# Patient Record
Sex: FEMALE | Race: BLACK OR AFRICAN AMERICAN | NOT HISPANIC OR LATINO | Employment: UNEMPLOYED | URBAN - METROPOLITAN AREA
[De-identification: names, ages, dates, MRNs, and addresses within clinical notes are randomized per-mention and may not be internally consistent; named-entity substitution may affect disease eponyms.]

---

## 2020-08-11 ENCOUNTER — TELEPHONE (OUTPATIENT)
Dept: OPHTHALMOLOGY | Facility: CLINIC | Age: 80
End: 2020-08-11

## 2020-08-11 ENCOUNTER — TELEPHONE (OUTPATIENT)
Dept: OPTOMETRY | Facility: CLINIC | Age: 80
End: 2020-08-11

## 2020-08-11 ENCOUNTER — OFFICE VISIT (OUTPATIENT)
Dept: OPTOMETRY | Facility: CLINIC | Age: 80
End: 2020-08-11
Payer: COMMERCIAL

## 2020-08-11 DIAGNOSIS — E11.36 TYPE 2 DIABETES MELLITUS WITH CATARACT: ICD-10-CM

## 2020-08-11 DIAGNOSIS — H10.13 ALLERGIC CONJUNCTIVITIS OF BOTH EYES: ICD-10-CM

## 2020-08-11 DIAGNOSIS — H04.123 DRY EYE SYNDROME OF BOTH EYES: ICD-10-CM

## 2020-08-11 DIAGNOSIS — H52.4 BILATERAL PRESBYOPIA: Primary | ICD-10-CM

## 2020-08-11 DIAGNOSIS — E11.9 TYPE 2 DIABETES MELLITUS WITHOUT RETINOPATHY: ICD-10-CM

## 2020-08-11 DIAGNOSIS — H40.023 OAG (OPEN ANGLE GLAUCOMA) SUSPECT, HIGH RISK, BILATERAL: ICD-10-CM

## 2020-08-11 DIAGNOSIS — H25.13 SENILE NUCLEAR SCLEROSIS, BILATERAL: ICD-10-CM

## 2020-08-11 PROCEDURE — 92004 COMPRE OPH EXAM NEW PT 1/>: CPT | Mod: S$GLB,,, | Performed by: OPTOMETRIST

## 2020-08-11 PROCEDURE — 99999 PR PBB SHADOW E&M-NEW PATIENT-LVL III: ICD-10-PCS | Mod: PBBFAC,,, | Performed by: OPTOMETRIST

## 2020-08-11 PROCEDURE — 92015 PR REFRACTION: ICD-10-PCS | Mod: S$GLB,,, | Performed by: OPTOMETRIST

## 2020-08-11 PROCEDURE — 92004 PR EYE EXAM, NEW PATIENT,COMPREHESV: ICD-10-PCS | Mod: S$GLB,,, | Performed by: OPTOMETRIST

## 2020-08-11 PROCEDURE — 99999 PR PBB SHADOW E&M-NEW PATIENT-LVL III: CPT | Mod: PBBFAC,,, | Performed by: OPTOMETRIST

## 2020-08-11 PROCEDURE — 92015 DETERMINE REFRACTIVE STATE: CPT | Mod: S$GLB,,, | Performed by: OPTOMETRIST

## 2020-08-11 RX ORDER — ASPIRIN 81 MG/1
81 TABLET ORAL DAILY
Status: ON HOLD | COMMUNITY
End: 2022-11-11 | Stop reason: HOSPADM

## 2020-08-11 RX ORDER — METFORMIN HYDROCHLORIDE 500 MG/1
TABLET ORAL
COMMUNITY
Start: 2020-01-14 | End: 2022-11-14 | Stop reason: ALTCHOICE

## 2020-08-11 RX ORDER — AMLODIPINE BESYLATE 10 MG/1
10 TABLET ORAL DAILY
Status: ON HOLD | COMMUNITY
Start: 2020-06-09 | End: 2022-10-14 | Stop reason: HOSPADM

## 2020-08-11 RX ORDER — GLYBURIDE 2.5 MG/1
2.5 TABLET ORAL DAILY
Status: ON HOLD | COMMUNITY
Start: 2020-02-19 | End: 2022-10-25 | Stop reason: HOSPADM

## 2020-08-11 RX ORDER — ATORVASTATIN CALCIUM 10 MG/1
TABLET, FILM COATED ORAL
Status: ON HOLD | COMMUNITY
Start: 2020-06-29 | End: 2022-10-14 | Stop reason: HOSPADM

## 2020-08-11 RX ORDER — LISINOPRIL AND HYDROCHLOROTHIAZIDE 12.5; 2 MG/1; MG/1
TABLET ORAL
Status: ON HOLD | COMMUNITY
Start: 2020-06-29 | End: 2022-10-14 | Stop reason: HOSPADM

## 2020-08-11 NOTE — TELEPHONE ENCOUNTER
Tried to call pt to schedule a cataract eval with Dr Garsia per Dr Elliott.  No answer, LM for pt to call to schedule.

## 2020-08-11 NOTE — TELEPHONE ENCOUNTER
----- Message from Shila Clarke sent at 8/11/2020  3:38 PM CDT -----  Good afternoon. Pt is being referred for cataract eval. Thank you .

## 2020-08-11 NOTE — PROGRESS NOTES
CHARLES     ELO: 3 yrs ago  Chief complaint (CC): pt stated blurred vision distance but more blurred   at near  Glasses? None- used to wear bifocals but states she doesn't use bc they   dont work- did not bring with her  Contacts? Never   H/o eye surgery, injections or laser: none  H/o eye injury: none  Known eye conditions? none  Family h/o eye conditions? Mother- glaucoma, cataracts  Eye gtts? otc at bid -3 weeks ago but stopped bc they did not work      (--) Flashes (--)  Floaters (--) Mucous   (++)  Tearing (++) Itching (--) Burning   (++) Headaches (--) Eye Pain/discomfort (++) Irritation   (--)  Redness (--) Double vision (++) Blurry vision    Diabetic? NIDDM -metformin & gliperide   A1c? No results found for: HGBA1C.  Not sure tried calling [facility] to get   last a1c. Last seen in January  Did not check today sugar      Last edited by Katarina Guzmán on 8/11/2020  2:31 PM. (History)            Assessment /Plan     For exam results, see Encounter Report.    Allergic conjunctivitis of both eyes    Dry eye syndrome of both eyes    Type 2 diabetes mellitus without retinopathy    Senile nuclear sclerosis, bilateral  -     Ambulatory referral/consult to Ophthalmology; Future; Expected date: 08/18/2020    Type 2 diabetes mellitus with cataract    OAG (open angle glaucoma) suspect, high risk, bilateral  -     Posterior Segment OCT Optic Nerve- Both eyes; Future  -     Guerra Visual Field - OU - Extended - Both Eyes; Future    Bilateral presbyopia      1. Recommend Zaditor or Alaway bid OU and cool compresses to help soothe itching. Patient advised to RTC if condition gets worse.   2. Recommend Systane Ultra or Refresh Optive BID-TID OU to aid with symptoms of dry eyes.  3. BS control. No signs of diabetic retinopathy. Monitor with annual exam.  4-5. Nuclear sclerotic cataract - visually significant OS. Possible that glaucoma is playing a role. Refer to Dr Garsia  7.  (+) FHx- mother. IOP 23 OD, 22 OS> c/d 0.85 OD, 0.75  OS. Educated pt on findings w/understanding. RTC 2-4 weeks IOP/OCT/HVF/pachy.   8. Hold SRx. Minimal improvement with glasses Rx.

## 2020-08-12 ENCOUNTER — CLINICAL SUPPORT (OUTPATIENT)
Dept: OPHTHALMOLOGY | Facility: CLINIC | Age: 80
End: 2020-08-12
Payer: MEDICARE

## 2020-08-12 ENCOUNTER — TELEPHONE (OUTPATIENT)
Dept: OPHTHALMOLOGY | Facility: CLINIC | Age: 80
End: 2020-08-12

## 2020-08-12 ENCOUNTER — OFFICE VISIT (OUTPATIENT)
Dept: OPTOMETRY | Facility: CLINIC | Age: 80
End: 2020-08-12
Payer: MEDICARE

## 2020-08-12 DIAGNOSIS — H40.1131 PRIMARY OPEN ANGLE GLAUCOMA (POAG) OF BOTH EYES, MILD STAGE: ICD-10-CM

## 2020-08-12 DIAGNOSIS — H40.1113 PRIMARY OPEN ANGLE GLAUCOMA (POAG) OF RIGHT EYE, SEVERE STAGE: Primary | ICD-10-CM

## 2020-08-12 DIAGNOSIS — H40.023 OAG (OPEN ANGLE GLAUCOMA) SUSPECT, HIGH RISK, BILATERAL: ICD-10-CM

## 2020-08-12 PROCEDURE — 99999 PR PBB SHADOW E&M-EST. PATIENT-LVL II: ICD-10-PCS | Mod: PBBFAC,,, | Performed by: OPTOMETRIST

## 2020-08-12 PROCEDURE — 92012 PR EYE EXAM, EST PATIENT,INTERMED: ICD-10-PCS | Mod: S$PBB,,, | Performed by: OPTOMETRIST

## 2020-08-12 PROCEDURE — 92133 POSTERIOR SEGMENT OCT OPTIC NERVE(OCULAR COHERENCE TOMOGRAPHY) - OU - BOTH EYES: ICD-10-PCS | Mod: 26,S$PBB,, | Performed by: OPTOMETRIST

## 2020-08-12 PROCEDURE — 99999 PR PBB SHADOW E&M-EST. PATIENT-LVL II: CPT | Mod: PBBFAC,,, | Performed by: OPTOMETRIST

## 2020-08-12 PROCEDURE — 92083 EXTENDED VISUAL FIELD XM: CPT | Mod: PBBFAC

## 2020-08-12 PROCEDURE — 92133 CPTRZD OPH DX IMG PST SGM ON: CPT | Mod: PBBFAC

## 2020-08-12 PROCEDURE — 92012 INTRM OPH EXAM EST PATIENT: CPT | Mod: S$PBB,,, | Performed by: OPTOMETRIST

## 2020-08-12 PROCEDURE — 92083 HUMPHREY VISUAL FIELD - OU - BOTH EYES: ICD-10-PCS | Mod: 26,S$PBB,, | Performed by: OPTOMETRIST

## 2020-08-12 PROCEDURE — 92133 CPTRZD OPH DX IMG PST SGM ON: CPT | Mod: 26,S$PBB,, | Performed by: OPTOMETRIST

## 2020-08-12 PROCEDURE — 92083 EXTENDED VISUAL FIELD XM: CPT | Mod: 26,S$PBB,, | Performed by: OPTOMETRIST

## 2020-08-12 PROCEDURE — 99212 OFFICE O/P EST SF 10 MIN: CPT | Mod: PBBFAC,25 | Performed by: OPTOMETRIST

## 2020-08-12 RX ORDER — TIMOLOL MALEATE 5 MG/ML
1 SOLUTION/ DROPS OPHTHALMIC 2 TIMES DAILY
Qty: 10 ML | Refills: 11 | Status: SHIPPED | OUTPATIENT
Start: 2020-08-12 | End: 2020-09-21

## 2020-08-12 RX ORDER — LATANOPROST 50 UG/ML
1 SOLUTION/ DROPS OPHTHALMIC DAILY
Qty: 2.5 ML | Refills: 11 | Status: SHIPPED | OUTPATIENT
Start: 2020-08-12 | End: 2021-01-22

## 2020-08-12 NOTE — TELEPHONE ENCOUNTER
Tried to call pt to set up an appt with Dr Garsia.  No answer, LM with appt date and time (8/28/20 at 9:00am)and advised pt to call and let us know if that date and time works for her.

## 2020-08-12 NOTE — PROGRESS NOTES
OCT DONE OU     24-2  SS DONE OU     REL & FIX = FAIR OU         COOP =   FAIR     PATIENT DENIES ANY ALLERGIES TO LATEX/ADHESIVES AT THIS TIME      JTHOMAS                     Sphere Cyl[employer] [place] [facility]   Right Colt   20/400 blur   Left +0.50 +0.75 090 20/60+

## 2020-08-14 NOTE — PROGRESS NOTES
HPI     ELO: 3 yrs ago   Chief complaint (CC): here to review oct, hvf and iop   Glasses? None  Contacts? Never   H/o eye surgery, injections or laser: none   H/o eye injury: none   Known eye conditions? none   Family h/o eye conditions? Mother- glaucoma, cataracts   Eye gtts? otc at bid -3 weeks ago but stopped bc they did not work           Last edited by Nicole Cross on 8/12/2020  4:16 PM. (History)            Assessment /Plan     For exam results, see Encounter Report.    Primary open angle glaucoma (POAG) of right eye, severe stage  -     latanoprost (XALATAN) 0.005 % ophthalmic solution; Place 1 drop into both eyes once daily.  Dispense: 2.5 mL; Refill: 11  -     timolol maleate 0.5% (TIMOPTIC) 0.5 % Drop; Place 1 drop into the right eye 2 (two) times daily.  Dispense: 10 mL; Refill: 11    Primary open angle glaucoma (POAG) of both eyes, mild stage  -     latanoprost (XALATAN) 0.005 % ophthalmic solution; Place 1 drop into both eyes once daily.  Dispense: 2.5 mL; Refill: 11  -     timolol maleate 0.5% (TIMOPTIC) 0.5 % Drop; Place 1 drop into the right eye 2 (two) times daily.  Dispense: 10 mL; Refill: 11       (+) FHx- mother. IOP 28 OD, 23 OS. Last IOP 23 OD, 22 OS> c/d 0.85 OD, 0.75 OS. Pt reports possibly being on drops for this previously.   Pachy 579 OD, 608 OS  8/12/2020 OCt OD borderline N, thin T, TI, TS, G, NI, OS thin G, Ti, NI, borderline N and T  8/12/2020 HVF OD severely depressed VF w/some inferior sparing, OS WNL  Educated pt on findings w/understanding.  Consequences of noncompliance and lack of f/u reviewed.  Rx Timolol BiD OD and Latanoprost QhS OU.   RTC 6 weeks IOP.      Pt's daughter will be going to work out of town ([place]?)

## 2020-09-21 ENCOUNTER — OFFICE VISIT (OUTPATIENT)
Dept: OPTOMETRY | Facility: CLINIC | Age: 80
End: 2020-09-21
Payer: MEDICARE

## 2020-09-21 DIAGNOSIS — H40.1121 PRIMARY OPEN ANGLE GLAUCOMA (POAG) OF LEFT EYE, MILD STAGE: ICD-10-CM

## 2020-09-21 DIAGNOSIS — H40.1113 PRIMARY OPEN ANGLE GLAUCOMA (POAG) OF RIGHT EYE, SEVERE STAGE: Primary | ICD-10-CM

## 2020-09-21 DIAGNOSIS — H04.123 DRY EYE SYNDROME OF BOTH EYES: ICD-10-CM

## 2020-09-21 PROCEDURE — 99999 PR PBB SHADOW E&M-EST. PATIENT-LVL III: ICD-10-PCS | Mod: PBBFAC,,, | Performed by: OPTOMETRIST

## 2020-09-21 PROCEDURE — 99213 OFFICE O/P EST LOW 20 MIN: CPT | Mod: PBBFAC | Performed by: OPTOMETRIST

## 2020-09-21 PROCEDURE — 92012 PR EYE EXAM, EST PATIENT,INTERMED: ICD-10-PCS | Mod: S$PBB,,, | Performed by: OPTOMETRIST

## 2020-09-21 PROCEDURE — 99999 PR PBB SHADOW E&M-EST. PATIENT-LVL III: CPT | Mod: PBBFAC,,, | Performed by: OPTOMETRIST

## 2020-09-21 PROCEDURE — 92012 INTRM OPH EXAM EST PATIENT: CPT | Mod: S$PBB,,, | Performed by: OPTOMETRIST

## 2020-09-21 RX ORDER — DORZOLAMIDE HYDROCHLORIDE AND TIMOLOL MALEATE 20; 5 MG/ML; MG/ML
1 SOLUTION/ DROPS OPHTHALMIC 2 TIMES DAILY
Qty: 10 ML | Refills: 11 | Status: SHIPPED | OUTPATIENT
Start: 2020-09-21 | End: 2021-10-18

## 2020-09-21 NOTE — PROGRESS NOTES
HPI     6 wk IOP   Chief complaint (CC): here for iop check no problems or concerns   Doing well with gtts     Latanoprost QHS OU  Timolol BIDOD    Last edited by Katarina Abreu on 9/21/2020  3:12 PM. (History)            Assessment /Plan     For exam results, see Encounter Report.    Primary open angle glaucoma (POAG) of right eye, severe stage  -     dorzolamide-timolol 2-0.5% (COSOPT) 22.3-6.8 mg/mL ophthalmic solution; Place 1 drop into the right eye 2 (two) times daily.  Dispense: 10 mL; Refill: 11    Primary open angle glaucoma (POAG) of left eye, mild stage  -     dorzolamide-timolol 2-0.5% (COSOPT) 22.3-6.8 mg/mL ophthalmic solution; Place 1 drop into the right eye 2 (two) times daily.  Dispense: 10 mL; Refill: 11    Dry eye syndrome of both eyes      1-2. (+) FHx- mother. IOP 17 OD, OS. last IOP 28 OD, 23 OS. Tmax 28 OD, 23 OS. c/d 0.85 OD, 0.75 OS. Pt reports possibly being on drops for this previously.   Pachy 579 OD, 608 OS  8/12/2020 OCt OD borderline N, thin T, TI, TS, G, NI, OS thin G, Ti, NI, borderline N and T  8/12/2020 HVF OD severely depressed VF w/some inferior sparing, OS WNL  Educated pt on findings w/understanding.  Consequences of noncompliance and lack of f/u reviewed.  Cont Latanoprost QhS OU (8/12/2020)   D/c Timolol BiD OD (8/12/2020).  Rx Cosopt Bid OD (start 9/21/2020)   RTC 6 weeks IOP.   3. Recommend Systane Ultra or Refresh Optive BID-TID OU to aid with symptoms of dry eyes.

## 2020-10-23 ENCOUNTER — TELEPHONE (OUTPATIENT)
Dept: OPTOMETRY | Facility: CLINIC | Age: 80
End: 2020-10-23

## 2020-11-04 ENCOUNTER — TELEPHONE (OUTPATIENT)
Dept: OPTOMETRY | Facility: CLINIC | Age: 80
End: 2020-11-04

## 2021-01-10 ENCOUNTER — IMMUNIZATION (OUTPATIENT)
Dept: INTERNAL MEDICINE | Facility: CLINIC | Age: 81
End: 2021-01-10
Payer: MEDICARE

## 2021-01-10 DIAGNOSIS — Z23 NEED FOR VACCINATION: ICD-10-CM

## 2021-01-10 PROCEDURE — 91300 COVID-19, MRNA, LNP-S, PF, 30 MCG/0.3 ML DOSE VACCINE: CPT | Mod: PBBFAC

## 2021-01-31 ENCOUNTER — IMMUNIZATION (OUTPATIENT)
Dept: INTERNAL MEDICINE | Facility: CLINIC | Age: 81
End: 2021-01-31
Payer: MEDICARE

## 2021-01-31 DIAGNOSIS — Z23 NEED FOR VACCINATION: Primary | ICD-10-CM

## 2021-01-31 PROCEDURE — 0002A PR IMMUNIZ ADMIN, SARS-COV-2 COVID-19 VACC, 30MCG/0.3ML, 2ND DOSE: CPT | Mod: PBBFAC

## 2021-01-31 PROCEDURE — 91300 PR SARS-COV- 2 COVID-19 VACCINE, NO PRSV, 30MCG/0.3ML, IM: CPT | Mod: PBBFAC

## 2021-01-31 RX ADMIN — RNA INGREDIENT BNT-162B2 0.3 ML: 0.23 INJECTION, SUSPENSION INTRAMUSCULAR at 09:01

## 2021-03-19 ENCOUNTER — TELEPHONE (OUTPATIENT)
Dept: OPHTHALMOLOGY | Facility: CLINIC | Age: 81
End: 2021-03-19

## 2021-03-22 ENCOUNTER — OFFICE VISIT (OUTPATIENT)
Dept: OPTOMETRY | Facility: CLINIC | Age: 81
End: 2021-03-22
Payer: MEDICARE

## 2021-03-22 DIAGNOSIS — H40.1113 PRIMARY OPEN ANGLE GLAUCOMA (POAG) OF RIGHT EYE, SEVERE STAGE: Primary | ICD-10-CM

## 2021-03-22 DIAGNOSIS — H40.1121 PRIMARY OPEN ANGLE GLAUCOMA (POAG) OF LEFT EYE, MILD STAGE: ICD-10-CM

## 2021-03-22 PROCEDURE — 92012 INTRM OPH EXAM EST PATIENT: CPT | Mod: S$PBB,,, | Performed by: OPTOMETRIST

## 2021-03-22 PROCEDURE — 99999 PR PBB SHADOW E&M-EST. PATIENT-LVL II: CPT | Mod: PBBFAC,,, | Performed by: OPTOMETRIST

## 2021-03-22 PROCEDURE — 92012 PR EYE EXAM, EST PATIENT,INTERMED: ICD-10-PCS | Mod: S$PBB,,, | Performed by: OPTOMETRIST

## 2021-03-22 PROCEDURE — 99999 PR PBB SHADOW E&M-EST. PATIENT-LVL II: ICD-10-PCS | Mod: PBBFAC,,, | Performed by: OPTOMETRIST

## 2021-03-22 PROCEDURE — 99212 OFFICE O/P EST SF 10 MIN: CPT | Mod: PBBFAC | Performed by: OPTOMETRIST

## 2021-03-22 RX ORDER — METFORMIN HYDROCHLORIDE 500 MG/1
TABLET ORAL
COMMUNITY
Start: 2020-11-03 | End: 2021-03-22

## 2021-03-22 RX ORDER — BRIMONIDINE TARTRATE 2 MG/ML
1 SOLUTION/ DROPS OPHTHALMIC 2 TIMES DAILY
Qty: 10 ML | Refills: 11 | Status: SHIPPED | OUTPATIENT
Start: 2021-03-22 | End: 2022-04-25

## 2021-03-22 RX ORDER — LISINOPRIL AND HYDROCHLOROTHIAZIDE 12.5; 2 MG/1; MG/1
TABLET ORAL
COMMUNITY
Start: 2021-01-22 | End: 2021-03-22

## 2021-03-22 RX ORDER — ATORVASTATIN CALCIUM 10 MG/1
TABLET, FILM COATED ORAL
COMMUNITY
Start: 2020-11-16 | End: 2021-03-22

## 2021-03-22 RX ORDER — AMLODIPINE BESYLATE 10 MG/1
TABLET ORAL
COMMUNITY
Start: 2020-11-03 | End: 2021-03-22

## 2021-03-22 RX ORDER — DORZOLAMIDE HYDROCHLORIDE AND TIMOLOL MALEATE PRESERVATIVE FREE 20; 5 MG/ML; MG/ML
SOLUTION/ DROPS OPHTHALMIC
COMMUNITY
End: 2021-03-22

## 2021-03-22 RX ORDER — GLYBURIDE 2.5 MG/1
2.5 TABLET ORAL
COMMUNITY
Start: 2021-02-10 | End: 2021-03-22

## 2021-03-24 ENCOUNTER — TELEPHONE (OUTPATIENT)
Dept: OPTOMETRY | Facility: CLINIC | Age: 81
End: 2021-03-24

## 2021-04-15 ENCOUNTER — TELEPHONE (OUTPATIENT)
Dept: OPHTHALMOLOGY | Facility: CLINIC | Age: 81
End: 2021-04-15

## 2021-05-03 ENCOUNTER — OFFICE VISIT (OUTPATIENT)
Dept: OPTOMETRY | Facility: CLINIC | Age: 81
End: 2021-05-03
Payer: MEDICARE

## 2021-05-03 DIAGNOSIS — H40.1113 PRIMARY OPEN ANGLE GLAUCOMA (POAG) OF RIGHT EYE, SEVERE STAGE: ICD-10-CM

## 2021-05-03 DIAGNOSIS — H40.1121 PRIMARY OPEN ANGLE GLAUCOMA (POAG) OF LEFT EYE, MILD STAGE: Primary | ICD-10-CM

## 2021-05-03 PROCEDURE — 99999 PR PBB SHADOW E&M-EST. PATIENT-LVL II: ICD-10-PCS | Mod: PBBFAC,,, | Performed by: OPTOMETRIST

## 2021-05-03 PROCEDURE — 92012 PR EYE EXAM, EST PATIENT,INTERMED: ICD-10-PCS | Mod: S$PBB,,, | Performed by: OPTOMETRIST

## 2021-05-03 PROCEDURE — 92012 INTRM OPH EXAM EST PATIENT: CPT | Mod: S$PBB,,, | Performed by: OPTOMETRIST

## 2021-05-03 PROCEDURE — 99212 OFFICE O/P EST SF 10 MIN: CPT | Mod: PBBFAC | Performed by: OPTOMETRIST

## 2021-05-03 PROCEDURE — 99999 PR PBB SHADOW E&M-EST. PATIENT-LVL II: CPT | Mod: PBBFAC,,, | Performed by: OPTOMETRIST

## 2021-05-03 RX ORDER — ATORVASTATIN CALCIUM 10 MG/1
TABLET, FILM COATED ORAL
COMMUNITY
Start: 2021-04-05 | End: 2021-05-03

## 2021-05-11 ENCOUNTER — TELEPHONE (OUTPATIENT)
Dept: OPTOMETRY | Facility: CLINIC | Age: 81
End: 2021-05-11

## 2021-06-28 ENCOUNTER — OFFICE VISIT (OUTPATIENT)
Dept: URGENT CARE | Facility: CLINIC | Age: 81
End: 2021-06-28
Payer: MEDICARE

## 2021-06-28 VITALS
WEIGHT: 132 LBS | TEMPERATURE: 98 F | OXYGEN SATURATION: 98 % | SYSTOLIC BLOOD PRESSURE: 156 MMHG | HEART RATE: 76 BPM | RESPIRATION RATE: 16 BRPM | BODY MASS INDEX: 21.99 KG/M2 | DIASTOLIC BLOOD PRESSURE: 83 MMHG | HEIGHT: 65 IN

## 2021-06-28 DIAGNOSIS — I10 HYPERTENSION, UNSPECIFIED TYPE: ICD-10-CM

## 2021-06-28 DIAGNOSIS — L02.511 ABSCESS OF RIGHT THUMB: Primary | ICD-10-CM

## 2021-06-28 PROCEDURE — 99203 PR OFFICE/OUTPT VISIT, NEW, LEVL III, 30-44 MIN: ICD-10-PCS | Mod: 25,S$GLB,, | Performed by: NURSE PRACTITIONER

## 2021-06-28 PROCEDURE — 10060 I&D ABSCESS SIMPLE/SINGLE: CPT | Mod: S$GLB,,, | Performed by: NURSE PRACTITIONER

## 2021-06-28 PROCEDURE — 90715 TDAP VACCINE GREATER THAN OR EQUAL TO 7YO IM: ICD-10-PCS | Mod: S$GLB,,, | Performed by: NURSE PRACTITIONER

## 2021-06-28 PROCEDURE — 90471 TDAP VACCINE GREATER THAN OR EQUAL TO 7YO IM: ICD-10-PCS | Mod: S$GLB,,, | Performed by: NURSE PRACTITIONER

## 2021-06-28 PROCEDURE — 10060 INCISION & DRAINAGE: ICD-10-PCS | Mod: S$GLB,,, | Performed by: NURSE PRACTITIONER

## 2021-06-28 PROCEDURE — 90715 TDAP VACCINE 7 YRS/> IM: CPT | Mod: S$GLB,,, | Performed by: NURSE PRACTITIONER

## 2021-06-28 PROCEDURE — 90471 IMMUNIZATION ADMIN: CPT | Mod: S$GLB,,, | Performed by: NURSE PRACTITIONER

## 2021-06-28 PROCEDURE — 99203 OFFICE O/P NEW LOW 30 MIN: CPT | Mod: 25,S$GLB,, | Performed by: NURSE PRACTITIONER

## 2021-06-28 RX ORDER — SULFAMETHOXAZOLE AND TRIMETHOPRIM 800; 160 MG/1; MG/1
1 TABLET ORAL 2 TIMES DAILY
Qty: 14 TABLET | Refills: 0 | Status: SHIPPED | OUTPATIENT
Start: 2021-06-28 | End: 2021-07-05

## 2021-08-16 ENCOUNTER — OFFICE VISIT (OUTPATIENT)
Dept: OPHTHALMOLOGY | Facility: CLINIC | Age: 81
End: 2021-08-16
Payer: MEDICARE

## 2021-08-16 DIAGNOSIS — H40.1122 PRIMARY OPEN ANGLE GLAUCOMA (POAG) OF LEFT EYE, MODERATE STAGE: ICD-10-CM

## 2021-08-16 DIAGNOSIS — H40.1113 PRIMARY OPEN ANGLE GLAUCOMA (POAG) OF RIGHT EYE, SEVERE STAGE: ICD-10-CM

## 2021-08-16 DIAGNOSIS — H25.13 NUCLEAR SCLEROSIS OF BOTH EYES: Primary | ICD-10-CM

## 2021-08-16 DIAGNOSIS — E11.9 DM TYPE 2 WITHOUT RETINOPATHY: ICD-10-CM

## 2021-08-16 DIAGNOSIS — H43.812 VITREOUS DETACHMENT OF LEFT EYE: ICD-10-CM

## 2021-08-16 DIAGNOSIS — H52.7 REFRACTIVE ERROR: ICD-10-CM

## 2021-08-16 PROCEDURE — 92004 COMPRE OPH EXAM NEW PT 1/>: CPT | Mod: S$PBB,,, | Performed by: OPHTHALMOLOGY

## 2021-08-16 PROCEDURE — 99213 OFFICE O/P EST LOW 20 MIN: CPT | Mod: PBBFAC,PO | Performed by: OPHTHALMOLOGY

## 2021-08-16 PROCEDURE — 92004 PR EYE EXAM, NEW PATIENT,COMPREHESV: ICD-10-PCS | Mod: S$PBB,,, | Performed by: OPHTHALMOLOGY

## 2021-08-16 PROCEDURE — 92136 BIOMETRY: ICD-10-PCS | Mod: 26,S$PBB,RT, | Performed by: OPHTHALMOLOGY

## 2021-08-16 PROCEDURE — 99999 PR PBB SHADOW E&M-EST. PATIENT-LVL III: CPT | Mod: PBBFAC,,, | Performed by: OPHTHALMOLOGY

## 2021-08-16 PROCEDURE — 92136 OPHTHALMIC BIOMETRY: CPT | Mod: PBBFAC,PO | Performed by: OPHTHALMOLOGY

## 2021-08-16 PROCEDURE — 99999 PR PBB SHADOW E&M-EST. PATIENT-LVL III: ICD-10-PCS | Mod: PBBFAC,,, | Performed by: OPHTHALMOLOGY

## 2021-09-29 ENCOUNTER — OFFICE VISIT (OUTPATIENT)
Dept: OPTOMETRY | Facility: CLINIC | Age: 81
End: 2021-09-29
Payer: MEDICARE

## 2021-09-29 DIAGNOSIS — E11.36 TYPE 2 DIABETES MELLITUS WITH CATARACT: ICD-10-CM

## 2021-09-29 DIAGNOSIS — H25.13 SENILE NUCLEAR SCLEROSIS, BILATERAL: ICD-10-CM

## 2021-09-29 DIAGNOSIS — E11.9 TYPE 2 DIABETES MELLITUS WITHOUT RETINOPATHY: Primary | ICD-10-CM

## 2021-09-29 DIAGNOSIS — H40.1113 PRIMARY OPEN ANGLE GLAUCOMA (POAG) OF RIGHT EYE, SEVERE STAGE: ICD-10-CM

## 2021-09-29 DIAGNOSIS — H40.1121 PRIMARY OPEN ANGLE GLAUCOMA (POAG) OF LEFT EYE, MILD STAGE: ICD-10-CM

## 2021-09-29 PROCEDURE — 92083 EXTENDED VISUAL FIELD XM: CPT | Mod: PBBFAC | Performed by: OPTOMETRIST

## 2021-09-29 PROCEDURE — 92133 CPTRZD OPH DX IMG PST SGM ON: CPT | Mod: PBBFAC | Performed by: OPTOMETRIST

## 2021-09-29 PROCEDURE — 99213 OFFICE O/P EST LOW 20 MIN: CPT | Mod: PBBFAC | Performed by: OPTOMETRIST

## 2021-09-29 PROCEDURE — 92014 PR EYE EXAM, EST PATIENT,COMPREHESV: ICD-10-PCS | Mod: S$PBB,,, | Performed by: OPTOMETRIST

## 2021-09-29 PROCEDURE — 99999 PR PBB SHADOW E&M-EST. PATIENT-LVL III: CPT | Mod: PBBFAC,,, | Performed by: OPTOMETRIST

## 2021-09-29 PROCEDURE — 99999 PR PBB SHADOW E&M-EST. PATIENT-LVL III: ICD-10-PCS | Mod: PBBFAC,,, | Performed by: OPTOMETRIST

## 2021-09-29 PROCEDURE — 92014 COMPRE OPH EXAM EST PT 1/>: CPT | Mod: S$PBB,,, | Performed by: OPTOMETRIST

## 2021-09-29 PROCEDURE — 92083 HUMPHREY VISUAL FIELD - OU - BOTH EYES: ICD-10-PCS | Mod: 26,S$PBB,, | Performed by: OPTOMETRIST

## 2021-09-29 PROCEDURE — 92133 POSTERIOR SEGMENT OCT OPTIC NERVE(OCULAR COHERENCE TOMOGRAPHY) - OU - BOTH EYES: ICD-10-PCS | Mod: 26,S$PBB,, | Performed by: OPTOMETRIST

## 2021-10-16 ENCOUNTER — IMMUNIZATION (OUTPATIENT)
Dept: INTERNAL MEDICINE | Facility: CLINIC | Age: 81
End: 2021-10-16
Payer: MEDICARE

## 2021-10-16 DIAGNOSIS — Z23 NEED FOR VACCINATION: Primary | ICD-10-CM

## 2021-10-16 PROCEDURE — 0003A COVID-19, MRNA, LNP-S, PF, 30 MCG/0.3 ML DOSE VACCINE: CPT | Mod: CV19,PBBFAC | Performed by: INTERNAL MEDICINE

## 2021-10-16 PROCEDURE — 91300 COVID-19, MRNA, LNP-S, PF, 30 MCG/0.3 ML DOSE VACCINE: CPT | Mod: PBBFAC

## 2021-11-30 ENCOUNTER — TELEPHONE (OUTPATIENT)
Dept: OPHTHALMOLOGY | Facility: CLINIC | Age: 81
End: 2021-11-30
Payer: MEDICARE

## 2021-12-02 ENCOUNTER — TELEPHONE (OUTPATIENT)
Dept: OPHTHALMOLOGY | Facility: CLINIC | Age: 81
End: 2021-12-02
Payer: MEDICARE

## 2021-12-06 ENCOUNTER — PATIENT MESSAGE (OUTPATIENT)
Dept: OPHTHALMOLOGY | Facility: CLINIC | Age: 81
End: 2021-12-06
Payer: MEDICARE

## 2021-12-06 ENCOUNTER — OFFICE VISIT (OUTPATIENT)
Dept: OPTOMETRY | Facility: CLINIC | Age: 81
End: 2021-12-06
Payer: MEDICARE

## 2021-12-06 ENCOUNTER — TELEPHONE (OUTPATIENT)
Dept: OPHTHALMOLOGY | Facility: CLINIC | Age: 81
End: 2021-12-06
Payer: MEDICARE

## 2021-12-06 DIAGNOSIS — H25.13 NUCLEAR SCLEROTIC CATARACT OF BOTH EYES: Primary | ICD-10-CM

## 2021-12-06 DIAGNOSIS — H40.1121 PRIMARY OPEN ANGLE GLAUCOMA (POAG) OF LEFT EYE, MILD STAGE: Primary | ICD-10-CM

## 2021-12-06 DIAGNOSIS — H40.1113 PRIMARY OPEN ANGLE GLAUCOMA (POAG) OF RIGHT EYE, SEVERE STAGE: ICD-10-CM

## 2021-12-06 PROCEDURE — 99999 PR PBB SHADOW E&M-EST. PATIENT-LVL III: CPT | Mod: PBBFAC,,, | Performed by: OPTOMETRIST

## 2021-12-06 PROCEDURE — 92012 INTRM OPH EXAM EST PATIENT: CPT | Mod: S$PBB,,, | Performed by: OPTOMETRIST

## 2021-12-06 PROCEDURE — 99213 OFFICE O/P EST LOW 20 MIN: CPT | Mod: PBBFAC | Performed by: OPTOMETRIST

## 2021-12-06 PROCEDURE — 92012 PR EYE EXAM, EST PATIENT,INTERMED: ICD-10-PCS | Mod: S$PBB,,, | Performed by: OPTOMETRIST

## 2021-12-06 PROCEDURE — 99999 PR PBB SHADOW E&M-EST. PATIENT-LVL III: ICD-10-PCS | Mod: PBBFAC,,, | Performed by: OPTOMETRIST

## 2021-12-06 RX ORDER — DIFLUPREDNATE OPHTHALMIC 0.5 MG/ML
1 EMULSION OPHTHALMIC 4 TIMES DAILY
Qty: 5 ML | Refills: 1 | Status: SHIPPED | OUTPATIENT
Start: 2021-12-21 | End: 2022-01-20

## 2021-12-06 RX ORDER — NEPAFENAC 3 MG/ML
1 SUSPENSION/ DROPS OPHTHALMIC DAILY
Qty: 3 ML | Refills: 1 | Status: SHIPPED | OUTPATIENT
Start: 2021-12-18 | End: 2022-01-17

## 2021-12-06 RX ORDER — OFLOXACIN 3 MG/ML
1 SOLUTION/ DROPS OPHTHALMIC 4 TIMES DAILY
Qty: 5 ML | Refills: 1 | Status: SHIPPED | OUTPATIENT
Start: 2021-12-18 | End: 2021-12-28

## 2021-12-08 ENCOUNTER — TELEPHONE (OUTPATIENT)
Dept: OPHTHALMOLOGY | Facility: CLINIC | Age: 81
End: 2021-12-08
Payer: MEDICARE

## 2021-12-08 DIAGNOSIS — H25.12 NS (NUCLEAR SCLEROSIS), LEFT: Primary | ICD-10-CM

## 2021-12-17 ENCOUNTER — TELEPHONE (OUTPATIENT)
Dept: OPHTHALMOLOGY | Facility: CLINIC | Age: 81
End: 2021-12-17
Payer: MEDICARE

## 2021-12-21 ENCOUNTER — HOSPITAL ENCOUNTER (OUTPATIENT)
Facility: OTHER | Age: 81
Discharge: HOME | End: 2021-12-21
Attending: OPHTHALMOLOGY | Admitting: OPHTHALMOLOGY
Payer: MEDICARE

## 2021-12-21 ENCOUNTER — ANESTHESIA EVENT (OUTPATIENT)
Dept: SURGERY | Facility: OTHER | Age: 81
End: 2021-12-21
Payer: MEDICARE

## 2021-12-21 ENCOUNTER — ANESTHESIA (OUTPATIENT)
Dept: SURGERY | Facility: OTHER | Age: 81
End: 2021-12-21
Payer: MEDICARE

## 2021-12-21 VITALS
HEIGHT: 65 IN | RESPIRATION RATE: 18 BRPM | OXYGEN SATURATION: 99 % | DIASTOLIC BLOOD PRESSURE: 85 MMHG | HEART RATE: 67 BPM | BODY MASS INDEX: 21.66 KG/M2 | SYSTOLIC BLOOD PRESSURE: 170 MMHG | TEMPERATURE: 98 F | WEIGHT: 130 LBS

## 2021-12-21 DIAGNOSIS — H25.12 NUCLEAR SCLEROTIC CATARACT OF LEFT EYE: Primary | ICD-10-CM

## 2021-12-21 DIAGNOSIS — H25.12 NS (NUCLEAR SCLEROSIS), LEFT: ICD-10-CM

## 2021-12-21 PROCEDURE — 25000003 PHARM REV CODE 250: Performed by: OPHTHALMOLOGY

## 2021-12-21 PROCEDURE — 63600175 PHARM REV CODE 636 W HCPCS: Performed by: OPHTHALMOLOGY

## 2021-12-21 PROCEDURE — 63600175 PHARM REV CODE 636 W HCPCS: Performed by: NURSE ANESTHETIST, CERTIFIED REGISTERED

## 2021-12-21 PROCEDURE — 37000009 HC ANESTHESIA EA ADD 15 MINS: Performed by: OPHTHALMOLOGY

## 2021-12-21 PROCEDURE — 66984 PR REMOVAL, CATARACT, W/INSRT INTRAOC LENS, W/O ENDO CYCLO: ICD-10-PCS | Mod: LT,,, | Performed by: OPHTHALMOLOGY

## 2021-12-21 PROCEDURE — 66984 XCAPSL CTRC RMVL W/O ECP: CPT | Mod: LT,,, | Performed by: OPHTHALMOLOGY

## 2021-12-21 PROCEDURE — 36000707: Performed by: OPHTHALMOLOGY

## 2021-12-21 PROCEDURE — 37000008 HC ANESTHESIA 1ST 15 MINUTES: Performed by: OPHTHALMOLOGY

## 2021-12-21 PROCEDURE — 71000015 HC POSTOP RECOV 1ST HR: Performed by: OPHTHALMOLOGY

## 2021-12-21 PROCEDURE — 36000706: Performed by: OPHTHALMOLOGY

## 2021-12-21 PROCEDURE — V2632 POST CHMBR INTRAOCULAR LENS: HCPCS | Performed by: OPHTHALMOLOGY

## 2021-12-21 DEVICE — LENS CLAREON AUTONOME 22.0D: Type: IMPLANTABLE DEVICE | Site: EYE | Status: FUNCTIONAL

## 2021-12-21 RX ORDER — HYDROCODONE BITARTRATE AND ACETAMINOPHEN 5; 325 MG/1; MG/1
1 TABLET ORAL EVERY 4 HOURS PRN
Status: CANCELLED | OUTPATIENT
Start: 2021-12-21

## 2021-12-21 RX ORDER — PHENYLEPHRINE HYDROCHLORIDE 25 MG/ML
1 SOLUTION/ DROPS OPHTHALMIC
Status: COMPLETED | OUTPATIENT
Start: 2021-12-21 | End: 2021-12-21

## 2021-12-21 RX ORDER — TETRACAINE HYDROCHLORIDE 5 MG/ML
1 SOLUTION OPHTHALMIC
Status: COMPLETED | OUTPATIENT
Start: 2021-12-21 | End: 2021-12-21

## 2021-12-21 RX ORDER — TETRACAINE HYDROCHLORIDE 5 MG/ML
SOLUTION OPHTHALMIC
Status: DISCONTINUED | OUTPATIENT
Start: 2021-12-21 | End: 2021-12-21 | Stop reason: HOSPADM

## 2021-12-21 RX ORDER — LIDOCAINE HYDROCHLORIDE 40 MG/ML
INJECTION, SOLUTION RETROBULBAR
Status: DISCONTINUED | OUTPATIENT
Start: 2021-12-21 | End: 2021-12-21 | Stop reason: HOSPADM

## 2021-12-21 RX ORDER — ACETAMINOPHEN 325 MG/1
650 TABLET ORAL EVERY 4 HOURS PRN
Status: CANCELLED | OUTPATIENT
Start: 2021-12-21

## 2021-12-21 RX ORDER — SODIUM CHLORIDE 0.9 % (FLUSH) 0.9 %
10 SYRINGE (ML) INJECTION
Status: DISCONTINUED | OUTPATIENT
Start: 2021-12-21 | End: 2021-12-21 | Stop reason: HOSPADM

## 2021-12-21 RX ORDER — CYCLOPENTOLATE HYDROCHLORIDE 10 MG/ML
1 SOLUTION/ DROPS OPHTHALMIC
Status: DISCONTINUED | OUTPATIENT
Start: 2021-12-21 | End: 2021-12-21 | Stop reason: HOSPADM

## 2021-12-21 RX ORDER — MIDAZOLAM HYDROCHLORIDE 1 MG/ML
INJECTION INTRAMUSCULAR; INTRAVENOUS
Status: DISCONTINUED | OUTPATIENT
Start: 2021-12-21 | End: 2021-12-21

## 2021-12-21 RX ORDER — PREDNISOLONE ACETATE 10 MG/ML
SUSPENSION/ DROPS OPHTHALMIC
Status: DISCONTINUED | OUTPATIENT
Start: 2021-12-21 | End: 2021-12-21 | Stop reason: HOSPADM

## 2021-12-21 RX ORDER — EPINEPHRINE 1 MG/ML
INJECTION, SOLUTION INTRACARDIAC; INTRAMUSCULAR; INTRAVENOUS; SUBCUTANEOUS
Status: DISCONTINUED | OUTPATIENT
Start: 2021-12-21 | End: 2021-12-21 | Stop reason: HOSPADM

## 2021-12-21 RX ORDER — TROPICAMIDE 10 MG/ML
1 SOLUTION/ DROPS OPHTHALMIC
Status: COMPLETED | OUTPATIENT
Start: 2021-12-21 | End: 2021-12-21

## 2021-12-21 RX ORDER — OFLOXACIN 3 MG/ML
1 SOLUTION/ DROPS OPHTHALMIC
Status: DISCONTINUED | OUTPATIENT
Start: 2021-12-21 | End: 2021-12-21 | Stop reason: HOSPADM

## 2021-12-21 RX ADMIN — MIDAZOLAM HYDROCHLORIDE 1 MG: 1 INJECTION, SOLUTION INTRAMUSCULAR; INTRAVENOUS at 02:12

## 2021-12-21 RX ADMIN — TETRACAINE HYDROCHLORIDE 1 DROP: 5 SOLUTION OPHTHALMIC at 11:12

## 2021-12-21 RX ADMIN — PHENYLEPHRINE HYDROCHLORIDE 1 DROP: 25 SOLUTION/ DROPS OPHTHALMIC at 11:12

## 2021-12-21 RX ADMIN — TROPICAMIDE 1 DROP: 10 SOLUTION/ DROPS OPHTHALMIC at 11:12

## 2021-12-21 RX ADMIN — OFLOXACIN 1 DROP: 3 SOLUTION OPHTHALMIC at 11:12

## 2021-12-21 RX ADMIN — CYCLOPENTOLATE HYDROCHLORIDE 1 DROP: 10 SOLUTION/ DROPS OPHTHALMIC at 11:12

## 2021-12-22 ENCOUNTER — OFFICE VISIT (OUTPATIENT)
Dept: OPHTHALMOLOGY | Facility: CLINIC | Age: 81
End: 2021-12-22
Payer: MEDICARE

## 2021-12-22 DIAGNOSIS — Z98.890 POST-OPERATIVE STATE: Primary | ICD-10-CM

## 2021-12-22 DIAGNOSIS — H40.1122 PRIMARY OPEN ANGLE GLAUCOMA (POAG) OF LEFT EYE, MODERATE STAGE: ICD-10-CM

## 2021-12-22 LAB — POCT GLUCOSE: 152 MG/DL (ref 70–110)

## 2021-12-22 PROCEDURE — 99024 PR POST-OP FOLLOW-UP VISIT: ICD-10-PCS | Mod: POP,,, | Performed by: OPHTHALMOLOGY

## 2021-12-22 PROCEDURE — 99024 POSTOP FOLLOW-UP VISIT: CPT | Mod: POP,,, | Performed by: OPHTHALMOLOGY

## 2021-12-22 PROCEDURE — 99213 OFFICE O/P EST LOW 20 MIN: CPT | Mod: PBBFAC | Performed by: OPHTHALMOLOGY

## 2021-12-22 PROCEDURE — 99999 PR PBB SHADOW E&M-EST. PATIENT-LVL III: ICD-10-PCS | Mod: PBBFAC,,, | Performed by: OPHTHALMOLOGY

## 2021-12-22 PROCEDURE — 99999 PR PBB SHADOW E&M-EST. PATIENT-LVL III: CPT | Mod: PBBFAC,,, | Performed by: OPHTHALMOLOGY

## 2021-12-29 ENCOUNTER — OFFICE VISIT (OUTPATIENT)
Dept: OPTOMETRY | Facility: CLINIC | Age: 81
End: 2021-12-29
Payer: MEDICARE

## 2021-12-29 DIAGNOSIS — Z96.1 PRESENCE OF INTRAOCULAR LENS: Primary | ICD-10-CM

## 2021-12-29 PROCEDURE — 99999 PR PBB SHADOW E&M-EST. PATIENT-LVL I: CPT | Mod: PBBFAC,,, | Performed by: OPTOMETRIST

## 2021-12-29 PROCEDURE — 99999 PR PBB SHADOW E&M-EST. PATIENT-LVL I: ICD-10-PCS | Mod: PBBFAC,,, | Performed by: OPTOMETRIST

## 2021-12-29 PROCEDURE — 99211 OFF/OP EST MAY X REQ PHY/QHP: CPT | Mod: PBBFAC | Performed by: OPTOMETRIST

## 2021-12-29 PROCEDURE — 99024 POSTOP FOLLOW-UP VISIT: CPT | Mod: POP,,, | Performed by: OPTOMETRIST

## 2021-12-29 PROCEDURE — 99024 PR POST-OP FOLLOW-UP VISIT: ICD-10-PCS | Mod: POP,,, | Performed by: OPTOMETRIST

## 2022-01-19 ENCOUNTER — TELEPHONE (OUTPATIENT)
Dept: OPHTHALMOLOGY | Facility: CLINIC | Age: 82
End: 2022-01-19
Payer: MEDICARE

## 2022-01-19 NOTE — TELEPHONE ENCOUNTER
Call left message regarding appt time  ----- Message from Cece Nam sent at 1/19/2022  2:26 PM CST -----  Contact: PT @ [phone]  Pts daughter is calling about her p/o appt on 1/26/22. She said it was supposed to be for 3pm since she has work, but sees it's for the morning. She is asking if it can be changed for 3pm since she has to work and then bring pt? Pls call her back to assist.

## 2022-01-26 ENCOUNTER — OFFICE VISIT (OUTPATIENT)
Dept: OPHTHALMOLOGY | Facility: CLINIC | Age: 82
End: 2022-01-26
Payer: MEDICARE

## 2022-01-26 DIAGNOSIS — Z98.890 POST-OPERATIVE STATE: Primary | ICD-10-CM

## 2022-01-26 DIAGNOSIS — H52.7 REFRACTIVE ERROR: ICD-10-CM

## 2022-01-26 DIAGNOSIS — H40.1113 PRIMARY OPEN ANGLE GLAUCOMA (POAG) OF RIGHT EYE, SEVERE STAGE: ICD-10-CM

## 2022-01-26 DIAGNOSIS — H25.11 NUCLEAR SCLEROSIS OF RIGHT EYE: ICD-10-CM

## 2022-01-26 DIAGNOSIS — H40.1122 PRIMARY OPEN ANGLE GLAUCOMA (POAG) OF LEFT EYE, MODERATE STAGE: ICD-10-CM

## 2022-01-26 PROCEDURE — 99999 PR PBB SHADOW E&M-EST. PATIENT-LVL III: ICD-10-PCS | Mod: PBBFAC,,, | Performed by: OPHTHALMOLOGY

## 2022-01-26 PROCEDURE — 99024 POSTOP FOLLOW-UP VISIT: CPT | Mod: POP,,, | Performed by: OPHTHALMOLOGY

## 2022-01-26 PROCEDURE — 99999 PR PBB SHADOW E&M-EST. PATIENT-LVL III: CPT | Mod: PBBFAC,,, | Performed by: OPHTHALMOLOGY

## 2022-01-26 PROCEDURE — 99213 OFFICE O/P EST LOW 20 MIN: CPT | Mod: PBBFAC | Performed by: OPHTHALMOLOGY

## 2022-01-26 PROCEDURE — 99024 PR POST-OP FOLLOW-UP VISIT: ICD-10-PCS | Mod: POP,,, | Performed by: OPHTHALMOLOGY

## 2022-01-27 NOTE — PROGRESS NOTES
Subjective:       Patient ID: Radha Feng is a 81 y.o. female.    Chief Complaint: Post-op Evaluation    HPI     S/p phaco w/IOL OS- 12/21/2021    80 y/o female is here for 1mth  post op of the LT eye. Denies pain and   discomfort.  .   Eyemeds  Ofloxacin QID OS  Ilevro QD OS  Durezol QID OS    Last edited by Jessica Feng on 1/26/2022 10:57 AM. (History)             Assessment:       1. Post-operative state    2. Primary open angle glaucoma (POAG) of left eye, moderate stage    3. Primary open angle glaucoma (POAG) of right eye, severe stage    4. Nuclear sclerosis of right eye    5. Refractive error        Plan:       S/p CE OS- Doing well.     POAG OU-IOP is excellent OS today.  Cataract OD- Not visually significant per Pt.  RE-Pt wants MRx.      Cont Latanoprost, Cosopt & Brimonidine OU.  Give MRx.  RTC Dr Elliott in April as scheduled.

## 2022-04-11 ENCOUNTER — OFFICE VISIT (OUTPATIENT)
Dept: OPTOMETRY | Facility: CLINIC | Age: 82
End: 2022-04-11
Payer: MEDICARE

## 2022-04-11 DIAGNOSIS — H40.1121 PRIMARY OPEN ANGLE GLAUCOMA (POAG) OF LEFT EYE, MILD STAGE: Primary | ICD-10-CM

## 2022-04-11 DIAGNOSIS — H40.1113 PRIMARY OPEN ANGLE GLAUCOMA (POAG) OF RIGHT EYE, SEVERE STAGE: ICD-10-CM

## 2022-04-11 PROCEDURE — 92133 POSTERIOR SEGMENT OCT OPTIC NERVE(OCULAR COHERENCE TOMOGRAPHY) - OU - BOTH EYES: ICD-10-PCS | Mod: 26,S$PBB,, | Performed by: OPTOMETRIST

## 2022-04-11 PROCEDURE — 92012 PR EYE EXAM, EST PATIENT,INTERMED: ICD-10-PCS | Mod: S$PBB,,, | Performed by: OPTOMETRIST

## 2022-04-11 PROCEDURE — 92012 INTRM OPH EXAM EST PATIENT: CPT | Mod: S$PBB,,, | Performed by: OPTOMETRIST

## 2022-04-11 PROCEDURE — 99213 OFFICE O/P EST LOW 20 MIN: CPT | Mod: PBBFAC | Performed by: OPTOMETRIST

## 2022-04-11 PROCEDURE — 99999 PR PBB SHADOW E&M-EST. PATIENT-LVL III: CPT | Mod: PBBFAC,,, | Performed by: OPTOMETRIST

## 2022-04-11 PROCEDURE — 99999 PR PBB SHADOW E&M-EST. PATIENT-LVL III: ICD-10-PCS | Mod: PBBFAC,,, | Performed by: OPTOMETRIST

## 2022-04-11 PROCEDURE — 92133 CPTRZD OPH DX IMG PST SGM ON: CPT | Mod: PBBFAC | Performed by: OPTOMETRIST

## 2022-04-11 NOTE — PROGRESS NOTES
HPI     ELO: 4 months  Chief complaint (CC): IOP Check  Glasses? +  Contacts? -  H/o eye surgery, injections or laser: +  H/o eye injury: -  Known eye conditions? POAG  Family h/o eye conditions? -  Eye gtts? +      (-) Flashes (-)  Floaters (-) Mucous   (-)  Tearing (-) Itching (-) Burning   (-) Headaches (-) Eye Pain/discomfort (-) Irritation   (-)  Redness (-) Double vision (+) Blurry vision    Diabetic? Yes  A1c? Hemoglobin A1c       Date                     Value               Ref Range             Status                01/13/2022               5.3                 <5.7 % of tota*       Final              Comment:    For the purpose of screening for the presence of  diabetes:    <5.7%         Consistent with the absence of diabetes  5.7-6.4%    Consistent with   increased risk for diabetes              (prediabetes)  > or =6.5%    Consistent with diabetes    This assay result is consistent with a   decreased risk  of diabetes.    Currently, no consensus exists regarding   use of  hemoglobin A1c for diagnosis of diabetes in children.    According   to American Diabetes Association (ADA)  guidelines, hemoglobin A1c <7.0%   represents optimal  control in non-pregnant diabetic patients.   Different  metrics may apply to specific patient populations.   Standards   of Medical Care in Diabetes(ADA).           11/30/2020               5.6                 <5.7 % of tota*       Final              Comment:    For the purpose of screening for the presence of  diabetes:    <5.7%         Consistent with the absence of diabetes  5.7-6.4%    Consistent with   increased risk for diabetes              (prediabetes)  > or =6.5%    Consistent with diabetes    This assay result is consistent with a   decreased risk  of diabetes.    Currently, no consensus exists regarding   use of  hemoglobin A1c for diagnosis of diabetes in children.    According   to American Diabetes Association (ADA)  guidelines, hemoglobin A1c <7.0%    represents optimal  control in non-pregnant diabetic patients.   Different  metrics may apply to specific patient populations.   Standards   of Medical Care in Diabetes(ADA).      ----------         Last edited by Armand Macias on 4/11/2022  3:51 PM. (History)            Assessment /Plan     For exam results, see Encounter Report.    Primary open angle glaucoma (POAG) of left eye, mild stage  -     Posterior Segment OCT Optic Nerve- Both eyes; Future  -     Cancel: Guerra Visual Field - OU - Extended - Both Eyes; Future    Primary open angle glaucoma (POAG) of right eye, severe stage  -     Posterior Segment OCT Optic Nerve- Both eyes; Future  -     Cancel: Guerra Visual Field - OU - Extended - Both Eyes; Future      1-2. (+) FHx- mother. IOP 18 OD, OS. Last 13 OD, 17 OS. Tmax 28 OD, 23 OS. c/d 0.85 OD, 0.75 OS. Pt reports possibly being on drops for this previously.   Pachy 579 OD, 608 OS  9/29/2021 OCt OD borderline NI , thin T, TI, TS, G, N (NS is normal today with value of 73 but was prev 85) OS thin G, Ti, NI 27 (prev 40), borderline N and TS  81 (prev 94)  4/11/2022 OCT OD borderline NI, Thin T, TI, TS, G, OS thin G, TI 65 (prev 48), T, borderline TS and NI 60 (prev 27)  9/29/2021 HVF OD severely depressed VF w/some inferior sparing, OS inferionasal step  Educated pt on findings w/understanding.  Consequences of noncompliance and lack of f/u reviewed.  D/c Timolol BiD OD (8/12/2020).  Cont Latanoprost QhS OU (8/12/2020) and cont Cosopt BiD OU (started 9/21/2020)  No noticeable change in IOP w/Cosopt added.   Cont Brimonidine BID OD (started 3/22/2021)  RTC 4 mo Routine-24-2 charmaine faster HvF

## 2022-05-10 ENCOUNTER — TELEPHONE (OUTPATIENT)
Dept: VASCULAR SURGERY | Facility: CLINIC | Age: 82
End: 2022-05-10
Payer: MEDICARE

## 2022-05-10 DIAGNOSIS — I73.9 PVD (PERIPHERAL VASCULAR DISEASE): Primary | ICD-10-CM

## 2022-05-10 NOTE — TELEPHONE ENCOUNTER
Received call from pt's daughter who states patient is complaining of right leg swelling and severe pain. States patient has diminished right pedal pulse and foot is cool to touch compared to left. Would like to be seen by Dr. Cruz. Appointment scheduled, daughter verified. Appointment letter placed in mail.

## 2022-05-23 ENCOUNTER — INITIAL CONSULT (OUTPATIENT)
Dept: VASCULAR SURGERY | Facility: CLINIC | Age: 82
End: 2022-05-23
Payer: MEDICARE

## 2022-05-23 ENCOUNTER — HOSPITAL ENCOUNTER (OUTPATIENT)
Dept: VASCULAR SURGERY | Facility: CLINIC | Age: 82
Discharge: HOME | End: 2022-05-23
Attending: SURGERY
Payer: MEDICARE

## 2022-05-23 VITALS
HEIGHT: 66 IN | TEMPERATURE: 98 F | BODY MASS INDEX: 18.79 KG/M2 | SYSTOLIC BLOOD PRESSURE: 124 MMHG | HEART RATE: 71 BPM | WEIGHT: 116.88 LBS | DIASTOLIC BLOOD PRESSURE: 71 MMHG

## 2022-05-23 DIAGNOSIS — G89.29 CHRONIC PAIN OF LEFT KNEE: Primary | ICD-10-CM

## 2022-05-23 DIAGNOSIS — I73.9 PVD (PERIPHERAL VASCULAR DISEASE): ICD-10-CM

## 2022-05-23 DIAGNOSIS — M25.562 CHRONIC PAIN OF LEFT KNEE: Primary | ICD-10-CM

## 2022-05-23 PROCEDURE — 99202 PR OFFICE/OUTPT VISIT, NEW, LEVL II, 15-29 MIN: ICD-10-PCS | Mod: S$PBB,,, | Performed by: SURGERY

## 2022-05-23 PROCEDURE — 99999 PR PBB SHADOW E&M-EST. PATIENT-LVL III: ICD-10-PCS | Mod: PBBFAC,,, | Performed by: SURGERY

## 2022-05-23 PROCEDURE — 99999 PR PBB SHADOW E&M-EST. PATIENT-LVL III: CPT | Mod: PBBFAC,,, | Performed by: SURGERY

## 2022-05-23 PROCEDURE — 93923 UPR/LXTR ART STDY 3+ LVLS: CPT | Mod: 26,S$PBB,, | Performed by: SURGERY

## 2022-05-23 PROCEDURE — 93923 UPR/LXTR ART STDY 3+ LVLS: CPT | Mod: PBBFAC | Performed by: SURGERY

## 2022-05-23 PROCEDURE — 99213 OFFICE O/P EST LOW 20 MIN: CPT | Mod: PBBFAC,25 | Performed by: SURGERY

## 2022-05-23 PROCEDURE — 93923 PR NON-INVASIVE PHYSIOLOGIC STUDY EXTREMITY 3 LEVELS: ICD-10-PCS | Mod: 26,S$PBB,, | Performed by: SURGERY

## 2022-05-23 PROCEDURE — 99202 OFFICE O/P NEW SF 15 MIN: CPT | Mod: S$PBB,,, | Performed by: SURGERY

## 2022-05-23 RX ORDER — MELOXICAM 15 MG/1
15 TABLET ORAL NIGHTLY
Qty: 11 TABLET | Refills: 0 | Status: SHIPPED | OUTPATIENT
Start: 2022-05-23 | End: 2022-06-03

## 2022-05-23 NOTE — PROGRESS NOTES
Radha Feng  05/23/2022    HPI:  Patient is a 81 y.o. female with a h/o DM (A1c 5.3), HTN, cataracts who is here today for evaluation of  RLE knee pain. Of Note, Dr. Cruz repaired her 's Thoracic and abdominal aneurysms in approximately 2011.    Patient has had knee pain for approximately 7 months. This has been progressive. The pain is behind the knee and lateral. Sometimes it radiates proximally to the thigh. It does not worsen with activity nor does it improve with rest. NSAIDs and tylenol have not helped.    No MI/stroke  Tobacco use: 1 ppd x40 years, quit 25 years ago.    Past Medical History:   Diagnosis Date    Cataract     Diabetes mellitus     Glaucoma     Hypertension      Past Surgical History:   Procedure Laterality Date    CATARACT EXTRACTION W/  INTRAOCULAR LENS IMPLANT Left 12/21/2021    Procedure: EXTRACTION, CATARACT, WITH IOL INSERTION;  Surgeon: Shay Chou MD;  Location: [facility];  Service: Ophthalmology;  Laterality: Left;     Family History   Problem Relation Age of Onset    Cataracts Mother     Diabetes Mother     Glaucoma Mother     Hypertension Mother     Cancer Sister     Blindness Cousin     Amblyopia Neg Hx     Macular degeneration Neg Hx     Retinal detachment Neg Hx      Social History     Socioeconomic History    Marital status: Single   Tobacco Use    Smoking status: Former Smoker    Smokeless tobacco: Never Used   Substance and Sexual Activity    Alcohol use: Yes     Alcohol/week: 3.0 standard drinks     Types: 3 Cans of beer per week    Drug use: Never       Current Outpatient Medications:     amLODIPine (NORVASC) 10 MG tablet, Take 10 mg by mouth once daily., Disp: , Rfl:     aspirin (ECOTRIN) 81 MG EC tablet, Take 81 mg by mouth once daily., Disp: , Rfl:     atorvastatin (LIPITOR) 10 MG tablet, TAKE 1 TABLET BY MOUTH EVERY DAY, Disp: , Rfl:     brimonidine 0.2% (ALPHAGAN) 0.2 % Drop, INSTILL 1 DROP INTO RIGHT EYE TWICE A DAY, Disp: 30  mL, Rfl: 3    dorzolamide-timolol 2-0.5% (COSOPT) 22.3-6.8 mg/mL ophthalmic solution, INSTILL 1 DROP INTO RIGHT EYE TWICE A DAY, Disp: 30 mL, Rfl: 3    glyBURIDE (DIABETA) 2.5 MG tablet, Take 2.5 mg by mouth 2 (two) times daily with meals., Disp: , Rfl:     latanoprost 0.005 % ophthalmic solution, PLACE 1 DROP INTO BOTH EYES ONCE DAILY., Disp: 7.5 mL, Rfl: 3    lisinopriL-hydrochlorothiazide (PRINZIDE,ZESTORETIC) 20-12.5 mg per tablet, TAKE 1 TABLET BY MOUTH TWICE A DAY, Disp: , Rfl:     metFORMIN (GLUCOPHAGE) 500 MG tablet, TAKE 1 TABLET BY MOUTH TWICE A DAY, Disp: , Rfl:     REVIEW OF SYSTEMS:  General: negative; ENT: negative; Allergy and Immunology: negative; Hematological and Lymphatic: Negative; Endocrine: negative; Respiratory: no cough, shortness of breath, or wheezing; Cardiovascular: no chest pain or dyspnea on exertion; Gastrointestinal: no abdominal pain/back, change in bowel habits, or bloody stools; Genito-Urinary: no dysuria, trouble voiding, or hematuria; Musculoskeletal: negative  Neurological: no TIA or stroke symptoms; Psychiatric: no nervousness, anxiety or depression.    PHYSICAL EXAM:   Right Arm BP - Sittin/71 (22 1500)  Left Arm BP - Sittin/68 (22 1500)  Pulse: 71  Temp: 98 °F (36.7 °C)      General appearance:  Alert, well-appearing, and in no distress.  Oriented to person, place, and time   Neurological: Normal speech, no focal findings noted; CN II - XII grossly intact           Musculoskeletal: Digits/nail without cyanosis/clubbing.  Normal muscle strength/tone.                 Neck: Supple, no significant adenopathy; thyroid is not enlarged                Chest:  Clear to auscultation, no wheezes, rales or rhonchi, symmetric air entry     No use of accessory muscles             Cardiac: Normal rate and regular rhythm, S1 and S2 normal; PMI non-displaced          Abdomen: Soft, nontender, nondistended, no masses or organomegaly     No rebound tenderness noted;  bowel sounds normal     Pulsatile aortic mass is not palpable.     No groin adenopathy      Extremities:   2+ femoral pulses bilaterally     2+ pedal pulses palpable.     No palpable popliteal aneurysm     No pre-tibial edema     No ulcerations     Tenderness to palpation right outer knee. No pain with brush of paper towel.    LAB RESULTS:  No results found for: K, CREATININE  No results found for: WBC, HCT, PLT  Lab Results   Component Value Date    HGBA1C 5.3 01/13/2022    HGBA1C 5.6 11/30/2020     IMAGING:  NURY 5/23/22: LLE 0.94, RLE 0.94      IMP/PLAN:     81 y.o. female with a well-controlled diabetes, hypertension, cataracts who presented for right knee pain.      - Referral to orthopedic surgery  - Will send 10 days meloxicam to [facility]  - This is likely a musculoskeletal problem without a vascular component.      Grayson Singh MD

## 2022-07-15 DIAGNOSIS — M25.561 RIGHT KNEE PAIN, UNSPECIFIED CHRONICITY: Primary | ICD-10-CM

## 2022-07-18 ENCOUNTER — HOSPITAL ENCOUNTER (OUTPATIENT)
Dept: RADIOLOGY | Facility: HOSPITAL | Age: 82
Discharge: HOME | End: 2022-07-18
Attending: ORTHOPAEDIC SURGERY
Payer: MEDICARE

## 2022-07-18 ENCOUNTER — OFFICE VISIT (OUTPATIENT)
Dept: ORTHOPEDICS | Facility: CLINIC | Age: 82
End: 2022-07-18
Payer: MEDICARE

## 2022-07-18 VITALS — WEIGHT: 114.63 LBS | HEIGHT: 66 IN | BODY MASS INDEX: 18.42 KG/M2

## 2022-07-18 DIAGNOSIS — M25.562 CHRONIC PAIN OF LEFT KNEE: ICD-10-CM

## 2022-07-18 DIAGNOSIS — M25.561 RIGHT KNEE PAIN, UNSPECIFIED CHRONICITY: ICD-10-CM

## 2022-07-18 DIAGNOSIS — G89.29 CHRONIC PAIN OF LEFT KNEE: ICD-10-CM

## 2022-07-18 PROCEDURE — 73562 X-RAY EXAM OF KNEE 3: CPT | Mod: 26,RT,, | Performed by: RADIOLOGY

## 2022-07-18 PROCEDURE — 99203 PR OFFICE/OUTPT VISIT, NEW, LEVL III, 30-44 MIN: ICD-10-PCS | Mod: 25,S$PBB,, | Performed by: ORTHOPAEDIC SURGERY

## 2022-07-18 PROCEDURE — 99999 PR PBB SHADOW E&M-EST. PATIENT-LVL III: CPT | Mod: PBBFAC,,, | Performed by: ORTHOPAEDIC SURGERY

## 2022-07-18 PROCEDURE — 20610 DRAIN/INJ JOINT/BURSA W/O US: CPT | Mod: S$PBB,LT,, | Performed by: ORTHOPAEDIC SURGERY

## 2022-07-18 PROCEDURE — 20610 PR DRAIN/INJECT LARGE JOINT/BURSA: ICD-10-PCS | Mod: S$PBB,LT,, | Performed by: ORTHOPAEDIC SURGERY

## 2022-07-18 PROCEDURE — 73560 X-RAY EXAM OF KNEE 1 OR 2: CPT | Mod: 26,LT,, | Performed by: RADIOLOGY

## 2022-07-18 PROCEDURE — 73560 XR KNEE ORTHO RIGHT: ICD-10-PCS | Mod: 26,LT,, | Performed by: RADIOLOGY

## 2022-07-18 PROCEDURE — 99203 OFFICE O/P NEW LOW 30 MIN: CPT | Mod: 25,S$PBB,, | Performed by: ORTHOPAEDIC SURGERY

## 2022-07-18 PROCEDURE — 20610 DRAIN/INJ JOINT/BURSA W/O US: CPT | Mod: PBBFAC | Performed by: ORTHOPAEDIC SURGERY

## 2022-07-18 PROCEDURE — 73560 X-RAY EXAM OF KNEE 1 OR 2: CPT | Mod: TC,LT

## 2022-07-18 PROCEDURE — 99213 OFFICE O/P EST LOW 20 MIN: CPT | Mod: PBBFAC,25 | Performed by: ORTHOPAEDIC SURGERY

## 2022-07-18 PROCEDURE — 73562 X-RAY EXAM OF KNEE 3: CPT | Mod: TC,RT

## 2022-07-18 PROCEDURE — 99999 PR PBB SHADOW E&M-EST. PATIENT-LVL III: ICD-10-PCS | Mod: PBBFAC,,, | Performed by: ORTHOPAEDIC SURGERY

## 2022-07-18 PROCEDURE — 73562 XR KNEE ORTHO RIGHT: ICD-10-PCS | Mod: 26,RT,, | Performed by: RADIOLOGY

## 2022-07-18 RX ORDER — TRIAMCINOLONE ACETONIDE 40 MG/ML
40 INJECTION, SUSPENSION INTRA-ARTICULAR; INTRAMUSCULAR
Status: COMPLETED | OUTPATIENT
Start: 2022-07-18 | End: 2022-07-18

## 2022-07-18 RX ADMIN — TRIAMCINOLONE ACETONIDE 40 MG: 40 INJECTION, SUSPENSION INTRA-ARTICULAR; INTRAMUSCULAR at 03:07

## 2022-07-18 NOTE — PROGRESS NOTES
Subjective:      Patient ID: Radha Feng is a 81 y.o. female.    Chief Complaint: Pain of the Right Knee    HPI  Radha Feng is a 81 year old female here with a 3 month history of right knee pain. The patient is a  retiree. There was not a history of trauma.  The pain is moderate tosevere The pain is located in the lateral, anterior aspect of the knee. There is is not radiation.  There is catching or locking.   The pain is described as achy. The patient has not had prior surgery. It is aggravated by walking.  It is alleviated by rest. There is not numbness or tingling of the lower extremity.  There is not back pain.  She  has not tried medications or injections.  She does have difficulty getting in or out of a car, getting dressed, or going up or down stairs.  The patient does use an assistive device. Cne      Past Medical History:   Diagnosis Date    Cataract     Diabetes mellitus     Glaucoma     Hypertension      Past Surgical History:   Procedure Laterality Date    CATARACT EXTRACTION W/  INTRAOCULAR LENS IMPLANT Left 12/21/2021    Procedure: EXTRACTION, CATARACT, WITH IOL INSERTION;  Surgeon: Shay Chou MD;  Location: [facility];  Service: Ophthalmology;  Laterality: Left;     Family History   Problem Relation Age of Onset    Cataracts Mother     Diabetes Mother     Glaucoma Mother     Hypertension Mother     Cancer Sister     Blindness Cousin     Amblyopia Neg Hx     Macular degeneration Neg Hx     Retinal detachment Neg Hx      Social History     Socioeconomic History    Marital status: Single   Tobacco Use    Smoking status: Former Smoker    Smokeless tobacco: Never Used   Substance and Sexual Activity    Alcohol use: Yes     Alcohol/week: 3.0 standard drinks     Types: 3 Cans of beer per week    Drug use: Never     Current Outpatient Medications on File Prior to Visit   Medication Sig Dispense Refill    amLODIPine (NORVASC) 10 MG tablet Take 10 mg by mouth once daily.    "   aspirin (ECOTRIN) 81 MG EC tablet Take 81 mg by mouth once daily.      atorvastatin (LIPITOR) 10 MG tablet TAKE 1 TABLET BY MOUTH EVERY DAY      brimonidine 0.2% (ALPHAGAN) 0.2 % Drop INSTILL 1 DROP INTO RIGHT EYE TWICE A DAY 30 mL 3    dorzolamide-timolol 2-0.5% (COSOPT) 22.3-6.8 mg/mL ophthalmic solution INSTILL 1 DROP INTO RIGHT EYE TWICE A DAY 30 mL 3    glyBURIDE (DIABETA) 2.5 MG tablet Take 2.5 mg by mouth 2 (two) times daily with meals.      latanoprost 0.005 % ophthalmic solution PLACE 1 DROP INTO BOTH EYES ONCE DAILY. 7.5 mL 3    lisinopriL-hydrochlorothiazide (PRINZIDE,ZESTORETIC) 20-12.5 mg per tablet TAKE 1 TABLET BY MOUTH TWICE A DAY      metFORMIN (GLUCOPHAGE) 500 MG tablet TAKE 1 TABLET BY MOUTH TWICE A DAY       No current facility-administered medications on file prior to visit.     Review of patient's allergies indicates:  No Known Allergies    Review of Systems   Constitutional: Negative for chills, fever and night sweats.   HENT: Negative for hearing loss.    Eyes: Negative for blurred vision and double vision.   Cardiovascular: Negative for chest pain, claudication and leg swelling.   Respiratory: Negative for shortness of breath.    Endocrine: Negative for polydipsia, polyphagia and polyuria.   Hematologic/Lymphatic: Negative for adenopathy and bleeding problem. Does not bruise/bleed easily.   Skin: Negative for poor wound healing.   Musculoskeletal: Positive for joint pain.   Gastrointestinal: Negative for diarrhea and heartburn.   Genitourinary: Negative for bladder incontinence.   Neurological: Negative for focal weakness, headaches, numbness, paresthesias and sensory change.   Psychiatric/Behavioral: The patient is not nervous/anxious.    Allergic/Immunologic: Negative for persistent infections.         Objective:      Body mass index is 18.5 kg/m².  Vitals:    07/18/22 1443   Weight: 52 kg (114 lb 10.2 oz)   Height: 5' 6" (1.676 m)         General    Constitutional: She is " oriented to person, place, and time. She appears well-developed and well-nourished.   HENT:   Head: Normocephalic and atraumatic.   Eyes: EOM are normal.   Cardiovascular: Normal rate.    Pulmonary/Chest: Effort normal.   Neurological: She is alert and oriented to person, place, and time.   Psychiatric: She has a normal mood and affect. Her behavior is normal.     General Musculoskeletal Exam   Gait: abnormal and antalgic       Right Knee Exam     Inspection   Erythema: absent  Scars: absent  Swelling: present  Effusion: absent  Deformity: absent  Bruising: absent    Tenderness   The patient is tender to palpation of the lateral joint line.    Range of Motion   Extension: 0   Flexion: 120     Tests   Ligament Examination Lachman: normal (-1 to 2mm)   MCL - Valgus: normal (0 to 2mm)  LCL - Varus: normal  Patella   Passive Patellar Tilt: neutral    Other   Sensation: normal    Left Knee Exam     Inspection   Erythema: absent  Scars: absent  Swelling: absent  Effusion: absent  Deformity: absent  Bruising: absent    Tenderness   The patient is experiencing no tenderness.     Range of Motion   Extension: 0   Flexion: 130     Tests   Stability Lachman: normal (-1 to 2mm)   MCL - Valgus: normal (0 to 2mm)  LCL - Varus: normal (0 to 2mm)  Patella   Passive Patellar Tilt: neutral    Other   Sensation: normal    Muscle Strength   Right Lower Extremity   Hip Abduction: 5/5   Quadriceps:  5/5   Hamstrin/5   Left Lower Extremity   Hip Abduction: 5/5   Quadriceps:  5/5   Hamstrin/5     Reflexes     Left Side  Quadriceps:  2+    Right Side   Quadriceps:  2+    Vascular Exam     Right Pulses  Dorsalis Pedis:      2+          Left Pulses  Dorsalis Pedis:      2+          Edema  Right Lower Leg: absent  Left Lower Leg: absent      Radiographs taken today and reviewed by me demonstrate mild-moderate arthritic change of the right KNEE(s).There  is not bone destruction.  There is not a fracture.         Assessment:        Encounter Diagnosis   Name Primary?    Chronic pain of left knee           Plan:       Radha was seen today for pain.    Diagnoses and all orders for this visit:    Chronic pain of left knee  -     Ambulatory referral/consult to Orthopedics    Other orders  -     triamcinolone acetonide injection 40 mg      Treatment options have been discussed.  We have decided to proceed with a  cortico- steroid injection.  The risk of elevated blood glucose and the extremely low risk of infection were discussed. Verbal consent was obtained. .    After time out was performed and patient ID, side, and site were verified, the right knee  was prepped in the standard sterile fashion.  A 22-gauge needle was introduced into the right knee joint from an alicia-lateral site without complication. The right knee(s) was then injected with 40mg of triamcinolone.  Sterile dressing was applied.  The patient was informed that they may resume activities as tolerated. She was instructed to call if there were any problems.     We will see Radha Feng  back in 2 weeks to see how she has responded. If no better get an MRI.

## 2022-07-29 ENCOUNTER — OFFICE VISIT (OUTPATIENT)
Dept: ORTHOPEDICS | Facility: CLINIC | Age: 82
End: 2022-07-29
Payer: MEDICARE

## 2022-07-29 VITALS — BODY MASS INDEX: 18.32 KG/M2 | HEIGHT: 66 IN | WEIGHT: 114 LBS

## 2022-07-29 DIAGNOSIS — M25.561 RIGHT KNEE PAIN, UNSPECIFIED CHRONICITY: Primary | ICD-10-CM

## 2022-07-29 DIAGNOSIS — G89.29 CHRONIC PAIN OF LEFT KNEE: ICD-10-CM

## 2022-07-29 DIAGNOSIS — M25.562 CHRONIC PAIN OF LEFT KNEE: ICD-10-CM

## 2022-07-29 PROCEDURE — 99213 PR OFFICE/OUTPT VISIT, EST, LEVL III, 20-29 MIN: ICD-10-PCS | Mod: S$PBB,,, | Performed by: PHYSICIAN ASSISTANT

## 2022-07-29 PROCEDURE — 99999 PR PBB SHADOW E&M-EST. PATIENT-LVL III: CPT | Mod: PBBFAC,,, | Performed by: PHYSICIAN ASSISTANT

## 2022-07-29 PROCEDURE — 99999 PR PBB SHADOW E&M-EST. PATIENT-LVL III: ICD-10-PCS | Mod: PBBFAC,,, | Performed by: PHYSICIAN ASSISTANT

## 2022-07-29 PROCEDURE — 99213 OFFICE O/P EST LOW 20 MIN: CPT | Mod: PBBFAC | Performed by: PHYSICIAN ASSISTANT

## 2022-07-29 PROCEDURE — 99213 OFFICE O/P EST LOW 20 MIN: CPT | Mod: S$PBB,,, | Performed by: PHYSICIAN ASSISTANT

## 2022-07-29 NOTE — PROGRESS NOTES
Subjective:      Patient ID: Radha Feng is a 81 y.o. female.    Chief Complaint: Pain of the Right Knee    Pain  Pertinent negatives include no chest pain, chills, fever, headaches or numbness.     Radha Feng is a 81 year old female here with a 10 month history of right knee pain. The patient is a  retiree. There was not a history of trauma.  The pain is moderate to severe The pain is located in the lateral, anterior aspect of the knee. There is is not radiation.  There is catching or locking and sensation of knee giving out.   The pain is described as achy. The patient has not had prior surgery. It is aggravated by walking.  It is alleviated by rest but there is also pain at rest. There is not numbness or tingling of the lower extremity.  There is not back pain.  She  has tried ibuprofen and tylenol.  She does have difficulty getting in or out of a car, getting dressed, or going up or down stairs.  The patient does use an assistive device since knee started hurting. She had CSI two weeks ago that only relieved pain for one hour. Symptoms are significantly affecting ADL including ability to live independently.      Past Medical History:   Diagnosis Date    Cataract     Diabetes mellitus     Glaucoma     Hypertension      Past Surgical History:   Procedure Laterality Date    CATARACT EXTRACTION W/  INTRAOCULAR LENS IMPLANT Left 12/21/2021    Procedure: EXTRACTION, CATARACT, WITH IOL INSERTION;  Surgeon: Shay Chou MD;  Location: [facility];  Service: Ophthalmology;  Laterality: Left;     Family History   Problem Relation Age of Onset    Cataracts Mother     Diabetes Mother     Glaucoma Mother     Hypertension Mother     Cancer Sister     Blindness Cousin     Amblyopia Neg Hx     Macular degeneration Neg Hx     Retinal detachment Neg Hx      Social History     Socioeconomic History    Marital status: Single   Tobacco Use    Smoking status: Former Smoker    Smokeless tobacco: Never  Used   Substance and Sexual Activity    Alcohol use: Yes     Alcohol/week: 3.0 standard drinks     Types: 3 Cans of beer per week    Drug use: Never     Current Outpatient Medications on File Prior to Visit   Medication Sig Dispense Refill    amLODIPine (NORVASC) 10 MG tablet Take 10 mg by mouth once daily.      aspirin (ECOTRIN) 81 MG EC tablet Take 81 mg by mouth once daily.      atorvastatin (LIPITOR) 10 MG tablet TAKE 1 TABLET BY MOUTH EVERY DAY      brimonidine 0.2% (ALPHAGAN) 0.2 % Drop INSTILL 1 DROP INTO RIGHT EYE TWICE A DAY 30 mL 3    dorzolamide-timolol 2-0.5% (COSOPT) 22.3-6.8 mg/mL ophthalmic solution INSTILL 1 DROP INTO RIGHT EYE TWICE A DAY 30 mL 3    glyBURIDE (DIABETA) 2.5 MG tablet Take 2.5 mg by mouth 2 (two) times daily with meals.      latanoprost 0.005 % ophthalmic solution PLACE 1 DROP INTO BOTH EYES ONCE DAILY. 7.5 mL 3    lisinopriL-hydrochlorothiazide (PRINZIDE,ZESTORETIC) 20-12.5 mg per tablet TAKE 1 TABLET BY MOUTH TWICE A DAY      metFORMIN (GLUCOPHAGE) 500 MG tablet TAKE 1 TABLET BY MOUTH TWICE A DAY       No current facility-administered medications on file prior to visit.     Review of patient's allergies indicates:  No Known Allergies    Review of Systems   Constitutional: Negative for chills, fever and night sweats.   HENT: Negative for hearing loss.    Eyes: Negative for blurred vision and double vision.   Cardiovascular: Negative for chest pain, claudication and leg swelling.   Respiratory: Negative for shortness of breath.    Endocrine: Negative for polydipsia, polyphagia and polyuria.   Hematologic/Lymphatic: Negative for adenopathy and bleeding problem. Does not bruise/bleed easily.   Skin: Negative for poor wound healing.   Musculoskeletal: Positive for joint pain.   Gastrointestinal: Negative for diarrhea and heartburn.   Genitourinary: Negative for bladder incontinence.   Neurological: Negative for focal weakness, headaches, numbness, paresthesias and sensory  "change.   Psychiatric/Behavioral: The patient is not nervous/anxious.    Allergic/Immunologic: Negative for persistent infections.         Objective:      Body mass index is 18.4 kg/m².  Vitals:    22 1336   Weight: 51.7 kg (114 lb)   Height: 5' 6" (1.676 m)         General    Constitutional: She is oriented to person, place, and time. She appears well-developed and well-nourished.   HENT:   Head: Normocephalic and atraumatic.   Eyes: EOM are normal.   Cardiovascular: Normal rate.    Pulmonary/Chest: Effort normal.   Neurological: She is alert and oriented to person, place, and time.   Psychiatric: She has a normal mood and affect. Her behavior is normal.     General Musculoskeletal Exam   Gait: abnormal and antalgic       Right Knee Exam     Inspection   Erythema: absent  Scars: absent  Swelling: present  Effusion: absent  Deformity: absent  Bruising: absent    Tenderness   The patient is tender to palpation of the lateral joint line.    Range of Motion   Extension: 0   Flexion: 120     Tests   Ligament Examination Lachman: normal (-1 to 2mm)   MCL - Valgus: normal (0 to 2mm)  LCL - Varus: normal  Patella   Passive Patellar Tilt: neutral    Other   Sensation: normal    Left Knee Exam     Inspection   Erythema: absent  Scars: absent  Swelling: absent  Effusion: absent  Deformity: absent  Bruising: absent    Tenderness   The patient is experiencing no tenderness.     Range of Motion   Extension: 0   Flexion: 130     Tests   Stability Lachman: normal (-1 to 2mm)   MCL - Valgus: normal (0 to 2mm)  LCL - Varus: normal (0 to 2mm)  Patella   Passive Patellar Tilt: neutral    Other   Sensation: normal    Muscle Strength   Right Lower Extremity   Hip Abduction: 5/5   Quadriceps:  5/5   Hamstrin/5   Left Lower Extremity   Hip Abduction: 5/5   Quadriceps:  5/5   Hamstrin/5     Reflexes     Left Side  Quadriceps:  2+    Right Side   Quadriceps:  2+    Vascular Exam     Right Pulses  Dorsalis Pedis:      " 2+          Left Pulses  Dorsalis Pedis:      2+          Edema  Right Lower Leg: absent  Left Lower Leg: absent      Radiographs reviewed by me demonstrate mild-moderate arthritic change of the right knee. There  is not bone destruction.  There is not a fracture.         Assessment:       Encounter Diagnoses   Name Primary?    Right knee pain, unspecified chronicity Yes    Chronic pain of left knee           Plan:     Right knee pain  - Patient has failed NSAIDs, activity modification, CSI. Symptoms have been present for 10 months and are affecting ADL. An MRI is indicated. MRI ordered. Will call her with results and plan.

## 2022-08-08 ENCOUNTER — OFFICE VISIT (OUTPATIENT)
Dept: OPTOMETRY | Facility: CLINIC | Age: 82
End: 2022-08-08
Payer: MEDICARE

## 2022-08-08 DIAGNOSIS — H40.1113 PRIMARY OPEN ANGLE GLAUCOMA (POAG) OF RIGHT EYE, SEVERE STAGE: ICD-10-CM

## 2022-08-08 DIAGNOSIS — H40.1121 PRIMARY OPEN ANGLE GLAUCOMA (POAG) OF LEFT EYE, MILD STAGE: Primary | ICD-10-CM

## 2022-08-08 PROCEDURE — 92014 COMPRE OPH EXAM EST PT 1/>: CPT | Mod: S$PBB,,, | Performed by: OPTOMETRIST

## 2022-08-08 PROCEDURE — 99999 PR PBB SHADOW E&M-EST. PATIENT-LVL II: ICD-10-PCS | Mod: PBBFAC,,, | Performed by: OPTOMETRIST

## 2022-08-08 PROCEDURE — 92133 POSTERIOR SEGMENT OCT OPTIC NERVE(OCULAR COHERENCE TOMOGRAPHY) - OU - BOTH EYES: ICD-10-PCS | Mod: 26,S$PBB,, | Performed by: OPTOMETRIST

## 2022-08-08 PROCEDURE — 92083 HUMPHREY VISUAL FIELD - OU - BOTH EYES: ICD-10-PCS | Mod: 26,S$PBB,, | Performed by: OPTOMETRIST

## 2022-08-08 PROCEDURE — 92014 PR EYE EXAM, EST PATIENT,COMPREHESV: ICD-10-PCS | Mod: S$PBB,,, | Performed by: OPTOMETRIST

## 2022-08-08 PROCEDURE — 99999 PR PBB SHADOW E&M-EST. PATIENT-LVL II: CPT | Mod: PBBFAC,,, | Performed by: OPTOMETRIST

## 2022-08-08 PROCEDURE — 92083 EXTENDED VISUAL FIELD XM: CPT | Mod: PBBFAC | Performed by: OPTOMETRIST

## 2022-08-08 PROCEDURE — 92133 CPTRZD OPH DX IMG PST SGM ON: CPT | Mod: PBBFAC | Performed by: OPTOMETRIST

## 2022-08-08 PROCEDURE — 99212 OFFICE O/P EST SF 10 MIN: CPT | Mod: PBBFAC | Performed by: OPTOMETRIST

## 2022-08-08 NOTE — PROGRESS NOTES
Assessment /Plan     For exam results, see Encounter Report.    Primary open angle glaucoma (POAG) of left eye, mild stage    Primary open angle glaucoma (POAG) of right eye, severe stage      (+) FHx- mother. IOP 15 OD, 14 OS. 18 OD, OS. Last 13 OD, 17 OS. Tmax 28 OD, 23 OS. c/d 0.85 OD, 0.75 OS. Pt reports possibly being on drops for this previously.   Pachy 579 OD, 608 OS  8/11/2022  OCT OD borderline NI, Thin T, TI, TS, G, OS thin G, TI, T, borderline TS and NI  9/29/2021 HVF OD severely depressed VF w/some inferior sparing, OS inferionasal step  8/11/2022 HVF OD dense sup arcuate and early inf arcuate, OS nasal defects  Educated pt on findings w/understanding.  Consequences of noncompliance and lack of f/u reviewed.  D/c Timolol BiD OD (8/12/2020).  Cont Latanoprost QHS OU (8/12/2020) and cont Cosopt BiD OU (started 9/21/2020)  No noticeable change in IOP w/Cosopt added.   Cont Brimonidine BID OD (started 3/22/2021)  RTC 4 mo Routine          NOTES        24-2 SF HVF   DONE-OU        OCT-P. POLE   ONH-RC ([facility]/RNFL)   OU- DONE   *OD DID NOT FOCUS, FOR ONH-RC       MRX   +1.00 +0.75 180  -2.00 +2.25 119    RELAXATION & FIXATION- GOOD-OU   COOPERATION- GOOD          PATIENT DENIES ANY ALLERGIES TO LATEX OR ADHESIVES AT THIS TIME.        TR 8/8/2022

## 2022-08-12 ENCOUNTER — HOSPITAL ENCOUNTER (OUTPATIENT)
Dept: RADIOLOGY | Facility: OTHER | Age: 82
Discharge: HOME | End: 2022-08-12
Attending: PHYSICIAN ASSISTANT
Payer: MEDICARE

## 2022-08-12 DIAGNOSIS — M25.561 RIGHT KNEE PAIN, UNSPECIFIED CHRONICITY: ICD-10-CM

## 2022-08-12 PROCEDURE — 73721 MRI KNEE WITHOUT CONTRAST RIGHT: ICD-10-PCS | Mod: 26,RT,, | Performed by: RADIOLOGY

## 2022-08-12 PROCEDURE — 73721 MRI JNT OF LWR EXTRE W/O DYE: CPT | Mod: TC,RT

## 2022-08-12 PROCEDURE — 73721 MRI JNT OF LWR EXTRE W/O DYE: CPT | Mod: 26,RT,, | Performed by: RADIOLOGY

## 2022-08-15 ENCOUNTER — TELEPHONE (OUTPATIENT)
Dept: ORTHOPEDICS | Facility: CLINIC | Age: 82
End: 2022-08-15
Payer: MEDICARE

## 2022-08-15 NOTE — TELEPHONE ENCOUNTER
----- Message from Re Atkinson PA-C sent at 8/15/2022  1:22 PM CDT -----  Please call and let her know that her MRI shows that she did not tear anything and does not need surgery. Dr. Peterson reviewed the MRI and would like her to try PT. Find out where she would like to go and I will put in order.

## 2022-08-15 NOTE — TELEPHONE ENCOUNTER
I called patient and left a message for her to return my call. I was calling her about her MRI results.

## 2022-08-16 ENCOUNTER — TELEPHONE (OUTPATIENT)
Dept: ORTHOPEDICS | Facility: CLINIC | Age: 82
End: 2022-08-16
Payer: MEDICARE

## 2022-09-12 ENCOUNTER — PATIENT MESSAGE (OUTPATIENT)
Dept: ORTHOPEDICS | Facility: CLINIC | Age: 82
End: 2022-09-12
Payer: MEDICARE

## 2022-09-16 DIAGNOSIS — G89.29 CHRONIC PAIN OF RIGHT KNEE: Primary | ICD-10-CM

## 2022-09-16 DIAGNOSIS — M25.561 CHRONIC PAIN OF RIGHT KNEE: Primary | ICD-10-CM

## 2022-09-20 ENCOUNTER — HOSPITAL ENCOUNTER (INPATIENT)
Facility: HOSPITAL | Age: 82
LOS: 22 days | Discharge: HOME | DRG: 441 | End: 2022-10-14
Attending: EMERGENCY MEDICINE | Admitting: INTERNAL MEDICINE
Payer: MEDICARE

## 2022-09-20 ENCOUNTER — OFFICE VISIT (OUTPATIENT)
Dept: CARDIOLOGY | Facility: CLINIC | Age: 82
DRG: 441 | End: 2022-09-20
Payer: MEDICARE

## 2022-09-20 VITALS
WEIGHT: 114.88 LBS | DIASTOLIC BLOOD PRESSURE: 58 MMHG | BODY MASS INDEX: 18.46 KG/M2 | SYSTOLIC BLOOD PRESSURE: 112 MMHG | OXYGEN SATURATION: 98 % | HEIGHT: 66 IN | HEART RATE: 82 BPM

## 2022-09-20 DIAGNOSIS — M79.89 LEG SWELLING: ICD-10-CM

## 2022-09-20 DIAGNOSIS — R60.0 EDEMA OF EXTREMITIES: ICD-10-CM

## 2022-09-20 DIAGNOSIS — D59.10 AUTOIMMUNE HEMOLYTIC ANEMIA: ICD-10-CM

## 2022-09-20 DIAGNOSIS — E86.0 DEHYDRATION: ICD-10-CM

## 2022-09-20 DIAGNOSIS — R53.83 LETHARGY: ICD-10-CM

## 2022-09-20 DIAGNOSIS — G89.29 CHRONIC PAIN OF RIGHT KNEE: ICD-10-CM

## 2022-09-20 DIAGNOSIS — R06.02 SHORTNESS OF BREATH: ICD-10-CM

## 2022-09-20 DIAGNOSIS — I49.9 ARRHYTHMIA: ICD-10-CM

## 2022-09-20 DIAGNOSIS — R07.9 CHEST PAIN: ICD-10-CM

## 2022-09-20 DIAGNOSIS — M25.561 CHRONIC PAIN OF RIGHT KNEE: ICD-10-CM

## 2022-09-20 DIAGNOSIS — I73.9 PVD (PERIPHERAL VASCULAR DISEASE): ICD-10-CM

## 2022-09-20 DIAGNOSIS — I10 HYPERTENSION, UNSPECIFIED TYPE: ICD-10-CM

## 2022-09-20 DIAGNOSIS — K75.4 AUTOIMMUNE HEPATITIS: Primary | ICD-10-CM

## 2022-09-20 DIAGNOSIS — I95.0 IDIOPATHIC HYPOTENSION: ICD-10-CM

## 2022-09-20 DIAGNOSIS — R60.0 LEG EDEMA: ICD-10-CM

## 2022-09-20 DIAGNOSIS — R06.02 SOB (SHORTNESS OF BREATH): Primary | ICD-10-CM

## 2022-09-20 LAB
ALBUMIN SERPL BCP-MCNC: 2.1 G/DL (ref 3.5–5.2)
ALP SERPL-CCNC: 224 U/L (ref 55–135)
ALT SERPL W/O P-5'-P-CCNC: 254 U/L (ref 10–44)
ANION GAP SERPL CALC-SCNC: 10 MMOL/L (ref 8–16)
AST SERPL-CCNC: 296 U/L (ref 10–40)
BACTERIA #/AREA URNS AUTO: NORMAL /HPF
BASOPHILS # BLD AUTO: 0.03 K/UL (ref 0–0.2)
BASOPHILS NFR BLD: 0.4 % (ref 0–1.9)
BILIRUB SERPL-MCNC: 2.9 MG/DL (ref 0.1–1)
BILIRUB UR QL STRIP: NEGATIVE
BNP SERPL-MCNC: 66 PG/ML (ref 0–99)
BUN SERPL-MCNC: 41 MG/DL (ref 6–30)
BUN SERPL-MCNC: 42 MG/DL (ref 8–23)
CALCIUM SERPL-MCNC: 8.6 MG/DL (ref 8.7–10.5)
CHLORIDE SERPL-SCNC: 111 MMOL/L (ref 95–110)
CHLORIDE SERPL-SCNC: 112 MMOL/L (ref 95–110)
CLARITY UR REFRACT.AUTO: ABNORMAL
CO2 SERPL-SCNC: 12 MMOL/L (ref 23–29)
COLOR UR AUTO: YELLOW
CREAT SERPL-MCNC: 1.4 MG/DL (ref 0.5–1.4)
CREAT SERPL-MCNC: 1.5 MG/DL (ref 0.5–1.4)
D DIMER PPP IA.FEU-MCNC: 11.97 MG/L FEU
DIFFERENTIAL METHOD: ABNORMAL
EOSINOPHIL # BLD AUTO: 0.1 K/UL (ref 0–0.5)
EOSINOPHIL NFR BLD: 1.3 % (ref 0–8)
ERYTHROCYTE [DISTWIDTH] IN BLOOD BY AUTOMATED COUNT: 21 % (ref 11.5–14.5)
EST. GFR  (NO RACE VARIABLE): 38 ML/MIN/1.73 M^2
GLUCOSE SERPL-MCNC: 308 MG/DL (ref 70–110)
GLUCOSE SERPL-MCNC: 314 MG/DL (ref 70–110)
GLUCOSE UR QL STRIP: NEGATIVE
HCT VFR BLD AUTO: 31.7 % (ref 37–48.5)
HCT VFR BLD CALC: 37 %PCV (ref 36–54)
HCV AB SERPL QL IA: NORMAL
HGB BLD-MCNC: 10.4 G/DL (ref 12–16)
HGB UR QL STRIP: NEGATIVE
HIV 1+2 AB+HIV1 P24 AG SERPL QL IA: NORMAL
IMM GRANULOCYTES # BLD AUTO: 0.03 K/UL (ref 0–0.04)
IMM GRANULOCYTES NFR BLD AUTO: 0.4 % (ref 0–0.5)
KETONES UR QL STRIP: NEGATIVE
LEUKOCYTE ESTERASE UR QL STRIP: ABNORMAL
LYMPHOCYTES # BLD AUTO: 1.3 K/UL (ref 1–4.8)
LYMPHOCYTES NFR BLD: 16.2 % (ref 18–48)
MCH RBC QN AUTO: 32.8 PG (ref 27–31)
MCHC RBC AUTO-ENTMCNC: 32.8 G/DL (ref 32–36)
MCV RBC AUTO: 100 FL (ref 82–98)
MICROSCOPIC COMMENT: NORMAL
MONOCYTES # BLD AUTO: 1.1 K/UL (ref 0.3–1)
MONOCYTES NFR BLD: 14.4 % (ref 4–15)
NEUTROPHILS # BLD AUTO: 5.3 K/UL (ref 1.8–7.7)
NEUTROPHILS NFR BLD: 67.3 % (ref 38–73)
NITRITE UR QL STRIP: NEGATIVE
NRBC BLD-RTO: 0 /100 WBC
PH UR STRIP: 6 [PH] (ref 5–8)
PLATELET # BLD AUTO: 126 K/UL (ref 150–450)
PMV BLD AUTO: 12.1 FL (ref 9.2–12.9)
POC IONIZED CALCIUM: 1.15 MMOL/L (ref 1.06–1.42)
POC TCO2 (MEASURED): 17 MMOL/L (ref 23–29)
POTASSIUM BLD-SCNC: 5 MMOL/L (ref 3.5–5.1)
POTASSIUM SERPL-SCNC: 6.7 MMOL/L (ref 3.5–5.1)
PROT SERPL-MCNC: 7.2 G/DL (ref 6–8.4)
PROT UR QL STRIP: ABNORMAL
RBC # BLD AUTO: 3.17 M/UL (ref 4–5.4)
SAMPLE: ABNORMAL
SODIUM BLD-SCNC: 141 MMOL/L (ref 136–145)
SODIUM SERPL-SCNC: 133 MMOL/L (ref 136–145)
SP GR UR STRIP: 1.01 (ref 1–1.03)
SQUAMOUS #/AREA URNS AUTO: 1 /HPF
TROPONIN I SERPL DL<=0.01 NG/ML-MCNC: <0.006 NG/ML (ref 0–0.03)
URN SPEC COLLECT METH UR: ABNORMAL
WBC # BLD AUTO: 7.79 K/UL (ref 3.9–12.7)
WBC #/AREA URNS AUTO: 2 /HPF (ref 0–5)

## 2022-09-20 PROCEDURE — 99284 EMERGENCY DEPT VISIT MOD MDM: CPT | Mod: FS,,, | Performed by: EMERGENCY MEDICINE

## 2022-09-20 PROCEDURE — 93005 ELECTROCARDIOGRAM TRACING: CPT

## 2022-09-20 PROCEDURE — 86803 HEPATITIS C AB TEST: CPT | Performed by: PHYSICIAN ASSISTANT

## 2022-09-20 PROCEDURE — 83880 ASSAY OF NATRIURETIC PEPTIDE: CPT | Performed by: EMERGENCY MEDICINE

## 2022-09-20 PROCEDURE — 93010 ELECTROCARDIOGRAM REPORT: CPT | Mod: ,,, | Performed by: INTERNAL MEDICINE

## 2022-09-20 PROCEDURE — 87389 HIV-1 AG W/HIV-1&-2 AB AG IA: CPT | Performed by: PHYSICIAN ASSISTANT

## 2022-09-20 PROCEDURE — 99999 PR PBB SHADOW E&M-EST. PATIENT-LVL IV: CPT | Mod: PBBFAC,,, | Performed by: INTERNAL MEDICINE

## 2022-09-20 PROCEDURE — 99999 PR PBB SHADOW E&M-EST. PATIENT-LVL IV: ICD-10-PCS | Mod: PBBFAC,,, | Performed by: INTERNAL MEDICINE

## 2022-09-20 PROCEDURE — 93005 ELECTROCARDIOGRAM TRACING: CPT | Mod: PBBFAC | Performed by: INTERNAL MEDICINE

## 2022-09-20 PROCEDURE — 93010 EKG 12-LEAD: ICD-10-PCS | Mod: ,,, | Performed by: INTERNAL MEDICINE

## 2022-09-20 PROCEDURE — 99285 EMERGENCY DEPT VISIT HI MDM: CPT | Mod: 25,27

## 2022-09-20 PROCEDURE — 93010 ELECTROCARDIOGRAM REPORT: CPT | Mod: S$PBB,,, | Performed by: INTERNAL MEDICINE

## 2022-09-20 PROCEDURE — 93010 PR ELECTROCARDIOGRAM REPORT: ICD-10-PCS | Mod: S$PBB,,, | Performed by: INTERNAL MEDICINE

## 2022-09-20 PROCEDURE — 25500020 PHARM REV CODE 255: Performed by: EMERGENCY MEDICINE

## 2022-09-20 PROCEDURE — 93010 EKG 12-LEAD: ICD-10-PCS | Mod: S$PBB,,, | Performed by: INTERNAL MEDICINE

## 2022-09-20 PROCEDURE — 99214 OFFICE O/P EST MOD 30 MIN: CPT | Mod: PBBFAC,25 | Performed by: INTERNAL MEDICINE

## 2022-09-20 PROCEDURE — 80053 COMPREHEN METABOLIC PANEL: CPT | Performed by: EMERGENCY MEDICINE

## 2022-09-20 PROCEDURE — 84484 ASSAY OF TROPONIN QUANT: CPT | Performed by: EMERGENCY MEDICINE

## 2022-09-20 PROCEDURE — 81001 URINALYSIS AUTO W/SCOPE: CPT | Performed by: PHYSICIAN ASSISTANT

## 2022-09-20 PROCEDURE — 85379 FIBRIN DEGRADATION QUANT: CPT | Performed by: PHYSICIAN ASSISTANT

## 2022-09-20 PROCEDURE — 99284 PR EMERGENCY DEPT VISIT,LEVEL IV: ICD-10-PCS | Mod: FS,,, | Performed by: EMERGENCY MEDICINE

## 2022-09-20 PROCEDURE — 99205 PR OFFICE/OUTPT VISIT, NEW, LEVL V, 60-74 MIN: ICD-10-PCS | Mod: S$PBB,,, | Performed by: INTERNAL MEDICINE

## 2022-09-20 PROCEDURE — 99205 OFFICE O/P NEW HI 60 MIN: CPT | Mod: S$PBB,,, | Performed by: INTERNAL MEDICINE

## 2022-09-20 PROCEDURE — 85025 COMPLETE CBC W/AUTO DIFF WBC: CPT | Performed by: EMERGENCY MEDICINE

## 2022-09-20 RX ADMIN — IOHEXOL 75 ML: 350 INJECTION, SOLUTION INTRAVENOUS at 08:09

## 2022-09-20 NOTE — FIRST PROVIDER EVALUATION
Medical screening examination initiated.  I have conducted a focused provider triage encounter, findings are as follows:    Brief history of present illness:  81-year-old female, history of hypertension, diabetes, referred to the ED by her cardiologist for evaluation of worsening generalized weakness, lethargy, shortness of breath, lower extremity edema x2 weeks.    Vitals:    09/20/22 1706   BP: (!) 113/56   BP Location: Left arm   Patient Position: Sitting   Pulse: 71   Resp: 18   Temp: 98.9 °F (37.2 °C)   TempSrc: Oral   SpO2: 100%   Weight: 114 lb 13.8 oz (52.1 kg)       Pertinent physical exam:  Appears generally weak    Brief workup plan:  CHF labs, chest x-ray, bilateral lower extremity Dopplers as requested by Dr. Hutton.  Will defer decision to order CTA to ED provider    Preliminary workup initiated; this workup will be continued and followed by the physician or advanced practice provider that is assigned to the patient when roomed.

## 2022-09-20 NOTE — ED NOTES
I-STAT Chem-8+ Results:   Value Reference Range   Sodium 141 136-145 mmol/L   Potassium  5.0 3.5-5.1 mmol/L   Chloride 112  mmol/L   Ionized Calcium 1.15 1.06-1.42 mmol/L   CO2 (measured) 17 23-29 mmol/L   Glucose 308  mg/dL   BUN 41 6-30 mg/dL   Creatinine 1.5 0.5-1.4 mg/dL   Hematocrit 37 36-54%

## 2022-09-20 NOTE — ED PROVIDER NOTES
"Encounter Date: 9/20/2022       History     Chief Complaint   Patient presents with    Shortness of Breath     Per Dr. Hutton's note, "Her daughter reports multiple symptoms including worsening leg swelling which started 2 weeks ago.  She reports lethargy, SOB, and fatigue, all of which started 2 weeks ago.  Daughter states the pt has declined quickly over the past 2 weeks, and "is not herself".  Pt states "feel sleepy"."     81-year-old female with past medical history of DM type 2, hypertension, glaucoma and cataracts presents the emergency department for weakness, leg swelling shortness of breath.  Patient was sent from her cardiology office for these complaints for admission.  Reports 2 weeks of lethargy, poor appetite and bilateral leg swelling.  Patient also reports dyspnea on exertion and states that she gets short of breath just from taking her jacket off.  Patient denies any chest pain.  Per daughter lower extremity swelling was post to be last week but has improved now.  Daughter notes that she was hoping her change last week and noted that she had a prolapsed uterus but has not seen her OBGYN yet.  Patient denies dysuria but states that she does have difficulty urinating and can only urinate a small amount.    Review of patient's allergies indicates:  No Known Allergies  Past Medical History:   Diagnosis Date    Cataract     Diabetes mellitus     Glaucoma     Hypertension      Past Surgical History:   Procedure Laterality Date    CATARACT EXTRACTION W/  INTRAOCULAR LENS IMPLANT Left 12/21/2021    Procedure: EXTRACTION, CATARACT, WITH IOL INSERTION;  Surgeon: Shay Chou MD;  Location: [facility];  Service: Ophthalmology;  Laterality: Left;     Family History   Problem Relation Age of Onset    Cataracts Mother     Diabetes Mother     Glaucoma Mother     Hypertension Mother     Heart attack Father     Cancer Sister     Blindness Cousin     Amblyopia Neg Hx     Macular degeneration Neg Hx     " Retinal detachment Neg Hx      Social History     Tobacco Use    Smoking status: Former    Smokeless tobacco: Never   Substance Use Topics    Alcohol use: Not Currently    Drug use: Never     Review of Systems   Constitutional:  Positive for appetite change and fatigue. Negative for chills and fever.   HENT:  Negative for sore throat.    Respiratory:  Positive for shortness of breath.    Cardiovascular:  Positive for leg swelling. Negative for chest pain.   Gastrointestinal:  Negative for abdominal pain.   Genitourinary:  Positive for difficulty urinating. Negative for dysuria.   Musculoskeletal: Negative.    Skin: Negative.    Neurological:  Positive for weakness.   Psychiatric/Behavioral:  Negative for confusion.      Physical Exam     Initial Vitals [09/20/22 1706]   BP Pulse Resp Temp SpO2   (!) 113/56 71 18 98.9 °F (37.2 °C) 100 %      MAP       --         Physical Exam    Nursing note and vitals reviewed.  Constitutional: She appears well-developed and well-nourished. She is not diaphoretic. No distress.   HENT:   Head: Normocephalic and atraumatic.   Eyes: Conjunctivae are normal. Pupils are equal, round, and reactive to light.   Neck: Neck supple.   Normal range of motion.  Cardiovascular:  Normal rate, regular rhythm, normal heart sounds and intact distal pulses.           Pulmonary/Chest: Breath sounds normal.   Abdominal: Abdomen is soft. Bowel sounds are normal. She exhibits no distension and no mass. There is no abdominal tenderness. There is no rebound and no guarding.   Genitourinary:    Genitourinary Comments: Chaperone presents.  No visible prolapse     Musculoskeletal:         General: Normal range of motion.      Cervical back: Normal range of motion and neck supple.     Neurological: She is alert and oriented to person, place, and time. GCS score is 15. GCS eye subscore is 4. GCS verbal subscore is 5. GCS motor subscore is 6.   Skin: Skin is warm and dry. Capillary refill takes less than 2  seconds.   Psychiatric: She has a normal mood and affect.       ED Course   Procedures  Labs Reviewed   CBC W/ AUTO DIFFERENTIAL - Abnormal; Notable for the following components:       Result Value    RBC 3.17 (*)     Hemoglobin 10.4 (*)     Hematocrit 31.7 (*)      (*)     MCH 32.8 (*)     RDW 21.0 (*)     Platelets 126 (*)     Mono # 1.1 (*)     Lymph % 16.2 (*)     All other components within normal limits    Narrative:     Release to patient->Immediate   COMPREHENSIVE METABOLIC PANEL - Abnormal; Notable for the following components:    Sodium 133 (*)     Potassium 6.7 (*)     Chloride 111 (*)     CO2 12 (*)     Glucose 314 (*)     BUN 42 (*)     Calcium 8.6 (*)     Albumin 2.1 (*)     Total Bilirubin 2.9 (*)     Alkaline Phosphatase 224 (*)      (*)      (*)     eGFR 38.0 (*)     All other components within normal limits    Narrative:     Release to patient->Immediate   URINALYSIS, REFLEX TO URINE CULTURE - Abnormal; Notable for the following components:    Appearance, UA Hazy (*)     Protein, UA Trace (*)     Leukocytes, UA 2+ (*)     All other components within normal limits    Narrative:     Specimen Source->Urine   D DIMER, QUANTITATIVE - Abnormal; Notable for the following components:    D-Dimer 11.97 (*)     All other components within normal limits   ISTAT PROCEDURE - Abnormal; Notable for the following components:    POC Glucose 308 (*)     POC BUN 41 (*)     POC Creatinine 1.5 (*)     POC Chloride 112 (*)     POC TCO2 (MEASURED) 17 (*)     All other components within normal limits   TROPONIN I    Narrative:     Release to patient->Immediate   B-TYPE NATRIURETIC PEPTIDE    Narrative:     Release to patient->Immediate   HIV 1 / 2 ANTIBODY    Narrative:     Release to patient->Immediate   HEPATITIS C ANTIBODY    Narrative:     Release to patient->Immediate   URINALYSIS MICROSCOPIC    Narrative:     Specimen Source->Urine   BASIC METABOLIC PANEL   ISTAT CHEM8          Imaging Results               US Abdomen Limited (Final result)  Result time 09/21/22 00:42:00      Final result by Rory Cheatham MD (09/21/22 00:42:00)                   Impression:      The gallbladder is distended however there is no sonographically detectable gallstone, and no additional sonographic evidence for acute cholecystitis.    There is free fluid of the abdomen noted.      Electronically signed by: Rory Cheatham  Date:    09/21/2022  Time:    00:42               Narrative:    EXAMINATION:  US ABDOMEN LIMITED    CLINICAL HISTORY:  right upper quadrant pain;    TECHNIQUE:  Limited ultrasound of the right upper quadrant of the abdomen (including pancreas, liver, gallbladder, common bile duct, and spleen) was performed.    COMPARISON:  CT examination of the chest September 20, 2022    FINDINGS:  The visualized aspects of the pancreas appear unremarkable, not seen in its entirety.  The visualized IVC appears unremarkable.    The gallbladder is identified, the gallbladder appears distended, however there is no sonographic evidence for cholelithiasis, there is no abnormal gallbladder wall thickening, the technologist indicates a negative sonographic Castaneda's sign.  There is no abnormal biliary dilatation, the common duct measures approximately 3.9 mm.    The submitted hepatic length measurement is 14 cm.  There is no sonographic evidence for focal hepatic mass lesion.  There is free fluid of the abdomen seen adjacent to the liver and adjacent to the spleen.  The spleen measures approximately 7.4 cm in length, there is no evidence for focal splenic mass lesion.  Limited imaging of the right kidney demonstrates no evidence for hydronephrosis.                                       X-Ray Chest AP Portable (Final result)  Result time 09/20/22 21:41:23      Final result by Charles Melendez MD (09/20/22 21:41:23)                   Impression:      No acute findings in the chest.      Electronically signed by: Charles Melendez  MD  Date:    09/20/2022  Time:    21:41               Narrative:    EXAMINATION:  XR CHEST AP PORTABLE    CLINICAL HISTORY:  CHF;    TECHNIQUE:  Single frontal view of the chest was performed.    COMPARISON:  None    FINDINGS:  Hyperinflated lungs with emphysematous architecture in the upper lobes.    No consolidation, pleural effusion or pneumothorax.    Cardiomediastinal silhouette is unremarkable.                                        CTA Chest Non-Coronary (PE Studies) (Final result)  Result time 09/20/22 22:55:08      Final result by Charles Melendez MD (09/20/22 22:55:08)                   Impression:      No evidence of pulmonary thromboembolism as clinically questioned.    Marked distention of the partially visualized gallbladder, clinical correlation recommended.    Mild perihepatic and perisplenic ascites.    Moderate upper lobe predominant centrilobular emphysema.    Few bilateral pulmonary micro nodules.  For multiple solid nodules all <6 mm, Fleischner Society 2017 guidelines recommend no routine follow up for a low risk patient, or follow up with non-contrast chest CT at 12 months after discovery in a high risk patient.    Additional findings above.    This report was flagged in Epic as abnormal.    Electronically signed by resident: Jami Zhang  Date:    09/20/2022  Time:    21:53    Electronically signed by: Charles Melendez MD  Date:    09/20/2022  Time:    22:55               Narrative:    EXAMINATION:  CTA CHEST NON CORONARY (PE STUDIES)    CLINICAL HISTORY:  Pulmonary embolism (PE) suspected, high prob;    TECHNIQUE:  Low dose axial images, sagittal and coronal reformations were obtained from the thoracic inlet to the lung bases following the IV administration of 75 mL of Omnipaque 350.  MIP images were performed.    COMPARISON:  Chest radiograph same day.    FINDINGS:  Base of Neck: No significant abnormality.    Thoracic soft tissues: No axillary lymphadenopathy.    Aorta/vasculature: 3  vessel left-sided aortic arch.  Minimal calcification including at the 3 vessel origin.  No aneurysm.    Heart: Prominent left atrial size.  Moderate bilateral calcification of the coronary arteries.  No pericardial effusion.    Pulmonary vasculature: This study is satisfactory for the evaluation of the pulmonary arteries. There is no filling defect of the pulmonary arteries to suggest pulmonary thromboembolism.  Pulmonary veins are unremarkable.    Liliana/Mediastinum: Prominent non pathologically enlarged hilar/lymph nodes.    Airways: Patent.    Lungs/Pleura: Mild apical fibro nodular scarring.  Moderate centrilobular emphysema in an upper lobe predominance.  Pleural lipoma along the left upper lobe.  Few bilateral pulmonary micro nodules, for example the nodule in the apical segment of the right upper lobe (series 3, image 81).  No consolidation.  No pleural effusion.    Esophagus: Normal.    Upper Abdomen: Subcentimeter hypodensity in the inferior right hepatic lobe, too small to adequately characterize.  Mild perihepatic and perisplenic ascites.  Marked distension of the partially visualized gallbladder.  No intrahepatic biliary ductal dilatation.    Bones: Osseous degenerative changes without evidence of acute fracture.  No osseous destructive lesions.  Remote fractures of the posterior 9th and 10th left ribs.                                       US Lower Extremity Veins Bilateral (Final result)  Result time 09/20/22 20:19:43      Final result by Charles Melendez MD (09/20/22 20:19:43)                   Impression:      No evidence of deep venous thrombosis in either lower extremity.      Electronically signed by: Charles Melendez MD  Date:    09/20/2022  Time:    20:19               Narrative:    EXAMINATION:  US LOWER EXTREMITY VEINS BILATERAL    CLINICAL HISTORY:  Localized edema    TECHNIQUE:  Duplex and color flow Doppler and dynamic compression was performed of the bilateral lower extremity veins was  performed.    COMPARISON:  None    FINDINGS:  Right thigh veins: The common femoral, femoral, popliteal, upper greater saphenous, and deep femoral veins are patent and free of thrombus. The veins are normally compressible and have normal phasic flow and augmentation response.    Right calf veins: The visualized calf veins are patent.    Left thigh veins: The common femoral, femoral, popliteal, upper greater saphenous, and deep femoral veins are patent and free of thrombus. The veins are normally compressible and have normal phasic flow and augmentation response.    Left calf veins: The visualized calf veins are patent.    Miscellaneous: None                                       Medications   lactated ringers bolus 500 mL (500 mLs Intravenous New Bag 9/21/22 0127)   iohexoL (OMNIPAQUE 350) injection 75 mL (75 mLs Intravenous Given 9/20/22 2012)     Medical Decision Making:   History:   Old Medical Records: I decided to obtain old medical records.  Independently Interpreted Test(s):   I have ordered and independently interpreted EKG Reading(s) - see summary below       <> Summary of EKG Reading(s): Sinus rhythm with rate of 69, low voltage, left axis deviation.  No STEMI  Clinical Tests:   Lab Tests: Ordered and Reviewed  Radiological Study: Ordered and Reviewed  Medical Tests: Ordered and Reviewed     APC / Resident Notes:   81 y.o. year old female presenting with referred to the ED by a cardiologist due to 2 weeks of lethargy, bilateral lower extremity swelling and dyspnea on exertion.  On exam patient is afebrile and nontoxic. Heart rate and rhythm are regular. Lungs with clear breath sounds throughout. Abdomen is soft, nontender.  Mild pretibial and ankle edema bilaterally.  No focal neurological deficits.      DDx includes but is not limited to CHF exacerbation, ACS, DVT, PE, cholecystitis, dehydration, failure to thrive    ED workup reveals EKG normal sinus rhythm with no ischemic changes.  Troponin negative.   BMP within normal limits doubt CHF.  Patient has no chest pain with negative trope and nonischemic EKG low suspicion for ACS at this time.  Ultrasound DVT study you were negative for DVT.  Patient did have a elevated D-dimer of 11 so CTA was obtained which was negative for PE.  There was incidental gallbladder distention noted on CTA so right upper quadrant ultrasound was obtained which shows no evidence of cholecystitis or CBD dilation.  Patient does have elevated alkaline phosphatase and transaminitis however her abdominal exam is benign.  Her BMP creatinine ratio appears to be elevated suggesting dehydration.  UA negative for infection and no urinary retention with a postvoid residual of 84.  Initial potassium is 6.7 with reported hemolysis from of.  I-STAT was obtained which shows a potassium 5.0.  Whole BP with a formal BMP for.  No EKG changes suspect this is from hemolysis.  Can patient's dehydration and continued lethargy and decline over the past 2 weeks will admit to Hospital Medicine for observation.    I discussed the care of this patient with my supervising physician.            ED Course as of 09/21/22 0145   Tue Sep 20, 2022   2343 D-Dimer(!): 11.97  CTA negative for PE [HJ]      ED Course User Index  [HJ] Mary Kennedy PA-C                   Clinical Impression:   Final diagnoses:  [R06.02] Shortness of breath  [R60.0] Leg edema  [E86.0] Dehydration (Primary)        ED Disposition Condition    Observation Stable                Mary Kennedy PA-C  09/21/22 0134       Mary Kennedy PA-C  09/21/22 0145

## 2022-09-20 NOTE — PROGRESS NOTES
"[facility]      Chief Complaint   Patient presents with    Leg Problem     Right leg pain    Shortness of Breath    Leg Swelling    Fatigue       Patient ID: Radha Feng is a 81 y.o. female with HTN, DM2, who presents for an initial appointment.  Pertinent history/events are as follows:     -Pt presents for evaluation of leg swelling/SOB/fatigue.    HPI:  Mrs. Feng is accompanied by her daughter.  Her daughter reports multiple symptoms including worsening leg swelling which started 2 weeks ago.  She reports lethargy, SOB, and fatigue, all of which started 2 weeks ago.  Daughter states the pt has declined quickly over the past 2 weeks, and "is not herself".  Pt states "feel sleepy".  He main complaint is right knee pain and SOB.  EKG today shows normal sinus rhythm; Left axis deviation; Low voltage QRS; inferior infarct; cannot rule out Anteroseptal infarct.  No previous EKG is available for comparison.      Past Medical History:   Diagnosis Date    Cataract     Diabetes mellitus     Glaucoma     Hypertension      Past Surgical History:   Procedure Laterality Date    CATARACT EXTRACTION W/  INTRAOCULAR LENS IMPLANT Left 12/21/2021    Procedure: EXTRACTION, CATARACT, WITH IOL INSERTION;  Surgeon: Shay Chou MD;  Location: [facility];  Service: Ophthalmology;  Laterality: Left;     Social History     Socioeconomic History    Marital status: Single   Tobacco Use    Smoking status: Former    Smokeless tobacco: Never   Substance and Sexual Activity    Alcohol use: Not Currently    Drug use: Never     Family History   Problem Relation Age of Onset    Cataracts Mother     Diabetes Mother     Glaucoma Mother     Hypertension Mother     Heart attack Father     Cancer Sister     Blindness Cousin     Amblyopia Neg Hx     Macular degeneration Neg Hx     Retinal detachment Neg Hx        Review of patient's allergies indicates:  No Known Allergies    Medication List with Changes/Refills   Current " "Medications    AMLODIPINE (NORVASC) 10 MG TABLET    Take 10 mg by mouth once daily.    ASPIRIN (ECOTRIN) 81 MG EC TABLET    Take 81 mg by mouth once daily.    ATORVASTATIN (LIPITOR) 10 MG TABLET    TAKE 1 TABLET BY MOUTH EVERY DAY    BRIMONIDINE 0.2% (ALPHAGAN) 0.2 % DROP    INSTILL 1 DROP INTO RIGHT EYE TWICE A DAY    DORZOLAMIDE-TIMOLOL 2-0.5% (COSOPT) 22.3-6.8 MG/ML OPHTHALMIC SOLUTION    INSTILL 1 DROP INTO RIGHT EYE TWICE A DAY    GLYBURIDE (DIABETA) 2.5 MG TABLET    Take 2.5 mg by mouth 2 (two) times daily with meals.    LATANOPROST 0.005 % OPHTHALMIC SOLUTION    PLACE 1 DROP INTO BOTH EYES ONCE DAILY.    LISINOPRIL-HYDROCHLOROTHIAZIDE (PRINZIDE,ZESTORETIC) 20-12.5 MG PER TABLET    TAKE 1 TABLET BY MOUTH TWICE A DAY    METFORMIN (GLUCOPHAGE) 500 MG TABLET    TAKE 1 TABLET BY MOUTH TWICE A DAY       Review of Systems  Constitution: Denies chills, fever, and sweats.  HENT: Denies headaches or blurry vision.  Cardiovascular: Denies chest pain or irregular heart beat.  Respiratory: Positive for shortness of breath.  Gastrointestinal: Denies abdominal pain, nausea, or vomiting.  Musculoskeletal: Positive leg swelling and right knee pain.  Neurological: Denies dizziness or focal weakness.  Psychiatric/Behavioral: Normal mental status.  Hematologic/Lymphatic: Denies bleeding problem or easy bruising/bleeding.  Skin: Denies rash or suspicious lesions    Physical Examination  BP (!) 112/58 (BP Location: Left arm, Patient Position: Sitting, BP Method: Medium (Automatic))   Pulse 82   Ht 5' 6" (1.676 m)   Wt 52.1 kg (114 lb 13.8 oz)   SpO2 98%   BMI 18.54 kg/m²     Constitutional: No acute distress, conversant  HEENT: Sclera anicteric, Pupils equal, round and reactive to light, extraocular motions intact, Oropharynx clear  Neck: No JVD, no carotid bruits  Cardiovascular: regular rate and rhythm, no murmur, rubs or gallops, normal S1/S2  Pulmonary: Clear to auscultation bilaterally  Abdominal: Abdomen soft, " nontender, nondistended, positive bowel sounds  Extremities: BLE's with trace to 1 pitting edema (R>L)   Pulses:  Carotid pulses are 2+ on the right side, and 2+ on the left side.  Radial pulses are 2+ on the right side, and 2+ on the left side.   Femoral pulses are 2+ on the right side, and 2+ on the left side.  Popliteal pulses are 2+ on the right side, and 2+ on the left side.   Dorsalis pedis pulses are 2+ on the right side, and 2+ on the left side.   Posterior tibial pulses are 2+ on the right side, and 2+ on the left side.    Skin: No ecchymosis, erythema, or ulcers  Psych: Alert and oriented x 3, appropriate affect  Neuro: CNII-XII intact, no focal deficits    Labs:  Most Recent Data  CBC: No results found for: WBC, HGB, HCT, PLT, MCV, RDW  BMP: No results found for: NA, K, CL, CO2, BUN, CREATININE, GLU, CALCIUM, MG, PHOS  LFTS; No results found for: PROT, ALBUMIN, BILITOT, AST, ALKPHOS, ALT, GGT  COAGS: No results found for: INR, PROTIME, PTT  FLP:   Lab Results   Component Value Date    CHOL 183 01/13/2022    HDL 75 01/13/2022    TRIG 131 01/13/2022     CARDIAC: No results found for: TROPONINI, CKMB, BNP    Imaging:    EKG 9/20/2022:  Normal sinus rhythm  Left axis deviation  Low voltage QRS  Inferior infarct (cited on or before 20-SEP-2022)  Cannot rule out Anteroseptal infarct (cited on or before 20-SEP-2022)    NURY Study 5/23/2022:  Right Leg: Segmental Pressures suggest minimal peripheral arterial occlusive disease. However, PVR waveforms suggest moderate   peripheral arterial occlusive disease.     Left Leg: Segmental Pressures suggest minimal peripheral arterial occlusive disease. However, PVR waveforms suggest mild   peripheral arterial occlusive disease.    Assessment/Plan:  Radha Feng is a 81 y.o. female with HTN, DM2, who presents for an initial appointment.    Right Knee Pain/leg swelling/SOB- Etiology unclear.  EKG today shows normal sinus rhythm; Left axis deviation; Low voltage QRS;  inferior infarct; cannot rule out Anteroseptal infarct.  No previous EKG is available for comparison.  Given pt's rapid decline over the past week, lethargy, advanced age, and hypotension, I recommend inpatient admission for workup to evaluate for possible recent vs remote infarct.  Check echo and cardiac enzymes.  Check CTA chest and BLE venous ultrasound to rule out PE and DVT.    2. Hypotension- Blood pressure 112/58 today.  Daughter reports 107/62 yesterday.  Plan as per above.     Pt to be transported to ED for admission  Follow up in 4 months    Total duration of face to face visit time 45 minutes.  Total time spent counseling greater than fifty percent of total visit time.  Counseling included discussion regarding imaging findings, diagnosis, possibilities, treatment options, risks and benefits.  The patient had many questions regarding the options and long-term effects.    Don Hutton MD, PhD  Interventional Cardiology

## 2022-09-20 NOTE — PATIENT INSTRUCTIONS
Assessment/Plan:  Radha Feng is a 81 y.o. female with HTN, DM2, who presents for an initial appointment.    Right Knee Pain/leg swelling/SOB- Etiology unclear.  EKG today shows normal sinus rhythm; Left axis deviation; Low voltage QRS; inferior infarct; cannot rule out Anteroseptal infarct.  No previous EKG is available for comparison.  Given pt's rapid decline over the past week, lethargy, advanced age, and hypotension, I recommend inpatient admission for workup to evaluate for possible recent vs remote infarct.  Check echo and cardiac enzymes.  Check CTA chest and BLE venous ultrasound to rule out PE and DVT.    2. Hypotension- Blood pressure 112/58 today.  Daughter reports 107/62 yesterday.  Plan as per above.     Pt to be transported to ED for admission  Follow up in 4 months

## 2022-09-20 NOTE — ED NOTES
LOC: The patient is awake, alert and aware of environment with an appropriate affect, the patient is oriented x 3 and speaking appropriately.  APPEARANCE: Patient resting comfortably and in no acute distress, patient is clean and well groomed, patient's clothing is properly fastened.  SKIN: The skin is warm and dry, color consistent with ethnicity, patient has normal skin turgor and moist mucus membranes, skin intact, no breakdown or bruising noted.  MUSCULOSKELETAL: Patient moving all extremities spontaneously, no obvious swelling or deformities noted.  RESPIRATORY: Airway is open and patent, respirations are spontaneous, patient has a normal effort and rate, no accessory muscle use noted.  ABDOMEN: Soft and non tender to palpation, no distention noted.

## 2022-09-21 ENCOUNTER — PATIENT MESSAGE (OUTPATIENT)
Dept: UROGYNECOLOGY | Facility: CLINIC | Age: 82
End: 2022-09-21
Payer: MEDICARE

## 2022-09-21 PROBLEM — R74.8 ELEVATED LIVER ENZYMES: Status: ACTIVE | Noted: 2022-09-21

## 2022-09-21 PROBLEM — N17.9 AKI (ACUTE KIDNEY INJURY): Status: ACTIVE | Noted: 2022-09-21

## 2022-09-21 PROBLEM — E86.0 DEHYDRATION: Status: ACTIVE | Noted: 2022-09-21

## 2022-09-21 PROBLEM — E11.9 TYPE 2 DIABETES MELLITUS, WITHOUT LONG-TERM CURRENT USE OF INSULIN: Status: ACTIVE | Noted: 2022-09-21

## 2022-09-21 PROBLEM — I10 HYPERTENSION: Status: ACTIVE | Noted: 2022-09-21

## 2022-09-21 LAB
ALBUMIN SERPL BCP-MCNC: 2 G/DL (ref 3.5–5.2)
ALBUMIN SERPL BCP-MCNC: 2.1 G/DL (ref 3.5–5.2)
ALP SERPL-CCNC: 210 U/L (ref 55–135)
ALP SERPL-CCNC: 223 U/L (ref 55–135)
ALT SERPL W/O P-5'-P-CCNC: 229 U/L (ref 10–44)
ALT SERPL W/O P-5'-P-CCNC: 246 U/L (ref 10–44)
ANION GAP SERPL CALC-SCNC: 6 MMOL/L (ref 8–16)
ANION GAP SERPL CALC-SCNC: 7 MMOL/L (ref 8–16)
AST SERPL-CCNC: 241 U/L (ref 10–40)
AST SERPL-CCNC: 264 U/L (ref 10–40)
BASOPHILS # BLD AUTO: 0.05 K/UL (ref 0–0.2)
BASOPHILS NFR BLD: 0.6 % (ref 0–1.9)
BILIRUB DIRECT SERPL-MCNC: 1.6 MG/DL (ref 0.1–0.3)
BILIRUB SERPL-MCNC: 2.7 MG/DL (ref 0.1–1)
BILIRUB SERPL-MCNC: 2.8 MG/DL (ref 0.1–1)
BUN SERPL-MCNC: 39 MG/DL (ref 8–23)
BUN SERPL-MCNC: 42 MG/DL (ref 8–23)
CALCIUM SERPL-MCNC: 8.7 MG/DL (ref 8.7–10.5)
CALCIUM SERPL-MCNC: 8.7 MG/DL (ref 8.7–10.5)
CHLORIDE SERPL-SCNC: 112 MMOL/L (ref 95–110)
CHLORIDE SERPL-SCNC: 114 MMOL/L (ref 95–110)
CO2 SERPL-SCNC: 16 MMOL/L (ref 23–29)
CO2 SERPL-SCNC: 19 MMOL/L (ref 23–29)
CREAT SERPL-MCNC: 1 MG/DL (ref 0.5–1.4)
CREAT SERPL-MCNC: 1.1 MG/DL (ref 0.5–1.4)
DIFFERENTIAL METHOD: ABNORMAL
EOSINOPHIL # BLD AUTO: 0.3 K/UL (ref 0–0.5)
EOSINOPHIL NFR BLD: 4.2 % (ref 0–8)
ERYTHROCYTE [DISTWIDTH] IN BLOOD BY AUTOMATED COUNT: 21.1 % (ref 11.5–14.5)
EST. GFR  (NO RACE VARIABLE): 50.8 ML/MIN/1.73 M^2
EST. GFR  (NO RACE VARIABLE): 57 ML/MIN/1.73 M^2
ESTIMATED AVG GLUCOSE: 105 MG/DL (ref 68–131)
GLUCOSE SERPL-MCNC: 163 MG/DL (ref 70–110)
GLUCOSE SERPL-MCNC: 192 MG/DL (ref 70–110)
HAV IGM SERPL QL IA: NORMAL
HBA1C MFR BLD: 5.3 % (ref 4–5.6)
HBV CORE IGM SERPL QL IA: NORMAL
HBV SURFACE AG SERPL QL IA: NORMAL
HCT VFR BLD AUTO: 31.7 % (ref 37–48.5)
HCV AB SERPL QL IA: NORMAL
HGB BLD-MCNC: 10.6 G/DL (ref 12–16)
IMM GRANULOCYTES # BLD AUTO: 0.04 K/UL (ref 0–0.04)
IMM GRANULOCYTES NFR BLD AUTO: 0.5 % (ref 0–0.5)
LIPASE SERPL-CCNC: 81 U/L (ref 4–60)
LYMPHOCYTES # BLD AUTO: 1.3 K/UL (ref 1–4.8)
LYMPHOCYTES NFR BLD: 16 % (ref 18–48)
MCH RBC QN AUTO: 33.3 PG (ref 27–31)
MCHC RBC AUTO-ENTMCNC: 33.4 G/DL (ref 32–36)
MCV RBC AUTO: 100 FL (ref 82–98)
MONOCYTES # BLD AUTO: 1.2 K/UL (ref 0.3–1)
MONOCYTES NFR BLD: 14.7 % (ref 4–15)
NEUTROPHILS # BLD AUTO: 5.1 K/UL (ref 1.8–7.7)
NEUTROPHILS NFR BLD: 64 % (ref 38–73)
NRBC BLD-RTO: 0 /100 WBC
PLATELET # BLD AUTO: 112 K/UL (ref 150–450)
PMV BLD AUTO: 12.1 FL (ref 9.2–12.9)
POCT GLUCOSE: 115 MG/DL (ref 70–110)
POCT GLUCOSE: 269 MG/DL (ref 70–110)
POCT GLUCOSE: 98 MG/DL (ref 70–110)
POTASSIUM SERPL-SCNC: 4.2 MMOL/L (ref 3.5–5.1)
POTASSIUM SERPL-SCNC: 4.5 MMOL/L (ref 3.5–5.1)
PROT SERPL-MCNC: 6.7 G/DL (ref 6–8.4)
PROT SERPL-MCNC: 7 G/DL (ref 6–8.4)
RBC # BLD AUTO: 3.18 M/UL (ref 4–5.4)
SODIUM SERPL-SCNC: 135 MMOL/L (ref 136–145)
SODIUM SERPL-SCNC: 139 MMOL/L (ref 136–145)
TSH SERPL DL<=0.005 MIU/L-ACNC: 0.74 UIU/ML (ref 0.4–4)
WBC # BLD AUTO: 7.89 K/UL (ref 3.9–12.7)

## 2022-09-21 PROCEDURE — 84443 ASSAY THYROID STIM HORMONE: CPT

## 2022-09-21 PROCEDURE — 25000003 PHARM REV CODE 250: Performed by: HOSPITALIST

## 2022-09-21 PROCEDURE — 63600175 PHARM REV CODE 636 W HCPCS: Performed by: INTERNAL MEDICINE

## 2022-09-21 PROCEDURE — 83690 ASSAY OF LIPASE: CPT

## 2022-09-21 PROCEDURE — 96372 THER/PROPH/DIAG INJ SC/IM: CPT

## 2022-09-21 PROCEDURE — 25000003 PHARM REV CODE 250

## 2022-09-21 PROCEDURE — G0378 HOSPITAL OBSERVATION PER HR: HCPCS

## 2022-09-21 PROCEDURE — 25000003 PHARM REV CODE 250: Performed by: INTERNAL MEDICINE

## 2022-09-21 PROCEDURE — 83036 HEMOGLOBIN GLYCOSYLATED A1C: CPT

## 2022-09-21 PROCEDURE — 80048 BASIC METABOLIC PNL TOTAL CA: CPT | Mod: XB | Performed by: PHYSICIAN ASSISTANT

## 2022-09-21 PROCEDURE — 63600175 PHARM REV CODE 636 W HCPCS: Performed by: PHYSICIAN ASSISTANT

## 2022-09-21 PROCEDURE — 99220 PR INITIAL OBSERVATION CARE,LEVL III: ICD-10-PCS | Mod: ,,,

## 2022-09-21 PROCEDURE — 99220 PR INITIAL OBSERVATION CARE,LEVL III: CPT | Mod: ,,,

## 2022-09-21 PROCEDURE — 80076 HEPATIC FUNCTION PANEL: CPT | Performed by: PHYSICIAN ASSISTANT

## 2022-09-21 PROCEDURE — 80053 COMPREHEN METABOLIC PANEL: CPT

## 2022-09-21 PROCEDURE — 85025 COMPLETE CBC W/AUTO DIFF WBC: CPT

## 2022-09-21 PROCEDURE — 80074 ACUTE HEPATITIS PANEL: CPT

## 2022-09-21 RX ORDER — ONDANSETRON 8 MG/1
8 TABLET, ORALLY DISINTEGRATING ORAL EVERY 8 HOURS PRN
Status: DISCONTINUED | OUTPATIENT
Start: 2022-09-21 | End: 2022-10-14 | Stop reason: HOSPADM

## 2022-09-21 RX ORDER — NALOXONE HCL 0.4 MG/ML
0.02 VIAL (ML) INJECTION
Status: DISCONTINUED | OUTPATIENT
Start: 2022-09-21 | End: 2022-10-14 | Stop reason: HOSPADM

## 2022-09-21 RX ORDER — TALC
6 POWDER (GRAM) TOPICAL NIGHTLY PRN
Status: DISCONTINUED | OUTPATIENT
Start: 2022-09-21 | End: 2022-10-14 | Stop reason: HOSPADM

## 2022-09-21 RX ORDER — BISACODYL 10 MG
10 SUPPOSITORY, RECTAL RECTAL DAILY PRN
Status: DISCONTINUED | OUTPATIENT
Start: 2022-09-21 | End: 2022-10-14 | Stop reason: HOSPADM

## 2022-09-21 RX ORDER — INSULIN ASPART 100 [IU]/ML
0-5 INJECTION, SOLUTION INTRAVENOUS; SUBCUTANEOUS
Status: DISCONTINUED | OUTPATIENT
Start: 2022-09-21 | End: 2022-10-14 | Stop reason: HOSPADM

## 2022-09-21 RX ORDER — LATANOPROST 50 UG/ML
1 SOLUTION/ DROPS OPHTHALMIC DAILY
Status: DISCONTINUED | OUTPATIENT
Start: 2022-09-21 | End: 2022-10-14 | Stop reason: HOSPADM

## 2022-09-21 RX ORDER — ACETAMINOPHEN 325 MG/1
650 TABLET ORAL EVERY 4 HOURS PRN
Status: DISCONTINUED | OUTPATIENT
Start: 2022-09-21 | End: 2022-10-14 | Stop reason: HOSPADM

## 2022-09-21 RX ORDER — PROCHLORPERAZINE EDISYLATE 5 MG/ML
5 INJECTION INTRAMUSCULAR; INTRAVENOUS EVERY 6 HOURS PRN
Status: DISCONTINUED | OUTPATIENT
Start: 2022-09-21 | End: 2022-10-14 | Stop reason: HOSPADM

## 2022-09-21 RX ORDER — ATORVASTATIN CALCIUM 10 MG/1
10 TABLET, FILM COATED ORAL DAILY
Status: DISCONTINUED | OUTPATIENT
Start: 2022-09-21 | End: 2022-09-23

## 2022-09-21 RX ORDER — IBUPROFEN 200 MG
24 TABLET ORAL
Status: DISCONTINUED | OUTPATIENT
Start: 2022-09-21 | End: 2022-10-14 | Stop reason: HOSPADM

## 2022-09-21 RX ORDER — IPRATROPIUM BROMIDE AND ALBUTEROL SULFATE 2.5; .5 MG/3ML; MG/3ML
3 SOLUTION RESPIRATORY (INHALATION) EVERY 4 HOURS PRN
Status: DISCONTINUED | OUTPATIENT
Start: 2022-09-21 | End: 2022-10-14 | Stop reason: HOSPADM

## 2022-09-21 RX ORDER — SIMETHICONE 80 MG
1 TABLET,CHEWABLE ORAL 4 TIMES DAILY PRN
Status: DISCONTINUED | OUTPATIENT
Start: 2022-09-21 | End: 2022-10-14 | Stop reason: HOSPADM

## 2022-09-21 RX ORDER — SODIUM CHLORIDE 0.9 % (FLUSH) 0.9 %
5 SYRINGE (ML) INJECTION
Status: DISCONTINUED | OUTPATIENT
Start: 2022-09-21 | End: 2022-10-14 | Stop reason: HOSPADM

## 2022-09-21 RX ORDER — ACETAMINOPHEN 500 MG
TABLET ORAL
Status: ON HOLD | COMMUNITY
End: 2022-10-14 | Stop reason: HOSPADM

## 2022-09-21 RX ORDER — ASPIRIN 81 MG/1
81 TABLET ORAL DAILY
Status: DISCONTINUED | OUTPATIENT
Start: 2022-09-21 | End: 2022-09-24

## 2022-09-21 RX ORDER — BRIMONIDINE TARTRATE 2 MG/ML
1 SOLUTION/ DROPS OPHTHALMIC 2 TIMES DAILY
Status: DISCONTINUED | OUTPATIENT
Start: 2022-09-21 | End: 2022-10-14 | Stop reason: HOSPADM

## 2022-09-21 RX ORDER — IBUPROFEN 200 MG
TABLET ORAL
Status: ON HOLD | COMMUNITY
End: 2022-10-25 | Stop reason: HOSPADM

## 2022-09-21 RX ORDER — IBUPROFEN 200 MG
16 TABLET ORAL
Status: DISCONTINUED | OUTPATIENT
Start: 2022-09-21 | End: 2022-10-14 | Stop reason: HOSPADM

## 2022-09-21 RX ORDER — MAG HYDROX/ALUMINUM HYD/SIMETH 200-200-20
30 SUSPENSION, ORAL (FINAL DOSE FORM) ORAL 4 TIMES DAILY PRN
Status: DISCONTINUED | OUTPATIENT
Start: 2022-09-21 | End: 2022-10-14 | Stop reason: HOSPADM

## 2022-09-21 RX ORDER — SODIUM CHLORIDE, SODIUM LACTATE, POTASSIUM CHLORIDE, CALCIUM CHLORIDE 600; 310; 30; 20 MG/100ML; MG/100ML; MG/100ML; MG/100ML
INJECTION, SOLUTION INTRAVENOUS CONTINUOUS
Status: ACTIVE | OUTPATIENT
Start: 2022-09-21 | End: 2022-09-22

## 2022-09-21 RX ORDER — POLYETHYLENE GLYCOL 3350 17 G/17G
17 POWDER, FOR SOLUTION ORAL DAILY
Status: DISCONTINUED | OUTPATIENT
Start: 2022-09-21 | End: 2022-09-21

## 2022-09-21 RX ORDER — BRIMONIDINE TARTRATE 2 MG/ML
1 SOLUTION/ DROPS OPHTHALMIC 2 TIMES DAILY
Status: DISCONTINUED | OUTPATIENT
Start: 2022-09-21 | End: 2022-09-21

## 2022-09-21 RX ORDER — GLUCAGON 1 MG
1 KIT INJECTION
Status: DISCONTINUED | OUTPATIENT
Start: 2022-09-21 | End: 2022-10-14 | Stop reason: HOSPADM

## 2022-09-21 RX ORDER — FAMOTIDINE 20 MG/1
20 TABLET, FILM COATED ORAL DAILY
Status: DISCONTINUED | OUTPATIENT
Start: 2022-09-21 | End: 2022-10-14 | Stop reason: HOSPADM

## 2022-09-21 RX ORDER — ACETAMINOPHEN 500 MG
1000 TABLET ORAL EVERY 8 HOURS PRN
Status: DISCONTINUED | OUTPATIENT
Start: 2022-09-21 | End: 2022-10-14 | Stop reason: HOSPADM

## 2022-09-21 RX ADMIN — INSULIN DETEMIR 12 UNITS: 100 INJECTION, SOLUTION SUBCUTANEOUS at 08:09

## 2022-09-21 RX ADMIN — FAMOTIDINE 20 MG: 20 TABLET ORAL at 01:09

## 2022-09-21 RX ADMIN — ACETAMINOPHEN 1000 MG: 500 TABLET ORAL at 03:09

## 2022-09-21 RX ADMIN — SODIUM CHLORIDE, SODIUM LACTATE, POTASSIUM CHLORIDE, AND CALCIUM CHLORIDE 500 ML: .6; .31; .03; .02 INJECTION, SOLUTION INTRAVENOUS at 01:09

## 2022-09-21 RX ADMIN — ATORVASTATIN CALCIUM 10 MG: 10 TABLET, FILM COATED ORAL at 08:09

## 2022-09-21 RX ADMIN — ACETAMINOPHEN 1000 MG: 500 TABLET ORAL at 09:09

## 2022-09-21 RX ADMIN — SODIUM CHLORIDE, SODIUM LACTATE, POTASSIUM CHLORIDE, AND CALCIUM CHLORIDE: .6; .31; .03; .02 INJECTION, SOLUTION INTRAVENOUS at 01:09

## 2022-09-21 RX ADMIN — ASPIRIN 81 MG: 81 TABLET, COATED ORAL at 08:09

## 2022-09-21 NOTE — ASSESSMENT & PLAN NOTE
- reported trauma about 30 years ago  - follows with orthopedics outpatient  - recent MRI in august with chronic findings  - continue tylenol prn for pain

## 2022-09-21 NOTE — ASSESSMENT & PLAN NOTE
- patient will mild RUQ pain on exam, otherwise benign abdominal exam  - elevated AST/ALT at 296/254, T. Bili 2.1, alk phos 224  - CTA performed in the ED with marked distension of the partially visualized gallbladder  - subsequent RUQ US with evidence of distended gallbladder but without gallstones or acute cholecystitis   - continue to trend  - further imaging pending clinical course

## 2022-09-21 NOTE — SUBJECTIVE & OBJECTIVE
Past Medical History:   Diagnosis Date    Cataract     Diabetes mellitus     Glaucoma     Hypertension        Past Surgical History:   Procedure Laterality Date    CATARACT EXTRACTION W/  INTRAOCULAR LENS IMPLANT Left 12/21/2021    Procedure: EXTRACTION, CATARACT, WITH IOL INSERTION;  Surgeon: Shay Chou MD;  Location: [facility];  Service: Ophthalmology;  Laterality: Left;       Review of patient's allergies indicates:  No Known Allergies    No current facility-administered medications on file prior to encounter.     Current Outpatient Medications on File Prior to Encounter   Medication Sig    amLODIPine (NORVASC) 10 MG tablet Take 10 mg by mouth once daily.    aspirin (ECOTRIN) 81 MG EC tablet Take 81 mg by mouth once daily.    atorvastatin (LIPITOR) 10 MG tablet TAKE 1 TABLET BY MOUTH EVERY DAY    brimonidine 0.2% (ALPHAGAN) 0.2 % Drop INSTILL 1 DROP INTO RIGHT EYE TWICE A DAY    dorzolamide-timolol 2-0.5% (COSOPT) 22.3-6.8 mg/mL ophthalmic solution INSTILL 1 DROP INTO RIGHT EYE TWICE A DAY    glyBURIDE (DIABETA) 2.5 MG tablet Take 2.5 mg by mouth 2 (two) times daily with meals.    latanoprost 0.005 % ophthalmic solution PLACE 1 DROP INTO BOTH EYES ONCE DAILY.    lisinopriL-hydrochlorothiazide (PRINZIDE,ZESTORETIC) 20-12.5 mg per tablet TAKE 1 TABLET BY MOUTH TWICE A DAY    metFORMIN (GLUCOPHAGE) 500 MG tablet TAKE 1 TABLET BY MOUTH TWICE A DAY     Family History       Problem Relation (Age of Onset)    Blindness Cousin    Cancer Sister    Cataracts Mother    Diabetes Mother    Glaucoma Mother    Heart attack Father    Hypertension Mother          Tobacco Use    Smoking status: Former    Smokeless tobacco: Never   Substance and Sexual Activity    Alcohol use: Not Currently    Drug use: Never    Sexual activity: Not on file     Review of Systems   Constitutional:  Positive for activity change, appetite change and fatigue. Negative for chills and fever.   HENT:  Negative for trouble swallowing.    Eyes:   Negative for photophobia and visual disturbance.   Respiratory:  Negative for cough, chest tightness and shortness of breath.    Cardiovascular:  Negative for chest pain, palpitations and leg swelling.   Gastrointestinal:  Negative for abdominal pain, constipation, diarrhea, nausea and vomiting.   Genitourinary:  Negative for dysuria, frequency and hematuria.   Musculoskeletal:  Positive for arthralgias (R knee). Negative for back pain, gait problem and neck pain.   Skin:  Negative for rash and wound.   Neurological:  Negative for dizziness, syncope, speech difficulty and light-headedness.   Psychiatric/Behavioral:  Negative for agitation and confusion. The patient is not nervous/anxious.    Objective:     Vital Signs (Most Recent):  Temp: 96.5 °F (35.8 °C) (09/21/22 0303)  Pulse: 65 (09/21/22 0303)  Resp: 18 (09/21/22 0303)  BP: (!) 145/66 (09/21/22 0303)  SpO2: 99 % (09/21/22 0303)   Vital Signs (24h Range):  Temp:  [96.5 °F (35.8 °C)-98.9 °F (37.2 °C)] 96.5 °F (35.8 °C)  Pulse:  [64-82] 65  Resp:  [16-19] 18  SpO2:  [98 %-100 %] 99 %  BP: (112-145)/(56-66) 145/66     Weight: 52.1 kg (114 lb 13.8 oz)  Body mass index is 18.54 kg/m².    Physical Exam  Vitals and nursing note reviewed.   Constitutional:       General: She is not in acute distress.     Appearance: She is well-developed.      Comments: Thin, frail   HENT:      Head: Normocephalic and atraumatic.      Mouth/Throat:      Pharynx: No oropharyngeal exudate.   Eyes:      Extraocular Movements: Extraocular movements intact.   Cardiovascular:      Rate and Rhythm: Normal rate and regular rhythm.      Heart sounds: Normal heart sounds.   Pulmonary:      Effort: Pulmonary effort is normal. No respiratory distress.      Breath sounds: Normal breath sounds. No wheezing.   Abdominal:      General: Abdomen is flat. Bowel sounds are normal. There is no distension.      Palpations: Abdomen is soft.      Tenderness: There is abdominal tenderness.      Comments: TTP  in RUQ   Genitourinary:     Comments: Deferred   Musculoskeletal:         General: No tenderness. Normal range of motion.      Cervical back: Normal range of motion and neck supple.      Comments: No deformity or swelling evident in R knee, pain with active and passive movement  1+ pitting edema in BLE   Lymphadenopathy:      Cervical: No cervical adenopathy.   Skin:     General: Skin is warm and dry.      Capillary Refill: Capillary refill takes less than 2 seconds.      Findings: No rash.   Neurological:      Mental Status: She is oriented to person, place, and time. She is lethargic.      Cranial Nerves: No cranial nerve deficit.      Sensory: No sensory deficit.      Coordination: Coordination normal.      Comments: AAOx4, no focal deficits   Psychiatric:         Behavior: Behavior normal. Behavior is cooperative.         Thought Content: Thought content normal.         Judgment: Judgment normal.           Significant Labs: All pertinent labs within the past 24 hours have been reviewed.  BMP:   Recent Labs   Lab 09/21/22  0127   *      K 4.5   *   CO2 19*   BUN 42*   CREATININE 1.1   CALCIUM 8.7     CBC:   Recent Labs   Lab 09/20/22 1717 09/20/22  1830   WBC 7.79  --    HGB 10.4*  --    HCT 31.7* 37   *  --      CMP:   Recent Labs   Lab 09/20/22 1717 09/21/22  0127   * 139   K 6.7* 4.5   * 114*   CO2 12* 19*   * 192*   BUN 42* 42*   CREATININE 1.4 1.1   CALCIUM 8.6* 8.7   PROT 7.2  --    ALBUMIN 2.1*  --    BILITOT 2.9*  --    ALKPHOS 224*  --    *  --    *  --    ANIONGAP 10 6*     Cardiac Markers:   Recent Labs   Lab 09/20/22 1717   BNP 66     Troponin:   Recent Labs   Lab 09/20/22 1717   TROPONINI <0.006     Urine Studies:   Recent Labs   Lab 09/20/22 2126   COLORU Yellow   APPEARANCEUA Hazy*   PHUR 6.0   SPECGRAV 1.015   PROTEINUA Trace*   GLUCUA Negative   KETONESU Negative   BILIRUBINUA Negative   OCCULTUA Negative   NITRITE Negative    LEUKOCYTESUR 2+*   WBCUA 2   BACTERIA Rare   SQUAMEPITHEL 1       Significant Imaging: I have reviewed all pertinent imaging results/findings within the past 24 hours.

## 2022-09-21 NOTE — H&P
"[facility] Medicine  History & Physical    Patient Name: Radha Feng  MRN: [id number]  Patient Class: OP- Observation  Admission Date: 9/20/2022  Attending Physician: No att. providers found   Primary Care Provider: Primary Doctor No         Patient information was obtained from patient, relative(s) and ER records.     Subjective:     Principal Problem:Lethargy    Chief Complaint:   Chief Complaint   Patient presents with    Shortness of Breath     Per Dr. Hutton's note, "Her daughter reports multiple symptoms including worsening leg swelling which started 2 weeks ago.  She reports lethargy, SOB, and fatigue, all of which started 2 weeks ago.  Daughter states the pt has declined quickly over the past 2 weeks, and "is not herself".  Pt states "feel sleepy"."        HPI: Radha Feng is a 81 y.o. female with a past medical history of type 2 diabetes, hyperlipidemia, hypertension, glaucoma and cataracts presents the emergency department due to progressive lethargy and weakness. Patient's daughter at the bedside who provides the majority of the history due to the acuity of patient's condition. Per daughter, patient was sent from her cardiology office earlier today due to increased bilateral leg swelling, shortness of breath, and progressive weakness over the past two weeks. She reports that over the past two weeks patient has had a poor appetite with significantly decreased po intake. In addition, she reports that patient's BP has been lower at home compared to baseline and states she has held BP medications (amlodipine and lisinopril-HCTZ) for the past two days. She states her BP is normally 130/80 but as been as low as 98/63. Per daughter, lower extremity swelling was most pronounced last week but has improved significantly with compression stockings.  Additionally, daughter notes that she was helping her change last week and noted that she had a prolapsed uterus/bladder but patient has " not seen her OBGYN yet. Patient is requiring more assistance with ADLs which is abnormal for her. Patient denies dysuria but states that she does have difficulty urinating and can only urinate a small amount. Denies chest pain, shortness of breath, abdominal pain, dizziness, nausea, vomiting, or syncope. Patient's only complaint at this time is right knee pain.    ED: vital signs stable, afebrile, no acute distress. Elevated liver enzymes with T.bili of 2.9, AST//254, and alk phos of 224. Na 133, K 5.0, Cr 1.4 (most recent Cr of 0.8 in 12/21). D-dimer 11.97, CTA negative for PE. CTA showed evidence of distended gallbladder and emphysema. Subsequent RUQ US negative for cholecystitis or gallstones. BNP 66, troponin negative. UA negative for infection. Given 500 mL LR and placed in observation.       Past Medical History:   Diagnosis Date    Cataract     Diabetes mellitus     Glaucoma     Hypertension        Past Surgical History:   Procedure Laterality Date    CATARACT EXTRACTION W/  INTRAOCULAR LENS IMPLANT Left 12/21/2021    Procedure: EXTRACTION, CATARACT, WITH IOL INSERTION;  Surgeon: Shay Chou MD;  Location: [facility];  Service: Ophthalmology;  Laterality: Left;       Review of patient's allergies indicates:  No Known Allergies    No current facility-administered medications on file prior to encounter.     Current Outpatient Medications on File Prior to Encounter   Medication Sig    amLODIPine (NORVASC) 10 MG tablet Take 10 mg by mouth once daily.    aspirin (ECOTRIN) 81 MG EC tablet Take 81 mg by mouth once daily.    atorvastatin (LIPITOR) 10 MG tablet TAKE 1 TABLET BY MOUTH EVERY DAY    brimonidine 0.2% (ALPHAGAN) 0.2 % Drop INSTILL 1 DROP INTO RIGHT EYE TWICE A DAY    dorzolamide-timolol 2-0.5% (COSOPT) 22.3-6.8 mg/mL ophthalmic solution INSTILL 1 DROP INTO RIGHT EYE TWICE A DAY    glyBURIDE (DIABETA) 2.5 MG tablet Take 2.5 mg by mouth 2 (two) times daily with meals.    latanoprost 0.005 %  ophthalmic solution PLACE 1 DROP INTO BOTH EYES ONCE DAILY.    lisinopriL-hydrochlorothiazide (PRINZIDE,ZESTORETIC) 20-12.5 mg per tablet TAKE 1 TABLET BY MOUTH TWICE A DAY    metFORMIN (GLUCOPHAGE) 500 MG tablet TAKE 1 TABLET BY MOUTH TWICE A DAY     Family History       Problem Relation (Age of Onset)    Blindness Cousin    Cancer Sister    Cataracts Mother    Diabetes Mother    Glaucoma Mother    Heart attack Father    Hypertension Mother          Tobacco Use    Smoking status: Former    Smokeless tobacco: Never   Substance and Sexual Activity    Alcohol use: Not Currently    Drug use: Never    Sexual activity: Not on file     Review of Systems   Constitutional:  Positive for activity change, appetite change and fatigue. Negative for chills and fever.   HENT:  Negative for trouble swallowing.    Eyes:  Negative for photophobia and visual disturbance.   Respiratory:  Negative for cough, chest tightness and shortness of breath.    Cardiovascular:  Negative for chest pain, palpitations and leg swelling.   Gastrointestinal:  Negative for abdominal pain, constipation, diarrhea, nausea and vomiting.   Genitourinary:  Negative for dysuria, frequency and hematuria.   Musculoskeletal:  Positive for arthralgias (R knee). Negative for back pain, gait problem and neck pain.   Skin:  Negative for rash and wound.   Neurological:  Negative for dizziness, syncope, speech difficulty and light-headedness.   Psychiatric/Behavioral:  Negative for agitation and confusion. The patient is not nervous/anxious.    Objective:     Vital Signs (Most Recent):  Temp: 96.5 °F (35.8 °C) (09/21/22 0303)  Pulse: 65 (09/21/22 0303)  Resp: 18 (09/21/22 0303)  BP: (!) 145/66 (09/21/22 0303)  SpO2: 99 % (09/21/22 0303)   Vital Signs (24h Range):  Temp:  [96.5 °F (35.8 °C)-98.9 °F (37.2 °C)] 96.5 °F (35.8 °C)  Pulse:  [64-82] 65  Resp:  [16-19] 18  SpO2:  [98 %-100 %] 99 %  BP: (112-145)/(56-66) 145/66     Weight: 52.1 kg (114 lb 13.8 oz)  Body mass  index is 18.54 kg/m².    Physical Exam  Vitals and nursing note reviewed.   Constitutional:       General: She is not in acute distress.     Appearance: She is well-developed.      Comments: Thin, frail   HENT:      Head: Normocephalic and atraumatic.      Mouth/Throat:      Pharynx: No oropharyngeal exudate.   Eyes:      Extraocular Movements: Extraocular movements intact.   Cardiovascular:      Rate and Rhythm: Normal rate and regular rhythm.      Heart sounds: Normal heart sounds.   Pulmonary:      Effort: Pulmonary effort is normal. No respiratory distress.      Breath sounds: Normal breath sounds. No wheezing.   Abdominal:      General: Abdomen is flat. Bowel sounds are normal. There is no distension.      Palpations: Abdomen is soft.      Tenderness: There is abdominal tenderness.      Comments: TTP in RUQ   Genitourinary:     Comments: Deferred   Musculoskeletal:         General: No tenderness. Normal range of motion.      Cervical back: Normal range of motion and neck supple.      Comments: No deformity or swelling evident in R knee, pain with active and passive movement  1+ pitting edema in BLE   Lymphadenopathy:      Cervical: No cervical adenopathy.   Skin:     General: Skin is warm and dry.      Capillary Refill: Capillary refill takes less than 2 seconds.      Findings: No rash.   Neurological:      Mental Status: She is oriented to person, place, and time. She is lethargic.      Cranial Nerves: No cranial nerve deficit.      Sensory: No sensory deficit.      Coordination: Coordination normal.      Comments: AAOx4, no focal deficits   Psychiatric:         Behavior: Behavior normal. Behavior is cooperative.         Thought Content: Thought content normal.         Judgment: Judgment normal.           Significant Labs: All pertinent labs within the past 24 hours have been reviewed.  BMP:   Recent Labs   Lab 09/21/22  0127   *      K 4.5   *   CO2 19*   BUN 42*   CREATININE 1.1   CALCIUM  8.7     CBC:   Recent Labs   Lab 22  1830   WBC 7.79  --    HGB 10.4*  --    HCT 31.7* 37   *  --      CMP:   Recent Labs   Lab 22  0127   * 139   K 6.7* 4.5   * 114*   CO2 12* 19*   * 192*   BUN 42* 42*   CREATININE 1.4 1.1   CALCIUM 8.6* 8.7   PROT 7.2  --    ALBUMIN 2.1*  --    BILITOT 2.9*  --    ALKPHOS 224*  --    *  --    *  --    ANIONGAP 10 6*     Cardiac Markers:   Recent Labs   Lab 22   BNP 66     Troponin:   Recent Labs   Lab 22   TROPONINI <0.006     Urine Studies:   Recent Labs   Lab 22   COLORU Yellow   APPEARANCEUA Hazy*   PHUR 6.0   SPECGRAV 1.015   PROTEINUA Trace*   GLUCUA Negative   KETONESU Negative   BILIRUBINUA Negative   OCCULTUA Negative   NITRITE Negative   LEUKOCYTESUR 2+*   WBCUA 2   BACTERIA Rare   SQUAMEPITHEL 1       Significant Imaging: I have reviewed all pertinent imaging results/findings within the past 24 hours.    Assessment/Plan:     * Lethargy  Dehydration  Patient's daughter reports progressive lethargy and weakness onset about two weeks ago. Reports [id number] po intake, max of 1/2 a water bottle per day. She lives at home with her daughter. She also expresses concern of underlying depression as patient's twin sister  earlier this year and their birthday is coming up in November.  - CMP consistent with dehydration  - UA without infection  - given 500 ml LR in the ED, will likely give more following echo results  - albumin 2.1  - boost glucose control ordered TIDWM  - nutrition consulted, appreciate assistance  - PT/OT consulted, appreciate assistance    Elevated liver enzymes  - patient will mild RUQ pain on exam, otherwise benign abdominal exam  - elevated AST/ALT at 296/254, T. Bili 2.1, alk phos 224  - CTA performed in the ED with marked distension of the partially visualized gallbladder  - subsequent RUQ US with evidence of distended gallbladder but  without gallstones or acute cholecystitis   - continue to trend  - further imaging pending clinical course    EFREN (acute kidney injury)  Patient with acute kidney injury likely due to IVVD/dehydration EFREN is currently stable. Labs reviewed- Renal function/electrolytes with Estimated Creatinine Clearance: 33.5 mL/min (based on SCr of 1.1 mg/dL). according to latest data. Monitor urine output and serial BMP and adjust therapy as needed. Avoid nephrotoxins and renally dose meds for GFR listed above.        - most recent Cr of 0.8 last December       - Cr 1.4 in the ED       - repeat Cr of 1.1 after 500 ml LR       - continue to trend with CMP    Type 2 diabetes mellitus, without long-term current use of insulin  Patient's FSGs are uncontrolled due to hyperglycemia on current medication regimen.  - home regimen: metformin and glyburide  Last A1c reviewed-   Lab Results   Component Value Date    HGBA1C 5.3 01/13/2022     Most recent fingerstick glucose reviewed- No results for input(s): POCTGLUCOSE in the last 24 hours.  Current correctional scale  Low  Maintain anti-hyperglycemic dose as follows-   Antihyperglycemics (From admission, onward)      Start     Stop Route Frequency Ordered    09/21/22 0900  insulin detemir U-100 pen 12 Units         -- SubQ Daily 09/21/22 0156    09/21/22 0256  insulin aspart U-100 pen 0-5 Units         -- SubQ Before meals & nightly PRN 09/21/22 0156          Hold Oral hypoglycemics while patient is in the hospital.    Hypertension  - BP currently well-controlled  - patient's daughter reports that patient's BP has been lower at home compared to baseline and states she has held BP medications for the past two days  - holding home regimen of amlodipine 10 mg daily and lisinopril/HCTZ 20-12.5 mg BID  - will continue to monitor and further titrate antihypertensives as clinically indicated     Leg swelling  - no previous hx of CHF  - echo ordered for the am    Right knee pain  - chronic, ongoing  for about one year  - reported trauma about 30 years ago  - follows with orthopedics outpatient  - recent MRI in august with chronic findings  - continue tylenol prn for pain    VTE Risk Mitigation (From admission, onward)           Ordered     IP VTE LOW RISK PATIENT  Once         09/21/22 0156                       KEILA NealC  Department of Hospital Medicine   [facility] - Emergency Dept

## 2022-09-21 NOTE — ASSESSMENT & PLAN NOTE
Dehydration    Patient's daughter reports progressive lethargy and weakness onset about two weeks ago. Reports [id number]-ps-tp po intake, max of 1/2 a water bottle per day. She lives at home with her daughter. She also expresses concern of underlying depression as patient's twin sister  earlier this year and their birthday is coming up in November.  - CMP consistent with dehydration  - UA without infection  - given 500 ml LR in the ED, will likely give more following echo results  - albumin 2.1  - boost glucose control ordered TIDWM  - nutrition consulted, appreciate assistance  - PT/OT consulted, appreciate assistance

## 2022-09-21 NOTE — ASSESSMENT & PLAN NOTE
- BP currently well-controlled  - patient's daughter reports that patient's BP has been lower at home compared to baseline and states she has held BP medications for the past two days  - holding home regimen of amlodipine 10 mg daily and lisinopril/HCTZ 20-12.5 mg BID  - will continue to monitor and further titrate antihypertensives as clinically indicated

## 2022-09-21 NOTE — ASSESSMENT & PLAN NOTE
Patient's FSGs are uncontrolled due to hyperglycemia on current medication regimen.  - home regimen: metformin and glyburide  Last A1c reviewed-   Lab Results   Component Value Date    HGBA1C 5.3 01/13/2022     Most recent fingerstick glucose reviewed- No results for input(s): POCTGLUCOSE in the last 24 hours.  Current correctional scale  Low  Maintain anti-hyperglycemic dose as follows-   Antihyperglycemics (From admission, onward)    Start     Stop Route Frequency Ordered    09/21/22 0900  insulin detemir U-100 pen 12 Units         -- SubQ Daily 09/21/22 0156    09/21/22 0256  insulin aspart U-100 pen 0-5 Units         -- SubQ Before meals & nightly PRN 09/21/22 0156        Hold Oral hypoglycemics while patient is in the hospital.

## 2022-09-21 NOTE — HPI
Radha Feng is a 81 y.o. female with a past medical history of type 2 diabetes, hyperlipidemia, hypertension, glaucoma and cataracts presents the emergency department due to progressive lethargy and weakness. Patient's daughter at the bedside who provides the majority of the history due to the acuity of patient's condition. Per daughter, patient was sent from her cardiology office earlier today due to increased bilateral leg swelling, shortness of breath, and progressive weakness over the past two weeks. She reports that over the past two weeks patient has had a poor appetite with significantly decreased po intake. In addition, she reports that patient's BP has been lower at home compared to baseline and states she has held BP medications (amlodipine and lisinopril-HCTZ) for the past two days. She states her BP is normally 130/80 but as been as low as 98/63. Per daughter, lower extremity swelling was most pronounced last week but has improved significantly with compression stockings.  Additionally, daughter notes that she was helping her change last week and noted that she had a prolapsed uterus/bladder but patient has not seen her OBGYN yet. Patient is requiring more assistance with ADLs which is abnormal for her. Patient denies dysuria but states that she does have difficulty urinating and can only urinate a small amount. Denies chest pain, shortness of breath, abdominal pain, dizziness, nausea, vomiting, or syncope. Patient's only complaint at this time is right knee pain.    ED: vital signs stable, afebrile, no acute distress. Elevated liver enzymes with T.bili of 2.9, AST//254, and alk phos of 224. Na 133, K 5.0, Cr 1.4 (most recent Cr of 0.8 in 12/21). D-dimer 11.97, CTA negative for PE. CTA showed evidence of distended gallbladder and emphysema. Subsequent RUQ US negative for cholecystitis or gallstones. BNP 66, troponin negative. UA negative for infection. Given 500 mL LR and placed in  observation.

## 2022-09-21 NOTE — ED NOTES
LOC: Patient is awake, alert, and aware of environment with an appropriate affect. Patient is oriented x 3 and speaking appropriately.  APPEARANCE: Patient resting comfortably and in no acute distress. Patient is clean and well groomed, patient's clothing is properly fastened.  HEENT:   SKIN: The skin is warm and dry. Patient has normal skin turgor and moist mucus membranes.   MUSKULOSKELETAL: Patient is moving all extremities well, no obvious deformities noted. Pulses intact.   RESPIRATORY: Airway is open and patent. Respirations are spontaneous and non-labored with normal effort and rate.  CARDIAC: Patient has a normal rate and rhythm. No peripheral edema noted.   ABDOMEN: No distention noted. Soft and non-tender upon palpation.  NEUROLOGICAL:  PERRL. Facial expression is symmetrical. Hand grasps are equal bilaterally. Normal sensation in all extremities when touched with finger.

## 2022-09-21 NOTE — PHARMACY MED REC
"Admission Medication History     The home medication history was taken by Christopher Choi.    You may go to "Admission" then "Reconcile Home Medications" tabs to review and/or act upon these items.     The home medication list has been updated by the Pharmacy department.   Please read ALL comments highlighted in yellow.   Please address this information as you see fit.    Feel free to contact us if you have any questions or require assistance.      Medications listed below were obtained from: Della (daughter) and Medications brought from home     Current Outpatient Medications on File Prior to Encounter   Medication Sig    acetaminophen (TYLENOL) 500 MG tablet Take 1000 mg by mouth daily to every other day as needed for pain.      amLODIPine (NORVASC) 10 MG tablet   Take 10 mg by mouth once daily.    aspirin (ECOTRIN) 81 MG EC tablet   Take 81 mg by mouth once daily.    atorvastatin (LIPITOR) 10 MG tablet   TAKE 1 TABLET BY MOUTH EVERY DAY      brimonidine 0.2% (ALPHAGAN) 0.2 % Drop INSTILL 1 DROP INTO RIGHT EYE TWICE A DAY      dorzolamide-timolol 2-0.5% (COSOPT) 22.3-6.8 mg/mL ophthalmic solution   INSTILL 1 DROP INTO RIGHT EYE TWICE A DAY    glyBURIDE (DIABETA) 2.5 MG tablet   Take 2.5 mg by mouth once daily.    ibuprofen (ADVIL,MOTRIN) 200 MG tablet Take 3 tablets (600 mg) by mouth daily to every other day as needed for pain.      latanoprost 0.005 % ophthalmic solution PLACE 1 DROP INTO BOTH EYES ONCE DAILY.      lisinopriL-hydrochlorothiazide (PRINZIDE,ZESTORETIC) 20-12.5 mg per tablet   TAKE 1 TABLET BY MOUTH TWICE A DAY    metFORMIN (GLUCOPHAGE) 500 MG tablet   TAKE 1 TABLET BY MOUTH TWICE A DAY    multivit-minerals/folic acid (ONE DAILY GUMMY VITES ORAL) Chew and swallow 1 gummy by mouth once daily.       Christopher Choi, [facility] [id number]  EXT [id number]                  .        "

## 2022-09-21 NOTE — PROGRESS NOTES
Brief update    81F with h/o DMII, HTN p/w lethargy, decreased oral intake found to abnormal imaging of gallblader and elevated LFTs in addition to an EFREN improved with IVF administration.    Patient only complains of right knee pain which has a benign exam.       NAD, AO3  Crackles at base which clears with deep inspiration   RRR  Trace b/l pedal edema  Tender epigastrium without rebound or guarding    Abnormal LFTs/gallbladder imaging  -MRCP  -NPO  -lipase ordered    2. EFREN 2/2 hypovolemia  -IVF    3. DMII  -insulin administered this AM but by med rec not on insulin at home  -follow glucose  -ISS ordered   -A1C- 5.3  -hold orals.    Pepcid ordered given location of pain  Echo pending   SCDs; chemical DVT ppx held in case procedure is required.     Billed by another provider.

## 2022-09-21 NOTE — ASSESSMENT & PLAN NOTE
Patient with acute kidney injury likely due to IVVD/dehydration EFREN is currently stable. Labs reviewed- Renal function/electrolytes with Estimated Creatinine Clearance: 33.5 mL/min (based on SCr of 1.1 mg/dL). according to latest data. Monitor urine output and serial BMP and adjust therapy as needed. Avoid nephrotoxins and renally dose meds for GFR listed above.   - most recent Cr of 0.8 last December  - Cr 1.4 in the ED  - repeat Cr 1.1 after 500 ml LR  - continue to trend with CMP

## 2022-09-21 NOTE — ED NOTES
Entered room to find pt not in room.  Called to pt's daughter who reports that they are in ultrasound.

## 2022-09-21 NOTE — ED NOTES
Pt requesting to eat. MD notified and states can change from NPO to low sodium diabetic diet. Order placed. Dietary called. Pt and daughter updated on new diet and admit room assignment update. Verbalized understanding.

## 2022-09-21 NOTE — PLAN OF CARE
Problem: Adult Inpatient Plan of Care  Goal: Plan of Care Review  Outcome: Ongoing, Progressing  Goal: Patient-Specific Goal (Individualized)  Outcome: Ongoing, Progressing  Goal: Absence of Hospital-Acquired Illness or Injury  Outcome: Ongoing, Progressing     Problem: Infection  Goal: Absence of Infection Signs and Symptoms  Outcome: Ongoing, Progressing     Problem: Diabetes Comorbidity  Goal: Blood Glucose Level Within Targeted Range  Outcome: Ongoing, Progressing

## 2022-09-22 ENCOUNTER — PATIENT MESSAGE (OUTPATIENT)
Dept: UROGYNECOLOGY | Facility: CLINIC | Age: 82
End: 2022-09-22
Payer: MEDICARE

## 2022-09-22 PROBLEM — D53.9 MACROCYTIC ANEMIA: Status: ACTIVE | Noted: 2022-09-22

## 2022-09-22 PROBLEM — E43 SEVERE PROTEIN-CALORIE MALNUTRITION: Status: ACTIVE | Noted: 2022-09-22

## 2022-09-22 LAB
ALBUMIN SERPL BCP-MCNC: 1.9 G/DL (ref 3.5–5.2)
ALP SERPL-CCNC: 215 U/L (ref 55–135)
ALT SERPL W/O P-5'-P-CCNC: 204 U/L (ref 10–44)
ANION GAP SERPL CALC-SCNC: 7 MMOL/L (ref 8–16)
ASCENDING AORTA: 2.9 CM
AST SERPL-CCNC: 210 U/L (ref 10–40)
AV INDEX (PROSTH): 0.91
AV MEAN GRADIENT: 5 MMHG
AV PEAK GRADIENT: 11 MMHG
AV VALVE AREA: 2.86 CM2
AV VELOCITY RATIO: 0.66
BASOPHILS # BLD AUTO: 0.05 K/UL (ref 0–0.2)
BASOPHILS NFR BLD: 0.6 % (ref 0–1.9)
BILIRUB SERPL-MCNC: 2.6 MG/DL (ref 0.1–1)
BSA FOR ECHO PROCEDURE: 1.54 M2
BUN SERPL-MCNC: 34 MG/DL (ref 8–23)
CALCIUM SERPL-MCNC: 8.6 MG/DL (ref 8.7–10.5)
CHLORIDE SERPL-SCNC: 114 MMOL/L (ref 95–110)
CO2 SERPL-SCNC: 20 MMOL/L (ref 23–29)
CREAT SERPL-MCNC: 1 MG/DL (ref 0.5–1.4)
CV ECHO LV RWT: 0.42 CM
DIFFERENTIAL METHOD: ABNORMAL
DOP CALC AO PEAK VEL: 1.63 M/S
DOP CALC AO VTI: 27.68 CM
DOP CALC LVOT AREA: 3.1 CM2
DOP CALC LVOT DIAMETER: 2 CM
DOP CALC LVOT PEAK VEL: 1.07 M/S
DOP CALC LVOT STROKE VOLUME: 79.07 CM3
DOP CALCLVOT PEAK VEL VTI: 25.18 CM
E WAVE DECELERATION TIME: 221.19 MSEC
E/A RATIO: 1.07
E/E' RATIO: 15.85 M/S
ECHO LV POSTERIOR WALL: 0.85 CM (ref 0.6–1.1)
EJECTION FRACTION: 65 %
EOSINOPHIL # BLD AUTO: 0.4 K/UL (ref 0–0.5)
EOSINOPHIL NFR BLD: 5.3 % (ref 0–8)
ERYTHROCYTE [DISTWIDTH] IN BLOOD BY AUTOMATED COUNT: 21.2 % (ref 11.5–14.5)
EST. GFR  (NO RACE VARIABLE): 57 ML/MIN/1.73 M^2
FRACTIONAL SHORTENING: 28 % (ref 28–44)
GLUCOSE SERPL-MCNC: 178 MG/DL (ref 70–110)
HCT VFR BLD AUTO: 31.1 % (ref 37–48.5)
HETEROPH AB SERPL QL IA: NEGATIVE
HGB BLD-MCNC: 10.4 G/DL (ref 12–16)
IMM GRANULOCYTES # BLD AUTO: 0.02 K/UL (ref 0–0.04)
IMM GRANULOCYTES NFR BLD AUTO: 0.3 % (ref 0–0.5)
INTERVENTRICULAR SEPTUM: 0.83 CM (ref 0.6–1.1)
LA MAJOR: 5.22 CM
LA MINOR: 5.46 CM
LA WIDTH: 4.01 CM
LEFT ATRIUM SIZE: 3.94 CM
LEFT ATRIUM VOLUME INDEX MOD: 30.3 ML/M2
LEFT ATRIUM VOLUME INDEX: 45.9 ML/M2
LEFT ATRIUM VOLUME MOD: 47.33 CM3
LEFT ATRIUM VOLUME: 71.68 CM3
LEFT INTERNAL DIMENSION IN SYSTOLE: 2.94 CM (ref 2.1–4)
LEFT VENTRICLE DIASTOLIC VOLUME INDEX: 47.04 ML/M2
LEFT VENTRICLE DIASTOLIC VOLUME: 73.38 ML
LEFT VENTRICLE MASS INDEX: 66 G/M2
LEFT VENTRICLE SYSTOLIC VOLUME INDEX: 21.3 ML/M2
LEFT VENTRICLE SYSTOLIC VOLUME: 33.24 ML
LEFT VENTRICULAR INTERNAL DIMENSION IN DIASTOLE: 4.08 CM (ref 3.5–6)
LEFT VENTRICULAR MASS: 103.09 G
LV LATERAL E/E' RATIO: 12.88 M/S
LV SEPTAL E/E' RATIO: 20.6 M/S
LYMPHOCYTES # BLD AUTO: 1.5 K/UL (ref 1–4.8)
LYMPHOCYTES NFR BLD: 19.1 % (ref 18–48)
MAGNESIUM SERPL-MCNC: 1.6 MG/DL (ref 1.6–2.6)
MCH RBC QN AUTO: 32.8 PG (ref 27–31)
MCHC RBC AUTO-ENTMCNC: 33.4 G/DL (ref 32–36)
MCV RBC AUTO: 98 FL (ref 82–98)
MONOCYTES # BLD AUTO: 1.2 K/UL (ref 0.3–1)
MONOCYTES NFR BLD: 15.5 % (ref 4–15)
MR PISA EROA: 0.28 CM2
MV A" WAVE DURATION": 14.46 MSEC
MV PEAK A VEL: 0.96 M/S
MV PEAK E VEL: 1.03 M/S
MV STENOSIS PRESSURE HALF TIME: 64.15 MS
MV VALVE AREA P 1/2 METHOD: 3.43 CM2
NEUTROPHILS # BLD AUTO: 4.6 K/UL (ref 1.8–7.7)
NEUTROPHILS NFR BLD: 59.2 % (ref 38–73)
NRBC BLD-RTO: 0 /100 WBC
PHOSPHATE SERPL-MCNC: 3 MG/DL (ref 2.7–4.5)
PISA MRMAX VEL: 6.3 M/S
PISA RADIUS: 0.9 CM
PISA TR MAX VEL: 2.87 M/S
PISA VN NYQUIST: 0.35 M/S
PLATELET # BLD AUTO: 111 K/UL (ref 150–450)
PMV BLD AUTO: 12.2 FL (ref 9.2–12.9)
POCT GLUCOSE: 169 MG/DL (ref 70–110)
POCT GLUCOSE: 215 MG/DL (ref 70–110)
POCT GLUCOSE: 267 MG/DL (ref 70–110)
POCT GLUCOSE: 279 MG/DL (ref 70–110)
POTASSIUM SERPL-SCNC: 4.3 MMOL/L (ref 3.5–5.1)
PROT SERPL-MCNC: 6.5 G/DL (ref 6–8.4)
PULM VEIN S/D RATIO: 0.84
PV PEAK D VEL: 0.64 M/S
PV PEAK S VEL: 0.54 M/S
RA MAJOR: 3.89 CM
RA PRESSURE: 3 MMHG
RA WIDTH: 2.14 CM
RBC # BLD AUTO: 3.17 M/UL (ref 4–5.4)
RIGHT VENTRICULAR END-DIASTOLIC DIMENSION: 2.92 CM
SINUS: 2.98 CM
SODIUM SERPL-SCNC: 141 MMOL/L (ref 136–145)
STJ: 2.69 CM
TDI LATERAL: 0.08 M/S
TDI SEPTAL: 0.05 M/S
TDI: 0.07 M/S
TR MAX PG: 33 MMHG
TRICUSPID ANNULAR PLANE SYSTOLIC EXCURSION: 1.55 CM
TV REST PULMONARY ARTERY PRESSURE: 36 MMHG
WBC # BLD AUTO: 7.7 K/UL (ref 3.9–12.7)

## 2022-09-22 PROCEDURE — 63600175 PHARM REV CODE 636 W HCPCS

## 2022-09-22 PROCEDURE — 99226 PR SUBSEQUENT OBSERVATION CARE,LEVEL III: CPT | Mod: ,,, | Performed by: HOSPITALIST

## 2022-09-22 PROCEDURE — 84100 ASSAY OF PHOSPHORUS: CPT | Performed by: INTERNAL MEDICINE

## 2022-09-22 PROCEDURE — 96372 THER/PROPH/DIAG INJ SC/IM: CPT

## 2022-09-22 PROCEDURE — 99226 PR SUBSEQUENT OBSERVATION CARE,LEVEL III: ICD-10-PCS | Mod: ,,, | Performed by: HOSPITALIST

## 2022-09-22 PROCEDURE — 25000003 PHARM REV CODE 250

## 2022-09-22 PROCEDURE — 63600175 PHARM REV CODE 636 W HCPCS: Performed by: INTERNAL MEDICINE

## 2022-09-22 PROCEDURE — 36415 COLL VENOUS BLD VENIPUNCTURE: CPT

## 2022-09-22 PROCEDURE — 80053 COMPREHEN METABOLIC PANEL: CPT

## 2022-09-22 PROCEDURE — 83735 ASSAY OF MAGNESIUM: CPT | Performed by: INTERNAL MEDICINE

## 2022-09-22 PROCEDURE — 36415 COLL VENOUS BLD VENIPUNCTURE: CPT | Performed by: HOSPITALIST

## 2022-09-22 PROCEDURE — 12000002 HC ACUTE/MED SURGE SEMI-PRIVATE ROOM

## 2022-09-22 PROCEDURE — 86665 EPSTEIN-BARR CAPSID VCA: CPT | Performed by: HOSPITALIST

## 2022-09-22 PROCEDURE — 86308 HETEROPHILE ANTIBODY SCREEN: CPT | Performed by: HOSPITALIST

## 2022-09-22 PROCEDURE — 85025 COMPLETE CBC W/AUTO DIFF WBC: CPT

## 2022-09-22 PROCEDURE — 25000003 PHARM REV CODE 250: Performed by: INTERNAL MEDICINE

## 2022-09-22 RX ORDER — METFORMIN HYDROCHLORIDE 500 MG/1
500 TABLET ORAL 2 TIMES DAILY WITH MEALS
Status: DISCONTINUED | OUTPATIENT
Start: 2022-09-22 | End: 2022-09-22

## 2022-09-22 RX ADMIN — INSULIN ASPART 3 UNITS: 100 INJECTION, SOLUTION INTRAVENOUS; SUBCUTANEOUS at 06:09

## 2022-09-22 RX ADMIN — ASPIRIN 81 MG: 81 TABLET, COATED ORAL at 10:09

## 2022-09-22 RX ADMIN — ACETAMINOPHEN 1000 MG: 500 TABLET ORAL at 09:09

## 2022-09-22 RX ADMIN — SODIUM CHLORIDE, SODIUM LACTATE, POTASSIUM CHLORIDE, AND CALCIUM CHLORIDE: .6; .31; .03; .02 INJECTION, SOLUTION INTRAVENOUS at 03:09

## 2022-09-22 RX ADMIN — INSULIN ASPART 2 UNITS: 100 INJECTION, SOLUTION INTRAVENOUS; SUBCUTANEOUS at 09:09

## 2022-09-22 RX ADMIN — ATORVASTATIN CALCIUM 10 MG: 10 TABLET, FILM COATED ORAL at 10:09

## 2022-09-22 RX ADMIN — FAMOTIDINE 20 MG: 20 TABLET ORAL at 10:09

## 2022-09-22 RX ADMIN — INSULIN ASPART 2 UNITS: 100 INJECTION, SOLUTION INTRAVENOUS; SUBCUTANEOUS at 12:09

## 2022-09-22 NOTE — SUBJECTIVE & OBJECTIVE
Interval History:   No acute events overnight.  The patient is tolerating some PO.  She states that she feels overall better.  Urinating well.  No fever. No muscle aches.     Review of Systems   Constitutional:  Positive for activity change and appetite change. Negative for chills and fever.   Respiratory:  Negative for cough and shortness of breath.    Cardiovascular:  Positive for leg swelling. Negative for chest pain.   Gastrointestinal:  Negative for abdominal pain, nausea and vomiting.   Genitourinary:  Negative for difficulty urinating and dysuria.   Musculoskeletal:  Negative for joint swelling and myalgias.   Skin:  Negative for rash and wound.   Allergic/Immunologic: Negative for immunocompromised state.   Neurological:  Positive for weakness. Negative for light-headedness and headaches.   Psychiatric/Behavioral:  Negative for sleep disturbance.    Objective:     Vital Signs (Most Recent):  Temp: 97.5 °F (36.4 °C) (09/22/22 1525)  Pulse: 68 (09/22/22 1525)  Resp: 18 (09/22/22 1525)  BP: 133/65 (09/22/22 1525)  SpO2: 95 % (09/22/22 1525) Vital Signs (24h Range):  Temp:  [97 °F (36.1 °C)-98.1 °F (36.7 °C)] 97.5 °F (36.4 °C)  Pulse:  [58-72] 68  Resp:  [16-20] 18  SpO2:  [89 %-97 %] 95 %  BP: (121-149)/(58-71) 133/65     Weight: 50.8 kg (111 lb 15.9 oz)  Body mass index is 18.08 kg/m².    Intake/Output Summary (Last 24 hours) at 9/22/2022 1540  Last data filed at 9/22/2022 0500  Gross per 24 hour   Intake 240 ml   Output --   Net 240 ml      Physical Exam  Vitals reviewed.   Constitutional:       General: She is awake.      Appearance: She is underweight.   HENT:      Head: Normocephalic and atraumatic.      Mouth/Throat:      Mouth: Mucous membranes are dry.   Eyes:      General: Lids are normal. No scleral icterus.     Conjunctiva/sclera: Conjunctivae normal.   Neck:      Thyroid: No thyroid mass or thyromegaly.   Cardiovascular:      Rate and Rhythm: Normal rate and regular rhythm.      Heart sounds: Murmur  heard.   Pulmonary:      Effort: Pulmonary effort is normal.      Breath sounds: Normal breath sounds.   Abdominal:      General: Bowel sounds are normal. There is no distension.      Palpations: Abdomen is soft.      Tenderness: There is no abdominal tenderness.   Musculoskeletal:         General: No swelling or deformity. Normal range of motion.      Right lower leg: No edema.      Left lower leg: No edema.   Lymphadenopathy:      Cervical: No cervical adenopathy.   Skin:     General: Skin is warm and dry.   Neurological:      General: No focal deficit present.      Mental Status: She is alert. Mental status is at baseline.   Psychiatric:         Attention and Perception: Attention normal.         Mood and Affect: Mood normal.         Behavior: Behavior is cooperative.       Significant Labs: All pertinent labs within the past 24 hours have been reviewed.  Blood Culture: No results for input(s): LABBLOO in the last 48 hours.  BMP:   Recent Labs   Lab 09/22/22  0520   *      K 4.3   *   CO2 20*   BUN 34*   CREATININE 1.0   CALCIUM 8.6*   MG 1.6     CBC:   Recent Labs   Lab 09/20/22  1717 09/20/22  1830 09/21/22  0348 09/22/22  0520   WBC 7.79  --  7.89 7.70   HGB 10.4*  --  10.6* 10.4*   HCT 31.7* 37 31.7* 31.1*   *  --  112* 111*     CMP:   Recent Labs   Lab 09/21/22  0127 09/21/22  0348 09/22/22  0520    135* 141   K 4.5 4.2 4.3   * 112* 114*   CO2 19* 16* 20*   * 163* 178*   BUN 42* 39* 34*   CREATININE 1.1 1.0 1.0   CALCIUM 8.7 8.7 8.6*   PROT 7.0 6.7 6.5   ALBUMIN 2.1* 2.0* 1.9*   BILITOT 2.8* 2.7* 2.6*   ALKPHOS 223* 210* 215*   * 241* 210*   * 229* 204*   ANIONGAP 6* 7* 7*     Cardiac Markers:   Recent Labs   Lab 09/20/22  1717   BNP 66       Significant Imaging: I have reviewed all pertinent imaging results/findings within the past 24 hours.

## 2022-09-22 NOTE — HOSPITAL COURSE
Patient presented with elevated LFTs, worsening confusion and lower extremity edema.  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 On imuran and prednisone.    - ascites survey w large volume ascitic fluid.   Para requested; no evidence of SBP  She was also treated for hepatic encephalopathy with lactulose. Seemingly resolved. Lactulose held.   Hospitalization continued due to delirium 2/2 UTI, worsening hyponatremia felt to be the result of hypervolemia.   Dx with UTI. Completed course of ceftriaxone. Mental status returned to baseline.  Uro- gYN consulted for uterine prolapse w LUTS, but due to mental status change, she could not get pessary placed. Requires outpatient follow up.

## 2022-09-22 NOTE — PLAN OF CARE
Problem: Adult Inpatient Plan of Care  Goal: Plan of Care Review  Outcome: Ongoing, Progressing  Goal: Patient-Specific Goal (Individualized)  Outcome: Ongoing, Progressing  Goal: Absence of Hospital-Acquired Illness or Injury  Outcome: Ongoing, Progressing  Goal: Optimal Comfort and Wellbeing  Outcome: Ongoing, Progressing     Problem: Infection  Goal: Absence of Infection Signs and Symptoms  Outcome: Ongoing, Progressing     Problem: Diabetes Comorbidity  Goal: Blood Glucose Level Within Targeted Range  Outcome: Ongoing, Progressing     Problem: Fluid and Electrolyte Imbalance (Acute Kidney Injury/Impairment)  Goal: Fluid and Electrolyte Balance  Outcome: Ongoing, Progressing     Problem: Oral Intake Inadequate (Acute Kidney Injury/Impairment)  Goal: Optimal Nutrition Intake  Outcome: Ongoing, Progressing     Problem: Renal Function Impairment (Acute Kidney Injury/Impairment)  Goal: Effective Renal Function  Outcome: Ongoing, Progressing      DERMATOLOGY PROGRESS NOTE      11/3/2020  Janeen Stanton  1984  MRN: [id number]    HPI:  Janeen Stanton is a 35 year old female established patient with pmhx of immunosuppression secondary to renal transplant who presents for FBSE with the following concerns:    #1  Hair loss to scalp  Onset:  2 weeks ago.  States COVID + in August.   Denies increased shedding, denies itching or bald patches to the scalp.  Denies previous treatment to the areas.     Visit will be completed today using Tam[employer] interpretor.         PAST DERMATOLOGIC HISTORY:   History of immunosuppression from kidney transplant    FAMILY HISTORY:   History of skin cancer--Negative    SOCIAL HISTORY:   Occupation: [employer]  Smoking: No  ETOH: No  Current tanning bed use: No  History of severe sunburns: No      REVIEW OF SYSTEMS:    Patient denies nausea, vomiting, fever, cough, bruising or chills.    ALLERGIES:   Allergen Reactions   • Penicillins RASH     Denies SOB, just breaks out in rash       Current Outpatient Medications   Medication Sig Dispense Refill   • furosemide (Lasix) 40 MG tablet Take 1 and one-HALF tablets by mouth 2 times daily for 2 days, THEN 1 tablet 2 times daily. 62 tablet 2   • potassium CHLORIDE (KLOR-CON M) 20 MEQ brando ER tablet Take 2 tablets by mouth 2 times daily for 2 days, THEN 2 tablets daily. 64 tablet 2   • mycophenolate (CELLCEPT) 250 MG capsule Take 1 capsule by mouth 2 times daily. 60 capsule 11   • rosuvastatin (CRESTOR) 20 MG tablet Take 1 tablet by mouth daily. 90 tablet 1   • cycloSPORINE modified 100 MG capsule Take 1 capsule by mouth 2 times daily. Take with one 25 mg capsule for total dose of 125 mg two times daily. 60 capsule 11   • cycloSPORINE modified 25 MG capsule Take 1 capsule by mouth 2 times daily. Take with one 100 mg capsule for total dose of 125 mg two times daily. 60 capsule 11   • losartan (COZAAR) 25 MG tablet Take 1 tablet by mouth daily. 90 tablet 11   • amLODIPine (NORVASC) 10 MG  tablet Take 1 tablet by mouth daily. 90 tablet 3   • predniSONE (DELTASONE) 5 MG tablet Take 1 tablet by mouth daily. 90 tablet 3   • Cholecalciferol (VITAMIN D3) 2000 units capsule Take 2,000 Units by mouth daily.     • Cyanocobalamin (B-12) 1000 MCG Cap Take 100 mcg by mouth daily. 90 tablet 3     No current facility-administered medications for this visit.        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  Examination of the scalp/body hair, face, neck, ears, eyelids/conjunctiva, lips/oral mucosa, chest, back, abdomen, right upper extremity, left upper extremity, right lower extremity, left lower extremity, digits/nails and genitals/buttocks was performed (Physical exam findings noted in A/P)    IMPRESSION / PLAN:      CLINICALLY BENIGN NEVI (Multiple small, evenly colored, brown, regular, well-circumscribed nevi on the back & chest )  -nature of the disorder discussed in detail  -clinically benign today on exam, reassurance was given  -continue to monitor for any changes  -call if any changes occur    Milia (smooth minute well-circumscribed whitish papules left aerola)  - Benign nature of condition as retained keratin cysts under the skin reviewed.  Retinoids can be helpful and rarely the condition can resolve spontaneously, but often requires surgical extraction which is not covered by insurance.  - treatment options discussed including extraction followed by topical retinoids QHS to prevent formation of new lesion  - patient elects removal, area lanced with needle and contents expressed.      Hairloss (scalp with high hair density, scalp with/without  redness/scalp, scalp with/without loss of follicular ostia/scarring)  - dw patient at length  - Telogen effluvium secondary to COVID infection   - check labs (Vit D, TSH, Ferritin & iron ), supplement if abnormal  - reserve biopsy  - improvement expected to take at least 4-6 months  - practice good self-care: reduce stress as able, 7-8 hours of sleep nightly as possible, healthy balanced diet including fruits, vegetables, and lean protein    History of Immunosuppression  - FBSE (full body skin examination) done 10/29/2019 next due annually  -  Sun-protective behavior discussed including the use of sunscreen SPF 30+ daily and reapplying at least every 2 hours.  Also discussed sun protective clothing including long sleeves and wide brimmed hats when anticipating sun exposure.  Avoid peak sun hours and seek shade from 10 a.m.-4 p.m. when possible  - Counseled regarding avoidance of tanning beds  -  Recommend monthly self skin exams to monitor for any concerning changes  -  RTC (return to clinic) sooner if any lesions have any new or concerning changes       Return in about 1 year (around 11/3/2021) for 1 year fbse.    Call sooner if any problems or concerns.          On 11/3/2020, INancy CCMA scribed the services personally performed by AARTI Lima, Leslye Conner NP, attest that I performed all of the work during this encounter and that the scribe only recorded my findings.

## 2022-09-22 NOTE — ASSESSMENT & PLAN NOTE
Patient's FSGs are uncontrolled due to hyperglycemia on current medication regimen.  - home regimen: metformin and glyburide  Last A1c reviewed-   Lab Results   Component Value Date    HGBA1C 5.3 09/21/2022     Most recent fingerstick glucose reviewed-   Recent Labs   Lab 09/21/22  1704 09/21/22  2343 09/22/22  0814 09/22/22  1140   POCTGLUCOSE 98 269* 169* 215*     Current correctional scale  Low  Maintain anti-hyperglycemic dose as follows-   Antihyperglycemics (From admission, onward)    Start     Stop Route Frequency Ordered    09/21/22 0256  insulin aspart U-100 pen 0-5 Units         -- SubQ Before meals & nightly PRN 09/21/22 0156        Hold Oral hypoglycemics while patient is in the hospital.

## 2022-09-22 NOTE — ASSESSMENT & PLAN NOTE
- check B12, FA  - proceed with anemia workup in general   - no need for transfusion at this time

## 2022-09-22 NOTE — PLAN OF CARE
Recommendations     1. Continue current diabetic/ low sodium diet- encourage adequate PO intake.      - Consider liberalizing diet to just diabetic or regular if PO intake remains 50% or less.     2. Discontinue Boost Glucose Control per pt request.      3. RD following.     Goals: Will meet % EEN/EPN by next RD f/u.  Nutrition Goal Status: new  Communication of RD Recs:  (POC)     Maribel Rolon PL-LDN

## 2022-09-22 NOTE — PLAN OF CARE
Problem: Adult Inpatient Plan of Care  Goal: Plan of Care Review  Outcome: Ongoing, Progressing  Goal: Patient-Specific Goal (Individualized)  Outcome: Ongoing, Progressing  Goal: Absence of Hospital-Acquired Illness or Injury  Outcome: Ongoing, Progressing  Goal: Optimal Comfort and Wellbeing  Outcome: Ongoing, Progressing  Goal: Readiness for Transition of Care  Outcome: Ongoing, Progressing     Problem: Infection  Goal: Absence of Infection Signs and Symptoms  Outcome: Ongoing, Progressing     Problem: Diabetes Comorbidity  Goal: Blood Glucose Level Within Targeted Range  Outcome: Ongoing, Progressing     Problem: Fluid and Electrolyte Imbalance (Acute Kidney Injury/Impairment)  Goal: Fluid and Electrolyte Balance  Outcome: Ongoing, Progressing     Problem: Oral Intake Inadequate (Acute Kidney Injury/Impairment)  Goal: Optimal Nutrition Intake  Outcome: Ongoing, Progressing     Problem: Renal Function Impairment (Acute Kidney Injury/Impairment)  Goal: Effective Renal Function  Outcome: Ongoing, Progressing

## 2022-09-22 NOTE — PLAN OF CARE
[facility]  Discharge Assessment    Primary Care Provider: Domonique Cooper MD    Discharge Assessment (most recent)       BRIEF DISCHARGE ASSESSMENT - 09/22/22 1120          Discharge Planning    Assessment Type Discharge Planning Brief Assessment     Resource/Environmental Concerns none     Support Systems Children;Family members;Friends/neighbors     Equipment Currently Used at Home cane, straight     Current Living Arrangements home/apartment/condo     Patient/Family Anticipates Transition to home with family     Patient/Family Anticipated Services at Transition none     DME Needed Upon Discharge  none     Discharge Plan A Home with family     Discharge Plan B Home Health                 RN CM met with the patient and her grandson Raymon at bedside. CM verified demographics. CM update facesheet with phone numbers.    PCP: Domonique Cooper MD at [facility]    Patient stated that she lives with her daughter Della and Della's son (patient's grandson) Raymon. Patient has supportive family members and neighbors.    Patient only has and uses a SPC, no other DME.    Patient is independent of all ADLs, is retired, and does not drive. Family provides all transportation.    Denies history of home health, home infusion, or rehab placement.    Patient is agreeable to d/c home with family when medically cleared.      Plan: D/C home with family    Ride: Family      Larisa Hidalgo RN Case Manager

## 2022-09-22 NOTE — ASSESSMENT & PLAN NOTE
Dehydration    Patient's daughter reports progressive lethargy and weakness onset about two weeks ago. Reports [id number] po intake, max of 1/2 a water bottle per day. She lives at home with her daughter. She also expresses concern of underlying depression as patient's twin sister  earlier this year and their birthday is coming up in November.  - CMP consistent with dehydration  - UA without infection  - given 500 ml LR in the ED  - boost glucose control ordered TIDWM  - nutrition consulted, appreciate assistance  - PT/OT consulted, appreciate assistance  - check FA, B12, ammonia level  - TSH WNL

## 2022-09-22 NOTE — ASSESSMENT & PLAN NOTE
- patient will mild RUQ pain on exam, otherwise benign abdominal exam  - elevated AST/ALT at 296/254, T. Bili 2.1, alk phos 224  - CTA performed in the ED with marked distension of the partially visualized gallbladder  - subsequent RUQ US with evidence of distended gallbladder but without gallstones or acute cholecystitis   - MRCP demonstrates normal CBD, pancreatic duct  - Acute hepatitis panel neg

## 2022-09-22 NOTE — PROGRESS NOTES
[facility] Medicine  Progress Note    Patient Name: Radha Feng  MRN: [id number]  Patient Class: OP- Observation   Admission Date: 9/20/2022  Length of Stay: 0 days  Attending Physician: Taylor Avalos MD  Primary Care Provider: Primary Doctor No        Subjective:     Principal Problem:Lethargy        HPI:  Radha Feng is a 81 y.o. female with a past medical history of type 2 diabetes, hyperlipidemia, hypertension, glaucoma and cataracts presents the emergency department due to progressive lethargy and weakness. Patient's daughter at the bedside who provides the majority of the history due to the acuity of patient's condition. Per daughter, patient was sent from her cardiology office earlier today due to increased bilateral leg swelling, shortness of breath, and progressive weakness over the past two weeks. She reports that over the past two weeks patient has had a poor appetite with significantly decreased po intake. In addition, she reports that patient's BP has been lower at home compared to baseline and states she has held BP medications (amlodipine and lisinopril-HCTZ) for the past two days. She states her BP is normally 130/80 but as been as low as 98/63. Per daughter, lower extremity swelling was most pronounced last week but has improved significantly with compression stockings.  Additionally, daughter notes that she was helping her change last week and noted that she had a prolapsed uterus/bladder but patient has not seen her OBGYN yet. Patient is requiring more assistance with ADLs which is abnormal for her. Patient denies dysuria but states that she does have difficulty urinating and can only urinate a small amount. Denies chest pain, shortness of breath, abdominal pain, dizziness, nausea, vomiting, or syncope. Patient's only complaint at this time is right knee pain.    ED: vital signs stable, afebrile, no acute distress. Elevated liver enzymes with T.bili of 2.9, AST/ALT  296/254, and alk phos of 224. Na 133, K 5.0, Cr 1.4 (most recent Cr of 0.8 in 12/21). D-dimer 11.97, CTA negative for PE. CTA showed evidence of distended gallbladder and emphysema. Subsequent RUQ US negative for cholecystitis or gallstones. BNP 66, troponin negative. UA negative for infection. Given 500 mL LR and placed in observation.       Overview/Hospital Course:  Patient admitted to hospitalist service.  She was noted to have elevated liver enzymes, acute renal insuff, hyperbilirubin.  D-dimer was significatnly elevated.   CTA demonstrated NO PTE but did show few bilateral pulmonary micro nodules which can be followed up as OP.   Right UQ US showed distended gallbladder but no sonographically detectable gallstone, and no additional sonographic evidence for acute cholecystitis.   Mild free fluid of the abdomen noted.  MRCP showed normal pancreatic duct, Gallbladder is distended without wall thickening or pericholecystic fluid.  Common bile duct is normal caliber and tapers distally to the ampulla.  No intrahepatic biliary dilatation.  No biliary filling defects identified.   Acute hepatitis panel negative.          Interval History:   No acute events overnight.  The patient is tolerating some PO.  She states that she feels overall better.  Urinating well.  No fever. No muscle aches.     Review of Systems   Constitutional:  Positive for activity change and appetite change. Negative for chills and fever.   Respiratory:  Negative for cough and shortness of breath.    Cardiovascular:  Positive for leg swelling. Negative for chest pain.   Gastrointestinal:  Negative for abdominal pain, nausea and vomiting.   Genitourinary:  Negative for difficulty urinating and dysuria.   Musculoskeletal:  Negative for joint swelling and myalgias.   Skin:  Negative for rash and wound.   Allergic/Immunologic: Negative for immunocompromised state.   Neurological:  Positive for weakness. Negative for light-headedness and headaches.    Psychiatric/Behavioral:  Negative for sleep disturbance.    Objective:     Vital Signs (Most Recent):  Temp: 97.5 °F (36.4 °C) (09/22/22 1525)  Pulse: 68 (09/22/22 1525)  Resp: 18 (09/22/22 1525)  BP: 133/65 (09/22/22 1525)  SpO2: 95 % (09/22/22 1525) Vital Signs (24h Range):  Temp:  [97 °F (36.1 °C)-98.1 °F (36.7 °C)] 97.5 °F (36.4 °C)  Pulse:  [58-72] 68  Resp:  [16-20] 18  SpO2:  [89 %-97 %] 95 %  BP: (121-149)/(58-71) 133/65     Weight: 50.8 kg (111 lb 15.9 oz)  Body mass index is 18.08 kg/m².    Intake/Output Summary (Last 24 hours) at 9/22/2022 1540  Last data filed at 9/22/2022 0500  Gross per 24 hour   Intake 240 ml   Output --   Net 240 ml      Physical Exam  Vitals reviewed.   Constitutional:       General: She is awake.      Appearance: She is underweight.   HENT:      Head: Normocephalic and atraumatic.      Mouth/Throat:      Mouth: Mucous membranes are dry.   Eyes:      General: Lids are normal. No scleral icterus.     Conjunctiva/sclera: Conjunctivae normal.   Neck:      Thyroid: No thyroid mass or thyromegaly.   Cardiovascular:      Rate and Rhythm: Normal rate and regular rhythm.      Heart sounds: Murmur heard.   Pulmonary:      Effort: Pulmonary effort is normal.      Breath sounds: Normal breath sounds.   Abdominal:      General: Bowel sounds are normal. There is no distension.      Palpations: Abdomen is soft.      Tenderness: There is no abdominal tenderness.   Musculoskeletal:         General: No swelling or deformity. Normal range of motion.      Right lower leg: No edema.      Left lower leg: No edema.   Lymphadenopathy:      Cervical: No cervical adenopathy.   Skin:     General: Skin is warm and dry.   Neurological:      General: No focal deficit present.      Mental Status: She is alert. Mental status is at baseline.   Psychiatric:         Attention and Perception: Attention normal.         Mood and Affect: Mood normal.         Behavior: Behavior is cooperative.       Significant Labs:  All pertinent labs within the past 24 hours have been reviewed.  Blood Culture: No results for input(s): LABBLOO in the last 48 hours.  BMP:   Recent Labs   Lab 22  0520   *      K 4.3   *   CO2 20*   BUN 34*   CREATININE 1.0   CALCIUM 8.6*   MG 1.6     CBC:   Recent Labs   Lab 22  1717 22  1830 22  0348 22  0520   WBC 7.79  --  7.89 7.70   HGB 10.4*  --  10.6* 10.4*   HCT 31.7* 37 31.7* 31.1*   *  --  112* 111*     CMP:   Recent Labs   Lab 22  0127 22  0348 22  0520    135* 141   K 4.5 4.2 4.3   * 112* 114*   CO2 19* 16* 20*   * 163* 178*   BUN 42* 39* 34*   CREATININE 1.1 1.0 1.0   CALCIUM 8.7 8.7 8.6*   PROT 7.0 6.7 6.5   ALBUMIN 2.1* 2.0* 1.9*   BILITOT 2.8* 2.7* 2.6*   ALKPHOS 223* 210* 215*   * 241* 210*   * 229* 204*   ANIONGAP 6* 7* 7*     Cardiac Markers:   Recent Labs   Lab 22  1717   BNP 66       Significant Imaging: I have reviewed all pertinent imaging results/findings within the past 24 hours.      Assessment/Plan:      * Lethargy  Dehydration    Patient's daughter reports progressive lethargy and weakness onset about two weeks ago. Reports [id number] po intake, max of 1/2 a water bottle per day. She lives at home with her daughter. She also expresses concern of underlying depression as patient's twin sister  earlier this year and their birthday is coming up in November.  - CMP consistent with dehydration  - UA without infection  - given 500 ml LR in the ED  - boost glucose control ordered TIDWM  - nutrition consulted, appreciate assistance  - PT/OT consulted, appreciate assistance  - check FA, B12, ammonia level  - TSH WNL    Elevated liver enzymes  - patient will mild RUQ pain on exam, otherwise benign abdominal exam  - elevated AST/ALT at 296/254, T. Bili 2.1, alk phos 224  - CTA performed in the ED with marked distension of the partially visualized gallbladder  - subsequent RUQ US  with evidence of distended gallbladder but without gallstones or acute cholecystitis   - MRCP demonstrates normal CBD, pancreatic duct  - Acute hepatitis panel neg        EFREN (acute kidney injury)  Patient with acute kidney injury likely due to IVVD/dehydration EFREN is currently stable. Labs reviewed- Renal function/electrolytes with Estimated Creatinine Clearance: 36 mL/min (based on SCr of 1 mg/dL). according to latest data. Monitor urine output and serial BMP and adjust therapy as needed. Avoid nephrotoxins and renally dose meds for GFR listed above.   - most recent Cr of 0.8 last December  - Cr 1.4 in the ED  - repeat Cr 1.1 after 500 ml LR  - continue to trend with CMP    Dehydration  - LR 75cc/hr      Macrocytic anemia  - check B12, FA  - proceed with anemia workup in general   - no need for transfusion at this time      Type 2 diabetes mellitus, without long-term current use of insulin  Patient's FSGs are uncontrolled due to hyperglycemia on current medication regimen.  - home regimen: metformin and glyburide  Last A1c reviewed-   Lab Results   Component Value Date    HGBA1C 5.3 09/21/2022     Most recent fingerstick glucose reviewed-   Recent Labs   Lab 09/21/22  1704 09/21/22  2343 09/22/22  0814 09/22/22  1140   POCTGLUCOSE 98 269* 169* 215*     Current correctional scale  Low  Maintain anti-hyperglycemic dose as follows-   Antihyperglycemics (From admission, onward)    Start     Stop Route Frequency Ordered    09/21/22 0256  insulin aspart U-100 pen 0-5 Units         -- SubQ Before meals & nightly PRN 09/21/22 0156        Hold Oral hypoglycemics while patient is in the hospital.    Hypertension  - BP currently well-controlled  - patient's daughter reports that patient's BP has been lower at home compared to baseline and states she has held BP medications for the past two days  - holding home regimen of amlodipine 10 mg daily and lisinopril/HCTZ 20-12.5 mg BID  - will continue to monitor and further titrate  antihypertensives as clinically indicated         Leg swelling  - no previous hx of CHF  - echo ordered for the am--> neg for DVT    Right knee pain  - reported trauma about 30 years ago  - follows with orthopedics outpatient  - recent MRI in august with chronic findings  - continue tylenol prn for pain      VTE Risk Mitigation (From admission, onward)         Ordered     Place sequential compression device  Until discontinued         09/21/22 0932     IP VTE LOW RISK PATIENT  Once         09/21/22 0156                Discharge Planning   MELVI: 9/24/2022     Code Status: Full Code   Is the patient medically ready for discharge?: No    Reason for patient still in hospital (select all that apply): Patient new problem, Patient trending condition, Laboratory test and Treatment  Discharge Plan A: Home with family                  Taylor Avalos MD  Department of Hospital Medicine   [facility]

## 2022-09-22 NOTE — CONSULTS
[facility]  Adult Nutrition  Consult Note    SUMMARY     Recommendations    1. Continue current diabetic/ low sodium diet- encourage adequate PO intake.     - Consider liberalizing diet to just diabetic or regular if PO intake remains 50% or less.    2. Discontinue Boost Glucose Control per pt request.     3. RD following.    Goals: Will meet % EEN/EPN by next RD f/u.  Nutrition Goal Status: new  Communication of RD Recs:  (POC)    Assessment and Plan    Severe Protein-Calorie Malnutrition    Contributing Nutrition Diagnosis  Severe malnutrition in the context of chronic illness    Related to (etiology):   Decreased PO intake/appetite    Signs and Symptoms (as evidenced by):   Energy Intake: less than or equal to 75% of estimated energy requirement for greater than or equal to 1 month  Wt Loss: 14% x 9 months  Body Fat Depletion: severe depletion of orbital, buccal, and upper are regions  Muscle Mass Depletion: severe depletion of temple, clavicle, acromion bone, and dorsal hand regions    Interventions/Recommendations (treatment strategy):  Collaboration of nutrition care with other providers   Encourage adequate PO intake    Nutrition Diagnosis Status:   New    Malnutrition Assessment  Malnutrition Type: chronic illness  Energy Intake: severe energy intake      Weight Loss (Malnutrition):  (14% x 9 months)  Energy Intake (Malnutrition): less than or equal to 75% for greater than or equal to 1 month  Subcutaneous Fat (Malnutrition): severe depletion  Muscle Mass (Malnutrition): severe depletion   Orbital Region (Subcutaneous Fat Loss): severe depletion  Upper Arm Region (Subcutaneous Fat Loss): severe depletion   Devers Region (Muscle Loss): severe depletion  Clavicle and Acromion Bone Region (Muscle Loss): severe depletion  Dorsal Hand (Muscle Loss): severe depletion       Subcutaneous Fat Loss (Final Summary): severe protein-calorie malnutrition  Muscle Loss Evaluation (Final Summary): severe  "protein-calorie malnutrition    Severe Weight Loss (Malnutrition):  (14% x 9 months)    Reason for Assessment    Reason For Assessment: consult  Diagnosis:  (Lethargy)  Relevant Medical History: Increased liver enzymes, HTN, T2DM< EFREN, PVD, leg swelling x 2 weeks  Interdisciplinary Rounds: did not attend  General Information Comments: RD consulted for malnutrition concern. RD spoke with pt at bedside. States intake PTA "not good" x 3 weeks and was eating 2 smaller meals/day at home. States she has never been a "big eater" at any point in her life. States intake now 50% with good appetite. Pt requesting to discontinue ONS and does not wish to recieve any. States issues with constipation but no issues with n/v/d/chewing/swallowing. Unsure UBW but endorses wt loss. Wt on 12/16/21 was 130# and #- indicates 14% wt loss x 9 months. NFPE completed 9/22 and found severe fat and muscle wasting. Pt meets criteria for severe malnutrition in the context of chronic illness- see PES statement for details. LBM 9/19.  Nutrition Discharge Planning: Adequate nutrition    Nutrition Risk Screen    Nutrition Risk Screen: no indicators present    Nutrition/Diet History    Food Preferences: Apple juice  Food Allergies: NKFA  Factors Affecting Nutritional Intake: decreased appetite    Anthropometrics    Temp: 97.2 °F (36.2 °C)  Height Method: Stated  Height: 5' 6" (167.6 cm)  Height (inches): 66 in  Weight Method: Standard Scale  Weight: 50.8 kg (111 lb 15.9 oz)  Weight (lb): 111.99 lb  Ideal Body Weight (IBW), Female: 130 lb  % Ideal Body Weight, Female (lb): 86.15 %  BMI (Calculated): 18.1  BMI Grade: 17 - 18.4 protein-energy malnutrition grade I     Lab/Procedures/Meds    Pertinent Labs Reviewed: reviewed  Pertinent Labs Comments: Glucose 178, Albumin 1.9, Ca 8.6, , , AL Phos 215, GFR 57.0, Chloride 114, T Bili 2.6, Hgb 10.4, Hct 31.1, BUN 34  Pertinent Medications Reviewed: reviewed  Pertinent Medications Comments: " aspirin, atorvastatin, famotidine    Estimated/Assessed Needs    Weight Used For Calorie Calculations: 59 kg (130 lb)  Energy Calorie Requirements (kcal): 7133-4286 kcal  Energy Need Method:  (30-35 kcal/kg IBW)  Protein Requirements: 77-89 g (1.2-1.5 g/kg IBW)  Weight Used For Protein Calculations: 59 kg (130 lb)  Fluid Requirements (mL): 1 ml or fluid per MD  Estimated Fluid Requirement Method: RDA Method  RDA Method (mL): 1769  CHO Requirement: 221 g    Nutrition Prescription Ordered    Current Diet Order: DM, Low Sodium  Oral Nutrition Supplement: Boost Glucose Control TID    Evaluation of Received Nutrient/Fluid Intake    I/O: +1.2 L since admit  Energy Calories Required: not meeting needs  Protein Required: not meeting needs  Fluid Required: not meeting needs  Tolerance: tolerating  % Intake of Estimated Energy Needs: 50%  % Meal Intake: 50%    Nutrition Risk    Level of Risk/Frequency of Follow-up:  (1 time/week)     Monitor and Evaluation    Food and Nutrient Intake: energy intake, food and beverage intake  Food and Nutrient Adminstration: diet order  Knowledge/Beliefs/Attitudes: food and nutrition knowledge/skill, beliefs and attitudes  Physical Activity and Function: nutrition-related ADLs and IADLs  Anthropometric Measurements: height/length, weight, weight change, body mass index  Biochemical Data, Medical Tests and Procedures: electrolyte and renal panel, gastrointestinal profile, glucose/endocrine profile, inflammatory profile, lipid profile  Nutrition-Focused Physical Findings: overall appearance     Nutrition Follow-Up    RD Follow-up?: Yes    Maribel Colon PL-LDN

## 2022-09-22 NOTE — ASSESSMENT & PLAN NOTE
Contributing Nutrition Diagnosis  Severe malnutrition in the context of chronic illness    Related to (etiology):   Decreased PO intake/appetite    Signs and Symptoms (as evidenced by):   Energy Intake: less than or equal to 75% of estimated energy requirement for greater than or equal to 1 month  Wt Loss: 14% x 9 months  Body Fat Depletion: severe depletion of orbital, buccal, and upper are regions  Muscle Mass Depletion: severe depletion of temple, clavicle, acromion bone, and dorsal hand regions    Interventions/Recommendations (treatment strategy):  Collaboration of nutrition care with other providers   Encourage adequate PO intake    Nutrition Diagnosis Status:   New

## 2022-09-22 NOTE — ASSESSMENT & PLAN NOTE
Patient with acute kidney injury likely due to IVVD/dehydration EFREN is currently stable. Labs reviewed- Renal function/electrolytes with Estimated Creatinine Clearance: 36 mL/min (based on SCr of 1 mg/dL). according to latest data. Monitor urine output and serial BMP and adjust therapy as needed. Avoid nephrotoxins and renally dose meds for GFR listed above.   - most recent Cr of 0.8 last December  - Cr 1.4 in the ED  - repeat Cr 1.1 after 500 ml LR  - continue to trend with CMP

## 2022-09-23 PROBLEM — D58.9 HEMOLYTIC ANEMIA: Status: ACTIVE | Noted: 2022-09-23

## 2022-09-23 PROBLEM — K72.00 ACUTE LIVER FAILURE WITHOUT HEPATIC COMA: Status: ACTIVE | Noted: 2022-09-21

## 2022-09-23 PROBLEM — K76.82 HEPATIC ENCEPHALOPATHY: Status: ACTIVE | Noted: 2022-09-23

## 2022-09-23 LAB
ALBUMIN SERPL BCP-MCNC: 1.6 G/DL (ref 3.5–5.2)
ALP SERPL-CCNC: 171 U/L (ref 55–135)
ALT SERPL W/O P-5'-P-CCNC: 169 U/L (ref 10–44)
AMMONIA PLAS-SCNC: 95 UMOL/L (ref 10–50)
ANION GAP SERPL CALC-SCNC: 5 MMOL/L (ref 8–16)
APTT BLDCRRT: 32 SEC (ref 21–32)
AST SERPL-CCNC: 173 U/L (ref 10–40)
BASOPHILS # BLD AUTO: 0.03 K/UL (ref 0–0.2)
BASOPHILS # BLD AUTO: 0.05 K/UL (ref 0–0.2)
BASOPHILS NFR BLD: 0.5 % (ref 0–1.9)
BASOPHILS NFR BLD: 0.7 % (ref 0–1.9)
BILIRUB SERPL-MCNC: 2.3 MG/DL (ref 0.1–1)
BUN SERPL-MCNC: 31 MG/DL (ref 8–23)
CALCIUM SERPL-MCNC: 7.9 MG/DL (ref 8.7–10.5)
CERULOPLASMIN SERPL-MCNC: 28 MG/DL (ref 15–45)
CHLORIDE SERPL-SCNC: 113 MMOL/L (ref 95–110)
CO2 SERPL-SCNC: 19 MMOL/L (ref 23–29)
CREAT SERPL-MCNC: 0.9 MG/DL (ref 0.5–1.4)
CRP SERPL-MCNC: 41.6 MG/L (ref 0–8.2)
D DIMER PPP IA.FEU-MCNC: 12.76 MG/L FEU
DAT IGG-SP REAG RBC-IMP: NORMAL
DIFFERENTIAL METHOD: ABNORMAL
DIFFERENTIAL METHOD: ABNORMAL
EBV VCA IGM SER QL IA: NEGATIVE
EOSINOPHIL # BLD AUTO: 0.3 K/UL (ref 0–0.5)
EOSINOPHIL # BLD AUTO: 0.3 K/UL (ref 0–0.5)
EOSINOPHIL NFR BLD: 4.8 % (ref 0–8)
EOSINOPHIL NFR BLD: 5.4 % (ref 0–8)
ERYTHROCYTE [DISTWIDTH] IN BLOOD BY AUTOMATED COUNT: 20.6 % (ref 11.5–14.5)
ERYTHROCYTE [DISTWIDTH] IN BLOOD BY AUTOMATED COUNT: 20.7 % (ref 11.5–14.5)
EST. GFR  (NO RACE VARIABLE): >60 ML/MIN/1.73 M^2
FERRITIN SERPL-MCNC: 1186 NG/ML (ref 20–300)
FIBRINOGEN PPP-MCNC: 118 MG/DL (ref 182–400)
FOLATE SERPL-MCNC: 12.4 NG/ML (ref 4–24)
GLUCOSE SERPL-MCNC: 169 MG/DL (ref 70–110)
HAPTOGLOB SERPL-MCNC: <10 MG/DL (ref 30–250)
HCT VFR BLD AUTO: 28.2 % (ref 37–48.5)
HCT VFR BLD AUTO: 30.9 % (ref 37–48.5)
HCYS SERPL-SCNC: 9.7 UMOL/L (ref 4–15.5)
HGB BLD-MCNC: 10.5 G/DL (ref 12–16)
HGB BLD-MCNC: 9.6 G/DL (ref 12–16)
IMM GRANULOCYTES # BLD AUTO: 0.02 K/UL (ref 0–0.04)
IMM GRANULOCYTES # BLD AUTO: 0.05 K/UL (ref 0–0.04)
IMM GRANULOCYTES NFR BLD AUTO: 0.3 % (ref 0–0.5)
IMM GRANULOCYTES NFR BLD AUTO: 0.7 % (ref 0–0.5)
INR PPP: 1.5 (ref 0.8–1.2)
IRON SERPL-MCNC: 101 UG/DL (ref 30–160)
LDH SERPL L TO P-CCNC: 430 U/L (ref 110–260)
LYMPHOCYTES # BLD AUTO: 1.1 K/UL (ref 1–4.8)
LYMPHOCYTES # BLD AUTO: 1.2 K/UL (ref 1–4.8)
LYMPHOCYTES NFR BLD: 16.1 % (ref 18–48)
LYMPHOCYTES NFR BLD: 19 % (ref 18–48)
MCH RBC QN AUTO: 33.1 PG (ref 27–31)
MCH RBC QN AUTO: 33.6 PG (ref 27–31)
MCHC RBC AUTO-ENTMCNC: 34 G/DL (ref 32–36)
MCHC RBC AUTO-ENTMCNC: 34 G/DL (ref 32–36)
MCV RBC AUTO: 98 FL (ref 82–98)
MCV RBC AUTO: 99 FL (ref 82–98)
MONOCYTES # BLD AUTO: 1 K/UL (ref 0.3–1)
MONOCYTES # BLD AUTO: 1.1 K/UL (ref 0.3–1)
MONOCYTES NFR BLD: 15.3 % (ref 4–15)
MONOCYTES NFR BLD: 16.6 % (ref 4–15)
NEUTROPHILS # BLD AUTO: 3.5 K/UL (ref 1.8–7.7)
NEUTROPHILS # BLD AUTO: 4.5 K/UL (ref 1.8–7.7)
NEUTROPHILS NFR BLD: 58.2 % (ref 38–73)
NEUTROPHILS NFR BLD: 62.4 % (ref 38–73)
NRBC BLD-RTO: 0 /100 WBC
NRBC BLD-RTO: 0 /100 WBC
PATH REV BLD -IMP: NORMAL
PLATELET # BLD AUTO: 92 K/UL (ref 150–450)
PLATELET # BLD AUTO: 93 K/UL (ref 150–450)
PMV BLD AUTO: 11.4 FL (ref 9.2–12.9)
PMV BLD AUTO: 12.2 FL (ref 9.2–12.9)
POCT GLUCOSE: 130 MG/DL (ref 70–110)
POCT GLUCOSE: 191 MG/DL (ref 70–110)
POCT GLUCOSE: 220 MG/DL (ref 70–110)
POCT GLUCOSE: 245 MG/DL (ref 70–110)
POCT GLUCOSE: 337 MG/DL (ref 70–110)
POTASSIUM SERPL-SCNC: 3.6 MMOL/L (ref 3.5–5.1)
PROT SERPL-MCNC: 5.5 G/DL (ref 6–8.4)
PROTHROMBIN TIME: 15.6 SEC (ref 9–12.5)
RBC # BLD AUTO: 2.86 M/UL (ref 4–5.4)
RBC # BLD AUTO: 3.17 M/UL (ref 4–5.4)
RETICS/RBC NFR AUTO: 3.9 % (ref 0.5–2.5)
SATURATED IRON: 73 % (ref 20–50)
SODIUM SERPL-SCNC: 137 MMOL/L (ref 136–145)
TOTAL IRON BINDING CAPACITY: 138 UG/DL (ref 250–450)
TRANSFERRIN SERPL-MCNC: 93 MG/DL (ref 200–375)
VIT B12 SERPL-MCNC: >2000 PG/ML (ref 210–950)
WBC # BLD AUTO: 5.96 K/UL (ref 3.9–12.7)
WBC # BLD AUTO: 7.14 K/UL (ref 3.9–12.7)

## 2022-09-23 PROCEDURE — 12000002 HC ACUTE/MED SURGE SEMI-PRIVATE ROOM

## 2022-09-23 PROCEDURE — 83921 ORGANIC ACID SINGLE QUANT: CPT | Performed by: HOSPITALIST

## 2022-09-23 PROCEDURE — 36415 COLL VENOUS BLD VENIPUNCTURE: CPT | Performed by: HOSPITALIST

## 2022-09-23 PROCEDURE — 84238 ASSAY NONENDOCRINE RECEPTOR: CPT | Performed by: HOSPITALIST

## 2022-09-23 PROCEDURE — 83010 ASSAY OF HAPTOGLOBIN QUANT: CPT

## 2022-09-23 PROCEDURE — 85730 THROMBOPLASTIN TIME PARTIAL: CPT | Performed by: HOSPITALIST

## 2022-09-23 PROCEDURE — 80053 COMPREHEN METABOLIC PANEL: CPT

## 2022-09-23 PROCEDURE — 85025 COMPLETE CBC W/AUTO DIFF WBC: CPT

## 2022-09-23 PROCEDURE — 93010 EKG 12-LEAD: ICD-10-PCS | Mod: ,,, | Performed by: INTERNAL MEDICINE

## 2022-09-23 PROCEDURE — 82746 ASSAY OF FOLIC ACID SERUM: CPT | Performed by: HOSPITALIST

## 2022-09-23 PROCEDURE — 81256 HFE GENE: CPT | Performed by: HOSPITALIST

## 2022-09-23 PROCEDURE — 85610 PROTHROMBIN TIME: CPT | Performed by: HOSPITALIST

## 2022-09-23 PROCEDURE — 82390 ASSAY OF CERULOPLASMIN: CPT | Performed by: HOSPITALIST

## 2022-09-23 PROCEDURE — 85379 FIBRIN DEGRADATION QUANT: CPT | Performed by: HOSPITALIST

## 2022-09-23 PROCEDURE — 85025 COMPLETE CBC W/AUTO DIFF WBC: CPT | Mod: 91 | Performed by: HOSPITALIST

## 2022-09-23 PROCEDURE — 99233 SBSQ HOSP IP/OBS HIGH 50: CPT | Mod: ,,, | Performed by: HOSPITALIST

## 2022-09-23 PROCEDURE — 63600175 PHARM REV CODE 636 W HCPCS: Performed by: HOSPITALIST

## 2022-09-23 PROCEDURE — 85384 FIBRINOGEN ACTIVITY: CPT | Performed by: HOSPITALIST

## 2022-09-23 PROCEDURE — 83615 LACTATE (LD) (LDH) ENZYME: CPT

## 2022-09-23 PROCEDURE — 93005 ELECTROCARDIOGRAM TRACING: CPT

## 2022-09-23 PROCEDURE — 82607 VITAMIN B-12: CPT | Performed by: HOSPITALIST

## 2022-09-23 PROCEDURE — 84466 ASSAY OF TRANSFERRIN: CPT | Performed by: HOSPITALIST

## 2022-09-23 PROCEDURE — 25000003 PHARM REV CODE 250

## 2022-09-23 PROCEDURE — 82140 ASSAY OF AMMONIA: CPT | Performed by: HOSPITALIST

## 2022-09-23 PROCEDURE — 25000003 PHARM REV CODE 250: Performed by: INTERNAL MEDICINE

## 2022-09-23 PROCEDURE — 85045 AUTOMATED RETICULOCYTE COUNT: CPT | Performed by: HOSPITALIST

## 2022-09-23 PROCEDURE — 83090 ASSAY OF HOMOCYSTEINE: CPT | Performed by: HOSPITALIST

## 2022-09-23 PROCEDURE — 86140 C-REACTIVE PROTEIN: CPT | Performed by: HOSPITALIST

## 2022-09-23 PROCEDURE — 25000003 PHARM REV CODE 250: Performed by: HOSPITALIST

## 2022-09-23 PROCEDURE — 85060 BLOOD SMEAR INTERPRETATION: CPT | Mod: ,,, | Performed by: PATHOLOGY

## 2022-09-23 PROCEDURE — 82728 ASSAY OF FERRITIN: CPT | Performed by: HOSPITALIST

## 2022-09-23 PROCEDURE — 86880 COOMBS TEST DIRECT: CPT | Performed by: HOSPITALIST

## 2022-09-23 PROCEDURE — 85060 PATHOLOGIST REVIEW: ICD-10-PCS | Mod: ,,, | Performed by: PATHOLOGY

## 2022-09-23 PROCEDURE — 82525 ASSAY OF COPPER: CPT | Performed by: HOSPITALIST

## 2022-09-23 PROCEDURE — 93010 ELECTROCARDIOGRAM REPORT: CPT | Mod: ,,, | Performed by: INTERNAL MEDICINE

## 2022-09-23 PROCEDURE — 99233 PR SUBSEQUENT HOSPITAL CARE,LEVL III: ICD-10-PCS | Mod: ,,, | Performed by: HOSPITALIST

## 2022-09-23 RX ORDER — SODIUM CHLORIDE, SODIUM LACTATE, POTASSIUM CHLORIDE, CALCIUM CHLORIDE 600; 310; 30; 20 MG/100ML; MG/100ML; MG/100ML; MG/100ML
INJECTION, SOLUTION INTRAVENOUS CONTINUOUS
Status: DISCONTINUED | OUTPATIENT
Start: 2022-09-23 | End: 2022-09-28

## 2022-09-23 RX ORDER — LACTULOSE 10 G/15ML
20 SOLUTION ORAL 2 TIMES DAILY
Status: DISCONTINUED | OUTPATIENT
Start: 2022-09-23 | End: 2022-09-24

## 2022-09-23 RX ADMIN — ASPIRIN 81 MG: 81 TABLET, COATED ORAL at 09:09

## 2022-09-23 RX ADMIN — LACTULOSE 20 G: 20 SOLUTION ORAL at 09:09

## 2022-09-23 RX ADMIN — SODIUM CHLORIDE, SODIUM LACTATE, POTASSIUM CHLORIDE, AND CALCIUM CHLORIDE: .6; .31; .03; .02 INJECTION, SOLUTION INTRAVENOUS at 03:09

## 2022-09-23 RX ADMIN — LATANOPROST 1 DROP: 50 SOLUTION OPHTHALMIC at 09:09

## 2022-09-23 RX ADMIN — ACETAMINOPHEN 1000 MG: 500 TABLET ORAL at 06:09

## 2022-09-23 RX ADMIN — BRIMONIDINE TARTRATE 1 DROP: 2 SOLUTION OPHTHALMIC at 09:09

## 2022-09-23 RX ADMIN — FAMOTIDINE 20 MG: 20 TABLET ORAL at 09:09

## 2022-09-23 RX ADMIN — INSULIN ASPART 2 UNITS: 100 INJECTION, SOLUTION INTRAVENOUS; SUBCUTANEOUS at 04:09

## 2022-09-23 RX ADMIN — INSULIN ASPART 1 UNITS: 100 INJECTION, SOLUTION INTRAVENOUS; SUBCUTANEOUS at 09:09

## 2022-09-23 RX ADMIN — ATORVASTATIN CALCIUM 10 MG: 10 TABLET, FILM COATED ORAL at 09:09

## 2022-09-23 NOTE — CARE UPDATE
[facility] unable to schedule PCP hospital follow up visit, no answer and no voicemail. Per Della, she wants to get her mom Radha established with an [facility] doctor. I explained to Della that I can schedule a hospital follow up but she refused and stated that she wants to research the doctors to determine which doctor she wants her mom to get established with.

## 2022-09-23 NOTE — ASSESSMENT & PLAN NOTE
- Chronic  - reported trauma about 30 years ago  - follows with orthopedics outpatient  - recent MRI in august with chronic findings  - continue tylenol prn for pain

## 2022-09-23 NOTE — PLAN OF CARE
[facility]  Discharge Reassessment    Primary Care Provider: Primary Doctor No    Expected Discharge Date: 9/27/2022    Reassessment (most recent)       Discharge Reassessment - 09/23/22 1520          Discharge Reassessment    Assessment Type Discharge Planning Reassessment     Discharge Plan A Home with family     Discharge Plan B Home Health     DME Needed Upon Discharge  other (see comments)   TBD    Discharge Barriers Identified None     Why the patient remains in the hospital Requires continued medical care        Post-Acute Status    Discharge Delays None known at this time                 Per chart review - the patient was admitted as observation status on 9/21/2022 and was changed to inpatient status on 9/22/22 for liver failure.    Per Dr. Avalos - the patient will not be able to d/c today or tomorrow.    Prior to admission - the patient was independent of all ADLs and lives with her daughter and adult grandson.    Denies history of home health, home infusion, or rehab placement.     Patient is agreeable to d/c home with family when medically cleared.    Patient's daughter wants to schedule/find a new PCP for the patient (see Isabella's note).        Plan: D/C home with family     Ride: Family        Larisa Hidalgo, RN Case Manager

## 2022-09-23 NOTE — ASSESSMENT & PLAN NOTE
- with associated iron overload  - etiology unknown  - follow up direct genny test  - hematology consult  - no definitive evidence of microangiopathic associated hemolytic anemia

## 2022-09-23 NOTE — SUBJECTIVE & OBJECTIVE
Interval History:   No acute events overnight.  Patient remains lethargic but awakens easily to verbal stim.  Started on lactulose for elevated ammonia and symptoms of hepatic enceph.   Continues to eat very little.  Afebrile.     Review of Systems   Constitutional:  Positive for activity change and appetite change. Negative for chills and fever.   Respiratory:  Negative for cough and shortness of breath.    Cardiovascular:  Negative for chest pain.   Gastrointestinal:  Negative for abdominal pain, nausea and vomiting.   Genitourinary:  Negative for difficulty urinating.   Musculoskeletal:  Negative for joint swelling and myalgias.   Skin:  Negative for rash and wound.   Neurological:  Positive for weakness. Negative for light-headedness and headaches.   Psychiatric/Behavioral:  Negative for sleep disturbance.    Objective:     Vital Signs (Most Recent):  Temp: 97.8 °F (36.6 °C) (09/23/22 1118)  Pulse: 67 (09/23/22 1118)  Resp: 18 (09/23/22 1118)  BP: 129/65 (09/23/22 1118)  SpO2: 98 % (09/23/22 1118) Vital Signs (24h Range):  Temp:  [97.2 °F (36.2 °C)-98.3 °F (36.8 °C)] 97.8 °F (36.6 °C)  Pulse:  [56-68] 67  Resp:  [16-20] 18  SpO2:  [94 %-98 %] 98 %  BP: (115-142)/(56-69) 129/65     Weight: 50.8 kg (111 lb 15.9 oz)  Body mass index is 18.08 kg/m².    Intake/Output Summary (Last 24 hours) at 9/23/2022 1453  Last data filed at 9/23/2022 0745  Gross per 24 hour   Intake 837 ml   Output --   Net 837 ml      Physical Exam  Vitals reviewed.   Constitutional:       General: She is awake.      Appearance: She is underweight.   HENT:      Head: Normocephalic and atraumatic.      Mouth/Throat:      Mouth: Mucous membranes are dry.   Eyes:      General: Lids are normal. No scleral icterus.     Conjunctiva/sclera: Conjunctivae normal.   Neck:      Thyroid: No thyroid mass or thyromegaly.   Cardiovascular:      Rate and Rhythm: Normal rate and regular rhythm.      Heart sounds: Murmur heard.   Pulmonary:      Effort: Pulmonary  effort is normal.      Breath sounds: Normal breath sounds.   Abdominal:      General: Bowel sounds are normal. There is no distension.      Palpations: Abdomen is soft.      Tenderness: There is no abdominal tenderness.   Musculoskeletal:         General: Normal range of motion.      Right lower leg: No edema.      Left lower leg: No edema.   Skin:     General: Skin is warm and dry.      Coloration: Skin is not jaundiced.   Neurological:      General: No focal deficit present.      Mental Status: She is easily aroused. She is lethargic.      Motor: Weakness present.   Psychiatric:         Attention and Perception: Attention normal.         Mood and Affect: Mood normal.         Behavior: Behavior is cooperative.       Significant Labs: All pertinent labs within the past 24 hours have been reviewed.  Bilirubin:   Recent Labs   Lab 09/20/22  1717 09/21/22  0127 09/21/22  0348 09/22/22  0520 09/23/22  0307   BILIDIR  --  1.6*  --   --   --    BILITOT 2.9* 2.8* 2.7* 2.6* 2.3*     Blood Culture: No results for input(s): LABBLOO in the last 48 hours.  BMP:   Recent Labs   Lab 09/22/22  0520 09/23/22  0307   * 169*    137   K 4.3 3.6   * 113*   CO2 20* 19*   BUN 34* 31*   CREATININE 1.0 0.9   CALCIUM 8.6* 7.9*   MG 1.6  --      CBC:   Recent Labs   Lab 09/22/22 0520 09/23/22  0307   WBC 7.70 5.96   HGB 10.4* 9.6*   HCT 31.1* 28.2*   * 92*     CMP:   Recent Labs   Lab 09/22/22  0520 09/23/22  0307    137   K 4.3 3.6   * 113*   CO2 20* 19*   * 169*   BUN 34* 31*   CREATININE 1.0 0.9   CALCIUM 8.6* 7.9*   PROT 6.5 5.5*   ALBUMIN 1.9* 1.6*   BILITOT 2.6* 2.3*   ALKPHOS 215* 171*   * 173*   * 169*   ANIONGAP 7* 5*     Coagulation:   Recent Labs   Lab 09/23/22 0307   INR 1.5*     Lactic Acid: No results for input(s): LACTATE in the last 48 hours.  Magnesium:   Recent Labs   Lab 09/22/22  0520   MG 1.6       Significant Imaging: I have reviewed all pertinent imaging  results/findings within the past 24 hours.

## 2022-09-23 NOTE — ASSESSMENT & PLAN NOTE
- elevated PT, low albumin, thrombocytopenia  - elevated AST/ALT at 296/254, T. Bili 2.1, alk phos 224  - CTA performed in the ED with marked distension of the partially visualized gallbladder  - subsequent RUQ US with evidence of distended gallbladder but without gallstones or acute cholecystitis   - MRCP demonstrates normal CBD, pancreatic duct  - Acute hepatitis panel neg, monospot neg, copper levels normal  - evidence of iron overload  - consult hepatology for further recommendations for workup

## 2022-09-23 NOTE — TELEPHONE ENCOUNTER
Spoke with patients daughter. She stated she doesn't believe her mother will be discharged on 09/24.     I stated Dr. Evans can see her mother on Monday in clinic and if her mother is still in the hospital I can speak with Dr. Evans in regards to doing inpatient visit.

## 2022-09-23 NOTE — ASSESSMENT & PLAN NOTE
Patient with acute kidney injury likely due to IVVD/dehydration EFREN is currently stable. Labs reviewed- Renal function/electrolytes with Estimated Creatinine Clearance: 40 mL/min (based on SCr of 0.9 mg/dL). according to latest data. Monitor urine output and serial BMP and adjust therapy as needed. Avoid nephrotoxins and renally dose meds for GFR listed above.   - most recent Cr of 0.8 last December  - Cr 1.4 in the ED  - repeat Cr 1.1 after 500 ml LR  - continue to trend with CMP  - Resolved

## 2022-09-23 NOTE — PLAN OF CARE
Pt appears more fatigued than yesterday, still with low PO intake. Pt's daughter concerned about pt's nutrition with new Dx/concerns for Vit B12 deficiency and anemia. Pt has not been receiving Boost on meal trays, will address. Secure chat Dr. Trae Dias regarding concerns who will pass onto day shift care team to address. Will continue to monitor.  Problem: Adult Inpatient Plan of Care  Goal: Plan of Care Review  Outcome: Ongoing, Progressing  Goal: Patient-Specific Goal (Individualized)  Outcome: Ongoing, Progressing  Goal: Absence of Hospital-Acquired Illness or Injury  Outcome: Ongoing, Progressing  Goal: Optimal Comfort and Wellbeing  Outcome: Ongoing, Progressing     Problem: Infection  Goal: Absence of Infection Signs and Symptoms  Outcome: Ongoing, Progressing     Problem: Diabetes Comorbidity  Goal: Blood Glucose Level Within Targeted Range  Outcome: Ongoing, Progressing     Problem: Fluid and Electrolyte Imbalance (Acute Kidney Injury/Impairment)  Goal: Fluid and Electrolyte Balance  Outcome: Ongoing, Progressing     Problem: Oral Intake Inadequate (Acute Kidney Injury/Impairment)  Goal: Optimal Nutrition Intake  Outcome: Ongoing, Progressing     Problem: Renal Function Impairment (Acute Kidney Injury/Impairment)  Goal: Effective Renal Function  Outcome: Ongoing, Progressing     Problem: Skin Injury Risk Increased  Goal: Skin Health and Integrity  Outcome: Ongoing, Progressing     Problem: Fall Injury Risk  Goal: Absence of Fall and Fall-Related Injury  Outcome: Ongoing, Progressing

## 2022-09-23 NOTE — PROGRESS NOTES
[facility]  Progress Note    Patient Name: Radha Feng  MRN: [id number]  Patient Class: IP- Inpatient   Admission Date: 9/20/2022  Length of Stay: 1 days  Attending Physician: Taylor Avalos MD  Primary Care Provider: Primary Doctor No        Subjective:     Principal Problem:Hemolytic anemia        HPI:  Radha Feng is a 81 y.o. female with a past medical history of type 2 diabetes, hyperlipidemia, hypertension, glaucoma and cataracts presents the emergency department due to progressive lethargy and weakness. Patient's daughter at the bedside who provides the majority of the history due to the acuity of patient's condition. Per daughter, patient was sent from her cardiology office earlier today due to increased bilateral leg swelling, shortness of breath, and progressive weakness over the past two weeks. She reports that over the past two weeks patient has had a poor appetite with significantly decreased po intake. In addition, she reports that patient's BP has been lower at home compared to baseline and states she has held BP medications (amlodipine and lisinopril-HCTZ) for the past two days. She states her BP is normally 130/80 but as been as low as 98/63. Per daughter, lower extremity swelling was most pronounced last week but has improved significantly with compression stockings.  Additionally, daughter notes that she was helping her change last week and noted that she had a prolapsed uterus/bladder but patient has not seen her OBGYN yet. Patient is requiring more assistance with ADLs which is abnormal for her. Patient denies dysuria but states that she does have difficulty urinating and can only urinate a small amount. Denies chest pain, shortness of breath, abdominal pain, dizziness, nausea, vomiting, or syncope. Patient's only complaint at this time is right knee pain.    ED: vital signs stable, afebrile, no acute distress. Elevated liver enzymes with T.bili of 2.9,  AST//254, and alk phos of 224. Na 133, K 5.0, Cr 1.4 (most recent Cr of 0.8 in 12/21). D-dimer 11.97, CTA negative for PE. CTA showed evidence of distended gallbladder and emphysema. Subsequent RUQ US negative for cholecystitis or gallstones. BNP 66, troponin negative. UA negative for infection. Given 500 mL LR and placed in observation.       Overview/Hospital Course:  Patient admitted to hospitalist service.  She was noted to have elevated liver enzymes, acute renal insuff, hyperbilirubin.  D-dimer was significatnly elevated.   CTA demonstrated NO PTE but did show few bilateral pulmonary micro nodules which can be followed up as OP.   Right UQ US showed distended gallbladder but no sonographically detectable gallstone, and no additional sonographic evidence for acute cholecystitis.   Mild free fluid of the abdomen noted.  MRCP showed normal pancreatic duct, Gallbladder is distended without wall thickening or pericholecystic fluid.  Common bile duct is normal caliber and tapers distally to the ampulla.  No intrahepatic biliary dilatation.  No biliary filling defects identified.   Acute hepatitis panel negative.  Monospot neg.    Upon further workup, patient noted to have evidence of hemolytic anemia and elevated ferritin and transferrin saturation consistent with iron overload.  She was also treated for mild hepatic encephalopathy with lactulose.    Both hepatology and hematology consulted for liver failure/iron overload and hemolytic anemia.          Interval History:   No acute events overnight.  Patient remains lethargic but awakens easily to verbal stim.  Started on lactulose for elevated ammonia and symptoms of hepatic enceph.   Continues to eat very little.  Afebrile.     Review of Systems   Constitutional:  Positive for activity change and appetite change. Negative for chills and fever.   Respiratory:  Negative for cough and shortness of breath.    Cardiovascular:  Negative for chest pain.    Gastrointestinal:  Negative for abdominal pain, nausea and vomiting.   Genitourinary:  Negative for difficulty urinating.   Musculoskeletal:  Negative for joint swelling and myalgias.   Skin:  Negative for rash and wound.   Neurological:  Positive for weakness. Negative for light-headedness and headaches.   Psychiatric/Behavioral:  Negative for sleep disturbance.    Objective:     Vital Signs (Most Recent):  Temp: 97.8 °F (36.6 °C) (09/23/22 1118)  Pulse: 67 (09/23/22 1118)  Resp: 18 (09/23/22 1118)  BP: 129/65 (09/23/22 1118)  SpO2: 98 % (09/23/22 1118) Vital Signs (24h Range):  Temp:  [97.2 °F (36.2 °C)-98.3 °F (36.8 °C)] 97.8 °F (36.6 °C)  Pulse:  [56-68] 67  Resp:  [16-20] 18  SpO2:  [94 %-98 %] 98 %  BP: (115-142)/(56-69) 129/65     Weight: 50.8 kg (111 lb 15.9 oz)  Body mass index is 18.08 kg/m².    Intake/Output Summary (Last 24 hours) at 9/23/2022 1453  Last data filed at 9/23/2022 0745  Gross per 24 hour   Intake 837 ml   Output --   Net 837 ml      Physical Exam  Vitals reviewed.   Constitutional:       General: She is awake.      Appearance: She is underweight.   HENT:      Head: Normocephalic and atraumatic.      Mouth/Throat:      Mouth: Mucous membranes are dry.   Eyes:      General: Lids are normal. No scleral icterus.     Conjunctiva/sclera: Conjunctivae normal.   Neck:      Thyroid: No thyroid mass or thyromegaly.   Cardiovascular:      Rate and Rhythm: Normal rate and regular rhythm.      Heart sounds: Murmur heard.   Pulmonary:      Effort: Pulmonary effort is normal.      Breath sounds: Normal breath sounds.   Abdominal:      General: Bowel sounds are normal. There is no distension.      Palpations: Abdomen is soft.      Tenderness: There is no abdominal tenderness.   Musculoskeletal:         General: Normal range of motion.      Right lower leg: No edema.      Left lower leg: No edema.   Skin:     General: Skin is warm and dry.      Coloration: Skin is not jaundiced.   Neurological:       General: No focal deficit present.      Mental Status: She is easily aroused. She is lethargic.      Motor: Weakness present.   Psychiatric:         Attention and Perception: Attention normal.         Mood and Affect: Mood normal.         Behavior: Behavior is cooperative.       Significant Labs: All pertinent labs within the past 24 hours have been reviewed.  Bilirubin:   Recent Labs   Lab 09/20/22  1717 09/21/22  0127 09/21/22  0348 09/22/22  0520 09/23/22  0307   BILIDIR  --  1.6*  --   --   --    BILITOT 2.9* 2.8* 2.7* 2.6* 2.3*     Blood Culture: No results for input(s): LABBLOO in the last 48 hours.  BMP:   Recent Labs   Lab 09/22/22  0520 09/23/22  0307   * 169*    137   K 4.3 3.6   * 113*   CO2 20* 19*   BUN 34* 31*   CREATININE 1.0 0.9   CALCIUM 8.6* 7.9*   MG 1.6  --      CBC:   Recent Labs   Lab 09/22/22  0520 09/23/22  0307   WBC 7.70 5.96   HGB 10.4* 9.6*   HCT 31.1* 28.2*   * 92*     CMP:   Recent Labs   Lab 09/22/22  0520 09/23/22  0307    137   K 4.3 3.6   * 113*   CO2 20* 19*   * 169*   BUN 34* 31*   CREATININE 1.0 0.9   CALCIUM 8.6* 7.9*   PROT 6.5 5.5*   ALBUMIN 1.9* 1.6*   BILITOT 2.6* 2.3*   ALKPHOS 215* 171*   * 173*   * 169*   ANIONGAP 7* 5*     Coagulation:   Recent Labs   Lab 09/23/22  0307   INR 1.5*     Lactic Acid: No results for input(s): LACTATE in the last 48 hours.  Magnesium:   Recent Labs   Lab 09/22/22  0520   MG 1.6       Significant Imaging: I have reviewed all pertinent imaging results/findings within the past 24 hours.      Assessment/Plan:      * Hemolytic anemia  - with associated iron overload  - etiology unknown  - follow up direct genny test  - hematology consult  - no definitive evidence of microangiopathic associated hemolytic anemia       Acute liver failure without hepatic coma  - elevated PT, low albumin, thrombocytopenia  - elevated AST/ALT at 296/254, T. Bili 2.1, alk phos 224  - CTA performed in the ED  with marked distension of the partially visualized gallbladder  - subsequent RUQ US with evidence of distended gallbladder but without gallstones or acute cholecystitis   - MRCP demonstrates normal CBD, pancreatic duct  - Acute hepatitis panel neg, monospot neg, copper levels normal  - evidence of iron overload  - consult hepatology for further recommendations for workup         EFREN (acute kidney injury)  Patient with acute kidney injury likely due to IVVD/dehydration EFREN is currently stable. Labs reviewed- Renal function/electrolytes with Estimated Creatinine Clearance: 40 mL/min (based on SCr of 0.9 mg/dL). according to latest data. Monitor urine output and serial BMP and adjust therapy as needed. Avoid nephrotoxins and renally dose meds for GFR listed above.   - most recent Cr of 0.8 last December  - Cr 1.4 in the ED  - repeat Cr 1.1 after 500 ml LR  - continue to trend with CMP  - Resolved     Hepatic encephalopathy  - mild  - lactulose bid  - adjust dose as needed      Dehydration  - LR 75cc/hr      Lethargy  Dehydration    Patient's daughter reports progressive lethargy and weakness onset about two weeks ago. Reports [id number] po intake, max of 1/2 a water bottle per day. She lives at home with her daughter. She also expresses concern of underlying depression as patient's twin sister  earlier this year and their birthday is coming up in November.  - CMP consistent with dehydration  - UA without infection  - given 500 ml LR in the ED  - boost glucose control ordered TIDWM  - nutrition consulted, appreciate assistance  - PT/OT consulted, appreciate assistance  - check FA, B12, ammonia level  - TSH WNL    Severe protein-calorie malnutrition  Nutrition consulted. Most recent weight and BMI monitored-     Malnutrition Type and Energy Intake  Malnutrition Type: chronic illness  Energy Intake: severe energy intake    Malnutrition (Moderate to Severe)  Weight Loss (Malnutrition):  (14% x 9 months)  Energy  Intake (Malnutrition): less than or equal to 75% for greater than or equal to 1 month  Subcutaneous Fat (Malnutrition): severe depletion  Muscle Mass (Malnutrition): severe depletion    Final Summary  Subcutaneous Fat Loss (Final Summary): severe protein-calorie malnutrition  Muscle Loss Evaluation (Final Summary): severe protein-calorie malnutrition    Malnutrition Final Summary  Severe Weight Loss (Malnutrition):  (14% x 9 months)    Measurements:  Wt Readings from Last 1 Encounters:   09/22/22 50.8 kg (111 lb 15.9 oz)   Body mass index is 18.08 kg/m².    Recommendations: Recommendation/Intervention: 1. Continue current diabetic/ low sodium diet- encourage adequate PO intake. - Consider liberalizing diet to just diabetic or regular if PO intake remains 50% or less. 2. Discontinue Boost Glucose Control per pt request. 3. RD following.  Goals: Will meet % EEN/EPN by next RD f/u.    Patient has been screened and assessed by RD. RD will follow patient.      Type 2 diabetes mellitus, without long-term current use of insulin  Patient's FSGs are uncontrolled due to hyperglycemia on current medication regimen.  - home regimen: metformin and glyburide  Last A1c reviewed-   Lab Results   Component Value Date    HGBA1C 5.3 09/21/2022     Most recent fingerstick glucose reviewed-   Recent Labs   Lab 09/21/22  1704 09/21/22  2343 09/22/22  0814 09/22/22  1140   POCTGLUCOSE 98 269* 169* 215*     Current correctional scale  Low  Maintain anti-hyperglycemic dose as follows-   Antihyperglycemics (From admission, onward)    Start     Stop Route Frequency Ordered    09/21/22 0256  insulin aspart U-100 pen 0-5 Units         -- SubQ Before meals & nightly PRN 09/21/22 0156        Hold Oral hypoglycemics while patient is in the hospital.    Hypertension  - BP currently well-controlled  - patient's daughter reports that patient's BP has been lower at home compared to baseline and states she has held BP medications for the past two  days  - holding home regimen of amlodipine 10 mg daily and lisinopril/HCTZ 20-12.5 mg BID  - will continue to monitor and further titrate antihypertensives as clinically indicated         Leg swelling  - no previous hx of CHF  - echo ordered for the am--> neg for DVT  - resolved    Right knee pain  - Chronic  - reported trauma about 30 years ago  - follows with orthopedics outpatient  - recent MRI in august with chronic findings  - continue tylenol prn for pain      VTE Risk Mitigation (From admission, onward)         Ordered     Place sequential compression device  Until discontinued         09/21/22 0932     IP VTE LOW RISK PATIENT  Once         09/21/22 0156                Discharge Planning   MELVI: 9/25/2022     Code Status: Full Code   Is the patient medically ready for discharge?: No    Reason for patient still in hospital (select all that apply): Patient new problem, Patient trending condition, Laboratory test, Treatment and Consult recommendations  Discharge Plan A: Home with family                  Taylor Avalos MD  Department of Hospital Medicine   [facility]

## 2022-09-23 NOTE — NURSING
Secure chat Dr. Dias regarding change in telemetry @ 3:45AM and 4:50AM, inverse QRS complex in lead II and aVF, relatively flat in aVR and V1, EKG with no changes. No orders noted.

## 2022-09-23 NOTE — ASSESSMENT & PLAN NOTE
Dehydration    Patient's daughter reports progressive lethargy and weakness onset about two weeks ago. Reports [id number] po intake, max of 1/2 a water bottle per day. She lives at home with her daughter. She also expresses concern of underlying depression as patient's twin sister  earlier this year and their birthday is coming up in November.  - CMP consistent with dehydration  - UA without infection  - given 500 ml LR in the ED  - boost glucose control ordered TIDWM  - nutrition consulted, appreciate assistance  - PT/OT consulted, appreciate assistance  - check FA, B12, ammonia level  - TSH WNL

## 2022-09-23 NOTE — ASSESSMENT & PLAN NOTE
Nutrition consulted. Most recent weight and BMI monitored-     Malnutrition Type and Energy Intake  Malnutrition Type: chronic illness  Energy Intake: severe energy intake    Malnutrition (Moderate to Severe)  Weight Loss (Malnutrition):  (14% x 9 months)  Energy Intake (Malnutrition): less than or equal to 75% for greater than or equal to 1 month  Subcutaneous Fat (Malnutrition): severe depletion  Muscle Mass (Malnutrition): severe depletion    Final Summary  Subcutaneous Fat Loss (Final Summary): severe protein-calorie malnutrition  Muscle Loss Evaluation (Final Summary): severe protein-calorie malnutrition    Malnutrition Final Summary  Severe Weight Loss (Malnutrition):  (14% x 9 months)    Measurements:  Wt Readings from Last 1 Encounters:   09/22/22 50.8 kg (111 lb 15.9 oz)   Body mass index is 18.08 kg/m².    Recommendations: Recommendation/Intervention: 1. Continue current diabetic/ low sodium diet- encourage adequate PO intake. - Consider liberalizing diet to just diabetic or regular if PO intake remains 50% or less. 2. Discontinue Boost Glucose Control per pt request. 3. RD following.  Goals: Will meet % EEN/EPN by next RD f/u.    Patient has been screened and assessed by RD. RD will follow patient.

## 2022-09-24 PROBLEM — R13.11 ORAL PHASE DYSPHAGIA: Status: ACTIVE | Noted: 2022-09-24

## 2022-09-24 LAB
ALBUMIN SERPL BCP-MCNC: 1.8 G/DL (ref 3.5–5.2)
ALP SERPL-CCNC: 198 U/L (ref 55–135)
ALT SERPL W/O P-5'-P-CCNC: 180 U/L (ref 10–44)
ANION GAP SERPL CALC-SCNC: 7 MMOL/L (ref 8–16)
AST SERPL-CCNC: 202 U/L (ref 10–40)
BASOPHILS # BLD AUTO: 0.02 K/UL (ref 0–0.2)
BASOPHILS NFR BLD: 0.3 % (ref 0–1.9)
BILIRUB DIRECT SERPL-MCNC: 1.6 MG/DL (ref 0.1–0.3)
BILIRUB SERPL-MCNC: 2.6 MG/DL (ref 0.1–1)
BUN SERPL-MCNC: 28 MG/DL (ref 8–23)
CALCIUM SERPL-MCNC: 8 MG/DL (ref 8.7–10.5)
CHLORIDE SERPL-SCNC: 112 MMOL/L (ref 95–110)
CO2 SERPL-SCNC: 16 MMOL/L (ref 23–29)
CREAT SERPL-MCNC: 0.8 MG/DL (ref 0.5–1.4)
DIFFERENTIAL METHOD: ABNORMAL
EOSINOPHIL # BLD AUTO: 0.4 K/UL (ref 0–0.5)
EOSINOPHIL NFR BLD: 5.7 % (ref 0–8)
ERYTHROCYTE [DISTWIDTH] IN BLOOD BY AUTOMATED COUNT: 20.9 % (ref 11.5–14.5)
EST. GFR  (NO RACE VARIABLE): >60 ML/MIN/1.73 M^2
GLUCOSE SERPL-MCNC: 243 MG/DL (ref 70–110)
HCT VFR BLD AUTO: 29.4 % (ref 37–48.5)
HGB BLD-MCNC: 9.9 G/DL (ref 12–16)
IMM GRANULOCYTES # BLD AUTO: 0.01 K/UL (ref 0–0.04)
IMM GRANULOCYTES NFR BLD AUTO: 0.2 % (ref 0–0.5)
LYMPHOCYTES # BLD AUTO: 1 K/UL (ref 1–4.8)
LYMPHOCYTES NFR BLD: 16.5 % (ref 18–48)
MAGNESIUM SERPL-MCNC: 1.6 MG/DL (ref 1.6–2.6)
MCH RBC QN AUTO: 34 PG (ref 27–31)
MCHC RBC AUTO-ENTMCNC: 33.7 G/DL (ref 32–36)
MCV RBC AUTO: 101 FL (ref 82–98)
MONOCYTES # BLD AUTO: 1 K/UL (ref 0.3–1)
MONOCYTES NFR BLD: 16.1 % (ref 4–15)
NEUTROPHILS # BLD AUTO: 3.9 K/UL (ref 1.8–7.7)
NEUTROPHILS NFR BLD: 61.2 % (ref 38–73)
NRBC BLD-RTO: 0 /100 WBC
PLATELET # BLD AUTO: 56 K/UL (ref 150–450)
PMV BLD AUTO: 12.5 FL (ref 9.2–12.9)
POCT GLUCOSE: 229 MG/DL (ref 70–110)
POCT GLUCOSE: 251 MG/DL (ref 70–110)
POCT GLUCOSE: 338 MG/DL (ref 70–110)
POCT GLUCOSE: 366 MG/DL (ref 70–110)
POTASSIUM SERPL-SCNC: 3.4 MMOL/L (ref 3.5–5.1)
PROCALCITONIN SERPL IA-MCNC: 0.61 NG/ML
PROT SERPL-MCNC: 6 G/DL (ref 6–8.4)
RBC # BLD AUTO: 2.91 M/UL (ref 4–5.4)
SODIUM SERPL-SCNC: 135 MMOL/L (ref 136–145)
STFR SERPL-MCNC: 2.8 MG/L (ref 1.8–4.6)
WBC # BLD AUTO: 6.29 K/UL (ref 3.9–12.7)

## 2022-09-24 PROCEDURE — 99223 1ST HOSP IP/OBS HIGH 75: CPT | Mod: ,,, | Performed by: INTERNAL MEDICINE

## 2022-09-24 PROCEDURE — 84145 PROCALCITONIN (PCT): CPT | Performed by: HOSPITALIST

## 2022-09-24 PROCEDURE — 25000003 PHARM REV CODE 250: Performed by: INTERNAL MEDICINE

## 2022-09-24 PROCEDURE — 63600175 PHARM REV CODE 636 W HCPCS: Performed by: HOSPITALIST

## 2022-09-24 PROCEDURE — A4216 STERILE WATER/SALINE, 10 ML: HCPCS

## 2022-09-24 PROCEDURE — 99233 PR SUBSEQUENT HOSPITAL CARE,LEVL III: ICD-10-PCS | Mod: ,,, | Performed by: HOSPITALIST

## 2022-09-24 PROCEDURE — 86645 CMV ANTIBODY IGM: CPT | Performed by: HOSPITALIST

## 2022-09-24 PROCEDURE — 25000003 PHARM REV CODE 250: Performed by: HOSPITALIST

## 2022-09-24 PROCEDURE — 80053 COMPREHEN METABOLIC PANEL: CPT | Performed by: HOSPITALIST

## 2022-09-24 PROCEDURE — 25000003 PHARM REV CODE 250

## 2022-09-24 PROCEDURE — 83735 ASSAY OF MAGNESIUM: CPT | Performed by: HOSPITALIST

## 2022-09-24 PROCEDURE — 12000002 HC ACUTE/MED SURGE SEMI-PRIVATE ROOM

## 2022-09-24 PROCEDURE — 99233 SBSQ HOSP IP/OBS HIGH 50: CPT | Mod: ,,, | Performed by: HOSPITALIST

## 2022-09-24 PROCEDURE — 85025 COMPLETE CBC W/AUTO DIFF WBC: CPT | Performed by: HOSPITALIST

## 2022-09-24 PROCEDURE — 82248 BILIRUBIN DIRECT: CPT | Performed by: HOSPITALIST

## 2022-09-24 PROCEDURE — 99223 PR INITIAL HOSPITAL CARE,LEVL III: ICD-10-PCS | Mod: ,,, | Performed by: INTERNAL MEDICINE

## 2022-09-24 RX ORDER — LACTULOSE 10 G/15ML
20 SOLUTION ORAL DAILY
Status: DISCONTINUED | OUTPATIENT
Start: 2022-09-25 | End: 2022-09-26

## 2022-09-24 RX ORDER — INSULIN ASPART 100 [IU]/ML
4 INJECTION, SOLUTION INTRAVENOUS; SUBCUTANEOUS
Status: DISCONTINUED | OUTPATIENT
Start: 2022-09-25 | End: 2022-10-01

## 2022-09-24 RX ORDER — CAPSAICIN 0.03 G/100G
CREAM TOPICAL 2 TIMES DAILY
Status: DISPENSED | OUTPATIENT
Start: 2022-09-24 | End: 2022-10-01

## 2022-09-24 RX ADMIN — LATANOPROST 1 DROP: 50 SOLUTION OPHTHALMIC at 09:09

## 2022-09-24 RX ADMIN — FAMOTIDINE 20 MG: 20 TABLET ORAL at 08:09

## 2022-09-24 RX ADMIN — INSULIN ASPART 5 UNITS: 100 INJECTION, SOLUTION INTRAVENOUS; SUBCUTANEOUS at 06:09

## 2022-09-24 RX ADMIN — Medication 5 ML: at 08:09

## 2022-09-24 RX ADMIN — ASPIRIN 81 MG: 81 TABLET, COATED ORAL at 08:09

## 2022-09-24 RX ADMIN — INSULIN ASPART 3 UNITS: 100 INJECTION, SOLUTION INTRAVENOUS; SUBCUTANEOUS at 01:09

## 2022-09-24 RX ADMIN — LACTULOSE 20 G: 20 SOLUTION ORAL at 08:09

## 2022-09-24 RX ADMIN — CAPSAICIN: 0.25 CREAM TOPICAL at 08:09

## 2022-09-24 RX ADMIN — SODIUM CHLORIDE, SODIUM LACTATE, POTASSIUM CHLORIDE, AND CALCIUM CHLORIDE: .6; .31; .03; .02 INJECTION, SOLUTION INTRAVENOUS at 10:09

## 2022-09-24 RX ADMIN — INSULIN ASPART 2 UNITS: 100 INJECTION, SOLUTION INTRAVENOUS; SUBCUTANEOUS at 08:09

## 2022-09-24 RX ADMIN — BRIMONIDINE TARTRATE 1 DROP: 2 SOLUTION OPHTHALMIC at 09:09

## 2022-09-24 RX ADMIN — INSULIN DETEMIR 10 UNITS: 100 INJECTION, SOLUTION SUBCUTANEOUS at 08:09

## 2022-09-24 RX ADMIN — ACETAMINOPHEN 1000 MG: 500 TABLET ORAL at 08:09

## 2022-09-24 NOTE — ASSESSMENT & PLAN NOTE
Patient with acute kidney injury likely due to IVVD/dehydration EFREN is currently stable. Labs reviewed- Renal function/electrolytes with Estimated Creatinine Clearance: 45 mL/min (based on SCr of 0.8 mg/dL). according to latest data. Monitor urine output and serial BMP and adjust therapy as needed. Avoid nephrotoxins and renally dose meds for GFR listed above.   - most recent Cr of 0.8 last December  - Cr 1.4 in the ED  - repeat Cr 1.1 after 500 ml LR  - continue to trend with CMP  - Resolved

## 2022-09-24 NOTE — PROGRESS NOTES
[facility] Medicine  Progress Note    Patient Name: Radha Feng  MRN: [id number]  Patient Class: IP- Inpatient   Admission Date: 9/20/2022  Length of Stay: 2 days  Attending Physician: Taylor Avalos MD  Primary Care Provider: Primary Doctor No        Subjective:     Principal Problem:Hemolytic anemia        HPI:  Radha Feng is a 81 y.o. female with a past medical history of type 2 diabetes, hyperlipidemia, hypertension, glaucoma and cataracts presents the emergency department due to progressive lethargy and weakness. Patient's daughter at the bedside who provides the majority of the history due to the acuity of patient's condition. Per daughter, patient was sent from her cardiology office earlier today due to increased bilateral leg swelling, shortness of breath, and progressive weakness over the past two weeks. She reports that over the past two weeks patient has had a poor appetite with significantly decreased po intake. In addition, she reports that patient's BP has been lower at home compared to baseline and states she has held BP medications (amlodipine and lisinopril-HCTZ) for the past two days. She states her BP is normally 130/80 but as been as low as 98/63. Per daughter, lower extremity swelling was most pronounced last week but has improved significantly with compression stockings.  Additionally, daughter notes that she was helping her change last week and noted that she had a prolapsed uterus/bladder but patient has not seen her OBGYN yet. Patient is requiring more assistance with ADLs which is abnormal for her. Patient denies dysuria but states that she does have difficulty urinating and can only urinate a small amount. Denies chest pain, shortness of breath, abdominal pain, dizziness, nausea, vomiting, or syncope. Patient's only complaint at this time is right knee pain.    ED: vital signs stable, afebrile, no acute distress. Elevated liver enzymes with T.bili of 2.9,  AST//254, and alk phos of 224. Na 133, K 5.0, Cr 1.4 (most recent Cr of 0.8 in 12/21). D-dimer 11.97, CTA negative for PE. CTA showed evidence of distended gallbladder and emphysema. Subsequent RUQ US negative for cholecystitis or gallstones. BNP 66, troponin negative. UA negative for infection. Given 500 mL LR and placed in observation.       Overview/Hospital Course:  Patient admitted to hospitalist service.  She was noted to have elevated liver enzymes, acute renal insuff, hyperbilirubin.  D-dimer was significatnly elevated.   CTA demonstrated NO PTE but did show few bilateral pulmonary micro nodules which can be followed up as OP.   Right UQ US showed distended gallbladder but no sonographically detectable gallstone, and no additional sonographic evidence for acute cholecystitis.   Mild free fluid of the abdomen noted.  MRCP showed normal pancreatic duct, Gallbladder is distended without wall thickening or pericholecystic fluid.  Common bile duct is normal caliber and tapers distally to the ampulla.  No intrahepatic biliary dilatation.  No biliary filling defects identified.   Acute hepatitis panel negative.  Monospot neg. Ceruloplasmin normal.    Upon further workup, patient noted to have evidence of hemolytic anemia and elevated ferritin and transferrin saturation consistent with iron overload.  She was also treated for mild hepatic encephalopathy with lactulose.    Both hepatology and hematology consulted for liver failure/iron overload and hemolytic anemia.          Interval History:   Patient more alert and awake today after receiving several doses of lactulose.  Appetite better today as well.  Denies pain.  No signs of bleeding overnight.  Denies SOB.   Per daughter; 20# wt loss from Jan to July and further wt loss since.     Review of Systems   Constitutional:  Positive for activity change and appetite change. Negative for chills and fever.   Respiratory:  Negative for cough and shortness of  breath.    Cardiovascular:  Negative for chest pain and leg swelling.   Gastrointestinal:  Negative for abdominal pain and blood in stool.   Genitourinary:  Negative for difficulty urinating.   Musculoskeletal:  Negative for arthralgias and joint swelling.   Skin:  Negative for rash and wound.   Neurological:  Positive for weakness. Negative for light-headedness and headaches.   Psychiatric/Behavioral:  Negative for sleep disturbance.    Objective:     Vital Signs (Most Recent):  Temp: 97.8 °F (36.6 °C) (09/24/22 1544)  Pulse: 66 (09/24/22 1544)  Resp: 18 (09/24/22 1544)  BP: (!) 145/86 (09/24/22 1544)  SpO2: 95 % (09/24/22 1544)   Vital Signs (24h Range):  Temp:  [97.5 °F (36.4 °C)-98.1 °F (36.7 °C)] 97.8 °F (36.6 °C)  Pulse:  [56-74] 66  Resp:  [16-18] 18  SpO2:  [93 %-97 %] 95 %  BP: (129-156)/(63-86) 145/86     Weight: 50.8 kg (111 lb 15.9 oz)  Body mass index is 18.08 kg/m².    Intake/Output Summary (Last 24 hours) at 9/24/2022 1637  Last data filed at 9/24/2022 1535  Gross per 24 hour   Intake 2318.51 ml   Output --   Net 2318.51 ml      Physical Exam  Vitals reviewed.   Constitutional:       General: She is awake.      Appearance: She is underweight.   HENT:      Head: Normocephalic and atraumatic.      Mouth/Throat:      Mouth: Mucous membranes are dry.      Pharynx: Oropharynx is clear. No oropharyngeal exudate or posterior oropharyngeal erythema.   Eyes:      General: Lids are normal. No scleral icterus.     Conjunctiva/sclera: Conjunctivae normal.   Neck:      Thyroid: No thyroid mass or thyromegaly.   Cardiovascular:      Rate and Rhythm: Normal rate and regular rhythm.      Heart sounds: Murmur heard.   Pulmonary:      Effort: Pulmonary effort is normal.      Breath sounds: Normal breath sounds.   Abdominal:      General: Bowel sounds are normal. There is no distension.      Palpations: Abdomen is soft. There is no mass.      Tenderness: There is no abdominal tenderness. There is no guarding.    Musculoskeletal:         General: Normal range of motion.      Right lower leg: No edema.      Left lower leg: No edema.   Skin:     General: Skin is warm and dry.      Coloration: Skin is not jaundiced.   Neurological:      General: No focal deficit present.      Mental Status: She is alert and easily aroused.      Motor: Weakness present.   Psychiatric:         Attention and Perception: Attention normal.         Mood and Affect: Mood normal.         Behavior: Behavior is cooperative.       Significant Labs: All pertinent labs within the past 24 hours have been reviewed.  Blood Culture: No results for input(s): LABBLOO in the last 48 hours.  BMP:   Recent Labs   Lab 09/24/22  0428   *   *   K 3.4*   *   CO2 16*   BUN 28*   CREATININE 0.8   CALCIUM 8.0*   MG 1.6     CBC:   Recent Labs   Lab 09/23/22  0307 09/23/22  1605 09/24/22  0428   WBC 5.96 7.14 6.29   HGB 9.6* 10.5* 9.9*   HCT 28.2* 30.9* 29.4*   PLT 92* 93* 56*     CMP:   Recent Labs   Lab 09/23/22  0307 09/24/22  0428    135*   K 3.6 3.4*   * 112*   CO2 19* 16*   * 243*   BUN 31* 28*   CREATININE 0.9 0.8   CALCIUM 7.9* 8.0*   PROT 5.5* 6.0   ALBUMIN 1.6* 1.8*   BILITOT 2.3* 2.6*   ALKPHOS 171* 198*   * 202*   * 180*   ANIONGAP 5* 7*     Cardiac Markers: No results for input(s): CKMB, MYOGLOBIN, BNP, TROPISTAT in the last 48 hours.  Coagulation:   Recent Labs   Lab 09/23/22  0307 09/23/22  1416   INR 1.5*  --    APTT  --  32.0     Lactic Acid: No results for input(s): LACTATE in the last 48 hours.  Lipase: No results for input(s): LIPASE in the last 48 hours.  Lipid Panel: No results for input(s): CHOL, HDL, LDLCALC, TRIG, CHOLHDL in the last 48 hours.    Significant Imaging: I have reviewed all pertinent imaging results/findings within the past 24 hours.      Assessment/Plan:      * Hemolytic anemia  - with associated iron overload  - etiology unknown  - direct genny test NEG  - hematology consult  -  elevated ddimer and low fibrinogen (monitor platelets, coags, evidence of bleeding)     Acute liver failure without hepatic coma  - elevated PT, low albumin, thrombocytopenia  - elevated AST/ALT at 296/254, T. Bili 2.1, alk phos 224  - CTA performed in the ED with marked distension of the partially visualized gallbladder  - subsequent RUQ US with evidence of distended gallbladder but without gallstones or acute cholecystitis   - MRCP demonstrates normal CBD, pancreatic duct  - Acute hepatitis panel neg, monospot neg, copper levels normal, EBV neg  - evidence of iron overload  - consult hepatology for further recommendations for workup  - possible liver bx Monday          EFREN (acute kidney injury)  Patient with acute kidney injury likely due to IVVD/dehydration EFREN is currently stable. Labs reviewed- Renal function/electrolytes with Estimated Creatinine Clearance: 45 mL/min (based on SCr of 0.8 mg/dL). according to latest data. Monitor urine output and serial BMP and adjust therapy as needed. Avoid nephrotoxins and renally dose meds for GFR listed above.   - most recent Cr of 0.8 last December  - Cr 1.4 in the ED  - repeat Cr 1.1 after 500 ml LR  - continue to trend with CMP  - Resolved     Hepatic encephalopathy  - mild  - lactulose bid  - adjust dose as needed      Dehydration  - LR 75cc/hr      Oral phase dysphagia  - has become more noticeable over past 2-4wks  - patient has to swallow multiple times to get food bolus down  - no issue with liquids  - no evidence of aspiration  - consult speech       Lethargy  Dehydration    Patient's daughter reports progressive lethargy and weakness onset about two weeks ago. Reports rbryif-ts-ti po intake, max of 1/2 a water bottle per day. She lives at home with her daughter. She also expresses concern of underlying depression as patient's twin sister  earlier this year and their birthday is coming up in November.  - CMP consistent with dehydration  - UA without  infection  - given 500 ml LR in the ED  - boost glucose control ordered TIDWM  - nutrition consulted, appreciate assistance  - PT/OT consulted, appreciate assistance  - check FA, B12, ammonia level  - TSH WNL    Severe protein-calorie malnutrition  Nutrition consulted. Most recent weight and BMI monitored-     Malnutrition Type and Energy Intake  Malnutrition Type: chronic illness  Energy Intake: severe energy intake    Malnutrition (Moderate to Severe)  Weight Loss (Malnutrition):  (14% x 9 months)  Energy Intake (Malnutrition): less than or equal to 75% for greater than or equal to 1 month  Subcutaneous Fat (Malnutrition): severe depletion  Muscle Mass (Malnutrition): severe depletion    Final Summary  Subcutaneous Fat Loss (Final Summary): severe protein-calorie malnutrition  Muscle Loss Evaluation (Final Summary): severe protein-calorie malnutrition    Malnutrition Final Summary  Severe Weight Loss (Malnutrition):  (14% x 9 months)    Measurements:  Wt Readings from Last 1 Encounters:   09/22/22 50.8 kg (111 lb 15.9 oz)   Body mass index is 18.08 kg/m².    Recommendations: Recommendation/Intervention: 1. Continue current diabetic/ low sodium diet- encourage adequate PO intake. - Consider liberalizing diet to just diabetic or regular if PO intake remains 50% or less. 2. Discontinue Boost Glucose Control per pt request. 3. RD following.  Goals: Will meet % EEN/EPN by next RD f/u.    Patient has been screened and assessed by RD. RD will follow patient.      Type 2 diabetes mellitus, without long-term current use of insulin  Patient's FSGs are uncontrolled due to hyperglycemia on current medication regimen.  - home regimen: metformin and glyburide  Last A1c reviewed-   Lab Results   Component Value Date    HGBA1C 5.3 09/21/2022     Most recent fingerstick glucose reviewed-   Recent Labs   Lab 09/21/22  1704 09/21/22  2343 09/22/22  0814 09/22/22  1140   POCTGLUCOSE 98 269* 169* 215*     Current correctional  scale  Low  Maintain anti-hyperglycemic dose as follows-   Antihyperglycemics (From admission, onward)    Start     Stop Route Frequency Ordered    09/21/22 0256  insulin aspart U-100 pen 0-5 Units         -- SubQ Before meals & nightly PRN 09/21/22 0156        Hold Oral hypoglycemics while patient is in the hospital.    Hypertension  - BP currently well-controlled  - patient's daughter reports that patient's BP has been lower at home compared to baseline and states she has held BP medications for the past two days  - holding home regimen of amlodipine 10 mg daily and lisinopril/HCTZ 20-12.5 mg BID  - will continue to monitor and further titrate antihypertensives as clinically indicated         Leg swelling  - no previous hx of CHF  - echo ordered for the am--> neg for DVT  - resolved    Right knee pain  - Chronic  - reported trauma about 30 years ago  - follows with orthopedics outpatient  - recent MRI in august with chronic findings  - continue tylenol prn for pain      VTE Risk Mitigation (From admission, onward)         Ordered     Place sequential compression device  Until discontinued         09/21/22 0932     IP VTE LOW RISK PATIENT  Once         09/21/22 0156                Discharge Planning   MELVI: 9/27/2022     Code Status: Full Code   Is the patient medically ready for discharge?: No    Reason for patient still in hospital (select all that apply): Patient new problem, Patient trending condition, Laboratory test, Treatment and Consult recommendations  Discharge Plan A: Home with family   Discharge Delays: None known at this time              Taylor Avalos MD  Department of Hospital Medicine   [facility]

## 2022-09-24 NOTE — SUBJECTIVE & OBJECTIVE
Interval History:   Patient more alert and awake today after receiving several doses of lactulose.  Appetite better today as well.  Denies pain.  No signs of bleeding overnight.  Denies SOB.   Per daughter; 20# wt loss from Jan to July and further wt loss since.     Review of Systems   Constitutional:  Positive for activity change and appetite change. Negative for chills and fever.   Respiratory:  Negative for cough and shortness of breath.    Cardiovascular:  Negative for chest pain and leg swelling.   Gastrointestinal:  Negative for abdominal pain and blood in stool.   Genitourinary:  Negative for difficulty urinating.   Musculoskeletal:  Negative for arthralgias and joint swelling.   Skin:  Negative for rash and wound.   Neurological:  Positive for weakness. Negative for light-headedness and headaches.   Psychiatric/Behavioral:  Negative for sleep disturbance.    Objective:     Vital Signs (Most Recent):  Temp: 97.8 °F (36.6 °C) (09/24/22 1544)  Pulse: 66 (09/24/22 1544)  Resp: 18 (09/24/22 1544)  BP: (!) 145/86 (09/24/22 1544)  SpO2: 95 % (09/24/22 1544)   Vital Signs (24h Range):  Temp:  [97.5 °F (36.4 °C)-98.1 °F (36.7 °C)] 97.8 °F (36.6 °C)  Pulse:  [56-74] 66  Resp:  [16-18] 18  SpO2:  [93 %-97 %] 95 %  BP: (129-156)/(63-86) 145/86     Weight: 50.8 kg (111 lb 15.9 oz)  Body mass index is 18.08 kg/m².    Intake/Output Summary (Last 24 hours) at 9/24/2022 1637  Last data filed at 9/24/2022 1535  Gross per 24 hour   Intake 2318.51 ml   Output --   Net 2318.51 ml      Physical Exam  Vitals reviewed.   Constitutional:       General: She is awake.      Appearance: She is underweight.   HENT:      Head: Normocephalic and atraumatic.      Mouth/Throat:      Mouth: Mucous membranes are dry.      Pharynx: Oropharynx is clear. No oropharyngeal exudate or posterior oropharyngeal erythema.   Eyes:      General: Lids are normal. No scleral icterus.     Conjunctiva/sclera: Conjunctivae normal.   Neck:      Thyroid: No  thyroid mass or thyromegaly.   Cardiovascular:      Rate and Rhythm: Normal rate and regular rhythm.      Heart sounds: Murmur heard.   Pulmonary:      Effort: Pulmonary effort is normal.      Breath sounds: Normal breath sounds.   Abdominal:      General: Bowel sounds are normal. There is no distension.      Palpations: Abdomen is soft. There is no mass.      Tenderness: There is no abdominal tenderness. There is no guarding.   Musculoskeletal:         General: Normal range of motion.      Right lower leg: No edema.      Left lower leg: No edema.   Skin:     General: Skin is warm and dry.      Coloration: Skin is not jaundiced.   Neurological:      General: No focal deficit present.      Mental Status: She is alert and easily aroused.      Motor: Weakness present.   Psychiatric:         Attention and Perception: Attention normal.         Mood and Affect: Mood normal.         Behavior: Behavior is cooperative.       Significant Labs: All pertinent labs within the past 24 hours have been reviewed.  Blood Culture: No results for input(s): LABBLOO in the last 48 hours.  BMP:   Recent Labs   Lab 09/24/22  0428   *   *   K 3.4*   *   CO2 16*   BUN 28*   CREATININE 0.8   CALCIUM 8.0*   MG 1.6     CBC:   Recent Labs   Lab 09/23/22  0307 09/23/22  1605 09/24/22  0428   WBC 5.96 7.14 6.29   HGB 9.6* 10.5* 9.9*   HCT 28.2* 30.9* 29.4*   PLT 92* 93* 56*     CMP:   Recent Labs   Lab 09/23/22  0307 09/24/22  0428    135*   K 3.6 3.4*   * 112*   CO2 19* 16*   * 243*   BUN 31* 28*   CREATININE 0.9 0.8   CALCIUM 7.9* 8.0*   PROT 5.5* 6.0   ALBUMIN 1.6* 1.8*   BILITOT 2.3* 2.6*   ALKPHOS 171* 198*   * 202*   * 180*   ANIONGAP 5* 7*     Cardiac Markers: No results for input(s): CKMB, MYOGLOBIN, BNP, TROPISTAT in the last 48 hours.  Coagulation:   Recent Labs   Lab 09/23/22  0307 09/23/22  1416   INR 1.5*  --    APTT  --  32.0     Lactic Acid: No results for input(s): LACTATE in the  last 48 hours.  Lipase: No results for input(s): LIPASE in the last 48 hours.  Lipid Panel: No results for input(s): CHOL, HDL, LDLCALC, TRIG, CHOLHDL in the last 48 hours.    Significant Imaging: I have reviewed all pertinent imaging results/findings within the past 24 hours.

## 2022-09-24 NOTE — ASSESSMENT & PLAN NOTE
- with associated iron overload  - etiology unknown  - direct genny test NEG  - hematology consult  - elevated ddimer and low fibrinogen (monitor platelets, coags, evidence of bleeding)

## 2022-09-24 NOTE — PLAN OF CARE
Problem: Adult Inpatient Plan of Care  Goal: Plan of Care Review  Outcome: Ongoing, Progressing  Goal: Patient-Specific Goal (Individualized)  Outcome: Ongoing, Progressing  Goal: Absence of Hospital-Acquired Illness or Injury  Outcome: Ongoing, Progressing  Goal: Optimal Comfort and Wellbeing  Outcome: Ongoing, Progressing  Goal: Readiness for Transition of Care  Outcome: Ongoing, Progressing     Problem: Infection  Goal: Absence of Infection Signs and Symptoms  Outcome: Ongoing, Progressing     Problem: Diabetes Comorbidity  Goal: Blood Glucose Level Within Targeted Range  Outcome: Ongoing, Progressing     Problem: Fluid and Electrolyte Imbalance (Acute Kidney Injury/Impairment)  Goal: Fluid and Electrolyte Balance  Outcome: Ongoing, Progressing     Problem: Oral Intake Inadequate (Acute Kidney Injury/Impairment)  Goal: Optimal Nutrition Intake  Outcome: Ongoing, Progressing     Problem: Renal Function Impairment (Acute Kidney Injury/Impairment)  Goal: Effective Renal Function  Outcome: Ongoing, Progressing     Problem: Skin Injury Risk Increased  Goal: Skin Health and Integrity  Outcome: Ongoing, Progressing     Problem: Fall Injury Risk  Goal: Absence of Fall and Fall-Related Injury  Outcome: Ongoing, Progressing     Pt lying in bed with eyes open. AAOx3 and able to voice needs to staff. Dtr at bedside and assists with pt ADLs. Pt appetite has slightly increased. Cont LR at 75 ml/hr. Tele monitor intact. Call light within reach. Will cont to monitor.

## 2022-09-24 NOTE — ASSESSMENT & PLAN NOTE
- has become more noticeable over past 2-4wks  - patient has to swallow multiple times to get food bolus down  - no issue with liquids  - no evidence of aspiration  - consult speech

## 2022-09-24 NOTE — ASSESSMENT & PLAN NOTE
- elevated PT, low albumin, thrombocytopenia  - elevated AST/ALT at 296/254, T. Bili 2.1, alk phos 224  - CTA performed in the ED with marked distension of the partially visualized gallbladder  - subsequent RUQ US with evidence of distended gallbladder but without gallstones or acute cholecystitis   - MRCP demonstrates normal CBD, pancreatic duct  - Acute hepatitis panel neg, monospot neg, copper levels normal, EBV neg  - evidence of iron overload  - consult hepatology for further recommendations for workup  - possible liver bx Monday

## 2022-09-24 NOTE — CONSULTS
[facility]  Hepatology  Consult Note    Patient Name: Radha Feng  MRN: [id number]  Admission Date: 9/20/2022  Hospital Length of Stay: 2 days  Code Status: Full Code   Attending Provider: Taylor Avalos MD   Consulting Provider: Swati Hadley MD  Primary Care Physician: Primary Doctor No  Principal Problem:Hemolytic anemia    Inpatient consult to Hepatology  Consult performed by: Swati Hadley MD  Consult ordered by: Taylor Avalos MD      Inpatient consult to Hepatology  Consult performed by: Swati Hadley MD  Consult ordered by: Taylor Avalos MD      Subjective:     HPI: Radha Feng is a 81 y.o. female with history of DM, HTN, cataracts who presents from cardiology clinic for fatigue, SOB and edema.    History was mostly obtained from the patient's daughter since the patient is slightly lethargic.  She reports that the patient is normally independent at baseline and pretty active up until about a month ago when she developed reduced p.o. intake generalized fatigue.  This progressed up until about 2 weeks ago when she developed leg swelling and shortness of breath.  She has not been eating much due to reduced appetite and has lost about 20 lb in the past 6 months she also had to stop giving her her blood pressure medications because her blood pressure was low.  She scheduled her for an appointment to see a cardiologist who referred her for admission.  She has a history of drinking about 2 light beers a day throughout her life, stopped in January.  No new medications over the past year.  Has been taking Tylenol 1 g every other day for joint pain, vitamin-D supplement, no other supplements.    On admission, she was noted to be afebrile and hemodynamically stable.  Labs are notable for sodium 133, potassium 6.7, EFREN with creatinine 1.4, glucose 314, to bili 2.9, alk-phos 224,  , platelets 126.  Acute hepatitis panel negative.  She had an elevated D-dimers  "so a CTA PE protocol was negative for PE.  Liver ultrasound negative for cirrhosis or biliary dilation.  MRCP confirmed that however there was small volume ascites.  An iron panel was done that showed a ferritin of 1186, transfer 93, iron 101, haptoglobin less than 10, retic count 3.9. Hepatology consulted for "Liver failure with evidence of iron overload of unknown etiology.  Need assistance on further workup and treatment."        Past Medical History:   Diagnosis Date    Cataract     Diabetes mellitus     Glaucoma     Hypertension        Past Surgical History:   Procedure Laterality Date    CATARACT EXTRACTION W/  INTRAOCULAR LENS IMPLANT Left 12/21/2021    Procedure: EXTRACTION, CATARACT, WITH IOL INSERTION;  Surgeon: Shay Chou MD;  Location: [facility];  Service: Ophthalmology;  Laterality: Left;       Family History   Problem Relation Age of Onset    Cataracts Mother     Diabetes Mother     Glaucoma Mother     Hypertension Mother     Heart attack Father     Cancer Sister     Blindness Cousin     Amblyopia Neg Hx     Macular degeneration Neg Hx     Retinal detachment Neg Hx        Social History     Socioeconomic History    Marital status: Single   Tobacco Use    Smoking status: Former    Smokeless tobacco: Never   Substance and Sexual Activity    Alcohol use: Not Currently    Drug use: Never       No current facility-administered medications on file prior to encounter.     Current Outpatient Medications on File Prior to Encounter   Medication Sig Dispense Refill    acetaminophen (TYLENOL) 500 MG tablet Take 1000 mg by mouth daily to every other day as needed for pain.      amLODIPine (NORVASC) 10 MG tablet Take 10 mg by mouth once daily.      aspirin (ECOTRIN) 81 MG EC tablet Take 81 mg by mouth once daily.      atorvastatin (LIPITOR) 10 MG tablet TAKE 1 TABLET BY MOUTH EVERY DAY      brimonidine 0.2% (ALPHAGAN) 0.2 % Drop INSTILL 1 DROP INTO RIGHT EYE TWICE A DAY 30 mL 3    dorzolamide-timolol " 2-0.5% (COSOPT) 22.3-6.8 mg/mL ophthalmic solution INSTILL 1 DROP INTO RIGHT EYE TWICE A DAY 30 mL 3    glyBURIDE (DIABETA) 2.5 MG tablet Take 2.5 mg by mouth once daily.      ibuprofen (ADVIL,MOTRIN) 200 MG tablet Take 3 tablets (600 mg) by mouth daily to every other day as needed for pain.      latanoprost 0.005 % ophthalmic solution PLACE 1 DROP INTO BOTH EYES ONCE DAILY. 7.5 mL 3    lisinopriL-hydrochlorothiazide (PRINZIDE,ZESTORETIC) 20-12.5 mg per tablet TAKE 1 TABLET BY MOUTH TWICE A DAY      metFORMIN (GLUCOPHAGE) 500 MG tablet TAKE 1 TABLET BY MOUTH TWICE A DAY      multivit-minerals/folic acid (ONE DAILY GUMMY VITES ORAL) Chew and swallow 1 gummy by mouth once daily.         Review of patient's allergies indicates:  No Known Allergies    Review of Systems   Constitutional:  Positive for malaise/fatigue and weight loss.   HENT: Negative.     Eyes: Negative.    Respiratory:  Positive for shortness of breath.    Cardiovascular:  Positive for leg swelling.   Gastrointestinal:  Negative for abdominal pain, nausea and vomiting.   Genitourinary: Negative.    Musculoskeletal:  Positive for joint pain.   Neurological:  Positive for weakness.   Psychiatric/Behavioral: Negative.        Objective:     Vitals:    09/24/22 0807   BP: (!) 156/74   Pulse: 66   Resp: 18   Temp: 98.1 °F (36.7 °C)         Constitutional:  not in acute distress and well developed  HENT: Head: Normal, normocephalic, atraumatic.  Eyes: conjunctiva clear and sclera nonicteric  Cardiovascular: regular rate and rhythm  Respiratory: normal chest expansion & respiratory effort   and no accessory muscle use  GI: soft, non-tender, without masses or organomegaly  Musculoskeletal: no muscular tenderness noted  Skin: normal color  Neurological: alert, oriented x3  Psychiatric: lethargic    Significant Labs:  Recent Labs   Lab 09/23/22  0307 09/23/22  1605 09/24/22  0428   HGB 9.6* 10.5* 9.9*       Lab Results   Component Value Date    WBC 6.29 09/24/2022     HGB 9.9 (L) 09/24/2022    HCT 29.4 (L) 09/24/2022     (H) 09/24/2022    PLT 56 (L) 09/24/2022       Lab Results   Component Value Date     (L) 09/24/2022    K 3.4 (L) 09/24/2022     (H) 09/24/2022    CO2 16 (L) 09/24/2022    BUN 28 (H) 09/24/2022    CREATININE 0.8 09/24/2022    CALCIUM 8.0 (L) 09/24/2022    ANIONGAP 7 (L) 09/24/2022       Lab Results   Component Value Date     (H) 09/24/2022     (H) 09/24/2022    ALKPHOS 198 (H) 09/24/2022    BILITOT 2.6 (H) 09/24/2022       Lab Results   Component Value Date    INR 1.5 (H) 09/23/2022       Significant Imaging:  Reviewed pertinent radiology findings.       Assessment/Plan:     Radha Feng is a 81 y.o. female with history of DM, HTN, cataracts who presents from cardiology clinic for fatigue, SOB and edema.    Problem List:  Acute liver injury  Hemolysis  Thrombocytopenia    Assessment:  The patient presents with fatigue and lower extremity edema, found to have acute liver injury of a mixed pattern on labs as well as thrombocytopenia.  Her last available labs from January showed normal liver enzymes.  No prior history of liver disease.  This picture is concerning for acute liver injury, the cause of which is currently on clear vs a more chronic underlying advanced fibrosis of the liver that has now decompensated, given the thrombocytopenia and ascites.  Differentials include autoimmune hepatitis or more systemic process that is causing acute liver injury such as a hematological malignancy.  Would recommend obtaining autoimmune serologies, as well as a liver biopsy next week.  As far as iron overload, low concern of hereditary hemochromatosis given the patient's age.  The iron labs unlikely more reflective of liver disease and are not the driving factor of her presentation.    Recommendations:  - please obtain SONNY, anti smooth muscle antibody, antimitochondrial antibody, total IgG, alpha-1 antitrypsin phenotype, ceruloplasmin,  PETH, CMV PCR if not done so already  - transjugular liver biopsy next week  - please obtain daily CMP, INR      Thank you for involving us in the care of Radha Feng. Please call with any additional questions, concerns or changes in the patient's clinical status. We will continue to follow.     Swati Hadley MD  Gastroenterology & Hepatology Fellow PGY VI   [facility]

## 2022-09-24 NOTE — ASSESSMENT & PLAN NOTE
Dehydration    Patient's daughter reports progressive lethargy and weakness onset about two weeks ago. Reports [id number] po intake, max of 1/2 a water bottle per day. She lives at home with her daughter. She also expresses concern of underlying depression as patient's twin sister  earlier this year and their birthday is coming up in November.  - CMP consistent with dehydration  - UA without infection  - given 500 ml LR in the ED  - boost glucose control ordered TIDWM  - nutrition consulted, appreciate assistance  - PT/OT consulted, appreciate assistance  - check FA, B12, ammonia level  - TSH WNL

## 2022-09-25 ENCOUNTER — PATIENT MESSAGE (OUTPATIENT)
Dept: UROGYNECOLOGY | Facility: CLINIC | Age: 82
End: 2022-09-25
Payer: MEDICARE

## 2022-09-25 PROBLEM — D64.89 OTHER SPECIFIED ANEMIAS: Status: ACTIVE | Noted: 2022-09-25

## 2022-09-25 LAB
ALBUMIN SERPL BCP-MCNC: 1.7 G/DL (ref 3.5–5.2)
ALP SERPL-CCNC: 193 U/L (ref 55–135)
ALT SERPL W/O P-5'-P-CCNC: 173 U/L (ref 10–44)
ANION GAP SERPL CALC-SCNC: 5 MMOL/L (ref 8–16)
APTT BLDCRRT: 34.5 SEC (ref 21–32)
AST SERPL-CCNC: 190 U/L (ref 10–40)
BASOPHILS # BLD AUTO: 0.05 K/UL (ref 0–0.2)
BASOPHILS NFR BLD: 0.7 % (ref 0–1.9)
BILIRUB SERPL-MCNC: 2.3 MG/DL (ref 0.1–1)
BUN SERPL-MCNC: 27 MG/DL (ref 8–23)
CALCIUM SERPL-MCNC: 8.2 MG/DL (ref 8.7–10.5)
CHLORIDE SERPL-SCNC: 109 MMOL/L (ref 95–110)
CO2 SERPL-SCNC: 17 MMOL/L (ref 23–29)
CREAT SERPL-MCNC: 0.8 MG/DL (ref 0.5–1.4)
D DIMER PPP IA.FEU-MCNC: 13.06 MG/L FEU
DIFFERENTIAL METHOD: ABNORMAL
EOSINOPHIL # BLD AUTO: 0.4 K/UL (ref 0–0.5)
EOSINOPHIL NFR BLD: 6.5 % (ref 0–8)
ERYTHROCYTE [DISTWIDTH] IN BLOOD BY AUTOMATED COUNT: 20.6 % (ref 11.5–14.5)
EST. GFR  (NO RACE VARIABLE): >60 ML/MIN/1.73 M^2
FIBRINOGEN PPP-MCNC: 114 MG/DL (ref 182–400)
GLUCOSE SERPL-MCNC: 213 MG/DL (ref 70–110)
HCT VFR BLD AUTO: 30.6 % (ref 37–48.5)
HGB BLD-MCNC: 10.4 G/DL (ref 12–16)
IGG SERPL-MCNC: 2805 MG/DL (ref 650–1600)
IMM GRANULOCYTES # BLD AUTO: 0.02 K/UL (ref 0–0.04)
IMM GRANULOCYTES NFR BLD AUTO: 0.3 % (ref 0–0.5)
INR PPP: 1.5 (ref 0.8–1.2)
LDH SERPL L TO P-CCNC: 461 U/L (ref 110–260)
LYMPHOCYTES # BLD AUTO: 1 K/UL (ref 1–4.8)
LYMPHOCYTES NFR BLD: 15.2 % (ref 18–48)
MAGNESIUM SERPL-MCNC: 1.8 MG/DL (ref 1.6–2.6)
MCH RBC QN AUTO: 34.3 PG (ref 27–31)
MCHC RBC AUTO-ENTMCNC: 34 G/DL (ref 32–36)
MCV RBC AUTO: 101 FL (ref 82–98)
MONOCYTES # BLD AUTO: 1.3 K/UL (ref 0.3–1)
MONOCYTES NFR BLD: 18.7 % (ref 4–15)
NEUTROPHILS # BLD AUTO: 3.9 K/UL (ref 1.8–7.7)
NEUTROPHILS NFR BLD: 58.6 % (ref 38–73)
NRBC BLD-RTO: 0 /100 WBC
PLATELET # BLD AUTO: 76 K/UL (ref 150–450)
PMV BLD AUTO: 12.4 FL (ref 9.2–12.9)
POCT GLUCOSE: 206 MG/DL (ref 70–110)
POCT GLUCOSE: 210 MG/DL (ref 70–110)
POCT GLUCOSE: 283 MG/DL (ref 70–110)
POCT GLUCOSE: 314 MG/DL (ref 70–110)
POTASSIUM SERPL-SCNC: 3.5 MMOL/L (ref 3.5–5.1)
PROT SERPL-MCNC: 5.8 G/DL (ref 6–8.4)
PROTHROMBIN TIME: 14.9 SEC (ref 9–12.5)
RBC # BLD AUTO: 3.03 M/UL (ref 4–5.4)
RETICS/RBC NFR AUTO: 4.3 % (ref 0.5–2.5)
SODIUM SERPL-SCNC: 131 MMOL/L (ref 136–145)
WBC # BLD AUTO: 6.73 K/UL (ref 3.9–12.7)

## 2022-09-25 PROCEDURE — 99233 SBSQ HOSP IP/OBS HIGH 50: CPT | Mod: ,,, | Performed by: HOSPITALIST

## 2022-09-25 PROCEDURE — 85045 AUTOMATED RETICULOCYTE COUNT: CPT | Performed by: STUDENT IN AN ORGANIZED HEALTH CARE EDUCATION/TRAINING PROGRAM

## 2022-09-25 PROCEDURE — 80053 COMPREHEN METABOLIC PANEL: CPT | Performed by: HOSPITALIST

## 2022-09-25 PROCEDURE — 25000003 PHARM REV CODE 250: Performed by: HOSPITALIST

## 2022-09-25 PROCEDURE — 85384 FIBRINOGEN ACTIVITY: CPT | Performed by: HOSPITALIST

## 2022-09-25 PROCEDURE — 86039 ANTINUCLEAR ANTIBODIES (ANA): CPT | Performed by: HOSPITALIST

## 2022-09-25 PROCEDURE — 63600175 PHARM REV CODE 636 W HCPCS: Performed by: HOSPITALIST

## 2022-09-25 PROCEDURE — 82784 ASSAY IGA/IGD/IGG/IGM EACH: CPT | Performed by: HOSPITALIST

## 2022-09-25 PROCEDURE — 99233 PR SUBSEQUENT HOSPITAL CARE,LEVL III: ICD-10-PCS | Mod: ,,, | Performed by: HOSPITALIST

## 2022-09-25 PROCEDURE — 86256 FLUORESCENT ANTIBODY TITER: CPT | Mod: 91 | Performed by: HOSPITALIST

## 2022-09-25 PROCEDURE — 36415 COLL VENOUS BLD VENIPUNCTURE: CPT | Performed by: HOSPITALIST

## 2022-09-25 PROCEDURE — 80321 ALCOHOLS BIOMARKERS 1OR 2: CPT | Performed by: HOSPITALIST

## 2022-09-25 PROCEDURE — 36415 COLL VENOUS BLD VENIPUNCTURE: CPT | Performed by: STUDENT IN AN ORGANIZED HEALTH CARE EDUCATION/TRAINING PROGRAM

## 2022-09-25 PROCEDURE — 94761 N-INVAS EAR/PLS OXIMETRY MLT: CPT

## 2022-09-25 PROCEDURE — 86235 NUCLEAR ANTIGEN ANTIBODY: CPT | Performed by: HOSPITALIST

## 2022-09-25 PROCEDURE — 12000002 HC ACUTE/MED SURGE SEMI-PRIVATE ROOM

## 2022-09-25 PROCEDURE — 85240 CLOT FACTOR VIII AHG 1 STAGE: CPT | Performed by: STUDENT IN AN ORGANIZED HEALTH CARE EDUCATION/TRAINING PROGRAM

## 2022-09-25 PROCEDURE — 83735 ASSAY OF MAGNESIUM: CPT | Performed by: HOSPITALIST

## 2022-09-25 PROCEDURE — 85730 THROMBOPLASTIN TIME PARTIAL: CPT | Performed by: HOSPITALIST

## 2022-09-25 PROCEDURE — 83615 LACTATE (LD) (LDH) ENZYME: CPT | Performed by: STUDENT IN AN ORGANIZED HEALTH CARE EDUCATION/TRAINING PROGRAM

## 2022-09-25 PROCEDURE — 25000003 PHARM REV CODE 250: Performed by: INTERNAL MEDICINE

## 2022-09-25 PROCEDURE — 85025 COMPLETE CBC W/AUTO DIFF WBC: CPT | Performed by: HOSPITALIST

## 2022-09-25 PROCEDURE — 85610 PROTHROMBIN TIME: CPT | Performed by: HOSPITALIST

## 2022-09-25 PROCEDURE — 82103 ALPHA-1-ANTITRYPSIN TOTAL: CPT | Performed by: HOSPITALIST

## 2022-09-25 PROCEDURE — 86747 PARVOVIRUS ANTIBODY: CPT | Performed by: HOSPITALIST

## 2022-09-25 PROCEDURE — 25000003 PHARM REV CODE 250

## 2022-09-25 PROCEDURE — 86038 ANTINUCLEAR ANTIBODIES: CPT | Performed by: HOSPITALIST

## 2022-09-25 PROCEDURE — 85379 FIBRIN DEGRADATION QUANT: CPT | Performed by: HOSPITALIST

## 2022-09-25 PROCEDURE — 86225 DNA ANTIBODY NATIVE: CPT | Mod: 59 | Performed by: HOSPITALIST

## 2022-09-25 RX ADMIN — CAPSAICIN: 0.25 CREAM TOPICAL at 08:09

## 2022-09-25 RX ADMIN — BRIMONIDINE TARTRATE 1 DROP: 2 SOLUTION OPHTHALMIC at 09:09

## 2022-09-25 RX ADMIN — INSULIN ASPART 4 UNITS: 100 INJECTION, SOLUTION INTRAVENOUS; SUBCUTANEOUS at 01:09

## 2022-09-25 RX ADMIN — ACETAMINOPHEN 1000 MG: 500 TABLET ORAL at 09:09

## 2022-09-25 RX ADMIN — INSULIN ASPART 4 UNITS: 100 INJECTION, SOLUTION INTRAVENOUS; SUBCUTANEOUS at 05:09

## 2022-09-25 RX ADMIN — SODIUM CHLORIDE, SODIUM LACTATE, POTASSIUM CHLORIDE, AND CALCIUM CHLORIDE: .6; .31; .03; .02 INJECTION, SOLUTION INTRAVENOUS at 02:09

## 2022-09-25 RX ADMIN — INSULIN DETEMIR 12 UNITS: 100 INJECTION, SOLUTION SUBCUTANEOUS at 08:09

## 2022-09-25 RX ADMIN — ACETAMINOPHEN 1000 MG: 500 TABLET ORAL at 08:09

## 2022-09-25 RX ADMIN — INSULIN ASPART 2 UNITS: 100 INJECTION, SOLUTION INTRAVENOUS; SUBCUTANEOUS at 08:09

## 2022-09-25 RX ADMIN — LACTULOSE 20 G: 20 SOLUTION ORAL at 08:09

## 2022-09-25 RX ADMIN — INSULIN ASPART 2 UNITS: 100 INJECTION, SOLUTION INTRAVENOUS; SUBCUTANEOUS at 01:09

## 2022-09-25 RX ADMIN — FAMOTIDINE 20 MG: 20 TABLET ORAL at 08:09

## 2022-09-25 RX ADMIN — INSULIN ASPART 4 UNITS: 100 INJECTION, SOLUTION INTRAVENOUS; SUBCUTANEOUS at 08:09

## 2022-09-25 RX ADMIN — INSULIN ASPART 3 UNITS: 100 INJECTION, SOLUTION INTRAVENOUS; SUBCUTANEOUS at 05:09

## 2022-09-25 NOTE — PROGRESS NOTES
[facility] Medicine  Progress Note    Patient Name: Radha Feng  MRN: [id number]  Patient Class: IP- Inpatient   Admission Date: 9/20/2022  Length of Stay: 3 days  Attending Physician: Taylor Avalos MD  Primary Care Provider: Primary Doctor No        Subjective:     Principal Problem:Hemolytic anemia        HPI:  Radha Feng is a 81 y.o. female with a past medical history of type 2 diabetes, hyperlipidemia, hypertension, glaucoma and cataracts presents the emergency department due to progressive lethargy and weakness. Patient's daughter at the bedside who provides the majority of the history due to the acuity of patient's condition. Per daughter, patient was sent from her cardiology office earlier today due to increased bilateral leg swelling, shortness of breath, and progressive weakness over the past two weeks. She reports that over the past two weeks patient has had a poor appetite with significantly decreased po intake. In addition, she reports that patient's BP has been lower at home compared to baseline and states she has held BP medications (amlodipine and lisinopril-HCTZ) for the past two days. She states her BP is normally 130/80 but as been as low as 98/63. Per daughter, lower extremity swelling was most pronounced last week but has improved significantly with compression stockings.  Additionally, daughter notes that she was helping her change last week and noted that she had a prolapsed uterus/bladder but patient has not seen her OBGYN yet. Patient is requiring more assistance with ADLs which is abnormal for her. Patient denies dysuria but states that she does have difficulty urinating and can only urinate a small amount. Denies chest pain, shortness of breath, abdominal pain, dizziness, nausea, vomiting, or syncope. Patient's only complaint at this time is right knee pain.    ED: vital signs stable, afebrile, no acute distress. Elevated liver enzymes with T.bili of 2.9,  AST//254, and alk phos of 224. Na 133, K 5.0, Cr 1.4 (most recent Cr of 0.8 in 12/21). D-dimer 11.97, CTA negative for PE. CTA showed evidence of distended gallbladder and emphysema. Subsequent RUQ US negative for cholecystitis or gallstones. BNP 66, troponin negative. UA negative for infection. Given 500 mL LR and placed in observation.       Overview/Hospital Course:  Patient admitted to hospitalist service.  She was noted to have elevated liver enzymes, acute renal insuff, hyperbilirubin.  D-dimer was significatnly elevated.   CTA demonstrated NO PTE but did show few bilateral pulmonary micro nodules which can be followed up as OP.   Right UQ US showed distended gallbladder but no sonographically detectable gallstone, and no additional sonographic evidence for acute cholecystitis.   Mild free fluid of the abdomen noted.  MRCP showed normal pancreatic duct, Gallbladder is distended without wall thickening or pericholecystic fluid.  Common bile duct is normal caliber and tapers distally to the ampulla.  No intrahepatic biliary dilatation.  No biliary filling defects identified.   Acute hepatitis panel negative.  Monospot neg. Ceruloplasmin normal.  She was also treated for mild hepatic encephalopathy with lactulose.      Upon further workup, patient noted to have evidence of hemolytic anemia and elevated ferritin and transferrin saturation consistent with iron overload.  With elevated d-dimer and low fibrinogen, concern for developing DIC.  Both hepatology and hematology consulted for liver failure/iron overload and hemolytic anemia.          Interval History:   Overall, the patient feels better.  She is more awake and alert since receiving lactulose.  Continues to have poor PO intake and difficulty swallowing.  She has early satiety.  Denies ab pain. Afebrile     Review of Systems   Constitutional:  Positive for activity change, appetite change and unexpected weight change.   HENT:  Positive for trouble  swallowing.    Respiratory:  Negative for cough and shortness of breath.    Cardiovascular:  Negative for chest pain and leg swelling.   Gastrointestinal:  Negative for abdominal pain and blood in stool.   Genitourinary:  Negative for difficulty urinating and hematuria.   Skin:  Negative for rash and wound.   Neurological:  Positive for weakness. Negative for light-headedness.   Psychiatric/Behavioral:  Negative for confusion.    Objective:     Vital Signs (Most Recent):  Temp: 98.4 °F (36.9 °C) (09/25/22 0800)  Pulse: 63 (09/25/22 1148)  Resp: 14 (09/25/22 1148)  BP: (!) 140/72 (09/25/22 1148)  SpO2: 95 % (09/25/22 1148)   Vital Signs (24h Range):  Temp:  [97.5 °F (36.4 °C)-98.4 °F (36.9 °C)] 98.4 °F (36.9 °C)  Pulse:  [61-96] 63  Resp:  [14-18] 14  SpO2:  [94 %-96 %] 95 %  BP: (127-160)/(63-86) 140/72     Weight: 50.8 kg (111 lb 15.9 oz)  Body mass index is 18.08 kg/m².    Intake/Output Summary (Last 24 hours) at 9/25/2022 1438  Last data filed at 9/25/2022 1000  Gross per 24 hour   Intake 1919.51 ml   Output --   Net 1919.51 ml      Physical Exam  Vitals reviewed.   Constitutional:       General: She is awake.      Appearance: She is underweight. She is ill-appearing.   HENT:      Head: Normocephalic and atraumatic.      Mouth/Throat:      Mouth: Mucous membranes are dry.      Pharynx: Oropharynx is clear. No oropharyngeal exudate or posterior oropharyngeal erythema.   Eyes:      General: Lids are normal.      Conjunctiva/sclera: Conjunctivae normal.   Neck:      Thyroid: No thyroid mass or thyromegaly.   Cardiovascular:      Rate and Rhythm: Normal rate and regular rhythm.      Heart sounds: Murmur heard.   Pulmonary:      Effort: Pulmonary effort is normal.      Breath sounds: Normal breath sounds.   Abdominal:      General: Bowel sounds are normal. There is no distension.      Palpations: Abdomen is soft. There is no mass.      Tenderness: There is no abdominal tenderness.   Musculoskeletal:         General:  Normal range of motion.      Right lower leg: No edema.      Left lower leg: No edema.   Skin:     General: Skin is warm and dry.      Coloration: Skin is not jaundiced.   Neurological:      General: No focal deficit present.      Mental Status: She is alert.      Motor: Weakness present.   Psychiatric:         Attention and Perception: Attention normal.         Mood and Affect: Mood normal.         Behavior: Behavior is cooperative.       Significant Labs: All pertinent labs within the past 24 hours have been reviewed.  Bilirubin:   Recent Labs   Lab 09/21/22  0127 09/21/22  0348 09/22/22  0520 09/23/22  0307 09/24/22  0428 09/25/22  0545   BILIDIR 1.6*  --   --   --  1.6*  --    BILITOT 2.8* 2.7* 2.6* 2.3* 2.6* 2.3*     BMP:   Recent Labs   Lab 09/25/22  0545   *   *   K 3.5      CO2 17*   BUN 27*   CREATININE 0.8   CALCIUM 8.2*   MG 1.8     CBC:   Recent Labs   Lab 09/23/22  1605 09/24/22  0428 09/25/22  0545   WBC 7.14 6.29 6.73   HGB 10.5* 9.9* 10.4*   HCT 30.9* 29.4* 30.6*   PLT 93* 56* 76*     CMP:   Recent Labs   Lab 09/24/22  0428 09/25/22  0545   * 131*   K 3.4* 3.5   * 109   CO2 16* 17*   * 213*   BUN 28* 27*   CREATININE 0.8 0.8   CALCIUM 8.0* 8.2*   PROT 6.0 5.8*   ALBUMIN 1.8* 1.7*   BILITOT 2.6* 2.3*   ALKPHOS 198* 193*   * 190*   * 173*   ANIONGAP 7* 5*     Magnesium:   Recent Labs   Lab 09/24/22  0428 09/25/22  0545   MG 1.6 1.8       Significant Imaging: I have reviewed all pertinent imaging results/findings within the past 24 hours.      Assessment/Plan:      * Hemolytic anemia  - with associated iron overload  - direct genny test NEG  - hematology consult  - elevated ddimer and low fibrinogen (monitor platelets, coags, evidence of bleeding or clotting)  - etiology unknown  - pathology review of PBS??    Acute liver failure without hepatic coma  - elevated PT, low albumin, thrombocytopenia  - elevated AST/ALT at 296/254, T. Bili 2.1, alk phos  224  - CTA of ab/pelvix,  subsequent RUQ US, and  MRCP demonstrate no cause of patient's liver failure  - Acute hepatitis panel neg, monospot neg, copper levels normal, EBV neg  - evidence of iron overload  - consult hepatology for further recommendations for workup  - possible liver bx Monday          EFREN (acute kidney injury)  Patient with acute kidney injury likely due to IVVD/dehydration EFREN is currently stable. Labs reviewed- Renal function/electrolytes with Estimated Creatinine Clearance: 45 mL/min (based on SCr of 0.8 mg/dL). according to latest data. Monitor urine output and serial BMP and adjust therapy as needed. Avoid nephrotoxins and renally dose meds for GFR listed above.   - most recent Cr of 0.8 last December  - Cr 1.4 in the ED  - repeat Cr 1.1 after 500 ml LR  - continue to trend with CMP  - Resolved     Hepatic encephalopathy  - mild  - lactulose bid--> adjusted to qday  - adjust dose as needed      Dehydration  - LR 75cc/hr      Oral phase dysphagia  - has become more noticeable over past 2-4wks  - patient has to swallow multiple times to get food bolus down  - no issue with liquids  - no evidence of aspiration  - consult speech       Lethargy  Dehydration    Patient's daughter reports progressive lethargy and weakness onset about two weeks ago. Reports ijzhxn-ba-hy po intake, max of 1/2 a water bottle per day. She lives at home with her daughter. She also expresses concern of underlying depression as patient's twin sister  earlier this year and their birthday is coming up in November.  - CMP consistent with dehydration  - UA without infection  - given 500 ml LR in the ED  - boost glucose control ordered TIDWM  - nutrition consulted, appreciate assistance  - PT/OT consulted, appreciate assistance  - check FA, B12, ammonia level  - TSH WNL    Severe protein-calorie malnutrition  Nutrition consulted. Most recent weight and BMI monitored-     Malnutrition Type and Energy Intake  Malnutrition  Type: chronic illness  Energy Intake: severe energy intake    Malnutrition (Moderate to Severe)  Weight Loss (Malnutrition):  (14% x 9 months)  Energy Intake (Malnutrition): less than or equal to 75% for greater than or equal to 1 month  Subcutaneous Fat (Malnutrition): severe depletion  Muscle Mass (Malnutrition): severe depletion    Final Summary  Subcutaneous Fat Loss (Final Summary): severe protein-calorie malnutrition  Muscle Loss Evaluation (Final Summary): severe protein-calorie malnutrition    Malnutrition Final Summary  Severe Weight Loss (Malnutrition):  (14% x 9 months)    Measurements:  Wt Readings from Last 1 Encounters:   09/22/22 50.8 kg (111 lb 15.9 oz)   Body mass index is 18.08 kg/m².    Recommendations: Recommendation/Intervention: 1. Continue current diabetic/ low sodium diet- encourage adequate PO intake. - Consider liberalizing diet to just diabetic or regular if PO intake remains 50% or less. 2. Discontinue Boost Glucose Control per pt request. 3. RD following.  Goals: Will meet % EEN/EPN by next RD f/u.    Patient has been screened and assessed by RD. RD will follow patient.      Type 2 diabetes mellitus, without long-term current use of insulin  Patient's FSGs are uncontrolled due to hyperglycemia on current medication regimen.  - home regimen: metformin and glyburide  Last A1c reviewed-   Lab Results   Component Value Date    HGBA1C 5.3 09/21/2022     Most recent fingerstick glucose reviewed-   Recent Labs   Lab 09/21/22  1704 09/21/22  2343 09/22/22  0814 09/22/22  1140   POCTGLUCOSE 98 269* 169* 215*     Current correctional scale  Low  Maintain anti-hyperglycemic dose as follows-   Antihyperglycemics (From admission, onward)    Start     Stop Route Frequency Ordered    09/21/22 0256  insulin aspart U-100 pen 0-5 Units         -- SubQ Before meals & nightly PRN 09/21/22 0156        Hold Oral hypoglycemics while patient is in the hospital.    Hypertension  - BP currently  well-controlled  - patient's daughter reports that patient's BP has been lower at home compared to baseline and states she has held BP medications for the past two days  - holding home regimen of amlodipine 10 mg daily and lisinopril/HCTZ 20-12.5 mg BID  - will continue to monitor and further titrate antihypertensives as clinically indicated         Leg swelling  - no previous hx of CHF  - echo ordered for the am--> neg for DVT  - resolved    Right knee pain  - Chronic  - reported trauma about 30 years ago  - follows with orthopedics outpatient  - recent MRI in august with chronic findings  - continue tylenol prn for pain      VTE Risk Mitigation (From admission, onward)         Ordered     Place sequential compression device  Until discontinued         09/21/22 0932     IP VTE LOW RISK PATIENT  Once         09/21/22 0156                Discharge Planning   MELVI: 9/28/2022     Code Status: Full Code   Is the patient medically ready for discharge?: No    Reason for patient still in hospital (select all that apply): Patient new problem, Patient trending condition, Laboratory test, Treatment, Imaging and Consult recommendations  Discharge Plan A: Home with family   Discharge Delays: None known at this time              Taylor Avalso MD  Department of Hospital Medicine   [facility]

## 2022-09-25 NOTE — ASSESSMENT & PLAN NOTE
- elevated PT, low albumin, thrombocytopenia  - elevated AST/ALT at 296/254, T. Bili 2.1, alk phos 224  - CTA of ab/pelvix,  subsequent RUQ US, and  MRCP demonstrate no cause of patient's liver failure  - Acute hepatitis panel neg, monospot neg, copper levels normal, EBV neg  - evidence of iron overload  - consult hepatology for further recommendations for workup  - possible liver bx Monday

## 2022-09-25 NOTE — CONSULTS
[facility]  Hematology/Oncology  Consult Note    Patient Name: Radha Feng  MRN: [id number]  Admission Date: 9/20/2022  Hospital Length of Stay: 3 days  Code Status: Full Code   Attending Provider: Taylor Avalos MD  Consulting Provider: Yolanda Hammer DO  Primary Care Physician: Primary Doctor No  Principal Problem:Hemolytic anemia    Inpatient consult to Hematology/Oncology  Consult performed by: Yolanda Hammer DO  Consult ordered by: Taylor Avalos MD        Subjective:     HPI:  Ms. Radha Feng is an 81 year old female with DM2, HTN, hyperlipidemia who was brought to the ED by her daughter for progressive weakness over the past several months. She has also had pruritis, somnolence, leg swelling, and shortness of breath. On admission, she was found to have new liver failure (with completely normal labs in January 2022) of unknown etiology. She also has new anemia with hgb 10.4 and thrombocytopenia which has been slowly worsening since admission. Hemolysis labs were sent as part of anemia workup and were notable for low fibrinogen, undetectable haptoglobin, high D dimer, slightly high LDH. Her hemoglobin has been stable since admission. Direct genny was negative. No recent transfusions. She denies any recent new drugs, alcohol use, over the counter medications/supplements, herbal medications, or heavy tylenol use. Hematology was consulted with concern for hemolytic anemia.            Oncology Treatment Plan:   [No matching plan found]    Medications:  Continuous Infusions:   lactated ringers 75 mL/hr at 09/25/22 1424     Scheduled Meds:   brimonidine 0.2%  1 drop Right Eye BID    capsaicin   Topical (Top) BID    famotidine  20 mg Oral Daily    insulin aspart U-100  4 Units Subcutaneous TIDWM    insulin detemir U-100  10 Units Subcutaneous QHS    lactulose  20 g Oral Daily    latanoprost  1 drop Both Eyes Daily     PRN Meds:acetaminophen, acetaminophen, albuterol-ipratropium,  aluminum-magnesium hydroxide-simethicone, bisacodyL, dextrose 10%, dextrose 10%, glucagon (human recombinant), glucose, glucose, insulin aspart U-100, melatonin, naloxone, ondansetron, prochlorperazine, simethicone, sodium chloride 0.9%     Review of patient's allergies indicates:  No Known Allergies     Past Medical History:   Diagnosis Date    Cataract     Diabetes mellitus     Glaucoma     Hypertension      Past Surgical History:   Procedure Laterality Date    CATARACT EXTRACTION W/  INTRAOCULAR LENS IMPLANT Left 12/21/2021    Procedure: EXTRACTION, CATARACT, WITH IOL INSERTION;  Surgeon: Shay Chou MD;  Location: [facility];  Service: Ophthalmology;  Laterality: Left;     Family History       Problem Relation (Age of Onset)    Blindness Cousin    Cancer Sister    Cataracts Mother    Diabetes Mother    Glaucoma Mother    Heart attack Father    Hypertension Mother          Tobacco Use    Smoking status: Former    Smokeless tobacco: Never   Substance and Sexual Activity    Alcohol use: Not Currently    Drug use: Never    Sexual activity: Not on file       Review of Systems   Constitutional:  Positive for fatigue and unexpected weight change. Negative for chills and fever.   HENT:  Negative for rhinorrhea and sore throat.    Eyes:  Negative for pain and redness.   Respiratory:  Positive for shortness of breath. Negative for cough.    Cardiovascular:  Negative for chest pain, palpitations and leg swelling.   Gastrointestinal:  Negative for abdominal pain, constipation, diarrhea, nausea and vomiting.   Endocrine: Negative for polydipsia and polyuria.   Genitourinary:  Negative for dysuria and hematuria.   Musculoskeletal:  Negative for back pain and neck pain.   Skin:  Negative for color change and rash.   Neurological:  Negative for syncope, light-headedness and headaches.   Hematological:  Negative for adenopathy. Does not bruise/bleed easily.   Psychiatric/Behavioral:  Negative for confusion. The patient  is not nervous/anxious.    Objective:     Vital Signs (Most Recent):  Temp: 98.4 °F (36.9 °C) (09/25/22 0800)  Pulse: 63 (09/25/22 1148)  Resp: 14 (09/25/22 1148)  BP: (!) 140/72 (09/25/22 1148)  SpO2: 95 % (09/25/22 1148)   Vital Signs (24h Range):  Temp:  [97.5 °F (36.4 °C)-98.4 °F (36.9 °C)] 98.4 °F (36.9 °C)  Pulse:  [61-96] 63  Resp:  [14-18] 14  SpO2:  [94 %-96 %] 95 %  BP: (127-160)/(63-72) 140/72     Weight: 50.8 kg (111 lb 15.9 oz)  Body mass index is 18.08 kg/m².  Body surface area is 1.54 meters squared.      Intake/Output Summary (Last 24 hours) at 9/25/2022 1605  Last data filed at 9/25/2022 1456  Gross per 24 hour   Intake 337 ml   Output --   Net 337 ml       Physical Exam  Constitutional:       General: She is not in acute distress.     Appearance: She is well-developed. She is not diaphoretic.   HENT:      Head: Normocephalic and atraumatic.   Eyes:      General: No scleral icterus.     Conjunctiva/sclera: Conjunctivae normal.   Cardiovascular:      Rate and Rhythm: Normal rate and regular rhythm.   Pulmonary:      Effort: Pulmonary effort is normal. No respiratory distress.   Abdominal:      General: Bowel sounds are normal. There is no distension.      Palpations: Abdomen is soft.      Tenderness: There is no abdominal tenderness. There is no guarding.   Musculoskeletal:         General: No tenderness. Normal range of motion.      Cervical back: Normal range of motion and neck supple.   Skin:     General: Skin is warm and dry.      Findings: No rash.   Neurological:      General: No focal deficit present.      Mental Status: She is alert and oriented to person, place, and time.   Psychiatric:         Behavior: Behavior normal.       Significant Labs:   CBC:   Recent Labs   Lab 09/24/22  0428 09/25/22  0545   WBC 6.29 6.73   HGB 9.9* 10.4*   HCT 29.4* 30.6*   PLT 56* 76*    and CMP:   Recent Labs   Lab 09/24/22  0428 09/25/22  0545   * 131*   K 3.4* 3.5   * 109   CO2 16* 17*   *  213*   BUN 28* 27*   CREATININE 0.8 0.8   CALCIUM 8.0* 8.2*   PROT 6.0 5.8*   ALBUMIN 1.8* 1.7*   BILITOT 2.6* 2.3*   ALKPHOS 198* 193*   * 190*   * 173*   ANIONGAP 7* 5*       Diagnostic Results:  I have reviewed all pertinent imaging results/findings within the past 24 hours.    Assessment/Plan:     Other specified anemias  Ms. Feng is an 81 year old female who is admitted with new liver failure of unknown etiology. Hematology was consulted with concern for hemolytic anemia. She does have new anemia with hemoglobin of 10.4 (stable since admission) and thrombocytopenia which has been slowly declining since admission (now 77). Nutritional anemia workup did not reveal nutritional deficiency. Direct genny was negative. No recent transfusions. No evidence of hematologic malignancy.    Abnormalities in her coagulation labs are most likely due to liver dysfunction - INR, PTT, haptoglobin, fibrinogen, LDH, and D-dimer can all be explained by liver dysfunction. Additionally, liver dysfunction can cause anemia and thrombocytopenia.     I personally reviewed her blood smear and saw target cells, some bethany cells, but very few schistocytes (0-1 per hpf). She does not have platelet clumping and there were no blasts seen.     It is possible that she has some component of hemolytic anemia, however her stable hemoglobin over the past several days makes this unlikely. Empiric prednisone could be considered if her hemoglobin begins to drop.     Will check: Factor VIII, type + screen, repeat LDH and reticulocytes. We will follow up on these labs and follow her counts closely.     Recommend transfusion for hemoglobin <7, platelets <10 (or <50 if bleeding develops)        Thank you for your consult. I will follow-up with patient. Please contact us if you have any additional questions.    Yolanda Hammer, DO  Hematology/Oncology  [facility]    STAFF NOTE:  I have personally reviewed the past notes, images,  labs and other provoider's notes and taken the history and examined this patient and agree with Dr. Hammer's Note as stated above.    Ina Boyer MD

## 2022-09-25 NOTE — HPI
Ms. Radha Feng is an 81 year old female with DM2, HTN, hyperlipidemia who was brought to the ED by her daughter for progressive weakness over the past several months. She has also had pruritis, somnolence, leg swelling, and shortness of breath. On admission, she was found to have new liver failure (with completely normal labs in January 2022) of unknown etiology. She also has new anemia with hgb 10.4 and thrombocytopenia which has been slowly worsening since admission. Hemolysis labs were sent as part of anemia workup and were notable for low fibrinogen, undetectable haptoglobin, high D dimer, slightly high LDH. Her hemoglobin has been stable since admission. Direct genny was negative. No recent transfusions. She denies any recent new drugs, alcohol use, over the counter medications/supplements, herbal medications, or heavy tylenol use. Hematology was consulted with concern for hemolytic anemia.

## 2022-09-25 NOTE — ASSESSMENT & PLAN NOTE
- with associated iron overload  - direct genny test NEG  - hematology consult  - elevated ddimer and low fibrinogen (monitor platelets, coags, evidence of bleeding or clotting)  - etiology unknown  - pathology review of PBS??

## 2022-09-25 NOTE — ASSESSMENT & PLAN NOTE
Dehydration    Patient's daughter reports progressive lethargy and weakness onset about two weeks ago. Reports [id number] po intake, max of 1/2 a water bottle per day. She lives at home with her daughter. She also expresses concern of underlying depression as patient's twin sister  earlier this year and their birthday is coming up in November.  - CMP consistent with dehydration  - UA without infection  - given 500 ml LR in the ED  - boost glucose control ordered TIDWM  - nutrition consulted, appreciate assistance  - PT/OT consulted, appreciate assistance  - check FA, B12, ammonia level  - TSH WNL

## 2022-09-25 NOTE — ASSESSMENT & PLAN NOTE
Ms. Feng is an 81 year old female who is admitted with new liver failure of unknown etiology. Hematology was consulted with concern for hemolytic anemia. She does have new anemia with hemoglobin of 10.4 (stable since admission) and thrombocytopenia which has been slowly declining since admission (now 77). Nutritional anemia workup did not reveal nutritional deficiency. Direct genny was negative. No recent transfusions. No evidence of hematologic malignancy.    Abnormalities in her coagulation labs are most likely due to liver dysfunction - INR, PTT, haptoglobin, fibrinogen, LDH, and D-dimer can all be explained by liver dysfunction. Additionally, liver dysfunction can cause anemia and thrombocytopenia.     I personally reviewed her blood smear and saw target cells, some bethany cells, but very few schistocytes (0-1 per hpf). She does not have platelet clumping and there were no blasts seen.     It is possible that she has some component of hemolytic anemia, however her stable hemoglobin over the past several days makes this unlikely. Empiric prednisone could be considered if her hemoglobin begins to drop.     Will check: Factor VIII, type + screen, repeat LDH and reticulocytes. We will follow up on these labs and follow her counts closely.     Recommend transfusion for hemoglobin <7, platelets <10 (or <50 if bleeding develops)

## 2022-09-25 NOTE — PLAN OF CARE
Problem: Adult Inpatient Plan of Care  Goal: Plan of Care Review  Outcome: Ongoing, Progressing  Goal: Patient-Specific Goal (Individualized)  Outcome: Ongoing, Progressing  Goal: Absence of Hospital-Acquired Illness or Injury  Outcome: Ongoing, Progressing  Goal: Optimal Comfort and Wellbeing  Outcome: Ongoing, Progressing  Goal: Readiness for Transition of Care  Outcome: Ongoing, Progressing     Problem: Infection  Goal: Absence of Infection Signs and Symptoms  Outcome: Ongoing, Progressing     Problem: Diabetes Comorbidity  Goal: Blood Glucose Level Within Targeted Range  Outcome: Ongoing, Progressing     Problem: Fluid and Electrolyte Imbalance (Acute Kidney Injury/Impairment)  Goal: Fluid and Electrolyte Balance  Outcome: Ongoing, Progressing     Problem: Oral Intake Inadequate (Acute Kidney Injury/Impairment)  Goal: Optimal Nutrition Intake  Outcome: Ongoing, Progressing     Problem: Renal Function Impairment (Acute Kidney Injury/Impairment)  Goal: Effective Renal Function  Outcome: Ongoing, Progressing     Problem: Skin Injury Risk Increased  Goal: Skin Health and Integrity  Outcome: Ongoing, Progressing     Problem: Fall Injury Risk  Goal: Absence of Fall and Fall-Related Injury  Outcome: Ongoing, Progressing    Pt lying supine in bed with eyes open. Pt dtr assist pt at beside with ADLs. S/S of hyperglycemia via accucheck noted today. Pt drowsy at times and requires reminders/redirection at times. Call light within reach. Will cont to monitor.

## 2022-09-25 NOTE — SUBJECTIVE & OBJECTIVE
Oncology Treatment Plan:   [No matching plan found]    Medications:  Continuous Infusions:   lactated ringers 75 mL/hr at 09/25/22 1424     Scheduled Meds:   brimonidine 0.2%  1 drop Right Eye BID    capsaicin   Topical (Top) BID    famotidine  20 mg Oral Daily    insulin aspart U-100  4 Units Subcutaneous TIDWM    insulin detemir U-100  10 Units Subcutaneous QHS    lactulose  20 g Oral Daily    latanoprost  1 drop Both Eyes Daily     PRN Meds:acetaminophen, acetaminophen, albuterol-ipratropium, aluminum-magnesium hydroxide-simethicone, bisacodyL, dextrose 10%, dextrose 10%, glucagon (human recombinant), glucose, glucose, insulin aspart U-100, melatonin, naloxone, ondansetron, prochlorperazine, simethicone, sodium chloride 0.9%     Review of patient's allergies indicates:  No Known Allergies     Past Medical History:   Diagnosis Date    Cataract     Diabetes mellitus     Glaucoma     Hypertension      Past Surgical History:   Procedure Laterality Date    CATARACT EXTRACTION W/  INTRAOCULAR LENS IMPLANT Left 12/21/2021    Procedure: EXTRACTION, CATARACT, WITH IOL INSERTION;  Surgeon: Shay Chou MD;  Location: [facility];  Service: Ophthalmology;  Laterality: Left;     Family History       Problem Relation (Age of Onset)    Blindness Cousin    Cancer Sister    Cataracts Mother    Diabetes Mother    Glaucoma Mother    Heart attack Father    Hypertension Mother          Tobacco Use    Smoking status: Former    Smokeless tobacco: Never   Substance and Sexual Activity    Alcohol use: Not Currently    Drug use: Never    Sexual activity: Not on file       Review of Systems   Constitutional:  Positive for fatigue and unexpected weight change. Negative for chills and fever.   HENT:  Negative for rhinorrhea and sore throat.    Eyes:  Negative for pain and redness.   Respiratory:  Positive for shortness of breath. Negative for cough.    Cardiovascular:  Negative for chest pain, palpitations and leg swelling.    Gastrointestinal:  Negative for abdominal pain, constipation, diarrhea, nausea and vomiting.   Endocrine: Negative for polydipsia and polyuria.   Genitourinary:  Negative for dysuria and hematuria.   Musculoskeletal:  Negative for back pain and neck pain.   Skin:  Negative for color change and rash.   Neurological:  Negative for syncope, light-headedness and headaches.   Hematological:  Negative for adenopathy. Does not bruise/bleed easily.   Psychiatric/Behavioral:  Negative for confusion. The patient is not nervous/anxious.    Objective:     Vital Signs (Most Recent):  Temp: 98.4 °F (36.9 °C) (09/25/22 0800)  Pulse: 63 (09/25/22 1148)  Resp: 14 (09/25/22 1148)  BP: (!) 140/72 (09/25/22 1148)  SpO2: 95 % (09/25/22 1148)   Vital Signs (24h Range):  Temp:  [97.5 °F (36.4 °C)-98.4 °F (36.9 °C)] 98.4 °F (36.9 °C)  Pulse:  [61-96] 63  Resp:  [14-18] 14  SpO2:  [94 %-96 %] 95 %  BP: (127-160)/(63-72) 140/72     Weight: 50.8 kg (111 lb 15.9 oz)  Body mass index is 18.08 kg/m².  Body surface area is 1.54 meters squared.      Intake/Output Summary (Last 24 hours) at 9/25/2022 1605  Last data filed at 9/25/2022 1456  Gross per 24 hour   Intake 337 ml   Output --   Net 337 ml       Physical Exam  Constitutional:       General: She is not in acute distress.     Appearance: She is well-developed. She is not diaphoretic.   HENT:      Head: Normocephalic and atraumatic.   Eyes:      General: No scleral icterus.     Conjunctiva/sclera: Conjunctivae normal.   Cardiovascular:      Rate and Rhythm: Normal rate and regular rhythm.   Pulmonary:      Effort: Pulmonary effort is normal. No respiratory distress.   Abdominal:      General: Bowel sounds are normal. There is no distension.      Palpations: Abdomen is soft.      Tenderness: There is no abdominal tenderness. There is no guarding.   Musculoskeletal:         General: No tenderness. Normal range of motion.      Cervical back: Normal range of motion and neck supple.   Skin:      General: Skin is warm and dry.      Findings: No rash.   Neurological:      General: No focal deficit present.      Mental Status: She is alert and oriented to person, place, and time.   Psychiatric:         Behavior: Behavior normal.       Significant Labs:   CBC:   Recent Labs   Lab 09/24/22 0428 09/25/22  0545   WBC 6.29 6.73   HGB 9.9* 10.4*   HCT 29.4* 30.6*   PLT 56* 76*    and CMP:   Recent Labs   Lab 09/24/22 0428 09/25/22  0545   * 131*   K 3.4* 3.5   * 109   CO2 16* 17*   * 213*   BUN 28* 27*   CREATININE 0.8 0.8   CALCIUM 8.0* 8.2*   PROT 6.0 5.8*   ALBUMIN 1.8* 1.7*   BILITOT 2.6* 2.3*   ALKPHOS 198* 193*   * 190*   * 173*   ANIONGAP 7* 5*       Diagnostic Results:  I have reviewed all pertinent imaging results/findings within the past 24 hours.

## 2022-09-26 LAB
ABO + RH BLD: NORMAL
ALBUMIN SERPL BCP-MCNC: 1.7 G/DL (ref 3.5–5.2)
ALP SERPL-CCNC: 176 U/L (ref 55–135)
ALT SERPL W/O P-5'-P-CCNC: 172 U/L (ref 10–44)
AMMONIA PLAS-SCNC: 39 UMOL/L (ref 10–50)
ANION GAP SERPL CALC-SCNC: 4 MMOL/L (ref 8–16)
APTT BLDCRRT: 31.4 SEC (ref 21–32)
AST SERPL-CCNC: 208 U/L (ref 10–40)
BASOPHILS # BLD AUTO: 0.04 K/UL (ref 0–0.2)
BASOPHILS NFR BLD: 0.6 % (ref 0–1.9)
BILIRUB SERPL-MCNC: 2.5 MG/DL (ref 0.1–1)
BLD GP AB SCN CELLS X3 SERPL QL: NORMAL
BUN SERPL-MCNC: 23 MG/DL (ref 8–23)
CALCIUM SERPL-MCNC: 8.3 MG/DL (ref 8.7–10.5)
CHLORIDE SERPL-SCNC: 115 MMOL/L (ref 95–110)
CMV DNA SPEC QL NAA+PROBE: NOT DETECTED
CMV IGM SERPL IA-ACNC: <8 AU/ML
CO2 SERPL-SCNC: 17 MMOL/L (ref 23–29)
COPPER SERPL-MCNC: 808 UG/L (ref 810–1990)
CREAT SERPL-MCNC: 0.7 MG/DL (ref 0.5–1.4)
CYTOMEGALOVIRUS LOG (IU/ML): NOT DETECTED LOGIU/ML
CYTOMEGALOVIRUS PCR, QUANT: NOT DETECTED IU/ML
D DIMER PPP IA.FEU-MCNC: 12.81 MG/L FEU
DIFFERENTIAL METHOD: ABNORMAL
EOSINOPHIL # BLD AUTO: 0.5 K/UL (ref 0–0.5)
EOSINOPHIL NFR BLD: 6.5 % (ref 0–8)
ERYTHROCYTE [DISTWIDTH] IN BLOOD BY AUTOMATED COUNT: 20.3 % (ref 11.5–14.5)
EST. GFR  (NO RACE VARIABLE): >60 ML/MIN/1.73 M^2
FACT VIII ACT/NOR PPP: 201 % (ref 60–170)
FIBRINOGEN PPP-MCNC: 115 MG/DL (ref 182–400)
GLUCOSE SERPL-MCNC: 97 MG/DL (ref 70–110)
HAPTOGLOB SERPL-MCNC: <10 MG/DL (ref 30–250)
HCT VFR BLD AUTO: 29.1 % (ref 37–48.5)
HGB BLD-MCNC: 9.7 G/DL (ref 12–16)
IMM GRANULOCYTES # BLD AUTO: 0.03 K/UL (ref 0–0.04)
IMM GRANULOCYTES NFR BLD AUTO: 0.4 % (ref 0–0.5)
INR PPP: 1.4 (ref 0.8–1.2)
LDH SERPL L TO P-CCNC: 543 U/L (ref 110–260)
LYMPHOCYTES # BLD AUTO: 1 K/UL (ref 1–4.8)
LYMPHOCYTES NFR BLD: 14.2 % (ref 18–48)
MAGNESIUM SERPL-MCNC: 1.8 MG/DL (ref 1.6–2.6)
MCH RBC QN AUTO: 33.4 PG (ref 27–31)
MCHC RBC AUTO-ENTMCNC: 33.3 G/DL (ref 32–36)
MCV RBC AUTO: 100 FL (ref 82–98)
MONOCYTES # BLD AUTO: 1.2 K/UL (ref 0.3–1)
MONOCYTES NFR BLD: 17.2 % (ref 4–15)
NEUTROPHILS # BLD AUTO: 4.4 K/UL (ref 1.8–7.7)
NEUTROPHILS NFR BLD: 61.1 % (ref 38–73)
NRBC BLD-RTO: 0 /100 WBC
PATH REV BLD -IMP: NORMAL
PLATELET # BLD AUTO: 80 K/UL (ref 150–450)
PMV BLD AUTO: 11.7 FL (ref 9.2–12.9)
POTASSIUM SERPL-SCNC: 3.5 MMOL/L (ref 3.5–5.1)
PROT SERPL-MCNC: 6 G/DL (ref 6–8.4)
PROTHROMBIN TIME: 14.7 SEC (ref 9–12.5)
RBC # BLD AUTO: 2.9 M/UL (ref 4–5.4)
SODIUM SERPL-SCNC: 136 MMOL/L (ref 136–145)
WBC # BLD AUTO: 7.2 K/UL (ref 3.9–12.7)

## 2022-09-26 PROCEDURE — 99233 PR SUBSEQUENT HOSPITAL CARE,LEVL III: ICD-10-PCS | Mod: ,,, | Performed by: INTERNAL MEDICINE

## 2022-09-26 PROCEDURE — 63600175 PHARM REV CODE 636 W HCPCS: Performed by: HOSPITALIST

## 2022-09-26 PROCEDURE — 82140 ASSAY OF AMMONIA: CPT | Performed by: HOSPITALIST

## 2022-09-26 PROCEDURE — 36415 COLL VENOUS BLD VENIPUNCTURE: CPT | Performed by: HOSPITALIST

## 2022-09-26 PROCEDURE — 25000003 PHARM REV CODE 250: Performed by: HOSPITALIST

## 2022-09-26 PROCEDURE — 85025 COMPLETE CBC W/AUTO DIFF WBC: CPT | Performed by: HOSPITALIST

## 2022-09-26 PROCEDURE — 85610 PROTHROMBIN TIME: CPT | Performed by: HOSPITALIST

## 2022-09-26 PROCEDURE — 25000003 PHARM REV CODE 250: Performed by: INTERNAL MEDICINE

## 2022-09-26 PROCEDURE — 85379 FIBRIN DEGRADATION QUANT: CPT | Performed by: HOSPITALIST

## 2022-09-26 PROCEDURE — 80053 COMPREHEN METABOLIC PANEL: CPT | Performed by: HOSPITALIST

## 2022-09-26 PROCEDURE — 99233 SBSQ HOSP IP/OBS HIGH 50: CPT | Mod: ,,, | Performed by: INTERNAL MEDICINE

## 2022-09-26 PROCEDURE — 83010 ASSAY OF HAPTOGLOBIN QUANT: CPT | Performed by: HOSPITALIST

## 2022-09-26 PROCEDURE — 99233 SBSQ HOSP IP/OBS HIGH 50: CPT | Mod: ,,, | Performed by: HOSPITALIST

## 2022-09-26 PROCEDURE — 85384 FIBRINOGEN ACTIVITY: CPT | Performed by: HOSPITALIST

## 2022-09-26 PROCEDURE — 86901 BLOOD TYPING SEROLOGIC RH(D): CPT | Performed by: HOSPITALIST

## 2022-09-26 PROCEDURE — 85730 THROMBOPLASTIN TIME PARTIAL: CPT | Performed by: HOSPITALIST

## 2022-09-26 PROCEDURE — 83615 LACTATE (LD) (LDH) ENZYME: CPT | Performed by: HOSPITALIST

## 2022-09-26 PROCEDURE — 25000003 PHARM REV CODE 250

## 2022-09-26 PROCEDURE — 12000002 HC ACUTE/MED SURGE SEMI-PRIVATE ROOM

## 2022-09-26 PROCEDURE — 99233 PR SUBSEQUENT HOSPITAL CARE,LEVL III: ICD-10-PCS | Mod: ,,, | Performed by: HOSPITALIST

## 2022-09-26 PROCEDURE — 83735 ASSAY OF MAGNESIUM: CPT | Performed by: HOSPITALIST

## 2022-09-26 RX ADMIN — INSULIN ASPART 1 UNITS: 100 INJECTION, SOLUTION INTRAVENOUS; SUBCUTANEOUS at 08:09

## 2022-09-26 RX ADMIN — SODIUM CHLORIDE, SODIUM LACTATE, POTASSIUM CHLORIDE, AND CALCIUM CHLORIDE: .6; .31; .03; .02 INJECTION, SOLUTION INTRAVENOUS at 07:09

## 2022-09-26 RX ADMIN — CAPSAICIN: 0.25 CREAM TOPICAL at 08:09

## 2022-09-26 RX ADMIN — ACETAMINOPHEN 1000 MG: 500 TABLET ORAL at 06:09

## 2022-09-26 RX ADMIN — FAMOTIDINE 20 MG: 20 TABLET ORAL at 09:09

## 2022-09-26 RX ADMIN — LACTULOSE 20 G: 20 SOLUTION ORAL at 09:09

## 2022-09-26 RX ADMIN — INSULIN DETEMIR 12 UNITS: 100 INJECTION, SOLUTION SUBCUTANEOUS at 08:09

## 2022-09-26 NOTE — PROGRESS NOTES
[facility] Medicine  Progress Note    Patient Name: Radha Feng  MRN: [id number]  Patient Class: IP- Inpatient   Admission Date: 9/20/2022  Length of Stay: 4 days  Attending Physician: Taylor Avalos MD  Primary Care Provider: Primary Doctor No        Subjective:     Principal Problem:Hemolytic anemia        HPI:  Radha Feng is a 81 y.o. female with a past medical history of type 2 diabetes, hyperlipidemia, hypertension, glaucoma and cataracts presents the emergency department due to progressive lethargy and weakness. Patient's daughter at the bedside who provides the majority of the history due to the acuity of patient's condition. Per daughter, patient was sent from her cardiology office earlier today due to increased bilateral leg swelling, shortness of breath, and progressive weakness over the past two weeks. She reports that over the past two weeks patient has had a poor appetite with significantly decreased po intake. In addition, she reports that patient's BP has been lower at home compared to baseline and states she has held BP medications (amlodipine and lisinopril-HCTZ) for the past two days. She states her BP is normally 130/80 but as been as low as 98/63. Per daughter, lower extremity swelling was most pronounced last week but has improved significantly with compression stockings.  Additionally, daughter notes that she was helping her change last week and noted that she had a prolapsed uterus/bladder but patient has not seen her OBGYN yet. Patient is requiring more assistance with ADLs which is abnormal for her. Patient denies dysuria but states that she does have difficulty urinating and can only urinate a small amount. Denies chest pain, shortness of breath, abdominal pain, dizziness, nausea, vomiting, or syncope. Patient's only complaint at this time is right knee pain.    ED: vital signs stable, afebrile, no acute distress. Elevated liver enzymes with T.bili of 2.9,  AST//254, and alk phos of 224. Na 133, K 5.0, Cr 1.4 (most recent Cr of 0.8 in 12/21). D-dimer 11.97, CTA negative for PE. CTA showed evidence of distended gallbladder and emphysema. Subsequent RUQ US negative for cholecystitis or gallstones. BNP 66, troponin negative. UA negative for infection. Given 500 mL LR and placed in observation.       Overview/Hospital Course:  Patient admitted to hospitalist service.  She was noted to have elevated liver enzymes, acute renal insuff, hyperbilirubin.  D-dimer was significatnly elevated.   CTA demonstrated NO PTE but did show few bilateral pulmonary micro nodules which can be followed up as OP.   Right UQ US showed distended gallbladder but no sonographically detectable gallstone, and no additional sonographic evidence for acute cholecystitis.   Mild free fluid of the abdomen noted.  MRCP showed normal pancreatic duct, Gallbladder is distended without wall thickening or pericholecystic fluid.  Common bile duct is normal caliber and tapers distally to the ampulla.  No intrahepatic biliary dilatation.  No biliary filling defects identified.   Acute hepatitis panel negative.  Monospot neg. Ceruloplasmin normal.  She was also treated for mild hepatic encephalopathy with lactulose.      Upon further workup, patient noted to have evidence of hemolytic anemia and elevated ferritin and transferrin saturation consistent with iron overload.  With elevated d-dimer and low fibrinogen, concern for developing DIC.  Both hepatology and hematology consulted for liver failure/iron overload and hemolytic anemia.          Interval History:   No acute events overnight.  She has had 3BM today already.  Will hold lactulose.  Afebrile.  Tolerating 25-50% of meals.  Did not eat last night band was hypoglycemic this AM.  Will adjust insulin reg.     Review of Systems   Constitutional:  Positive for activity change, appetite change and unexpected weight change.   HENT:  Positive for trouble  swallowing.    Respiratory:  Negative for cough and shortness of breath.    Cardiovascular:  Negative for chest pain and leg swelling.   Gastrointestinal:  Negative for abdominal pain and blood in stool.   Genitourinary:  Negative for difficulty urinating and hematuria.   Musculoskeletal:  Negative for joint swelling.   Skin:  Negative for rash.   Neurological:  Positive for weakness. Negative for dizziness.   Psychiatric/Behavioral:  Negative for confusion.    Objective:     Vital Signs (Most Recent):  Temp: 97.9 °F (36.6 °C) (09/26/22 1537)  Pulse: 65 (09/26/22 1537)  Resp: 17 (09/26/22 1537)  BP: (!) 148/81 (09/26/22 1537)  SpO2: 95 % (09/26/22 1537)   Vital Signs (24h Range):  Temp:  [97.5 °F (36.4 °C)-97.9 °F (36.6 °C)] 97.9 °F (36.6 °C)  Pulse:  [63-78] 65  Resp:  [16-19] 17  SpO2:  [95 %-98 %] 95 %  BP: (135-165)/(66-82) 148/81     Weight: 50.8 kg (111 lb 15.9 oz)  Body mass index is 18.08 kg/m².    Intake/Output Summary (Last 24 hours) at 9/26/2022 1604  Last data filed at 9/26/2022 1328  Gross per 24 hour   Intake 574 ml   Output --   Net 574 ml      Physical Exam  Vitals reviewed.   Constitutional:       General: She is awake.      Appearance: She is underweight. She is ill-appearing.   HENT:      Head: Normocephalic and atraumatic.   Eyes:      General: Lids are normal.      Conjunctiva/sclera: Conjunctivae normal.   Neck:      Thyroid: No thyroid mass or thyromegaly.   Cardiovascular:      Rate and Rhythm: Normal rate and regular rhythm.      Heart sounds: Murmur heard.   Pulmonary:      Effort: Pulmonary effort is normal.      Breath sounds: Normal breath sounds.   Abdominal:      General: Bowel sounds are normal. There is no distension.      Palpations: Abdomen is soft. There is no mass.   Musculoskeletal:         General: Normal range of motion.      Right lower leg: No edema.      Left lower leg: No edema.   Skin:     General: Skin is warm and dry.      Coloration: Skin is not jaundiced.    Neurological:      General: No focal deficit present.      Mental Status: She is alert.      Motor: Weakness present.   Psychiatric:         Attention and Perception: Attention normal.         Mood and Affect: Mood normal.         Behavior: Behavior is cooperative.       Significant Labs: All pertinent labs within the past 24 hours have been reviewed.  BMP:   Recent Labs   Lab 09/26/22  0558   GLU 97      K 3.5   *   CO2 17*   BUN 23   CREATININE 0.7   CALCIUM 8.3*   MG 1.8     CBC:   Recent Labs   Lab 09/25/22  0545 09/26/22  0558   WBC 6.73 7.20   HGB 10.4* 9.7*   HCT 30.6* 29.1*   PLT 76* 80*     CMP:   Recent Labs   Lab 09/25/22  0545 09/26/22  0558   * 136   K 3.5 3.5    115*   CO2 17* 17*   * 97   BUN 27* 23   CREATININE 0.8 0.7   CALCIUM 8.2* 8.3*   PROT 5.8* 6.0   ALBUMIN 1.7* 1.7*   BILITOT 2.3* 2.5*   ALKPHOS 193* 176*   * 208*   * 172*   ANIONGAP 5* 4*     Cardiac Markers: No results for input(s): CKMB, MYOGLOBIN, BNP, TROPISTAT in the last 48 hours.    Significant Imaging: I have reviewed all pertinent imaging results/findings within the past 24 hours.      Assessment/Plan:      * Hemolytic anemia  - with associated iron overload  - direct genny test NEG  - hematology consult  - elevated ddimer and low fibrinogen (monitor platelets, coags, evidence of bleeding or clotting)  - etiology unknown  - hematology review of PBS--> target cells, some bethany cells,  very few schistocytes (0-1 per hp), no blast    Acute liver failure without hepatic coma  - elevated PT, low albumin, thrombocytopenia  - elevated AST/ALT at 296/254, T. Bili 2.1, alk phos 224  - CTA of ab/pelvix,  subsequent RUQ US, and  MRCP demonstrate no cause of patient's liver failure  - Acute hepatitis panel neg, monospot neg, copper levels normal, EBV neg  - evidence of iron overload  - consult hepatology for further recommendations for workup  - possible liver bx this week        EFREN (acute kidney  injury)  Patient with acute kidney injury likely due to IVVD/dehydration EFREN is currently stable. Labs reviewed- Renal function/electrolytes with Estimated Creatinine Clearance: 45 mL/min (based on SCr of 0.8 mg/dL). according to latest data. Monitor urine output and serial BMP and adjust therapy as needed. Avoid nephrotoxins and renally dose meds for GFR listed above.   - most recent Cr of 0.8 last December  - Cr 1.4 in the ED  - repeat Cr 1.1 after 500 ml LR  - continue to trend with CMP  - Resolved     Hepatic encephalopathy  - mild  - lactulose bid--> adjusted to qday  - adjust dose as needed      Dehydration  - LR 75cc/hr      Oral phase dysphagia  - has become more noticeable over past 2-4wks  - patient has to swallow multiple times to get food bolus down  - no issue with liquids  - no evidence of aspiration  - consult speech       Lethargy  Dehydration    Patient's daughter reports progressive lethargy and weakness onset about two weeks ago. Reports [id number] po intake, max of 1/2 a water bottle per day. She lives at home with her daughter. She also expresses concern of underlying depression as patient's twin sister  earlier this year and their birthday is coming up in November.  - CMP consistent with dehydration  - UA without infection  - given 500 ml LR in the ED  - boost glucose control ordered TIDWM  - nutrition consulted, appreciate assistance  - PT/OT consulted, appreciate assistance  - check FA, B12, ammonia level  - TSH WNL    Severe protein-calorie malnutrition  Nutrition consulted. Most recent weight and BMI monitored-     Malnutrition Type and Energy Intake  Malnutrition Type: chronic illness  Energy Intake: severe energy intake    Malnutrition (Moderate to Severe)  Weight Loss (Malnutrition):  (14% x 9 months)  Energy Intake (Malnutrition): less than or equal to 75% for greater than or equal to 1 month  Subcutaneous Fat (Malnutrition): severe depletion  Muscle Mass (Malnutrition): severe  depletion    Final Summary  Subcutaneous Fat Loss (Final Summary): severe protein-calorie malnutrition  Muscle Loss Evaluation (Final Summary): severe protein-calorie malnutrition    Malnutrition Final Summary  Severe Weight Loss (Malnutrition):  (14% x 9 months)    Measurements:  Wt Readings from Last 1 Encounters:   09/22/22 50.8 kg (111 lb 15.9 oz)   Body mass index is 18.08 kg/m².    Recommendations: Recommendation/Intervention: 1. Continue current diabetic/ low sodium diet- encourage adequate PO intake. - Consider liberalizing diet to just diabetic or regular if PO intake remains 50% or less. 2. Discontinue Boost Glucose Control per pt request. 3. RD following.  Goals: Will meet % EEN/EPN by next RD f/u.    Patient has been screened and assessed by RD. RD will follow patient.      Type 2 diabetes mellitus, without long-term current use of insulin  Patient's FSGs are uncontrolled due to hyperglycemia on current medication regimen.  - home regimen: metformin and glyburide  Last A1c reviewed-   Lab Results   Component Value Date    HGBA1C 5.3 09/21/2022     Most recent fingerstick glucose reviewed-   Recent Labs   Lab 09/21/22  1704 09/21/22  2343 09/22/22  0814 09/22/22  1140   POCTGLUCOSE 98 269* 169* 215*     Current correctional scale  Low  Maintain anti-hyperglycemic dose as follows-   Antihyperglycemics (From admission, onward)    Start     Stop Route Frequency Ordered    09/21/22 0256  insulin aspart U-100 pen 0-5 Units         -- SubQ Before meals & nightly PRN 09/21/22 0156        Hold Oral hypoglycemics while patient is in the hospital.    Hypertension  - BP currently well-controlled  - patient's daughter reports that patient's BP has been lower at home compared to baseline and states she has held BP medications for the past two days  - holding home regimen of amlodipine 10 mg daily and lisinopril/HCTZ 20-12.5 mg BID  - will continue to monitor and further titrate antihypertensives as clinically  indicated         Leg swelling  - no previous hx of CHF  - echo ordered for the am--> neg for DVT  - resolved    Right knee pain  - Chronic  - reported trauma about 30 years ago  - follows with orthopedics outpatient  - recent MRI in august with chronic findings  - continue tylenol prn for pain      VTE Risk Mitigation (From admission, onward)         Ordered     Place sequential compression device  Until discontinued         09/21/22 0932     IP VTE LOW RISK PATIENT  Once         09/21/22 0156                Discharge Planning   MELVI: 9/28/2022     Code Status: Full Code   Is the patient medically ready for discharge?: No    Reason for patient still in hospital (select all that apply): Patient trending condition, Laboratory test, Treatment and Consult recommendations  Discharge Plan A: Home with family   Discharge Delays: None known at this time              Taylor Avalos MD  Department of Hospital Medicine   [facility]

## 2022-09-26 NOTE — ASSESSMENT & PLAN NOTE
- elevated PT, low albumin, thrombocytopenia  - elevated AST/ALT at 296/254, T. Bili 2.1, alk phos 224  - CTA of ab/pelvix,  subsequent RUQ US, and  MRCP demonstrate no cause of patient's liver failure  - Acute hepatitis panel neg, monospot neg, copper levels normal, EBV neg  - evidence of iron overload  - consult hepatology for further recommendations for workup  - possible liver bx this week

## 2022-09-26 NOTE — SUBJECTIVE & OBJECTIVE
Interval History:   No acute events overnight.  She has had 3BM today already.  Will hold lactulose.  Afebrile.  Tolerating 25-50% of meals.  Did not eat last night band was hypoglycemic this AM.  Will adjust insulin reg.     Review of Systems   Constitutional:  Positive for activity change, appetite change and unexpected weight change.   HENT:  Positive for trouble swallowing.    Respiratory:  Negative for cough and shortness of breath.    Cardiovascular:  Negative for chest pain and leg swelling.   Gastrointestinal:  Negative for abdominal pain and blood in stool.   Genitourinary:  Negative for difficulty urinating and hematuria.   Musculoskeletal:  Negative for joint swelling.   Skin:  Negative for rash.   Neurological:  Positive for weakness. Negative for dizziness.   Psychiatric/Behavioral:  Negative for confusion.    Objective:     Vital Signs (Most Recent):  Temp: 97.9 °F (36.6 °C) (09/26/22 1537)  Pulse: 65 (09/26/22 1537)  Resp: 17 (09/26/22 1537)  BP: (!) 148/81 (09/26/22 1537)  SpO2: 95 % (09/26/22 1537)   Vital Signs (24h Range):  Temp:  [97.5 °F (36.4 °C)-97.9 °F (36.6 °C)] 97.9 °F (36.6 °C)  Pulse:  [63-78] 65  Resp:  [16-19] 17  SpO2:  [95 %-98 %] 95 %  BP: (135-165)/(66-82) 148/81     Weight: 50.8 kg (111 lb 15.9 oz)  Body mass index is 18.08 kg/m².    Intake/Output Summary (Last 24 hours) at 9/26/2022 1604  Last data filed at 9/26/2022 1328  Gross per 24 hour   Intake 574 ml   Output --   Net 574 ml      Physical Exam  Vitals reviewed.   Constitutional:       General: She is awake.      Appearance: She is underweight. She is ill-appearing.   HENT:      Head: Normocephalic and atraumatic.   Eyes:      General: Lids are normal.      Conjunctiva/sclera: Conjunctivae normal.   Neck:      Thyroid: No thyroid mass or thyromegaly.   Cardiovascular:      Rate and Rhythm: Normal rate and regular rhythm.      Heart sounds: Murmur heard.   Pulmonary:      Effort: Pulmonary effort is normal.      Breath sounds:  Normal breath sounds.   Abdominal:      General: Bowel sounds are normal. There is no distension.      Palpations: Abdomen is soft. There is no mass.   Musculoskeletal:         General: Normal range of motion.      Right lower leg: No edema.      Left lower leg: No edema.   Skin:     General: Skin is warm and dry.      Coloration: Skin is not jaundiced.   Neurological:      General: No focal deficit present.      Mental Status: She is alert.      Motor: Weakness present.   Psychiatric:         Attention and Perception: Attention normal.         Mood and Affect: Mood normal.         Behavior: Behavior is cooperative.       Significant Labs: All pertinent labs within the past 24 hours have been reviewed.  BMP:   Recent Labs   Lab 09/26/22  0558   GLU 97      K 3.5   *   CO2 17*   BUN 23   CREATININE 0.7   CALCIUM 8.3*   MG 1.8     CBC:   Recent Labs   Lab 09/25/22 0545 09/26/22  0558   WBC 6.73 7.20   HGB 10.4* 9.7*   HCT 30.6* 29.1*   PLT 76* 80*     CMP:   Recent Labs   Lab 09/25/22 0545 09/26/22  0558   * 136   K 3.5 3.5    115*   CO2 17* 17*   * 97   BUN 27* 23   CREATININE 0.8 0.7   CALCIUM 8.2* 8.3*   PROT 5.8* 6.0   ALBUMIN 1.7* 1.7*   BILITOT 2.3* 2.5*   ALKPHOS 193* 176*   * 208*   * 172*   ANIONGAP 5* 4*     Cardiac Markers: No results for input(s): CKMB, MYOGLOBIN, BNP, TROPISTAT in the last 48 hours.    Significant Imaging: I have reviewed all pertinent imaging results/findings within the past 24 hours.   Melolabial Interpolation Flap Text: A decision was made to reconstruct the defect utilizing an interpolation axial flap and a staged reconstruction.  A telfa template was made of the defect.  This telfa template was then used to outline the melolabial interpolation flap.  The donor area for the pedicle flap was then injected with anesthesia.  The flap was excised through the skin and subcutaneous tissue down to the layer of the underlying musculature.  The pedicle flap was carefully excised within this deep plane to maintain its blood supply.  The edges of the donor site were undermined.   The donor site was closed in a primary fashion.  The pedicle was then rotated into position and sutured.  Once the tube was sutured into place, adequate blood supply was confirmed with blanching and refill.  The pedicle was then wrapped with xeroform gauze and dressed appropriately with a telfa and gauze bandage to ensure continued blood supply and protect the attached pedicle.

## 2022-09-26 NOTE — ASSESSMENT & PLAN NOTE
Ms. Feng is an 81 year old female who is admitted with new liver failure of unknown etiology. Hematology was consulted with concern for hemolytic anemia. She does have new anemia with hemoglobin of 10.4 (stable since admission) and thrombocytopenia which has been slowly declining since admission (now 76). Nutritional anemia workup did not reveal nutritional deficiency. Direct genny was negative. No recent transfusions. No evidence of hematologic malignancy.    Abnormalities in her coagulation labs are most likely due to liver dysfunction - INR, PTT, haptoglobin, fibrinogen, LDH, D-dimer and Factor VIII can all be explained by liver dysfunction. Additionally, liver dysfunction can cause anemia and thrombocytopenia.     I personally reviewed her blood smear and saw target cells, some bethany cells, but very few schistocytes (0-1 per hpf). She does not have platelet clumping and there were no blasts seen.     It is possible that she has some component of hemolytic anemia, however her stable hemoglobin over the past several days makes this unlikely. Empiric prednisone could be considered if her hemoglobin begins to drop.     Recommend transfusion for hemoglobin <7, platelets <10 (or <50 if bleeding develops)

## 2022-09-26 NOTE — ASSESSMENT & PLAN NOTE
- with associated iron overload  - direct genny test NEG  - hematology consult  - elevated ddimer and low fibrinogen (monitor platelets, coags, evidence of bleeding or clotting)  - etiology unknown  - hematology review of PBS--> target cells, some bethany cells,  very few schistocytes (0-1 per hp), no blast

## 2022-09-26 NOTE — NURSING
Blood Sugars this shift as follows (glucometers not interfacing with Epic charting at this time):    Breakfast: 71  Lunch: 107  Dinner: 121    Destiny Cuello RN

## 2022-09-26 NOTE — SUBJECTIVE & OBJECTIVE
Interval History: Dyspnea is improving. Hgb and platelets remain stable.     Oncology Treatment Plan:   [No matching plan found]    Medications:  Continuous Infusions:   lactated ringers 75 mL/hr at 09/25/22 1424     Scheduled Meds:   brimonidine 0.2%  1 drop Right Eye BID    capsaicin   Topical (Top) BID    famotidine  20 mg Oral Daily    insulin aspart U-100  4 Units Subcutaneous TIDWM    insulin detemir U-100  12 Units Subcutaneous QHS    lactulose  20 g Oral Daily    latanoprost  1 drop Both Eyes Daily     PRN Meds:acetaminophen, acetaminophen, albuterol-ipratropium, aluminum-magnesium hydroxide-simethicone, bisacodyL, dextrose 10%, dextrose 10%, glucagon (human recombinant), glucose, glucose, insulin aspart U-100, melatonin, naloxone, ondansetron, prochlorperazine, simethicone, sodium chloride 0.9%     Review of Systems  Objective:     Vital Signs (Most Recent):  Temp: 97.5 °F (36.4 °C) (09/26/22 0440)  Pulse: 63 (09/26/22 0440)  Resp: 18 (09/26/22 0440)  BP: (!) 140/66 (09/26/22 0440)  SpO2: 96 % (09/26/22 0440)   Vital Signs (24h Range):  Temp:  [97.5 °F (36.4 °C)-97.8 °F (36.6 °C)] 97.5 °F (36.4 °C)  Pulse:  [63-71] 63  Resp:  [14-19] 18  SpO2:  [95 %-98 %] 96 %  BP: (135-157)/(66-82) 140/66     Weight: 50.8 kg (111 lb 15.9 oz)  Body mass index is 18.08 kg/m².  Body surface area is 1.54 meters squared.      Intake/Output Summary (Last 24 hours) at 9/26/2022 1134  Last data filed at 9/26/2022 0917  Gross per 24 hour   Intake 474 ml   Output --   Net 474 ml       Physical Exam  Constitutional:       General: She is not in acute distress.     Appearance: She is well-developed. She is not diaphoretic.   HENT:      Head: Normocephalic and atraumatic.   Eyes:      General: No scleral icterus.     Conjunctiva/sclera: Conjunctivae normal.   Cardiovascular:      Rate and Rhythm: Normal rate and regular rhythm.      Heart sounds: Murmur heard.   Pulmonary:      Effort: Pulmonary effort is normal. No respiratory  distress.      Breath sounds: Wheezing present.   Abdominal:      General: Bowel sounds are normal. There is no distension.      Palpations: Abdomen is soft.      Tenderness: There is no abdominal tenderness. There is no guarding.   Musculoskeletal:         General: No tenderness. Normal range of motion.      Cervical back: Normal range of motion and neck supple.   Skin:     General: Skin is warm and dry.      Findings: No rash.   Neurological:      General: No focal deficit present.      Mental Status: She is alert and oriented to person, place, and time.   Psychiatric:         Behavior: Behavior normal.       Significant Labs:   CBC:   Recent Labs   Lab 09/25/22  0545 09/26/22  0558   WBC 6.73 7.20   HGB 10.4* 9.7*   HCT 30.6* 29.1*   PLT 76* 80*   , Coagulation:   Recent Labs   Lab 09/25/22 0545 09/26/22  0558   INR 1.5* 1.4*   APTT 34.5* 31.4   , Haptoglobin:   Recent Labs   Lab 09/26/22  0558   HAPTOGLOBIN <10*   , LDH: No results for input(s): LDHCSF, BFSOURCE in the last 48 hours., and Reticulocytes:   Recent Labs   Lab 09/25/22  1617   RETIC 4.3*       Diagnostic Results:  I have reviewed all pertinent imaging results/findings within the past 24 hours.

## 2022-09-27 LAB
ALBUMIN SERPL BCP-MCNC: 1.6 G/DL (ref 3.5–5.2)
ALP SERPL-CCNC: 172 U/L (ref 55–135)
ALT SERPL W/O P-5'-P-CCNC: 177 U/L (ref 10–44)
ANA PATTERN 1: NORMAL
ANA SER QL IF: POSITIVE
ANA TITR SER IF: >2560 {TITER}
ANION GAP SERPL CALC-SCNC: 7 MMOL/L (ref 8–16)
APTT BLDCRRT: 32.8 SEC (ref 21–32)
AST SERPL-CCNC: 245 U/L (ref 10–40)
BASOPHILS # BLD AUTO: 0.05 K/UL (ref 0–0.2)
BASOPHILS NFR BLD: 0.8 % (ref 0–1.9)
BILIRUB SERPL-MCNC: 2.3 MG/DL (ref 0.1–1)
BUN SERPL-MCNC: 19 MG/DL (ref 8–23)
CALCIUM SERPL-MCNC: 8 MG/DL (ref 8.7–10.5)
CHLORIDE SERPL-SCNC: 113 MMOL/L (ref 95–110)
CO2 SERPL-SCNC: 15 MMOL/L (ref 23–29)
CREAT SERPL-MCNC: 0.8 MG/DL (ref 0.5–1.4)
DIFFERENTIAL METHOD: ABNORMAL
EOSINOPHIL # BLD AUTO: 0.5 K/UL (ref 0–0.5)
EOSINOPHIL NFR BLD: 7.5 % (ref 0–8)
ERYTHROCYTE [DISTWIDTH] IN BLOOD BY AUTOMATED COUNT: 20.6 % (ref 11.5–14.5)
EST. GFR  (NO RACE VARIABLE): >60 ML/MIN/1.73 M^2
GLUCOSE SERPL-MCNC: 155 MG/DL (ref 70–110)
HCT VFR BLD AUTO: 29.3 % (ref 37–48.5)
HGB BLD-MCNC: 10 G/DL (ref 12–16)
IMM GRANULOCYTES # BLD AUTO: 0.03 K/UL (ref 0–0.04)
IMM GRANULOCYTES NFR BLD AUTO: 0.5 % (ref 0–0.5)
INR PPP: 1.4 (ref 0.8–1.2)
LYMPHOCYTES # BLD AUTO: 1.2 K/UL (ref 1–4.8)
LYMPHOCYTES NFR BLD: 18.2 % (ref 18–48)
MAGNESIUM SERPL-MCNC: 1.8 MG/DL (ref 1.6–2.6)
MCH RBC QN AUTO: 34 PG (ref 27–31)
MCHC RBC AUTO-ENTMCNC: 34.1 G/DL (ref 32–36)
MCV RBC AUTO: 100 FL (ref 82–98)
METHYLMALONATE SERPL-SCNC: 0.19 UMOL/L
MONOCYTES # BLD AUTO: 1.1 K/UL (ref 0.3–1)
MONOCYTES NFR BLD: 16.5 % (ref 4–15)
NEUTROPHILS # BLD AUTO: 3.7 K/UL (ref 1.8–7.7)
NEUTROPHILS NFR BLD: 56.5 % (ref 38–73)
NRBC BLD-RTO: 0 /100 WBC
PLATELET # BLD AUTO: 83 K/UL (ref 150–450)
PMV BLD AUTO: 11.6 FL (ref 9.2–12.9)
POCT GLUCOSE: 106 MG/DL (ref 70–110)
POCT GLUCOSE: 109 MG/DL (ref 70–110)
POCT GLUCOSE: 121 MG/DL (ref 70–110)
POCT GLUCOSE: 122 MG/DL (ref 70–110)
POCT GLUCOSE: 188 MG/DL (ref 70–110)
POCT GLUCOSE: 282 MG/DL (ref 70–110)
POCT GLUCOSE: 71 MG/DL (ref 70–110)
POTASSIUM SERPL-SCNC: 4.7 MMOL/L (ref 3.5–5.1)
PROT SERPL-MCNC: 6.1 G/DL (ref 6–8.4)
PROTHROMBIN TIME: 14.8 SEC (ref 9–12.5)
RBC # BLD AUTO: 2.94 M/UL (ref 4–5.4)
SODIUM SERPL-SCNC: 135 MMOL/L (ref 136–145)
WBC # BLD AUTO: 6.55 K/UL (ref 3.9–12.7)

## 2022-09-27 PROCEDURE — 85025 COMPLETE CBC W/AUTO DIFF WBC: CPT | Performed by: HOSPITALIST

## 2022-09-27 PROCEDURE — 83735 ASSAY OF MAGNESIUM: CPT | Performed by: HOSPITALIST

## 2022-09-27 PROCEDURE — 88313 SPECIAL STAINS GROUP 2: CPT | Performed by: PATHOLOGY

## 2022-09-27 PROCEDURE — 80053 COMPREHEN METABOLIC PANEL: CPT | Performed by: HOSPITALIST

## 2022-09-27 PROCEDURE — 88307 TISSUE EXAM BY PATHOLOGIST: CPT | Mod: 26,,, | Performed by: PATHOLOGY

## 2022-09-27 PROCEDURE — 99232 SBSQ HOSP IP/OBS MODERATE 35: CPT | Mod: ,,, | Performed by: HOSPITALIST

## 2022-09-27 PROCEDURE — 25000003 PHARM REV CODE 250: Performed by: INTERNAL MEDICINE

## 2022-09-27 PROCEDURE — 25000003 PHARM REV CODE 250: Performed by: STUDENT IN AN ORGANIZED HEALTH CARE EDUCATION/TRAINING PROGRAM

## 2022-09-27 PROCEDURE — 63600175 PHARM REV CODE 636 W HCPCS: Performed by: HOSPITALIST

## 2022-09-27 PROCEDURE — 25000003 PHARM REV CODE 250

## 2022-09-27 PROCEDURE — 12000002 HC ACUTE/MED SURGE SEMI-PRIVATE ROOM

## 2022-09-27 PROCEDURE — 85610 PROTHROMBIN TIME: CPT | Performed by: HOSPITALIST

## 2022-09-27 PROCEDURE — 25500020 PHARM REV CODE 255: Performed by: HOSPITALIST

## 2022-09-27 PROCEDURE — 36415 COLL VENOUS BLD VENIPUNCTURE: CPT | Performed by: HOSPITALIST

## 2022-09-27 PROCEDURE — 88307 PR  SURG PATH,LEVEL V: ICD-10-PCS | Mod: 26,,, | Performed by: PATHOLOGY

## 2022-09-27 PROCEDURE — 88313 SPECIAL STAINS GROUP 2: CPT | Mod: 26,,, | Performed by: PATHOLOGY

## 2022-09-27 PROCEDURE — 88307 TISSUE EXAM BY PATHOLOGIST: CPT | Performed by: PATHOLOGY

## 2022-09-27 PROCEDURE — 85730 THROMBOPLASTIN TIME PARTIAL: CPT | Performed by: HOSPITALIST

## 2022-09-27 PROCEDURE — 88313 PR  SPECIAL STAINS,GROUP II: ICD-10-PCS | Mod: 26,,, | Performed by: PATHOLOGY

## 2022-09-27 PROCEDURE — 63600175 PHARM REV CODE 636 W HCPCS: Performed by: STUDENT IN AN ORGANIZED HEALTH CARE EDUCATION/TRAINING PROGRAM

## 2022-09-27 PROCEDURE — 99232 PR SUBSEQUENT HOSPITAL CARE,LEVL II: ICD-10-PCS | Mod: ,,, | Performed by: HOSPITALIST

## 2022-09-27 RX ORDER — MIDAZOLAM HYDROCHLORIDE 1 MG/ML
INJECTION INTRAMUSCULAR; INTRAVENOUS
Status: COMPLETED | OUTPATIENT
Start: 2022-09-27 | End: 2022-09-27

## 2022-09-27 RX ORDER — DICLOFENAC SODIUM 10 MG/G
2 GEL TOPICAL 2 TIMES DAILY PRN
Status: DISCONTINUED | OUTPATIENT
Start: 2022-09-27 | End: 2022-10-14 | Stop reason: HOSPADM

## 2022-09-27 RX ORDER — FENTANYL CITRATE 50 UG/ML
INJECTION, SOLUTION INTRAMUSCULAR; INTRAVENOUS
Status: COMPLETED | OUTPATIENT
Start: 2022-09-27 | End: 2022-09-27

## 2022-09-27 RX ORDER — LIDOCAINE HYDROCHLORIDE 10 MG/ML
INJECTION INFILTRATION; PERINEURAL
Status: COMPLETED | OUTPATIENT
Start: 2022-09-27 | End: 2022-10-05

## 2022-09-27 RX ADMIN — INSULIN DETEMIR 6 UNITS: 100 INJECTION, SOLUTION SUBCUTANEOUS at 10:09

## 2022-09-27 RX ADMIN — MIDAZOLAM HYDROCHLORIDE 1 MG: 1 INJECTION, SOLUTION INTRAMUSCULAR; INTRAVENOUS at 04:09

## 2022-09-27 RX ADMIN — IOHEXOL 15 ML: 300 INJECTION, SOLUTION INTRAVENOUS at 05:09

## 2022-09-27 RX ADMIN — FENTANYL CITRATE 50 MCG: 0.05 INJECTION, SOLUTION INTRAMUSCULAR; INTRAVENOUS at 04:09

## 2022-09-27 RX ADMIN — CAPSAICIN: 0.25 CREAM TOPICAL at 08:09

## 2022-09-27 RX ADMIN — LIDOCAINE HYDROCHLORIDE 3 ML: 10 INJECTION, SOLUTION INFILTRATION; PERINEURAL at 04:09

## 2022-09-27 RX ADMIN — SODIUM CHLORIDE, SODIUM LACTATE, POTASSIUM CHLORIDE, AND CALCIUM CHLORIDE: .6; .31; .03; .02 INJECTION, SOLUTION INTRAVENOUS at 08:09

## 2022-09-27 RX ADMIN — ACETAMINOPHEN 1000 MG: 500 TABLET ORAL at 08:09

## 2022-09-27 RX ADMIN — FAMOTIDINE 20 MG: 20 TABLET ORAL at 08:09

## 2022-09-27 NOTE — PLAN OF CARE
Pt arrived to IR Room 190 for transjugular liver biopsy. Pt oriented to unit and staff. Plan of care reviewed with patient, patient verbalizes understanding. Comfort measures utilized. Pt safely transferred from stretcher to procedural table. Fall risk reviewed with patient, fall risk interventions maintained. Safety strap applied, positioner pillows utilized to minimize pressure points. Blankets applied. Pt prepped and draped utilizing standard sterile technique. Patient placed on continuous monitoring, as required by sedation policy. Timeouts completed utilizing standard universal time-out, per department and facility policy. RN to remain at bedside, continuous monitoring maintained. Pt resting comfortably. Denies pain/discomfort. Will continue to monitor. See flow sheets for monitoring, medication administration, and updates.

## 2022-09-27 NOTE — ASSESSMENT & PLAN NOTE
Dehydration    Patient's daughter reports progressive lethargy and weakness onset about two weeks ago. Reports mhmlsk-ha-gz po intake, max of 1/2 a water bottle per day. She lives at home with her daughter. She also expresses concern of underlying depression as patient's twin sister  earlier this year and their birthday is coming up in November.  - CMP consistent with dehydration  - UA without infection  - given 500 ml LR in the ED  - boost glucose control ordered TIDWM  - nutrition consulted, appreciate assistance  - PT/OT consulted, appreciate assistance  - check FA(WNL), B12(WNL), ammonia level (elevated)  - TSH WNL  - improving

## 2022-09-27 NOTE — PROGRESS NOTES
[facility] Medicine  Progress Note    Patient Name: Radha Feng  MRN: [id number]  Patient Class: IP- Inpatient   Admission Date: 9/20/2022  Length of Stay: 5 days  Attending Physician: Taylor Avalos MD  Primary Care Provider: Primary Doctor No        Subjective:     Principal Problem:Hemolytic anemia        HPI:  Radha Feng is a 81 y.o. female with a past medical history of type 2 diabetes, hyperlipidemia, hypertension, glaucoma and cataracts presents the emergency department due to progressive lethargy and weakness. Patient's daughter at the bedside who provides the majority of the history due to the acuity of patient's condition. Per daughter, patient was sent from her cardiology office earlier today due to increased bilateral leg swelling, shortness of breath, and progressive weakness over the past two weeks. She reports that over the past two weeks patient has had a poor appetite with significantly decreased po intake. In addition, she reports that patient's BP has been lower at home compared to baseline and states she has held BP medications (amlodipine and lisinopril-HCTZ) for the past two days. She states her BP is normally 130/80 but as been as low as 98/63. Per daughter, lower extremity swelling was most pronounced last week but has improved significantly with compression stockings.  Additionally, daughter notes that she was helping her change last week and noted that she had a prolapsed uterus/bladder but patient has not seen her OBGYN yet. Patient is requiring more assistance with ADLs which is abnormal for her. Patient denies dysuria but states that she does have difficulty urinating and can only urinate a small amount. Denies chest pain, shortness of breath, abdominal pain, dizziness, nausea, vomiting, or syncope. Patient's only complaint at this time is right knee pain.    ED: vital signs stable, afebrile, no acute distress. Elevated liver enzymes with T.bili of 2.9,  AST//254, and alk phos of 224. Na 133, K 5.0, Cr 1.4 (most recent Cr of 0.8 in 12/21). D-dimer 11.97, CTA negative for PE. CTA showed evidence of distended gallbladder and emphysema. Subsequent RUQ US negative for cholecystitis or gallstones. BNP 66, troponin negative. UA negative for infection. Given 500 mL LR and placed in observation.       Overview/Hospital Course:  Patient admitted to hospitalist service.  She was noted to have elevated liver enzymes, acute renal insuff, hyperbilirubin.  D-dimer was significatnly elevated.   CTA demonstrated NO PTE but did show few bilateral pulmonary micro nodules which can be followed up as OP.   Right UQ US showed distended gallbladder but no sonographically detectable gallstone, and no additional sonographic evidence for acute cholecystitis.   Mild free fluid of the abdomen noted.  MRCP showed normal pancreatic duct, Gallbladder is distended without wall thickening or pericholecystic fluid.  Common bile duct is normal caliber and tapers distally to the ampulla.  No intrahepatic biliary dilatation.  No biliary filling defects identified.   Acute hepatitis panel negative.  Monospot neg. Ceruloplasmin normal.  She was also treated for mild hepatic encephalopathy with lactulose.      Upon further workup, patient noted to have evidence of hemolytic anemia and elevated ferritin and transferrin saturation consistent with iron overload.  With elevated d-dimer and low fibrinogen, concern for developing DIC.  Both hepatology and hematology consulted for liver failure/iron overload and hemolytic anemia.          Interval History:   No acute events overnight.  Patient continues to have poor PO intake.  No bleeding noted per patient or family members at the bedside.  Denies SOB.     Review of Systems   Constitutional:  Positive for activity change, appetite change and unexpected weight change.   Respiratory:  Negative for shortness of breath.    Cardiovascular:  Negative for  chest pain and leg swelling.   Gastrointestinal:  Negative for abdominal pain and blood in stool.   Genitourinary:  Negative for difficulty urinating and hematuria.   Musculoskeletal:  Negative for joint swelling and myalgias.   Neurological:  Positive for weakness. Negative for dizziness.   Psychiatric/Behavioral:  Negative for confusion.    Objective:     Vital Signs (Most Recent):  Temp: 98 °F (36.7 °C) (09/27/22 1200)  Pulse: 62 (09/27/22 1200)  Resp: 18 (09/27/22 1200)  BP: (!) 155/71 (09/27/22 1200)  SpO2: 97 % (09/27/22 1200)   Vital Signs (24h Range):  Temp:  [97.2 °F (36.2 °C)-98.4 °F (36.9 °C)] 98 °F (36.7 °C)  Pulse:  [60-73] 62  Resp:  [18-19] 18  SpO2:  [94 %-97 %] 97 %  BP: (147-155)/(64-75) 155/71     Weight: 50.8 kg (111 lb 15.9 oz)  Body mass index is 18.08 kg/m².    Intake/Output Summary (Last 24 hours) at 9/27/2022 1638  Last data filed at 9/27/2022 1320  Gross per 24 hour   Intake 1140 ml   Output --   Net 1140 ml      Physical Exam  Vitals reviewed.   Constitutional:       General: She is awake.      Appearance: She is underweight.   HENT:      Head: Normocephalic and atraumatic.   Eyes:      General: Lids are normal.      Conjunctiva/sclera: Conjunctivae normal.   Neck:      Thyroid: No thyroid mass or thyromegaly.   Cardiovascular:      Rate and Rhythm: Normal rate and regular rhythm.      Heart sounds: Murmur heard.   Pulmonary:      Effort: Pulmonary effort is normal.      Breath sounds: Normal breath sounds.   Abdominal:      General: Bowel sounds are normal. There is no distension.      Palpations: Abdomen is soft. There is no mass.   Musculoskeletal:         General: Normal range of motion.      Right lower leg: No edema.      Left lower leg: No edema.   Skin:     General: Skin is warm and dry.      Coloration: Skin is not jaundiced.   Neurological:      General: No focal deficit present.      Mental Status: She is alert.      Motor: Weakness present.   Psychiatric:         Attention and  Perception: Attention normal.         Mood and Affect: Mood normal.         Behavior: Behavior is cooperative.       Significant Labs: All pertinent labs within the past 24 hours have been reviewed.  BMP:   Recent Labs   Lab 09/27/22  0500   *   *   K 4.7   *   CO2 15*   BUN 19   CREATININE 0.8   CALCIUM 8.0*   MG 1.8     CBC:   Recent Labs   Lab 09/26/22  0558 09/27/22  0739   WBC 7.20 6.55   HGB 9.7* 10.0*   HCT 29.1* 29.3*   PLT 80* 83*     CMP:   Recent Labs   Lab 09/26/22  0558 09/27/22  0500    135*   K 3.5 4.7   * 113*   CO2 17* 15*   GLU 97 155*   BUN 23 19   CREATININE 0.7 0.8   CALCIUM 8.3* 8.0*   PROT 6.0 6.1   ALBUMIN 1.7* 1.6*   BILITOT 2.5* 2.3*   ALKPHOS 176* 172*   * 245*   * 177*   ANIONGAP 4* 7*       Significant Imaging: I have reviewed all pertinent imaging results/findings within the past 24 hours.      Assessment/Plan:      * Hemolytic anemia  - with associated iron overload  - direct genny test NEG  - hematology consult  - elevated ddimer and low fibrinogen (monitor platelets, coags, evidence of bleeding or clotting)  - etiology unknown  - hematology review of PBS--> target cells, some bethany cells,  very few schistocytes (0-1 per hp), no blast    Acute liver failure without hepatic coma  - elevated PT, low albumin, thrombocytopenia  - elevated AST/ALT at 296/254, T. Bili 2.1, alk phos 224  - CTA of ab/pelvix,  subsequent RUQ US, and  MRCP demonstrate no cause of patient's liver failure  - Acute hepatitis panel neg, monospot neg, copper levels normal, EBV neg  - evidence of iron overload  - consult hepatology for further recommendations for workup  - Liver bx on 9/27      EFREN (acute kidney injury)  Patient with acute kidney injury likely due to IVVD/dehydration EFREN is currently stable. Labs reviewed- Renal function/electrolytes with Estimated Creatinine Clearance: 45 mL/min (based on SCr of 0.8 mg/dL). according to latest data. Monitor urine output  and serial BMP and adjust therapy as needed. Avoid nephrotoxins and renally dose meds for GFR listed above.   - most recent Cr of 0.8 last December  - Cr 1.4 in the ED  - repeat Cr 1.1 after 500 ml LR  - continue to trend with CMP  - Resolved     Hepatic encephalopathy  - mild  - lactulose bid--> adjusted to qday  - adjust dose as needed      Dehydration  - LR 75cc/hr  - resolved       Oral phase dysphagia  - has become more noticeable over past 2-4wks  - patient has to swallow multiple times to get food bolus down  - no issue with liquids  - no evidence of aspiration  - consult speech       Lethargy  Dehydration    Patient's daughter reports progressive lethargy and weakness onset about two weeks ago. Reports gvvvux-uq-ph po intake, max of 1/2 a water bottle per day. She lives at home with her daughter. She also expresses concern of underlying depression as patient's twin sister  earlier this year and their birthday is coming up in November.  - CMP consistent with dehydration  - UA without infection  - given 500 ml LR in the ED  - boost glucose control ordered TIDWM  - nutrition consulted, appreciate assistance  - PT/OT consulted, appreciate assistance  - check FA(WNL), B12(WNL), ammonia level (elevated)  - TSH WNL  - improving     Severe protein-calorie malnutrition  Nutrition consulted. Most recent weight and BMI monitored-     Malnutrition Type and Energy Intake  Malnutrition Type: chronic illness  Energy Intake: severe energy intake    Malnutrition (Moderate to Severe)  Weight Loss (Malnutrition):  (14% x 9 months)  Energy Intake (Malnutrition): less than or equal to 75% for greater than or equal to 1 month  Subcutaneous Fat (Malnutrition): severe depletion  Muscle Mass (Malnutrition): severe depletion    Final Summary  Subcutaneous Fat Loss (Final Summary): severe protein-calorie malnutrition  Muscle Loss Evaluation (Final Summary): severe protein-calorie malnutrition    Malnutrition Final Summary  Severe  Weight Loss (Malnutrition):  (14% x 9 months)    Measurements:  Wt Readings from Last 1 Encounters:   09/22/22 50.8 kg (111 lb 15.9 oz)   Body mass index is 18.08 kg/m².    Recommendations: Recommendation/Intervention: 1. Continue current diabetic/ low sodium diet- encourage adequate PO intake. - Consider liberalizing diet to just diabetic or regular if PO intake remains 50% or less. 2. Discontinue Boost Glucose Control per pt request. 3. RD following.  Goals: Will meet % EEN/EPN by next RD f/u.    Patient has been screened and assessed by RD. RD will follow patient.      Type 2 diabetes mellitus, without long-term current use of insulin  Patient's FSGs are uncontrolled due to hyperglycemia on current medication regimen.  - home regimen: metformin and glyburide  Last A1c reviewed-   Lab Results   Component Value Date    HGBA1C 5.3 09/21/2022     Most recent fingerstick glucose reviewed-   Recent Labs   Lab 09/21/22  1704 09/21/22  2343 09/22/22  0814 09/22/22  1140   POCTGLUCOSE 98 269* 169* 215*     Current correctional scale  Low  Maintain anti-hyperglycemic dose as follows-   Antihyperglycemics (From admission, onward)    Start     Stop Route Frequency Ordered    09/21/22 0256  insulin aspart U-100 pen 0-5 Units         -- SubQ Before meals & nightly PRN 09/21/22 0156        Hold Oral hypoglycemics while patient is in the hospital.    Hypertension  - BP currently well-controlled  - patient's daughter reports that patient's BP has been lower at home compared to baseline and states she has held BP medications for the past two days  - holding home regimen of amlodipine 10 mg daily and lisinopril/HCTZ 20-12.5 mg BID  - will continue to monitor and further titrate antihypertensives as clinically indicated         Leg swelling  - no previous hx of CHF  - echo ordered for the am--> neg for DVT  - resolved    Right knee pain  - Chronic  - reported trauma about 30 years ago  - follows with orthopedics outpatient  -  recent MRI in august with chronic findings  - continue tylenol prn for pain      VTE Risk Mitigation (From admission, onward)         Ordered     Place sequential compression device  Until discontinued         09/21/22 0932     IP VTE LOW RISK PATIENT  Once         09/21/22 0156                Discharge Planning   MELVI: 9/30/2022     Code Status: Full Code   Is the patient medically ready for discharge?: No    Reason for patient still in hospital (select all that apply): Patient trending condition, Laboratory test, Treatment and Consult recommendations  Discharge Plan A: Home with family   Discharge Delays: None known at this time              Taylor Avalos MD  Department of Hospital Medicine   [facility]

## 2022-09-27 NOTE — PLAN OF CARE
Problem: Adult Inpatient Plan of Care  Goal: Plan of Care Review  Outcome: Ongoing, Progressing  Goal: Patient-Specific Goal (Individualized)  Outcome: Ongoing, Progressing  Goal: Absence of Hospital-Acquired Illness or Injury  Outcome: Ongoing, Progressing  Goal: Optimal Comfort and Wellbeing  Outcome: Ongoing, Progressing     Problem: Infection  Goal: Absence of Infection Signs and Symptoms  Outcome: Ongoing, Progressing     Problem: Diabetes Comorbidity  Goal: Blood Glucose Level Within Targeted Range  Outcome: Ongoing, Progressing     Problem: Fluid and Electrolyte Imbalance (Acute Kidney Injury/Impairment)  Goal: Fluid and Electrolyte Balance  Outcome: Ongoing, Progressing     Problem: Oral Intake Inadequate (Acute Kidney Injury/Impairment)  Goal: Optimal Nutrition Intake  Outcome: Ongoing, Progressing     Problem: Renal Function Impairment (Acute Kidney Injury/Impairment)  Goal: Effective Renal Function  Outcome: Ongoing, Progressing     Problem: Skin Injury Risk Increased  Goal: Skin Health and Integrity  Outcome: Ongoing, Progressing     Problem: Fall Injury Risk  Goal: Absence of Fall and Fall-Related Injury  Outcome: Ongoing, Progressing

## 2022-09-27 NOTE — PLAN OF CARE
Pt arrived to [facility]U bed 7 for 1 hour post transjugular liver biopsy recovery. Pt denies pain/discomfort. Dressing CDI. VSS. No acute events. See flow sheets for post procedure monitoring.

## 2022-09-27 NOTE — PROCEDURES
IR POST PROCEDURE NOTE    Date of Procedure: 9/27/2022    Procedure: Transjugular liver biopsy with pressure measurements    Pre-Procedure Diagnosis: Transaminitis    Post-Procedure Diagnosis: Same    Procedure Personnel: Lorenzo Palencia MD and Chelsie Norman MD    Written Informed Consent Obtained: Yes    Specimen Removed: 3 19G core specimens submitted to pathology    Estimated Blood Loss: Minimal    Findings: IVC venography and transjugular liver biopsy performed. Hemostasis achieved via manual compression.    Complications: None immediate.      Chelsie Norman MD  Fellow, Dept. Of Interventional Radiology  [facility]

## 2022-09-27 NOTE — SUBJECTIVE & OBJECTIVE
Interval History:   No acute events overnight.  Patient continues to have poor PO intake.  No bleeding noted per patient or family members at the bedside.  Denies SOB.     Review of Systems   Constitutional:  Positive for activity change, appetite change and unexpected weight change.   Respiratory:  Negative for shortness of breath.    Cardiovascular:  Negative for chest pain and leg swelling.   Gastrointestinal:  Negative for abdominal pain and blood in stool.   Genitourinary:  Negative for difficulty urinating and hematuria.   Musculoskeletal:  Negative for joint swelling and myalgias.   Neurological:  Positive for weakness. Negative for dizziness.   Psychiatric/Behavioral:  Negative for confusion.    Objective:     Vital Signs (Most Recent):  Temp: 98 °F (36.7 °C) (09/27/22 1200)  Pulse: 62 (09/27/22 1200)  Resp: 18 (09/27/22 1200)  BP: (!) 155/71 (09/27/22 1200)  SpO2: 97 % (09/27/22 1200)   Vital Signs (24h Range):  Temp:  [97.2 °F (36.2 °C)-98.4 °F (36.9 °C)] 98 °F (36.7 °C)  Pulse:  [60-73] 62  Resp:  [18-19] 18  SpO2:  [94 %-97 %] 97 %  BP: (147-155)/(64-75) 155/71     Weight: 50.8 kg (111 lb 15.9 oz)  Body mass index is 18.08 kg/m².    Intake/Output Summary (Last 24 hours) at 9/27/2022 1638  Last data filed at 9/27/2022 1320  Gross per 24 hour   Intake 1140 ml   Output --   Net 1140 ml      Physical Exam  Vitals reviewed.   Constitutional:       General: She is awake.      Appearance: She is underweight.   HENT:      Head: Normocephalic and atraumatic.   Eyes:      General: Lids are normal.      Conjunctiva/sclera: Conjunctivae normal.   Neck:      Thyroid: No thyroid mass or thyromegaly.   Cardiovascular:      Rate and Rhythm: Normal rate and regular rhythm.      Heart sounds: Murmur heard.   Pulmonary:      Effort: Pulmonary effort is normal.      Breath sounds: Normal breath sounds.   Abdominal:      General: Bowel sounds are normal. There is no distension.      Palpations: Abdomen is soft. There is no  mass.   Musculoskeletal:         General: Normal range of motion.      Right lower leg: No edema.      Left lower leg: No edema.   Skin:     General: Skin is warm and dry.      Coloration: Skin is not jaundiced.   Neurological:      General: No focal deficit present.      Mental Status: She is alert.      Motor: Weakness present.   Psychiatric:         Attention and Perception: Attention normal.         Mood and Affect: Mood normal.         Behavior: Behavior is cooperative.       Significant Labs: All pertinent labs within the past 24 hours have been reviewed.  BMP:   Recent Labs   Lab 09/27/22  0500   *   *   K 4.7   *   CO2 15*   BUN 19   CREATININE 0.8   CALCIUM 8.0*   MG 1.8     CBC:   Recent Labs   Lab 09/26/22  0558 09/27/22  0739   WBC 7.20 6.55   HGB 9.7* 10.0*   HCT 29.1* 29.3*   PLT 80* 83*     CMP:   Recent Labs   Lab 09/26/22  0558 09/27/22  0500    135*   K 3.5 4.7   * 113*   CO2 17* 15*   GLU 97 155*   BUN 23 19   CREATININE 0.7 0.8   CALCIUM 8.3* 8.0*   PROT 6.0 6.1   ALBUMIN 1.7* 1.6*   BILITOT 2.5* 2.3*   ALKPHOS 176* 172*   * 245*   * 177*   ANIONGAP 4* 7*       Significant Imaging: I have reviewed all pertinent imaging results/findings within the past 24 hours.

## 2022-09-27 NOTE — ASSESSMENT & PLAN NOTE
- elevated PT, low albumin, thrombocytopenia  - elevated AST/ALT at 296/254, T. Bili 2.1, alk phos 224  - CTA of ab/pelvix,  subsequent RUQ US, and  MRCP demonstrate no cause of patient's liver failure  - Acute hepatitis panel neg, monospot neg, copper levels normal, EBV neg  - evidence of iron overload  - consult hepatology for further recommendations for workup  - Liver bx on 9/27

## 2022-09-27 NOTE — PLAN OF CARE
Procedure completed. Patient tolerated well; VSS. Site CDI. Specimen transported to pathology. Patient to be transported to [facility] for 1 hour recovery; report to be given at the bedside. Report to also be called to inpatient RN.

## 2022-09-27 NOTE — PLAN OF CARE
Patient's hemoglobin and platelets remain stable. Would continue to hold off starting steroid therapy, unless counts start dropping.   The team will sign off. If there are any changes to her counts or any additional questions, please reach out to team.     Qi Solis, DO  Internal Medicine, PGY-3  Hematology Consults  [facility]

## 2022-09-27 NOTE — H&P
Inpatient Radiology Pre-procedure Note    History of Present Illness:  Radha Feng is a 81 y.o. female with history of HTN, T2DM, HLD who initially presented due to weakness and lower extremity edema and found to have acute liver injury of unknown etiology. IR was consulted for transjugular liver biopsy.     Admission H&P reviewed.  Past Medical History:   Diagnosis Date    Cataract     Diabetes mellitus     Glaucoma     Hypertension      Past Surgical History:   Procedure Laterality Date    CATARACT EXTRACTION W/  INTRAOCULAR LENS IMPLANT Left 12/21/2021    Procedure: EXTRACTION, CATARACT, WITH IOL INSERTION;  Surgeon: Shay Chou MD;  Location: [facility];  Service: Ophthalmology;  Laterality: Left;       Review of Systems:   As documented in primary team H&P    Home Meds:   Prior to Admission medications    Medication Sig Start Date End Date Taking? Authorizing Provider   acetaminophen (TYLENOL) 500 MG tablet Take 1000 mg by mouth daily to every other day as needed for pain.   Yes Historical Provider   amLODIPine (NORVASC) 10 MG tablet Take 10 mg by mouth once daily. 6/9/20  Yes Historical Provider   aspirin (ECOTRIN) 81 MG EC tablet Take 81 mg by mouth once daily.   Yes Historical Provider   atorvastatin (LIPITOR) 10 MG tablet TAKE 1 TABLET BY MOUTH EVERY DAY 6/29/20  Yes Historical Provider   brimonidine 0.2% (ALPHAGAN) 0.2 % Drop INSTILL 1 DROP INTO RIGHT EYE TWICE A DAY 4/25/22  Yes Nj Elliott, OD   dorzolamide-timolol 2-0.5% (COSOPT) 22.3-6.8 mg/mL ophthalmic solution INSTILL 1 DROP INTO RIGHT EYE TWICE A DAY 10/18/21  Yes Celina Choi, OD   glyBURIDE (DIABETA) 2.5 MG tablet Take 2.5 mg by mouth once daily. 2/19/20  Yes Historical Provider   ibuprofen (ADVIL,MOTRIN) 200 MG tablet Take 3 tablets (600 mg) by mouth daily to every other day as needed for pain.   Yes Historical Provider   latanoprost 0.005 % ophthalmic solution PLACE 1 DROP INTO BOTH EYES ONCE DAILY. 1/19/22 1/19/23 Yes  Nj Elliott, OD   lisinopriL-hydrochlorothiazide (PRINZIDE,ZESTORETIC) 20-12.5 mg per tablet TAKE 1 TABLET BY MOUTH TWICE A DAY 6/29/20  Yes Historical Provider   metFORMIN (GLUCOPHAGE) 500 MG tablet TAKE 1 TABLET BY MOUTH TWICE A DAY 1/14/20  Yes Historical Provider   multivit-minerals/folic acid (ONE DAILY GUMMY VITES ORAL) Chew and swallow 1 gummy by mouth once daily.   Yes Historical Provider     Scheduled Meds:    brimonidine 0.2%  1 drop Right Eye BID    capsaicin   Topical (Top) BID    famotidine  20 mg Oral Daily    insulin aspart U-100  4 Units Subcutaneous TIDWM    insulin detemir U-100  12 Units Subcutaneous QHS    latanoprost  1 drop Both Eyes Daily     Continuous Infusions:    lactated ringers 75 mL/hr at 09/26/22 1905     PRN Meds:acetaminophen, acetaminophen, albuterol-ipratropium, aluminum-magnesium hydroxide-simethicone, bisacodyL, dextrose 10%, dextrose 10%, glucagon (human recombinant), glucose, glucose, insulin aspart U-100, melatonin, naloxone, ondansetron, prochlorperazine, simethicone, sodium chloride 0.9%  Anticoagulants/Antiplatelets: no anticoagulation    Allergies: Review of patient's allergies indicates:  No Known Allergies  Sedation Hx: have not been any systemic reactions    Labs:  Recent Labs   Lab 09/27/22  0739   INR 1.4*       Recent Labs   Lab 09/27/22  0739   WBC 6.55   HGB 10.0*   HCT 29.3*   *   PLT 83*      Recent Labs   Lab 09/24/22  0428 09/25/22  0545 09/27/22  0500   *   < > 155*   *   < > 135*   K 3.4*   < > 4.7   *   < > 113*   CO2 16*   < > 15*   BUN 28*   < > 19   CREATININE 0.8   < > 0.8   CALCIUM 8.0*   < > 8.0*   MG 1.6   < > 1.8   *   < > 177*   *   < > 245*   ALBUMIN 1.8*   < > 1.6*   BILITOT 2.6*   < > 2.3*   BILIDIR 1.6*  --   --     < > = values in this interval not displayed.         Vitals:  Temp: 97.2 °F (36.2 °C) (09/27/22 0848)  Pulse: 73 (09/27/22 0848)  Resp: 18 (09/27/22 0848)  BP: (!) 155/75 (09/27/22  0848)  SpO2: (!) 94 % (09/27/22 0848)     Physical Exam:  ASA: III  Mallampati: II    General: frail/elderly  Mental Status: alert and oriented to person, place and time  HEENT: normocephalic, atraumatic  Chest: unlabored breathing  Heart: regular heart rate  Abdomen: nondistended  Extremity: moves all extremities    Plan:   Transjugular liver biopsy tentatively planned for 9/27.     Sedation Plan: Up to Moderate.     Elayne Love MD  Radiology, PGY II  [facility]

## 2022-09-28 PROBLEM — K75.4 AUTOIMMUNE HEPATITIS: Status: ACTIVE | Noted: 2022-09-28

## 2022-09-28 LAB
ALBUMIN SERPL BCP-MCNC: 1.5 G/DL (ref 3.5–5.2)
ALP SERPL-CCNC: 157 U/L (ref 55–135)
ALT SERPL W/O P-5'-P-CCNC: 166 U/L (ref 10–44)
ANION GAP SERPL CALC-SCNC: 5 MMOL/L (ref 8–16)
AST SERPL-CCNC: 242 U/L (ref 10–40)
BASOPHILS # BLD AUTO: 0.03 K/UL (ref 0–0.2)
BASOPHILS NFR BLD: 0.5 % (ref 0–1.9)
BILIRUB SERPL-MCNC: 2.6 MG/DL (ref 0.1–1)
BUN SERPL-MCNC: 19 MG/DL (ref 8–23)
CALCIUM SERPL-MCNC: 8.1 MG/DL (ref 8.7–10.5)
CHLORIDE SERPL-SCNC: 113 MMOL/L (ref 95–110)
CO2 SERPL-SCNC: 16 MMOL/L (ref 23–29)
COMMENT: NORMAL
CREAT SERPL-MCNC: 0.7 MG/DL (ref 0.5–1.4)
DIFFERENTIAL METHOD: ABNORMAL
EOSINOPHIL # BLD AUTO: 0.3 K/UL (ref 0–0.5)
EOSINOPHIL NFR BLD: 5.4 % (ref 0–8)
ERYTHROCYTE [DISTWIDTH] IN BLOOD BY AUTOMATED COUNT: 20.9 % (ref 11.5–14.5)
EST. GFR  (NO RACE VARIABLE): >60 ML/MIN/1.73 M^2
FINAL PATHOLOGIC DIAGNOSIS: NORMAL
GENETICIST REVIEW: NORMAL
GLUCOSE SERPL-MCNC: 181 MG/DL (ref 70–110)
GROSS: NORMAL
HCT VFR BLD AUTO: 25.7 % (ref 37–48.5)
HFE GENE MUT ANL BLD/T: NORMAL
HFE RELEASED BY: NORMAL
HFE RESULT SUMMARY: NEGATIVE
HGB BLD-MCNC: 8.8 G/DL (ref 12–16)
IMM GRANULOCYTES # BLD AUTO: 0.02 K/UL (ref 0–0.04)
IMM GRANULOCYTES NFR BLD AUTO: 0.3 % (ref 0–0.5)
INR PPP: 1.6 (ref 0.8–1.2)
LYMPHOCYTES # BLD AUTO: 1.2 K/UL (ref 1–4.8)
LYMPHOCYTES NFR BLD: 19.6 % (ref 18–48)
Lab: NORMAL
MAGNESIUM SERPL-MCNC: 1.6 MG/DL (ref 1.6–2.6)
MCH RBC QN AUTO: 34.1 PG (ref 27–31)
MCHC RBC AUTO-ENTMCNC: 34.2 G/DL (ref 32–36)
MCV RBC AUTO: 100 FL (ref 82–98)
MICROSCOPIC EXAM: NORMAL
MONOCYTES # BLD AUTO: 1.1 K/UL (ref 0.3–1)
MONOCYTES NFR BLD: 17.9 % (ref 4–15)
NEUTROPHILS # BLD AUTO: 3.3 K/UL (ref 1.8–7.7)
NEUTROPHILS NFR BLD: 56.3 % (ref 38–73)
NRBC BLD-RTO: 0 /100 WBC
PARVOVIRUS B19 ABS IGG & IGM: ABNORMAL
PARVOVIRUS B19 IGG ANTIBODY: POSITIVE
PARVOVIRUS B19 IGM ANTIBODY: NEGATIVE
PETH 16:0/18.1 (POPETH): <10 NG/ML
PETH 16:0/18.2 (PLPETH): <10 NG/ML
PLATELET # BLD AUTO: 73 K/UL (ref 150–450)
PMV BLD AUTO: 11.9 FL (ref 9.2–12.9)
POCT GLUCOSE: 138 MG/DL (ref 70–110)
POCT GLUCOSE: 221 MG/DL (ref 70–110)
POCT GLUCOSE: 227 MG/DL (ref 70–110)
POCT GLUCOSE: 250 MG/DL (ref 70–110)
POTASSIUM SERPL-SCNC: 3.9 MMOL/L (ref 3.5–5.1)
PROT SERPL-MCNC: 5.3 G/DL (ref 6–8.4)
PROTHROMBIN TIME: 15.8 SEC (ref 9–12.5)
RBC # BLD AUTO: 2.58 M/UL (ref 4–5.4)
REF LAB TEST METHOD: NORMAL
SODIUM SERPL-SCNC: 134 MMOL/L (ref 136–145)
SPECIMEN SOURCE: NORMAL
SPECIMEN,  HEMOCHROMATOSIS: NORMAL
WBC # BLD AUTO: 5.88 K/UL (ref 3.9–12.7)

## 2022-09-28 PROCEDURE — 99232 PR SUBSEQUENT HOSPITAL CARE,LEVL II: ICD-10-PCS | Mod: ,,, | Performed by: HOSPITALIST

## 2022-09-28 PROCEDURE — 25000003 PHARM REV CODE 250

## 2022-09-28 PROCEDURE — 36415 COLL VENOUS BLD VENIPUNCTURE: CPT | Performed by: HOSPITALIST

## 2022-09-28 PROCEDURE — 25000003 PHARM REV CODE 250: Performed by: INTERNAL MEDICINE

## 2022-09-28 PROCEDURE — 85610 PROTHROMBIN TIME: CPT | Performed by: HOSPITALIST

## 2022-09-28 PROCEDURE — 80053 COMPREHEN METABOLIC PANEL: CPT | Performed by: HOSPITALIST

## 2022-09-28 PROCEDURE — 63600175 PHARM REV CODE 636 W HCPCS: Performed by: HOSPITALIST

## 2022-09-28 PROCEDURE — 99232 SBSQ HOSP IP/OBS MODERATE 35: CPT | Mod: ,,, | Performed by: HOSPITALIST

## 2022-09-28 PROCEDURE — 83735 ASSAY OF MAGNESIUM: CPT | Performed by: HOSPITALIST

## 2022-09-28 PROCEDURE — 85025 COMPLETE CBC W/AUTO DIFF WBC: CPT | Performed by: HOSPITALIST

## 2022-09-28 PROCEDURE — 12000002 HC ACUTE/MED SURGE SEMI-PRIVATE ROOM

## 2022-09-28 RX ADMIN — CAPSAICIN: 0.25 CREAM TOPICAL at 10:09

## 2022-09-28 RX ADMIN — INSULIN ASPART 2 UNITS: 100 INJECTION, SOLUTION INTRAVENOUS; SUBCUTANEOUS at 06:09

## 2022-09-28 RX ADMIN — INSULIN ASPART 1 UNITS: 100 INJECTION, SOLUTION INTRAVENOUS; SUBCUTANEOUS at 10:09

## 2022-09-28 RX ADMIN — INSULIN ASPART 2 UNITS: 100 INJECTION, SOLUTION INTRAVENOUS; SUBCUTANEOUS at 01:09

## 2022-09-28 RX ADMIN — ACETAMINOPHEN 1000 MG: 500 TABLET ORAL at 06:09

## 2022-09-28 RX ADMIN — INSULIN DETEMIR 12 UNITS: 100 INJECTION, SOLUTION SUBCUTANEOUS at 10:09

## 2022-09-28 RX ADMIN — INSULIN ASPART 4 UNITS: 100 INJECTION, SOLUTION INTRAVENOUS; SUBCUTANEOUS at 06:09

## 2022-09-28 RX ADMIN — FAMOTIDINE 20 MG: 20 TABLET ORAL at 08:09

## 2022-09-28 RX ADMIN — SODIUM CHLORIDE, SODIUM LACTATE, POTASSIUM CHLORIDE, AND CALCIUM CHLORIDE: .6; .31; .03; .02 INJECTION, SOLUTION INTRAVENOUS at 10:09

## 2022-09-28 RX ADMIN — INSULIN ASPART 4 UNITS: 100 INJECTION, SOLUTION INTRAVENOUS; SUBCUTANEOUS at 01:09

## 2022-09-28 RX ADMIN — SODIUM CHLORIDE, SODIUM LACTATE, POTASSIUM CHLORIDE, AND CALCIUM CHLORIDE: .6; .31; .03; .02 INJECTION, SOLUTION INTRAVENOUS at 05:09

## 2022-09-29 LAB
ALBUMIN SERPL BCP-MCNC: 1.6 G/DL (ref 3.5–5.2)
ALP SERPL-CCNC: 171 U/L (ref 55–135)
ALT SERPL W/O P-5'-P-CCNC: 166 U/L (ref 10–44)
ANION GAP SERPL CALC-SCNC: 3 MMOL/L (ref 8–16)
ANTI SM ANTIBODY: 0.19 RATIO (ref 0–0.99)
ANTI SM/RNP ANTIBODY: 0.87 RATIO (ref 0–0.99)
ANTI-SM INTERPRETATION: NEGATIVE
ANTI-SM/RNP INTERPRETATION: NEGATIVE
ANTI-SSA ANTIBODY: 0.07 RATIO (ref 0–0.99)
ANTI-SSA INTERPRETATION: NEGATIVE
ANTI-SSB ANTIBODY: 0.1 RATIO (ref 0–0.99)
ANTI-SSB INTERPRETATION: NEGATIVE
APTT BLDCRRT: 35.6 SEC (ref 21–32)
AST SERPL-CCNC: 225 U/L (ref 10–40)
BASOPHILS # BLD AUTO: 0.03 K/UL (ref 0–0.2)
BASOPHILS NFR BLD: 0.5 % (ref 0–1.9)
BILIRUB SERPL-MCNC: 2.4 MG/DL (ref 0.1–1)
BUN SERPL-MCNC: 18 MG/DL (ref 8–23)
CALCIUM SERPL-MCNC: 7.9 MG/DL (ref 8.7–10.5)
CHLORIDE SERPL-SCNC: 113 MMOL/L (ref 95–110)
CO2 SERPL-SCNC: 19 MMOL/L (ref 23–29)
CREAT SERPL-MCNC: 0.7 MG/DL (ref 0.5–1.4)
DIFFERENTIAL METHOD: ABNORMAL
DNA TITER: NORMAL
DSDNA AB SER-ACNC: POSITIVE [IU]/ML
EOSINOPHIL # BLD AUTO: 0.4 K/UL (ref 0–0.5)
EOSINOPHIL NFR BLD: 6.1 % (ref 0–8)
ERYTHROCYTE [DISTWIDTH] IN BLOOD BY AUTOMATED COUNT: 21 % (ref 11.5–14.5)
EST. GFR  (NO RACE VARIABLE): >60 ML/MIN/1.73 M^2
GLUCOSE SERPL-MCNC: 198 MG/DL (ref 70–110)
HCT VFR BLD AUTO: 26.9 % (ref 37–48.5)
HGB BLD-MCNC: 9 G/DL (ref 12–16)
IMM GRANULOCYTES # BLD AUTO: 0.02 K/UL (ref 0–0.04)
IMM GRANULOCYTES NFR BLD AUTO: 0.3 % (ref 0–0.5)
INR PPP: 1.5 (ref 0.8–1.2)
LYMPHOCYTES # BLD AUTO: 1.1 K/UL (ref 1–4.8)
LYMPHOCYTES NFR BLD: 17.3 % (ref 18–48)
MAGNESIUM SERPL-MCNC: 1.6 MG/DL (ref 1.6–2.6)
MCH RBC QN AUTO: 33.7 PG (ref 27–31)
MCHC RBC AUTO-ENTMCNC: 33.5 G/DL (ref 32–36)
MCV RBC AUTO: 101 FL (ref 82–98)
MITOCHONDRIA AB TITR SER IF: NORMAL {TITER}
MONOCYTES # BLD AUTO: 1.3 K/UL (ref 0.3–1)
MONOCYTES NFR BLD: 19.3 % (ref 4–15)
NEUTROPHILS # BLD AUTO: 3.7 K/UL (ref 1.8–7.7)
NEUTROPHILS NFR BLD: 56.5 % (ref 38–73)
NRBC BLD-RTO: 0 /100 WBC
PLATELET # BLD AUTO: 65 K/UL (ref 150–450)
PMV BLD AUTO: 12.3 FL (ref 9.2–12.9)
POCT GLUCOSE: 168 MG/DL (ref 70–110)
POCT GLUCOSE: 179 MG/DL (ref 70–110)
POCT GLUCOSE: 210 MG/DL (ref 70–110)
POCT GLUCOSE: 271 MG/DL (ref 70–110)
POTASSIUM SERPL-SCNC: 4.2 MMOL/L (ref 3.5–5.1)
PROT SERPL-MCNC: 5.6 G/DL (ref 6–8.4)
PROTHROMBIN TIME: 15.4 SEC (ref 9–12.5)
RBC # BLD AUTO: 2.67 M/UL (ref 4–5.4)
SMOOTH MUSCLE AB TITR SER IF: NORMAL {TITER}
SODIUM SERPL-SCNC: 135 MMOL/L (ref 136–145)
WBC # BLD AUTO: 6.59 K/UL (ref 3.9–12.7)

## 2022-09-29 PROCEDURE — 80053 COMPREHEN METABOLIC PANEL: CPT | Performed by: HOSPITALIST

## 2022-09-29 PROCEDURE — 99232 PR SUBSEQUENT HOSPITAL CARE,LEVL II: ICD-10-PCS | Mod: GC,,, | Performed by: INTERNAL MEDICINE

## 2022-09-29 PROCEDURE — 25000003 PHARM REV CODE 250: Performed by: FAMILY MEDICINE

## 2022-09-29 PROCEDURE — 25000003 PHARM REV CODE 250

## 2022-09-29 PROCEDURE — 86706 HEP B SURFACE ANTIBODY: CPT | Mod: 91 | Performed by: INTERNAL MEDICINE

## 2022-09-29 PROCEDURE — 12000002 HC ACUTE/MED SURGE SEMI-PRIVATE ROOM

## 2022-09-29 PROCEDURE — 99233 SBSQ HOSP IP/OBS HIGH 50: CPT | Mod: ,,, | Performed by: FAMILY MEDICINE

## 2022-09-29 PROCEDURE — 86704 HEP B CORE ANTIBODY TOTAL: CPT | Performed by: INTERNAL MEDICINE

## 2022-09-29 PROCEDURE — 85025 COMPLETE CBC W/AUTO DIFF WBC: CPT | Performed by: HOSPITALIST

## 2022-09-29 PROCEDURE — 85730 THROMBOPLASTIN TIME PARTIAL: CPT | Performed by: HOSPITALIST

## 2022-09-29 PROCEDURE — 99233 PR SUBSEQUENT HOSPITAL CARE,LEVL III: ICD-10-PCS | Mod: ,,, | Performed by: FAMILY MEDICINE

## 2022-09-29 PROCEDURE — 25000003 PHARM REV CODE 250: Performed by: INTERNAL MEDICINE

## 2022-09-29 PROCEDURE — 87340 HEPATITIS B SURFACE AG IA: CPT | Performed by: INTERNAL MEDICINE

## 2022-09-29 PROCEDURE — 85610 PROTHROMBIN TIME: CPT | Performed by: HOSPITALIST

## 2022-09-29 PROCEDURE — 83735 ASSAY OF MAGNESIUM: CPT | Performed by: HOSPITALIST

## 2022-09-29 PROCEDURE — 99232 SBSQ HOSP IP/OBS MODERATE 35: CPT | Mod: GC,,, | Performed by: INTERNAL MEDICINE

## 2022-09-29 PROCEDURE — 86480 TB TEST CELL IMMUN MEASURE: CPT | Performed by: INTERNAL MEDICINE

## 2022-09-29 PROCEDURE — 36415 COLL VENOUS BLD VENIPUNCTURE: CPT | Performed by: INTERNAL MEDICINE

## 2022-09-29 PROCEDURE — 36415 COLL VENOUS BLD VENIPUNCTURE: CPT | Performed by: HOSPITALIST

## 2022-09-29 RX ORDER — PREDNISONE 20 MG/1
40 TABLET ORAL DAILY
Status: DISCONTINUED | OUTPATIENT
Start: 2022-09-30 | End: 2022-10-01

## 2022-09-29 RX ORDER — BENZONATATE 100 MG/1
100 CAPSULE ORAL 3 TIMES DAILY PRN
Status: DISCONTINUED | OUTPATIENT
Start: 2022-09-29 | End: 2022-10-14 | Stop reason: HOSPADM

## 2022-09-29 RX ADMIN — INSULIN ASPART 3 UNITS: 100 INJECTION, SOLUTION INTRAVENOUS; SUBCUTANEOUS at 05:09

## 2022-09-29 RX ADMIN — FAMOTIDINE 20 MG: 20 TABLET ORAL at 08:09

## 2022-09-29 RX ADMIN — ACETAMINOPHEN 1000 MG: 500 TABLET ORAL at 07:09

## 2022-09-29 RX ADMIN — INSULIN ASPART 4 UNITS: 100 INJECTION, SOLUTION INTRAVENOUS; SUBCUTANEOUS at 12:09

## 2022-09-29 RX ADMIN — INSULIN ASPART 4 UNITS: 100 INJECTION, SOLUTION INTRAVENOUS; SUBCUTANEOUS at 08:09

## 2022-09-29 RX ADMIN — BENZONATATE 100 MG: 100 CAPSULE ORAL at 09:09

## 2022-09-29 RX ADMIN — INSULIN ASPART 4 UNITS: 100 INJECTION, SOLUTION INTRAVENOUS; SUBCUTANEOUS at 05:09

## 2022-09-29 RX ADMIN — INSULIN ASPART 2 UNITS: 100 INJECTION, SOLUTION INTRAVENOUS; SUBCUTANEOUS at 12:09

## 2022-09-29 RX ADMIN — CAPSAICIN: 0.25 CREAM TOPICAL at 09:09

## 2022-09-29 RX ADMIN — BRIMONIDINE TARTRATE 1 DROP: 2 SOLUTION OPHTHALMIC at 09:09

## 2022-09-29 NOTE — PROGRESS NOTES
[facility] Medicine  Progress Note    Patient Name: Radha Feng  MRN: [id number]  Patient Class: IP- Inpatient   Admission Date: 9/20/2022  Length of Stay: 6 days  Attending Physician: Taylor Avalos MD  Primary Care Provider: Primary Doctor No        Subjective:     Principal Problem:Autoimmune hepatitis        HPI:  Radha Feng is a 81 y.o. female with a past medical history of type 2 diabetes, hyperlipidemia, hypertension, glaucoma and cataracts presents the emergency department due to progressive lethargy and weakness. Patient's daughter at the bedside who provides the majority of the history due to the acuity of patient's condition. Per daughter, patient was sent from her cardiology office earlier today due to increased bilateral leg swelling, shortness of breath, and progressive weakness over the past two weeks. She reports that over the past two weeks patient has had a poor appetite with significantly decreased po intake. In addition, she reports that patient's BP has been lower at home compared to baseline and states she has held BP medications (amlodipine and lisinopril-HCTZ) for the past two days. She states her BP is normally 130/80 but as been as low as 98/63. Per daughter, lower extremity swelling was most pronounced last week but has improved significantly with compression stockings.  Additionally, daughter notes that she was helping her change last week and noted that she had a prolapsed uterus/bladder but patient has not seen her OBGYN yet. Patient is requiring more assistance with ADLs which is abnormal for her. Patient denies dysuria but states that she does have difficulty urinating and can only urinate a small amount. Denies chest pain, shortness of breath, abdominal pain, dizziness, nausea, vomiting, or syncope. Patient's only complaint at this time is right knee pain.    ED: vital signs stable, afebrile, no acute distress. Elevated liver enzymes with T.bili of 2.9,  AST//254, and alk phos of 224. Na 133, K 5.0, Cr 1.4 (most recent Cr of 0.8 in 12/21). D-dimer 11.97, CTA negative for PE. CTA showed evidence of distended gallbladder and emphysema. Subsequent RUQ US negative for cholecystitis or gallstones. BNP 66, troponin negative. UA negative for infection. Given 500 mL LR and placed in observation.       Overview/Hospital Course:  Patient admitted to hospitalist service.  She was noted to have elevated liver enzymes, acute renal insuff, hyperbilirubin.  D-dimer was significatnly elevated.   CTA demonstrated NO PTE but did show few bilateral pulmonary micro nodules which can be followed up as OP.   Right UQ US showed distended gallbladder but no sonographically detectable gallstone, and no additional sonographic evidence for acute cholecystitis.   Mild free fluid of the abdomen noted.  MRCP showed normal pancreatic duct, Gallbladder is distended without wall thickening or pericholecystic fluid.  Common bile duct is normal caliber and tapers distally to the ampulla.  No intrahepatic biliary dilatation.  No biliary filling defects identified.   Acute hepatitis panel negative.  Monospot/EBV neg. CMV neg. Ceruloplasmin normal. Elevated IgG and SONNY.   Tentative diagnosis of Autoimmune Hepatitis. Defer to hepatology for treatment.   She was also treated for mild hepatic encephalopathy with lactulose.          Interval History:   No acute events overnight.  Tolerating about 50% PO.   Afebrile.  Denies CP or SOB. NO abdominal pain.    Review of Systems   Constitutional:  Positive for activity change, appetite change and unexpected weight change.   Respiratory:  Negative for shortness of breath.    Cardiovascular:  Negative for chest pain and leg swelling.   Gastrointestinal:  Negative for abdominal pain and blood in stool.   Genitourinary:  Negative for difficulty urinating and hematuria.   Musculoskeletal:  Negative for joint swelling and myalgias.   Neurological:  Positive for  weakness. Negative for dizziness.   Psychiatric/Behavioral:  Negative for confusion.    Objective:     Vital Signs (Most Recent):  Temp: 97.7 °F (36.5 °C) (09/28/22 1545)  Pulse: 77 (09/28/22 1545)  Resp: 18 (09/28/22 1545)  BP: (!) 168/79 (09/28/22 1545)  SpO2: 98 % (09/28/22 1545)   Vital Signs (24h Range):  Temp:  [97 °F (36.1 °C)-98.3 °F (36.8 °C)] 97.7 °F (36.5 °C)  Pulse:  [62-77] 77  Resp:  [17-18] 18  SpO2:  [94 %-98 %] 98 %  BP: (130-168)/(69-79) 168/79     Weight: 50.8 kg (111 lb 15.9 oz)  Body mass index is 18.08 kg/m².    Intake/Output Summary (Last 24 hours) at 9/28/2022 1948  Last data filed at 9/28/2022 1850  Gross per 24 hour   Intake 2205 ml   Output --   Net 2205 ml      Physical Exam  Vitals reviewed.   Constitutional:       General: She is awake.      Appearance: She is underweight.   HENT:      Head: Normocephalic and atraumatic.   Eyes:      General: Lids are normal.      Conjunctiva/sclera: Conjunctivae normal.   Cardiovascular:      Rate and Rhythm: Normal rate and regular rhythm.      Heart sounds: Murmur heard.   Pulmonary:      Effort: Pulmonary effort is normal.      Breath sounds: Normal breath sounds.   Abdominal:      General: Bowel sounds are normal. There is no distension.      Palpations: Abdomen is soft. There is no mass.   Musculoskeletal:         General: Normal range of motion.      Right lower leg: No edema.      Left lower leg: No edema.   Skin:     General: Skin is warm and dry.      Coloration: Skin is not jaundiced.   Neurological:      General: No focal deficit present.      Mental Status: She is alert.      Motor: Weakness present.   Psychiatric:         Attention and Perception: Attention normal.         Mood and Affect: Mood normal.         Behavior: Behavior is cooperative.       Significant Labs: All pertinent labs within the past 24 hours have been reviewed.  BMP:   Recent Labs   Lab 09/28/22  0311   *   *   K 3.9   *   CO2 16*   BUN 19    CREATININE 0.7   CALCIUM 8.1*   MG 1.6     CBC:   Recent Labs   Lab 09/27/22  0739 09/28/22  0311   WBC 6.55 5.88   HGB 10.0* 8.8*   HCT 29.3* 25.7*   PLT 83* 73*     CMP:   Recent Labs   Lab 09/27/22  0500 09/28/22  0311   * 134*   K 4.7 3.9   * 113*   CO2 15* 16*   * 181*   BUN 19 19   CREATININE 0.8 0.7   CALCIUM 8.0* 8.1*   PROT 6.1 5.3*   ALBUMIN 1.6* 1.5*   BILITOT 2.3* 2.6*   ALKPHOS 172* 157*   * 242*   * 166*   ANIONGAP 7* 5*       Significant Imaging: I have reviewed all pertinent imaging results/findings within the past 24 hours.      Assessment/Plan:      * Autoimmune hepatitis  - tentative dx  - waiting for hepatology input  - treatment per hepatology       Acute liver failure without hepatic coma  - elevated PT, low albumin, thrombocytopenia  - elevated AST/ALT at 296/254, T. Bili 2.1, alk phos 224  - CTA of ab/pelvix,  subsequent RUQ US, and  MRCP demonstrate no cause of patient's liver failure  - Acute hepatitis panel neg, monospot neg, copper levels normal, EBV neg  - consult hepatology for further recommendations for workup  - Liver bx on 9/27  - suspect autoimmune hepatitis.       Hemolytic anemia  - with associated iron overload  - direct genny test NEG  - hematology consult  - elevated ddimer and low fibrinogen (monitor platelets, coags, evidence of bleeding or clotting)  - etiology unknown  - hematology review of PBS--> target cells, some bethany cells,  very few schistocytes (0-1 per hp), no blast  - 9/28--> secondary to liver failure     Hepatic encephalopathy  - mild  - lactulose bid--> adjusted to qday  - adjust dose as needed      EFREN (acute kidney injury)  Patient with acute kidney injury likely due to IVVD/dehydration EFREN is currently stable. Labs reviewed- Renal function/electrolytes with Estimated Creatinine Clearance: 51.4 mL/min (based on SCr of 0.7 mg/dL). according to latest data. Monitor urine output and serial BMP and adjust therapy as needed.  Avoid nephrotoxins and renally dose meds for GFR listed above.   - Cr 1.4 in the ED  - repeat Cr 1.1 after 500 ml LR  - continue to trend with CMP  - Resolved     Oral phase dysphagia  - has become more noticeable over past 2-4wks  - patient has to swallow multiple times to get food bolus down  - no issue with liquids  - no evidence of aspiration  - consult speech       Dehydration  - LR 75cc/hr  - resolved       Lethargy  Dehydration    Patient's daughter reports progressive lethargy and weakness onset about two weeks ago. Reports [id number] po intake, max of 1/2 a water bottle per day. She lives at home with her daughter. She also expresses concern of underlying depression as patient's twin sister  earlier this year and their birthday is coming up in November.  - CMP consistent with dehydration  - UA without infection  - given 500 ml LR in the ED  - boost glucose control ordered TIDWM  - nutrition consulted, appreciate assistance  - PT/OT consulted, appreciate assistance  - check FA(WNL), B12(WNL), ammonia level (elevated)  - TSH WNL  - improving     Severe protein-calorie malnutrition  Nutrition consulted. Most recent weight and BMI monitored-     Malnutrition Type and Energy Intake  Malnutrition Type: chronic illness  Energy Intake: severe energy intake    Malnutrition (Moderate to Severe)  Weight Loss (Malnutrition):  (14% x 9 months)  Energy Intake (Malnutrition): less than or equal to 75% for greater than or equal to 1 month  Subcutaneous Fat (Malnutrition): severe depletion  Muscle Mass (Malnutrition): severe depletion    Final Summary  Subcutaneous Fat Loss (Final Summary): severe protein-calorie malnutrition  Muscle Loss Evaluation (Final Summary): severe protein-calorie malnutrition    Malnutrition Final Summary  Severe Weight Loss (Malnutrition):  (14% x 9 months)    Measurements:  Wt Readings from Last 1 Encounters:   22 50.8 kg (111 lb 15.9 oz)   Body mass index is 18.08  kg/m².    Recommendations: Recommendation/Intervention: 1. Continue current diabetic/ low sodium diet- encourage adequate PO intake. - Consider liberalizing diet to just diabetic or regular if PO intake remains 50% or less. 2. Discontinue Boost Glucose Control per pt request. 3. RD following.  Goals: Will meet % EEN/EPN by next RD f/u.    Patient has been screened and assessed by RD. RD will follow patient.      Type 2 diabetes mellitus, without long-term current use of insulin  Patient's FSGs are uncontrolled due to hyperglycemia on current medication regimen.  - home regimen: metformin and glyburide  Last A1c reviewed-   Lab Results   Component Value Date    HGBA1C 5.3 09/21/2022     Most recent fingerstick glucose reviewed-   Recent Labs   Lab 09/21/22  1704 09/21/22  2343 09/22/22  0814 09/22/22  1140   POCTGLUCOSE 98 269* 169* 215*     Current correctional scale  Low  Maintain anti-hyperglycemic dose as follows-   Antihyperglycemics (From admission, onward)    Start     Stop Route Frequency Ordered    09/21/22 0256  insulin aspart U-100 pen 0-5 Units         -- SubQ Before meals & nightly PRN 09/21/22 0156        Hold Oral hypoglycemics while patient is in the hospital.    Hypertension  - BP currently well-controlled  - patient's daughter reports that patient's BP has been lower at home compared to baseline and states she has held BP medications for the past two days  - holding home regimen of amlodipine 10 mg daily and lisinopril/HCTZ 20-12.5 mg BID  - will continue to monitor and further titrate antihypertensives as clinically indicated         Leg swelling  - no previous hx of CHF  - echo ordered for the am--> neg for DVT  - resolved    Right knee pain  - Chronic  - reported trauma about 30 years ago  - follows with orthopedics outpatient  - recent MRI in august with chronic findings  - continue tylenol prn for pain      VTE Risk Mitigation (From admission, onward)         Ordered     Place sequential  compression device  Until discontinued         09/21/22 0932     IP VTE LOW RISK PATIENT  Once         09/21/22 0156                Discharge Planning   MELVI: 9/30/2022     Code Status: Full Code   Is the patient medically ready for discharge?: No    Reason for patient still in hospital (select all that apply): Treatment and Consult recommendations  Discharge Plan A: Home with family   Discharge Delays: None known at this time              Taylor Avalos MD  Department of Hospital Medicine   [facility]

## 2022-09-29 NOTE — PROGRESS NOTES
Hepatology Treatment Plan    Radha Feng is a 81 y.o. female admitted to hospital 9/20/2022 (Hospital Day: 10) due to Autoimmune hepatitis.     Interval History  Biopsy results showing findings consistent with autoimmune hepatitis.    Objective  Temp:  [97.7 °F (36.5 °C)-98.7 °F (37.1 °C)] 97.8 °F (36.6 °C) (09/29 1123)  Pulse:  [71-80] 78 (09/29 1123)  BP: (119-168)/(60-88) 130/71 (09/29 1123)  Resp:  [18-20] 18 (09/29 1123)  SpO2:  [94 %-98 %] 96 % (09/29 1123)    General: Alert, Oriented x3, no distress  Neurologic: Asterixis absent  Abdomen: Normoactive bowel sounds. Non-distended. Normal tympany. Soft. Non-tender. No peritoneal signs.    Laboratory    Lab Results   Component Value Date    WBC 6.59 09/29/2022    HGB 9.0 (L) 09/29/2022    HCT 26.9 (L) 09/29/2022     (H) 09/29/2022    PLT 65 (L) 09/29/2022       Lab Results   Component Value Date     (L) 09/29/2022    K 4.2 09/29/2022     (H) 09/29/2022    CO2 19 (L) 09/29/2022    BUN 18 09/29/2022    CREATININE 0.7 09/29/2022    CALCIUM 7.9 (L) 09/29/2022       Lab Results   Component Value Date    ALBUMIN 1.6 (L) 09/29/2022     (H) 09/29/2022     (H) 09/29/2022    ALKPHOS 171 (H) 09/29/2022    BILITOT 2.4 (H) 09/29/2022       Lab Results   Component Value Date    INR 1.5 (H) 09/29/2022    INR 1.6 (H) 09/28/2022    INR 1.4 (H) 09/27/2022       MELD-Na score: 16 at 9/29/2022  3:29 AM  MELD score: 14 at 9/29/2022  3:29 AM  Calculated from:  Serum Creatinine: 0.7 mg/dL (Using min of 1 mg/dL) at 9/29/2022  3:29 AM  Serum Sodium: 135 mmol/L at 9/29/2022  3:29 AM  Total Bilirubin: 2.4 mg/dL at 9/29/2022  3:29 AM  INR(ratio): 1.5 at 9/29/2022  3:29 AM  Age: 81 years    Plan  - START prednisone 40mg daily  - STOP atorvastatin on discharge  - will monitor for response to steroids  - please obtain daily CBC, BMP, LFT, INR  - Plan of care was discussed with primary team    Thank you for involving us in the care of Radha Feng. Please  call with any additional concerns or questions.    Bud Nam MD  Gastroenterology Fellow

## 2022-09-29 NOTE — SUBJECTIVE & OBJECTIVE
Interval History: NAEON. No acute concerns. Daughter at bedside.       Review of Systems   Constitutional:  Negative for chills and fever.   Respiratory:  Negative for shortness of breath.    Cardiovascular:  Negative for chest pain.   Gastrointestinal:  Negative for abdominal pain.   Genitourinary:  Negative for dysuria.   Neurological:  Negative for headaches.   Psychiatric/Behavioral:  Negative for confusion.      Objective:     Vital Signs (Most Recent):  Temp: 98.1 °F (36.7 °C) (09/29/22 0559)  Pulse: 75 (09/29/22 0559)  Resp: 20 (09/29/22 0001)  BP: (!) 150/69 (09/29/22 0559)  SpO2: 98 % (09/29/22 0559)   Vital Signs (24h Range):  Temp:  [97.6 °F (36.4 °C)-98.7 °F (37.1 °C)] 98.1 °F (36.7 °C)  Pulse:  [64-80] 75  Resp:  [17-20] 20  SpO2:  [94 %-98 %] 98 %  BP: (119-168)/(60-88) 150/69     Weight: 50.8 kg (111 lb 15.9 oz)  Body mass index is 18.08 kg/m².    Intake/Output Summary (Last 24 hours) at 9/29/2022 0730  Last data filed at 9/29/2022 0600  Gross per 24 hour   Intake 1725 ml   Output --   Net 1725 ml      Physical Exam  Vitals and nursing note reviewed.   Constitutional:       General: She is not in acute distress.     Appearance: She is well-developed.   HENT:      Head: Normocephalic and atraumatic.   Eyes:      Conjunctiva/sclera: Conjunctivae normal.   Neck:      Vascular: No JVD.   Cardiovascular:      Rate and Rhythm: Normal rate and regular rhythm.      Heart sounds: Normal heart sounds.   Pulmonary:      Effort: Pulmonary effort is normal.      Breath sounds: Normal breath sounds.   Abdominal:      General: Bowel sounds are normal. There is no distension.      Palpations: Abdomen is soft.      Tenderness: There is no abdominal tenderness.   Musculoskeletal:      Cervical back: Neck supple.      Right lower leg: Edema present.      Left lower leg: Edema present.   Neurological:      Mental Status: She is alert.   Psychiatric:         Behavior: Behavior normal.       Significant Labs: All pertinent  labs within the past 24 hours have been reviewed.  CBC:   Recent Labs   Lab 09/27/22  0739 09/28/22  0311 09/29/22  0329   WBC 6.55 5.88 6.59   HGB 10.0* 8.8* 9.0*   HCT 29.3* 25.7* 26.9*   PLT 83* 73* 65*     CMP:   Recent Labs   Lab 09/28/22  0311 09/29/22  0329   * 135*   K 3.9 4.2   * 113*   CO2 16* 19*   * 198*   BUN 19 18   CREATININE 0.7 0.7   CALCIUM 8.1* 7.9*   PROT 5.3* 5.6*   ALBUMIN 1.5* 1.6*   BILITOT 2.6* 2.4*   ALKPHOS 157* 171*   * 225*   * 166*   ANIONGAP 5* 3*     Magnesium:   Recent Labs   Lab 09/28/22  0311 09/29/22  0329   MG 1.6 1.6     POCT Glucose:   Recent Labs   Lab 09/28/22  1145 09/28/22  1614 09/28/22  2203   POCTGLUCOSE 250* 221* 227*       Significant Imaging: I have reviewed all pertinent imaging results/findings within the past 24 hours.

## 2022-09-29 NOTE — PROGRESS NOTES
[facility]  Adult Nutrition  Progress Note    SUMMARY     Recommendations    1. Continue current diabetic/ low sodium diet- encourage adequate PO intake.   - Consider liberalizing diet to just diabetic or regular if PO intake remains 50% or less.     2. Discontinue Boost Glucose Control per pt request.     3. RD following.  Goals: Will meet % EEN/EPN by next RD f/u.  Nutrition Goal Status: new  Communication of RD Recs:  (POC)    Assessment and Plan    Severe protein-calorie malnutrition  Contributing Nutrition Diagnosis  Severe malnutrition in the context of chronic illness    Related to (etiology):   Decreased PO intake/appetite    Signs and Symptoms (as evidenced by):   Energy Intake: less than or equal to 75% of estimated energy requirement for greater than or equal to 1 month  Wt Loss: 14% x 9 months  Body Fat Depletion: severe depletion of orbital, buccal, and upper are regions  Muscle Mass Depletion: severe depletion of temple, clavicle, acromion bone, and dorsal hand regions    Interventions/Recommendations (treatment strategy):  Collaboration of nutrition care with other providers   Encourage adequate PO intake    Nutrition Diagnosis Status:   New           Malnutrition Assessment  Malnutrition Type: chronic illness  Energy Intake: severe energy intake          Weight Loss (Malnutrition):  (14% x 9 months)  Energy Intake (Malnutrition): less than or equal to 75% for greater than or equal to 1 month  Subcutaneous Fat (Malnutrition): severe depletion  Muscle Mass (Malnutrition): severe depletion   Orbital Region (Subcutaneous Fat Loss): severe depletion  Upper Arm Region (Subcutaneous Fat Loss): severe depletion   [place] Region (Muscle Loss): severe depletion  Clavicle and Acromion Bone Region (Muscle Loss): severe depletion  Dorsal Hand (Muscle Loss): severe depletion       Subcutaneous Fat Loss (Final Summary): severe protein-calorie malnutrition  Muscle Loss Evaluation (Final Summary):  "severe protein-calorie malnutrition    Severe Weight Loss (Malnutrition):  (14% x 9 months)    Reason for Assessment    Reason For Assessment: RD follow-up  Diagnosis:  (Lethargy)  Relevant Medical History: Increased liver enzymes, HTN, T2DM< EFREN, PVD, leg swelling x 2 weeks  Interdisciplinary Rounds: did not attend  General Information Comments: Pt seen for f/u, noted with 50% intake of meal, no N/V/D/C. Wts stable Rd following  Nutrition Discharge Planning: Adequate nutrition    Nutrition Risk Screen    Nutrition Risk Screen: no indicators present    Nutrition/Diet History    Food Preferences: Apple juice  Food Allergies: NKFA  Factors Affecting Nutritional Intake: decreased appetite    Anthropometrics    Temp: 97.2 °F (36.2 °C)  Height Method: Stated  Height: 5' 6" (167.6 cm)  Height (inches): 66 in  Weight Method: Standard Scale  Weight: 50.8 kg (111 lb 15.9 oz)  Weight (lb): 111.99 lb  Ideal Body Weight (IBW), Female: 130 lb  % Ideal Body Weight, Female (lb): 86.15 %  BMI (Calculated): 18.1  BMI Grade: 17 - 18.4 protein-energy malnutrition grade I       Lab/Procedures/Meds    Pertinent Labs Reviewed: reviewed  Pertinent Labs Comments: h/h:9.0/26.9, mcv:101, mch:33.7, na:135, chloride:113, bun:3, glucose:198, calcium:7.9, alkaline phophatase:171, ast 225, alt 166  Pertinent Medications Reviewed: reviewed  Pertinent Medications Comments: insulin, famotidine      Estimated/Assessed Needs    Weight Used For Calorie Calculations: 59 kg (130 lb)  Energy Calorie Requirements (kcal): 6466-5292 kcal  Energy Need Method:  (30-35 kcal/kg IBW)  Protein Requirements: 77-89 g (1.2-1.5 g/kg IBW)  Weight Used For Protein Calculations: 59 kg (130 lb)  Fluid Requirements (mL): 1 ml or fluid per MD  Estimated Fluid Requirement Method: RDA Method  RDA Method (mL): 1769  CHO Requirement: 221 g      Nutrition Prescription Ordered    Current Diet Order: DM, Low Sodium  Oral Nutrition Supplement: Boost Glucose Control " TID    Evaluation of Received Nutrient/Fluid Intake    I/O: +387  Energy Calories Required: not meeting needs  Protein Required: not meeting needs  Fluid Required: not meeting needs  Comments: LBM 9/29  Tolerance: tolerating  % Intake of Estimated Energy Needs: 50 - 75 %  % Meal Intake: 50 - 75 %    Nutrition Risk    Level of Risk/Frequency of Follow-up:  (1 time/week)     Monitor and Evaluation    Food and Nutrient Intake: energy intake, food and beverage intake  Food and Nutrient Adminstration: diet order  Knowledge/Beliefs/Attitudes: food and nutrition knowledge/skill, beliefs and attitudes  Physical Activity and Function: nutrition-related ADLs and IADLs  Anthropometric Measurements: height/length, weight, weight change, body mass index  Biochemical Data, Medical Tests and Procedures: electrolyte and renal panel, gastrointestinal profile, glucose/endocrine profile, inflammatory profile, lipid profile  Nutrition-Focused Physical Findings: overall appearance     Nutrition Follow-Up    RD Follow-up?: Yes

## 2022-09-29 NOTE — PLAN OF CARE
Recommendations    1. Continue current diabetic/ low sodium diet- encourage adequate PO intake.   - Consider liberalizing diet to just diabetic or regular if PO intake remains 50% or less.     2. Discontinue Boost Glucose Control per pt request.     3. RD following.  Goals: Will meet % EEN/EPN by next RD f/u.  Nutrition Goal Status: new  Communication of RD Recs:  (POC)

## 2022-09-29 NOTE — ASSESSMENT & PLAN NOTE
Patient with acute kidney injury likely due to IVVD/dehydration EFREN is currently stable. Labs reviewed- Renal function/electrolytes with Estimated Creatinine Clearance: 51.4 mL/min (based on SCr of 0.7 mg/dL). according to latest data. Monitor urine output and serial BMP and adjust therapy as needed. Avoid nephrotoxins and renally dose meds for GFR listed above.   - Cr 1.4 in the ED  - repeat Cr 1.1 after 500 ml LR  - continue to trend with CMP  - Resolved

## 2022-09-29 NOTE — ASSESSMENT & PLAN NOTE
- elevated PT, low albumin, thrombocytopenia  - elevated AST/ALT at 296/254, T. Bili 2.1, alk phos 224  - CTA of ab/pelvix,  subsequent RUQ US, and  MRCP demonstrate no cause of patient's liver failure  - Acute hepatitis panel neg, monospot neg, copper levels normal, EBV neg  - consult hepatology for further recommendations for workup  - Liver bx on 9/27  - suspect autoimmune hepatitis.

## 2022-09-29 NOTE — ASSESSMENT & PLAN NOTE
- with associated iron overload  - direct genny test NEG  - hematology consult  - elevated ddimer and low fibrinogen (monitor platelets, coags, evidence of bleeding or clotting)  - etiology unknown  - hematology review of PBS--> target cells, some bethany cells,  very few schistocytes (0-1 per hp), no blast  - 9/28--> secondary to liver failure

## 2022-09-29 NOTE — SUBJECTIVE & OBJECTIVE
Interval History:   No acute events overnight.  Tolerating about 50% PO.   Afebrile.  Denies CP or SOB. NO abdominal pain.    Review of Systems   Constitutional:  Positive for activity change, appetite change and unexpected weight change.   Respiratory:  Negative for shortness of breath.    Cardiovascular:  Negative for chest pain and leg swelling.   Gastrointestinal:  Negative for abdominal pain and blood in stool.   Genitourinary:  Negative for difficulty urinating and hematuria.   Musculoskeletal:  Negative for joint swelling and myalgias.   Neurological:  Positive for weakness. Negative for dizziness.   Psychiatric/Behavioral:  Negative for confusion.    Objective:     Vital Signs (Most Recent):  Temp: 97.7 °F (36.5 °C) (09/28/22 1545)  Pulse: 77 (09/28/22 1545)  Resp: 18 (09/28/22 1545)  BP: (!) 168/79 (09/28/22 1545)  SpO2: 98 % (09/28/22 1545)   Vital Signs (24h Range):  Temp:  [97 °F (36.1 °C)-98.3 °F (36.8 °C)] 97.7 °F (36.5 °C)  Pulse:  [62-77] 77  Resp:  [17-18] 18  SpO2:  [94 %-98 %] 98 %  BP: (130-168)/(69-79) 168/79     Weight: 50.8 kg (111 lb 15.9 oz)  Body mass index is 18.08 kg/m².    Intake/Output Summary (Last 24 hours) at 9/28/2022 1948  Last data filed at 9/28/2022 1850  Gross per 24 hour   Intake 2205 ml   Output --   Net 2205 ml      Physical Exam  Vitals reviewed.   Constitutional:       General: She is awake.      Appearance: She is underweight.   HENT:      Head: Normocephalic and atraumatic.   Eyes:      General: Lids are normal.      Conjunctiva/sclera: Conjunctivae normal.   Cardiovascular:      Rate and Rhythm: Normal rate and regular rhythm.      Heart sounds: Murmur heard.   Pulmonary:      Effort: Pulmonary effort is normal.      Breath sounds: Normal breath sounds.   Abdominal:      General: Bowel sounds are normal. There is no distension.      Palpations: Abdomen is soft. There is no mass.   Musculoskeletal:         General: Normal range of motion.      Right lower leg: No edema.       Left lower leg: No edema.   Skin:     General: Skin is warm and dry.      Coloration: Skin is not jaundiced.   Neurological:      General: No focal deficit present.      Mental Status: She is alert.      Motor: Weakness present.   Psychiatric:         Attention and Perception: Attention normal.         Mood and Affect: Mood normal.         Behavior: Behavior is cooperative.       Significant Labs: All pertinent labs within the past 24 hours have been reviewed.  BMP:   Recent Labs   Lab 09/28/22 0311   *   *   K 3.9   *   CO2 16*   BUN 19   CREATININE 0.7   CALCIUM 8.1*   MG 1.6     CBC:   Recent Labs   Lab 09/27/22  0739 09/28/22 0311   WBC 6.55 5.88   HGB 10.0* 8.8*   HCT 29.3* 25.7*   PLT 83* 73*     CMP:   Recent Labs   Lab 09/27/22  0500 09/28/22 0311   * 134*   K 4.7 3.9   * 113*   CO2 15* 16*   * 181*   BUN 19 19   CREATININE 0.8 0.7   CALCIUM 8.0* 8.1*   PROT 6.1 5.3*   ALBUMIN 1.6* 1.5*   BILITOT 2.3* 2.6*   ALKPHOS 172* 157*   * 242*   * 166*   ANIONGAP 7* 5*       Significant Imaging: I have reviewed all pertinent imaging results/findings within the past 24 hours.

## 2022-09-30 LAB
ALBUMIN SERPL BCP-MCNC: 1.6 G/DL (ref 3.5–5.2)
ALP SERPL-CCNC: 160 U/L (ref 55–135)
ALT SERPL W/O P-5'-P-CCNC: 176 U/L (ref 10–44)
ANION GAP SERPL CALC-SCNC: 3 MMOL/L (ref 8–16)
AST SERPL-CCNC: 265 U/L (ref 10–40)
BASOPHILS # BLD AUTO: 0.03 K/UL (ref 0–0.2)
BASOPHILS NFR BLD: 0.5 % (ref 0–1.9)
BILIRUB SERPL-MCNC: 2.7 MG/DL (ref 0.1–1)
BUN SERPL-MCNC: 18 MG/DL (ref 8–23)
CALCIUM SERPL-MCNC: 8.1 MG/DL (ref 8.7–10.5)
CHLORIDE SERPL-SCNC: 114 MMOL/L (ref 95–110)
CO2 SERPL-SCNC: 17 MMOL/L (ref 23–29)
CREAT SERPL-MCNC: 0.7 MG/DL (ref 0.5–1.4)
DIFFERENTIAL METHOD: ABNORMAL
EOSINOPHIL # BLD AUTO: 0.1 K/UL (ref 0–0.5)
EOSINOPHIL NFR BLD: 2.4 % (ref 0–8)
ERYTHROCYTE [DISTWIDTH] IN BLOOD BY AUTOMATED COUNT: 21.2 % (ref 11.5–14.5)
EST. GFR  (NO RACE VARIABLE): >60 ML/MIN/1.73 M^2
GLUCOSE SERPL-MCNC: 118 MG/DL (ref 70–110)
HBV CORE AB SERPL QL IA: NORMAL
HBV SURFACE AB SER-ACNC: <3 MIU/ML
HBV SURFACE AB SER-ACNC: NORMAL M[IU]/ML
HBV SURFACE AG SERPL QL IA: NORMAL
HCT VFR BLD AUTO: 27.8 % (ref 37–48.5)
HGB BLD-MCNC: 9.5 G/DL (ref 12–16)
IMM GRANULOCYTES # BLD AUTO: 0.02 K/UL (ref 0–0.04)
IMM GRANULOCYTES NFR BLD AUTO: 0.3 % (ref 0–0.5)
INR PPP: 1.5 (ref 0.8–1.2)
LYMPHOCYTES # BLD AUTO: 0.7 K/UL (ref 1–4.8)
LYMPHOCYTES NFR BLD: 11.5 % (ref 18–48)
MCH RBC QN AUTO: 35.1 PG (ref 27–31)
MCHC RBC AUTO-ENTMCNC: 34.2 G/DL (ref 32–36)
MCV RBC AUTO: 103 FL (ref 82–98)
MONOCYTES # BLD AUTO: 0.5 K/UL (ref 0.3–1)
MONOCYTES NFR BLD: 7.9 % (ref 4–15)
NEUTROPHILS # BLD AUTO: 4.4 K/UL (ref 1.8–7.7)
NEUTROPHILS NFR BLD: 77.4 % (ref 38–73)
NRBC BLD-RTO: 1 /100 WBC
PLATELET # BLD AUTO: 80 K/UL (ref 150–450)
PMV BLD AUTO: 12.3 FL (ref 9.2–12.9)
POCT GLUCOSE: 129 MG/DL (ref 70–110)
POCT GLUCOSE: 200 MG/DL (ref 70–110)
POCT GLUCOSE: 290 MG/DL (ref 70–110)
POCT GLUCOSE: 98 MG/DL (ref 70–110)
POTASSIUM SERPL-SCNC: 4 MMOL/L (ref 3.5–5.1)
PROT SERPL-MCNC: 6 G/DL (ref 6–8.4)
PROTHROMBIN TIME: 15.6 SEC (ref 9–12.5)
RBC # BLD AUTO: 2.71 M/UL (ref 4–5.4)
SODIUM SERPL-SCNC: 134 MMOL/L (ref 136–145)
WBC # BLD AUTO: 5.73 K/UL (ref 3.9–12.7)

## 2022-09-30 PROCEDURE — 25000003 PHARM REV CODE 250

## 2022-09-30 PROCEDURE — 85610 PROTHROMBIN TIME: CPT | Performed by: FAMILY MEDICINE

## 2022-09-30 PROCEDURE — 99232 SBSQ HOSP IP/OBS MODERATE 35: CPT | Mod: ,,, | Performed by: FAMILY MEDICINE

## 2022-09-30 PROCEDURE — 25000003 PHARM REV CODE 250: Performed by: INTERNAL MEDICINE

## 2022-09-30 PROCEDURE — 85025 COMPLETE CBC W/AUTO DIFF WBC: CPT | Performed by: FAMILY MEDICINE

## 2022-09-30 PROCEDURE — 12000002 HC ACUTE/MED SURGE SEMI-PRIVATE ROOM

## 2022-09-30 PROCEDURE — 25000003 PHARM REV CODE 250: Performed by: FAMILY MEDICINE

## 2022-09-30 PROCEDURE — 63600175 PHARM REV CODE 636 W HCPCS: Performed by: FAMILY MEDICINE

## 2022-09-30 PROCEDURE — 80053 COMPREHEN METABOLIC PANEL: CPT | Performed by: INTERNAL MEDICINE

## 2022-09-30 PROCEDURE — 99232 PR SUBSEQUENT HOSPITAL CARE,LEVL II: ICD-10-PCS | Mod: ,,, | Performed by: FAMILY MEDICINE

## 2022-09-30 PROCEDURE — 92610 EVALUATE SWALLOWING FUNCTION: CPT

## 2022-09-30 RX ORDER — GUAIFENESIN 600 MG/1
600 TABLET, EXTENDED RELEASE ORAL 2 TIMES DAILY
Status: DISCONTINUED | OUTPATIENT
Start: 2022-09-30 | End: 2022-10-11

## 2022-09-30 RX ADMIN — PREDNISONE 40 MG: 20 TABLET ORAL at 08:09

## 2022-09-30 RX ADMIN — INSULIN ASPART 4 UNITS: 100 INJECTION, SOLUTION INTRAVENOUS; SUBCUTANEOUS at 12:09

## 2022-09-30 RX ADMIN — LATANOPROST 1 DROP: 50 SOLUTION OPHTHALMIC at 09:09

## 2022-09-30 RX ADMIN — CAPSAICIN: 0.25 CREAM TOPICAL at 08:09

## 2022-09-30 RX ADMIN — BRIMONIDINE TARTRATE 1 DROP: 2 SOLUTION OPHTHALMIC at 09:09

## 2022-09-30 RX ADMIN — INSULIN ASPART 1 UNITS: 100 INJECTION, SOLUTION INTRAVENOUS; SUBCUTANEOUS at 08:09

## 2022-09-30 RX ADMIN — INSULIN ASPART 4 UNITS: 100 INJECTION, SOLUTION INTRAVENOUS; SUBCUTANEOUS at 08:09

## 2022-09-30 RX ADMIN — ACETAMINOPHEN 1000 MG: 500 TABLET ORAL at 08:09

## 2022-09-30 RX ADMIN — ACETAMINOPHEN 650 MG: 325 TABLET ORAL at 08:09

## 2022-09-30 RX ADMIN — FAMOTIDINE 20 MG: 20 TABLET ORAL at 08:09

## 2022-09-30 RX ADMIN — GUAIFENESIN 600 MG: 600 TABLET, EXTENDED RELEASE ORAL at 08:09

## 2022-09-30 RX ADMIN — BRIMONIDINE TARTRATE 1 DROP: 2 SOLUTION OPHTHALMIC at 08:09

## 2022-09-30 RX ADMIN — INSULIN ASPART 4 UNITS: 100 INJECTION, SOLUTION INTRAVENOUS; SUBCUTANEOUS at 04:09

## 2022-09-30 NOTE — PROGRESS NOTES
[facility]  Progress Note    Patient Name: Radha Feng  MRN: [id number]  Patient Class: IP- Inpatient   Admission Date: 9/20/2022  Length of Stay: 8 days  Attending Physician: Malaika Madrigal MD  Primary Care Provider: Primary Doctor No      Subjective:     Principal Problem:Autoimmune hepatitis      HPI:  Radha Feng is a 81 y.o. female with a past medical history of type 2 diabetes, hyperlipidemia, hypertension, glaucoma and cataracts presents the emergency department due to progressive lethargy and weakness. Patient's daughter at the bedside who provides the majority of the history due to the acuity of patient's condition. Per daughter, patient was sent from her cardiology office earlier today due to increased bilateral leg swelling, shortness of breath, and progressive weakness over the past two weeks. She reports that over the past two weeks patient has had a poor appetite with significantly decreased po intake. In addition, she reports that patient's BP has been lower at home compared to baseline and states she has held BP medications (amlodipine and lisinopril-HCTZ) for the past two days. She states her BP is normally 130/80 but as been as low as 98/63. Per daughter, lower extremity swelling was most pronounced last week but has improved significantly with compression stockings.  Additionally, daughter notes that she was helping her change last week and noted that she had a prolapsed uterus/bladder but patient has not seen her OBGYN yet. Patient is requiring more assistance with ADLs which is abnormal for her. Patient denies dysuria but states that she does have difficulty urinating and can only urinate a small amount. Denies chest pain, shortness of breath, abdominal pain, dizziness, nausea, vomiting, or syncope. Patient's only complaint at this time is right knee pain.    ED: vital signs stable, afebrile, no acute distress. Elevated liver enzymes with T.bili of 2.9,  AST//254, and alk phos of 224. Na 133, K 5.0, Cr 1.4 (most recent Cr of 0.8 in 12/21). D-dimer 11.97, CTA negative for PE. CTA showed evidence of distended gallbladder and emphysema. Subsequent RUQ US negative for cholecystitis or gallstones. BNP 66, troponin negative. UA negative for infection. Given 500 mL LR and placed in observation.       Overview/Hospital Course:  Patient admitted to hospitalist service.  She was noted to have elevated liver enzymes, acute renal insuff, hyperbilirubin.  D-dimer was significatnly elevated.   CTA demonstrated NO PTE but did show few bilateral pulmonary micro nodules which can be followed up as OP.   Right UQ US showed distended gallbladder but no sonographically detectable gallstone, and no additional sonographic evidence for acute cholecystitis.   Mild free fluid of the abdomen noted.  MRCP showed normal pancreatic duct, Gallbladder is distended without wall thickening or pericholecystic fluid.  Common bile duct is normal caliber and tapers distally to the ampulla.  No intrahepatic biliary dilatation.  No biliary filling defects identified.   Acute hepatitis panel negative.  Monospot/EBV neg. CMV neg. Ceruloplasmin normal. Elevated IgG and SONNY.   Tentative diagnosis of Autoimmune Hepatitis. Defer to hepatology for treatment.   She was also treated for mild hepatic encephalopathy with lactulose.          Interval History: NAEON. No acute concerns. Daughter at bedside.       Review of Systems   Constitutional:  Negative for chills and fever.   Respiratory:  Negative for shortness of breath.    Cardiovascular:  Negative for chest pain.   Gastrointestinal:  Negative for abdominal pain.   Genitourinary:  Negative for dysuria.   Neurological:  Negative for headaches.   Psychiatric/Behavioral:  Negative for confusion.      Objective:     Vital Signs (Most Recent):  Temp: 97.8 °F (36.6 °C) (09/30/22 0804)  Pulse: 82 (09/30/22 0804)  Resp: 18 (09/30/22 0804)  BP: 139/85 (09/30/22  0804)  SpO2: 98 % (09/30/22 0804)   Vital Signs (24h Range):  Temp:  [97.2 °F (36.2 °C)-98.6 °F (37 °C)] 97.8 °F (36.6 °C)  Pulse:  [69-82] 82  Resp:  [18-19] 18  SpO2:  [93 %-100 %] 98 %  BP: (123-165)/(60-85) 139/85     Weight: 50.8 kg (111 lb 15.9 oz)  Body mass index is 18.08 kg/m².    Intake/Output Summary (Last 24 hours) at 9/30/2022 1042  Last data filed at 9/30/2022 0500  Gross per 24 hour   Intake 400 ml   Output --   Net 400 ml        Physical Exam  Vitals and nursing note reviewed.   Constitutional:       General: She is not in acute distress.     Appearance: She is well-developed.   HENT:      Head: Normocephalic and atraumatic.   Eyes:      Conjunctiva/sclera: Conjunctivae normal.   Neck:      Vascular: No JVD.   Cardiovascular:      Rate and Rhythm: Normal rate and regular rhythm.      Heart sounds: Normal heart sounds.   Pulmonary:      Effort: Pulmonary effort is normal.      Breath sounds: Normal breath sounds.   Abdominal:      General: Bowel sounds are normal. There is no distension.      Palpations: Abdomen is soft.      Tenderness: There is no abdominal tenderness.   Musculoskeletal:      Cervical back: Neck supple.      Right lower leg: Edema present.      Left lower leg: Edema present.   Neurological:      Mental Status: She is alert.   Psychiatric:         Behavior: Behavior normal.       Significant Labs: All pertinent labs within the past 24 hours have been reviewed.  CBC:   Recent Labs   Lab 09/29/22  0329   WBC 6.59   HGB 9.0*   HCT 26.9*   PLT 65*       CMP:   Recent Labs   Lab 09/29/22  0329   *   K 4.2   *   CO2 19*   *   BUN 18   CREATININE 0.7   CALCIUM 7.9*   PROT 5.6*   ALBUMIN 1.6*   BILITOT 2.4*   ALKPHOS 171*   *   *   ANIONGAP 3*       Magnesium:   Recent Labs   Lab 09/29/22  0329   MG 1.6       POCT Glucose:   Recent Labs   Lab 09/29/22  1538 09/29/22  2019 09/30/22  0805   POCTGLUCOSE 271* 168* 129*         Significant Imaging: I have reviewed  all pertinent imaging results/findings within the past 24 hours.      Assessment/Plan:      * Autoimmune hepatitis  - continue prednisone. Further POC per hepatology.         Acute liver failure without hepatic coma  - CTA of ab/pelvix,  subsequent RUQ US, and  MRCP demonstrate no cause of patient's liver failure  - Acute hepatitis panel neg, monospot neg, copper levels normal, EBV neg  - Liver bx on 9/27-- Transjugular liver biopsy with pressure measurements         Hemolytic anemia  - with associated iron overload  - direct genny test NEG  - elevated ddimer and low fibrinogen (monitor platelets, coags, evidence of bleeding or clotting)  - etiology unknown  - hematology review of PBS--> target cells, some bethany cells,  very few schistocytes (0-1 per hp), no blast  - 9/28--> secondary to liver failure    - hematology signed off-- no further recs unless counts become unstable.         Oral phase dysphagia   - has become more noticeable over past 2-4wks  - patient has to swallow multiple times to get food bolus down  - no issue with liquids  - no evidence of aspiration  - consult speech      Severe protein-calorie malnutrition  Nutrition following. Maximize oral intake.        Type 2 diabetes mellitus, without long-term current use of insulin  Patient's FSGs are uncontrolled due to hyperglycemia on current medication regimen.  - home regimen: metformin and glyburide  Last A1c reviewed-             Lab Results   Component Value Date     HGBA1C 5.3 09/21/2022      - increased detemir. Will need to adjust with steroid therapy being initiated. Continue SSI. Monitor.         Hypertension  - BP stable. Monitor with initiation of steroid therapy.      Leg swelling  - no previous hx of CHF  - echo w grade II DD-- no acute exacerbation  - dopplers neg for DVT     Right knee pain  - Chronic  - reported trauma about 30 years ago  - follows with orthopedics outpatient  - recent MRI in august with chronic findings  - continue  tylenol prn for pain      Hepatic encephalopathy  - resolved   - lactulose bid--> adjusted to qday  - adjust dose as needed      Dehydration  - resolved      EFREN (acute kidney injury)  - Resolved     VTE Risk Mitigation (From admission, onward)           Ordered     Place sequential compression device  Until discontinued         09/21/22 0932     IP VTE LOW RISK PATIENT  Once         09/21/22 0156                    Discharge Planning   MELVI: 10/1/2022     Code Status: Full Code   Is the patient medically ready for discharge?: No    Reason for patient still in hospital (select all that apply): Patient trending condition, Treatment and Consult recommendations  Discharge Plan A: Home with family   Discharge Delays: None known at this time      Malaika Madrigal MD  Department of Hospital Medicine   [facility]

## 2022-09-30 NOTE — TREATMENT PLAN
Hepatology Treatment Plan    Radha Feng is a 81 y.o. female admitted to hospital 9/20/2022 (Hospital Day: 11) due to Autoimmune hepatitis.     Interval History  NAEON.  Started on PO prednisone    Objective  Temp:  [97.2 °F (36.2 °C)-98.6 °F (37 °C)] 97.6 °F (36.4 °C) (09/30 1125)  Pulse:  [67-82] 67 (09/30 1125)  BP: (123-165)/(60-85) 148/72 (09/30 1125)  Resp:  [18-19] 18 (09/30 1125)  SpO2:  [93 %-100 %] 94 % (09/30 1125)      Laboratory    Lab Results   Component Value Date    WBC 5.73 09/30/2022    HGB 9.5 (L) 09/30/2022    HCT 27.8 (L) 09/30/2022     (H) 09/30/2022    PLT 80 (L) 09/30/2022       Lab Results   Component Value Date     (L) 09/29/2022    K 4.2 09/29/2022     (H) 09/29/2022    CO2 19 (L) 09/29/2022    BUN 18 09/29/2022    CREATININE 0.7 09/29/2022    CALCIUM 7.9 (L) 09/29/2022       Lab Results   Component Value Date    ALBUMIN 1.6 (L) 09/29/2022     (H) 09/29/2022     (H) 09/29/2022    ALKPHOS 171 (H) 09/29/2022    BILITOT 2.4 (H) 09/29/2022       Lab Results   Component Value Date    INR 1.5 (H) 09/30/2022    INR 1.5 (H) 09/29/2022    INR 1.6 (H) 09/28/2022       MELD-Na score: 16 at 9/30/2022 11:00 AM  MELD score: 14 at 9/30/2022 11:00 AM  Calculated from:  Serum Creatinine: 0.7 mg/dL (Using min of 1 mg/dL) at 9/29/2022  3:29 AM  Serum Sodium: 135 mmol/L at 9/29/2022  3:29 AM  Total Bilirubin: 2.4 mg/dL at 9/29/2022  3:29 AM  INR(ratio): 1.5 at 9/30/2022 11:00 AM  Age: 81 years    Plan  - Continues on PO prednisone  - trend liver chemistries daily  - please obtain daily CBC, BMP, LFT, INR  - Plan of care was discussed with primary team    Thank you for involving us in the care of Radha Feng. Please call with any additional concerns or questions.    Bud Nam MD  Gastroenterology Fellow

## 2022-09-30 NOTE — PT/OT/SLP EVAL
Speech Language Pathology Evaluation & Discharge Summary  Bedside Swallow    Patient Name:  Radha Feng   MRN:  [id number]  Admitting Diagnosis: Autoimmune hepatitis    Recommendations:                 General Recommendations:  Follow-up not indicated  Diet recommendations:  Regular, Thin   Aspiration Precautions:  Cueing for timely mastication, 1 bite/sip at a time, Assistance with meals, Small bites/sips, and Standard aspiration precautions   General Precautions: Standard,    Communication strategies:  none    History:     Past Medical History:   Diagnosis Date    Cataract     Diabetes mellitus     Glaucoma     Hypertension        Past Surgical History:   Procedure Laterality Date    CATARACT EXTRACTION W/  INTRAOCULAR LENS IMPLANT Left 12/21/2021    Procedure: EXTRACTION, CATARACT, WITH IOL INSERTION;  Surgeon: Shya Chou MD;  Location: [facility];  Service: Ophthalmology;  Laterality: Left;     Prior Intubation HX:  None this admission    Modified Barium Swallow: None on file    Chest X-Rays 9/20/22: No acute findings in the chest.    Prior diet: reg/thin.    Subjective     Patient awake and alert  Communicated with nurse prior to session    Pain/Comfort:  Pain Rating 1: 0/10  Pain Rating Post-Intervention 1: 0/10    Respiratory Status: Room air    Objective:     Oral Musculature Evaluation  Oral Musculature: WFL  Dentition: rarely or never uses dentures to eat, edentulous  Secretion Management: adequate  Mucosal Quality: good  Mandibular Strength and Mobility: WFL  Oral Labial Strength and Mobility: WFL  Lingual Strength and Mobility: WFL  Volitional Cough: adequate  Volitional Swallow: timely; good laryngeal elevation  Voice Prior to PO Intake: WFL    Bedside Swallow Eval:   Consistencies Assessed:  Thin liquids x5 (via cup-edge)  Puree x3   Soft solids x2      Oral Phase:   Prolonged mastication of solids  Mastication of purees despite cueing    Pharyngeal Phase:   no overt clinical signs/symptoms  of aspiration  no overt clinical signs/symptoms of pharyngeal dysphagia    Compensatory Strategies  None    Treatment: Patient sitting up in bed with daughter present. Patient's daughter reported prolonged mastication during the last few weeks. Daughter was very familiar with altered diets and safe swallowing precautions. Regular diet appropriate at this time though patient would benefit with cueing for timely mastication. SLP educated patient and daughter regarding recs. Patient left in bed with call light within reach.     Assessment:     Radha Feng is a 81 y.o. female who presents with mild oral dysphagia, but with a safe pharyngeal swallow. No further acute ST warranted at this time.     Goals:   Multidisciplinary Problems       SLP Goals       Not on file              Multidisciplinary Problems (Resolved)          Problem: SLP    Goal Priority Disciplines Outcome   SLP Goal   (Resolved)     SLP Met                       Plan:     Plan of Care reviewed with:  patient, daughter   SLP Follow-Up:  No       Discharge recommendations:   (no further ST recommended)   Barriers to Discharge:  None    Time Tracking:     SLP Treatment Date:   09/30/22  Speech Start Time:  1011  Speech Stop Time:  1026     Speech Total Time (min):  15 min    Billable Minutes: Eval Swallow and Oral Function 15    Emeka Toussaint CCC-SLP  Speech-Language Pathology  Pager: [phone]   09/30/2022

## 2022-09-30 NOTE — PROGRESS NOTES
[facility] Medicine  Progress Note    Patient Name: Radha Feng  MRN: [id number]  Patient Class: IP- Inpatient   Admission Date: 9/20/2022  Length of Stay: 7 days  Attending Physician: Malaika Madrigal MD  Primary Care Provider: Primary Doctor No        Subjective:     Principal Problem:Autoimmune hepatitis        HPI:  Radha Feng is a 81 y.o. female with a past medical history of type 2 diabetes, hyperlipidemia, hypertension, glaucoma and cataracts presents the emergency department due to progressive lethargy and weakness. Patient's daughter at the bedside who provides the majority of the history due to the acuity of patient's condition. Per daughter, patient was sent from her cardiology office earlier today due to increased bilateral leg swelling, shortness of breath, and progressive weakness over the past two weeks. She reports that over the past two weeks patient has had a poor appetite with significantly decreased po intake. In addition, she reports that patient's BP has been lower at home compared to baseline and states she has held BP medications (amlodipine and lisinopril-HCTZ) for the past two days. She states her BP is normally 130/80 but as been as low as 98/63. Per daughter, lower extremity swelling was most pronounced last week but has improved significantly with compression stockings.  Additionally, daughter notes that she was helping her change last week and noted that she had a prolapsed uterus/bladder but patient has not seen her OBGYN yet. Patient is requiring more assistance with ADLs which is abnormal for her. Patient denies dysuria but states that she does have difficulty urinating and can only urinate a small amount. Denies chest pain, shortness of breath, abdominal pain, dizziness, nausea, vomiting, or syncope. Patient's only complaint at this time is right knee pain.    ED: vital signs stable, afebrile, no acute distress. Elevated liver enzymes with T.bili of  2.9, AST//254, and alk phos of 224. Na 133, K 5.0, Cr 1.4 (most recent Cr of 0.8 in 12/21). D-dimer 11.97, CTA negative for PE. CTA showed evidence of distended gallbladder and emphysema. Subsequent RUQ US negative for cholecystitis or gallstones. BNP 66, troponin negative. UA negative for infection. Given 500 mL LR and placed in observation.       Overview/Hospital Course:  Patient admitted to hospitalist service.  She was noted to have elevated liver enzymes, acute renal insuff, hyperbilirubin.  D-dimer was significatnly elevated.   CTA demonstrated NO PTE but did show few bilateral pulmonary micro nodules which can be followed up as OP.   Right UQ US showed distended gallbladder but no sonographically detectable gallstone, and no additional sonographic evidence for acute cholecystitis.   Mild free fluid of the abdomen noted.  MRCP showed normal pancreatic duct, Gallbladder is distended without wall thickening or pericholecystic fluid.  Common bile duct is normal caliber and tapers distally to the ampulla.  No intrahepatic biliary dilatation.  No biliary filling defects identified.   Acute hepatitis panel negative.  Monospot/EBV neg. CMV neg. Ceruloplasmin normal. Elevated IgG and SONNY.   Tentative diagnosis of Autoimmune Hepatitis. Defer to hepatology for treatment.   She was also treated for mild hepatic encephalopathy with lactulose.          Interval History: NAEON. No acute concerns. Daughter at bedside.       Review of Systems   Constitutional:  Negative for chills and fever.   Respiratory:  Negative for shortness of breath.    Cardiovascular:  Negative for chest pain.   Gastrointestinal:  Negative for abdominal pain.   Genitourinary:  Negative for dysuria.   Neurological:  Negative for headaches.   Psychiatric/Behavioral:  Negative for confusion.      Objective:     Vital Signs (Most Recent):  Temp: 98.1 °F (36.7 °C) (09/29/22 0559)  Pulse: 75 (09/29/22 0559)  Resp: 20 (09/29/22 0001)  BP: (!) 150/69  (09/29/22 0559)  SpO2: 98 % (09/29/22 0559)   Vital Signs (24h Range):  Temp:  [97.6 °F (36.4 °C)-98.7 °F (37.1 °C)] 98.1 °F (36.7 °C)  Pulse:  [64-80] 75  Resp:  [17-20] 20  SpO2:  [94 %-98 %] 98 %  BP: (119-168)/(60-88) 150/69     Weight: 50.8 kg (111 lb 15.9 oz)  Body mass index is 18.08 kg/m².    Intake/Output Summary (Last 24 hours) at 9/29/2022 0730  Last data filed at 9/29/2022 0600  Gross per 24 hour   Intake 1725 ml   Output --   Net 1725 ml      Physical Exam  Vitals and nursing note reviewed.   Constitutional:       General: She is not in acute distress.     Appearance: She is well-developed.   HENT:      Head: Normocephalic and atraumatic.   Eyes:      Conjunctiva/sclera: Conjunctivae normal.   Neck:      Vascular: No JVD.   Cardiovascular:      Rate and Rhythm: Normal rate and regular rhythm.      Heart sounds: Normal heart sounds.   Pulmonary:      Effort: Pulmonary effort is normal.      Breath sounds: Normal breath sounds.   Abdominal:      General: Bowel sounds are normal. There is no distension.      Palpations: Abdomen is soft.      Tenderness: There is no abdominal tenderness.   Musculoskeletal:      Cervical back: Neck supple.      Right lower leg: Edema present.      Left lower leg: Edema present.   Neurological:      Mental Status: She is alert.   Psychiatric:         Behavior: Behavior normal.       Significant Labs: All pertinent labs within the past 24 hours have been reviewed.  CBC:   Recent Labs   Lab 09/27/22  0739 09/28/22 0311 09/29/22 0329   WBC 6.55 5.88 6.59   HGB 10.0* 8.8* 9.0*   HCT 29.3* 25.7* 26.9*   PLT 83* 73* 65*     CMP:   Recent Labs   Lab 09/28/22 0311 09/29/22 0329   * 135*   K 3.9 4.2   * 113*   CO2 16* 19*   * 198*   BUN 19 18   CREATININE 0.7 0.7   CALCIUM 8.1* 7.9*   PROT 5.3* 5.6*   ALBUMIN 1.5* 1.6*   BILITOT 2.6* 2.4*   ALKPHOS 157* 171*   * 225*   * 166*   ANIONGAP 5* 3*     Magnesium:   Recent Labs   Lab 09/28/22  0312  09/29/22  0329   MG 1.6 1.6     POCT Glucose:   Recent Labs   Lab 09/28/22  1145 09/28/22  1614 09/28/22  2203   POCTGLUCOSE 250* 221* 227*       Significant Imaging: I have reviewed all pertinent imaging results/findings within the past 24 hours.      Assessment/Plan:      * Autoimmune hepatitis  - start prednisone per hepatology. Appreciate recs.         Acute liver failure without hepatic coma  - CTA of ab/pelvix,  subsequent RUQ US, and  MRCP demonstrate no cause of patient's liver failure  - Acute hepatitis panel neg, monospot neg, copper levels normal, EBV neg  - Liver bx on 9/27-- Transjugular liver biopsy with pressure measurements        Hemolytic anemia  - with associated iron overload  - direct genny test NEG  - elevated ddimer and low fibrinogen (monitor platelets, coags, evidence of bleeding or clotting)  - etiology unknown  - hematology review of PBS--> target cells, some bethany cells,  very few schistocytes (0-1 per hp), no blast  - 9/28--> secondary to liver failure    - hematology signed off-- no further recs unless counts become unstable.       Hepatic encephalopathy  - resolved   - lactulose bid--> adjusted to qday  - adjust dose as needed        EFREN (acute kidney injury)  - Resolved      Oral phase dysphagia   - has become more noticeable over past 2-4wks  - patient has to swallow multiple times to get food bolus down  - no issue with liquids  - no evidence of aspiration  - consult speech         Dehydration  - resolved      Severe protein-calorie malnutrition  Nutrition following. Maximize oral intake.        Type 2 diabetes mellitus, without long-term current use of insulin  Patient's FSGs are uncontrolled due to hyperglycemia on current medication regimen.  - home regimen: metformin and glyburide  Last A1c reviewed-         Lab Results   Component Value Date     HGBA1C 5.3 09/21/2022      - increased detemir. Will need to adjust with steroid therapy being initiated. Continue SSI. Monitor.         Hypertension  - BP stable. Monitor with initiation of steroid therapy.      Leg swelling  - no previous hx of CHF  - echo w grade II DD-- no acute exacerbation  - dopplers neg for DVT     Right knee pain  - Chronic  - reported trauma about 30 years ago  - follows with orthopedics outpatient  - recent MRI in august with chronic findings  - continue tylenol prn for pain      VTE Risk Mitigation (From admission, onward)         Ordered     Place sequential compression device  Until discontinued         09/21/22 0932     IP VTE LOW RISK PATIENT  Once         09/21/22 0156                Discharge Planning   MELVI: 10/1/2022     Code Status: Full Code   Is the patient medically ready for discharge?: No    Reason for patient still in hospital (select all that apply): Patient trending condition, Treatment and Consult recommendations  Discharge Plan A: Home with family   Discharge Delays: None known at this time      Malaika Madrigal MD  Department of Hospital Medicine   [facility]

## 2022-09-30 NOTE — SUBJECTIVE & OBJECTIVE
Interval History: NAEON. No acute concerns. Daughter at bedside.       Review of Systems   Constitutional:  Negative for chills and fever.   Respiratory:  Positive for cough (non productive). Negative for shortness of breath.    Cardiovascular:  Negative for chest pain.   Gastrointestinal:  Negative for abdominal pain.   Genitourinary:  Negative for dysuria.   Neurological:  Negative for headaches.   Psychiatric/Behavioral:  Negative for confusion.      Objective:     Vital Signs (Most Recent):  Temp: 97.8 °F (36.6 °C) (09/30/22 0804)  Pulse: 82 (09/30/22 0804)  Resp: 18 (09/30/22 0804)  BP: 139/85 (09/30/22 0804)  SpO2: 98 % (09/30/22 0804)   Vital Signs (24h Range):  Temp:  [97.2 °F (36.2 °C)-98.6 °F (37 °C)] 97.8 °F (36.6 °C)  Pulse:  [69-82] 82  Resp:  [18-19] 18  SpO2:  [93 %-100 %] 98 %  BP: (123-165)/(60-85) 139/85     Weight: 50.8 kg (111 lb 15.9 oz)  Body mass index is 18.08 kg/m².    Intake/Output Summary (Last 24 hours) at 9/30/2022 1042  Last data filed at 9/30/2022 0500  Gross per 24 hour   Intake 400 ml   Output --   Net 400 ml        Physical Exam  Vitals and nursing note reviewed.   Constitutional:       General: She is not in acute distress.     Appearance: She is well-developed.   HENT:      Head: Normocephalic and atraumatic.   Eyes:      Conjunctiva/sclera: Conjunctivae normal.   Neck:      Vascular: No JVD.   Cardiovascular:      Rate and Rhythm: Normal rate and regular rhythm.      Heart sounds: Normal heart sounds.   Pulmonary:      Effort: Pulmonary effort is normal.      Breath sounds: Normal breath sounds.   Abdominal:      General: Bowel sounds are normal. There is no distension.      Palpations: Abdomen is soft.      Tenderness: There is no abdominal tenderness.   Musculoskeletal:      Cervical back: Neck supple.      Right lower leg: Edema present.      Left lower leg: Edema present.   Neurological:      Mental Status: She is alert.   Psychiatric:         Behavior: Behavior normal.        Significant Labs: All pertinent labs within the past 24 hours have been reviewed.  CBC:   Recent Labs   Lab 09/29/22 0329   WBC 6.59   HGB 9.0*   HCT 26.9*   PLT 65*       CMP:   Recent Labs   Lab 09/29/22 0329   *   K 4.2   *   CO2 19*   *   BUN 18   CREATININE 0.7   CALCIUM 7.9*   PROT 5.6*   ALBUMIN 1.6*   BILITOT 2.4*   ALKPHOS 171*   *   *   ANIONGAP 3*       Magnesium:   Recent Labs   Lab 09/29/22 0329   MG 1.6       POCT Glucose:   Recent Labs   Lab 09/29/22  1538 09/29/22 2019 09/30/22  0805   POCTGLUCOSE 271* 168* 129*         Significant Imaging: I have reviewed all pertinent imaging results/findings within the past 24 hours.

## 2022-10-01 ENCOUNTER — TELEPHONE (OUTPATIENT)
Dept: HEPATOLOGY | Facility: CLINIC | Age: 82
End: 2022-10-01
Payer: MEDICARE

## 2022-10-01 LAB
A1AT PHENOTYP SERPL-IMP: NORMAL
A1AT SERPL NEPH-MCNC: 173 MG/DL (ref 100–190)
ALBUMIN SERPL BCP-MCNC: 1.8 G/DL (ref 3.5–5.2)
ALP SERPL-CCNC: 175 U/L (ref 55–135)
ALT SERPL W/O P-5'-P-CCNC: 164 U/L (ref 10–44)
ANION GAP SERPL CALC-SCNC: 4 MMOL/L (ref 8–16)
AST SERPL-CCNC: 196 U/L (ref 10–40)
BASOPHILS # BLD AUTO: 0.01 K/UL (ref 0–0.2)
BASOPHILS NFR BLD: 0.1 % (ref 0–1.9)
BILIRUB SERPL-MCNC: 2.4 MG/DL (ref 0.1–1)
BUN SERPL-MCNC: 23 MG/DL (ref 8–23)
CALCIUM SERPL-MCNC: 8.5 MG/DL (ref 8.7–10.5)
CHLORIDE SERPL-SCNC: 111 MMOL/L (ref 95–110)
CO2 SERPL-SCNC: 17 MMOL/L (ref 23–29)
CREAT SERPL-MCNC: 0.8 MG/DL (ref 0.5–1.4)
DIFFERENTIAL METHOD: ABNORMAL
EOSINOPHIL # BLD AUTO: 0 K/UL (ref 0–0.5)
EOSINOPHIL NFR BLD: 0 % (ref 0–8)
ERYTHROCYTE [DISTWIDTH] IN BLOOD BY AUTOMATED COUNT: 21.1 % (ref 11.5–14.5)
EST. GFR  (NO RACE VARIABLE): >60 ML/MIN/1.73 M^2
GAMMA INTERFERON BACKGROUND BLD IA-ACNC: 0.07 IU/ML
GLUCOSE SERPL-MCNC: 300 MG/DL (ref 70–110)
HCT VFR BLD AUTO: 29.1 % (ref 37–48.5)
HGB BLD-MCNC: 9.8 G/DL (ref 12–16)
IMM GRANULOCYTES # BLD AUTO: 0.03 K/UL (ref 0–0.04)
IMM GRANULOCYTES NFR BLD AUTO: 0.4 % (ref 0–0.5)
INR PPP: 1.4 (ref 0.8–1.2)
LYMPHOCYTES # BLD AUTO: 1.3 K/UL (ref 1–4.8)
LYMPHOCYTES NFR BLD: 15.2 % (ref 18–48)
M TB IFN-G CD4+ BCKGRND COR BLD-ACNC: -0 IU/ML
MCH RBC QN AUTO: 34.5 PG (ref 27–31)
MCHC RBC AUTO-ENTMCNC: 33.7 G/DL (ref 32–36)
MCV RBC AUTO: 103 FL (ref 82–98)
MITOGEN IGNF BCKGRD COR BLD-ACNC: 9.93 IU/ML
MONOCYTES # BLD AUTO: 1.3 K/UL (ref 0.3–1)
MONOCYTES NFR BLD: 15.7 % (ref 4–15)
NEUTROPHILS # BLD AUTO: 5.9 K/UL (ref 1.8–7.7)
NEUTROPHILS NFR BLD: 68.6 % (ref 38–73)
NRBC BLD-RTO: 0 /100 WBC
PLATELET # BLD AUTO: 84 K/UL (ref 150–450)
PMV BLD AUTO: 12.1 FL (ref 9.2–12.9)
POCT GLUCOSE: 286 MG/DL (ref 70–110)
POCT GLUCOSE: 291 MG/DL (ref 70–110)
POCT GLUCOSE: 299 MG/DL (ref 70–110)
POCT GLUCOSE: 349 MG/DL (ref 70–110)
POTASSIUM SERPL-SCNC: 4.7 MMOL/L (ref 3.5–5.1)
PROT SERPL-MCNC: 6.6 G/DL (ref 6–8.4)
PROTHROMBIN TIME: 14.3 SEC (ref 9–12.5)
RBC # BLD AUTO: 2.84 M/UL (ref 4–5.4)
SODIUM SERPL-SCNC: 132 MMOL/L (ref 136–145)
TB GOLD PLUS: NEGATIVE
TB2 - NIL: 0 IU/ML
WBC # BLD AUTO: 8.53 K/UL (ref 3.9–12.7)

## 2022-10-01 PROCEDURE — 99233 PR SUBSEQUENT HOSPITAL CARE,LEVL III: ICD-10-PCS | Mod: ,,, | Performed by: FAMILY MEDICINE

## 2022-10-01 PROCEDURE — 12000002 HC ACUTE/MED SURGE SEMI-PRIVATE ROOM

## 2022-10-01 PROCEDURE — 25000003 PHARM REV CODE 250: Performed by: INTERNAL MEDICINE

## 2022-10-01 PROCEDURE — 85610 PROTHROMBIN TIME: CPT | Performed by: FAMILY MEDICINE

## 2022-10-01 PROCEDURE — 80053 COMPREHEN METABOLIC PANEL: CPT | Performed by: FAMILY MEDICINE

## 2022-10-01 PROCEDURE — 85025 COMPLETE CBC W/AUTO DIFF WBC: CPT | Performed by: FAMILY MEDICINE

## 2022-10-01 PROCEDURE — 63600175 PHARM REV CODE 636 W HCPCS: Performed by: FAMILY MEDICINE

## 2022-10-01 PROCEDURE — 36415 COLL VENOUS BLD VENIPUNCTURE: CPT | Performed by: FAMILY MEDICINE

## 2022-10-01 PROCEDURE — 99233 SBSQ HOSP IP/OBS HIGH 50: CPT | Mod: ,,, | Performed by: FAMILY MEDICINE

## 2022-10-01 PROCEDURE — 25000003 PHARM REV CODE 250: Performed by: FAMILY MEDICINE

## 2022-10-01 PROCEDURE — 94761 N-INVAS EAR/PLS OXIMETRY MLT: CPT

## 2022-10-01 RX ORDER — METHYLPREDNISOLONE SOD SUCC 125 MG
60 VIAL (EA) INJECTION DAILY
Status: DISCONTINUED | OUTPATIENT
Start: 2022-10-01 | End: 2022-10-03

## 2022-10-01 RX ORDER — INSULIN ASPART 100 [IU]/ML
7 INJECTION, SOLUTION INTRAVENOUS; SUBCUTANEOUS
Status: DISCONTINUED | OUTPATIENT
Start: 2022-10-01 | End: 2022-10-03

## 2022-10-01 RX ADMIN — BRIMONIDINE TARTRATE 1 DROP: 2 SOLUTION OPHTHALMIC at 09:10

## 2022-10-01 RX ADMIN — FAMOTIDINE 20 MG: 20 TABLET ORAL at 09:10

## 2022-10-01 RX ADMIN — INSULIN ASPART 4 UNITS: 100 INJECTION, SOLUTION INTRAVENOUS; SUBCUTANEOUS at 12:10

## 2022-10-01 RX ADMIN — INSULIN ASPART 4 UNITS: 100 INJECTION, SOLUTION INTRAVENOUS; SUBCUTANEOUS at 07:10

## 2022-10-01 RX ADMIN — GUAIFENESIN 600 MG: 600 TABLET, EXTENDED RELEASE ORAL at 08:10

## 2022-10-01 RX ADMIN — INSULIN ASPART 3 UNITS: 100 INJECTION, SOLUTION INTRAVENOUS; SUBCUTANEOUS at 04:10

## 2022-10-01 RX ADMIN — LATANOPROST 1 DROP: 50 SOLUTION OPHTHALMIC at 09:10

## 2022-10-01 RX ADMIN — INSULIN ASPART 2 UNITS: 100 INJECTION, SOLUTION INTRAVENOUS; SUBCUTANEOUS at 08:10

## 2022-10-01 RX ADMIN — METHYLPREDNISOLONE SODIUM SUCCINATE 60 MG: 125 INJECTION, POWDER, FOR SOLUTION INTRAMUSCULAR; INTRAVENOUS at 09:10

## 2022-10-01 RX ADMIN — GUAIFENESIN 600 MG: 600 TABLET, EXTENDED RELEASE ORAL at 09:10

## 2022-10-01 RX ADMIN — INSULIN ASPART 7 UNITS: 100 INJECTION, SOLUTION INTRAVENOUS; SUBCUTANEOUS at 04:10

## 2022-10-01 RX ADMIN — CAPSAICIN: 0.25 CREAM TOPICAL at 09:10

## 2022-10-01 NOTE — PLAN OF CARE
Patient has been up in the chair for much of the afternoon.  She is currently sitting up in bed, ate 50% of supper.  Denies any abd pain, noted with some abd swelling, blood sugars have been elevated; on now on IV solumedrol, noted increased insulin. BLE swelling noted at 2+. BMx2 today.  Will continue monitoring.    Problem: Adult Inpatient Plan of Care  Goal: Plan of Care Review  Outcome: Ongoing, Progressing  Goal: Patient-Specific Goal (Individualized)  Outcome: Ongoing, Progressing  Goal: Absence of Hospital-Acquired Illness or Injury  Outcome: Ongoing, Progressing  Goal: Optimal Comfort and Wellbeing  Outcome: Ongoing, Progressing  Goal: Readiness for Transition of Care  Outcome: Ongoing, Progressing     Problem: Infection  Goal: Absence of Infection Signs and Symptoms  Outcome: Ongoing, Progressing

## 2022-10-01 NOTE — SUBJECTIVE & OBJECTIVE
Interval History: NAEON. No acute concerns. Daughter at bedside. Feels patient has more fluid in abd since procedure.        Review of Systems   Constitutional:  Negative for chills and fever.   Respiratory:  Negative for shortness of breath.    Cardiovascular:  Negative for chest pain.   Gastrointestinal:  Negative for abdominal pain.   Genitourinary:  Negative for dysuria.   Neurological:  Negative for headaches.   Psychiatric/Behavioral:  Negative for confusion.      Objective:     Vital Signs (Most Recent):  Temp: 97.6 °F (36.4 °C) (10/01/22 0741)  Pulse: 64 (10/01/22 0741)  Resp: 18 (10/01/22 0741)  BP: (!) 149/68 (10/01/22 0741)  SpO2: 95 % (10/01/22 0741)   Vital Signs (24h Range):  Temp:  [97.6 °F (36.4 °C)-98.5 °F (36.9 °C)] 97.6 °F (36.4 °C)  Pulse:  [62-76] 64  Resp:  [18-19] 18  SpO2:  [92 %-96 %] 95 %  BP: (132-149)/(62-81) 149/68     Weight: 50.8 kg (111 lb 15.9 oz)  Body mass index is 18.08 kg/m².  No intake or output data in the 24 hours ending 10/01/22 0818     Physical Exam  Vitals and nursing note reviewed.   Constitutional:       General: She is not in acute distress.     Appearance: She is well-developed.   HENT:      Head: Normocephalic and atraumatic.   Eyes:      Conjunctiva/sclera: Conjunctivae normal.   Neck:      Vascular: No JVD.   Cardiovascular:      Rate and Rhythm: Normal rate and regular rhythm.      Heart sounds: Normal heart sounds.   Pulmonary:      Effort: Pulmonary effort is normal.      Breath sounds: Normal breath sounds.   Abdominal:      General: Bowel sounds are normal. There is no distension.      Palpations: Abdomen is soft.      Tenderness: There is no abdominal tenderness.   Musculoskeletal:      Cervical back: Neck supple.      Right lower leg: Edema present.      Left lower leg: Edema present.   Neurological:      Mental Status: She is alert.   Psychiatric:         Behavior: Behavior normal.       Significant Labs: All pertinent labs within the past 24 hours have been  reviewed.  CBC:   Recent Labs   Lab 09/30/22  1100 10/01/22  0806   WBC 5.73 8.53   HGB 9.5* 9.8*   HCT 27.8* 29.1*   PLT 80* 84*     CMP:   Recent Labs   Lab 09/30/22  1100 10/01/22  0806   * 132*   K 4.0 4.7   * 111*   CO2 17* 17*   * 300*   BUN 18 23   CREATININE 0.7 0.8   CALCIUM 8.1* 8.5*   PROT 6.0 6.6   ALBUMIN 1.6* 1.8*   BILITOT 2.7* 2.4*   ALKPHOS 160* 175*   * 196*   * 164*   ANIONGAP 3* 4*     Magnesium:   No results for input(s): MG in the last 48 hours.    POCT Glucose:   Recent Labs   Lab 09/30/22  1509 09/30/22  1936 10/01/22  0742   POCTGLUCOSE 200* 290* 286*         Significant Imaging: I have reviewed all pertinent imaging results/findings within the past 24 hours.

## 2022-10-01 NOTE — TELEPHONE ENCOUNTER
[facility] Pathology Conference Note    Patient:  Radha Feng  MRN:   [id number]  YOB: 1940  Date of Transplant:  N/A  Native Diagnosis:     Discussion/Plan:    Presenter: Hepatologist - Meli Guerrero MD    Reason for presenting: elevated liver function  On atorvastatin for many years.   SONNY titer >2560.     Concerns for Pathologists:     Lab Results  WBC (K/uL)   Date Value   10/01/2022 8.53   09/30/2022 5.73   09/29/2022 6.59     PLT (K/uL)   Date Value   10/01/2022 84 (L)   09/30/2022 80 (L)   09/29/2022 65 (L)     INR (no units)   Date Value   10/01/2022 1.4 (H)   09/30/2022 1.5 (H)   09/29/2022 1.5 (H)     AST (U/L)   Date Value   10/01/2022 196 (H)     ALT (U/L)   Date Value   10/01/2022 164 (H)   09/30/2022 176 (H)   09/29/2022 166 (H)     BILITOT (mg/dL)   Date Value   10/01/2022 2.4 (H)   09/30/2022 2.7 (H)   09/29/2022 2.4 (H)     ALKPHOS (U/L)   Date Value   10/01/2022 175 (H)   09/30/2022 160 (H)   09/29/2022 171 (H)     CREATININE (mg/dL)   Date Value   10/01/2022 0.8   09/30/2022 0.7   09/29/2022 0.7

## 2022-10-01 NOTE — TREATMENT PLAN
Hepatology Treatment Plan    Radha Feng is a 81 y.o. female admitted to hospital 9/20/2022 (Hospital Day: 12) due to Autoimmune hepatitis.     Interval History  Liver chemistries worsened today.    Objective  Temp:  [97.6 °F (36.4 °C)-98.5 °F (36.9 °C)] 97.6 °F (36.4 °C) (10/01 0741)  Pulse:  [62-76] 64 (10/01 0741)  BP: (132-149)/(62-81) 149/68 (10/01 0741)  Resp:  [18-19] 18 (10/01 0741)  SpO2:  [92 %-96 %] 95 % (10/01 0741)      Laboratory    Lab Results   Component Value Date    WBC 8.53 10/01/2022    HGB 9.8 (L) 10/01/2022    HCT 29.1 (L) 10/01/2022     (H) 10/01/2022    PLT 84 (L) 10/01/2022       Lab Results   Component Value Date     (L) 10/01/2022    K 4.7 10/01/2022     (H) 10/01/2022    CO2 17 (L) 10/01/2022    BUN 23 10/01/2022    CREATININE 0.8 10/01/2022    CALCIUM 8.5 (L) 10/01/2022       Lab Results   Component Value Date    ALBUMIN 1.8 (L) 10/01/2022     (H) 10/01/2022     (H) 10/01/2022    ALKPHOS 175 (H) 10/01/2022    BILITOT 2.4 (H) 10/01/2022       Lab Results   Component Value Date    INR 1.4 (H) 10/01/2022    INR 1.5 (H) 09/30/2022    INR 1.5 (H) 09/29/2022       MELD-Na score: 18 at 10/1/2022  8:06 AM  MELD score: 14 at 10/1/2022  8:06 AM  Calculated from:  Serum Creatinine: 0.8 mg/dL (Using min of 1 mg/dL) at 10/1/2022  8:06 AM  Serum Sodium: 132 mmol/L at 10/1/2022  8:06 AM  Total Bilirubin: 2.4 mg/dL at 10/1/2022  8:06 AM  INR(ratio): 1.4 at 10/1/2022  8:06 AM  Age: 81 years    Plan  - INCREASE steroids to IV 60mg daily  - please obtain daily CBC, BMP, LFT, INR  - Plan of care was discussed with primary team    Thank you for involving us in the care of Radha Feng. Please call with any additional concerns or questions.    Bud Nam MD  Gastroenterology Fellow

## 2022-10-01 NOTE — PROGRESS NOTES
[facility] Medicine  Progress Note    Patient Name: Radha Feng  MRN: [id number]  Patient Class: IP- Inpatient   Admission Date: 9/20/2022  Length of Stay: 9 days  Attending Physician: Malaika Madrigal MD  Primary Care Provider: Primary Doctor No      Subjective:     Principal Problem:Autoimmune hepatitis      HPI:  Radha Feng is a 81 y.o. female with a past medical history of type 2 diabetes, hyperlipidemia, hypertension, glaucoma and cataracts presents the emergency department due to progressive lethargy and weakness. Patient's daughter at the bedside who provides the majority of the history due to the acuity of patient's condition. Per daughter, patient was sent from her cardiology office earlier today due to increased bilateral leg swelling, shortness of breath, and progressive weakness over the past two weeks. She reports that over the past two weeks patient has had a poor appetite with significantly decreased po intake. In addition, she reports that patient's BP has been lower at home compared to baseline and states she has held BP medications (amlodipine and lisinopril-HCTZ) for the past two days. She states her BP is normally 130/80 but as been as low as 98/63. Per daughter, lower extremity swelling was most pronounced last week but has improved significantly with compression stockings.  Additionally, daughter notes that she was helping her change last week and noted that she had a prolapsed uterus/bladder but patient has not seen her OBGYN yet. Patient is requiring more assistance with ADLs which is abnormal for her. Patient denies dysuria but states that she does have difficulty urinating and can only urinate a small amount. Denies chest pain, shortness of breath, abdominal pain, dizziness, nausea, vomiting, or syncope. Patient's only complaint at this time is right knee pain.    ED: vital signs stable, afebrile, no acute distress. Elevated liver enzymes with T.bili of 2.9,  AST//254, and alk phos of 224. Na 133, K 5.0, Cr 1.4 (most recent Cr of 0.8 in 12/21). D-dimer 11.97, CTA negative for PE. CTA showed evidence of distended gallbladder and emphysema. Subsequent RUQ US negative for cholecystitis or gallstones. BNP 66, troponin negative. UA negative for infection. Given 500 mL LR and placed in observation.       Overview/Hospital Course:  Patient admitted to hospitalist service.  She was noted to have elevated liver enzymes, acute renal insuff, hyperbilirubin.  D-dimer was significatnly elevated.   CTA demonstrated NO PTE but did show few bilateral pulmonary micro nodules which can be followed up as OP.   Right UQ US showed distended gallbladder but no sonographically detectable gallstone, and no additional sonographic evidence for acute cholecystitis.   Mild free fluid of the abdomen noted.  MRCP showed normal pancreatic duct, Gallbladder is distended without wall thickening or pericholecystic fluid.  Common bile duct is normal caliber and tapers distally to the ampulla.  No intrahepatic biliary dilatation.  No biliary filling defects identified.   Acute hepatitis panel negative.  Monospot/EBV neg. CMV neg. Ceruloplasmin normal. Elevated IgG and SONNY.   Tentative diagnosis of Autoimmune Hepatitis. Defer to hepatology for treatment.   She was also treated for mild hepatic encephalopathy with lactulose.        Interval History: NAEON. No acute concerns. Daughter at bedside. Feels patient has more fluid in abd since procedure. Also patient now having retention/ frequency/ urgency of urine due to uterine prolapse.       Review of Systems   Constitutional:  Negative for chills and fever.   Respiratory:  Negative for shortness of breath.    Cardiovascular:  Negative for chest pain.   Gastrointestinal:  Negative for abdominal pain.   Genitourinary:  Negative for dysuria.   Neurological:  Negative for headaches.   Psychiatric/Behavioral:  Negative for confusion.      Objective:      Vital Signs (Most Recent):  Temp: 97.6 °F (36.4 °C) (10/01/22 0741)  Pulse: 64 (10/01/22 0741)  Resp: 18 (10/01/22 0741)  BP: (!) 149/68 (10/01/22 0741)  SpO2: 95 % (10/01/22 0741)   Vital Signs (24h Range):  Temp:  [97.6 °F (36.4 °C)-98.5 °F (36.9 °C)] 97.6 °F (36.4 °C)  Pulse:  [62-76] 64  Resp:  [18-19] 18  SpO2:  [92 %-96 %] 95 %  BP: (132-149)/(62-81) 149/68     Weight: 50.8 kg (111 lb 15.9 oz)  Body mass index is 18.08 kg/m².  No intake or output data in the 24 hours ending 10/01/22 0818     Physical Exam  Vitals and nursing note reviewed.   Constitutional:       General: She is not in acute distress.     Appearance: She is well-developed.   HENT:      Head: Normocephalic and atraumatic.   Eyes:      Conjunctiva/sclera: Conjunctivae normal.   Neck:      Vascular: No JVD.   Cardiovascular:      Rate and Rhythm: Normal rate and regular rhythm.      Heart sounds: Normal heart sounds.   Pulmonary:      Effort: Pulmonary effort is normal.      Breath sounds: Normal breath sounds.   Abdominal:      General: Bowel sounds are normal. There is no distension.      Palpations: Abdomen is soft.      Tenderness: There is no abdominal tenderness.   Musculoskeletal:      Cervical back: Neck supple.      Right lower leg: Edema present.      Left lower leg: Edema present.   Neurological:      Mental Status: She is alert.   Psychiatric:         Behavior: Behavior normal.       Significant Labs: All pertinent labs within the past 24 hours have been reviewed.  CBC:   Recent Labs   Lab 09/30/22  1100 10/01/22  0806   WBC 5.73 8.53   HGB 9.5* 9.8*   HCT 27.8* 29.1*   PLT 80* 84*     CMP:   Recent Labs   Lab 09/30/22  1100 10/01/22  0806   * 132*   K 4.0 4.7   * 111*   CO2 17* 17*   * 300*   BUN 18 23   CREATININE 0.7 0.8   CALCIUM 8.1* 8.5*   PROT 6.0 6.6   ALBUMIN 1.6* 1.8*   BILITOT 2.7* 2.4*   ALKPHOS 160* 175*   * 196*   * 164*   ANIONGAP 3* 4*     Magnesium:   No results for input(s): MG in  the last 48 hours.    POCT Glucose:   Recent Labs   Lab 09/30/22  1509 09/30/22  1936 10/01/22  0742   POCTGLUCOSE 200* 290* 286*         Significant Imaging: I have reviewed all pertinent imaging results/findings within the past 24 hours.      Assessment/Plan:      * Autoimmune hepatitis  - discussed with hepatology. Increase steroids to 60 mg IV.       Uterine prolapse  - GYN consulted    Acute liver failure without hepatic coma  - CTA of ab/pelvix,  subsequent RUQ US, and  MRCP demonstrate no cause of patient's liver failure  - Acute hepatitis panel neg, monospot neg, copper levels normal, EBV neg  - Liver bx on 9/27-- Transjugular liver biopsy with pressure measurements    - ascites survey u/s ordered.      Hemolytic anemia  - with associated iron overload  - direct genny test NEG  - elevated ddimer and low fibrinogen (monitor platelets, coags, evidence of bleeding or clotting)  - etiology unknown  - hematology review of PBS--> target cells, some bethany cells,  very few schistocytes (0-1 per hp), no blast  - 9/28--> secondary to liver failure    - hematology signed off-- no further recs unless counts become unstable.         Oral phase dysphagia   mild oral dysphagia, but with a safe pharyngeal swallow. No further acute ST warranted at this time.      Severe protein-calorie malnutrition  Nutrition following. Maximize oral intake.        Type 2 diabetes mellitus, without long-term current use of insulin  Patient's FSGs are uncontrolled due to hyperglycemia on current medication regimen.  - home regimen: metformin and glyburide  Last A1c reviewed-             Lab Results   Component Value Date     HGBA1C 5.3 09/21/2022      - increased detemir. Will need to continue to adjust with steroid therapy. Increased pre meal novolog. SSI. Monitor.         Hypertension  - BP stable. Monitor with initiation of steroid therapy.      Leg swelling  - no previous hx of CHF  - echo w grade II DD-- no acute exacerbation  - dopplers  neg for DVT     Right knee pain  - Chronic  - reported trauma about 30 years ago  - follows with orthopedics outpatient  - recent MRI in august with chronic findings  - continue tylenol prn for pain      Hepatic encephalopathy  - resolved   - lactulose bid--> adjusted to qday  - adjust dose as needed      Dehydration  - resolved      EFREN (acute kidney injury)  - Resolved     VTE Risk Mitigation (From admission, onward)           Ordered     Place sequential compression device  Until discontinued         09/21/22 0932     IP VTE LOW RISK PATIENT  Once         09/21/22 0156                    Discharge Planning   MELVI: 10/1/2022     Code Status: Full Code   Is the patient medically ready for discharge?: No    Reason for patient still in hospital (select all that apply): Patient trending condition, Treatment and Consult recommendations  Discharge Plan A: Home with family   Discharge Delays: None known at this time      Malaika Madrigal MD  Department of Hospital Medicine   [facility]

## 2022-10-02 LAB
ALBUMIN SERPL BCP-MCNC: 1.6 G/DL (ref 3.5–5.2)
ALP SERPL-CCNC: 155 U/L (ref 55–135)
ALT SERPL W/O P-5'-P-CCNC: 129 U/L (ref 10–44)
ANION GAP SERPL CALC-SCNC: 4 MMOL/L (ref 8–16)
AST SERPL-CCNC: 130 U/L (ref 10–40)
BASOPHILS # BLD AUTO: 0.01 K/UL (ref 0–0.2)
BASOPHILS NFR BLD: 0.1 % (ref 0–1.9)
BILIRUB SERPL-MCNC: 2.1 MG/DL (ref 0.1–1)
BUN SERPL-MCNC: 26 MG/DL (ref 8–23)
CALCIUM SERPL-MCNC: 8.2 MG/DL (ref 8.7–10.5)
CHLORIDE SERPL-SCNC: 109 MMOL/L (ref 95–110)
CO2 SERPL-SCNC: 17 MMOL/L (ref 23–29)
CREAT SERPL-MCNC: 0.7 MG/DL (ref 0.5–1.4)
DIFFERENTIAL METHOD: ABNORMAL
EOSINOPHIL # BLD AUTO: 0 K/UL (ref 0–0.5)
EOSINOPHIL NFR BLD: 0 % (ref 0–8)
ERYTHROCYTE [DISTWIDTH] IN BLOOD BY AUTOMATED COUNT: 21 % (ref 11.5–14.5)
EST. GFR  (NO RACE VARIABLE): >60 ML/MIN/1.73 M^2
GLUCOSE SERPL-MCNC: 316 MG/DL (ref 70–110)
HCT VFR BLD AUTO: 28.6 % (ref 37–48.5)
HGB BLD-MCNC: 10.1 G/DL (ref 12–16)
IMM GRANULOCYTES # BLD AUTO: 0.02 K/UL (ref 0–0.04)
IMM GRANULOCYTES NFR BLD AUTO: 0.2 % (ref 0–0.5)
INR PPP: 1.5 (ref 0.8–1.2)
LYMPHOCYTES # BLD AUTO: 0.9 K/UL (ref 1–4.8)
LYMPHOCYTES NFR BLD: 9.2 % (ref 18–48)
MCH RBC QN AUTO: 34.9 PG (ref 27–31)
MCHC RBC AUTO-ENTMCNC: 35.3 G/DL (ref 32–36)
MCV RBC AUTO: 99 FL (ref 82–98)
MONOCYTES # BLD AUTO: 0.7 K/UL (ref 0.3–1)
MONOCYTES NFR BLD: 7.2 % (ref 4–15)
NEUTROPHILS # BLD AUTO: 8.3 K/UL (ref 1.8–7.7)
NEUTROPHILS NFR BLD: 83.3 % (ref 38–73)
NRBC BLD-RTO: 0 /100 WBC
PLATELET # BLD AUTO: 81 K/UL (ref 150–450)
PMV BLD AUTO: 12.3 FL (ref 9.2–12.9)
POCT GLUCOSE: 285 MG/DL (ref 70–110)
POCT GLUCOSE: 299 MG/DL (ref 70–110)
POCT GLUCOSE: 306 MG/DL (ref 70–110)
POCT GLUCOSE: 315 MG/DL (ref 70–110)
POTASSIUM SERPL-SCNC: 4.4 MMOL/L (ref 3.5–5.1)
PROT SERPL-MCNC: 6 G/DL (ref 6–8.4)
PROTHROMBIN TIME: 15.4 SEC (ref 9–12.5)
RBC # BLD AUTO: 2.89 M/UL (ref 4–5.4)
SODIUM SERPL-SCNC: 130 MMOL/L (ref 136–145)
WBC # BLD AUTO: 9.93 K/UL (ref 3.9–12.7)

## 2022-10-02 PROCEDURE — 12000002 HC ACUTE/MED SURGE SEMI-PRIVATE ROOM

## 2022-10-02 PROCEDURE — 99233 PR SUBSEQUENT HOSPITAL CARE,LEVL III: ICD-10-PCS | Mod: ,,, | Performed by: FAMILY MEDICINE

## 2022-10-02 PROCEDURE — 80053 COMPREHEN METABOLIC PANEL: CPT | Performed by: FAMILY MEDICINE

## 2022-10-02 PROCEDURE — 25000003 PHARM REV CODE 250: Performed by: FAMILY MEDICINE

## 2022-10-02 PROCEDURE — 63600175 PHARM REV CODE 636 W HCPCS: Performed by: FAMILY MEDICINE

## 2022-10-02 PROCEDURE — 85025 COMPLETE CBC W/AUTO DIFF WBC: CPT | Performed by: FAMILY MEDICINE

## 2022-10-02 PROCEDURE — 0034U TPMT NUDT15 GENES: CPT | Performed by: INTERNAL MEDICINE

## 2022-10-02 PROCEDURE — 25000003 PHARM REV CODE 250

## 2022-10-02 PROCEDURE — 25000003 PHARM REV CODE 250: Performed by: INTERNAL MEDICINE

## 2022-10-02 PROCEDURE — 36415 COLL VENOUS BLD VENIPUNCTURE: CPT | Performed by: FAMILY MEDICINE

## 2022-10-02 PROCEDURE — 36415 COLL VENOUS BLD VENIPUNCTURE: CPT | Performed by: INTERNAL MEDICINE

## 2022-10-02 PROCEDURE — 85610 PROTHROMBIN TIME: CPT | Performed by: FAMILY MEDICINE

## 2022-10-02 PROCEDURE — 94761 N-INVAS EAR/PLS OXIMETRY MLT: CPT

## 2022-10-02 PROCEDURE — 99233 SBSQ HOSP IP/OBS HIGH 50: CPT | Mod: ,,, | Performed by: FAMILY MEDICINE

## 2022-10-02 RX ORDER — AMLODIPINE BESYLATE 5 MG/1
5 TABLET ORAL DAILY
Status: DISCONTINUED | OUTPATIENT
Start: 2022-10-02 | End: 2022-10-03

## 2022-10-02 RX ADMIN — INSULIN ASPART 7 UNITS: 100 INJECTION, SOLUTION INTRAVENOUS; SUBCUTANEOUS at 04:10

## 2022-10-02 RX ADMIN — BRIMONIDINE TARTRATE 1 DROP: 2 SOLUTION OPHTHALMIC at 09:10

## 2022-10-02 RX ADMIN — INSULIN ASPART 7 UNITS: 100 INJECTION, SOLUTION INTRAVENOUS; SUBCUTANEOUS at 08:10

## 2022-10-02 RX ADMIN — INSULIN ASPART 3 UNITS: 100 INJECTION, SOLUTION INTRAVENOUS; SUBCUTANEOUS at 04:10

## 2022-10-02 RX ADMIN — GUAIFENESIN 600 MG: 600 TABLET, EXTENDED RELEASE ORAL at 08:10

## 2022-10-02 RX ADMIN — GUAIFENESIN 600 MG: 600 TABLET, EXTENDED RELEASE ORAL at 09:10

## 2022-10-02 RX ADMIN — METHYLPREDNISOLONE SODIUM SUCCINATE 60 MG: 125 INJECTION, POWDER, FOR SOLUTION INTRAMUSCULAR; INTRAVENOUS at 08:10

## 2022-10-02 RX ADMIN — INSULIN ASPART 7 UNITS: 100 INJECTION, SOLUTION INTRAVENOUS; SUBCUTANEOUS at 11:10

## 2022-10-02 RX ADMIN — LATANOPROST 1 DROP: 50 SOLUTION OPHTHALMIC at 09:10

## 2022-10-02 RX ADMIN — ACETAMINOPHEN 650 MG: 325 TABLET ORAL at 08:10

## 2022-10-02 RX ADMIN — INSULIN ASPART 1 UNITS: 100 INJECTION, SOLUTION INTRAVENOUS; SUBCUTANEOUS at 08:10

## 2022-10-02 RX ADMIN — INSULIN ASPART 4 UNITS: 100 INJECTION, SOLUTION INTRAVENOUS; SUBCUTANEOUS at 11:10

## 2022-10-02 RX ADMIN — AMLODIPINE BESYLATE 5 MG: 5 TABLET ORAL at 10:10

## 2022-10-02 RX ADMIN — FAMOTIDINE 20 MG: 20 TABLET ORAL at 08:10

## 2022-10-02 NOTE — TREATMENT PLAN
Hepatology Treatment Plan    Radha Feng is a 81 y.o. female admitted to hospital 9/20/2022 (Hospital Day: 13) due to Autoimmune hepatitis.     Interval History  Liver chemistries improving in IV steroids.    Objective  Temp:  [97.6 °F (36.4 °C)-98 °F (36.7 °C)] 97.8 °F (36.6 °C) (10/02 0840)  Pulse:  [59-64] 62 (10/02 0840)  BP: (134-164)/(67-87) 147/70 (10/02 0840)  Resp:  [18] 18 (10/02 0435)  SpO2:  [93 %-99 %] 94 % (10/02 0840)      Laboratory    Lab Results   Component Value Date    WBC 9.93 10/02/2022    HGB 10.1 (L) 10/02/2022    HCT 28.6 (L) 10/02/2022    MCV 99 (H) 10/02/2022    PLT 81 (L) 10/02/2022       Lab Results   Component Value Date     (L) 10/02/2022    K 4.4 10/02/2022     10/02/2022    CO2 17 (L) 10/02/2022    BUN 26 (H) 10/02/2022    CREATININE 0.7 10/02/2022    CALCIUM 8.2 (L) 10/02/2022       Lab Results   Component Value Date    ALBUMIN 1.6 (L) 10/02/2022     (H) 10/02/2022     (H) 10/02/2022    ALKPHOS 155 (H) 10/02/2022    BILITOT 2.1 (H) 10/02/2022       Lab Results   Component Value Date    INR 1.5 (H) 10/02/2022    INR 1.4 (H) 10/01/2022    INR 1.5 (H) 09/30/2022       MELD-Na score: 20 at 10/2/2022  5:19 AM  MELD score: 14 at 10/2/2022  5:19 AM  Calculated from:  Serum Creatinine: 0.7 mg/dL (Using min of 1 mg/dL) at 10/2/2022  5:19 AM  Serum Sodium: 130 mmol/L at 10/2/2022  5:19 AM  Total Bilirubin: 2.1 mg/dL at 10/2/2022  5:19 AM  INR(ratio): 1.5 at 10/2/2022  5:19 AM  Age: 81 years    Plan  - continue IV steroids today  - continue to trend liver chemistries daily  - please obtain daily CBC, BMP, LFT, INR  -  Hepatology will continue to follow    Thank you for involving us in the care of Radha Feng. Please call with any additional concerns or questions.    Bud Nam MD  Gastroenterology Fellow

## 2022-10-02 NOTE — PLAN OF CARE
Patient has had an uneventful day.  Blood sugars have been relatively high as she continues with Solumedrol IV as ordered.  Abd US done showing large volume ascites.  VSS.  Given PRN Tylenol x1 for c/o rt knee pain this am.  Currently sitting up in bed watching TV.  Daughter at bedside.  NAD noted.  Will continue monitoring.  Problem: Adult Inpatient Plan of Care  Goal: Plan of Care Review  Outcome: Ongoing, Progressing  Goal: Patient-Specific Goal (Individualized)  Outcome: Ongoing, Progressing  Goal: Absence of Hospital-Acquired Illness or Injury  Outcome: Ongoing, Progressing  Goal: Optimal Comfort and Wellbeing  Outcome: Ongoing, Progressing  Goal: Readiness for Transition of Care  Outcome: Ongoing, Progressing     Problem: Infection  Goal: Absence of Infection Signs and Symptoms  Outcome: Ongoing, Progressing     Problem: Diabetes Comorbidity  Goal: Blood Glucose Level Within Targeted Range  Outcome: Ongoing, Progressing

## 2022-10-03 LAB
ALBUMIN SERPL BCP-MCNC: 1.7 G/DL (ref 3.5–5.2)
ALP SERPL-CCNC: 163 U/L (ref 55–135)
ALT SERPL W/O P-5'-P-CCNC: 128 U/L (ref 10–44)
ANION GAP SERPL CALC-SCNC: 7 MMOL/L (ref 8–16)
AST SERPL-CCNC: 114 U/L (ref 10–40)
BASOPHILS # BLD AUTO: 0.01 K/UL (ref 0–0.2)
BASOPHILS NFR BLD: 0.1 % (ref 0–1.9)
BILIRUB SERPL-MCNC: 2.4 MG/DL (ref 0.1–1)
BUN SERPL-MCNC: 26 MG/DL (ref 8–23)
CALCIUM SERPL-MCNC: 8.4 MG/DL (ref 8.7–10.5)
CHLORIDE SERPL-SCNC: 108 MMOL/L (ref 95–110)
CO2 SERPL-SCNC: 14 MMOL/L (ref 23–29)
CREAT SERPL-MCNC: 0.8 MG/DL (ref 0.5–1.4)
DIFFERENTIAL METHOD: ABNORMAL
EOSINOPHIL # BLD AUTO: 0 K/UL (ref 0–0.5)
EOSINOPHIL NFR BLD: 0 % (ref 0–8)
ERYTHROCYTE [DISTWIDTH] IN BLOOD BY AUTOMATED COUNT: 21.3 % (ref 11.5–14.5)
EST. GFR  (NO RACE VARIABLE): >60 ML/MIN/1.73 M^2
GLUCOSE SERPL-MCNC: 261 MG/DL (ref 70–110)
HCT VFR BLD AUTO: 31.5 % (ref 37–48.5)
HGB BLD-MCNC: 11 G/DL (ref 12–16)
IMM GRANULOCYTES # BLD AUTO: 0.04 K/UL (ref 0–0.04)
IMM GRANULOCYTES NFR BLD AUTO: 0.4 % (ref 0–0.5)
INR PPP: 1.5 (ref 0.8–1.2)
LYMPHOCYTES # BLD AUTO: 0.8 K/UL (ref 1–4.8)
LYMPHOCYTES NFR BLD: 8.5 % (ref 18–48)
MCH RBC QN AUTO: 35 PG (ref 27–31)
MCHC RBC AUTO-ENTMCNC: 34.9 G/DL (ref 32–36)
MCV RBC AUTO: 100 FL (ref 82–98)
MONOCYTES # BLD AUTO: 0.9 K/UL (ref 0.3–1)
MONOCYTES NFR BLD: 9.3 % (ref 4–15)
NEUTROPHILS # BLD AUTO: 8.1 K/UL (ref 1.8–7.7)
NEUTROPHILS NFR BLD: 81.7 % (ref 38–73)
NRBC BLD-RTO: 0 /100 WBC
PLATELET # BLD AUTO: 100 K/UL (ref 150–450)
PMV BLD AUTO: 11.5 FL (ref 9.2–12.9)
POCT GLUCOSE: 237 MG/DL (ref 70–110)
POCT GLUCOSE: 245 MG/DL (ref 70–110)
POCT GLUCOSE: 250 MG/DL (ref 70–110)
POCT GLUCOSE: 262 MG/DL (ref 70–110)
POTASSIUM SERPL-SCNC: 4.6 MMOL/L (ref 3.5–5.1)
PROT SERPL-MCNC: 6.4 G/DL (ref 6–8.4)
PROTHROMBIN TIME: 15.5 SEC (ref 9–12.5)
RBC # BLD AUTO: 3.14 M/UL (ref 4–5.4)
SODIUM SERPL-SCNC: 129 MMOL/L (ref 136–145)
WBC # BLD AUTO: 9.93 K/UL (ref 3.9–12.7)

## 2022-10-03 PROCEDURE — 85025 COMPLETE CBC W/AUTO DIFF WBC: CPT | Performed by: FAMILY MEDICINE

## 2022-10-03 PROCEDURE — 99232 PR SUBSEQUENT HOSPITAL CARE,LEVL II: ICD-10-PCS | Mod: ,,, | Performed by: FAMILY MEDICINE

## 2022-10-03 PROCEDURE — 63600175 PHARM REV CODE 636 W HCPCS: Performed by: FAMILY MEDICINE

## 2022-10-03 PROCEDURE — 99232 SBSQ HOSP IP/OBS MODERATE 35: CPT | Mod: ,,, | Performed by: FAMILY MEDICINE

## 2022-10-03 PROCEDURE — 25000003 PHARM REV CODE 250: Performed by: INTERNAL MEDICINE

## 2022-10-03 PROCEDURE — 36415 COLL VENOUS BLD VENIPUNCTURE: CPT | Performed by: FAMILY MEDICINE

## 2022-10-03 PROCEDURE — 25000003 PHARM REV CODE 250

## 2022-10-03 PROCEDURE — 85610 PROTHROMBIN TIME: CPT | Performed by: FAMILY MEDICINE

## 2022-10-03 PROCEDURE — 80053 COMPREHEN METABOLIC PANEL: CPT | Performed by: FAMILY MEDICINE

## 2022-10-03 PROCEDURE — 25000003 PHARM REV CODE 250: Performed by: FAMILY MEDICINE

## 2022-10-03 PROCEDURE — 12000002 HC ACUTE/MED SURGE SEMI-PRIVATE ROOM

## 2022-10-03 RX ORDER — INSULIN ASPART 100 [IU]/ML
10 INJECTION, SOLUTION INTRAVENOUS; SUBCUTANEOUS
Status: DISCONTINUED | OUTPATIENT
Start: 2022-10-03 | End: 2022-10-14 | Stop reason: HOSPADM

## 2022-10-03 RX ORDER — AMLODIPINE BESYLATE 10 MG/1
10 TABLET ORAL DAILY
Status: DISCONTINUED | OUTPATIENT
Start: 2022-10-04 | End: 2022-10-11

## 2022-10-03 RX ORDER — PREDNISONE 20 MG/1
60 TABLET ORAL DAILY
Status: DISCONTINUED | OUTPATIENT
Start: 2022-10-04 | End: 2022-10-11

## 2022-10-03 RX ADMIN — INSULIN ASPART 7 UNITS: 100 INJECTION, SOLUTION INTRAVENOUS; SUBCUTANEOUS at 12:10

## 2022-10-03 RX ADMIN — FAMOTIDINE 20 MG: 20 TABLET ORAL at 08:10

## 2022-10-03 RX ADMIN — LATANOPROST 1 DROP: 50 SOLUTION OPHTHALMIC at 09:10

## 2022-10-03 RX ADMIN — BRIMONIDINE TARTRATE 1 DROP: 2 SOLUTION OPHTHALMIC at 09:10

## 2022-10-03 RX ADMIN — INSULIN ASPART 10 UNITS: 100 INJECTION, SOLUTION INTRAVENOUS; SUBCUTANEOUS at 05:10

## 2022-10-03 RX ADMIN — AMLODIPINE BESYLATE 5 MG: 5 TABLET ORAL at 08:10

## 2022-10-03 RX ADMIN — INSULIN ASPART 3 UNITS: 100 INJECTION, SOLUTION INTRAVENOUS; SUBCUTANEOUS at 12:10

## 2022-10-03 RX ADMIN — ACETAMINOPHEN 1000 MG: 500 TABLET ORAL at 08:10

## 2022-10-03 RX ADMIN — INSULIN ASPART 7 UNITS: 100 INJECTION, SOLUTION INTRAVENOUS; SUBCUTANEOUS at 08:10

## 2022-10-03 RX ADMIN — GUAIFENESIN 600 MG: 600 TABLET, EXTENDED RELEASE ORAL at 08:10

## 2022-10-03 RX ADMIN — INSULIN DETEMIR 30 UNITS: 100 INJECTION, SOLUTION SUBCUTANEOUS at 08:10

## 2022-10-03 RX ADMIN — INSULIN ASPART 2 UNITS: 100 INJECTION, SOLUTION INTRAVENOUS; SUBCUTANEOUS at 05:10

## 2022-10-03 RX ADMIN — INSULIN ASPART 2 UNITS: 100 INJECTION, SOLUTION INTRAVENOUS; SUBCUTANEOUS at 08:10

## 2022-10-03 RX ADMIN — METHYLPREDNISOLONE SODIUM SUCCINATE 60 MG: 125 INJECTION, POWDER, FOR SOLUTION INTRAMUSCULAR; INTRAVENOUS at 08:10

## 2022-10-03 NOTE — SUBJECTIVE & OBJECTIVE
Interval History: NAEON. No acute concerns. Daughter at bedside.       Review of Systems   Constitutional:  Negative for chills and fever.   Respiratory:  Negative for shortness of breath.    Cardiovascular:  Negative for chest pain.   Gastrointestinal:  Negative for abdominal pain.   Genitourinary:  Negative for dysuria.   Neurological:  Negative for headaches.   Psychiatric/Behavioral:  Negative for confusion.      Objective:     Vital Signs (Most Recent):  Temp: 97.3 °F (36.3 °C) (10/03/22 1107)  Pulse: 66 (10/03/22 1107)  Resp: 18 (10/03/22 1107)  BP: (!) 160/72 (10/03/22 1107)  SpO2: (!) 93 % (10/03/22 1107)   Vital Signs (24h Range):  Temp:  [96.4 °F (35.8 °C)-98.2 °F (36.8 °C)] 97.3 °F (36.3 °C)  Pulse:  [62-77] 66  Resp:  [18-20] 18  SpO2:  [92 %-96 %] 93 %  BP: (145-162)/(72-92) 160/72     Weight: 50.8 kg (111 lb 15.9 oz)  Body mass index is 18.08 kg/m².  No intake or output data in the 24 hours ending 10/03/22 1426     Physical Exam  Vitals and nursing note reviewed.   Constitutional:       General: She is not in acute distress.     Appearance: She is well-developed.   HENT:      Head: Normocephalic and atraumatic.   Eyes:      Conjunctiva/sclera: Conjunctivae normal.   Neck:      Vascular: No JVD.   Cardiovascular:      Rate and Rhythm: Normal rate and regular rhythm.      Heart sounds: Normal heart sounds.   Pulmonary:      Effort: Pulmonary effort is normal.      Breath sounds: Normal breath sounds.   Abdominal:      General: Bowel sounds are normal. There is no distension.      Palpations: Abdomen is soft.      Tenderness: There is no abdominal tenderness.   Musculoskeletal:      Cervical back: Neck supple.      Right lower leg: Edema present.      Left lower leg: Edema present.   Neurological:      Mental Status: She is alert.   Psychiatric:         Behavior: Behavior normal.       Significant Labs: All pertinent labs within the past 24 hours have been reviewed.  CBC:   Recent Labs   Lab  10/02/22  0519 10/03/22  0330   WBC 9.93 9.93   HGB 10.1* 11.0*   HCT 28.6* 31.5*   PLT 81* 100*       CMP:   Recent Labs   Lab 10/02/22  0519 10/03/22  0330   * 129*   K 4.4 4.6    108   CO2 17* 14*   * 261*   BUN 26* 26*   CREATININE 0.7 0.8   CALCIUM 8.2* 8.4*   PROT 6.0 6.4   ALBUMIN 1.6* 1.7*   BILITOT 2.1* 2.4*   ALKPHOS 155* 163*   * 114*   * 128*   ANIONGAP 4* 7*       Magnesium:   No results for input(s): MG in the last 48 hours.    POCT Glucose:   Recent Labs   Lab 10/02/22  1912 10/03/22  0725 10/03/22  1056   POCTGLUCOSE 285* 250* 262*         Significant Imaging: I have reviewed all pertinent imaging results/findings within the past 24 hours.

## 2022-10-03 NOTE — CONSULTS
Brief Consult Note    The request for consultation received and patient has been seen and examined. All of the relevant medical information (including but not limited to laboratory data and diagnostic imaging) was reviewed.  After full history and physical exam, this patient's care was discussed with the attending of record. The full note is pending.    In/out cath showed approx 100cc post void residual volume. Low concern for significant volume retention.       Assessment or Diagnosis:  uterine prolapse without significant urinary retention.     Plans & Recommendations:  will eval for pessary tomorrow. Discussed with patient and daughter.       The plans and recommendations have been communicated to the consulting service.     ROASRIO Smith MD  OBGYN PGY-4

## 2022-10-03 NOTE — TREATMENT PLAN
Hepatology Treatment Plan    Radha Feng is a 81 y.o. female admitted to hospital 9/20/2022 (Hospital Day: 14) due to Autoimmune hepatitis.     Interval History  Liver chemistries continue to improve with IV steroids.      Objective  Temp:  [96.4 °F (35.8 °C)-98.2 °F (36.8 °C)] 96.4 °F (35.8 °C) (10/03 0722)  Pulse:  [62-77] 77 (10/03 0722)  BP: (145-179)/(72-92) 162/72 (10/03 0722)  Resp:  [18-20] 18 (10/03 0722)  SpO2:  [92 %-96 %] 92 % (10/03 0722)      Laboratory    Lab Results   Component Value Date    WBC 9.93 10/03/2022    HGB 11.0 (L) 10/03/2022    HCT 31.5 (L) 10/03/2022     (H) 10/03/2022     (L) 10/03/2022       Lab Results   Component Value Date     (L) 10/03/2022    K 4.6 10/03/2022     10/03/2022    CO2 14 (L) 10/03/2022    BUN 26 (H) 10/03/2022    CREATININE 0.8 10/03/2022    CALCIUM 8.4 (L) 10/03/2022       Lab Results   Component Value Date    ALBUMIN 1.7 (L) 10/03/2022     (H) 10/03/2022     (H) 10/03/2022    ALKPHOS 163 (H) 10/03/2022    BILITOT 2.4 (H) 10/03/2022       Lab Results   Component Value Date    INR 1.5 (H) 10/03/2022    INR 1.5 (H) 10/02/2022    INR 1.4 (H) 10/01/2022       MELD-Na score: 21 at 10/3/2022  3:30 AM  MELD score: 14 at 10/3/2022  3:30 AM  Calculated from:  Serum Creatinine: 0.8 mg/dL (Using min of 1 mg/dL) at 10/3/2022  3:30 AM  Serum Sodium: 129 mmol/L at 10/3/2022  3:30 AM  Total Bilirubin: 2.4 mg/dL at 10/3/2022  3:30 AM  INR(ratio): 1.5 at 10/3/2022  3:30 AM  Age: 81 years    Plan  - transition to PO prednisone 60mg today  - continue to trend liver chemistries daily  - please obtain daily CBC, BMP, LFT, INR  -  Hepatology will continue to follow    Thank you for involving us in the care of Radha Feng. Please call with any additional concerns or questions.    Bud Nam MD  Gastroenterology Fellow

## 2022-10-03 NOTE — PROGRESS NOTES
[facility]  Progress Note    Patient Name: Radha Feng  MRN: [id number]  Patient Class: IP- Inpatient   Admission Date: 9/20/2022  Length of Stay: 11 days  Attending Physician: Malaika Madrigal MD  Primary Care Provider: Primary Doctor No      Subjective:     Principal Problem:Autoimmune hepatitis      HPI:  Radha Feng is a 81 y.o. female with a past medical history of type 2 diabetes, hyperlipidemia, hypertension, glaucoma and cataracts presents the emergency department due to progressive lethargy and weakness. Patient's daughter at the bedside who provides the majority of the history due to the acuity of patient's condition. Per daughter, patient was sent from her cardiology office earlier today due to increased bilateral leg swelling, shortness of breath, and progressive weakness over the past two weeks. She reports that over the past two weeks patient has had a poor appetite with significantly decreased po intake. In addition, she reports that patient's BP has been lower at home compared to baseline and states she has held BP medications (amlodipine and lisinopril-HCTZ) for the past two days. She states her BP is normally 130/80 but as been as low as 98/63. Per daughter, lower extremity swelling was most pronounced last week but has improved significantly with compression stockings.  Additionally, daughter notes that she was helping her change last week and noted that she had a prolapsed uterus/bladder but patient has not seen her OBGYN yet. Patient is requiring more assistance with ADLs which is abnormal for her. Patient denies dysuria but states that she does have difficulty urinating and can only urinate a small amount. Denies chest pain, shortness of breath, abdominal pain, dizziness, nausea, vomiting, or syncope. Patient's only complaint at this time is right knee pain.    ED: vital signs stable, afebrile, no acute distress. Elevated liver enzymes with T.bili of 2.9,  AST//254, and alk phos of 224. Na 133, K 5.0, Cr 1.4 (most recent Cr of 0.8 in 12/21). D-dimer 11.97, CTA negative for PE. CTA showed evidence of distended gallbladder and emphysema. Subsequent RUQ US negative for cholecystitis or gallstones. BNP 66, troponin negative. UA negative for infection. Given 500 mL LR and placed in observation.       Overview/Hospital Course:  Patient admitted to hospitalist service.  She was noted to have elevated liver enzymes, acute renal insuff, hyperbilirubin.  D-dimer was significatnly elevated.   CTA demonstrated NO PTE but did show few bilateral pulmonary micro nodules which can be followed up as OP.   Right UQ US showed distended gallbladder but no sonographically detectable gallstone, and no additional sonographic evidence for acute cholecystitis.   Mild free fluid of the abdomen noted.  MRCP showed normal pancreatic duct, Gallbladder is distended without wall thickening or pericholecystic fluid.  Common bile duct is normal caliber and tapers distally to the ampulla.  No intrahepatic biliary dilatation.  No biliary filling defects identified.   Acute hepatitis panel negative.  Monospot/EBV neg. CMV neg. Ceruloplasmin normal. Elevated IgG and SONNY.   Tentative diagnosis of Autoimmune Hepatitis.   She was also treated for mild hepatic encephalopathy with lactulose.    Hepatology started her on steroid therapy. Her LFTs improved daily. GYN consulted for uterine prolapse with LUTS.       Interval History: NAEON. No acute concerns. Daughter at bedside.       Review of Systems   Constitutional:  Negative for chills and fever.   Respiratory:  Negative for shortness of breath.    Cardiovascular:  Negative for chest pain.   Gastrointestinal:  Negative for abdominal pain.   Genitourinary:  Negative for dysuria.   Neurological:  Negative for headaches.   Psychiatric/Behavioral:  Negative for confusion.      Objective:     Vital Signs (Most Recent):  Temp: 97.3 °F (36.3 °C) (10/03/22  1107)  Pulse: 66 (10/03/22 1107)  Resp: 18 (10/03/22 1107)  BP: (!) 160/72 (10/03/22 1107)  SpO2: (!) 93 % (10/03/22 1107)   Vital Signs (24h Range):  Temp:  [96.4 °F (35.8 °C)-98.2 °F (36.8 °C)] 97.3 °F (36.3 °C)  Pulse:  [62-77] 66  Resp:  [18-20] 18  SpO2:  [92 %-96 %] 93 %  BP: (145-162)/(72-92) 160/72     Weight: 50.8 kg (111 lb 15.9 oz)  Body mass index is 18.08 kg/m².  No intake or output data in the 24 hours ending 10/03/22 1426     Physical Exam  Vitals and nursing note reviewed.   Constitutional:       General: She is not in acute distress.     Appearance: She is well-developed.   HENT:      Head: Normocephalic and atraumatic.   Eyes:      Conjunctiva/sclera: Conjunctivae normal.   Neck:      Vascular: No JVD.   Cardiovascular:      Rate and Rhythm: Normal rate and regular rhythm.      Heart sounds: Normal heart sounds.   Pulmonary:      Effort: Pulmonary effort is normal.      Breath sounds: Normal breath sounds.   Abdominal:      General: Bowel sounds are normal. There is no distension.      Palpations: Abdomen is soft.      Tenderness: There is no abdominal tenderness.   Musculoskeletal:      Cervical back: Neck supple.      Right lower leg: Edema present.      Left lower leg: Edema present.   Neurological:      Mental Status: She is alert.   Psychiatric:         Behavior: Behavior normal.       Significant Labs: All pertinent labs within the past 24 hours have been reviewed.  CBC:   Recent Labs   Lab 10/02/22  0519 10/03/22  0330   WBC 9.93 9.93   HGB 10.1* 11.0*   HCT 28.6* 31.5*   PLT 81* 100*       CMP:   Recent Labs   Lab 10/02/22  0519 10/03/22  0330   * 129*   K 4.4 4.6    108   CO2 17* 14*   * 261*   BUN 26* 26*   CREATININE 0.7 0.8   CALCIUM 8.2* 8.4*   PROT 6.0 6.4   ALBUMIN 1.6* 1.7*   BILITOT 2.1* 2.4*   ALKPHOS 155* 163*   * 114*   * 128*   ANIONGAP 4* 7*       Magnesium:   No results for input(s): MG in the last 48 hours.    POCT Glucose:   Recent Labs    Lab 10/02/22  1912 10/03/22  0725 10/03/22  1056   POCTGLUCOSE 285* 250* 262*         Significant Imaging: I have reviewed all pertinent imaging results/findings within the past 24 hours.      Assessment/Plan:      * Autoimmune hepatitis  - discussed with hepatology. Continue steroids at 60 mg PO. Continue to trend LFTs.       Uterine prolapse  - GYN plans to place pessary tomorrow. No significant urinary retention with straight cath. Continue plans for outpt fu with uro- gyn.      Acute liver failure without hepatic coma  - CTA of ab/pelvix,  subsequent RUQ US, and  MRCP demonstrate no cause of patient's liver failure  - Acute hepatitis panel neg, monospot neg, copper levels normal, EBV neg  - Liver bx on 9/27-- Transjugular liver biopsy with pressure measurements    - ascites survey w large volume ascitic fluid. Decision for paracentesis pending discussions with hepatology.      Hemolytic anemia  - with associated iron overload  - direct genny test NEG  - elevated ddimer and low fibrinogen (monitor platelets, coags, evidence of bleeding or clotting)  - etiology unknown  - hematology review of PBS--> target cells, some bethany cells,  very few schistocytes (0-1 per hp), no blast  - 9/28--> secondary to liver failure    - hematology signed off-- no further recs unless counts become unstable.         Oral phase dysphagia   mild oral dysphagia, but with a safe pharyngeal swallow. No further acute ST warranted at this time.      Severe protein-calorie malnutrition  Nutrition following. Maximize oral intake.        Type 2 diabetes mellitus, without long-term current use of insulin  Patient's FSGs are uncontrolled due to hyperglycemia on current medication regimen.  - home regimen: metformin and glyburide  Last A1c reviewed-             Lab Results   Component Value Date     HGBA1C 5.3 09/21/2022      - increased detemir to 25 u bid. Will need to continue to adjust with steroid therapy. Increased pre meal novolog. SSI.  Monitor.         Hypertension  - BP elevated. Increased norvasc to home dose, 10mg. Monitor with steroid therapy.      Leg swelling  - no previous hx of CHF  - echo w grade II DD-- no acute exacerbation  - dopplers neg for DVT    Daughter says patient has been on Amlodipine for years with no recent dosage changes. So this is unlikely the culprit.        Right knee pain  - Chronic  - reported trauma about 30 years ago  - follows with orthopedics outpatient  - recent MRI in august with chronic findings  - continue tylenol prn for pain        Hepatic encephalopathy  - resolved   - lactulose bid--> adjusted to qday  - adjust dose as needed      Dehydration  - resolved      EFREN (acute kidney injury)  - Resolved          VTE Risk Mitigation (From admission, onward)           Ordered     Place sequential compression device  Until discontinued         09/21/22 0932     IP VTE LOW RISK PATIENT  Once         09/21/22 0156                    Discharge Planning   MELVI: 10/3/2022     Code Status: Full Code   Is the patient medically ready for discharge?: No    Reason for patient still in hospital (select all that apply): Patient trending condition, Treatment and Consult recommendations  Discharge Plan A: Home with family   Discharge Delays: None known at this time      Malaika Madrigal MD  Department of Hospital Medicine   [facility]

## 2022-10-04 LAB
ALBUMIN SERPL BCP-MCNC: 1.6 G/DL (ref 3.5–5.2)
ALP SERPL-CCNC: 144 U/L (ref 55–135)
ALT SERPL W/O P-5'-P-CCNC: 114 U/L (ref 10–44)
ANION GAP SERPL CALC-SCNC: 5 MMOL/L (ref 8–16)
ANISOCYTOSIS BLD QL SMEAR: SLIGHT
AST SERPL-CCNC: 94 U/L (ref 10–40)
BASOPHILS # BLD AUTO: 0 K/UL (ref 0–0.2)
BASOPHILS NFR BLD: 0 % (ref 0–1.9)
BILIRUB SERPL-MCNC: 2.1 MG/DL (ref 0.1–1)
BUN SERPL-MCNC: 26 MG/DL (ref 8–23)
CALCIUM SERPL-MCNC: 8.3 MG/DL (ref 8.7–10.5)
CHLORIDE SERPL-SCNC: 110 MMOL/L (ref 95–110)
CO2 SERPL-SCNC: 15 MMOL/L (ref 23–29)
CREAT SERPL-MCNC: 0.7 MG/DL (ref 0.5–1.4)
DIFFERENTIAL METHOD: ABNORMAL
EOSINOPHIL # BLD AUTO: 0 K/UL (ref 0–0.5)
EOSINOPHIL NFR BLD: 0 % (ref 0–8)
ERYTHROCYTE [DISTWIDTH] IN BLOOD BY AUTOMATED COUNT: 21.2 % (ref 11.5–14.5)
EST. GFR  (NO RACE VARIABLE): >60 ML/MIN/1.73 M^2
GLUCOSE SERPL-MCNC: 152 MG/DL (ref 70–110)
HCT VFR BLD AUTO: 29.4 % (ref 37–48.5)
HGB BLD-MCNC: 10.1 G/DL (ref 12–16)
HYPOCHROMIA BLD QL SMEAR: ABNORMAL
IMM GRANULOCYTES # BLD AUTO: 0.01 K/UL (ref 0–0.04)
IMM GRANULOCYTES NFR BLD AUTO: 0.1 % (ref 0–0.5)
INR PPP: 1.5 (ref 0.8–1.2)
LYMPHOCYTES # BLD AUTO: 0.9 K/UL (ref 1–4.8)
LYMPHOCYTES NFR BLD: 10.4 % (ref 18–48)
MCH RBC QN AUTO: 35.2 PG (ref 27–31)
MCHC RBC AUTO-ENTMCNC: 34.4 G/DL (ref 32–36)
MCV RBC AUTO: 102 FL (ref 82–98)
MONOCYTES # BLD AUTO: 1 K/UL (ref 0.3–1)
MONOCYTES NFR BLD: 11.3 % (ref 4–15)
NEUTROPHILS # BLD AUTO: 6.6 K/UL (ref 1.8–7.7)
NEUTROPHILS NFR BLD: 78.2 % (ref 38–73)
NRBC BLD-RTO: 0 /100 WBC
OVALOCYTES BLD QL SMEAR: ABNORMAL
PLATELET # BLD AUTO: 87 K/UL (ref 150–450)
PMV BLD AUTO: 12.4 FL (ref 9.2–12.9)
POCT GLUCOSE: 109 MG/DL (ref 70–110)
POCT GLUCOSE: 194 MG/DL (ref 70–110)
POCT GLUCOSE: 216 MG/DL (ref 70–110)
POCT GLUCOSE: 218 MG/DL (ref 70–110)
POIKILOCYTOSIS BLD QL SMEAR: SLIGHT
POLYCHROMASIA BLD QL SMEAR: ABNORMAL
POTASSIUM SERPL-SCNC: 4.5 MMOL/L (ref 3.5–5.1)
PROT SERPL-MCNC: 6 G/DL (ref 6–8.4)
PROTHROMBIN TIME: 15.2 SEC (ref 9–12.5)
RBC # BLD AUTO: 2.87 M/UL (ref 4–5.4)
SODIUM SERPL-SCNC: 130 MMOL/L (ref 136–145)
TARGETS BLD QL SMEAR: ABNORMAL
WBC # BLD AUTO: 8.39 K/UL (ref 3.9–12.7)

## 2022-10-04 PROCEDURE — 63600175 PHARM REV CODE 636 W HCPCS: Performed by: FAMILY MEDICINE

## 2022-10-04 PROCEDURE — 12000002 HC ACUTE/MED SURGE SEMI-PRIVATE ROOM

## 2022-10-04 PROCEDURE — 80053 COMPREHEN METABOLIC PANEL: CPT | Performed by: FAMILY MEDICINE

## 2022-10-04 PROCEDURE — 85610 PROTHROMBIN TIME: CPT | Performed by: FAMILY MEDICINE

## 2022-10-04 PROCEDURE — 99233 SBSQ HOSP IP/OBS HIGH 50: CPT | Mod: ,,, | Performed by: INTERNAL MEDICINE

## 2022-10-04 PROCEDURE — 99233 PR SUBSEQUENT HOSPITAL CARE,LEVL III: ICD-10-PCS | Mod: ,,, | Performed by: INTERNAL MEDICINE

## 2022-10-04 PROCEDURE — 36415 COLL VENOUS BLD VENIPUNCTURE: CPT | Performed by: FAMILY MEDICINE

## 2022-10-04 PROCEDURE — 85025 COMPLETE CBC W/AUTO DIFF WBC: CPT | Performed by: FAMILY MEDICINE

## 2022-10-04 PROCEDURE — 25000003 PHARM REV CODE 250: Performed by: INTERNAL MEDICINE

## 2022-10-04 PROCEDURE — 99233 PR SUBSEQUENT HOSPITAL CARE,LEVL III: ICD-10-PCS | Mod: GC,,, | Performed by: STUDENT IN AN ORGANIZED HEALTH CARE EDUCATION/TRAINING PROGRAM

## 2022-10-04 PROCEDURE — 25000003 PHARM REV CODE 250: Performed by: FAMILY MEDICINE

## 2022-10-04 PROCEDURE — 99233 SBSQ HOSP IP/OBS HIGH 50: CPT | Mod: GC,,, | Performed by: STUDENT IN AN ORGANIZED HEALTH CARE EDUCATION/TRAINING PROGRAM

## 2022-10-04 RX ADMIN — GUAIFENESIN 600 MG: 600 TABLET, EXTENDED RELEASE ORAL at 08:10

## 2022-10-04 RX ADMIN — AMLODIPINE BESYLATE 10 MG: 10 TABLET ORAL at 09:10

## 2022-10-04 RX ADMIN — TRAMADOL HYDROCHLORIDE 25 MG: 50 TABLET, FILM COATED ORAL at 05:10

## 2022-10-04 RX ADMIN — PREDNISONE 60 MG: 20 TABLET ORAL at 09:10

## 2022-10-04 RX ADMIN — GUAIFENESIN 600 MG: 600 TABLET, EXTENDED RELEASE ORAL at 09:10

## 2022-10-04 RX ADMIN — FAMOTIDINE 20 MG: 20 TABLET ORAL at 09:10

## 2022-10-04 RX ADMIN — LATANOPROST 1 DROP: 50 SOLUTION OPHTHALMIC at 09:10

## 2022-10-04 RX ADMIN — BRIMONIDINE TARTRATE 1 DROP: 2 SOLUTION OPHTHALMIC at 09:10

## 2022-10-04 NOTE — CONSULTS
Initial Consult                                     UroGynecology          Subjective:       Radha Feng is a 81 y.o.  female admitted for suspected autoimmune hepatitis who is noted by caregiver (daughter) to have bulge coming from vagina and subjectively decreased urine output. This history and physical is combined from yesterday and today. Patient does complain of some bulge/pressure from vagina at times. Neither pt nor daughter have noticed any vaginal bulge prior to hospitalization. Prior to hospitalization, pt was able to perform ADLs without any assistance. She denies any PMB. No hx fecal/urinary incontinence, but now has had urinary incontinence during hospitalization.     Review of Systems   Constitutional:  Positive for activity change and fatigue. Negative for chills.   Gastrointestinal:  Negative for abdominal pain, bloating and nausea.   Endocrine: Negative for hot flashes.   Genitourinary:  Positive for urinary incontinence. Negative for pelvic pain and vaginal bleeding.   Musculoskeletal:  Positive for arthralgias.   Neurological:  Negative for syncope.   All other systems reviewed and are negative.    Past Medical/Surgical Hx: reviewed in chart  Social/Family Hx: reviewed in chart  Allergies: reviewed in chart  Meds:   Medications Prior to Admission   Medication Sig Dispense Refill Last Dose    acetaminophen (TYLENOL) 500 MG tablet Take 1000 mg by mouth daily to every other day as needed for pain.       amLODIPine (NORVASC) 10 MG tablet Take 10 mg by mouth once daily.       aspirin (ECOTRIN) 81 MG EC tablet Take 81 mg by mouth once daily.       atorvastatin (LIPITOR) 10 MG tablet TAKE 1 TABLET BY MOUTH EVERY DAY       brimonidine 0.2% (ALPHAGAN) 0.2 % Drop INSTILL 1 DROP INTO RIGHT EYE TWICE A DAY 30 mL 3     dorzolamide-timolol 2-0.5% (COSOPT) 22.3-6.8 mg/mL ophthalmic solution INSTILL 1 DROP INTO RIGHT EYE TWICE A DAY 30 mL 3     glyBURIDE (DIABETA) 2.5 MG tablet Take 2.5 mg by mouth  "once daily.       ibuprofen (ADVIL,MOTRIN) 200 MG tablet Take 3 tablets (600 mg) by mouth daily to every other day as needed for pain.       latanoprost 0.005 % ophthalmic solution PLACE 1 DROP INTO BOTH EYES ONCE DAILY. 7.5 mL 3     lisinopriL-hydrochlorothiazide (PRINZIDE,ZESTORETIC) 20-12.5 mg per tablet TAKE 1 TABLET BY MOUTH TWICE A DAY       metFORMIN (GLUCOPHAGE) 500 MG tablet TAKE 1 TABLET BY MOUTH TWICE A DAY       multivit-minerals/folic acid (ONE DAILY GUMMY VITES ORAL) Chew and swallow 1 gummy by mouth once daily.        OBHx:   As above    Objective:       BP (!) 154/77 (BP Location: Right arm, Patient Position: Lying)   Pulse 63   Temp 97.7 °F (36.5 °C) (Tympanic)   Resp 17   Ht 5' 6" (1.676 m)   Wt 50.8 kg (111 lb 15.9 oz)   SpO2 95%   BMI 18.08 kg/m²   Physical Exam  Constitutional:       General: She is not in acute distress.  Genitourinary:      Genitourinary Comments: Stage 4. Cervix visible past introitus      Uterus is prolapsed.   POP-Q measurements were:      Aa: 3, Ba: 3, C: 7     gH: 6, pB: 1, TVL: 9     Ap: 3, Bp: 5, D: 3     Pelvic exam was performed with patient in the lithotomy position.   HENT:      Head: Normocephalic.   Eyes:      Extraocular Movements: Extraocular movements intact.   Cardiovascular:      Rate and Rhythm: Normal rate.   Pulmonary:      Effort: Pulmonary effort is normal.   Abdominal:      General: There is distension.      Tenderness: There is no abdominal tenderness. There is no guarding or rebound.   Musculoskeletal:      Cervical back: Normal range of motion.   Neurological:      Mental Status: She is disoriented.   Skin:     General: Skin is warm.   Psychiatric:         Behavior: Behavior is uncooperative and withdrawn.   Vitals and nursing note reviewed. Exam conducted with a chaperone present.        Lab Review  BMP  Lab Results   Component Value Date     (L) 10/04/2022    K 4.5 10/04/2022     10/04/2022    CO2 15 (L) 10/04/2022    BUN 26 (H) " 10/04/2022    CREATININE 0.7 10/04/2022    CALCIUM 8.3 (L) 10/04/2022    ANIONGAP 5 (L) 10/04/2022      Lab Results   Component Value Date     (H) 10/04/2022    AST 94 (H) 10/04/2022    ALKPHOS 144 (H) 10/04/2022    BILITOT 2.1 (H) 10/04/2022      Lab Results   Component Value Date    WBC 8.39 10/04/2022    HGB 10.1 (L) 10/04/2022    HCT 29.4 (L) 10/04/2022     (H) 10/04/2022    PLT 87 (L) 10/04/2022           Assessment:       80 y/o para 5 w/ stage 4 uterine prolapse    Active Hospital Problems    Diagnosis  POA    *Autoimmune hepatitis [K75.4]  Yes    Other specified anemias [D64.89]  Yes    Oral phase dysphagia [R13.11]  Yes    Hemolytic anemia [D58.9]  Yes    Hepatic encephalopathy [K76.82]  Yes    Severe protein-calorie malnutrition [E43]  Yes    Acute liver failure without hepatic coma [K72.00]  Yes    Hypertension [I10]  Yes    Type 2 diabetes mellitus, without long-term current use of insulin [E11.9]  Yes    Dehydration [E86.0]  Yes    EFREN (acute kidney injury) [N17.9]  Yes    Right knee pain [M25.561]  Yes    Lethargy [R53.83]  Yes    Leg swelling [M79.89]  Yes      Resolved Hospital Problems   No resolved problems to display.          Plan:   Stage 4 Pelvic Organ Prolapse (POP)  - Exam consistent with this diagnosis  - In/out cath performed after voiding with approx 70cc concentrated urine. Low concern for urinary retention 2/2 POP  - NO need for hyman catheter at this time, however if patient's mentation continues to worsen, consider purewick  - Not a surgical candidate at this time; pt is a candidate for pessary  - Pessary fitting deferred given patient's worsening encephalopathy this am  - Will follow along via chart review and follow up with patient once metal status improves or follow up as outpatient.   - Pt has follow up scheduled for 10/20 as outpatient  - Findings/plan discussed with patient/daughter who are in agreement    ROSARIO Smith MD  OBGYN PGY-4

## 2022-10-04 NOTE — PROGRESS NOTES
Hepatology Treatment Plan    Radha Feng is a 81 y.o. female admitted to hospital 9/20/2022 (Hospital Day: 15) due to Autoimmune hepatitis.     Interval History  Patient's liver chemistries continue to improve.  Family reports delirum this AM.    Objective  Temp:  [97.2 °F (36.2 °C)-98.1 °F (36.7 °C)] 97.7 °F (36.5 °C) (10/04 0735)  Pulse:  [62-67] 63 (10/04 0735)  BP: (144-170)/(70-82) 154/77 (10/04 0735)  Resp:  [17-19] 17 (10/04 0735)  SpO2:  [91 %-95 %] 95 % (10/04 0735)    General: Alert, Oriented x1, no distress  Neurologic: Asterixis absent  Abdomen: Normoactive bowel sounds. Non-distended. Normal tympany. Soft. Non-tender. No peritoneal signs.    Laboratory    Lab Results   Component Value Date    WBC 8.39 10/04/2022    HGB 10.1 (L) 10/04/2022    HCT 29.4 (L) 10/04/2022     (H) 10/04/2022    PLT 87 (L) 10/04/2022       Lab Results   Component Value Date     (L) 10/04/2022    K 4.5 10/04/2022     10/04/2022    CO2 15 (L) 10/04/2022    BUN 26 (H) 10/04/2022    CREATININE 0.7 10/04/2022    CALCIUM 8.3 (L) 10/04/2022       Lab Results   Component Value Date    ALBUMIN 1.6 (L) 10/04/2022     (H) 10/04/2022    AST 94 (H) 10/04/2022    ALKPHOS 144 (H) 10/04/2022    BILITOT 2.1 (H) 10/04/2022       Lab Results   Component Value Date    INR 1.5 (H) 10/04/2022    INR 1.5 (H) 10/03/2022    INR 1.5 (H) 10/02/2022       MELD-Na score: 20 at 10/4/2022  3:54 AM  MELD score: 14 at 10/4/2022  3:54 AM  Calculated from:  Serum Creatinine: 0.7 mg/dL (Using min of 1 mg/dL) at 10/4/2022  3:54 AM  Serum Sodium: 130 mmol/L at 10/4/2022  3:54 AM  Total Bilirubin: 2.1 mg/dL at 10/4/2022  3:54 AM  INR(ratio): 1.5 at 10/4/2022  3:54 AM  Age: 81 years    Plan  - continue PO prednisone 60mg  - altered mentation could be augmented by steroids but likely delirium  - consider ruling out infectious process causing altered mentation  - please obtain daily CBC, BMP, LFT, INR  - Plan of care was discussed with  primary team    Thank you for involving us in the care of Rdaha Feng. Please call with any additional concerns or questions.    Bud Nam MD  Gastroenterology Fellow

## 2022-10-04 NOTE — NURSING
Patient slightly off LOC baseline then beginning of shift-still able to make needs known, but having inappropriate responses to situation and time. No current signs or symptoms of distress. Absent fever, chills or sweats. Sherry notified-no new orders rec'vd at this time. Daughter would like to speak with medical team over concerns she has with change in status-clutter removed. Bed in lowest position, call light within reach

## 2022-10-04 NOTE — PROGRESS NOTES
[facility] Medicine  Progress Note    Patient Name: Radha Feng  MRN: [id number]  Patient Class: IP- Inpatient   Admission Date: 9/20/2022  Length of Stay: 12 days  Attending Physician: Marysol Bullock MD  Primary Care Provider: Primary Doctor No      Subjective:     Principal Problem:Autoimmune hepatitis      HPI:  Radha Feng is a 81 y.o. female with a past medical history of type 2 diabetes, hyperlipidemia, hypertension, glaucoma and cataracts presents the emergency department due to progressive lethargy and weakness. Patient's daughter at the bedside who provides the majority of the history due to the acuity of patient's condition. Per daughter, patient was sent from her cardiology office earlier today due to increased bilateral leg swelling, shortness of breath, and progressive weakness over the past two weeks. She reports that over the past two weeks patient has had a poor appetite with significantly decreased po intake. In addition, she reports that patient's BP has been lower at home compared to baseline and states she has held BP medications (amlodipine and lisinopril-HCTZ) for the past two days. She states her BP is normally 130/80 but as been as low as 98/63. Per daughter, lower extremity swelling was most pronounced last week but has improved significantly with compression stockings.  Additionally, daughter notes that she was helping her change last week and noted that she had a prolapsed uterus/bladder but patient has not seen her OBGYN yet. Patient is requiring more assistance with ADLs which is abnormal for her. Patient denies dysuria but states that she does have difficulty urinating and can only urinate a small amount. Denies chest pain, shortness of breath, abdominal pain, dizziness, nausea, vomiting, or syncope. Patient's only complaint at this time is right knee pain.    ED: vital signs stable, afebrile, no acute distress. Elevated liver enzymes with T.bili of 2.9,  AST//254, and alk phos of 224. Na 133, K 5.0, Cr 1.4 (most recent Cr of 0.8 in 12/21). D-dimer 11.97, CTA negative for PE. CTA showed evidence of distended gallbladder and emphysema. Subsequent RUQ US negative for cholecystitis or gallstones. BNP 66, troponin negative. UA negative for infection. Given 500 mL LR and placed in observation.       Overview/Hospital Course:  Patient admitted to hospitalist service.  She was noted to have elevated liver enzymes, acute renal insuff, hyperbilirubin.  D-dimer was significatnly elevated.   CTA demonstrated NO PTE but did show few bilateral pulmonary micro nodules which can be followed up as OP.   Right UQ US showed distended gallbladder but no sonographically detectable gallstone, and no additional sonographic evidence for acute cholecystitis.   Mild free fluid of the abdomen noted.  MRCP showed normal pancreatic duct, Gallbladder is distended without wall thickening or pericholecystic fluid.  Common bile duct is normal caliber and tapers distally to the ampulla.  No intrahepatic biliary dilatation.  No biliary filling defects identified.   Acute hepatitis panel negative.  Monospot/EBV neg. CMV neg. Ceruloplasmin normal. Elevated IgG and SONNY.   Tentative diagnosis of Autoimmune Hepatitis.   She was also treated for mild hepatic encephalopathy with lactulose.    Hepatology started her on steroid therapy. Her LFTs improved daily. GYN consulted for uterine prolapse with LUTS.     Unable to participate in history taking process given current cognitive state. >4 BM/day    NAD, somnolent  NC, AT  RRR  CTAB  SNT, distended without rebound or guarding, +BS  1+ edema   PERRL    Vitals, labs and radiographs from past 24h reviewed and personally interpreted.       Assessment/Plan:      * Autoimmune hepatitis  - discussed with hepatology. Continue steroids at 60 mg PO. Continue to trend LFTs.       Uterine prolapse  - GYN plans to place pessary once cognitive status improves.. No  significant urinary retention with straight cath. Continue plans for outpt fu with uro- gyn.      Acute liver failure without hepatic coma  - CTA of ab/pelvix,  subsequent RUQ US, and  MRCP demonstrate no cause of patient's liver failure  - Acute hepatitis panel neg, monospot neg, copper levels normal, EBV neg  - Liver bx on 9/27-- Transjugular liver biopsy with pressure measurements    - ascites survey w large volume ascitic fluid.   Para requested      Hemolytic anemia  - with associated iron overload  - direct genny test NEG  - elevated ddimer and low fibrinogen (monitor platelets, coags, evidence of bleeding or clotting)  - etiology unknown  - hematology review of PBS--> target cells, some bethany cells,  very few schistocytes (0-1 per hp), no blast  - 9/28--> secondary to liver failure    - hematology signed off-- no further recs unless counts become unstable.         Oral phase dysphagia   mild oral dysphagia, but with a safe pharyngeal swallow. No further acute ST warranted at this time.      Severe protein-calorie malnutrition  Nutrition following. Maximize oral intake.        Type 2 diabetes mellitus, without long-term current use of insulin  Patient's FSGs are uncontrolled due to hyperglycemia on current medication regimen.  - home regimen: metformin and glyburide  Last A1c reviewed-             Lab Results   Component Value Date     HGBA1C 5.3 09/21/2022      - increased detemir to 25 u bid. Will need to continue to adjust with steroid therapy. Increased pre meal novolog. SSI. Monitor.         Hypertension  - BP elevated. Increased norvasc to home dose, 10mg. Monitor with steroid therapy.      Leg swelling  - no previous hx of CHF  - echo w grade II DD-- no acute exacerbation  - dopplers neg for DVT    Daughter says patient has been on Amlodipine for years with no recent dosage changes. So this is unlikely the culprit.        Right knee pain  - Chronic  - reported trauma about 30 years ago  - follows with  orthopedics outpatient  - recent MRI in august with chronic findings  - continue tylenol prn for pain        Hepatic encephalopathy  - resolved   - lactulose held  - adjust dose as needed  -Still >4 BM/day      Dehydration  - resolved      EFREN (acute kidney injury)  - Resolved          VTE Risk Mitigation (From admission, onward)           Ordered     Place sequential compression device  Until discontinued         09/21/22 0932     IP VTE LOW RISK PATIENT  Once         09/21/22 0156                    Discharge Planning   MELVI: 10/4/2022     Code Status: Full Code   Is the patient medically ready for discharge?: No    Reason for patient still in hospital (select all that apply): Patient trending condition, Treatment and Consult recommendations  Discharge Plan A: Home with family   Discharge Delays: None known at this time      Marysol Bullock MD  Department of Hospital Medicine   [facility]

## 2022-10-05 LAB
ALBUMIN SERPL BCP-MCNC: 1.7 G/DL (ref 3.5–5.2)
ALP SERPL-CCNC: 150 U/L (ref 55–135)
ALT SERPL W/O P-5'-P-CCNC: 106 U/L (ref 10–44)
ANION GAP SERPL CALC-SCNC: 7 MMOL/L (ref 8–16)
ANISOCYTOSIS BLD QL SMEAR: SLIGHT
APPEARANCE FLD: NORMAL
AST SERPL-CCNC: 82 U/L (ref 10–40)
BASOPHILS # BLD AUTO: 0.02 K/UL (ref 0–0.2)
BASOPHILS NFR BLD: 0.2 % (ref 0–1.9)
BILIRUB SERPL-MCNC: 2.7 MG/DL (ref 0.1–1)
BODY FLD TYPE: NORMAL
BUN SERPL-MCNC: 27 MG/DL (ref 8–23)
BURR CELLS BLD QL SMEAR: ABNORMAL
CALCIUM SERPL-MCNC: 8.5 MG/DL (ref 8.7–10.5)
CHLORIDE SERPL-SCNC: 106 MMOL/L (ref 95–110)
CO2 SERPL-SCNC: 16 MMOL/L (ref 23–29)
COLOR FLD: YELLOW
CREAT SERPL-MCNC: 0.7 MG/DL (ref 0.5–1.4)
DIFFERENTIAL METHOD: ABNORMAL
EOSINOPHIL # BLD AUTO: 0 K/UL (ref 0–0.5)
EOSINOPHIL NFR BLD: 0 % (ref 0–8)
ERYTHROCYTE [DISTWIDTH] IN BLOOD BY AUTOMATED COUNT: 21.4 % (ref 11.5–14.5)
EST. GFR  (NO RACE VARIABLE): >60 ML/MIN/1.73 M^2
GLUCOSE SERPL-MCNC: 178 MG/DL (ref 70–110)
GRAM STN SPEC: NORMAL
GRAM STN SPEC: NORMAL
HCT VFR BLD AUTO: 31.6 % (ref 37–48.5)
HGB BLD-MCNC: 10.5 G/DL (ref 12–16)
HYPOCHROMIA BLD QL SMEAR: ABNORMAL
IMM GRANULOCYTES # BLD AUTO: 0.02 K/UL (ref 0–0.04)
IMM GRANULOCYTES NFR BLD AUTO: 0.2 % (ref 0–0.5)
LYMPHOCYTES # BLD AUTO: 0.9 K/UL (ref 1–4.8)
LYMPHOCYTES NFR BLD: 11 % (ref 18–48)
LYMPHOCYTES NFR FLD MANUAL: 32 %
MAGNESIUM SERPL-MCNC: 1.9 MG/DL (ref 1.6–2.6)
MCH RBC QN AUTO: 34.5 PG (ref 27–31)
MCHC RBC AUTO-ENTMCNC: 33.2 G/DL (ref 32–36)
MCV RBC AUTO: 104 FL (ref 82–98)
MESOTHL CELL NFR FLD MANUAL: 8 %
MONOCYTES # BLD AUTO: 0.9 K/UL (ref 0.3–1)
MONOCYTES NFR BLD: 10.2 % (ref 4–15)
MONOS+MACROS NFR FLD MANUAL: 10 %
NEUTROPHILS # BLD AUTO: 6.7 K/UL (ref 1.8–7.7)
NEUTROPHILS NFR BLD: 78.4 % (ref 38–73)
NEUTROPHILS NFR FLD MANUAL: 50 %
NRBC BLD-RTO: 0 /100 WBC
NUDT15 GENOTYPE: NORMAL
NUDT15 PHENOTYPE: NORMAL
OVALOCYTES BLD QL SMEAR: ABNORMAL
PHOSPHATE SERPL-MCNC: 3.2 MG/DL (ref 2.7–4.5)
PLATELET # BLD AUTO: 91 K/UL (ref 150–450)
PMV BLD AUTO: 12.3 FL (ref 9.2–12.9)
POCT GLUCOSE: 145 MG/DL (ref 70–110)
POCT GLUCOSE: 153 MG/DL (ref 70–110)
POCT GLUCOSE: 179 MG/DL (ref 70–110)
POCT GLUCOSE: 211 MG/DL (ref 70–110)
POIKILOCYTOSIS BLD QL SMEAR: SLIGHT
POLYCHROMASIA BLD QL SMEAR: ABNORMAL
POTASSIUM SERPL-SCNC: 4.5 MMOL/L (ref 3.5–5.1)
PROT SERPL-MCNC: 6.2 G/DL (ref 6–8.4)
RBC # BLD AUTO: 3.04 M/UL (ref 4–5.4)
SODIUM SERPL-SCNC: 129 MMOL/L (ref 136–145)
SPHEROCYTES BLD QL SMEAR: ABNORMAL
TARGETS BLD QL SMEAR: ABNORMAL
TPMT ADDITIONAL INFORMATION: NORMAL
TPMT DISCLAIMER: NORMAL
TPMT GENOTYPE RESULT: NORMAL
TPMT INTERPRETATION: NORMAL
TPMT METHOD: NORMAL
TPMT PHENOTYPE: NORMAL
TPMT REVIEWED BY: NORMAL
WBC # BLD AUTO: 8.56 K/UL (ref 3.9–12.7)
WBC # FLD: 69 /CU MM

## 2022-10-05 PROCEDURE — 80053 COMPREHEN METABOLIC PANEL: CPT | Performed by: FAMILY MEDICINE

## 2022-10-05 PROCEDURE — 87205 SMEAR GRAM STAIN: CPT | Performed by: INTERNAL MEDICINE

## 2022-10-05 PROCEDURE — 85025 COMPLETE CBC W/AUTO DIFF WBC: CPT | Performed by: FAMILY MEDICINE

## 2022-10-05 PROCEDURE — 36415 COLL VENOUS BLD VENIPUNCTURE: CPT | Performed by: FAMILY MEDICINE

## 2022-10-05 PROCEDURE — 83735 ASSAY OF MAGNESIUM: CPT | Performed by: INTERNAL MEDICINE

## 2022-10-05 PROCEDURE — 99233 PR SUBSEQUENT HOSPITAL CARE,LEVL III: ICD-10-PCS | Mod: ,,, | Performed by: INTERNAL MEDICINE

## 2022-10-05 PROCEDURE — 63600175 PHARM REV CODE 636 W HCPCS

## 2022-10-05 PROCEDURE — 63600175 PHARM REV CODE 636 W HCPCS: Performed by: FAMILY MEDICINE

## 2022-10-05 PROCEDURE — 25000003 PHARM REV CODE 250: Performed by: FAMILY MEDICINE

## 2022-10-05 PROCEDURE — 87075 CULTR BACTERIA EXCEPT BLOOD: CPT | Performed by: INTERNAL MEDICINE

## 2022-10-05 PROCEDURE — 25000003 PHARM REV CODE 250: Performed by: INTERNAL MEDICINE

## 2022-10-05 PROCEDURE — 87070 CULTURE OTHR SPECIMN AEROBIC: CPT | Performed by: INTERNAL MEDICINE

## 2022-10-05 PROCEDURE — 25000003 PHARM REV CODE 250: Performed by: STUDENT IN AN ORGANIZED HEALTH CARE EDUCATION/TRAINING PROGRAM

## 2022-10-05 PROCEDURE — 63600175 PHARM REV CODE 636 W HCPCS: Performed by: INTERNAL MEDICINE

## 2022-10-05 PROCEDURE — 12000002 HC ACUTE/MED SURGE SEMI-PRIVATE ROOM

## 2022-10-05 PROCEDURE — 99233 SBSQ HOSP IP/OBS HIGH 50: CPT | Mod: ,,, | Performed by: INTERNAL MEDICINE

## 2022-10-05 PROCEDURE — 89051 BODY FLUID CELL COUNT: CPT | Performed by: INTERNAL MEDICINE

## 2022-10-05 PROCEDURE — 84100 ASSAY OF PHOSPHORUS: CPT | Performed by: INTERNAL MEDICINE

## 2022-10-05 RX ORDER — ENOXAPARIN SODIUM 100 MG/ML
40 INJECTION SUBCUTANEOUS EVERY 24 HOURS
Status: DISCONTINUED | OUTPATIENT
Start: 2022-10-05 | End: 2022-10-14 | Stop reason: HOSPADM

## 2022-10-05 RX ORDER — LACTULOSE 10 G/15ML
20 SOLUTION ORAL 2 TIMES DAILY
Status: DISCONTINUED | OUTPATIENT
Start: 2022-10-05 | End: 2022-10-06

## 2022-10-05 RX ADMIN — GUAIFENESIN 600 MG: 600 TABLET, EXTENDED RELEASE ORAL at 08:10

## 2022-10-05 RX ADMIN — INSULIN ASPART 10 UNITS: 100 INJECTION, SOLUTION INTRAVENOUS; SUBCUTANEOUS at 11:10

## 2022-10-05 RX ADMIN — LACTULOSE 20 G: 20 SOLUTION ORAL at 08:10

## 2022-10-05 RX ADMIN — ENOXAPARIN SODIUM 40 MG: 100 INJECTION SUBCUTANEOUS at 08:10

## 2022-10-05 RX ADMIN — AMLODIPINE BESYLATE 10 MG: 10 TABLET ORAL at 01:10

## 2022-10-05 RX ADMIN — TRAMADOL HYDROCHLORIDE 25 MG: 50 TABLET, FILM COATED ORAL at 08:10

## 2022-10-05 RX ADMIN — INSULIN ASPART 2 UNITS: 100 INJECTION, SOLUTION INTRAVENOUS; SUBCUTANEOUS at 01:10

## 2022-10-05 RX ADMIN — LATANOPROST 1 DROP: 50 SOLUTION OPHTHALMIC at 09:10

## 2022-10-05 RX ADMIN — LIDOCAINE HYDROCHLORIDE 5 ML: 10 INJECTION, SOLUTION INFILTRATION; PERINEURAL at 10:10

## 2022-10-05 RX ADMIN — BRIMONIDINE TARTRATE 1 DROP: 2 SOLUTION OPHTHALMIC at 09:10

## 2022-10-05 RX ADMIN — GUAIFENESIN 600 MG: 600 TABLET, EXTENDED RELEASE ORAL at 01:10

## 2022-10-05 RX ADMIN — PREDNISONE 60 MG: 20 TABLET ORAL at 01:10

## 2022-10-05 RX ADMIN — FAMOTIDINE 20 MG: 20 TABLET ORAL at 01:10

## 2022-10-05 NOTE — PROCEDURES
Radiology Post-Procedure Note    Pre Op Diagnosis: Ascites  Post Op Diagnosis: Same    Procedure: Ultrasound Guided Paracentesis    Procedure performed by: Ruma Wells PA-C    Written Informed Consent Obtained: Yes (By Daughter)  Specimen Removed: YES Clear, Yellow  Estimated Blood Loss: Minimal    Findings:   Successful paracentesis.  Albumin administered PRN per protocol.    Patient tolerated procedure well.    Ruma Wells PA-C  Interventional Radiology  [phone]

## 2022-10-05 NOTE — PLAN OF CARE
Pt arrived to MPU room 3 for Paracentesis, no acute distress noted. Orders and labs reviewed on chart. Awaiting consent.

## 2022-10-05 NOTE — PROGRESS NOTES
Hepatology Treatment Plan    Radha Feng is a 81 y.o. female admitted to hospital 9/20/2022 (Hospital Day: 16) due to Autoimmune hepatitis.     Interval History  Liver chemistries continue to improve.  Paracentesis performed today.    Objective  Temp:  [97.5 °F (36.4 °C)-98.5 °F (36.9 °C)] 98.1 °F (36.7 °C) (10/05 0727)  Pulse:  [63-88] 87 (10/05 1136)  BP: (134-160)/(65-94) 150/89 (10/05 1136)  Resp:  [16-20] 17 (10/05 1136)  SpO2:  [89 %-100 %] 95 % (10/05 1136)    Laboratory    Lab Results   Component Value Date    WBC 8.56 10/05/2022    HGB 10.5 (L) 10/05/2022    HCT 31.6 (L) 10/05/2022     (H) 10/05/2022    PLT 91 (L) 10/05/2022       Lab Results   Component Value Date     (L) 10/05/2022    K 4.5 10/05/2022     10/05/2022    CO2 16 (L) 10/05/2022    BUN 27 (H) 10/05/2022    CREATININE 0.7 10/05/2022    CALCIUM 8.5 (L) 10/05/2022       Lab Results   Component Value Date    ALBUMIN 1.7 (L) 10/05/2022     (H) 10/05/2022    AST 82 (H) 10/05/2022    ALKPHOS 150 (H) 10/05/2022    BILITOT 2.7 (H) 10/05/2022       Lab Results   Component Value Date    INR 1.5 (H) 10/04/2022    INR 1.5 (H) 10/03/2022    INR 1.5 (H) 10/02/2022       MELD-Na score: 22 at 10/5/2022  2:56 AM  MELD score: 15 at 10/5/2022  2:56 AM  Calculated from:  Serum Creatinine: 0.7 mg/dL (Using min of 1 mg/dL) at 10/5/2022  2:56 AM  Serum Sodium: 129 mmol/L at 10/5/2022  2:56 AM  Total Bilirubin: 2.7 mg/dL at 10/5/2022  2:56 AM  INR(ratio): 1.5 at 10/4/2022  3:54 AM  Age: 81 years    Plan  - continue PO prednisone 60mg  - altered mentation could be augmented by steroids but likely delirium  - consider ruling out infectious process causing altered mentation  - please obtain daily CBC, BMP, LFT, INR  - Plan of care was discussed with primary team    Thank you for involving us in the care of Radha Feng. Please call with any additional concerns or questions.    Bud Nam MD  Gastroenterology Fellow

## 2022-10-05 NOTE — PLAN OF CARE
Paracentesis completed, pt tolerated well. No apparent distress noted. 3.8 Liters removed from right lower abdomen, mepore applied CDI. Labs collected and sent. Pt. Ready for transport back to floor. Report called to FARZAD Jensen

## 2022-10-05 NOTE — H&P
Inpatient Radiology Pre-procedure Note    History of Present Illness:  Radha Feng is a 81 y.o. female who presents for US guided paracentesis.    Admission H&P reviewed.  Past Medical History:   Diagnosis Date    Cataract     Diabetes mellitus     Glaucoma     Hypertension      Past Surgical History:   Procedure Laterality Date    CATARACT EXTRACTION W/  INTRAOCULAR LENS IMPLANT Left 12/21/2021    Procedure: EXTRACTION, CATARACT, WITH IOL INSERTION;  Surgeon: Shay Chou MD;  Location: [facility];  Service: Ophthalmology;  Laterality: Left;       Review of Systems:   As documented in primary team H&P    Home Meds:   Prior to Admission medications    Medication Sig Start Date End Date Taking? Authorizing Provider   acetaminophen (TYLENOL) 500 MG tablet Take 1000 mg by mouth daily to every other day as needed for pain.   Yes Historical Provider   amLODIPine (NORVASC) 10 MG tablet Take 10 mg by mouth once daily. 6/9/20  Yes Historical Provider   aspirin (ECOTRIN) 81 MG EC tablet Take 81 mg by mouth once daily.   Yes Historical Provider   atorvastatin (LIPITOR) 10 MG tablet TAKE 1 TABLET BY MOUTH EVERY DAY 6/29/20  Yes Historical Provider   brimonidine 0.2% (ALPHAGAN) 0.2 % Drop INSTILL 1 DROP INTO RIGHT EYE TWICE A DAY 4/25/22  Yes Nj Elliott, OD   dorzolamide-timolol 2-0.5% (COSOPT) 22.3-6.8 mg/mL ophthalmic solution INSTILL 1 DROP INTO RIGHT EYE TWICE A DAY 10/18/21  Yes Celina Choi, OD   glyBURIDE (DIABETA) 2.5 MG tablet Take 2.5 mg by mouth once daily. 2/19/20  Yes Historical Provider   ibuprofen (ADVIL,MOTRIN) 200 MG tablet Take 3 tablets (600 mg) by mouth daily to every other day as needed for pain.   Yes Historical Provider   latanoprost 0.005 % ophthalmic solution PLACE 1 DROP INTO BOTH EYES ONCE DAILY. 1/19/22 1/19/23 Yes Nj Elliott, OD   lisinopriL-hydrochlorothiazide (PRINZIDE,ZESTORETIC) 20-12.5 mg per tablet TAKE 1 TABLET BY MOUTH TWICE A DAY 6/29/20  Yes Historical Provider    metFORMIN (GLUCOPHAGE) 500 MG tablet TAKE 1 TABLET BY MOUTH TWICE A DAY 1/14/20  Yes Historical Provider   multivit-minerals/folic acid (ONE DAILY GUMMY VITES ORAL) Chew and swallow 1 gummy by mouth once daily.   Yes Historical Provider     Scheduled Meds:    amLODIPine  10 mg Oral Daily    brimonidine 0.2%  1 drop Right Eye BID    famotidine  20 mg Oral Daily    guaiFENesin  600 mg Oral BID    insulin aspart U-100  10 Units Subcutaneous TIDWM    insulin detemir U-100  30 Units Subcutaneous QHS    latanoprost  1 drop Both Eyes Daily    predniSONE  60 mg Oral Daily     Continuous Infusions:   PRN Meds:acetaminophen, acetaminophen, albuterol-ipratropium, aluminum-magnesium hydroxide-simethicone, benzonatate, bisacodyL, dextrose 10%, dextrose 10%, diclofenac sodium, fentaNYL, glucagon (human recombinant), glucose, glucose, insulin aspart U-100, LIDOcaine HCL 10 mg/ml (1%), melatonin, midazolam, naloxone, ondansetron, prochlorperazine, simethicone, sodium chloride 0.9%, traMADoL  Anticoagulants/Antiplatelets: no anticoagulation    Allergies: Review of patient's allergies indicates:  No Known Allergies  Sedation Hx: have not been any systemic reactions    Vitals:  Temp: 98.1 °F (36.7 °C) (10/05/22 0727)  Pulse: 70 (10/05/22 0727)  Resp: 17 (10/05/22 0727)  BP: (!) 160/71 (10/05/22 0727)  SpO2: (!) 94 % (10/05/22 0727)     Physical Exam:  ASA: 3  Mallampati: n/a    General: no acute distress  Mental Status: alert and oriented to person, place and time  HEENT: normocephalic, atraumatic  Chest: unlabored breathing  Heart: regular heart rate  Abdomen: nondistended  Extremity: moves all extremities    Plan: US guided paracentesis  Sedation Plan: Local    Ruma Wells PA-C  Interventional Radiology  [phone]

## 2022-10-06 ENCOUNTER — CONFERENCE (OUTPATIENT)
Dept: TRANSPLANT | Facility: CLINIC | Age: 82
End: 2022-10-06
Payer: MEDICARE

## 2022-10-06 LAB
ALBUMIN SERPL BCP-MCNC: 1.7 G/DL (ref 3.5–5.2)
ALP SERPL-CCNC: 145 U/L (ref 55–135)
ALT SERPL W/O P-5'-P-CCNC: 112 U/L (ref 10–44)
ANION GAP SERPL CALC-SCNC: 7 MMOL/L (ref 8–16)
ANION GAP SERPL CALC-SCNC: 8 MMOL/L (ref 8–16)
AST SERPL-CCNC: 103 U/L (ref 10–40)
BACTERIA #/AREA URNS AUTO: ABNORMAL /HPF
BASOPHILS # BLD AUTO: 0 K/UL (ref 0–0.2)
BASOPHILS NFR BLD: 0 % (ref 0–1.9)
BILIRUB SERPL-MCNC: 3.2 MG/DL (ref 0.1–1)
BILIRUB UR QL STRIP: NEGATIVE
BUN SERPL-MCNC: 27 MG/DL (ref 8–23)
BUN SERPL-MCNC: 28 MG/DL (ref 8–23)
CALCIUM SERPL-MCNC: 8.7 MG/DL (ref 8.7–10.5)
CALCIUM SERPL-MCNC: 9 MG/DL (ref 8.7–10.5)
CHLORIDE SERPL-SCNC: 110 MMOL/L (ref 95–110)
CHLORIDE SERPL-SCNC: 113 MMOL/L (ref 95–110)
CLARITY UR REFRACT.AUTO: ABNORMAL
CO2 SERPL-SCNC: 15 MMOL/L (ref 23–29)
CO2 SERPL-SCNC: 16 MMOL/L (ref 23–29)
COLOR UR AUTO: YELLOW
CREAT SERPL-MCNC: 0.7 MG/DL (ref 0.5–1.4)
CREAT SERPL-MCNC: 0.8 MG/DL (ref 0.5–1.4)
DIFFERENTIAL METHOD: ABNORMAL
EOSINOPHIL # BLD AUTO: 0 K/UL (ref 0–0.5)
EOSINOPHIL NFR BLD: 0 % (ref 0–8)
ERYTHROCYTE [DISTWIDTH] IN BLOOD BY AUTOMATED COUNT: 21.2 % (ref 11.5–14.5)
EST. GFR  (NO RACE VARIABLE): >60 ML/MIN/1.73 M^2
EST. GFR  (NO RACE VARIABLE): >60 ML/MIN/1.73 M^2
GLUCOSE SERPL-MCNC: 180 MG/DL (ref 70–110)
GLUCOSE SERPL-MCNC: 185 MG/DL (ref 70–110)
GLUCOSE UR QL STRIP: NEGATIVE
HCT VFR BLD AUTO: 31.8 % (ref 37–48.5)
HGB BLD-MCNC: 11 G/DL (ref 12–16)
HGB UR QL STRIP: ABNORMAL
IMM GRANULOCYTES # BLD AUTO: 0.03 K/UL (ref 0–0.04)
IMM GRANULOCYTES NFR BLD AUTO: 0.3 % (ref 0–0.5)
KETONES UR QL STRIP: ABNORMAL
LEUKOCYTE ESTERASE UR QL STRIP: ABNORMAL
LYMPHOCYTES # BLD AUTO: 0.8 K/UL (ref 1–4.8)
LYMPHOCYTES NFR BLD: 8.4 % (ref 18–48)
MAGNESIUM SERPL-MCNC: 2 MG/DL (ref 1.6–2.6)
MCH RBC QN AUTO: 35 PG (ref 27–31)
MCHC RBC AUTO-ENTMCNC: 34.6 G/DL (ref 32–36)
MCV RBC AUTO: 101 FL (ref 82–98)
MICROSCOPIC COMMENT: ABNORMAL
MONOCYTES # BLD AUTO: 0.8 K/UL (ref 0.3–1)
MONOCYTES NFR BLD: 8.1 % (ref 4–15)
NEUTROPHILS # BLD AUTO: 8 K/UL (ref 1.8–7.7)
NEUTROPHILS NFR BLD: 83.2 % (ref 38–73)
NITRITE UR QL STRIP: NEGATIVE
NRBC BLD-RTO: 0 /100 WBC
PH UR STRIP: 6 [PH] (ref 5–8)
PHOSPHATE SERPL-MCNC: 3.5 MG/DL (ref 2.7–4.5)
PLATELET # BLD AUTO: 87 K/UL (ref 150–450)
PMV BLD AUTO: 11.9 FL (ref 9.2–12.9)
POCT GLUCOSE: 151 MG/DL (ref 70–110)
POCT GLUCOSE: 167 MG/DL (ref 70–110)
POCT GLUCOSE: 187 MG/DL (ref 70–110)
POCT GLUCOSE: 249 MG/DL (ref 70–110)
POTASSIUM SERPL-SCNC: 4.4 MMOL/L (ref 3.5–5.1)
POTASSIUM SERPL-SCNC: 5.3 MMOL/L (ref 3.5–5.1)
PROT SERPL-MCNC: 6.3 G/DL (ref 6–8.4)
PROT UR QL STRIP: ABNORMAL
RBC # BLD AUTO: 3.14 M/UL (ref 4–5.4)
RBC #/AREA URNS AUTO: 6 /HPF (ref 0–4)
SODIUM SERPL-SCNC: 132 MMOL/L (ref 136–145)
SODIUM SERPL-SCNC: 137 MMOL/L (ref 136–145)
SP GR UR STRIP: 1.02 (ref 1–1.03)
SQUAMOUS #/AREA URNS AUTO: 38 /HPF
URN SPEC COLLECT METH UR: ABNORMAL
WBC # BLD AUTO: 9.55 K/UL (ref 3.9–12.7)
WBC #/AREA URNS AUTO: 35 /HPF (ref 0–5)

## 2022-10-06 PROCEDURE — 97165 OT EVAL LOW COMPLEX 30 MIN: CPT

## 2022-10-06 PROCEDURE — 81001 URINALYSIS AUTO W/SCOPE: CPT | Performed by: INTERNAL MEDICINE

## 2022-10-06 PROCEDURE — 97116 GAIT TRAINING THERAPY: CPT

## 2022-10-06 PROCEDURE — 84100 ASSAY OF PHOSPHORUS: CPT | Performed by: INTERNAL MEDICINE

## 2022-10-06 PROCEDURE — 36415 COLL VENOUS BLD VENIPUNCTURE: CPT | Performed by: INTERNAL MEDICINE

## 2022-10-06 PROCEDURE — 83735 ASSAY OF MAGNESIUM: CPT | Performed by: INTERNAL MEDICINE

## 2022-10-06 PROCEDURE — 25000003 PHARM REV CODE 250: Performed by: INTERNAL MEDICINE

## 2022-10-06 PROCEDURE — 85025 COMPLETE CBC W/AUTO DIFF WBC: CPT | Performed by: INTERNAL MEDICINE

## 2022-10-06 PROCEDURE — 87086 URINE CULTURE/COLONY COUNT: CPT | Performed by: INTERNAL MEDICINE

## 2022-10-06 PROCEDURE — 87186 SC STD MICRODIL/AGAR DIL: CPT | Performed by: INTERNAL MEDICINE

## 2022-10-06 PROCEDURE — 63600175 PHARM REV CODE 636 W HCPCS: Performed by: FAMILY MEDICINE

## 2022-10-06 PROCEDURE — 99233 PR SUBSEQUENT HOSPITAL CARE,LEVL III: ICD-10-PCS | Mod: ,,, | Performed by: INTERNAL MEDICINE

## 2022-10-06 PROCEDURE — 80053 COMPREHEN METABOLIC PANEL: CPT | Performed by: INTERNAL MEDICINE

## 2022-10-06 PROCEDURE — 97162 PT EVAL MOD COMPLEX 30 MIN: CPT

## 2022-10-06 PROCEDURE — 80048 BASIC METABOLIC PNL TOTAL CA: CPT | Mod: XB | Performed by: INTERNAL MEDICINE

## 2022-10-06 PROCEDURE — 12000002 HC ACUTE/MED SURGE SEMI-PRIVATE ROOM

## 2022-10-06 PROCEDURE — 25000003 PHARM REV CODE 250: Performed by: FAMILY MEDICINE

## 2022-10-06 PROCEDURE — 97535 SELF CARE MNGMENT TRAINING: CPT

## 2022-10-06 PROCEDURE — 87077 CULTURE AEROBIC IDENTIFY: CPT | Performed by: INTERNAL MEDICINE

## 2022-10-06 PROCEDURE — 87088 URINE BACTERIA CULTURE: CPT | Performed by: INTERNAL MEDICINE

## 2022-10-06 PROCEDURE — 99233 SBSQ HOSP IP/OBS HIGH 50: CPT | Mod: ,,, | Performed by: INTERNAL MEDICINE

## 2022-10-06 PROCEDURE — 63600175 PHARM REV CODE 636 W HCPCS: Performed by: INTERNAL MEDICINE

## 2022-10-06 RX ORDER — LACTULOSE 10 G/15ML
20 SOLUTION ORAL DAILY
Status: DISCONTINUED | OUTPATIENT
Start: 2022-10-07 | End: 2022-10-07

## 2022-10-06 RX ORDER — AZATHIOPRINE 50 MG/1
50 TABLET ORAL DAILY
Status: DISCONTINUED | OUTPATIENT
Start: 2022-10-06 | End: 2022-10-14 | Stop reason: HOSPADM

## 2022-10-06 RX ADMIN — FAMOTIDINE 20 MG: 20 TABLET ORAL at 08:10

## 2022-10-06 RX ADMIN — AMLODIPINE BESYLATE 10 MG: 10 TABLET ORAL at 08:10

## 2022-10-06 RX ADMIN — INSULIN ASPART 10 UNITS: 100 INJECTION, SOLUTION INTRAVENOUS; SUBCUTANEOUS at 01:10

## 2022-10-06 RX ADMIN — CEFTRIAXONE 1 G: 1 INJECTION, SOLUTION INTRAVENOUS at 11:10

## 2022-10-06 RX ADMIN — TRAMADOL HYDROCHLORIDE 25 MG: 50 TABLET, FILM COATED ORAL at 09:10

## 2022-10-06 RX ADMIN — INSULIN ASPART 10 UNITS: 100 INJECTION, SOLUTION INTRAVENOUS; SUBCUTANEOUS at 08:10

## 2022-10-06 RX ADMIN — GUAIFENESIN 600 MG: 600 TABLET, EXTENDED RELEASE ORAL at 09:10

## 2022-10-06 RX ADMIN — INSULIN ASPART 1 UNITS: 100 INJECTION, SOLUTION INTRAVENOUS; SUBCUTANEOUS at 09:10

## 2022-10-06 RX ADMIN — ENOXAPARIN SODIUM 40 MG: 100 INJECTION SUBCUTANEOUS at 05:10

## 2022-10-06 RX ADMIN — GUAIFENESIN 600 MG: 600 TABLET, EXTENDED RELEASE ORAL at 08:10

## 2022-10-06 RX ADMIN — PREDNISONE 60 MG: 20 TABLET ORAL at 08:10

## 2022-10-06 RX ADMIN — INSULIN DETEMIR 30 UNITS: 100 INJECTION, SOLUTION SUBCUTANEOUS at 09:10

## 2022-10-06 RX ADMIN — LACTULOSE 20 G: 20 SOLUTION ORAL at 08:10

## 2022-10-06 RX ADMIN — AZATHIOPRINE 50 MG: 50 TABLET ORAL at 11:10

## 2022-10-06 NOTE — PT/OT/SLP EVAL
"Physical Therapy Co-Evaluation  And Co-Treatment    Patient Name:  Radha Feng   MRN:  [id number]    Recommendations:     Discharge Recommendations:  outpatient PT   Discharge Equipment Recommendations: walker, rolling, wheelchair   Barriers to discharge: None    Assessment:     Radha Feng is a 81 y.o. female admitted with a medical diagnosis of Autoimmune hepatitis.  She presents with the following impairments/functional limitations:  weakness, impaired endurance, impaired functional mobility, decreased lower extremity function, impaired balance, gait instability Patient was initially drowsy, but more arousable in upright position with encouragement from daughter. Session consisted of bed mobility, multiple transfers, and ambulation into the hallway. Patient is exhibiting deficits with generalized weakness and endurance, and will benefit from skilled PT during this admit to address these deficits and maximize independence with functional mobility. After discharge she would benefit from OPPT in order to further progress functional strength, static/dynamic balance, gait quality, and overall endurance to return patient to independent PLOF.    Rehab Prognosis: Good; patient would benefit from acute skilled PT services to address these deficits and reach maximum level of function.    Recent Surgery: * No surgery found *      Plan:     During this hospitalization, patient to be seen 3 x/week to address the identified rehab impairments via gait training, therapeutic activities, therapeutic exercises, neuromuscular re-education and progress toward the following goals:    Plan of Care Expires:  11/06/22    Subjective     Chief Complaint: "I need to use the bathroom"  Patient/Family Comments/goals: return home to PLOF  Pain/Comfort:  Pain Rating 1: 0/10  Pain Rating Post-Intervention 1: 0/10    Patients cultural, spiritual, Roman Catholic conflicts given the current situation: no    Living Environment:  Most information " obtained from daughter, who was present during evaluation. Patient lives with her daughter and adult grandson in a H with 6 ARIEL and BHR (too far apart to reach simultaneously). The bathroom contains a tub-shower combo with a shower chair.  Prior to admission, patients level of function was modified independence with use of SPC. The days leading up to hospital admission, patient had decreased ability to get in/out of bathtub, and needed A from family for ambulation and ADLs. Equipment used at home: cane, straight, bedside commode, shower chair.  DME owned (not currently used): none.  Upon discharge, patient will have assistance from daughter, grandson.    Objective:   Patient required co-evaluation with OT for highest quality care d/t decreased activity tolerance and increased level of assistance necessary.  Communicated with nurse prior to session.  Patient found HOB elevated with PureWick  upon PT entry to room.    General Precautions: Standard, fall, diabetic   Orthopedic Precautions:N/A   Braces: N/A  Respiratory Status: Room air    Exams:  Cognitive Exam:  Patient is oriented to Person and Place  Gross Motor Coordination:  WFL  Postural Exam:  Patient presented with the following abnormalities:    RLE ROM: WFL  RLE Strength: WFL  LLE ROM: WFL  LLE Strength: WFL    Functional Mobility:  Bed Mobility:     Supine to Sit: minimum assistance and HOB elevated with use of bed rails  Sit to Supine: minimum assistance  Transfers:     Sit to Stand:  minimum assistance with no AD, hand-held assist, and x3 trials (EOB, BSC)  Gait: patient ambulated ~10 ft, 10 ft, 40 ft with min A and no AD seated rest breaks following each trial. Patient exhibits decreased milton and step length.   Balance: Sitting: SBA at EOB with BUE support, Standing: min A    Therapeutic Activities and Exercises:   Patient educated on importance of OOB activity to promote overall endurance.  Patient educated on calling for assistance for any needs  to improve overall safety awareness.   Patient educated on role of PT in acute care, PT POC, and PT goals.    AM-PAC 6 CLICK MOBILITY  Total Score:17     Patient left HOB elevated with all lines intact, call button in reach, bed alarm on, RN notified, and daughter present.    GOALS:   Multidisciplinary Problems       Physical Therapy Goals          Problem: Physical Therapy    Goal Priority Disciplines Outcome Goal Variances Interventions   Physical Therapy Goal     PT, PT/OT Ongoing, Progressing     Description: Goals to be met by: 10/20/2022     Patient will increase functional independence with mobility by performin. Supine to sit with Stand-by Assistance  2. Sit to supine with Stand-by Assistance  3. Sit to stand transfer with Stand-by Assistance  4. Bed to chair transfer with Stand-by Assistance using Rolling Walker or LRAD  5. Gait  x 100 feet with Stand-by Assistance using Rolling Walker or LRAD  6. Ascend/descend 6 stair with left Handrails Contact Guard Assistance using No Assistive Device.   7. Lower extremity exercise program x15 reps per handout, with assistance as needed                         History:     Past Medical History:   Diagnosis Date    Cataract     Diabetes mellitus     Glaucoma     Hypertension        Past Surgical History:   Procedure Laterality Date    CATARACT EXTRACTION W/  INTRAOCULAR LENS IMPLANT Left 2021    Procedure: EXTRACTION, CATARACT, WITH IOL INSERTION;  Surgeon: Shay Chou MD;  Location: [facility];  Service: Ophthalmology;  Laterality: Left;       Time Tracking:     PT Received On: 10/06/22  PT Start Time: 1051     PT Stop Time: 1128  PT Total Time (min): 37 min     Billable Minutes: Evaluation 15 and Gait Training 22      10/06/2022

## 2022-10-06 NOTE — PROGRESS NOTES
Hepatology Treatment Plan    Radha Feng is a 81 y.o. female admitted to hospital 9/20/2022 (Hospital Day: 17) due to Autoimmune hepatitis.     Interval History  Liver chemistries uptrending today on oral prednisone.    Objective  Temp:  [97.5 °F (36.4 °C)-98.5 °F (36.9 °C)] 97.5 °F (36.4 °C) (10/06 0743)  Pulse:  [63-87] 63 (10/06 0743)  BP: (129-162)/(59-89) 138/73 (10/06 0743)  Resp:  [17-18] 17 (10/06 0743)  SpO2:  [91 %-95 %] 95 % (10/06 0743)    Laboratory    Lab Results   Component Value Date    WBC 9.55 10/06/2022    HGB 11.0 (L) 10/06/2022    HCT 31.8 (L) 10/06/2022     (H) 10/06/2022    PLT 87 (L) 10/06/2022       Lab Results   Component Value Date     (L) 10/06/2022    K 5.3 (H) 10/06/2022     10/06/2022    CO2 15 (L) 10/06/2022    BUN 27 (H) 10/06/2022    CREATININE 0.7 10/06/2022    CALCIUM 8.7 10/06/2022       Lab Results   Component Value Date    ALBUMIN 1.7 (L) 10/06/2022     (H) 10/06/2022     (H) 10/06/2022    ALKPHOS 145 (H) 10/06/2022    BILITOT 3.2 (H) 10/06/2022       Lab Results   Component Value Date    INR 1.5 (H) 10/04/2022    INR 1.5 (H) 10/03/2022    INR 1.5 (H) 10/02/2022       MELD-Na score: 19 at 10/6/2022  5:53 AM  MELD score: 15 at 10/6/2022  5:53 AM  Calculated from:  Serum Creatinine: 0.7 mg/dL (Using min of 1 mg/dL) at 10/6/2022  5:53 AM  Serum Sodium: 132 mmol/L at 10/6/2022  5:53 AM  Total Bilirubin: 3.2 mg/dL at 10/6/2022  5:53 AM  INR(ratio): 1.5 at 10/4/2022  3:54 AM  Age: 81 years    Plan  - continue PO prednisone 60mg  - START imuran 50mg daily  TPMT normal metabolizer  - altered mentation could be augmented by steroids but likely delirium  - consider ruling out infectious process causing altered mentation  - please obtain daily CBC, BMP, LFT, INR  - Plan of care was discussed with primary team    Thank you for involving us in the care of Radha NELLY Feng. Please call with any additional concerns or questions.    Bud Nam,  MD  Gastroenterology Fellow

## 2022-10-06 NOTE — PT/OT/SLP EVAL
Occupational Therapy  Co- Evaluation/tx    Name: Radha Feng  MRN: [id number]  Admitting Diagnosis:  Autoimmune hepatitis  Recent Surgery: * No surgery found *    Co-treatment performed due to patient's multiple deficits requiring two skilled therapists to appropriately and safely assess patient's strength and endurance while facilitating functional tasks in addition to accommodating for patient's activity tolerance.     Recommendations:     Discharge Recommendations: outpatient PT  Discharge Equipment Recommendations:  walker, rolling, wheelchair  Barriers to discharge:  None    Assessment:     Radha Feng is a 81 y.o. female with a medical diagnosis of Autoimmune hepatitis.  She presents with deficits in self-care tasks and mobility as compared to PLOF.  Pt. More lethargic than usual and somewhat confused but is improving per family.  Pt. Tolerated session well on this date and is below baseline level and would benefit from continued OT services to maximize safety and I with ADL tasks.  Performance deficits affecting function: weakness, impaired endurance, impaired self care skills, impaired functional mobility, decreased safety awareness, gait instability.      Rehab Prognosis: Good; patient would benefit from acute skilled OT services to address these deficits and reach maximum level of function.       Plan:     Patient to be seen 3 x/week to address the above listed problems via self-care/home management, therapeutic activities, therapeutic exercises  Plan of Care Expires: 11/05/22  Plan of Care Reviewed with: patient, daughter    Subjective     Chief Complaint: Pt. Trying to lay back down in bed   Patient/Family Comments/goals: Family would like pt. To improve and have OP services on d/c    Occupational Profile:  Living Environment: Pt. Lives in a sss house with 6 steps to enter and BHR but too far apart to reach both sides at the same time. Pt. 's daughter and grandson reside with her. (Grandson 27 years  old) Pt. Has a tub with a seat and has had difficulty getting out of tub past few days and has required assist.   Previous level of function: Pt. Was completely independent 2 weeks ago.  Pt. Has had a decline in past 2 weeks and has required assist for mobility and self-care.   Roles and Routines: normally caretaker of self, mother, grandmother  Equipment Used at Home:  cane, straight, bedside commode, shower chair  Assistance upon Discharge: daughter (who is a nurse ) or grandson (who is a )    Pain/Comfort:  Pain Rating 1: 0/10  Pain Rating Post-Intervention 1: 0/10    Patients cultural, spiritual, Religion conflicts given the current situation: no    Objective:     Communicated with: nurse prior to session.  Patient found supine with   upon OT entry to room.daughter present. Pt. Initially difficult to arouse    General Precautions: Standard, diabetic, fall   Orthopedic Precautions:N/A   Braces: N/A  Respiratory Status: Room air    Occupational Performance:    Bed Mobility:    Patient completed Supine to Sit with minimum assistance    Functional Mobility/Transfers:  Patient completed Sit <> Stand Transfer with minimum assistance  with  no assistive device   Patient completed Bed <> Chair Transfer using Stand Pivot technique with minimum assistance with no assistive device  Patient completed Toilet Transfer Stand Pivot technique with minimum assistance with  no AD  Functional Mobility: Pt. Ambulated ~ 40 feet with Min A : pt. Also ambulated in room with Min A    Activities of Daily Living:  Lower Body Dressing: total assistance to don socks  Toileting: maximal assistance with assist to lower brief and clean    Cognitive/Visual Perceptual:  Cognitive/Psychosocial Skills:     -       Oriented to: Person and Place   -       Follows Commands/attention:Follows one-step commands  -       Communication: minimal verbalizations noted  -       Memory: impaired  -       Safety awareness/insight to disability:  impaired   -       Mood/Affect/Coping skills/emotional control: pt. Initally very sleepy  Visual/Perceptual:      -wears readers    Physical Exam:  Balance: -       SBA : sit; Min A stand  Postural examination/scapula alignment:    -       Rounded shoulders  -       Posterior pelvic tilt  Upper Extremity Range of Motion:     -       Right Upper Extremity: WFL  -       Left Upper Extremity: WFL   Strength:    -       Right Upper Extremity: WFL  -       Left Upper Extremity: WFL    AMPAC 6 Click ADL:  AMPAC Total Score: 16    Treatment & Education:  Pt. And family educated on role of OT and POC  Pt. And family educated on importance of OOB/therapy    Patient left supine with call button in reach, bed alarm on, and daughter present    GOALS:   Multidisciplinary Problems       Occupational Therapy Goals          Problem: Occupational Therapy    Goal Priority Disciplines Outcome Interventions   Occupational Therapy Goal     OT, PT/OT Ongoing, Progressing    Description: Goals to be met by: 10-16-22     Patient will increase functional independence with ADLs by performing:    UE Dressing with Supervision.  LE Dressing with Supervision.  Grooming while standing at sink with Supervision.  Toileting from bedside commode with Supervision for hygiene and clothing management.   Supine to sit with [place].  Stand pivot transfers with Supervision with RW  Toilet transfer to bedside commode with Supervision.  Pt. To be I with HEP to BUE to improve level of endurance                         History:     Past Medical History:   Diagnosis Date    Cataract     Diabetes mellitus     Glaucoma     Hypertension          Past Surgical History:   Procedure Laterality Date    CATARACT EXTRACTION W/  INTRAOCULAR LENS IMPLANT Left 12/21/2021    Procedure: EXTRACTION, CATARACT, WITH IOL INSERTION;  Surgeon: Shay Chou MD;  Location: [facility];  Service: Ophthalmology;  Laterality: Left;       Time Tracking:     OT Date of  Treatment: 10/06/22  OT Start Time: 1051  OT Stop Time: 1128  OT Total Time (min): 37 min    Billable Minutes:Evaluation 15  Self Care/Home Management 22    10/6/2022

## 2022-10-06 NOTE — PLAN OF CARE
Recommendations     1. If PO intake remains <50% consider liberalizing to Regular Diet for adequate EEN/EPN.   2. Continue Boost Glucose Control TID.  3. RD following.     Goals: Meet % EEN/EPN by RD f/u date  Nutrition Goal Status: new  Communication of RD Recs: other (comment) (POC)    Estella Payan - Dietetic Intern

## 2022-10-06 NOTE — PLAN OF CARE
Problem: Occupational Therapy  Goal: Occupational Therapy Goal  Description: Goals to be met by: 10-16-22     Patient will increase functional independence with ADLs by performing:    UE Dressing with Supervision.  LE Dressing with Supervision.  Grooming while standing at sink with Supervision.  Toileting from bedside commode with Supervision for hygiene and clothing management.   Supine to sit with [place].  Stand pivot transfers with Supervision with RW  Toilet transfer to bedside commode with Supervision.  Pt. To be I with HEP to BUE to improve level of endurance    Outcome: Ongoing, Progressing

## 2022-10-06 NOTE — TELEPHONE ENCOUNTER
10/6/22 Liver Pathology Conference:    Liver Biopsy: active severe hepatitis/ plasma cells/ interface;  plasma cell rich hepatitis ; consistent with Autoimmune     Plan : steroids given.

## 2022-10-06 NOTE — PROGRESS NOTES
[facility]  Adult Nutrition  Progress Note    SUMMARY       Recommendations    1. If PO intake remains <50% consider liberalizing to Regular Diet for adequate EEN/EPN.   2. Continue Boost Glucose Control TID.  3. RD following.    Goals: Meet % EEN/EPN by RD f/u date  Nutrition Goal Status: new  Communication of RD Recs: other (comment) (POC)    Assessment and Plan    Severe protein-calorie malnutrition  Contributing Nutrition Diagnosis  Severe malnutrition in the context of chronic illness    Related to (etiology):   Decreased PO intake/appetite    Signs and Symptoms (as evidenced by):   Energy Intake: less than or equal to 75% of estimated energy requirement for greater than or equal to 1 month  Wt Loss: 14% x 9 months  Body Fat Depletion: severe depletion of orbital, buccal, and upper are regions  Muscle Mass Depletion: severe depletion of temple, clavicle, acromion bone, and dorsal hand regions    Interventions/Recommendations (treatment strategy):  Collaboration of nutrition care with other providers   Encourage adequate PO intake    Nutrition Diagnosis Status:        Continues     Malnutrition Assessment  Malnutrition Type: chronic illness  Energy Intake: severe energy intake  Weight Loss (Malnutrition):  (14% x 9 months)  Energy Intake (Malnutrition) less than or equal to 75% for greater than or equal to 1 month  Subcutaneous Fat (Malnutrition): severe depletion  Muscle Mass (Malnutrition): severe depletion   Orbital Region (Subcutaneous Fat Loss): severe depletion  Upper Arm Region (Subcutaneous Fat Loss): severe depletion   [place] Region (Muscle Loss): severe depletion  Clavicle and Acromion Bone Region (Muscle Loss): severe depletion  Dorsal Hand (Muscle Loss): severe depletion   Subcutaneous Fat Loss (Final Summary): severe protein-calorie malnutrition  Muscle Loss Evaluation (Final Summary): severe protein-calorie malnutrition    Severe Weight Loss (Malnutrition):  (14% x 9  "months)    Reason for Assessment    Reason For Assessment: RD follow-up  Diagnosis: other (see comments) (Autoimmune hepatitis)  Relevant Medical History: HTN, T2DM, EFREN, Severe Protein-Calorie Malnutrition (9/22)  Interdisciplinary Rounds: did not attend    General Information Comments:   10/6 - Pt was sleeping at time of visit. Spoke w/ Pts daughter at bedside. Pts daughter reported Pt to be consuming 50% of meals + Boost until 10/5. Pts intake decreased to 0% 10/5 and 20% at breakfast 10/6. Pts daughter reported no signs of nausea or vomitting or issues with chewing or swallowing. Per chart review, Pt has weight loss of 5# x 4 months. CBW - 111#. Pt still meeting criteria for severe protein-calorie malnutrition at this time. Diagnosis made 9/22. LBM reported- 10/6    Nutrition Discharge Planning: pending clinical course    Nutrition Risk Screen    Nutrition Risk Screen: no indicators present    Nutrition/Diet History    Food Preferences: Apple juice  Spiritual, Cultural Beliefs, Sabianism Practices, Values that Affect Care: no  Food Allergies: NKFA  Factors Affecting Nutritional Intake: decreased appetite    Anthropometrics    Temp: 97.5 °F (36.4 °C)  Height Method: Stated  Height: 5' 5.98" (167.6 cm)  Height (inches): 65.98 in  Weight Method: Standard Scale  Weight: 50.3 kg (111 lb)  Weight (lb): 111 lb  Ideal Body Weight (IBW), Female: 129.9 lb  % Ideal Body Weight, Female (lb): 85.45 %  BMI (Calculated): 17.9  BMI Grade: 17 - 18.4 protein-energy malnutrition grade I     Lab/Procedures/Meds    Pertinent Labs Reviewed: reviewed  Pertinent Labs Comments: Glucose 185, Na 132, K 5.3, BUN 27, , Ketones in Urine  Pertinent Medications Reviewed: reviewed  Pertinent Medications Comments: Ondansetron, Insulin, Famotidine, Enoxaparin    Estimated/Assessed Needs    Weight Used For Calorie Calculations: 50.3 kg (111 lb)  Energy Calorie Requirements (kcal): 1762  Energy Need Method: Kcal/kg (35 kcal/kg)  Protein " Requirements: 60 - 75 g (1.2 - 1.5 g/kg)  Weight Used For Protein Calculations: 50.3 kg (111 lb)  Fluid Requirements (mL): Per MD or 1 ml/ kcal  Estimated Fluid Requirement Method: RDA Method  RDA Method (mL): 1762  CHO Requirement: 221 g    Nutrition Prescription Ordered    Current Diet Order: Diabetic Diet  Oral Nutrition Supplement: Boost Plus TID    Evaluation of Received Nutrient/Fluid Intake    I/O: +9.1L  Energy Calories Required: not meeting needs  Protein Required: not meeting needs  Fluid Required: not meeting needs  Comments: LBM 9/29  Tolerance: tolerating  % Intake of Estimated Energy Needs: 0 - 25 %  % Meal Intake: 0 - 25 %    Nutrition Risk    Level of Risk/Frequency of Follow-up:  (1xweek)     Monitor and Evaluation    Food and Nutrient Intake: energy intake, food and beverage intake  Food and Nutrient Adminstration: diet order  Knowledge/Beliefs/Attitudes: beliefs and attitudes, food and nutrition knowledge/skill  Physical Activity and Function: nutrition-related ADLs and IADLs  Anthropometric Measurements: height/length, weight, weight change, body mass index  Biochemical Data, Medical Tests and Procedures: gastrointestinal profile, electrolyte and renal panel, glucose/endocrine profile, inflammatory profile, lipid profile  Nutrition-Focused Physical Findings: overall appearance     Nutrition Follow-Up    RD Follow-up?: Yes    Estella Payan - Dietetic Intern

## 2022-10-06 NOTE — PROGRESS NOTES
[facility] Medicine  Progress Note    Patient Name: Radha Feng  MRN: [id number]  Patient Class: IP- Inpatient   Admission Date: 9/20/2022  Length of Stay: 13 days  Attending Physician: Marysol Bullock MD  Primary Care Provider: Primary Doctor No      Subjective:     Principal Problem:Autoimmune hepatitis      HPI:  Radha Feng is a 81 y.o. female with a past medical history of type 2 diabetes, hyperlipidemia, hypertension, glaucoma and cataracts presents the emergency department due to progressive lethargy and weakness. Patient's daughter at the bedside who provides the majority of the history due to the acuity of patient's condition. Per daughter, patient was sent from her cardiology office earlier today due to increased bilateral leg swelling, shortness of breath, and progressive weakness over the past two weeks. She reports that over the past two weeks patient has had a poor appetite with significantly decreased po intake. In addition, she reports that patient's BP has been lower at home compared to baseline and states she has held BP medications (amlodipine and lisinopril-HCTZ) for the past two days. She states her BP is normally 130/80 but as been as low as 98/63. Per daughter, lower extremity swelling was most pronounced last week but has improved significantly with compression stockings.  Additionally, daughter notes that she was helping her change last week and noted that she had a prolapsed uterus/bladder but patient has not seen her OBGYN yet. Patient is requiring more assistance with ADLs which is abnormal for her. Patient denies dysuria but states that she does have difficulty urinating and can only urinate a small amount. Denies chest pain, shortness of breath, abdominal pain, dizziness, nausea, vomiting, or syncope. Patient's only complaint at this time is right knee pain.    ED: vital signs stable, afebrile, no acute distress. Elevated liver enzymes with T.bili of 2.9,  AST//254, and alk phos of 224. Na 133, K 5.0, Cr 1.4 (most recent Cr of 0.8 in 12/21). D-dimer 11.97, CTA negative for PE. CTA showed evidence of distended gallbladder and emphysema. Subsequent RUQ US negative for cholecystitis or gallstones. BNP 66, troponin negative. UA negative for infection. Given 500 mL LR and placed in observation.       Overview/Hospital Course:  Patient admitted to hospitalist service.  She was noted to have elevated liver enzymes, acute renal insuff, hyperbilirubin.  D-dimer was significatnly elevated.   CTA demonstrated NO PTE but did show few bilateral pulmonary micro nodules which can be followed up as OP.   Right UQ US showed distended gallbladder but no sonographically detectable gallstone, and no additional sonographic evidence for acute cholecystitis.   Mild free fluid of the abdomen noted.  MRCP showed normal pancreatic duct, Gallbladder is distended without wall thickening or pericholecystic fluid.  Common bile duct is normal caliber and tapers distally to the ampulla.  No intrahepatic biliary dilatation.  No biliary filling defects identified.   Acute hepatitis panel negative.  Monospot/EBV neg. CMV neg. Ceruloplasmin normal. Elevated IgG and SONNY.   Tentative diagnosis of Autoimmune Hepatitis.   She was also treated for mild hepatic encephalopathy with lactulose.    Hepatology started her on steroid therapy. Her LFTs improved daily. GYN consulted for uterine prolapse with LUTS.     Unable to participate in history taking process given current cognitive state. 0-1 BM in 24h    NAD, somnolent  NC, AT  RRR  CTAB  SNT, (less) distended without rebound or guarding, +BS  1+ edema   PERRL    Vitals, labs and radiographs from past 24h reviewed and personally interpreted.       Assessment/Plan:      * Autoimmune hepatitis   Continue steroids at 60 mg PO. Continue to trend LFTs.       Uterine prolapse  - GYN plans to place pessary once cognitive status improves.. No significant  urinary retention with straight cath. Continue plans for outpt fu with uro- gyn.      Acute liver failure without hepatic coma  - CTA of ab/pelvix,  subsequent RUQ US, and  MRCP demonstrate no cause of patient's liver failure  - Acute hepatitis panel neg, monospot neg, copper levels normal, EBV neg  - Liver bx on 9/27-- Transjugular liver biopsy with pressure measurements    - ascites survey w large volume ascitic fluid.   Para requested; no evidence of SBP     Hemolytic anemia  - with associated iron overload  - direct genny test NEG  - elevated ddimer and low fibrinogen (monitor platelets, coags, evidence of bleeding or clotting)  - etiology unknown  - hematology review of PBS--> target cells, some bethany cells,  very few schistocytes (0-1 per hp), no blast  - 9/28--> secondary to liver failure    - hematology signed off-- no further recs unless counts become unstable.         Oral phase dysphagia   mild oral dysphagia, but with a safe pharyngeal swallow. No further acute ST warranted at this time.      Severe protein-calorie malnutrition  Nutrition following. Maximize oral intake.        Type 2 diabetes mellitus, without long-term current use of insulin  Patient's FSGs are uncontrolled due to hyperglycemia on current medication regimen.  - home regimen: metformin and glyburide  Last A1c reviewed-             Lab Results   Component Value Date     HGBA1C 5.3 09/21/2022      - increased detemir to 25 u bid. Will need to continue to adjust with steroid therapy. Increased pre meal novolog. SSI. Monitor.         Hypertension  - BP elevated. Increased norvasc to home dose, 10mg. Monitor with steroid therapy.      Leg swelling  - no previous hx of CHF  - echo w grade II DD-- no acute exacerbation  - dopplers neg for DVT    Daughter says patient has been on Amlodipine for years with no recent dosage changes. So this is unlikely the culprit.        Right knee pain  - Chronic  - reported trauma about 30 years ago  - follows  with orthopedics outpatient  - recent MRI in august with chronic findings  - continue tylenol prn for pain        Metabolic encephalopathy/Hepatic encephalopathy      - resuming lactulose; adjust dose as needed  -No evidence of SBP  -UA requested      Dehydration  - resolved      EFREN (acute kidney injury)  - Resolved          VTE Risk Mitigation (From admission, onward)           Ordered     enoxaparin injection 40 mg  Daily         10/05/22 1920     Place sequential compression device  Until discontinued         09/21/22 0932     IP VTE LOW RISK PATIENT  Once         09/21/22 0156                    Discharge Planning   MELVI: 10/6/2022     Code Status: Full Code   Is the patient medically ready for discharge?: No    Reason for patient still in hospital (select all that apply): Patient trending condition, Treatment and Consult recommendations  Discharge Plan A: Home with family   Discharge Delays: None known at this time      Marysol Bullock MD  Department of Hospital Medicine   [facility]

## 2022-10-06 NOTE — PLAN OF CARE
Problem: Physical Therapy  Goal: Physical Therapy Goal  Description: Goals to be met by: 10/20/2022     Patient will increase functional independence with mobility by performin. Supine to sit with Stand-by Assistance  2. Sit to supine with Stand-by Assistance  3. Sit to stand transfer with Stand-by Assistance  4. Bed to chair transfer with Stand-by Assistance using Rolling Walker or LRAD  5. Gait  x 100 feet with Stand-by Assistance using Rolling Walker or LRAD  6. Ascend/descend 6 stair with left Handrails Contact Guard Assistance using No Assistive Device.   7. Lower extremity exercise program x15 reps per handout, with assistance as needed    PT Evaluation completed 10/6/2022   PT goals and POC established.    Outcome: Ongoing, Progressing

## 2022-10-07 LAB
ALBUMIN SERPL BCP-MCNC: 1.7 G/DL (ref 3.5–5.2)
ALP SERPL-CCNC: 143 U/L (ref 55–135)
ALT SERPL W/O P-5'-P-CCNC: 100 U/L (ref 10–44)
AMMONIA PLAS-SCNC: 34 UMOL/L (ref 10–50)
ANION GAP SERPL CALC-SCNC: 5 MMOL/L (ref 8–16)
AST SERPL-CCNC: 74 U/L (ref 10–40)
BASOPHILS # BLD AUTO: 0.01 K/UL (ref 0–0.2)
BASOPHILS NFR BLD: 0.1 % (ref 0–1.9)
BILIRUB SERPL-MCNC: 2.7 MG/DL (ref 0.1–1)
BUN SERPL-MCNC: 30 MG/DL (ref 8–23)
CALCIUM SERPL-MCNC: 8.5 MG/DL (ref 8.7–10.5)
CHLORIDE SERPL-SCNC: 114 MMOL/L (ref 95–110)
CO2 SERPL-SCNC: 16 MMOL/L (ref 23–29)
CREAT SERPL-MCNC: 0.7 MG/DL (ref 0.5–1.4)
DIFFERENTIAL METHOD: ABNORMAL
EOSINOPHIL # BLD AUTO: 0 K/UL (ref 0–0.5)
EOSINOPHIL NFR BLD: 0 % (ref 0–8)
ERYTHROCYTE [DISTWIDTH] IN BLOOD BY AUTOMATED COUNT: 20.9 % (ref 11.5–14.5)
EST. GFR  (NO RACE VARIABLE): >60 ML/MIN/1.73 M^2
GLUCOSE SERPL-MCNC: 149 MG/DL (ref 70–110)
HCT VFR BLD AUTO: 33.6 % (ref 37–48.5)
HGB BLD-MCNC: 11.2 G/DL (ref 12–16)
IMM GRANULOCYTES # BLD AUTO: 0.04 K/UL (ref 0–0.04)
IMM GRANULOCYTES NFR BLD AUTO: 0.4 % (ref 0–0.5)
LYMPHOCYTES # BLD AUTO: 0.7 K/UL (ref 1–4.8)
LYMPHOCYTES NFR BLD: 7.5 % (ref 18–48)
MAGNESIUM SERPL-MCNC: 1.9 MG/DL (ref 1.6–2.6)
MCH RBC QN AUTO: 34.7 PG (ref 27–31)
MCHC RBC AUTO-ENTMCNC: 33.3 G/DL (ref 32–36)
MCV RBC AUTO: 104 FL (ref 82–98)
MONOCYTES # BLD AUTO: 1.1 K/UL (ref 0.3–1)
MONOCYTES NFR BLD: 10.8 % (ref 4–15)
NEUTROPHILS # BLD AUTO: 8 K/UL (ref 1.8–7.7)
NEUTROPHILS NFR BLD: 81.2 % (ref 38–73)
NRBC BLD-RTO: 0 /100 WBC
PHOSPHATE SERPL-MCNC: 2.9 MG/DL (ref 2.7–4.5)
PLATELET # BLD AUTO: 96 K/UL (ref 150–450)
PMV BLD AUTO: 11.6 FL (ref 9.2–12.9)
POCT GLUCOSE: 140 MG/DL (ref 70–110)
POCT GLUCOSE: 160 MG/DL (ref 70–110)
POCT GLUCOSE: 171 MG/DL (ref 70–110)
POCT GLUCOSE: 93 MG/DL (ref 70–110)
POTASSIUM SERPL-SCNC: 4.2 MMOL/L (ref 3.5–5.1)
PROT SERPL-MCNC: 6.2 G/DL (ref 6–8.4)
RBC # BLD AUTO: 3.23 M/UL (ref 4–5.4)
SODIUM SERPL-SCNC: 135 MMOL/L (ref 136–145)
WBC # BLD AUTO: 9.87 K/UL (ref 3.9–12.7)

## 2022-10-07 PROCEDURE — 25000003 PHARM REV CODE 250: Performed by: FAMILY MEDICINE

## 2022-10-07 PROCEDURE — 25000003 PHARM REV CODE 250: Performed by: INTERNAL MEDICINE

## 2022-10-07 PROCEDURE — 85025 COMPLETE CBC W/AUTO DIFF WBC: CPT | Performed by: INTERNAL MEDICINE

## 2022-10-07 PROCEDURE — 83735 ASSAY OF MAGNESIUM: CPT | Performed by: INTERNAL MEDICINE

## 2022-10-07 PROCEDURE — 12000002 HC ACUTE/MED SURGE SEMI-PRIVATE ROOM

## 2022-10-07 PROCEDURE — 63600175 PHARM REV CODE 636 W HCPCS: Performed by: FAMILY MEDICINE

## 2022-10-07 PROCEDURE — 36415 COLL VENOUS BLD VENIPUNCTURE: CPT | Performed by: INTERNAL MEDICINE

## 2022-10-07 PROCEDURE — 97116 GAIT TRAINING THERAPY: CPT | Mod: CQ

## 2022-10-07 PROCEDURE — 97535 SELF CARE MNGMENT TRAINING: CPT

## 2022-10-07 PROCEDURE — 99233 PR SUBSEQUENT HOSPITAL CARE,LEVL III: ICD-10-PCS | Mod: ,,, | Performed by: INTERNAL MEDICINE

## 2022-10-07 PROCEDURE — 99233 SBSQ HOSP IP/OBS HIGH 50: CPT | Mod: ,,, | Performed by: INTERNAL MEDICINE

## 2022-10-07 PROCEDURE — 82140 ASSAY OF AMMONIA: CPT | Performed by: INTERNAL MEDICINE

## 2022-10-07 PROCEDURE — 80053 COMPREHEN METABOLIC PANEL: CPT | Performed by: INTERNAL MEDICINE

## 2022-10-07 PROCEDURE — 63600175 PHARM REV CODE 636 W HCPCS: Performed by: INTERNAL MEDICINE

## 2022-10-07 PROCEDURE — 84100 ASSAY OF PHOSPHORUS: CPT | Performed by: INTERNAL MEDICINE

## 2022-10-07 RX ORDER — LACTULOSE 10 G/15ML
20 SOLUTION ORAL DAILY
Status: DISCONTINUED | OUTPATIENT
Start: 2022-10-07 | End: 2022-10-09

## 2022-10-07 RX ADMIN — INSULIN ASPART 10 UNITS: 100 INJECTION, SOLUTION INTRAVENOUS; SUBCUTANEOUS at 04:10

## 2022-10-07 RX ADMIN — PREDNISONE 60 MG: 20 TABLET ORAL at 08:10

## 2022-10-07 RX ADMIN — FAMOTIDINE 20 MG: 20 TABLET ORAL at 08:10

## 2022-10-07 RX ADMIN — AZATHIOPRINE 50 MG: 50 TABLET ORAL at 08:10

## 2022-10-07 RX ADMIN — CEFTRIAXONE 1 G: 1 INJECTION, SOLUTION INTRAVENOUS at 11:10

## 2022-10-07 RX ADMIN — INSULIN ASPART 10 UNITS: 100 INJECTION, SOLUTION INTRAVENOUS; SUBCUTANEOUS at 01:10

## 2022-10-07 RX ADMIN — AMLODIPINE BESYLATE 10 MG: 10 TABLET ORAL at 08:10

## 2022-10-07 RX ADMIN — GUAIFENESIN 600 MG: 600 TABLET, EXTENDED RELEASE ORAL at 09:10

## 2022-10-07 RX ADMIN — GUAIFENESIN 600 MG: 600 TABLET, EXTENDED RELEASE ORAL at 08:10

## 2022-10-07 RX ADMIN — ENOXAPARIN SODIUM 40 MG: 100 INJECTION SUBCUTANEOUS at 04:10

## 2022-10-07 RX ADMIN — LACTULOSE 20 G: 20 SOLUTION ORAL at 01:10

## 2022-10-07 NOTE — PROGRESS NOTES
[facility] Medicine  Progress Note    Patient Name: Radha Feng  MRN: [id number]  Patient Class: IP- Inpatient   Admission Date: 9/20/2022  Length of Stay: 14 days  Attending Physician: Marysol Bullock MD  Primary Care Provider: Primary Doctor No      Subjective:     Principal Problem:Autoimmune hepatitis      HPI:  Radha Feng is a 81 y.o. female with a past medical history of type 2 diabetes, hyperlipidemia, hypertension, glaucoma and cataracts presents the emergency department due to progressive lethargy and weakness. Patient's daughter at the bedside who provides the majority of the history due to the acuity of patient's condition. Per daughter, patient was sent from her cardiology office earlier today due to increased bilateral leg swelling, shortness of breath, and progressive weakness over the past two weeks. She reports that over the past two weeks patient has had a poor appetite with significantly decreased po intake. In addition, she reports that patient's BP has been lower at home compared to baseline and states she has held BP medications (amlodipine and lisinopril-HCTZ) for the past two days. She states her BP is normally 130/80 but as been as low as 98/63. Per daughter, lower extremity swelling was most pronounced last week but has improved significantly with compression stockings.  Additionally, daughter notes that she was helping her change last week and noted that she had a prolapsed uterus/bladder but patient has not seen her OBGYN yet. Patient is requiring more assistance with ADLs which is abnormal for her. Patient denies dysuria but states that she does have difficulty urinating and can only urinate a small amount. Denies chest pain, shortness of breath, abdominal pain, dizziness, nausea, vomiting, or syncope. Patient's only complaint at this time is right knee pain.    ED: vital signs stable, afebrile, no acute distress. Elevated liver enzymes with T.bili of 2.9,  AST//254, and alk phos of 224. Na 133, K 5.0, Cr 1.4 (most recent Cr of 0.8 in 12/21). D-dimer 11.97, CTA negative for PE. CTA showed evidence of distended gallbladder and emphysema. Subsequent RUQ US negative for cholecystitis or gallstones. BNP 66, troponin negative. UA negative for infection. Given 500 mL LR and placed in observation.       Overview/Hospital Course:  Patient admitted to hospitalist service.  She was noted to have elevated liver enzymes, acute renal insuff, hyperbilirubin.  D-dimer was significatnly elevated.   CTA demonstrated NO PTE but did show few bilateral pulmonary micro nodules which can be followed up as OP.   Right UQ US showed distended gallbladder but no sonographically detectable gallstone, and no additional sonographic evidence for acute cholecystitis.   Mild free fluid of the abdomen noted.  MRCP showed normal pancreatic duct, Gallbladder is distended without wall thickening or pericholecystic fluid.  Common bile duct is normal caliber and tapers distally to the ampulla.  No intrahepatic biliary dilatation.  No biliary filling defects identified.   Acute hepatitis panel negative.  Monospot/EBV neg. CMV neg. Ceruloplasmin normal. Elevated IgG and SONNY.   Tentative diagnosis of Autoimmune Hepatitis.   She was also treated for mild hepatic encephalopathy with lactulose.    Hepatology started her on steroid therapy. Her LFTs improved daily. GYN consulted for uterine prolapse with LUTS.     Unable to participate in history taking process given current cognitive state. 5 BM in 12h    NAD, somnolent  NC, AT  RRR  CTAB  SNT, (less) distended without rebound or guarding, +BS  1+ edema   PERRL    Vitals, labs and radiographs from past 24h reviewed and personally interpreted.       Assessment/Plan:      * Autoimmune hepatitis   Continue steroids at 60 mg PO. Continue to trend LFTs.    -10/5: imuran 50 added       Uterine prolapse  - GYN plans to place pessary once cognitive status  improves.. No significant urinary retention with straight cath. Continue plans for outpt fu with uro- gyn.      Acute liver failure without hepatic coma  - CTA of ab/pelvix,  subsequent RUQ US, and  MRCP demonstrate no cause of patient's liver failure  - Acute hepatitis panel neg, monospot neg, copper levels normal, EBV neg  - Liver bx on 9/27-- Transjugular liver biopsy with pressure measurements    - ascites survey w large volume ascitic fluid.   Para requested; no evidence of SBP  -titrate lactulose to 3-4 BM/day      Hemolytic anemia  - with associated iron overload  - direct genny test NEG  - elevated ddimer and low fibrinogen (monitor platelets, coags, evidence of bleeding or clotting)  - etiology unknown  - hematology review of PBS--> target cells, some bethany cells,  very few schistocytes (0-1 per hp), no blast  - 9/28--> secondary to liver failure    - hematology signed off-- no further recs unless counts become unstable.         Oral phase dysphagia   mild oral dysphagia, but with a safe pharyngeal swallow. No further acute ST warranted at this time.      Severe protein-calorie malnutrition  Nutrition following. Maximize oral intake.        Type 2 diabetes mellitus, without long-term current use of insulin  Patient's FSGs are uncontrolled due to hyperglycemia on current medication regimen.  - home regimen: metformin and glyburide  Last A1c reviewed-             Lab Results   Component Value Date     HGBA1C 5.3 09/21/2022      - increased detemir to 25 u bid. Will need to continue to adjust with steroid therapy. Increased pre meal novolog. SSI. Monitor.         Hypertension  - BP elevated. Increased norvasc to home dose, 10mg. Monitor with steroid therapy.      Leg swelling  - no previous hx of CHF  - echo w grade II DD-- no acute exacerbation  - dopplers neg for DVT    Daughter says patient has been on Amlodipine for years with no recent dosage changes. So this is unlikely the culprit.        Right knee  pain  - Chronic  - reported trauma about 30 years ago  - follows with orthopedics outpatient  - recent MRI in august with chronic findings  - continue tylenol prn for pain        Metabolic encephalopathy/Hepatic encephalopathy      - resuming lactulose; adjust dose as needed  -No evidence of SBP  -UA requested      Dehydration  - resolved      EFREN (acute kidney injury)  - Resolved     UTI  -ceftriaxone          VTE Risk Mitigation (From admission, onward)           Ordered     enoxaparin injection 40 mg  Daily         10/05/22 1920     Place sequential compression device  Until discontinued         09/21/22 0932     IP VTE LOW RISK PATIENT  Once         09/21/22 0156                    Discharge Planning   MELVI: 10/7/2022     Code Status: Full Code   Is the patient medically ready for discharge?: No    Reason for patient still in hospital (select all that apply): Patient trending condition, Treatment and Consult recommendations  Discharge Plan A: Home with family   Discharge Delays: None known at this time      Marysol Bullock MD  Department of Hospital Medicine   [facility]

## 2022-10-07 NOTE — PLAN OF CARE
Patient is not medically ready to discharge.    Plan: Outpatient therapy.    CM will follow-up and assist team with discharge needs.      Tarsha Luevano RN  Covering  - [facility]  [id number]

## 2022-10-07 NOTE — PT/OT/SLP PROGRESS
Physical Therapy Treatment    Patient Name:  Radha Feng   MRN:  [id number]    Recommendations:     Discharge Recommendations:  outpatient PT   Discharge Equipment Recommendations: walker, rolling, wheelchair   Barriers to discharge: None    Assessment:     Radha Feng is a 81 y.o. female admitted with a medical diagnosis of Autoimmune hepatitis.  She presents with the following impairments/functional limitations:   (weakness; impaired endurance; impaired functional mobility; decreased lower extremity function; impaired balance; gait instability) .Pt  tolerated treatment fairly well and is progressing slowly with mobility. pt limited due to fatigue. Patient remains appropriate for continued skilled services within the acute environment and goals remain appropriate.      Rehab Prognosis: Good; patient would benefit from acute skilled PT services to address these deficits and reach maximum level of function.    Recent Surgery: * No surgery found *      Plan:     During this hospitalization, patient to be seen 3 x/week to address the identified rehab impairments via gait training, therapeutic activities, therapeutic exercises, neuromuscular re-education and progress toward the following goals:    Plan of Care Expires:  11/06/22    Subjective     Chief Complaint: I am tired, I want to rest    Pain/Comfort:  Pain Rating 1: 0/10  Pain Rating Post-Intervention 1: 0/10      Objective:     Communicated with RN prior to session.  Patient found right sidelying with   upon PT entry to room.     General Precautions: Standard, fall   Orthopedic Precautions:N/A   Braces: N/A  Respiratory Status: Room air     Functional Mobility:  Bed Mobility:     Supine to Sit: minimum assistance and HOB elevated with use of bed rails  Sit to Supine: minimum assistance  Transfers:     Sit to Stand:  minimum assistance with RW  Gait: patient ambulated 120 ft with CGA/min A and RW with  standing rest breaks  during gait. Patient exhibits  decreased milton and step length.   Stairs:  Pt ascended/descended 5 stair(s) with No Assistive Device with left handrail with Minimal Assistance.       AM-PAC 6 CLICK MOBILITY  Turning over in bed (including adjusting bedclothes, sheets and blankets)?: 3  Sitting down on and standing up from a chair with arms (e.g., wheelchair, bedside commode, etc.): 3  Moving from lying on back to sitting on the side of the bed?: 3  Moving to and from a bed to a chair (including a wheelchair)?: 3  Need to walk in hospital room?: 3  Climbing 3-5 steps with a railing?: 2  Basic Mobility Total Score: 17       Therapeutic Activities and Exercises:   Therapist provided instruction and educated of  patient on progress, safety,d/c,PT POC,   proper body mechanics, energy conservation, and fall prevention strategies during tasks listed above, on the effects of prolonged immobility and the importance of performing OOB activity and exercises to promote healing and reduce recovery time    Updated white board with appropriate PT mobility information for medical team notification     Donned an extra gown     Call nursing/pct to transfer to chair/use bathroom. Pt stated understanding    Bedside table in front of patient and area set up for function, convenience, and safety. RN aware of patient's mobility needs and status. Questions/concerns addressed within PTA scope of practice; patient  with no further questions. Time was provided for active listening, discussion of health disposition, and discussion of safe discharge. Pt?verbalized?agreement .    Patient left right sidelying with all lines intact, call button in reach, and grandson present..    GOALS:   Multidisciplinary Problems       Physical Therapy Goals          Problem: Physical Therapy    Goal Priority Disciplines Outcome Goal Variances Interventions   Physical Therapy Goal     PT, PT/OT Ongoing, Progressing     Description: Goals to be met by: 10/20/2022     Patient will increase  functional independence with mobility by performin. Supine to sit with Stand-by Assistance  2. Sit to supine with Stand-by Assistance  3. Sit to stand transfer with Stand-by Assistance  4. Bed to chair transfer with Stand-by Assistance using Rolling Walker or LRAD  5. Gait  x 100 feet with Stand-by Assistance using Rolling Walker or LRAD  6. Ascend/descend 6 stair with left Handrails Contact Guard Assistance using No Assistive Device.   7. Lower extremity exercise program x15 reps per handout, with assistance as needed                         Time Tracking:     PT Received On: 10/07/22  PT Start Time: 1041     PT Stop Time: 1054  PT Total Time (min): 13 min     Billable Minutes: Gait Training 13    Treatment Type: Treatment  PT/PTA: PTA     PTA Visit Number: 1     10/07/2022

## 2022-10-07 NOTE — PT/OT/SLP PROGRESS
Occupational Therapy   Treatment    Name: Radha Feng  MRN: [id number]  Admitting Diagnosis:  Autoimmune hepatitis       Recommendations:     Discharge Recommendations: outpatient OT, outpatient PT  Discharge Equipment Recommendations:  walker, rolling, wheelchair  Barriers to discharge:       Assessment:     Radha Feng is a 81 y.o. female with a medical diagnosis of Autoimmune hepatitis.  She presents with autoimmune hepatitis. Performance deficits affecting function are weakness, impaired endurance, impaired self care skills, impaired functional mobility, gait instability, impaired balance, decreased safety awareness (weakness; impaired endurance; impaired functional mobility; decreased lower extremity function; impaired balance; gait instability).     Rehab Prognosis:  Good; patient would benefit from acute skilled OT services to address these deficits and reach maximum level of function.       Plan:     Patient to be seen 3 x/week to address the above listed problems via self-care/home management, therapeutic activities, therapeutic exercises  Plan of Care Expires: 11/05/22  Plan of Care Reviewed with: patient, daughter    Subjective     Pain/Comfort:       Objective:     Communicated with: RN prior to session.  Patient found left sidelying with   upon OT entry to room.    General Precautions: Standard, diabetic, fall   Orthopedic Precautions:N/A   Braces:    Respiratory Status: Room air     Occupational Performance:     Bed Mobility:    Patient completed Supine to Sit with minimum assistance  Patient completed Sit to Supine with minimum assistance     Functional Mobility/Transfers:  Patient completed Sit <> Stand Transfer with contact guard assistance  with  rolling walker   Functional Mobility: completed in preparation for fxnl mobility with CGA for balance and safety    Activities of Daily Living:  Feeding:  supervision sitting EOB      [facility] 6 Click ADL: 16    Treatment & Education:  Family and pt  educated on OT POC, no questions at this time.    Patient left HOB elevated with all lines intact, call button in reach, RN notified, and family present    GOALS:   Multidisciplinary Problems       Occupational Therapy Goals          Problem: Occupational Therapy    Goal Priority Disciplines Outcome Interventions   Occupational Therapy Goal     OT, PT/OT Ongoing, Progressing    Description: Goals to be met by: 10-16-22     Patient will increase functional independence with ADLs by performing:    UE Dressing with Supervision.  LE Dressing with Supervision.  Grooming while standing at sink with Supervision.  Toileting from bedside commode with Supervision for hygiene and clothing management.   Supine to sit with Taney.  Stand pivot transfers with Supervision with RW  Toilet transfer to bedside commode with Supervision.  Pt. To be I with HEP to BUE to improve level of endurance                         Time Tracking:     OT Date of Treatment: 10/07/22  OT Start Time: 1354  OT Stop Time: 1411  OT Total Time (min): 17 min    Billable Minutes:Self Care/Home Management 17    OT/MANFRED: OT          10/7/2022

## 2022-10-07 NOTE — PROGRESS NOTES
Hepatology Treatment Plan    Radha Feng is a 81 y.o. female admitted to hospital 9/20/2022 (Hospital Day: 18) due to Autoimmune hepatitis.     Interval History  Liver chemistries improved slightly today.    Objective  Temp:  [97.5 °F (36.4 °C)-97.9 °F (36.6 °C)] 97.9 °F (36.6 °C) (10/07 0804)  Pulse:  [59-65] 63 (10/07 0804)  BP: (136-157)/(64-72) 149/70 (10/07 0804)  Resp:  [17-20] 18 (10/07 0804)  SpO2:  [95 %-97 %] 97 % (10/07 0804)    Laboratory    Lab Results   Component Value Date    WBC 9.87 10/07/2022    HGB 11.2 (L) 10/07/2022    HCT 33.6 (L) 10/07/2022     (H) 10/07/2022    PLT 96 (L) 10/07/2022       Lab Results   Component Value Date     (L) 10/07/2022    K 4.2 10/07/2022     (H) 10/07/2022    CO2 16 (L) 10/07/2022    BUN 30 (H) 10/07/2022    CREATININE 0.7 10/07/2022    CALCIUM 8.5 (L) 10/07/2022       Lab Results   Component Value Date    ALBUMIN 1.7 (L) 10/07/2022     (H) 10/07/2022    AST 74 (H) 10/07/2022    ALKPHOS 143 (H) 10/07/2022    BILITOT 2.7 (H) 10/07/2022       Lab Results   Component Value Date    INR 1.5 (H) 10/04/2022    INR 1.5 (H) 10/03/2022    INR 1.5 (H) 10/02/2022       MELD-Na score: 15 at 10/6/2022  3:19 PM  MELD score: 15 at 10/6/2022  3:19 PM  Calculated from:  Serum Creatinine: 0.8 mg/dL (Using min of 1 mg/dL) at 10/6/2022  3:19 PM  Serum Sodium: 137 mmol/L at 10/6/2022  3:19 PM  Total Bilirubin: 3.2 mg/dL at 10/6/2022  5:53 AM  INR(ratio): 1.5 at 10/4/2022  3:54 AM  Age: 81 years    Plan  - continue PO prednisone 60mg  - continue imuran 50mg daily  TPMT normal metabolizer  - OK for discharge from hepatology standpoint when stable per primary.  Will arrange for steroid taper via hepatology clinic and arrange for f/u labs and clinic visit.  - altered mentation could be augmented by steroids but likely delirium  - consider ruling out infectious process causing altered mentation  - please obtain daily CBC, BMP, LFT, INR  - Plan of care was discussed  with primary team    Thank you for involving us in the care of Radha Feng. Please call with any additional concerns or questions.    Bud Nam MD  Gastroenterology Fellow

## 2022-10-08 LAB
ALBUMIN SERPL BCP-MCNC: 1.9 G/DL (ref 3.5–5.2)
ALP SERPL-CCNC: 152 U/L (ref 55–135)
ALT SERPL W/O P-5'-P-CCNC: 95 U/L (ref 10–44)
ANION GAP SERPL CALC-SCNC: 6 MMOL/L (ref 8–16)
AST SERPL-CCNC: 72 U/L (ref 10–40)
BACTERIA SPEC AEROBE CULT: NO GROWTH
BACTERIA UR CULT: ABNORMAL
BASOPHILS # BLD AUTO: 0.01 K/UL (ref 0–0.2)
BASOPHILS NFR BLD: 0.1 % (ref 0–1.9)
BILIRUB SERPL-MCNC: 2.7 MG/DL (ref 0.1–1)
BUN SERPL-MCNC: 27 MG/DL (ref 8–23)
CALCIUM SERPL-MCNC: 8.7 MG/DL (ref 8.7–10.5)
CHLORIDE SERPL-SCNC: 110 MMOL/L (ref 95–110)
CO2 SERPL-SCNC: 17 MMOL/L (ref 23–29)
CREAT SERPL-MCNC: 0.7 MG/DL (ref 0.5–1.4)
DIFFERENTIAL METHOD: ABNORMAL
EOSINOPHIL # BLD AUTO: 0 K/UL (ref 0–0.5)
EOSINOPHIL NFR BLD: 0 % (ref 0–8)
ERYTHROCYTE [DISTWIDTH] IN BLOOD BY AUTOMATED COUNT: 19.9 % (ref 11.5–14.5)
EST. GFR  (NO RACE VARIABLE): >60 ML/MIN/1.73 M^2
GLUCOSE SERPL-MCNC: 96 MG/DL (ref 70–110)
HCT VFR BLD AUTO: 34.2 % (ref 37–48.5)
HGB BLD-MCNC: 11.6 G/DL (ref 12–16)
IMM GRANULOCYTES # BLD AUTO: 0.05 K/UL (ref 0–0.04)
IMM GRANULOCYTES NFR BLD AUTO: 0.5 % (ref 0–0.5)
LYMPHOCYTES # BLD AUTO: 0.7 K/UL (ref 1–4.8)
LYMPHOCYTES NFR BLD: 6.4 % (ref 18–48)
MAGNESIUM SERPL-MCNC: 2.1 MG/DL (ref 1.6–2.6)
MCH RBC QN AUTO: 35 PG (ref 27–31)
MCHC RBC AUTO-ENTMCNC: 33.9 G/DL (ref 32–36)
MCV RBC AUTO: 103 FL (ref 82–98)
MONOCYTES # BLD AUTO: 1.1 K/UL (ref 0.3–1)
MONOCYTES NFR BLD: 10.4 % (ref 4–15)
NEUTROPHILS # BLD AUTO: 8.4 K/UL (ref 1.8–7.7)
NEUTROPHILS NFR BLD: 82.6 % (ref 38–73)
NRBC BLD-RTO: 0 /100 WBC
PHOSPHATE SERPL-MCNC: 2.5 MG/DL (ref 2.7–4.5)
PLATELET # BLD AUTO: 94 K/UL (ref 150–450)
PMV BLD AUTO: 12.1 FL (ref 9.2–12.9)
POCT GLUCOSE: 141 MG/DL (ref 70–110)
POCT GLUCOSE: 184 MG/DL (ref 70–110)
POCT GLUCOSE: 197 MG/DL (ref 70–110)
POCT GLUCOSE: 58 MG/DL (ref 70–110)
POTASSIUM SERPL-SCNC: 4.2 MMOL/L (ref 3.5–5.1)
PROT SERPL-MCNC: 6.5 G/DL (ref 6–8.4)
RBC # BLD AUTO: 3.31 M/UL (ref 4–5.4)
SODIUM SERPL-SCNC: 133 MMOL/L (ref 136–145)
WBC # BLD AUTO: 10.21 K/UL (ref 3.9–12.7)

## 2022-10-08 PROCEDURE — 63600175 PHARM REV CODE 636 W HCPCS: Performed by: FAMILY MEDICINE

## 2022-10-08 PROCEDURE — 83735 ASSAY OF MAGNESIUM: CPT | Performed by: INTERNAL MEDICINE

## 2022-10-08 PROCEDURE — 85025 COMPLETE CBC W/AUTO DIFF WBC: CPT | Performed by: INTERNAL MEDICINE

## 2022-10-08 PROCEDURE — 99232 PR SUBSEQUENT HOSPITAL CARE,LEVL II: ICD-10-PCS | Mod: ,,, | Performed by: INTERNAL MEDICINE

## 2022-10-08 PROCEDURE — 36415 COLL VENOUS BLD VENIPUNCTURE: CPT | Performed by: INTERNAL MEDICINE

## 2022-10-08 PROCEDURE — 84100 ASSAY OF PHOSPHORUS: CPT | Performed by: INTERNAL MEDICINE

## 2022-10-08 PROCEDURE — 25000003 PHARM REV CODE 250: Performed by: FAMILY MEDICINE

## 2022-10-08 PROCEDURE — 25000003 PHARM REV CODE 250: Performed by: INTERNAL MEDICINE

## 2022-10-08 PROCEDURE — 80053 COMPREHEN METABOLIC PANEL: CPT | Performed by: INTERNAL MEDICINE

## 2022-10-08 PROCEDURE — 63600175 PHARM REV CODE 636 W HCPCS: Performed by: INTERNAL MEDICINE

## 2022-10-08 PROCEDURE — 99232 SBSQ HOSP IP/OBS MODERATE 35: CPT | Mod: ,,, | Performed by: INTERNAL MEDICINE

## 2022-10-08 PROCEDURE — 12000002 HC ACUTE/MED SURGE SEMI-PRIVATE ROOM

## 2022-10-08 RX ADMIN — LACTULOSE 20 G: 20 SOLUTION ORAL at 08:10

## 2022-10-08 RX ADMIN — AZATHIOPRINE 50 MG: 50 TABLET ORAL at 08:10

## 2022-10-08 RX ADMIN — CEFTRIAXONE 1 G: 1 INJECTION, SOLUTION INTRAVENOUS at 12:10

## 2022-10-08 RX ADMIN — FAMOTIDINE 20 MG: 20 TABLET ORAL at 08:10

## 2022-10-08 RX ADMIN — GUAIFENESIN 600 MG: 600 TABLET, EXTENDED RELEASE ORAL at 08:10

## 2022-10-08 RX ADMIN — PREDNISONE 60 MG: 20 TABLET ORAL at 08:10

## 2022-10-08 RX ADMIN — INSULIN ASPART 10 UNITS: 100 INJECTION, SOLUTION INTRAVENOUS; SUBCUTANEOUS at 12:10

## 2022-10-08 RX ADMIN — AMLODIPINE BESYLATE 10 MG: 10 TABLET ORAL at 08:10

## 2022-10-08 RX ADMIN — ENOXAPARIN SODIUM 40 MG: 100 INJECTION SUBCUTANEOUS at 05:10

## 2022-10-08 RX ADMIN — INSULIN ASPART 10 UNITS: 100 INJECTION, SOLUTION INTRAVENOUS; SUBCUTANEOUS at 05:10

## 2022-10-08 NOTE — PROGRESS NOTES
[facility] Medicine  Progress Note    Patient Name: Radha Feng  MRN: [id number]  Patient Class: IP- Inpatient   Admission Date: 9/20/2022  Length of Stay: 16 days  Attending Physician: Marysol Bullock MD  Primary Care Provider: Primary Doctor No      Subjective:     Principal Problem:Autoimmune hepatitis      HPI:  Radha Feng is a 81 y.o. female with a past medical history of type 2 diabetes, hyperlipidemia, hypertension, glaucoma and cataracts presents the emergency department due to progressive lethargy and weakness. Patient's daughter at the bedside who provides the majority of the history due to the acuity of patient's condition. Per daughter, patient was sent from her cardiology office earlier today due to increased bilateral leg swelling, shortness of breath, and progressive weakness over the past two weeks. She reports that over the past two weeks patient has had a poor appetite with significantly decreased po intake. In addition, she reports that patient's BP has been lower at home compared to baseline and states she has held BP medications (amlodipine and lisinopril-HCTZ) for the past two days. She states her BP is normally 130/80 but as been as low as 98/63. Per daughter, lower extremity swelling was most pronounced last week but has improved significantly with compression stockings.  Additionally, daughter notes that she was helping her change last week and noted that she had a prolapsed uterus/bladder but patient has not seen her OBGYN yet. Patient is requiring more assistance with ADLs which is abnormal for her. Patient denies dysuria but states that she does have difficulty urinating and can only urinate a small amount. Denies chest pain, shortness of breath, abdominal pain, dizziness, nausea, vomiting, or syncope. Patient's only complaint at this time is right knee pain.    ED: vital signs stable, afebrile, no acute distress. Elevated liver enzymes with T.bili of 2.9,  AST//254, and alk phos of 224. Na 133, K 5.0, Cr 1.4 (most recent Cr of 0.8 in 12/21). D-dimer 11.97, CTA negative for PE. CTA showed evidence of distended gallbladder and emphysema. Subsequent RUQ US negative for cholecystitis or gallstones. BNP 66, troponin negative. UA negative for infection. Given 500 mL LR and placed in observation.       Overview/Hospital Course:  Patient admitted to hospitalist service.  She was noted to have elevated liver enzymes, acute renal insuff, hyperbilirubin.  D-dimer was significatnly elevated.   CTA demonstrated NO PTE but did show few bilateral pulmonary micro nodules which can be followed up as OP.   Right UQ US showed distended gallbladder but no sonographically detectable gallstone, and no additional sonographic evidence for acute cholecystitis.   Mild free fluid of the abdomen noted.  MRCP showed normal pancreatic duct, Gallbladder is distended without wall thickening or pericholecystic fluid.  Common bile duct is normal caliber and tapers distally to the ampulla.  No intrahepatic biliary dilatation.  No biliary filling defects identified.   Acute hepatitis panel negative.  Monospot/EBV neg. CMV neg. Ceruloplasmin normal. Elevated IgG and SONNY.   Tentative diagnosis of Autoimmune Hepatitis.   She was also treated for mild hepatic encephalopathy with lactulose.    Hepatology started her on steroid therapy. Her LFTs improved daily. GYN consulted for uterine prolapse with LUTS.     .  Patient more alert and interactive.  Denies chest pain, shortness a breath or lightheadedness.  2 BM in 12h. Seen sitting on the toilet initially and then on follow up interview sitting and fully interactive. Pain controlled     NAD, AO3  NC, AT  RRR  CTAB  SNT, minimally distended +BS  1+ edema   PERRL    Vitals, labs and radiographs from past 24h reviewed and personally interpreted.       Assessment/Plan:      * Autoimmune hepatitis   Continue steroids at 60 mg PO. Continue to trend LFTs.     -10/5: imuran 50 added  -per hepatology, no intention to wean steroids until follow up appointment        Uterine prolapse  - GYN plans to place pessary once cognitive status improves.. No significant urinary retention with straight cath. Continue plans for outpt fu with uro- gyn.      Acute liver failure without hepatic coma  - CTA of ab/pelvix,  subsequent RUQ US, and  MRCP demonstrate no cause of patient's liver failure  - Acute hepatitis panel neg, monospot neg, copper levels normal, EBV neg  - Liver bx on 9/27-- Transjugular liver biopsy with pressure measurements    - ascites survey w large volume ascitic fluid.   Para requested; no evidence of SBP  -titrate lactulose to 3-4 BM/day      Hemolytic anemia  - with associated iron overload  - direct genny test NEG  - elevated ddimer and low fibrinogen (monitor platelets, coags, evidence of bleeding or clotting)  - etiology unknown  - hematology review of PBS--> target cells, some bethany cells,  very few schistocytes (0-1 per hp), no blast  - 9/28--> secondary to liver failure    - hematology signed off-- no further recs unless counts become unstable.         Oral phase dysphagia   mild oral dysphagia, but with a safe pharyngeal swallow. No further acute ST warranted at this time.      Severe protein-calorie malnutrition  Nutrition following. Maximize oral intake.        Type 2 diabetes mellitus, without long-term current use of insulin  Patient's FSGs are uncontrolled due to hyperglycemia on current medication regimen.  - home regimen: metformin and glyburide  Last A1c reviewed-             Lab Results   Component Value Date     HGBA1C 5.3 09/21/2022      - i Levemir decreased to 25 nightly from 30.  Continue 10 of prandial insulin     Hypertension  - BP elevated. Increased norvasc to home dose, 10mg. Monitor with steroid therapy.      Leg swelling  - no previous hx of CHF  - echo w grade II DD-- no acute exacerbation  - dopplers neg for DVT    Daughter says  patient has been on Amlodipine for years with no recent dosage changes. So this is unlikely the culprit.        Right knee pain  - Chronic  - reported trauma about 30 years ago  - follows with orthopedics outpatient  - recent MRI in august with chronic findings  - continue tylenol prn for pain        Metabolic encephalopathy/Hepatic encephalopathy      - resuming lactulose; adjust dose as needed  -No evidence of SBP  -UA requested      Dehydration  - resolved      EFREN (acute kidney injury)  - Resolved     UTI  -ceftriaxone        There was concern for urinary retention on 10/07.  Bladder scan believed to be with spurious result given possibility of the presence of ascites.  Following urination post void straight cath yielded less than 50 cc.      VTE Risk Mitigation (From admission, onward)           Ordered     enoxaparin injection 40 mg  Daily         10/05/22 1920     Place sequential compression device  Until discontinued         09/21/22 0932     IP VTE LOW RISK PATIENT  Once         09/21/22 0156                    Discharge Planning   MELVI: 10/8/2022     Code Status: Full Code   Is the patient medically ready for discharge?: No    Reason for patient still in hospital (select all that apply): Patient trending condition, Treatment and Consult recommendations  Discharge Plan A: Home with family   Discharge Delays: None known at this time      Marysol Bullock MD  Department of Hospital Medicine   [facility]

## 2022-10-08 NOTE — PROGRESS NOTES
[facility] Medicine  Progress Note    Patient Name: Radha Feng  MRN: [id number]  Patient Class: IP- Inpatient   Admission Date: 9/20/2022  Length of Stay: 15 days  Attending Physician: Marysol Bullock MD  Primary Care Provider: Primary Doctor No      Subjective:     Principal Problem:Autoimmune hepatitis      HPI:  Radha Feng is a 81 y.o. female with a past medical history of type 2 diabetes, hyperlipidemia, hypertension, glaucoma and cataracts presents the emergency department due to progressive lethargy and weakness. Patient's daughter at the bedside who provides the majority of the history due to the acuity of patient's condition. Per daughter, patient was sent from her cardiology office earlier today due to increased bilateral leg swelling, shortness of breath, and progressive weakness over the past two weeks. She reports that over the past two weeks patient has had a poor appetite with significantly decreased po intake. In addition, she reports that patient's BP has been lower at home compared to baseline and states she has held BP medications (amlodipine and lisinopril-HCTZ) for the past two days. She states her BP is normally 130/80 but as been as low as 98/63. Per daughter, lower extremity swelling was most pronounced last week but has improved significantly with compression stockings.  Additionally, daughter notes that she was helping her change last week and noted that she had a prolapsed uterus/bladder but patient has not seen her OBGYN yet. Patient is requiring more assistance with ADLs which is abnormal for her. Patient denies dysuria but states that she does have difficulty urinating and can only urinate a small amount. Denies chest pain, shortness of breath, abdominal pain, dizziness, nausea, vomiting, or syncope. Patient's only complaint at this time is right knee pain.    ED: vital signs stable, afebrile, no acute distress. Elevated liver enzymes with T.bili of 2.9,  AST//254, and alk phos of 224. Na 133, K 5.0, Cr 1.4 (most recent Cr of 0.8 in 12/21). D-dimer 11.97, CTA negative for PE. CTA showed evidence of distended gallbladder and emphysema. Subsequent RUQ US negative for cholecystitis or gallstones. BNP 66, troponin negative. UA negative for infection. Given 500 mL LR and placed in observation.       Overview/Hospital Course:  Patient admitted to hospitalist service.  She was noted to have elevated liver enzymes, acute renal insuff, hyperbilirubin.  D-dimer was significatnly elevated.   CTA demonstrated NO PTE but did show few bilateral pulmonary micro nodules which can be followed up as OP.   Right UQ US showed distended gallbladder but no sonographically detectable gallstone, and no additional sonographic evidence for acute cholecystitis.   Mild free fluid of the abdomen noted.  MRCP showed normal pancreatic duct, Gallbladder is distended without wall thickening or pericholecystic fluid.  Common bile duct is normal caliber and tapers distally to the ampulla.  No intrahepatic biliary dilatation.  No biliary filling defects identified.   Acute hepatitis panel negative.  Monospot/EBV neg. CMV neg. Ceruloplasmin normal. Elevated IgG and SONNY.   Tentative diagnosis of Autoimmune Hepatitis.   She was also treated for mild hepatic encephalopathy with lactulose.    Hepatology started her on steroid therapy. Her LFTs improved daily. GYN consulted for uterine prolapse with LUTS.     .  Patient more alert and interactive.  Denies chest pain, shortness a breath or lightheadedness.  2 BM in 12h    NAD, somnolent  NC, AT  RRR  CTAB  SNT, (less) distended without rebound or guarding, +BS  1+ edema   PERRL    Vitals, labs and radiographs from past 24h reviewed and personally interpreted.       Assessment/Plan:      * Autoimmune hepatitis   Continue steroids at 60 mg PO. Continue to trend LFTs.    -10/5: imuran 50 added       Uterine prolapse  - GYN plans to place pessary once  cognitive status improves.. No significant urinary retention with straight cath. Continue plans for outpt fu with uro- gyn.      Acute liver failure without hepatic coma  - CTA of ab/pelvix,  subsequent RUQ US, and  MRCP demonstrate no cause of patient's liver failure  - Acute hepatitis panel neg, monospot neg, copper levels normal, EBV neg  - Liver bx on 9/27-- Transjugular liver biopsy with pressure measurements    - ascites survey w large volume ascitic fluid.   Para requested; no evidence of SBP  -titrate lactulose to 3-4 BM/day      Hemolytic anemia  - with associated iron overload  - direct genny test NEG  - elevated ddimer and low fibrinogen (monitor platelets, coags, evidence of bleeding or clotting)  - etiology unknown  - hematology review of PBS--> target cells, some bethany cells,  very few schistocytes (0-1 per hp), no blast  - 9/28--> secondary to liver failure    - hematology signed off-- no further recs unless counts become unstable.         Oral phase dysphagia   mild oral dysphagia, but with a safe pharyngeal swallow. No further acute ST warranted at this time.      Severe protein-calorie malnutrition  Nutrition following. Maximize oral intake.        Type 2 diabetes mellitus, without long-term current use of insulin  Patient's FSGs are uncontrolled due to hyperglycemia on current medication regimen.  - home regimen: metformin and glyburide  Last A1c reviewed-             Lab Results   Component Value Date     HGBA1C 5.3 09/21/2022      - i Levemir decreased to 25 nightly from 30.  Continue 10 of prandial insulin     Hypertension  - BP elevated. Increased norvasc to home dose, 10mg. Monitor with steroid therapy.      Leg swelling  - no previous hx of CHF  - echo w grade II DD-- no acute exacerbation  - dopplers neg for DVT    Daughter says patient has been on Amlodipine for years with no recent dosage changes. So this is unlikely the culprit.        Right knee pain  - Chronic  - reported trauma about  30 years ago  - follows with orthopedics outpatient  - recent MRI in august with chronic findings  - continue tylenol prn for pain        Metabolic encephalopathy/Hepatic encephalopathy      - resuming lactulose; adjust dose as needed  -No evidence of SBP  -UA requested      Dehydration  - resolved      EFREN (acute kidney injury)  - Resolved     UTI  -ceftriaxone        There was concern for urinary retention on 10/07.  Bladder scan believed to be with spurious result given possibility of the presence of ascites.  Following urination post void straight cath yielded less than 50 cc.      VTE Risk Mitigation (From admission, onward)           Ordered     enoxaparin injection 40 mg  Daily         10/05/22 1920     Place sequential compression device  Until discontinued         09/21/22 0932     IP VTE LOW RISK PATIENT  Once         09/21/22 0156                    Discharge Planning   MELVI: 10/8/2022     Code Status: Full Code   Is the patient medically ready for discharge?: No    Reason for patient still in hospital (select all that apply): Patient trending condition, Treatment and Consult recommendations  Discharge Plan A: Home with family   Discharge Delays: None known at this time      Marysol Bullock MD  Department of Hospital Medicine   [facility]

## 2022-10-09 LAB
ALBUMIN SERPL BCP-MCNC: 1.8 G/DL (ref 3.5–5.2)
ALP SERPL-CCNC: 142 U/L (ref 55–135)
ALT SERPL W/O P-5'-P-CCNC: 88 U/L (ref 10–44)
ANION GAP SERPL CALC-SCNC: 5 MMOL/L (ref 8–16)
AST SERPL-CCNC: 62 U/L (ref 10–40)
BILIRUB SERPL-MCNC: 2.7 MG/DL (ref 0.1–1)
BUN SERPL-MCNC: 28 MG/DL (ref 8–23)
CALCIUM SERPL-MCNC: 8.4 MG/DL (ref 8.7–10.5)
CHLORIDE SERPL-SCNC: 112 MMOL/L (ref 95–110)
CO2 SERPL-SCNC: 17 MMOL/L (ref 23–29)
CREAT SERPL-MCNC: 0.8 MG/DL (ref 0.5–1.4)
EST. GFR  (NO RACE VARIABLE): >60 ML/MIN/1.73 M^2
GLUCOSE SERPL-MCNC: 165 MG/DL (ref 70–110)
MAGNESIUM SERPL-MCNC: 2.1 MG/DL (ref 1.6–2.6)
PHOSPHATE SERPL-MCNC: 2.7 MG/DL (ref 2.7–4.5)
POCT GLUCOSE: 139 MG/DL (ref 70–110)
POCT GLUCOSE: 186 MG/DL (ref 70–110)
POCT GLUCOSE: 269 MG/DL (ref 70–110)
POCT GLUCOSE: 299 MG/DL (ref 70–110)
POTASSIUM SERPL-SCNC: 4.5 MMOL/L (ref 3.5–5.1)
PROT SERPL-MCNC: 6.2 G/DL (ref 6–8.4)
SODIUM SERPL-SCNC: 134 MMOL/L (ref 136–145)

## 2022-10-09 PROCEDURE — 12000002 HC ACUTE/MED SURGE SEMI-PRIVATE ROOM

## 2022-10-09 PROCEDURE — 99232 PR SUBSEQUENT HOSPITAL CARE,LEVL II: ICD-10-PCS | Mod: ,,, | Performed by: INTERNAL MEDICINE

## 2022-10-09 PROCEDURE — 25000003 PHARM REV CODE 250: Performed by: FAMILY MEDICINE

## 2022-10-09 PROCEDURE — 84100 ASSAY OF PHOSPHORUS: CPT | Performed by: INTERNAL MEDICINE

## 2022-10-09 PROCEDURE — 80053 COMPREHEN METABOLIC PANEL: CPT | Performed by: INTERNAL MEDICINE

## 2022-10-09 PROCEDURE — 99232 SBSQ HOSP IP/OBS MODERATE 35: CPT | Mod: ,,, | Performed by: INTERNAL MEDICINE

## 2022-10-09 PROCEDURE — 25000003 PHARM REV CODE 250: Performed by: INTERNAL MEDICINE

## 2022-10-09 PROCEDURE — 63600175 PHARM REV CODE 636 W HCPCS: Performed by: INTERNAL MEDICINE

## 2022-10-09 PROCEDURE — 63600175 PHARM REV CODE 636 W HCPCS: Performed by: FAMILY MEDICINE

## 2022-10-09 PROCEDURE — 83735 ASSAY OF MAGNESIUM: CPT | Performed by: INTERNAL MEDICINE

## 2022-10-09 PROCEDURE — 36415 COLL VENOUS BLD VENIPUNCTURE: CPT | Performed by: INTERNAL MEDICINE

## 2022-10-09 PROCEDURE — 97530 THERAPEUTIC ACTIVITIES: CPT

## 2022-10-09 RX ORDER — LACTULOSE 10 G/15ML
20 SOLUTION ORAL DAILY PRN
Status: DISCONTINUED | OUTPATIENT
Start: 2022-10-09 | End: 2022-10-14 | Stop reason: HOSPADM

## 2022-10-09 RX ADMIN — INSULIN ASPART 1 UNITS: 100 INJECTION, SOLUTION INTRAVENOUS; SUBCUTANEOUS at 08:10

## 2022-10-09 RX ADMIN — INSULIN ASPART 10 UNITS: 100 INJECTION, SOLUTION INTRAVENOUS; SUBCUTANEOUS at 12:10

## 2022-10-09 RX ADMIN — AMLODIPINE BESYLATE 10 MG: 10 TABLET ORAL at 09:10

## 2022-10-09 RX ADMIN — PREDNISONE 60 MG: 20 TABLET ORAL at 09:10

## 2022-10-09 RX ADMIN — ENOXAPARIN SODIUM 40 MG: 100 INJECTION SUBCUTANEOUS at 05:10

## 2022-10-09 RX ADMIN — AZATHIOPRINE 50 MG: 50 TABLET ORAL at 09:10

## 2022-10-09 RX ADMIN — GUAIFENESIN 600 MG: 600 TABLET, EXTENDED RELEASE ORAL at 08:10

## 2022-10-09 RX ADMIN — CEFTRIAXONE 1 G: 1 INJECTION, SOLUTION INTRAVENOUS at 08:10

## 2022-10-09 RX ADMIN — INSULIN ASPART 10 UNITS: 100 INJECTION, SOLUTION INTRAVENOUS; SUBCUTANEOUS at 04:10

## 2022-10-09 RX ADMIN — GUAIFENESIN 600 MG: 600 TABLET, EXTENDED RELEASE ORAL at 09:10

## 2022-10-09 RX ADMIN — FAMOTIDINE 20 MG: 20 TABLET ORAL at 09:10

## 2022-10-09 RX ADMIN — INSULIN ASPART 3 UNITS: 100 INJECTION, SOLUTION INTRAVENOUS; SUBCUTANEOUS at 05:10

## 2022-10-09 NOTE — PT/OT/SLP PROGRESS
Occupational Therapy   Treatment    Name: Radha Feng  MRN: [id number]  Admitting Diagnosis:  Autoimmune hepatitis       Recommendations:     Discharge Recommendations: outpatient OT  Discharge Equipment Recommendations:  walker, rolling, wheelchair  Barriers to discharge:  None    Assessment:     Radha Feng is a 81 y.o. female with a medical diagnosis of Autoimmune hepatitis. Performance deficits affecting function are weakness, impaired self care skills, impaired functional mobility, gait instability, impaired balance, decreased safety awareness.     Rehab Prognosis:  Good; patient would benefit from acute skilled OT services to address these deficits and reach maximum level of function.       Plan:     Patient to be seen 3 x/week to address the above listed problems via self-care/home management, therapeutic activities, therapeutic exercises  Plan of Care Expires: 11/05/22  Plan of Care Reviewed with: patient, daughter    Subjective     Pain/Comfort:  Pain Rating 1: 0/10    Objective:     Communicated with: nurse prior to session.  Patient found up in chair with   upon OT entry to room.    General Precautions: Standard, fall   Orthopedic Precautions:N/A   Braces: N/A  Respiratory Status: Room air      Bed Mobility:    Patient completed Supine to Sit with stand by assistance     Functional Mobility/Transfers:  Patient completed Sit <> Stand Transfer with contact guard assistance  with  hand-held assist   Patient completed Bed <> Chair Transfer using Step Transfer technique with contact guard assistance with hand-held assist  Patient completed Toilet Transfer Step Transfer technique with contact guard assistance with  hand-held assist  Functional Mobility: pt ambulated ~ 300 ft w/ single point cane w/ CGA for safety, req'ing 2 standing rest breaks 2t DEC endurance.     Activities of Daily Living:  Toileting: minimum assistance seated on bedside commode  LB dressing: Max A x 1 seated EOB      [facility] 6 Click  ADL: 19    Treatment & Education:  -pt and daughter educated on HEP for UB strengthening. Pt provided w/ 3 level gradable thera bands and exercises to complete 3x/week. Pt verbalized understanding and demo'd each exercise for 5 reps each (shoulder flexion, bicep curl, tricep extension, lateral shoulder raise).     Patient left up in chair with call button in reach and chair alarm on    GOALS:   Multidisciplinary Problems       Occupational Therapy Goals          Problem: Occupational Therapy    Goal Priority Disciplines Outcome Interventions   Occupational Therapy Goal     OT, PT/OT Ongoing, Progressing    Description: Goals to be met by: 10-16-22     Patient will increase functional independence with ADLs by performing:    UE Dressing with Supervision.  LE Dressing with Supervision.  Grooming while standing at sink with Supervision.  Toileting from bedside commode with Supervision for hygiene and clothing management.   Supine to sit with [place].  Stand pivot transfers with Supervision with RW  Toilet transfer to bedside commode with Supervision.  Pt. To be I with HEP to BUE to improve level of endurance                         Time Tracking:     OT Date of Treatment: 10/09/22  OT Start Time: 1513  OT Stop Time: 1531  OT Total Time (min): 18 min    Billable Minutes:Therapeutic Activity 18    OT/MANFRED: OT     MANFRED Visit Number: 0    10/9/2022

## 2022-10-10 LAB
ALBUMIN SERPL BCP-MCNC: 1.8 G/DL (ref 3.5–5.2)
ALP SERPL-CCNC: 142 U/L (ref 55–135)
ALT SERPL W/O P-5'-P-CCNC: 83 U/L (ref 10–44)
ANION GAP SERPL CALC-SCNC: 4 MMOL/L (ref 8–16)
AST SERPL-CCNC: 65 U/L (ref 10–40)
BASOPHILS # BLD AUTO: 0.02 K/UL (ref 0–0.2)
BASOPHILS NFR BLD: 0.1 % (ref 0–1.9)
BILIRUB SERPL-MCNC: 2.8 MG/DL (ref 0.1–1)
BUN SERPL-MCNC: 30 MG/DL (ref 8–23)
BURR CELLS BLD QL SMEAR: ABNORMAL
CALCIUM SERPL-MCNC: 8.6 MG/DL (ref 8.7–10.5)
CHLORIDE SERPL-SCNC: 108 MMOL/L (ref 95–110)
CO2 SERPL-SCNC: 16 MMOL/L (ref 23–29)
CREAT SERPL-MCNC: 0.7 MG/DL (ref 0.5–1.4)
DIFFERENTIAL METHOD: ABNORMAL
EOSINOPHIL # BLD AUTO: 0 K/UL (ref 0–0.5)
EOSINOPHIL NFR BLD: 0 % (ref 0–8)
ERYTHROCYTE [DISTWIDTH] IN BLOOD BY AUTOMATED COUNT: 18.6 % (ref 11.5–14.5)
EST. GFR  (NO RACE VARIABLE): >60 ML/MIN/1.73 M^2
GLUCOSE SERPL-MCNC: 183 MG/DL (ref 70–110)
HCT VFR BLD AUTO: 32.5 % (ref 37–48.5)
HGB BLD-MCNC: 10.9 G/DL (ref 12–16)
IMM GRANULOCYTES # BLD AUTO: 0.08 K/UL (ref 0–0.04)
IMM GRANULOCYTES NFR BLD AUTO: 0.6 % (ref 0–0.5)
LYMPHOCYTES # BLD AUTO: 0.5 K/UL (ref 1–4.8)
LYMPHOCYTES NFR BLD: 3.6 % (ref 18–48)
MAGNESIUM SERPL-MCNC: 2 MG/DL (ref 1.6–2.6)
MCH RBC QN AUTO: 36.1 PG (ref 27–31)
MCHC RBC AUTO-ENTMCNC: 33.5 G/DL (ref 32–36)
MCV RBC AUTO: 108 FL (ref 82–98)
MONOCYTES # BLD AUTO: 1.1 K/UL (ref 0.3–1)
MONOCYTES NFR BLD: 8.4 % (ref 4–15)
NEUTROPHILS # BLD AUTO: 11.8 K/UL (ref 1.8–7.7)
NEUTROPHILS NFR BLD: 87.3 % (ref 38–73)
NRBC BLD-RTO: 0 /100 WBC
PHOSPHATE SERPL-MCNC: 2.7 MG/DL (ref 2.7–4.5)
PLATELET # BLD AUTO: 86 K/UL (ref 150–450)
PLATELET BLD QL SMEAR: ABNORMAL
PMV BLD AUTO: 13.6 FL (ref 9.2–12.9)
POCT GLUCOSE: 151 MG/DL (ref 70–110)
POCT GLUCOSE: 191 MG/DL (ref 70–110)
POCT GLUCOSE: 200 MG/DL (ref 70–110)
POCT GLUCOSE: 249 MG/DL (ref 70–110)
POIKILOCYTOSIS BLD QL SMEAR: SLIGHT
POTASSIUM SERPL-SCNC: 4.8 MMOL/L (ref 3.5–5.1)
PROT SERPL-MCNC: 6 G/DL (ref 6–8.4)
RBC # BLD AUTO: 3.02 M/UL (ref 4–5.4)
SODIUM SERPL-SCNC: 128 MMOL/L (ref 136–145)
TARGETS BLD QL SMEAR: ABNORMAL
WBC # BLD AUTO: 13.54 K/UL (ref 3.9–12.7)

## 2022-10-10 PROCEDURE — 99233 SBSQ HOSP IP/OBS HIGH 50: CPT | Mod: ,,, | Performed by: INTERNAL MEDICINE

## 2022-10-10 PROCEDURE — 83735 ASSAY OF MAGNESIUM: CPT | Performed by: INTERNAL MEDICINE

## 2022-10-10 PROCEDURE — 99233 PR SUBSEQUENT HOSPITAL CARE,LEVL III: ICD-10-PCS | Mod: ,,, | Performed by: INTERNAL MEDICINE

## 2022-10-10 PROCEDURE — 63600175 PHARM REV CODE 636 W HCPCS: Performed by: FAMILY MEDICINE

## 2022-10-10 PROCEDURE — 85025 COMPLETE CBC W/AUTO DIFF WBC: CPT | Performed by: INTERNAL MEDICINE

## 2022-10-10 PROCEDURE — 25000003 PHARM REV CODE 250: Performed by: INTERNAL MEDICINE

## 2022-10-10 PROCEDURE — 99233 PR SUBSEQUENT HOSPITAL CARE,LEVL III: ICD-10-PCS | Mod: GC,,, | Performed by: INTERNAL MEDICINE

## 2022-10-10 PROCEDURE — 99233 SBSQ HOSP IP/OBS HIGH 50: CPT | Mod: GC,,, | Performed by: INTERNAL MEDICINE

## 2022-10-10 PROCEDURE — 12000002 HC ACUTE/MED SURGE SEMI-PRIVATE ROOM

## 2022-10-10 PROCEDURE — 84100 ASSAY OF PHOSPHORUS: CPT | Performed by: INTERNAL MEDICINE

## 2022-10-10 PROCEDURE — 80053 COMPREHEN METABOLIC PANEL: CPT | Performed by: INTERNAL MEDICINE

## 2022-10-10 PROCEDURE — 25000003 PHARM REV CODE 250: Performed by: FAMILY MEDICINE

## 2022-10-10 PROCEDURE — 36415 COLL VENOUS BLD VENIPUNCTURE: CPT | Performed by: INTERNAL MEDICINE

## 2022-10-10 PROCEDURE — 63600175 PHARM REV CODE 636 W HCPCS: Performed by: INTERNAL MEDICINE

## 2022-10-10 RX ORDER — FUROSEMIDE 20 MG/1
20 TABLET ORAL DAILY
Status: DISCONTINUED | OUTPATIENT
Start: 2022-10-10 | End: 2022-10-11

## 2022-10-10 RX ADMIN — INSULIN ASPART 10 UNITS: 100 INJECTION, SOLUTION INTRAVENOUS; SUBCUTANEOUS at 05:10

## 2022-10-10 RX ADMIN — FUROSEMIDE 20 MG: 20 TABLET ORAL at 09:10

## 2022-10-10 RX ADMIN — ENOXAPARIN SODIUM 40 MG: 100 INJECTION SUBCUTANEOUS at 05:10

## 2022-10-10 RX ADMIN — INSULIN ASPART 10 UNITS: 100 INJECTION, SOLUTION INTRAVENOUS; SUBCUTANEOUS at 12:10

## 2022-10-10 RX ADMIN — PREDNISONE 60 MG: 20 TABLET ORAL at 09:10

## 2022-10-10 RX ADMIN — AZATHIOPRINE 50 MG: 50 TABLET ORAL at 09:10

## 2022-10-10 RX ADMIN — FAMOTIDINE 20 MG: 20 TABLET ORAL at 09:10

## 2022-10-10 RX ADMIN — GUAIFENESIN 600 MG: 600 TABLET, EXTENDED RELEASE ORAL at 09:10

## 2022-10-10 RX ADMIN — AMLODIPINE BESYLATE 10 MG: 10 TABLET ORAL at 09:10

## 2022-10-10 RX ADMIN — CEFTRIAXONE 1 G: 1 INJECTION, SOLUTION INTRAVENOUS at 09:10

## 2022-10-10 RX ADMIN — INSULIN ASPART 10 UNITS: 100 INJECTION, SOLUTION INTRAVENOUS; SUBCUTANEOUS at 08:10

## 2022-10-10 RX ADMIN — INSULIN ASPART 2 UNITS: 100 INJECTION, SOLUTION INTRAVENOUS; SUBCUTANEOUS at 05:10

## 2022-10-10 NOTE — SUBJECTIVE & OBJECTIVE
Interval History: No acute events documented overnight. History obtained with assistance of daughter at bedside. They report patient experiencing on-going slight abdominal distension, however patient denies abdominal pain. She is tolerating PO without difficulty and is ambulating with assistance of cane. Vital signs normal on room air. Labs notable for new leukocytosis (13), stable macrocytic anemia, worsening hyponatremia (128), and stable transaminitis (AST 65, ALT 83).     Current Facility-Administered Medications   Medication    acetaminophen tablet 1,000 mg    acetaminophen tablet 650 mg    albuterol-ipratropium 2.5 mg-0.5 mg/3 mL nebulizer solution 3 mL    aluminum-magnesium hydroxide-simethicone 200-200-20 mg/5 mL suspension 30 mL    amLODIPine tablet 10 mg    azaTHIOprine tablet 50 mg    benzonatate capsule 100 mg    bisacodyL suppository 10 mg    brimonidine 0.2% ophthalmic solution 1 drop    cefTRIAXone (ROCEPHIN) 1 g/50 mL D5W IVPB    dextrose 10% bolus 125 mL    dextrose 10% bolus 250 mL    diclofenac sodium 1 % gel 2 g    enoxaparin injection 40 mg    famotidine tablet 20 mg    glucagon (human recombinant) injection 1 mg    glucose chewable tablet 16 g    glucose chewable tablet 24 g    guaiFENesin 12 hr tablet 600 mg    insulin aspart U-100 pen 0-5 Units    insulin aspart U-100 pen 10 Units    lactulose 20 gram/30 mL solution Soln 20 g    latanoprost 0.005 % ophthalmic solution 1 drop    melatonin tablet 6 mg    naloxone 0.4 mg/mL injection 0.02 mg    ondansetron disintegrating tablet 8 mg    predniSONE tablet 60 mg    prochlorperazine injection Soln 5 mg    simethicone chewable tablet 80 mg    sodium chloride 0.9% flush 5 mL    tramadol split tablet 25 mg       Objective:     Vital Signs (Most Recent):  Temp: 97.7 °F (36.5 °C) (10/10/22 1129)  Pulse: 63 (10/10/22 1129)  Resp: 17 (10/10/22 1129)  BP: 138/65 (10/10/22 1129)  SpO2: 95 % (10/10/22 1129) Vital Signs (24h Range):  Temp:  [97.2 °F (36.2  °C)-98 °F (36.7 °C)] 97.7 °F (36.5 °C)  Pulse:  [62-67] 63  Resp:  [16-18] 17  SpO2:  [95 %-98 %] 95 %  BP: (128-142)/(60-69) 138/65     Weight: 50.3 kg (111 lb) (10/06/22 1100)  Body mass index is 17.92 kg/m².    Physical Exam  Constitutional:       Appearance: Normal appearance. She is not ill-appearing.   Eyes:      General: No scleral icterus.     Conjunctiva/sclera: Conjunctivae normal.   Cardiovascular:      Rate and Rhythm: Normal rate and regular rhythm.      Pulses: Normal pulses.      Heart sounds: Normal heart sounds.   Pulmonary:      Effort: Pulmonary effort is normal. No respiratory distress.      Breath sounds: Normal breath sounds.   Abdominal:      General: Bowel sounds are normal. There is distension.      Palpations: Abdomen is soft.      Tenderness: There is no abdominal tenderness.   Musculoskeletal:      Right lower leg: Edema present.      Left lower leg: Edema present.   Skin:     General: Skin is warm and dry.      Findings: No bruising or rash.   Neurological:      Mental Status: She is alert and oriented to person, place, and time.       MELD-Na score: 15 at 10/6/2022  3:19 PM  MELD score: 15 at 10/6/2022  3:19 PM  Calculated from:  Serum Creatinine: 0.8 mg/dL (Using min of 1 mg/dL) at 10/6/2022  3:19 PM  Serum Sodium: 137 mmol/L at 10/6/2022  3:19 PM  Total Bilirubin: 3.2 mg/dL at 10/6/2022  5:53 AM  INR(ratio): 1.5 at 10/4/2022  3:54 AM  Age: 81 years    Significant Labs:  Labs within the past month have been reviewed.    Significant Imaging:  US: Reviewed

## 2022-10-10 NOTE — PROGRESS NOTES
[facility] Medicine  Progress Note    Patient Name: Radha Feng  MRN: [id number]  Patient Class: IP- Inpatient   Admission Date: 9/20/2022  Length of Stay: 17 days  Attending Physician: Marysol Bullock MD  Primary Care Provider: Primary Doctor No      Subjective:     Principal Problem:Autoimmune hepatitis      HPI:  Radha Feng is a 81 y.o. female with a past medical history of type 2 diabetes, hyperlipidemia, hypertension, glaucoma and cataracts presents the emergency department due to progressive lethargy and weakness. Patient's daughter at the bedside who provides the majority of the history due to the acuity of patient's condition. Per daughter, patient was sent from her cardiology office earlier today due to increased bilateral leg swelling, shortness of breath, and progressive weakness over the past two weeks. She reports that over the past two weeks patient has had a poor appetite with significantly decreased po intake. In addition, she reports that patient's BP has been lower at home compared to baseline and states she has held BP medications (amlodipine and lisinopril-HCTZ) for the past two days. She states her BP is normally 130/80 but as been as low as 98/63. Per daughter, lower extremity swelling was most pronounced last week but has improved significantly with compression stockings.  Additionally, daughter notes that she was helping her change last week and noted that she had a prolapsed uterus/bladder but patient has not seen her OBGYN yet. Patient is requiring more assistance with ADLs which is abnormal for her. Patient denies dysuria but states that she does have difficulty urinating and can only urinate a small amount. Denies chest pain, shortness of breath, abdominal pain, dizziness, nausea, vomiting, or syncope. Patient's only complaint at this time is right knee pain.    ED: vital signs stable, afebrile, no acute distress. Elevated liver enzymes with T.bili of 2.9,  AST//254, and alk phos of 224. Na 133, K 5.0, Cr 1.4 (most recent Cr of 0.8 in 12/21). D-dimer 11.97, CTA negative for PE. CTA showed evidence of distended gallbladder and emphysema. Subsequent RUQ US negative for cholecystitis or gallstones. BNP 66, troponin negative. UA negative for infection. Given 500 mL LR and placed in observation.       Overview/Hospital Course:  Patient admitted to hospitalist service.  She was noted to have elevated liver enzymes, acute renal insuff, hyperbilirubin.  D-dimer was significatnly elevated.   CTA demonstrated NO PTE but did show few bilateral pulmonary micro nodules which can be followed up as OP.   Right UQ US showed distended gallbladder but no sonographically detectable gallstone, and no additional sonographic evidence for acute cholecystitis.   Mild free fluid of the abdomen noted.  MRCP showed normal pancreatic duct, Gallbladder is distended without wall thickening or pericholecystic fluid.  Common bile duct is normal caliber and tapers distally to the ampulla.  No intrahepatic biliary dilatation.  No biliary filling defects identified.   Acute hepatitis panel negative.  Monospot/EBV neg. CMV neg. Ceruloplasmin normal. Elevated IgG and SONNY.   Tentative diagnosis of Autoimmune Hepatitis.   She was also treated for mild hepatic encephalopathy with lactulose.    Hepatology started her on steroid therapy. Her LFTs improved daily. GYN consulted for uterine prolapse with LUTS.     .  Patient more alert and interactive.  Denies chest pain, shortness a breath or lightheadedness.  5 BM in 12h.  Pain controlled     NAD, AO3  NC, AT  RRR  CTAB  SNT, minimally distended +BS  1+ edema   PERRL    Vitals, labs and radiographs from past 24h reviewed and personally interpreted.       Assessment/Plan:      * Autoimmune hepatitis   Continue steroids at 60 mg PO. Continue to trend LFTs.    -10/5: imuran 50 added  -per hepatology, no intention to wean steroids until follow up appointment         Uterine prolapse  - GYN plans to place pessary once cognitive status improves.. No significant urinary retention with straight cath. Continue plans for outpt fu with uro- gyn.      Acute liver failure without hepatic coma  - CTA of ab/pelvix,  subsequent RUQ US, and  MRCP demonstrate no cause of patient's liver failure  - Acute hepatitis panel neg, monospot neg, copper levels normal, EBV neg  - Liver bx on 9/27-- Transjugular liver biopsy with pressure measurements    - ascites survey w large volume ascitic fluid.   Para requested; no evidence of SBP  -metabolic encephalopathy now likely to UTI with hepatoencephalopathy resolved. Transitioning to PRN lactulose     Hemolytic anemia  - with associated iron overload  - direct genny test NEG  - elevated ddimer and low fibrinogen (monitor platelets, coags, evidence of bleeding or clotting)  - etiology unknown  - hematology review of PBS--> target cells, some bethany cells,  very few schistocytes (0-1 per hp), no blast  - 9/28--> secondary to liver failure    - hematology signed off-- no further recs unless counts become unstable.         Oral phase dysphagia   mild oral dysphagia, but with a safe pharyngeal swallow. No further acute ST warranted at this time.      Severe protein-calorie malnutrition  Nutrition following. Maximize oral intake.        Type 2 diabetes mellitus, without long-term current use of insulin  Patient's FSGs are uncontrolled due to hyperglycemia on current medication regimen.  - home regimen: metformin and glyburide  Last A1c reviewed-             Lab Results   Component Value Date     HGBA1C 5.3 09/21/2022      - i Levemir decreased to 25 nightly from 30.  Continue 10 of prandial insulin     Hypertension  - BP elevated. Increased norvasc to home dose, 10mg. Monitor with steroid therapy.      Leg swelling  - no previous hx of CHF  - echo w grade II DD-- no acute exacerbation  - dopplers neg for DVT    Daughter says patient has been on  Amlodipine for years with no recent dosage changes. So this is unlikely the culprit.        Right knee pain  - Chronic  - reported trauma about 30 years ago  - follows with orthopedics outpatient  - recent MRI in august with chronic findings  - continue tylenol prn for pain        Metabolic encephalopathy/Hepatic encephalopathy      - resuming lactulose; adjust dose as needed  -No evidence of SBP  -UA requested      Dehydration  - resolved      EFREN (acute kidney injury)  - Resolved     UTI  -ceftriaxone        There was concern for urinary retention on 10/07.  Bladder scan believed to be with spurious result given possibility of the presence of ascites.  Following urination post void straight cath yielded less than 50 cc.      VTE Risk Mitigation (From admission, onward)           Ordered     enoxaparin injection 40 mg  Daily         10/05/22 1920     Place sequential compression device  Until discontinued         09/21/22 0932     IP VTE LOW RISK PATIENT  Once         09/21/22 0156                    Discharge Planning   MELVI: 10/8/2022     Code Status: Full Code   Is the patient medically ready for discharge?: No    Reason for patient still in hospital (select all that apply): Patient trending condition, Treatment and Consult recommendations  Discharge Plan A: Home with family   Discharge Delays: None known at this time      Marysol Bullock MD  Department of Hospital Medicine   [facility]

## 2022-10-10 NOTE — TELEPHONE ENCOUNTER
Mario Arthur,   Called them to follow up left a message, letting you know in case they call.  Thanks,   LD

## 2022-10-10 NOTE — PLAN OF CARE
Problem: Adult Inpatient Plan of Care  Goal: Plan of Care Review  Outcome: Ongoing, Progressing     Problem: Adult Inpatient Plan of Care  Goal: Absence of Hospital-Acquired Illness or Injury  Outcome: Ongoing, Progressing     Problem: Adult Inpatient Plan of Care  Goal: Optimal Comfort and Wellbeing  Outcome: Ongoing, Progressing     Problem: Diabetes Comorbidity  Goal: Blood Glucose Level Within Targeted Range  Outcome: Ongoing, Progressing

## 2022-10-10 NOTE — PROGRESS NOTES
[facility]  Hepatology  Progress Note    Patient Name: Radha Feng  MRN: [id number]  Admission Date: 9/20/2022  Hospital Length of Stay: 18 days  Attending Provider: Marysol Bullock MD   Primary Care Physician: Primary Doctor No  Principal Problem:Autoimmune hepatitis    Subjective:     Transplant status: No    Interval History: No acute events documented overnight. History obtained with assistance of daughter at bedside. They report patient experiencing on-going slight abdominal distension, however patient denies abdominal pain. She is tolerating PO without difficulty and is ambulating with assistance of cane. Vital signs normal on room air. Labs notable for new leukocytosis (13), stable macrocytic anemia, worsening hyponatremia (128), and stable transaminitis (AST 65, ALT 83).     Current Facility-Administered Medications   Medication    acetaminophen tablet 1,000 mg    acetaminophen tablet 650 mg    albuterol-ipratropium 2.5 mg-0.5 mg/3 mL nebulizer solution 3 mL    aluminum-magnesium hydroxide-simethicone 200-200-20 mg/5 mL suspension 30 mL    amLODIPine tablet 10 mg    azaTHIOprine tablet 50 mg    benzonatate capsule 100 mg    bisacodyL suppository 10 mg    brimonidine 0.2% ophthalmic solution 1 drop    cefTRIAXone (ROCEPHIN) 1 g/50 mL D5W IVPB    dextrose 10% bolus 125 mL    dextrose 10% bolus 250 mL    diclofenac sodium 1 % gel 2 g    enoxaparin injection 40 mg    famotidine tablet 20 mg    glucagon (human recombinant) injection 1 mg    glucose chewable tablet 16 g    glucose chewable tablet 24 g    guaiFENesin 12 hr tablet 600 mg    insulin aspart U-100 pen 0-5 Units    insulin aspart U-100 pen 10 Units    lactulose 20 gram/30 mL solution Soln 20 g    latanoprost 0.005 % ophthalmic solution 1 drop    melatonin tablet 6 mg    naloxone 0.4 mg/mL injection 0.02 mg    ondansetron disintegrating tablet 8 mg    predniSONE tablet 60 mg    prochlorperazine injection Soln 5  mg    simethicone chewable tablet 80 mg    sodium chloride 0.9% flush 5 mL    tramadol split tablet 25 mg       Objective:     Vital Signs (Most Recent):  Temp: 97.7 °F (36.5 °C) (10/10/22 1129)  Pulse: 63 (10/10/22 1129)  Resp: 17 (10/10/22 1129)  BP: 138/65 (10/10/22 1129)  SpO2: 95 % (10/10/22 1129) Vital Signs (24h Range):  Temp:  [97.2 °F (36.2 °C)-98 °F (36.7 °C)] 97.7 °F (36.5 °C)  Pulse:  [62-67] 63  Resp:  [16-18] 17  SpO2:  [95 %-98 %] 95 %  BP: (128-142)/(60-69) 138/65     Weight: 50.3 kg (111 lb) (10/06/22 1100)  Body mass index is 17.92 kg/m².    Physical Exam  Constitutional:       Appearance: Normal appearance. She is not ill-appearing.   Eyes:      General: No scleral icterus.     Conjunctiva/sclera: Conjunctivae normal.   Cardiovascular:      Rate and Rhythm: Normal rate and regular rhythm.      Pulses: Normal pulses.      Heart sounds: Normal heart sounds.   Pulmonary:      Effort: Pulmonary effort is normal. No respiratory distress.      Breath sounds: Normal breath sounds.   Abdominal:      General: Bowel sounds are normal. There is distension.      Palpations: Abdomen is soft.      Tenderness: There is no abdominal tenderness.   Musculoskeletal:      Right lower leg: Edema present.      Left lower leg: Edema present.   Skin:     General: Skin is warm and dry.      Findings: No bruising or rash.   Neurological:      Mental Status: She is alert and oriented to person, place, and time.       MELD-Na score: 15 at 10/6/2022  3:19 PM  MELD score: 15 at 10/6/2022  3:19 PM  Calculated from:  Serum Creatinine: 0.8 mg/dL (Using min of 1 mg/dL) at 10/6/2022  3:19 PM  Serum Sodium: 137 mmol/L at 10/6/2022  3:19 PM  Total Bilirubin: 3.2 mg/dL at 10/6/2022  5:53 AM  INR(ratio): 1.5 at 10/4/2022  3:54 AM  Age: 81 years    Significant Labs:  Labs within the past month have been reviewed.    Significant Imaging:  US: Reviewed      Assessment/Plan:     * Autoimmune hepatitis  This is an 81 year old male with  PMH significant for HTN and T2DM (A1c 5.3) who was admitted to [facility] on 09/20/2022 for evaluation of two week duration of progressive dyspnea on exertion, fatigue, and lower extremity swelling. Labs on admission significant for  evidence of new transaminitis in a mixed cholestatic and hepatocellular pattern and EFREN. Work-up for underlying etiology of transaminitis suggestive of autoimmune hepatitis (positive high titer SONNY and dsDNA and liver biopsy showing chronic severe active hepatitis with plasma cell rich infiltrate) with likely underlying cirrhosis (based on presence of ascites, thrombocytopenia, and liver biopsy also showing underlying fibrosis). It is unclear if underlying liver disease is from RUBIO (with risk factors) or auto-immune hepatitis. She is status post initiation of steroids and IMURAN with improvement in transaminitis, but with progressive hyponatremia and signs of volume overload on exam.     Recommendations:     -Initiation of Lasix 40 and Spironolactone 100 mg daily for volume management.   -Continue IMURAN 50 mg daily with PREDISONE 40 MG daily. Upon discharge, will plan for steroid taper consisting of Prednisone 40 mg for 1 week, 30 mg for 1 week, 20 mg for 1 week, 15 mg for 2 weeks, and 10 mg for 2 weeks.   -Given age and anticipated need for prolonged steroid taper, recommend initiation of PJP prophylaxis with TMP-SMX on Monday, Wednesday, Friday and initiation of Candida prophylaxis with Nystatin swish/swallow.   -CMP and INR daily.   -Will arrange follow-up with hepatology.           Thank you for your consult. I will follow-up with patient. Please contact us if you have any additional questions.    Victor Manuel Espinoza MD  Hepatology  [facility]

## 2022-10-10 NOTE — ASSESSMENT & PLAN NOTE
This is an 81 year old male with PMH significant for HTN and T2DM (A1c 5.3) who was admitted to [facility] on 09/20/2022 for evaluation of two week duration of progressive dyspnea on exertion, fatigue, and lower extremity swelling. Labs on admission significant for  evidence of new transaminitis in a mixed cholestatic and hepatocellular pattern and EFREN. Work-up for underlying etiology of transaminitis suggestive of autoimmune hepatitis (positive high titer SONNY and dsDNA and liver biopsy showing chronic severe active hepatitis with plasma cell rich infiltrate) with likely underlying cirrhosis (based on presence of ascites, thrombocytopenia, and liver biopsy also showing underlying fibrosis). It is unclear if underlying liver disease is from RUBIO (with risk factors) or auto-immune hepatitis. She is status post initiation of steroids and IMURAN with improvement in transaminitis, but with progressive hyponatremia and signs of volume overload on exam.     Recommendations:     -Initiation of Lasix 40 and Spironolactone 100 mg daily for volume management.   -Continue IMURAN 50 mg daily with PREDISONE 40 MG daily. Upon discharge, will plan for steroid taper consisting of Prednisone 40 mg for 1 week, 30 mg for 1 week, 20 mg for 1 week, 15 mg for 2 weeks, and 10 mg for 2 weeks.   -Given age and anticipated need for prolonged steroid taper, recommend initiation of PJP prophylaxis with TMP-SMX on Monday, Wednesday, Friday and initiation of Candida prophylaxis with Nystatin swish/swallow.   -CMP and INR daily.   -Will arrange follow-up with hepatology.

## 2022-10-11 LAB
ALBUMIN SERPL BCP-MCNC: 1.7 G/DL (ref 3.5–5.2)
ALP SERPL-CCNC: 136 U/L (ref 55–135)
ALT SERPL W/O P-5'-P-CCNC: 73 U/L (ref 10–44)
ANION GAP SERPL CALC-SCNC: 2 MMOL/L (ref 8–16)
AST SERPL-CCNC: 55 U/L (ref 10–40)
BASOPHILS # BLD AUTO: 0.02 K/UL (ref 0–0.2)
BASOPHILS NFR BLD: 0.1 % (ref 0–1.9)
BILIRUB SERPL-MCNC: 2.6 MG/DL (ref 0.1–1)
BUN SERPL-MCNC: 29 MG/DL (ref 8–23)
CALCIUM SERPL-MCNC: 8 MG/DL (ref 8.7–10.5)
CHLORIDE SERPL-SCNC: 104 MMOL/L (ref 95–110)
CO2 SERPL-SCNC: 21 MMOL/L (ref 23–29)
CREAT SERPL-MCNC: 0.6 MG/DL (ref 0.5–1.4)
DIFFERENTIAL METHOD: ABNORMAL
EOSINOPHIL # BLD AUTO: 0 K/UL (ref 0–0.5)
EOSINOPHIL NFR BLD: 0 % (ref 0–8)
ERYTHROCYTE [DISTWIDTH] IN BLOOD BY AUTOMATED COUNT: 17.9 % (ref 11.5–14.5)
EST. GFR  (NO RACE VARIABLE): >60 ML/MIN/1.73 M^2
GLUCOSE SERPL-MCNC: 169 MG/DL (ref 70–110)
HCT VFR BLD AUTO: 30.8 % (ref 37–48.5)
HGB BLD-MCNC: 10.6 G/DL (ref 12–16)
IMM GRANULOCYTES # BLD AUTO: 0.09 K/UL (ref 0–0.04)
IMM GRANULOCYTES NFR BLD AUTO: 0.5 % (ref 0–0.5)
LYMPHOCYTES # BLD AUTO: 0.6 K/UL (ref 1–4.8)
LYMPHOCYTES NFR BLD: 3.6 % (ref 18–48)
MAGNESIUM SERPL-MCNC: 1.9 MG/DL (ref 1.6–2.6)
MCH RBC QN AUTO: 35.8 PG (ref 27–31)
MCHC RBC AUTO-ENTMCNC: 34.4 G/DL (ref 32–36)
MCV RBC AUTO: 104 FL (ref 82–98)
MONOCYTES # BLD AUTO: 1.4 K/UL (ref 0.3–1)
MONOCYTES NFR BLD: 8.5 % (ref 4–15)
NEUTROPHILS # BLD AUTO: 14.5 K/UL (ref 1.8–7.7)
NEUTROPHILS NFR BLD: 87.3 % (ref 38–73)
NRBC BLD-RTO: 0 /100 WBC
PHOSPHATE SERPL-MCNC: 2.5 MG/DL (ref 2.7–4.5)
PLATELET # BLD AUTO: 90 K/UL (ref 150–450)
PMV BLD AUTO: 12.3 FL (ref 9.2–12.9)
POCT GLUCOSE: 172 MG/DL (ref 70–110)
POCT GLUCOSE: 248 MG/DL (ref 70–110)
POCT GLUCOSE: 251 MG/DL (ref 70–110)
POCT GLUCOSE: 296 MG/DL (ref 70–110)
POCT GLUCOSE: 336 MG/DL (ref 70–110)
POTASSIUM SERPL-SCNC: 4.4 MMOL/L (ref 3.5–5.1)
PROT SERPL-MCNC: 5.6 G/DL (ref 6–8.4)
RBC # BLD AUTO: 2.96 M/UL (ref 4–5.4)
SODIUM SERPL-SCNC: 127 MMOL/L (ref 136–145)
WBC # BLD AUTO: 16.56 K/UL (ref 3.9–12.7)

## 2022-10-11 PROCEDURE — 25000003 PHARM REV CODE 250: Performed by: HOSPITALIST

## 2022-10-11 PROCEDURE — 63600175 PHARM REV CODE 636 W HCPCS: Performed by: FAMILY MEDICINE

## 2022-10-11 PROCEDURE — 25000003 PHARM REV CODE 250: Performed by: STUDENT IN AN ORGANIZED HEALTH CARE EDUCATION/TRAINING PROGRAM

## 2022-10-11 PROCEDURE — 85025 COMPLETE CBC W/AUTO DIFF WBC: CPT | Performed by: INTERNAL MEDICINE

## 2022-10-11 PROCEDURE — 63600175 PHARM REV CODE 636 W HCPCS: Performed by: INTERNAL MEDICINE

## 2022-10-11 PROCEDURE — 97116 GAIT TRAINING THERAPY: CPT

## 2022-10-11 PROCEDURE — 25000003 PHARM REV CODE 250: Performed by: FAMILY MEDICINE

## 2022-10-11 PROCEDURE — 36415 COLL VENOUS BLD VENIPUNCTURE: CPT | Performed by: INTERNAL MEDICINE

## 2022-10-11 PROCEDURE — 25000003 PHARM REV CODE 250: Performed by: INTERNAL MEDICINE

## 2022-10-11 PROCEDURE — 12000002 HC ACUTE/MED SURGE SEMI-PRIVATE ROOM

## 2022-10-11 PROCEDURE — 80053 COMPREHEN METABOLIC PANEL: CPT | Performed by: INTERNAL MEDICINE

## 2022-10-11 PROCEDURE — 99233 SBSQ HOSP IP/OBS HIGH 50: CPT | Mod: ,,, | Performed by: FAMILY MEDICINE

## 2022-10-11 PROCEDURE — 83735 ASSAY OF MAGNESIUM: CPT | Performed by: INTERNAL MEDICINE

## 2022-10-11 PROCEDURE — 99233 PR SUBSEQUENT HOSPITAL CARE,LEVL III: ICD-10-PCS | Mod: ,,, | Performed by: FAMILY MEDICINE

## 2022-10-11 PROCEDURE — 84100 ASSAY OF PHOSPHORUS: CPT | Performed by: INTERNAL MEDICINE

## 2022-10-11 RX ORDER — FUROSEMIDE 20 MG/1
20 TABLET ORAL DAILY
Status: DISCONTINUED | OUTPATIENT
Start: 2022-10-12 | End: 2022-10-14 | Stop reason: HOSPADM

## 2022-10-11 RX ORDER — SPIRONOLACTONE 100 MG/1
100 TABLET, FILM COATED ORAL DAILY
Status: DISCONTINUED | OUTPATIENT
Start: 2022-10-11 | End: 2022-10-12

## 2022-10-11 RX ORDER — SULFAMETHOXAZOLE AND TRIMETHOPRIM 800; 160 MG/1; MG/1
1 TABLET ORAL
Status: DISCONTINUED | OUTPATIENT
Start: 2022-10-12 | End: 2022-10-14 | Stop reason: HOSPADM

## 2022-10-11 RX ORDER — FUROSEMIDE 40 MG/1
40 TABLET ORAL DAILY
Status: DISCONTINUED | OUTPATIENT
Start: 2022-10-12 | End: 2022-10-11

## 2022-10-11 RX ORDER — NYSTATIN 100000 [USP'U]/ML
500000 SUSPENSION ORAL 4 TIMES DAILY
Status: DISCONTINUED | OUTPATIENT
Start: 2022-10-11 | End: 2022-10-14 | Stop reason: HOSPADM

## 2022-10-11 RX ORDER — PREDNISONE 20 MG/1
40 TABLET ORAL DAILY
Status: DISCONTINUED | OUTPATIENT
Start: 2022-10-12 | End: 2022-10-14 | Stop reason: HOSPADM

## 2022-10-11 RX ADMIN — INSULIN ASPART 10 UNITS: 100 INJECTION, SOLUTION INTRAVENOUS; SUBCUTANEOUS at 05:10

## 2022-10-11 RX ADMIN — INSULIN ASPART 2 UNITS: 100 INJECTION, SOLUTION INTRAVENOUS; SUBCUTANEOUS at 12:10

## 2022-10-11 RX ADMIN — NYSTATIN 500000 UNITS: 500000 SUSPENSION ORAL at 09:10

## 2022-10-11 RX ADMIN — INSULIN ASPART 1 UNITS: 100 INJECTION, SOLUTION INTRAVENOUS; SUBCUTANEOUS at 09:10

## 2022-10-11 RX ADMIN — GUAIFENESIN 600 MG: 600 TABLET, EXTENDED RELEASE ORAL at 08:10

## 2022-10-11 RX ADMIN — INSULIN ASPART 2 UNITS: 100 INJECTION, SOLUTION INTRAVENOUS; SUBCUTANEOUS at 05:10

## 2022-10-11 RX ADMIN — PREDNISONE 60 MG: 20 TABLET ORAL at 08:10

## 2022-10-11 RX ADMIN — FUROSEMIDE 20 MG: 20 TABLET ORAL at 08:10

## 2022-10-11 RX ADMIN — NYSTATIN 500000 UNITS: 500000 SUSPENSION ORAL at 05:10

## 2022-10-11 RX ADMIN — SPIRONOLACTONE 100 MG: 100 TABLET ORAL at 12:10

## 2022-10-11 RX ADMIN — ENOXAPARIN SODIUM 40 MG: 100 INJECTION SUBCUTANEOUS at 05:10

## 2022-10-11 RX ADMIN — INSULIN ASPART 10 UNITS: 100 INJECTION, SOLUTION INTRAVENOUS; SUBCUTANEOUS at 08:10

## 2022-10-11 RX ADMIN — INSULIN ASPART 10 UNITS: 100 INJECTION, SOLUTION INTRAVENOUS; SUBCUTANEOUS at 12:10

## 2022-10-11 RX ADMIN — AZATHIOPRINE 50 MG: 50 TABLET ORAL at 08:10

## 2022-10-11 RX ADMIN — FAMOTIDINE 20 MG: 20 TABLET ORAL at 08:10

## 2022-10-11 RX ADMIN — AMLODIPINE BESYLATE 10 MG: 10 TABLET ORAL at 08:10

## 2022-10-11 RX ADMIN — CEFTRIAXONE 1 G: 1 INJECTION, SOLUTION INTRAVENOUS at 09:10

## 2022-10-11 NOTE — PROGRESS NOTES
[facility] Medicine  Progress Note    Patient Name: Radha Feng  MRN: [id number]  Patient Class: IP- Inpatient   Admission Date: 9/20/2022  Length of Stay: 19 days  Attending Physician: Marysol Bullock MD  Primary Care Provider: Primary Doctor No      Subjective:     Principal Problem:Autoimmune hepatitis      HPI:  Radha Feng is a 81 y.o. female with a past medical history of type 2 diabetes, hyperlipidemia, hypertension, glaucoma and cataracts presents the emergency department due to progressive lethargy and weakness. Patient's daughter at the bedside who provides the majority of the history due to the acuity of patient's condition. Per daughter, patient was sent from her cardiology office earlier today due to increased bilateral leg swelling, shortness of breath, and progressive weakness over the past two weeks. She reports that over the past two weeks patient has had a poor appetite with significantly decreased po intake. In addition, she reports that patient's BP has been lower at home compared to baseline and states she has held BP medications (amlodipine and lisinopril-HCTZ) for the past two days. She states her BP is normally 130/80 but as been as low as 98/63. Per daughter, lower extremity swelling was most pronounced last week but has improved significantly with compression stockings.  Additionally, daughter notes that she was helping her change last week and noted that she had a prolapsed uterus/bladder but patient has not seen her OBGYN yet. Patient is requiring more assistance with ADLs which is abnormal for her. Patient denies dysuria but states that she does have difficulty urinating and can only urinate a small amount. Denies chest pain, shortness of breath, abdominal pain, dizziness, nausea, vomiting, or syncope. Patient's only complaint at this time is right knee pain.    ED: vital signs stable, afebrile, no acute distress. Elevated liver enzymes with T.bili of 2.9,  AST//254, and alk phos of 224. Na 133, K 5.0, Cr 1.4 (most recent Cr of 0.8 in 12/21). D-dimer 11.97, CTA negative for PE. CTA showed evidence of distended gallbladder and emphysema. Subsequent RUQ US negative for cholecystitis or gallstones. BNP 66, troponin negative. UA negative for infection. Given 500 mL LR and placed in observation.       Overview/Hospital Course:  Patient presented with elevated LFTs, worsening confusion and lower extremity edema.  Ultimate diagnosis is likely autoimmune Hepatitis. On imuran and prednisone.  Intention is for regimen to stay as is until hepatology follow up;   She was also treated for hepatic encephalopathy with lactulose. Seemingly resolved. Lactulose held. Follow cognitive status as now off lactulose. (Liver function improved. Encephalopathy possibly 2/2 UTI primarily).    Hospitalization continues due to worsening hyponatremia felt to be the result of hypervolemia.  Lasix initiated on 10/10.  Follow response. Consider renal consult    Dx with UTI. On ceftriaxone.   GYN consulted for uterine prolapse. Requires outpatient follow up.     .  Patient more alert and interactive.  Denies chest pain, shortness a breath or lightheadedness. Pain controlled. Worsening lower extremity edema    NAD, AO3  NC, AT  RRR  CTAB  SNT, minimally distended +BS  1+ edema, worsening   PERRL    Vitals, labs and radiographs from past 24h reviewed and personally interpreted.       Assessment/Plan:        Hyponatremia  -lasix initiated as above on 10/10       * Autoimmune hepatitis   Continue steroids at 60 mg PO. Continue to trend LFTs.    -10/5: imuran 50 added  -per hepatology, no intention to wean steroids until follow up appointment        Uterine prolapse  - GYN plans to place pessary once cognitive status improves.. No significant urinary retention with straight cath. Continue plans for outpt fu with uro- gyn.      Acute liver failure without hepatic coma  - CTA of ab/pelvix,  subsequent  RUQ US, and  MRCP demonstrate no cause of patient's liver failure  - Acute hepatitis panel neg, monospot neg, copper levels normal, EBV neg  - Liver bx on 9/27-- Transjugular liver biopsy with pressure measurements    - ascites survey w large volume ascitic fluid.   Para requested; no evidence of SBP  -metabolic encephalopathy now likely to UTI with hepatoencephalopathy resolved. Transitioning to PRN lactulose     Hemolytic anemia  - with associated iron overload  - direct genny test NEG  - elevated ddimer and low fibrinogen (monitor platelets, coags, evidence of bleeding or clotting)  - etiology unknown  - hematology review of PBS--> target cells, some bethany cells,  very few schistocytes (0-1 per hp), no blast  - 9/28--> secondary to liver failure    - hematology signed off-- no further recs unless counts become unstable.         Oral phase dysphagia   mild oral dysphagia, but with a safe pharyngeal swallow. No further acute ST warranted at this time.      Severe protein-calorie malnutrition  Nutrition following. Maximize oral intake.        Type 2 diabetes mellitus, without long-term current use of insulin  Patient's FSGs are uncontrolled due to hyperglycemia on current medication regimen.  - home regimen: metformin and glyburide  Last A1c reviewed-             Lab Results   Component Value Date     HGBA1C 5.3 09/21/2022      - i Levemir held multiple nights; now discontinued. Continue 10 of prandial insulin     Hypertension  - BP elevated. Increased norvasc to home dose, 10mg. Monitor with steroid therapy.      Leg swelling  - no previous hx of CHF  - echo w grade II DD-- no acute exacerbation  - dopplers neg for DVT    Daughter says patient has been on Amlodipine for years with no recent dosage changes. So this is unlikely the culprit.          Right knee pain  - Chronic  - reported trauma about 30 years ago  - follows with orthopedics outpatient  - recent MRI in august with chronic findings  - continue tylenol  prn for pain        Metabolic encephalopathy/Hepatic encephalopathy      - resuming lactulose; adjust dose as needed  -No evidence of SBP  -UA requested      Dehydration  - resolved      EFREN (acute kidney injury)  - Resolved     UTI  -ceftriaxone        There was concern for urinary retention on 10/07.  Bladder scan believed to be with spurious result given possibility of the presence of ascites.  Following urination post void straight cath yielded less than 50 cc.      VTE Risk Mitigation (From admission, onward)           Ordered     enoxaparin injection 40 mg  Daily         10/05/22 1920     Place sequential compression device  Until discontinued         09/21/22 0932     IP VTE LOW RISK PATIENT  Once         09/21/22 0156                    Discharge Planning   MELVI: 10/12/2022     Code Status: Full Code   Is the patient medically ready for discharge?: No    Reason for patient still in hospital (select all that apply): Patient trending condition, Treatment and Consult recommendations  Discharge Plan A: Home with family   Discharge Delays: None known at this time      Marysol Bullock MD  Department of Hospital Medicine   [facility]

## 2022-10-11 NOTE — PT/OT/SLP PROGRESS
"Physical Therapy Treatment    Patient Name:  Radha Feng   MRN:  [id number]    Recommendations:     Discharge Recommendations:  outpatient PT   Discharge Equipment Recommendations: walker, rolling, wheelchair   Barriers to discharge: None    Assessment:     Radha Feng is a 81 y.o. female admitted with a medical diagnosis of Autoimmune hepatitis.  She presents with the following impairments/functional limitations:  weakness, impaired endurance, impaired functional mobility, gait instability, impaired balance Patient was pleasantly agreeable to PT this date and tolerated her session well this date. She was able to complete bed mobility, transfers, and ambulation into the hallway for stair training. Patient and daughter educated on appropriate energy conservation techniques to utilize as well as toileting completed during session. She will continue to benefit from skilled PT during this admit to address her decreased BLE strength and endurance, and maximize independence with functional mobility.    Rehab Prognosis: Good; patient would benefit from acute skilled PT services to address these deficits and reach maximum level of function.    Recent Surgery: * No surgery found *      Plan:     During this hospitalization, patient to be seen 3 x/week to address the identified rehab impairments via gait training, therapeutic activities, therapeutic exercises, neuromuscular re-education and progress toward the following goals:    Plan of Care Expires:  11/06/22    Subjective     Chief Complaint: "I feel much better today"  Patient/Family Comments/goals: return home to [facility]  Pain/Comfort:  Pain Rating 1: 0/10  Pain Rating Post-Intervention 1: 0/10      Objective:     Communicated with RN prior to session.  Patient found HOB elevated with  (no active lines) upon PT entry to room.     General Precautions: Standard, fall   Orthopedic Precautions:N/A   Braces: N/A  Respiratory Status: Room air     Functional Mobility:  Bed " Mobility:     Scooting: contact guard assistance and in seated position anteriorly toward EOB  Supine to Sit: contact guard assistance and with HOB elevated, use of bed rails, increased time required  Transfers:     Sit to Stand:  contact guard assistance with hand-held assist and from EOB x1 trial, from chair x1 trial, from BSC x1 trial  Bed to BSC: contact guard assistance with  hand-held assist  using  Step Transfer  Gait: ~22 ft in hallway with SPC initially and CGA/HHA, transferred to RW with improved stability ~150 ft and CGA. 3 standing rest breaks implemented, decreased milton and step length, appropriate use of RW used.  Balance:   Sitting: supervision for supported and unsupported sitting  Standing: CGA with BUE support on RW  Stairs:  Pt descended  5 stair(s) with No Assistive Device with left handrail with Minimal Assistance and HHA  Step to stair pattern noted with increased use of LLE on HR, RUE with PT support.       AM-PAC 6 CLICK MOBILITY  Turning over in bed (including adjusting bedclothes, sheets and blankets)?: 3  Sitting down on and standing up from a chair with arms (e.g., wheelchair, bedside commode, etc.): 3  Moving from lying on back to sitting on the side of the bed?: 3  Moving to and from a bed to a chair (including a wheelchair)?: 3  Need to walk in hospital room?: 3  Climbing 3-5 steps with a railing?: 2  Basic Mobility Total Score: 17       Therapeutic Activities and Exercises:   Patient educated on importance of OOB activity to promote overall endurance.  Patient educated on calling for assistance for any needs to improve overall safety awareness.   Toileting completed on BSC, total A from daughter for katie-care  Educated on energy conservation techniques, appropriate RW usage    Patient left up in chair with all lines intact, call button in reach, and RN notified, daughter present.    GOALS:   Multidisciplinary Problems       Physical Therapy Goals          Problem: Physical Therapy     Goal Priority Disciplines Outcome Goal Variances Interventions   Physical Therapy Goal     PT, PT/OT Ongoing, Progressing     Description: Goals to be met by: 10/20/2022     Patient will increase functional independence with mobility by performin. Supine to sit with Stand-by Assistance  2. Sit to supine with Stand-by Assistance  3. Sit to stand transfer with Stand-by Assistance  4. Bed to chair transfer with Stand-by Assistance using Rolling Walker or LRAD  5. Gait  x 100 feet with Stand-by Assistance using Rolling Walker or LRAD  6. Ascend/descend 6 stair with left Handrails Contact Guard Assistance using No Assistive Device.   7. Lower extremity exercise program x15 reps per handout, with assistance as needed                         Time Tracking:     PT Received On: 10/11/22  PT Start Time: 1524     PT Stop Time: 1553  PT Total Time (min): 29 min     Billable Minutes: Gait Training 29    Treatment Type: Treatment  PT/PTA: PT     PTA Visit Number: 0     10/11/2022

## 2022-10-11 NOTE — TREATMENT PLAN
Hepatology Treatment Plan    Radha Feng is a 81 y.o. female admitted to hospital 9/20/2022 (Hospital Day: 22) due to Autoimmune hepatitis.     Interval History  No events documented overnight. Patient and daughter report decreasing lower extremity edema and abdominal distension following receiving LASIX 20 mg yesterday. Daughter denies noticing confusion. Vital signs stable on room air. Labs notable for sodium downtrending to 127 from 128, however LFT's and bilirubin continue to downtrend.     Objective  Temp:  [97.4 °F (36.3 °C)-98.4 °F (36.9 °C)] 97.4 °F (36.3 °C) (10/11 1148)  Pulse:  [60-71] 64 (10/11 1148)  BP: (128-150)/(60-74) 128/60 (10/11 1148)  Resp:  [16-18] 18 (10/11 1148)  SpO2:  [94 %-98 %] 96 % (10/11 1148)    General: Alert, Oriented x3, no distress  Neurologic: Asterixis absent  Abdomen: Normoactive bowel sounds. Non-distended. Normal tympany. Soft. Non-tender. No peritoneal signs.  MSK: Trace bilateral lower extremity edema.     Laboratory    Lab Results   Component Value Date    WBC 16.56 (H) 10/11/2022    HGB 10.6 (L) 10/11/2022    HCT 30.8 (L) 10/11/2022     (H) 10/11/2022    PLT 90 (L) 10/11/2022       Lab Results   Component Value Date     (L) 10/11/2022    K 4.4 10/11/2022     10/11/2022    CO2 21 (L) 10/11/2022    BUN 29 (H) 10/11/2022    CREATININE 0.6 10/11/2022    CALCIUM 8.0 (L) 10/11/2022       Lab Results   Component Value Date    ALBUMIN 1.7 (L) 10/11/2022    ALT 73 (H) 10/11/2022    AST 55 (H) 10/11/2022    ALKPHOS 136 (H) 10/11/2022    BILITOT 2.6 (H) 10/11/2022       Lab Results   Component Value Date    INR 1.5 (H) 10/04/2022    INR 1.5 (H) 10/03/2022    INR 1.5 (H) 10/02/2022       MELD-Na score: 15 at 10/6/2022  3:19 PM  MELD score: 15 at 10/6/2022  3:19 PM  Calculated from:  Serum Creatinine: 0.8 mg/dL (Using min of 1 mg/dL) at 10/6/2022  3:19 PM  Serum Sodium: 137 mmol/L at 10/6/2022  3:19 PM  Total Bilirubin: 3.2 mg/dL at 10/6/2022  5:53  AM  INR(ratio): 1.5 at 10/4/2022  3:54 AM  Age: 81 years    Assessment  This is an 81 year old male with PMH significant for HTN and T2DM (A1c 5.3) who was admitted to [facility] on 09/20/2022 for evaluation of two week duration of progressive dyspnea on exertion, fatigue, and lower extremity swelling. Labs on admission significant for  evidence of new transaminitis in a mixed cholestatic and hepatocellular pattern and EFREN. Work-up for underlying etiology of transaminitis suggestive of autoimmune hepatitis (positive high titer SONNY and dsDNA and liver biopsy showing chronic severe active hepatitis with plasma cell rich infiltrate) with likely underlying cirrhosis (based on presence of ascites, thrombocytopenia, and liver biopsy also showing underlying fibrosis). It is unclear if underlying liver disease is from RUBIO (with risk factors) or auto-immune hepatitis. She is status post initiation of steroids and IMURAN with improvement in transaminitis, but with progressive hyponatremia and signs of volume overload on exam prompting initiation of diuresis on 10/10. Hypervolemia has improved, however hyponatremia slightly worse.      Recommendations:      -Continue Lasix 20 mg and Spironolactone 100 mg daily for volume management.   -Continue IMURAN 50 mg daily with PREDISONE 40 MG daily. Upon discharge, will plan for steroid taper consisting of Prednisone 40 mg for 1 week, 30 mg for 1 week, 20 mg for 1 week, 15 mg for 2 weeks, and 10 mg for 2 weeks.   -Given age and anticipated need for prolonged steroid taper, recommend initiation of PJP prophylaxis with TMP-SMX on Monday, Wednesday, Friday and initiation of Candida prophylaxis with Nystatin swish/swallow.   -1L fluid restricted diet.   -CMP and INR daily.   -Follow-up with hepatology scheduled on 11/04.     Thank you for involving us in the care of Radha Feng. Please call with any additional concerns or questions.    Victor Manuel Espinoza MD, PGY-V  Gastroenterology  Fellow  [facility]

## 2022-10-11 NOTE — SUBJECTIVE & OBJECTIVE
Interval History: NAEON. Daughter at bedside. Patient no longer confused. Wants to go home.      Review of Systems   Constitutional:  Negative for chills and fever.   Respiratory:  Negative for shortness of breath.    Cardiovascular:  Negative for chest pain.   Gastrointestinal:  Negative for abdominal pain.   Genitourinary:  Negative for dysuria.   Neurological:  Negative for headaches.   Psychiatric/Behavioral:  Negative for confusion.      Objective:     Vital Signs (Most Recent):  Temp: 97.7 °F (36.5 °C) (10/11/22 0807)  Pulse: 62 (10/11/22 0807)  Resp: 16 (10/11/22 0807)  BP: (!) 146/65 (10/11/22 0807)  SpO2: (!) 94 % (10/11/22 0807)   Vital Signs (24h Range):  Temp:  [97.4 °F (36.3 °C)-98.4 °F (36.9 °C)] 97.7 °F (36.5 °C)  Pulse:  [60-71] 62  Resp:  [16-18] 16  SpO2:  [94 %-98 %] 94 %  BP: (129-150)/(60-74) 146/65     Weight: 50.3 kg (111 lb)  Body mass index is 17.92 kg/m².  No intake or output data in the 24 hours ending 10/11/22 1049     Physical Exam  Vitals and nursing note reviewed.   Constitutional:       General: She is not in acute distress.     Appearance: She is well-developed.   HENT:      Head: Normocephalic and atraumatic.   Eyes:      Conjunctiva/sclera: Conjunctivae normal.   Neck:      Vascular: No JVD.   Cardiovascular:      Rate and Rhythm: Normal rate and regular rhythm.      Heart sounds: Normal heart sounds.   Pulmonary:      Effort: Pulmonary effort is normal.      Breath sounds: Normal breath sounds.   Abdominal:      General: Bowel sounds are normal. There is no distension.      Palpations: Abdomen is soft.      Tenderness: There is no abdominal tenderness.   Musculoskeletal:      Cervical back: Neck supple.      Right lower leg: Edema present.      Left lower leg: Edema present.      Comments: 3+   Neurological:      Mental Status: She is alert.   Psychiatric:         Behavior: Behavior normal.       Significant Labs: All pertinent labs within the past 24 hours have been  reviewed.  CBC:   Recent Labs   Lab 10/10/22  0455 10/11/22  0505   WBC 13.54* 16.56*   HGB 10.9* 10.6*   HCT 32.5* 30.8*   PLT 86* 90*       CMP:   Recent Labs   Lab 10/10/22  0455 10/11/22  0505   * 127*   K 4.8 4.4    104   CO2 16* 21*   * 169*   BUN 30* 29*   CREATININE 0.7 0.6   CALCIUM 8.6* 8.0*   PROT 6.0 5.6*   ALBUMIN 1.8* 1.7*   BILITOT 2.8* 2.6*   ALKPHOS 142* 136*   AST 65* 55*   ALT 83* 73*   ANIONGAP 4* 2*       Magnesium:   Recent Labs   Lab 10/10/22  0455 10/11/22  0505   MG 2.0 1.9       POCT Glucose:   Recent Labs   Lab 10/10/22  1515 10/10/22  2116 10/11/22  0823   POCTGLUCOSE 249* 151* 172*         Significant Imaging: I have reviewed all pertinent imaging results/findings within the past 24 hours.

## 2022-10-11 NOTE — PROGRESS NOTES
[facility] Medicine  Progress Note    Patient Name: Radha Feng  MRN: [id number]  Patient Class: IP- Inpatient   Admission Date: 9/20/2022  Length of Stay: 19 days  Attending Physician: Malaika Madrigal MD  Primary Care Provider: Primary Doctor No      Subjective:     Principal Problem:Autoimmune hepatitis      HPI:  Radha Feng is a 81 y.o. female with a past medical history of type 2 diabetes, hyperlipidemia, hypertension, glaucoma and cataracts presents the emergency department due to progressive lethargy and weakness. Patient's daughter at the bedside who provides the majority of the history due to the acuity of patient's condition. Per daughter, patient was sent from her cardiology office earlier today due to increased bilateral leg swelling, shortness of breath, and progressive weakness over the past two weeks. She reports that over the past two weeks patient has had a poor appetite with significantly decreased po intake. In addition, she reports that patient's BP has been lower at home compared to baseline and states she has held BP medications (amlodipine and lisinopril-HCTZ) for the past two days. She states her BP is normally 130/80 but as been as low as 98/63. Per daughter, lower extremity swelling was most pronounced last week but has improved significantly with compression stockings.  Additionally, daughter notes that she was helping her change last week and noted that she had a prolapsed uterus/bladder but patient has not seen her OBGYN yet. Patient is requiring more assistance with ADLs which is abnormal for her. Patient denies dysuria but states that she does have difficulty urinating and can only urinate a small amount. Denies chest pain, shortness of breath, abdominal pain, dizziness, nausea, vomiting, or syncope. Patient's only complaint at this time is right knee pain.    ED: vital signs stable, afebrile, no acute distress. Elevated liver enzymes with T.bili of 2.9,  AST//254, and alk phos of 224. Na 133, K 5.0, Cr 1.4 (most recent Cr of 0.8 in 12/21). D-dimer 11.97, CTA negative for PE. CTA showed evidence of distended gallbladder and emphysema. Subsequent RUQ US negative for cholecystitis or gallstones. BNP 66, troponin negative. UA negative for infection. Given 500 mL LR and placed in observation.       Overview/Hospital Course:  Patient presented with elevated LFTs, worsening confusion and lower extremity edema.  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 On imuran and prednisone.    - ascites survey w large volume ascitic fluid.   Para requested; no evidence of SBP  She was also treated for hepatic encephalopathy with lactulose. Seemingly resolved. Lactulose held. Follow cognitive status as now off lactulose. (Liver function improved. Encephalopathy possibly 2/2 UTI primarily).    Hospitalization continued due to worsening hyponatremia felt to be the result of hypervolemia.  Lasix initiated on 10/10.     Dx with UTI. On ceftriaxone.   GYN consulted for uterine prolapse, but due to mental status change, could not get pessary placed. Requires outpatient follow up.       Interval History: NAEON. Daughter at bedside. Patient no longer confused. Wants to go home.      Review of Systems   Constitutional:  Negative for chills and fever.   Respiratory:  Negative for shortness of breath.    Cardiovascular:  Negative for chest pain.   Gastrointestinal:  Negative for abdominal pain.   Genitourinary:  Negative for dysuria.   Neurological:  Negative for headaches.   Psychiatric/Behavioral:  Negative for confusion.      Objective:     Vital Signs (Most Recent):  Temp: 97.7 °F (36.5 °C) (10/11/22 0807)  Pulse: 62 (10/11/22  0807)  Resp: 16 (10/11/22 0807)  BP: (!) 146/65 (10/11/22 0807)  SpO2: (!) 94 % (10/11/22 0807)   Vital Signs (24h Range):  Temp:  [97.4 °F (36.3 °C)-98.4 °F (36.9 °C)] 97.7 °F (36.5 °C)  Pulse:  [60-71] 62  Resp:  [16-18] 16  SpO2:  [94 %-98 %] 94 %  BP: (129-150)/(60-74) 146/65     Weight: 50.3 kg (111 lb)  Body mass index is 17.92 kg/m².  No intake or output data in the 24 hours ending 10/11/22 1049     Physical Exam  Vitals and nursing note reviewed.   Constitutional:       General: She is not in acute distress.     Appearance: She is well-developed.   HENT:      Head: Normocephalic and atraumatic.   Eyes:      Conjunctiva/sclera: Conjunctivae normal.   Neck:      Vascular: No JVD.   Cardiovascular:      Rate and Rhythm: Normal rate and regular rhythm.      Heart sounds: Normal heart sounds.   Pulmonary:      Effort: Pulmonary effort is normal.      Breath sounds: Normal breath sounds.   Abdominal:      General: Bowel sounds are normal. There is no distension.      Palpations: Abdomen is soft.      Tenderness: There is no abdominal tenderness.   Musculoskeletal:      Cervical back: Neck supple.      Right lower leg: Edema present.      Left lower leg: Edema present.      Comments: 3+   Neurological:      Mental Status: She is alert.   Psychiatric:         Behavior: Behavior normal.       Significant Labs: All pertinent labs within the past 24 hours have been reviewed.  CBC:   Recent Labs   Lab 10/10/22  0455 10/11/22  0505   WBC 13.54* 16.56*   HGB 10.9* 10.6*   HCT 32.5* 30.8*   PLT 86* 90*       CMP:   Recent Labs   Lab 10/10/22  0455 10/11/22  0505   * 127*   K 4.8 4.4    104   CO2 16* 21*   * 169*   BUN 30* 29*   CREATININE 0.7 0.6   CALCIUM 8.6* 8.0*   PROT 6.0 5.6*   ALBUMIN 1.8* 1.7*   BILITOT 2.8* 2.6*   ALKPHOS 142* 136*   AST 65* 55*   ALT 83* 73*   ANIONGAP 4* 2*       Magnesium:   Recent Labs   Lab 10/10/22  0455 10/11/22  0505   MG 2.0 1.9       POCT Glucose:   Recent Labs   Lab  10/10/22  1515 10/10/22  2116 10/11/22  0823   POCTGLUCOSE 249* 151* 172*         Significant Imaging: I have reviewed all pertinent imaging results/findings within the past 24 hours.    Assessment/Plan:      Hyponatremia  Likely 2/2 to diuretic use. Trend.      Leukocytosis  - likely secondary to steroid use. Trend. Denies diarrhea.     UTI   -Ucx w E. Coli. Pan sensitive. Last day of Ceftriaxone.      * Autoimmune hepatitis  Continue IMURAN 50 mg daily with PREDISONE 40 MG daily   Hep recs upon dc: steroid taper consisting of Prednisone 40 mg for 1 week, 30 mg for 1 week, 20 mg for 1 week, 15 mg for 2 weeks, and 10 mg for 2 weeks.   -Given age and anticipated need for prolonged steroid taper, recommend initiation of PJP prophylaxis with TMP-SMX on Monday, Wednesday, Friday and initiation of Candida prophylaxis with Nystatin swish/swallow.         Uterine prolapse   plans for outpt fu with uro- gyn.      Acute liver failure without hepatic coma w ascites   LFTs significantly improved and almost back to normal levels after initiation of steroids.   lasix 40 mg and aldactone 100 mg daily        Hemolytic anemia  - with associated iron overload  - direct genny test NEG  - elevated ddimer and low fibrinogen (monitor platelets, coags, evidence of bleeding or clotting)  - etiology unknown  - hematology review of PBS--> target cells, some bethany cells,  very few schistocytes (0-1 per hp), no blast  - 9/28--> secondary to liver failure    - hematology signed off-- no further recs unless counts become unstable.         Oral phase dysphagia   mild oral dysphagia, but with a safe pharyngeal swallow. No further acute ST warranted at this time.      Severe protein-calorie malnutrition  Maximize oral intake.        Type 2 diabetes mellitus, without long-term current use of insulin  Patient's FSGs are stable as above. On bolus/ SSI.     Last A1c reviewed-             Lab Results   Component Value Date     HGBA1C 5.3 09/21/2022          Hypertension  - Stable. Hold home Norvasc due to the need of diuretics for ascites. Resume in addition to diuretics if warranted.      Leg swelling  - no previous hx of CHF  - echo w grade II DD-- no acute exacerbation  - dopplers neg for DVT     Daughter says patient has been on Amlodipine for years with no recent dosage changes. So this is unlikely the culprit.         Right knee pain  - Chronic  - reported trauma about 30 years ago  - follows with orthopedics outpatient  - recent MRI in august with chronic findings  - continue tylenol prn for pain        Metabolic encephalopathy/ Hepatic encephalopathy   - treat UTI  - lactulose prn        Dehydration  - resolved      EFREN (acute kidney injury)  - Resolved        VTE Risk Mitigation (From admission, onward)           Ordered     enoxaparin injection 40 mg  Daily         10/05/22 1920     Place sequential compression device  Until discontinued         09/21/22 0932     IP VTE LOW RISK PATIENT  Once         09/21/22 0156                    Discharge Planning   MELVI: 10/13/2022     Code Status: Full Code   Is the patient medically ready for discharge?: No    Reason for patient still in hospital (select all that apply): Patient trending condition and Treatment  Discharge Plan A: Home with family   Discharge Delays: None known at this time      Malaika Madrigal MD  Department of Hospital Medicine   [facility]

## 2022-10-11 NOTE — PLAN OF CARE
0000 pt and family refused 0400 vitals          END OF SHIFT NOTE  Pt rested well throughout shift, no distress at this time, no complaints, VSS, no insulin coverage needed, daughter remain at bedside, possible d/c today, bed in lowest position, side rails up x2. Bed alarm refused, personal items within reach. [facility]      KALYAN Avendaño RN       Problem: Adult Inpatient Plan of Care  Goal: Plan of Care Review  Outcome: Ongoing, Progressing  Goal: Patient-Specific Goal (Individualized)  Outcome: Ongoing, Progressing  Goal: Absence of Hospital-Acquired Illness or Injury  Outcome: Ongoing, Progressing  Goal: Optimal Comfort and Wellbeing  Outcome: Ongoing, Progressing  Goal: Readiness for Transition of Care  Outcome: Ongoing, Progressing     Problem: Infection  Goal: Absence of Infection Signs and Symptoms  Outcome: Ongoing, Progressing     Problem: Diabetes Comorbidity  Goal: Blood Glucose Level Within Targeted Range  Outcome: Ongoing, Progressing     Problem: Fluid and Electrolyte Imbalance (Acute Kidney Injury/Impairment)  Goal: Fluid and Electrolyte Balance  Outcome: Ongoing, Progressing     Problem: Oral Intake Inadequate (Acute Kidney Injury/Impairment)  Goal: Optimal Nutrition Intake  Outcome: Ongoing, Progressing     Problem: Renal Function Impairment (Acute Kidney Injury/Impairment)  Goal: Effective Renal Function  Outcome: Ongoing, Progressing     Problem: Skin Injury Risk Increased  Goal: Skin Health and Integrity  Outcome: Ongoing, Progressing     Problem: Fall Injury Risk  Goal: Absence of Fall and Fall-Related Injury  Outcome: Ongoing, Progressing

## 2022-10-12 ENCOUNTER — PATIENT MESSAGE (OUTPATIENT)
Dept: UROGYNECOLOGY | Facility: CLINIC | Age: 82
End: 2022-10-12
Payer: MEDICARE

## 2022-10-12 LAB
ALBUMIN SERPL BCP-MCNC: 1.8 G/DL (ref 3.5–5.2)
ALP SERPL-CCNC: 141 U/L (ref 55–135)
ALT SERPL W/O P-5'-P-CCNC: 74 U/L (ref 10–44)
ANION GAP SERPL CALC-SCNC: 4 MMOL/L (ref 8–16)
AST SERPL-CCNC: 65 U/L (ref 10–40)
BACTERIA SPEC ANAEROBE CULT: NORMAL
BASOPHILS # BLD AUTO: 0.01 K/UL (ref 0–0.2)
BASOPHILS NFR BLD: 0.1 % (ref 0–1.9)
BILIRUB SERPL-MCNC: 2.8 MG/DL (ref 0.1–1)
BUN SERPL-MCNC: 34 MG/DL (ref 8–23)
CALCIUM SERPL-MCNC: 8.3 MG/DL (ref 8.7–10.5)
CHLORIDE SERPL-SCNC: 104 MMOL/L (ref 95–110)
CO2 SERPL-SCNC: 19 MMOL/L (ref 23–29)
CREAT SERPL-MCNC: 0.8 MG/DL (ref 0.5–1.4)
DIFFERENTIAL METHOD: ABNORMAL
EOSINOPHIL # BLD AUTO: 0 K/UL (ref 0–0.5)
EOSINOPHIL NFR BLD: 0 % (ref 0–8)
ERYTHROCYTE [DISTWIDTH] IN BLOOD BY AUTOMATED COUNT: 17.2 % (ref 11.5–14.5)
EST. GFR  (NO RACE VARIABLE): >60 ML/MIN/1.73 M^2
GLUCOSE SERPL-MCNC: 275 MG/DL (ref 70–110)
HCT VFR BLD AUTO: 30.2 % (ref 37–48.5)
HGB BLD-MCNC: 10.7 G/DL (ref 12–16)
IMM GRANULOCYTES # BLD AUTO: 0.08 K/UL (ref 0–0.04)
IMM GRANULOCYTES NFR BLD AUTO: 0.5 % (ref 0–0.5)
LYMPHOCYTES # BLD AUTO: 0.7 K/UL (ref 1–4.8)
LYMPHOCYTES NFR BLD: 4.5 % (ref 18–48)
MCH RBC QN AUTO: 36.6 PG (ref 27–31)
MCHC RBC AUTO-ENTMCNC: 35.4 G/DL (ref 32–36)
MCV RBC AUTO: 103 FL (ref 82–98)
MONOCYTES # BLD AUTO: 1 K/UL (ref 0.3–1)
MONOCYTES NFR BLD: 6.5 % (ref 4–15)
NEUTROPHILS # BLD AUTO: 13 K/UL (ref 1.8–7.7)
NEUTROPHILS NFR BLD: 88.4 % (ref 38–73)
NRBC BLD-RTO: 0 /100 WBC
PLATELET # BLD AUTO: 110 K/UL (ref 150–450)
PMV BLD AUTO: 11.8 FL (ref 9.2–12.9)
POCT GLUCOSE: 226 MG/DL (ref 70–110)
POCT GLUCOSE: 237 MG/DL (ref 70–110)
POCT GLUCOSE: 260 MG/DL (ref 70–110)
POCT GLUCOSE: 269 MG/DL (ref 70–110)
POTASSIUM SERPL-SCNC: 5 MMOL/L (ref 3.5–5.1)
PROT SERPL-MCNC: 6.1 G/DL (ref 6–8.4)
RBC # BLD AUTO: 2.92 M/UL (ref 4–5.4)
SODIUM SERPL-SCNC: 127 MMOL/L (ref 136–145)
WBC # BLD AUTO: 14.67 K/UL (ref 3.9–12.7)

## 2022-10-12 PROCEDURE — 12000002 HC ACUTE/MED SURGE SEMI-PRIVATE ROOM

## 2022-10-12 PROCEDURE — 63600175 PHARM REV CODE 636 W HCPCS: Performed by: FAMILY MEDICINE

## 2022-10-12 PROCEDURE — 85025 COMPLETE CBC W/AUTO DIFF WBC: CPT | Performed by: FAMILY MEDICINE

## 2022-10-12 PROCEDURE — 25000003 PHARM REV CODE 250: Performed by: STUDENT IN AN ORGANIZED HEALTH CARE EDUCATION/TRAINING PROGRAM

## 2022-10-12 PROCEDURE — 99233 PR SUBSEQUENT HOSPITAL CARE,LEVL III: ICD-10-PCS | Mod: ,,, | Performed by: FAMILY MEDICINE

## 2022-10-12 PROCEDURE — 36415 COLL VENOUS BLD VENIPUNCTURE: CPT | Performed by: FAMILY MEDICINE

## 2022-10-12 PROCEDURE — 25000003 PHARM REV CODE 250: Performed by: FAMILY MEDICINE

## 2022-10-12 PROCEDURE — 80053 COMPREHEN METABOLIC PANEL: CPT | Performed by: FAMILY MEDICINE

## 2022-10-12 PROCEDURE — 25000003 PHARM REV CODE 250: Performed by: INTERNAL MEDICINE

## 2022-10-12 PROCEDURE — 99233 SBSQ HOSP IP/OBS HIGH 50: CPT | Mod: ,,, | Performed by: FAMILY MEDICINE

## 2022-10-12 PROCEDURE — 63600175 PHARM REV CODE 636 W HCPCS: Performed by: INTERNAL MEDICINE

## 2022-10-12 RX ORDER — AMLODIPINE BESYLATE 2.5 MG/1
2.5 TABLET ORAL DAILY
Status: DISCONTINUED | OUTPATIENT
Start: 2022-10-12 | End: 2022-10-14 | Stop reason: HOSPADM

## 2022-10-12 RX ORDER — SPIRONOLACTONE 25 MG/1
50 TABLET ORAL DAILY
Status: DISCONTINUED | OUTPATIENT
Start: 2022-10-13 | End: 2022-10-14 | Stop reason: HOSPADM

## 2022-10-12 RX ADMIN — INSULIN ASPART 1 UNITS: 100 INJECTION, SOLUTION INTRAVENOUS; SUBCUTANEOUS at 08:10

## 2022-10-12 RX ADMIN — INSULIN ASPART 10 UNITS: 100 INJECTION, SOLUTION INTRAVENOUS; SUBCUTANEOUS at 05:10

## 2022-10-12 RX ADMIN — SULFAMETHOXAZOLE AND TRIMETHOPRIM 1 TABLET: 800; 160 TABLET ORAL at 05:10

## 2022-10-12 RX ADMIN — SPIRONOLACTONE 100 MG: 100 TABLET ORAL at 09:10

## 2022-10-12 RX ADMIN — INSULIN ASPART 10 UNITS: 100 INJECTION, SOLUTION INTRAVENOUS; SUBCUTANEOUS at 12:10

## 2022-10-12 RX ADMIN — INSULIN ASPART 2 UNITS: 100 INJECTION, SOLUTION INTRAVENOUS; SUBCUTANEOUS at 05:10

## 2022-10-12 RX ADMIN — NYSTATIN 500000 UNITS: 500000 SUSPENSION ORAL at 09:10

## 2022-10-12 RX ADMIN — NYSTATIN 500000 UNITS: 500000 SUSPENSION ORAL at 08:10

## 2022-10-12 RX ADMIN — AMLODIPINE BESYLATE 2.5 MG: 2.5 TABLET ORAL at 05:10

## 2022-10-12 RX ADMIN — FUROSEMIDE 20 MG: 20 TABLET ORAL at 09:10

## 2022-10-12 RX ADMIN — NYSTATIN 500000 UNITS: 500000 SUSPENSION ORAL at 05:10

## 2022-10-12 RX ADMIN — ENOXAPARIN SODIUM 40 MG: 100 INJECTION SUBCUTANEOUS at 05:10

## 2022-10-12 RX ADMIN — PREDNISONE 40 MG: 20 TABLET ORAL at 09:10

## 2022-10-12 RX ADMIN — INSULIN ASPART 2 UNITS: 100 INJECTION, SOLUTION INTRAVENOUS; SUBCUTANEOUS at 12:10

## 2022-10-12 RX ADMIN — FAMOTIDINE 20 MG: 20 TABLET ORAL at 09:10

## 2022-10-12 RX ADMIN — AZATHIOPRINE 50 MG: 50 TABLET ORAL at 10:10

## 2022-10-12 RX ADMIN — INSULIN ASPART 10 UNITS: 100 INJECTION, SOLUTION INTRAVENOUS; SUBCUTANEOUS at 09:10

## 2022-10-12 RX ADMIN — INSULIN ASPART 3 UNITS: 100 INJECTION, SOLUTION INTRAVENOUS; SUBCUTANEOUS at 09:10

## 2022-10-12 NOTE — TREATMENT PLAN
Hepatology Treatment Plan    Radha Feng is a 81 y.o. female admitted to hospital 9/20/2022 (Hospital Day: 23) due to Autoimmune hepatitis.     Interval History  No events documented overnight. Vital signs remain normal on room air. Labs notable for stable leukocytosis (14), stable macrocytic anemia (Hgb 10.7), stable hyponatremia (127), and stable transaminitis (AST 65, ALT 74). K 5 today.       Objective  Temp:  [97.8 °F (36.6 °C)-98.2 °F (36.8 °C)] 97.9 °F (36.6 °C) (10/12 1611)  Pulse:  [57-67] 67 (10/12 1611)  BP: (128-150)/(60-74) 146/74 (10/12 1611)  Resp:  [16-18] 18 (10/12 1611)  SpO2:  [94 %-98 %] 96 % (10/12 1611)    Laboratory    Lab Results   Component Value Date    WBC 14.67 (H) 10/12/2022    HGB 10.7 (L) 10/12/2022    HCT 30.2 (L) 10/12/2022     (H) 10/12/2022     (L) 10/12/2022       Lab Results   Component Value Date     (L) 10/12/2022    K 5.0 10/12/2022     10/12/2022    CO2 19 (L) 10/12/2022    BUN 34 (H) 10/12/2022    CREATININE 0.8 10/12/2022    CALCIUM 8.3 (L) 10/12/2022       Lab Results   Component Value Date    ALBUMIN 1.8 (L) 10/12/2022    ALT 74 (H) 10/12/2022    AST 65 (H) 10/12/2022    ALKPHOS 141 (H) 10/12/2022    BILITOT 2.8 (H) 10/12/2022       Lab Results   Component Value Date    INR 1.5 (H) 10/04/2022    INR 1.5 (H) 10/03/2022    INR 1.5 (H) 10/02/2022       MELD-Na score: 15 at 10/6/2022  3:19 PM  MELD score: 15 at 10/6/2022  3:19 PM  Calculated from:  Serum Creatinine: 0.8 mg/dL (Using min of 1 mg/dL) at 10/6/2022  3:19 PM  Serum Sodium: 137 mmol/L at 10/6/2022  3:19 PM  Total Bilirubin: 3.2 mg/dL at 10/6/2022  5:53 AM  INR(ratio): 1.5 at 10/4/2022  3:54 AM  Age: 81 years    Assessment  This is an 81 year old male with PMH significant for HTN and T2DM (A1c 5.3) who was admitted to [facility] on 09/20/2022 for evaluation of two week duration of progressive dyspnea on exertion, fatigue, and lower extremity swelling. Labs on admission significant for   evidence of new transaminitis in a mixed cholestatic and hepatocellular pattern and EFREN. Work-up for underlying etiology of transaminitis suggestive of autoimmune hepatitis (positive high titer SONNY and dsDNA and liver biopsy showing chronic severe active hepatitis with plasma cell rich infiltrate) with likely underlying cirrhosis (based on presence of ascites, thrombocytopenia, and liver biopsy also showing underlying fibrosis). It is unclear if underlying liver disease is from RUBIO (with risk factors) or auto-immune hepatitis. She is status post initiation of steroids and IMURAN with improvement in transaminitis, but with progressive hyponatremia and signs of volume overload on exam prompting initiation of diuresis on 10/10. Hypervolemia has improved and hypernatremia stable.       Recommendations:      -Continue Lasix 20 mg and reduce Spironolactone to 50 mg daily for volume management with borderline hyperkalemia.    -Continue IMURAN 50 mg daily with PREDISONE 40 MG daily. Upon discharge, will plan for steroid taper consisting of Prednisone 40 mg for 1 week, 30 mg for 1 week, 20 mg for 1 week, 15 mg for 2 weeks, and 10 mg for 2 weeks.   -Given age and anticipated need for prolonged steroid taper, recommend initiation of PJP prophylaxis with TMP-SMX on Monday, Wednesday, Friday and initiation of Candida prophylaxis with Nystatin swish/swallow.   -1L fluid restricted diet.   -CMP and INR daily.   -Follow-up with hepatology scheduled on 11/04.     Thank you for involving us in the care of Radha Feng. Please call with any additional concerns or questions.    Victor Manuel Espinoza MD, PGY-V  Gastroenterology Fellow  [facility]

## 2022-10-12 NOTE — SUBJECTIVE & OBJECTIVE
Interval History: NAEON. No acute concerns. Feeling well.       Review of Systems   Constitutional:  Negative for chills and fever.   Respiratory:  Negative for shortness of breath.    Cardiovascular:  Negative for chest pain.   Gastrointestinal:  Negative for abdominal pain.   Genitourinary:  Negative for dysuria.   Neurological:  Negative for headaches.   Psychiatric/Behavioral:  Negative for confusion.      Objective:     Vital Signs (Most Recent):  Temp: 98.2 °F (36.8 °C) (10/12/22 1124)  Pulse: 63 (10/12/22 1124)  Resp: 18 (10/12/22 1124)  BP: (!) 150/72 (10/12/22 1124)  SpO2: 97 % (10/12/22 1124)   Vital Signs (24h Range):  Temp:  [97.6 °F (36.4 °C)-98.2 °F (36.8 °C)] 98.2 °F (36.8 °C)  Pulse:  [57-65] 63  Resp:  [16-18] 18  SpO2:  [94 %-98 %] 97 %  BP: (120-150)/(60-72) 150/72     Weight: 50.3 kg (111 lb)  Body mass index is 17.92 kg/m².  No intake or output data in the 24 hours ending 10/12/22 1450     Physical Exam  Vitals and nursing note reviewed.   Constitutional:       General: She is not in acute distress.     Appearance: She is well-developed.   HENT:      Head: Normocephalic and atraumatic.   Eyes:      Conjunctiva/sclera: Conjunctivae normal.   Neck:      Vascular: No JVD.   Cardiovascular:      Rate and Rhythm: Normal rate and regular rhythm.      Heart sounds: Normal heart sounds.   Pulmonary:      Effort: Pulmonary effort is normal.      Breath sounds: Normal breath sounds.   Abdominal:      General: Bowel sounds are normal. There is no distension.      Palpations: Abdomen is soft.      Tenderness: There is no abdominal tenderness.   Musculoskeletal:      Cervical back: Neck supple.      Right lower leg: Edema present.      Left lower leg: Edema present.      Comments: 3+   Neurological:      Mental Status: She is alert.   Psychiatric:         Behavior: Behavior normal.       Significant Labs: All pertinent labs within the past 24 hours have been reviewed.  CBC:   Recent Labs   Lab 10/11/22  0505  10/12/22  1022   WBC 16.56* 14.67*   HGB 10.6* 10.7*   HCT 30.8* 30.2*   PLT 90* 110*       CMP:   Recent Labs   Lab 10/11/22  0505 10/12/22  1022   * 127*   K 4.4 5.0    104   CO2 21* 19*   * 275*   BUN 29* 34*   CREATININE 0.6 0.8   CALCIUM 8.0* 8.3*   PROT 5.6* 6.1   ALBUMIN 1.7* 1.8*   BILITOT 2.6* 2.8*   ALKPHOS 136* 141*   AST 55* 65*   ALT 73* 74*   ANIONGAP 2* 4*       Magnesium:   Recent Labs   Lab 10/11/22  0505   MG 1.9       POCT Glucose:   Recent Labs   Lab 10/11/22  2121 10/12/22  0719 10/12/22  1123   POCTGLUCOSE 296* 260* 226*         Significant Imaging: I have reviewed all pertinent imaging results/findings within the past 24 hours.

## 2022-10-12 NOTE — PROGRESS NOTES
[facility]  Progress Note    Patient Name: Radha Feng  MRN: [id number]  Patient Class: IP- Inpatient   Admission Date: 9/20/2022  Length of Stay: 20 days  Attending Physician: Malaika Madrigal MD  Primary Care Provider: Primary Doctor No      Subjective:     Principal Problem:Autoimmune hepatitis      HPI:  Radha Feng is a 81 y.o. female with a past medical history of type 2 diabetes, hyperlipidemia, hypertension, glaucoma and cataracts presents the emergency department due to progressive lethargy and weakness. Patient's daughter at the bedside who provides the majority of the history due to the acuity of patient's condition. Per daughter, patient was sent from her cardiology office earlier today due to increased bilateral leg swelling, shortness of breath, and progressive weakness over the past two weeks. She reports that over the past two weeks patient has had a poor appetite with significantly decreased po intake. In addition, she reports that patient's BP has been lower at home compared to baseline and states she has held BP medications (amlodipine and lisinopril-HCTZ) for the past two days. She states her BP is normally 130/80 but as been as low as 98/63. Per daughter, lower extremity swelling was most pronounced last week but has improved significantly with compression stockings.  Additionally, daughter notes that she was helping her change last week and noted that she had a prolapsed uterus/bladder but patient has not seen her OBGYN yet. Patient is requiring more assistance with ADLs which is abnormal for her. Patient denies dysuria but states that she does have difficulty urinating and can only urinate a small amount. Denies chest pain, shortness of breath, abdominal pain, dizziness, nausea, vomiting, or syncope. Patient's only complaint at this time is right knee pain.    ED: vital signs stable, afebrile, no acute distress. Elevated liver enzymes with T.bili of 2.9,  AST//254, and alk phos of 224. Na 133, K 5.0, Cr 1.4 (most recent Cr of 0.8 in 12/21). D-dimer 11.97, CTA negative for PE. CTA showed evidence of distended gallbladder and emphysema. Subsequent RUQ US negative for cholecystitis or gallstones. BNP 66, troponin negative. UA negative for infection. Given 500 mL LR and placed in observation.       Overview/Hospital Course:  Patient presented with elevated LFTs, worsening confusion and lower extremity edema.  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 On imuran and prednisone.    - ascites survey w large volume ascitic fluid.   Para requested; no evidence of SBP  She was also treated for hepatic encephalopathy with lactulose. Seemingly resolved. Lactulose held. Follow cognitive status as now off lactulose. (Liver function improved. Encephalopathy possibly 2/2 UTI primarily).    Hospitalization continued due to worsening hyponatremia felt to be the result of hypervolemia.  Lasix initiated on 10/10.     Dx with UTI. On ceftriaxone.   GYN consulted for uterine prolapse, but due to mental status change, could not get pessary placed. Requires outpatient follow up.       Interval History: NAEON. No acute concerns. Feeling well.       Review of Systems   Constitutional:  Negative for chills and fever.   Respiratory:  Negative for shortness of breath.    Cardiovascular:  Negative for chest pain.   Gastrointestinal:  Negative for abdominal pain.   Genitourinary:  Negative for dysuria.   Neurological:  Negative for headaches.   Psychiatric/Behavioral:  Negative for confusion.      Objective:     Vital Signs (Most Recent):  Temp: 98.2 °F (36.8 °C) (10/12/22 1124)  Pulse: 63 (10/12/22 1124)  Resp: 18 (10/12/22 1124)  BP:  (!) 150/72 (10/12/22 1124)  SpO2: 97 % (10/12/22 1124)   Vital Signs (24h Range):  Temp:  [97.6 °F (36.4 °C)-98.2 °F (36.8 °C)] 98.2 °F (36.8 °C)  Pulse:  [57-65] 63  Resp:  [16-18] 18  SpO2:  [94 %-98 %] 97 %  BP: (120-150)/(60-72) 150/72     Weight: 50.3 kg (111 lb)  Body mass index is 17.92 kg/m².  No intake or output data in the 24 hours ending 10/12/22 1450     Physical Exam  Vitals and nursing note reviewed.   Constitutional:       General: She is not in acute distress.     Appearance: She is well-developed.   HENT:      Head: Normocephalic and atraumatic.   Eyes:      Conjunctiva/sclera: Conjunctivae normal.   Neck:      Vascular: No JVD.   Cardiovascular:      Rate and Rhythm: Normal rate and regular rhythm.      Heart sounds: Normal heart sounds.   Pulmonary:      Effort: Pulmonary effort is normal.      Breath sounds: Normal breath sounds.   Abdominal:      General: Bowel sounds are normal. There is no distension.      Palpations: Abdomen is soft.      Tenderness: There is no abdominal tenderness.   Musculoskeletal:      Cervical back: Neck supple.      Right lower leg: Edema present.      Left lower leg: Edema present.      Comments: 3+   Neurological:      Mental Status: She is alert.   Psychiatric:         Behavior: Behavior normal.       Significant Labs: All pertinent labs within the past 24 hours have been reviewed.  CBC:   Recent Labs   Lab 10/11/22  0505 10/12/22  1022   WBC 16.56* 14.67*   HGB 10.6* 10.7*   HCT 30.8* 30.2*   PLT 90* 110*       CMP:   Recent Labs   Lab 10/11/22  0505 10/12/22  1022   * 127*   K 4.4 5.0    104   CO2 21* 19*   * 275*   BUN 29* 34*   CREATININE 0.6 0.8   CALCIUM 8.0* 8.3*   PROT 5.6* 6.1   ALBUMIN 1.7* 1.8*   BILITOT 2.6* 2.8*   ALKPHOS 136* 141*   AST 55* 65*   ALT 73* 74*   ANIONGAP 2* 4*       Magnesium:   Recent Labs   Lab 10/11/22  0505   MG 1.9       POCT Glucose:   Recent Labs   Lab 10/11/22  2121 10/12/22  0719 10/12/22  1123   POCTGLUCOSE  296* 260* 226*         Significant Imaging: I have reviewed all pertinent imaging results/findings within the past 24 hours.    Assessment/Plan:      Hyponatremia  Likely 2/2 to diuretic use. Trend. Daughter says patient only had 600 cc intake yesterday and does not drink much so no need to fluid restrict.      Leukocytosis  - likely secondary to steroid use. Trend- down trending.      * Autoimmune hepatitis  Continue IMURAN 50 mg daily with PREDISONE 40 MG daily   Hep recs upon dc: steroid taper consisting of Prednisone 40 mg for 1 week, 30 mg for 1 week, 20 mg for 1 week, 15 mg for 2 weeks, and 10 mg for 2 weeks.   -Given age and anticipated need for prolonged steroid taper, recommend initiation of PJP prophylaxis with TMP-SMX on Monday, Wednesday, Friday and initiation of Candida prophylaxis with Nystatin swish/swallow.         Uterine prolapse   plans for outpt fu with uro- gyn.      Acute liver failure without hepatic coma w ascites   LFTs significantly improved and almost back to normal levels after initiation of steroids.   lasix 40 mg and aldactone 100 mg daily. Appears Lasix decreased to 20 mg-- will discuss w hep.         Hemolytic anemia  - with associated iron overload  - direct genny test NEG  - elevated ddimer and low fibrinogen (monitor platelets, coags, evidence of bleeding or clotting)  - etiology unknown  - hematology review of PBS--> target cells, some bethany cells,  very few schistocytes (0-1 per hp), no blast  - 9/28--> secondary to liver failure    - hematology signed off-- no further recs unless counts become unstable.         Oral phase dysphagia   mild oral dysphagia, but with a safe pharyngeal swallow. No further acute ST warranted at this time.      Severe protein-calorie malnutrition  Maximize oral intake.        Type 2 diabetes mellitus, without long-term current use of insulin  Patient's FSGs are stable as above. On bolus/ SSI.     Last A1c reviewed-             Lab Results   Component  Value Date     HGBA1C 5.3 09/21/2022         Hypertension  - Stable. Hold home Norvasc due to the need of diuretics for ascites. Resume in addition to diuretics if warranted.      Leg swelling  - no previous hx of CHF  - echo w grade II DD-- no acute exacerbation  - dopplers neg for DVT     Daughter says patient has been on Amlodipine for years with no recent dosage changes. So this is unlikely the culprit.         Right knee pain  - Chronic  - reported trauma about 30 years ago  - follows with orthopedics outpatient  - recent MRI in august with chronic findings  - continue tylenol prn for pain        Metabolic encephalopathy/ Hepatic encephalopathy   - resolved       Dehydration  - resolved      EFREN (acute kidney injury)  - Resolved      UTI   -Ucx w E. Coli. Pan sensitive. Treated with Ceftriaxone.       VTE Risk Mitigation (From admission, onward)           Ordered     enoxaparin injection 40 mg  Daily         10/05/22 1920     Place sequential compression device  Until discontinued         09/21/22 0932     IP VTE LOW RISK PATIENT  Once         09/21/22 0156                    Discharge Planning   MELVI: 10/13/2022     Code Status: Full Code   Is the patient medically ready for discharge?: No    Reason for patient still in hospital (select all that apply): Patient trending condition and Treatment  Discharge Plan A: Home with family   Discharge Delays: None known at this time      Malaika Madrigal MD  Department of Hospital Medicine   [facility]

## 2022-10-13 LAB
ALBUMIN SERPL BCP-MCNC: 1.7 G/DL (ref 3.5–5.2)
ALP SERPL-CCNC: 142 U/L (ref 55–135)
ALT SERPL W/O P-5'-P-CCNC: 73 U/L (ref 10–44)
ANION GAP SERPL CALC-SCNC: 3 MMOL/L (ref 8–16)
AST SERPL-CCNC: 55 U/L (ref 10–40)
BASOPHILS # BLD AUTO: 0.02 K/UL (ref 0–0.2)
BASOPHILS NFR BLD: 0.1 % (ref 0–1.9)
BILIRUB SERPL-MCNC: 2.7 MG/DL (ref 0.1–1)
BUN SERPL-MCNC: 32 MG/DL (ref 8–23)
CALCIUM SERPL-MCNC: 8.2 MG/DL (ref 8.7–10.5)
CHLORIDE SERPL-SCNC: 105 MMOL/L (ref 95–110)
CO2 SERPL-SCNC: 18 MMOL/L (ref 23–29)
CREAT SERPL-MCNC: 0.7 MG/DL (ref 0.5–1.4)
DIFFERENTIAL METHOD: ABNORMAL
EOSINOPHIL # BLD AUTO: 0 K/UL (ref 0–0.5)
EOSINOPHIL NFR BLD: 0 % (ref 0–8)
ERYTHROCYTE [DISTWIDTH] IN BLOOD BY AUTOMATED COUNT: 16.6 % (ref 11.5–14.5)
EST. GFR  (NO RACE VARIABLE): >60 ML/MIN/1.73 M^2
GLUCOSE SERPL-MCNC: 192 MG/DL (ref 70–110)
HCT VFR BLD AUTO: 30.1 % (ref 37–48.5)
HGB BLD-MCNC: 10.6 G/DL (ref 12–16)
IMM GRANULOCYTES # BLD AUTO: 0.11 K/UL (ref 0–0.04)
IMM GRANULOCYTES NFR BLD AUTO: 0.6 % (ref 0–0.5)
LYMPHOCYTES # BLD AUTO: 0.6 K/UL (ref 1–4.8)
LYMPHOCYTES NFR BLD: 3.3 % (ref 18–48)
MCH RBC QN AUTO: 35.9 PG (ref 27–31)
MCHC RBC AUTO-ENTMCNC: 35.2 G/DL (ref 32–36)
MCV RBC AUTO: 102 FL (ref 82–98)
MONOCYTES # BLD AUTO: 1.2 K/UL (ref 0.3–1)
MONOCYTES NFR BLD: 6.3 % (ref 4–15)
NEUTROPHILS # BLD AUTO: 16.3 K/UL (ref 1.8–7.7)
NEUTROPHILS NFR BLD: 89.7 % (ref 38–73)
NRBC BLD-RTO: 0 /100 WBC
PLATELET # BLD AUTO: 99 K/UL (ref 150–450)
PMV BLD AUTO: 11.3 FL (ref 9.2–12.9)
POCT GLUCOSE: 193 MG/DL (ref 70–110)
POCT GLUCOSE: 254 MG/DL (ref 70–110)
POCT GLUCOSE: 288 MG/DL (ref 70–110)
POCT GLUCOSE: 291 MG/DL (ref 70–110)
POTASSIUM SERPL-SCNC: 4.7 MMOL/L (ref 3.5–5.1)
PROT SERPL-MCNC: 5.8 G/DL (ref 6–8.4)
RBC # BLD AUTO: 2.95 M/UL (ref 4–5.4)
SODIUM SERPL-SCNC: 126 MMOL/L (ref 136–145)
WBC # BLD AUTO: 18.13 K/UL (ref 3.9–12.7)

## 2022-10-13 PROCEDURE — 25000003 PHARM REV CODE 250: Performed by: STUDENT IN AN ORGANIZED HEALTH CARE EDUCATION/TRAINING PROGRAM

## 2022-10-13 PROCEDURE — 97535 SELF CARE MNGMENT TRAINING: CPT

## 2022-10-13 PROCEDURE — 97530 THERAPEUTIC ACTIVITIES: CPT

## 2022-10-13 PROCEDURE — P9047 ALBUMIN (HUMAN), 25%, 50ML: HCPCS | Mod: JG | Performed by: STUDENT IN AN ORGANIZED HEALTH CARE EDUCATION/TRAINING PROGRAM

## 2022-10-13 PROCEDURE — 12000002 HC ACUTE/MED SURGE SEMI-PRIVATE ROOM

## 2022-10-13 PROCEDURE — 63600175 PHARM REV CODE 636 W HCPCS: Performed by: INTERNAL MEDICINE

## 2022-10-13 PROCEDURE — 63600175 PHARM REV CODE 636 W HCPCS: Mod: JG | Performed by: STUDENT IN AN ORGANIZED HEALTH CARE EDUCATION/TRAINING PROGRAM

## 2022-10-13 PROCEDURE — 99233 PR SUBSEQUENT HOSPITAL CARE,LEVL III: ICD-10-PCS | Mod: ,,, | Performed by: FAMILY MEDICINE

## 2022-10-13 PROCEDURE — 80053 COMPREHEN METABOLIC PANEL: CPT | Performed by: FAMILY MEDICINE

## 2022-10-13 PROCEDURE — 85025 COMPLETE CBC W/AUTO DIFF WBC: CPT | Performed by: FAMILY MEDICINE

## 2022-10-13 PROCEDURE — 25000003 PHARM REV CODE 250: Performed by: FAMILY MEDICINE

## 2022-10-13 PROCEDURE — 99233 SBSQ HOSP IP/OBS HIGH 50: CPT | Mod: GC,,, | Performed by: INTERNAL MEDICINE

## 2022-10-13 PROCEDURE — 99233 PR SUBSEQUENT HOSPITAL CARE,LEVL III: ICD-10-PCS | Mod: GC,,, | Performed by: INTERNAL MEDICINE

## 2022-10-13 PROCEDURE — 36415 COLL VENOUS BLD VENIPUNCTURE: CPT | Performed by: FAMILY MEDICINE

## 2022-10-13 PROCEDURE — 25000003 PHARM REV CODE 250: Performed by: INTERNAL MEDICINE

## 2022-10-13 PROCEDURE — 99233 SBSQ HOSP IP/OBS HIGH 50: CPT | Mod: ,,, | Performed by: FAMILY MEDICINE

## 2022-10-13 PROCEDURE — 63600175 PHARM REV CODE 636 W HCPCS: Performed by: FAMILY MEDICINE

## 2022-10-13 RX ORDER — FUROSEMIDE 10 MG/ML
20 INJECTION INTRAMUSCULAR; INTRAVENOUS ONCE
Status: COMPLETED | OUTPATIENT
Start: 2022-10-13 | End: 2022-10-13

## 2022-10-13 RX ORDER — ALBUMIN HUMAN 250 G/1000ML
25 SOLUTION INTRAVENOUS ONCE
Status: COMPLETED | OUTPATIENT
Start: 2022-10-13 | End: 2022-10-13

## 2022-10-13 RX ADMIN — PREDNISONE 40 MG: 20 TABLET ORAL at 08:10

## 2022-10-13 RX ADMIN — FAMOTIDINE 20 MG: 20 TABLET ORAL at 08:10

## 2022-10-13 RX ADMIN — AZATHIOPRINE 50 MG: 50 TABLET ORAL at 08:10

## 2022-10-13 RX ADMIN — NYSTATIN 500000 UNITS: 500000 SUSPENSION ORAL at 09:10

## 2022-10-13 RX ADMIN — INSULIN ASPART 10 UNITS: 100 INJECTION, SOLUTION INTRAVENOUS; SUBCUTANEOUS at 12:10

## 2022-10-13 RX ADMIN — ALBUMIN (HUMAN) 25 G: 12.5 SOLUTION INTRAVENOUS at 03:10

## 2022-10-13 RX ADMIN — INSULIN ASPART 10 UNITS: 100 INJECTION, SOLUTION INTRAVENOUS; SUBCUTANEOUS at 05:10

## 2022-10-13 RX ADMIN — INSULIN ASPART 1 UNITS: 100 INJECTION, SOLUTION INTRAVENOUS; SUBCUTANEOUS at 09:10

## 2022-10-13 RX ADMIN — ENOXAPARIN SODIUM 40 MG: 100 INJECTION SUBCUTANEOUS at 05:10

## 2022-10-13 RX ADMIN — FUROSEMIDE 20 MG: 10 INJECTION, SOLUTION INTRAMUSCULAR; INTRAVENOUS at 03:10

## 2022-10-13 RX ADMIN — FUROSEMIDE 20 MG: 20 TABLET ORAL at 08:10

## 2022-10-13 RX ADMIN — NYSTATIN 500000 UNITS: 500000 SUSPENSION ORAL at 05:10

## 2022-10-13 RX ADMIN — INSULIN ASPART 3 UNITS: 100 INJECTION, SOLUTION INTRAVENOUS; SUBCUTANEOUS at 12:10

## 2022-10-13 RX ADMIN — SPIRONOLACTONE 50 MG: 25 TABLET, FILM COATED ORAL at 08:10

## 2022-10-13 RX ADMIN — INSULIN ASPART 10 UNITS: 100 INJECTION, SOLUTION INTRAVENOUS; SUBCUTANEOUS at 08:10

## 2022-10-13 RX ADMIN — INSULIN ASPART 3 UNITS: 100 INJECTION, SOLUTION INTRAVENOUS; SUBCUTANEOUS at 05:10

## 2022-10-13 RX ADMIN — NYSTATIN 500000 UNITS: 500000 SUSPENSION ORAL at 12:10

## 2022-10-13 RX ADMIN — AMLODIPINE BESYLATE 2.5 MG: 2.5 TABLET ORAL at 08:10

## 2022-10-13 RX ADMIN — NYSTATIN 500000 UNITS: 500000 SUSPENSION ORAL at 08:10

## 2022-10-13 NOTE — SUBJECTIVE & OBJECTIVE
Interval History: No acute events documented overnight. History obtained with assistance of daughter at bedside. They report stable lower extremity swelling and abdominal distension with use of PO diuretic regimen. Patient is tolerating PO and ambulating with assistance. However, patient noted to be consuming ice chips without restriction. Vital signs normal on room air. Labs notable for stable leukocytosis (18), stable macrocytic anemia, worsening hyponatremia (126), and stable transaminitis (AST 55, ALT 73).     Current Facility-Administered Medications   Medication    acetaminophen tablet 1,000 mg    acetaminophen tablet 650 mg    albuterol-ipratropium 2.5 mg-0.5 mg/3 mL nebulizer solution 3 mL    aluminum-magnesium hydroxide-simethicone 200-200-20 mg/5 mL suspension 30 mL    amLODIPine tablet 2.5 mg    azaTHIOprine tablet 50 mg    benzonatate capsule 100 mg    bisacodyL suppository 10 mg    brimonidine 0.2% ophthalmic solution 1 drop    dextrose 10% bolus 125 mL    dextrose 10% bolus 250 mL    diclofenac sodium 1 % gel 2 g    enoxaparin injection 40 mg    famotidine tablet 20 mg    furosemide tablet 20 mg    glucagon (human recombinant) injection 1 mg    glucose chewable tablet 16 g    glucose chewable tablet 24 g    insulin aspart U-100 pen 0-5 Units    insulin aspart U-100 pen 10 Units    lactulose 20 gram/30 mL solution Soln 20 g    latanoprost 0.005 % ophthalmic solution 1 drop    melatonin tablet 6 mg    naloxone 0.4 mg/mL injection 0.02 mg    nystatin 100,000 unit/mL suspension 500,000 Units    ondansetron disintegrating tablet 8 mg    predniSONE tablet 40 mg    prochlorperazine injection Soln 5 mg    simethicone chewable tablet 80 mg    sodium chloride 0.9% flush 5 mL    spironolactone tablet 50 mg    sulfamethoxazole-trimethoprim 800-160mg per tablet 1 tablet    tramadol split tablet 25 mg       Objective:     Vital Signs (Most Recent):  Temp: 97.8 °F (36.6 °C) (10/13/22 1133)  Pulse: 62 (10/13/22  1133)  Resp: 18 (10/13/22 1133)  BP: (!) 140/67 (10/13/22 1133)  SpO2: (!) 94 % (10/13/22 1133) Vital Signs (24h Range):  Temp:  [97.6 °F (36.4 °C)-98.1 °F (36.7 °C)] 97.8 °F (36.6 °C)  Pulse:  [61-67] 62  Resp:  [18-19] 18  SpO2:  [92 %-96 %] 94 %  BP: (117-146)/(57-74) 140/67     Weight: 50.3 kg (110 lb 14.3 oz) (10/13/22 1133)  Body mass index is 17.91 kg/m².    Physical Exam  Constitutional:       Appearance: Normal appearance. She is not ill-appearing.   Eyes:      General: No scleral icterus.     Conjunctiva/sclera: Conjunctivae normal.   Cardiovascular:      Rate and Rhythm: Normal rate and regular rhythm.      Pulses: Normal pulses.      Heart sounds: Normal heart sounds.   Pulmonary:      Effort: Pulmonary effort is normal. No respiratory distress.      Breath sounds: Normal breath sounds.   Abdominal:      General: Bowel sounds are normal. There is distension.      Palpations: Abdomen is soft.      Tenderness: There is no abdominal tenderness.   Musculoskeletal:      Right lower leg: Edema present.      Left lower leg: Edema present.   Skin:     General: Skin is warm and dry.      Findings: No bruising or rash.   Neurological:      Mental Status: She is alert and oriented to person, place, and time.       MELD-Na score: 15 at 10/6/2022  3:19 PM  MELD score: 15 at 10/6/2022  3:19 PM  Calculated from:  Serum Creatinine: 0.8 mg/dL (Using min of 1 mg/dL) at 10/6/2022  3:19 PM  Serum Sodium: 137 mmol/L at 10/6/2022  3:19 PM  Total Bilirubin: 3.2 mg/dL at 10/6/2022  5:53 AM  INR(ratio): 1.5 at 10/4/2022  3:54 AM  Age: 81 years    Significant Labs:  Labs within the past month have been reviewed.    Significant Imaging:  US: Reviewed

## 2022-10-13 NOTE — PLAN OF CARE
Recommendations     1. Continue diabetic diet with fluid restriction per MD- encourage adequate PO intake.  2. Continue Boost Glucose Control TID.  3. RD following.     Goals: Meet % EEN/EPN by RD f/u date  Nutrition Goal Status: progressing towards goal  Communication of RD Recs: other (comment) (POC)    Estella Payan - Dietetic Intern

## 2022-10-13 NOTE — PT/OT/SLP PROGRESS
Occupational Therapy   Treatment    Name: Radha Feng  MRN: [id number]  Admitting Diagnosis:  Autoimmune hepatitis       Recommendations:     Discharge Recommendations: outpatient PT  Discharge Equipment Recommendations:  wheelchair, walker, rolling  Barriers to discharge:  None    Assessment:     Radha Feng is a 81 y.o. female with a medical diagnosis of Autoimmune hepatitis.  She presents with deconditioning, VERA. Performance deficits affecting function are weakness, impaired endurance, impaired self care skills, decreased lower extremity function, decreased upper extremity function, impaired balance, gait instability, impaired functional mobility, edema, impaired cardiopulmonary response to activity, decreased ROM.     Rehab Prognosis:  Good; patient would benefit from acute skilled OT services to address these deficits and reach maximum level of function.       Plan:     Patient to be seen 3 x/week to address the above listed problems via self-care/home management, therapeutic activities, therapeutic exercises  Plan of Care Expires: 11/05/22  Plan of Care Reviewed with: patient, grandchild(keith)    Subjective     Pain/Comfort:  Pain Rating 1: 0/10    Objective:     Communicated with: nsg prior to session.  Patient found  initially supine in bed but after OT returned with supplied, grandson had assisted pt to seated EOB  with peripheral IV upon OT entry to room.    General Precautions: Standard, fall   Orthopedic Precautions:N/A   Braces: N/A  Respiratory Status: Room air     Occupational Performance:     Functional Mobility/Transfers:  Patient completed Sit <> Stand Transfer with contact guard assistance and minimum assistance  with  rolling walker and straight cane  imprived stability with RW, family educated on use of RW until pt with improved balance  Functional Mobility: Pt with fair- to poor+ dynamic seated and standing balance.     Activities of Daily Living:  Upper Body Dressing: stand by assistance  to don gown as robe seated NAMAN  Lower Body Dressing: moderate assistance and maximal assistance to don/doff B socks seated EOB      AMPAC 6 Click ADL: 18    Treatment & Education:  Pt educated on role of OT and POC.   Pt performing skills as listed above.   Pt ambulated in hallway and required min/mod v/c for pacing and standing rest breaks. Pt encouraged to pace self and to pair breathing with activities to reduce holding breath while exercising.   Pt completed 3x5 reps BUE therex with yellow theraband, mod v/c for technique and positioning, increased rest breaks required initially but improved endurance with pairing breath to movement likely 2/2 pt holding breath during repetitions.     Patient left up in chair with all lines intact, call button in reach, nsg notified, and grandson present    GOALS:   Multidisciplinary Problems       Occupational Therapy Goals          Problem: Occupational Therapy    Goal Priority Disciplines Outcome Interventions   Occupational Therapy Goal     OT, PT/OT Ongoing, Progressing    Description: Goals to be met by: 10-16-22     Patient will increase functional independence with ADLs by performing:    UE Dressing with Supervision.  LE Dressing with Supervision.  Grooming while standing at sink with Supervision.  Toileting from bedside commode with Supervision for hygiene and clothing management.   Supine to sit with [place].  Stand pivot transfers with Supervision with RW  Toilet transfer to bedside commode with Supervision.  Pt. To be I with HEP to BUE to improve level of endurance                         Time Tracking:     OT Date of Treatment: 10/13/22  OT Start Time: 1441  OT Stop Time: 1504  OT Total Time (min): 23 min    Billable Minutes:Self Care/Home Management 10  Therapeutic Activity 13    OT/MANFRED: OT     MANFRED Visit Number: 0    10/13/2022

## 2022-10-13 NOTE — PLAN OF CARE
Problem: Occupational Therapy  Goal: Occupational Therapy Goal  Description: Goals to be met by: 10-16-22     Patient will increase functional independence with ADLs by performing:    UE Dressing with Supervision.  LE Dressing with Supervision.  Grooming while standing at sink with Supervision.  Toileting from bedside commode with Supervision for hygiene and clothing management.   Supine to sit with [place].  Stand pivot transfers with Supervision with RW  Toilet transfer to bedside commode with Supervision.  Pt. To be I with HEP to BUE to improve level of endurance    Outcome: Ongoing, Progressing   Pt progressing towards OT goals. Cont OT POC

## 2022-10-13 NOTE — PROGRESS NOTES
[facility]  Adult Nutrition  Progress Note    SUMMARY       Recommendations    1. Continue diabetic diet with fluid restriction per MD- encourage adequate PO intake.  2. Continue Boost Glucose Control TID.  3. RD following.    Goals: Meet % EEN/EPN by RD f/u date  Nutrition Goal Status: progressing towards goal  Communication of RD Recs: other (comment) (POC)    Assessment and Plan    Severe protein-calorie malnutrition  Contributing Nutrition Diagnosis  Severe malnutrition in the context of chronic illness    Related to (etiology):   Decreased PO intake/appetite    Signs and Symptoms (as evidenced by):   Energy Intake: less than or equal to 75% of estimated energy requirement for greater than or equal to 1 month  Wt Loss: 14% x 9 months  Body Fat Depletion: severe depletion of orbital, buccal, and upper are regions  Muscle Mass Depletion: severe depletion of temple, clavicle, acromion bone, and dorsal hand regions    Interventions/Recommendations (treatment strategy):  Collaboration of nutrition care with other providers   Encourage adequate PO intake    Nutrition Diagnosis Status:   Continues    Malnutrition Assessment  Malnutrition Type: chronic illness  Energy Intake: severe energy intake    Weight Loss (Malnutrition):  (14% x 9 months)  Energy Intake (Malnutrition): less than or equal to 75% for greater than or equal to 1 month  Subcutaneous Fat (Malnutrition): severe depletion  Muscle Mass (Malnutrition): severe depletion   Orbital Region (Subcutaneous Fat Loss): severe depletion  Upper Arm Region (Subcutaneous Fat Loss): severe depletion   Christian Region (Muscle Loss): severe depletion  Clavicle and Acromion Bone Region (Muscle Loss): severe depletion  Dorsal Hand (Muscle Loss): severe depletion   Subcutaneous Fat Loss (Final Summary): severe protein-calorie malnutrition  Muscle Loss Evaluation (Final Summary): severe protein-calorie malnutrition    Severe Weight Loss (Malnutrition):  (14% x  "9 months)    Reason for Assessment    Reason For Assessment: RD follow-up  Diagnosis: other (see comments) (autoimmune hepatits)  Relevant Medical History: HTN, T2DM, EFREN, Severe Protein-Calorie Malnutrition  Interdisciplinary Rounds: did not attend    General Information Comments:   10/13- Visited for RD f/u. Spoke with Pt and family member at bedside. Reported a growing appetite, consuming 25-50% of each meal and no issues with n/v or chewing and swallowing. Per chart review, Pt has weight loss of 5# x 4 months. CBW - 110#. Per NFPE 10/13, pt still meeting criteria for severe protein-calorie malnutrition at this time d/t wt loss, PO intake, and muscle/fat loss (see PES statement.) LBM - 10/12.  Nutrition Discharge Planning: pending clinical course    Nutrition Risk Screen    Nutrition Risk Screen: no indicators present    Nutrition/Diet History    Food Preferences: Apple juice  Spiritual, Cultural Beliefs, Jainism Practices, Values that Affect Care: no  Food Allergies: NKFA  Factors Affecting Nutritional Intake: decreased appetite    Anthropometrics    Temp: 97.8 °F (36.6 °C)  Height Method: Stated  Height: 5' 5.98" (167.6 cm)  Height (inches): 65.98 in  Weight Method: Standard Scale  Weight: 50.3 kg (110 lb 14.3 oz)  Weight (lb): 110.89 lb  Ideal Body Weight (IBW), Female: 129.9 lb  % Ideal Body Weight, Female (lb): 85.37 %  BMI (Calculated): 17.9  BMI Grade: 17 - 18.4 protein-energy malnutrition grade I     Lab/Procedures/Meds    Pertinent Labs Reviewed: reviewed  Pertinent Labs Comments: glucose 192, Na 126, BUN 32  Pertinent Medications Reviewed: reviewed  Pertinent Medications Comments: enoxaparin, famotidine, insulin, prednisone, ondansetron,    Estimated/Assessed Needs    Weight Used For Calorie Calculations: 50.3 kg (111 lb)  Energy Calorie Requirements (kcal): 1762  Energy Need Method: Kcal/kg (35 kcal/kg)  Protein Requirements: 60-75 g (1.2-1.5 g/kg)  Weight Used For Protein Calculations: 50.3 kg (111 " lb)  Fluid Requirements (mL): per MD or 1ml/kcal  Estimated Fluid Requirement Method: RDA Method  RDA Method (mL): 1762  CHO Requirement: 221 g    Nutrition Prescription Ordered    Current Diet Order: Diabetic Diet/Fluid 1000mL  Oral Nutrition Supplement: Boost Plus TID    Evaluation of Received Nutrient/Fluid Intake    I/O: +1.2 L  Energy Calories Required: not meeting needs  Protein Required: not meeting needs  Fluid Required: not meeting needs  Comments: LBM 9/29  Tolerance: tolerating  % Intake of Estimated Energy Needs: 50 - 75 %  % Meal Intake: 50 - 75 %    Nutrition Risk    Level of Risk/Frequency of Follow-up:  (1xweek)     Monitor and Evaluation    Food and Nutrient Intake: energy intake, food and beverage intake  Food and Nutrient Adminstration: diet order  Knowledge/Beliefs/Attitudes: beliefs and attitudes, food and nutrition knowledge/skill  Physical Activity and Function: nutrition-related ADLs and IADLs  Anthropometric Measurements: height/length, weight, weight change, body mass index  Biochemical Data, Medical Tests and Procedures: electrolyte and renal panel, glucose/endocrine profile, inflammatory profile, gastrointestinal profile, lipid profile  Nutrition-Focused Physical Findings: overall appearance     Nutrition Follow-Up    RD Follow-up?: Yes    Estella Payan - Dietetic Intern

## 2022-10-13 NOTE — PROGRESS NOTES
[facility] - [facility] Medicine  Progress Note    Patient Name: Radha Feng  MRN: [id number]  Patient Class: IP- Inpatient   Admission Date: 9/20/2022  Length of Stay: 21 days  Attending Physician: Malaika Madrigal MD  Primary Care Provider: Primary Doctor No      Subjective:     Principal Problem:Autoimmune hepatitis      HPI:  Radha Feng is a 81 y.o. female with a past medical history of type 2 diabetes, hyperlipidemia, hypertension, glaucoma and cataracts presents the emergency department due to progressive lethargy and weakness. Patient's daughter at the bedside who provides the majority of the history due to the acuity of patient's condition. Per daughter, patient was sent from her cardiology office earlier today due to increased bilateral leg swelling, shortness of breath, and progressive weakness over the past two weeks. She reports that over the past two weeks patient has had a poor appetite with significantly decreased po intake. In addition, she reports that patient's BP has been lower at home compared to baseline and states she has held BP medications (amlodipine and lisinopril-HCTZ) for the past two days. She states her BP is normally 130/80 but as been as low as 98/63. Per daughter, lower extremity swelling was most pronounced last week but has improved significantly with compression stockings.  Additionally, daughter notes that she was helping her change last week and noted that she had a prolapsed uterus/bladder but patient has not seen her OBGYN yet. Patient is requiring more assistance with ADLs which is abnormal for her. Patient denies dysuria but states that she does have difficulty urinating and can only urinate a small amount. Denies chest pain, shortness of breath, abdominal pain, dizziness, nausea, vomiting, or syncope. Patient's only complaint at this time is right knee pain.    ED: vital signs stable, afebrile, no acute distress. Elevated liver enzymes with T.bili of 2.9,  AST//254, and alk phos of 224. Na 133, K 5.0, Cr 1.4 (most recent Cr of 0.8 in 12/21). D-dimer 11.97, CTA negative for PE. CTA showed evidence of distended gallbladder and emphysema. Subsequent RUQ US negative for cholecystitis or gallstones. BNP 66, troponin negative. UA negative for infection. Given 500 mL LR and placed in observation.       Overview/Hospital Course:  Patient presented with elevated LFTs, worsening confusion and lower extremity edema.  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 On imuran and prednisone.    - ascites survey w large volume ascitic fluid.   Para requested; no evidence of SBP  She was also treated for hepatic encephalopathy with lactulose. Seemingly resolved. Lactulose held. Follow cognitive status as now off lactulose. (Liver function improved. Encephalopathy possibly 2/2 UTI primarily).    Hospitalization continued due to worsening hyponatremia felt to be the result of hypervolemia.  Lasix initiated on 10/10.     Dx with UTI. On ceftriaxone.   GYN consulted for uterine prolapse, but due to mental status change, could not get pessary placed. Requires outpatient follow up.       Interval History: NAEON. No acute concerns. Feeling well.       Review of Systems   Constitutional:  Negative for chills and fever.   Respiratory:  Negative for shortness of breath.    Cardiovascular:  Negative for chest pain.   Gastrointestinal:  Negative for abdominal pain.   Genitourinary:  Negative for dysuria.   Neurological:  Negative for headaches.   Psychiatric/Behavioral:  Negative for confusion.      Objective:     Vital Signs (Most Recent):  Temp: 97.8 °F (36.6 °C) (10/13/22 1133)  Pulse: 62 (10/13/22 1133)  Resp: 18 (10/13/22 1133)  BP:  (!) 140/67 (10/13/22 1133)  SpO2: (!) 94 % (10/13/22 1133)   Vital Signs (24h Range):  Temp:  [97.6 °F (36.4 °C)-98.1 °F (36.7 °C)] 97.8 °F (36.6 °C)  Pulse:  [61-67] 62  Resp:  [18-19] 18  SpO2:  [92 %-96 %] 94 %  BP: (117-146)/(57-74) 140/67     Weight: 50.3 kg (110 lb 14.3 oz)  Body mass index is 17.91 kg/m².    Intake/Output Summary (Last 24 hours) at 10/13/2022 1432  Last data filed at 10/12/2022 1846  Gross per 24 hour   Intake --   Output 300 ml   Net -300 ml        Physical Exam  Vitals and nursing note reviewed.   Constitutional:       General: She is not in acute distress.     Appearance: She is well-developed.   HENT:      Head: Normocephalic and atraumatic.   Eyes:      Conjunctiva/sclera: Conjunctivae normal.   Neck:      Vascular: No JVD.   Cardiovascular:      Rate and Rhythm: Normal rate and regular rhythm.      Heart sounds: Normal heart sounds.   Pulmonary:      Effort: Pulmonary effort is normal.      Breath sounds: Normal breath sounds.   Abdominal:      General: Bowel sounds are normal. There is no distension.      Palpations: Abdomen is soft.      Tenderness: There is no abdominal tenderness.   Musculoskeletal:      Cervical back: Neck supple.      Right lower leg: Edema present.      Left lower leg: Edema present.      Comments: 3+   Neurological:      Mental Status: She is alert.   Psychiatric:         Behavior: Behavior normal.       Significant Labs: All pertinent labs within the past 24 hours have been reviewed.  CBC:   Recent Labs   Lab 10/12/22  1022 10/13/22  0357   WBC 14.67* 18.13*   HGB 10.7* 10.6*   HCT 30.2* 30.1*   * 99*       CMP:   Recent Labs   Lab 10/12/22  1022 10/13/22  0357   * 126*   K 5.0 4.7    105   CO2 19* 18*   * 192*   BUN 34* 32*   CREATININE 0.8 0.7   CALCIUM 8.3* 8.2*   PROT 6.1 5.8*   ALBUMIN 1.8* 1.7*   BILITOT 2.8* 2.7*   ALKPHOS 141* 142*   AST 65* 55*   ALT 74* 73*   ANIONGAP 4* 3*       Magnesium:   No results for input(s): MG in  the last 48 hours.    POCT Glucose:   Recent Labs   Lab 10/12/22  2032 10/13/22  0754 10/13/22  1132   POCTGLUCOSE 269* 193* 254*         Significant Imaging: I have reviewed all pertinent imaging results/findings within the past 24 hours.    Assessment/Plan:      Hyponatremia  Likely 2/2 to diuretic use. Trend. 1L fluid restriction placed as patient may be consuming too much ice. IV lasix and albumin given by hep.      Leukocytosis  - likely secondary to steroid use. Trend. Not worried about C. Diff as she is not having diarrhea.      * Autoimmune hepatitis  Continue IMURAN 50 mg daily with PREDISONE 40 MG daily   Hep recs upon dc: steroid taper consisting of Prednisone 40 mg for 1 week, 30 mg for 1 week, 20 mg for 1 week, 15 mg for 2 weeks, and 10 mg for 2 weeks.   -Given age and anticipated need for prolonged steroid taper, recommend initiation of PJP prophylaxis with TMP-SMX on Monday, Wednesday, Friday and initiation of Candida prophylaxis with Nystatin swish/swallow.   -Follow-up with hepatology scheduled on 11/04         Uterine prolapse   plans for outpt fu with uro- gyn.      Acute liver failure without hepatic coma w ascites   LFTs significantly improved and almost back to normal levels after initiation of steroids.   lasix 20 mg and aldactone 50 mg daily.        Hemolytic anemia  - with associated iron overload  - direct genny test NEG  - elevated ddimer and low fibrinogen (monitor platelets, coags, evidence of bleeding or clotting)  - etiology unknown  - hematology review of PBS--> target cells, some bethany cells,  very few schistocytes (0-1 per hp), no blast  - 9/28--> secondary to liver failure    - hematology signed off-- no further recs unless counts become unstable.         Oral phase dysphagia   mild oral dysphagia, but with a safe pharyngeal swallow. No further acute ST warranted at this time.      Severe protein-calorie malnutrition  Maximize oral intake.        Type 2 diabetes mellitus, without  long-term current use of insulin  Patient's FSGs are stable as above. On bolus/ SSI.     Last A1c reviewed-             Lab Results   Component Value Date     HGBA1C 5.3 09/21/2022         Hypertension  - Stable. Resume home Norvasc at 2.5 mg due to elevated BP.      Leg swelling  - no previous hx of CHF  - echo w grade II DD-- no acute exacerbation  - dopplers neg for DVT     Daughter says patient has been on Amlodipine for years with no recent dosage changes. So this is unlikely the culprit.         Right knee pain  - Chronic  - reported trauma about 30 years ago  - follows with orthopedics outpatient  - recent MRI in august with chronic findings  - continue tylenol prn for pain        Metabolic encephalopathy/ Hepatic encephalopathy   - resolved       Dehydration  - resolved      EFREN (acute kidney injury)  - Resolved      UTI   -Ucx w E. Coli. Pan sensitive. Treated with Ceftriaxone.       VTE Risk Mitigation (From admission, onward)           Ordered     enoxaparin injection 40 mg  Daily         10/05/22 1920     Place sequential compression device  Until discontinued         09/21/22 0932     IP VTE LOW RISK PATIENT  Once         09/21/22 0156                    Discharge Planning   MELVI: 10/13/2022     Code Status: Full Code   Is the patient medically ready for discharge?: No    Reason for patient still in hospital (select all that apply): Patient trending condition and Treatment  Discharge Plan A: Home with family   Discharge Delays: None known at this time      Malaika Madrigal MD  Department of Hospital Medicine   [facility]

## 2022-10-13 NOTE — PROGRESS NOTES
[facility]  Hepatology  Progress Note    Patient Name: Radha Feng  MRN: [id number]  Admission Date: 9/20/2022  Hospital Length of Stay: 21 days  Attending Provider: Malaika Madrigal MD   Primary Care Physician: Primary Doctor No  Principal Problem:Autoimmune hepatitis    Subjective:     Transplant status: No    Interval History: No acute events documented overnight. History obtained with assistance of daughter at bedside. They report stable lower extremity swelling and abdominal distension with use of PO diuretic regimen. Patient is tolerating PO and ambulating with assistance. However, patient noted to be consuming ice chips without restriction. Vital signs normal on room air. Labs notable for stable leukocytosis (18), stable macrocytic anemia, worsening hyponatremia (126), and stable transaminitis (AST 55, ALT 73).     Current Facility-Administered Medications   Medication    acetaminophen tablet 1,000 mg    acetaminophen tablet 650 mg    albuterol-ipratropium 2.5 mg-0.5 mg/3 mL nebulizer solution 3 mL    aluminum-magnesium hydroxide-simethicone 200-200-20 mg/5 mL suspension 30 mL    amLODIPine tablet 2.5 mg    azaTHIOprine tablet 50 mg    benzonatate capsule 100 mg    bisacodyL suppository 10 mg    brimonidine 0.2% ophthalmic solution 1 drop    dextrose 10% bolus 125 mL    dextrose 10% bolus 250 mL    diclofenac sodium 1 % gel 2 g    enoxaparin injection 40 mg    famotidine tablet 20 mg    furosemide tablet 20 mg    glucagon (human recombinant) injection 1 mg    glucose chewable tablet 16 g    glucose chewable tablet 24 g    insulin aspart U-100 pen 0-5 Units    insulin aspart U-100 pen 10 Units    lactulose 20 gram/30 mL solution Soln 20 g    latanoprost 0.005 % ophthalmic solution 1 drop    melatonin tablet 6 mg    naloxone 0.4 mg/mL injection 0.02 mg    nystatin 100,000 unit/mL suspension 500,000 Units    ondansetron disintegrating tablet 8 mg    predniSONE tablet 40  mg    prochlorperazine injection Soln 5 mg    simethicone chewable tablet 80 mg    sodium chloride 0.9% flush 5 mL    spironolactone tablet 50 mg    sulfamethoxazole-trimethoprim 800-160mg per tablet 1 tablet    tramadol split tablet 25 mg       Objective:     Vital Signs (Most Recent):  Temp: 97.8 °F (36.6 °C) (10/13/22 1133)  Pulse: 62 (10/13/22 1133)  Resp: 18 (10/13/22 1133)  BP: (!) 140/67 (10/13/22 1133)  SpO2: (!) 94 % (10/13/22 1133) Vital Signs (24h Range):  Temp:  [97.6 °F (36.4 °C)-98.1 °F (36.7 °C)] 97.8 °F (36.6 °C)  Pulse:  [61-67] 62  Resp:  [18-19] 18  SpO2:  [92 %-96 %] 94 %  BP: (117-146)/(57-74) 140/67     Weight: 50.3 kg (110 lb 14.3 oz) (10/13/22 1133)  Body mass index is 17.91 kg/m².    Physical Exam  Constitutional:       Appearance: Normal appearance. She is not ill-appearing.   Eyes:      General: No scleral icterus.     Conjunctiva/sclera: Conjunctivae normal.   Cardiovascular:      Rate and Rhythm: Normal rate and regular rhythm.      Pulses: Normal pulses.      Heart sounds: Normal heart sounds.   Pulmonary:      Effort: Pulmonary effort is normal. No respiratory distress.      Breath sounds: Normal breath sounds.   Abdominal:      General: Bowel sounds are normal. There is distension.      Palpations: Abdomen is soft.      Tenderness: There is no abdominal tenderness.   Musculoskeletal:      Right lower leg: Edema present.      Left lower leg: Edema present.   Skin:     General: Skin is warm and dry.      Findings: No bruising or rash.   Neurological:      Mental Status: She is alert and oriented to person, place, and time.       MELD-Na score: 15 at 10/6/2022  3:19 PM  MELD score: 15 at 10/6/2022  3:19 PM  Calculated from:  Serum Creatinine: 0.8 mg/dL (Using min of 1 mg/dL) at 10/6/2022  3:19 PM  Serum Sodium: 137 mmol/L at 10/6/2022  3:19 PM  Total Bilirubin: 3.2 mg/dL at 10/6/2022  5:53 AM  INR(ratio): 1.5 at 10/4/2022  3:54 AM  Age: 81 years    Significant Labs:  Labs within  the past month have been reviewed.    Significant Imaging:  US: Reviewed      Assessment/Plan:     * Autoimmune hepatitis  This is an 81 year old male with PMH significant for HTN and T2DM (A1c 5.3) who was admitted to [facility] on 09/20/2022 for evaluation of two week duration of progressive dyspnea on exertion, fatigue, and lower extremity swelling. Labs on admission significant for  evidence of new transaminitis in a mixed cholestatic and hepatocellular pattern and EFREN. Work-up for underlying etiology of transaminitis suggestive of autoimmune hepatitis (positive high titer SONNY and dsDNA and liver biopsy showing chronic severe active hepatitis with plasma cell rich infiltrate) with likely underlying cirrhosis (based on presence of ascites, thrombocytopenia, and liver biopsy also showing underlying fibrosis). It is unclear if underlying liver disease is from RUBIO (with risk factors) or auto-immune hepatitis. She is status post initiation of steroids and IMURAN with improvement in transaminitis, but with progressive hyponatremia and signs of volume overload on exam.     Recommendations:     -Continue Lasix 20 mg and Spironolactone 50 mg daily. Give Albumin 25 g and additional Lasix 20 mg IV today.   -Continue IMURAN 50 mg daily with PREDISONE 40 MG daily. Upon discharge, will plan for steroid taper consisting of Prednisone 40 mg for 1 week, 30 mg for 1 week, 20 mg for 1 week, 15 mg for 2 weeks, and 10 mg for 2 weeks.   -Given age and anticipated need for prolonged steroid taper, recommend initiation of PJP prophylaxis with TMP-SMX on Monday, Wednesday, Friday and initiation of Candida prophylaxis with Nystatin swish/swallow.   -CMP and INR daily.   -1L fluid restricted diet; please prevent unrestricted consumption of ice chips as to not cause worsening hyponatremia.   -Follow-up with hepatology scheduled on 11/04.           Thank you for your consult. I will follow-up with patient. Please contact us if you have any  additional questions.    Victor Manuel Espinoza MD  Hepatology  [facility]

## 2022-10-13 NOTE — SUBJECTIVE & OBJECTIVE
Interval History: NAEON. No acute concerns. Feeling well.       Review of Systems   Constitutional:  Negative for chills and fever.   Respiratory:  Negative for shortness of breath.    Cardiovascular:  Negative for chest pain.   Gastrointestinal:  Negative for abdominal pain.   Genitourinary:  Negative for dysuria.   Neurological:  Negative for headaches.   Psychiatric/Behavioral:  Negative for confusion.      Objective:     Vital Signs (Most Recent):  Temp: 97.8 °F (36.6 °C) (10/13/22 1133)  Pulse: 62 (10/13/22 1133)  Resp: 18 (10/13/22 1133)  BP: (!) 140/67 (10/13/22 1133)  SpO2: (!) 94 % (10/13/22 1133)   Vital Signs (24h Range):  Temp:  [97.6 °F (36.4 °C)-98.1 °F (36.7 °C)] 97.8 °F (36.6 °C)  Pulse:  [61-67] 62  Resp:  [18-19] 18  SpO2:  [92 %-96 %] 94 %  BP: (117-146)/(57-74) 140/67     Weight: 50.3 kg (110 lb 14.3 oz)  Body mass index is 17.91 kg/m².    Intake/Output Summary (Last 24 hours) at 10/13/2022 1432  Last data filed at 10/12/2022 1846  Gross per 24 hour   Intake --   Output 300 ml   Net -300 ml        Physical Exam  Vitals and nursing note reviewed.   Constitutional:       General: She is not in acute distress.     Appearance: She is well-developed.   HENT:      Head: Normocephalic and atraumatic.   Eyes:      Conjunctiva/sclera: Conjunctivae normal.   Neck:      Vascular: No JVD.   Cardiovascular:      Rate and Rhythm: Normal rate and regular rhythm.      Heart sounds: Normal heart sounds.   Pulmonary:      Effort: Pulmonary effort is normal.      Breath sounds: Normal breath sounds.   Abdominal:      General: Bowel sounds are normal. There is no distension.      Palpations: Abdomen is soft.      Tenderness: There is no abdominal tenderness.   Musculoskeletal:      Cervical back: Neck supple.      Right lower leg: Edema present.      Left lower leg: Edema present.      Comments: 3+   Neurological:      Mental Status: She is alert.   Psychiatric:         Behavior: Behavior normal.       Significant  Labs: All pertinent labs within the past 24 hours have been reviewed.  CBC:   Recent Labs   Lab 10/12/22  1022 10/13/22  0357   WBC 14.67* 18.13*   HGB 10.7* 10.6*   HCT 30.2* 30.1*   * 99*       CMP:   Recent Labs   Lab 10/12/22  1022 10/13/22  0357   * 126*   K 5.0 4.7    105   CO2 19* 18*   * 192*   BUN 34* 32*   CREATININE 0.8 0.7   CALCIUM 8.3* 8.2*   PROT 6.1 5.8*   ALBUMIN 1.8* 1.7*   BILITOT 2.8* 2.7*   ALKPHOS 141* 142*   AST 65* 55*   ALT 74* 73*   ANIONGAP 4* 3*       Magnesium:   No results for input(s): MG in the last 48 hours.    POCT Glucose:   Recent Labs   Lab 10/12/22  2032 10/13/22  0754 10/13/22  1132   POCTGLUCOSE 269* 193* 254*         Significant Imaging: I have reviewed all pertinent imaging results/findings within the past 24 hours.

## 2022-10-13 NOTE — ASSESSMENT & PLAN NOTE
This is an 81 year old male with PMH significant for HTN and T2DM (A1c 5.3) who was admitted to [facility] on 09/20/2022 for evaluation of two week duration of progressive dyspnea on exertion, fatigue, and lower extremity swelling. Labs on admission significant for  evidence of new transaminitis in a mixed cholestatic and hepatocellular pattern and EFREN. Work-up for underlying etiology of transaminitis suggestive of autoimmune hepatitis (positive high titer SONNY and dsDNA and liver biopsy showing chronic severe active hepatitis with plasma cell rich infiltrate) with likely underlying cirrhosis (based on presence of ascites, thrombocytopenia, and liver biopsy also showing underlying fibrosis). It is unclear if underlying liver disease is from RUBIO (with risk factors) or auto-immune hepatitis. She is status post initiation of steroids and IMURAN with improvement in transaminitis, but with progressive hyponatremia and signs of volume overload on exam.     Recommendations:     -Continue Lasix 20 mg and Spironolactone 50 mg daily. Give Albumin 25 g and additional Lasix 20 mg IV today.   -Continue IMURAN 50 mg daily with PREDISONE 40 MG daily. Upon discharge, will plan for steroid taper consisting of Prednisone 40 mg for 1 week, 30 mg for 1 week, 20 mg for 1 week, 15 mg for 2 weeks, and 10 mg for 2 weeks.   -Given age and anticipated need for prolonged steroid taper, recommend initiation of PJP prophylaxis with TMP-SMX on Monday, Wednesday, Friday and initiation of Candida prophylaxis with Nystatin swish/swallow.   -CMP and INR daily.   -1L fluid restricted diet; please prevent unrestricted consumption of ice chips as to not cause worsening hyponatremia.   -Follow-up with hepatology scheduled on 11/04.

## 2022-10-14 VITALS
WEIGHT: 110.88 LBS | SYSTOLIC BLOOD PRESSURE: 155 MMHG | RESPIRATION RATE: 18 BRPM | TEMPERATURE: 98 F | HEART RATE: 58 BPM | BODY MASS INDEX: 17.82 KG/M2 | HEIGHT: 66 IN | DIASTOLIC BLOOD PRESSURE: 86 MMHG | OXYGEN SATURATION: 93 %

## 2022-10-14 LAB
ALBUMIN SERPL BCP-MCNC: 2.2 G/DL (ref 3.5–5.2)
ALP SERPL-CCNC: 112 U/L (ref 55–135)
ALT SERPL W/O P-5'-P-CCNC: 57 U/L (ref 10–44)
ANION GAP SERPL CALC-SCNC: 3 MMOL/L (ref 8–16)
AST SERPL-CCNC: 41 U/L (ref 10–40)
BASOPHILS # BLD AUTO: 0.01 K/UL (ref 0–0.2)
BASOPHILS NFR BLD: 0.1 % (ref 0–1.9)
BILIRUB SERPL-MCNC: 2.6 MG/DL (ref 0.1–1)
BUN SERPL-MCNC: 29 MG/DL (ref 8–23)
CALCIUM SERPL-MCNC: 8 MG/DL (ref 8.7–10.5)
CHLORIDE SERPL-SCNC: 107 MMOL/L (ref 95–110)
CO2 SERPL-SCNC: 20 MMOL/L (ref 23–29)
CREAT SERPL-MCNC: 0.8 MG/DL (ref 0.5–1.4)
DIFFERENTIAL METHOD: ABNORMAL
EOSINOPHIL # BLD AUTO: 0 K/UL (ref 0–0.5)
EOSINOPHIL NFR BLD: 0 % (ref 0–8)
ERYTHROCYTE [DISTWIDTH] IN BLOOD BY AUTOMATED COUNT: 16.6 % (ref 11.5–14.5)
EST. GFR  (NO RACE VARIABLE): >60 ML/MIN/1.73 M^2
GLUCOSE SERPL-MCNC: 239 MG/DL (ref 70–110)
HCT VFR BLD AUTO: 25.9 % (ref 37–48.5)
HGB BLD-MCNC: 9.2 G/DL (ref 12–16)
IMM GRANULOCYTES # BLD AUTO: 0.09 K/UL (ref 0–0.04)
IMM GRANULOCYTES NFR BLD AUTO: 0.6 % (ref 0–0.5)
LYMPHOCYTES # BLD AUTO: 0.6 K/UL (ref 1–4.8)
LYMPHOCYTES NFR BLD: 3.8 % (ref 18–48)
MCH RBC QN AUTO: 36.2 PG (ref 27–31)
MCHC RBC AUTO-ENTMCNC: 35.5 G/DL (ref 32–36)
MCV RBC AUTO: 102 FL (ref 82–98)
MONOCYTES # BLD AUTO: 1.1 K/UL (ref 0.3–1)
MONOCYTES NFR BLD: 7.8 % (ref 4–15)
NEUTROPHILS # BLD AUTO: 12.7 K/UL (ref 1.8–7.7)
NEUTROPHILS NFR BLD: 87.7 % (ref 38–73)
NRBC BLD-RTO: 0 /100 WBC
PLATELET # BLD AUTO: 87 K/UL (ref 150–450)
PMV BLD AUTO: 11.9 FL (ref 9.2–12.9)
POCT GLUCOSE: 227 MG/DL (ref 70–110)
POCT GLUCOSE: 235 MG/DL (ref 70–110)
POTASSIUM SERPL-SCNC: 4.7 MMOL/L (ref 3.5–5.1)
PROT SERPL-MCNC: 5.2 G/DL (ref 6–8.4)
RBC # BLD AUTO: 2.54 M/UL (ref 4–5.4)
SODIUM SERPL-SCNC: 130 MMOL/L (ref 136–145)
WBC # BLD AUTO: 14.49 K/UL (ref 3.9–12.7)

## 2022-10-14 PROCEDURE — 25000003 PHARM REV CODE 250: Performed by: INTERNAL MEDICINE

## 2022-10-14 PROCEDURE — 25000003 PHARM REV CODE 250: Performed by: STUDENT IN AN ORGANIZED HEALTH CARE EDUCATION/TRAINING PROGRAM

## 2022-10-14 PROCEDURE — 85025 COMPLETE CBC W/AUTO DIFF WBC: CPT | Performed by: FAMILY MEDICINE

## 2022-10-14 PROCEDURE — 36415 COLL VENOUS BLD VENIPUNCTURE: CPT | Performed by: FAMILY MEDICINE

## 2022-10-14 PROCEDURE — 63600175 PHARM REV CODE 636 W HCPCS: Performed by: INTERNAL MEDICINE

## 2022-10-14 PROCEDURE — 63600175 PHARM REV CODE 636 W HCPCS: Performed by: FAMILY MEDICINE

## 2022-10-14 PROCEDURE — 80053 COMPREHEN METABOLIC PANEL: CPT | Performed by: FAMILY MEDICINE

## 2022-10-14 PROCEDURE — 25000003 PHARM REV CODE 250: Performed by: FAMILY MEDICINE

## 2022-10-14 RX ORDER — SULFAMETHOXAZOLE AND TRIMETHOPRIM 800; 160 MG/1; MG/1
1 TABLET ORAL
Qty: 12 TABLET | Refills: 0 | Status: SHIPPED | OUTPATIENT
Start: 2022-10-14 | End: 2022-11-14

## 2022-10-14 RX ORDER — PREDNISONE 10 MG/1
TABLET ORAL
Qty: 90 TABLET | Refills: 0 | Status: ON HOLD | OUTPATIENT
Start: 2022-10-15 | End: 2022-11-11 | Stop reason: HOSPADM

## 2022-10-14 RX ORDER — FUROSEMIDE 20 MG/1
20 TABLET ORAL DAILY
Qty: 30 TABLET | Refills: 0 | Status: ON HOLD | OUTPATIENT
Start: 2022-10-15 | End: 2022-10-25 | Stop reason: HOSPADM

## 2022-10-14 RX ORDER — FAMOTIDINE 20 MG/1
20 TABLET, FILM COATED ORAL DAILY
Qty: 30 TABLET | Refills: 0 | Status: ON HOLD | OUTPATIENT
Start: 2022-10-15 | End: 2022-11-11 | Stop reason: HOSPADM

## 2022-10-14 RX ORDER — AZATHIOPRINE 50 MG/1
50 TABLET ORAL DAILY
Qty: 30 TABLET | Refills: 0 | Status: ON HOLD | OUTPATIENT
Start: 2022-10-15 | End: 2022-11-11 | Stop reason: HOSPADM

## 2022-10-14 RX ORDER — SPIRONOLACTONE 50 MG/1
50 TABLET, FILM COATED ORAL DAILY
Qty: 30 TABLET | Refills: 0 | Status: ON HOLD | OUTPATIENT
Start: 2022-10-15 | End: 2022-11-11 | Stop reason: HOSPADM

## 2022-10-14 RX ORDER — AMLODIPINE BESYLATE 2.5 MG/1
2.5 TABLET ORAL DAILY
Qty: 30 TABLET | Refills: 0 | Status: ON HOLD | OUTPATIENT
Start: 2022-10-15 | End: 2022-11-11 | Stop reason: HOSPADM

## 2022-10-14 RX ORDER — ACETAMINOPHEN 500 MG
1000 TABLET ORAL EVERY 8 HOURS PRN
Refills: 0 | COMMUNITY
Start: 2022-10-14

## 2022-10-14 RX ADMIN — PREDNISONE 40 MG: 20 TABLET ORAL at 08:10

## 2022-10-14 RX ADMIN — INSULIN ASPART 2 UNITS: 100 INJECTION, SOLUTION INTRAVENOUS; SUBCUTANEOUS at 12:10

## 2022-10-14 RX ADMIN — FAMOTIDINE 20 MG: 20 TABLET ORAL at 08:10

## 2022-10-14 RX ADMIN — AZATHIOPRINE 50 MG: 50 TABLET ORAL at 08:10

## 2022-10-14 RX ADMIN — SPIRONOLACTONE 50 MG: 25 TABLET, FILM COATED ORAL at 08:10

## 2022-10-14 RX ADMIN — INSULIN ASPART 10 UNITS: 100 INJECTION, SOLUTION INTRAVENOUS; SUBCUTANEOUS at 08:10

## 2022-10-14 RX ADMIN — AMLODIPINE BESYLATE 2.5 MG: 2.5 TABLET ORAL at 08:10

## 2022-10-14 RX ADMIN — INSULIN ASPART 10 UNITS: 100 INJECTION, SOLUTION INTRAVENOUS; SUBCUTANEOUS at 12:10

## 2022-10-14 RX ADMIN — NYSTATIN 500000 UNITS: 500000 SUSPENSION ORAL at 08:10

## 2022-10-14 RX ADMIN — FUROSEMIDE 20 MG: 20 TABLET ORAL at 08:10

## 2022-10-14 RX ADMIN — INSULIN ASPART 2 UNITS: 100 INJECTION, SOLUTION INTRAVENOUS; SUBCUTANEOUS at 08:10

## 2022-10-14 NOTE — PLAN OF CARE
Problem: Adult Inpatient Plan of Care  Goal: Optimal Comfort and Wellbeing  Outcome: Ongoing, Progressing     Problem: Diabetes Comorbidity  Goal: Blood Glucose Level Within Targeted Range  Outcome: Ongoing, Progressing     Problem: Adult Inpatient Plan of Care  Goal: Optimal Comfort and Wellbeing  Outcome: Ongoing, Progressing

## 2022-10-14 NOTE — NURSING
Pt AAOX4. No distress noted. Bed in lowest position. Side rails up x2. Call bell and personal belongs within reach. Purewick maintained. Safety precautions maintained. Pt free of falls or injuries. Daughter at bedside. Will continue to monitor.

## 2022-10-14 NOTE — PLAN OF CARE
[facility]  Discharge Final Note    Primary Care Provider: Primary Doctor No    Expected Discharge Date: 10/14/2022    Final Discharge Note (most recent)       Final Note - 10/14/22 1356          Final Note    Assessment Type Final Discharge Note     Anticipated Discharge Disposition Home or Self Care     What phone number can be called within the next 1-3 days to see how you are doing after discharge? [phone]     Hospital Resources/Appts/Education Provided Provided patient/caregiver with written discharge plan information;Appointments scheduled by Navigator/Coordinator        Post-Acute Status    Discharge Delays None known at this time                     Important Message from Medicare  Important Message from Medicare regarding Discharge Appeal Rights: Given to patient/caregiver, Explained to patient/caregiver, Signed/date by patient/caregiver, Other (comments) (IMM signed by daughter.)     Date IMM was signed: 10/13/22  Time IMM was signed: 0903    Contact Info       No, Primary Doctor   Relationship: PCP - General        Next Steps: Follow up in 1 week(s)          Pt discharging home with family. Pt's appointments are scheduled and in her AVS. Pt has no other post acute needs.    Pt is cleared for discharge from a case management standpoint.    HOOD Summers, AMISH  [facility]  [id number]

## 2022-10-14 NOTE — NURSING
No s/s of distress.VS stable  . Patient  resp even and unlabored. Safety precautions maintained. Bed in low position call light within reach patient instructed to call if needed

## 2022-10-14 NOTE — NURSING
Pt was inquiring about the Flu vaccination. Pt is on immunosuppressions. Contacted MD Madrigal due to contraindication. Awaiting reply/new order.

## 2022-10-14 NOTE — NURSING
Discharge instructions and education given to pt and family. Verbalized understanding. Removal of IV. No redness, swelling or drainage noted. Pt escort requested/family at bedside.

## 2022-10-14 NOTE — TREATMENT PLAN
Hepatology Treatment Plan    Radha Feng is a 81 y.o. female admitted to hospital 9/20/2022 (Hospital Day: 25) due to Autoimmune hepatitis.     Interval History  No events documented overnight. Patient seen with daughter at bedside. They report improving lower extremity edema this morning with continued mild abdominal distension. Labs notable for improving hyponatremia (130 from 126) following trial of albumin yesterday; LFT's remain stable (AST 41, ALT 57, total bilirubin 2.6).     Objective  Temp:  [97.6 °F (36.4 °C)-98.5 °F (36.9 °C)] 98.3 °F (36.8 °C) (10/14 1114)  Pulse:  [53-66] 58 (10/14 1114)  BP: (138-155)/(64-86) 155/86 (10/14 1114)  Resp:  [18-20] 18 (10/14 1114)  SpO2:  [93 %-95 %] 93 % (10/14 1114)    General: Alert, Oriented x3, no distress  Neurologic: Asterixis absent  Abdomen: Normoactive bowel sounds. Non-distended. Normal tympany. Soft. Non-tender. No peritoneal signs.    Laboratory    Lab Results   Component Value Date    WBC 14.49 (H) 10/14/2022    HGB 9.2 (L) 10/14/2022    HCT 25.9 (L) 10/14/2022     (H) 10/14/2022    PLT 87 (L) 10/14/2022       Lab Results   Component Value Date     (L) 10/14/2022    K 4.7 10/14/2022     10/14/2022    CO2 20 (L) 10/14/2022    BUN 29 (H) 10/14/2022    CREATININE 0.8 10/14/2022    CALCIUM 8.0 (L) 10/14/2022       Lab Results   Component Value Date    ALBUMIN 2.2 (L) 10/14/2022    ALT 57 (H) 10/14/2022    AST 41 (H) 10/14/2022    ALKPHOS 112 10/14/2022    BILITOT 2.6 (H) 10/14/2022       Lab Results   Component Value Date    INR 1.5 (H) 10/04/2022    INR 1.5 (H) 10/03/2022    INR 1.5 (H) 10/02/2022       MELD-Na score: 15 at 10/6/2022  3:19 PM  MELD score: 15 at 10/6/2022  3:19 PM  Calculated from:  Serum Creatinine: 0.8 mg/dL (Using min of 1 mg/dL) at 10/6/2022  3:19 PM  Serum Sodium: 137 mmol/L at 10/6/2022  3:19 PM  Total Bilirubin: 3.2 mg/dL at 10/6/2022  5:53 AM  INR(ratio): 1.5 at 10/4/2022  3:54 AM  Age: 81 years    Assessment  This  is an 81 year old male with PMH significant for HTN and T2DM (A1c 5.3) who was admitted to [facility] on 09/20/2022 for evaluation of two week duration of progressive dyspnea on exertion, fatigue, and lower extremity swelling. Labs on admission significant for  evidence of new transaminitis in a mixed cholestatic and hepatocellular pattern and EFREN. Work-up for underlying etiology of transaminitis suggestive of autoimmune hepatitis (positive high titer SONNY and dsDNA and liver biopsy showing chronic severe active hepatitis with plasma cell rich infiltrate) with likely underlying cirrhosis (based on presence of ascites, thrombocytopenia, and liver biopsy also showing underlying fibrosis). It is unclear if underlying liver disease is from RUBIO (with risk factors) or auto-immune hepatitis. She is status post initiation of steroids and IMURAN with improvement in transaminitis. Hospital course associated with progressive hyponatremia that improved with trial of Albumin. She continued minimal signs of volume overload on exam that will be on-going in setting of suspected cirrhosis.     Recommendations:      -Continue Lasix 20 mg and Spironolactone 50 mg daily.   -Continue IMURAN 50 mg daily with PREDISONE 40 MG daily. Upon discharge, will plan for steroid taper consisting of Prednisone 40 mg for 1 week, 30 mg for 1 week, 20 mg for 1 week, 15 mg for 2 weeks, and 10 mg for 2 weeks.   -Given age and anticipated need for prolonged steroid taper, recommend initiation of PJP prophylaxis with TMP-SMX on Monday, Wednesday, Friday and initiation of Candida prophylaxis with Nystatin swish/swallow.   -CMP and INR daily.   -1L fluid restricted diet.  -Follow-up with hepatology scheduled on 11/04.   -Stable for discharge from hepatology standpoint.   - Plan of care was discussed with primary team    Thank you for involving us in the care of Radha Feng. Please call with any additional concerns or questions.        Victor Manuel Espinoza MD,  PGY-V  Gastroenterology Fellow  [facility]

## 2022-10-14 NOTE — PLAN OF CARE
Problem: Adult Inpatient Plan of Care  Goal: Plan of Care Review  Outcome: Met  Goal: Patient-Specific Goal (Individualized)  Outcome: Met  Goal: Absence of Hospital-Acquired Illness or Injury  Outcome: Met  Goal: Optimal Comfort and Wellbeing  Outcome: Met  Goal: Readiness for Transition of Care  Outcome: Met     Problem: Infection  Goal: Absence of Infection Signs and Symptoms  Outcome: Met     Problem: Diabetes Comorbidity  Goal: Blood Glucose Level Within Targeted Range  Outcome: Met     Problem: Fluid and Electrolyte Imbalance (Acute Kidney Injury/Impairment)  Goal: Fluid and Electrolyte Balance  Outcome: Met     Problem: Oral Intake Inadequate (Acute Kidney Injury/Impairment)  Goal: Optimal Nutrition Intake  Outcome: Met     Problem: Renal Function Impairment (Acute Kidney Injury/Impairment)  Goal: Effective Renal Function  Outcome: Met     Problem: Skin Injury Risk Increased  Goal: Skin Health and Integrity  Outcome: Met     Problem: Fall Injury Risk  Goal: Absence of Fall and Fall-Related Injury  Outcome: Met

## 2022-10-15 DIAGNOSIS — K76.89 AUTOIMMUNE LIVER DISEASE: Primary | ICD-10-CM

## 2022-10-15 NOTE — DISCHARGE SUMMARY
[facility]  Discharge Summary      Patient Name: Radha Feng  MRN: [id number]  Patient Class: IP- Inpatient  Admission Date: 9/20/2022  Hospital Length of Stay: 22 days  Discharge Date and Time: 10/14/2022  2:51 PM  Discharging Provider: Malaika Madrigal MD  Primary Care Provider: Primary Doctor [facility] Medicine Team: [facility] B Malaika Madrigal MD      HPI:   Radha Feng is a 81 y.o. female with a past medical history of type 2 diabetes, hyperlipidemia, hypertension, glaucoma and cataracts presents the emergency department due to progressive lethargy and weakness. Patient's daughter at the bedside who provides the majority of the history due to the acuity of patient's condition. Per daughter, patient was sent from her cardiology office earlier today due to increased bilateral leg swelling, shortness of breath, and progressive weakness over the past two weeks. She reports that over the past two weeks patient has had a poor appetite with significantly decreased po intake. In addition, she reports that patient's BP has been lower at home compared to baseline and states she has held BP medications (amlodipine and lisinopril-HCTZ) for the past two days. She states her BP is normally 130/80 but as been as low as 98/63. Per daughter, lower extremity swelling was most pronounced last week but has improved significantly with compression stockings.  Additionally, daughter notes that she was helping her change last week and noted that she had a prolapsed uterus/bladder but patient has not seen her OBGYN yet. Patient is requiring more assistance with ADLs which is abnormal for her. Patient denies dysuria but states that she does have difficulty urinating and can only urinate a small amount. Denies chest pain, shortness of breath, abdominal pain, dizziness, nausea, vomiting, or syncope. Patient's only complaint at this time is right knee pain.    ED: vital signs stable, afebrile,  no acute distress. Elevated liver enzymes with T.bili of 2.9, AST//254, and alk phos of 224. Na 133, K 5.0, Cr 1.4 (most recent Cr of 0.8 in 12/21). D-dimer 11.97, CTA negative for PE. CTA showed evidence of distended gallbladder and emphysema. Subsequent RUQ US negative for cholecystitis or gallstones. BNP 66, troponin negative. UA negative for infection. Given 500 mL LR and placed in observation.       Hospital Course:   Patient presented with elevated LFTs, worsening confusion and lower extremity edema.  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 On imuran and prednisone.    - ascites survey w large volume ascitic fluid.   Para requested; no evidence of SBP  She was also treated for hepatic encephalopathy with lactulose. Seemingly resolved. Lactulose held.   Hospitalization continued due to delirium 2/2 UTI, worsening hyponatremia felt to be the result of hypervolemia.   Dx with UTI. Completed course of ceftriaxone. Mental status returned to baseline.  Uro- gYN consulted for uterine prolapse w LUTS, but due to mental status change, she could not get pessary placed. Requires outpatient follow up.       Consults:   Consults (From admission, onward)          Status Ordering Provider     Inpatient consult to Gynecology  Once        Provider:  (Not yet assigned)    SINAN Cosby     Inpatient consult to Interventional Radiology  Once        Provider:  (Not yet assigned)    Completed JOE KRUGER     IP consult to case management  Once        Provider:  (Not yet assigned)    Completed JOE KRUGER     Inpatient consult to Hematology/Oncology  Once        Provider:  (Not yet assigned)    Completed JOE KRUGER     Inpatient consult  to Hepatology  Once        Provider:  (Not yet assigned)    Completed JOE KRUGER.     Inpatient consult to Hepatology  Once        Provider:  (Not yet assigned)    Completed JOE KRUGER.     Inpatient consult to Registered Dietitian/Nutritionist  Once        Provider:  (Not yet assigned)    Completed LEVI HOLMAN          Hyponatremia  Improved. Fu w PCP.     Leukocytosis  - likely secondary to steroid use.      * Autoimmune hepatitis  Continue IMURAN 50 mg daily with PREDISONE taper  Hep recs upon dc: steroid taper consisting of Prednisone 40 mg for 1 week, 30 mg for 1 week, 20 mg for 1 week, 15 mg for 2 weeks, and 10 mg for 2 weeks.   -Given age and anticipated need for prolonged steroid taper, recommend initiation of PJP prophylaxis with TMP-SMX on Monday, Wednesday, Friday  -Follow-up with hepatology scheduled on 11/04          Uterine prolapse   plans for outpt fu with uro- gyn.      Acute liver failure without hepatic coma w ascites   LFTs significantly improved and almost back to normal levels after initiation of steroids.   lasix 20 mg and aldactone 50 mg daily.         Hemolytic anemia  - resolved         Oral phase dysphagia   mild oral dysphagia, but with a safe pharyngeal swallow. No further acute ST warranted at this time.      Severe protein-calorie malnutrition  Maximize oral intake.        Type 2 diabetes mellitus, without long-term current use of insulin  Stable. Monitor while on steroids.        Hypertension  - Stable.     Leg swelling  Chronic diastolic CHF  - echo w grade II DD-- no acute exacerbation  - dopplers neg for DVT        Right knee pain  - Chronic  - reported trauma about 30 years ago  - follows with orthopedics outpatient  - recent MRI in august with chronic findings  - continue tylenol prn for pain        Metabolic encephalopathy/ Hepatic encephalopathy   - resolved       Dehydration  - resolved      EFREN (acute kidney injury)  - Resolved      UTI   -Ucx w E. Coli. Pan  sensitive. Treated with Ceftriaxone.       Final Active Diagnoses:    Diagnosis Date Noted POA    PRINCIPAL PROBLEM:  Autoimmune hepatitis [K75.4] 09/28/2022 Yes    Other specified anemias [D64.89] 09/25/2022 Yes    Oral phase dysphagia [R13.11] 09/24/2022 Yes    Hemolytic anemia [D58.9] 09/23/2022 Yes    Hepatic encephalopathy [K76.82] 09/23/2022 Yes    Severe protein-calorie malnutrition [E43] 09/22/2022 Yes    Acute liver failure without hepatic coma [K72.00] 09/21/2022 Yes    Hypertension [I10] 09/21/2022 Yes    Type 2 diabetes mellitus, without long-term current use of insulin [E11.9] 09/21/2022 Yes    Dehydration [E86.0] 09/21/2022 Yes    EFREN (acute kidney injury) [N17.9] 09/21/2022 Yes    Right knee pain [M25.561] 09/20/2022 Yes    Lethargy [R53.83] 09/20/2022 Yes    Leg swelling [M79.89] 09/20/2022 Yes      Problems Resolved During this Admission:       Discharged Condition: stable    Disposition: Home or Self Care    Follow Up:   Follow-up Information       Primary Doctor No Follow up in 1 week(s).                           Patient Instructions:      Diet Cardiac     Activity as tolerated       Significant Diagnostic Studies: Labs:   CMP   Recent Labs   Lab 10/14/22  0453   *   K 4.7      CO2 20*   *   BUN 29*   CREATININE 0.8   CALCIUM 8.0*   PROT 5.2*   ALBUMIN 2.2*   BILITOT 2.6*   ALKPHOS 112   AST 41*   ALT 57*   ANIONGAP 3*    and CBC   Recent Labs   Lab 10/14/22  0453   WBC 14.49*   HGB 9.2*   HCT 25.9*   PLT 87*       Pending Diagnostic Studies:       None           Medications:  Reconciled Home Medications:      Medication List        START taking these medications      azaTHIOprine 50 mg Tab  Commonly known as: IMURAN  Take 1 tablet (50 mg total) by mouth once daily.     famotidine 20 MG tablet  Commonly known as: PEPCID  Take 1 tablet (20 mg total) by mouth once daily.     furosemide 20 MG tablet  Commonly known as: LASIX  Take 1 tablet (20 mg total) by mouth once daily.      predniSONE 10 MG tablet  Commonly known as: DELTASONE  Take 4 tablets by mouth once daily for 5 days, THEN 3 tablets once daily for 7 days, THEN 2 tablets once daily for 7 days, THEN 1.5 tablets once daily for 14 days, THEN 1 tablet once daily for 14 days.  Start taking on: October 15, 2022     spironolactone 50 MG tablet  Commonly known as: ALDACTONE  Take 1 tablet (50 mg total) by mouth once daily.     sulfamethoxazole-trimethoprim 800-160mg 800-160 mg Tab  Commonly known as: BACTRIM DS  Take 1 tablet by mouth every Mon, Wed, Fri.            CHANGE how you take these medications      acetaminophen 500 MG tablet  Commonly known as: TYLENOL  Take 2 tablets (1,000 mg total) by mouth every 8 (eight) hours as needed for Pain.  What changed:   how much to take  how to take this  when to take this  reasons to take this  additional instructions     amLODIPine 2.5 MG tablet  Commonly known as: NORVASC  Take 1 tablet (2.5 mg total) by mouth once daily.  What changed:   medication strength  how much to take            CONTINUE taking these medications      aspirin 81 MG EC tablet  Commonly known as: ECOTRIN  Take 81 mg by mouth once daily.     brimonidine 0.2% 0.2 % Drop  Commonly known as: ALPHAGAN  INSTILL 1 DROP INTO RIGHT EYE TWICE A DAY     dorzolamide-timolol 2-0.5% 22.3-6.8 mg/mL ophthalmic solution  Commonly known as: COSOPT  INSTILL 1 DROP INTO RIGHT EYE TWICE A DAY     glyBURIDE 2.5 MG tablet  Commonly known as: DIABETA  Take 2.5 mg by mouth once daily.     ibuprofen 200 MG tablet  Commonly known as: ADVIL,MOTRIN  Take 3 tablets (600 mg) by mouth daily to every other day as needed for pain.     latanoprost 0.005 % ophthalmic solution  PLACE 1 DROP INTO BOTH EYES ONCE DAILY.     metFORMIN 500 MG tablet  Commonly known as: GLUCOPHAGE  TAKE 1 TABLET BY MOUTH TWICE A DAY     ONE DAILY GUMMY VITES ORAL  Chew and swallow 1 gummy by mouth once daily.            STOP taking these medications      atorvastatin 10 MG  tablet  Commonly known as: LIPITOR     lisinopriL-hydrochlorothiazide 20-12.5 mg per tablet  Commonly known as: PRINZIDE,ZESTORETIC              Indwelling Lines/Drains at time of discharge:   Lines/Drains/Airways       None                   Time spent on the discharge of patient: 36 minutes   Patient examined at bedside on day of discharge. Exam findings stable. She was deemed safe for discharge.       Malaika Madrigal MD  Department of Hospital Medicine  [facility]

## 2022-10-17 ENCOUNTER — PATIENT OUTREACH (OUTPATIENT)
Dept: ADMINISTRATIVE | Facility: CLINIC | Age: 82
End: 2022-10-17
Payer: MEDICARE

## 2022-10-17 ENCOUNTER — TELEPHONE (OUTPATIENT)
Dept: HEPATOLOGY | Facility: CLINIC | Age: 82
End: 2022-10-17
Payer: MEDICARE

## 2022-10-17 NOTE — PROGRESS NOTES
C3 nurse spoke with Radha Feng's daughter, Della, for a TCC post hospital discharge follow up call. The patient reports does not have a scheduled HOSFU appointment. C3 nurse was unable to schedule HOSFU appointment for [facility] PCP. Patient advised to contact their PCP to schedule a HOSPFU within 5-7 days.     Daughter, Della, declined NP @ Home visit.    Spoke with patient's daughter, Della. States that patient has been experiencing blood sugars in 300's due to steroid, would like to know if she can increase Metformin and Glyburide to 3xs daily.Also states patient was supposed to receive prescription for Nystatin swish and swallow, but was not discharged home with it. Della also states that she was told by PT/OT patient was to receive a walker and wheelchair at discharge. Message sent to [facility] dr[facility] and Stu. Spoke with KATE Summers, advises patient to call PCP for orders; Patient daughter advised, states that she is switching to [facility] from current PCP. # provided to help with scheduling.

## 2022-10-17 NOTE — PATIENT INSTRUCTIONS
[facility] teaching reviewed with Radhaleticia Feng's daughter Della . She verbalized understanding.    Education was provided based on the patient's discharge diagnosis using the attached [facility] patient education as a reference.

## 2022-10-17 NOTE — TELEPHONE ENCOUNTER
----- Message from Marilee Merrill RN sent at 10/15/2022  4:21 PM CDT -----  Regarding: hospital  Ms. Feng was discharged and already has a follow up but needs weekly labs. I placed orders.  Can you please schedule and let the patient know.  Thanks Josefina

## 2022-10-17 NOTE — TELEPHONE ENCOUNTER
----- Message from Bela Pacheco RN sent at 10/17/2022  1:48 PM CDT -----  Spoke with patient's daughter, Della. States that patient has been experiencing blood sugars in 300's due to steroid, would like to know if she can increase Metformin and Glyburide to 3xs daily.Also states patient was supposed to receive prescription for Nystatin swish and swallow, but was not discharged home with it. Della also states that she was told by PT/OT patient was to receive a walker and wheelchair at discharge. Please contact patient's daughter, Della, to discuss as soon as possible.      Respectfully,  Bela Pacheco RN  [facility]    [email or web address]       Please do not reply to this message, as this inbox is not routinely monitored.

## 2022-10-23 ENCOUNTER — HOSPITAL ENCOUNTER (INPATIENT)
Facility: HOSPITAL | Age: 82
LOS: 5 days | Discharge: HOME | DRG: 638 | End: 2022-10-28
Attending: EMERGENCY MEDICINE | Admitting: HOSPITALIST
Payer: MEDICARE

## 2022-10-23 DIAGNOSIS — R73.9 HYPERGLYCEMIA: ICD-10-CM

## 2022-10-23 DIAGNOSIS — M17.11 OSTEOARTHRITIS OF RIGHT KNEE, UNSPECIFIED OSTEOARTHRITIS TYPE: ICD-10-CM

## 2022-10-23 DIAGNOSIS — R10.9 ABDOMINAL PAIN: ICD-10-CM

## 2022-10-23 DIAGNOSIS — R53.81 PHYSICAL DEBILITY: ICD-10-CM

## 2022-10-23 DIAGNOSIS — D69.6 THROMBOCYTOPENIA: ICD-10-CM

## 2022-10-23 DIAGNOSIS — E11.00 HYPEROSMOLAR HYPERGLYCEMIC STATE (HHS): ICD-10-CM

## 2022-10-23 DIAGNOSIS — M94.261 CHONDROMALACIA, KNEE, RIGHT: ICD-10-CM

## 2022-10-23 DIAGNOSIS — E86.0 DEHYDRATION: Primary | ICD-10-CM

## 2022-10-23 DIAGNOSIS — G89.29 CHRONIC PAIN OF RIGHT KNEE: ICD-10-CM

## 2022-10-23 DIAGNOSIS — M25.561 CHRONIC PAIN OF RIGHT KNEE: ICD-10-CM

## 2022-10-23 DIAGNOSIS — E11.01 HHNC (HYPERGLYCEMIC HYPEROSMOLAR NONKETOTIC COMA): ICD-10-CM

## 2022-10-23 PROBLEM — H40.89 OTHER SPECIFIED GLAUCOMA: Status: ACTIVE | Noted: 2022-10-23

## 2022-10-23 LAB
ALBUMIN SERPL BCP-MCNC: 2.4 G/DL (ref 3.5–5.2)
ALLENS TEST: ABNORMAL
ALP SERPL-CCNC: 119 U/L (ref 55–135)
ALT SERPL W/O P-5'-P-CCNC: 56 U/L (ref 10–44)
ANION GAP SERPL CALC-SCNC: 11 MMOL/L (ref 8–16)
ANION GAP SERPL CALC-SCNC: 14 MMOL/L (ref 8–16)
AST SERPL-CCNC: 29 U/L (ref 10–40)
B-OH-BUTYR BLD STRIP-SCNC: 0.5 MMOL/L (ref 0–0.5)
BACTERIA #/AREA URNS AUTO: ABNORMAL /HPF
BASOPHILS # BLD AUTO: 0.01 K/UL (ref 0–0.2)
BASOPHILS NFR BLD: 0.1 % (ref 0–1.9)
BILIRUB SERPL-MCNC: 3.8 MG/DL (ref 0.1–1)
BILIRUB UR QL STRIP: NEGATIVE
BUN SERPL-MCNC: 50 MG/DL (ref 6–30)
BUN SERPL-MCNC: 55 MG/DL (ref 8–23)
BUN SERPL-MCNC: 59 MG/DL (ref 8–23)
CALCIUM SERPL-MCNC: 8.2 MG/DL (ref 8.7–10.5)
CALCIUM SERPL-MCNC: 9.1 MG/DL (ref 8.7–10.5)
CHLORIDE SERPL-SCNC: 109 MMOL/L (ref 95–110)
CHLORIDE SERPL-SCNC: 112 MMOL/L (ref 95–110)
CHLORIDE SERPL-SCNC: 114 MMOL/L (ref 95–110)
CLARITY UR REFRACT.AUTO: ABNORMAL
CO2 SERPL-SCNC: 10 MMOL/L (ref 23–29)
CO2 SERPL-SCNC: 12 MMOL/L (ref 23–29)
COLOR UR AUTO: YELLOW
CREAT SERPL-MCNC: 1.5 MG/DL (ref 0.5–1.4)
CREAT SERPL-MCNC: 1.5 MG/DL (ref 0.5–1.4)
CREAT SERPL-MCNC: 1.7 MG/DL (ref 0.5–1.4)
CTP QC/QA: YES
DIFFERENTIAL METHOD: ABNORMAL
EOSINOPHIL # BLD AUTO: 0 K/UL (ref 0–0.5)
EOSINOPHIL NFR BLD: 0 % (ref 0–8)
ERYTHROCYTE [DISTWIDTH] IN BLOOD BY AUTOMATED COUNT: 15.7 % (ref 11.5–14.5)
EST. GFR  (NO RACE VARIABLE): 30.1 ML/MIN/1.73 M^2
EST. GFR  (NO RACE VARIABLE): 35 ML/MIN/1.73 M^2
GLUCOSE SERPL-MCNC: 465 MG/DL (ref 70–110)
GLUCOSE SERPL-MCNC: 492 MG/DL (ref 70–110)
GLUCOSE SERPL-MCNC: 603 MG/DL (ref 70–110)
GLUCOSE UR QL STRIP: ABNORMAL
HCO3 UR-SCNC: 13.7 MMOL/L (ref 24–28)
HCT VFR BLD AUTO: 32.8 % (ref 37–48.5)
HCT VFR BLD CALC: 30 %PCV (ref 36–54)
HGB BLD-MCNC: 11 G/DL (ref 12–16)
HGB UR QL STRIP: ABNORMAL
HYALINE CASTS UR QL AUTO: 0 /LPF
IMM GRANULOCYTES # BLD AUTO: 0.07 K/UL (ref 0–0.04)
IMM GRANULOCYTES NFR BLD AUTO: 0.7 % (ref 0–0.5)
KETONES UR QL STRIP: NEGATIVE
LACTATE SERPL-SCNC: 6.1 MMOL/L (ref 0.5–2.2)
LEUKOCYTE ESTERASE UR QL STRIP: ABNORMAL
LIPASE SERPL-CCNC: 36 U/L (ref 4–60)
LYMPHOCYTES # BLD AUTO: 0.3 K/UL (ref 1–4.8)
LYMPHOCYTES NFR BLD: 2.9 % (ref 18–48)
MCH RBC QN AUTO: 37.4 PG (ref 27–31)
MCHC RBC AUTO-ENTMCNC: 33.5 G/DL (ref 32–36)
MCV RBC AUTO: 112 FL (ref 82–98)
MICROSCOPIC COMMENT: ABNORMAL
MONOCYTES # BLD AUTO: 0.3 K/UL (ref 0.3–1)
MONOCYTES NFR BLD: 3.4 % (ref 4–15)
NEUTROPHILS # BLD AUTO: 9 K/UL (ref 1.8–7.7)
NEUTROPHILS NFR BLD: 92.9 % (ref 38–73)
NITRITE UR QL STRIP: NEGATIVE
NRBC BLD-RTO: 0 /100 WBC
OSMOLALITY SERPL: 339 MOSM/KG (ref 275–295)
PCO2 BLDA: 23.9 MMHG (ref 35–45)
PH SMN: 7.37 [PH] (ref 7.35–7.45)
PH UR STRIP: 6 [PH] (ref 5–8)
PHOSPHATE SERPL-MCNC: 2.6 MG/DL (ref 2.7–4.5)
PLATELET # BLD AUTO: 43 K/UL (ref 150–450)
PMV BLD AUTO: 13.1 FL (ref 9.2–12.9)
PO2 BLDA: 79 MMHG (ref 80–100)
POC BE: -12 MMOL/L
POC IONIZED CALCIUM: 1.18 MMOL/L (ref 1.06–1.42)
POC SATURATED O2: 96 % (ref 95–100)
POC TCO2 (MEASURED): 15 MMOL/L (ref 23–29)
POC TCO2: 14 MMOL/L (ref 23–27)
POCT GLUCOSE: 402 MG/DL (ref 70–110)
POCT GLUCOSE: 432 MG/DL (ref 70–110)
POCT GLUCOSE: 476 MG/DL (ref 70–110)
POCT GLUCOSE: >500 MG/DL (ref 70–110)
POTASSIUM BLD-SCNC: 4.8 MMOL/L (ref 3.5–5.1)
POTASSIUM SERPL-SCNC: 4.8 MMOL/L (ref 3.5–5.1)
POTASSIUM SERPL-SCNC: 5.6 MMOL/L (ref 3.5–5.1)
PROT SERPL-MCNC: 6.1 G/DL (ref 6–8.4)
PROT UR QL STRIP: ABNORMAL
RBC # BLD AUTO: 2.94 M/UL (ref 4–5.4)
RBC #/AREA URNS AUTO: >100 /HPF (ref 0–4)
SAMPLE: ABNORMAL
SAMPLE: ABNORMAL
SARS-COV-2 RDRP RESP QL NAA+PROBE: NEGATIVE
SITE: ABNORMAL
SODIUM BLD-SCNC: 139 MMOL/L (ref 136–145)
SODIUM SERPL-SCNC: 133 MMOL/L (ref 136–145)
SODIUM SERPL-SCNC: 137 MMOL/L (ref 136–145)
SP GR UR STRIP: 1.02 (ref 1–1.03)
SQUAMOUS #/AREA URNS AUTO: 1 /HPF
URN SPEC COLLECT METH UR: ABNORMAL
WBC # BLD AUTO: 9.68 K/UL (ref 3.9–12.7)
WBC #/AREA URNS AUTO: >100 /HPF (ref 0–5)
WBC CLUMPS UR QL AUTO: ABNORMAL
YEAST UR QL AUTO: ABNORMAL

## 2022-10-23 PROCEDURE — 84100 ASSAY OF PHOSPHORUS: CPT | Performed by: EMERGENCY MEDICINE

## 2022-10-23 PROCEDURE — 80048 BASIC METABOLIC PNL TOTAL CA: CPT | Mod: XB | Performed by: EMERGENCY MEDICINE

## 2022-10-23 PROCEDURE — 36600 WITHDRAWAL OF ARTERIAL BLOOD: CPT

## 2022-10-23 PROCEDURE — 87086 URINE CULTURE/COLONY COUNT: CPT | Performed by: EMERGENCY MEDICINE

## 2022-10-23 PROCEDURE — 99900035 HC TECH TIME PER 15 MIN (STAT)

## 2022-10-23 PROCEDURE — 20600001 HC STEP DOWN PRIVATE ROOM

## 2022-10-23 PROCEDURE — 83605 ASSAY OF LACTIC ACID: CPT | Performed by: EMERGENCY MEDICINE

## 2022-10-23 PROCEDURE — 99285 EMERGENCY DEPT VISIT HI MDM: CPT | Mod: 25

## 2022-10-23 PROCEDURE — 63600175 PHARM REV CODE 636 W HCPCS: Performed by: INTERNAL MEDICINE

## 2022-10-23 PROCEDURE — 99223 PR INITIAL HOSPITAL CARE,LEVL III: ICD-10-PCS | Mod: ,,, | Performed by: INTERNAL MEDICINE

## 2022-10-23 PROCEDURE — 82010 KETONE BODYS QUAN: CPT | Performed by: EMERGENCY MEDICINE

## 2022-10-23 PROCEDURE — 99223 1ST HOSP IP/OBS HIGH 75: CPT | Mod: ,,, | Performed by: INTERNAL MEDICINE

## 2022-10-23 PROCEDURE — 83930 ASSAY OF BLOOD OSMOLALITY: CPT | Performed by: EMERGENCY MEDICINE

## 2022-10-23 PROCEDURE — 81001 URINALYSIS AUTO W/SCOPE: CPT | Performed by: EMERGENCY MEDICINE

## 2022-10-23 PROCEDURE — 96374 THER/PROPH/DIAG INJ IV PUSH: CPT

## 2022-10-23 PROCEDURE — 99291 PR CRITICAL CARE, E/M 30-74 MINUTES: ICD-10-PCS | Mod: CS,,, | Performed by: EMERGENCY MEDICINE

## 2022-10-23 PROCEDURE — 80053 COMPREHEN METABOLIC PANEL: CPT | Performed by: EMERGENCY MEDICINE

## 2022-10-23 PROCEDURE — 82962 GLUCOSE BLOOD TEST: CPT

## 2022-10-23 PROCEDURE — 25000003 PHARM REV CODE 250: Performed by: EMERGENCY MEDICINE

## 2022-10-23 PROCEDURE — 83690 ASSAY OF LIPASE: CPT | Performed by: EMERGENCY MEDICINE

## 2022-10-23 PROCEDURE — 85025 COMPLETE CBC W/AUTO DIFF WBC: CPT | Performed by: EMERGENCY MEDICINE

## 2022-10-23 PROCEDURE — 63600175 PHARM REV CODE 636 W HCPCS: Performed by: EMERGENCY MEDICINE

## 2022-10-23 PROCEDURE — 96361 HYDRATE IV INFUSION ADD-ON: CPT

## 2022-10-23 PROCEDURE — 93010 ELECTROCARDIOGRAM REPORT: CPT | Mod: ,,, | Performed by: INTERNAL MEDICINE

## 2022-10-23 PROCEDURE — 99291 CRITICAL CARE FIRST HOUR: CPT | Mod: CS,,, | Performed by: EMERGENCY MEDICINE

## 2022-10-23 PROCEDURE — 87635 SARS-COV-2 COVID-19 AMP PRB: CPT | Performed by: INTERNAL MEDICINE

## 2022-10-23 PROCEDURE — 80047 BASIC METABLC PNL IONIZED CA: CPT

## 2022-10-23 PROCEDURE — 93010 EKG 12-LEAD: ICD-10-PCS | Mod: ,,, | Performed by: INTERNAL MEDICINE

## 2022-10-23 PROCEDURE — 93005 ELECTROCARDIOGRAM TRACING: CPT

## 2022-10-23 PROCEDURE — 25000003 PHARM REV CODE 250: Performed by: INTERNAL MEDICINE

## 2022-10-23 PROCEDURE — 82803 BLOOD GASES ANY COMBINATION: CPT

## 2022-10-23 RX ORDER — SODIUM CHLORIDE 9 MG/ML
INJECTION, SOLUTION INTRAVENOUS CONTINUOUS
Status: DISCONTINUED | OUTPATIENT
Start: 2022-10-23 | End: 2022-10-23

## 2022-10-23 RX ORDER — LATANOPROST 50 UG/ML
1 SOLUTION/ DROPS OPHTHALMIC DAILY
Status: DISCONTINUED | OUTPATIENT
Start: 2022-10-23 | End: 2022-10-28 | Stop reason: HOSPADM

## 2022-10-23 RX ORDER — AMLODIPINE BESYLATE 2.5 MG/1
2.5 TABLET ORAL DAILY
Status: DISCONTINUED | OUTPATIENT
Start: 2022-10-24 | End: 2022-10-28 | Stop reason: HOSPADM

## 2022-10-23 RX ORDER — AZATHIOPRINE 50 MG/1
50 TABLET ORAL DAILY
Status: DISCONTINUED | OUTPATIENT
Start: 2022-10-24 | End: 2022-10-25

## 2022-10-23 RX ORDER — DORZOLAMIDE HYDROCHLORIDE AND TIMOLOL MALEATE 20; 5 MG/ML; MG/ML
1 SOLUTION/ DROPS OPHTHALMIC 2 TIMES DAILY
Status: DISCONTINUED | OUTPATIENT
Start: 2022-10-23 | End: 2022-10-23

## 2022-10-23 RX ORDER — ENOXAPARIN SODIUM 100 MG/ML
40 INJECTION SUBCUTANEOUS EVERY 24 HOURS
Status: DISCONTINUED | OUTPATIENT
Start: 2022-10-23 | End: 2022-10-23

## 2022-10-23 RX ORDER — SODIUM CHLORIDE 9 MG/ML
INJECTION, SOLUTION INTRAVENOUS CONTINUOUS
Status: DISCONTINUED | OUTPATIENT
Start: 2022-10-23 | End: 2022-10-24

## 2022-10-23 RX ORDER — TALC
6 POWDER (GRAM) TOPICAL NIGHTLY PRN
Status: DISCONTINUED | OUTPATIENT
Start: 2022-10-23 | End: 2022-10-28 | Stop reason: HOSPADM

## 2022-10-23 RX ORDER — ONDANSETRON 2 MG/ML
4 INJECTION INTRAMUSCULAR; INTRAVENOUS
Status: COMPLETED | OUTPATIENT
Start: 2022-10-23 | End: 2022-10-23

## 2022-10-23 RX ORDER — BRIMONIDINE TARTRATE 2 MG/ML
1 SOLUTION/ DROPS OPHTHALMIC 2 TIMES DAILY
Status: DISCONTINUED | OUTPATIENT
Start: 2022-10-23 | End: 2022-10-28 | Stop reason: HOSPADM

## 2022-10-23 RX ORDER — FAMOTIDINE 20 MG/1
20 TABLET, FILM COATED ORAL DAILY
Status: DISCONTINUED | OUTPATIENT
Start: 2022-10-23 | End: 2022-10-28 | Stop reason: HOSPADM

## 2022-10-23 RX ORDER — SODIUM CHLORIDE 0.9 % (FLUSH) 0.9 %
10 SYRINGE (ML) INJECTION
Status: DISCONTINUED | OUTPATIENT
Start: 2022-10-23 | End: 2022-10-28 | Stop reason: HOSPADM

## 2022-10-23 RX ORDER — ENOXAPARIN SODIUM 100 MG/ML
30 INJECTION SUBCUTANEOUS EVERY 24 HOURS
Status: DISCONTINUED | OUTPATIENT
Start: 2022-10-23 | End: 2022-10-26

## 2022-10-23 RX ORDER — TIMOLOL MALEATE 5 MG/ML
1 SOLUTION/ DROPS OPHTHALMIC 2 TIMES DAILY
Status: DISCONTINUED | OUTPATIENT
Start: 2022-10-23 | End: 2022-10-28 | Stop reason: HOSPADM

## 2022-10-23 RX ORDER — DORZOLAMIDE HCL 20 MG/ML
1 SOLUTION/ DROPS OPHTHALMIC 2 TIMES DAILY
Status: DISCONTINUED | OUTPATIENT
Start: 2022-10-23 | End: 2022-10-28 | Stop reason: HOSPADM

## 2022-10-23 RX ADMIN — INSULIN HUMAN 2 UNITS/HR: 1 INJECTION, SOLUTION INTRAVENOUS at 02:10

## 2022-10-23 RX ADMIN — PREDNISONE 30 MG: 20 TABLET ORAL at 05:10

## 2022-10-23 RX ADMIN — FAMOTIDINE 20 MG: 20 TABLET ORAL at 05:10

## 2022-10-23 RX ADMIN — SODIUM CHLORIDE 1000 ML: 0.9 INJECTION, SOLUTION INTRAVENOUS at 12:10

## 2022-10-23 RX ADMIN — ENOXAPARIN SODIUM 30 MG: 30 INJECTION SUBCUTANEOUS at 08:10

## 2022-10-23 RX ADMIN — SODIUM CHLORIDE: 0.9 INJECTION, SOLUTION INTRAVENOUS at 05:10

## 2022-10-23 RX ADMIN — ONDANSETRON 4 MG: 2 INJECTION INTRAMUSCULAR; INTRAVENOUS at 12:10

## 2022-10-23 NOTE — HPI
81-year-old female with a history of autoimmune hepatitis, hypertension, glaucoma, type 2 diabetes and cataract presenting with abdominal pain, diarrhea and hyperglycemia.  Patient's daughter brought her in for lethargy, weakness, fatigue, and confusion. She was recently admitted and discharged 1 week ago with autoimmune hepatitis. She was discharged on prednisone taper. While at home she has had elevated glucoses that persisted into 500s despite increasing glyburide and metformin.  Patient has been having several bouts of nonbloody diarrhea associated with abdominal cramping and pain.  She was instructed to present to the ED today for evaluation of hyperglycemia, abdominal pain and worsening lethargy and fatigue. No focal neurologic deficits or unilateral weakness.

## 2022-10-23 NOTE — H&P
Hospital Medicine  History and Physical Exam    Team: [facility] MED  Adela Kaur MD  Admit Date: 10/23/2022  MELVI   Principal Problem:  HHNC (hyperglycemic hyperosmolar nonketotic coma)   Patient information was obtained from patient and ER records.   Primary care Physician: Primary Doctor No  Code status: Full Code    HPI: 81-year-old female with a history of autoimmune hepatitis, hypertension, glaucoma, type 2 diabetes and cataract presenting with abdominal pain, diarrhea and hyperglycemia.  Patient's daughter brought her in for lethargy, weakness, fatigue, and confusion. She was recently admitted and discharged 1 week ago with autoimmune hepatitis. She was discharged on prednisone taper. While at home she has had elevated glucoses that persisted into 500s despite increasing glyburide and metformin.  Patient has been having several bouts of nonbloody diarrhea associated with abdominal cramping and pain.  She was instructed to present to the ED today for evaluation of hyperglycemia, abdominal pain and worsening lethargy and fatigue. No focal neurologic deficits or unilateral weakness.    Past Medical History: Patient has a past medical history of Cataract, Diabetes mellitus, Glaucoma, and Hypertension.    Past Surgical History: Patient has a past surgical history that includes Cataract extraction w/  intraocular lens implant (Left, 12/21/2021).    Social History: Patient reports that she has quit smoking. She has never used smokeless tobacco. She reports that she does not currently use alcohol. She reports that she does not use drugs.    Family History: family history includes Blindness in her cousin; Cancer in her sister; Cataracts in her mother; Diabetes in her mother; Glaucoma in her mother; Heart attack in her father; Hypertension in her mother.    Medications:   Prior to Admission medications    Medication Sig   acetaminophen (TYLENOL) 500 MG tablet Take 2 tablets (1,000 mg total) by mouth every 8 (eight)  hours as needed for Pain.   amLODIPine (NORVASC) 2.5 MG tablet Take 1 tablet (2.5 mg total) by mouth once daily.   aspirin (ECOTRIN) 81 MG EC tablet Take 81 mg by mouth once daily.   azaTHIOprine (IMURAN) 50 mg Tab Take 1 tablet (50 mg total) by mouth once daily.   brimonidine 0.2% (ALPHAGAN) 0.2 % Drop INSTILL 1 DROP INTO RIGHT EYE TWICE A DAY   dorzolamide-timolol 2-0.5% (COSOPT) 22.3-6.8 mg/mL ophthalmic solution INSTILL 1 DROP INTO RIGHT EYE TWICE A DAY   famotidine (PEPCID) 20 MG tablet Take 1 tablet (20 mg total) by mouth once daily.   furosemide (LASIX) 20 MG tablet Take 1 tablet (20 mg total) by mouth once daily.   glyBURIDE (DIABETA) 2.5 MG tablet Take 2.5 mg by mouth once daily.   ibuprofen (ADVIL,MOTRIN) 200 MG tablet Take 3 tablets (600 mg) by mouth daily to every other day as needed for pain.   latanoprost 0.005 % ophthalmic solution PLACE 1 DROP INTO BOTH EYES ONCE DAILY.   metFORMIN (GLUCOPHAGE) 500 MG tablet TAKE 1 TABLET BY MOUTH TWICE A DAY   multivit-minerals/folic acid (ONE DAILY GUMMY VITES ORAL) Chew and swallow 1 gummy by mouth once daily.   predniSONE (DELTASONE) 10 MG tablet Take 4 tablets by mouth once daily for 5 days, THEN 3 tablets once daily for 7 days, THEN 2 tablets once daily for 7 days, THEN 1.5 tablets once daily for 14 days, THEN 1 tablet once daily for 14 days.   spironolactone (ALDACTONE) 50 MG tablet Take 1 tablet (50 mg total) by mouth once daily.   sulfamethoxazole-trimethoprim 800-160mg (BACTRIM DS) 800-160 mg Tab Take 1 tablet by mouth every Mon, Wed, Fri.       Allergies: Patient has No Known Allergies.    ROS    Constitutional: neg for fever or chills  Eyes: neg for visual changes  ENT: neg for nasal congestion or sore throat  Respiratory: neg for cough or shortness of breath  Cardiovascular: neg for chest pain or palpitations  Gastrointestinal: neg for nausea or vomiting or abdominal pain. Neg for change in bowel habits  Genitourinary: neg for hematuria or  dysuria  Integument/Breast: neg for rash or pruritis  Hematologic/Lymphatic: neg for easy bruising neg for lymphadenopathy   Musculoskeletal: neg for arthralgias or myalgias  Neurological: neg for seizures or tremors  Endocrine: neg for heat or cold intolerance    PEx  Temp:  [97.5 °F (36.4 °C)]   Pulse:  [64-73]   Resp:  [16]   BP: (144-166)/(68-77)   SpO2:  [96 %-99 %]   Body mass index is 17.92 kg/m².     General: well developed, well nourished, neg for acute distress  Eyes: conjunctivae/corneas clear.   Head: normocephalic, atraumatic.   Neck: supple, symmetrical, trachea midline, neg for JVD, neg for carotid bruits  Lungs: clear to auscultation bilaterally, normal respiratory effort  Heart: regular rate and rhythm, neg for murmur  Extremities: neg for edema, neg for joint swelling, pulses: 2+ at ankles   Abdomen: Soft, non-tender; liver and spleen not palpable, bowel sounds active, neg for aortic bruits  Skin: Skin color, texture, turgor normal. Neg for rashes or lesions.   Neurologic: CN II-XII grossly intact, DTR: 2/4 bilaterally. Normal muscle strength and tone. Neg for focal numbness or weakness  Mental status: Alert, oriented x 4, affect appropriate, memory intact    Recent Labs   Lab 10/23/22  1251 10/23/22  1715   WBC 9.68  --    HGB 11.0*  --    HCT 32.8* 30*   PLT 43*  --      Recent Labs   Lab 10/23/22  1155 10/23/22  1711   * 137   K 5.6* 4.8    114*   CO2 10* 12*   BUN 59* 55*   CREATININE 1.7* 1.5*   * 492*   CALCIUM 9.1 8.2*   PHOS  --  2.6*   LIPASE 36  --      Recent Labs   Lab 10/23/22  1155   ALKPHOS 119   ALT 56*   AST 29   ALBUMIN 2.4*   PROT 6.1   BILITOT 3.8*      Recent Labs   Lab 10/23/22  1047 10/23/22  1438 10/23/22  1555 10/23/22  1704   POCTGLUCOSE >500* >500* >500* >500*     Recent Labs     10/23/22  1251   LACTATE 6.1*      Assessment and Plan:    * HHNC (hyperglycemic hyperosmolar nonketotic coma)  Dehydration  Acute kidney injury  Steroid induced  hyperglycemia  Insulin drip  IVFs    Other specified glaucoma  Continue eye drops    Autoimmune hepatitis  Prednisone taper  azathioprine 50 mg daily    Primary hypertension  Amlodipine 2.5 mg daily      Diet:  regular diet  GI PPx: not needed  DVT PPx:  enoxaparin  Airways: room air  Wounds: none    Goals of Care: Return to prior functional status  Discharge plan: none    Time (minutes) spent in care of the patient (Greater than 1/2 spent in direct face-to-face contact) 35 min    Adela Kaur MD

## 2022-10-23 NOTE — ED PROVIDER NOTES
Encounter Date: 10/23/2022       History     Chief Complaint   Patient presents with    Hyperglycemia     D/c 10/14, dx with autoimmune hepatitis. Pt placed on steroids, high sugars of 500 at home. Pt complaining of abdominal pain and diarrhea.     CHARLES Feng is an 81-year-old female with a history of autoimmune hepatitis, hypertension, glaucoma, type 2 diabetes and cataract presenting with abdominal pain, diarrhea and hyperglycemia.  Patient's daughter is present at bedside to relay some history as patient has recent lethargy, weakness and fatigue.  She was recently admitted and discharged 1 week ago with autoimmune hepatitis, lethargy generalized fatigue and weakness.  At that time she was placed on a steroid.  Since then she has been having rising blood sugars at home initially at 300 and now greater than 550.  Patient is currently not on insulin.  Per her daughter, she has been administering glyburide and metformin 3 times a day in order to maintain blood sugar of 330s however it has been increasing to greater than 500.  Patient's daughter endorses that the patient has been lethargic, weak, fatigued and periods of confusion.  There has been no reported falls, trauma, chest pain, head injury, evidence of cough or shortness of breath.  Patient has been having several bouts of nonbloody diarrhea associated with abdominal cramping and pain.  She was instructed to present to the ED today for evaluation of hyperglycemia, abdominal pain and worsening lethargy and fatigue.  No focal neurologic deficits or unilateral weakness.    Review of patient's allergies indicates:  No Known Allergies  Past Medical History:   Diagnosis Date    Cataract     Diabetes mellitus     Glaucoma     Hypertension      Past Surgical History:   Procedure Laterality Date    CATARACT EXTRACTION W/  INTRAOCULAR LENS IMPLANT Left 12/21/2021    Procedure: EXTRACTION, CATARACT, WITH IOL INSERTION;  Surgeon: Shay Chou MD;   Location: [facility] OR;  Service: Ophthalmology;  Laterality: Left;     Family History   Problem Relation Age of Onset    Cataracts Mother     Diabetes Mother     Glaucoma Mother     Hypertension Mother     Heart attack Father     Cancer Sister     Blindness Cousin     Amblyopia Neg Hx     Macular degeneration Neg Hx     Retinal detachment Neg Hx      Social History     Tobacco Use    Smoking status: Former    Smokeless tobacco: Never   Substance Use Topics    Alcohol use: Not Currently    Drug use: Never     Review of Systems   Unable to perform ROS: Mental status change   Constitutional:  Positive for fatigue. Negative for chills and fever.   HENT:  Negative for congestion and sore throat.    Respiratory:  Negative for chest tightness and shortness of breath.    Cardiovascular:  Negative for chest pain, palpitations and leg swelling.   Gastrointestinal:  Negative for abdominal pain, nausea and vomiting.   Endocrine: Positive for polydipsia.   Genitourinary:  Negative for decreased urine volume, dysuria, flank pain and hematuria.   Musculoskeletal:  Negative for back pain and neck stiffness.   Skin:  Negative for color change and wound.   Psychiatric/Behavioral:  Positive for confusion.      Physical Exam     Initial Vitals [10/23/22 1046]   BP Pulse Resp Temp SpO2   (!) 144/69 73 16 97.5 °F (36.4 °C) 99 %      MAP       --         Physical Exam    Nursing note and vitals reviewed.    Gen/Constitutional: Interactive.  Chronically ill-appearing  Head: Normocephalic, Atraumatic  Neck: supple, no masses or LAD, no JVD  Eyes: PERRLA, conjunctiva clear  Ears, Nose and Throat: No rhinorrhea or stridor.  Cardiac:  Regular rate, Reg Rhythm, No murmur  Pulmonary: CTA Bilat, no wheezes, rhonchi, rales.  No increased work of breathing.  GI: Abdomen soft, non-tender, non-distended; no rebound or guarding  : No CVA tenderness.  Musculoskeletal: Extremities warm, well perfused, no erythema, no edema  Skin: No rashes, cyanosis or  jaundice. Dry skin  Neuro: Alert and Oriented x 3; No focal motor or sensory deficits.    Psych: Normal affect      ED Course   Critical Care    Date/Time: 10/23/2022 1:15 PM  Performed by: Hilario Glasgow DO  Authorized by: Hilario Glasgow DO   Direct patient critical care time: 15 minutes  Additional history critical care time: 15 minutes  Ordering / reviewing critical care time: 25 minutes  Documentation critical care time: 8 minutes  Total critical care time (exclusive of procedural time) : 63 minutes  Critical care was necessary to treat or prevent imminent or life-threatening deterioration of the following conditions: metabolic crisis and endocrine crisis.  Critical care was time spent personally by me on the following activities: blood draw for specimens, development of treatment plan with patient or surrogate, evaluation of patient's response to treatment, examination of patient, obtaining history from patient or surrogate, ordering and performing treatments and interventions, ordering and review of laboratory studies, ordering and review of radiographic studies, pulse oximetry, re-evaluation of patient's condition and review of old charts.      Labs Reviewed   CBC W/ AUTO DIFFERENTIAL - Abnormal; Notable for the following components:       Result Value    RBC 2.94 (*)     Hemoglobin 11.0 (*)     Hematocrit 32.8 (*)      (*)     MCH 37.4 (*)     RDW 15.7 (*)     Platelets 43 (*)     MPV 13.1 (*)     Immature Granulocytes 0.7 (*)     Gran # (ANC) 9.0 (*)     Immature Grans (Abs) 0.07 (*)     Lymph # 0.3 (*)     Gran % 92.9 (*)     Lymph % 2.9 (*)     Mono % 3.4 (*)     All other components within normal limits   COMPREHENSIVE METABOLIC PANEL - Abnormal; Notable for the following components:    Sodium 133 (*)     Potassium 5.6 (*)     CO2 10 (*)     Glucose 603 (*)     BUN 59 (*)     Creatinine 1.7 (*)     Albumin 2.4 (*)     Total Bilirubin 3.8 (*)     ALT 56 (*)     eGFR 30.1 (*)     All other  components within normal limits   URINALYSIS, REFLEX TO URINE CULTURE - Abnormal; Notable for the following components:    Protein, UA 1+ (*)     Glucose, UA 4+ (*)     Occult Blood UA 3+ (*)     Leukocytes, UA 3+ (*)     All other components within normal limits    Narrative:     Specimen Source->Urine   LACTIC ACID, PLASMA - Abnormal; Notable for the following components:    Lactate (Lactic Acid) 6.1 (*)     All other components within normal limits    Narrative:     add on mg & phos per Tarsha Abreu RN/ Hilraio Glasgow DO order#   [id number]  10/23/2022 @ 13:37    OSMOLALITY, SERUM - Abnormal; Notable for the following components:    Osmolality 339 (*)     All other components within normal limits    Narrative:     add on mg & phos per Tarsha Abreu RN/ Hilario Glasgow DO order#   [id number]  10/23/2022 @ 13:37     serum osmo critical result(s) called and verbal readback obtained   from tarsha encarnacion rn by BEBA 10/23/2022 13:42   BASIC METABOLIC PANEL - Abnormal; Notable for the following components:    Chloride 114 (*)     CO2 12 (*)     Glucose 492 (*)     BUN 55 (*)     Creatinine 1.5 (*)     Calcium 8.2 (*)     eGFR 35.0 (*)     All other components within normal limits   PHOSPHORUS - Abnormal; Notable for the following components:    Phosphorus 2.6 (*)     All other components within normal limits   URINALYSIS MICROSCOPIC - Abnormal; Notable for the following components:    RBC, UA >100 (*)     WBC, UA >100 (*)     WBC Clumps, UA Occasional (*)     Bacteria Many (*)     All other components within normal limits    Narrative:     Specimen Source->Urine   POCT GLUCOSE - Abnormal; Notable for the following components:    POCT Glucose >500 (*)     All other components within normal limits   ISTAT PROCEDURE - Abnormal; Notable for the following components:    POC PCO2 23.9 (*)     POC PO2 79 (*)     POC HCO3 13.7 (*)     POC TCO2 14 (*)     All other components within normal limits   POCT  GLUCOSE - Abnormal; Notable for the following components:    POCT Glucose >500 (*)     All other components within normal limits   POCT GLUCOSE - Abnormal; Notable for the following components:    POCT Glucose >500 (*)     All other components within normal limits   POCT GLUCOSE - Abnormal; Notable for the following components:    POCT Glucose >500 (*)     All other components within normal limits   ISTAT PROCEDURE - Abnormal; Notable for the following components:    POC Glucose 465 (*)     POC BUN 50 (*)     POC Creatinine 1.5 (*)     POC Chloride 112 (*)     POC TCO2 (MEASURED) 15 (*)     POC Hematocrit 30 (*)     All other components within normal limits   POCT GLUCOSE - Abnormal; Notable for the following components:    POCT Glucose >500 (*)     All other components within normal limits   POCT GLUCOSE - Abnormal; Notable for the following components:    POCT Glucose 476 (*)     All other components within normal limits   POCT GLUCOSE - Abnormal; Notable for the following components:    POCT Glucose 432 (*)     All other components within normal limits   POCT GLUCOSE - Abnormal; Notable for the following components:    POCT Glucose 402 (*)     All other components within normal limits   POCT GLUCOSE - Abnormal; Notable for the following components:    POCT Glucose 450 (*)     All other components within normal limits   POCT GLUCOSE - Abnormal; Notable for the following components:    POCT Glucose 323 (*)     All other components within normal limits   CULTURE, URINE   LIPASE   BETA - HYDROXYBUTYRATE, SERUM   SARS-COV-2 RDRP GENE   ISTAT CHEM8   POCT GLUCOSE MONITORING CONTINUOUS     EKG Readings: (Independently Interpreted)   Initial Reading: No STEMI. Previous EKG: Compared with most recent EKG Rhythm: Sinus Tachycardia. Heart Rate: 108. ST Segments: Non-Specific ST Segment Depression.     Imaging Results    None          Medications   dextrose 10% bolus 250 mL (has no administration in time range)   dextrose  10% bolus 125 mL (has no administration in time range)   dextrose 10% bolus 125 mL (has no administration in time range)   dextrose 10% bolus 250 mL (has no administration in time range)   insulin regular in 0.9 % NaCl 100 unit/100 mL (1 unit/mL) infusion (1.1 Units/hr Intravenous No Dose/Rate Change 10/24/22 0629)   sodium chloride 0.9% flush 10 mL (has no administration in time range)   melatonin tablet 6 mg (has no administration in time range)   brimonidine 0.2% ophthalmic solution 1 drop (1 drop Right Eye Not Given 10/23/22 2100)   famotidine tablet 20 mg (20 mg Oral Given 10/23/22 1736)   latanoprost 0.005 % ophthalmic solution 1 drop (1 drop Both Eyes Not Given 10/23/22 1600)   predniSONE tablet 30 mg (30 mg Oral Given 10/23/22 1736)   dorzolamide 2 % ophthalmic solution 1 drop (1 drop Right Eye Not Given 10/23/22 2100)   timolol maleate 0.5% ophthalmic solution 1 drop (1 drop Right Eye Not Given 10/23/22 2100)   k phos di & mono-sod phos mono 250 mg tablet 2 tablet (has no administration in time range)   azaTHIOprine tablet 50 mg (has no administration in time range)   amLODIPine tablet 2.5 mg (has no administration in time range)   enoxaparin injection 30 mg (30 mg Subcutaneous Given 10/23/22 2048)   dextrose 5 % and 0.45 % NaCl with KCl 20 mEq infusion ( Intravenous New Bag 10/24/22 0600)   sodium chloride 0.9% bolus 1,000 mL (0 mLs Intravenous Stopped 10/23/22 1726)   ondansetron injection 4 mg (4 mg Intravenous Given 10/23/22 1251)     Medical Decision Making:   History:   I obtained history from: someone other than patient.       <> Summary of History: Patient's daughter and medical records provides much of the history as patient is lethargic, weak and fatigue and a poor historian  Old Medical Records: I decided to obtain old medical records.  Initial Assessment:     Radha Feng is an 81-year-old female with a history of autoimmune hepatitis, hypertension, glaucoma, type 2 diabetes and cataract  presenting with abdominal pain, diarrhea and hyperglycemia.    Differential Diagnosis:   Infectious diarrhea, hyperglycemia, HHS, DKA, dehydration, UTI, pneumonia, electrolyte abnormality, infectious etiology  Independently Interpreted Test(s):   I have ordered and independently interpreted EKG Reading(s) - see prior notes  Clinical Tests:   Lab Tests: Ordered and Reviewed  Medical Tests: Reviewed and Ordered  Other:   I have discussed this case with another health care provider.       <> Summary of the Discussion: Hospital medicine                 Emergent evaluation patient presenting with hyperglycemia, evidence of dehydration and occasional confusion.  She was recently placed on steroids secondary to autoimmune hepatitis.  I suspect she is steroid induced hyperglycemia.  She has required increasing dose of oral anti hyperglycemics at home per her daughter's report.  Per her daughter's report, patient has had occasional confusion and disorientation that is improved today.  Her last blood sugars greater than 500.  She is currently afebrile without tachycardia, hypoxemia or hypotension.  Physical exam findings with no focal neurologic deficits, lung sounds clear, cardiac exam unremarkable.  She appears to have dry mucous membranes and dry skin.  Laboratory evaluation confirms hyperglycemia with lactic acidosis of 6.1.  She has an elevated serum osmolality greater than 330 with suspicion for hyper osmolar nonketotic hyperglycemia.  Beta hydroxybutyrate 0.5.  ABG without significant acidosis.  Given HHS picture, will place on insulin drip, and continue to manage aggressive hyperglycemia management.  ECG without ischemia or STEMI on my read.  Discussed case with hospital medicine, will admit for ongoing management cares.  Please see critical care note for critical care time given significant metabolic crisis.    Complexity:  Critical care       Clinical Impression:   Final diagnoses:  [R10.9] Abdominal pain  [E86.0]  Dehydration (Primary)  [R73.9] Hyperglycemia  [E11.00] Hyperosmolar hyperglycemic state (HHS)      ED Disposition Condition    Admit Stable               Hilario Glasgow DO, FAAEM  Emergency Staff Physician   Dept of Emergency Medicine   [facility]  Spectralink: [id number]        Disclaimer: This note has been generated using voice-recognition software. There may be typographical errors that have been missed during proof-reading.       Hilario Glasgow DO  10/24/22 0718

## 2022-10-24 ENCOUNTER — PATIENT MESSAGE (OUTPATIENT)
Dept: PRIMARY CARE CLINIC | Facility: CLINIC | Age: 82
End: 2022-10-24
Payer: MEDICARE

## 2022-10-24 LAB
ALBUMIN SERPL BCP-MCNC: 1.7 G/DL (ref 3.5–5.2)
ALBUMIN SERPL BCP-MCNC: 2.1 G/DL (ref 3.5–5.2)
ALP SERPL-CCNC: 109 U/L (ref 55–135)
ALT SERPL W/O P-5'-P-CCNC: 52 U/L (ref 10–44)
ANION GAP SERPL CALC-SCNC: 5 MMOL/L (ref 8–16)
ANION GAP SERPL CALC-SCNC: 7 MMOL/L (ref 8–16)
ANISOCYTOSIS BLD QL SMEAR: SLIGHT
AST SERPL-CCNC: 36 U/L (ref 10–40)
BACTERIA UR CULT: NORMAL
BACTERIA UR CULT: NORMAL
BASOPHILS # BLD AUTO: 0.01 K/UL (ref 0–0.2)
BASOPHILS NFR BLD: 0.1 % (ref 0–1.9)
BILIRUB SERPL-MCNC: 2.9 MG/DL (ref 0.1–1)
BUN SERPL-MCNC: 44 MG/DL (ref 8–23)
BUN SERPL-MCNC: 47 MG/DL (ref 8–23)
BURR CELLS BLD QL SMEAR: ABNORMAL
CALCIUM SERPL-MCNC: 8.4 MG/DL (ref 8.7–10.5)
CALCIUM SERPL-MCNC: 8.9 MG/DL (ref 8.7–10.5)
CHLORIDE SERPL-SCNC: 112 MMOL/L (ref 95–110)
CHLORIDE SERPL-SCNC: 119 MMOL/L (ref 95–110)
CO2 SERPL-SCNC: 15 MMOL/L (ref 23–29)
CO2 SERPL-SCNC: 15 MMOL/L (ref 23–29)
CREAT SERPL-MCNC: 1.1 MG/DL (ref 0.5–1.4)
CREAT SERPL-MCNC: 1.1 MG/DL (ref 0.5–1.4)
DIFFERENTIAL METHOD: ABNORMAL
EOSINOPHIL # BLD AUTO: 0 K/UL (ref 0–0.5)
EOSINOPHIL NFR BLD: 0 % (ref 0–8)
ERYTHROCYTE [DISTWIDTH] IN BLOOD BY AUTOMATED COUNT: 15.9 % (ref 11.5–14.5)
EST. GFR  (NO RACE VARIABLE): 50.8 ML/MIN/1.73 M^2
EST. GFR  (NO RACE VARIABLE): 50.8 ML/MIN/1.73 M^2
GLUCOSE SERPL-MCNC: 186 MG/DL (ref 70–110)
GLUCOSE SERPL-MCNC: 199 MG/DL (ref 70–110)
HCT VFR BLD AUTO: 32.7 % (ref 37–48.5)
HGB BLD-MCNC: 11.2 G/DL (ref 12–16)
IMM GRANULOCYTES # BLD AUTO: 0.04 K/UL (ref 0–0.04)
IMM GRANULOCYTES NFR BLD AUTO: 0.5 % (ref 0–0.5)
LYMPHOCYTES # BLD AUTO: 0.2 K/UL (ref 1–4.8)
LYMPHOCYTES NFR BLD: 3 % (ref 18–48)
MAGNESIUM SERPL-MCNC: 1.8 MG/DL (ref 1.6–2.6)
MCH RBC QN AUTO: 37.1 PG (ref 27–31)
MCHC RBC AUTO-ENTMCNC: 34.3 G/DL (ref 32–36)
MCV RBC AUTO: 108 FL (ref 82–98)
MONOCYTES # BLD AUTO: 0.3 K/UL (ref 0.3–1)
MONOCYTES NFR BLD: 3.6 % (ref 4–15)
NEUTROPHILS # BLD AUTO: 7.5 K/UL (ref 1.8–7.7)
NEUTROPHILS NFR BLD: 92.8 % (ref 38–73)
NRBC BLD-RTO: 0 /100 WBC
OVALOCYTES BLD QL SMEAR: ABNORMAL
PHOSPHATE SERPL-MCNC: 2.5 MG/DL (ref 2.7–4.5)
PLATELET # BLD AUTO: 32 K/UL (ref 150–450)
PLATELET BLD QL SMEAR: ABNORMAL
PMV BLD AUTO: 12.3 FL (ref 9.2–12.9)
POCT GLUCOSE: 188 MG/DL (ref 70–110)
POCT GLUCOSE: 197 MG/DL (ref 70–110)
POCT GLUCOSE: 202 MG/DL (ref 70–110)
POCT GLUCOSE: 205 MG/DL (ref 70–110)
POCT GLUCOSE: 213 MG/DL (ref 70–110)
POCT GLUCOSE: 229 MG/DL (ref 70–110)
POCT GLUCOSE: 247 MG/DL (ref 70–110)
POCT GLUCOSE: 253 MG/DL (ref 70–110)
POCT GLUCOSE: 257 MG/DL (ref 70–110)
POCT GLUCOSE: 292 MG/DL (ref 70–110)
POCT GLUCOSE: 313 MG/DL (ref 70–110)
POCT GLUCOSE: 321 MG/DL (ref 70–110)
POCT GLUCOSE: 323 MG/DL (ref 70–110)
POCT GLUCOSE: 450 MG/DL (ref 70–110)
POIKILOCYTOSIS BLD QL SMEAR: SLIGHT
POLYCHROMASIA BLD QL SMEAR: ABNORMAL
POTASSIUM SERPL-SCNC: 4.6 MMOL/L (ref 3.5–5.1)
POTASSIUM SERPL-SCNC: 5.8 MMOL/L (ref 3.5–5.1)
PROT SERPL-MCNC: 5.6 G/DL (ref 6–8.4)
RBC # BLD AUTO: 3.02 M/UL (ref 4–5.4)
SODIUM SERPL-SCNC: 134 MMOL/L (ref 136–145)
SODIUM SERPL-SCNC: 139 MMOL/L (ref 136–145)
WBC # BLD AUTO: 8.06 K/UL (ref 3.9–12.7)

## 2022-10-24 PROCEDURE — 80053 COMPREHEN METABOLIC PANEL: CPT | Performed by: INTERNAL MEDICINE

## 2022-10-24 PROCEDURE — 20600001 HC STEP DOWN PRIVATE ROOM

## 2022-10-24 PROCEDURE — 99233 PR SUBSEQUENT HOSPITAL CARE,LEVL III: ICD-10-PCS | Mod: ,,, | Performed by: INTERNAL MEDICINE

## 2022-10-24 PROCEDURE — 85025 COMPLETE CBC W/AUTO DIFF WBC: CPT | Performed by: INTERNAL MEDICINE

## 2022-10-24 PROCEDURE — 25000003 PHARM REV CODE 250: Performed by: EMERGENCY MEDICINE

## 2022-10-24 PROCEDURE — 63600175 PHARM REV CODE 636 W HCPCS: Performed by: INTERNAL MEDICINE

## 2022-10-24 PROCEDURE — 36415 COLL VENOUS BLD VENIPUNCTURE: CPT | Performed by: INTERNAL MEDICINE

## 2022-10-24 PROCEDURE — 25000003 PHARM REV CODE 250: Performed by: INTERNAL MEDICINE

## 2022-10-24 PROCEDURE — 83735 ASSAY OF MAGNESIUM: CPT | Performed by: INTERNAL MEDICINE

## 2022-10-24 PROCEDURE — 99233 SBSQ HOSP IP/OBS HIGH 50: CPT | Mod: ,,, | Performed by: INTERNAL MEDICINE

## 2022-10-24 PROCEDURE — 80069 RENAL FUNCTION PANEL: CPT | Performed by: INTERNAL MEDICINE

## 2022-10-24 RX ORDER — IBUPROFEN 200 MG
16 TABLET ORAL
Status: DISCONTINUED | OUTPATIENT
Start: 2022-10-24 | End: 2022-10-28 | Stop reason: HOSPADM

## 2022-10-24 RX ORDER — INSULIN ASPART 100 [IU]/ML
1-10 INJECTION, SOLUTION INTRAVENOUS; SUBCUTANEOUS
Status: DISCONTINUED | OUTPATIENT
Start: 2022-10-24 | End: 2022-10-28 | Stop reason: HOSPADM

## 2022-10-24 RX ORDER — IBUPROFEN 200 MG
24 TABLET ORAL
Status: DISCONTINUED | OUTPATIENT
Start: 2022-10-24 | End: 2022-10-28 | Stop reason: HOSPADM

## 2022-10-24 RX ORDER — INSULIN ASPART 100 [IU]/ML
5 INJECTION, SOLUTION INTRAVENOUS; SUBCUTANEOUS
Status: DISCONTINUED | OUTPATIENT
Start: 2022-10-24 | End: 2022-10-26

## 2022-10-24 RX ORDER — DEXTROSE MONOHYDRATE, SODIUM CHLORIDE, AND POTASSIUM CHLORIDE 50; 1.49; 4.5 G/1000ML; G/1000ML; G/1000ML
INJECTION, SOLUTION INTRAVENOUS CONTINUOUS
Status: DISCONTINUED | OUTPATIENT
Start: 2022-10-24 | End: 2022-10-24

## 2022-10-24 RX ORDER — GLUCAGON 1 MG
1 KIT INJECTION
Status: DISCONTINUED | OUTPATIENT
Start: 2022-10-24 | End: 2022-10-28 | Stop reason: HOSPADM

## 2022-10-24 RX ORDER — SODIUM CHLORIDE 9 MG/ML
INJECTION, SOLUTION INTRAVENOUS CONTINUOUS
Status: ACTIVE | OUTPATIENT
Start: 2022-10-24 | End: 2022-10-25

## 2022-10-24 RX ADMIN — DIBASIC SODIUM PHOSPHATE, MONOBASIC POTASSIUM PHOSPHATE AND MONOBASIC SODIUM PHOSPHATE 2 TABLET: 852; 155; 130 TABLET ORAL at 08:10

## 2022-10-24 RX ADMIN — INSULIN DETEMIR 15 UNITS: 100 INJECTION, SOLUTION SUBCUTANEOUS at 10:10

## 2022-10-24 RX ADMIN — INSULIN ASPART 5 UNITS: 100 INJECTION, SOLUTION INTRAVENOUS; SUBCUTANEOUS at 01:10

## 2022-10-24 RX ADMIN — INSULIN ASPART 6 UNITS: 100 INJECTION, SOLUTION INTRAVENOUS; SUBCUTANEOUS at 05:10

## 2022-10-24 RX ADMIN — AZATHIOPRINE 50 MG: 50 TABLET ORAL at 08:10

## 2022-10-24 RX ADMIN — FAMOTIDINE 20 MG: 20 TABLET ORAL at 08:10

## 2022-10-24 RX ADMIN — PREDNISONE 30 MG: 20 TABLET ORAL at 08:10

## 2022-10-24 RX ADMIN — INSULIN HUMAN 3.3 UNITS/HR: 1 INJECTION, SOLUTION INTRAVENOUS at 12:10

## 2022-10-24 RX ADMIN — SODIUM CHLORIDE: 0.9 INJECTION, SOLUTION INTRAVENOUS at 12:10

## 2022-10-24 RX ADMIN — SODIUM CHLORIDE: 0.9 INJECTION, SOLUTION INTRAVENOUS at 09:10

## 2022-10-24 RX ADMIN — DEXTROSE, SODIUM CHLORIDE, AND POTASSIUM CHLORIDE: 5; .45; .15 INJECTION INTRAVENOUS at 06:10

## 2022-10-24 RX ADMIN — INSULIN ASPART 5 UNITS: 100 INJECTION, SOLUTION INTRAVENOUS; SUBCUTANEOUS at 05:10

## 2022-10-24 RX ADMIN — INSULIN ASPART 2 UNITS: 100 INJECTION, SOLUTION INTRAVENOUS; SUBCUTANEOUS at 09:10

## 2022-10-24 RX ADMIN — INSULIN DETEMIR 15 UNITS: 100 INJECTION, SOLUTION SUBCUTANEOUS at 09:10

## 2022-10-24 RX ADMIN — INSULIN ASPART 6 UNITS: 100 INJECTION, SOLUTION INTRAVENOUS; SUBCUTANEOUS at 01:10

## 2022-10-24 RX ADMIN — DIBASIC SODIUM PHOSPHATE, MONOBASIC POTASSIUM PHOSPHATE AND MONOBASIC SODIUM PHOSPHATE 2 TABLET: 852; 155; 130 TABLET ORAL at 01:10

## 2022-10-24 RX ADMIN — CEFTRIAXONE SODIUM 2 G: 2 INJECTION, SOLUTION INTRAVENOUS at 10:10

## 2022-10-24 RX ADMIN — ENOXAPARIN SODIUM 30 MG: 30 INJECTION SUBCUTANEOUS at 05:10

## 2022-10-24 RX ADMIN — AMLODIPINE BESYLATE 2.5 MG: 2.5 TABLET ORAL at 08:10

## 2022-10-24 RX ADMIN — SODIUM CHLORIDE: 0.9 INJECTION, SOLUTION INTRAVENOUS at 01:10

## 2022-10-24 RX ADMIN — DIBASIC SODIUM PHOSPHATE, MONOBASIC POTASSIUM PHOSPHATE AND MONOBASIC SODIUM PHOSPHATE 2 TABLET: 852; 155; 130 TABLET ORAL at 05:10

## 2022-10-24 NOTE — ED NOTES
Family member, daughter, at bedside reports administering eye drops per home medication supply. Declines to provide RX with home medication for verification at this time r/t concerns for medication being misplaced. Declines hospital administration of eye drops during length of stay.

## 2022-10-24 NOTE — PLAN OF CARE
Pt admitted this shift from ED with elevated BG, lethagy, Abdominal pain and diarrhea   Pt remains AAO x 4 with VSS, afebrile, sats upper 90s on RA throughout shift  BG greater than 500 on admit - q1 insulin gtt started in ED - see flowsheets for values   L wrist 20g - NS infusing at 100 cc/hr  Lactic 6.1 on Admit, UA dirty   Skin tears noted on arms, back and sacrum - Mepilex applied to sacrum   Pt up with assist x2, turns with assist in bed, Purewick in place, LBM 10/23  Daughter remains at bedside an attentive to patient needs  Clear liquid diet   Pt remains free from falls and injuries, call light in reach, bed in lowest position, nonskid socks on when OOB, side rails up x2  Will continue to monitor

## 2022-10-24 NOTE — PLAN OF CARE
[facility] - Transplant Stepdown  Initial Discharge Assessment       Primary Care Provider: Primary Doctor No    Admission Diagnosis: Dehydration [E86.0]  Hyperglycemia [R73.9]  Abdominal pain [R10.9]  Hyperosmolar hyperglycemic state (HHS) [E11.00]    Admission Date: 10/23/2022  Expected Discharge Date: 10/27/2022         Payor: MEDICARE / Plan: MEDICARE PART A & B / Product Type: Government /     Extended Emergency Contact Information  Primary Emergency Contact: Della Feng  Mobile Phone: [phone]  Relation: Daughter  Preferred language: English   needed? No  Secondary Emergency Contact: Raymon Morin  Mobile Phone: [phone]  Relation: Grandchild    Discharge Plan A: Home with family  Discharge Plan B: Home Health      [id number] [address]  [address]  [place] [address]  Phone: [phone] Fax: [phone]      Initial Assessment (most recent)       Adult Discharge Assessment - 10/24/22 1350          Discharge Assessment    Assessment Type Discharge Planning Assessment     Confirmed/corrected address, phone number and insurance Yes     Confirmed Demographics Correct on Facesheet     Source of Information patient     Communicated MELVI with patient/caregiver Date not available/Unable to determine     Reason For Admission HHNC (hyperglycemic hyperosmolar nonketotic coma)     Lives With child(keith), adult     Do you expect to return to your current living situation? Yes     Do you have help at home or someone to help you manage your care at home? Yes     Who are your caregiver(s) and their phone number(s)? Della Feng [phone]     Home Layout Able to live on 1st floor     Equipment Currently Used at Home cane, quad     Patient currently being followed by outpatient case management? No     Do you currently have service(s) that help you manage your care at home? No     Do you take prescription medications? Yes     Is the patient taking  medications as prescribed? yes     Who is going to help you get home at discharge? Della Feng     Are you on dialysis? No     Do you take coumadin? No     Discharge Plan A Home with family     Discharge Plan B Home Health     DME Needed Upon Discharge  other (see comments)   TBD    Discharge Plan discussed with: Patient        Relationship/Environment    Name(s) of Who Lives With Patient Della Feng                   This SW met with patient at bedside to complete DPA. Questions answered / contact numbers provided.  Use PREFERRED PHARMACY / BEDSIDE DELIVERY for any necessary medications at time of discharge. The patient is independent with all ADLs - does use cane, is not on HD, BTs or home oxygen.The patient's daughter will be assisting with help upon discharge. The patient's daughter  will be providing transportation home. Hospital follow up will be scheduled with PCP. Will continue to follow for course of hospitalization.     Alec Carrero LMSW  Case Management [facility]  Ext: [phone]

## 2022-10-24 NOTE — PROGRESS NOTES
Pharmacist Renal Dose Adjustment Note    Radha Feng is a 81 y.o. female being treated with the medication enoxaparin for DVT prophylaxis.    Patient Data:    Vital Signs (Most Recent):  Temp: 97.5 °F (36.4 °C) (10/23/22 1046)  Pulse: 64 (10/23/22 1900)  Resp: 16 (10/23/22 1046)  BP: (!) 148/68 (10/23/22 1649)  SpO2: 95 % (10/23/22 1900)   Vital Signs (72h Range):  Temp:  [97.5 °F (36.4 °C)]   Pulse:  [64-73]   Resp:  [16]   BP: (144-166)/(68-77)   SpO2:  [95 %-99 %]      Recent Labs   Lab 10/23/22  1155 10/23/22  1711   CREATININE 1.7* 1.5*     Serum creatinine: 1.5 mg/dL (H) 10/23/22 1711  Estimated creatinine clearance: 23.8 mL/min (A)    Enoxaparin 40 mg subcutaneous every 24 hours will be changed to enoxaparin 30 mg subcutaneous every 24 hours.     Pharmacist's Name:  Koby Luu  Pharmacist's Extension:  [phone]

## 2022-10-24 NOTE — PLAN OF CARE
Patient is AAOx3. Patient started on IV antibiotics. Accuchecks changed from Q1 hr to AC&HS. Insulin drip stopped, patient received levemir and started on meal insulin. Patient to receive NS@100cc/hr x 2L. Pure Wick in place. PT/OT consulted. Reminded the patient and her daughter to call for assistance. Call light and personal items are within reach.

## 2022-10-24 NOTE — ASSESSMENT & PLAN NOTE
Resolved, off insulin infusion, off IVF.  levemir 10 units subq qhs and novolog 5 units subq TID - continue current regimen, carb restricted diet.

## 2022-10-24 NOTE — PROGRESS NOTES
Hospital Medicine  Progress note    Team: [facility] Adela Kaur MD  Admit Date: 10/23/2022    Principal Problem:  HHNC (hyperglycemic hyperosmolar nonketotic coma)    Interval hx:  Feeling improved. Daughter reporting patient still with decreased UOP    ROS   Respiratory: neg for cough neg for shortness of breath  Cardiovascular: neg for chest pain neg for palpitations  Gastrointestinal: neg for nausea neg for vomiting, neg for abdominal pain neg for diarrhea neg for constipation   Behavioral/Psych: neg for depression neg for anxiety    PEx  Temp:  [97.1 °F (36.2 °C)-97.9 °F (36.6 °C)]   Pulse:  [57-70]   Resp:  [16-17]   BP: (133-157)/(68-90)   SpO2:  [94 %-100 %]     Intake/Output Summary (Last 24 hours) at 10/24/2022 1649  Last data filed at 10/24/2022 1400  Gross per 24 hour   Intake 3212.78 ml   Output 425 ml   Net 2787.78 ml     General Appearance: no acute distress, WD, elderly  Mouth: mucous membranes still dry  Heart: regular rate and rhythm, no heave  Respiratory: Normal respiratory effort, symmetric excursion, bilateral vesicular breath sounds   Abdomen: Soft, non-tender; bowel sounds active  Skin: intact, no rash, no ulcers, still tenting  Neurologic:  No focal numbness or weakness  Mental status: Alert, oriented x 4, affect appropriate     Recent Labs   Lab 10/23/22  1251 10/23/22  1715 10/24/22  1200   WBC 9.68  --  8.06   HGB 11.0*  --  11.2*   HCT 32.8* 30* 32.7*   PLT 43*  --  32*     Recent Labs   Lab 10/23/22  1155 10/23/22  1711 10/24/22  0748 10/24/22  1200   * 137 139 134*   K 5.6* 4.8 5.8* 4.6    114* 119* 112*   CO2 10* 12* 15* 15*   BUN 59* 55* 47* 44*   CREATININE 1.7* 1.5* 1.1 1.1   * 492* 186* 199*   CALCIUM 9.1 8.2* 8.4* 8.9   MG  --   --  1.8  --    PHOS  --  2.6* 2.5*  --    LIPASE 36  --   --   --      Recent Labs   Lab 10/23/22  1155 10/24/22  0748 10/24/22  1200   ALKPHOS 119  --  109   ALT 56*  --  52*   AST 29  --  36   ALBUMIN 2.4* 1.7* 2.1*   PROT 6.1   --  5.6*   BILITOT 3.8*  --  2.9*        Recent Labs   Lab 10/24/22  0426 10/24/22  0527 10/24/22  0628 10/24/22  0744 10/24/22  0831 10/24/22  1328   POCTGLUCOSE 292* 188* 202* 197* 205* 253*       Scheduled Meds:   amLODIPine  2.5 mg Oral Daily    azaTHIOprine  50 mg Oral Daily    brimonidine 0.2%  1 drop Right Eye BID    cefTRIAXone (ROCEPHIN) IVPB  2 g Intravenous Daily    dorzolamide  1 drop Right Eye BID    enoxaparin  30 mg Subcutaneous Daily    famotidine  20 mg Oral Daily    insulin aspart U-100  5 Units Subcutaneous TIDWM    insulin detemir U-100  15 Units Subcutaneous QHS    k phos di & mono-sod phos mono  2 tablet Oral TID WM    latanoprost  1 drop Both Eyes Daily    predniSONE  30 mg Oral Daily    timolol maleate 0.5%  1 drop Right Eye BID     Continuous Infusions:   sodium chloride 0.9% 100 mL/hr at 10/24/22 1329     As Needed:  dextrose 10%, dextrose 10%, glucagon (human recombinant), glucose, glucose, insulin aspart U-100, melatonin, sodium chloride 0.9%    Assessment and Plan  / Problems managed today    * HHNC (hyperglycemic hyperosmolar nonketotic coma)  Dehydration  Acute kidney injury  Steroid induced hyperglycemia  Insulin drip  IVFs  Still dry on exam - NS x 2 L    Other specified glaucoma  Continue eye drops    Autoimmune hepatitis  Prednisone taper  azathioprine 50 mg daily    Primary hypertension  Amlodipine 2.5 mg daily    Discharge Planning   MELVI: 10/27/2022     Code Status: Full Code   Is the patient medically ready for discharge?:     Reason for patient still in hospital (select all that apply): Patient trending condition and Treatment  Discharge Plan A: Home with family        Diet:  regular diet  GI PPx: protonix  DVT PPx:  enoxaparin  Airways: room air  Wounds: none    Goals of Care:  Return to prior functional status       Time (minutes) spent in care of the patient (Greater than 1/2 spent in direct face-to-face contact and care coordination on unit)  35 min    Adela Kaur MD

## 2022-10-24 NOTE — ASSESSMENT & PLAN NOTE
Prednisone taper - currently at prednisone 30 mg daily. Will keep at current dose given azathioprine needs to be held  azathioprine 50 mg daily - hold due to thrombocytopenia  Hepatology recs reviewed.

## 2022-10-24 NOTE — ED NOTES
Pt awake, lethargic, oriented X4. Moves all extremities, noted generalized weakness. Reports feelings of malaise, abd pain, N/V. Abd round, tender. Skin hot, dry, normal color for extremity. 20G L wrist, patent, dressing intact. Monitoring equipment on, family at bedside. Updated on POC.

## 2022-10-25 LAB
ALBUMIN SERPL BCP-MCNC: 1.9 G/DL (ref 3.5–5.2)
ALP SERPL-CCNC: 95 U/L (ref 55–135)
ALT SERPL W/O P-5'-P-CCNC: 48 U/L (ref 10–44)
ANION GAP SERPL CALC-SCNC: 4 MMOL/L (ref 8–16)
ANISOCYTOSIS BLD QL SMEAR: SLIGHT
AST SERPL-CCNC: 46 U/L (ref 10–40)
BASOPHILS # BLD AUTO: 0.05 K/UL (ref 0–0.2)
BASOPHILS NFR BLD: 0.7 % (ref 0–1.9)
BILIRUB SERPL-MCNC: 2.2 MG/DL (ref 0.1–1)
BUN SERPL-MCNC: 33 MG/DL (ref 8–23)
CALCIUM SERPL-MCNC: 8.2 MG/DL (ref 8.7–10.5)
CHLORIDE SERPL-SCNC: 114 MMOL/L (ref 95–110)
CO2 SERPL-SCNC: 16 MMOL/L (ref 23–29)
CREAT SERPL-MCNC: 0.7 MG/DL (ref 0.5–1.4)
DIFFERENTIAL METHOD: ABNORMAL
EOSINOPHIL # BLD AUTO: 0 K/UL (ref 0–0.5)
EOSINOPHIL NFR BLD: 0.3 % (ref 0–8)
ERYTHROCYTE [DISTWIDTH] IN BLOOD BY AUTOMATED COUNT: 16.6 % (ref 11.5–14.5)
EST. GFR  (NO RACE VARIABLE): >60 ML/MIN/1.73 M^2
GLUCOSE SERPL-MCNC: 125 MG/DL (ref 70–110)
HCT VFR BLD AUTO: 33.1 % (ref 37–48.5)
HGB BLD-MCNC: 10.9 G/DL (ref 12–16)
IMM GRANULOCYTES # BLD AUTO: 0.22 K/UL (ref 0–0.04)
IMM GRANULOCYTES NFR BLD AUTO: 3 % (ref 0–0.5)
INR PPP: 1.5 (ref 0.8–1.2)
LYMPHOCYTES # BLD AUTO: 0.6 K/UL (ref 1–4.8)
LYMPHOCYTES NFR BLD: 7.9 % (ref 18–48)
MAGNESIUM SERPL-MCNC: 1.7 MG/DL (ref 1.6–2.6)
MCH RBC QN AUTO: 38.1 PG (ref 27–31)
MCHC RBC AUTO-ENTMCNC: 32.9 G/DL (ref 32–36)
MCV RBC AUTO: 116 FL (ref 82–98)
MONOCYTES # BLD AUTO: 0.4 K/UL (ref 0.3–1)
MONOCYTES NFR BLD: 5.2 % (ref 4–15)
NEUTROPHILS # BLD AUTO: 6 K/UL (ref 1.8–7.7)
NEUTROPHILS NFR BLD: 82.9 % (ref 38–73)
NRBC BLD-RTO: 1 /100 WBC
PHOSPHATE SERPL-MCNC: 3.1 MG/DL (ref 2.7–4.5)
PLATELET # BLD AUTO: 37 K/UL (ref 150–450)
PLATELET BLD QL SMEAR: ABNORMAL
PMV BLD AUTO: 11.9 FL (ref 9.2–12.9)
POCT GLUCOSE: 109 MG/DL (ref 70–110)
POCT GLUCOSE: 131 MG/DL (ref 70–110)
POCT GLUCOSE: 150 MG/DL (ref 70–110)
POCT GLUCOSE: 232 MG/DL (ref 70–110)
POTASSIUM SERPL-SCNC: 4.3 MMOL/L (ref 3.5–5.1)
PROT SERPL-MCNC: 5.1 G/DL (ref 6–8.4)
PROTHROMBIN TIME: 15.4 SEC (ref 9–12.5)
RBC # BLD AUTO: 2.86 M/UL (ref 4–5.4)
SODIUM SERPL-SCNC: 134 MMOL/L (ref 136–145)
WBC # BLD AUTO: 7.25 K/UL (ref 3.9–12.7)

## 2022-10-25 PROCEDURE — 84100 ASSAY OF PHOSPHORUS: CPT | Performed by: INTERNAL MEDICINE

## 2022-10-25 PROCEDURE — 80053 COMPREHEN METABOLIC PANEL: CPT | Performed by: INTERNAL MEDICINE

## 2022-10-25 PROCEDURE — 36415 COLL VENOUS BLD VENIPUNCTURE: CPT | Performed by: INTERNAL MEDICINE

## 2022-10-25 PROCEDURE — 20600001 HC STEP DOWN PRIVATE ROOM

## 2022-10-25 PROCEDURE — 63600175 PHARM REV CODE 636 W HCPCS: Performed by: INTERNAL MEDICINE

## 2022-10-25 PROCEDURE — 83735 ASSAY OF MAGNESIUM: CPT | Performed by: INTERNAL MEDICINE

## 2022-10-25 PROCEDURE — 97161 PT EVAL LOW COMPLEX 20 MIN: CPT

## 2022-10-25 PROCEDURE — 99233 SBSQ HOSP IP/OBS HIGH 50: CPT | Mod: ,,, | Performed by: INTERNAL MEDICINE

## 2022-10-25 PROCEDURE — 85025 COMPLETE CBC W/AUTO DIFF WBC: CPT | Performed by: INTERNAL MEDICINE

## 2022-10-25 PROCEDURE — 99233 PR SUBSEQUENT HOSPITAL CARE,LEVL III: ICD-10-PCS | Mod: ,,, | Performed by: INTERNAL MEDICINE

## 2022-10-25 PROCEDURE — 87449 NOS EACH ORGANISM AG IA: CPT | Performed by: INTERNAL MEDICINE

## 2022-10-25 PROCEDURE — 97116 GAIT TRAINING THERAPY: CPT

## 2022-10-25 PROCEDURE — 25000003 PHARM REV CODE 250: Performed by: INTERNAL MEDICINE

## 2022-10-25 PROCEDURE — 85610 PROTHROMBIN TIME: CPT | Performed by: INTERNAL MEDICINE

## 2022-10-25 PROCEDURE — 27000207 HC ISOLATION

## 2022-10-25 RX ORDER — INSULIN ASPART 100 [IU]/ML
5 INJECTION, SOLUTION INTRAVENOUS; SUBCUTANEOUS
Qty: 15 ML | Refills: 1 | Status: SHIPPED | OUTPATIENT
Start: 2022-10-25 | End: 2022-11-14

## 2022-10-25 RX ORDER — SODIUM CHLORIDE 9 MG/ML
INJECTION, SOLUTION INTRAVENOUS CONTINUOUS
Status: ACTIVE | OUTPATIENT
Start: 2022-10-25 | End: 2022-10-26

## 2022-10-25 RX ADMIN — AZATHIOPRINE 50 MG: 50 TABLET ORAL at 09:10

## 2022-10-25 RX ADMIN — DIBASIC SODIUM PHOSPHATE, MONOBASIC POTASSIUM PHOSPHATE AND MONOBASIC SODIUM PHOSPHATE 2 TABLET: 852; 155; 130 TABLET ORAL at 09:10

## 2022-10-25 RX ADMIN — INSULIN ASPART 2 UNITS: 100 INJECTION, SOLUTION INTRAVENOUS; SUBCUTANEOUS at 10:10

## 2022-10-25 RX ADMIN — INSULIN ASPART 5 UNITS: 100 INJECTION, SOLUTION INTRAVENOUS; SUBCUTANEOUS at 09:10

## 2022-10-25 RX ADMIN — SODIUM CHLORIDE: 0.9 INJECTION, SOLUTION INTRAVENOUS at 05:10

## 2022-10-25 RX ADMIN — ENOXAPARIN SODIUM 30 MG: 30 INJECTION SUBCUTANEOUS at 05:10

## 2022-10-25 RX ADMIN — INSULIN ASPART 5 UNITS: 100 INJECTION, SOLUTION INTRAVENOUS; SUBCUTANEOUS at 01:10

## 2022-10-25 RX ADMIN — AMLODIPINE BESYLATE 2.5 MG: 2.5 TABLET ORAL at 09:10

## 2022-10-25 RX ADMIN — INSULIN DETEMIR 15 UNITS: 100 INJECTION, SOLUTION SUBCUTANEOUS at 10:10

## 2022-10-25 RX ADMIN — FAMOTIDINE 20 MG: 20 TABLET ORAL at 09:10

## 2022-10-25 RX ADMIN — DIBASIC SODIUM PHOSPHATE, MONOBASIC POTASSIUM PHOSPHATE AND MONOBASIC SODIUM PHOSPHATE 2 TABLET: 852; 155; 130 TABLET ORAL at 05:10

## 2022-10-25 RX ADMIN — CEFTRIAXONE SODIUM 2 G: 2 INJECTION, SOLUTION INTRAVENOUS at 09:10

## 2022-10-25 RX ADMIN — PREDNISONE 30 MG: 20 TABLET ORAL at 09:10

## 2022-10-25 RX ADMIN — SODIUM CHLORIDE: 0.9 INJECTION, SOLUTION INTRAVENOUS at 06:10

## 2022-10-25 RX ADMIN — DIBASIC SODIUM PHOSPHATE, MONOBASIC POTASSIUM PHOSPHATE AND MONOBASIC SODIUM PHOSPHATE 2 TABLET: 852; 155; 130 TABLET ORAL at 01:10

## 2022-10-25 NOTE — PLAN OF CARE
"- Patient admitted 10/23/22 with hyperosmolar hyperglycemia (), lactic acidosis, dehydration, and weakness.  - Patient has a recent diagnosis of autoimmune hepatitis and is currently on a steroid taper.  - No acute events overnight.  - BG monitored AC/HS. BG= 247 at bedtime. Novolog and Levemir administered.  - Patient is receiving IV fluids (NS at 100 mL/hr) until ~0930 this morning under current orders.  - Purewick in use - concentrated urine output noted (250 mL overnight).  - Patient has daughter at bedside - attentive to needs.  - Eye drops documented as "family refused" per request of daughter - as some of these are very expensive, she prefers to utilize existing home supply of the same eye drops.  - 2 small BMs in bedside commode during this RN's shift.  "

## 2022-10-25 NOTE — PLAN OF CARE
10/25/22 1013   Post-Acute Status   Post-Acute Authorization Placement;Other   Post-Acute Placement Status Pending medical clearance/testing   Other Status See Comments  (will need OP therapy upon dc)     Covering SW observed therapy recs for OP. Advised by MD that Pt and family have DME questions. Observed therapy is recommending a WC and RW. With Medicare, they will only cover one of those. Ordered both and advised Pt at bedside they would have to pay for the RW.     Candace Atkins, SERENE  Neurocritical Care   [facility]  [id number]

## 2022-10-25 NOTE — PLAN OF CARE
Patient is AAOx3. Patient's daughter reports that the patient has been having diarrhea. Dr Kaur notified. Stool sample sent for testing and IV antibiotics stopped. PT is working with the patient. Patient sat up in the chair for breakfast. Reminded the patient and her daughter to call for assistance. Call light and personal items are within reach

## 2022-10-25 NOTE — PLAN OF CARE
Problem: Physical Therapy  Goal: Physical Therapy Goal  Description: PT goals to be met by: 11/6/22    Patient will perform supine <> sitting with supervision.  Patient will perform sit <> stand transitions with supervision using LRAD.  Patient will perform transfers from bed <> chair or BSC with supervision using LRAD.  Patient will ambulate 60 feet with supervision using LRAD.  Patient will ascend/descend 6 steps using handrails with stand by assistance.  Outcome: Ongoing, Progressing

## 2022-10-25 NOTE — PT/OT/SLP EVAL
Physical Therapy Evaluation    Patient Name:  Radha Feng   MRN:  [id number]  Admit Date: 10/23/2022  Admitting Diagnosis:  HHNC (hyperglycemic hyperosmolar nonketotic coma)  Length of Stay: 2 days  Recent Surgery: * No surgery found *      Recommendations:     Discharge Recommendations:  outpatient PT   Discharge Equipment Recommendations: walker, rolling, wheelchair   Barriers to discharge: None    Highest Level of Mobility: walked ~20 feet  Assistance Needed: minimal assistance    Assessment:     Radha Feng is a 81 y.o. female admitted with a medical diagnosis of HHNC (hyperglycemic hyperosmolar nonketotic coma). Medical history includes hepatitis and diabetes. She is most limited by weakness. Today, she was able to perform bed mobility, standing, transfers, and ambuation. Based on clinical presentation and co-morbidities, pt would benefit from acute skilled physical therapy services to improve functional mobility and return to max capacity prior level of function. See detailed evaluation below:    Problem List: weakness, impaired endurance, impaired self care skills, impaired functional mobility, gait instability, impaired balance, impaired cardiopulmonary response to activity  Rehab Prognosis: Good; patient would benefit from acute skilled PT services to address these deficits and reach maximum level of function.      Plan:     During this hospitalization, patient to be seen 4 x/week to address the identified rehab impairments via gait training, therapeutic activities, therapeutic exercises, neuromuscular re-education and progress towards the established goals.    Plan of Care Expires:  11/24/22    Subjective   Communicated with RN prior to session.  Patient found HOB elevated upon PT entry to room, agreeable to evaluation.     Chief Complaint: Hyperglycemia (D/c 10/14, dx with autoimmune hepatitis. Pt placed on steroids, high sugars of 500 at home. Pt complaining of abdominal pain and  "diarrhea.)    Patient/Family Comments/goals: to get better  Pain/Comfort:  Pain Rating 1: 0/10  Pain Rating Post-Intervention 1: 0/10    Living Environment:  Patient lives with her daughter and family in a single story home with 6 steps to enter (bilateral handrails on the back steps). She denies any recent falls.     Prior Level of Function:   Patient reports being modified independent with mobility & with ADLs. Patient owns DME as follows: cane, straight, bedside commode, shower chair, raised toilet.     Patient reports they will have assistance from family upon discharge.    Objective:   Patient found with: peripheral IV     General Precautions: Standard, fall   Orthopedic Precautions:N/A   Braces: N/A   Oxygen Device: Room Air  Vitals: BP (!) 153/74 (BP Location: Right arm, Patient Position: Lying)   Pulse (!) 56   Temp 98 °F (36.7 °C) (Oral)   Resp 18   Ht 5' 6" (1.676 m)   Wt 61.5 kg (135 lb 9.3 oz)   SpO2 96%   Breastfeeding No   BMI 21.88 kg/m²     Exams:  Cognition:   Alert and Cooperative  AxOx4  Command following: Follows multistep  commands  Fluency: clear/fluent  Hearing: Intact  Vision:  Intact visual fields  Skin Integrity: intact  Sensation: intact  Coordination: intact  LE Strength:  L Lower Extremity: grossly 3/5  R Lower Extremity: grossly 3/5  LE ROM:  L Lower Extremity: WFL  R Lower Extremity: WFL      Outcome Measures:  AM-PAC 6 CLICK MOBILITY  Turning over in bed (including adjusting bedclothes, sheets and blankets)?: 3  Sitting down on and standing up from a chair with arms (e.g., wheelchair, bedside commode, etc.): 3  Moving from lying on back to sitting on the side of the bed?: 3  Moving to and from a bed to a chair (including a wheelchair)?: 3  Need to walk in hospital room?: 3  Climbing 3-5 steps with a railing?: 3  Basic Mobility Total Score: 18     Functional Mobility:    Bed Mobility:  Supine to Sit: minimum assistance  Scooting anteriorly to EOB to have both feet planted on " floor: minimum assistance    Sitting Balance at Edge of Bed:  Assistance Level Required: Supervision  Postural deviations noted: rounded shoulders, forward head    Transfers:   Sit <> Stand Transfer: minimum assistance with hand-held assist   Standing Tolerance: minimum assistance with hand-held assist   Deviations: flexed posture  Bed <> Chair Transfer: Step Transfer technique with minimum assistance with hand-held assist    Gait:   Patient ambulated: ~20 feet   Patient required: minimal assist  Patient used: hand-held assist  Gait Deviation(s): decreased speed, narrow base of support, decreased step length    Education:   Patient was educated on the following:  Role of PT, plan of care, and goals  In room safety and use of call button  Importance of continued upright mobility and exercise      Patient left up in chair with all lines intact, call button in reach, and RN notified.    GOALS:   Multidisciplinary Problems       Physical Therapy Goals          Problem: Physical Therapy    Goal Priority Disciplines Outcome Goal Variances Interventions   Physical Therapy Goal     PT, PT/OT Ongoing, Progressing     Description: PT goals to be met by: 11/6/22    Patient will perform supine <> sitting with supervision.  Patient will perform sit <> stand transitions with supervision using LRAD.  Patient will perform transfers from bed <> chair or BSC with supervision using LRAD.  Patient will ambulate 60 feet with supervision using LRAD.  Patient will ascend/descend 6 steps using handrails with stand by assistance.                       History:     Past Medical History:   Diagnosis Date    Cataract     Diabetes mellitus     Glaucoma     Hypertension        Past Surgical History:   Procedure Laterality Date    CATARACT EXTRACTION W/  INTRAOCULAR LENS IMPLANT Left 12/21/2021    Procedure: EXTRACTION, CATARACT, WITH IOL INSERTION;  Surgeon: Shay Chou MD;  Location: [facility];  Service: Ophthalmology;  Laterality:  Left;       Time Tracking:     PT Received On: 10/25/22  PT Start Time: 0849     PT Stop Time: 0906  PT Total Time (min): 17 min     Billable Minutes: Evaluation 8 and Gait Training 9    Lety Medrano, PT, DPT  10/25/2022

## 2022-10-26 PROBLEM — D69.6 THROMBOCYTOPENIA: Status: ACTIVE | Noted: 2022-10-26

## 2022-10-26 PROBLEM — R82.90 ABNORMAL URINALYSIS: Status: ACTIVE | Noted: 2022-10-26

## 2022-10-26 LAB
ALBUMIN SERPL BCP-MCNC: 1.9 G/DL (ref 3.5–5.2)
ALP SERPL-CCNC: 110 U/L (ref 55–135)
ALT SERPL W/O P-5'-P-CCNC: 51 U/L (ref 10–44)
ANION GAP SERPL CALC-SCNC: 11 MMOL/L (ref 8–16)
AST SERPL-CCNC: 48 U/L (ref 10–40)
BASOPHILS # BLD AUTO: 0.01 K/UL (ref 0–0.2)
BASOPHILS NFR BLD: 0.2 % (ref 0–1.9)
BILIRUB SERPL-MCNC: 2.1 MG/DL (ref 0.1–1)
BUN SERPL-MCNC: 29 MG/DL (ref 8–23)
C DIFF GDH STL QL: NEGATIVE
C DIFF TOX A+B STL QL IA: NEGATIVE
CALCIUM SERPL-MCNC: 8.1 MG/DL (ref 8.7–10.5)
CHLORIDE SERPL-SCNC: 118 MMOL/L (ref 95–110)
CO2 SERPL-SCNC: 11 MMOL/L (ref 23–29)
CREAT SERPL-MCNC: 0.7 MG/DL (ref 0.5–1.4)
DIFFERENTIAL METHOD: ABNORMAL
EOSINOPHIL # BLD AUTO: 0 K/UL (ref 0–0.5)
EOSINOPHIL NFR BLD: 0.2 % (ref 0–8)
ERYTHROCYTE [DISTWIDTH] IN BLOOD BY AUTOMATED COUNT: 16.4 % (ref 11.5–14.5)
EST. GFR  (NO RACE VARIABLE): >60 ML/MIN/1.73 M^2
GLUCOSE SERPL-MCNC: 90 MG/DL (ref 70–110)
HCT VFR BLD AUTO: 31.4 % (ref 37–48.5)
HGB BLD-MCNC: 10.8 G/DL (ref 12–16)
IMM GRANULOCYTES # BLD AUTO: 0.03 K/UL (ref 0–0.04)
IMM GRANULOCYTES NFR BLD AUTO: 0.6 % (ref 0–0.5)
LYMPHOCYTES # BLD AUTO: 0.3 K/UL (ref 1–4.8)
LYMPHOCYTES NFR BLD: 6.5 % (ref 18–48)
MAGNESIUM SERPL-MCNC: 1.6 MG/DL (ref 1.6–2.6)
MCH RBC QN AUTO: 37.8 PG (ref 27–31)
MCHC RBC AUTO-ENTMCNC: 34.4 G/DL (ref 32–36)
MCV RBC AUTO: 110 FL (ref 82–98)
MONOCYTES # BLD AUTO: 0.3 K/UL (ref 0.3–1)
MONOCYTES NFR BLD: 5.4 % (ref 4–15)
NEUTROPHILS # BLD AUTO: 4 K/UL (ref 1.8–7.7)
NEUTROPHILS NFR BLD: 87.1 % (ref 38–73)
NRBC BLD-RTO: 0 /100 WBC
OB PNL STL: POSITIVE
PHOSPHATE SERPL-MCNC: 3.5 MG/DL (ref 2.7–4.5)
PLATELET # BLD AUTO: 21 K/UL (ref 150–450)
PLATELET BLD QL SMEAR: ABNORMAL
PMV BLD AUTO: 12.6 FL (ref 9.2–12.9)
POCT GLUCOSE: 124 MG/DL (ref 70–110)
POCT GLUCOSE: 150 MG/DL (ref 70–110)
POCT GLUCOSE: 169 MG/DL (ref 70–110)
POCT GLUCOSE: 84 MG/DL (ref 70–110)
POTASSIUM SERPL-SCNC: 3.8 MMOL/L (ref 3.5–5.1)
PROT SERPL-MCNC: 5.1 G/DL (ref 6–8.4)
RBC # BLD AUTO: 2.86 M/UL (ref 4–5.4)
SODIUM SERPL-SCNC: 140 MMOL/L (ref 136–145)
WBC # BLD AUTO: 4.62 K/UL (ref 3.9–12.7)
WBC #/AREA STL HPF: NORMAL /[HPF]

## 2022-10-26 PROCEDURE — 25000003 PHARM REV CODE 250: Performed by: INTERNAL MEDICINE

## 2022-10-26 PROCEDURE — 87045 FECES CULTURE AEROBIC BACT: CPT | Performed by: INTERNAL MEDICINE

## 2022-10-26 PROCEDURE — 63600175 PHARM REV CODE 636 W HCPCS: Performed by: INTERNAL MEDICINE

## 2022-10-26 PROCEDURE — 99233 SBSQ HOSP IP/OBS HIGH 50: CPT | Mod: ,,, | Performed by: INTERNAL MEDICINE

## 2022-10-26 PROCEDURE — 80053 COMPREHEN METABOLIC PANEL: CPT | Performed by: INTERNAL MEDICINE

## 2022-10-26 PROCEDURE — 85025 COMPLETE CBC W/AUTO DIFF WBC: CPT | Performed by: INTERNAL MEDICINE

## 2022-10-26 PROCEDURE — 36415 COLL VENOUS BLD VENIPUNCTURE: CPT | Performed by: INTERNAL MEDICINE

## 2022-10-26 PROCEDURE — 94761 N-INVAS EAR/PLS OXIMETRY MLT: CPT

## 2022-10-26 PROCEDURE — 20600001 HC STEP DOWN PRIVATE ROOM

## 2022-10-26 PROCEDURE — 87329 GIARDIA AG IA: CPT | Performed by: INTERNAL MEDICINE

## 2022-10-26 PROCEDURE — 99233 PR SUBSEQUENT HOSPITAL CARE,LEVL III: ICD-10-PCS | Mod: ,,, | Performed by: INTERNAL MEDICINE

## 2022-10-26 PROCEDURE — 82272 OCCULT BLD FECES 1-3 TESTS: CPT | Performed by: INTERNAL MEDICINE

## 2022-10-26 PROCEDURE — 87427 SHIGA-LIKE TOXIN AG IA: CPT | Performed by: INTERNAL MEDICINE

## 2022-10-26 PROCEDURE — 87209 SMEAR COMPLEX STAIN: CPT | Performed by: INTERNAL MEDICINE

## 2022-10-26 PROCEDURE — 83735 ASSAY OF MAGNESIUM: CPT | Performed by: INTERNAL MEDICINE

## 2022-10-26 PROCEDURE — 97535 SELF CARE MNGMENT TRAINING: CPT

## 2022-10-26 PROCEDURE — 87425 ROTAVIRUS AG IA: CPT | Performed by: INTERNAL MEDICINE

## 2022-10-26 PROCEDURE — 84100 ASSAY OF PHOSPHORUS: CPT | Performed by: INTERNAL MEDICINE

## 2022-10-26 PROCEDURE — 97165 OT EVAL LOW COMPLEX 30 MIN: CPT

## 2022-10-26 PROCEDURE — 89055 LEUKOCYTE ASSESSMENT FECAL: CPT | Performed by: INTERNAL MEDICINE

## 2022-10-26 PROCEDURE — 87046 STOOL CULTR AEROBIC BACT EA: CPT | Mod: 59 | Performed by: INTERNAL MEDICINE

## 2022-10-26 RX ORDER — LOPERAMIDE HYDROCHLORIDE 2 MG/1
2 CAPSULE ORAL 4 TIMES DAILY PRN
Status: DISCONTINUED | OUTPATIENT
Start: 2022-10-26 | End: 2022-10-28 | Stop reason: HOSPADM

## 2022-10-26 RX ORDER — INSULIN ASPART 100 [IU]/ML
7 INJECTION, SOLUTION INTRAVENOUS; SUBCUTANEOUS
Status: DISCONTINUED | OUTPATIENT
Start: 2022-10-26 | End: 2022-10-28 | Stop reason: HOSPADM

## 2022-10-26 RX ORDER — FLASH GLUCOSE SENSOR
KIT MISCELLANEOUS
Qty: 1 KIT | Status: SHIPPED | OUTPATIENT
Start: 2022-10-26 | End: 2022-11-14

## 2022-10-26 RX ORDER — BLOOD-GLUCOSE,RECEIVER,CONT
EACH MISCELLANEOUS
Qty: 1 EACH | Status: SHIPPED | OUTPATIENT
Start: 2022-10-26

## 2022-10-26 RX ORDER — BLOOD-GLUCOSE TRANSMITTER
EACH MISCELLANEOUS
Qty: 1 EACH | Status: SHIPPED | OUTPATIENT
Start: 2022-10-26 | End: 2023-02-24 | Stop reason: SDUPTHER

## 2022-10-26 RX ORDER — BLOOD-GLUCOSE SENSOR
EACH MISCELLANEOUS
Qty: 1 EACH | Status: SHIPPED | OUTPATIENT
Start: 2022-10-26 | End: 2022-12-12 | Stop reason: SDUPTHER

## 2022-10-26 RX ADMIN — INSULIN ASPART 7 UNITS: 100 INJECTION, SOLUTION INTRAVENOUS; SUBCUTANEOUS at 05:10

## 2022-10-26 RX ADMIN — LOPERAMIDE HYDROCHLORIDE 2 MG: 2 CAPSULE ORAL at 09:10

## 2022-10-26 RX ADMIN — FAMOTIDINE 20 MG: 20 TABLET ORAL at 08:10

## 2022-10-26 RX ADMIN — PREDNISONE 30 MG: 20 TABLET ORAL at 08:10

## 2022-10-26 RX ADMIN — INSULIN ASPART 7 UNITS: 100 INJECTION, SOLUTION INTRAVENOUS; SUBCUTANEOUS at 03:10

## 2022-10-26 RX ADMIN — AMLODIPINE BESYLATE 2.5 MG: 2.5 TABLET ORAL at 08:10

## 2022-10-26 RX ADMIN — DIBASIC SODIUM PHOSPHATE, MONOBASIC POTASSIUM PHOSPHATE AND MONOBASIC SODIUM PHOSPHATE 2 TABLET: 852; 155; 130 TABLET ORAL at 08:10

## 2022-10-26 NOTE — PLAN OF CARE
SSC attempted scheduling follow up with Dr. Lim for patient,  stated she will reach out with a message for Raphael to schedule patient follow up appointment.

## 2022-10-26 NOTE — PROGRESS NOTES
Hospital Medicine  Progress note    [facility]: [facility] Adela Kaur MD  Admit Date: 10/23/2022    Principal Problem:  HHNC (hyperglycemic hyperosmolar nonketotic coma)    Interval hx:  PAtient with diarrhea. Described as loose 2-3 times a day. Patient drinking minimally    ROS   Respiratory: neg for cough neg for shortness of breath  Cardiovascular: neg for chest pain neg for palpitations  Gastrointestinal: neg for nausea neg for vomiting, neg for abdominal pain neg for diarrhea neg for constipation   Behavioral/Psych: neg for depression neg for anxiety    PEx  Temp:  [97.4 °F (36.3 °C)-97.7 °F (36.5 °C)]   Pulse:  [56-62]   Resp:  [15-18]   BP: (133-168)/(66-85)   SpO2:  [96 %-100 %]     Intake/Output Summary (Last 24 hours) at 10/26/2022 0811  Last data filed at 10/26/2022 0403  Gross per 24 hour   Intake 2499.05 ml   Output --   Net 2499.05 ml       General Appearance: no acute distress, WD, elderly  Mouth: mucous membranes still dry  Heart: regular rate and rhythm, no heave  Respiratory: Normal respiratory effort, symmetric excursion, bilateral vesicular breath sounds   Abdomen: Soft, non-tender; bowel sounds active  Skin: intact, no rash, no ulcers, still tenting  Neurologic:  No focal numbness or weakness  Mental status: Alert, oriented x 4, affect appropriate     Recent Labs   Lab 10/23/22  1251 10/23/22  1715 10/24/22  1200 10/25/22  0753   WBC 9.68  --  8.06 7.25   HGB 11.0*  --  11.2* 10.9*   HCT 32.8* 30* 32.7* 33.1*   PLT 43*  --  32* 37*       Recent Labs   Lab 10/23/22  1155 10/23/22  1711 10/24/22  0748 10/24/22  1200 10/25/22  0753   * 137 139 134* 134*   K 5.6* 4.8 5.8* 4.6 4.3    114* 119* 112* 114*   CO2 10* 12* 15* 15* 16*   BUN 59* 55* 47* 44* 33*   CREATININE 1.7* 1.5* 1.1 1.1 0.7   * 492* 186* 199* 125*   CALCIUM 9.1 8.2* 8.4* 8.9 8.2*   MG  --   --  1.8  --  1.7   PHOS  --  2.6* 2.5*  --  3.1   LIPASE 36  --   --   --   --        Recent Labs   Lab 10/23/22  7710  10/24/22  0748 10/24/22  1200 10/25/22  0753   ALKPHOS 119  --  109 95   ALT 56*  --  52* 48*   AST 29  --  36 46*   ALBUMIN 2.4* 1.7* 2.1* 1.9*   PROT 6.1  --  5.6* 5.1*   BILITOT 3.8*  --  2.9* 2.2*   INR  --   --   --  1.5*          Recent Labs   Lab 10/24/22  2130 10/25/22  0905 10/25/22  1319 10/25/22  1753 10/25/22  2206 10/26/22  0802   POCTGLUCOSE 247* 109 131* 150* 232* 84         Scheduled Meds:   amLODIPine  2.5 mg Oral Daily    brimonidine 0.2%  1 drop Right Eye BID    dorzolamide  1 drop Right Eye BID    enoxaparin  30 mg Subcutaneous Daily    famotidine  20 mg Oral Daily    insulin aspart U-100  5 Units Subcutaneous TIDWM    insulin detemir U-100  15 Units Subcutaneous QHS    k phos di & mono-sod phos mono  2 tablet Oral TID WM    latanoprost  1 drop Both Eyes Daily    predniSONE  30 mg Oral Daily    timolol maleate 0.5%  1 drop Right Eye BID     Continuous Infusions:      As Needed:  dextrose 10%, dextrose 10%, glucagon (human recombinant), glucose, glucose, insulin aspart U-100, melatonin, sodium chloride 0.9%    Assessment and Plan  / Problems managed today    * HHNC (hyperglycemic hyperosmolar nonketotic coma)  Dehydration  Acute kidney injury  Steroid induced hyperglycemia  Insulin drip  IVFs  Evolemic  Concern for diarrhea >> oral fluid intake - 1 L fluid    Abnormal urinalysis  Ceftriaxone emprically started  Culture resulted in multiple organisms  Stop ceftriaxone due to development of diarrhea and non-diagnostic result of urine culture.    Other specified glaucoma  Continue eye drops    Autoimmune hepatitis  Prednisone taper - currently at prednisone 30 mg daily  azathioprine 50 mg daily - hold due to thrombocytopenia  Discussed above with hepatology    Primary hypertension  Amlodipine 2.5 mg daily    Discharge Planning   MELVI: 10/26/2022     Code Status: Full Code   Is the patient medically ready for discharge?:     Reason for patient still in hospital (select all that apply): Patient trending  condition and Treatment  Discharge Plan A: Home with family        Diet:  regular diet  GI PPx: protonix  DVT PPx:  enoxaparin  Airways: room air  Wounds: none    Goals of Care:  Return to prior functional status       Time (minutes) spent in care of the patient (Greater than 1/2 spent in direct face-to-face contact and care coordination on unit)  35 min    Adela Kaur MD

## 2022-10-26 NOTE — PT/OT/SLP EVAL
"Occupational Therapy   Evaluation    Name: Radha Feng  MRN: [id number]  Admitting Diagnosis:  HHNC (hyperglycemic hyperosmolar nonketotic coma)  Recent Surgery: * No surgery found *      Recommendations:     Discharge Recommendations: outpatient OT  Discharge Equipment Recommendations:  walker, rolling, wheelchair  Barriers to discharge:  None    Assessment:     Radha Feng is a 81 y.o. female with a medical diagnosis of HHNC (hyperglycemic hyperosmolar nonketotic coma).  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 Performance deficits affecting function: weakness, impaired endurance, impaired self care skills, impaired functional mobility, gait instability, impaired balance, impaired cardiopulmonary response to activity.  Patient would benefit from continued skilled acute OT 4x/wk to improve functional mobility, increase independence with ADLs, and address established goals. Recommending HHOT  once medically appropriate for discharge to increase maximal independence, reduce burden of care, and ensure safety.       Rehab Prognosis: Good; patient would benefit from acute skilled OT services to address these deficits and reach maximum level of function.       Plan:     Patient to be seen 4 x/week to address the above listed problems via self-care/home management, therapeutic activities, therapeutic exercises  Plan of Care Expires: 11/23/22  Plan of Care Reviewed with: patient, daughter    Subjective   " I'll haver to get my neighbor to help me get her inside the house"  Chief Complaint: persistent loose stool  Patient/Family Comments/goals: to return home with increased ADL independence.    Occupational Profile:  Living Environment: Lives with RN daughter in  house with 6 ARIEL, B rails in back.  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    Pain/Comfort:  Pain Rating 1: 0/10  Pain Rating Post-Intervention 1: 0/10    Patients cultural, spiritual, Jain conflicts given the current situation: no    Objective:     Communicated with: nurse and daughter prior to session.  Patient found HOB elevated with peripheral IV upon OT entry to room.    General Precautions: Standard, fall   Orthopedic Precautions:N/A   Braces: N/A  Respiratory Status: Room air    Occupational Performance:    Bed Mobility:    Patient completed Rolling/Turning to Right with minimum assistance  Patient completed Scooting/Bridging with minimum assistance  Patient completed Supine to Sit with minimum assistance  Patient completed Sit to Supine with minimum assistance    Functional Mobility/Transfers:  Patient completed Sit <> Stand Transfer with minimum assistance  with  rolling walker   Patient completed Toilet Transfer Step Transfer technique with minimum assistance with  rolling walker  Functional Mobility: Pt able to functionally ambulate around room with RW and CGA for balance. Pt had shuffle gait but no LOB but fatigue after reaching door.    Activities of Daily Living:  Grooming: supervision for hair  Upper Body Dressing: minimum assistance for tie in back of gown.  Lower Body Dressing: maximal assistance to don socks secondary to legs "feeling heavy" and unable to cross over leg or bend over to don at EOB.  Toileting: maximal assistance for clothing mgmt and hygiene using BSC.    Cognitive/Visual Perceptual:  Cognitive/Psychosocial Skills:     -       Oriented to: Person, Place, Time, and Situation   -       Follows Commands/attention:Follows multistep  commands  -       Communication: clear/fluent  -       Memory: No Deficits noted  -       Safety awareness/insight to disability: intact   -       Mood/Affect/Coping skills/emotional control: Pleasant  Visual/Perceptual:    "   -Intact WNL    Physical Exam:  Balance: -       WFL sitting EOB and static/dynamic stand.  Postural examination/scapula alignment:    -       Rounded shoulders  -       Forward head  Skin integrity: Visible skin intact  Edema:  Moderate BLE's  Sensation:    -       Intact  Motor Planning: -       WFL  Dominant hand: -       Left  Upper Extremity Range of Motion:     -       Right Upper Extremity: WFL  -       Left Upper Extremity: WFL  Upper Extremity Strength:    -       Right Upper Extremity: WFL  -       Left Upper Extremity: WFL   Strength:    -       Right Upper Extremity: WFL  -       Left Upper Extremity: WFL  Fine Motor Coordination:    -       Intact  Neurological: -       WNL    AMPAC 6 Click ADL:  AMPA Total Score: 15    Treatment & Education:  Role of OT and POC  Safety  ADL retraining  Functional mobility training  Discharge planning  Importance of EOB/OOB activity    Patient left HOB elevated with call button in reach, nurse notified, and daughter present    GOALS:   Multidisciplinary Problems       Occupational Therapy Goals          Problem: Occupational Therapy    Goal Priority Disciplines Outcome Interventions   Occupational Therapy Goal     OT, PT/OT Ongoing, Progressing    Description: Goals to be met by: 11/9/22    Patient will increase functional independence with ADLs by performing:    UE Dressing with Supervision.  LE Dressing with Minimal Assistance.  Grooming while seated at sink with Supervision.  Toileting from bedside commode with Minimal Assistance for hygiene and clothing management.   Supine to sit with Supervision.                         History:     Past Medical History:   Diagnosis Date    Cataract     Diabetes mellitus     Glaucoma     Hypertension          Past Surgical History:   Procedure Laterality Date    CATARACT EXTRACTION W/  INTRAOCULAR LENS IMPLANT Left 12/21/2021    Procedure: EXTRACTION, CATARACT, WITH IOL INSERTION;  Surgeon: Shay Chou MD;   Location: [facility];  Service: Ophthalmology;  Laterality: Left;       Time Tracking:     OT Date of Treatment: 10/26/22  OT Start Time: 1112  OT Stop Time: 1206  OT Total Time (min): 76    Billable Minutes:Evaluation 8  Self Care/Home Management 68    10/26/2022

## 2022-10-26 NOTE — ASSESSMENT & PLAN NOTE
Ceftriaxone emprically started  Culture resulted in multiple organisms  Stop ceftriaxone due to development of diarrhea and non-diagnostic result of urine culture.

## 2022-10-26 NOTE — PLAN OF CARE
10/26/22 0958   Post-Acute Status   Post-Acute Authorization HME   HME Status Referrals Sent     The SW placed a secure chat to Deloris with [facility] regarding the patient's walker and wheelchair.     The SW placed a message to the patient's MD regarding the patient's referral for outpatient PT/OT.       11:13 AM  Deloris informed the SW that the patient's daughter declined the walker.     1:06 PM  The SW placed a secure chat to the patient's MD regarding the patient's referral for outpatient PT/OT.    The SW will continue to follow.     Alec Carrero LMSW  Case Management [facility]  Ext: [phone]

## 2022-10-26 NOTE — PLAN OF CARE
SSC met with patient/family at bedside. Patient experience rounding completed and reviewed the following.     Do you know your discharge plan? Yes      If yes, what is the plan? Home with family    If you are discharging home, do you have help at home? Yes, Daughter and grandson    Do you think you will need help at home at discharge? Yes, WheelChair    Have you discussed your needs and preferences with your SW/CM? Yes      Assigned SW/CM notified of any patient/family needs or concerns.

## 2022-10-26 NOTE — PLAN OF CARE
Problem: Occupational Therapy  Goal: Occupational Therapy Goal  Description: Goals to be met by: 11/9/22    Patient will increase functional independence with ADLs by performing:    UE Dressing with Supervision.  LE Dressing with Minimal Assistance.  Grooming while seated at sink with Supervision.  Toileting from bedside commode with Minimal Assistance for hygiene and clothing management.   Supine to sit with Supervision.    10/26/2022 1214 by Mitali Figueroa OT  Outcome: Ongoing, Progressing  10/26/2022 1208 by Mitali Figueroa, OT  Outcome: Ongoing, Progressing  Pts goals are set.

## 2022-10-26 NOTE — PROGRESS NOTES
Hospital Medicine  Progress note    Team: [facility] Adela Kaur MD  Admit Date: 10/23/2022    Principal Problem:  HHNC (hyperglycemic hyperosmolar nonketotic coma)    Interval hx:  Patient with diarrhea but has improved since onset over a week ago. C diff negative. Appetite poor but oral fluid intake has dramatically improved.     ROS   Respiratory: neg for cough neg for shortness of breath  Cardiovascular: neg for chest pain neg for palpitations  Gastrointestinal: neg for nausea neg for vomiting, neg for abdominal pain neg for diarrhea neg for constipation   Behavioral/Psych: neg for depression neg for anxiety    PEx  Temp:  [97.5 °F (36.4 °C)-97.7 °F (36.5 °C)]   Pulse:  [56-94]   Resp:  [15-18]   BP: (133-168)/()   SpO2:  [96 %-100 %]     Intake/Output Summary (Last 24 hours) at 10/26/2022 1742  Last data filed at 10/26/2022 0403  Gross per 24 hour   Intake 1542.87 ml   Output --   Net 1542.87 ml       General Appearance: no acute distress, WD, elderly  Mouth: mucous membranes moist  Heart: regular rate and rhythm, no heave  Respiratory: Normal respiratory effort, symmetric excursion, bilateral vesicular breath sounds   Abdomen: Soft, non-tender; bowel sounds active  Skin: intact, no rash, no ulcers, no longer tenting and somewhat   Neurologic:  No focal numbness or weakness  Mental status: Alert, oriented x 4, affect appropriate     Recent Labs   Lab 10/24/22  1200 10/25/22  0753 10/26/22  0739   WBC 8.06 7.25 4.62   HGB 11.2* 10.9* 10.8*   HCT 32.7* 33.1* 31.4*   PLT 32* 37* 21*       Recent Labs   Lab 10/23/22  1155 10/23/22  1711 10/24/22  0748 10/24/22  1200 10/25/22  0753 10/26/22  0739   *   < > 139 134* 134* 140   K 5.6*   < > 5.8* 4.6 4.3 3.8      < > 119* 112* 114* 118*   CO2 10*   < > 15* 15* 16* 11*   BUN 59*   < > 47* 44* 33* 29*   CREATININE 1.7*   < > 1.1 1.1 0.7 0.7   *   < > 186* 199* 125* 90   CALCIUM 9.1   < > 8.4* 8.9 8.2* 8.1*   MG  --   --  1.8  --  1.7 1.6    PHOS  --    < > 2.5*  --  3.1 3.5   LIPASE 36  --   --   --   --   --     < > = values in this interval not displayed.       Recent Labs   Lab 10/24/22  1200 10/25/22  0753 10/26/22  0739   ALKPHOS 109 95 110   ALT 52* 48* 51*   AST 36 46* 48*   ALBUMIN 2.1* 1.9* 1.9*   PROT 5.6* 5.1* 5.1*   BILITOT 2.9* 2.2* 2.1*   INR  --  1.5*  --           Recent Labs   Lab 10/24/22  2130 10/25/22  0905 10/25/22  1319 10/25/22  1753 10/25/22  2206 10/26/22  0802   POCTGLUCOSE 247* 109 131* 150* 232* 84         Scheduled Meds:   amLODIPine  2.5 mg Oral Daily    brimonidine 0.2%  1 drop Right Eye BID    dorzolamide  1 drop Right Eye BID    famotidine  20 mg Oral Daily    insulin aspart U-100  7 Units Subcutaneous TIDWM    insulin detemir U-100  12 Units Subcutaneous QHS    latanoprost  1 drop Both Eyes Daily    predniSONE  30 mg Oral Daily    timolol maleate 0.5%  1 drop Right Eye BID     Continuous Infusions:      As Needed:  dextrose 10%, dextrose 10%, glucagon (human recombinant), glucose, glucose, insulin aspart U-100, loperamide, melatonin, sodium chloride 0.9%    Assessment and Plan  / Problems managed today    * HHNC (hyperglycemic hyperosmolar nonketotic coma)  Dehydration  Acute kidney injury  Steroid induced hyperglycemia  Insulin drip  Resuscitated with IV fluids - euvolemic  Encourage oral fluid intake of at least 8 cups a day  levemir 10 units subq qhs and novolog 5 units subq TID    Thrombocytopenia  platelets peaked at 100s during stay last admission  She was started on imuran on 10/6/2022  Hold imuran per hepatology  Will get hematology to evaluate in AM if thrombocytopenia not improved    Abnormal urinalysis  Ceftriaxone emprically started  Culture resulted in multiple organisms  Stop ceftriaxone due to development of diarrhea and non-diagnostic result of urine culture.    Other specified glaucoma  Continue eye drops    Autoimmune hepatitis  Prednisone taper - currently at prednisone 30 mg daily. Will keep at  current dose given azathioprine needs to be held  azathioprine 50 mg daily - hold due to thrombocytopenia  Discussed above with hepatology    Primary hypertension  Amlodipine 2.5 mg daily    Discharge Planning   MELVI: 10/28/2022     Code Status: Full Code   Is the patient medically ready for discharge?:     Reason for patient still in hospital (select all that apply): Patient trending condition and Treatment  Discharge Plan A: Home with family        Diet:  regular diet  GI PPx: protonix  DVT PPx:  enoxaparin  Airways: room air  Wounds: none    Goals of Care:  Return to prior functional status       Time (minutes) spent in care of the patient (Greater than 1/2 spent in direct face-to-face contact and care coordination on unit)  35 min    Adela Kaur MD

## 2022-10-26 NOTE — ASSESSMENT & PLAN NOTE
Acute drop noted, no bleeding, sebastian 21K. 27K today.  She was started on imuran on 10/6/2022  Hold imuran per hepatology, trend.   Hematology recs reviewed.

## 2022-10-26 NOTE — PLAN OF CARE
Patient aao today, one person assist to the bedside commode. Patient daughter at the bedside, VERY helpful with patient care. BG controlled.  Home eye drops utilized due to cost per patients request. Diarrhea noted this am, CDIFF negative. Stool studies sent, one dose of imodium. No more BMs today. Plt 21 today, will monitor one more day, possible dc tomorrow.

## 2022-10-26 NOTE — PLAN OF CARE
"- Patient admitted 10/23/22 with hyperosmolar hyperglycemia (), lactic acidosis, dehydration, and weakness.  - Patient has a recent diagnosis of autoimmune hepatitis and is currently on a steroid taper.  - No acute events overnight.  - BG monitored AC/HS. BG= 232 at bedtime. Novolog and Levemir administered.  - Patient received IV fluids (NS at 100 mL/hr) overnight per order until ~0400 due to ongoing c/f dehydration.  - Patient is up with assistance to bedside commode and has daughter at bedside - attentive to needs. Daughter states patient had 3 BMs overnight. C-diff sample collected yesterday afternoon - resulted negative this morning.  - Eye drops documented as "family refused" per request of daughter - as some of these are very expensive, she prefers to utilize existing home supply of the same eye drops.  - Possible discharge today.  "

## 2022-10-26 NOTE — PROGRESS NOTES
C-diff testing has resulted negative. Isolation materials taken down per protocol. Patient continues to have diarrhea with BM occurrence every 2-3 hours overnight.     Latest Reference Range & Units 10/25/22 13:57   C. diff Antigen Negative  Negative   C difficile Toxins A+B, EIA Negative  Negative

## 2022-10-27 LAB
ALBUMIN SERPL BCP-MCNC: 1.7 G/DL (ref 3.5–5.2)
ALP SERPL-CCNC: 104 U/L (ref 55–135)
ALT SERPL W/O P-5'-P-CCNC: 47 U/L (ref 10–44)
ANION GAP SERPL CALC-SCNC: 9 MMOL/L (ref 8–16)
ANISOCYTOSIS BLD QL SMEAR: SLIGHT
AST SERPL-CCNC: 42 U/L (ref 10–40)
BASOPHILS # BLD AUTO: 0 K/UL (ref 0–0.2)
BASOPHILS NFR BLD: 0 % (ref 0–1.9)
BILIRUB SERPL-MCNC: 1.9 MG/DL (ref 0.1–1)
BUN SERPL-MCNC: 28 MG/DL (ref 8–23)
BURR CELLS BLD QL SMEAR: ABNORMAL
CALCIUM SERPL-MCNC: 7.7 MG/DL (ref 8.7–10.5)
CHLORIDE SERPL-SCNC: 116 MMOL/L (ref 95–110)
CO2 SERPL-SCNC: 13 MMOL/L (ref 23–29)
CREAT SERPL-MCNC: 0.7 MG/DL (ref 0.5–1.4)
CRYPTOSP AG STL QL IA: NEGATIVE
DIFFERENTIAL METHOD: ABNORMAL
EOSINOPHIL # BLD AUTO: 0 K/UL (ref 0–0.5)
EOSINOPHIL NFR BLD: 0.8 % (ref 0–8)
ERYTHROCYTE [DISTWIDTH] IN BLOOD BY AUTOMATED COUNT: 16.4 % (ref 11.5–14.5)
EST. GFR  (NO RACE VARIABLE): >60 ML/MIN/1.73 M^2
G LAMBLIA AG STL QL IA: NEGATIVE
GLUCOSE SERPL-MCNC: 113 MG/DL (ref 70–110)
HCT VFR BLD AUTO: 30.2 % (ref 37–48.5)
HGB BLD-MCNC: 10.4 G/DL (ref 12–16)
IMM GRANULOCYTES # BLD AUTO: 0.02 K/UL (ref 0–0.04)
IMM GRANULOCYTES NFR BLD AUTO: 0.4 % (ref 0–0.5)
LYMPHOCYTES # BLD AUTO: 0.3 K/UL (ref 1–4.8)
LYMPHOCYTES NFR BLD: 6.2 % (ref 18–48)
MAGNESIUM SERPL-MCNC: 1.5 MG/DL (ref 1.6–2.6)
MCH RBC QN AUTO: 37.3 PG (ref 27–31)
MCHC RBC AUTO-ENTMCNC: 34.4 G/DL (ref 32–36)
MCV RBC AUTO: 108 FL (ref 82–98)
MONOCYTES # BLD AUTO: 0.3 K/UL (ref 0.3–1)
MONOCYTES NFR BLD: 7.1 % (ref 4–15)
NEUTROPHILS # BLD AUTO: 4.1 K/UL (ref 1.8–7.7)
NEUTROPHILS NFR BLD: 85.5 % (ref 38–73)
NRBC BLD-RTO: 0 /100 WBC
PHOSPHATE SERPL-MCNC: 2.7 MG/DL (ref 2.7–4.5)
PLATELET # BLD AUTO: 27 K/UL (ref 150–450)
PLATELET BLD QL SMEAR: ABNORMAL
PMV BLD AUTO: 12.7 FL (ref 9.2–12.9)
POCT GLUCOSE: 173 MG/DL (ref 70–110)
POCT GLUCOSE: 194 MG/DL (ref 70–110)
POCT GLUCOSE: 196 MG/DL (ref 70–110)
POCT GLUCOSE: 94 MG/DL (ref 70–110)
POIKILOCYTOSIS BLD QL SMEAR: SLIGHT
POLYCHROMASIA BLD QL SMEAR: ABNORMAL
POTASSIUM SERPL-SCNC: 3.8 MMOL/L (ref 3.5–5.1)
PROT SERPL-MCNC: 4.6 G/DL (ref 6–8.4)
RBC # BLD AUTO: 2.79 M/UL (ref 4–5.4)
RV AG STL QL IA.RAPID: NEGATIVE
SODIUM SERPL-SCNC: 138 MMOL/L (ref 136–145)
TARGETS BLD QL SMEAR: ABNORMAL
WBC # BLD AUTO: 4.82 K/UL (ref 3.9–12.7)

## 2022-10-27 PROCEDURE — 99232 SBSQ HOSP IP/OBS MODERATE 35: CPT | Mod: ,,, | Performed by: INTERNAL MEDICINE

## 2022-10-27 PROCEDURE — 94761 N-INVAS EAR/PLS OXIMETRY MLT: CPT

## 2022-10-27 PROCEDURE — 25000003 PHARM REV CODE 250: Performed by: INTERNAL MEDICINE

## 2022-10-27 PROCEDURE — 97530 THERAPEUTIC ACTIVITIES: CPT

## 2022-10-27 PROCEDURE — 36415 COLL VENOUS BLD VENIPUNCTURE: CPT | Performed by: INTERNAL MEDICINE

## 2022-10-27 PROCEDURE — 85025 COMPLETE CBC W/AUTO DIFF WBC: CPT | Performed by: INTERNAL MEDICINE

## 2022-10-27 PROCEDURE — 80053 COMPREHEN METABOLIC PANEL: CPT | Performed by: INTERNAL MEDICINE

## 2022-10-27 PROCEDURE — 83735 ASSAY OF MAGNESIUM: CPT | Performed by: INTERNAL MEDICINE

## 2022-10-27 PROCEDURE — 99232 PR SUBSEQUENT HOSPITAL CARE,LEVL II: ICD-10-PCS | Mod: ,,, | Performed by: INTERNAL MEDICINE

## 2022-10-27 PROCEDURE — 84100 ASSAY OF PHOSPHORUS: CPT | Performed by: INTERNAL MEDICINE

## 2022-10-27 PROCEDURE — 97110 THERAPEUTIC EXERCISES: CPT

## 2022-10-27 PROCEDURE — 20600001 HC STEP DOWN PRIVATE ROOM

## 2022-10-27 PROCEDURE — 63600175 PHARM REV CODE 636 W HCPCS: Performed by: INTERNAL MEDICINE

## 2022-10-27 PROCEDURE — 63600175 PHARM REV CODE 636 W HCPCS: Performed by: HOSPITALIST

## 2022-10-27 RX ORDER — MAGNESIUM SULFATE HEPTAHYDRATE 40 MG/ML
2 INJECTION, SOLUTION INTRAVENOUS ONCE
Status: COMPLETED | OUTPATIENT
Start: 2022-10-27 | End: 2022-10-27

## 2022-10-27 RX ADMIN — INSULIN ASPART 7 UNITS: 100 INJECTION, SOLUTION INTRAVENOUS; SUBCUTANEOUS at 03:10

## 2022-10-27 RX ADMIN — AMLODIPINE BESYLATE 2.5 MG: 2.5 TABLET ORAL at 09:10

## 2022-10-27 RX ADMIN — INSULIN ASPART 1 UNITS: 100 INJECTION, SOLUTION INTRAVENOUS; SUBCUTANEOUS at 09:10

## 2022-10-27 RX ADMIN — FAMOTIDINE 20 MG: 20 TABLET ORAL at 09:10

## 2022-10-27 RX ADMIN — MAGNESIUM SULFATE 2 G: 2 INJECTION INTRAVENOUS at 03:10

## 2022-10-27 RX ADMIN — PREDNISONE 30 MG: 20 TABLET ORAL at 09:10

## 2022-10-27 RX ADMIN — INSULIN ASPART 2 UNITS: 100 INJECTION, SOLUTION INTRAVENOUS; SUBCUTANEOUS at 03:10

## 2022-10-27 NOTE — PLAN OF CARE
Plan of care reviewed with the patient at the beginning of the shift. No diarrhea overnight. Fall precautions maintained. She is up with stand by assist. Pt remained free from falls and injury this shift. Bed locked in lowest position, side rails up x2, call light within reach. Instructed pt to call for assistance as needed. Pt verbalized understanding. Vitals stable. Pt afebrile overnight. Neutropenic precautions maintained. No acute issues overnight. Daughter remains at the bedside. Will continue to monitor. Tentative discharge today.

## 2022-10-27 NOTE — HPI
Mrs. Feng is an 80 yo woman with autoimmune hepatitis (on azathioprine and prednisone), T2DM, HTN, HLD presenting with hyperglycemia, abdominal pain and diarrhea. She has been on steroids and azathioprine for about a month for newly diagnosed autoimmune hepatitis. Since admission her hemoglobin has remained stable but her platelets continue to drop. She denies any bleeding or easy bruising outside of sites where labs are drawn. She notes her diarrhea and appetite are both improving. Denies any fever, chills, shortness of breath, cough, dizziness or rashes. She denies any recent vaccinations.  Hematology was consulted for evaluation of thrombocytopenia.    Of note, anemia workup during recent hospitalization may have shown some hemolysis, but not treatment was started as counts remained stable.

## 2022-10-27 NOTE — PT/OT/SLP PROGRESS
Occupational Therapy   Treatment    Name: Radha Feng  MRN: [id number]  Admitting Diagnosis:  HHNC (hyperglycemic hyperosmolar nonketotic coma)       Recommendations:     Discharge Recommendations: home health PT, home health OT  Discharge Equipment Recommendations:  wheelchair, walker, rolling  Barriers to discharge:  None    Assessment:     Radha Feng is a 81 y.o. female with a medical diagnosis of HHNC (hyperglycemic hyperosmolar nonketotic coma).  She presents with deconditioning, fatigue but good self pacing in session. Performance deficits affecting function are weakness, impaired endurance, impaired self care skills, visual deficits, impaired balance, impaired functional mobility, gait instability, decreased upper extremity function, decreased lower extremity function, decreased ROM, edema, decreased safety awareness.     Rehab Prognosis:  Good; patient would benefit from acute skilled OT services to address these deficits and reach maximum level of function.       Plan:     Patient to be seen 4 x/week to address the above listed problems via self-care/home management, therapeutic activities, therapeutic exercises  Plan of Care Expires: 11/23/22  Plan of Care Reviewed with: patient, daughter    Subjective     Pain/Comfort:  Pain Rating 1: 0/10    Objective:     Communicated with: tigre prior to session.  Patient found ambulatory in room/bernard  with daughter with peripheral IV upon OT entry to room.    General Precautions: Standard, fall   Orthopedic Precautions:N/A   Braces: N/A  Respiratory Status: Room air     Occupational Performance:     Bed Mobility:    Patient completed Scooting/Bridging with stand by assistance and contact guard assistance  Patient completed Sit to Supine with minimum assistance and moderate assistance fro BLE management and v/c for hand placement    Functional Mobility/Transfers:  Functional Mobility: Pt ambulated in room and hallway ~45 ft x2 with short standing rest break. Pt CGA  with close SBA for short distances    Activities of Daily Living:  Upper Body Dressing: minimum assistance and moderate assistance to don gown as robe in stance      [facility] 6 Click ADL: 15    Treatment & Education:  Pt educated on role of OT and POC.   Pt performing skills as listed above.     Patient left HOB elevated with all lines intact, call button in reach, nsg notified, and daughter present    GOALS:   Multidisciplinary Problems       Occupational Therapy Goals          Problem: Occupational Therapy    Goal Priority Disciplines Outcome Interventions   Occupational Therapy Goal     OT, PT/OT Ongoing, Progressing    Description: Goals to be met by: 11/9/22    Patient will increase functional independence with ADLs by performing:    UE Dressing with Supervision.  LE Dressing with Minimal Assistance.  Grooming while seated at sink with Supervision.  Toileting from bedside commode with Minimal Assistance for hygiene and clothing management.   Supine to sit with Supervision.                         Time Tracking:     OT Date of Treatment: 10/27/22  OT Start Time: 1808  OT Stop Time: 1823  OT Total Time (min): 15 min    Billable Minutes:Therapeutic Activity 15    OT/MANFRED: OT     MANFRED Visit Number: 0    10/27/2022

## 2022-10-27 NOTE — ASSESSMENT & PLAN NOTE
82 yo woman with newly diagnosed autoimmune hepatitis (on azathioprine and prednisone), T2DM, HTN, HLD presenting with steroid induced hyperglycemia found to have worsening thrombocytopenia. Anemia remains stable.  Previous work up done last month, showed elevated B12, normal folate, low fibrinogen, low haptoglobin, high D-dimer, high LDH, negative ROMA and high factor 8.   Thrombocytopenia likely secondary to autoimmune hepatitis and medication side effect from azathioprine. Although previous labs showed some component of hemolytic anemia, less likely contributing now due to stable hemoglobin.    -- Recommend continuing to hold azathioprine  -- Obtain LDH, haptoglobin, fibrinogen and coagulation labs to evaluate for other causes.  -- Will have patient follow up in clinic  -- Recommend holding off on vaccinations until platelet count improves

## 2022-10-27 NOTE — PLAN OF CARE
Problem: Occupational Therapy  Goal: Occupational Therapy Goal  Description: Goals to be met by: 11/9/22    Patient will increase functional independence with ADLs by performing:    UE Dressing with Supervision.  LE Dressing with Minimal Assistance.  Grooming while seated at sink with Supervision.  Toileting from bedside commode with Minimal Assistance for hygiene and clothing management.   Supine to sit with Supervision.    Outcome: Ongoing, Progressing   Pt progressing well towards OT goals, cont OT POC

## 2022-10-27 NOTE — SUBJECTIVE & OBJECTIVE
Oncology Treatment Plan:   [No matching plan found]    Medications:  Continuous Infusions:  Scheduled Meds:   amLODIPine  2.5 mg Oral Daily    brimonidine 0.2%  1 drop Right Eye BID    dorzolamide  1 drop Right Eye BID    famotidine  20 mg Oral Daily    insulin aspart U-100  7 Units Subcutaneous TIDWM    insulin detemir U-100  12 Units Subcutaneous QHS    latanoprost  1 drop Both Eyes Daily    predniSONE  30 mg Oral Daily    timolol maleate 0.5%  1 drop Right Eye BID     PRN Meds:dextrose 10%, dextrose 10%, glucagon (human recombinant), glucose, glucose, insulin aspart U-100, loperamide, melatonin, sodium chloride 0.9%     Review of patient's allergies indicates:  No Known Allergies     Past Medical History:   Diagnosis Date    Cataract     Diabetes mellitus     Glaucoma     Hypertension      Past Surgical History:   Procedure Laterality Date    CATARACT EXTRACTION W/  INTRAOCULAR LENS IMPLANT Left 12/21/2021    Procedure: EXTRACTION, CATARACT, WITH IOL INSERTION;  Surgeon: Shay Chou MD;  Location: [facility];  Service: Ophthalmology;  Laterality: Left;     Family History       Problem Relation (Age of Onset)    Blindness Cousin    Cancer Sister    Cataracts Mother    Diabetes Mother    Glaucoma Mother    Heart attack Father    Hypertension Mother          Tobacco Use    Smoking status: Former    Smokeless tobacco: Never   Substance and Sexual Activity    Alcohol use: Not Currently    Drug use: Never    Sexual activity: Not on file       Review of Systems   Constitutional:  Negative for activity change, chills and fever.   HENT:  Negative for sore throat.    Respiratory:  Negative for cough and shortness of breath.    Cardiovascular:  Positive for leg swelling. Negative for chest pain.   Gastrointestinal:  Positive for diarrhea. Negative for abdominal pain, blood in stool, constipation and vomiting.   Genitourinary:  Negative for dysuria and hematuria.   Musculoskeletal:  Negative for  arthralgias and myalgias.   Skin:  Negative for wound.   Neurological:  Negative for dizziness and headaches.   Psychiatric/Behavioral:  Negative for confusion.    Objective:     Vital Signs (Most Recent):  Temp: 97 °F (36.1 °C) (10/27/22 0908)  Pulse: 71 (10/27/22 0908)  Resp: 16 (10/27/22 0908)  BP: (!) 146/91 (10/27/22 0908)  SpO2: 95 % (10/27/22 0908)   Vital Signs (24h Range):  Temp:  [97 °F (36.1 °C)-98.1 °F (36.7 °C)] 97 °F (36.1 °C)  Pulse:  [58-94] 71  Resp:  [15-18] 16  SpO2:  [95 %-100 %] 95 %  BP: (122-162)/(69-91) 146/91     Weight: 60.8 kg (134 lb 1.6 oz)  Body mass index is 21.64 kg/m².  Body surface area is 1.68 meters squared.    No intake or output data in the 24 hours ending 10/27/22 1035    Physical Exam  Constitutional:       Appearance: Normal appearance.   HENT:      Head: Normocephalic and atraumatic.      Mouth/Throat:      Mouth: Mucous membranes are moist.   Eyes:      General: No scleral icterus.  Neck:      Thyroid: No thyromegaly.      Trachea: No tracheal deviation.   Cardiovascular:      Rate and Rhythm: Normal rate and regular rhythm.      Heart sounds: Murmur heard.   Pulmonary:      Effort: Pulmonary effort is normal.      Breath sounds: Normal breath sounds. No wheezing or rales.   Abdominal:      General: Bowel sounds are normal.      Palpations: Abdomen is soft. There is no mass.      Tenderness: There is no abdominal tenderness.   Musculoskeletal:      Right lower leg: Edema present.      Left lower leg: Edema present.   Skin:     General: Skin is warm and dry.      Coloration: Skin is not pale.      Findings: No bruising.   Neurological:      Mental Status: She is alert. Mental status is at baseline.       Significant Labs:   CBC:   Recent Labs   Lab 10/26/22  0739 10/27/22  0702   WBC 4.62 4.82   HGB 10.8* 10.4*   HCT 31.4* 30.2*   PLT 21*  --     and CMP:   Recent Labs   Lab 10/26/22  0739 10/27/22  0702    138   K 3.8 3.8   * 116*   CO2 11* 13*   GLU 90 113*    BUN 29* 28*   CREATININE 0.7 0.7   CALCIUM 8.1* 7.7*   PROT 5.1* 4.6*   ALBUMIN 1.9* 1.7*   BILITOT 2.1* 1.9*   ALKPHOS 110 104   AST 48* 42*   ALT 51* 47*   ANIONGAP 11 9       Diagnostic Results:  I have reviewed all pertinent imaging results/findings within the past 24 hours.

## 2022-10-27 NOTE — CONSULTS
[facility] - Transplant Stepdown  Hematology/Oncology  Consult Note    Patient Name: Radha Feng  MRN: [id number]  Admission Date: 10/23/2022  Hospital Length of Stay: 4 days  Code Status: Full Code   Attending Provider: Allyn Shelby MD  Consulting Provider: Qi Solis DO  Primary Care Physician: Primary Doctor No  Principal Problem:HHNC (hyperglycemic hyperosmolar nonketotic coma)    Inpatient consult to Hematology/Oncology  Consult performed by: Qi Solis DO  Consult ordered by: Adela Kaur MD        Subjective:     HPI:  Mrs. Feng is an 82 yo woman with autoimmune hepatitis (on azathioprine and prednisone), T2DM, HTN, HLD presenting with hyperglycemia, abdominal pain and diarrhea. She has been on steroids and azathioprine for about a month for newly diagnosed autoimmune hepatitis. Since admission her hemoglobin has remained stable but her platelets continue to drop. She denies any bleeding or easy bruising outside of sites where labs are drawn. She notes her diarrhea and appetite are both improving. Denies any fever, chills, shortness of breath, cough, dizziness or rashes. She denies any recent vaccinations.  Hematology was consulted for evaluation of thrombocytopenia.    Of note, anemia workup during recent hospitalization may have shown some hemolysis, but not treatment was started as counts remained stable.       Oncology Treatment Plan:   [No matching plan found]    Medications:  Continuous Infusions:  Scheduled Meds:   amLODIPine  2.5 mg Oral Daily    brimonidine 0.2%  1 drop Right Eye BID    dorzolamide  1 drop Right Eye BID    famotidine  20 mg Oral Daily    insulin aspart U-100  7 Units Subcutaneous TIDWM    insulin detemir U-100  12 Units Subcutaneous QHS    latanoprost  1 drop Both Eyes Daily    predniSONE  30 mg Oral Daily    timolol maleate 0.5%  1 drop Right Eye BID     PRN Meds:dextrose 10%, dextrose 10%, glucagon (human recombinant), glucose, glucose, insulin aspart  U-100, loperamide, melatonin, sodium chloride 0.9%     Review of patient's allergies indicates:  No Known Allergies     Past Medical History:   Diagnosis Date    Cataract     Diabetes mellitus     Glaucoma     Hypertension      Past Surgical History:   Procedure Laterality Date    CATARACT EXTRACTION W/  INTRAOCULAR LENS IMPLANT Left 12/21/2021    Procedure: EXTRACTION, CATARACT, WITH IOL INSERTION;  Surgeon: Shay Chou MD;  Location: [facility];  Service: Ophthalmology;  Laterality: Left;     Family History       Problem Relation (Age of Onset)    Blindness Cousin    Cancer Sister    Cataracts Mother    Diabetes Mother    Glaucoma Mother    Heart attack Father    Hypertension Mother          Tobacco Use    Smoking status: Former    Smokeless tobacco: Never   Substance and Sexual Activity    Alcohol use: Not Currently    Drug use: Never    Sexual activity: Not on file       Review of Systems   Constitutional:  Negative for activity change, chills and fever.   HENT:  Negative for sore throat.    Respiratory:  Negative for cough and shortness of breath.    Cardiovascular:  Positive for leg swelling. Negative for chest pain.   Gastrointestinal:  Positive for diarrhea. Negative for abdominal pain, blood in stool, constipation and vomiting.   Genitourinary:  Negative for dysuria and hematuria.   Musculoskeletal:  Negative for arthralgias and myalgias.   Skin:  Negative for wound.   Neurological:  Negative for dizziness and headaches.   Psychiatric/Behavioral:  Negative for confusion.    Objective:     Vital Signs (Most Recent):  Temp: 97 °F (36.1 °C) (10/27/22 0908)  Pulse: 71 (10/27/22 0908)  Resp: 16 (10/27/22 0908)  BP: (!) 146/91 (10/27/22 0908)  SpO2: 95 % (10/27/22 0908)   Vital Signs (24h Range):  Temp:  [97 °F (36.1 °C)-98.1 °F (36.7 °C)] 97 °F (36.1 °C)  Pulse:  [58-94] 71  Resp:  [15-18] 16  SpO2:  [95 %-100 %] 95 %  BP: (122-162)/(69-91) 146/91     Weight: 60.8 kg (134 lb 1.6 oz)  Body mass  index is 21.64 kg/m².  Body surface area is 1.68 meters squared.    No intake or output data in the 24 hours ending 10/27/22 1035    Physical Exam  Constitutional:       Appearance: Normal appearance.   HENT:      Head: Normocephalic and atraumatic.      Mouth/Throat:      Mouth: Mucous membranes are moist.   Eyes:      General: No scleral icterus.  Neck:      Thyroid: No thyromegaly.      Trachea: No tracheal deviation.   Cardiovascular:      Rate and Rhythm: Normal rate and regular rhythm.      Heart sounds: Murmur heard.   Pulmonary:      Effort: Pulmonary effort is normal.      Breath sounds: Normal breath sounds. No wheezing or rales.   Abdominal:      General: Bowel sounds are normal.      Palpations: Abdomen is soft. There is no mass.      Tenderness: There is no abdominal tenderness.   Musculoskeletal:      Right lower leg: Edema present.      Left lower leg: Edema present.   Skin:     General: Skin is warm and dry.      Coloration: Skin is not pale.      Findings: No bruising.   Neurological:      Mental Status: She is alert. Mental status is at baseline.       Significant Labs:   CBC:   Recent Labs   Lab 10/26/22  0739 10/27/22  0702   WBC 4.62 4.82   HGB 10.8* 10.4*   HCT 31.4* 30.2*   PLT 21*  --     and CMP:   Recent Labs   Lab 10/26/22  0739 10/27/22  0702    138   K 3.8 3.8   * 116*   CO2 11* 13*   GLU 90 113*   BUN 29* 28*   CREATININE 0.7 0.7   CALCIUM 8.1* 7.7*   PROT 5.1* 4.6*   ALBUMIN 1.9* 1.7*   BILITOT 2.1* 1.9*   ALKPHOS 110 104   AST 48* 42*   ALT 51* 47*   ANIONGAP 11 9       Diagnostic Results:  I have reviewed all pertinent imaging results/findings within the past 24 hours.    Assessment/Plan:     Thrombocytopenia  82 yo woman with newly diagnosed autoimmune hepatitis (on azathioprine and prednisone), T2DM, HTN, HLD presenting with steroid induced hyperglycemia found to have worsening thrombocytopenia. Anemia remains stable.  Previous work up done last month, showed elevated  B12, normal folate, low fibrinogen, low haptoglobin, high D-dimer, high LDH, negative ROMA and high factor 8.   Thrombocytopenia likely secondary to autoimmune hepatitis and medication side effect from azathioprine. Although previous labs showed some component of hemolytic anemia, less likely contributing now due to stable hemoglobin.    -- Recommend continuing to hold azathioprine  -- Obtain LDH, haptoglobin, fibrinogen and coagulation labs to evaluate for other causes.  -- Will have patient follow up in clinic  -- Recommend holding off on vaccinations until platelet count improves            Qi Solis, DO  Hematology/Oncology  [facility] - [facility]

## 2022-10-27 NOTE — HOSPITAL COURSE
82F with T2DM and newly diagnosed autoimmune hepatitis (on OP tx with azathioprine and high dose prednisone) was admitted with HHS, reversed with IVF and insulin. Hospital course pertinent for thrombocytopenia, prompting Hematology consult.

## 2022-10-27 NOTE — PT/OT/SLP PROGRESS
Physical Therapy Treatment    Patient Name:  Radha Feng   MRN:  [id number]    Recommendations:     Discharge Recommendations:  home health PT ((pt's family wants OPPT))   Discharge Equipment Recommendations: walker, rolling, wheelchair   Barriers to discharge: Inaccessible home    Assessment:     Radha Feng is a 81 y.o. female admitted with a medical diagnosis of HHNC (hyperglycemic hyperosmolar nonketotic coma).  She presents with the following impairments/functional limitations:  weakness, impaired endurance, impaired self care skills, impaired functional mobility, gait instability, impaired balance, decreased coordination, decreased upper extremity function, decreased lower extremity function, decreased safety awareness, decreased ROM, edema. Despite maximal and then maxAx2 assistance, pt was unable to complete more than 1 step in stair well. Pt's daughter reports her son is a  and can help them at home. Pt's daughter continues to want OPPT.     Rehab Prognosis: Good; patient would benefit from acute skilled PT services to address these deficits and reach maximum level of function.    Recent Surgery: * No surgery found *      Plan:     During this hospitalization, patient to be seen 4 x/week to address the identified rehab impairments via gait training, therapeutic activities, therapeutic exercises, neuromuscular re-education and progress toward the following goals:    Plan of Care Expires:  11/24/22    Subjective     Chief Complaint: BLE heaviness and weakness  Patient/Family Comments/goals: pt's daughter reporting they are going home today or tomorrow  Pain/Comfort:  Pain Rating 1: 0/10  Pain Rating Post-Intervention 1: 0/10      Objective:     Communicated with RN prior to session.  Patient found up in chair with peripheral IV upon PT entry to room.     General Precautions: Standard, fall   Orthopedic Precautions:N/A   Braces: N/A  Respiratory Status: Room air     Functional Mobility:  Transfers:      Sit to Stand:  moderate assistance with no AD and hand-held assist  Sit to Stand:  moderate assistance with no AD and hand-held assist  Sit to Stand:  minimum assistance with no AD and hand-held assist  Gait: 8 ft, 4 ft, 4 ft, no AD, HHA, Asmita; decreased gait speed, decreased step length, unsteadiness present  Balance:   Static Sitting: SBA  Dynamic Sitting: SBA-CGA  Static Standing: Asmita  Dynamic Standing: Asmita for ambulation  Stairs:  Pt ascended/descended  1 stair(s) with No Assistive Device with left handrail with Maximum Assistance x2. X3 trials with multiple attempts each trial. Pt with significant difficulty in clearing LEs on steps.      AM-PAC 6 CLICK MOBILITY  Turning over in bed (including adjusting bedclothes, sheets and blankets)?: 3  Sitting down on and standing up from a chair with arms (e.g., wheelchair, bedside commode, etc.): 3  Moving from lying on back to sitting on the side of the bed?: 3  Moving to and from a bed to a chair (including a wheelchair)?: 3  Need to walk in hospital room?: 3  Climbing 3-5 steps with a railing?: 1  Basic Mobility Total Score: 16       Treatment & Education:  Discussed exercises and ambulation to be performed at home. TE: hip flexion, knee extension, APs. Ambulation: to be performed with assistance from family and with RW.  Patient and daughter educated on role of therapy, goals of session, and benefits of mobilizing.   Discussed PT plan of care during hospitalization.   Patient and daughter educated on calling for assistance.   Patient and daughter educated on how BLE edema impacts their mobility within PT scope of practice.   Communication board up to date.  All questions answered within PT scope of practice.    Patient left up in chair with all lines intact, call button in reach, and pt's daughter present.    GOALS:   Multidisciplinary Problems       Physical Therapy Goals          Problem: Physical Therapy    Goal Priority Disciplines Outcome Goal Variances  Interventions   Physical Therapy Goal     PT, PT/OT Ongoing, Progressing     Description: PT goals to be met by: 11/6/22    Patient will perform supine <> sitting with supervision.  Patient will perform sit <> stand transitions with supervision using LRAD.  Patient will perform transfers from bed <> chair or BSC with supervision using LRAD.  Patient will ambulate 60 feet with supervision using LRAD.  Patient will ascend/descend 6 steps using handrails with stand by assistance.                       Time Tracking:     PT Received On: 10/27/22  PT Start Time: 1154     PT Stop Time: 1239  PT Total Time (min): 45 min     Billable Minutes: Therapeutic Activity 15 and Therapeutic Exercise 30    Treatment Type: Treatment  PT/PTA: PT     PTA Visit Number: 0     10/27/2022

## 2022-10-27 NOTE — PLAN OF CARE
Pt AAOx4. VSS, afebrile, on room air. Pt w/o complaints of pain. Pt on diabetic diet. 2 BM/shift. Blood glucose monitoring ACHS. Mg on AM labs 1.5, IV replacements administered. PT worked w/ PT. Pt OOBTC throughout shift, ambulates w/ 1 person assistance. Non-skid socks on pt when OOB. Chair wheels locked, call light within pt reach. Daughter at bedside, very attentive to pt & participating in care. Pt & daughter updated on plan of care.

## 2022-10-28 VITALS
HEIGHT: 66 IN | SYSTOLIC BLOOD PRESSURE: 135 MMHG | RESPIRATION RATE: 18 BRPM | WEIGHT: 134.13 LBS | TEMPERATURE: 98 F | DIASTOLIC BLOOD PRESSURE: 69 MMHG | HEART RATE: 62 BPM | OXYGEN SATURATION: 99 % | BODY MASS INDEX: 21.56 KG/M2

## 2022-10-28 PROBLEM — M25.561 RIGHT KNEE PAIN: Status: RESOLVED | Noted: 2022-09-20 | Resolved: 2022-10-28

## 2022-10-28 PROBLEM — E86.0 DEHYDRATION: Status: RESOLVED | Noted: 2022-09-21 | Resolved: 2022-10-28

## 2022-10-28 PROBLEM — N17.9 AKI (ACUTE KIDNEY INJURY): Status: RESOLVED | Noted: 2022-09-21 | Resolved: 2022-10-28

## 2022-10-28 PROBLEM — K75.4 AUTOIMMUNE HEPATITIS: Chronic | Status: ACTIVE | Noted: 2022-09-28

## 2022-10-28 PROBLEM — R82.90 ABNORMAL URINALYSIS: Status: RESOLVED | Noted: 2022-10-26 | Resolved: 2022-10-28

## 2022-10-28 PROBLEM — E11.01 HHNC (HYPERGLYCEMIC HYPEROSMOLAR NONKETOTIC COMA): Status: RESOLVED | Noted: 2022-10-23 | Resolved: 2022-10-28

## 2022-10-28 PROBLEM — M94.261 CHONDROMALACIA, KNEE, RIGHT: Status: ACTIVE | Noted: 2022-10-28

## 2022-10-28 LAB
ALBUMIN SERPL BCP-MCNC: 1.8 G/DL (ref 3.5–5.2)
ALP SERPL-CCNC: 104 U/L (ref 55–135)
ALT SERPL W/O P-5'-P-CCNC: 47 U/L (ref 10–44)
ANION GAP SERPL CALC-SCNC: 4 MMOL/L (ref 8–16)
AST SERPL-CCNC: 41 U/L (ref 10–40)
BASOPHILS # BLD AUTO: 0 K/UL (ref 0–0.2)
BASOPHILS NFR BLD: 0 % (ref 0–1.9)
BILIRUB SERPL-MCNC: 2.1 MG/DL (ref 0.1–1)
BUN SERPL-MCNC: 29 MG/DL (ref 8–23)
CALCIUM SERPL-MCNC: 7.8 MG/DL (ref 8.7–10.5)
CHLORIDE SERPL-SCNC: 115 MMOL/L (ref 95–110)
CO2 SERPL-SCNC: 17 MMOL/L (ref 23–29)
CREAT SERPL-MCNC: 0.7 MG/DL (ref 0.5–1.4)
DIFFERENTIAL METHOD: ABNORMAL
EOSINOPHIL # BLD AUTO: 0 K/UL (ref 0–0.5)
EOSINOPHIL NFR BLD: 0.2 % (ref 0–8)
ERYTHROCYTE [DISTWIDTH] IN BLOOD BY AUTOMATED COUNT: 16.5 % (ref 11.5–14.5)
EST. GFR  (NO RACE VARIABLE): >60 ML/MIN/1.73 M^2
GLUCOSE SERPL-MCNC: 114 MG/DL (ref 70–110)
HCT VFR BLD AUTO: 28.3 % (ref 37–48.5)
HGB BLD-MCNC: 10 G/DL (ref 12–16)
IMM GRANULOCYTES # BLD AUTO: 0.03 K/UL (ref 0–0.04)
IMM GRANULOCYTES NFR BLD AUTO: 0.7 % (ref 0–0.5)
LYMPHOCYTES # BLD AUTO: 0.4 K/UL (ref 1–4.8)
LYMPHOCYTES NFR BLD: 8.1 % (ref 18–48)
MAGNESIUM SERPL-MCNC: 1.9 MG/DL (ref 1.6–2.6)
MCH RBC QN AUTO: 38 PG (ref 27–31)
MCHC RBC AUTO-ENTMCNC: 35.3 G/DL (ref 32–36)
MCV RBC AUTO: 108 FL (ref 82–98)
MONOCYTES # BLD AUTO: 0.3 K/UL (ref 0.3–1)
MONOCYTES NFR BLD: 7.2 % (ref 4–15)
NEUTROPHILS # BLD AUTO: 3.7 K/UL (ref 1.8–7.7)
NEUTROPHILS NFR BLD: 83.8 % (ref 38–73)
NRBC BLD-RTO: 0 /100 WBC
PHOSPHATE SERPL-MCNC: 2.5 MG/DL (ref 2.7–4.5)
PLATELET # BLD AUTO: 28 K/UL (ref 150–450)
PLATELET BLD QL SMEAR: ABNORMAL
PMV BLD AUTO: 13.3 FL (ref 9.2–12.9)
POCT GLUCOSE: 200 MG/DL (ref 70–110)
POCT GLUCOSE: 207 MG/DL (ref 70–110)
POCT GLUCOSE: 65 MG/DL (ref 70–110)
POCT GLUCOSE: 87 MG/DL (ref 70–110)
POTASSIUM SERPL-SCNC: 3.8 MMOL/L (ref 3.5–5.1)
PROT SERPL-MCNC: 4.7 G/DL (ref 6–8.4)
RBC # BLD AUTO: 2.63 M/UL (ref 4–5.4)
SODIUM SERPL-SCNC: 136 MMOL/L (ref 136–145)
WBC # BLD AUTO: 4.44 K/UL (ref 3.9–12.7)

## 2022-10-28 PROCEDURE — 85025 COMPLETE CBC W/AUTO DIFF WBC: CPT | Performed by: INTERNAL MEDICINE

## 2022-10-28 PROCEDURE — 25000003 PHARM REV CODE 250: Performed by: HOSPITALIST

## 2022-10-28 PROCEDURE — 25000003 PHARM REV CODE 250: Performed by: INTERNAL MEDICINE

## 2022-10-28 PROCEDURE — 36415 COLL VENOUS BLD VENIPUNCTURE: CPT | Performed by: INTERNAL MEDICINE

## 2022-10-28 PROCEDURE — 63600175 PHARM REV CODE 636 W HCPCS: Performed by: INTERNAL MEDICINE

## 2022-10-28 PROCEDURE — 83735 ASSAY OF MAGNESIUM: CPT | Performed by: INTERNAL MEDICINE

## 2022-10-28 PROCEDURE — 84100 ASSAY OF PHOSPHORUS: CPT | Performed by: INTERNAL MEDICINE

## 2022-10-28 PROCEDURE — 80053 COMPREHEN METABOLIC PANEL: CPT | Performed by: INTERNAL MEDICINE

## 2022-10-28 RX ORDER — SODIUM,POTASSIUM PHOSPHATES 280-250MG
2 POWDER IN PACKET (EA) ORAL ONCE
Status: COMPLETED | OUTPATIENT
Start: 2022-10-28 | End: 2022-10-28

## 2022-10-28 RX ADMIN — AMLODIPINE BESYLATE 2.5 MG: 2.5 TABLET ORAL at 09:10

## 2022-10-28 RX ADMIN — POTASSIUM & SODIUM PHOSPHATES POWDER PACK 280-160-250 MG 2 PACKET: 280-160-250 PACK at 09:10

## 2022-10-28 RX ADMIN — INSULIN ASPART 7 UNITS: 100 INJECTION, SOLUTION INTRAVENOUS; SUBCUTANEOUS at 02:10

## 2022-10-28 RX ADMIN — INSULIN ASPART 2 UNITS: 100 INJECTION, SOLUTION INTRAVENOUS; SUBCUTANEOUS at 06:10

## 2022-10-28 RX ADMIN — PREDNISONE 30 MG: 20 TABLET ORAL at 09:10

## 2022-10-28 RX ADMIN — INSULIN ASPART 7 UNITS: 100 INJECTION, SOLUTION INTRAVENOUS; SUBCUTANEOUS at 06:10

## 2022-10-28 RX ADMIN — FAMOTIDINE 20 MG: 20 TABLET ORAL at 09:10

## 2022-10-28 NOTE — ASSESSMENT & PLAN NOTE
Patient with acute kidney injury likely due to IVVD/dehydration and pre-renal azotemia EFREN is currently improving. Labs reviewed- Renal function/electrolytes with Estimated Creatinine Clearance: 60 mL/min (based on SCr of 0.7 mg/dL). according to latest data. Monitor urine output and serial BMP and adjust therapy as needed. Avoid nephrotoxins and renally dose meds for GFR listed above.

## 2022-10-28 NOTE — PLAN OF CARE
2:21 PM    The SW met with the patient and her grandson and provided them with the patient's outpatient PT/OT orders. The patient contacted her daughter by phone. The SW spoke with the patient's daughter by phone regarding the outpatient PT/OT orders.  The patient's daughter reported that she had spoke with a navigator from [facility] regarding PT/OT outpatient. The patient's daughter informed the SW that the patient's orders need clarification.  The patient's daughter reported that the patient's diagnosis is chronic pain of the right knee which was the same in the orders from 9-23-22. The SW placed a secure chat to the patient's MD asking for clarification regarding the patient' outpatient PT/OT.       2:44 PM  The patient's MD responded Severe Osteoarthritis. The SW requested updated outpatient PT/OT from the patient's MD.     4:36 PM  The SW is still awaiting the patient's ambulatory referral for outpatient PT/OT. The patient's MD placed the order for out patient PT/OP but cancelled the orders. The SW placed the on call SW into the secure chat.     The SW will continue to follow.       Alec Carrero LMSW  Case Management [facility]  Ext: [phone]

## 2022-10-28 NOTE — PLAN OF CARE
10/28/22 1635   Post-Acute Status   Post-Acute Authorization Home Health   HME Status Patient declined/refused       The patient declined home health services .      Alec Carrero LMSW  Case Management [facility]  Ext: [phone]

## 2022-10-28 NOTE — PROGRESS NOTES
[facility] Hospital Medicine  Progress Note    Patient Name: Radha Feng  MRN: [id number]  Patient Class: IP- Inpatient   Admission Date: 10/23/2022  Length of Stay: 5 days  Attending Physician: Allyn Shelby MD  Primary Care Provider: Primary Doctor No        Subjective:     Principal Problem:HHNC (hyperglycemic hyperosmolar nonketotic coma)        HPI:  81-year-old female with a history of autoimmune hepatitis, hypertension, glaucoma, type 2 diabetes and cataract presenting with abdominal pain, diarrhea and hyperglycemia.  Patient's daughter brought her in for lethargy, weakness, fatigue, and confusion. She was recently admitted and discharged 1 week ago with autoimmune hepatitis. She was discharged on prednisone taper. While at home she has had elevated glucoses that persisted into 500s despite increasing glyburide and metformin.  Patient has been having several bouts of nonbloody diarrhea associated with abdominal cramping and pain.  She was instructed to present to the ED today for evaluation of hyperglycemia, abdominal pain and worsening lethargy and fatigue. No focal neurologic deficits or unilateral weakness.      Overview/Hospital Course:  PAtient with recently diagnosed AIH came in with hyperosmolar hyperglycemic syndrome. Resolved with lots of fluids. PAtient with on-going diarrhea for >1 week that presented prior to discharge of previous admission, but overall improving. C. Diff negative. Other stool studies pending. Loperamide prn ordered. Thrombocytopenia likely from Imuran. Discussed care with Dr. Guerrero. Hold imuran but keep prednisone at 30 mg until follow up with Dr. Lacy on 11/4. Trending platelets. Plt 40s-->20s      Interval History: assuming care today, examined in room with daughter and RN. Improving overall, still weak. Worked with therapy today. Glycemic profile - stable on current insulin regimen.    Review of Systems   Constitutional:  Negative for fever.   Eyes:   Negative for visual disturbance.   Respiratory:  Negative for shortness of breath.    Cardiovascular:  Negative for chest pain.   All other systems reviewed and are negative.  Objective:       Physical Exam  Vitals reviewed.   Constitutional:       General: She is not in acute distress.     Appearance: She is not toxic-appearing.   HENT:      Head: Normocephalic and atraumatic.   Eyes:      General: No scleral icterus.     Extraocular Movements: Extraocular movements intact.   Pulmonary:      Effort: Pulmonary effort is normal. No respiratory distress.   Skin:     General: Skin is warm.   Neurological:      Mental Status: She is alert. Mental status is at baseline.   Psychiatric:         Behavior: Behavior normal.         Thought Content: Thought content normal.       Significant Labs: All pertinent labs within the past 24 hours have been reviewed.    Significant Imaging: I have reviewed all pertinent imaging results/findings within the past 24 hours.      Assessment/Plan:      * HHNC (hyperglycemic hyperosmolar nonketotic coma)  Resolved, off insulin infusion, off IVF.  levemir 10 units subq qhs and novolog 5 units subq TID - continue current regimen, carb restricted diet.    Thrombocytopenia  Acute drop noted, no bleeding, sebastian 21K. 27K today.  She was started on imuran on 10/6/2022  Hold imuran per hepatology, trend.   Hematology recs reviewed.     Abnormal urinalysis  Ceftriaxone emprically started  Culture resulted in multiple organisms  Stop ceftriaxone due to development of diarrhea and non-diagnostic result of urine culture.    Other specified glaucoma  Continue eye drops    Autoimmune hepatitis  Prednisone taper - currently at prednisone 30 mg daily. Will keep at current dose given azathioprine needs to be held  azathioprine 50 mg daily - hold due to thrombocytopenia  Hepatology recs reviewed.    EFREN (acute kidney injury)  Patient with acute kidney injury likely due to IVVD/dehydration and pre-renal  azotemia EFREN is currently improving. Labs reviewed- Renal function/electrolytes with Estimated Creatinine Clearance: 60 mL/min (based on SCr of 0.7 mg/dL). according to latest data. Monitor urine output and serial BMP and adjust therapy as needed. Avoid nephrotoxins and renally dose meds for GFR listed above.       Dehydration  Due to severe hyperglycemia, s/p IVF resc.  Encouraged PO hydration.    Primary hypertension  Amlodipine 2.5 mg daily      VTE Risk Mitigation (From admission, onward)         Ordered     IP VTE HIGH RISK PATIENT  Once         10/23/22 1456     Place sequential compression device  Until discontinued         10/23/22 1456                Discharge Planning   MELVI: 10/28/2022     Code Status: Full Code   Is the patient medically ready for discharge?:     Reason for patient still in hospital (select all that apply): Patient trending condition, Laboratory test, Consult recommendations and PT / OT recommendations  Discharge Plan A: Home with family                  Allyn Shelby MD  Department of Hospital Medicine   [facility] - Trans[facility] Stepdown

## 2022-10-28 NOTE — PLAN OF CARE
Plan of care reviewed with the patient at the beginning of the shift. No diarrhea overnight. Fall precautions maintained. She is up with stand by assist. Pt remained free from falls and injury this shift. Bed locked in lowest position, side rails up x2, call light within reach. Instructed pt to call for assistance as needed. Pt verbalized understanding. Vitals stable. Pt afebrile overnight. No acute issues overnight. Daughter remains at the bedside. Will continue to monitor. Tentative discharge today.

## 2022-10-28 NOTE — SUBJECTIVE & OBJECTIVE
Interval History: assuming care today, examined in room with daughter and RN. Improving overall, still weak. Worked with therapy today. Glycemic profile - stable on current insulin regimen.    Review of Systems   Constitutional:  Negative for fever.   Eyes:  Negative for visual disturbance.   Respiratory:  Negative for shortness of breath.    Cardiovascular:  Negative for chest pain.   All other systems reviewed and are negative.  Objective:       Physical Exam  Vitals reviewed.   Constitutional:       General: She is not in acute distress.     Appearance: She is not toxic-appearing.   HENT:      Head: Normocephalic and atraumatic.   Eyes:      General: No scleral icterus.     Extraocular Movements: Extraocular movements intact.   Pulmonary:      Effort: Pulmonary effort is normal. No respiratory distress.   Skin:     General: Skin is warm.   Neurological:      Mental Status: She is alert. Mental status is at baseline.   Psychiatric:         Behavior: Behavior normal.         Thought Content: Thought content normal.       Significant Labs: All pertinent labs within the past 24 hours have been reviewed.    Significant Imaging: I have reviewed all pertinent imaging results/findings within the past 24 hours.

## 2022-10-29 LAB
BACTERIA STL CULT: NORMAL
BACTERIA STL CULT: NORMAL
O+P STL MICRO: NORMAL

## 2022-10-29 NOTE — PLAN OF CARE
Pt d/c from [facility]. VSS. PIV d/c. AVS reviewed w/ pt & daughter, upcoming labs/appointments reviewed, pt verbalizes understanding. Prescriptions picked up from pharmacy. Pt transported off unit via wheelchair w/ pt belongings.

## 2022-10-31 ENCOUNTER — LAB VISIT (OUTPATIENT)
Dept: LAB | Facility: HOSPITAL | Age: 82
End: 2022-10-31
Attending: STUDENT IN AN ORGANIZED HEALTH CARE EDUCATION/TRAINING PROGRAM
Payer: MEDICARE

## 2022-10-31 ENCOUNTER — TELEPHONE (OUTPATIENT)
Dept: PHARMACY | Facility: CLINIC | Age: 82
End: 2022-10-31
Payer: MEDICARE

## 2022-10-31 DIAGNOSIS — K76.89 AUTOIMMUNE LIVER DISEASE: ICD-10-CM

## 2022-10-31 LAB
ALBUMIN SERPL BCP-MCNC: 2 G/DL (ref 3.5–5.2)
ALP SERPL-CCNC: 117 U/L (ref 55–135)
ALT SERPL W/O P-5'-P-CCNC: 63 U/L (ref 10–44)
ANION GAP SERPL CALC-SCNC: 4 MMOL/L (ref 8–16)
AST SERPL-CCNC: 46 U/L (ref 10–40)
BASOPHILS # BLD AUTO: 0 K/UL (ref 0–0.2)
BASOPHILS NFR BLD: 0 % (ref 0–1.9)
BILIRUB SERPL-MCNC: 2.9 MG/DL (ref 0.1–1)
BUN SERPL-MCNC: 31 MG/DL (ref 8–23)
CALCIUM SERPL-MCNC: 8.2 MG/DL (ref 8.7–10.5)
CHLORIDE SERPL-SCNC: 110 MMOL/L (ref 95–110)
CO2 SERPL-SCNC: 19 MMOL/L (ref 23–29)
CREAT SERPL-MCNC: 0.8 MG/DL (ref 0.5–1.4)
DIFFERENTIAL METHOD: ABNORMAL
EOSINOPHIL # BLD AUTO: 0 K/UL (ref 0–0.5)
EOSINOPHIL NFR BLD: 0.5 % (ref 0–8)
ERYTHROCYTE [DISTWIDTH] IN BLOOD BY AUTOMATED COUNT: 17.1 % (ref 11.5–14.5)
EST. GFR  (NO RACE VARIABLE): >60 ML/MIN/1.73 M^2
GLUCOSE SERPL-MCNC: 217 MG/DL (ref 70–110)
HCT VFR BLD AUTO: 31.7 % (ref 37–48.5)
HGB BLD-MCNC: 11 G/DL (ref 12–16)
IMM GRANULOCYTES # BLD AUTO: 0.02 K/UL (ref 0–0.04)
IMM GRANULOCYTES NFR BLD AUTO: 0.5 % (ref 0–0.5)
INR PPP: 1.4 (ref 0.8–1.2)
LYMPHOCYTES # BLD AUTO: 0.5 K/UL (ref 1–4.8)
LYMPHOCYTES NFR BLD: 12.5 % (ref 18–48)
MCH RBC QN AUTO: 37 PG (ref 27–31)
MCHC RBC AUTO-ENTMCNC: 34.7 G/DL (ref 32–36)
MCV RBC AUTO: 107 FL (ref 82–98)
MONOCYTES # BLD AUTO: 0.3 K/UL (ref 0.3–1)
MONOCYTES NFR BLD: 7.9 % (ref 4–15)
NEUTROPHILS # BLD AUTO: 3.1 K/UL (ref 1.8–7.7)
NEUTROPHILS NFR BLD: 78.6 % (ref 38–73)
NRBC BLD-RTO: 1 /100 WBC
PLATELET # BLD AUTO: 44 K/UL (ref 150–450)
PMV BLD AUTO: 13 FL (ref 9.2–12.9)
POTASSIUM SERPL-SCNC: 4.4 MMOL/L (ref 3.5–5.1)
PROT SERPL-MCNC: 5.1 G/DL (ref 6–8.4)
PROTHROMBIN TIME: 14 SEC (ref 9–12.5)
RBC # BLD AUTO: 2.97 M/UL (ref 4–5.4)
SODIUM SERPL-SCNC: 133 MMOL/L (ref 136–145)
WBC # BLD AUTO: 3.92 K/UL (ref 3.9–12.7)

## 2022-10-31 PROCEDURE — 80053 COMPREHEN METABOLIC PANEL: CPT | Performed by: STUDENT IN AN ORGANIZED HEALTH CARE EDUCATION/TRAINING PROGRAM

## 2022-10-31 PROCEDURE — 36415 COLL VENOUS BLD VENIPUNCTURE: CPT | Performed by: STUDENT IN AN ORGANIZED HEALTH CARE EDUCATION/TRAINING PROGRAM

## 2022-10-31 PROCEDURE — 85610 PROTHROMBIN TIME: CPT | Performed by: STUDENT IN AN ORGANIZED HEALTH CARE EDUCATION/TRAINING PROGRAM

## 2022-10-31 PROCEDURE — 85025 COMPLETE CBC W/AUTO DIFF WBC: CPT | Performed by: STUDENT IN AN ORGANIZED HEALTH CARE EDUCATION/TRAINING PROGRAM

## 2022-11-03 ENCOUNTER — HOSPITAL ENCOUNTER (INPATIENT)
Facility: HOSPITAL | Age: 82
LOS: 7 days | Discharge: HOME | DRG: 442 | End: 2022-11-11
Attending: EMERGENCY MEDICINE | Admitting: EMERGENCY MEDICINE
Payer: MEDICARE

## 2022-11-03 ENCOUNTER — PATIENT MESSAGE (OUTPATIENT)
Dept: HEPATOLOGY | Facility: CLINIC | Age: 82
End: 2022-11-03
Payer: MEDICARE

## 2022-11-03 DIAGNOSIS — L13.9 BULLOUS ERUPTION: ICD-10-CM

## 2022-11-03 DIAGNOSIS — K72.90 LIVER FAILURE WITHOUT HEPATIC COMA, UNSPECIFIED CHRONICITY: ICD-10-CM

## 2022-11-03 DIAGNOSIS — K75.4 AUTOIMMUNE HEPATITIS: ICD-10-CM

## 2022-11-03 DIAGNOSIS — R06.02 SOB (SHORTNESS OF BREATH): Primary | ICD-10-CM

## 2022-11-03 PROBLEM — E87.1 HYPONATREMIA: Status: ACTIVE | Noted: 2022-11-03

## 2022-11-03 LAB
ALBUMIN SERPL BCP-MCNC: 2.3 G/DL (ref 3.5–5.2)
ALLENS TEST: ABNORMAL
ALP SERPL-CCNC: 163 U/L (ref 55–135)
ALT SERPL W/O P-5'-P-CCNC: 86 U/L (ref 10–44)
AMMONIA PLAS-SCNC: 49 UMOL/L (ref 10–50)
ANION GAP SERPL CALC-SCNC: 9 MMOL/L (ref 8–16)
AST SERPL-CCNC: 68 U/L (ref 10–40)
BACTERIA #/AREA URNS AUTO: ABNORMAL /HPF
BASOPHILS # BLD AUTO: 0.01 K/UL (ref 0–0.2)
BASOPHILS NFR BLD: 0.2 % (ref 0–1.9)
BILIRUB SERPL-MCNC: 4.4 MG/DL (ref 0.1–1)
BILIRUB UR QL STRIP: NEGATIVE
BUN SERPL-MCNC: 27 MG/DL (ref 8–23)
CALCIUM SERPL-MCNC: 8.8 MG/DL (ref 8.7–10.5)
CHLORIDE SERPL-SCNC: 111 MMOL/L (ref 95–110)
CLARITY UR REFRACT.AUTO: CLEAR
CO2 SERPL-SCNC: 10 MMOL/L (ref 23–29)
COLOR UR AUTO: ABNORMAL
CREAT SERPL-MCNC: 0.7 MG/DL (ref 0.5–1.4)
DELSYS: ABNORMAL
DIFFERENTIAL METHOD: ABNORMAL
EOSINOPHIL # BLD AUTO: 0 K/UL (ref 0–0.5)
EOSINOPHIL NFR BLD: 0.6 % (ref 0–8)
ERYTHROCYTE [DISTWIDTH] IN BLOOD BY AUTOMATED COUNT: 17.1 % (ref 11.5–14.5)
EST. GFR  (NO RACE VARIABLE): >60 ML/MIN/1.73 M^2
GLUCOSE SERPL-MCNC: 134 MG/DL (ref 70–110)
GLUCOSE UR QL STRIP: NEGATIVE
HCO3 UR-SCNC: 8.8 MMOL/L (ref 24–28)
HCT VFR BLD AUTO: 38.8 % (ref 37–48.5)
HGB BLD-MCNC: 13.7 G/DL (ref 12–16)
HGB UR QL STRIP: ABNORMAL
HYALINE CASTS UR QL AUTO: 12 /LPF
IMM GRANULOCYTES # BLD AUTO: 0.01 K/UL (ref 0–0.04)
IMM GRANULOCYTES NFR BLD AUTO: 0.2 % (ref 0–0.5)
INR PPP: 2.4 (ref 0.8–1.2)
KETONES UR QL STRIP: NEGATIVE
LEUKOCYTE ESTERASE UR QL STRIP: ABNORMAL
LYMPHOCYTES # BLD AUTO: 0.6 K/UL (ref 1–4.8)
LYMPHOCYTES NFR BLD: 11.3 % (ref 18–48)
MAGNESIUM SERPL-MCNC: 2 MG/DL (ref 1.6–2.6)
MCH RBC QN AUTO: 37.3 PG (ref 27–31)
MCHC RBC AUTO-ENTMCNC: 35.3 G/DL (ref 32–36)
MCV RBC AUTO: 106 FL (ref 82–98)
MICROSCOPIC COMMENT: ABNORMAL
MONOCYTES # BLD AUTO: 0.6 K/UL (ref 0.3–1)
MONOCYTES NFR BLD: 10.7 % (ref 4–15)
NEUTROPHILS # BLD AUTO: 4 K/UL (ref 1.8–7.7)
NEUTROPHILS NFR BLD: 77 % (ref 38–73)
NITRITE UR QL STRIP: NEGATIVE
NRBC BLD-RTO: 0 /100 WBC
OSMOLALITY UR: 308 MOSM/KG (ref 50–1200)
PCO2 BLDA: 21.2 MMHG (ref 35–45)
PH SMN: 7.23 [PH] (ref 7.35–7.45)
PH UR STRIP: 5 [PH] (ref 5–8)
PLATELET # BLD AUTO: 57 K/UL (ref 150–450)
PMV BLD AUTO: 12.6 FL (ref 9.2–12.9)
PO2 BLDA: 51 MMHG (ref 40–60)
POC BE: -19 MMOL/L
POC SATURATED O2: 80 % (ref 95–100)
POC TCO2: 9 MMOL/L (ref 24–29)
POCT GLUCOSE: 171 MG/DL (ref 70–110)
POTASSIUM SERPL-SCNC: 4.4 MMOL/L (ref 3.5–5.1)
PROT SERPL-MCNC: 6.4 G/DL (ref 6–8.4)
PROT UR QL STRIP: NEGATIVE
PROTHROMBIN TIME: 24 SEC (ref 9–12.5)
RBC # BLD AUTO: 3.67 M/UL (ref 4–5.4)
RBC #/AREA URNS AUTO: 4 /HPF (ref 0–4)
SAMPLE: ABNORMAL
SITE: ABNORMAL
SODIUM SERPL-SCNC: 130 MMOL/L (ref 136–145)
SODIUM UR-SCNC: 117 MMOL/L (ref 20–250)
SP GR UR STRIP: 1 (ref 1–1.03)
SQUAMOUS #/AREA URNS AUTO: 0 /HPF
URN SPEC COLLECT METH UR: ABNORMAL
UUN UR-MCNC: 119 MG/DL (ref 140–1050)
WBC # BLD AUTO: 5.13 K/UL (ref 3.9–12.7)
WBC #/AREA URNS AUTO: 14 /HPF (ref 0–5)

## 2022-11-03 PROCEDURE — 99291 CRITICAL CARE FIRST HOUR: CPT | Mod: 25

## 2022-11-03 PROCEDURE — 81001 URINALYSIS AUTO W/SCOPE: CPT | Performed by: STUDENT IN AN ORGANIZED HEALTH CARE EDUCATION/TRAINING PROGRAM

## 2022-11-03 PROCEDURE — 94761 N-INVAS EAR/PLS OXIMETRY MLT: CPT

## 2022-11-03 PROCEDURE — 82140 ASSAY OF AMMONIA: CPT | Performed by: EMERGENCY MEDICINE

## 2022-11-03 PROCEDURE — 25000003 PHARM REV CODE 250: Performed by: STUDENT IN AN ORGANIZED HEALTH CARE EDUCATION/TRAINING PROGRAM

## 2022-11-03 PROCEDURE — 63600175 PHARM REV CODE 636 W HCPCS: Performed by: STUDENT IN AN ORGANIZED HEALTH CARE EDUCATION/TRAINING PROGRAM

## 2022-11-03 PROCEDURE — 85025 COMPLETE CBC W/AUTO DIFF WBC: CPT | Performed by: EMERGENCY MEDICINE

## 2022-11-03 PROCEDURE — 96372 THER/PROPH/DIAG INJ SC/IM: CPT | Performed by: STUDENT IN AN ORGANIZED HEALTH CARE EDUCATION/TRAINING PROGRAM

## 2022-11-03 PROCEDURE — 99220 PR INITIAL OBSERVATION CARE,LEVL III: ICD-10-PCS | Mod: ,,, | Performed by: STUDENT IN AN ORGANIZED HEALTH CARE EDUCATION/TRAINING PROGRAM

## 2022-11-03 PROCEDURE — 93010 EKG 12-LEAD: ICD-10-PCS | Mod: ,,, | Performed by: INTERNAL MEDICINE

## 2022-11-03 PROCEDURE — 80053 COMPREHEN METABOLIC PANEL: CPT | Performed by: EMERGENCY MEDICINE

## 2022-11-03 PROCEDURE — G0378 HOSPITAL OBSERVATION PER HR: HCPCS

## 2022-11-03 PROCEDURE — 99291 CRITICAL CARE FIRST HOUR: CPT | Mod: ,,, | Performed by: EMERGENCY MEDICINE

## 2022-11-03 PROCEDURE — 93005 ELECTROCARDIOGRAM TRACING: CPT

## 2022-11-03 PROCEDURE — 99900035 HC TECH TIME PER 15 MIN (STAT)

## 2022-11-03 PROCEDURE — 99220 PR INITIAL OBSERVATION CARE,LEVL III: CPT | Mod: ,,, | Performed by: STUDENT IN AN ORGANIZED HEALTH CARE EDUCATION/TRAINING PROGRAM

## 2022-11-03 PROCEDURE — 96374 THER/PROPH/DIAG INJ IV PUSH: CPT

## 2022-11-03 PROCEDURE — 84540 ASSAY OF URINE/UREA-N: CPT | Performed by: STUDENT IN AN ORGANIZED HEALTH CARE EDUCATION/TRAINING PROGRAM

## 2022-11-03 PROCEDURE — 85610 PROTHROMBIN TIME: CPT | Performed by: EMERGENCY MEDICINE

## 2022-11-03 PROCEDURE — 63600175 PHARM REV CODE 636 W HCPCS: Performed by: EMERGENCY MEDICINE

## 2022-11-03 PROCEDURE — 83935 ASSAY OF URINE OSMOLALITY: CPT | Performed by: STUDENT IN AN ORGANIZED HEALTH CARE EDUCATION/TRAINING PROGRAM

## 2022-11-03 PROCEDURE — 99291 PR CRITICAL CARE, E/M 30-74 MINUTES: ICD-10-PCS | Mod: ,,, | Performed by: EMERGENCY MEDICINE

## 2022-11-03 PROCEDURE — 83735 ASSAY OF MAGNESIUM: CPT | Performed by: EMERGENCY MEDICINE

## 2022-11-03 PROCEDURE — 87086 URINE CULTURE/COLONY COUNT: CPT | Performed by: STUDENT IN AN ORGANIZED HEALTH CARE EDUCATION/TRAINING PROGRAM

## 2022-11-03 PROCEDURE — 93010 ELECTROCARDIOGRAM REPORT: CPT | Mod: ,,, | Performed by: INTERNAL MEDICINE

## 2022-11-03 PROCEDURE — 82803 BLOOD GASES ANY COMBINATION: CPT

## 2022-11-03 PROCEDURE — 84300 ASSAY OF URINE SODIUM: CPT | Performed by: STUDENT IN AN ORGANIZED HEALTH CARE EDUCATION/TRAINING PROGRAM

## 2022-11-03 RX ORDER — SODIUM CHLORIDE 0.9 % (FLUSH) 0.9 %
10 SYRINGE (ML) INJECTION
Status: DISCONTINUED | OUTPATIENT
Start: 2022-11-03 | End: 2022-11-11 | Stop reason: HOSPADM

## 2022-11-03 RX ORDER — LOPERAMIDE HYDROCHLORIDE 2 MG/1
2 CAPSULE ORAL 4 TIMES DAILY PRN
Status: DISCONTINUED | OUTPATIENT
Start: 2022-11-03 | End: 2022-11-11 | Stop reason: HOSPADM

## 2022-11-03 RX ORDER — TALC
6 POWDER (GRAM) TOPICAL NIGHTLY PRN
Status: DISCONTINUED | OUTPATIENT
Start: 2022-11-03 | End: 2022-11-03

## 2022-11-03 RX ORDER — IBUPROFEN 200 MG
24 TABLET ORAL
Status: DISCONTINUED | OUTPATIENT
Start: 2022-11-03 | End: 2022-11-09

## 2022-11-03 RX ORDER — TALC
6 POWDER (GRAM) TOPICAL NIGHTLY PRN
Status: DISCONTINUED | OUTPATIENT
Start: 2022-11-03 | End: 2022-11-11 | Stop reason: HOSPADM

## 2022-11-03 RX ORDER — INSULIN ASPART 100 [IU]/ML
1-10 INJECTION, SOLUTION INTRAVENOUS; SUBCUTANEOUS
Status: DISCONTINUED | OUTPATIENT
Start: 2022-11-03 | End: 2022-11-09

## 2022-11-03 RX ORDER — SODIUM CHLORIDE 0.9 % (FLUSH) 0.9 %
10 SYRINGE (ML) INJECTION
Status: DISCONTINUED | OUTPATIENT
Start: 2022-11-03 | End: 2022-11-03

## 2022-11-03 RX ORDER — FUROSEMIDE 10 MG/ML
40 INJECTION INTRAMUSCULAR; INTRAVENOUS
Status: COMPLETED | OUTPATIENT
Start: 2022-11-03 | End: 2022-11-03

## 2022-11-03 RX ORDER — IBUPROFEN 200 MG
16 TABLET ORAL
Status: DISCONTINUED | OUTPATIENT
Start: 2022-11-03 | End: 2022-11-09

## 2022-11-03 RX ORDER — TIMOLOL MALEATE 5 MG/ML
1 SOLUTION/ DROPS OPHTHALMIC 2 TIMES DAILY
Status: DISCONTINUED | OUTPATIENT
Start: 2022-11-03 | End: 2022-11-11 | Stop reason: HOSPADM

## 2022-11-03 RX ORDER — GLUCAGON 1 MG
1 KIT INJECTION
Status: DISCONTINUED | OUTPATIENT
Start: 2022-11-03 | End: 2022-11-09

## 2022-11-03 RX ORDER — DORZOLAMIDE HCL 20 MG/ML
1 SOLUTION/ DROPS OPHTHALMIC 2 TIMES DAILY
Status: DISCONTINUED | OUTPATIENT
Start: 2022-11-03 | End: 2022-11-11 | Stop reason: HOSPADM

## 2022-11-03 RX ORDER — BRIMONIDINE TARTRATE 2 MG/ML
1 SOLUTION/ DROPS OPHTHALMIC 2 TIMES DAILY
Status: DISCONTINUED | OUTPATIENT
Start: 2022-11-03 | End: 2022-11-11 | Stop reason: HOSPADM

## 2022-11-03 RX ORDER — AMLODIPINE BESYLATE 2.5 MG/1
2.5 TABLET ORAL DAILY
Status: DISCONTINUED | OUTPATIENT
Start: 2022-11-04 | End: 2022-11-04

## 2022-11-03 RX ADMIN — INSULIN DETEMIR 10 UNITS: 100 INJECTION, SOLUTION SUBCUTANEOUS at 10:11

## 2022-11-03 RX ADMIN — CEFTRIAXONE 1 G: 1 INJECTION, SOLUTION INTRAVENOUS at 11:11

## 2022-11-03 RX ADMIN — FUROSEMIDE 40 MG: 10 INJECTION, SOLUTION INTRAMUSCULAR; INTRAVENOUS at 07:11

## 2022-11-03 NOTE — ED NOTES
Patient identifiers for Radha Feng 81 y.o. female checked and correct.  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 Patient is oriented x 2 and speaking appropriately.  APPEARANCE: Patient resting comfortably and in no acute distress. Patient is clean and well groomed, patient's clothing is properly fastened.  SKIN: The skin is warm and dry. Patient has normal skin turgor and moist mucus membranes.   MUSKULOSKELETAL: Patient is moving all extremities well, no obvious deformities noted. Pulses intact. Generalized weakness  RESPIRATORY: Airway is open and patent. Respirations are spontaneous and non-labored with normal effort and rate. C/o SOB, O2 sat  room air  CARDIAC: Patient has a normal rate and rhythm.  trace edema to BUEs, 3+ to BLES noted.   ABDOMEN: Acites noted. Rigid and non-tender upon palpation.  NEUROLOGICAL: pupils 3mm, PERRL. Facial expression is symmetrical. Hand grasps are equal bilaterally. Normal sensation in all extremities when touched with finger.

## 2022-11-03 NOTE — Clinical Note
Diagnosis: SOB (shortness of breath) [274690]   Future Attending Provider: VINITA FAITH [[id number]]   Admitting Provider:: GAETANO SELLERS [[id number]]

## 2022-11-03 NOTE — ED PROVIDER NOTES
Encounter Date: 11/3/2022       History     Chief Complaint   Patient presents with    Shortness of Breath     Cough, hx ascites      81-year-old female with a history of hypertension, diabetes who was previously doing very well from a health standpoint until the end of September when she was hospitalized for what was ultimately diagnosed as autoimmune hepatitis - based on liver biopsy.  CT/ultrasound not c/w cirrhosis.  She was started on azathioprine and prednisone and sent home but then was readmitted on October 23rd and sent home again last week.  Readmission was for severe hyperglycemia, EFREN, thrombocytopenia in the setting of prednisone and azathioprine use.  She was volume resuscitated and her lab work improved so she was sent home.  Medications were held given adverse reaction.  Daughter reports that over the past week the patient's ascites has worsened, her lower extremity edema has worsened, and she has become more short of breath and her breathing has appeared more labored in the past 2 days.  In addition, prior to this illness in September she was active and independent in all of her ADLs but her functional status has declined dramatically over the last month.    The history is provided by the patient and a relative.   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    Diabetes mellitus     Glaucoma     Hypertension      Past Surgical History:   Procedure Laterality Date    CATARACT EXTRACTION W/  INTRAOCULAR LENS IMPLANT Left 12/21/2021    Procedure: EXTRACTION, CATARACT, WITH IOL INSERTION;  Surgeon: Shay Chou MD;  Location: [facility];  Service: Ophthalmology;  Laterality: Left;     Family History   Problem Relation Age of Onset    Cataracts Mother     Diabetes Mother     Glaucoma Mother     Hypertension Mother     Heart attack Father     Cancer Sister     Blindness Cousin      Amblyopia Neg Hx     Macular degeneration Neg Hx     Retinal detachment Neg Hx      Social History     Tobacco Use    Smoking status: Former    Smokeless tobacco: Never   Substance Use Topics    Alcohol use: Not Currently    Drug use: Never     Review of Systems   Constitutional:  Positive for activity change, appetite change and fatigue. Negative for chills and fever.   Respiratory:  Positive for shortness of breath. Negative for cough.    Cardiovascular:  Positive for leg swelling. Negative for chest pain and palpitations.   Gastrointestinal:  Positive for abdominal distention. Negative for abdominal pain, diarrhea and vomiting.   Neurological:  Negative for headaches.   All other systems reviewed and are negative.    Physical Exam     Initial Vitals [11/03/22 1516]   BP Pulse Resp Temp SpO2   (!) 169/83 70 18 97.9 °F (36.6 °C) 99 %      MAP       --         Physical Exam    Nursing note and vitals reviewed.  Constitutional: Vital signs are normal. She appears well-developed and well-nourished. She appears ill.   HENT:   Head: Normocephalic and atraumatic.   Mouth/Throat: Oropharynx is clear and moist and mucous membranes are normal. Mucous membranes are not dry.   Eyes: Conjunctivae and lids are normal. No scleral icterus.   Neck: Neck supple.   Normal range of motion.  Cardiovascular:  Normal rate.           Pulmonary/Chest: Tachypnea noted. She has decreased breath sounds in the right lower field and the left lower field.   Abdominal: Abdomen is soft. She exhibits distension. There is no abdominal tenderness. There is no rebound and no guarding.   Musculoskeletal:         General: Edema present.      Cervical back: Normal range of motion and neck supple.      Comments: 2+ edema to her lower extremities bilaterally     Neurological: She is oriented to person, place, and time.   Slightly drowsy but able to answer simple questions appropriately and follow commands, no focal weakness   Skin: Skin is warm, dry and  intact. No pallor.   Psychiatric: Her speech is normal.       ED Course   Procedures  Labs Reviewed   CBC W/ AUTO DIFFERENTIAL - Abnormal; Notable for the following components:       Result Value    RBC 3.67 (*)      (*)     MCH 37.3 (*)     RDW 17.1 (*)     Platelets 57 (*)     Lymph # 0.6 (*)     Gran % 77.0 (*)     Lymph % 11.3 (*)     All other components within normal limits   COMPREHENSIVE METABOLIC PANEL - Abnormal; Notable for the following components:    Sodium 130 (*)     Chloride 111 (*)     CO2 10 (*)     Glucose 134 (*)     BUN 27 (*)     Albumin 2.3 (*)     Total Bilirubin 4.4 (*)     Alkaline Phosphatase 163 (*)     AST 68 (*)     ALT 86 (*)     All other components within normal limits   PROTIME-INR - Abnormal; Notable for the following components:    Prothrombin Time 24.0 (*)     INR 2.4 (*)     All other components within normal limits   ISTAT PROCEDURE - Abnormal; Notable for the following components:    POC PH 7.227 (*)     POC PCO2 21.2 (*)     POC HCO3 8.8 (*)     POC SATURATED O2 80 (*)     POC TCO2 9 (*)     All other components within normal limits   CULTURE, AEROBIC  (SPECIFY SOURCE)   CULTURE, ANAEROBIC   GRAM STAIN   MAGNESIUM   AMMONIA   ALBUMIN, PERITONEAL, PLEURAL FLUID OR ILENE DRAINAGE, IN-HOUSE   PROTEIN, PERITONEAL, PLEURAL FLUID OR ILENE DRAINAGE, IN-HOUSE   LDH, PERITONEAL, PLEURAL FLUID OR ILENE DRAINAGE, IN-HOUSE   WBC & DIFF, BODY FLUID   URINALYSIS, REFLEX TO URINE CULTURE   OSMOLALITY, SERUM   OSMOLALITY, URINE RANDOM   SODIUM, URINE, RANDOM   LACTIC ACID, PLASMA   B-TYPE NATRIURETIC PEPTIDE   POCT GLUCOSE MONITORING CONTINUOUS        ECG Results              EKG 12-lead (Final result)  Result time 11/03/22 15:39:19      Final result by Interface, Lab In [facility] (11/03/22 15:39:19)                   Narrative:    Test Reason : R06.02,    Vent. Rate : 068 BPM     Atrial Rate : 000 BPM     P-R Int : 000 ms          QRS Dur : 064 ms      QT Int : 332 ms       P-R-T Axes : 000 -05  -29 degrees     QTc Int : 353 ms    Normal sinus rhythm  Low voltage QRS  Anteroseptal infarct (cited on or before 20-SEP-2022)  Abnormal ECG  When compared with ECG of 23-OCT-2022 13:28,  No significant change was found  Confirmed by Damian Morales MD ([id number]) on 11/3/2022 3:39:07 PM    Referred By: RACHEL   SELF           Confirmed By:Damian Morales MD                                  Imaging Results              US Liver with Doppler (xpd) (In process)                       X-Ray Chest AP Portable (Final result)  Result time 11/03/22 17:34:48      Final result by Franklin Moyer MD (11/03/22 17:34:48)                   Impression:      See above comments.  Interval worsening from the prior study.  Follow-up recommended.    This report was flagged in Epic as abnormal.      Electronically signed by: Franklin Moyer  Date:    11/03/2022  Time:    17:34               Narrative:    EXAMINATION:  XR CHEST AP PORTABLE    CLINICAL HISTORY:  sob;    TECHNIQUE:  Single frontal view of the chest was performed.    COMPARISON:  09/20/2022    FINDINGS:  Suboptimal inspiration limits characterization.    Cardiac silhouette is within normal limits.    Bilateral ground-glass changes may be associated with layering mild pleural effusions.  Mild edema or infiltrate are not excluded.    No evidence of pneumothorax.  No focal mass or lobar consolidation is detected.  No acute osseous abnormality.    Detrimental change from the prior study.                                       Medications   sodium chloride 0.9% flush 10 mL (has no administration in time range)   melatonin tablet 6 mg (has no administration in time range)   amLODIPine tablet 2.5 mg (has no administration in time range)   predniSONE tablet 30 mg (has no administration in time range)   timolol maleate 0.5% ophthalmic solution 1 drop (has no administration in time range)   brimonidine 0.2% ophthalmic solution 1 drop (has no administration in time range)   dorzolamide 2 %  ophthalmic solution 1 drop (has no administration in time range)   insulin detemir U-100 pen 10 Units (has no administration in time range)   dextrose 10% bolus 125 mL (has no administration in time range)   dextrose 10% bolus 250 mL (has no administration in time range)   glucagon (human recombinant) injection 1 mg (has no administration in time range)   glucose chewable tablet 16 g (has no administration in time range)   glucose chewable tablet 24 g (has no administration in time range)   insulin aspart U-100 pen 1-10 Units (has no administration in time range)   loperamide capsule 2 mg (has no administration in time range)   cefTRIAXone (ROCEPHIN) 1 g/50 mL D5W IVPB (has no administration in time range)   furosemide injection 40 mg (40 mg Intravenous Given 11/3/22 1911)     Medical Decision Making:   History:   Old Medical Records: I decided to obtain old medical records.  Initial Assessment:   Suspect recurrent decompensated liver disease    VSS though pt tachypneic and ill-appearing  Exam consistent with recurrent ascites and volume overload  Differential Diagnosis:   Liver failure  Heart failure  Pulmonary edema  Worsening ascites  Clinical Tests:   Lab Tests: Ordered and Reviewed  Radiological Study: Ordered and Reviewed  Medical Tests: Ordered and Reviewed  ED Management:  Labs  Chest x-ray  Anticipate need for readmission, recurrent paracentesis, further workup and management of this illness    Labs concerning for worsening liver function tests.  Chest x-ray also shows significant pulmonary edema.  Patient is also noted to have a low bicarb and anion gap acidosis.  I did a VBG that looks consistent with a mixed metabolic acidosis and respiratory alkalosis, compensatory.  I wonder if the patient's metformin is causing a lactic acidosis given that when I reviewed the patient's EMR it seems that this has been a recurrent issue for her.  She is clinically nontoxic appearing.  I ordered her Lasix for the  significant pulmonary edema and her daughter was initially resistant to this intervention but seems more receptive to this now.  I have admitted her to the hospital medicine team for further management.  She will need a paracentesis but I do not think this will be able to be performed tonight.  I am worried about her overall prognosis given her age, progressive symptoms, severe debility and I do also think that she would benefit from a palliative care consult to determine goals of care.          Attending Attestation:         Attending Critical Care:   Critical Care Times:   ==============================================================  Total Critical Care Time - exclusive of procedural time: 35 minutes.  ==============================================================  Critical care was necessary to treat or prevent imminent or life-threatening deterioration of the following conditions: hepatic failure, respiratory failure and metabolic crisis.   The following critical care procedures were done by me (see procedure notes): pulse oximetry.   Critical care was time spent personally by me on the following activities: obtaining history from patient or relative, examination of patient, review of old charts, review of x-rays / CT sent with the patient, ordering lab, x-rays, and/or EKG, development of treatment plan with patient or relative, ordering and performing treatments and interventions, evaluation of patient's response to treatment and re-evaluation of patient's conition.   Critical Care Condition: potentially life-threatening                      Clinical Impression:   Final diagnoses:  [R06.02] SOB (shortness of breath) (Primary)        ED Disposition Condition    Observation Stable                Libby Talbot MD  11/03/22 2042

## 2022-11-04 ENCOUNTER — TELEPHONE (OUTPATIENT)
Dept: INTERVENTIONAL RADIOLOGY/VASCULAR | Facility: HOSPITAL | Age: 82
End: 2022-11-04
Payer: MEDICARE

## 2022-11-04 PROBLEM — K72.00 LIVER FAILURE, ACUTE: Status: ACTIVE | Noted: 2022-11-04

## 2022-11-04 PROBLEM — K72.00 LIVER FAILURE, ACUTE: Status: RESOLVED | Noted: 2022-11-04 | Resolved: 2022-11-04

## 2022-11-04 PROBLEM — K72.90 LIVER FAILURE WITHOUT HEPATIC COMA: Status: ACTIVE | Noted: 2022-11-04

## 2022-11-04 LAB
ABO + RH BLD: NORMAL
ALBUMIN SERPL BCP-MCNC: 1.9 G/DL (ref 3.5–5.2)
ALP SERPL-CCNC: 127 U/L (ref 55–135)
ALT SERPL W/O P-5'-P-CCNC: 67 U/L (ref 10–44)
ANION GAP SERPL CALC-SCNC: 8 MMOL/L (ref 8–16)
AST SERPL-CCNC: 63 U/L (ref 10–40)
BASOPHILS # BLD AUTO: 0.01 K/UL (ref 0–0.2)
BASOPHILS NFR BLD: 0.2 % (ref 0–1.9)
BILIRUB SERPL-MCNC: 3.5 MG/DL (ref 0.1–1)
BLD GP AB SCN CELLS X3 SERPL QL: NORMAL
BUN SERPL-MCNC: 28 MG/DL (ref 8–23)
CALCIUM SERPL-MCNC: 8.3 MG/DL (ref 8.7–10.5)
CHLORIDE SERPL-SCNC: 111 MMOL/L (ref 95–110)
CO2 SERPL-SCNC: 17 MMOL/L (ref 23–29)
CREAT SERPL-MCNC: 0.7 MG/DL (ref 0.5–1.4)
DIFFERENTIAL METHOD: ABNORMAL
EOSINOPHIL # BLD AUTO: 0 K/UL (ref 0–0.5)
EOSINOPHIL NFR BLD: 0 % (ref 0–8)
ERYTHROCYTE [DISTWIDTH] IN BLOOD BY AUTOMATED COUNT: 17.3 % (ref 11.5–14.5)
EST. GFR  (NO RACE VARIABLE): >60 ML/MIN/1.73 M^2
FOLATE SERPL-MCNC: 10.8 NG/ML (ref 4–24)
GLUCOSE SERPL-MCNC: 183 MG/DL (ref 70–110)
HCT VFR BLD AUTO: 30.8 % (ref 37–48.5)
HCV AB SERPL QL IA: NORMAL
HGB BLD-MCNC: 10.4 G/DL (ref 12–16)
HIV 1+2 AB+HIV1 P24 AG SERPL QL IA: NORMAL
IMM GRANULOCYTES # BLD AUTO: 0.03 K/UL (ref 0–0.04)
IMM GRANULOCYTES NFR BLD AUTO: 0.6 % (ref 0–0.5)
INR PPP: 1.4 (ref 0.8–1.2)
LYMPHOCYTES # BLD AUTO: 0.6 K/UL (ref 1–4.8)
LYMPHOCYTES NFR BLD: 11.1 % (ref 18–48)
MAGNESIUM SERPL-MCNC: 2 MG/DL (ref 1.6–2.6)
MCH RBC QN AUTO: 37 PG (ref 27–31)
MCHC RBC AUTO-ENTMCNC: 33.8 G/DL (ref 32–36)
MCV RBC AUTO: 110 FL (ref 82–98)
MONOCYTES # BLD AUTO: 0.6 K/UL (ref 0.3–1)
MONOCYTES NFR BLD: 10.5 % (ref 4–15)
NEUTROPHILS # BLD AUTO: 4.1 K/UL (ref 1.8–7.7)
NEUTROPHILS NFR BLD: 77.6 % (ref 38–73)
NRBC BLD-RTO: 0 /100 WBC
PHOSPHATE SERPL-MCNC: 3.1 MG/DL (ref 2.7–4.5)
PLATELET # BLD AUTO: 53 K/UL (ref 150–450)
PMV BLD AUTO: 12.8 FL (ref 9.2–12.9)
POCT GLUCOSE: 112 MG/DL (ref 70–110)
POCT GLUCOSE: 160 MG/DL (ref 70–110)
POCT GLUCOSE: 170 MG/DL (ref 70–110)
POCT GLUCOSE: 211 MG/DL (ref 70–110)
POCT GLUCOSE: 285 MG/DL (ref 70–110)
POTASSIUM SERPL-SCNC: 4.6 MMOL/L (ref 3.5–5.1)
PROT SERPL-MCNC: 5.3 G/DL (ref 6–8.4)
PROTHROMBIN TIME: 14 SEC (ref 9–12.5)
RBC # BLD AUTO: 2.81 M/UL (ref 4–5.4)
SODIUM SERPL-SCNC: 136 MMOL/L (ref 136–145)
VIT B12 SERPL-MCNC: >2000 PG/ML (ref 210–950)
WBC # BLD AUTO: 5.24 K/UL (ref 3.9–12.7)

## 2022-11-04 PROCEDURE — 63600175 PHARM REV CODE 636 W HCPCS: Performed by: STUDENT IN AN ORGANIZED HEALTH CARE EDUCATION/TRAINING PROGRAM

## 2022-11-04 PROCEDURE — 36415 COLL VENOUS BLD VENIPUNCTURE: CPT | Performed by: HOSPITALIST

## 2022-11-04 PROCEDURE — 86850 RBC ANTIBODY SCREEN: CPT | Performed by: HOSPITALIST

## 2022-11-04 PROCEDURE — 82746 ASSAY OF FOLIC ACID SERUM: CPT | Performed by: STUDENT IN AN ORGANIZED HEALTH CARE EDUCATION/TRAINING PROGRAM

## 2022-11-04 PROCEDURE — 85610 PROTHROMBIN TIME: CPT | Performed by: HOSPITALIST

## 2022-11-04 PROCEDURE — 25000003 PHARM REV CODE 250: Performed by: STUDENT IN AN ORGANIZED HEALTH CARE EDUCATION/TRAINING PROGRAM

## 2022-11-04 PROCEDURE — 97165 OT EVAL LOW COMPLEX 30 MIN: CPT

## 2022-11-04 PROCEDURE — 11000001 HC ACUTE MED/SURG PRIVATE ROOM

## 2022-11-04 PROCEDURE — 99223 1ST HOSP IP/OBS HIGH 75: CPT | Mod: GC,,, | Performed by: INTERNAL MEDICINE

## 2022-11-04 PROCEDURE — 86803 HEPATITIS C AB TEST: CPT | Performed by: STUDENT IN AN ORGANIZED HEALTH CARE EDUCATION/TRAINING PROGRAM

## 2022-11-04 PROCEDURE — 82607 VITAMIN B-12: CPT | Performed by: STUDENT IN AN ORGANIZED HEALTH CARE EDUCATION/TRAINING PROGRAM

## 2022-11-04 PROCEDURE — 97535 SELF CARE MNGMENT TRAINING: CPT

## 2022-11-04 PROCEDURE — 84100 ASSAY OF PHOSPHORUS: CPT | Performed by: STUDENT IN AN ORGANIZED HEALTH CARE EDUCATION/TRAINING PROGRAM

## 2022-11-04 PROCEDURE — 83735 ASSAY OF MAGNESIUM: CPT | Performed by: STUDENT IN AN ORGANIZED HEALTH CARE EDUCATION/TRAINING PROGRAM

## 2022-11-04 PROCEDURE — 36415 COLL VENOUS BLD VENIPUNCTURE: CPT | Performed by: STUDENT IN AN ORGANIZED HEALTH CARE EDUCATION/TRAINING PROGRAM

## 2022-11-04 PROCEDURE — 99223 PR INITIAL HOSPITAL CARE,LEVL III: ICD-10-PCS | Mod: GC,,, | Performed by: INTERNAL MEDICINE

## 2022-11-04 PROCEDURE — 97161 PT EVAL LOW COMPLEX 20 MIN: CPT

## 2022-11-04 PROCEDURE — 97116 GAIT TRAINING THERAPY: CPT

## 2022-11-04 PROCEDURE — 63600175 PHARM REV CODE 636 W HCPCS: Performed by: HOSPITALIST

## 2022-11-04 PROCEDURE — 85025 COMPLETE CBC W/AUTO DIFF WBC: CPT | Performed by: HOSPITALIST

## 2022-11-04 PROCEDURE — 25000003 PHARM REV CODE 250: Performed by: HOSPITALIST

## 2022-11-04 PROCEDURE — 87389 HIV-1 AG W/HIV-1&-2 AB AG IA: CPT | Performed by: STUDENT IN AN ORGANIZED HEALTH CARE EDUCATION/TRAINING PROGRAM

## 2022-11-04 PROCEDURE — 80053 COMPREHEN METABOLIC PANEL: CPT | Performed by: STUDENT IN AN ORGANIZED HEALTH CARE EDUCATION/TRAINING PROGRAM

## 2022-11-04 RX ORDER — SPIRONOLACTONE 100 MG/1
100 TABLET, FILM COATED ORAL DAILY
Status: DISCONTINUED | OUTPATIENT
Start: 2022-11-05 | End: 2022-11-04

## 2022-11-04 RX ORDER — PREDNISONE 10 MG/1
10 TABLET ORAL DAILY
Status: DISCONTINUED | OUTPATIENT
Start: 2022-11-05 | End: 2022-11-11 | Stop reason: HOSPADM

## 2022-11-04 RX ORDER — SPIRONOLACTONE 25 MG/1
50 TABLET ORAL DAILY
Status: DISCONTINUED | OUTPATIENT
Start: 2022-11-05 | End: 2022-11-05

## 2022-11-04 RX ORDER — LACTULOSE 10 G/15ML
15 SOLUTION ORAL 2 TIMES DAILY
Status: DISCONTINUED | OUTPATIENT
Start: 2022-11-04 | End: 2022-11-08

## 2022-11-04 RX ORDER — FUROSEMIDE 20 MG/1
20 TABLET ORAL DAILY
Status: DISCONTINUED | OUTPATIENT
Start: 2022-11-05 | End: 2022-11-05

## 2022-11-04 RX ORDER — FUROSEMIDE 40 MG/1
40 TABLET ORAL DAILY
Status: DISCONTINUED | OUTPATIENT
Start: 2022-11-05 | End: 2022-11-04

## 2022-11-04 RX ADMIN — INSULIN ASPART 2 UNITS: 100 INJECTION, SOLUTION INTRAVENOUS; SUBCUTANEOUS at 05:11

## 2022-11-04 RX ADMIN — INSULIN ASPART 2 UNITS: 100 INJECTION, SOLUTION INTRAVENOUS; SUBCUTANEOUS at 09:11

## 2022-11-04 RX ADMIN — PREDNISONE 30 MG: 5 TABLET ORAL at 09:11

## 2022-11-04 RX ADMIN — INSULIN ASPART 3 UNITS: 100 INJECTION, SOLUTION INTRAVENOUS; SUBCUTANEOUS at 09:11

## 2022-11-04 RX ADMIN — LACTULOSE 15 G: 20 SOLUTION ORAL at 10:11

## 2022-11-04 RX ADMIN — CEFTRIAXONE 2 G: 2 INJECTION, SOLUTION INTRAVENOUS at 11:11

## 2022-11-04 RX ADMIN — AMLODIPINE BESYLATE 2.5 MG: 2.5 TABLET ORAL at 09:11

## 2022-11-04 NOTE — ASSESSMENT & PLAN NOTE
Decompensated liver failure with recurrent ascites.   Plan was for IR to proceed with paracentesis today, but unfortunately it was not done for unclear reason.   Daughter agreed to have medicine consult try bedside US guided paracentesis.   s/p IV furosemide 40 mg daily x1. Will start lasix 40 mg daily and aldactone 100 mg daily per hepatology recs.  Will increase ceftriaxone from 1 g to 2 grams to cover for SBP until it is ruled out.

## 2022-11-04 NOTE — NURSING
Ned in Hematology Lab called to report the purple for the cbc had clotted and will need recollection.cbc reordered,

## 2022-11-04 NOTE — PT/OT/SLP EVAL
"Occupational Therapy   Co-Evaluation  Co-treat with PT to accommodate pt activity tolerance and need for skilled hands for safe intervention.    Name: Radha Feng  MRN: [id number]  Admitting Diagnosis:  Autoimmune hepatitis  Recent Surgery: * No surgery found *      Recommendations:     Discharge Recommendations: outpatient OT  Discharge Equipment Recommendations:  other (see comments) (tbd pending progress)  Barriers to discharge:  None    Assessment:     Radha Feng is a 81 y.o. female with a medical diagnosis of Autoimmune hepatitis.  She presents with autoimmune hepatitis. Performance deficits affecting function: weakness, impaired endurance, impaired self care skills, impaired balance, gait instability, impaired functional mobility, decreased coordination, decreased upper extremity function, decreased lower extremity function, decreased safety awareness.      Rehab Prognosis: Good; patient would benefit from acute skilled OT services to address these deficits and reach maximum level of function.       Plan:     Patient to be seen 3 x/week to address the above listed problems via self-care/home management, therapeutic activities, therapeutic exercises, neuromuscular re-education  Plan of Care Expires: 12/04/22  Plan of Care Reviewed with: patient, daughter    Subjective     Chief Complaint: auto immune hepitits  Patient/Family Comments/goals: "She was doing everything by herself before"    Occupational Profile:  Living Environment: PT lives with her daughter and grandson, SSH, [address], with a tub only  Previous level of function: Pt was  completing ADLs independently prior to recent hospitilizations  Roles and Routines: Pt enjoys spending time with her family  Equipment Used at Home:  bedside commode, shower chair, walker, rolling, wheelchair, raised toilet  Assistance upon Discharge: outpatient OT recommended upon d/c    Pain/Comfort:  Pain Rating 1: 0/10    Patients cultural, spiritual, Worship conflicts " given the current situation: no    Objective:     Communicated with: RN prior to session.  Patient found HOB elevated with peripheral IV upon OT entry to room.    General Precautions: Standard, fall   Orthopedic Precautions:N/A   Braces: N/A  Respiratory Status: Room air    Occupational Performance:    Bed Mobility:    Patient completed Supine to Sit with moderate assistance  Patient completed Sit to Supine with moderate assistance    Functional Mobility/Transfers:  Patient completed Sit <> Stand Transfer with moderate assistance  with  hand-held assist   Functional Mobility: completed for household distances in preparation for ADLs with Min A x2    Activities of Daily Living:  Grooming: modified independence in bed  Lower Body Dressing: total assistance for donning socks EOB    Cognitive/Visual Perceptual:  Cognitive/Psychosocial Skills:     -       Oriented to: Person, Place, Situation, and time when given two choices     Physical Exam:  Upper Extremity Range of Motion:     -       Right Upper Extremity: WFL  -       Left Upper Extremity: WFL  Upper Extremity Strength:    -       Right Upper Extremity: 4/5 in all planes and ranges  -       Left Upper Extremity: 4/5 in all planes and ranges   Strength:    -       Right Upper Extremity: WFL  -       Left Upper Extremity: WFL  Fine Motor Coordination:    -       Intact    AMPAC 6 Click ADL:  AMPAC Total Score: 13    Treatment & Education:  Pt educated on OT POC  Pt educated on using call bell to request assistance for fxnl ambulation  All questions within OT scope of practice answered to satisfaction  Pt left with all lines intact and all needs met         Patient left HOB elevated with all lines intact, call button in reach, RN notified, and daughter present    GOALS:   Multidisciplinary Problems       Occupational Therapy Goals          Problem: Occupational Therapy    Goal Priority Disciplines Outcome Interventions   Occupational Therapy Goal     OT, PT/OT  Ongoing, Progressing    Description: Goals to be met by: 11/18/22     Patient will increase functional independence with ADLs by performing:    Feeding with Set-up Assistance.  UE Dressing with Modified [place].  LE Dressing with Stand-by Assistance.  Grooming while standing at sink with Modified [place].  Toileting from bedside commode with Modified [place] for hygiene and clothing management.   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

## 2022-11-04 NOTE — H&P
[facility] Medicine  History & Physical    Patient Name: Radha Feng  MRN: [id number]  Patient Class: OP- Observation  Admission Date: 11/3/2022  Attending Physician: Ruth Mendoza MD   Primary Care Provider: Primary Doctor No    Patient information was obtained from patient and ER records.     Subjective:     Chief Complaint:   Chief Complaint   Patient presents with    Shortness of Breath     Cough, hx ascites         HPI: Radha Feng is an 81-year-old woman with a past medical history of HTN, HLD, T2DM, and autoimmune hepatitis, who presents to the emergency department with increased abdominal distention and shortness of breath. Of note, the patient is accompanied by her daughter who assists in providing the history. Per discussion with patient, family, and chart review, the patient was in her normal state of health several weeks ago until she was admitted and hospitalized and found to have autoimmune hepatitis on liver biopsy. The patient was discharged home on a prednisone taper and azathioprine. However, she experienced malaise and significant hyperglycemia and was hospitalized again last week for hyperglycemia and thrombocytopenia. Her azathioprine was held and she was told to continue her prednisone 30 mg until she followed up with Hepatology in clinic. Her daughter said that she noted her mother's abdomen has become more distended since her most recent hospitalization. She started complaining of abdominal pain last evening, but denies any fevers, nausea/emesis or diarrhea. She has three, formed bowel movements daily. Her daughter reports she has been having a poor appetite. She also started to develop shortness of breath at rest. No chest pain, wheezing or cough. She denies any hematemesis, hematochezia, or rectal bleeding. She denies other overt bleeding. Her daughter does note a bullous eruption on the patient's buttocks and coccygeal area that started at her last hospitalization  and has continued to spread down her buttocks.     In the emergency department, the patient was given IV furosemide 40 mg once. She was admitted to a Hospital Medicine service for further management.           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           Current Outpatient Medications on File Prior to Encounter   Medication Sig    acetaminophen (TYLENOL) 500 MG tablet Take 2 tablets (1,000 mg total) by mouth every 8 (eight) hours as needed for Pain.    amLODIPine (NORVASC) 2.5 MG tablet Take 1 tablet (2.5 mg total) by mouth once daily.    aspirin (ECOTRIN) 81 MG EC tablet Take 81 mg by mouth once daily.    azaTHIOprine (IMURAN) 50 mg Tab Take 1 tablet (50 mg total) by mouth once daily.    blood-glucose meter,continuous (DEXCOM G6 ) Misc Check blood sugar before meals and at bedtime    blood-glucose sensor (DEXCOM G6 SENSOR) Amanda Check blood sugar before meals and at bedtime    blood-glucose transmitter (DEXCOM G6 TRANSMITTER) Amanda Check blood sugar before meals and at bedtime    brimonidine 0.2% (ALPHAGAN) 0.2 % Drop INSTILL 1 DROP INTO RIGHT EYE TWICE A DAY    dorzolamide-timolol 2-0.5% (COSOPT) 22.3-6.8 mg/mL ophthalmic solution INSTILL 1 DROP INTO RIGHT EYE TWICE A DAY    famotidine (PEPCID) 20 MG tablet Take 1 tablet (20 mg total) by mouth once daily.    flash glucose sensor (SeevibesYLE LISSA 14 DAY SENSOR) Kit Check blood sugar before meals and at bedtime     "insulin aspart U-100 (NOVOLOG) 100 unit/mL (3 mL) InPn pen Inject 5 Units into the skin 3 (three) times daily with meals. Hold if pre-meal blood sugar is less than 100 or eating less thant <25% of meal    insulin detemir U-100 (LEVEMIR FLEXTOUCH) 100 unit/mL (3 mL) SubQ InPn pen Inject 10 Units into the skin every evening.    latanoprost 0.005 % ophthalmic solution PLACE 1 DROP INTO BOTH EYES ONCE DAILY.    metFORMIN (GLUCOPHAGE) 500 MG tablet TAKE 1 TABLET BY MOUTH TWICE A DAY    pen needle, diabetic 31 gauge x 5/16" Ndle Use to inject insulin into the skin up to 4 times daily    predniSONE (DELTASONE) 10 MG tablet Take 4 tablets by mouth once daily for 5 days, THEN 3 tablets once daily for 7 days, THEN 2 tablets once daily for 7 days, THEN 1.5 tablets once daily for 14 days, THEN 1 tablet once daily for 14 days.    spironolactone (ALDACTONE) 50 MG tablet Take 1 tablet (50 mg total) by mouth once daily.    sulfamethoxazole-trimethoprim 800-160mg (BACTRIM DS) 800-160 mg Tab Take 1 tablet by mouth every Mon, Wed, Fri.      Family History         Problem Relation (Age of Onset)     Blindness Cousin     Cancer Sister     Cataracts Mother     Diabetes Mother     Glaucoma Mother     Heart attack Father     Hypertension Mother                  Tobacco Use    Smoking status: Former    Smokeless tobacco: Never   Substance and Sexual Activity    Alcohol use: Not Currently    Drug use: Never    Sexual activity: Not on file      Review of Systems   Constitutional:  Positive for fatigue. Negative for activity change, chills and fever.   HENT:  Negative for congestion, nosebleeds, rhinorrhea and sore throat.    Eyes:  Negative for photophobia and visual disturbance.   Respiratory:  Negative for chest tightness, shortness of breath and wheezing.    Cardiovascular:  Positive for leg swelling. Negative for chest pain.   Gastrointestinal:  Positive for abdominal distention and abdominal pain. Negative for anal bleeding, blood in " stool, constipation, diarrhea, nausea and vomiting.   Endocrine: Negative for polyphagia and polyuria.   Genitourinary:  Negative for difficulty urinating, dysuria, frequency and urgency.   Musculoskeletal:  Negative for gait problem.   Skin:  Positive for rash and wound.   Neurological:  Negative for tremors and seizures.   Hematological:  Does not bruise/bleed easily.   Psychiatric/Behavioral:  Negative for agitation, confusion and hallucinations.    Objective:      Vital Signs (Most Recent):  Temp: 98.1 °F (36.7 °C) (11/03/22 1911)  Pulse: 67 (11/03/22 1911)  Resp: 18 (11/03/22 1911)  BP: (!) 150/82 (11/03/22 1911)  SpO2: 100 % (11/03/22 1911)    Vital Signs (24h Range):  Temp:  [97.9 °F (36.6 °C)-98.1 °F (36.7 °C)] 98.1 °F (36.7 °C)  Pulse:  [67-70] 67  Resp:  [18] 18  SpO2:  [99 %-100 %] 100 %  BP: (150-169)/(82-83) 150/82      Weight: 50.8 kg (112 lb)  Body mass index is 18.08 kg/m².     Physical Exam  Constitutional:       General: She is not in acute distress.     Appearance: She is ill-appearing.      Comments: Patient is a pleasant woman but is ill-appearing but in no acute distress. Cooperative with physical examination and conversant with interview.   HENT:      Head: Normocephalic and atraumatic.      Nose: Nose normal. No congestion or rhinorrhea.      Mouth/Throat:      Mouth: Mucous membranes are moist.      Pharynx: Oropharynx is clear. No oropharyngeal exudate or posterior oropharyngeal erythema.   Eyes:      General: No scleral icterus.     Extraocular Movements: Extraocular movements intact.   Cardiovascular:      Rate and Rhythm: Normal rate and regular rhythm.      Pulses: Normal pulses.      Heart sounds: Normal heart sounds. No murmur heard.    No friction rub. No gallop.   Pulmonary:      Effort: Pulmonary effort is normal.      Breath sounds: Normal breath sounds. No wheezing, rhonchi or rales.   Chest:      Chest wall: No tenderness.   Abdominal:      General: Abdomen is flat. There is  distension.      Tenderness: There is abdominal tenderness. There is no guarding or rebound.   Genitourinary:     Comments: Unroofed blistering lesions noted in coccygeal and rectal area  Musculoskeletal:      Cervical back: Normal range of motion and neck supple.      Right lower leg: Edema present.      Left lower leg: Edema present.   Skin:     General: Skin is warm and dry.      Findings: Lesion and rash present.   Neurological:      General: No focal deficit present.      Mental Status: She is alert and oriented to person, place, and time. Mental status is at baseline.      Sensory: No sensory deficit.      Motor: No weakness.   Psychiatric:         Mood and Affect: Mood normal.         Behavior: Behavior normal.            Significant Labs: All pertinent labs within the past 24 hours have been reviewed.  CBC:       Recent Labs   Lab 11/03/22  1620   WBC 5.13   HGB 13.7   HCT 38.8   PLT 57*      CMP:       Recent Labs   Lab 11/03/22  1620   *   K 4.4   *   CO2 10*   *   BUN 27*   CREATININE 0.7   CALCIUM 8.8   PROT 6.4   ALBUMIN 2.3*   BILITOT 4.4*   ALKPHOS 163*   AST 68*   ALT 86*   ANIONGAP 9         Significant Imaging: I have reviewed all pertinent imaging results/findings within the past 24 hours.    Assessment/Plan:     Autoimmune hepatitis  Patient previously diagnosed with autoimmune hepatitis by liver biopsy at previous hospitalization last month. She was placed on azathioprine and a prednisone taper and discharged with Hepatology follow-up. Azathioprine discontinued by providers at last admission given thrombocytopenia. Patient presenting with notable ascites in abdomen with elevated INR, worsening total bilirubin (4.4), and progressive worsening of her liver enzymes. No confusion on examination, patient without asterixis.  - s/p IV furosemide 40 mg daily x1  - obtained RUQ ultrasound which revealed heterogeneous architecture of the liver concerning for progression to cirrhosis  "without evidence of portal vein thrombosis but with severe ascites  - Hepatology consult placed, appreciate recommendations  - continuing to hold azathioprine until Hepatology givens additional recommendations  - resume home prednisone 30 mg daily  - begin IV ceftriaxone 1 g daily for SBP prophylaxis  - Interventional Radiology consult placed for paracentesis, peritoneal labs ordered to rule-out SBP      Bullous eruption  Patient with notable bullous eruption of coccyx and gluteal cleft. Her daughter notes blisters that unroofed now with shallow ulcerations that began at her last hospitalization. Patient's daughter also noted "sore" in mouth but was not able to be appreciated on examination.  - will consult Would Care, patient would likely benefit from inpatient Dermatology consultation for punch biopsy      Thrombocytopenia  Patient with platelet level of 57 which is increased from 28 last week. Most concerning secondary to bone marrow suppression from the patient's azathioprine versus splenic sequestration from her liver disease. Will rule-out other causes.  - f/u HIV, Hepatitis C antibody, peripheral smear, folate and vitamin B12    Hyponatremia  Sodium level of 130 which is decreased from below baseline. Concern for mild volume overload given her recurrent ascites and concern for the development of cirrhosis.  - f/u serum osmolality, urine osmolality, urine sodium and urine urea nitrogen        Other specified glaucoma  Will continue all home prescribed eye drops.    Type 2 diabetes mellitus, without long-term current use of insulin  Patient's diabetes appears well controlled at home. She is on metformin as an outpatient. Recently hyperglycemic given her prednisone taper so placed on insulin detemir.  - resume home insulin detemir 10 units qHS  - low dose sliding scale insulin qACHS  - hypoglycemia protocol as needed    Primary hypertension  Patient on amlodipine 2.5 mg at home. She was previously on 10 mg but " this was decreased to assist with her lower extremity edema. Mildly hypertensive in the emergency, but patient with normal blood pressure when manual blood pressure utilized instead of automatic blood pressure cuff.  - continue amlodipine 2.5 mg daily      VTE Risk Mitigation (From admission, onward)           Ordered     IP VTE HIGH RISK PATIENT  Once         11/03/22 1925     Place sequential compression device  Until discontinued         11/03/22 1925     Place sequential compression device  Until discontinued         11/03/22 1925                   Code Status: FULL    Charles Kong MD  Department of Hospital Medicine   [facility]

## 2022-11-04 NOTE — PLAN OF CARE
[facility]  Initial Discharge Assessment       Primary Care Provider: Leticia Lim MD    Admission Diagnosis: SOB (shortness of breath) [R06.02]  Autoimmune hepatitis [K75.4]    Admission Date: 11/3/2022  Expected Discharge Date: 11/7/2022    Discharge Barriers Identified: None    Payor: MEDICARE / Plan: MEDICARE PART A & B / Product Type: Government /     Extended Emergency Contact Information  Primary Emergency Contact: Della Feng  Mobile Phone: [phone]  Relation: Daughter  Preferred language: English   needed? No  Secondary Emergency Contact: Raymon Morin  Mobile Phone: [phone]  Relation: Grandchild    Discharge Plan A: Home with family         [id number] - [place], [place] - [address]  [address]  [place] [address]  Phone: [phone] Fax: [phone]    SW met with pts dtg Della to complete dc assessment. Della lives in the home with pt, she provides assistance with mobility and ADLs. Up until Sept of this year, pt was independent and having no issues with mobility. She was recently diagnosed with autoimmune hepatitis and has been having fluid buildup in her abdomen and legs which make walking difficult.     Della provides transportation to MD appts and outpt therapy. Pt has a BSC and shower chair. They have declined HH during previous hospitalizations as pts dtg is a nurse and she prefers pt to receive therapy on an outpt basis.Pt is not on HD or coumadin.    SW will follow for dc needs.      Initial Assessment (most recent)       Adult Discharge Assessment - 11/04/22 1309          Discharge Assessment    Assessment Type Discharge Planning Assessment     Confirmed/corrected address, phone number and insurance Yes     Confirmed Demographics Correct on Facesheet     Source of Information family     If unable to respond/provide information was family/caregiver contacted? Yes     Contact Name/Number Dtg Della Feng  [phone]     Communicated MELVI with patient/caregiver Yes     Lives With child(keith), adult     Do you expect to return to your current living situation? Yes     Do you have help at home or someone to help you manage your care at home? Yes     Who are your caregiver(s) and their phone number(s)? dtg and other family     Prior to hospitilization cognitive status: Unable to Assess     Current cognitive status: Unable to Assess     Walking or Climbing Stairs Difficulty ambulation difficulty, requires equipment;ambulation difficulty, assistance 1 person     Mobility Management pt has had difficulty recently due to fluid buildup walking     Dressing/Bathing Difficulty bathing difficulty, assistance 1 person;dressing difficulty, assistance 1 person     Equipment Currently Used at Home bedside commode;shower chair     Readmission within 30 days? No     Patient currently being followed by outpatient case management? No     Do you currently have service(s) that help you manage your care at home? No     Do you take prescription medications? Yes     Do you have prescription coverage? Yes     Do you have any problems affording any of your prescribed medications? No     Is the patient taking medications as prescribed? yes     Who is going to help you get home at discharge? dtg     How do you get to doctors appointments? family or friend will provide     Are you on dialysis? No     Do you take coumadin? No     Discharge Plan A Home with family     DME Needed Upon Discharge  other (see comments)   tbd    Discharge Plan discussed with: Patient;Adult children     Discharge Barriers Identified None        Relationship/Environment    Name(s) of Who Lives With Patient dtg Della Eisenberg LCSW  PRPROSPER

## 2022-11-04 NOTE — PROGRESS NOTES
11/04/22 0700   WOCN Assessment   WOCN Total Time (mins) 15   Visit Date 11/04/22   Visit Time 0700   Consult Type New   WOCN Speciality Wound   Intervention assessed;changed;applied;chart review;coordination of care;orders        Altered Skin Integrity 10/24/22 0105 Left midline;lower Coccyx Skin Tear Partial thickness tissue loss. Shallow open ulcer with a red or pink wound bed, without slough. Intact or Open/Ruptured Serum-filled blister.   Date First Assessed/Time First Assessed: 10/24/22 0105   Altered Skin Integrity Present on Admission: yes  Side: Left  Orientation: midline;lower  Location: Coccyx  Is this injury device related?: No  Primary Wound Type: Skin Tear  Description of Alte...   Wound Image    Drainage Amount Scant   Red (%), Wound Tissue Color 100 %        Altered Skin Integrity 11/04/22 0700 Left lateral Lumbar spine   Date First Assessed/Time First Assessed: 11/04/22 0700   Side: Left  Orientation: lateral  Location: Lumbar spine   Wound Image    Drainage Amount Scant   Red (%), Wound Tissue Color 100 %   Wound Length (cm) 6 cm   Wound Width (cm) 5 cm   Wound Depth (cm) 0.1 cm   Wound Volume (cm^3) 3 cm^3   Wound Surface Area (cm^2) 30 cm^2   [facility]  Wound Care    Patient Name:  Radha Feng   MRN:  [id number]  Date: 11/4/2022  Diagnosis: Autoimmune hepatitis    History:     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    Diabetes mellitus     Glaucoma     Hypertension        Social History     Socioeconomic History    Marital status: Single   Tobacco Use    Smoking status: Former    Smokeless tobacco: Never   Substance and Sexual Activity    Alcohol use: Not Currently    Drug use: Never     Social Determinants of Health     Financial Resource Strain: Low Risk     Difficulty of Paying Living Expenses: Not hard at all   Food Insecurity: No Food Insecurity    Worried About Running Out of Food in the Last Year: Never true    Ran Out  of Food in the Last Year: Never true   Transportation Needs: No Transportation Needs    Lack of Transportation (Medical): No    Lack of Transportation (Non-Medical): No   Physical Activity: Inactive    Days of Exercise per Week: 0 days    Minutes of Exercise per Session: 0 min   Stress: No Stress Concern Present    Feeling of Stress : Only a little   Social Connections: Unknown    Frequency of Communication with Friends and Family: More than three times a week    Frequency of Social Gatherings with Friends and Family: Once a week    Active Member of Clubs or Organizations: No    Attends Club or Organization Meetings: Never    Marital Status:    Housing Stability: Low Risk     Unable to Pay for Housing in the Last Year: No    Number of Places Lived in the Last Year: 1    Unstable Housing in the Last Year: No       Precautions:     Allergies as of 11/03/2022    (No Known Allergies)       WOC Assessment Details/Treatment   Patient consult for wound care to bi lateral buttocks and left lower side. The patient's daughter states that her mom will get these raise red areas and then she would scratch them, breaking the skin open. The treatment is to cleanse the open sites with soap and water pat dry then apply triad paste leave open to air twice a day and prn.     (Insert flowsheet data here)    Recommendations made to primary team for the above Orders placed.     11/04/2022

## 2022-11-04 NOTE — SUBJECTIVE & OBJECTIVE
Interval History: was sleeping this morning. Answered the orientation questions while her eyes were closed. Denies abdominal pain.     Review of Systems   Constitutional:  Negative for chills and fever.   Respiratory:  Negative for cough and shortness of breath.    Cardiovascular:  Negative for chest pain.   Gastrointestinal:  Positive for abdominal distention. Negative for abdominal pain.   Genitourinary:  Negative for dysuria.   Skin:         Ulcers noted lumbar region and on her buttock cheek.   Neurological:  Negative for syncope.   Objective:     Vital Signs (Most Recent):  Temp: 97.7 °F (36.5 °C) (11/04/22 1615)  Pulse: (!) 58 (11/04/22 1615)  Resp: 17 (11/04/22 1615)  BP: 109/65 (11/04/22 1615)  SpO2: 98 % (11/04/22 1615)   Vital Signs (24h Range):  Temp:  [97.1 °F (36.2 °C)-98.4 °F (36.9 °C)] 97.7 °F (36.5 °C)  Pulse:  [58-67] 58  Resp:  [17-18] 17  SpO2:  [95 %-100 %] 98 %  BP: (109-176)/(65-86) 109/65     Weight: 64.1 kg (141 lb 5 oz)  Body mass index is 22.81 kg/m².    Intake/Output Summary (Last 24 hours) at 11/4/2022 1839  Last data filed at 11/4/2022 1740  Gross per 24 hour   Intake 778 ml   Output 900 ml   Net -122 ml      Physical Exam  Constitutional:       Appearance: Normal appearance.   HENT:      Head: Normocephalic and atraumatic.      Mouth/Throat:      Mouth: Mucous membranes are moist.   Eyes:      Extraocular Movements: Extraocular movements intact.      Conjunctiva/sclera: Conjunctivae normal.      Pupils: Pupils are equal, round, and reactive to light.   Cardiovascular:      Rate and Rhythm: Normal rate and regular rhythm.   Pulmonary:      Effort: Pulmonary effort is normal.      Breath sounds: Normal breath sounds.   Abdominal:      Tenderness: There is no abdominal tenderness.      Comments: Abdomen is distended but soft.    Musculoskeletal:      Cervical back: Neck supple.      Comments: +2 bilateral LE swelling.    Skin:     General: Skin is warm.   Neurological:      Mental Status:  She is alert and oriented to person, place, and time.       Significant Labs: All pertinent labs within the past 24 hours have been reviewed.  BMP:   Recent Labs   Lab 11/04/22  0341   *      K 4.6   *   CO2 17*   BUN 28*   CREATININE 0.7   CALCIUM 8.3*   MG 2.0     CBC:   Recent Labs   Lab 11/03/22  1620 11/04/22  0858   WBC 5.13 5.24   HGB 13.7 10.4*   HCT 38.8 30.8*   PLT 57* 53*     CMP:   Recent Labs   Lab 11/03/22  1620 11/04/22  0341   * 136   K 4.4 4.6   * 111*   CO2 10* 17*   * 183*   BUN 27* 28*   CREATININE 0.7 0.7   CALCIUM 8.8 8.3*   PROT 6.4 5.3*   ALBUMIN 2.3* 1.9*   BILITOT 4.4* 3.5*   ALKPHOS 163* 127   AST 68* 63*   ALT 86* 67*   ANIONGAP 9 8     Coagulation:   Recent Labs   Lab 11/04/22  0858   INR 1.4*       Significant Imaging: I have reviewed all pertinent imaging results/findings within the past 24 hours.

## 2022-11-04 NOTE — CONSULTS
Med Consults Staff  Consulted for para. Pt already in IR for para, will cancel consult. Please re-consult PRN.

## 2022-11-04 NOTE — NURSING
Daniel from Hematology Lab called stating the PT/INR tube hemolyzed.will need to be redrawn.reordered.

## 2022-11-04 NOTE — ASSESSMENT & PLAN NOTE
Patient on amlodipine 2.5 mg at home. She was previously on 10 mg but this was decreased to assist with her lower extremity edema. Mildly hypertensive in the emergency, but patient with normal blood pressure when manual blood pressure utilized instead of automatic blood pressure cuff.  - continue amlodipine 2.5 mg daily

## 2022-11-04 NOTE — ASSESSMENT & PLAN NOTE
Patient on amlodipine 2.5 mg at home. She was previously on 10 mg but this was decreased to assist with her lower extremity edema. Mildly hypertensive in the emergency, but patient with normal blood pressure when manual blood pressure utilized instead of automatic blood pressure cuff.  - I will discontinue norvasc 2.5 mg now that pt will be started on lasix and aldactone to hypotension.

## 2022-11-04 NOTE — CONSULTS
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    Patient recently hospitalized 09/20-10/15 for SOB found to have elevated liver enzymes and diagnosed with AIH vs RUBIO cirrhosis on biopsy decompensated by ascites. Started on steroids, imurann w/ improvement inn enzymes    VS since arrival notable for AF, HR wnl, normotensive.     CBC w/o leukocytosis, Hgb 13.7, plts 57.    BMP w/ Na 136, BUN/ Cr 28/ 0.7.  LFTs w/ T bili 4.4-->3.5. AST/ ALT 68/ 86, . INR 2.4.  US liver w/ doppler notable for large volume ascites, patent portal vein, morphologic cirrhosis.          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    Diabetes mellitus     Glaucoma     Hypertension        Past Surgical History:   Procedure Laterality Date    CATARACT EXTRACTION W/  INTRAOCULAR LENS IMPLANT Left 12/21/2021    Procedure: EXTRACTION, CATARACT, WITH IOL INSERTION;  Surgeon: Shay Chou MD;  Location: [facility];  Service: Ophthalmology;  Laterality: Left;       Family History   Problem Relation Age of Onset    Cataracts Mother     Diabetes Mother     Glaucoma Mother     Hypertension Mother     Heart attack Father     Cancer Sister     Blindness Cousin     Amblyopia Neg Hx     Macular degeneration Neg Hx     Retinal detachment Neg Hx        Social History     Socioeconomic History    Marital status: Single   Tobacco Use    Smoking status: Former    Smokeless tobacco: Never   Substance and Sexual Activity    Alcohol use: Not  Currently    Drug use: Never     Social Determinants of Health     Financial Resource Strain: Low Risk     Difficulty of Paying Living Expenses: Not hard at all   Food Insecurity: No Food Insecurity    Worried About Running Out of Food in the Last Year: Never true    Ran Out of Food in the Last Year: Never true   Transportation Needs: No Transportation Needs    Lack of Transportation (Medical): No    Lack of Transportation (Non-Medical): No   Physical Activity: Inactive    Days of Exercise per Week: 0 days    Minutes of Exercise per Session: 0 min   Stress: No Stress Concern Present    Feeling of Stress : Only a little   Social Connections: Unknown    Frequency of Communication with Friends and Family: More than three times a week    Frequency of Social Gatherings with Friends and Family: Once a week    Active Member of Clubs or Organizations: No    Attends Club or Organization Meetings: Never    Marital Status:    Housing Stability: Low Risk     Unable to Pay for Housing in the Last Year: No    Number of Places Lived in the Last Year: 1    Unstable Housing in the Last Year: No       No current facility-administered medications on file prior to encounter.     Current Outpatient Medications on File Prior to Encounter   Medication Sig Dispense Refill    amLODIPine (NORVASC) 2.5 MG tablet Take 1 tablet (2.5 mg total) by mouth once daily. 30 tablet 0    azaTHIOprine (IMURAN) 50 mg Tab Take 1 tablet (50 mg total) by mouth once daily. 30 tablet 0    brimonidine 0.2% (ALPHAGAN) 0.2 % Drop INSTILL 1 DROP INTO RIGHT EYE TWICE A DAY 30 mL 3    dorzolamide-timolol 2-0.5% (COSOPT) 22.3-6.8 mg/mL ophthalmic solution INSTILL 1 DROP INTO RIGHT EYE TWICE A DAY 30 mL 3    famotidine (PEPCID) 20 MG tablet Take 1 tablet (20 mg total) by mouth once daily. 30 tablet 0    insulin aspart U-100 (NOVOLOG) 100 unit/mL (3 mL) InPn pen Inject 5 Units into the skin 3 (three) times daily with meals. Hold if pre-meal blood sugar is less  "than 100 or eating less thant <25% of meal 15 mL 1    insulin detemir U-100 (LEVEMIR FLEXTOUCH) 100 unit/mL (3 mL) SubQ InPn pen Inject 10 Units into the skin every evening. 15 mL 1    latanoprost 0.005 % ophthalmic solution PLACE 1 DROP INTO BOTH EYES ONCE DAILY. 7.5 mL 3    metFORMIN (GLUCOPHAGE) 500 MG tablet TAKE 1 TABLET BY MOUTH TWICE A DAY      predniSONE (DELTASONE) 10 MG tablet Take 4 tablets by mouth once daily for 5 days, THEN 3 tablets once daily for 7 days, THEN 2 tablets once daily for 7 days, THEN 1.5 tablets once daily for 14 days, THEN 1 tablet once daily for 14 days. 90 tablet 0    spironolactone (ALDACTONE) 50 MG tablet Take 1 tablet (50 mg total) by mouth once daily. 30 tablet 0    sulfamethoxazole-trimethoprim 800-160mg (BACTRIM DS) 800-160 mg Tab Take 1 tablet by mouth every Mon, Wed, Fri. 12 tablet 0    acetaminophen (TYLENOL) 500 MG tablet Take 2 tablets (1,000 mg total) by mouth every 8 (eight) hours as needed for Pain.  0    aspirin (ECOTRIN) 81 MG EC tablet Take 81 mg by mouth once daily.      blood-glucose meter,continuous (DEXCOM G6 ) Misc Check blood sugar before meals and at bedtime 1 each PRN    blood-glucose sensor (DEXCOM G6 SENSOR) Amanda Check blood sugar before meals and at bedtime 1 each PRN    blood-glucose transmitter (DEXCOM G6 TRANSMITTER) Amanda Check blood sugar before meals and at bedtime 1 each PRN    flash glucose sensor (FREESTYLE LISSA 14 DAY SENSOR) Kit Check blood sugar before meals and at bedtime 1 kit PRN    pen needle, diabetic 31 gauge x 5/16" Ndle Use to inject insulin into the skin up to 4 times daily 100 each 0       Review of patient's allergies indicates:  No Known Allergies    Review of Systems   Constitutional:  Negative for chills, fever and weight loss.   HENT:  Negative for ear pain, hearing loss and tinnitus.    Eyes:  Negative for blurred vision, double vision and photophobia.   Respiratory:  Negative for cough, hemoptysis and sputum production.  "   Cardiovascular:  Negative for chest pain, palpitations and orthopnea.   Gastrointestinal:  Negative for abdominal pain, blood in stool, constipation, diarrhea, heartburn, melena, nausea and vomiting.   Genitourinary:  Negative for dysuria, frequency and urgency.   Musculoskeletal:  Negative for back pain, myalgias and neck pain.   Skin:  Negative for itching and rash.   Neurological:  Negative for dizziness, tingling and headaches.   Endo/Heme/Allergies:  Negative for environmental allergies and polydipsia. Does not bruise/bleed easily.      Objective:     Vitals:    11/04/22 0437   BP: 139/71   Pulse: (!) 58   Resp: 18   Temp: 97.3 °F (36.3 °C)         Constitutional:  not in acute distress and well developed  HENT: Head: Normal, normocephalic, atraumatic.  Eyes: conjunctiva clear and sclera nonicteric  Cardiovascular: regular rate and rhythm and no murmur  Respiratory: normal chest expansion & respiratory effort   and no accessory muscle use  GI: soft, non-tender, without masses or organomegaly  Musculoskeletal: no muscular tenderness noted  Skin: normal color  Neurological: alert, oriented x3  Psychiatric: mood and affect are within normal limits, pt is a good historian; no memory problems were noted    Significant Labs:  Recent Labs   Lab 10/31/22  1130 11/03/22  1620   HGB 11.0* 13.7       Lab Results   Component Value Date    WBC 5.13 11/03/2022    HGB 13.7 11/03/2022    HCT 38.8 11/03/2022     (H) 11/03/2022    PLT 57 (L) 11/03/2022       Lab Results   Component Value Date     11/04/2022    K 4.6 11/04/2022     (H) 11/04/2022    CO2 17 (L) 11/04/2022    BUN 28 (H) 11/04/2022    CREATININE 0.7 11/04/2022    CALCIUM 8.3 (L) 11/04/2022    ANIONGAP 8 11/04/2022       Lab Results   Component Value Date    ALT 67 (H) 11/04/2022    AST 63 (H) 11/04/2022    ALKPHOS 127 11/04/2022    BILITOT 3.5 (H) 11/04/2022       Lab Results   Component Value Date    INR 2.4 (H) 11/03/2022    INR 1.4 (H)  10/31/2022    INR 1.5 (H) 10/25/2022       Significant Imaging:  Reviewed pertinent radiology findings.       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    Improvement in LFTs since initiation of prednisone/ imuran last hospitalization.    Problem List:  Decompensated RUBIO vs AIH cirrhosis (ascites)    Recommendations:  Decrease prednisone to 10mg   Diagnostic paracentesis to rule out SBP  Hold imuran until SBP ruled out  Can start lasix 40, aldactone 100mg to assist with volume  Can start lactulose to titrate to 4BM/d    Thank you for involving us in the care of Radha Feng. Please call with any additional questions, concerns or changes in the patient's clinical status. We will continue to follow.     Hilario Chery MD  Gastroenterology Fellow PGY V  [facility]

## 2022-11-04 NOTE — SUBJECTIVE & OBJECTIVE
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    Diabetes mellitus     Glaucoma     Hypertension        Past Surgical History:   Procedure Laterality Date    CATARACT EXTRACTION W/  INTRAOCULAR LENS IMPLANT Left 12/21/2021    Procedure: EXTRACTION, CATARACT, WITH IOL INSERTION;  Surgeon: Shay Chou MD;  Location: [facility];  Service: Ophthalmology;  Laterality: Left;       Review of patient's allergies indicates:  No Known Allergies    No current facility-administered medications on file prior to encounter.     Current Outpatient Medications on File Prior to Encounter   Medication Sig    acetaminophen (TYLENOL) 500 MG tablet Take 2 tablets (1,000 mg total) by mouth every 8 (eight) hours as needed for Pain.    amLODIPine (NORVASC) 2.5 MG tablet Take 1 tablet (2.5 mg total) by mouth once daily.    aspirin (ECOTRIN) 81 MG EC tablet Take 81 mg by mouth once daily.    azaTHIOprine (IMURAN) 50 mg Tab Take 1 tablet (50 mg total) by mouth once daily.    blood-glucose meter,continuous (DEXCOM G6 ) Misc Check blood sugar before meals and at bedtime    blood-glucose sensor (DEXCOM G6 SENSOR) Amanda Check blood sugar before meals and at bedtime    blood-glucose transmitter (DEXCOM G6 TRANSMITTER) Amanda Check blood sugar before meals and at bedtime    brimonidine 0.2% (ALPHAGAN) 0.2 % Drop INSTILL 1 DROP INTO RIGHT EYE TWICE A DAY    dorzolamide-timolol 2-0.5% (COSOPT) 22.3-6.8 mg/mL ophthalmic solution INSTILL 1 DROP INTO RIGHT EYE TWICE A DAY    famotidine (PEPCID) 20 MG tablet Take 1 tablet (20 mg total) by mouth once daily.    flash glucose sensor (FREESTYLE LISSA 14 DAY SENSOR) Kit Check blood sugar before meals and at bedtime    insulin aspart U-100 (NOVOLOG) 100 unit/mL (3 mL) InPn pen Inject 5 Units into the skin 3 (three) times daily with meals. Hold if pre-meal blood sugar is less than 100 or eating less thant <25% of meal    insulin detemir U-100 (LEVEMIR  "FLEXTOUCH) 100 unit/mL (3 mL) SubQ InPn pen Inject 10 Units into the skin every evening.    latanoprost 0.005 % ophthalmic solution PLACE 1 DROP INTO BOTH EYES ONCE DAILY.    metFORMIN (GLUCOPHAGE) 500 MG tablet TAKE 1 TABLET BY MOUTH TWICE A DAY    pen needle, diabetic 31 gauge x 5/16" Ndle Use to inject insulin into the skin up to 4 times daily    predniSONE (DELTASONE) 10 MG tablet Take 4 tablets by mouth once daily for 5 days, THEN 3 tablets once daily for 7 days, THEN 2 tablets once daily for 7 days, THEN 1.5 tablets once daily for 14 days, THEN 1 tablet once daily for 14 days.    spironolactone (ALDACTONE) 50 MG tablet Take 1 tablet (50 mg total) by mouth once daily.    sulfamethoxazole-trimethoprim 800-160mg (BACTRIM DS) 800-160 mg Tab Take 1 tablet by mouth every Mon, Wed, Fri.     Family History       Problem Relation (Age of Onset)    Blindness Cousin    Cancer Sister    Cataracts Mother    Diabetes Mother    Glaucoma Mother    Heart attack Father    Hypertension Mother          Tobacco Use    Smoking status: Former    Smokeless tobacco: Never   Substance and Sexual Activity    Alcohol use: Not Currently    Drug use: Never    Sexual activity: Not on file     Review of Systems   Constitutional:  Positive for fatigue. Negative for activity change, chills and fever.   HENT:  Negative for congestion, nosebleeds, rhinorrhea and sore throat.    Eyes:  Negative for photophobia and visual disturbance.   Respiratory:  Negative for chest tightness, shortness of breath and wheezing.    Cardiovascular:  Positive for leg swelling. Negative for chest pain.   Gastrointestinal:  Positive for abdominal distention and abdominal pain. Negative for anal bleeding, blood in stool, constipation, diarrhea, nausea and vomiting.   Endocrine: Negative for polyphagia and polyuria.   Genitourinary:  Negative for difficulty urinating, dysuria, frequency and urgency.   Musculoskeletal:  Negative for gait problem.   Skin:  Positive for " rash and wound.   Neurological:  Negative for tremors and seizures.   Hematological:  Does not bruise/bleed easily.   Psychiatric/Behavioral:  Negative for agitation, confusion and hallucinations.    Objective:     Vital Signs (Most Recent):  Temp: 98.1 °F (36.7 °C) (11/03/22 1911)  Pulse: 67 (11/03/22 1911)  Resp: 18 (11/03/22 1911)  BP: (!) 150/82 (11/03/22 1911)  SpO2: 100 % (11/03/22 1911)   Vital Signs (24h Range):  Temp:  [97.9 °F (36.6 °C)-98.1 °F (36.7 °C)] 98.1 °F (36.7 °C)  Pulse:  [67-70] 67  Resp:  [18] 18  SpO2:  [99 %-100 %] 100 %  BP: (150-169)/(82-83) 150/82     Weight: 50.8 kg (112 lb)  Body mass index is 18.08 kg/m².    Physical Exam  Constitutional:       General: She is not in acute distress.     Appearance: She is ill-appearing.      Comments: Patient is a pleasant woman but is ill-appearing but in no acute distress. Cooperative with physical examination and conversant with interview.   HENT:      Head: Normocephalic and atraumatic.      Nose: Nose normal. No congestion or rhinorrhea.      Mouth/Throat:      Mouth: Mucous membranes are moist.      Pharynx: Oropharynx is clear. No oropharyngeal exudate or posterior oropharyngeal erythema.   Eyes:      General: No scleral icterus.     Extraocular Movements: Extraocular movements intact.   Cardiovascular:      Rate and Rhythm: Normal rate and regular rhythm.      Pulses: Normal pulses.      Heart sounds: Normal heart sounds. No murmur heard.    No friction rub. No gallop.   Pulmonary:      Effort: Pulmonary effort is normal.      Breath sounds: Normal breath sounds. No wheezing, rhonchi or rales.   Chest:      Chest wall: No tenderness.   Abdominal:      General: Abdomen is flat. There is distension.      Tenderness: There is abdominal tenderness. There is no guarding or rebound.   Genitourinary:     Comments: Unroofed blistering lesions noted in coccygeal and rectal area  Musculoskeletal:      Cervical back: Normal range of motion and neck supple.       Right lower leg: Edema present.      Left lower leg: Edema present.   Skin:     General: Skin is warm and dry.      Findings: Lesion and rash present.   Neurological:      General: No focal deficit present.      Mental Status: She is alert and oriented to person, place, and time. Mental status is at baseline.      Sensory: No sensory deficit.      Motor: No weakness.   Psychiatric:         Mood and Affect: Mood normal.         Behavior: Behavior normal.           Significant Labs: All pertinent labs within the past 24 hours have been reviewed.  CBC:   Recent Labs   Lab 11/03/22  1620   WBC 5.13   HGB 13.7   HCT 38.8   PLT 57*     CMP:   Recent Labs   Lab 11/03/22  1620   *   K 4.4   *   CO2 10*   *   BUN 27*   CREATININE 0.7   CALCIUM 8.8   PROT 6.4   ALBUMIN 2.3*   BILITOT 4.4*   ALKPHOS 163*   AST 68*   ALT 86*   ANIONGAP 9       Significant Imaging: I have reviewed all pertinent imaging results/findings within the past 24 hours.

## 2022-11-04 NOTE — ASSESSMENT & PLAN NOTE
"Patient with notable bullous eruption of coccyx and gluteal cleft. Her daughter notes blisters that unroofed now with shallow ulcerations that began at her last hospitalization. Patient's daughter also noted "sore" in mouth but was not able to be appreciated on examination.  I have also spoken with dermatology. Per dermatology, since her ulcers are not life threatening and they are not the reason for her presentation, then pt can follow up with derm outpt.   "

## 2022-11-04 NOTE — NURSING
Pts daughter,Della Feng, is a Nurse.she reports she will be administering pts eye drops from her own Home meds.also performing pts wound care to coccyx/perirectal region.

## 2022-11-04 NOTE — PLAN OF CARE
Problem: Occupational Therapy  Goal: Occupational Therapy Goal  Description: Goals to be met by: 11/18/22     Patient will increase functional independence with ADLs by performing:    Feeding with Set-up Assistance.  UE Dressing with Modified [place].  LE Dressing with Stand-by Assistance.  Grooming while standing at sink with Modified [place].  Toileting from bedside commode with Modified [place] for hygiene and clothing management.   Step transfer with Stand-by Assistance    Outcome: Ongoing, Progressing

## 2022-11-04 NOTE — ASSESSMENT & PLAN NOTE
Patient previously diagnosed with autoimmune hepatitis by liver biopsy at previous hospitalization last month. She was placed on azathioprine and a prednisone taper and discharged with Hepatology follow-up. Azathioprine discontinued by providers at last admission given thrombocytopenia. Patient presenting with notable ascites in abdomen with elevated INR, worsening total bilirubin (4.4), and progressive worsening of her liver enzymes. No confusion on examination, patient without asterixis.  - s/p IV furosemide 40 mg daily x1  - obtained RUQ ultrasound which revealed heterogeneous architecture of the liver concerning for progression to cirrhosis without evidence of portal vein thrombosis but with severe ascites  - Hepatology consult placed, appreciate recommendations  - continuing to hold azathioprine until Hepatology givens additional recommendations  - resume home prednisone 30 mg daily  - begin IV ceftriaxone 1 g daily for SBP prophylaxis  - Interventional Radiology consult placed for paracentesis, peritoneal labs ordered to rule-out SBP

## 2022-11-04 NOTE — ASSESSMENT & PLAN NOTE
Patient's diabetes appears well controlled at home. She is on metformin as an outpatient. Recently hyperglycemic given her prednisone taper so placed on insulin detemir.  - resume home insulin detemir 10 units qHS  - low dose sliding scale insulin qACHS  - hypoglycemia protocol as needed

## 2022-11-04 NOTE — PLAN OF CARE
Problem: Adult Inpatient Plan of Care  Goal: Plan of Care Review  Outcome: Ongoing, Progressing  Goal: Patient-Specific Goal (Individualized)  Outcome: Ongoing, Progressing  Goal: Absence of Hospital-Acquired Illness or Injury  Outcome: Ongoing, Progressing  Goal: Optimal Comfort and Wellbeing  Outcome: Ongoing, Progressing  Goal: Readiness for Transition of Care  Outcome: Ongoing, Progressing     Problem: Diabetes Comorbidity  Goal: Blood Glucose Level Within Targeted Range  Outcome: Ongoing, Progressing     Problem: Impaired Wound Healing  Goal: Optimal Wound Healing  Outcome: Ongoing, Progressing     Problem: Skin Injury Risk Increased  Goal: Skin Health and Integrity  Outcome: Ongoing, Progressing     Problem: Adjustment to Illness (Liver Failure)  Goal: Optimal Coping with Liver Failure  Outcome: Ongoing, Progressing     Problem: Fluid and Electrolyte Imbalance (Liver Failure)  Goal: Fluid and Electrolyte Balance  Outcome: Ongoing, Progressing     Problem: Gastrointestinal Complications (Liver Failure)  Goal: Optimal Gastrointestinal Function  Outcome: Ongoing, Progressing     Problem: Impaired Coagulation (Liver Failure)  Goal: Optimal Coagulation Function  Outcome: Ongoing, Progressing     Problem: Infection (Liver Failure)  Goal: Absence of Infection Signs and Symptoms  Outcome: Ongoing, Progressing     Problem: Neurologic Function Impaired (Liver Failure)  Goal: Optimal Neurologic Function  Outcome: Ongoing, Progressing     Problem: Oral Intake Inadequate (Liver Failure)  Goal: Improved Oral Intake  Outcome: Ongoing, Progressing     Problem: Pain (Liver Failure)  Goal: Optimal Pain Control  Outcome: Ongoing, Progressing     Problem: Renal Dysfunction (Liver Failure)  Goal: Optimize Renal Function  Outcome: Ongoing, Progressing     Problem: Respiratory Compromise (Liver Failure)  Goal: Effective Oxygenation and Ventilation  Outcome: Ongoing, Progressing     Problem: Fluid Volume Excess  Goal: Fluid  Balance  Outcome: Ongoing, Progressing   Pt resting in bed with daughter at bedside, Pt up to bedside commode x2 today, no complaints voiced, call light in reach

## 2022-11-04 NOTE — PT/OT/SLP EVAL
"Physical Therapy Co-Evaluation and Co-Treatment    Patient Name:  Radha Feng   MRN:  [id number]    Co-evaluation and co-treatment performed for this visit due to suspected patient need for two skilled therapists to ensure patient and staff safety and to accommodate for patient activity tolerance/pain management   Recommendations:     Discharge Recommendations:  outpatient PT   Discharge Equipment Recommendations: none   Barriers to discharge: Inaccessible home    Assessment:     Radha Feng is a 81 y.o. female admitted with a medical diagnosis of Autoimmune hepatitis. She presents with the following impairments/functional limitations: weakness, impaired endurance, gait instability, impaired self care skills, impaired functional mobility. Patient tolerated session well. Patient's daughter reports desire for pt to attend OPPT, clear for OPPT with family assist at home. Verbalized understanding for assist required on stairs.    Rehab Prognosis: Good; patient would benefit from acute skilled PT services 3 x/week to address these deficits and reach maximum level of function.  Recent Surgery: * No surgery found *      Plan:     During this hospitalization, patient to be seen 3 x/week to address the identified rehab impairments via gait training, therapeutic activities, therapeutic exercises, neuromuscular re-education and progress toward the following goals:    Plan of Care Expires:  12/04/22    Subjective     Chief Complaint: None verbalized   Patient/Family Comments/Goals: "She was afraid to go up the steps"  Pain/Comfort:  Pain Rating 1: 0/10    Patients cultural, spiritual, Buddhism conflicts given the current situation: no    Living Environment:  Patient lives with their daughter and granddaughter in a single story home, number of outside stairs: 6 big steps with no real handrail, tub only.  Prior to admission, patient was independent with ADLs, using straight cane for ambulation. Patient uses DME as follows: " cane, straight, bedside commode, wheelchair, raised toilet, shower chair. DME owned (not currently used): none. Upon discharge, patient will have assistance from family.    Objective:     Communicated with nursing prior to session.  Patient found HOB elevated with peripheral IV upon PT entry to room.    General Precautions: Standard, fall   Orthopedic Precautions:N/A   Braces: N/A    Exams:  Cognitive Exam:  Patient is oriented to Person, Place, Time (cuing for year), Situation, follows commands 100% of the time  RLE ROM: WFL  RLE Strength: WFL, grossly 4/5  LLE ROM: WFL  LLE Strength: WFL, grossly 4/5  Sensation: Intact light touch to BLEs    Functional Mobility:  Bed Mobility:     Supine to Sit: moderate assistance  Sit to Supine: moderate assistance  Transfers:     Sit to Stand: moderate assistance with hand-held assist  Gait: Patient ambulated 20 ft with hand-held assist and minimum assistance of 2 persons. Patient demonstrates occasional unsteady gait, decreased step length, narrow base of support, flexed posture, and decreased milton. Cuing for increased step size. All lines remained intact throughout ambulation trial.  Balance:   Static Sitting: Good, able to maintain for 6 minute(s) with supervision  Dynamic Sitting: Fair: Patient accepts minimal challenge, stand by assistance  Static Standing: Fair, able to maintain for 10 seconds with minimum assistance  Dynamic Standing: Fair: Patient accepts minimal challenge, minimum assistance of 2 persons    Therapeutic Activities and Exercises:  Patient educated on role of acute care PT and PT POC  Patient is clear to stand pivot transfer with RN/PCT, assist x1  Educated about HHPT vs OPPT, patient confident in caring for and transporting patient at home and prefers OPPT    AM-PAC 6 CLICK MOBILITY  Total Score:13     Patient left HOB elevated with all lines intact, call button in reach, and family present.    GOALS:   Multidisciplinary Problems       Physical  Therapy Goals          Problem: Physical Therapy    Goal Priority Disciplines Outcome Goal Variances Interventions   Physical Therapy Goal     PT, PT/OT Ongoing, Progressing     Description: Goals to be met by: 2022     Patient will increase functional independence with mobility by performin. Supine to sit with supervision  2. Sit to supine with supervision  3. Sit to stand transfer with supervision  4. Gait  x 40 feet with supervision using LRAD as needed  5. Ascend/descend 6 stair with no handrails minimum assistance using LRAD as needed  6. 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

## 2022-11-04 NOTE — PLAN OF CARE
Pt admitted and care plan initiated.scheduled for a Paracentesis in am in IR.3+ edema noted to ble.diuresing with iv lasix.monitor strict I&o.remains weak.will start PT/OT in am.vss.r/a sats 95%.ANGELA sob noted.continue iv antibiotics.afebrile.safety precautions implemented.bed in low position.rails up x3.call bell in reach.bed alarm in use for pt safety.daughter at bedside.continue plan of care.

## 2022-11-04 NOTE — NURSING TRANSFER
Nursing Transfer Note      Pt arrived from the er via stretcher accompanied by transporter and daughter.awake,alert,oriented to self,place and daughter.pt very weak.3+ edema noted to ble and feet.Ascites noted to abdomen.scheduled for a Paracentesis in am in IR. Glucose 171.pudding,broth,crackers served.assisted up to bsc to urinate.urine sample collected and sent to lab.resp even and nonlabored.o2 sat 95% on r/a.safety precautions implemented.bed in low position.rails up x3.call bell in reach.bed alarm activated for pt safety.daughter at bedside.will monitor.pt arrived with five open ulcers around rectal region.ulcerations are superficial,moist.wound care consulted as ordered.

## 2022-11-04 NOTE — HOSPITAL COURSE
"Below are the medical problems that were addressed during this hospitalization;    * Liver failure without hepatic coma  Decompensated liver failure with recurrent ascites.   s/p IR guided paracentesis on 11/7. SBP has been ruled out.  Pt was initially started on lasix 40 mg bid iv with albumin 25% bid.   After her volume stated improved, diuretic were switched to lasix 40 mg and aldactone 100 mg daily which she will continue on discharge.  She was given lactulose for goal of 2-3 BM a day.      Autoimmune hepatitis  - Patient previously diagnosed with autoimmune hepatitis by liver biopsy at previous hospitalization last month. She was placed on azathioprine and a prednisone taper and discharged with Hepatology follow-up. Azathioprine discontinued by providers at last admission given thrombocytopenia.      - RUQ ultrasound revealed heterogeneous architecture of the liver concerning for progression to cirrhosis without evidence of portal vein thrombosis but with severe ascites  - Continue lasix 40 mg and aldactone 100 mg daily per hepatology on discharge.   - Continue [id number] mg daily on dishcarge.   Will continue to hold Imuran on discharge per hepatology recs.         Type 2 diabetes mellitus, without long-term current use of insulin  Pt's blood sugar levels were up and down during this hospitalization unfortunately .  Per daughter who is RN, Pt will well managed at home with detemir 10 units qHS, and humalog 5 units with meals.       Thrombocytopenia  ddx includes bone marrow suppression from the patient's azathioprine versus splenic sequestration from her liver disease.  We ruled-out other causes; HIV negative, Hepatitis C antibody negative, folate and vitamin B12 are wnl.     Bullous eruption  Patient with notable bullous eruption of coccyx and gluteal cleft. Her daughter notes blisters that unroofed now with shallow ulcerations that began at her last hospitalization. Patient's daughter also noted "sore" in " mouth but was not able to be appreciated on examination.  I have spoken with dermatology on 11/4. Per dermatology, since her ulcers are not life threatening and they are not the reason for her presentation, then pt can follow up with derm outpt. referral to derm was given on discharge.        Subjective; pt feels much better. Denies abdominal pain, nausea, vomiting. Her abdominal distension resolved. Her leg swelling improved.     Objective;   Vitals; reviewed  General; no acute distress  HENT; NC/AT  CV; RRR  Resp; CTA   Abdomen; soft, non tender, ND, +Ve bowel sounds  Extremities; +2 bilateral LE edema  Neuro; AAO*3

## 2022-11-04 NOTE — ASSESSMENT & PLAN NOTE
Sodium level of 130 which is decreased from below baseline. Concern for mild volume overload given her recurrent ascites and concern for the development of cirrhosis.  - f/u serum osmolality, urine osmolality, urine sodium and urine urea nitrogen

## 2022-11-04 NOTE — ASSESSMENT & PLAN NOTE
Patient with platelet level of 57 which is increased from 28 last week. Most concerning secondary to bone marrow suppression from the patient's azathioprine versus splenic sequestration from her liver disease. Will rule-out other causes.  - f/u HIV, Hepatitis C antibody, peripheral smear, folate and vitamin B12

## 2022-11-04 NOTE — PLAN OF CARE
PT eval complete, plan of care established    2022    Problem: Physical Therapy  Goal: Physical Therapy Goal  Description: Goals to be met by: 2022     Patient will increase functional independence with mobility by performin. Supine to sit with supervision  2. Sit to supine with supervision  3. Sit to stand transfer with supervision  4. Gait  x 40 feet with supervision using LRAD as needed  5. Ascend/descend 6 stair with no handrails minimum assistance using LRAD as needed  6. Lower extremity exercise program x10 reps per handout, with independence   Outcome: Ongoing, Progressing

## 2022-11-04 NOTE — ASSESSMENT & PLAN NOTE
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 She was placed on azathioprine and a prednisone taper and discharged with Hepatology follow-up. Azathioprine discontinued by providers at last admission given thrombocytopenia.     - obtained RUQ ultrasound which revealed heterogeneous architecture of the liver concerning for progression to cirrhosis without evidence of portal vein thrombosis but with severe ascites  - Hepatology was consulted and case was discussed. Will decrease prednisone from 30 mg to 10 mg daily. Imuran to be held until SBP has been ruled out per hepatology.

## 2022-11-04 NOTE — TELEPHONE ENCOUNTER
Attempted to contact patient's daughter for paracentesis consent. No answer. Left VM to please call [phone]

## 2022-11-04 NOTE — HPI
Radha Feng is an 81-year-old woman with a past medical history of HTN, HLD, T2DM, and autoimmune hepatitis, who presents to the emergency department with increased abdominal distention and shortness of breath. Of note, the patient is accompanied by her daughter who assists in providing the history. Per discussion with patient, family, and chart review, the patient was in her normal state of health several weeks ago until she was admitted and hospitalized and found to have autoimmune hepatitis on liver biopsy. The patient was discharged home on a prednisone taper and azathioprine. However, she experienced malaise and significant hyperglycemia and was hospitalized again last week for hyperglycemia and thrombocytopenia. Her azathioprine was held and she was told to continue her prednisone 30 mg until she followed up with Hepatology in clinic. Her daughter said that she noted her mother's abdomen has become more distended since her most recent hospitalization. She started complaining of abdominal pain last evening, but denies any fevers, nausea/emesis or diarrhea. She has three, formed bowel movements daily. Her daughter reports she has been having a poor appetite. She also started to develop shortness of breath at rest. No chest pain, wheezing or cough. She denies any hematemesis, hematochezia, or rectal bleeding. She denies other overt bleeding. Her daughter does note a bullous eruption on the patient's buttocks and coccygeal area that started at her last hospitalization and has continued to spread down her buttocks.     In the emergency department, the patient was given IV furosemide 40 mg once. She was admitted to a Hospital Medicine service for further management.

## 2022-11-05 PROBLEM — E87.5 HYPERKALEMIA: Status: ACTIVE | Noted: 2022-11-05

## 2022-11-05 LAB
ALBUMIN SERPL BCP-MCNC: 1.8 G/DL (ref 3.5–5.2)
ALP SERPL-CCNC: 125 U/L (ref 55–135)
ALT SERPL W/O P-5'-P-CCNC: 61 U/L (ref 10–44)
ANION GAP SERPL CALC-SCNC: 4 MMOL/L (ref 8–16)
AST SERPL-CCNC: 39 U/L (ref 10–40)
BASOPHILS # BLD AUTO: 0 K/UL (ref 0–0.2)
BASOPHILS NFR BLD: 0 % (ref 0–1.9)
BILIRUB SERPL-MCNC: 2.4 MG/DL (ref 0.1–1)
BUN SERPL-MCNC: 26 MG/DL (ref 8–23)
CALCIUM SERPL-MCNC: 8.4 MG/DL (ref 8.7–10.5)
CHLORIDE SERPL-SCNC: 114 MMOL/L (ref 95–110)
CO2 SERPL-SCNC: 19 MMOL/L (ref 23–29)
CREAT SERPL-MCNC: 0.7 MG/DL (ref 0.5–1.4)
DIFFERENTIAL METHOD: ABNORMAL
EOSINOPHIL # BLD AUTO: 0 K/UL (ref 0–0.5)
EOSINOPHIL NFR BLD: 0 % (ref 0–8)
ERYTHROCYTE [DISTWIDTH] IN BLOOD BY AUTOMATED COUNT: 17 % (ref 11.5–14.5)
EST. GFR  (NO RACE VARIABLE): >60 ML/MIN/1.73 M^2
GLUCOSE SERPL-MCNC: 223 MG/DL (ref 70–110)
HCT VFR BLD AUTO: 30.3 % (ref 37–48.5)
HGB BLD-MCNC: 10.3 G/DL (ref 12–16)
IMM GRANULOCYTES # BLD AUTO: 0.02 K/UL (ref 0–0.04)
IMM GRANULOCYTES NFR BLD AUTO: 0.4 % (ref 0–0.5)
INR PPP: 1.4 (ref 0.8–1.2)
LYMPHOCYTES # BLD AUTO: 0.7 K/UL (ref 1–4.8)
LYMPHOCYTES NFR BLD: 12.6 % (ref 18–48)
MAGNESIUM SERPL-MCNC: 1.9 MG/DL (ref 1.6–2.6)
MCH RBC QN AUTO: 37.1 PG (ref 27–31)
MCHC RBC AUTO-ENTMCNC: 34 G/DL (ref 32–36)
MCV RBC AUTO: 109 FL (ref 82–98)
MONOCYTES # BLD AUTO: 0.7 K/UL (ref 0.3–1)
MONOCYTES NFR BLD: 11.7 % (ref 4–15)
NEUTROPHILS # BLD AUTO: 4.3 K/UL (ref 1.8–7.7)
NEUTROPHILS NFR BLD: 75.3 % (ref 38–73)
NRBC BLD-RTO: 0 /100 WBC
PHOSPHATE SERPL-MCNC: 2.7 MG/DL (ref 2.7–4.5)
PLATELET # BLD AUTO: 53 K/UL (ref 150–450)
PMV BLD AUTO: 12.9 FL (ref 9.2–12.9)
POCT GLUCOSE: 165 MG/DL (ref 70–110)
POCT GLUCOSE: 166 MG/DL (ref 70–110)
POCT GLUCOSE: 213 MG/DL (ref 70–110)
POCT GLUCOSE: 227 MG/DL (ref 70–110)
POTASSIUM SERPL-SCNC: 5.3 MMOL/L (ref 3.5–5.1)
PROT SERPL-MCNC: 4.8 G/DL (ref 6–8.4)
PROTHROMBIN TIME: 14 SEC (ref 9–12.5)
RBC # BLD AUTO: 2.78 M/UL (ref 4–5.4)
SODIUM SERPL-SCNC: 137 MMOL/L (ref 136–145)
WBC # BLD AUTO: 5.71 K/UL (ref 3.9–12.7)

## 2022-11-05 PROCEDURE — 63600175 PHARM REV CODE 636 W HCPCS: Performed by: HOSPITALIST

## 2022-11-05 PROCEDURE — P9047 ALBUMIN (HUMAN), 25%, 50ML: HCPCS | Mod: JG | Performed by: HOSPITALIST

## 2022-11-05 PROCEDURE — 83735 ASSAY OF MAGNESIUM: CPT | Performed by: STUDENT IN AN ORGANIZED HEALTH CARE EDUCATION/TRAINING PROGRAM

## 2022-11-05 PROCEDURE — 11000001 HC ACUTE MED/SURG PRIVATE ROOM

## 2022-11-05 PROCEDURE — 85025 COMPLETE CBC W/AUTO DIFF WBC: CPT | Performed by: STUDENT IN AN ORGANIZED HEALTH CARE EDUCATION/TRAINING PROGRAM

## 2022-11-05 PROCEDURE — 25000003 PHARM REV CODE 250: Performed by: STUDENT IN AN ORGANIZED HEALTH CARE EDUCATION/TRAINING PROGRAM

## 2022-11-05 PROCEDURE — 84100 ASSAY OF PHOSPHORUS: CPT | Performed by: STUDENT IN AN ORGANIZED HEALTH CARE EDUCATION/TRAINING PROGRAM

## 2022-11-05 PROCEDURE — 36415 COLL VENOUS BLD VENIPUNCTURE: CPT | Performed by: STUDENT IN AN ORGANIZED HEALTH CARE EDUCATION/TRAINING PROGRAM

## 2022-11-05 PROCEDURE — 80053 COMPREHEN METABOLIC PANEL: CPT | Performed by: STUDENT IN AN ORGANIZED HEALTH CARE EDUCATION/TRAINING PROGRAM

## 2022-11-05 PROCEDURE — 25000003 PHARM REV CODE 250: Performed by: HOSPITALIST

## 2022-11-05 PROCEDURE — 85610 PROTHROMBIN TIME: CPT | Performed by: STUDENT IN AN ORGANIZED HEALTH CARE EDUCATION/TRAINING PROGRAM

## 2022-11-05 RX ORDER — FUROSEMIDE 10 MG/ML
40 INJECTION INTRAMUSCULAR; INTRAVENOUS DAILY
Status: DISCONTINUED | OUTPATIENT
Start: 2022-11-05 | End: 2022-11-05

## 2022-11-05 RX ORDER — FUROSEMIDE 40 MG/1
40 TABLET ORAL DAILY
Status: DISCONTINUED | OUTPATIENT
Start: 2022-11-05 | End: 2022-11-05

## 2022-11-05 RX ORDER — FUROSEMIDE 10 MG/ML
40 INJECTION INTRAMUSCULAR; INTRAVENOUS 2 TIMES DAILY
Status: DISCONTINUED | OUTPATIENT
Start: 2022-11-05 | End: 2022-11-08

## 2022-11-05 RX ORDER — ALBUMIN HUMAN 250 G/1000ML
12.5 SOLUTION INTRAVENOUS 2 TIMES DAILY
Status: DISCONTINUED | OUTPATIENT
Start: 2022-11-05 | End: 2022-11-08

## 2022-11-05 RX ADMIN — LACTULOSE 15 G: 20 SOLUTION ORAL at 08:11

## 2022-11-05 RX ADMIN — ALBUMIN (HUMAN) 12.5 G: 12.5 SOLUTION INTRAVENOUS at 08:11

## 2022-11-05 RX ADMIN — INSULIN ASPART 2 UNITS: 100 INJECTION, SOLUTION INTRAVENOUS; SUBCUTANEOUS at 05:11

## 2022-11-05 RX ADMIN — INSULIN ASPART 4 UNITS: 100 INJECTION, SOLUTION INTRAVENOUS; SUBCUTANEOUS at 09:11

## 2022-11-05 RX ADMIN — INSULIN ASPART 2 UNITS: 100 INJECTION, SOLUTION INTRAVENOUS; SUBCUTANEOUS at 09:11

## 2022-11-05 RX ADMIN — SODIUM POLYSTYRENE SULFONATE 30 G: 15 SUSPENSION ORAL; RECTAL at 11:11

## 2022-11-05 RX ADMIN — PREDNISONE 10 MG: 5 TABLET ORAL at 08:11

## 2022-11-05 RX ADMIN — INSULIN DETEMIR 10 UNITS: 100 INJECTION, SOLUTION SUBCUTANEOUS at 09:11

## 2022-11-05 RX ADMIN — INSULIN ASPART 2 UNITS: 100 INJECTION, SOLUTION INTRAVENOUS; SUBCUTANEOUS at 11:11

## 2022-11-05 RX ADMIN — CEFTRIAXONE 2 G: 2 INJECTION, SOLUTION INTRAVENOUS at 09:11

## 2022-11-05 RX ADMIN — FUROSEMIDE 40 MG: 10 INJECTION, SOLUTION INTRAVENOUS at 08:11

## 2022-11-05 RX ADMIN — FUROSEMIDE 40 MG: 10 INJECTION, SOLUTION INTRAVENOUS at 09:11

## 2022-11-05 NOTE — PROGRESS NOTES
[facility] Medicine  Progress Note    Patient Name: Radha Feng  MRN: [id number]  Patient Class: IP- Inpatient   Admission Date: 11/3/2022  Length of Stay: 1 days  Attending Physician: Ruth Mendoza MD  Primary Care Provider: Leticia Lim MD        Subjective:     Principal Problem:Liver failure without hepatic coma        HPI:  Radha Feng is an 81-year-old woman with a past medical history of HTN, HLD, T2DM, and autoimmune hepatitis, who presents to the emergency department with increased abdominal distention and shortness of breath. Of note, the patient is accompanied by her daughter who assists in providing the history. Per discussion with patient, family, and chart review, the patient was in her normal state of health several weeks ago until she was admitted and hospitalized and found to have autoimmune hepatitis on liver biopsy. The patient was discharged home on a prednisone taper and azathioprine. However, she experienced malaise and significant hyperglycemia and was hospitalized again last week for hyperglycemia and thrombocytopenia. Her azathioprine was held and she was told to continue her prednisone 30 mg until she followed up with Hepatology in clinic. Her daughter said that she noted her mother's abdomen has become more distended since her most recent hospitalization. She started complaining of abdominal pain last evening, but denies any fevers, nausea/emesis or diarrhea. She has three, formed bowel movements daily. Her daughter reports she has been having a poor appetite. She also started to develop shortness of breath at rest. No chest pain, wheezing or cough. She denies any hematemesis, hematochezia, or rectal bleeding. She denies other overt bleeding. Her daughter does note a bullous eruption on the patient's buttocks and coccygeal area that started at her last hospitalization and has continued to spread down her buttocks.     In the emergency department, the  patient was given IV furosemide 40 mg once. She was admitted to a Hospital Medicine service for further management.      Overview/Hospital Course:  On 11/4; pt was seen and examined. Daughter by the bedside relating all the history.   Plan was for IR to proceed with paracentesis today, but unfortunately it was not done for unclear reason.   Daughter agreed to have medicine consult try bedside US guided paracentesis.   Discussed case with hepatology. Plan is to resume imuran 50 mg daily once SBP has been ruled out. Also to decrease prednisone from 30 mg to 10 mg.  I have also spoken with dermatology. Per dermatology, since her ulcers are not life threatening and they are not the reason for her presentation, then pt can follow up with derm outpt.     On 11/5; pt's potassium was elevated this am. Kayexalate ordered.   Case was discussed with hepatology who recommended lasix 40 mg bid iv and albumin 25% bid to be given prior to lasix.   No aldactone today given hyperkalemia.  Consult was placed to medicine for paracentesis. However unfortunately medicine consult is busy today and won't be able to do paracentesis until tomorrow. Case was discussed with the daughter who wanted to wait till Monday for the paracentesis to be done by IR since it won't be done today.           Interval History: sleepy this morning. When awaken, she is confused and only knows that she is in the hospital but otherwise doesn't know the [place] president name or the year.   Per daughter, pt is usually more alert and oriented in the afternoon.   Denies any abdominal pain, nausea/vomiting.       Review of Systems   Constitutional:  Negative for chills and fever.   Respiratory:  Negative for cough and shortness of breath.    Cardiovascular:  Negative for chest pain.   Gastrointestinal:  Positive for abdominal distention. Negative for abdominal pain.   Genitourinary:  Negative for dysuria.   Skin:         Ulcers noted lumbar region and on her buttock  cheek.   Neurological:  Negative for syncope.   Objective:     Vital Signs (Most Recent):  Temp: 97.3 °F (36.3 °C) (11/05/22 1142)  Pulse: (!) 59 (11/05/22 1142)  Resp: 18 (11/05/22 1142)  BP: (!) 160/88 (11/05/22 1142)  SpO2: 95 % (11/05/22 1142)   Vital Signs (24h Range):  Temp:  [97.3 °F (36.3 °C)-98.1 °F (36.7 °C)] 97.3 °F (36.3 °C)  Pulse:  [58-67] 59  Resp:  [17-18] 18  SpO2:  [95 %-98 %] 95 %  BP: (109-160)/(64-88) 160/88     Weight: 64.1 kg (141 lb 5 oz)  Body mass index is 22.81 kg/m².    Intake/Output Summary (Last 24 hours) at 11/5/2022 1353  Last data filed at 11/4/2022 1740  Gross per 24 hour   Intake 200 ml   Output 600 ml   Net -400 ml        Physical Exam  Constitutional:       Appearance: Normal appearance.   HENT:      Head: Normocephalic and atraumatic.      Mouth/Throat:      Mouth: Mucous membranes are moist.   Eyes:      Extraocular Movements: Extraocular movements intact.      Conjunctiva/sclera: Conjunctivae normal.      Pupils: Pupils are equal, round, and reactive to light.   Cardiovascular:      Rate and Rhythm: Normal rate and regular rhythm.   Pulmonary:      Effort: Pulmonary effort is normal.      Breath sounds: Normal breath sounds.   Abdominal:      Tenderness: There is no abdominal tenderness.      Comments: Abdomen is distended but soft.    Musculoskeletal:      Cervical back: Neck supple.      Comments: +2 bilateral LE swelling.    Skin:     General: Skin is warm.   Neurological:      Mental Status: She is alert.      Comments: Alert and oriented to self, and place being hospital.        Significant Labs: All pertinent labs within the past 24 hours have been reviewed.  BMP:   Recent Labs   Lab 11/05/22  0533   *      K 5.3*   *   CO2 19*   BUN 26*   CREATININE 0.7   CALCIUM 8.4*   MG 1.9       CBC:   Recent Labs   Lab 11/03/22  1620 11/04/22  0858 11/05/22  0533   WBC 5.13 5.24 5.71   HGB 13.7 10.4* 10.3*   HCT 38.8 30.8* 30.3*   PLT 57* 53* 53*       CMP:    Recent Labs   Lab 11/03/22  1620 11/04/22  0341 11/05/22  0533   * 136 137   K 4.4 4.6 5.3*   * 111* 114*   CO2 10* 17* 19*   * 183* 223*   BUN 27* 28* 26*   CREATININE 0.7 0.7 0.7   CALCIUM 8.8 8.3* 8.4*   PROT 6.4 5.3* 4.8*   ALBUMIN 2.3* 1.9* 1.8*   BILITOT 4.4* 3.5* 2.4*   ALKPHOS 163* 127 125   AST 68* 63* 39   ALT 86* 67* 61*   ANIONGAP 9 8 4*       Coagulation:   Recent Labs   Lab 11/05/22  0533   INR 1.4*         Significant Imaging: I have reviewed all pertinent imaging results/findings within the past 24 hours.      Assessment/Plan:      * Liver failure without hepatic coma  Decompensated liver failure with recurrent ascites.   Plan was for IR to proceed with diagnostic and therapeutic paracentesis on 11/4, but unfortunately it was not done for unclear reason.   Daughter agreed to have medicine consult try bedside US guided paracentesis. However medicine consult service is busy today and won't be able to do it. Per daughter request, will wait till IR be able to do it on Monday.   s/p IV furosemide 40 mg daily x1 in the ER. Will start lasix 40 mg iv bid per hepatology recs.  Continue ceftriaxone 2 grams to cover for SBP until it is ruled out.   Continue lactulose for goal of 2-3 BM a day. Per daughter, pt had 3 BM yesterday.      Autoimmune hepatitis  - Patient previously diagnosed with autoimmune hepatitis by liver biopsy at previous hospitalization last month. She was placed on azathioprine and a prednisone taper and discharged with Hepatology follow-up. Azathioprine discontinued by providers at last admission given thrombocytopenia.     - obtained RUQ ultrasound which revealed heterogeneous architecture of the liver concerning for progression to cirrhosis without evidence of portal vein thrombosis but with severe ascites  - Hepatology is following and case was discussed today:  Will start lasix 40 mg bid iv. Albumin 25% to be given prior to lasix.   Continue [id number] mg daily.  "  Imuran to be held until SBP has been ruled out per hepatology.         Hyperkalemia  Pt was given kayexylate and lasix.   Will repeat labs at 3 pm.       Thrombocytopenia  Patient with platelet level of 57 on admission which has increased from 28 last week.   ddx includes bone marrow suppression from the patient's azathioprine versus splenic sequestration from her liver disease.  We ruled-out other causes; HIV negative, Hepatitis C antibody negative, folate and vitamin B12 are wnl.  Monitor. Transfuse if plts<10.    Bullous eruption  Patient with notable bullous eruption of coccyx and gluteal cleft. Her daughter notes blisters that unroofed now with shallow ulcerations that began at her last hospitalization. Patient's daughter also noted "sore" in mouth but was not able to be appreciated on examination.  I have also spoken with dermatology on 11/4. Per dermatology, since her ulcers are not life threatening and they are not the reason for her presentation, then pt can follow up with derm outpt.     Hyponatremia  Sodium level of 130 which is decreased from below baseline. Concern for mild volume overload given her recurrent ascites and concern for the development of cirrhosis.  - improved with diuresis.       Other specified glaucoma  Will continue all home prescribed eye drops.    Type 2 diabetes mellitus, without long-term current use of insulin  Patient's diabetes appears well controlled at home. She is on metformin as an outpatient. Recently hyperglycemic given her prednisone taper so placed on insulin detemir.  - continue home insulin detemir 10 units qHS  - low dose sliding scale insulin qACHS  - hypoglycemia protocol as needed    Primary hypertension  Hold bp meds since pt will be on lasix iv. Avoid drop in bp.     VTE Risk Mitigation (From admission, onward)         Ordered     IP VTE HIGH RISK PATIENT  Once         11/03/22 1925     Place sequential compression device  Until discontinued         11/03/22 " 1925                Discharge Planning   MELVI: 11/7/2022     Code Status: Full Code   Is the patient medically ready for discharge?:     Reason for patient still in hospital (select all that apply): Patient trending condition  Discharge Plan A: Home with family        Case was discussed with hepatology, medicine consult service and RN.  I have spent more than 35 minutes taking care of Mrs. Feng today with more than 50% of that time was spent coordinating care and reviewing records.       Ruth Mendoza MD  Department of Hospital Medicine   [facility]

## 2022-11-05 NOTE — SUBJECTIVE & OBJECTIVE
Interval History: sleepy this morning. When awaken, she is confused and only knows that she is in the hospital but otherwise doesn't know the [place] president name or the year.   Per daughter, pt is usually more alert and oriented in the afternoon.   Denies any abdominal pain, nausea/vomiting.       Review of Systems   Constitutional:  Negative for chills and fever.   Respiratory:  Negative for cough and shortness of breath.    Cardiovascular:  Negative for chest pain.   Gastrointestinal:  Positive for abdominal distention. Negative for abdominal pain.   Genitourinary:  Negative for dysuria.   Skin:         Ulcers noted lumbar region and on her buttock cheek.   Neurological:  Negative for syncope.   Objective:     Vital Signs (Most Recent):  Temp: 97.3 °F (36.3 °C) (11/05/22 1142)  Pulse: (!) 59 (11/05/22 1142)  Resp: 18 (11/05/22 1142)  BP: (!) 160/88 (11/05/22 1142)  SpO2: 95 % (11/05/22 1142)   Vital Signs (24h Range):  Temp:  [97.3 °F (36.3 °C)-98.1 °F (36.7 °C)] 97.3 °F (36.3 °C)  Pulse:  [58-67] 59  Resp:  [17-18] 18  SpO2:  [95 %-98 %] 95 %  BP: (109-160)/(64-88) 160/88     Weight: 64.1 kg (141 lb 5 oz)  Body mass index is 22.81 kg/m².    Intake/Output Summary (Last 24 hours) at 11/5/2022 1353  Last data filed at 11/4/2022 1740  Gross per 24 hour   Intake 200 ml   Output 600 ml   Net -400 ml        Physical Exam  Constitutional:       Appearance: Normal appearance.   HENT:      Head: Normocephalic and atraumatic.      Mouth/Throat:      Mouth: Mucous membranes are moist.   Eyes:      Extraocular Movements: Extraocular movements intact.      Conjunctiva/sclera: Conjunctivae normal.      Pupils: Pupils are equal, round, and reactive to light.   Cardiovascular:      Rate and Rhythm: Normal rate and regular rhythm.   Pulmonary:      Effort: Pulmonary effort is normal.      Breath sounds: Normal breath sounds.   Abdominal:      Tenderness: There is no abdominal tenderness.      Comments: Abdomen is distended but  soft.    Musculoskeletal:      Cervical back: Neck supple.      Comments: +2 bilateral LE swelling.    Skin:     General: Skin is warm.   Neurological:      Mental Status: She is alert.      Comments: Alert and oriented to self, and place being hospital.        Significant Labs: All pertinent labs within the past 24 hours have been reviewed.  BMP:   Recent Labs   Lab 11/05/22  0533   *      K 5.3*   *   CO2 19*   BUN 26*   CREATININE 0.7   CALCIUM 8.4*   MG 1.9       CBC:   Recent Labs   Lab 11/03/22  1620 11/04/22  0858 11/05/22  0533   WBC 5.13 5.24 5.71   HGB 13.7 10.4* 10.3*   HCT 38.8 30.8* 30.3*   PLT 57* 53* 53*       CMP:   Recent Labs   Lab 11/03/22  1620 11/04/22  0341 11/05/22  0533   * 136 137   K 4.4 4.6 5.3*   * 111* 114*   CO2 10* 17* 19*   * 183* 223*   BUN 27* 28* 26*   CREATININE 0.7 0.7 0.7   CALCIUM 8.8 8.3* 8.4*   PROT 6.4 5.3* 4.8*   ALBUMIN 2.3* 1.9* 1.8*   BILITOT 4.4* 3.5* 2.4*   ALKPHOS 163* 127 125   AST 68* 63* 39   ALT 86* 67* 61*   ANIONGAP 9 8 4*       Coagulation:   Recent Labs   Lab 11/05/22  0533   INR 1.4*         Significant Imaging: I have reviewed all pertinent imaging results/findings within the past 24 hours.

## 2022-11-05 NOTE — ASSESSMENT & PLAN NOTE
Patient with platelet level of 57 on admission which has increased from 28 last week.   ddx includes bone marrow suppression from the patient's azathioprine versus splenic sequestration from her liver disease.  We ruled-out other causes; HIV negative, Hepatitis C antibody negative, folate and vitamin B12 are wnl.  Monitor. Transfuse if plts<10.

## 2022-11-05 NOTE — PROGRESS NOTES
[facility] Medicine  Progress Note    Patient Name: Radha Feng  MRN: [id number]  Patient Class: IP- Inpatient   Admission Date: 11/3/2022  Length of Stay: 0 days  Attending Physician: Ruth Mendoza MD  Primary Care Provider: Leticia Lim MD        Subjective:     Principal Problem:Liver failure without hepatic coma        HPI:  Radha Feng is an 81-year-old woman with a past medical history of HTN, HLD, T2DM, and autoimmune hepatitis, who presents to the emergency department with increased abdominal distention and shortness of breath. Of note, the patient is accompanied by her daughter who assists in providing the history. Per discussion with patient, family, and chart review, the patient was in her normal state of health several weeks ago until she was admitted and hospitalized and found to have autoimmune hepatitis on liver biopsy. The patient was discharged home on a prednisone taper and azathioprine. However, she experienced malaise and significant hyperglycemia and was hospitalized again last week for hyperglycemia and thrombocytopenia. Her azathioprine was held and she was told to continue her prednisone 30 mg until she followed up with Hepatology in clinic. Her daughter said that she noted her mother's abdomen has become more distended since her most recent hospitalization. She started complaining of abdominal pain last evening, but denies any fevers, nausea/emesis or diarrhea. She has three, formed bowel movements daily. Her daughter reports she has been having a poor appetite. She also started to develop shortness of breath at rest. No chest pain, wheezing or cough. She denies any hematemesis, hematochezia, or rectal bleeding. She denies other overt bleeding. Her daughter does note a bullous eruption on the patient's buttocks and coccygeal area that started at her last hospitalization and has continued to spread down her buttocks.     In the emergency department, the  patient was given IV furosemide 40 mg once. She was admitted to a Hospital Medicine service for further management.      Overview/Hospital Course:  On 11/4; pt was seen and examined. Daughter by the bedside relating all the history.   Plan was for IR to proceed with paracentesis today, but unfortunately it was not done for unclear reason.   Daughter agreed to have medicine consult try bedside US guided paracentesis.   Discussed case with hepatology. Plan is to resume imuran 50 mg daily once SBP has been ruled out. Also to decrease prednisone from 30 mg to 10 mg.  I have also spoken with dermatology. Per dermatology, since her ulcers are not life threatening and they are not the reason for her presentation, then pt can follow up with derm outpt.       Interval History: was sleeping this morning. Answered the orientation questions while her eyes were closed. Denies abdominal pain.     Review of Systems   Constitutional:  Negative for chills and fever.   Respiratory:  Negative for cough and shortness of breath.    Cardiovascular:  Negative for chest pain.   Gastrointestinal:  Positive for abdominal distention. Negative for abdominal pain.   Genitourinary:  Negative for dysuria.   Skin:         Ulcers noted lumbar region and on her buttock cheek.   Neurological:  Negative for syncope.   Objective:     Vital Signs (Most Recent):  Temp: 97.7 °F (36.5 °C) (11/04/22 1615)  Pulse: (!) 58 (11/04/22 1615)  Resp: 17 (11/04/22 1615)  BP: 109/65 (11/04/22 1615)  SpO2: 98 % (11/04/22 1615)   Vital Signs (24h Range):  Temp:  [97.1 °F (36.2 °C)-98.4 °F (36.9 °C)] 97.7 °F (36.5 °C)  Pulse:  [58-67] 58  Resp:  [17-18] 17  SpO2:  [95 %-100 %] 98 %  BP: (109-176)/(65-86) 109/65     Weight: 64.1 kg (141 lb 5 oz)  Body mass index is 22.81 kg/m².    Intake/Output Summary (Last 24 hours) at 11/4/2022 1839  Last data filed at 11/4/2022 1740  Gross per 24 hour   Intake 778 ml   Output 900 ml   Net -122 ml      Physical Exam  Constitutional:        Appearance: Normal appearance.   HENT:      Head: Normocephalic and atraumatic.      Mouth/Throat:      Mouth: Mucous membranes are moist.   Eyes:      Extraocular Movements: Extraocular movements intact.      Conjunctiva/sclera: Conjunctivae normal.      Pupils: Pupils are equal, round, and reactive to light.   Cardiovascular:      Rate and Rhythm: Normal rate and regular rhythm.   Pulmonary:      Effort: Pulmonary effort is normal.      Breath sounds: Normal breath sounds.   Abdominal:      Tenderness: There is no abdominal tenderness.      Comments: Abdomen is distended but soft.    Musculoskeletal:      Cervical back: Neck supple.      Comments: +2 bilateral LE swelling.    Skin:     General: Skin is warm.   Neurological:      Mental Status: She is alert and oriented to person, place, and time.       Significant Labs: All pertinent labs within the past 24 hours have been reviewed.  BMP:   Recent Labs   Lab 11/04/22  0341   *      K 4.6   *   CO2 17*   BUN 28*   CREATININE 0.7   CALCIUM 8.3*   MG 2.0     CBC:   Recent Labs   Lab 11/03/22  1620 11/04/22  0858   WBC 5.13 5.24   HGB 13.7 10.4*   HCT 38.8 30.8*   PLT 57* 53*     CMP:   Recent Labs   Lab 11/03/22  1620 11/04/22  0341   * 136   K 4.4 4.6   * 111*   CO2 10* 17*   * 183*   BUN 27* 28*   CREATININE 0.7 0.7   CALCIUM 8.8 8.3*   PROT 6.4 5.3*   ALBUMIN 2.3* 1.9*   BILITOT 4.4* 3.5*   ALKPHOS 163* 127   AST 68* 63*   ALT 86* 67*   ANIONGAP 9 8     Coagulation:   Recent Labs   Lab 11/04/22  0858   INR 1.4*       Significant Imaging: I have reviewed all pertinent imaging results/findings within the past 24 hours.      Assessment/Plan:      * Liver failure without hepatic coma  Decompensated liver failure with recurrent ascites.   Plan was for IR to proceed with paracentesis today, but unfortunately it was not done for unclear reason.   Daughter agreed to have medicine consult try bedside US guided paracentesis.   s/p  "IV furosemide 40 mg daily x1. Will start lasix 40 mg daily and aldactone 100 mg daily per hepatology recs.  Will increase ceftriaxone from 1 g to 2 grams to cover for SBP until it is ruled out.         Autoimmune hepatitis  - Patient previously diagnosed with autoimmune hepatitis by liver biopsy at previous hospitalization last month. She was placed on azathioprine and a prednisone taper and discharged with Hepatology follow-up. Azathioprine discontinued by providers at last admission given thrombocytopenia.     - obtained RUQ ultrasound which revealed heterogeneous architecture of the liver concerning for progression to cirrhosis without evidence of portal vein thrombosis but with severe ascites  - Hepatology was consulted and case was discussed. Will decrease prednisone from 30 mg to 10 mg daily. Imuran to be held until SBP has been ruled out per hepatology.         Bullous eruption  Patient with notable bullous eruption of coccyx and gluteal cleft. Her daughter notes blisters that unroofed now with shallow ulcerations that began at her last hospitalization. Patient's daughter also noted "sore" in mouth but was not able to be appreciated on examination.  I have also spoken with dermatology. Per dermatology, since her ulcers are not life threatening and they are not the reason for her presentation, then pt can follow up with derm outpt.     Thrombocytopenia  Patient with platelet level of 57 on admission which has increased from 28 last week.   ddx includes bone marrow suppression from the patient's azathioprine versus splenic sequestration from her liver disease.  We ruled-out other causes; HIV negative, Hepatitis C antibody negative, folate and vitamin B12 are wnl.    Hyponatremia  Sodium level of 130 which is decreased from below baseline. Concern for mild volume overload given her recurrent ascites and concern for the development of cirrhosis.  - improved with diuresis.       Other specified glaucoma  Will " continue all home prescribed eye drops.    Type 2 diabetes mellitus, without long-term current use of insulin  Patient's diabetes appears well controlled at home. She is on metformin as an outpatient. Recently hyperglycemic given her prednisone taper so placed on insulin detemir.  - continue home insulin detemir 10 units qHS  - low dose sliding scale insulin qACHS  - hypoglycemia protocol as needed    Primary hypertension  Patient on amlodipine 2.5 mg at home. She was previously on 10 mg but this was decreased to assist with her lower extremity edema. Mildly hypertensive in the emergency, but patient with normal blood pressure when manual blood pressure utilized instead of automatic blood pressure cuff.  - I will discontinue norvasc 2.5 mg now that pt will be started on lasix and aldactone to hypotension.     VTE Risk Mitigation (From admission, onward)         Ordered     IP VTE HIGH RISK PATIENT  Once         11/03/22 1925     Place sequential compression device  Until discontinued         11/03/22 1925                Discharge Planning   MELVI: 11/7/2022     Code Status: Full Code   Is the patient medically ready for discharge?:     Reason for patient still in hospital (select all that apply): Patient trending condition  Discharge Plan A: Home with family        I have spent more than 35 minutes taking care of Mrs. Feng today with more than 50% of that time was spent coordinating care and reviewing records. Case was discussed with hepatology, dermatology, RN and pt's daughter.       Ruth Mendoza MD  Department of Hospital Medicine   [facility]

## 2022-11-05 NOTE — ASSESSMENT & PLAN NOTE
Sodium level of 130 which is decreased from below baseline. Concern for mild volume overload given her recurrent ascites and concern for the development of cirrhosis.  - improved with diuresis.

## 2022-11-05 NOTE — PROGRESS NOTES
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     Patient recently hospitalized 09/20-10/15 for SOB found to have elevated liver enzymes and diagnosed with AIH vs RUBIO cirrhosis on biopsy decompensated by ascites. Started on steroids, imurann w/ improvement inn enzymes     VS since arrival notable for AF, HR wnl, normotensive.      CBC w/o leukocytosis, Hgb 13.7, plts 57.     BMP w/ Na 136, BUN/ Cr 28/ 0.7.  LFTs w/ T bili 4.4-->3.5. AST/ ALT 68/ 86, . INR 2.4.  US liver w/ doppler notable for large volume ascites, patent portal vein, morphologic cirrhosis."    Interval History:  NAEON. Paracentesis not completed.  LFTs downtrending    No current facility-administered medications on file prior to encounter.     Current Outpatient Medications on File Prior to Encounter   Medication Sig Dispense Refill    amLODIPine (NORVASC) 2.5 MG tablet Take 1 tablet (2.5 mg total) by mouth once daily. 30 tablet 0    azaTHIOprine (IMURAN) 50 mg Tab Take 1 tablet (50 mg total) by mouth once daily. 30 tablet 0    brimonidine 0.2% (ALPHAGAN) 0.2 % Drop INSTILL 1 DROP INTO RIGHT EYE TWICE A DAY 30 mL 3    dorzolamide-timolol 2-0.5% (COSOPT) 22.3-6.8 mg/mL ophthalmic solution INSTILL 1 DROP INTO RIGHT EYE TWICE A DAY 30 mL 3    famotidine (PEPCID) 20 MG tablet Take 1 tablet (20 mg total) by mouth once daily. 30 tablet 0    insulin aspart U-100 (NOVOLOG) 100 unit/mL (3 mL) InPn pen Inject 5 Units into the skin 3 (three) times daily with meals. Hold if pre-meal blood sugar is less than 100 or eating less thant <25% of meal 15 mL 1    insulin detemir U-100 (LEVEMIR FLEXTOUCH) 100 unit/mL (3 mL) SubQ InPn pen Inject 10 Units into the skin every evening. 15 mL 1    " "latanoprost 0.005 % ophthalmic solution PLACE 1 DROP INTO BOTH EYES ONCE DAILY. 7.5 mL 3    metFORMIN (GLUCOPHAGE) 500 MG tablet TAKE 1 TABLET BY MOUTH TWICE A DAY      predniSONE (DELTASONE) 10 MG tablet Take 4 tablets by mouth once daily for 5 days, THEN 3 tablets once daily for 7 days, THEN 2 tablets once daily for 7 days, THEN 1.5 tablets once daily for 14 days, THEN 1 tablet once daily for 14 days. 90 tablet 0    spironolactone (ALDACTONE) 50 MG tablet Take 1 tablet (50 mg total) by mouth once daily. 30 tablet 0    sulfamethoxazole-trimethoprim 800-160mg (BACTRIM DS) 800-160 mg Tab Take 1 tablet by mouth every Mon, Wed, Fri. 12 tablet 0    acetaminophen (TYLENOL) 500 MG tablet Take 2 tablets (1,000 mg total) by mouth every 8 (eight) hours as needed for Pain.  0    aspirin (ECOTRIN) 81 MG EC tablet Take 81 mg by mouth once daily.      blood-glucose meter,continuous (DEXCOM G6 ) Misc Check blood sugar before meals and at bedtime 1 each PRN    blood-glucose sensor (DEXCOM G6 SENSOR) Amanda Check blood sugar before meals and at bedtime 1 each PRN    blood-glucose transmitter (DEXCOM G6 TRANSMITTER) Amanda Check blood sugar before meals and at bedtime 1 each PRN    flash glucose sensor (FREESTYLE LISSA 14 DAY SENSOR) Kit Check blood sugar before meals and at bedtime 1 kit PRN    pen needle, diabetic 31 gauge x 5/16" Ndle Use to inject insulin into the skin up to 4 times daily 100 each 0       Review of patient's allergies indicates:  No Known Allergies      Objective:     Vitals:    11/05/22 0743   BP: (!) 153/72   Pulse: (!) 59   Resp: 18   Temp: 97.3 °F (36.3 °C)         Constitutional:  not in acute distress and well developed  HENT: Head: Normal, normocephalic, atraumatic.  Eyes: conjunctiva clear and sclera nonicteric  Cardiovascular: regular rate and rhythm  Respiratory: normal chest expansion & respiratory effort   and no accessory muscle use  GI: soft and distended  Musculoskeletal: no muscular " tenderness noted  Skin: normal color  Neurological: alert, oriented x3  Psychiatric: mood and affect are within normal limits, pt is a good historian; no memory problems were noted    Significant Labs:  Recent Labs   Lab 11/03/22  1620 11/04/22  0858 11/05/22  0533   HGB 13.7 10.4* 10.3*       Lab Results   Component Value Date    WBC 5.71 11/05/2022    HGB 10.3 (L) 11/05/2022    HCT 30.3 (L) 11/05/2022     (H) 11/05/2022    PLT 53 (L) 11/05/2022       Lab Results   Component Value Date     11/05/2022    K 5.3 (H) 11/05/2022     (H) 11/05/2022    CO2 19 (L) 11/05/2022    BUN 26 (H) 11/05/2022    CREATININE 0.7 11/05/2022    CALCIUM 8.4 (L) 11/05/2022    ANIONGAP 4 (L) 11/05/2022       Lab Results   Component Value Date    ALT 61 (H) 11/05/2022    AST 39 11/05/2022    ALKPHOS 125 11/05/2022    BILITOT 2.4 (H) 11/05/2022       Lab Results   Component Value Date    INR 1.4 (H) 11/05/2022    INR 1.4 (H) 11/04/2022    INR 2.4 (H) 11/03/2022       Significant Imaging:  Reviewed pertinent radiology findings.       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     Improvement in LFTs since initiation of prednisone/ imuran last hospitalization.     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 Please call with any additional questions, concerns or changes in the patient's clinical status. We will continue to follow.     Hilario Chery MD  Gastroenterology Fellow PGY IV   [facility]

## 2022-11-05 NOTE — ASSESSMENT & PLAN NOTE
Patient's diabetes appears well controlled at home. She is on metformin as an outpatient. Recently hyperglycemic given her prednisone taper so placed on insulin detemir.  - continue home insulin detemir 10 units qHS  - low dose sliding scale insulin qACHS  - hypoglycemia protocol as needed

## 2022-11-05 NOTE — ASSESSMENT & PLAN NOTE
"Patient with notable bullous eruption of coccyx and gluteal cleft. Her daughter notes blisters that unroofed now with shallow ulcerations that began at her last hospitalization. Patient's daughter also noted "sore" in mouth but was not able to be appreciated on examination.  I have also spoken with dermatology on 11/4. Per dermatology, since her ulcers are not life threatening and they are not the reason for her presentation, then pt can follow up with derm outpt.   "

## 2022-11-05 NOTE — ASSESSMENT & PLAN NOTE
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 She was placed on azathioprine and a prednisone taper and discharged with Hepatology follow-up. Azathioprine discontinued by providers at last admission given thrombocytopenia.     - obtained RUQ ultrasound which revealed heterogeneous architecture of the liver concerning for progression to cirrhosis without evidence of portal vein thrombosis but with severe ascites  - Hepatology is following and case was discussed today:  Will start lasix 40 mg bid iv. Albumin 25% to be given prior to lasix.   Continue [id number] mg daily.   Imuran to be held until SBP has been ruled out per hepatology.

## 2022-11-05 NOTE — ASSESSMENT & PLAN NOTE
Patient with platelet level of 57 on admission which has increased from 28 last week.   ddx includes bone marrow suppression from the patient's azathioprine versus splenic sequestration from her liver disease.  We ruled-out other causes; HIV negative, Hepatitis C antibody negative, folate and vitamin B12 are wnl.

## 2022-11-05 NOTE — ASSESSMENT & PLAN NOTE
Decompensated liver failure with recurrent ascites.   Plan was for IR to proceed with diagnostic and therapeutic paracentesis on 11/4, but unfortunately it was not done for unclear reason.   Daughter agreed to have medicine consult try bedside US guided paracentesis. However medicine consult service is busy today and won't be able to do it. Per daughter request, will wait till IR be able to do it on Monday.   s/p IV furosemide 40 mg daily x1 in the ER. Will start lasix 40 mg iv bid per hepatology recs.  Continue ceftriaxone 2 grams to cover for SBP until it is ruled out.   Continue lactulose for goal of 2-3 BM a day. Per daughter, pt had 3 BM yesterday.

## 2022-11-06 LAB
ALBUMIN SERPL BCP-MCNC: 2.3 G/DL (ref 3.5–5.2)
ALP SERPL-CCNC: 118 U/L (ref 55–135)
ALT SERPL W/O P-5'-P-CCNC: 58 U/L (ref 10–44)
ANION GAP SERPL CALC-SCNC: 7 MMOL/L (ref 8–16)
AST SERPL-CCNC: 41 U/L (ref 10–40)
BACTERIA UR CULT: NO GROWTH
BASOPHILS # BLD AUTO: 0 K/UL (ref 0–0.2)
BASOPHILS NFR BLD: 0 % (ref 0–1.9)
BILIRUB DIRECT SERPL-MCNC: 1.2 MG/DL (ref 0.1–0.3)
BILIRUB SERPL-MCNC: 2.7 MG/DL (ref 0.1–1)
BUN SERPL-MCNC: 22 MG/DL (ref 8–23)
CALCIUM SERPL-MCNC: 8.1 MG/DL (ref 8.7–10.5)
CHLORIDE SERPL-SCNC: 110 MMOL/L (ref 95–110)
CO2 SERPL-SCNC: 20 MMOL/L (ref 23–29)
CREAT SERPL-MCNC: 0.7 MG/DL (ref 0.5–1.4)
DIFFERENTIAL METHOD: ABNORMAL
EOSINOPHIL # BLD AUTO: 0 K/UL (ref 0–0.5)
EOSINOPHIL NFR BLD: 0.2 % (ref 0–8)
ERYTHROCYTE [DISTWIDTH] IN BLOOD BY AUTOMATED COUNT: 16.8 % (ref 11.5–14.5)
EST. GFR  (NO RACE VARIABLE): >60 ML/MIN/1.73 M^2
GLUCOSE SERPL-MCNC: 240 MG/DL (ref 70–110)
HCT VFR BLD AUTO: 27.5 % (ref 37–48.5)
HGB BLD-MCNC: 9.3 G/DL (ref 12–16)
IMM GRANULOCYTES # BLD AUTO: 0.01 K/UL (ref 0–0.04)
IMM GRANULOCYTES NFR BLD AUTO: 0.2 % (ref 0–0.5)
INR PPP: 1.5 (ref 0.8–1.2)
LYMPHOCYTES # BLD AUTO: 0.6 K/UL (ref 1–4.8)
LYMPHOCYTES NFR BLD: 13.6 % (ref 18–48)
MAGNESIUM SERPL-MCNC: 1.6 MG/DL (ref 1.6–2.6)
MCH RBC QN AUTO: 36.8 PG (ref 27–31)
MCHC RBC AUTO-ENTMCNC: 33.8 G/DL (ref 32–36)
MCV RBC AUTO: 109 FL (ref 82–98)
MONOCYTES # BLD AUTO: 0.5 K/UL (ref 0.3–1)
MONOCYTES NFR BLD: 11.7 % (ref 4–15)
NEUTROPHILS # BLD AUTO: 3.1 K/UL (ref 1.8–7.7)
NEUTROPHILS NFR BLD: 74.3 % (ref 38–73)
NRBC BLD-RTO: 0 /100 WBC
PHOSPHATE SERPL-MCNC: 2.4 MG/DL (ref 2.7–4.5)
PLATELET # BLD AUTO: 52 K/UL (ref 150–450)
PMV BLD AUTO: 12.7 FL (ref 9.2–12.9)
POCT GLUCOSE: 194 MG/DL (ref 70–110)
POCT GLUCOSE: 241 MG/DL (ref 70–110)
POCT GLUCOSE: 271 MG/DL (ref 70–110)
POCT GLUCOSE: 276 MG/DL (ref 70–110)
POTASSIUM SERPL-SCNC: 3.5 MMOL/L (ref 3.5–5.1)
PROT SERPL-MCNC: 4.9 G/DL (ref 6–8.4)
PROTHROMBIN TIME: 15.1 SEC (ref 9–12.5)
RBC # BLD AUTO: 2.53 M/UL (ref 4–5.4)
SODIUM SERPL-SCNC: 137 MMOL/L (ref 136–145)
WBC # BLD AUTO: 4.19 K/UL (ref 3.9–12.7)

## 2022-11-06 PROCEDURE — 11000001 HC ACUTE MED/SURG PRIVATE ROOM

## 2022-11-06 PROCEDURE — 99233 PR SUBSEQUENT HOSPITAL CARE,LEVL III: ICD-10-PCS | Mod: GC,,, | Performed by: INTERNAL MEDICINE

## 2022-11-06 PROCEDURE — 84100 ASSAY OF PHOSPHORUS: CPT | Performed by: STUDENT IN AN ORGANIZED HEALTH CARE EDUCATION/TRAINING PROGRAM

## 2022-11-06 PROCEDURE — 82248 BILIRUBIN DIRECT: CPT | Performed by: HOSPITALIST

## 2022-11-06 PROCEDURE — 25000003 PHARM REV CODE 250: Performed by: HOSPITALIST

## 2022-11-06 PROCEDURE — 99233 SBSQ HOSP IP/OBS HIGH 50: CPT | Mod: GC,,, | Performed by: INTERNAL MEDICINE

## 2022-11-06 PROCEDURE — 85610 PROTHROMBIN TIME: CPT | Performed by: STUDENT IN AN ORGANIZED HEALTH CARE EDUCATION/TRAINING PROGRAM

## 2022-11-06 PROCEDURE — 63600175 PHARM REV CODE 636 W HCPCS: Performed by: HOSPITALIST

## 2022-11-06 PROCEDURE — 36415 COLL VENOUS BLD VENIPUNCTURE: CPT | Performed by: STUDENT IN AN ORGANIZED HEALTH CARE EDUCATION/TRAINING PROGRAM

## 2022-11-06 PROCEDURE — 20600001 HC STEP DOWN PRIVATE ROOM

## 2022-11-06 PROCEDURE — 85025 COMPLETE CBC W/AUTO DIFF WBC: CPT | Performed by: STUDENT IN AN ORGANIZED HEALTH CARE EDUCATION/TRAINING PROGRAM

## 2022-11-06 PROCEDURE — 80053 COMPREHEN METABOLIC PANEL: CPT | Performed by: STUDENT IN AN ORGANIZED HEALTH CARE EDUCATION/TRAINING PROGRAM

## 2022-11-06 PROCEDURE — P9047 ALBUMIN (HUMAN), 25%, 50ML: HCPCS | Mod: JG | Performed by: HOSPITALIST

## 2022-11-06 PROCEDURE — 83735 ASSAY OF MAGNESIUM: CPT | Performed by: STUDENT IN AN ORGANIZED HEALTH CARE EDUCATION/TRAINING PROGRAM

## 2022-11-06 RX ADMIN — INSULIN ASPART 6 UNITS: 100 INJECTION, SOLUTION INTRAVENOUS; SUBCUTANEOUS at 01:11

## 2022-11-06 RX ADMIN — LACTULOSE 15 G: 20 SOLUTION ORAL at 10:11

## 2022-11-06 RX ADMIN — INSULIN ASPART 2 UNITS: 100 INJECTION, SOLUTION INTRAVENOUS; SUBCUTANEOUS at 09:11

## 2022-11-06 RX ADMIN — INSULIN ASPART 6 UNITS: 100 INJECTION, SOLUTION INTRAVENOUS; SUBCUTANEOUS at 05:11

## 2022-11-06 RX ADMIN — FUROSEMIDE 40 MG: 10 INJECTION, SOLUTION INTRAVENOUS at 10:11

## 2022-11-06 RX ADMIN — ALBUMIN (HUMAN) 12.5 G: 12.5 SOLUTION INTRAVENOUS at 01:11

## 2022-11-06 RX ADMIN — FUROSEMIDE 40 MG: 10 INJECTION, SOLUTION INTRAVENOUS at 02:11

## 2022-11-06 RX ADMIN — CEFTRIAXONE 2 G: 2 INJECTION, SOLUTION INTRAVENOUS at 09:11

## 2022-11-06 RX ADMIN — INSULIN DETEMIR 10 UNITS: 100 INJECTION, SOLUTION SUBCUTANEOUS at 09:11

## 2022-11-06 RX ADMIN — ALBUMIN (HUMAN) 12.5 G: 12.5 SOLUTION INTRAVENOUS at 07:11

## 2022-11-06 RX ADMIN — PREDNISONE 10 MG: 5 TABLET ORAL at 10:11

## 2022-11-06 RX ADMIN — INSULIN ASPART 2 UNITS: 100 INJECTION, SOLUTION INTRAVENOUS; SUBCUTANEOUS at 10:11

## 2022-11-06 NOTE — SUBJECTIVE & OBJECTIVE
Interval History: sleepy this morning. When awaken, she is confused and only knows that she is in the hospital but otherwise doesn't know the [place] president name or the year.   Per daughter, pt is usually more alert and oriented in the afternoon.   Denies any abdominal pain, nausea/vomiting.       Review of Systems   Constitutional:  Negative for chills and fever.   Respiratory:  Negative for cough and shortness of breath.    Cardiovascular:  Negative for chest pain.   Gastrointestinal:  Negative for abdominal pain.        Abdominal distension has improved compared to admission.    Genitourinary:  Negative for dysuria.   Skin:         Ulcers lumbar region and on her buttock cheek.   Neurological:  Negative for syncope.   Objective:     Vital Signs (Most Recent):  Temp: 97.6 °F (36.4 °C) (11/06/22 1224)  Pulse: (!) 55 (11/06/22 1224)  Resp: 16 (11/06/22 1224)  BP: (!) 157/72 (11/06/22 1224)  SpO2: (!) 94 % (11/06/22 1224)   Vital Signs (24h Range):  Temp:  [97.3 °F (36.3 °C)-97.7 °F (36.5 °C)] 97.6 °F (36.4 °C)  Pulse:  [55-88] 55  Resp:  [16-19] 16  SpO2:  [94 %-99 %] 94 %  BP: (122-157)/(63-86) 157/72     Weight: 64.1 kg (141 lb 5 oz)  Body mass index is 22.81 kg/m².  No intake or output data in the 24 hours ending 11/06/22 1228     Physical Exam  Constitutional:       Appearance: Normal appearance.   HENT:      Head: Normocephalic and atraumatic.      Mouth/Throat:      Mouth: Mucous membranes are moist.   Eyes:      Extraocular Movements: Extraocular movements intact.      Conjunctiva/sclera: Conjunctivae normal.      Pupils: Pupils are equal, round, and reactive to light.   Cardiovascular:      Rate and Rhythm: Normal rate and regular rhythm.   Pulmonary:      Effort: Pulmonary effort is normal.      Breath sounds: Normal breath sounds.   Abdominal:      Comments: Abdomen distension has improved since admission. No tenderness on deep palpation.    Musculoskeletal:      Cervical back: Neck supple.      Comments:  +2 bilateral LE swelling.    Skin:     General: Skin is warm.      Comments: Exam was done in the presence of her daughter (who stated that she is a RN) on 11/4. Pt has superficial skin ulcers with no erythema around the area.   Neurological:      Mental Status: She is alert.      Comments: Alert and oriented to self, and place being hospital.        Significant Labs: All pertinent labs within the past 24 hours have been reviewed.  BMP:   Recent Labs   Lab 11/06/22  0340   *      K 3.5      CO2 20*   BUN 22   CREATININE 0.7   CALCIUM 8.1*   MG 1.6       CBC:   Recent Labs   Lab 11/05/22 0533 11/06/22 0340   WBC 5.71 4.19   HGB 10.3* 9.3*   HCT 30.3* 27.5*   PLT 53* 52*       CMP:   Recent Labs   Lab 11/05/22 0533 11/06/22 0340    137   K 5.3* 3.5   * 110   CO2 19* 20*   * 240*   BUN 26* 22   CREATININE 0.7 0.7   CALCIUM 8.4* 8.1*   PROT 4.8* 4.9*   ALBUMIN 1.8* 2.3*   BILITOT 2.4* 2.7*   ALKPHOS 125 118   AST 39 41*   ALT 61* 58*   ANIONGAP 4* 7*       Coagulation:   Recent Labs   Lab 11/06/22 0340   INR 1.5*         Significant Imaging: I have reviewed all pertinent imaging results/findings within the past 24 hours.

## 2022-11-06 NOTE — PROGRESS NOTES
[facility]  Progress Note    Patient Name: Radha Feng  MRN: [id number]  Patient Class: IP- Inpatient   Admission Date: 11/3/2022  Length of Stay: 2 days  Attending Physician: Ruth Mendoza MD  Primary Care Provider: Leticia Lim MD        Subjective:     Principal Problem:Liver failure without hepatic coma        HPI:  Radha Feng is an 81-year-old woman with a past medical history of HTN, HLD, T2DM, and autoimmune hepatitis, who presents to the emergency department with increased abdominal distention and shortness of breath. Of note, the patient is accompanied by her daughter who assists in providing the history. Per discussion with patient, family, and chart review, the patient was in her normal state of health several weeks ago until she was admitted and hospitalized and found to have autoimmune hepatitis on liver biopsy. The patient was discharged home on a prednisone taper and azathioprine. However, she experienced malaise and significant hyperglycemia and was hospitalized again last week for hyperglycemia and thrombocytopenia. Her azathioprine was held and she was told to continue her prednisone 30 mg until she followed up with Hepatology in clinic. Her daughter said that she noted her mother's abdomen has become more distended since her most recent hospitalization. She started complaining of abdominal pain last evening, but denies any fevers, nausea/emesis or diarrhea. She has three, formed bowel movements daily. Her daughter reports she has been having a poor appetite. She also started to develop shortness of breath at rest. No chest pain, wheezing or cough. She denies any hematemesis, hematochezia, or rectal bleeding. She denies other overt bleeding. Her daughter does note a bullous eruption on the patient's buttocks and coccygeal area that started at her last hospitalization and has continued to spread down her buttocks.     In the emergency department, the  patient was given IV furosemide 40 mg once. She was admitted to a Hospital Medicine service for further management.      Overview/Hospital Course:  On 11/4; pt was seen and examined. Daughter by the bedside relating all the history.   Plan was for IR to proceed with paracentesis today, but unfortunately it was not done for unclear reason.   Daughter agreed to have medicine consult try bedside US guided paracentesis.   Discussed case with hepatology. Plan is to resume imuran 50 mg daily once SBP has been ruled out. Also to decrease prednisone from 30 mg to 10 mg.  I have also spoken with dermatology. Per dermatology, since her ulcers are not life threatening and they are not the reason for her presentation, then pt can follow up with derm outpt.     On 11/5; pt's potassium was elevated this am. Kayexalate ordered.   Case was discussed with hepatology who recommended lasix 40 mg bid iv and albumin 25% bid to be given prior to lasix.   No aldactone today given hyperkalemia.  Consult was placed to medicine for paracentesis. However unfortunately medicine consult is busy today and won't be able to do paracentesis until tomorrow. Case was discussed with the daughter who wanted to wait till Monday for the paracentesis to be done by IR since it won't be done today.     On 11/6; pt is doing better. Mentation has improved. She has not any abdominal pain since admission.   Diuresing well with the lasix iv bid and had 7 Bms according to the daughter yesterday.         Interval History: sleepy this morning. When awaken, she is confused and only knows that she is in the hospital but otherwise doesn't know the [place] president name or the year.   Per daughter, pt is usually more alert and oriented in the afternoon.   Denies any abdominal pain, nausea/vomiting.       Review of Systems   Constitutional:  Negative for chills and fever.   Respiratory:  Negative for cough and shortness of breath.    Cardiovascular:  Negative for chest  pain.   Gastrointestinal:  Negative for abdominal pain.        Abdominal distension has improved compared to admission.    Genitourinary:  Negative for dysuria.   Skin:         Ulcers lumbar region and on her buttock cheek.   Neurological:  Negative for syncope.   Objective:     Vital Signs (Most Recent):  Temp: 97.6 °F (36.4 °C) (11/06/22 1224)  Pulse: (!) 55 (11/06/22 1224)  Resp: 16 (11/06/22 1224)  BP: (!) 157/72 (11/06/22 1224)  SpO2: (!) 94 % (11/06/22 1224)   Vital Signs (24h Range):  Temp:  [97.3 °F (36.3 °C)-97.7 °F (36.5 °C)] 97.6 °F (36.4 °C)  Pulse:  [55-88] 55  Resp:  [16-19] 16  SpO2:  [94 %-99 %] 94 %  BP: (122-157)/(63-86) 157/72     Weight: 64.1 kg (141 lb 5 oz)  Body mass index is 22.81 kg/m².  No intake or output data in the 24 hours ending 11/06/22 1228     Physical Exam  Constitutional:       Appearance: Normal appearance.   HENT:      Head: Normocephalic and atraumatic.      Mouth/Throat:      Mouth: Mucous membranes are moist.   Eyes:      Extraocular Movements: Extraocular movements intact.      Conjunctiva/sclera: Conjunctivae normal.      Pupils: Pupils are equal, round, and reactive to light.   Cardiovascular:      Rate and Rhythm: Normal rate and regular rhythm.   Pulmonary:      Effort: Pulmonary effort is normal.      Breath sounds: Normal breath sounds.   Abdominal:      Comments: Abdomen distension has improved since admission. No tenderness on deep palpation.    Musculoskeletal:      Cervical back: Neck supple.      Comments: +2 bilateral LE swelling.    Skin:     General: Skin is warm.      Comments: Exam was done in the presence of her daughter (who stated that she is a RN) on 11/4. Pt has superficial skin ulcers with no erythema around the area.   Neurological:      Mental Status: She is alert.      Comments: Alert and oriented to self, and place being hospital.        Significant Labs: All pertinent labs within the past 24 hours have been reviewed.  BMP:   Recent Labs   Lab  11/06/22  0340   *      K 3.5      CO2 20*   BUN 22   CREATININE 0.7   CALCIUM 8.1*   MG 1.6       CBC:   Recent Labs   Lab 11/05/22  0533 11/06/22  0340   WBC 5.71 4.19   HGB 10.3* 9.3*   HCT 30.3* 27.5*   PLT 53* 52*       CMP:   Recent Labs   Lab 11/05/22  0533 11/06/22  0340    137   K 5.3* 3.5   * 110   CO2 19* 20*   * 240*   BUN 26* 22   CREATININE 0.7 0.7   CALCIUM 8.4* 8.1*   PROT 4.8* 4.9*   ALBUMIN 1.8* 2.3*   BILITOT 2.4* 2.7*   ALKPHOS 125 118   AST 39 41*   ALT 61* 58*   ANIONGAP 4* 7*       Coagulation:   Recent Labs   Lab 11/06/22  0340   INR 1.5*         Significant Imaging: I have reviewed all pertinent imaging results/findings within the past 24 hours.      Assessment/Plan:      * Liver failure without hepatic coma  Decompensated liver failure with recurrent ascites.   Plan was for IR to proceed with diagnostic and therapeutic paracentesis on 11/4, but unfortunately it was not done for unclear reason.   Daughter agreed to have medicine consult try bedside US guided paracentesis. However medicine consult service was bsuy on 11/5 and couldn't do it. Per daughter request, will wait till IR be able to do it on Monday.   s/p IV furosemide 40 mg daily x1 in the ER.  Continue lasix 40 mg iv bid per hepatology recs.  Continue ceftriaxone 2 grams to cover for SBP until it is ruled out.   Continue lactulose for goal of 2-3 BM a day. Per daughter, pt had 7 BM yesterday.      Autoimmune hepatitis  - Patient previously diagnosed with autoimmune hepatitis by liver biopsy at previous hospitalization last month. She was placed on azathioprine and a prednisone taper and discharged with Hepatology follow-up. Azathioprine discontinued by providers at last admission given thrombocytopenia.     - RUQ ultrasound revealed heterogeneous architecture of the liver concerning for progression to cirrhosis without evidence of portal vein thrombosis but with severe ascites  - Hepatology is  "following and case was discussed:  Continue lasix 40 mg bid iv. Albumin 25% to be given prior to lasix. Daily wt and strict I's & O's.  Continue [id number] mg daily.   Imuran to be held until SBP has been ruled out per hepatology.         Type 2 diabetes mellitus, without long-term current use of insulin  Patient's diabetes appears well controlled at home. She is on metformin as an outpatient. Recently hyperglycemic given her prednisone taper so placed on insulin detemir.  - continue home insulin detemir 10 units qHS  - low dose sliding scale insulin qACHS  - hypoglycemia protocol as needed    Thrombocytopenia  Patient with platelet level of 57 on admission which has increased from 28 last week.   ddx includes bone marrow suppression from the patient's azathioprine versus splenic sequestration from her liver disease.  We ruled-out other causes; HIV negative, Hepatitis C antibody negative, folate and vitamin B12 are wnl.  Monitor. Transfuse if plts<10.    Hyperkalemia  Resolved. Continue to monitor.       Bullous eruption  Patient with notable bullous eruption of coccyx and gluteal cleft. Her daughter notes blisters that unroofed now with shallow ulcerations that began at her last hospitalization. Patient's daughter also noted "sore" in mouth but was not able to be appreciated on examination.  I have spoken with dermatology on 11/4. Per dermatology, since her ulcers are not life threatening and they are not the reason for her presentation, then pt can follow up with derm outpt.     Hyponatremia  Sodium level of 130 which is decreased from below baseline. Concern for mild volume overload given her recurrent ascites and concern for the development of cirrhosis.  - improved with diuresis.       Other specified glaucoma  Will continue all home prescribed eye drops.    Primary hypertension  Hold bp meds since pt will be on lasix iv. Avoid drop in bp.       VTE Risk Mitigation (From admission, onward)         Ordered     " IP VTE HIGH RISK PATIENT  Once         11/03/22 1925     Place sequential compression device  Until discontinued         11/03/22 1925                Discharge Planning   MELVI: 11/7/2022     Code Status: Full Code   Is the patient medically ready for discharge?:     Reason for patient still in hospital (select all that apply): Patient trending condition  Discharge Plan A: Home with family              Ruth Menodza MD  Department of Hospital Medicine   [facility]

## 2022-11-06 NOTE — ASSESSMENT & PLAN NOTE
Decompensated liver failure with recurrent ascites.   Plan was for IR to proceed with diagnostic and therapeutic paracentesis on 11/4, but unfortunately it was not done for unclear reason.   Daughter agreed to have medicine consult try bedside US guided paracentesis. However medicine consult service was bsuy on 11/5 and couldn't do it. Per daughter request, will wait till IR be able to do it on Monday.   s/p IV furosemide 40 mg daily x1 in the ER.  Continue lasix 40 mg iv bid per hepatology recs.  Continue ceftriaxone 2 grams to cover for SBP until it is ruled out.   Continue lactulose for goal of 2-3 BM a day. Per daughter, pt had 7 BM yesterday.

## 2022-11-06 NOTE — PROGRESS NOTES
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     Patient recently hospitalized 09/20-10/15 for SOB found to have elevated liver enzymes and diagnosed with AIH vs RUBIO cirrhosis on biopsy decompensated by ascites. Started on steroids, imurann w/ improvement inn enzymes     VS since arrival notable for AF, HR wnl, normotensive.      CBC w/o leukocytosis, Hgb 13.7, plts 57.     BMP w/ Na 136, BUN/ Cr 28/ 0.7.  LFTs w/ T bili 4.4-->3.5. AST/ ALT 68/ 86, . INR 2.4.  US liver w/ doppler notable for large volume ascites, patent portal vein, morphologic cirrhosis."    Interval History:  NAEON.     No current facility-administered medications on file prior to encounter.     Current Outpatient Medications on File Prior to Encounter   Medication Sig Dispense Refill    amLODIPine (NORVASC) 2.5 MG tablet Take 1 tablet (2.5 mg total) by mouth once daily. 30 tablet 0    azaTHIOprine (IMURAN) 50 mg Tab Take 1 tablet (50 mg total) by mouth once daily. 30 tablet 0    brimonidine 0.2% (ALPHAGAN) 0.2 % Drop INSTILL 1 DROP INTO RIGHT EYE TWICE A DAY 30 mL 3    dorzolamide-timolol 2-0.5% (COSOPT) 22.3-6.8 mg/mL ophthalmic solution INSTILL 1 DROP INTO RIGHT EYE TWICE A DAY 30 mL 3    famotidine (PEPCID) 20 MG tablet Take 1 tablet (20 mg total) by mouth once daily. 30 tablet 0    insulin aspart U-100 (NOVOLOG) 100 unit/mL (3 mL) InPn pen Inject 5 Units into the skin 3 (three) times daily with meals. Hold if pre-meal blood sugar is less than 100 or eating less thant <25% of meal 15 mL 1    insulin detemir U-100 (LEVEMIR FLEXTOUCH) 100 unit/mL (3 mL) SubQ InPn pen Inject 10 Units into the skin every evening. 15 mL 1    latanoprost 0.005 % ophthalmic solution PLACE 1 " "DROP INTO BOTH EYES ONCE DAILY. 7.5 mL 3    metFORMIN (GLUCOPHAGE) 500 MG tablet TAKE 1 TABLET BY MOUTH TWICE A DAY      predniSONE (DELTASONE) 10 MG tablet Take 4 tablets by mouth once daily for 5 days, THEN 3 tablets once daily for 7 days, THEN 2 tablets once daily for 7 days, THEN 1.5 tablets once daily for 14 days, THEN 1 tablet once daily for 14 days. 90 tablet 0    spironolactone (ALDACTONE) 50 MG tablet Take 1 tablet (50 mg total) by mouth once daily. 30 tablet 0    sulfamethoxazole-trimethoprim 800-160mg (BACTRIM DS) 800-160 mg Tab Take 1 tablet by mouth every Mon, Wed, Fri. 12 tablet 0    acetaminophen (TYLENOL) 500 MG tablet Take 2 tablets (1,000 mg total) by mouth every 8 (eight) hours as needed for Pain.  0    aspirin (ECOTRIN) 81 MG EC tablet Take 81 mg by mouth once daily.      blood-glucose meter,continuous (DEXCOM G6 ) Misc Check blood sugar before meals and at bedtime 1 each PRN    blood-glucose sensor (DEXCOM G6 SENSOR) Amanda Check blood sugar before meals and at bedtime 1 each PRN    blood-glucose transmitter (DEXCOM G6 TRANSMITTER) Amanda Check blood sugar before meals and at bedtime 1 each PRN    flash glucose sensor (FREESTYLE LISSA 14 DAY SENSOR) Kit Check blood sugar before meals and at bedtime 1 kit PRN    pen needle, diabetic 31 gauge x 5/16" Ndle Use to inject insulin into the skin up to 4 times daily 100 each 0       Review of patient's allergies indicates:  No Known Allergies      Objective:     Vitals:    11/06/22 0906   BP: 122/78   Pulse: 69   Resp: 16   Temp: 97.4 °F (36.3 °C)         Constitutional:  not in acute distress and well developed  HENT: Head: Normal, normocephalic, atraumatic.  Eyes: conjunctiva clear and sclera nonicteric  Cardiovascular: regular rate and rhythm  Respiratory: normal chest expansion & respiratory effort   and no accessory muscle use  GI: soft and distended  Musculoskeletal: no muscular tenderness noted  Skin: normal color  Neurological: alert, " oriented x3  Psychiatric: mood and affect are within normal limits, pt is a good historian; no memory problems were noted    Significant Labs:  Recent Labs   Lab 11/04/22  0858 11/05/22  0533 11/06/22  0340   HGB 10.4* 10.3* 9.3*       Lab Results   Component Value Date    WBC 4.19 11/06/2022    HGB 9.3 (L) 11/06/2022    HCT 27.5 (L) 11/06/2022     (H) 11/06/2022    PLT 52 (L) 11/06/2022       Lab Results   Component Value Date     11/06/2022    K 3.5 11/06/2022     11/06/2022    CO2 20 (L) 11/06/2022    BUN 22 11/06/2022    CREATININE 0.7 11/06/2022    CALCIUM 8.1 (L) 11/06/2022    ANIONGAP 7 (L) 11/06/2022       Lab Results   Component Value Date    ALT 58 (H) 11/06/2022    AST 41 (H) 11/06/2022    ALKPHOS 118 11/06/2022    BILITOT 2.7 (H) 11/06/2022       Lab Results   Component Value Date    INR 1.5 (H) 11/06/2022    INR 1.4 (H) 11/05/2022    INR 1.4 (H) 11/04/2022       Significant Imaging:  Reviewed pertinent radiology findings.       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     Improvement in LFTs since initiation of prednisone/ imuran last hospitalization.     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 Please call with any additional questions, concerns or changes in the patient's clinical status. We will continue to follow.     Hilario Chery MD  Gastroenterology Fellow PGY IV   [facility]

## 2022-11-06 NOTE — ASSESSMENT & PLAN NOTE
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 She was placed on azathioprine and a prednisone taper and discharged with Hepatology follow-up. Azathioprine discontinued by providers at last admission given thrombocytopenia.     - RUQ ultrasound revealed heterogeneous architecture of the liver concerning for progression to cirrhosis without evidence of portal vein thrombosis but with severe ascites  - Hepatology is following and case was discussed:  Continue lasix 40 mg bid iv. Albumin 25% to be given prior to lasix. Daily wt and strict I's & O's.  Continue [id number] mg daily.   Imuran to be held until SBP has been ruled out per hepatology.

## 2022-11-06 NOTE — ASSESSMENT & PLAN NOTE
"Patient with notable bullous eruption of coccyx and gluteal cleft. Her daughter notes blisters that unroofed now with shallow ulcerations that began at her last hospitalization. Patient's daughter also noted "sore" in mouth but was not able to be appreciated on examination.  I have spoken with dermatology on 11/4. Per dermatology, since her ulcers are not life threatening and they are not the reason for her presentation, then pt can follow up with derm outpt.   "

## 2022-11-07 ENCOUNTER — TELEPHONE (OUTPATIENT)
Dept: HEMATOLOGY/ONCOLOGY | Facility: CLINIC | Age: 82
End: 2022-11-07
Payer: MEDICARE

## 2022-11-07 PROBLEM — E83.42 HYPOMAGNESEMIA: Status: ACTIVE | Noted: 2022-11-07

## 2022-11-07 PROBLEM — E87.5 HYPERKALEMIA: Status: RESOLVED | Noted: 2022-11-05 | Resolved: 2022-11-07

## 2022-11-07 PROBLEM — E87.6 HYPOKALEMIA: Status: ACTIVE | Noted: 2022-11-07

## 2022-11-07 LAB
ABO + RH BLD: NORMAL
ALBUMIN FLD-MCNC: <0.4 G/DL
ALBUMIN SERPL BCP-MCNC: 2.3 G/DL (ref 3.5–5.2)
ALP SERPL-CCNC: 84 U/L (ref 55–135)
ALT SERPL W/O P-5'-P-CCNC: 39 U/L (ref 10–44)
ANION GAP SERPL CALC-SCNC: 5 MMOL/L (ref 8–16)
APPEARANCE FLD: NORMAL
AST SERPL-CCNC: 28 U/L (ref 10–40)
BASOPHILS # BLD AUTO: 0 K/UL (ref 0–0.2)
BASOPHILS NFR BLD: 0 % (ref 0–1.9)
BILIRUB SERPL-MCNC: 2 MG/DL (ref 0.1–1)
BLD GP AB SCN CELLS X3 SERPL QL: NORMAL
BODY FLD TYPE: NORMAL
BODY FLUID SOURCE, LDH: NORMAL
BUN SERPL-MCNC: 19 MG/DL (ref 8–23)
CALCIUM SERPL-MCNC: 8.1 MG/DL (ref 8.7–10.5)
CHLORIDE SERPL-SCNC: 110 MMOL/L (ref 95–110)
CO2 SERPL-SCNC: 23 MMOL/L (ref 23–29)
COLOR FLD: YELLOW
CREAT SERPL-MCNC: 0.6 MG/DL (ref 0.5–1.4)
DIFFERENTIAL METHOD: ABNORMAL
EOSINOPHIL # BLD AUTO: 0 K/UL (ref 0–0.5)
EOSINOPHIL NFR BLD: 0.5 % (ref 0–8)
ERYTHROCYTE [DISTWIDTH] IN BLOOD BY AUTOMATED COUNT: 16.6 % (ref 11.5–14.5)
EST. GFR  (NO RACE VARIABLE): >60 ML/MIN/1.73 M^2
GLUCOSE SERPL-MCNC: 90 MG/DL (ref 70–110)
GRAM STN SPEC: NORMAL
GRAM STN SPEC: NORMAL
HCT VFR BLD AUTO: 21.1 % (ref 37–48.5)
HGB BLD-MCNC: 7.4 G/DL (ref 12–16)
IMM GRANULOCYTES # BLD AUTO: 0.02 K/UL (ref 0–0.04)
IMM GRANULOCYTES NFR BLD AUTO: 0.5 % (ref 0–0.5)
INR PPP: 1.8 (ref 0.8–1.2)
LDH FLD L TO P-CCNC: 49 U/L
LYMPHOCYTES # BLD AUTO: 0.7 K/UL (ref 1–4.8)
LYMPHOCYTES NFR BLD: 18.4 % (ref 18–48)
LYMPHOCYTES NFR FLD MANUAL: 18 %
MAGNESIUM SERPL-MCNC: 1.4 MG/DL (ref 1.6–2.6)
MCH RBC QN AUTO: 37.4 PG (ref 27–31)
MCHC RBC AUTO-ENTMCNC: 35.1 G/DL (ref 32–36)
MCV RBC AUTO: 107 FL (ref 82–98)
MESOTHL CELL NFR FLD MANUAL: 6 %
MONOCYTES # BLD AUTO: 0.6 K/UL (ref 0.3–1)
MONOCYTES NFR BLD: 14.6 % (ref 4–15)
MONOS+MACROS NFR FLD MANUAL: 49 %
NEUTROPHILS # BLD AUTO: 2.6 K/UL (ref 1.8–7.7)
NEUTROPHILS NFR BLD: 66 % (ref 38–73)
NEUTROPHILS NFR FLD MANUAL: 27 %
NRBC BLD-RTO: 0 /100 WBC
PHOSPHATE SERPL-MCNC: 1.9 MG/DL (ref 2.7–4.5)
PLATELET # BLD AUTO: 40 K/UL (ref 150–450)
PMV BLD AUTO: 12.9 FL (ref 9.2–12.9)
POCT GLUCOSE: 271 MG/DL (ref 70–110)
POCT GLUCOSE: 318 MG/DL (ref 70–110)
POCT GLUCOSE: 37 MG/DL (ref 70–110)
POCT GLUCOSE: 90 MG/DL (ref 70–110)
POTASSIUM SERPL-SCNC: 3.1 MMOL/L (ref 3.5–5.1)
PROT FLD-MCNC: <1 G/DL
PROT SERPL-MCNC: 4.2 G/DL (ref 6–8.4)
PROTHROMBIN TIME: 17.6 SEC (ref 9–12.5)
RBC # BLD AUTO: 1.98 M/UL (ref 4–5.4)
SODIUM SERPL-SCNC: 138 MMOL/L (ref 136–145)
SPECIMEN SOURCE: NORMAL
SPECIMEN SOURCE: NORMAL
WBC # BLD AUTO: 3.91 K/UL (ref 3.9–12.7)
WBC # FLD: 50 /CU MM

## 2022-11-07 PROCEDURE — 97110 THERAPEUTIC EXERCISES: CPT | Mod: CQ

## 2022-11-07 PROCEDURE — 25000003 PHARM REV CODE 250: Performed by: HOSPITALIST

## 2022-11-07 PROCEDURE — 83615 LACTATE (LD) (LDH) ENZYME: CPT | Performed by: STUDENT IN AN ORGANIZED HEALTH CARE EDUCATION/TRAINING PROGRAM

## 2022-11-07 PROCEDURE — 97535 SELF CARE MNGMENT TRAINING: CPT

## 2022-11-07 PROCEDURE — P9047 ALBUMIN (HUMAN), 25%, 50ML: HCPCS | Mod: JG | Performed by: HOSPITALIST

## 2022-11-07 PROCEDURE — 63600175 PHARM REV CODE 636 W HCPCS: Performed by: HOSPITALIST

## 2022-11-07 PROCEDURE — 97530 THERAPEUTIC ACTIVITIES: CPT

## 2022-11-07 PROCEDURE — 20600001 HC STEP DOWN PRIVATE ROOM

## 2022-11-07 PROCEDURE — 80053 COMPREHEN METABOLIC PANEL: CPT | Performed by: STUDENT IN AN ORGANIZED HEALTH CARE EDUCATION/TRAINING PROGRAM

## 2022-11-07 PROCEDURE — 97116 GAIT TRAINING THERAPY: CPT | Mod: CQ

## 2022-11-07 PROCEDURE — 84100 ASSAY OF PHOSPHORUS: CPT | Performed by: STUDENT IN AN ORGANIZED HEALTH CARE EDUCATION/TRAINING PROGRAM

## 2022-11-07 PROCEDURE — 87070 CULTURE OTHR SPECIMN AEROBIC: CPT | Performed by: HOSPITALIST

## 2022-11-07 PROCEDURE — 82042 OTHER SOURCE ALBUMIN QUAN EA: CPT | Performed by: STUDENT IN AN ORGANIZED HEALTH CARE EDUCATION/TRAINING PROGRAM

## 2022-11-07 PROCEDURE — 63600175 PHARM REV CODE 636 W HCPCS: Performed by: STUDENT IN AN ORGANIZED HEALTH CARE EDUCATION/TRAINING PROGRAM

## 2022-11-07 PROCEDURE — 87205 SMEAR GRAM STAIN: CPT | Performed by: HOSPITALIST

## 2022-11-07 PROCEDURE — 85025 COMPLETE CBC W/AUTO DIFF WBC: CPT | Performed by: STUDENT IN AN ORGANIZED HEALTH CARE EDUCATION/TRAINING PROGRAM

## 2022-11-07 PROCEDURE — 83735 ASSAY OF MAGNESIUM: CPT | Performed by: STUDENT IN AN ORGANIZED HEALTH CARE EDUCATION/TRAINING PROGRAM

## 2022-11-07 PROCEDURE — 85610 PROTHROMBIN TIME: CPT | Performed by: HOSPITALIST

## 2022-11-07 PROCEDURE — 87075 CULTR BACTERIA EXCEPT BLOOD: CPT | Performed by: HOSPITALIST

## 2022-11-07 PROCEDURE — 86850 RBC ANTIBODY SCREEN: CPT | Performed by: HOSPITALIST

## 2022-11-07 PROCEDURE — 25000003 PHARM REV CODE 250

## 2022-11-07 PROCEDURE — 89051 BODY FLUID CELL COUNT: CPT | Performed by: STUDENT IN AN ORGANIZED HEALTH CARE EDUCATION/TRAINING PROGRAM

## 2022-11-07 PROCEDURE — 84157 ASSAY OF PROTEIN OTHER: CPT | Performed by: STUDENT IN AN ORGANIZED HEALTH CARE EDUCATION/TRAINING PROGRAM

## 2022-11-07 RX ORDER — LIDOCAINE HYDROCHLORIDE 10 MG/ML
INJECTION, SOLUTION EPIDURAL; INFILTRATION; INTRACAUDAL; PERINEURAL
Status: COMPLETED
Start: 2022-11-07 | End: 2022-11-07

## 2022-11-07 RX ORDER — MAGNESIUM SULFATE HEPTAHYDRATE 40 MG/ML
2 INJECTION, SOLUTION INTRAVENOUS ONCE
Status: COMPLETED | OUTPATIENT
Start: 2022-11-07 | End: 2022-11-08

## 2022-11-07 RX ADMIN — PREDNISONE 10 MG: 5 TABLET ORAL at 08:11

## 2022-11-07 RX ADMIN — INSULIN ASPART 8 UNITS: 100 INJECTION, SOLUTION INTRAVENOUS; SUBCUTANEOUS at 05:11

## 2022-11-07 RX ADMIN — LIDOCAINE HYDROCHLORIDE 5 MG: 10 INJECTION, SOLUTION EPIDURAL; INFILTRATION; INTRACAUDAL at 10:11

## 2022-11-07 RX ADMIN — LACTULOSE 15 G: 20 SOLUTION ORAL at 08:11

## 2022-11-07 RX ADMIN — MAGNESIUM SULFATE 2 G: 2 INJECTION INTRAVENOUS at 11:11

## 2022-11-07 RX ADMIN — ALBUMIN (HUMAN) 12.5 G: 12.5 SOLUTION INTRAVENOUS at 09:11

## 2022-11-07 RX ADMIN — FUROSEMIDE 40 MG: 10 INJECTION, SOLUTION INTRAVENOUS at 09:11

## 2022-11-07 RX ADMIN — POTASSIUM BICARBONATE 50 MEQ: 978 TABLET, EFFERVESCENT ORAL at 11:11

## 2022-11-07 RX ADMIN — INSULIN DETEMIR 10 UNITS: 100 INJECTION, SOLUTION SUBCUTANEOUS at 09:11

## 2022-11-07 RX ADMIN — INSULIN ASPART 3 UNITS: 100 INJECTION, SOLUTION INTRAVENOUS; SUBCUTANEOUS at 09:11

## 2022-11-07 RX ADMIN — FUROSEMIDE 40 MG: 10 INJECTION, SOLUTION INTRAVENOUS at 08:11

## 2022-11-07 RX ADMIN — CEFTRIAXONE 2 G: 2 INJECTION, SOLUTION INTRAVENOUS at 10:11

## 2022-11-07 RX ADMIN — ALBUMIN (HUMAN) 12.5 G: 12.5 SOLUTION INTRAVENOUS at 08:11

## 2022-11-07 NOTE — H&P
Inpatient Radiology Pre-procedure Note    History of Present Illness:  Radha Feng is a 82 y.o. female who presents for ultrasound guided paracentesis.  Admission H&P reviewed.  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    Diabetes mellitus     Glaucoma     Hypertension      Past Surgical History:   Procedure Laterality Date    CATARACT EXTRACTION W/  INTRAOCULAR LENS IMPLANT Left 12/21/2021    Procedure: EXTRACTION, CATARACT, WITH IOL INSERTION;  Surgeon: Shay Chou MD;  Location: [facility];  Service: Ophthalmology;  Laterality: Left;       Review of Systems:   As documented in primary team H&P    Home Meds:   Prior to Admission medications    Medication Sig Start Date End Date Taking? Authorizing Provider   amLODIPine (NORVASC) 2.5 MG tablet Take 1 tablet (2.5 mg total) by mouth once daily. 10/15/22 10/15/23 Yes Malaika Madrigal MD   azaTHIOprine (IMURAN) 50 mg Tab Take 1 tablet (50 mg total) by mouth once daily. 10/15/22 10/15/23 Yes Malaika Madrigal MD   brimonidine 0.2% (ALPHAGAN) 0.2 % Drop INSTILL 1 DROP INTO RIGHT EYE TWICE A DAY 4/25/22  Yes Nj Elliott, OD   dorzolamide-timolol 2-0.5% (COSOPT) 22.3-6.8 mg/mL ophthalmic solution INSTILL 1 DROP INTO RIGHT EYE TWICE A DAY 10/18/21  Yes Celina Choi, OD   famotidine (PEPCID) 20 MG tablet Take 1 tablet (20 mg total) by mouth once daily. 10/15/22 10/15/23 Yes Malaika Madrigal MD   insulin aspart U-100 (NOVOLOG) 100 unit/mL (3 mL) InPn pen Inject 5 Units into the skin 3 (three) times daily with meals. Hold if pre-meal blood sugar is less than 100 or eating less thant <25% of meal 10/25/22 11/24/22 Yes Adela Kaur MD   insulin detemir U-100 (LEVEMIR FLEXTOUCH) 100 unit/mL (3 mL) SubQ InPn pen Inject 10 Units into the skin every evening. 10/26/22 10/26/23 Yes Adela Kaur MD   latanoprost 0.005 % ophthalmic solution PLACE 1 DROP INTO BOTH EYES ONCE DAILY. 1/19/22 1/19/23 Yes  "Nj Elliott, OD   metFORMIN (GLUCOPHAGE) 500 MG tablet TAKE 1 TABLET BY MOUTH TWICE A DAY 1/14/20  Yes Historical Provider   predniSONE (DELTASONE) 10 MG tablet Take 4 tablets by mouth once daily for 5 days, THEN 3 tablets once daily for 7 days, THEN 2 tablets once daily for 7 days, THEN 1.5 tablets once daily for 14 days, THEN 1 tablet once daily for 14 days. 10/15/22 12/1/22 Yes Malaika Madrigal MD   spironolactone (ALDACTONE) 50 MG tablet Take 1 tablet (50 mg total) by mouth once daily. 10/15/22 10/15/23 Yes Malaika Madrigal MD   sulfamethoxazole-trimethoprim 800-160mg (BACTRIM DS) 800-160 mg Tab Take 1 tablet by mouth every Mon, Wed, Fri. 10/14/22 11/13/22 Yes Malaika Madrigal MD   acetaminophen (TYLENOL) 500 MG tablet Take 2 tablets (1,000 mg total) by mouth every 8 (eight) hours as needed for Pain. 10/14/22   Malaika Madrigal MD   aspirin (ECOTRIN) 81 MG EC tablet Take 81 mg by mouth once daily.    Historical Provider   blood-glucose meter,continuous (DEXCOM G6 ) Misc Check blood sugar before meals and at bedtime 10/26/22   Adela Kaur MD   blood-glucose sensor (DEXCOM G6 SENSOR) Amanda Check blood sugar before meals and at bedtime 10/26/22   Adela Kaur MD   blood-glucose transmitter (DEXCOM G6 TRANSMITTER) Amanda Check blood sugar before meals and at bedtime 10/26/22   Adela Kaur MD   flash glucose sensor (FREESTYLE LISSA 14 DAY SENSOR) Kit Check blood sugar before meals and at bedtime 10/26/22   Adela Kaur MD   pen needle, diabetic 31 gauge x 5/16" Ndle Use to inject insulin into the skin up to 4 times daily 10/26/22   Adela Kaur MD     Scheduled Meds:    LIDOcaine (PF) 10 mg/ml (1%)        albumin human 25%  12.5 g Intravenous BID    brimonidine 0.2%  1 drop Right Eye BID    cefTRIAXone (ROCEPHIN) IVPB  2 g Intravenous Q24H    dorzolamide  1 drop Right Eye BID    furosemide (LASIX) injection  40 mg Intravenous BID    insulin detemir U-100  10 Units Subcutaneous QHS    " lactulose  15 g Oral BID    predniSONE  10 mg Oral Daily    timolol maleate 0.5%  1 drop Right Eye BID     Continuous Infusions:   PRN Meds:dextrose 10%, dextrose 10%, glucagon (human recombinant), glucose, glucose, insulin aspart U-100, loperamide, melatonin, sodium chloride 0.9%  Anticoagulants/Antiplatelets: no anticoagulation    Allergies: Review of patient's allergies indicates:  No Known Allergies  Sedation Hx: have not been any systemic reactions    Vitals:  Temp: 98.4 °F (36.9 °C) (11/07/22 0000)  Pulse: (!) 57 (11/07/22 1004)  Resp: 18 (11/07/22 1004)  BP: (!) 154/77 (11/07/22 1004)  SpO2: 100 % (11/07/22 1004)     Physical Exam:  ASA: 3  Mallampati: n/a    General: no acute distress  Mental Status: alert and oriented to person, place and time  HEENT: normocephalic, atraumatic  Chest: unlabored breathing  Heart: regular heart rate  Abdomen: distended  Extremity: moves all extremities    Plan: ultrasound guided paracentesis  Sedation Plan: local    LUZ Alarcon, DONGP  Interventional Radiology  [phone] clinic

## 2022-11-07 NOTE — PLAN OF CARE
Para completed, pt tolerated well. No apparent distress noted. 2.2 Liters removed from left abd, mepore applied CDI. Labs collected and sent. Albumin not given per protocol. Report given to FARZAD Hamilton. Pt awaiting transport, family at bedside.

## 2022-11-07 NOTE — PLAN OF CARE
Patient is AAOx3. Patient had a para this morning with 2L removed. Patient is receiving scheduled lasix and albumin. Monitoring the patient's blood sugar AC&HS. Patient's blood sugar at lunch was 37. Patient's family brought her sweets for her birthday, blood sugar came up to 90. Reminded the patient to call for assistance. Call light and personal items are within reach.

## 2022-11-07 NOTE — PROCEDURES
Radiology Post-Procedure Note    Pre Op Diagnosis: Ascites  Post Op Diagnosis: Same    Procedure: Ultrasound Guided Paracentesis    Procedure performed by: Lila ESCALANTE, Lola     Written Informed Consent Obtained: Yes  Specimen Removed: YES cloudy yellow  Estimated Blood Loss: Minimal    Findings:   Successful paracentesis.  Albumin administered PRN per protocol.    Patient tolerated procedure well.    Lola Sharp, APRN, FNP  Interventional Radiology  [phone] clinic

## 2022-11-07 NOTE — TREATMENT PLAN
Hepatology Treatment Plan    Radha Feng is a 82 y.o. female admitted to hospital 11/3/2022 (Hospital Day: 5) due to Liver failure without hepatic coma.     Interval History  No overnight events documented. Patient downstairs for paracentesis at time of attempted bedside assessment. Vital signs normal on room air. Labs notable for downtrending Hgb (7.4 from 9.3 on 11/06), normal renal function, and bilirubin improving to 2 with normal AST, ALT.     Objective  Temp:  [97.7 °F (36.5 °C)-98.5 °F (36.9 °C)] 98.2 °F (36.8 °C) (11/07 1200)  Pulse:  [51-68] 51 (11/07 1200)  BP: (120-168)/(52-84) 146/66 (11/07 1200)  Resp:  [16-19] 17 (11/07 1200)  SpO2:  [94 %-100 %] 100 % (11/07 1200)    Laboratory    Lab Results   Component Value Date    WBC 3.91 11/07/2022    HGB 7.4 (L) 11/07/2022    HCT 21.1 (L) 11/07/2022     (H) 11/07/2022    PLT 40 (L) 11/07/2022       Lab Results   Component Value Date     11/07/2022    K 3.1 (L) 11/07/2022     11/07/2022    CO2 23 11/07/2022    BUN 19 11/07/2022    CREATININE 0.6 11/07/2022    CALCIUM 8.1 (L) 11/07/2022       Lab Results   Component Value Date    ALBUMIN 2.3 (L) 11/07/2022    ALT 39 11/07/2022    AST 28 11/07/2022    ALKPHOS 84 11/07/2022    BILITOT 2.0 (H) 11/07/2022       Lab Results   Component Value Date    INR 1.8 (H) 11/07/2022    INR 1.5 (H) 11/06/2022    INR 1.4 (H) 11/05/2022       MELD-Na score: 16 at 11/7/2022  6:56 AM  MELD score: 16 at 11/7/2022  6:56 AM  Calculated from:  Serum Creatinine: 0.6 mg/dL (Using min of 1 mg/dL) at 11/7/2022  6:56 AM  Serum Sodium: 138 mmol/L (Using max of 137 mmol/L) at 11/7/2022  6:56 AM  Total Bilirubin: 2.0 mg/dL at 11/7/2022  6:56 AM  INR(ratio): 1.8 at 11/7/2022  6:56 AM  Age: 82 years    Assessment  This is an 81 year old female with PMH significant for 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 She is pending diagnostic paracentesis to rule out SBP contributing to decompensation. Hospital course associated with onset of encephalopathy on 11/06.     Recommendations  - Follow-up results of ascitic fluid studies to rule out SBP.   - Continue Lasix 40 mg IV BID with Albumin 12.5 g BID.   - Continue Prednisone 10 mg daily with TMP/SMX prophylaxis.   - Okay to hold home IMURAN.   - Lactulose TID; titrate to 3-4 bowel movements daily.   - Avoid use of medications that may precipitate encephalopathy (e.g. opioids and benzo's).   - Please obtain daily CBC, BMP, LFT, INR    Thank you for involving us in the care of Radha Feng. Please call with any additional concerns or questions.        Victor Manuel Espinoza MD, PGY-V  Gastroenterology Fellow  [facility]

## 2022-11-07 NOTE — PT/OT/SLP PROGRESS
Occupational Therapy   Treatment    Name: Radha Feng  MRN: [id number]  Admitting Diagnosis:  Liver failure without hepatic coma       Recommendations:     Discharge Recommendations: outpatient OT  Discharge Equipment Recommendations:  other (see comments) (tbd pending progress)  Barriers to discharge:       Assessment:     Radha Feng is a 82 y.o. female with a medical diagnosis of Liver failure without hepatic coma. Performance deficits affecting function are weakness, impaired endurance, impaired self care skills, impaired functional mobility, gait instability, impaired balance, decreased coordination.     Rehab Prognosis:  Good; patient would benefit from acute skilled OT services to address these deficits and reach maximum level of function.       Plan:     Patient to be seen 3 x/week to address the above listed problems via self-care/home management, therapeutic activities, therapeutic exercises  Plan of Care Expires: 12/04/22  Plan of Care Reviewed with: patient, daughter    Subjective     Pain/Comfort:  Pain Rating 1: 0/10    Objective:     Communicated with: rn prior to session.  Patient found supine with peripheral IV upon OT entry to room.    General Precautions: Standard, fall   Orthopedic Precautions:N/A   Braces:    Respiratory Status: Room air     Occupational Performance:     Bed Mobility:    Patient completed Rolling/Turning to Right with minimum assistance  Patient completed Scooting/Bridging with minimum assistance  Patient completed Supine to Sit with moderate assistance     Functional Mobility/Transfers:  Patient completed Sit <> Stand Transfer with contact guard assistance  with  no assistive device   Patient completed Bed <> Chair Transfer using Step Transfer technique with contact guard assistance with no assistive device  Patient completed Toilet Transfer Step Transfer technique with contact guard assistance with  bedside commode  Functional Mobility: Pt walked within room SBA pushing  her IV pole.    Activities of Daily Living:  Grooming: stand by assistance in standing at the sink.  Toileting: stand by assistance for pericare and moderate assistance to manage brief.    [facility] 6 Click ADL: 20    Treatment & Education:  Discussed Ot POC and progress.  Educated on proper t/f techniques.    Patient left up in chair with all lines intact and call button in reach    GOALS:   Multidisciplinary Problems       Occupational Therapy Goals          Problem: Occupational Therapy    Goal Priority Disciplines Outcome Interventions   Occupational Therapy Goal     OT, PT/OT Ongoing, Progressing    Description: Goals to be met by: 11/18/22     Patient will increase functional independence with ADLs by performing:    Feeding with Set-up Assistance.  UE Dressing with Modified [place].  LE Dressing with Stand-by Assistance.  Grooming while standing at sink with Modified [place].  Toileting from bedside commode with Modified [place] for hygiene and clothing management.   Step transfer with Stand-by Assistance                         Time Tracking:     OT Date of Treatment: 11/07/22  OT Start Time: 0937  OT Stop Time: 1002  OT Total Time (min): 25 min    Billable Minutes:Self Care/Home Management 10  Therapeutic Activity 15    OT/MANFRED: OT          11/7/2022

## 2022-11-07 NOTE — PLAN OF CARE
Pt arrived to MPU room 3 for PARA, no acute distress noted. Orders and labs reviewed on chart. Awaiting consent.

## 2022-11-07 NOTE — PLAN OF CARE
Patient arrived to TSU from Observation at 2330. Pt oriented to room, VSS, call bell in reach. Pt NPO for Paracentesis in AM. Daughter at bedside.

## 2022-11-07 NOTE — PT/OT/SLP PROGRESS
Physical Therapy Treatment    Patient Name:  Radha Feng   MRN:  [id number]    Recommendations:     Discharge Recommendations:  outpatient PT   Discharge Equipment Recommendations: none   Barriers to discharge: Inaccessible home    Assessment:     Radha Feng is a 82 y.o. female admitted with a medical diagnosis of Liver failure without hepatic coma.  She presents with the following impairments/functional limitations:  weakness, impaired endurance, impaired functional mobility, gait instability, decreased safety awareness, pain.  Pt was agreeable to therapy with encouragement from family. Pt ambulated around the room with CGA and RW, no rest break and no LOB noted. Pt completed seated therex and standing marching with no issues and occasional rest breaks. Pt is progressing well and continues to benefit from therapy to improve functional endurance, strength, and mobility.     Rehab Prognosis: Good; patient would benefit from acute skilled PT services to address these deficits and reach maximum level of function.    Recent Surgery: * No surgery found *      Plan:     During this hospitalization, patient to be seen 3 x/week to address the identified rehab impairments via gait training, therapeutic activities, therapeutic exercises, neuromuscular re-education and progress toward the following goals:    Plan of Care Expires:  12/04/22    Subjective     Chief Complaint: fatigued  Patient/Family Comments/goals: to go home  Pain/Comfort:  Pain Rating 1: 0/10  Pain Rating Post-Intervention 1: 0/10      Objective:     Communicated with RN prior to session.  Patient found up in chair with telemetry upon PT entry to room.     General Precautions: Standard, fall   Orthopedic Precautions:N/A   Braces: N/A  Respiratory Status: Room air     Functional Mobility:  Bed Mobility:   Not assessed, Pt found/returned to bedside chair    Transfers:   Sit <> Stand Transfer: minimum assistance with rolling walker   Verbal cues for hand  placement prior to standing up and sitting down and cues to slowly sit down    Gait:  Pt ambulated ~60 ft with contact guard assistance and rolling walker.  Gait Pattern observed: reciprocal gait  Gait Deviation(s): occasional unsteady gait, decreased step length, flexed posture, and decreased [place]  Verbal/tactile cues for RW management and upright posture and gaze direction.   No rest break and no LOB noted.     Stairs:  Deferred stairs, completed x15 standing march with RW and CGA    AM-PAC 6 CLICK MOBILITY  Turning over in bed (including adjusting bedclothes, sheets and blankets)?: 2  Sitting down on and standing up from a chair with arms (e.g., wheelchair, bedside commode, etc.): 3  Moving from lying on back to sitting on the side of the bed?: 2  Moving to and from a bed to a chair (including a wheelchair)?: 3  Need to walk in hospital room?: 3  Climbing 3-5 steps with a railing?: 2  Basic Mobility Total Score: 15       Treatment & Education:  Seated BLE therex 2x10 reps: heel/toe raises, LAQ, marching   Standing BLE therex x15 reps; marching   CGA with RW  Verbal cues for higher knee to simulate steps at home  Patient educated on role of therapy, goals of session, and benefits of out of bed mobility.   Instructed on use of call button and importance of calling nursing staff for assistance with mobility   Questions/concerns addressed within PTA scope of practice  Pt verbalized understanding.  Whiteboard Updated    Patient left up in chair with all lines intact, call button in reach, and family present..    GOALS:   Multidisciplinary Problems       Physical Therapy Goals          Problem: Physical Therapy    Goal Priority Disciplines Outcome Goal Variances Interventions   Physical Therapy Goal     PT, PT/OT Ongoing, Progressing     Description: Goals to be met by: 2022     Patient will increase functional independence with mobility by performin. Supine to sit with supervision  2. Sit to supine  with supervision  3. Sit to stand transfer with supervision  4. Gait  x 40 feet with supervision using LRAD as needed  5. Ascend/descend 6 stair with no handrails minimum assistance using LRAD as needed  6. Lower extremity exercise program x10 reps per handout, with independence                        Time Tracking:     PT Received On: 11/07/22  PT Start Time: 1450     PT Stop Time: 1515  PT Total Time (min): 25 min     Billable Minutes: Gait Training 13 and Therapeutic Exercise 12    Treatment Type: Treatment  PT/PTA: PTA     PTA Visit Number: 1     11/07/2022

## 2022-11-08 PROBLEM — E87.6 HYPOKALEMIA: Status: RESOLVED | Noted: 2022-11-07 | Resolved: 2022-11-08

## 2022-11-08 PROBLEM — E83.42 HYPOMAGNESEMIA: Status: RESOLVED | Noted: 2022-11-07 | Resolved: 2022-11-08

## 2022-11-08 PROBLEM — E83.39 HYPOPHOSPHATEMIA: Status: ACTIVE | Noted: 2022-11-08

## 2022-11-08 LAB
ALBUMIN SERPL BCP-MCNC: 2.7 G/DL (ref 3.5–5.2)
ALP SERPL-CCNC: 80 U/L (ref 55–135)
ALT SERPL W/O P-5'-P-CCNC: 33 U/L (ref 10–44)
ANION GAP SERPL CALC-SCNC: 6 MMOL/L (ref 8–16)
AST SERPL-CCNC: 34 U/L (ref 10–40)
BASOPHILS # BLD AUTO: 0 K/UL (ref 0–0.2)
BASOPHILS NFR BLD: 0 % (ref 0–1.9)
BILIRUB SERPL-MCNC: 1.8 MG/DL (ref 0.1–1)
BUN SERPL-MCNC: 18 MG/DL (ref 8–23)
CALCIUM SERPL-MCNC: 8 MG/DL (ref 8.7–10.5)
CHLORIDE SERPL-SCNC: 109 MMOL/L (ref 95–110)
CO2 SERPL-SCNC: 22 MMOL/L (ref 23–29)
CREAT SERPL-MCNC: 0.6 MG/DL (ref 0.5–1.4)
DIFFERENTIAL METHOD: ABNORMAL
EOSINOPHIL # BLD AUTO: 0 K/UL (ref 0–0.5)
EOSINOPHIL NFR BLD: 0.2 % (ref 0–8)
ERYTHROCYTE [DISTWIDTH] IN BLOOD BY AUTOMATED COUNT: 16.7 % (ref 11.5–14.5)
EST. GFR  (NO RACE VARIABLE): >60 ML/MIN/1.73 M^2
GLUCOSE SERPL-MCNC: 121 MG/DL (ref 70–110)
HCT VFR BLD AUTO: 21.6 % (ref 37–48.5)
HGB BLD-MCNC: 7.4 G/DL (ref 12–16)
IMM GRANULOCYTES # BLD AUTO: 0.02 K/UL (ref 0–0.04)
IMM GRANULOCYTES NFR BLD AUTO: 0.5 % (ref 0–0.5)
LYMPHOCYTES # BLD AUTO: 0.8 K/UL (ref 1–4.8)
LYMPHOCYTES NFR BLD: 19.5 % (ref 18–48)
MAGNESIUM SERPL-MCNC: 1.7 MG/DL (ref 1.6–2.6)
MCH RBC QN AUTO: 37 PG (ref 27–31)
MCHC RBC AUTO-ENTMCNC: 34.3 G/DL (ref 32–36)
MCV RBC AUTO: 108 FL (ref 82–98)
MONOCYTES # BLD AUTO: 0.7 K/UL (ref 0.3–1)
MONOCYTES NFR BLD: 15.3 % (ref 4–15)
NEUTROPHILS # BLD AUTO: 2.8 K/UL (ref 1.8–7.7)
NEUTROPHILS NFR BLD: 64.5 % (ref 38–73)
NRBC BLD-RTO: 0 /100 WBC
PHOSPHATE SERPL-MCNC: 1.5 MG/DL (ref 2.7–4.5)
PLATELET # BLD AUTO: 45 K/UL (ref 150–450)
PMV BLD AUTO: 12.6 FL (ref 9.2–12.9)
POCT GLUCOSE: 158 MG/DL (ref 70–110)
POCT GLUCOSE: 323 MG/DL (ref 70–110)
POCT GLUCOSE: 376 MG/DL (ref 70–110)
POCT GLUCOSE: 389 MG/DL (ref 70–110)
POTASSIUM SERPL-SCNC: 3.8 MMOL/L (ref 3.5–5.1)
PROT SERPL-MCNC: 4.2 G/DL (ref 6–8.4)
RBC # BLD AUTO: 2 M/UL (ref 4–5.4)
SODIUM SERPL-SCNC: 137 MMOL/L (ref 136–145)
WBC # BLD AUTO: 4.26 K/UL (ref 3.9–12.7)

## 2022-11-08 PROCEDURE — 99233 SBSQ HOSP IP/OBS HIGH 50: CPT | Mod: GC,,, | Performed by: INTERNAL MEDICINE

## 2022-11-08 PROCEDURE — 25000003 PHARM REV CODE 250: Performed by: STUDENT IN AN ORGANIZED HEALTH CARE EDUCATION/TRAINING PROGRAM

## 2022-11-08 PROCEDURE — 20600001 HC STEP DOWN PRIVATE ROOM

## 2022-11-08 PROCEDURE — 36415 COLL VENOUS BLD VENIPUNCTURE: CPT | Performed by: STUDENT IN AN ORGANIZED HEALTH CARE EDUCATION/TRAINING PROGRAM

## 2022-11-08 PROCEDURE — 25000003 PHARM REV CODE 250: Performed by: HOSPITALIST

## 2022-11-08 PROCEDURE — 85025 COMPLETE CBC W/AUTO DIFF WBC: CPT | Performed by: STUDENT IN AN ORGANIZED HEALTH CARE EDUCATION/TRAINING PROGRAM

## 2022-11-08 PROCEDURE — 63600175 PHARM REV CODE 636 W HCPCS: Performed by: HOSPITALIST

## 2022-11-08 PROCEDURE — 84100 ASSAY OF PHOSPHORUS: CPT | Performed by: STUDENT IN AN ORGANIZED HEALTH CARE EDUCATION/TRAINING PROGRAM

## 2022-11-08 PROCEDURE — 99233 PR SUBSEQUENT HOSPITAL CARE,LEVL III: ICD-10-PCS | Mod: GC,,, | Performed by: INTERNAL MEDICINE

## 2022-11-08 PROCEDURE — P9047 ALBUMIN (HUMAN), 25%, 50ML: HCPCS | Mod: JG | Performed by: HOSPITALIST

## 2022-11-08 PROCEDURE — 83735 ASSAY OF MAGNESIUM: CPT | Performed by: STUDENT IN AN ORGANIZED HEALTH CARE EDUCATION/TRAINING PROGRAM

## 2022-11-08 PROCEDURE — 80053 COMPREHEN METABOLIC PANEL: CPT | Performed by: STUDENT IN AN ORGANIZED HEALTH CARE EDUCATION/TRAINING PROGRAM

## 2022-11-08 RX ORDER — LACTULOSE 10 G/15ML
10 SOLUTION ORAL DAILY
Status: DISCONTINUED | OUTPATIENT
Start: 2022-11-09 | End: 2022-11-08

## 2022-11-08 RX ORDER — MAGNESIUM SULFATE 1 G/100ML
1 INJECTION INTRAVENOUS ONCE
Status: COMPLETED | OUTPATIENT
Start: 2022-11-08 | End: 2022-11-08

## 2022-11-08 RX ORDER — SPIRONOLACTONE 50 MG/1
100 TABLET, FILM COATED ORAL DAILY
Status: DISCONTINUED | OUTPATIENT
Start: 2022-11-09 | End: 2022-11-11 | Stop reason: HOSPADM

## 2022-11-08 RX ORDER — FUROSEMIDE 10 MG/ML
40 INJECTION INTRAMUSCULAR; INTRAVENOUS ONCE
Status: COMPLETED | OUTPATIENT
Start: 2022-11-08 | End: 2022-11-08

## 2022-11-08 RX ORDER — FUROSEMIDE 40 MG/1
40 TABLET ORAL DAILY
Status: DISCONTINUED | OUTPATIENT
Start: 2022-11-09 | End: 2022-11-11 | Stop reason: HOSPADM

## 2022-11-08 RX ORDER — LACTULOSE 10 G/15ML
10 SOLUTION ORAL EVERY OTHER DAY
Status: DISCONTINUED | OUTPATIENT
Start: 2022-11-10 | End: 2022-11-11 | Stop reason: HOSPADM

## 2022-11-08 RX ORDER — ALBUMIN HUMAN 250 G/1000ML
12.5 SOLUTION INTRAVENOUS ONCE
Status: COMPLETED | OUTPATIENT
Start: 2022-11-08 | End: 2022-11-08

## 2022-11-08 RX ADMIN — LACTULOSE 15 G: 20 SOLUTION ORAL at 08:11

## 2022-11-08 RX ADMIN — MAGNESIUM SULFATE HEPTAHYDRATE 1 G: 500 INJECTION, SOLUTION INTRAMUSCULAR; INTRAVENOUS at 03:11

## 2022-11-08 RX ADMIN — INSULIN ASPART 10 UNITS: 100 INJECTION, SOLUTION INTRAVENOUS; SUBCUTANEOUS at 04:11

## 2022-11-08 RX ADMIN — FUROSEMIDE 40 MG: 10 INJECTION, SOLUTION INTRAVENOUS at 08:11

## 2022-11-08 RX ADMIN — POTASSIUM PHOSPHATE, MONOBASIC AND POTASSIUM PHOSPHATE, DIBASIC 15 MMOL: 224; 236 INJECTION, SOLUTION, CONCENTRATE INTRAVENOUS at 04:11

## 2022-11-08 RX ADMIN — FUROSEMIDE 40 MG: 10 INJECTION, SOLUTION INTRAMUSCULAR; INTRAVENOUS at 09:11

## 2022-11-08 RX ADMIN — ALBUMIN (HUMAN) 12.5 G: 12.5 SOLUTION INTRAVENOUS at 09:11

## 2022-11-08 RX ADMIN — SODIUM PHOSPHATE, MONOBASIC, MONOHYDRATE AND SODIUM PHOSPHATE, DIBASIC, ANHYDROUS 30 MMOL: 142; 276 INJECTION, SOLUTION INTRAVENOUS at 10:11

## 2022-11-08 RX ADMIN — PREDNISONE 10 MG: 5 TABLET ORAL at 08:11

## 2022-11-08 RX ADMIN — ALBUMIN (HUMAN) 12.5 G: 12.5 SOLUTION INTRAVENOUS at 08:11

## 2022-11-08 RX ADMIN — INSULIN ASPART 5 UNITS: 100 INJECTION, SOLUTION INTRAVENOUS; SUBCUTANEOUS at 10:11

## 2022-11-08 RX ADMIN — INSULIN DETEMIR 10 UNITS: 100 INJECTION, SOLUTION SUBCUTANEOUS at 10:11

## 2022-11-08 NOTE — PROGRESS NOTES
[facility] - Transplant Stepdown  Hepatology  Progress Note    Patient Name: Radha Feng  MRN: [id number]  Admission Date: 11/3/2022  Hospital Length of Stay: 4 days  Attending Provider: Ruth Mendoza MD   Primary Care Physician: Leticia Lim MD  Principal Problem:Liver failure without hepatic coma    Subjective:     Transplant status: No    Interval History: Paracentesis completed yesterday with 2.2L of fluid removed; studies negative for SBP. Patient had no complaints on bedside encounter. Daughter reports patient having up to 9 bowel movements daily with current Lactulose dosing. Vital signs normal on room air. Labs notable for normal renal function and LFT's.     Current Facility-Administered Medications   Medication    albumin human 25% bottle 12.5 g    brimonidine 0.2% ophthalmic solution 1 drop    dextrose 10% bolus 125 mL    dextrose 10% bolus 250 mL    dorzolamide 2 % ophthalmic solution 1 drop    furosemide injection 40 mg    [START ON 11/9/2022] furosemide tablet 40 mg    glucagon (human recombinant) injection 1 mg    glucose chewable tablet 16 g    glucose chewable tablet 24 g    insulin aspart U-100 pen 1-10 Units    insulin detemir U-100 pen 10 Units    [START ON 11/10/2022] lactulose 20 gram/30 mL solution Soln 10 g    loperamide capsule 2 mg    magnesium sulfate in dextrose IVPB (premix) 1 g    melatonin tablet 6 mg    potassium phosphate 15 mmol in dextrose 5 % 250 mL infusion    predniSONE tablet 10 mg    sodium chloride 0.9% flush 10 mL    sodium phosphate 30 mmol in dextrose 5 % 250 mL IVPB    [START ON 11/9/2022] spironolactone tablet 100 mg    timolol maleate 0.5% ophthalmic solution 1 drop       Objective:     Vital Signs (Most Recent):  Temp: 97.7 °F (36.5 °C) (11/08/22 1135)  Pulse: (!) 55 (11/08/22 1135)  Resp: 19 (11/08/22 1135)  BP: (!) 149/67 (11/08/22 1135)  SpO2: 99 % (11/08/22 1135)   Vital Signs (24h Range):  Temp:  [97.1 °F (36.2 °C)-98.3 °F (36.8 °C)]  97.7 °F (36.5 °C)  Pulse:  [54-60] 55  Resp:  [17-19] 19  SpO2:  [97 %-100 %] 99 %  BP: (123-149)/(62-91) 149/67     Weight: 65.4 kg (144 lb 2.9 oz) (11/08/22 0518)  Body mass index is 23.27 kg/m².    Physical Exam  Constitutional:       Appearance: Normal appearance.   Eyes:      General: No scleral icterus.     Conjunctiva/sclera: Conjunctivae normal.   Cardiovascular:      Rate and Rhythm: Normal rate and regular rhythm.      Pulses: Normal pulses.      Heart sounds: Normal heart sounds.   Pulmonary:      Effort: Pulmonary effort is normal. No respiratory distress.      Breath sounds: Normal breath sounds.   Abdominal:      General: Bowel sounds are normal. There is distension.      Palpations: Abdomen is soft.      Tenderness: There is no abdominal tenderness.   Musculoskeletal:      Right lower leg: Edema present.      Left lower leg: Edema present.   Skin:     General: Skin is warm and dry.      Findings: No bruising or rash.   Neurological:      Mental Status: She is alert and oriented to person, place, and time.       MELD-Na score: 15 at 11/8/2022  6:10 AM  MELD score: 15 at 11/8/2022  6:10 AM  Calculated from:  Serum Creatinine: 0.6 mg/dL (Using min of 1 mg/dL) at 11/8/2022  6:10 AM  Serum Sodium: 137 mmol/L at 11/8/2022  6:10 AM  Total Bilirubin: 1.8 mg/dL at 11/8/2022  6:10 AM  INR(ratio): 1.8 at 11/7/2022  6:56 AM  Age: 82 years    Significant Labs:  Labs within the past month have been reviewed.      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  Hospital course associated with onset of encephalopathy on 11/06. Paracentesis completed on 11/07 with studies negative for SBP. She is status post  initiation of IV diuretics and albumin with improvement in volume status.     Recommendations:     -Continue Lasix 40 mg IV BID for today and then transition to Lasix 40 mg PO and Spironolactone 100 mg daily tomorrow.   -Discontinue Albumin IV.   -Decrease Lactulose to 10 g every other day; target 3-4 bowel movements daily.   -Continue Prednisone 10 mg daily.   -Continue to hold IMURAN; patient may not be able to tolerate long-term due to thrombocytopenia.   -CMP and INR daily.   -Avoid use of medications that may precipitate encephalopathy (e.g. opioids and benzo's).   -Low sodium (< 2 g daily) diet.         Thank you for your consult. I will follow-up with patient. Please contact us if you have any additional questions.    Victor Manuel Espinoza MD  Hepatology  [facility] - Transplant Stepdown

## 2022-11-08 NOTE — ASSESSMENT & PLAN NOTE
plts is 40 today.  ddx includes bone marrow suppression from the patient's azathioprine versus splenic sequestration from her liver disease.  We ruled-out other causes; HIV negative, Hepatitis C antibody negative, folate and vitamin B12 are wnl.  Monitor. Transfuse if plts<10.

## 2022-11-08 NOTE — SUBJECTIVE & OBJECTIVE
Interval History: continue to clinically improves. Denies abdominal pain, nausea or vomiting. Her abdominal distension almost resolved.       Review of Systems   Constitutional:  Negative for chills and fever.   Respiratory:  Negative for cough and shortness of breath.    Cardiovascular:  Negative for chest pain.   Gastrointestinal:  Negative for abdominal pain.        Abdominal distension has almost resolved.   Genitourinary:  Negative for dysuria.   Skin:         Ulcers lumbar region and on her buttock cheek.   Neurological:  Negative for syncope.   Objective:     Vital Signs (Most Recent):  Temp: 97.7 °F (36.5 °C) (11/08/22 1135)  Pulse: (!) 55 (11/08/22 1135)  Resp: 19 (11/08/22 1135)  BP: (!) 149/67 (11/08/22 1135)  SpO2: 99 % (11/08/22 1135)   Vital Signs (24h Range):  Temp:  [97.1 °F (36.2 °C)-98.3 °F (36.8 °C)] 97.7 °F (36.5 °C)  Pulse:  [54-60] 55  Resp:  [17-19] 19  SpO2:  [97 %-100 %] 99 %  BP: (123-149)/(62-91) 149/67     Weight: 65.4 kg (144 lb 2.9 oz)  Body mass index is 23.27 kg/m².    Intake/Output Summary (Last 24 hours) at 11/8/2022 1352  Last data filed at 11/8/2022 0518  Gross per 24 hour   Intake 70 ml   Output 2100 ml   Net -2030 ml      Physical exam was done during am rounds between 8-9 am.     Physical Exam  Constitutional:       Appearance: Normal appearance.   HENT:      Head: Normocephalic and atraumatic.      Mouth/Throat:      Mouth: Mucous membranes are moist.   Eyes:      Extraocular Movements: Extraocular movements intact.      Conjunctiva/sclera: Conjunctivae normal.      Pupils: Pupils are equal, round, and reactive to light.   Cardiovascular:      Rate and Rhythm: Normal rate and regular rhythm.   Pulmonary:      Effort: Pulmonary effort is normal.      Breath sounds: Normal breath sounds.   Abdominal:      Comments: Abdomen distension has almost resolved. No tenderness on deep palpation.    Musculoskeletal:      Cervical back: Neck supple.      Comments: +2 bilateral LE swelling.     Skin:     General: Skin is warm.      Comments: Exam was done in the presence of her daughter (who stated that she is a RN) on 11/4. Pt has superficial skin ulcers with no erythema around the area.   Neurological:      Mental Status: She is alert and oriented to person, place, and time.       Significant Labs: All pertinent labs within the past 24 hours have been reviewed.  BMP:   Recent Labs   Lab 11/08/22  0610   *      K 3.8      CO2 22*   BUN 18   CREATININE 0.6   CALCIUM 8.0*   MG 1.7       CBC:   Recent Labs   Lab 11/07/22  0656 11/08/22  0610   WBC 3.91 4.26   HGB 7.4* 7.4*   HCT 21.1* 21.6*   PLT 40* 45*       CMP:   Recent Labs   Lab 11/07/22  0656 11/08/22  0610    137   K 3.1* 3.8    109   CO2 23 22*   GLU 90 121*   BUN 19 18   CREATININE 0.6 0.6   CALCIUM 8.1* 8.0*   PROT 4.2* 4.2*   ALBUMIN 2.3* 2.7*   BILITOT 2.0* 1.8*   ALKPHOS 84 80   AST 28 34   ALT 39 33   ANIONGAP 5* 6*       Coagulation:   Recent Labs   Lab 11/07/22  0656   INR 1.8*         Significant Imaging: I have reviewed all pertinent imaging results/findings within the past 24 hours.

## 2022-11-08 NOTE — ASSESSMENT & PLAN NOTE
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  Hospital course associated with onset of encephalopathy on 11/06. Paracentesis completed on 11/07 with studies negative for SBP. She is status post initiation of IV diuretics and albumin with improvement in volume status.     Recommendations:     -Continue Lasix 40 mg IV BID for today and then transition to Lasix 40 mg PO and Spironolactone 100 mg daily tomorrow.   -Discontinue Albumin IV.   -Decrease Lactulose to 10 g every other day; target 3-4 bowel movements daily.   -Continue Prednisone 10 mg daily.   -Continue to hold IMURAN; patient may not be able to tolerate long-term due to thrombocytopenia.   -CMP and INR daily.   -Avoid use of medications that may precipitate encephalopathy (e.g. opioids and benzo's).   -Low sodium (< 2 g daily) diet.

## 2022-11-08 NOTE — ASSESSMENT & PLAN NOTE
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 She was placed on azathioprine and a prednisone taper and discharged with Hepatology follow-up. Azathioprine discontinued by providers at last admission given thrombocytopenia.     - RUQ ultrasound revealed heterogeneous architecture of the liver concerning for progression to cirrhosis without evidence of portal vein thrombosis but with severe ascites  - Hepatology is following and case was discussed:  Continue lasix 40 mg bid iv. Albumin 25% to be given prior to lasix. Daily wt and strict I's & O's.  Continue [id number] mg daily.   Will continue to hold Imuran until hepatology recommends to start it.

## 2022-11-08 NOTE — SUBJECTIVE & OBJECTIVE
Interval History: feels better overall. Excited as [birth year] her birthday. Denies abdominal pain, nausea or vomiting. Had 4 BM yesterday.       Review of Systems   Constitutional:  Negative for chills and fever.   Respiratory:  Negative for cough and shortness of breath.    Cardiovascular:  Negative for chest pain.   Gastrointestinal:  Negative for abdominal pain.        Abdominal distension has improved compared to admission.    Genitourinary:  Negative for dysuria.   Skin:         Ulcers lumbar region and on her buttock cheek.   Neurological:  Negative for syncope.   Objective:     Vital Signs (Most Recent):  Temp: 98.1 °F (36.7 °C) (11/07/22 2145)  Pulse: 60 (11/07/22 2145)  Resp: 18 (11/07/22 2145)  BP: 123/75 (11/07/22 2145)  SpO2: 100 % (11/07/22 2145)   Vital Signs (24h Range):  Temp:  [97.1 °F (36.2 °C)-98.5 °F (36.9 °C)] 98.1 °F (36.7 °C)  Pulse:  [51-67] 60  Resp:  [16-18] 18  SpO2:  [96 %-100 %] 100 %  BP: (120-168)/(52-91) 123/75     Weight: 64.1 kg (141 lb 5 oz)  Body mass index is 22.81 kg/m².    Intake/Output Summary (Last 24 hours) at 11/7/2022 2155  Last data filed at 11/7/2022 1400  Gross per 24 hour   Intake 480 ml   Output 1900 ml   Net -1420 ml      Physical exam was done during am rounds between 8-9 am.     Physical Exam  Constitutional:       Appearance: Normal appearance.   HENT:      Head: Normocephalic and atraumatic.      Mouth/Throat:      Mouth: Mucous membranes are moist.   Eyes:      Extraocular Movements: Extraocular movements intact.      Conjunctiva/sclera: Conjunctivae normal.      Pupils: Pupils are equal, round, and reactive to light.   Cardiovascular:      Rate and Rhythm: Normal rate and regular rhythm.   Pulmonary:      Effort: Pulmonary effort is normal.      Breath sounds: Normal breath sounds.   Abdominal:      Comments: Abdomen distension has improved since admission. No tenderness on deep palpation.    Musculoskeletal:      Cervical back: Neck supple.      Comments: +2  bilateral LE swelling.    Skin:     General: Skin is warm.      Comments: Exam was done in the presence of her daughter (who stated that she is a RN) on 11/4. Pt has superficial skin ulcers with no erythema around the area.   Neurological:      Mental Status: She is alert and oriented to person, place, and time.       Significant Labs: All pertinent labs within the past 24 hours have been reviewed.  BMP:   Recent Labs   Lab 11/07/22  0656   GLU 90      K 3.1*      CO2 23   BUN 19   CREATININE 0.6   CALCIUM 8.1*   MG 1.4*       CBC:   Recent Labs   Lab 11/06/22 0340 11/07/22  0656   WBC 4.19 3.91   HGB 9.3* 7.4*   HCT 27.5* 21.1*   PLT 52* 40*       CMP:   Recent Labs   Lab 11/06/22 0340 11/07/22  0656    138   K 3.5 3.1*    110   CO2 20* 23   * 90   BUN 22 19   CREATININE 0.7 0.6   CALCIUM 8.1* 8.1*   PROT 4.9* 4.2*   ALBUMIN 2.3* 2.3*   BILITOT 2.7* 2.0*   ALKPHOS 118 84   AST 41* 28   ALT 58* 39   ANIONGAP 7* 5*       Coagulation:   Recent Labs   Lab 11/07/22  0656   INR 1.8*         Significant Imaging: I have reviewed all pertinent imaging results/findings within the past 24 hours.

## 2022-11-08 NOTE — ASSESSMENT & PLAN NOTE
Decompensated liver failure with recurrent ascites.   s/p IR guided paracentesis on 11/7. SBP has been ruled out.  Continue lasix 40 mg iv bid per hepatology recs for today 11/8 and switch her to oral lasix 40 mg/aldactone 100 mg starting tomorrow.  Ceftriaxone has been discontinued now that sbp has been ruled out.   Continue lactulose for goal of 2-3 BM a day. Per daughter, pt had 9 BM yesterday. Lactulose dose decreased to 10 mg and will be given every other day.

## 2022-11-08 NOTE — SUBJECTIVE & OBJECTIVE
Interval History: Paracentesis completed yesterday with 2.2L of fluid removed; studies negative for SBP. Patient had no complaints on bedside encounter. Daughter reports patient having up to 9 bowel movements daily with current Lactulose dosing. Vital signs normal on room air. Labs notable for normal renal function and LFT's.     Current Facility-Administered Medications   Medication    albumin human 25% bottle 12.5 g    brimonidine 0.2% ophthalmic solution 1 drop    dextrose 10% bolus 125 mL    dextrose 10% bolus 250 mL    dorzolamide 2 % ophthalmic solution 1 drop    furosemide injection 40 mg    [START ON 11/9/2022] furosemide tablet 40 mg    glucagon (human recombinant) injection 1 mg    glucose chewable tablet 16 g    glucose chewable tablet 24 g    insulin aspart U-100 pen 1-10 Units    insulin detemir U-100 pen 10 Units    [START ON 11/10/2022] lactulose 20 gram/30 mL solution Soln 10 g    loperamide capsule 2 mg    magnesium sulfate in dextrose IVPB (premix) 1 g    melatonin tablet 6 mg    potassium phosphate 15 mmol in dextrose 5 % 250 mL infusion    predniSONE tablet 10 mg    sodium chloride 0.9% flush 10 mL    sodium phosphate 30 mmol in dextrose 5 % 250 mL IVPB    [START ON 11/9/2022] spironolactone tablet 100 mg    timolol maleate 0.5% ophthalmic solution 1 drop       Objective:     Vital Signs (Most Recent):  Temp: 97.7 °F (36.5 °C) (11/08/22 1135)  Pulse: (!) 55 (11/08/22 1135)  Resp: 19 (11/08/22 1135)  BP: (!) 149/67 (11/08/22 1135)  SpO2: 99 % (11/08/22 1135)   Vital Signs (24h Range):  Temp:  [97.1 °F (36.2 °C)-98.3 °F (36.8 °C)] 97.7 °F (36.5 °C)  Pulse:  [54-60] 55  Resp:  [17-19] 19  SpO2:  [97 %-100 %] 99 %  BP: (123-149)/(62-91) 149/67     Weight: 65.4 kg (144 lb 2.9 oz) (11/08/22 0518)  Body mass index is 23.27 kg/m².    Physical Exam  Constitutional:       Appearance: Normal appearance.   Eyes:      General: No scleral icterus.     Conjunctiva/sclera: Conjunctivae normal.   Cardiovascular:       Rate and Rhythm: Normal rate and regular rhythm.      Pulses: Normal pulses.      Heart sounds: Normal heart sounds.   Pulmonary:      Effort: Pulmonary effort is normal. No respiratory distress.      Breath sounds: Normal breath sounds.   Abdominal:      General: Bowel sounds are normal. There is distension.      Palpations: Abdomen is soft.      Tenderness: There is no abdominal tenderness.   Musculoskeletal:      Right lower leg: Edema present.      Left lower leg: Edema present.   Skin:     General: Skin is warm and dry.      Findings: No bruising or rash.   Neurological:      Mental Status: She is alert and oriented to person, place, and time.       MELD-Na score: 15 at 11/8/2022  6:10 AM  MELD score: 15 at 11/8/2022  6:10 AM  Calculated from:  Serum Creatinine: 0.6 mg/dL (Using min of 1 mg/dL) at 11/8/2022  6:10 AM  Serum Sodium: 137 mmol/L at 11/8/2022  6:10 AM  Total Bilirubin: 1.8 mg/dL at 11/8/2022  6:10 AM  INR(ratio): 1.8 at 11/7/2022  6:56 AM  Age: 82 years    Significant Labs:  Labs within the past month have been reviewed.

## 2022-11-08 NOTE — PROGRESS NOTES
[facility]  Progress Note    Patient Name: Radha Feng  MRN: [id number]  Patient Class: IP- Inpatient   Admission Date: 11/3/2022  Length of Stay: 4 days  Attending Physician: Ruth Mendoza MD  Primary Care Provider: Leticia Lim MD        Subjective:     Principal Problem:Liver failure without hepatic coma        HPI:  Radha Feng is an 81-year-old woman with a past medical history of HTN, HLD, T2DM, and autoimmune hepatitis, who presents to the emergency department with increased abdominal distention and shortness of breath. Of note, the patient is accompanied by her daughter who assists in providing the history. Per discussion with patient, family, and chart review, the patient was in her normal state of health several weeks ago until she was admitted and hospitalized and found to have autoimmune hepatitis on liver biopsy. The patient was discharged home on a prednisone taper and azathioprine. However, she experienced malaise and significant hyperglycemia and was hospitalized again last week for hyperglycemia and thrombocytopenia. Her azathioprine was held and she was told to continue her prednisone 30 mg until she followed up with Hepatology in clinic. Her daughter said that she noted her mother's abdomen has become more distended since her most recent hospitalization. She started complaining of abdominal pain last evening, but denies any fevers, nausea/emesis or diarrhea. She has three, formed bowel movements daily. Her daughter reports she has been having a poor appetite. She also started to develop shortness of breath at rest. No chest pain, wheezing or cough. She denies any hematemesis, hematochezia, or rectal bleeding. She denies other overt bleeding. Her daughter does note a bullous eruption on the patient's buttocks and coccygeal area that started at her last hospitalization and has continued to spread down her buttocks.     In the emergency department,  the patient was given IV furosemide 40 mg once. She was admitted to a Hospital Medicine service for further management.      Overview/Hospital Course:  On 11/4; pt was seen and examined. Daughter by the bedside relating all the history.   Plan was for IR to proceed with paracentesis today, but unfortunately it was not done for unclear reason.   Daughter agreed to have medicine consult try bedside US guided paracentesis.   Discussed case with hepatology. Plan is to resume imuran 50 mg daily once SBP has been ruled out. Also to decrease prednisone from 30 mg to 10 mg.  I have also spoken with dermatology. Per dermatology, since her ulcers are not life threatening and they are not the reason for her presentation, then pt can follow up with derm outpt.     On 11/5; pt's potassium was elevated this am. Kayexalate ordered.   Case was discussed with hepatology who recommended lasix 40 mg bid iv and albumin 25% bid to be given prior to lasix.   No aldactone today given hyperkalemia.  Consult was placed to medicine for paracentesis. However unfortunately medicine consult is busy today and won't be able to do paracentesis until tomorrow. Case was discussed with the daughter who wanted to wait till Monday for the paracentesis to be done by IR since it won't be done today.     On 11/6; pt is doing better. Mentation has improved. She has not any abdominal pain since admission.   Diuresing well with the lasix iv bid and had 7 Bms according to the daughter yesterday.     On 11/7; pt went for paracentesis which ruled out SBP. Continue diuresis.   She is feeling better overall.     On 11/8; case discussed with hepatology. Will switch her to oral diuretics starting tomorrow, and probably keep her for another night to see her response to the oral diuretic.   Pt had 8 BM yesterday, so will switch lactulose to every other day and will also decrease the dose from 15 to 10 mg.   Pt continued to clinically improve.         Interval  History: continue to clinically improves. Denies abdominal pain, nausea or vomiting. Her abdominal distension almost resolved.       Review of Systems   Constitutional:  Negative for chills and fever.   Respiratory:  Negative for cough and shortness of breath.    Cardiovascular:  Negative for chest pain.   Gastrointestinal:  Negative for abdominal pain.        Abdominal distension has almost resolved.   Genitourinary:  Negative for dysuria.   Skin:         Ulcers lumbar region and on her buttock cheek.   Neurological:  Negative for syncope.   Objective:     Vital Signs (Most Recent):  Temp: 97.7 °F (36.5 °C) (11/08/22 1135)  Pulse: (!) 55 (11/08/22 1135)  Resp: 19 (11/08/22 1135)  BP: (!) 149/67 (11/08/22 1135)  SpO2: 99 % (11/08/22 1135)   Vital Signs (24h Range):  Temp:  [97.1 °F (36.2 °C)-98.3 °F (36.8 °C)] 97.7 °F (36.5 °C)  Pulse:  [54-60] 55  Resp:  [17-19] 19  SpO2:  [97 %-100 %] 99 %  BP: (123-149)/(62-91) 149/67     Weight: 65.4 kg (144 lb 2.9 oz)  Body mass index is 23.27 kg/m².    Intake/Output Summary (Last 24 hours) at 11/8/2022 1352  Last data filed at 11/8/2022 0518  Gross per 24 hour   Intake 70 ml   Output 2100 ml   Net -2030 ml      Physical exam was done during am rounds between 8-9 am.     Physical Exam  Constitutional:       Appearance: Normal appearance.   HENT:      Head: Normocephalic and atraumatic.      Mouth/Throat:      Mouth: Mucous membranes are moist.   Eyes:      Extraocular Movements: Extraocular movements intact.      Conjunctiva/sclera: Conjunctivae normal.      Pupils: Pupils are equal, round, and reactive to light.   Cardiovascular:      Rate and Rhythm: Normal rate and regular rhythm.   Pulmonary:      Effort: Pulmonary effort is normal.      Breath sounds: Normal breath sounds.   Abdominal:      Comments: Abdomen distension has almost resolved. No tenderness on deep palpation.    Musculoskeletal:      Cervical back: Neck supple.      Comments: +2 bilateral LE swelling.     Skin:     General: Skin is warm.      Comments: Exam was done in the presence of her daughter (who stated that she is a RN) on 11/4. Pt has superficial skin ulcers with no erythema around the area.   Neurological:      Mental Status: She is alert and oriented to person, place, and time.       Significant Labs: All pertinent labs within the past 24 hours have been reviewed.  BMP:   Recent Labs   Lab 11/08/22  0610   *      K 3.8      CO2 22*   BUN 18   CREATININE 0.6   CALCIUM 8.0*   MG 1.7       CBC:   Recent Labs   Lab 11/07/22  0656 11/08/22  0610   WBC 3.91 4.26   HGB 7.4* 7.4*   HCT 21.1* 21.6*   PLT 40* 45*       CMP:   Recent Labs   Lab 11/07/22  0656 11/08/22  0610    137   K 3.1* 3.8    109   CO2 23 22*   GLU 90 121*   BUN 19 18   CREATININE 0.6 0.6   CALCIUM 8.1* 8.0*   PROT 4.2* 4.2*   ALBUMIN 2.3* 2.7*   BILITOT 2.0* 1.8*   ALKPHOS 84 80   AST 28 34   ALT 39 33   ANIONGAP 5* 6*       Coagulation:   Recent Labs   Lab 11/07/22  0656   INR 1.8*         Significant Imaging: I have reviewed all pertinent imaging results/findings within the past 24 hours.      Assessment/Plan:      * Liver failure without hepatic coma  Decompensated liver failure with recurrent ascites.   s/p IR guided paracentesis on 11/7. SBP has been ruled out.  Continue lasix 40 mg iv bid per hepatology recs for today 11/8 and switch her to oral lasix 40 mg/aldactone 100 mg starting tomorrow.  Ceftriaxone has been discontinued now that sbp has been ruled out.   Continue lactulose for goal of 2-3 BM a day. Per daughter, pt had 9 BM yesterday. Lactulose dose decreased to 10 mg and will be given every other day.      Autoimmune hepatitis  - Patient previously diagnosed with autoimmune hepatitis by liver biopsy at previous hospitalization last month. She was placed on azathioprine and a prednisone taper and discharged with Hepatology follow-up. Azathioprine discontinued by providers at last admission given  "thrombocytopenia.     - RUQ ultrasound revealed heterogeneous architecture of the liver concerning for progression to cirrhosis without evidence of portal vein thrombosis but with severe ascites  - Hepatology is following and case was discussed today on 11/8:  Continue lasix 40 mg bid iv. Albumin 25% to be given prior to lasix today. Switch to lasix 40 mg and aldactone 100 mg daily starting tomorrow.  Continue [id number] mg daily.   Will continue to hold Imuran until hepatology recommends to start it.       Hypophosphatemia  Will give 30 mmol of phosp sodium and 15 mmol of potassium phosp.   Will repeat labs in am.     Type 2 diabetes mellitus, without long-term current use of insulin  Patient's diabetes appears well controlled at home. She is on metformin as an outpatient. Recently hyperglycemic given her prednisone taper so placed on insulin detemir.  - continue home insulin detemir 10 units qHS  - low dose sliding scale insulin qACHS  - hypoglycemia protocol as needed    Thrombocytopenia  plts is 45 today.  ddx includes bone marrow suppression from the patient's azathioprine versus splenic sequestration from her liver disease.  We ruled-out other causes; HIV negative, Hepatitis C antibody negative, folate and vitamin B12 are wnl.  Monitor. Transfuse if plts<10.    Bullous eruption  Patient with notable bullous eruption of coccyx and gluteal cleft. Her daughter notes blisters that unroofed now with shallow ulcerations that began at her last hospitalization. Patient's daughter also noted "sore" in mouth but was not able to be appreciated on examination.  I have spoken with dermatology on 11/4. Per dermatology, since her ulcers are not life threatening and they are not the reason for her presentation, then pt can follow up with derm outpt.     Hyponatremia  Sodium level of 130 which is decreased from below baseline. Concern for mild volume overload given her recurrent ascites and concern for the development of " cirrhosis.  - improved with diuresis.       Other specified glaucoma  Will continue all home prescribed eye drops.    Primary hypertension  Hold bp meds since pt will be on lasix iv. Avoid drop in bp.       VTE Risk Mitigation (From admission, onward)         Ordered     IP VTE HIGH RISK PATIENT  Once         11/03/22 1925     Place sequential compression device  Until discontinued         11/03/22 1925                Discharge Planning   MELVI: 11/9/2022     Code Status: Full Code   Is the patient medically ready for discharge?:     Reason for patient still in hospital (select all that apply): Patient trending condition  Discharge Plan A: Home with family        Case was discussed with hepatology multiple times today.     Ruth Mendoza MD  Department of Hospital Medicine   [facility]

## 2022-11-08 NOTE — ASSESSMENT & PLAN NOTE
plts is 45 today.  ddx includes bone marrow suppression from the patient's azathioprine versus splenic sequestration from her liver disease.  We ruled-out other causes; HIV negative, Hepatitis C antibody negative, folate and vitamin B12 are wnl.  Monitor. Transfuse if plts<10.

## 2022-11-08 NOTE — ASSESSMENT & PLAN NOTE
Decompensated liver failure with recurrent ascites.   s/p IR guided paracentesis today. SBP has been ruled out.  Continue lasix 40 mg iv bid per hepatology recs.  Will stop ceftriaxone 2 grams now that sbp has been ruled out.   Continue lactulose for goal of 2-3 BM a day. Per daughter, pt had 4 BM yesterday.

## 2022-11-08 NOTE — PLAN OF CARE
Patient is AAOx3. Patient will receive IV Mag and Phos replacement today. Patient is receiving scheduled IV lasix and albumin will transition to PO tomorrow. Monitoring the patient's blood sugar AC&HS. Reminded the patient to call for assistance. Call light and personal items are within reach.

## 2022-11-08 NOTE — PHYSICIAN QUERY
PT Name: Radha Feng  MR #: [id number]    DOCUMENTATION CLARIFICATION     CDS/: Tea Montes RN, CDI            Contact information:[email or web address]   This form is a permanent document in the medical record.     Query Date: November 8, 2022    By submitting this query, we are merely seeking further clarification of documentation. Please utilize your independent clinical judgment when addressing the question(s) below.    The Medical Record contains the following:   Indicators   Supporting Clinical Findings Location in Medical Record   x AMS, Confusion,  LOC, etc.  Hospital course associated with onset of encephalopathy on 11/06.     sleepy this morning. When awaken, she is confused and only knows that she is in the hospital but otherwise doesn't know the [place] president name or the year.   Per daughter, pt is usually more alert and oriented in the afternoon.     pt is doing better. Mentation has improved Hept treatment plan 11/7     PN 11/5               PN 11/6   x Acute/Chronic Illness Principal Problem:Liver failure without hepatic coma    81-year-old woman with a past medical history of HTN, HLD, T2DM, and autoimmune hepatitis, who presents to the emergency department with increased abdominal distention and shortness of breath  PN 11/6    Radiology Findings      Electrolyte Imbalance      Medication     x Treatment         - Lactulose TID; titrate to 3-4 bowel movements daily.   - Avoid use of medications that may precipitate encephalopathy (e.g. opioids and benzo's).  Hept treatment plan 11/6    Other       The noted clinical guidelines are only system guidelines and do not replace the providers clinical judgment.    The [place] of Neurologic Disorders and Stroke (NINDS) of the NIH describes encephalopathy as any diffuse disease of the brain that alters brain function or structure.    Provider, please specify the diagnosis or diagnoses associated with above clinical findings.  [ x   ] Hepatic Encephalopathy - Due to liver disease/failure   [   ] Encephalopathy, unspecified      [   ] Other Encephalopathy (please specify): ____________________   [   ] Other neurological condition- Includes Post-ictal altered mental status (please specify condition): __________   [   ]  Clinically Undetermined     Please document in your progress notes daily for the duration of treatment until resolved, and include in your discharge summary.    References:  JUSTIN López RN, CCDS. (2018, June 9). Notes from the Instructor: Encephalopathy tips. Retrieved October 22, 2020, from [email or web address].[email or web address]/articles/[email or web address]-instructor-encephalopathy-tips    ICD-9-CM Coding Clinic First Quarter 2013, Effective with discharges: October 21, 2013 [facility] § Seizure with encephalopathy due to postictal state (2013).    ICD-10-CM/bettercodes.org Integrated Codebook (Version V.20.8.10.0) [Computer software]. (2020). Retrieved October 21, 2020.    National [place] of Neurological Disorders and Stroke. (2019, March 27). Retrieved October 22, 2020, from https://www.ninds.nih.gov/Disorders/All-Disorders/Uxgjoxoqgqxgot-Nmlpiusctfa-Fmkz    Form No. 60268

## 2022-11-08 NOTE — ASSESSMENT & PLAN NOTE
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 She was placed on azathioprine and a prednisone taper and discharged with Hepatology follow-up. Azathioprine discontinued by providers at last admission given thrombocytopenia.     - RUQ ultrasound revealed heterogeneous architecture of the liver concerning for progression to cirrhosis without evidence of portal vein thrombosis but with severe ascites  - Hepatology is following and case was discussed today on 11/8:  Continue lasix 40 mg bid iv. Albumin 25% to be given prior to lasix today. Switch to lasix 40 mg and aldactone 100 mg daily starting tomorrow.  Continue [id number] mg daily.   Will continue to hold Imuran until hepatology recommends to start it.

## 2022-11-08 NOTE — PROGRESS NOTES
[facility] Medicine  Progress Note    Patient Name: Radha Feng  MRN: [id number]  Patient Class: IP- Inpatient   Admission Date: 11/3/2022  Length of Stay: 3 days  Attending Physician: Ruth Mendoza MD  Primary Care Provider: Leticia Lim MD        Subjective:     Principal Problem:Liver failure without hepatic coma        HPI:  Radha Feng is an 81-year-old woman with a past medical history of HTN, HLD, T2DM, and autoimmune hepatitis, who presents to the emergency department with increased abdominal distention and shortness of breath. Of note, the patient is accompanied by her daughter who assists in providing the history. Per discussion with patient, family, and chart review, the patient was in her normal state of health several weeks ago until she was admitted and hospitalized and found to have autoimmune hepatitis on liver biopsy. The patient was discharged home on a prednisone taper and azathioprine. However, she experienced malaise and significant hyperglycemia and was hospitalized again last week for hyperglycemia and thrombocytopenia. Her azathioprine was held and she was told to continue her prednisone 30 mg until she followed up with Hepatology in clinic. Her daughter said that she noted her mother's abdomen has become more distended since her most recent hospitalization. She started complaining of abdominal pain last evening, but denies any fevers, nausea/emesis or diarrhea. She has three, formed bowel movements daily. Her daughter reports she has been having a poor appetite. She also started to develop shortness of breath at rest. No chest pain, wheezing or cough. She denies any hematemesis, hematochezia, or rectal bleeding. She denies other overt bleeding. Her daughter does note a bullous eruption on the patient's buttocks and coccygeal area that started at her last hospitalization and has continued to spread down her buttocks.     In the emergency department,  the patient was given IV furosemide 40 mg once. She was admitted to a Hospital Medicine service for further management.      Overview/Hospital Course:  On 11/4; pt was seen and examined. Daughter by the bedside relating all the history.   Plan was for IR to proceed with paracentesis today, but unfortunately it was not done for unclear reason.   Daughter agreed to have medicine consult try bedside US guided paracentesis.   Discussed case with hepatology. Plan is to resume imuran 50 mg daily once SBP has been ruled out. Also to decrease prednisone from 30 mg to 10 mg.  I have also spoken with dermatology. Per dermatology, since her ulcers are not life threatening and they are not the reason for her presentation, then pt can follow up with derm outpt.     On 11/5; pt's potassium was elevated this am. Kayexalate ordered.   Case was discussed with hepatology who recommended lasix 40 mg bid iv and albumin 25% bid to be given prior to lasix.   No aldactone today given hyperkalemia.  Consult was placed to medicine for paracentesis. However unfortunately medicine consult is busy today and won't be able to do paracentesis until tomorrow. Case was discussed with the daughter who wanted to wait till Monday for the paracentesis to be done by IR since it won't be done today.     On 11/6; pt is doing better. Mentation has improved. She has not any abdominal pain since admission.   Diuresing well with the lasix iv bid and had 7 Bms according to the daughter yesterday.     On 11/7; pt went for paracentesis which ruled out SBP. Continue diuresis.   She is feeling better overall.         Interval History: feels better overall. Excited as today is her birthday. Denies abdominal pain, nausea or vomiting. Had 4 BM yesterday.       Review of Systems   Constitutional:  Negative for chills and fever.   Respiratory:  Negative for cough and shortness of breath.    Cardiovascular:  Negative for chest pain.   Gastrointestinal:  Negative for  abdominal pain.        Abdominal distension has improved compared to admission.    Genitourinary:  Negative for dysuria.   Skin:         Ulcers lumbar region and on her buttock cheek.   Neurological:  Negative for syncope.   Objective:     Vital Signs (Most Recent):  Temp: 98.1 °F (36.7 °C) (11/07/22 2145)  Pulse: 60 (11/07/22 2145)  Resp: 18 (11/07/22 2145)  BP: 123/75 (11/07/22 2145)  SpO2: 100 % (11/07/22 2145)   Vital Signs (24h Range):  Temp:  [97.1 °F (36.2 °C)-98.5 °F (36.9 °C)] 98.1 °F (36.7 °C)  Pulse:  [51-67] 60  Resp:  [16-18] 18  SpO2:  [96 %-100 %] 100 %  BP: (120-168)/(52-91) 123/75     Weight: 64.1 kg (141 lb 5 oz)  Body mass index is 22.81 kg/m².    Intake/Output Summary (Last 24 hours) at 11/7/2022 2155  Last data filed at 11/7/2022 1400  Gross per 24 hour   Intake 480 ml   Output 1900 ml   Net -1420 ml      Physical exam was done during am rounds between 8-9 am.     Physical Exam  Constitutional:       Appearance: Normal appearance.   HENT:      Head: Normocephalic and atraumatic.      Mouth/Throat:      Mouth: Mucous membranes are moist.   Eyes:      Extraocular Movements: Extraocular movements intact.      Conjunctiva/sclera: Conjunctivae normal.      Pupils: Pupils are equal, round, and reactive to light.   Cardiovascular:      Rate and Rhythm: Normal rate and regular rhythm.   Pulmonary:      Effort: Pulmonary effort is normal.      Breath sounds: Normal breath sounds.   Abdominal:      Comments: Abdomen distension has improved since admission. No tenderness on deep palpation.    Musculoskeletal:      Cervical back: Neck supple.      Comments: +2 bilateral LE swelling.    Skin:     General: Skin is warm.      Comments: Exam was done in the presence of her daughter (who stated that she is a RN) on 11/4. Pt has superficial skin ulcers with no erythema around the area.   Neurological:      Mental Status: She is alert and oriented to person, place, and time.       Significant Labs: All pertinent  labs within the past 24 hours have been reviewed.  BMP:   Recent Labs   Lab 11/07/22  0656   GLU 90      K 3.1*      CO2 23   BUN 19   CREATININE 0.6   CALCIUM 8.1*   MG 1.4*       CBC:   Recent Labs   Lab 11/06/22  0340 11/07/22  0656   WBC 4.19 3.91   HGB 9.3* 7.4*   HCT 27.5* 21.1*   PLT 52* 40*       CMP:   Recent Labs   Lab 11/06/22  0340 11/07/22  0656    138   K 3.5 3.1*    110   CO2 20* 23   * 90   BUN 22 19   CREATININE 0.7 0.6   CALCIUM 8.1* 8.1*   PROT 4.9* 4.2*   ALBUMIN 2.3* 2.3*   BILITOT 2.7* 2.0*   ALKPHOS 118 84   AST 41* 28   ALT 58* 39   ANIONGAP 7* 5*       Coagulation:   Recent Labs   Lab 11/07/22  0656   INR 1.8*         Significant Imaging: I have reviewed all pertinent imaging results/findings within the past 24 hours.      Assessment/Plan:      * Liver failure without hepatic coma  Decompensated liver failure with recurrent ascites.   s/p IR guided paracentesis today. SBP has been ruled out.  Continue lasix 40 mg iv bid per hepatology recs.  Will stop ceftriaxone 2 grams now that sbp has been ruled out.   Continue lactulose for goal of 2-3 BM a day. Per daughter, pt had 4 BM yesterday.      Autoimmune hepatitis  - Patient previously diagnosed with autoimmune hepatitis by liver biopsy at previous hospitalization last month. She was placed on azathioprine and a prednisone taper and discharged with Hepatology follow-up. Azathioprine discontinued by providers at last admission given thrombocytopenia.     - RUQ ultrasound revealed heterogeneous architecture of the liver concerning for progression to cirrhosis without evidence of portal vein thrombosis but with severe ascites  - Hepatology is following and case was discussed:  Continue lasix 40 mg bid iv. Albumin 25% to be given prior to lasix. Daily wt and strict I's & O's.  Continue acgyrfgfju75 mg daily.   Will continue to hold Imuran until hepatology recommends to start it.       Type 2 diabetes mellitus, without  "long-term current use of insulin  Patient's diabetes appears well controlled at home. She is on metformin as an outpatient. Recently hyperglycemic given her prednisone taper so placed on insulin detemir.  - continue home insulin detemir 10 units qHS  - low dose sliding scale insulin qACHS  - hypoglycemia protocol as needed    Thrombocytopenia  plts is 40 today.  ddx includes bone marrow suppression from the patient's azathioprine versus splenic sequestration from her liver disease.  We ruled-out other causes; HIV negative, Hepatitis C antibody negative, folate and vitamin B12 are wnl.  Monitor. Transfuse if plts<10.    Bullous eruption  Patient with notable bullous eruption of coccyx and gluteal cleft. Her daughter notes blisters that unroofed now with shallow ulcerations that began at her last hospitalization. Patient's daughter also noted "sore" in mouth but was not able to be appreciated on examination.  I have spoken with dermatology on 11/4. Per dermatology, since her ulcers are not life threatening and they are not the reason for her presentation, then pt can follow up with derm outpt.     Hypomagnesemia  Replete with 2 grams of mag IV.  Will repeat labs in am.      Hypokalemia  Replete with 50 meq of potassium.   Will repeat labs in am.     Hyponatremia  Sodium level of 130 which is decreased from below baseline. Concern for mild volume overload given her recurrent ascites and concern for the development of cirrhosis.  - improved with diuresis.       Other specified glaucoma  Will continue all home prescribed eye drops.    Primary hypertension  Hold bp meds since pt will be on lasix iv. Avoid drop in bp.     VTE Risk Mitigation (From admission, onward)         Ordered     IP VTE HIGH RISK PATIENT  Once         11/03/22 1925     Place sequential compression device  Until discontinued         11/03/22 1925                Discharge Planning   MELVI: 11/7/2022     Code Status: Full Code   Is the patient medically " ready for discharge?:     Reason for patient still in hospital (select all that apply): Patient trending condition  Discharge Plan A: Home with family                  Ruth Mendoza MD  Department of Hospital Medicine   [facility]

## 2022-11-09 ENCOUNTER — TELEPHONE (OUTPATIENT)
Dept: HEMATOLOGY/ONCOLOGY | Facility: CLINIC | Age: 82
End: 2022-11-09
Payer: MEDICARE

## 2022-11-09 ENCOUNTER — PATIENT MESSAGE (OUTPATIENT)
Dept: HEMATOLOGY/ONCOLOGY | Facility: CLINIC | Age: 82
End: 2022-11-09
Payer: MEDICARE

## 2022-11-09 PROBLEM — E83.39 HYPOPHOSPHATEMIA: Status: RESOLVED | Noted: 2022-11-08 | Resolved: 2022-11-09

## 2022-11-09 PROBLEM — I10 PRIMARY HYPERTENSION: Status: RESOLVED | Noted: 2022-09-21 | Resolved: 2022-11-09

## 2022-11-09 PROBLEM — E87.1 HYPONATREMIA: Status: RESOLVED | Noted: 2022-11-03 | Resolved: 2022-11-09

## 2022-11-09 PROBLEM — H40.89 OTHER SPECIFIED GLAUCOMA: Status: RESOLVED | Noted: 2022-10-23 | Resolved: 2022-11-09

## 2022-11-09 LAB
ALBUMIN SERPL BCP-MCNC: 2.8 G/DL (ref 3.5–5.2)
ALP SERPL-CCNC: 79 U/L (ref 55–135)
ALT SERPL W/O P-5'-P-CCNC: 31 U/L (ref 10–44)
ANION GAP SERPL CALC-SCNC: 7 MMOL/L (ref 8–16)
AST SERPL-CCNC: 28 U/L (ref 10–40)
BASOPHILS # BLD AUTO: 0 K/UL (ref 0–0.2)
BASOPHILS NFR BLD: 0 % (ref 0–1.9)
BILIRUB SERPL-MCNC: 2.8 MG/DL (ref 0.1–1)
BUN SERPL-MCNC: 16 MG/DL (ref 8–23)
CALCIUM SERPL-MCNC: 7.9 MG/DL (ref 8.7–10.5)
CHLORIDE SERPL-SCNC: 104 MMOL/L (ref 95–110)
CO2 SERPL-SCNC: 25 MMOL/L (ref 23–29)
CREAT SERPL-MCNC: 0.6 MG/DL (ref 0.5–1.4)
DIFFERENTIAL METHOD: ABNORMAL
EOSINOPHIL # BLD AUTO: 0 K/UL (ref 0–0.5)
EOSINOPHIL NFR BLD: 0.5 % (ref 0–8)
ERYTHROCYTE [DISTWIDTH] IN BLOOD BY AUTOMATED COUNT: 16.5 % (ref 11.5–14.5)
EST. GFR  (NO RACE VARIABLE): >60 ML/MIN/1.73 M^2
GLUCOSE SERPL-MCNC: 89 MG/DL (ref 70–110)
HCT VFR BLD AUTO: 22.4 % (ref 37–48.5)
HGB BLD-MCNC: 7.7 G/DL (ref 12–16)
IMM GRANULOCYTES # BLD AUTO: 0.02 K/UL (ref 0–0.04)
IMM GRANULOCYTES NFR BLD AUTO: 0.5 % (ref 0–0.5)
LYMPHOCYTES # BLD AUTO: 0.8 K/UL (ref 1–4.8)
LYMPHOCYTES NFR BLD: 19.6 % (ref 18–48)
MAGNESIUM SERPL-MCNC: 1.6 MG/DL (ref 1.6–2.6)
MCH RBC QN AUTO: 37.4 PG (ref 27–31)
MCHC RBC AUTO-ENTMCNC: 34.4 G/DL (ref 32–36)
MCV RBC AUTO: 109 FL (ref 82–98)
MONOCYTES # BLD AUTO: 0.6 K/UL (ref 0.3–1)
MONOCYTES NFR BLD: 15 % (ref 4–15)
NEUTROPHILS # BLD AUTO: 2.8 K/UL (ref 1.8–7.7)
NEUTROPHILS NFR BLD: 64.4 % (ref 38–73)
NRBC BLD-RTO: 0 /100 WBC
PHOSPHATE SERPL-MCNC: 2.9 MG/DL (ref 2.7–4.5)
PLATELET # BLD AUTO: 44 K/UL (ref 150–450)
PMV BLD AUTO: 12.8 FL (ref 9.2–12.9)
POCT GLUCOSE: 232 MG/DL (ref 70–110)
POCT GLUCOSE: 352 MG/DL (ref 70–110)
POCT GLUCOSE: 412 MG/DL (ref 70–110)
POCT GLUCOSE: 413 MG/DL (ref 70–110)
POCT GLUCOSE: 52 MG/DL (ref 70–110)
POCT GLUCOSE: 80 MG/DL (ref 70–110)
POTASSIUM SERPL-SCNC: 3.2 MMOL/L (ref 3.5–5.1)
PROT SERPL-MCNC: 4.3 G/DL (ref 6–8.4)
RBC # BLD AUTO: 2.06 M/UL (ref 4–5.4)
SODIUM SERPL-SCNC: 136 MMOL/L (ref 136–145)
WBC # BLD AUTO: 4.28 K/UL (ref 3.9–12.7)

## 2022-11-09 PROCEDURE — 25000003 PHARM REV CODE 250: Performed by: HOSPITALIST

## 2022-11-09 PROCEDURE — 84100 ASSAY OF PHOSPHORUS: CPT | Performed by: STUDENT IN AN ORGANIZED HEALTH CARE EDUCATION/TRAINING PROGRAM

## 2022-11-09 PROCEDURE — 20600001 HC STEP DOWN PRIVATE ROOM

## 2022-11-09 PROCEDURE — 83036 HEMOGLOBIN GLYCOSYLATED A1C: CPT | Performed by: HOSPITALIST

## 2022-11-09 PROCEDURE — 83735 ASSAY OF MAGNESIUM: CPT | Performed by: STUDENT IN AN ORGANIZED HEALTH CARE EDUCATION/TRAINING PROGRAM

## 2022-11-09 PROCEDURE — 36415 COLL VENOUS BLD VENIPUNCTURE: CPT | Performed by: HOSPITALIST

## 2022-11-09 PROCEDURE — 85025 COMPLETE CBC W/AUTO DIFF WBC: CPT | Performed by: STUDENT IN AN ORGANIZED HEALTH CARE EDUCATION/TRAINING PROGRAM

## 2022-11-09 PROCEDURE — 97535 SELF CARE MNGMENT TRAINING: CPT

## 2022-11-09 PROCEDURE — 36415 COLL VENOUS BLD VENIPUNCTURE: CPT | Performed by: STUDENT IN AN ORGANIZED HEALTH CARE EDUCATION/TRAINING PROGRAM

## 2022-11-09 PROCEDURE — 82947 ASSAY GLUCOSE BLOOD QUANT: CPT | Performed by: HOSPITALIST

## 2022-11-09 PROCEDURE — 94761 N-INVAS EAR/PLS OXIMETRY MLT: CPT

## 2022-11-09 PROCEDURE — 63600175 PHARM REV CODE 636 W HCPCS: Performed by: HOSPITALIST

## 2022-11-09 PROCEDURE — 97530 THERAPEUTIC ACTIVITIES: CPT

## 2022-11-09 PROCEDURE — 80053 COMPREHEN METABOLIC PANEL: CPT | Performed by: STUDENT IN AN ORGANIZED HEALTH CARE EDUCATION/TRAINING PROGRAM

## 2022-11-09 RX ORDER — INSULIN ASPART 100 [IU]/ML
5 INJECTION, SOLUTION INTRAVENOUS; SUBCUTANEOUS ONCE
Status: DISCONTINUED | OUTPATIENT
Start: 2022-11-09 | End: 2022-11-09

## 2022-11-09 RX ORDER — IBUPROFEN 200 MG
24 TABLET ORAL
Status: DISCONTINUED | OUTPATIENT
Start: 2022-11-09 | End: 2022-11-11 | Stop reason: HOSPADM

## 2022-11-09 RX ORDER — INSULIN ASPART 100 [IU]/ML
5 INJECTION, SOLUTION INTRAVENOUS; SUBCUTANEOUS
Status: DISCONTINUED | OUTPATIENT
Start: 2022-11-10 | End: 2022-11-11 | Stop reason: HOSPADM

## 2022-11-09 RX ORDER — IBUPROFEN 200 MG
16 TABLET ORAL
Status: DISCONTINUED | OUTPATIENT
Start: 2022-11-09 | End: 2022-11-11 | Stop reason: HOSPADM

## 2022-11-09 RX ORDER — INSULIN ASPART 100 [IU]/ML
0-5 INJECTION, SOLUTION INTRAVENOUS; SUBCUTANEOUS
Status: DISCONTINUED | OUTPATIENT
Start: 2022-11-09 | End: 2022-11-11 | Stop reason: HOSPADM

## 2022-11-09 RX ORDER — GLUCAGON 1 MG
1 KIT INJECTION
Status: DISCONTINUED | OUTPATIENT
Start: 2022-11-09 | End: 2022-11-11 | Stop reason: HOSPADM

## 2022-11-09 RX ORDER — INSULIN ASPART 100 [IU]/ML
2 INJECTION, SOLUTION INTRAVENOUS; SUBCUTANEOUS ONCE
Status: COMPLETED | OUTPATIENT
Start: 2022-11-09 | End: 2022-11-09

## 2022-11-09 RX ADMIN — INSULIN ASPART 2 UNITS: 100 INJECTION, SOLUTION INTRAVENOUS; SUBCUTANEOUS at 10:11

## 2022-11-09 RX ADMIN — INSULIN ASPART 10 UNITS: 100 INJECTION, SOLUTION INTRAVENOUS; SUBCUTANEOUS at 06:11

## 2022-11-09 RX ADMIN — PREDNISONE 10 MG: 5 TABLET ORAL at 08:11

## 2022-11-09 RX ADMIN — INSULIN ASPART 4 UNITS: 100 INJECTION, SOLUTION INTRAVENOUS; SUBCUTANEOUS at 01:11

## 2022-11-09 RX ADMIN — SPIRONOLACTONE 100 MG: 50 TABLET, FILM COATED ORAL at 08:11

## 2022-11-09 RX ADMIN — INSULIN ASPART 3 UNITS: 100 INJECTION, SOLUTION INTRAVENOUS; SUBCUTANEOUS at 09:11

## 2022-11-09 RX ADMIN — FUROSEMIDE 40 MG: 40 TABLET ORAL at 08:11

## 2022-11-09 RX ADMIN — POTASSIUM BICARBONATE 20 MEQ: 391 TABLET, EFFERVESCENT ORAL at 01:11

## 2022-11-09 NOTE — PT/OT/SLP PROGRESS
Physical Therapy      Patient Name:  Radha Feng   MRN:  [id number]    Patient not seen today secondary to Patient unwilling to participate, Patient fatigue, Other (Comment) (Attempt 2x: 1203 reported increased fatigue just seen OT and 1437 reported daughter wasn't present, she does not want to work w/ PT today.). Will follow-up 11/110/22.

## 2022-11-09 NOTE — PLAN OF CARE
Patient is AAOx3. Patient started on PO lasix and aldactone today. Patient received PO K replacement. PT/OT are working with the patient. Monitoring the patient's blood sugar AC&HS. Reminded the patient to call for assistance. Call light and personal items are within reach.

## 2022-11-09 NOTE — PLAN OF CARE
[facility]  Discharge Reassessment    Primary Care Provider: Leticia Lim MD    Expected Discharge Date: 11/11/2022    Reassessment (most recent)       Discharge Reassessment - 11/09/22 1535          Discharge Reassessment    Assessment Type Discharge Planning Reassessment     Communicated MELVI with patient/caregiver Date not available/Unable to determine     Discharge Plan A Home     Discharge Plan B Other   Outpatient PT/OT    DME Needed Upon Discharge  other (see comments)   TBD    Discharge Barriers Identified None     Why the patient remains in the hospital Requires continued medical care                     Alec Carrero LMSW  Case Management [facility]  Ext: [phone]

## 2022-11-09 NOTE — PT/OT/SLP PROGRESS
Occupational Therapy Treatment    Patient Name:  Radha Feng   MRN:  [id number]  Admit Date: 11/3/2022  Admitting Diagnosis:  Liver failure without hepatic coma   Length of Stay: 5 days  Recent Surgery: * No surgery found *      Recommendations:     Discharge Recommendations: outpatient OT  Discharge Equipment Recommendations:  none  Barriers to discharge:  None    Plan:     Patient to be seen 3 x/week to address the above listed problems via self-care/home management, therapeutic activities, therapeutic exercises  Plan of Care Expires: 12/04/22  Plan of Care Reviewed with: patient, daughter    Assessment:   Radha Feng is a 82 y.o. female with a medical diagnosis of Liver failure without hepatic coma.  She presents with the following performance deficits affecting function: weakness, impaired endurance, impaired self care skills, impaired functional mobility, gait instability, decreased coordination, decreased upper extremity function, decreased lower extremity function, decreased safety awareness, impaired balance, impaired skin.      Pt tolerated session well this date and was willing to participate. Demonstrating continued impaired balance, increased weakness, decreased activity tolerance, impaired endurance, decreased safety awareness and continued BLE edema, requiring increased time and cueing to complete functional tasks. Patient required education and cueing on safety, attempting to stand off BSC unassisted despite daughter cueing. Patient completed sit>Stand transfers and functional mobility of community and household distances using RW. Patient expressed eagerness to maintain independence. Patient is progressing towards established goals, and continues to benefit from acute skilled OT services to increase functional performance and improve quality of life. OT to continue to recommend OPOT at discharge to improve pt functional independence and increase patient safety before returning home.      Rehab  "Prognosis: Good; patient would benefit from acute skilled OT services to address these deficits and reach maximum level of function.        Subjective   Communicated with: Nurse prior to session.  Patient found  seated on BSC  with peripheral IV upon OT entry to room. Pt agreeable to participate at this time.    Patient: " I can stand by myself! " -to daughter in attempt to stand unassisted from BSC  "Thank you."    Pain/Comfort:  Pain Rating 1: 0/10  Pain Rating Post-Intervention 1: 0/10    Objective:   Patient found with: peripheral IV   General Precautions: Standard, Cardiac fall   Orthopedic Precautions:N/A   Braces: N/A   Oxygen Device: Room Air  Vitals: /67 (BP Location: Left arm, Patient Position: Sitting)   Pulse (!) 57   Temp 98 °F (36.7 °C) (Oral)   Resp 17   Ht 5' 6" (1.676 m)   Wt 65.4 kg (144 lb 2.9 oz)   SpO2 100%   Breastfeeding No   BMI 23.27 kg/m²     Outcome Measures:  [facility] 6 Click ADL: 20    Cognition:   Alert and Cooperative  Command following: follows two-step commands  Communication: clear/fluent    Occupational Performance:  Bed Mobility:    Not assessed    Functional Mobility/Transfers:  Static Sitting EOB: Supervision  Patient completed Sit <> Stand Transfer from BSC with contact guard assistance  with  rolling walker ; cueing for technique  Patient completed stand from EOB with CGA using RW; cueing for technique  Static Standing Balance: SBA  Patient completed functional mobility of household distance ~40ft with CGA using RW.  Patient completed functional mobility of community distance ~100ft with CGA using RW      Activities of Daily Living:  Grooming: independence to wash B hands in stance at sink  Lower Body Dressing: maximal assistance to doff/don adult brief in supported stance  Toileting: independence to perform pericare seated at BSC level    AMPA 6 Click ADL:  [facility] Total Score: 20    Treatment & Education:  -Pt and daughter education on OT role and POC.  -Importance " of E/OOB activity with staff assistance, emphasis on daily participation  -Safety during functional transfer and mobility ensured  -Patient provided with education on importance of Bilateral UB/LB integration during functional tasks for improvement in functional performance.   -Education provided/reviewed, questions answered within OT scope of practice.   -Patient demonstrates understanding and learning this date.         Patient left up in chair with all lines intact, call button in reach, nurse notified, and daughter present    GOALS:   Multidisciplinary Problems       Occupational Therapy Goals          Problem: Occupational Therapy    Goal Priority Disciplines Outcome Interventions   Occupational Therapy Goal     OT, PT/OT Ongoing, Progressing    Description: Goals to be met by: 11/18/22     Patient will increase functional independence with ADLs by performing:    Feeding with Set-up Assistance.  UE Dressing with Modified [place].  LE Dressing with Stand-by Assistance.  Grooming while standing at sink with Modified [place].  Toileting from bedside commode with Modified [place] for hygiene and clothing management.   Step transfer with Stand-by Assistance                         Time Tracking:     OT Date of Treatment: 11/09/22  OT Start Time: 1047  OT Stop Time: 1115  OT Total Time (min): 28 min    Billable Minutes:Self Care/Home Management 14  Therapeutic Activity 14      11/9/2022

## 2022-11-10 ENCOUNTER — PATIENT MESSAGE (OUTPATIENT)
Dept: HEPATOLOGY | Facility: CLINIC | Age: 82
End: 2022-11-10
Payer: MEDICARE

## 2022-11-10 DIAGNOSIS — K76.89 AUTOIMMUNE LIVER DISEASE: Primary | ICD-10-CM

## 2022-11-10 LAB
ALBUMIN SERPL BCP-MCNC: 2.6 G/DL (ref 3.5–5.2)
ALP SERPL-CCNC: 90 U/L (ref 55–135)
ALT SERPL W/O P-5'-P-CCNC: 35 U/L (ref 10–44)
ANION GAP SERPL CALC-SCNC: 5 MMOL/L (ref 8–16)
AST SERPL-CCNC: 34 U/L (ref 10–40)
BACTERIA SPEC AEROBE CULT: NO GROWTH
BASOPHILS # BLD AUTO: 0.01 K/UL (ref 0–0.2)
BASOPHILS NFR BLD: 0.2 % (ref 0–1.9)
BILIRUB SERPL-MCNC: 2.7 MG/DL (ref 0.1–1)
BUN SERPL-MCNC: 18 MG/DL (ref 8–23)
CALCIUM SERPL-MCNC: 7.9 MG/DL (ref 8.7–10.5)
CHLORIDE SERPL-SCNC: 105 MMOL/L (ref 95–110)
CO2 SERPL-SCNC: 23 MMOL/L (ref 23–29)
CREAT SERPL-MCNC: 0.6 MG/DL (ref 0.5–1.4)
DIFFERENTIAL METHOD: ABNORMAL
EOSINOPHIL # BLD AUTO: 0 K/UL (ref 0–0.5)
EOSINOPHIL NFR BLD: 0.2 % (ref 0–8)
ERYTHROCYTE [DISTWIDTH] IN BLOOD BY AUTOMATED COUNT: 16.7 % (ref 11.5–14.5)
EST. GFR  (NO RACE VARIABLE): >60 ML/MIN/1.73 M^2
ESTIMATED AVG GLUCOSE: 137 MG/DL (ref 68–131)
GLUCOSE SERPL-MCNC: 215 MG/DL (ref 70–110)
GLUCOSE SERPL-MCNC: 383 MG/DL (ref 70–110)
HBA1C MFR BLD: 6.4 % (ref 4–5.6)
HCT VFR BLD AUTO: 24.5 % (ref 37–48.5)
HGB BLD-MCNC: 8.1 G/DL (ref 12–16)
IMM GRANULOCYTES # BLD AUTO: 0.03 K/UL (ref 0–0.04)
IMM GRANULOCYTES NFR BLD AUTO: 0.6 % (ref 0–0.5)
LYMPHOCYTES # BLD AUTO: 1.1 K/UL (ref 1–4.8)
LYMPHOCYTES NFR BLD: 22.5 % (ref 18–48)
MAGNESIUM SERPL-MCNC: 1.7 MG/DL (ref 1.6–2.6)
MCH RBC QN AUTO: 37.5 PG (ref 27–31)
MCHC RBC AUTO-ENTMCNC: 33.1 G/DL (ref 32–36)
MCV RBC AUTO: 113 FL (ref 82–98)
MONOCYTES # BLD AUTO: 0.9 K/UL (ref 0.3–1)
MONOCYTES NFR BLD: 17.7 % (ref 4–15)
NEUTROPHILS # BLD AUTO: 2.9 K/UL (ref 1.8–7.7)
NEUTROPHILS NFR BLD: 58.8 % (ref 38–73)
NRBC BLD-RTO: 0 /100 WBC
PHOSPHATE SERPL-MCNC: 1.9 MG/DL (ref 2.7–4.5)
PLATELET # BLD AUTO: 45 K/UL (ref 150–450)
PMV BLD AUTO: 12.4 FL (ref 9.2–12.9)
POCT GLUCOSE: 126 MG/DL (ref 70–110)
POCT GLUCOSE: 174 MG/DL (ref 70–110)
POCT GLUCOSE: 192 MG/DL (ref 70–110)
POTASSIUM SERPL-SCNC: 3.6 MMOL/L (ref 3.5–5.1)
PROT SERPL-MCNC: 4.3 G/DL (ref 6–8.4)
RBC # BLD AUTO: 2.16 M/UL (ref 4–5.4)
SODIUM SERPL-SCNC: 133 MMOL/L (ref 136–145)
WBC # BLD AUTO: 4.98 K/UL (ref 3.9–12.7)

## 2022-11-10 PROCEDURE — 99233 PR SUBSEQUENT HOSPITAL CARE,LEVL III: ICD-10-PCS | Mod: GC,,, | Performed by: INTERNAL MEDICINE

## 2022-11-10 PROCEDURE — 80053 COMPREHEN METABOLIC PANEL: CPT | Performed by: STUDENT IN AN ORGANIZED HEALTH CARE EDUCATION/TRAINING PROGRAM

## 2022-11-10 PROCEDURE — 25000003 PHARM REV CODE 250: Performed by: HOSPITALIST

## 2022-11-10 PROCEDURE — 85025 COMPLETE CBC W/AUTO DIFF WBC: CPT | Performed by: STUDENT IN AN ORGANIZED HEALTH CARE EDUCATION/TRAINING PROGRAM

## 2022-11-10 PROCEDURE — 84100 ASSAY OF PHOSPHORUS: CPT | Performed by: STUDENT IN AN ORGANIZED HEALTH CARE EDUCATION/TRAINING PROGRAM

## 2022-11-10 PROCEDURE — 63600175 PHARM REV CODE 636 W HCPCS: Performed by: HOSPITALIST

## 2022-11-10 PROCEDURE — 83735 ASSAY OF MAGNESIUM: CPT | Performed by: STUDENT IN AN ORGANIZED HEALTH CARE EDUCATION/TRAINING PROGRAM

## 2022-11-10 PROCEDURE — 36415 COLL VENOUS BLD VENIPUNCTURE: CPT | Performed by: STUDENT IN AN ORGANIZED HEALTH CARE EDUCATION/TRAINING PROGRAM

## 2022-11-10 PROCEDURE — 20600001 HC STEP DOWN PRIVATE ROOM

## 2022-11-10 PROCEDURE — 97116 GAIT TRAINING THERAPY: CPT | Mod: CQ

## 2022-11-10 PROCEDURE — 99233 SBSQ HOSP IP/OBS HIGH 50: CPT | Mod: GC,,, | Performed by: INTERNAL MEDICINE

## 2022-11-10 PROCEDURE — 97530 THERAPEUTIC ACTIVITIES: CPT | Mod: CQ

## 2022-11-10 RX ORDER — SODIUM,POTASSIUM PHOSPHATES 280-250MG
2 POWDER IN PACKET (EA) ORAL
Status: DISCONTINUED | OUTPATIENT
Start: 2022-11-10 | End: 2022-11-11 | Stop reason: HOSPADM

## 2022-11-10 RX ADMIN — INSULIN ASPART 5 UNITS: 100 INJECTION, SOLUTION INTRAVENOUS; SUBCUTANEOUS at 02:11

## 2022-11-10 RX ADMIN — INSULIN ASPART 5 UNITS: 100 INJECTION, SOLUTION INTRAVENOUS; SUBCUTANEOUS at 09:11

## 2022-11-10 RX ADMIN — PREDNISONE 10 MG: 5 TABLET ORAL at 08:11

## 2022-11-10 RX ADMIN — LACTULOSE 10 G: 20 SOLUTION ORAL at 08:11

## 2022-11-10 RX ADMIN — FUROSEMIDE 40 MG: 40 TABLET ORAL at 08:11

## 2022-11-10 RX ADMIN — POTASSIUM & SODIUM PHOSPHATES POWDER PACK 280-160-250 MG 2 PACKET: 280-160-250 PACK at 08:11

## 2022-11-10 RX ADMIN — INSULIN ASPART 5 UNITS: 100 INJECTION, SOLUTION INTRAVENOUS; SUBCUTANEOUS at 06:11

## 2022-11-10 RX ADMIN — SPIRONOLACTONE 100 MG: 50 TABLET, FILM COATED ORAL at 08:11

## 2022-11-10 NOTE — ASSESSMENT & PLAN NOTE
Decompensated liver failure with recurrent ascites.   s/p IR guided paracentesis on 11/7. SBP has been ruled out.  Diuretic switched to oral, lasix 40 mg and aldactone 100 mg daily.  Ceftriaxone has been discontinued now that sbp has been ruled out.   Continue lactulose for goal of 2-3 BM a day.

## 2022-11-10 NOTE — PROGRESS NOTES
[facility]  Gastroenterology  Progress Note    Patient Name: Radha Feng  MRN: [id number]  Admission Date: 11/3/2022  Hospital Length of Stay: 6 days    Subjective:     Interval History:  NAEON. Appears to be near euvolemic    No current facility-administered medications on file prior to encounter.     Current Outpatient Medications on File Prior to Encounter   Medication Sig Dispense Refill    amLODIPine (NORVASC) 2.5 MG tablet Take 1 tablet (2.5 mg total) by mouth once daily. 30 tablet 0    azaTHIOprine (IMURAN) 50 mg Tab Take 1 tablet (50 mg total) by mouth once daily. 30 tablet 0    brimonidine 0.2% (ALPHAGAN) 0.2 % Drop INSTILL 1 DROP INTO RIGHT EYE TWICE A DAY 30 mL 3    dorzolamide-timolol 2-0.5% (COSOPT) 22.3-6.8 mg/mL ophthalmic solution INSTILL 1 DROP INTO RIGHT EYE TWICE A DAY 30 mL 3    famotidine (PEPCID) 20 MG tablet Take 1 tablet (20 mg total) by mouth once daily. 30 tablet 0    insulin aspart U-100 (NOVOLOG) 100 unit/mL (3 mL) InPn pen Inject 5 Units into the skin 3 (three) times daily with meals. Hold if pre-meal blood sugar is less than 100 or eating less thant <25% of meal 15 mL 1    insulin detemir U-100 (LEVEMIR FLEXTOUCH) 100 unit/mL (3 mL) SubQ InPn pen Inject 10 Units into the skin every evening. 15 mL 1    latanoprost 0.005 % ophthalmic solution PLACE 1 DROP INTO BOTH EYES ONCE DAILY. 7.5 mL 3    metFORMIN (GLUCOPHAGE) 500 MG tablet TAKE 1 TABLET BY MOUTH TWICE A DAY      predniSONE (DELTASONE) 10 MG tablet Take 4 tablets by mouth once daily for 5 days, THEN 3 tablets once daily for 7 days, THEN 2 tablets once daily for 7 days, THEN 1.5 tablets once daily for 14 days, THEN 1 tablet once daily for 14 days. 90 tablet 0    spironolactone (ALDACTONE) 50 MG tablet Take 1 tablet (50 mg total) by mouth once daily. 30 tablet 0    sulfamethoxazole-trimethoprim 800-160mg (BACTRIM DS) 800-160 mg Tab Take 1 tablet by mouth every Mon, Wed, Fri. 12 tablet 0    acetaminophen (TYLENOL)  "500 MG tablet Take 2 tablets (1,000 mg total) by mouth every 8 (eight) hours as needed for Pain.  0    aspirin (ECOTRIN) 81 MG EC tablet Take 81 mg by mouth once daily.      blood-glucose meter,continuous (DEXCOM G6 ) Misc Check blood sugar before meals and at bedtime 1 each PRN    blood-glucose sensor (DEXCOM G6 SENSOR) Amanda Check blood sugar before meals and at bedtime 1 each PRN    blood-glucose transmitter (DEXCOM G6 TRANSMITTER) Amanda Check blood sugar before meals and at bedtime 1 each PRN    flash glucose sensor (FREESTYLE LISSA 14 DAY SENSOR) Kit Check blood sugar before meals and at bedtime 1 kit PRN    pen needle, diabetic 31 gauge x 5/16" Ndle Use to inject insulin into the skin up to 4 times daily 100 each 0       Review of patient's allergies indicates:  No Known Allergies      Objective:     Vitals:    11/10/22 1136   BP: (!) 150/73   Pulse: 64   Resp:    Temp: 97.9 °F (36.6 °C)         Constitutional:  not in acute distress and well developed  HENT: Head: Normal, normocephalic, atraumatic.  Eyes: conjunctiva clear and sclera nonicteric  Cardiovascular: regular rate and rhythm  Respiratory: normal chest expansion & respiratory effort   and no accessory muscle use  GI: soft, non-tender, without masses or organomegaly  Musculoskeletal: no muscular tenderness noted  Skin: normal color  Neurological: alert, oriented x3  Psychiatric: mood and affect are within normal limits, pt is a good historian; no memory problems were noted    Significant Labs:  Recent Labs   Lab 11/08/22  0610 11/09/22  0709 11/10/22  0658   HGB 7.4* 7.7* 8.1*       Lab Results   Component Value Date    WBC 4.98 11/10/2022    HGB 8.1 (L) 11/10/2022    HCT 24.5 (L) 11/10/2022     (H) 11/10/2022    PLT 45 (L) 11/10/2022       Lab Results   Component Value Date     (L) 11/10/2022    K 3.6 11/10/2022     11/10/2022    CO2 23 11/10/2022    BUN 18 11/10/2022    CREATININE 0.6 11/10/2022    CALCIUM 7.9 (L) 11/10/2022 "    ANIONGAP 5 (L) 11/10/2022       Lab Results   Component Value Date    ALT 35 11/10/2022    AST 34 11/10/2022    ALKPHOS 90 11/10/2022    BILITOT 2.7 (H) 11/10/2022       Lab Results   Component Value Date    INR 1.8 (H) 11/07/2022    INR 1.5 (H) 11/06/2022    INR 1.4 (H) 11/05/2022       Significant Imaging:  Reviewed pertinent radiology findings.       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  Hospital course associated with onset of encephalopathy on 11/06. Paracentesis completed on 11/07 with studies negative for SBP. She is status post initiation of IV diuretics and albumin with improvement in volume status.      Recommendations:      -Continue Lasix 40 mg IV BID for today and then transition to Lasix 40 mg PO and Spironolactone 100 mg daily  -Discontinue Albumin IV.   -Decrease Lactulose to 10 g every other day; target 3-4 bowel movements daily.   -Continue Prednisone 10 mg daily.   -Continue to hold IMURAN; patient may not be able to tolerate long-term due to thrombocytopenia.   -CMP and INR daily.   -Avoid use of medications that may precipitate encephalopathy (e.g. opioids and benzo's).   -Low sodium (< 2 g daily) diet.     Thank you for involving us in the care of Radha Feng. Please call with any additional questions, concerns or changes in the patient's clinical status. We will continue to follow.     Hilario Chery MD  Gastroenterology Fellow PGY IV   [facility]

## 2022-11-10 NOTE — PLAN OF CARE
Admit 11/3 with SOB and abdominal distention  -AAO4, VSS/afebrile, on RA, denies pain  -11/7 para done, ruled out SBP  -11/9 started on PO lasix and spironolactone  -voids independently in BSC  -getting PO lactulose every 2 days  -accuchecks achs, BG >400 at bedtime; MD Kelly aware.  A1C and serum glucose collected.  2 extra units aspart given per MD.  Told pt daughter to not feed the pt over night, per MD  -daughter at bedside to assist with pt care, fall precautions maintained call bell in reach

## 2022-11-10 NOTE — SUBJECTIVE & OBJECTIVE
Interval History: continue to clinically improves. Denies abdominal pain, nausea or vomiting. Her abdominal distension almost resolved.       Review of Systems   Constitutional:  Negative for chills and fever.   Respiratory:  Negative for cough and shortness of breath.    Cardiovascular:  Negative for chest pain.   Gastrointestinal:  Negative for abdominal pain.        Abdominal distension has almost resolved.   Genitourinary:  Negative for dysuria.   Skin:         Ulcers lumbar region and on her buttock cheek.   Neurological:  Negative for syncope.   Objective:     Vital Signs (Most Recent):  Temp: 97.9 °F (36.6 °C) (11/09/22 1704)  Pulse: 62 (11/1941)  Resp: 16 (11/1941)  BP: (!) 153/72 (11/1941)  SpO2: 95 % (11/1941)   Vital Signs (24h Range):  Temp:  [97.4 °F (36.3 °C)-98.5 °F (36.9 °C)] 97.9 °F (36.6 °C)  Pulse:  [51-62] 62  Resp:  [16-20] 16  SpO2:  [95 %-100 %] 95 %  BP: (128-153)/(62-72) 153/72     Weight: 65.4 kg (144 lb 2.9 oz)  Body mass index is 23.27 kg/m².    Intake/Output Summary (Last 24 hours) at 11/9/2022 1948  Last data filed at 11/9/2022 1400  Gross per 24 hour   Intake 1020 ml   Output 1900 ml   Net -880 ml      Physical exam was done during am rounds between 8-9 am.     Physical Exam  Constitutional:       Appearance: Normal appearance.   HENT:      Head: Normocephalic and atraumatic.      Mouth/Throat:      Mouth: Mucous membranes are moist.   Eyes:      Extraocular Movements: Extraocular movements intact.      Conjunctiva/sclera: Conjunctivae normal.      Pupils: Pupils are equal, round, and reactive to light.   Cardiovascular:      Rate and Rhythm: Normal rate and regular rhythm.   Pulmonary:      Effort: Pulmonary effort is normal.      Breath sounds: Normal breath sounds.   Abdominal:      Comments: Abdomen distension has almost resolved. No tenderness on deep palpation.    Musculoskeletal:      Cervical back: Neck supple.      Comments: +2 bilateral LE swelling.     Skin:     General: Skin is warm.      Comments: Exam was done in the presence of her daughter (who stated that she is a RN) on 11/4. Pt has superficial skin ulcers with no erythema around the area.   Neurological:      Mental Status: She is alert and oriented to person, place, and time.       Significant Labs: All pertinent labs within the past 24 hours have been reviewed.  BMP:   Recent Labs   Lab 11/09/22  0709   GLU 89      K 3.2*      CO2 25   BUN 16   CREATININE 0.6   CALCIUM 7.9*   MG 1.6       CBC:   Recent Labs   Lab 11/08/22  0610 11/09/22  0709   WBC 4.26 4.28   HGB 7.4* 7.7*   HCT 21.6* 22.4*   PLT 45* 44*       CMP:   Recent Labs   Lab 11/08/22  0610 11/09/22  0709    136   K 3.8 3.2*    104   CO2 22* 25   * 89   BUN 18 16   CREATININE 0.6 0.6   CALCIUM 8.0* 7.9*   PROT 4.2* 4.3*   ALBUMIN 2.7* 2.8*   BILITOT 1.8* 2.8*   ALKPHOS 80 79   AST 34 28   ALT 33 31   ANIONGAP 6* 7*       Coagulation:   No results for input(s): PT, INR, APTT in the last 48 hours.      Significant Imaging: I have reviewed all pertinent imaging results/findings within the past 24 hours.

## 2022-11-10 NOTE — PLAN OF CARE
SSC met with patient/family at bedside. Patient experience rounding completed and reviewed the following.     Do you know your discharge plan? Yes    If yes, what is the plan? Home    If you are discharging home, do you have help at home? Yes , Daughter    Do you think you will need help at home at discharge? No    Have you discussed your needs and preferences with your SW/CM? Yes     Assigned SW/CM notified of any patient/family needs or concerns.     Fernando Ramos  Community Health Worker

## 2022-11-10 NOTE — ASSESSMENT & PLAN NOTE
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 She was placed on azathioprine and a prednisone taper and discharged with Hepatology follow-up. Azathioprine discontinued by providers at last admission given thrombocytopenia.     - RUQ ultrasound revealed heterogeneous architecture of the liver concerning for progression to cirrhosis without evidence of portal vein thrombosis but with severe ascites  - diuretic switched to oral today, lasix 40 mg and aldactone 100 mg daily.   - Continue [id number] mg daily.   Will continue to hold Imuran until hepatology recommends to start it.

## 2022-11-10 NOTE — ASSESSMENT & PLAN NOTE
Pt's blood sugar has been elevated.   Will increase detemir from 10 to 15 units qHS  - will add humalog 5 units with meals.  - Will switch her sliding scale insulin from moderate to low.   - hypoglycemia protocol as needed

## 2022-11-10 NOTE — PT/OT/SLP PROGRESS
"Physical Therapy Treatment    Patient Name:  Radha Feng   MRN:  [id number]  Admitting Diagnosis: Liver failure without hepatic coma  Recent Surgery: * No surgery found *      Recommendations:     Discharge Recommendations:  outpatient PT   Discharge Equipment Recommendations: none   Barriers to discharge: Inaccessible home     Plan:     During this hospitalization, patient to be seen 3 x/week to address the above listed problems via gait training, therapeutic activities, therapeutic exercises, neuromuscular re-education  Plan of Care Expires:  12/04/22  Plan of Care Reviewed with: patient, daughter    This Plan of care has been discussed with the patient who was involved in its development and understands and is in agreement with the identified goals and treatment plan    Subjective     Communicated with nurse (Catina MART) prior to session.     Patient comments: "My R leg is weak".   Pain/Comfort:  Pain Rating 1: 0/10  Location - Side 1: Right  Location - Orientation 1: generalized  Location 1: leg (during stair training)  Pain Addressed 1: Distraction, Cessation of Activity  Pain Rating Post-Intervention 1:  (no rating provided)    Objective:     2 attempts for tx session 2* pt's daughter prefers to give pt a shower first and "she's just waking up" at 12:02 pm    Patient found with:  (no lines attached)    Patient found sup in bed with HOB elevated upon PT entry to room, agreeable to treatment.  Daughter present in the room.    Respiratory Status: Room air    General Precautions: Standard, fall   Orthopedic Precautions:N/A   Braces: N/A       BED MOBILITY (vc's for hand placement sequencing of task) HOB elevated at 30* angle:        Rolling to the R:  SBA.       Rolling to the L:  NT.        Sup > sit at the EOB:  SBA for safety, exiting on the R side.       Sit > sup:  Not performed 2* pt left seated up in the chair.       Scooting hips to EOB with SBA                SITTING AT THE EDGE OF THE BED   Assistance " "Level Required: close sup for safety with B UE support   Postural deviations noted: flexed trunk   Encouraged: upright posture, B feet flat on floor        TRANSFERS  (vc's for hand placement, sequencing of task and safety)   Patient completed Sit <> Stand Transfer from moderately raised EOB with min A for hip elevation and balance with rolling walker x1 trial(s); from BS chair with no AD/B HHA min/mod A   Patient completed Stand <> Sit Transfer to BS chair with min A for controlled descetn with  RW/B HHA     Patient completed Bed <> BS chair Transfer using Step Transfer technique to the R with min A with rolling walker     GAIT: not performed this session in order to focus on stair training       STAIRS (Pt's daughter statest "It'll be my son and I helping her, we normally do this at home"  Pt ascended/descended  5 stair(s) with  B HHA  with no rails descending/no rails ascending, except for last 2 steps with  mod A of 2 descending, leading with R LE; mod/max A of 2 ascending leading with L LE  with vc's for Sequencing of LE's, hand placement, speed of task and safety.     EDUCATION  Patient provided with daily orientation and goals of this PT session. They were educated to call for assistance and to transfer with hospital staff only.  Also, pt was educated on the effects of prolonged immobility and the importance of performing OOB activity and exercises to promote healing and reduce recovery time    Whiteboard updated with correct mobility information. RN/PCT notified.  Pt safe to transfer OOB/BTB and amb short distanceswith RN/PCT and daughter: Use RW with min A.    Patient left up in chair, with  call button in reach, nurse notified, and daughter present    AM-PAC 6 CLICK MOBILITY  Turning over in bed (including adjusting bedclothes, sheets and blankets)?: 3  Sitting down on and standing up from a chair with arms (e.g., wheelchair, bedside commode, etc.): 2  Moving from lying on back to sitting on the side of " the bed?: 3  Moving to and from a bed to a chair (including a wheelchair)?: 3  Need to walk in hospital room?: 3  Climbing 3-5 steps with a railing?: 2  Basic Mobility Total Score: 16     Assessment:     Radha Feng is a 82 y.o. female admitted with a medical diagnosis of Liver failure without hepatic coma.  She presents with the following impairments/functional limitations:  weakness, impaired endurance, impaired self care skills, impaired functional mobility, gait instability, impaired balance, decreased upper extremity function, decreased lower extremity function, decreased safety awareness, pain, impaired coordination, edema, impaired cardiopulmonary response to activity. requiring light assistance and verbal cues for bed mob, transfers, and gait on level surfaces and significant assistance for stairs to prevent falls due to weakness, fatigue.   Pt remains motivated to participate in PT session and will cont to benefit from skilled PT intervention.    Rehab Prognosis:  Good; patient would benefit from acute skilled PT services to address these deficits and reach maximum level of function.      GOALS:   Multidisciplinary Problems       Physical Therapy Goals          Problem: Physical Therapy    Goal Priority Disciplines Outcome Goal Variances Interventions   Physical Therapy Goal     PT, PT/OT Ongoing, Progressing     Description: Goals to be met by: 2022     Patient will increase functional independence with mobility by performin. Supine to sit with supervision  2. Sit to supine with supervision  3. Sit to stand transfer with supervision  4. Gait  x 40 feet with supervision using LRAD as needed  5. Ascend/descend 6 stair with no handrails minimum assistance using LRAD as needed  6. Lower extremity exercise program x10 reps per handout, with independence                        Time Tracking:     PT Received On: 11/10/22  PT Start Time: 1350     PT Stop Time: 1417  PT Total Time (min): 27 min      Billable Minutes: Gait Training 15 and Therapeutic Activity 12    Treatment Type: Treatment  PT/PTA: PTA     PTA Visit Number: 2       HEAVENLY Fernando.  Pager [phone]    11/10/2022    .

## 2022-11-10 NOTE — PROGRESS NOTES
[facility] - [facility] Medicine  Progress Note    Patient Name: Radha Feng  MRN: [id number]  Patient Class: IP- Inpatient   Admission Date: 11/3/2022  Length of Stay: 5 days  Attending Physician: Ruth Mendoza MD  Primary Care Provider: Leticia Lim MD        Subjective:     Principal Problem:Liver failure without hepatic coma        HPI:  Radha Feng is an 81-year-old woman with a past medical history of HTN, HLD, T2DM, and autoimmune hepatitis, who presents to the emergency department with increased abdominal distention and shortness of breath. Of note, the patient is accompanied by her daughter who assists in providing the history. Per discussion with patient, family, and chart review, the patient was in her normal state of health several weeks ago until she was admitted and hospitalized and found to have autoimmune hepatitis on liver biopsy. The patient was discharged home on a prednisone taper and azathioprine. However, she experienced malaise and significant hyperglycemia and was hospitalized again last week for hyperglycemia and thrombocytopenia. Her azathioprine was held and she was told to continue her prednisone 30 mg until she followed up with Hepatology in clinic. Her daughter said that she noted her mother's abdomen has become more distended since her most recent hospitalization. She started complaining of abdominal pain last evening, but denies any fevers, nausea/emesis or diarrhea. She has three, formed bowel movements daily. Her daughter reports she has been having a poor appetite. She also started to develop shortness of breath at rest. No chest pain, wheezing or cough. She denies any hematemesis, hematochezia, or rectal bleeding. She denies other overt bleeding. Her daughter does note a bullous eruption on the patient's buttocks and coccygeal area that started at her last hospitalization and has continued to spread down her buttocks.     In the emergency department,  the patient was given IV furosemide 40 mg once. She was admitted to a Hospital Medicine service for further management.      Overview/Hospital Course:  On 11/4; pt was seen and examined. Daughter by the bedside relating all the history.   Plan was for IR to proceed with paracentesis today, but unfortunately it was not done for unclear reason.   Daughter agreed to have medicine consult try bedside US guided paracentesis.   Discussed case with hepatology. Plan is to resume imuran 50 mg daily once SBP has been ruled out. Also to decrease prednisone from 30 mg to 10 mg.  I have also spoken with dermatology. Per dermatology, since her ulcers are not life threatening and they are not the reason for her presentation, then pt can follow up with derm outpt.     On 11/5; pt's potassium was elevated this am. Kayexalate ordered.   Case was discussed with hepatology who recommended lasix 40 mg bid iv and albumin 25% bid to be given prior to lasix.   No aldactone today given hyperkalemia.  Consult was placed to medicine for paracentesis. However unfortunately medicine consult is busy today and won't be able to do paracentesis until tomorrow. Case was discussed with the daughter who wanted to wait till Monday for the paracentesis to be done by IR since it won't be done today.     On 11/6; pt is doing better. Mentation has improved. She has not any abdominal pain since admission.   Diuresing well with the lasix iv bid and had 7 Bms according to the daughter yesterday.     On 11/7; pt went for paracentesis which ruled out SBP. Continue diuresis.   She is feeling better overall.     On 11/8; case discussed with hepatology. Will switch her to oral diuretics starting tomorrow, and probably keep her for another night to see her response to the oral diuretic.   Pt had 8 BM yesterday, so will switch lactulose to every other day and will also decrease the dose from 15 to 10 mg.   Pt continued to clinically improve.     On 11/9: oral  diuretic started today. Pt will stay another day to monitor respond.       Interval History: continue to clinically improves. Denies abdominal pain, nausea or vomiting. Her abdominal distension almost resolved.       Review of Systems   Constitutional:  Negative for chills and fever.   Respiratory:  Negative for cough and shortness of breath.    Cardiovascular:  Negative for chest pain.   Gastrointestinal:  Negative for abdominal pain.        Abdominal distension has almost resolved.   Genitourinary:  Negative for dysuria.   Skin:         Ulcers lumbar region and on her buttock cheek.   Neurological:  Negative for syncope.   Objective:     Vital Signs (Most Recent):  Temp: 97.9 °F (36.6 °C) (11/09/22 1704)  Pulse: 62 (11/1941)  Resp: 16 (11/1941)  BP: (!) 153/72 (11/1941)  SpO2: 95 % (11/1941)   Vital Signs (24h Range):  Temp:  [97.4 °F (36.3 °C)-98.5 °F (36.9 °C)] 97.9 °F (36.6 °C)  Pulse:  [51-62] 62  Resp:  [16-20] 16  SpO2:  [95 %-100 %] 95 %  BP: (128-153)/(62-72) 153/72     Weight: 65.4 kg (144 lb 2.9 oz)  Body mass index is 23.27 kg/m².    Intake/Output Summary (Last 24 hours) at 11/9/2022 1948  Last data filed at 11/9/2022 1400  Gross per 24 hour   Intake 1020 ml   Output 1900 ml   Net -880 ml      Physical exam was done during am rounds between 8-9 am.     Physical Exam  Constitutional:       Appearance: Normal appearance.   HENT:      Head: Normocephalic and atraumatic.      Mouth/Throat:      Mouth: Mucous membranes are moist.   Eyes:      Extraocular Movements: Extraocular movements intact.      Conjunctiva/sclera: Conjunctivae normal.      Pupils: Pupils are equal, round, and reactive to light.   Cardiovascular:      Rate and Rhythm: Normal rate and regular rhythm.   Pulmonary:      Effort: Pulmonary effort is normal.      Breath sounds: Normal breath sounds.   Abdominal:      Comments: Abdomen distension has almost resolved. No tenderness on deep palpation.    Musculoskeletal:       Cervical back: Neck supple.      Comments: +2 bilateral LE swelling.    Skin:     General: Skin is warm.      Comments: Exam was done in the presence of her daughter (who stated that she is a RN) on 11/4. Pt has superficial skin ulcers with no erythema around the area.   Neurological:      Mental Status: She is alert and oriented to person, place, and time.       Significant Labs: All pertinent labs within the past 24 hours have been reviewed.  BMP:   Recent Labs   Lab 11/09/22  0709   GLU 89      K 3.2*      CO2 25   BUN 16   CREATININE 0.6   CALCIUM 7.9*   MG 1.6       CBC:   Recent Labs   Lab 11/08/22  0610 11/09/22  0709   WBC 4.26 4.28   HGB 7.4* 7.7*   HCT 21.6* 22.4*   PLT 45* 44*       CMP:   Recent Labs   Lab 11/08/22  0610 11/09/22  0709    136   K 3.8 3.2*    104   CO2 22* 25   * 89   BUN 18 16   CREATININE 0.6 0.6   CALCIUM 8.0* 7.9*   PROT 4.2* 4.3*   ALBUMIN 2.7* 2.8*   BILITOT 1.8* 2.8*   ALKPHOS 80 79   AST 34 28   ALT 33 31   ANIONGAP 6* 7*       Coagulation:   No results for input(s): PT, INR, APTT in the last 48 hours.      Significant Imaging: I have reviewed all pertinent imaging results/findings within the past 24 hours.      Assessment/Plan:      * Liver failure without hepatic coma  Decompensated liver failure with recurrent ascites.   s/p IR guided paracentesis on 11/7. SBP has been ruled out.  Diuretic switched to oral, lasix 40 mg and aldactone 100 mg daily.  Ceftriaxone has been discontinued now that sbp has been ruled out.   Continue lactulose for goal of 2-3 BM a day.     Autoimmune hepatitis  - Patient previously diagnosed with autoimmune hepatitis by liver biopsy at previous hospitalization last month. She was placed on azathioprine and a prednisone taper and discharged with Hepatology follow-up. Azathioprine discontinued by providers at last admission given thrombocytopenia.     - RUQ ultrasound revealed heterogeneous architecture of the liver  "concerning for progression to cirrhosis without evidence of portal vein thrombosis but with severe ascites  - diuretic switched to oral today, lasix 40 mg and aldactone 100 mg daily.   - Continue dyyyxpbrag70 mg daily.   Will continue to hold Imuran until hepatology recommends to start it.       Type 2 diabetes mellitus, without long-term current use of insulin  Pt's blood sugar has been elevated.   Will increase detemir from 10 to 15 units qHS  - will add humalog 5 units with meals.  - Will switch her sliding scale insulin from moderate to low.   - hypoglycemia protocol as needed    Thrombocytopenia  plts is 45 today.  ddx includes bone marrow suppression from the patient's azathioprine versus splenic sequestration from her liver disease.  We ruled-out other causes; HIV negative, Hepatitis C antibody negative, folate and vitamin B12 are wnl.  Monitor. Transfuse if plts<10.    Bullous eruption  Patient with notable bullous eruption of coccyx and gluteal cleft. Her daughter notes blisters that unroofed now with shallow ulcerations that began at her last hospitalization. Patient's daughter also noted "sore" in mouth but was not able to be appreciated on examination.  I have spoken with dermatology on 11/4. Per dermatology, since her ulcers are not life threatening and they are not the reason for her presentation, then pt can follow up with derm outpt.     Primary hypertension  Hold bp meds since pt will be on lasix iv. Avoid drop in bp.       VTE Risk Mitigation (From admission, onward)         Ordered     IP VTE HIGH RISK PATIENT  Once         11/03/22 1925     Place sequential compression device  Until discontinued         11/03/22 1925                Discharge Planning   MELVI: 11/11/2022     Code Status: Full Code   Is the patient medically ready for discharge?:     Reason for patient still in hospital (select all that apply): Patient trending condition  Discharge Plan A: Home                  Ruth Mendoza, " MD  Department of Hospital Medicine   [facility] - [facility]

## 2022-11-11 ENCOUNTER — TELEPHONE (OUTPATIENT)
Dept: HEPATOLOGY | Facility: CLINIC | Age: 82
End: 2022-11-11
Payer: MEDICARE

## 2022-11-11 VITALS
HEIGHT: 66 IN | SYSTOLIC BLOOD PRESSURE: 137 MMHG | RESPIRATION RATE: 18 BRPM | BODY MASS INDEX: 22.99 KG/M2 | TEMPERATURE: 98 F | HEART RATE: 61 BPM | OXYGEN SATURATION: 95 % | DIASTOLIC BLOOD PRESSURE: 70 MMHG | WEIGHT: 143.06 LBS

## 2022-11-11 DIAGNOSIS — R18.8 OTHER ASCITES: Primary | ICD-10-CM

## 2022-11-11 LAB
ALBUMIN SERPL BCP-MCNC: 2.7 G/DL (ref 3.5–5.2)
ALP SERPL-CCNC: 100 U/L (ref 55–135)
ALT SERPL W/O P-5'-P-CCNC: 40 U/L (ref 10–44)
ANION GAP SERPL CALC-SCNC: 6 MMOL/L (ref 8–16)
AST SERPL-CCNC: 45 U/L (ref 10–40)
BASOPHILS # BLD AUTO: 0 K/UL (ref 0–0.2)
BASOPHILS NFR BLD: 0 % (ref 0–1.9)
BILIRUB SERPL-MCNC: 3.1 MG/DL (ref 0.1–1)
BUN SERPL-MCNC: 20 MG/DL (ref 8–23)
CALCIUM SERPL-MCNC: 8.1 MG/DL (ref 8.7–10.5)
CHLORIDE SERPL-SCNC: 104 MMOL/L (ref 95–110)
CO2 SERPL-SCNC: 24 MMOL/L (ref 23–29)
CREAT SERPL-MCNC: 0.6 MG/DL (ref 0.5–1.4)
DIFFERENTIAL METHOD: ABNORMAL
EOSINOPHIL # BLD AUTO: 0 K/UL (ref 0–0.5)
EOSINOPHIL NFR BLD: 0.2 % (ref 0–8)
ERYTHROCYTE [DISTWIDTH] IN BLOOD BY AUTOMATED COUNT: 16.8 % (ref 11.5–14.5)
EST. GFR  (NO RACE VARIABLE): >60 ML/MIN/1.73 M^2
GLUCOSE SERPL-MCNC: 66 MG/DL (ref 70–110)
HCT VFR BLD AUTO: 24.8 % (ref 37–48.5)
HGB BLD-MCNC: 8.6 G/DL (ref 12–16)
IMM GRANULOCYTES # BLD AUTO: 0.02 K/UL (ref 0–0.04)
IMM GRANULOCYTES NFR BLD AUTO: 0.4 % (ref 0–0.5)
INR PPP: 1.4 (ref 0.8–1.2)
LYMPHOCYTES # BLD AUTO: 1.3 K/UL (ref 1–4.8)
LYMPHOCYTES NFR BLD: 23.1 % (ref 18–48)
MAGNESIUM SERPL-MCNC: 1.7 MG/DL (ref 1.6–2.6)
MCH RBC QN AUTO: 37.9 PG (ref 27–31)
MCHC RBC AUTO-ENTMCNC: 34.7 G/DL (ref 32–36)
MCV RBC AUTO: 109 FL (ref 82–98)
MONOCYTES # BLD AUTO: 0.9 K/UL (ref 0.3–1)
MONOCYTES NFR BLD: 17 % (ref 4–15)
NEUTROPHILS # BLD AUTO: 3.2 K/UL (ref 1.8–7.7)
NEUTROPHILS NFR BLD: 59.3 % (ref 38–73)
NRBC BLD-RTO: 0 /100 WBC
PHOSPHATE SERPL-MCNC: 2.3 MG/DL (ref 2.7–4.5)
PLATELET # BLD AUTO: 46 K/UL (ref 150–450)
PMV BLD AUTO: 12.8 FL (ref 9.2–12.9)
POCT GLUCOSE: 173 MG/DL (ref 70–110)
POCT GLUCOSE: 181 MG/DL (ref 70–110)
POCT GLUCOSE: 62 MG/DL (ref 70–110)
POCT GLUCOSE: 89 MG/DL (ref 70–110)
POTASSIUM SERPL-SCNC: 4.1 MMOL/L (ref 3.5–5.1)
PROT SERPL-MCNC: 4.8 G/DL (ref 6–8.4)
PROTHROMBIN TIME: 13.9 SEC (ref 9–12.5)
RBC # BLD AUTO: 2.27 M/UL (ref 4–5.4)
SODIUM SERPL-SCNC: 134 MMOL/L (ref 136–145)
WBC # BLD AUTO: 5.46 K/UL (ref 3.9–12.7)

## 2022-11-11 PROCEDURE — 83735 ASSAY OF MAGNESIUM: CPT | Performed by: STUDENT IN AN ORGANIZED HEALTH CARE EDUCATION/TRAINING PROGRAM

## 2022-11-11 PROCEDURE — 84100 ASSAY OF PHOSPHORUS: CPT | Performed by: STUDENT IN AN ORGANIZED HEALTH CARE EDUCATION/TRAINING PROGRAM

## 2022-11-11 PROCEDURE — 80053 COMPREHEN METABOLIC PANEL: CPT | Performed by: STUDENT IN AN ORGANIZED HEALTH CARE EDUCATION/TRAINING PROGRAM

## 2022-11-11 PROCEDURE — 25000003 PHARM REV CODE 250: Performed by: HOSPITALIST

## 2022-11-11 PROCEDURE — 85610 PROTHROMBIN TIME: CPT | Performed by: STUDENT IN AN ORGANIZED HEALTH CARE EDUCATION/TRAINING PROGRAM

## 2022-11-11 PROCEDURE — 99233 PR SUBSEQUENT HOSPITAL CARE,LEVL III: ICD-10-PCS | Mod: GC,,, | Performed by: INTERNAL MEDICINE

## 2022-11-11 PROCEDURE — 99233 SBSQ HOSP IP/OBS HIGH 50: CPT | Mod: GC,,, | Performed by: INTERNAL MEDICINE

## 2022-11-11 PROCEDURE — 63600175 PHARM REV CODE 636 W HCPCS: Performed by: HOSPITALIST

## 2022-11-11 PROCEDURE — 85025 COMPLETE CBC W/AUTO DIFF WBC: CPT | Performed by: STUDENT IN AN ORGANIZED HEALTH CARE EDUCATION/TRAINING PROGRAM

## 2022-11-11 PROCEDURE — 36415 COLL VENOUS BLD VENIPUNCTURE: CPT | Performed by: STUDENT IN AN ORGANIZED HEALTH CARE EDUCATION/TRAINING PROGRAM

## 2022-11-11 RX ORDER — PANTOPRAZOLE SODIUM 20 MG/1
20 TABLET, DELAYED RELEASE ORAL DAILY
Qty: 30 TABLET | Refills: 0 | Status: SHIPPED | OUTPATIENT
Start: 2022-11-11 | End: 2023-07-24

## 2022-11-11 RX ORDER — PREDNISONE 10 MG/1
10 TABLET ORAL DAILY
Qty: 30 TABLET | Refills: 0 | Status: SHIPPED | OUTPATIENT
Start: 2022-11-11 | End: 2022-12-07 | Stop reason: SDUPTHER

## 2022-11-11 RX ORDER — FUROSEMIDE 40 MG/1
40 TABLET ORAL DAILY
Qty: 30 TABLET | Refills: 0 | Status: SHIPPED | OUTPATIENT
Start: 2022-11-11 | End: 2022-12-07 | Stop reason: SDUPTHER

## 2022-11-11 RX ORDER — LACTULOSE 10 G/15ML
10 SOLUTION ORAL EVERY OTHER DAY
Qty: 225 ML | Refills: 0 | Status: SHIPPED | OUTPATIENT
Start: 2022-11-12 | End: 2022-11-11 | Stop reason: SDUPTHER

## 2022-11-11 RX ORDER — SPIRONOLACTONE 100 MG/1
100 TABLET, FILM COATED ORAL DAILY
Qty: 30 TABLET | Refills: 0 | Status: SHIPPED | OUTPATIENT
Start: 2022-11-11 | End: 2023-06-26

## 2022-11-11 RX ORDER — LACTULOSE 10 G/15ML
10 SOLUTION ORAL; RECTAL DAILY
Qty: 450 ML | Refills: 0 | Status: SHIPPED | OUTPATIENT
Start: 2022-11-11 | End: 2022-12-11

## 2022-11-11 RX ADMIN — POTASSIUM & SODIUM PHOSPHATES POWDER PACK 280-160-250 MG 2 PACKET: 280-160-250 PACK at 12:11

## 2022-11-11 RX ADMIN — FUROSEMIDE 40 MG: 40 TABLET ORAL at 07:11

## 2022-11-11 RX ADMIN — PREDNISONE 10 MG: 5 TABLET ORAL at 07:11

## 2022-11-11 RX ADMIN — INSULIN ASPART 5 UNITS: 100 INJECTION, SOLUTION INTRAVENOUS; SUBCUTANEOUS at 12:11

## 2022-11-11 RX ADMIN — SPIRONOLACTONE 100 MG: 50 TABLET, FILM COATED ORAL at 07:11

## 2022-11-11 RX ADMIN — POTASSIUM & SODIUM PHOSPHATES POWDER PACK 280-160-250 MG 2 PACKET: 280-160-250 PACK at 07:11

## 2022-11-11 NOTE — NURSING
AAOx4, VSS, Spo2 > 92% on RA. Meds will be picked up at pharmacy per daughter. Discharge instructions reviewed. Verbalized understanding. All personal belongings left with pt.

## 2022-11-11 NOTE — TELEPHONE ENCOUNTER
----- Message from Desiree Estrada RN sent at 11/11/2022 12:37 PM CST -----  Regarding: FW: hospital discharge EP Dr Regino Savage has an opening 11/15 at 3:00 pm      ----- Message -----  From: Lenny Wallace MA  Sent: 11/10/2022   3:24 PM CST  To: Desiree Estrada RN, Dot Hopkins RN  Subject: RE: hospital discharge EP Dr Lacy               Can you guys please help me get pt in soon. Thx !  ----- Message -----  From: Marilee Merrill RN  Sent: 11/10/2022   2:41 PM CST  To: [facility]UAN Staff  Subject: hospital discharge EP Dr Lacy                   Ms. Feng was supposed to see Dr. Lacy on the 4th but was inpatient. Can we please set her up with follow up in clinic soon.  Thanks  Josefina  She is discharging on diuretics, prednisone and is 82. She needs close follow up

## 2022-11-11 NOTE — TELEPHONE ENCOUNTER
----- Message from Roni Chakraborty MA sent at 11/11/2022  2:44 PM CST -----  Regarding: FW: Speak with office  Contact: Della Feng (Daughter)    ----- Message -----  From: George James  Sent: 11/11/2022   2:25 PM CST  To: Stu Russell Staff  Subject: Speak with office                                Pt daughter is returning call back to [facility] for sooner appt.    [phone]

## 2022-11-11 NOTE — DISCHARGE INSTRUCTIONS
Please continue to take lasix 40 mg daily, aldactone 100 mg daily, and prednisone 10 mg daily.     Stop famotidine and take protonix instead while being on prednisone.     Titrate lactulose for a goal of 2-3 bowel movements a day.     Follow up with the hepatologist and schedule an appointment with the dermatologist.

## 2022-11-11 NOTE — SUBJECTIVE & OBJECTIVE
Interval History: Patient reports having 2 bowel movements yesterday and 1 bowel movement this morning. She reports lower extremity swelling and abdominal distension are stable compared to yesterday. Vital signs normal on room air. Labs notable for normal renal function and uptrending bilirubin (3.5) although still improved since admission (4.4). Tentative plan for discharge home today.     Current Facility-Administered Medications   Medication    brimonidine 0.2% ophthalmic solution 1 drop    dextrose 10% bolus 125 mL    dextrose 10% bolus 250 mL    dorzolamide 2 % ophthalmic solution 1 drop    furosemide tablet 40 mg    glucagon (human recombinant) injection 1 mg    glucose chewable tablet 16 g    glucose chewable tablet 24 g    insulin aspart U-100 pen 0-5 Units    insulin aspart U-100 pen 5 Units    insulin detemir U-100 pen 15 Units    lactulose 20 gram/30 mL solution Soln 10 g    loperamide capsule 2 mg    melatonin tablet 6 mg    potassium, sodium phosphates 280-160-250 mg packet 2 packet    predniSONE tablet 10 mg    sodium chloride 0.9% flush 10 mL    spironolactone tablet 100 mg    timolol maleate 0.5% ophthalmic solution 1 drop       Objective:     Vital Signs (Most Recent):  Temp: 98.4 °F (36.9 °C) (11/11/22 0745)  Pulse: (!) 57 (11/11/22 0745)  Resp: 18 (11/11/22 0745)  BP: (!) 145/67 (11/11/22 0745)  SpO2: 97 % (11/11/22 0745)   Vital Signs (24h Range):  Temp:  [97.8 °F (36.6 °C)-98.4 °F (36.9 °C)] 98.4 °F (36.9 °C)  Pulse:  [56-78] 57  Resp:  [18] 18  SpO2:  [97 %-99 %] 97 %  BP: (130-156)/(63-73) 145/67     Weight: 64.9 kg (143 lb 1.3 oz) (11/11/22 0306)  Body mass index is 23.09 kg/m².    Physical Exam  Constitutional:       Appearance: Normal appearance.   Eyes:      General: No scleral icterus.     Conjunctiva/sclera: Conjunctivae normal.   Cardiovascular:      Rate and Rhythm: Normal rate and regular rhythm.      Pulses: Normal pulses.      Heart sounds: Normal heart sounds.   Pulmonary:       Effort: Pulmonary effort is normal. No respiratory distress.      Breath sounds: Normal breath sounds.   Abdominal:      General: Bowel sounds are normal. There is distension.      Palpations: Abdomen is soft.      Tenderness: There is no abdominal tenderness.   Musculoskeletal:      Right lower leg: Edema present.      Left lower leg: Edema present.   Skin:     General: Skin is warm and dry.      Findings: No bruising or rash.   Neurological:      Mental Status: She is alert and oriented to person, place, and time.       MELD-Na score: 17 at 11/11/2022  7:00 AM  MELD score: 14 at 11/11/2022  7:00 AM  Calculated from:  Serum Creatinine: 0.6 mg/dL (Using min of 1 mg/dL) at 11/11/2022  7:00 AM  Serum Sodium: 134 mmol/L at 11/11/2022  7:00 AM  Total Bilirubin: 3.1 mg/dL at 11/11/2022  7:00 AM  INR(ratio): 1.4 at 11/11/2022  7:00 AM  Age: 82 years    Significant Labs:  Labs within the past month have been reviewed.

## 2022-11-11 NOTE — PT/OT/SLP PROGRESS
Physical Therapy  Pt Not Seen    Patient Name:  Radha Feng   MRN:  [id number]    4:00 pm  Patient not seen today secondary to  Other (Comment) (Pt being discharged home from hospital today). Will follow-up on next scheduled visit if pt not discharged from hospital.    Stacy Ryan, PTA  11/11/2022

## 2022-11-11 NOTE — TELEPHONE ENCOUNTER
Ma contacted pt she stated she rather keep the appt that's set for January w Dr. Peraza also wanted to see how to press forward with the paracentesis

## 2022-11-11 NOTE — PROGRESS NOTES
[facility] Medicine  Progress Note    Patient Name: Radha Feng  MRN: [id number]  Patient Class: IP- Inpatient   Admission Date: 11/3/2022  Length of Stay: 6 days  Attending Physician: Ruth Mendoza MD  Primary Care Provider: Leticia Lim MD        Subjective:     Principal Problem:Liver failure without hepatic coma        HPI:  Radha Feng is an 81-year-old woman with a past medical history of HTN, HLD, T2DM, and autoimmune hepatitis, who presents to the emergency department with increased abdominal distention and shortness of breath. Of note, the patient is accompanied by her daughter who assists in providing the history. Per discussion with patient, family, and chart review, the patient was in her normal state of health several weeks ago until she was admitted and hospitalized and found to have autoimmune hepatitis on liver biopsy. The patient was discharged home on a prednisone taper and azathioprine. However, she experienced malaise and significant hyperglycemia and was hospitalized again last week for hyperglycemia and thrombocytopenia. Her azathioprine was held and she was told to continue her prednisone 30 mg until she followed up with Hepatology in clinic. Her daughter said that she noted her mother's abdomen has become more distended since her most recent hospitalization. She started complaining of abdominal pain last evening, but denies any fevers, nausea/emesis or diarrhea. She has three, formed bowel movements daily. Her daughter reports she has been having a poor appetite. She also started to develop shortness of breath at rest. No chest pain, wheezing or cough. She denies any hematemesis, hematochezia, or rectal bleeding. She denies other overt bleeding. Her daughter does note a bullous eruption on the patient's buttocks and coccygeal area that started at her last hospitalization and has continued to spread down her buttocks.     In the emergency department,  the patient was given IV furosemide 40 mg once. She was admitted to a Hospital Medicine service for further management.      Overview/Hospital Course:  On 11/4; pt was seen and examined. Daughter by the bedside relating all the history.   Plan was for IR to proceed with paracentesis today, but unfortunately it was not done for unclear reason.   Daughter agreed to have medicine consult try bedside US guided paracentesis.   Discussed case with hepatology. Plan is to resume imuran 50 mg daily once SBP has been ruled out. Also to decrease prednisone from 30 mg to 10 mg.  I have also spoken with dermatology. Per dermatology, since her ulcers are not life threatening and they are not the reason for her presentation, then pt can follow up with derm outpt.     On 11/5; pt's potassium was elevated this am. Kayexalate ordered.   Case was discussed with hepatology who recommended lasix 40 mg bid iv and albumin 25% bid to be given prior to lasix.   No aldactone today given hyperkalemia.  Consult was placed to medicine for paracentesis. However unfortunately medicine consult is busy today and won't be able to do paracentesis until tomorrow. Case was discussed with the daughter who wanted to wait till Monday for the paracentesis to be done by IR since it won't be done today.     On 11/6; pt is doing better. Mentation has improved. She has not any abdominal pain since admission.   Diuresing well with the lasix iv bid and had 7 Bms according to the daughter yesterday.     On 11/7; pt went for paracentesis which ruled out SBP. Continue diuresis.   She is feeling better overall.     On 11/8; case discussed with hepatology. Will switch her to oral diuretics starting tomorrow, and probably keep her for another night to see her response to the oral diuretic.   Pt had 8 BM yesterday, so will switch lactulose to every other day and will also decrease the dose from 15 to 10 mg.   Pt continued to clinically improve.     On 11/9: oral  diuretic started today. Pt will stay another day to monitor respond.     On 11/10; Pt didn't diurese much over the last 24 hours after oral diuretic were initiated. Will monitor for another night per GI recs.       Interval History: back to baseline. Denies abdominal pain, nausea or vomiting. Her abdominal distension resolved.       Review of Systems   Constitutional:  Negative for chills and fever.   Respiratory:  Negative for cough and shortness of breath.    Cardiovascular:  Negative for chest pain.   Gastrointestinal:  Negative for abdominal pain.        Abdominal distension has almost resolved.   Genitourinary:  Negative for dysuria.   Skin:         Ulcers lumbar region and on her buttock cheek.   Neurological:  Negative for syncope.   Objective:     Vital Signs (Most Recent):  Temp: 98.2 °F (36.8 °C) (11/10/22 1639)  Pulse: 65 (11/10/22 1639)  Resp: 16 (11/10/22 0740)  BP: 130/63 (11/10/22 1639)  SpO2: 97 % (11/10/22 1639)   Vital Signs (24h Range):  Temp:  [97.9 °F (36.6 °C)-98.7 °F (37.1 °C)] 98.2 °F (36.8 °C)  Pulse:  [55-65] 65  Resp:  [16-18] 16  SpO2:  [95 %-100 %] 97 %  BP: (130-153)/(63-81) 130/63     Weight: 65.3 kg (143 lb 15.4 oz)  Body mass index is 23.24 kg/m².    Intake/Output Summary (Last 24 hours) at 11/10/2022 1922  Last data filed at 11/10/2022 0333  Gross per 24 hour   Intake --   Output 250 ml   Net -250 ml      Physical exam was done during am rounds between 8-9 am.     Physical Exam  Constitutional:       Appearance: Normal appearance.   HENT:      Head: Normocephalic and atraumatic.      Mouth/Throat:      Mouth: Mucous membranes are moist.   Eyes:      Extraocular Movements: Extraocular movements intact.      Conjunctiva/sclera: Conjunctivae normal.      Pupils: Pupils are equal, round, and reactive to light.   Cardiovascular:      Rate and Rhythm: Normal rate and regular rhythm.   Pulmonary:      Effort: Pulmonary effort is normal.      Breath sounds: Normal breath sounds.    Abdominal:      Comments: Abdomen distension has resolved. No tenderness on deep palpation.    Musculoskeletal:      Cervical back: Neck supple.      Comments: +2 bilateral LE swelling.    Skin:     General: Skin is warm.      Comments: Exam was done in the presence of her daughter (who stated that she is a RN) on 11/4. Pt has superficial skin ulcers with no erythema around the area.   Neurological:      Mental Status: She is alert and oriented to person, place, and time.       Significant Labs: All pertinent labs within the past 24 hours have been reviewed.  BMP:   Recent Labs   Lab 11/10/22  0658   *   *   K 3.6      CO2 23   BUN 18   CREATININE 0.6   CALCIUM 7.9*   MG 1.7       CBC:   Recent Labs   Lab 11/09/22  0709 11/10/22  0658   WBC 4.28 4.98   HGB 7.7* 8.1*   HCT 22.4* 24.5*   PLT 44* 45*       CMP:   Recent Labs   Lab 11/09/22  0709 11/09/22  2255 11/10/22  0658     --  133*   K 3.2*  --  3.6     --  105   CO2 25  --  23   GLU 89 383* 215*   BUN 16  --  18   CREATININE 0.6  --  0.6   CALCIUM 7.9*  --  7.9*   PROT 4.3*  --  4.3*   ALBUMIN 2.8*  --  2.6*   BILITOT 2.8*  --  2.7*   ALKPHOS 79  --  90   AST 28  --  34   ALT 31  --  35   ANIONGAP 7*  --  5*       Coagulation:   No results for input(s): PT, INR, APTT in the last 48 hours.      Significant Imaging: I have reviewed all pertinent imaging results/findings within the past 24 hours.      Assessment/Plan:      * Liver failure without hepatic coma  Decompensated liver failure with recurrent ascites.   s/p IR guided paracentesis on 11/7. SBP has been ruled out.  continue lasix 40 mg and aldactone 100 mg daily.  Continue lactulose for goal of 2-3 BM a day.     Autoimmune hepatitis  - Patient previously diagnosed with autoimmune hepatitis by liver biopsy at previous hospitalization last month. She was placed on azathioprine and a prednisone taper and discharged with Hepatology follow-up. Azathioprine discontinued by  "providers at last admission given thrombocytopenia.     - RUQ ultrasound revealed heterogeneous architecture of the liver concerning for progression to cirrhosis without evidence of portal vein thrombosis but with severe ascites  - continue lasix 40 mg and aldactone 100 mg daily per hepatology.   - Continue [id number] mg daily.   Will continue to hold Imuran until hepatology recommends to start it.       Type 2 diabetes mellitus, without long-term current use of insulin  Continue detemir 15 units qHS, and humalog 5 units with meals.  - also sliding scale insulin low dose.   - hypoglycemia protocol as needed    Thrombocytopenia  plts is 45 today.  ddx includes bone marrow suppression from the patient's azathioprine versus splenic sequestration from her liver disease.  We ruled-out other causes; HIV negative, Hepatitis C antibody negative, folate and vitamin B12 are wnl.  Monitor. Transfuse if plts<10.    Bullous eruption  Patient with notable bullous eruption of coccyx and gluteal cleft. Her daughter notes blisters that unroofed now with shallow ulcerations that began at her last hospitalization. Patient's daughter also noted "sore" in mouth but was not able to be appreciated on examination.  I have spoken with dermatology on 11/4. Per dermatology, since her ulcers are not life threatening and they are not the reason for her presentation, then pt can follow up with derm outpt.     Primary hypertension  Hold bp meds since pt will be on lasix iv. Avoid drop in bp.       VTE Risk Mitigation (From admission, onward)         Ordered     IP VTE HIGH RISK PATIENT  Once         11/03/22 1925     Place sequential compression device  Until discontinued         11/03/22 1925                Discharge Planning   MELVI: 11/11/2022     Code Status: Full Code   Is the patient medically ready for discharge?:     Reason for patient still in hospital (select all that apply): Patient trending condition  Discharge Plan A: Home    "               Ruth Mendoza MD  Department of Hospital Medicine   [facility]

## 2022-11-11 NOTE — PROGRESS NOTES
[facility]  Hepatology  Progress Note    Patient Name: Radha Feng  MRN: [id number]  Admission Date: 11/3/2022  Hospital Length of Stay: 7 days   Attending Provider: Ruth Mendoza MD   Primary Care Physician: Leticia Lim MD  Principal Problem:Liver failure without hepatic coma    Subjective:     Transplant status: No    Interval History: Patient reports having 2 bowel movements yesterday and 1 bowel movement this morning. She reports lower extremity swelling and abdominal distension are stable compared to yesterday. Vital signs normal on room air. Labs notable for normal renal function and uptrending bilirubin (3.5) although still improved since admission (4.4). Tentative plan for discharge home today.     Current Facility-Administered Medications   Medication    brimonidine 0.2% ophthalmic solution 1 drop    dextrose 10% bolus 125 mL    dextrose 10% bolus 250 mL    dorzolamide 2 % ophthalmic solution 1 drop    furosemide tablet 40 mg    glucagon (human recombinant) injection 1 mg    glucose chewable tablet 16 g    glucose chewable tablet 24 g    insulin aspart U-100 pen 0-5 Units    insulin aspart U-100 pen 5 Units    insulin detemir U-100 pen 15 Units    lactulose 20 gram/30 mL solution Soln 10 g    loperamide capsule 2 mg    melatonin tablet 6 mg    potassium, sodium phosphates 280-160-250 mg packet 2 packet    predniSONE tablet 10 mg    sodium chloride 0.9% flush 10 mL    spironolactone tablet 100 mg    timolol maleate 0.5% ophthalmic solution 1 drop       Objective:     Vital Signs (Most Recent):  Temp: 98.4 °F (36.9 °C) (11/11/22 0745)  Pulse: (!) 57 (11/11/22 0745)  Resp: 18 (11/11/22 0745)  BP: (!) 145/67 (11/11/22 0745)  SpO2: 97 % (11/11/22 0745)   Vital Signs (24h Range):  Temp:  [97.8 °F (36.6 °C)-98.4 °F (36.9 °C)] 98.4 °F (36.9 °C)  Pulse:  [56-78] 57  Resp:  [18] 18  SpO2:  [97 %-99 %] 97 %  BP: (130-156)/(63-73) 145/67     Weight: 64.9 kg (143 lb 1.3  oz) (11/11/22 0306)  Body mass index is 23.09 kg/m².    Physical Exam  Constitutional:       Appearance: Normal appearance.   Eyes:      General: No scleral icterus.     Conjunctiva/sclera: Conjunctivae normal.   Cardiovascular:      Rate and Rhythm: Normal rate and regular rhythm.      Pulses: Normal pulses.      Heart sounds: Normal heart sounds.   Pulmonary:      Effort: Pulmonary effort is normal. No respiratory distress.      Breath sounds: Normal breath sounds.   Abdominal:      General: Bowel sounds are normal. There is distension.      Palpations: Abdomen is soft.      Tenderness: There is no abdominal tenderness.   Musculoskeletal:      Right lower leg: Edema present.      Left lower leg: Edema present.   Skin:     General: Skin is warm and dry.      Findings: No bruising or rash.   Neurological:      Mental Status: She is alert and oriented to person, place, and time.       MELD-Na score: 17 at 11/11/2022  7:00 AM  MELD score: 14 at 11/11/2022  7:00 AM  Calculated from:  Serum Creatinine: 0.6 mg/dL (Using min of 1 mg/dL) at 11/11/2022  7:00 AM  Serum Sodium: 134 mmol/L at 11/11/2022  7:00 AM  Total Bilirubin: 3.1 mg/dL at 11/11/2022  7:00 AM  INR(ratio): 1.4 at 11/11/2022  7:00 AM  Age: 82 years    Significant Labs:  Labs within the past month have been reviewed.      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  Hospital course associated with onset of encephalopathy on 11/06. Paracentesis completed on 11/07 with studies negative for SBP. She is status post initiation of IV diuretics and albumin with improvement in volume status.     Recommendations:     -Continue Lasix 40 mg PO and Spironolactone  100 mg daily.   -Decrease Lactulose to 10 g every other day; target 3-4 bowel movements daily.   -Continue Prednisone 10 mg daily.   -Continue to hold IMURAN; patient may not be able to tolerate long-term due to thrombocytopenia.   -CMP and INR daily.   -Avoid use of medications that may precipitate encephalopathy (e.g. opioids and benzo's).   -Low sodium (< 2 g daily) diet.   -Stable for discharge from a hepatology standpoint. We will arrange weekly labs and outpatient follow-up.         Thank you for your consult. I will follow-up with patient. Please contact us if you have any additional questions.    Victor Manuel Espinoza MD  Hepatology  [facility]

## 2022-11-11 NOTE — PLAN OF CARE
Pt and family requesting an earlier appt with hepatology than Jan 2023.  In -basket message sent to Dr. Finn's staff with this request.    Artie Wilkinson RN CM  Case Management  [id number]

## 2022-11-11 NOTE — PLAN OF CARE
Admit 11/3 with SOB and abdominal distention  -AAO4, VSS/afebrile, on RA, denies pain  -11/7 para done, ruled out SBP  -11/9 started on PO lasix and spironolactone  -voids independently in BSC  -getting PO lactulose every 2 days  -accuchecks achs, no ss coverage needed this shift  -anticipating d/c 11/11  -daughter at bedside to assist with pt care, fall precautions maintained call bell in reach

## 2022-11-11 NOTE — ASSESSMENT & PLAN NOTE
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  Hospital course associated with onset of encephalopathy on 11/06. Paracentesis completed on 11/07 with studies negative for SBP. She is status post initiation of IV diuretics and albumin with improvement in volume status.     Recommendations:     -Continue Lasix 40 mg PO and Spironolactone 100 mg daily.   -Decrease Lactulose to 10 g every other day; target 3-4 bowel movements daily.   -Continue Prednisone 10 mg daily.   -Continue to hold IMURAN; patient may not be able to tolerate long-term due to thrombocytopenia.   -CMP and INR daily.   -Avoid use of medications that may precipitate encephalopathy (e.g. opioids and benzo's).   -Low sodium (< 2 g daily) diet.   -Stable for discharge from a hepatology standpoint. We will arrange weekly labs and outpatient follow-up.

## 2022-11-11 NOTE — ASSESSMENT & PLAN NOTE
Continue detemir 15 units qHS, and humalog 5 units with meals.  - also sliding scale insulin low dose.   - hypoglycemia protocol as needed

## 2022-11-11 NOTE — ASSESSMENT & PLAN NOTE
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 She was placed on azathioprine and a prednisone taper and discharged with Hepatology follow-up. Azathioprine discontinued by providers at last admission given thrombocytopenia.     - RUQ ultrasound revealed heterogeneous architecture of the liver concerning for progression to cirrhosis without evidence of portal vein thrombosis but with severe ascites  - continue lasix 40 mg and aldactone 100 mg daily per hepatology.   - Continue [id number] mg daily.   Will continue to hold Imuran until hepatology recommends to start it.

## 2022-11-11 NOTE — PLAN OF CARE
11:26 AM    The SW met with the patient and her daughter at bedside to provide the patient with her Outpatient PT/OT Orders.     The SW will continue to follow.     Alec Carrero LMSW  Case Management [facility]  Ext: [phone]

## 2022-11-11 NOTE — ASSESSMENT & PLAN NOTE
Decompensated liver failure with recurrent ascites.   s/p IR guided paracentesis on 11/7. SBP has been ruled out.  continue lasix 40 mg and aldactone 100 mg daily.  Continue lactulose for goal of 2-3 BM a day.

## 2022-11-11 NOTE — PLAN OF CARE
AAOx4, VSS, SpO2 > 92% on RA.   PO lasix and aldactone continued.   Po lactulose every other day.   BG checks achs. Meal time administered per MAR.   Poss dc today.   Up with stand by assist. Daughter at bedside attentive to pt needs. Free from falls/injuries.   Bed in lowest, locked position. Bed rails up x2. Call light and personal belongings within reach.   Pt educated on plan of care. Verbalized understanding.

## 2022-11-11 NOTE — SUBJECTIVE & OBJECTIVE
Interval History: back to baseline. Denies abdominal pain, nausea or vomiting. Her abdominal distension resolved.       Review of Systems   Constitutional:  Negative for chills and fever.   Respiratory:  Negative for cough and shortness of breath.    Cardiovascular:  Negative for chest pain.   Gastrointestinal:  Negative for abdominal pain.        Abdominal distension has almost resolved.   Genitourinary:  Negative for dysuria.   Skin:         Ulcers lumbar region and on her buttock cheek.   Neurological:  Negative for syncope.   Objective:     Vital Signs (Most Recent):  Temp: 98.2 °F (36.8 °C) (11/10/22 1639)  Pulse: 65 (11/10/22 1639)  Resp: 16 (11/10/22 0740)  BP: 130/63 (11/10/22 1639)  SpO2: 97 % (11/10/22 1639)   Vital Signs (24h Range):  Temp:  [97.9 °F (36.6 °C)-98.7 °F (37.1 °C)] 98.2 °F (36.8 °C)  Pulse:  [55-65] 65  Resp:  [16-18] 16  SpO2:  [95 %-100 %] 97 %  BP: (130-153)/(63-81) 130/63     Weight: 65.3 kg (143 lb 15.4 oz)  Body mass index is 23.24 kg/m².    Intake/Output Summary (Last 24 hours) at 11/10/2022 1922  Last data filed at 11/10/2022 0333  Gross per 24 hour   Intake --   Output 250 ml   Net -250 ml      Physical exam was done during am rounds between 8-9 am.     Physical Exam  Constitutional:       Appearance: Normal appearance.   HENT:      Head: Normocephalic and atraumatic.      Mouth/Throat:      Mouth: Mucous membranes are moist.   Eyes:      Extraocular Movements: Extraocular movements intact.      Conjunctiva/sclera: Conjunctivae normal.      Pupils: Pupils are equal, round, and reactive to light.   Cardiovascular:      Rate and Rhythm: Normal rate and regular rhythm.   Pulmonary:      Effort: Pulmonary effort is normal.      Breath sounds: Normal breath sounds.   Abdominal:      Comments: Abdomen distension has resolved. No tenderness on deep palpation.    Musculoskeletal:      Cervical back: Neck supple.      Comments: +2 bilateral LE swelling.    Skin:     General: Skin is warm.       Comments: Exam was done in the presence of her daughter (who stated that she is a RN) on 11/4. Pt has superficial skin ulcers with no erythema around the area.   Neurological:      Mental Status: She is alert and oriented to person, place, and time.       Significant Labs: All pertinent labs within the past 24 hours have been reviewed.  BMP:   Recent Labs   Lab 11/10/22  0658   *   *   K 3.6      CO2 23   BUN 18   CREATININE 0.6   CALCIUM 7.9*   MG 1.7       CBC:   Recent Labs   Lab 11/09/22  0709 11/10/22  0658   WBC 4.28 4.98   HGB 7.7* 8.1*   HCT 22.4* 24.5*   PLT 44* 45*       CMP:   Recent Labs   Lab 11/09/22  0709 11/09/22  2255 11/10/22  0658     --  133*   K 3.2*  --  3.6     --  105   CO2 25  --  23   GLU 89 383* 215*   BUN 16  --  18   CREATININE 0.6  --  0.6   CALCIUM 7.9*  --  7.9*   PROT 4.3*  --  4.3*   ALBUMIN 2.8*  --  2.6*   BILITOT 2.8*  --  2.7*   ALKPHOS 79  --  90   AST 28  --  34   ALT 31  --  35   ANIONGAP 7*  --  5*       Coagulation:   No results for input(s): PT, INR, APTT in the last 48 hours.      Significant Imaging: I have reviewed all pertinent imaging results/findings within the past 24 hours.

## 2022-11-12 NOTE — DISCHARGE SUMMARY
Mundo Diaz - Transplant [facility] Medicine  Discharge Summary      Patient Name: Radha Feng  MRN: [id number]  BISMARK: [id number]  Patient Class: IP- Inpatient  Admission Date: 11/3/2022  Hospital Length of Stay: 7 days  Discharge Date and Time:  11/11/2022 9:11 PM  Attending Physician: Jenny att. providers found   Discharging Provider: Ruth Mendoza MD  Primary Care Provider: Leticia Lim MD  [facility] Medicine Team: [facility] Ruth Mendoza MD  Primary Care Team: [facility] MED Q    HPI:   Radha Feng is an 81-year-old woman with a past medical history of HTN, HLD, T2DM, and autoimmune hepatitis, who presents to the emergency department with increased abdominal distention and shortness of breath. Of note, the patient is accompanied by her daughter who assists in providing the history. Per discussion with patient, family, and chart review, the patient was in her normal state of health several weeks ago until she was admitted and hospitalized and found to have autoimmune hepatitis on liver biopsy. The patient was discharged home on a prednisone taper and azathioprine. However, she experienced malaise and significant hyperglycemia and was hospitalized again last week for hyperglycemia and thrombocytopenia. Her azathioprine was held and she was told to continue her prednisone 30 mg until she followed up with Hepatology in clinic. Her daughter said that she noted her mother's abdomen has become more distended since her most recent hospitalization. She started complaining of abdominal pain last evening, but denies any fevers, nausea/emesis or diarrhea. She has three, formed bowel movements daily. Her daughter reports she has been having a poor appetite. She also started to develop shortness of breath at rest. No chest pain, wheezing or cough. She denies any hematemesis, hematochezia, or rectal bleeding. She denies other overt bleeding. Her daughter does note a bullous eruption on the patient's buttocks and  coccygeal area that started at her last hospitalization and has continued to spread down her buttocks.     In the emergency department, the patient was given IV furosemide 40 mg once. She was admitted to a Hospital Medicine service for further management.      * No surgery found *      Hospital Course:   Below are the medical problems that were addressed during this hospitalization;    * Liver failure without hepatic coma  Decompensated liver failure with recurrent ascites.   s/p IR guided paracentesis on 11/7. SBP has been ruled out.  Pt was initially started on lasix 40 mg bid iv with albumin 25% bid.   After her volume stated improved, diuretic were switched to lasix 40 mg and aldactone 100 mg daily which she will continue on discharge.  She was given lactulose for goal of 2-3 BM a day.      Autoimmune hepatitis  - Patient previously diagnosed with autoimmune hepatitis by liver biopsy at previous hospitalization last month. She was placed on azathioprine and a prednisone taper and discharged with Hepatology follow-up. Azathioprine discontinued by providers at last admission given thrombocytopenia.      - RUQ ultrasound revealed heterogeneous architecture of the liver concerning for progression to cirrhosis without evidence of portal vein thrombosis but with severe ascites  - Continue lasix 40 mg and aldactone 100 mg daily per hepatology on discharge.   - Continue [id number] mg daily on dishcarge.   Will continue to hold Imuran on discharge per hepatology recs.         Type 2 diabetes mellitus, without long-term current use of insulin  Pt's blood sugar levels were up and down during this hospitalization unfortunately .  Per daughter who is RN, Pt will well managed at home with detemir 10 units qHS, and humalog 5 units with meals.       Thrombocytopenia  ddx includes bone marrow suppression from the patient's azathioprine versus splenic sequestration from her liver disease.  We ruled-out other causes; HIV  "negative, Hepatitis C antibody negative, folate and vitamin B12 are wnl.     Bullous eruption  Patient with notable bullous eruption of coccyx and gluteal cleft. Her daughter notes blisters that unroofed now with shallow ulcerations that began at her last hospitalization. Patient's daughter also noted "sore" in mouth but was not able to be appreciated on examination.  I have spoken with dermatology on 11/4. Per dermatology, since her ulcers are not life threatening and they are not the reason for her presentation, then pt can follow up with derm outpt. referral to derm was given on discharge.        Subjective; pt feels much better. Denies abdominal pain, nausea, vomiting. Her abdominal distension resolved. Her leg swelling improved.     Objective;   Vitals; reviewed  General; no acute distress  HENT; NC/AT  CV; RRR  Resp; CTA   Abdomen; soft, non tender, ND, +Ve bowel sounds  Extremities; +2 bilateral LE edema  Neuro; AAO*3       Goals of Care Treatment Preferences:  Code Status: Full Code      Consults:   Consults (From admission, onward)        Status Ordering Provider     Inpatient consult to Hospital Medicine-General  Once        Provider:  (Not yet assigned)    VINITA Lu          No new Assessment & Plan notes have been filed under this hospital service since the last note was generated.  Service: Hospital Medicine    Final Active Diagnoses:    Diagnosis Date Noted POA    PRINCIPAL PROBLEM:  Liver failure without hepatic coma [K72.90] 11/04/2022 Yes    Autoimmune hepatitis [K75.4] 09/28/2022 Yes     Chronic    Type 2 diabetes mellitus, without long-term current use of insulin [E11.9] 09/21/2022 Yes    Thrombocytopenia [D69.6] 10/26/2022 Yes    Bullous eruption [L13.9] 11/03/2022 Yes    Primary hypertension [I10] 09/21/2022 Yes      Problems Resolved During this Admission:    Diagnosis Date Noted Date Resolved POA    Hypophosphatemia [E83.39] 11/08/2022 11/09/2022 No    Hyperkalemia " [E87.5] 11/05/2022 11/07/2022 Yes    Hypokalemia [E87.6] 11/07/2022 11/08/2022 No    Hypomagnesemia [E83.42] 11/07/2022 11/08/2022 No    Liver failure, acute [K72.00] 11/04/2022 11/04/2022 Yes    Hyponatremia [E87.1] 11/03/2022 11/09/2022 Yes    Other specified glaucoma [H40.89] 10/23/2022 11/09/2022 Yes       Discharged Condition: stable    Disposition: Home or Self Care    Follow Up:   Follow-up Information     Leticia Lim MD. Schedule an appointment as soon as possible for a visit in 1 week(s).    Specialty: Internal Medicine  Contact information:  [address]  [facility] [address]  [phone]                       Patient Instructions:      Ambulatory referral/consult to Physical/Occupational Therapy   Standing Status: Future   Referral Priority: Routine Referral Type: Physical Medicine   Referral Reason: Specialty Services Required   Number of Visits Requested: 1     Ambulatory referral/consult to Dermatology   Standing Status: Future   Referral Priority: Routine Referral Type: Consultation   Referral Reason: Specialty Services Required   Requested Specialty: Dermatology   Number of Visits Requested: 1       Significant Diagnostic Studies: Labs:   BMP:   Recent Labs   Lab 11/09/22  2255 11/10/22  0658 11/11/22  0700   * 215* 66*   NA  --  133* 134*   K  --  3.6 4.1   CL  --  105 104   CO2  --  23 24   BUN  --  18 20   CREATININE  --  0.6 0.6   CALCIUM  --  7.9* 8.1*   MG  --  1.7 1.7   , CMP   Recent Labs   Lab 11/09/22  2255 11/10/22  0658 11/11/22  0700   NA  --  133* 134*   K  --  3.6 4.1   CL  --  105 104   CO2  --  23 24   * 215* 66*   BUN  --  18 20   CREATININE  --  0.6 0.6   CALCIUM  --  7.9* 8.1*   PROT  --  4.3* 4.8*   ALBUMIN  --  2.6* 2.7*   BILITOT  --  2.7* 3.1*   ALKPHOS  --  90 100   AST  --  34 45*   ALT  --  35 40   ANIONGAP  --  5* 6*    and CBC   Recent Labs   Lab 11/10/22  0658 11/11/22  0700   WBC 4.98 5.46   HGB 8.1* 8.6*   HCT 24.5* 24.8*   PLT 45*  "46*         Pending Diagnostic Studies:     None         Medications:  Reconciled Home Medications:      Medication List      START taking these medications    CONSTULOSE 10 gram/15 mL solution  Generic drug: lactulose  Take 15 mLs (10 g total) by mouth once daily.     furosemide 40 MG tablet  Commonly known as: LASIX  Take 1 tablet (40 mg total) by mouth once daily.     pantoprazole 20 MG tablet  Commonly known as: PROTONIX  Take 1 tablet (20 mg total) by mouth once daily.     PHOSPHA 250 NEUTRAL 250 mg Tab  Generic drug: k phos di & mono-sod phos mono  Take 1 tablet by mouth once daily. for 5 days        CHANGE how you take these medications    predniSONE 10 MG tablet  Commonly known as: DELTASONE  Take 1 tablet (10 mg total) by mouth once daily.  What changed: See the new instructions.     spironolactone 100 MG tablet  Commonly known as: ALDACTONE  Take 1 tablet (100 mg total) by mouth once daily.  What changed:   · medication strength  · how much to take        CONTINUE taking these medications    acetaminophen 500 MG tablet  Commonly known as: TYLENOL  Take 2 tablets (1,000 mg total) by mouth every 8 (eight) hours as needed for Pain.     BD ULTRA-FINE SHORT PEN NEEDLE 31 gauge x 5/16" Ndle  Generic drug: pen needle, diabetic  Use to inject insulin into the skin up to 4 times daily     brimonidine 0.2% 0.2 % Drop  Commonly known as: ALPHAGAN  INSTILL 1 DROP INTO RIGHT EYE TWICE A DAY     DEXCOM G6  Misc  Generic drug: blood-glucose meter,continuous  Check blood sugar before meals and at bedtime     DEXCOM G6 SENSOR Amanda  Generic drug: blood-glucose sensor  Check blood sugar before meals and at bedtime     DEXCOM G6 TRANSMITTER Amanda  Generic drug: blood-glucose transmitter  Check blood sugar before meals and at bedtime     dorzolamide-timolol 2-0.5% 22.3-6.8 mg/mL ophthalmic solution  Commonly known as: COSOPT  INSTILL 1 DROP INTO RIGHT EYE TWICE A DAY     FREESTYLE LISSA 14 DAY SENSOR Kit  Generic drug: " flash glucose sensor  Check blood sugar before meals and at bedtime     latanoprost 0.005 % ophthalmic solution  PLACE 1 DROP INTO BOTH EYES ONCE DAILY.     LEVEMIR FLEXTOUCH U-100 INSULN 100 unit/mL (3 mL) Inpn pen  Generic drug: insulin detemir U-100  Inject 10 Units into the skin every evening.     metFORMIN 500 MG tablet  Commonly known as: GLUCOPHAGE  TAKE 1 TABLET BY MOUTH TWICE A DAY     NovoLOG Flexpen U-100 Insulin 100 unit/mL (3 mL) Inpn pen  Generic drug: insulin aspart U-100  Inject 5 Units into the skin 3 (three) times daily with meals. Hold if pre-meal blood sugar is less than 100 or eating less thant <25% of meal     sulfamethoxazole-trimethoprim 800-160mg 800-160 mg Tab  Commonly known as: BACTRIM DS  Take 1 tablet by mouth every Mon, Wed, Fri.        STOP taking these medications    amLODIPine 2.5 MG tablet  Commonly known as: NORVASC     aspirin 81 MG EC tablet  Commonly known as: ECOTRIN     azaTHIOprine 50 mg Tab  Commonly known as: IMURAN     famotidine 20 MG tablet  Commonly known as: PEPCID            Indwelling Lines/Drains at time of discharge:   Lines/Drains/Airways     None                 Time spent on the discharge of patient: 35 minutes         Ruth Mendoza MD  Department of Hospital Medicine  [facility] - Transplant Stepdown

## 2022-11-14 ENCOUNTER — TELEPHONE (OUTPATIENT)
Dept: HEPATOLOGY | Facility: CLINIC | Age: 82
End: 2022-11-14
Payer: MEDICARE

## 2022-11-14 ENCOUNTER — LAB VISIT (OUTPATIENT)
Dept: LAB | Facility: HOSPITAL | Age: 82
End: 2022-11-14
Attending: STUDENT IN AN ORGANIZED HEALTH CARE EDUCATION/TRAINING PROGRAM
Payer: MEDICARE

## 2022-11-14 ENCOUNTER — OFFICE VISIT (OUTPATIENT)
Dept: PRIMARY CARE CLINIC | Facility: CLINIC | Age: 82
End: 2022-11-14
Payer: MEDICARE

## 2022-11-14 ENCOUNTER — PATIENT MESSAGE (OUTPATIENT)
Dept: HEPATOLOGY | Facility: CLINIC | Age: 82
End: 2022-11-14
Payer: MEDICARE

## 2022-11-14 VITALS
WEIGHT: 132.31 LBS | OXYGEN SATURATION: 99 % | SYSTOLIC BLOOD PRESSURE: 112 MMHG | HEIGHT: 66 IN | TEMPERATURE: 99 F | DIASTOLIC BLOOD PRESSURE: 60 MMHG | BODY MASS INDEX: 21.26 KG/M2 | HEART RATE: 49 BPM

## 2022-11-14 DIAGNOSIS — E11.59 TYPE 2 DIABETES MELLITUS WITH OTHER CIRCULATORY COMPLICATION, WITH LONG-TERM CURRENT USE OF INSULIN: Primary | ICD-10-CM

## 2022-11-14 DIAGNOSIS — J43.2 CENTRILOBULAR EMPHYSEMA: ICD-10-CM

## 2022-11-14 DIAGNOSIS — R18.8 CIRRHOSIS OF LIVER WITH ASCITES, UNSPECIFIED HEPATIC CIRRHOSIS TYPE: ICD-10-CM

## 2022-11-14 DIAGNOSIS — K75.4 AUTOIMMUNE HEPATITIS: ICD-10-CM

## 2022-11-14 DIAGNOSIS — I25.84 CORONARY ATHEROSCLEROSIS DUE TO CALCIFIED CORONARY LESION: ICD-10-CM

## 2022-11-14 DIAGNOSIS — I11.0 HYPERTENSIVE HEART DISEASE WITH DIASTOLIC HEART FAILURE: ICD-10-CM

## 2022-11-14 DIAGNOSIS — D53.9 MACROCYTIC ANEMIA: ICD-10-CM

## 2022-11-14 DIAGNOSIS — K74.60 CIRRHOSIS OF LIVER WITH ASCITES, UNSPECIFIED HEPATIC CIRRHOSIS TYPE: ICD-10-CM

## 2022-11-14 DIAGNOSIS — I70.0 AORTIC ATHEROSCLEROSIS: ICD-10-CM

## 2022-11-14 DIAGNOSIS — I50.30 HYPERTENSIVE HEART DISEASE WITH DIASTOLIC HEART FAILURE: ICD-10-CM

## 2022-11-14 DIAGNOSIS — D69.6 THROMBOCYTOPENIA: ICD-10-CM

## 2022-11-14 DIAGNOSIS — K76.89 AUTOIMMUNE LIVER DISEASE: ICD-10-CM

## 2022-11-14 DIAGNOSIS — I25.10 CORONARY ATHEROSCLEROSIS DUE TO CALCIFIED CORONARY LESION: ICD-10-CM

## 2022-11-14 DIAGNOSIS — Z79.4 TYPE 2 DIABETES MELLITUS WITH OTHER CIRCULATORY COMPLICATION, WITH LONG-TERM CURRENT USE OF INSULIN: Primary | ICD-10-CM

## 2022-11-14 LAB — BACTERIA SPEC ANAEROBE CULT: NORMAL

## 2022-11-14 PROCEDURE — 99999 PR PBB SHADOW E&M-EST. PATIENT-LVL IV: CPT | Mod: PBBFAC,,, | Performed by: INTERNAL MEDICINE

## 2022-11-14 PROCEDURE — 99999 PR PBB SHADOW E&M-EST. PATIENT-LVL IV: ICD-10-PCS | Mod: PBBFAC,,, | Performed by: INTERNAL MEDICINE

## 2022-11-14 PROCEDURE — 99214 PR OFFICE/OUTPT VISIT, EST, LEVL IV, 30-39 MIN: ICD-10-PCS | Mod: S$PBB,,, | Performed by: INTERNAL MEDICINE

## 2022-11-14 PROCEDURE — 99214 OFFICE O/P EST MOD 30 MIN: CPT | Mod: S$PBB,,, | Performed by: INTERNAL MEDICINE

## 2022-11-14 PROCEDURE — 99214 OFFICE O/P EST MOD 30 MIN: CPT | Mod: PBBFAC,PN | Performed by: INTERNAL MEDICINE

## 2022-11-14 RX ORDER — INSULIN ASPART 100 [IU]/ML
8 INJECTION, SOLUTION INTRAVENOUS; SUBCUTANEOUS
Qty: 21 ML | Refills: 1 | Status: SHIPPED | OUTPATIENT
Start: 2022-11-14 | End: 2023-01-09

## 2022-11-14 RX ORDER — AMLODIPINE BESYLATE 10 MG/1
10 TABLET ORAL DAILY
COMMUNITY
Start: 2022-10-19 | End: 2022-11-14 | Stop reason: ALTCHOICE

## 2022-11-14 RX ORDER — ATORVASTATIN CALCIUM 10 MG/1
10 TABLET, FILM COATED ORAL DAILY
COMMUNITY
Start: 2022-10-19 | End: 2022-11-14 | Stop reason: ALTCHOICE

## 2022-11-14 RX ORDER — LISINOPRIL AND HYDROCHLOROTHIAZIDE 12.5; 2 MG/1; MG/1
1 TABLET ORAL 2 TIMES DAILY
COMMUNITY
Start: 2022-10-19 | End: 2022-11-14 | Stop reason: ALTCHOICE

## 2022-11-15 ENCOUNTER — PATIENT MESSAGE (OUTPATIENT)
Dept: HEPATOLOGY | Facility: CLINIC | Age: 82
End: 2022-11-15
Payer: MEDICARE

## 2022-11-16 NOTE — DISCHARGE SUMMARY
Mundo Diaz - Transplant [facility] Medicine  Discharge Summary      Patient Name: Radha Feng  MRN: [id number]  BISMARK: [id number]  Patient Class: IP- Inpatient  Admission Date: 10/23/2022  Hospital Length of Stay: 5 days  Discharge Date and Time: 10/28/2022  7:12 PM  Attending Physician: No att. providers found   Discharging Provider: Allyn Shelby MD  Primary Care Provider: Leticia Lim MD  [facility] Medicine Team: [facility] Allyn Shelby MD  Primary Care Team: [facility]     HPI:   81-year-old female with a history of autoimmune hepatitis, hypertension, glaucoma, type 2 diabetes and cataract presenting with abdominal pain, diarrhea and hyperglycemia.  Patient's daughter brought her in for lethargy, weakness, fatigue, and confusion. She was recently admitted and discharged 1 week ago with autoimmune hepatitis. She was discharged on prednisone taper. While at home she has had elevated glucoses that persisted into 500s despite increasing glyburide and metformin.  Patient has been having several bouts of nonbloody diarrhea associated with abdominal cramping and pain.  She was instructed to present to the ED today for evaluation of hyperglycemia, abdominal pain and worsening lethargy and fatigue. No focal neurologic deficits or unilateral weakness.      * No surgery found *      Hospital Course:   82F with T2DM and newly diagnosed autoimmune hepatitis (on OP tx with azathioprine and high dose prednisone) was admitted with HHS, reversed with IVF and insulin. Hospital course pertinent for thrombocytopenia, prompting Hematology consult.        Goals of Care Treatment Preferences:  Code Status: Full Code      Consults:   Consults (From admission, onward)        Status Ordering Provider     Inpatient consult to PICC team ([facility])  Once        Provider:  (Not yet assigned)    Completed ALLYN SHELBY     Inpatient consult to Hematology/Oncology  Once        Provider:  (Not yet assigned)     Completed LINA PENALOZA          Chondromalacia, knee, right  Referral to outpatient PT/OT. Declined home health services.      Thrombocytopenia  Acute drop noted, no bleeding, sebastian 21K. 27K today.   Hematology consulted, recs reviewed. Continue holding azathioprine.   No c/i to discharge today, close OP follow up with repeat labs.      Autoimmune hepatitis  Evaluated by Hepatology, recs reviewed.  Continue prednisone 30 mg daily.   Azathioprine held due to thrombocytopenia.   Keep OP Hepatology clinic appointment.        Final Active Diagnoses:    Diagnosis Date Noted POA    Chondromalacia, knee, right [M94.261] 10/28/2022 Yes    Thrombocytopenia [D69.6] 10/26/2022 Yes    Autoimmune hepatitis [K75.4] 09/28/2022 Yes     Chronic    Hypertensive heart disease with diastolic heart failure [I11.0, I50.30] 09/21/2022 Yes      Problems Resolved During this Admission:    Diagnosis Date Noted Date Resolved POA    PRINCIPAL PROBLEM:  HHNC (hyperglycemic hyperosmolar nonketotic coma) [E11.01] 10/23/2022 10/28/2022 Yes    Abnormal urinalysis [R82.90] 10/26/2022 10/28/2022 Yes    Other specified glaucoma [H40.89] 10/23/2022 11/09/2022 Yes    Dehydration [E86.0] 09/21/2022 10/28/2022 Yes    EFREN (acute kidney injury) [N17.9] 09/21/2022 10/28/2022 Yes       Discharged Condition: stable    Disposition: Home or Self Care    Follow Up:   Follow-up Information     Aure Fajardo MD. Go to.    Specialty: Internal Medicine  Why: Hospital follow up on 11/1 at 10:30am  Contact information:  [address] [facility] [address]  [phone]             Willie Lacy MD. Go to.    Specialties: Transplant, Hepatology, Gastroenterology  Why: 11/4 at 8am  Contact information:  [address]  [facility] [address]  [phone]             Leticia Lim MD .    Specialty: Internal Medicine  Contact information:  [address]  [facility] [address]  [phone]             Primary Doctor No Follow up.      "                Patient Instructions:      WALKER FOR HOME USE     Order Specific Question Answer Comments   Type of Walker: Adult (5'4"-6'6")    With wheels? Yes    Height: 5' 6" (1.676 m)    Weight: 61.5 kg (135 lb 9.3 oz)    Length of need (1-99 months): 99    Does patient have medical equipment at home? cane, straight    Does patient have medical equipment at home? bedside commode    Does patient have medical equipment at home? shower chair    Does patient have medical equipment at home? raised toilet    Please check all that apply: Patient's condition impairs ambulation.    Please check all that apply: Walker will be used for gait training.      WHEELCHAIR FOR HOME USE     Order Specific Question Answer Comments   Hours in W/C per day: 6    Type of Wheelchair: Standard    Size(Width): 18"(STD adult)    Leg Support: STD footrests    Lap Belt: Velcro    Accessories: None    Cushion: Basic    Height: 5' 6" (1.676 m)    Weight: 61.5 kg (135 lb 9.3 oz)    Does patient have medical equipment at home? cane, straight    Does patient have medical equipment at home? bedside commode    Does patient have medical equipment at home? shower chair    Does patient have medical equipment at home? raised toilet    Length of need (1-99 months): 99    Please check all that apply: Caregiver is capable and willing to operate wheelchair safely.    Please check all that apply: The patient has a cast, brace or muscloskeletal condition which prevents 90 degree flexion of the knee.      CBC Auto Differential   Standing Status: Future Standing Exp. Date: 12/26/23     Comprehensive Metabolic Panel   Standing Status: Future Standing Exp. Date: 12/26/23     Ambulatory referral/consult to Physical/Occupational Therapy   Standing Status: Future   Referral Priority: Routine Referral Type: Physical Medicine   Referral Reason: Specialty Services Required   Requested Specialty: Physical Therapy   Number of Visits Requested: 1       Significant " "Diagnostic Studies: summarized above    Pending Diagnostic Studies:     None         Medications:  Reconciled Home Medications:      Medication List      START taking these medications    BD ULTRA-FINE SHORT PEN NEEDLE 31 gauge x 5/16" Ndle  Generic drug: pen needle, diabetic  Use to inject insulin into the skin up to 4 times daily     DEXCOM G6  Misc  Generic drug: blood-glucose meter,continuous  Check blood sugar before meals and at bedtime     DEXCOM G6 SENSOR Amanda  Generic drug: blood-glucose sensor  Check blood sugar before meals and at bedtime     DEXCOM G6 TRANSMITTER Amanda  Generic drug: blood-glucose transmitter  Check blood sugar before meals and at bedtime        CONTINUE taking these medications    acetaminophen 500 MG tablet  Commonly known as: TYLENOL  Take 2 tablets (1,000 mg total) by mouth every 8 (eight) hours as needed for Pain.     brimonidine 0.2% 0.2 % Drop  Commonly known as: ALPHAGAN  INSTILL 1 DROP INTO RIGHT EYE TWICE A DAY     dorzolamide-timolol 2-0.5% 22.3-6.8 mg/mL ophthalmic solution  Commonly known as: COSOPT  INSTILL 1 DROP INTO RIGHT EYE TWICE A DAY     latanoprost 0.005 % ophthalmic solution  PLACE 1 DROP INTO BOTH EYES ONCE DAILY.        STOP taking these medications    furosemide 20 MG tablet  Commonly known as: LASIX     glyBURIDE 2.5 MG tablet  Commonly known as: DIABETA     ibuprofen 200 MG tablet  Commonly known as: ADVIL,MOTRIN            Indwelling Lines/Drains at time of discharge:   Lines/Drains/Airways     None                 Time spent on the discharge of patient: 45 minutes         Allyn Shelby MD  Department of Hospital Medicine  [facility] - Transplant Stepdown  "

## 2022-11-16 NOTE — ASSESSMENT & PLAN NOTE
Evaluated by Hepatology, recs reviewed.  Continue prednisone 30 mg daily.   Azathioprine held due to thrombocytopenia.   Keep OP Hepatology clinic appointment.

## 2022-11-16 NOTE — ASSESSMENT & PLAN NOTE
Acute drop noted, no bleeding, sebastian 21K. 27K today.   Hematology consulted, recs reviewed. Continue holding azathioprine.   No c/i to discharge today, close OP follow up with repeat labs.

## 2022-11-17 NOTE — PROGRESS NOTES
Subjective:       Patient ID: Radha Feng is a 82 y.o. female.    Chief Complaint: Establish Care    Last seen by previous PCP outside [facility] ten months ago. Presents for transfer of care. Recent hospital admit x 2, diagnosed two months ago with autoimmune hepatitis with cirrhosis, ascites and hepatic encephalopathy. Required paracentesis, infection ruled out. Macrocytic anemia and thrombocytopenia - scheduled for follow up with Hematology. Cardiology also following. Here with her daughter Joss today. Did not bring a log of home glucose readings, she does not have the Dexcom monitor yet. Glucose has fluctuated in a wide range from near 60 fasting to near 300 in the evening. Following a diabetic diet, but has been supplementing with Boost - planning to switch to a low glucose alternative.     PMH: .  Hypertension, EF 65% with Grade II Diastolic Dysfunction, Left Atrial Enlargement, MVP with moderate regurg. Sep. '22.  Diabetes Type 2, HbA1c 6.4% Sep. '22. Glyburide switched to Insulin 10/22.  Hyperlipidemia, LDL 77 , currently off statin therapy.  Atherosclerosis of thoracic and abdominal aorta, and coronaries seen on imaging.  Centrilobular Emphysema with Micronodules and Bilateral Ground Glass Opacities on CT .  Autoimmune Hepatitis (biopsy ) with Cirrhosis, Ascites, Encephalopathy.  Hemolytic Anemia, Thrombocytopenia.   Dysphagia, Protein Malnutrition.     PSH:  2021: CATARACT EXTRACTION W/  INTRAOCULAR LENS IMPLANT; Left    NKDA.    Medications: list reviewed and reconciled.           Review of Systems   Constitutional:  Negative for diaphoresis and fever.   HENT:  Negative for nasal congestion, rhinorrhea, sore throat and trouble swallowing.    Eyes:  Negative for visual disturbance.   Respiratory:  Negative for cough, chest tightness, shortness of breath and wheezing.    Cardiovascular:  Negative for chest pain, palpitations and leg swelling.   Gastrointestinal:  Negative  "for abdominal pain, blood in stool, constipation, diarrhea, nausea and vomiting.   Genitourinary:  Negative for dysuria and frequency.   Musculoskeletal:  Positive for arthralgias. Negative for gait problem, joint swelling and myalgias.        Arthritis knees, has been evaluated by Ortho.    Integumentary:  Negative for color change and rash.        Bullous wound in sacral area has healed.   Neurological:  Negative for dizziness, syncope, facial asymmetry, speech difficulty, weakness, numbness and headaches.   Psychiatric/Behavioral:  Negative for confusion, dysphoric mood and sleep disturbance. The patient is not nervous/anxious.        Objective:      Vitals:    11/14/22 1015   BP: 112/60   Pulse: (!) 49   Temp: 98.9 °F (37.2 °C)   SpO2: 99%   Weight: 60 kg (132 lb 4.8 oz)   Height: 5' 6" (1.676 m)     Physical Exam  Constitutional:       General: She is not in acute distress.  HENT:      Head: Normocephalic and atraumatic.      Mouth/Throat:      Mouth: Mucous membranes are moist.      Pharynx: Oropharynx is clear.   Eyes:      Extraocular Movements: Extraocular movements intact.      Conjunctiva/sclera: Conjunctivae normal.   Cardiovascular:      Rate and Rhythm: Normal rate and regular rhythm.   Pulmonary:      Effort: Pulmonary effort is normal. No respiratory distress.      Breath sounds: Normal breath sounds. No wheezing, rhonchi or rales.   Abdominal:      General: Bowel sounds are normal.      Palpations: Abdomen is soft.      Tenderness: There is no abdominal tenderness.   Musculoskeletal:         General: Normal range of motion.      Right lower leg: No edema.      Left lower leg: No edema.      Comments: Crepitus left knee with pain on range of motion.    Skin:     General: Skin is warm and dry.   Neurological:      General: No focal deficit present.      Mental Status: She is alert.      Cranial Nerves: No cranial nerve deficit.   Psychiatric:         Mood and Affect: Mood normal.         Behavior: " Behavior normal.       Assessment:       Problem List Items Addressed This Visit       Autoimmune hepatitis (Chronic)    Hypertensive heart disease with diastolic heart failure    Thrombocytopenia    Aortic atherosclerosis    Centrilobular emphysema    Coronary atherosclerosis due to calcified coronary lesion    Cirrhosis of liver with ascites     Other Visit Diagnoses       Type 2 diabetes mellitus with other circulatory complication, with long-term current use of insulin    -  Primary    Relevant Medications    insulin detemir U-100 (LEVEMIR FLEXTOUCH) 100 unit/mL (3 mL) SubQ InPn pen    insulin aspart U-100 (NOVOLOG) 100 unit/mL (3 mL) InPn pen    Other Relevant Orders    Microalbumin/Creatinine Ratio, Urine    Macrocytic anemia                  Plan:       Type 2 diabetes mellitus with other circulatory complication, with long-term current use of insulin - morning hypoglycemia and evening hyperglycemia.  -     Decrease Insulin detemir U-100 (LEVEMIR FLEXTOUCH) 100 unit/mL (3 mL) SubQ InPn pen from 10 to 5 Units into the skin every evening.  Dispense: 6 mL; Refill: 1  -     Increase Insulin aspart U-100 (NOVOLOG) 100 unit/mL (3 mL) InPn pen; from 5 to 8 Units into the skin 3 (three) times daily with meals. Hold if pre-meal blood sugar is less than 100 or eating less thant <25% of meal  Dispense: 21 mL; Refill: 1  -     Microalbumin/Creatinine Ratio, Urine    Hypertensive heart disease with diastolic heart failure        -     pressure stable off meds, Amlodipine on hold.     Aortic atherosclerosis  Coronary atherosclerosis due to calcified coronary lesion        -     statin therapy on hold due to liver disease.     Centrilobular emphysema        -     asymptomatic.     Autoimmune hepatitis  Cirrhosis of liver with ascites, unspecified hepatic cirrhosis type        -    follow up with Hepatology and Transplant, labs as scheduled today.     Macrocytic anemia  Thrombocytopenia        -   Heme/Onc consult as  scheduled.

## 2022-11-18 ENCOUNTER — TELEPHONE (OUTPATIENT)
Dept: DERMATOLOGY | Facility: CLINIC | Age: 82
End: 2022-11-18
Payer: MEDICARE

## 2022-11-18 ENCOUNTER — TELEPHONE (OUTPATIENT)
Dept: INTERVENTIONAL RADIOLOGY/VASCULAR | Facility: CLINIC | Age: 82
End: 2022-11-18
Payer: MEDICARE

## 2022-11-18 NOTE — TELEPHONE ENCOUNTER
----- Message from Jennifer Arreola MD sent at 11/4/2022  9:37 AM CDT -----  Regarding: Acute derm patient schedule  Hello,    Can we schedule Ms. Feng in acute derm clinic in the next couple weeks? She's currently admitted but just needs outpatient follow-up for skin lesions - I would think later next week/early the week after should be fine. Thanks!

## 2022-11-21 ENCOUNTER — LAB VISIT (OUTPATIENT)
Dept: LAB | Facility: HOSPITAL | Age: 82
End: 2022-11-21
Attending: STUDENT IN AN ORGANIZED HEALTH CARE EDUCATION/TRAINING PROGRAM
Payer: MEDICARE

## 2022-11-21 DIAGNOSIS — K76.89 AUTOIMMUNE LIVER DISEASE: ICD-10-CM

## 2022-11-21 LAB
ALBUMIN SERPL BCP-MCNC: 2.6 G/DL (ref 3.5–5.2)
ALP SERPL-CCNC: 116 U/L (ref 55–135)
ALT SERPL W/O P-5'-P-CCNC: 35 U/L (ref 10–44)
ANION GAP SERPL CALC-SCNC: 7 MMOL/L (ref 8–16)
AST SERPL-CCNC: 38 U/L (ref 10–40)
BASOPHILS # BLD AUTO: 0.02 K/UL (ref 0–0.2)
BASOPHILS NFR BLD: 0.3 % (ref 0–1.9)
BILIRUB SERPL-MCNC: 3.2 MG/DL (ref 0.1–1)
BUN SERPL-MCNC: 16 MG/DL (ref 8–23)
CALCIUM SERPL-MCNC: 8.3 MG/DL (ref 8.7–10.5)
CHLORIDE SERPL-SCNC: 112 MMOL/L (ref 95–110)
CO2 SERPL-SCNC: 21 MMOL/L (ref 23–29)
CREAT SERPL-MCNC: 0.6 MG/DL (ref 0.5–1.4)
DIFFERENTIAL METHOD: ABNORMAL
EOSINOPHIL # BLD AUTO: 0 K/UL (ref 0–0.5)
EOSINOPHIL NFR BLD: 0 % (ref 0–8)
ERYTHROCYTE [DISTWIDTH] IN BLOOD BY AUTOMATED COUNT: 15.6 % (ref 11.5–14.5)
EST. GFR  (NO RACE VARIABLE): >60 ML/MIN/1.73 M^2
GLUCOSE SERPL-MCNC: 81 MG/DL (ref 70–110)
HCT VFR BLD AUTO: 30 % (ref 37–48.5)
HGB BLD-MCNC: 9.5 G/DL (ref 12–16)
IMM GRANULOCYTES # BLD AUTO: 0.03 K/UL (ref 0–0.04)
IMM GRANULOCYTES NFR BLD AUTO: 0.5 % (ref 0–0.5)
LYMPHOCYTES # BLD AUTO: 1.1 K/UL (ref 1–4.8)
LYMPHOCYTES NFR BLD: 16.9 % (ref 18–48)
MCH RBC QN AUTO: 37.1 PG (ref 27–31)
MCHC RBC AUTO-ENTMCNC: 31.7 G/DL (ref 32–36)
MCV RBC AUTO: 117 FL (ref 82–98)
MONOCYTES # BLD AUTO: 0.8 K/UL (ref 0.3–1)
MONOCYTES NFR BLD: 12.1 % (ref 4–15)
NEUTROPHILS # BLD AUTO: 4.6 K/UL (ref 1.8–7.7)
NEUTROPHILS NFR BLD: 70.2 % (ref 38–73)
NRBC BLD-RTO: 0 /100 WBC
PLATELET # BLD AUTO: 99 K/UL (ref 150–450)
PMV BLD AUTO: 11.6 FL (ref 9.2–12.9)
POTASSIUM SERPL-SCNC: 3.6 MMOL/L (ref 3.5–5.1)
PROT SERPL-MCNC: 5.5 G/DL (ref 6–8.4)
RBC # BLD AUTO: 2.56 M/UL (ref 4–5.4)
SODIUM SERPL-SCNC: 140 MMOL/L (ref 136–145)
WBC # BLD AUTO: 6.55 K/UL (ref 3.9–12.7)

## 2022-11-21 PROCEDURE — 80053 COMPREHEN METABOLIC PANEL: CPT | Performed by: STUDENT IN AN ORGANIZED HEALTH CARE EDUCATION/TRAINING PROGRAM

## 2022-11-21 PROCEDURE — 36415 COLL VENOUS BLD VENIPUNCTURE: CPT | Performed by: STUDENT IN AN ORGANIZED HEALTH CARE EDUCATION/TRAINING PROGRAM

## 2022-11-21 PROCEDURE — 85025 COMPLETE CBC W/AUTO DIFF WBC: CPT | Performed by: STUDENT IN AN ORGANIZED HEALTH CARE EDUCATION/TRAINING PROGRAM

## 2022-11-28 ENCOUNTER — OFFICE VISIT (OUTPATIENT)
Dept: HEPATOLOGY | Facility: CLINIC | Age: 82
End: 2022-11-28
Payer: MEDICARE

## 2022-11-28 ENCOUNTER — PATIENT MESSAGE (OUTPATIENT)
Dept: HEPATOLOGY | Facility: CLINIC | Age: 82
End: 2022-11-28

## 2022-11-28 VITALS
OXYGEN SATURATION: 98 % | SYSTOLIC BLOOD PRESSURE: 152 MMHG | DIASTOLIC BLOOD PRESSURE: 70 MMHG | TEMPERATURE: 99 F | WEIGHT: 144.19 LBS | HEART RATE: 66 BPM | BODY MASS INDEX: 23.17 KG/M2 | HEIGHT: 66 IN | RESPIRATION RATE: 19 BRPM

## 2022-11-28 DIAGNOSIS — K76.82 HEPATIC ENCEPHALOPATHY: ICD-10-CM

## 2022-11-28 DIAGNOSIS — R18.8 OTHER ASCITES: ICD-10-CM

## 2022-11-28 DIAGNOSIS — K75.4 AUTOIMMUNE HEPATITIS: Chronic | ICD-10-CM

## 2022-11-28 DIAGNOSIS — R18.8 CIRRHOSIS OF LIVER WITH ASCITES, UNSPECIFIED HEPATIC CIRRHOSIS TYPE: ICD-10-CM

## 2022-11-28 DIAGNOSIS — K74.60 CIRRHOSIS OF LIVER WITH ASCITES, UNSPECIFIED HEPATIC CIRRHOSIS TYPE: ICD-10-CM

## 2022-11-28 DIAGNOSIS — M79.89 LEG SWELLING: ICD-10-CM

## 2022-11-28 DIAGNOSIS — R79.0 LOW SERUM PHOSPHORUS FOR AGE: Primary | ICD-10-CM

## 2022-11-28 DIAGNOSIS — K74.60 HEPATIC CIRRHOSIS, UNSPECIFIED HEPATIC CIRRHOSIS TYPE, UNSPECIFIED WHETHER ASCITES PRESENT: ICD-10-CM

## 2022-11-28 PROCEDURE — 99999 PR PBB SHADOW E&M-EST. PATIENT-LVL III: CPT | Mod: PBBFAC,,, | Performed by: INTERNAL MEDICINE

## 2022-11-28 PROCEDURE — 99214 PR OFFICE/OUTPT VISIT, EST, LEVL IV, 30-39 MIN: ICD-10-PCS | Mod: S$PBB,,, | Performed by: INTERNAL MEDICINE

## 2022-11-28 PROCEDURE — 99999 PR PBB SHADOW E&M-EST. PATIENT-LVL III: ICD-10-PCS | Mod: PBBFAC,,, | Performed by: INTERNAL MEDICINE

## 2022-11-28 PROCEDURE — 99214 OFFICE O/P EST MOD 30 MIN: CPT | Mod: S$PBB,,, | Performed by: INTERNAL MEDICINE

## 2022-11-28 PROCEDURE — 99213 OFFICE O/P EST LOW 20 MIN: CPT | Mod: PBBFAC,PN | Performed by: INTERNAL MEDICINE

## 2022-11-28 NOTE — PATIENT INSTRUCTIONS
Labs tomorrow and monthly  Add phosphorus to labs tomorrow  Lactulose - titrate for mental status changes  Continue current diuretics  Return dayami

## 2022-11-28 NOTE — PROGRESS NOTES
HEPATOLOGY FOLLOW UP    Referring Physician: Leticia Lim MD   Current Corresponding Physician: Leticia Lim MD     Radha Feng is here for follow up of autoimmune hepatitis-induced cirrhosis    HPI  Ms Feng is an 82yo PMHx HTN, ID-T2DM, decompensated AIH cirrhosis (biopsy proven) presented and was admitted 11/3/22-11/11/22 with abdo distention from ascites.     Patient recently hospitalized 09/20-10/15 for SOB found to have elevated liver enzymes and diagnosed with AIH vs RUBIO cirrhosis on biopsy decompensated by ascites. Started on steroids, imuran w/ improvement in enzymes. However due to malaise an leukopenia, imuran has been held. She was diuresed, underwent LVP and discharged on 10 mg prednisone daily with goal to continue but minimize prednisone as outpt.    Interval History  Since Radha's discharge she has continued diuretics, lactulose and prednisone 10 mg daily. She has not required a paracentesis.    Labs 11/21/22: Tbil 3.2 (previously fractionated and found to be mostly indirect), ALT 35, AST 38, ALKP 116    Ascites, ongoing: lasix 40 mg daily and aldactone 100 mg every 3 days  HE, ongoing: lactulose every few days    MELD-Na score: 17 at 11/14/2022  4:00 PM  MELD score: 14 at 11/14/2022  4:00 PM  Calculated from:  Serum Creatinine: 0.7 mg/dL (Using min of 1 mg/dL) at 11/14/2022  4:00 PM  Serum Sodium: 134 mmol/L at 11/14/2022  4:00 PM  Total Bilirubin: 3.4 mg/dL at 11/14/2022  4:00 PM  INR(ratio): 1.3 at 11/14/2022  4:00 PM  Age: 82 years     Outpatient Encounter Medications as of 11/28/2022   Medication Sig Dispense Refill    blood-glucose meter,continuous (DEXCOM G6 ) Misc Check blood sugar before meals and at bedtime 1 each PRN    brimonidine 0.2% (ALPHAGAN) 0.2 % Drop INSTILL 1 DROP INTO RIGHT EYE TWICE A DAY 30 mL 3    dorzolamide-timolol 2-0.5% (COSOPT) 22.3-6.8 mg/mL ophthalmic solution INSTILL 1 DROP INTO RIGHT EYE TWICE A DAY 30 mL 3    furosemide (LASIX) 40 MG  "tablet Take 1 tablet (40 mg total) by mouth once daily. 30 tablet 0    insulin aspart U-100 (NOVOLOG) 100 unit/mL (3 mL) InPn pen Inject 8 Units into the skin 3 (three) times daily with meals. Hold if pre-meal blood sugar is less than 100 or eating less thant <25% of meal 21 mL 1    insulin detemir U-100 (LEVEMIR FLEXTOUCH) 100 unit/mL (3 mL) SubQ InPn pen Inject 5 Units into the skin every evening. 6 mL 1    lactulose (CHRONULAC) 10 gram/15 mL solution Take 15 mLs (10 g total) by mouth once daily. 450 mL 0    latanoprost 0.005 % ophthalmic solution PLACE 1 DROP INTO BOTH EYES ONCE DAILY. 7.5 mL 3    pantoprazole (PROTONIX) 20 MG tablet Take 1 tablet (20 mg total) by mouth once daily. 30 tablet 0    pen needle, diabetic 31 gauge x 5/16" Ndle Use to inject insulin into the skin up to 4 times daily 100 each 0    predniSONE (DELTASONE) 10 MG tablet Take 1 tablet (10 mg total) by mouth once daily. 30 tablet 0    spironolactone (ALDACTONE) 100 MG tablet Take 1 tablet (100 mg total) by mouth once daily. (Patient taking differently: Take 100 mg by mouth every 72 hours.) 30 tablet 0    acetaminophen (TYLENOL) 500 MG tablet Take 2 tablets (1,000 mg total) by mouth every 8 (eight) hours as needed for Pain. (Patient not taking: Reported on 11/28/2022)  0    blood-glucose sensor (DEXCOM G6 SENSOR) Amanda Check blood sugar before meals and at bedtime (Patient not taking: Reported on 11/28/2022) 1 each PRN    blood-glucose transmitter (DEXCOM G6 TRANSMITTER) Amanda Check blood sugar before meals and at bedtime (Patient not taking: Reported on 11/28/2022) 1 each PRN    k phos di & mono-sod phos mono (PHOSPHA 250 NEUTRAL) 250 mg Tab Take 1 tablet by mouth once daily. for 5 days 5 tablet 0     No facility-administered encounter medications on file as of 11/28/2022.     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   Constitutional:       Appearance: She is well-developed.   HENT:      Head: Normocephalic and atraumatic.   Eyes:      General: No scleral icterus.     Conjunctiva/sclera: Conjunctivae normal.      Pupils: Pupils are equal, round, and reactive to light.   Neck:      Thyroid: No thyromegaly.   Cardiovascular:      Rate and Rhythm: Normal rate and regular rhythm.      Heart sounds: Normal heart sounds.   Pulmonary:      Effort: Pulmonary effort is normal.      Breath sounds: Normal breath sounds. No rales.   Abdominal:      General: Bowel sounds are normal. There is no distension.      Palpations: Abdomen is soft. There is no mass.      Tenderness: There is no abdominal tenderness.   Musculoskeletal:         General: Normal range of motion.      Cervical back: Normal range of motion and neck supple.   Skin:     General: Skin is warm and dry.      Findings: No rash.   Neurological:      Mental Status: She is alert and oriented to person, place, and time.       MELD-Na score: 17 at 11/14/2022  4:00 PM  MELD score: 14 at 11/14/2022  4:00 PM  Calculated from:  Serum Creatinine: 0.7 mg/dL (Using min of 1 mg/dL) at 11/14/2022  4:00 PM  Serum Sodium: 134 mmol/L at 11/14/2022  4:00 PM  Total Bilirubin: 3.4 mg/dL at 11/14/2022  4:00 PM  INR(ratio): 1.3 at 11/14/2022  4:00 PM  Age: 82 years    Lab Results   Component Value Date    GLU 81 11/21/2022    BUN 16 11/21/2022    CREATININE 0.6 11/21/2022    CALCIUM 8.3 (L) 11/21/2022     11/21/2022    K 3.6 11/21/2022     (H) 11/21/2022    PROT 5.5 (L) 11/21/2022    CO2 21 (L) 11/21/2022    ANIONGAP 7 (L) 11/21/2022    WBC 6.55 11/21/2022    RBC 2.56 (L) 11/21/2022    HGB 9.5 (L) 11/21/2022    HCT 30.0 (L) 11/21/2022    HCT 30 (L) 10/23/2022     (H) 11/21/2022    MCH 37.1  (H) 11/21/2022    MCHC 31.7 (L) 11/21/2022     Lab Results   Component Value Date    RDW 15.6 (H) 11/21/2022    PLT 99 (L) 11/21/2022    MPV 11.6 11/21/2022    GRAN 4.6 11/21/2022    GRAN 70.2 11/21/2022    LYMPH 1.1 11/21/2022    LYMPH 16.9 (L) 11/21/2022    MONO 0.8 11/21/2022    MONO 12.1 11/21/2022    EOSINOPHIL 0.0 11/21/2022    BASOPHIL 0.3 11/21/2022    EOS 0.0 11/21/2022    BASO 0.02 11/21/2022    APTT 35.6 (H) 09/29/2022    GROUPTRH O POS 11/07/2022    BNP 66 09/20/2022    CHOL 183 01/13/2022    TRIG 131 01/13/2022    HDL 75 01/13/2022    ALBUMIN 2.6 (L) 11/21/2022    BILIDIR 1.2 (H) 11/06/2022    AST 38 11/21/2022    ALT 35 11/21/2022    ALKPHOS 116 11/21/2022    MG 1.7 11/11/2022    LABPROT 13.0 (H) 11/14/2022    INR 1.3 (H) 11/14/2022       Assessment and Plan:  Radha Feng is a 82 y.o. female with AIH-induced cirrhosis. Current recommendations:  Cirrhosis: check labs now and monthly; HCC screening every 6 months  Autoimmune hepatitis: continue prednisone 10 mg daily; will try to lower to 5 mg in the future  Adcites/edema, ongoing: continue current diuretics  HE, ongoing: continue HE meds  Return 8 weeks

## 2022-11-29 ENCOUNTER — CLINICAL SUPPORT (OUTPATIENT)
Dept: REHABILITATION | Facility: HOSPITAL | Age: 82
End: 2022-11-29
Payer: MEDICARE

## 2022-11-29 ENCOUNTER — LAB VISIT (OUTPATIENT)
Dept: LAB | Facility: HOSPITAL | Age: 82
End: 2022-11-29
Attending: INTERNAL MEDICINE
Payer: MEDICARE

## 2022-11-29 DIAGNOSIS — Z74.09 DECREASED MOBILITY AND ENDURANCE: ICD-10-CM

## 2022-11-29 DIAGNOSIS — K72.90 LIVER FAILURE WITHOUT HEPATIC COMA, UNSPECIFIED CHRONICITY: ICD-10-CM

## 2022-11-29 DIAGNOSIS — K74.60 HEPATIC CIRRHOSIS, UNSPECIFIED HEPATIC CIRRHOSIS TYPE, UNSPECIFIED WHETHER ASCITES PRESENT: ICD-10-CM

## 2022-11-29 DIAGNOSIS — R29.898 LEG WEAKNESS, BILATERAL: ICD-10-CM

## 2022-11-29 DIAGNOSIS — R79.0 LOW SERUM PHOSPHORUS FOR AGE: ICD-10-CM

## 2022-11-29 PROBLEM — R18.8 OTHER ASCITES: Status: ACTIVE | Noted: 2022-11-29

## 2022-11-29 LAB
ALBUMIN SERPL BCP-MCNC: 2.4 G/DL (ref 3.5–5.2)
ALP SERPL-CCNC: 99 U/L (ref 55–135)
ALT SERPL W/O P-5'-P-CCNC: 28 U/L (ref 10–44)
ANION GAP SERPL CALC-SCNC: 6 MMOL/L (ref 8–16)
AST SERPL-CCNC: 38 U/L (ref 10–40)
BASOPHILS # BLD AUTO: 0.01 K/UL (ref 0–0.2)
BASOPHILS NFR BLD: 0.1 % (ref 0–1.9)
BILIRUB DIRECT SERPL-MCNC: 1.1 MG/DL (ref 0.1–0.3)
BILIRUB SERPL-MCNC: 2.4 MG/DL (ref 0.1–1)
BUN SERPL-MCNC: 17 MG/DL (ref 8–23)
CALCIUM SERPL-MCNC: 8.2 MG/DL (ref 8.7–10.5)
CHLORIDE SERPL-SCNC: 108 MMOL/L (ref 95–110)
CO2 SERPL-SCNC: 24 MMOL/L (ref 23–29)
CREAT SERPL-MCNC: 0.7 MG/DL (ref 0.5–1.4)
DIFFERENTIAL METHOD: ABNORMAL
EOSINOPHIL # BLD AUTO: 0 K/UL (ref 0–0.5)
EOSINOPHIL NFR BLD: 0.1 % (ref 0–8)
ERYTHROCYTE [DISTWIDTH] IN BLOOD BY AUTOMATED COUNT: 14 % (ref 11.5–14.5)
EST. GFR  (NO RACE VARIABLE): >60 ML/MIN/1.73 M^2
GLUCOSE SERPL-MCNC: 222 MG/DL (ref 70–110)
HCT VFR BLD AUTO: 30 % (ref 37–48.5)
HGB BLD-MCNC: 9.7 G/DL (ref 12–16)
IMM GRANULOCYTES # BLD AUTO: 0.05 K/UL (ref 0–0.04)
IMM GRANULOCYTES NFR BLD AUTO: 0.6 % (ref 0–0.5)
INR PPP: 1.2 (ref 0.8–1.2)
LYMPHOCYTES # BLD AUTO: 1 K/UL (ref 1–4.8)
LYMPHOCYTES NFR BLD: 12.4 % (ref 18–48)
MCH RBC QN AUTO: 37.2 PG (ref 27–31)
MCHC RBC AUTO-ENTMCNC: 32.3 G/DL (ref 32–36)
MCV RBC AUTO: 115 FL (ref 82–98)
MONOCYTES # BLD AUTO: 0.5 K/UL (ref 0.3–1)
MONOCYTES NFR BLD: 6.1 % (ref 4–15)
NEUTROPHILS # BLD AUTO: 6.6 K/UL (ref 1.8–7.7)
NEUTROPHILS NFR BLD: 80.7 % (ref 38–73)
NRBC BLD-RTO: 0 /100 WBC
PHOSPHATE SERPL-MCNC: 3.3 MG/DL (ref 2.7–4.5)
PLATELET # BLD AUTO: 91 K/UL (ref 150–450)
PMV BLD AUTO: 12 FL (ref 9.2–12.9)
POTASSIUM SERPL-SCNC: 4.6 MMOL/L (ref 3.5–5.1)
PROT SERPL-MCNC: 5.5 G/DL (ref 6–8.4)
PROTHROMBIN TIME: 12.6 SEC (ref 9–12.5)
RBC # BLD AUTO: 2.61 M/UL (ref 4–5.4)
SODIUM SERPL-SCNC: 138 MMOL/L (ref 136–145)
WBC # BLD AUTO: 8.15 K/UL (ref 3.9–12.7)

## 2022-11-29 PROCEDURE — 84100 ASSAY OF PHOSPHORUS: CPT | Performed by: INTERNAL MEDICINE

## 2022-11-29 PROCEDURE — 82248 BILIRUBIN DIRECT: CPT | Performed by: INTERNAL MEDICINE

## 2022-11-29 PROCEDURE — 85610 PROTHROMBIN TIME: CPT | Performed by: INTERNAL MEDICINE

## 2022-11-29 PROCEDURE — 85025 COMPLETE CBC W/AUTO DIFF WBC: CPT | Performed by: INTERNAL MEDICINE

## 2022-11-29 PROCEDURE — 36415 COLL VENOUS BLD VENIPUNCTURE: CPT | Performed by: INTERNAL MEDICINE

## 2022-11-29 PROCEDURE — 97163 PT EVAL HIGH COMPLEX 45 MIN: CPT

## 2022-11-29 PROCEDURE — 97110 THERAPEUTIC EXERCISES: CPT

## 2022-11-29 PROCEDURE — 80053 COMPREHEN METABOLIC PANEL: CPT | Performed by: INTERNAL MEDICINE

## 2022-11-29 NOTE — PROGRESS NOTES
See evaluation in POC for goals and assessment     Eval Date: 11/30/2022    Bina Hurtado, PT, DPT, CLT

## 2022-11-30 PROBLEM — Z74.09 DECREASED MOBILITY AND ENDURANCE: Status: ACTIVE | Noted: 2022-11-30

## 2022-11-30 PROBLEM — R29.898 LEG WEAKNESS, BILATERAL: Status: ACTIVE | Noted: 2022-11-30

## 2022-12-01 NOTE — PLAN OF CARE
[facility]   Physical Therapy Initial Evaluation     Date: 11/29/2022   Name: Radha Feng  Clinic Number: [id number]    Therapy Diagnosis:   Encounter Diagnoses   Name Primary?    Liver failure without hepatic coma, unspecified chronicity     Leg weakness, bilateral     Decreased mobility and endurance      Physician: Ruth Mendoza MD    Physician Orders: PT Eval and Treat   Medical Diagnosis from Referral: K72.90 (ICD-10-CM) - Liver failure without hepatic coma, unspecified chronicity   Evaluation Date: 11/29/2022  Authorization Period Expiration: 12/31/2022  Plan of Care Expiration: 1/11/2023  Progress Note Due: 12/21/2022  Visit # / Visits authorized: 1/ 1   FOTO: Not taken     Precautions: Standard, Diabetes, and Fall, Liver Failure     Time In: 12:00pm   Time Out: 1:00pm   Total Appointment Time (timed & untimed codes): 60 minutes      SUBJECTIVE     Date of onset: 9/20/2022: first admittance     History of current condition - [place] reports: She was originally hospitalized for 3 weeks after seeing her cardiologist for general weakness, fatigue, SOB, and not eating/ drinking. She was admitted and diagnosed with autoimmune hepatitis. She was home but needed to be readmitted because of her blood sugar and then once again for her ascites.     Falls: None     Imaging,       FINDINGS:  Suboptimal inspiration limits characterization.     Cardiac silhouette is within normal limits.     Bilateral ground-glass changes may be associated with layering mild pleural effusions.  Mild edema or infiltrate are not excluded.     No evidence of pneumothorax.  No focal mass or lobar consolidation is detected.  No acute osseous abnormality.     Detrimental change from the prior study.     Impression:     See above comments.  Interval worsening from the prior study.  Follow-up recommended.     This report was flagged in Epic as abnormal.          Prior Therapy: in the hospital   Social History:  Daughter stays with her right now  Occupation: caring for elderly people   Prior Level of Function: Using a cane due to R knee problems. Independennt with bathing, toileting, cooking   Current Level of Function: needs assistance with dressing, getting into bed, walking, using a cane for walking.     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    Medications:   Radha has a current medication list which includes the following prescription(s): acetaminophen, dexcom g6 , dexcom g6 sensor, dexcom g6 transmitter, brimonidine 0.2%, dorzolamide-timolol 2-0.5%, furosemide, insulin aspart u-100, insulin detemir u-100, k phos di & mono-sod phos mono, lactulose, latanoprost, pantoprazole, pen needle, diabetic, prednisone, and spironolactone.    Allergies:   Review of patient's allergies indicates:  No Known Allergies       OBJECTIVE     Postural examination/scapula alignment: FHP, rounded shoulders, forward lean in WC      Strength: manual muscle test grades below      Lower Extremity Strength  Right LE   Left LE     Hip Flexion: 2+/5  Hip Flexion: 3/5   Hip extesnion: 3/5 Hip extension 3/5   Hip Abduction: 3/5 Hip Abduction 3/5   Knee Extension: 3/5 Knee Extension: 3/5   Knee Flexion: 3/5 Knee Flexion: 3/5   Ankle Dorsiflexion: 3/5 Ankle Dorsiflexion: 3/5   Ankle Plantarflexion: 3/5 Ankle Plantarflexion: 3/5      Functional Assessment:   Sit to stand: Mod A, Fall risk         GAIT DEVIATIONS:  Shortened step length, lack of foot clearance, poor balance, poor trunk control. Mod A with gait for balance      Sensation: Intact  grossly BLEs     Edema: Moderate swelling B ankles- pt wearing compression socks         Limitation/Restriction for FOTO  Survey    Therapist reviewed FOTO scores for Radha Feng on 11/29/2022.   FOTO documents entered into [employer] - see Media section.    Limitation Score: not performed          TREATMENT     Total Treatment time (time-based codes) separate from Evaluation: 30 minutes      Radha received the treatments listed below:      therapeutic exercises to develop strength, endurance, ROM, flexibility, and posture for 30 minutes including:  LAQs seated 2x5   Seated marches 2x5  Seated Hip ADD 2x5       PATIENT EDUCATION AND HOME EXERCISES     Education provided:   - HEP, POC, stop HEP if painful     Written Home Exercises Provided: yes. Exercises were reviewed and Radha was able to demonstrate them prior to the end of the session.  Radha demonstrated good  understanding of the education provided. See EMR under Patient Instructions for exercises provided during therapy sessions.    ASSESSMENT     Radha is a 82 y.o. female referred to outpatient Physical Therapy with a medical diagnosis of K72.90 (ICD-10-CM) - Liver failure without hepatic coma, unspecified chronicity . Patient presents with debility, overall LE weakness, poor balance, and decreased functional mobility. Pt presents to clinic after several extended stays in the hospital. While pt's overall health status has stabilized, she has lost much of her independence and mobility. Pt is currently living with daughter who accompanied her to [facility] today. Pt arrived in  But has SPC. Pt's daughter also reports they are working to get a walker. Pt's RLE is weaker than her LLE especially with hip flexion. Pt requires Mod A for sit to stand transfers and gait training, even with SPC. Pt was educated it is not safe at this time to ambulate without assistance at home. Pt was given seated exercises and was able to perform in clinic with no issues. Pt would  benefit from therapy to improve strength and balance and regain function      Patient prognosis is Good.   Patient will benefit from skilled outpatient Physical Therapy to address the deficits stated above and in the chart below, provide patient /family education, and to maximize patientt's level of independence.     Plan of care discussed with patient: Yes  Patient's spiritual, cultural and educational needs considered and patient is agreeable to the plan of care and goals as stated below:     Anticipated Barriers for therapy: Fall risk     Medical Necessity is demonstrated by the following  History  Co-morbidities and personal factors that may impact the plan of care Co-morbidities:   advanced age, diabetes, HTN, and Liver failure    Personal Factors:   no deficits     high   Examination  Body Structures and Functions, activity limitations and participation restrictions that may impact the plan of care Body Regions:   back  lower extremities  trunk    Body Systems:    strength  gross coordinated movement  balance  gait  transfers  motor control  motor learning    Participation Restrictions:   None     Activity limitations:   Learning and applying knowledge  no deficits    General Tasks and Commands  no deficits    Communication  no deficits    Mobility  lifting and carrying objects  walking  moving around using equipment (WC)  driving (bike, car, motorcycle)    Self care  toileting  dressing  looking after one's health    Domestic Life  cooking  doing house work (cleaning house, washing dishes, laundry)    Interactions/Relationships  no deficits    Life Areas  no deficits    Community and Social Life  no deficits         high   Clinical Presentation unstable clinical presentation with unpredictable characteristics high   Decision Making/ Complexity Score: high     Goals:  Short Term Goals: 3 weeks   Pt will be independent with HEP   Pt will be able to perform one sit to stand independently   Pt will be able to  walk with a walker with min A for 10 feet     Long Term Goals: 6 weeks   Pt will be independent with updated HEP   Pt will be SBA with walker for 30 feet with no LOB   Pt will be 4/5 for LE MMTs     PLAN   Plan of care Certification: 11/29/2022 to 1/11/2023    Outpatient Physical Therapy 2 times weekly for 6 weeks to include the following interventions: Gait Training, Manual Therapy, Neuromuscular Re-ed, Patient Education, Self Care, Therapeutic Activities, and Therapeutic Exercise.     Bina Hurtado, PT      I CERTIFY THE NEED FOR THESE SERVICES FURNISHED UNDER THIS PLAN OF TREATMENT AND WHILE UNDER MY CARE   Physician's comments:     Physician's Signature: ___________________________________________________

## 2022-12-05 ENCOUNTER — LAB VISIT (OUTPATIENT)
Dept: LAB | Facility: HOSPITAL | Age: 82
End: 2022-12-05
Payer: MEDICARE

## 2022-12-05 ENCOUNTER — CLINICAL SUPPORT (OUTPATIENT)
Dept: REHABILITATION | Facility: HOSPITAL | Age: 82
End: 2022-12-05
Payer: MEDICARE

## 2022-12-05 DIAGNOSIS — Z74.09 DECREASED MOBILITY AND ENDURANCE: ICD-10-CM

## 2022-12-05 DIAGNOSIS — D69.6 THROMBOCYTOPENIA: ICD-10-CM

## 2022-12-05 DIAGNOSIS — R29.898 LEG WEAKNESS, BILATERAL: Primary | ICD-10-CM

## 2022-12-05 LAB
ALBUMIN SERPL BCP-MCNC: 2.4 G/DL (ref 3.5–5.2)
ALP SERPL-CCNC: 91 U/L (ref 55–135)
ALT SERPL W/O P-5'-P-CCNC: 24 U/L (ref 10–44)
ANION GAP SERPL CALC-SCNC: 5 MMOL/L (ref 8–16)
AST SERPL-CCNC: 42 U/L (ref 10–40)
BASOPHILS # BLD AUTO: 0.02 K/UL (ref 0–0.2)
BASOPHILS NFR BLD: 0.2 % (ref 0–1.9)
BILIRUB SERPL-MCNC: 2.1 MG/DL (ref 0.1–1)
BUN SERPL-MCNC: 14 MG/DL (ref 8–23)
CALCIUM SERPL-MCNC: 8.2 MG/DL (ref 8.7–10.5)
CHLORIDE SERPL-SCNC: 109 MMOL/L (ref 95–110)
CO2 SERPL-SCNC: 24 MMOL/L (ref 23–29)
CREAT SERPL-MCNC: 0.6 MG/DL (ref 0.5–1.4)
DIFFERENTIAL METHOD: ABNORMAL
EOSINOPHIL # BLD AUTO: 0 K/UL (ref 0–0.5)
EOSINOPHIL NFR BLD: 0.2 % (ref 0–8)
ERYTHROCYTE [DISTWIDTH] IN BLOOD BY AUTOMATED COUNT: 13.2 % (ref 11.5–14.5)
EST. GFR  (NO RACE VARIABLE): >60 ML/MIN/1.73 M^2
GLUCOSE SERPL-MCNC: 110 MG/DL (ref 70–110)
HCT VFR BLD AUTO: 32.4 % (ref 37–48.5)
HGB BLD-MCNC: 10.2 G/DL (ref 12–16)
IMM GRANULOCYTES # BLD AUTO: 0.04 K/UL (ref 0–0.04)
IMM GRANULOCYTES NFR BLD AUTO: 0.5 % (ref 0–0.5)
LYMPHOCYTES # BLD AUTO: 0.9 K/UL (ref 1–4.8)
LYMPHOCYTES NFR BLD: 11.2 % (ref 18–48)
MCH RBC QN AUTO: 35.8 PG (ref 27–31)
MCHC RBC AUTO-ENTMCNC: 31.5 G/DL (ref 32–36)
MCV RBC AUTO: 114 FL (ref 82–98)
MONOCYTES # BLD AUTO: 0.7 K/UL (ref 0.3–1)
MONOCYTES NFR BLD: 7.9 % (ref 4–15)
NEUTROPHILS # BLD AUTO: 6.7 K/UL (ref 1.8–7.7)
NEUTROPHILS NFR BLD: 80 % (ref 38–73)
NRBC BLD-RTO: 0 /100 WBC
PLATELET # BLD AUTO: 99 K/UL (ref 150–450)
PMV BLD AUTO: 11.2 FL (ref 9.2–12.9)
POTASSIUM SERPL-SCNC: 3.6 MMOL/L (ref 3.5–5.1)
PROT SERPL-MCNC: 5.6 G/DL (ref 6–8.4)
RBC # BLD AUTO: 2.85 M/UL (ref 4–5.4)
SODIUM SERPL-SCNC: 138 MMOL/L (ref 136–145)
WBC # BLD AUTO: 8.37 K/UL (ref 3.9–12.7)

## 2022-12-05 PROCEDURE — 36415 COLL VENOUS BLD VENIPUNCTURE: CPT | Performed by: STUDENT IN AN ORGANIZED HEALTH CARE EDUCATION/TRAINING PROGRAM

## 2022-12-05 PROCEDURE — 85025 COMPLETE CBC W/AUTO DIFF WBC: CPT | Performed by: STUDENT IN AN ORGANIZED HEALTH CARE EDUCATION/TRAINING PROGRAM

## 2022-12-05 PROCEDURE — 97110 THERAPEUTIC EXERCISES: CPT | Mod: CQ

## 2022-12-05 PROCEDURE — 80053 COMPREHEN METABOLIC PANEL: CPT | Performed by: STUDENT IN AN ORGANIZED HEALTH CARE EDUCATION/TRAINING PROGRAM

## 2022-12-05 NOTE — PROGRESS NOTES
[facility]   Physical Therapy Treatment Note     Name: Radha Feng  Clinic Number: [id number]    Therapy Diagnosis:   Encounter Diagnoses   Name Primary?    Leg weakness, bilateral Yes    Decreased mobility and endurance      Physician: Deandre Peterson MD    Visit Date: 12/5/2022    Physician Orders: PT Eval and Treat   Medical Diagnosis from Referral: K72.90 (ICD-10-CM) - Liver failure without hepatic coma, unspecified chronicity   Evaluation Date: 11/29/2022  Authorization Period Expiration: 12/31/2022  Plan of Care Expiration: 1/11/2023  Progress Note Due: 12/21/2022  Visit # / Visits authorized: 1/ 6  FOTO: Not taken     PTA Visit #: 1/5     Time In: 12:10 pm  Time Out: 1:00 pm  Total Billable Time: 50 minutes    Precautions: Standard, Diabetes, and Fall, Liver Failure     SUBJECTIVE     Pt reports: doing well today . They ordered a walker . Pt stated she has been doing her exercises daily .   She was compliant with home exercise program.  Response to previous treatment: felt good after   Functional change: limited mobility and strength , amb c/ SPC with daughter assist - will begin to ambulate with RW once received .     Pain: N/A    OBJECTIVE     Objective Measures updated at progress report unless specified.     Treatment     Radha received the treatments listed below:      therapeutic exercises to develop strength, endurance, and ROM for 50 minutes including:    *Rae stand pivot WC<>table    Supine marching HOB elevated : 2 x 10 reps ( min assist c/ strap on R)   SAQs : 2 x 10 reps B   LAQs 5'' hold on L : 2 x 10 reps B  Seated marching: 2 x 10 reps   Seated hip add ball : 2 x 10 reps , 5'' hold   Seated hip abduction YTB: 2 x 10 reps , 5'' hold   Sit to stands - next    Patient Education and Home Exercises     Home Exercises Provided and Patient Education Provided     Education provided:   - Updated HEP provided   - Encouraged use of RW vs cane - amb with assistance      Written Home Exercises Provided: yes. Exercises were reviewed and Radha was able to demonstrate them prior to the end of the session.  Radha demonstrated good  understanding of the education provided. See EMR under Patient Instructions for exercises provided during therapy sessions    ASSESSMENT     Pt exhibits significant BLE weakness with more involvement on R>L. Assistance required to perform marching due to hip flexor weakness. Increased quad weakness also noted with SAQ/LAQ although able to perform without assistance. Focused on general LE strengthening today to progress HEP which she was appropriately challenged with.  Will plan to progress with strengthening and with gait training per tolerance and safety .     Radha Is progressing well towards her goals.   Pt prognosis is Good.     Pt will continue to benefit from skilled outpatient physical therapy to address the deficits listed in the problem list box on initial evaluation, provide pt/family education and to maximize pt's level of independence in the home and community environment.   Pt's spiritual, cultural and educational needs considered and pt agreeable to plan of care and goals.     Anticipated barriers to physical therapy: fall risk    Goals:  Short Term Goals: 3 weeks   Pt will be independent with HEP   Pt will be able to perform one sit to stand independently   Pt will be able to walk with a walker with min A for 10 feet      Long Term Goals: 6 weeks   Pt will be independent with updated HEP   Pt will be SBA with walker for 30 feet with no LOB   Pt will be 4/5 for LE MMTs     PLAN     Continue to progress general strengthening and gait training.     Nicole Disla, PTA

## 2022-12-07 ENCOUNTER — PATIENT MESSAGE (OUTPATIENT)
Dept: HEPATOLOGY | Facility: CLINIC | Age: 82
End: 2022-12-07
Payer: MEDICARE

## 2022-12-07 RX ORDER — FUROSEMIDE 40 MG/1
40 TABLET ORAL DAILY
Qty: 30 TABLET | Refills: 0 | Status: SHIPPED | OUTPATIENT
Start: 2022-12-07 | End: 2023-01-03

## 2022-12-07 RX ORDER — PREDNISONE 10 MG/1
10 TABLET ORAL DAILY
Qty: 30 TABLET | Refills: 0 | Status: SHIPPED | OUTPATIENT
Start: 2022-12-07 | End: 2023-01-06

## 2022-12-08 ENCOUNTER — CLINICAL SUPPORT (OUTPATIENT)
Dept: REHABILITATION | Facility: HOSPITAL | Age: 82
End: 2022-12-08
Payer: MEDICARE

## 2022-12-08 DIAGNOSIS — R29.898 LEG WEAKNESS, BILATERAL: Primary | ICD-10-CM

## 2022-12-08 DIAGNOSIS — Z74.09 DECREASED MOBILITY AND ENDURANCE: ICD-10-CM

## 2022-12-08 PROCEDURE — 97110 THERAPEUTIC EXERCISES: CPT

## 2022-12-08 NOTE — PROGRESS NOTES
[facility]   Physical Therapy Treatment Note     Name: Radha Feng  Clinic Number: [id number]    Therapy Diagnosis:   Encounter Diagnoses   Name Primary?    Leg weakness, bilateral Yes    Decreased mobility and endurance        Physician: Deandre Peterson MD    Visit Date: 2022    Physician Orders: PT Eval and Treat   Medical Diagnosis from Referral: K72.90 (ICD-10-CM) - Liver failure without hepatic coma, unspecified chronicity   Evaluation Date: 2022  Authorization Period Expiration: 2022  Plan of Care Expiration: 2023  Progress Note Due: 2022  Visit # / Visits authorized:   FOTO: Not taken     PTA Visit #:      Time In: 3:05pm   Time Out: 4:00pm   Total Billable Time: 55 minutes    Precautions: Standard, Diabetes, and Fall, Liver Failure     SUBJECTIVE     Pt reports: doing well today, she has been doing her exercises at home. She is having an easier time moving her L leg   She was compliant with home exercise program.  Response to previous treatment: felt good after   Functional change: limited mobility and strength , amb c/ SPC with daughter assist - will begin to ambulate with RW once received .     Pain: N/A    OBJECTIVE     Objective Measures updated at progress report unless specified.     Treatment     Radha received the treatments listed below:      therapeutic exercises to develop strength, endurance, and ROM for 50 minutes including:    *Rae stand pivot WC<>table    SAQs : 2 x 10 reps B   LAQs 5'' hold BLEs 1min x2   Seated Heel raises 1minx2   Seated toe raises 1 min x2     Seated marchin sets of 1 min   Seated hip add ball : 2 sets of 1 min  , 5'' hold   Seated hip abduction YTB: 2 sets of 1 min  , 5'' hold   Sit to stands 2 sets of 3, Mod A for safety- requires cueing for proper sequence, Bed raised up          Patient Education and Home Exercises     Home Exercises Provided and Patient Education Provided     Education  provided:   - Updated HEP provided   - Encouraged use of RW vs cane - amb with assistance     Written Home Exercises Provided: yes. Exercises were reviewed and Radha was able to demonstrate them prior to the end of the session.  Radha demonstrated good  understanding of the education provided. See EMR under Patient Instructions for exercises provided during therapy sessions    ASSESSMENT     Pt exhibits significant BLE weakness with more involvement on R>L.  Pt able to perform seated march on L side. Pt requires Mod A for safety and proper cueing for sit to stand sequence with bed raised up. Will continue to progress     Radha Is progressing well towards her goals.   Pt prognosis is Good.     Pt will continue to benefit from skilled outpatient physical therapy to address the deficits listed in the problem list box on initial evaluation, provide pt/family education and to maximize pt's level of independence in the home and community environment.   Pt's spiritual, cultural and educational needs considered and pt agreeable to plan of care and goals.     Anticipated barriers to physical therapy: fall risk    Goals:  Short Term Goals: 3 weeks   Pt will be independent with HEP   Pt will be able to perform one sit to stand independently   Pt will be able to walk with a walker with min A for 10 feet      Long Term Goals: 6 weeks   Pt will be independent with updated HEP   Pt will be SBA with walker for 30 feet with no LOB   Pt will be 4/5 for LE MMTs     PLAN     Continue to progress general strengthening and gait training.     Bina Hurtado, PT

## 2022-12-10 ENCOUNTER — PATIENT MESSAGE (OUTPATIENT)
Dept: PRIMARY CARE CLINIC | Facility: CLINIC | Age: 82
End: 2022-12-10
Payer: MEDICARE

## 2022-12-10 DIAGNOSIS — E11.59 TYPE 2 DIABETES MELLITUS WITH OTHER CIRCULATORY COMPLICATION, WITH LONG-TERM CURRENT USE OF INSULIN: Primary | ICD-10-CM

## 2022-12-10 DIAGNOSIS — Z79.4 TYPE 2 DIABETES MELLITUS WITH OTHER CIRCULATORY COMPLICATION, WITH LONG-TERM CURRENT USE OF INSULIN: Primary | ICD-10-CM

## 2022-12-12 ENCOUNTER — CLINICAL SUPPORT (OUTPATIENT)
Dept: REHABILITATION | Facility: HOSPITAL | Age: 82
End: 2022-12-12
Payer: MEDICARE

## 2022-12-12 ENCOUNTER — TELEPHONE (OUTPATIENT)
Dept: HEMATOLOGY/ONCOLOGY | Facility: CLINIC | Age: 82
End: 2022-12-12
Payer: MEDICARE

## 2022-12-12 DIAGNOSIS — R29.898 LEG WEAKNESS, BILATERAL: Primary | ICD-10-CM

## 2022-12-12 DIAGNOSIS — Z74.09 DECREASED MOBILITY AND ENDURANCE: ICD-10-CM

## 2022-12-12 PROCEDURE — 97110 THERAPEUTIC EXERCISES: CPT | Mod: CQ

## 2022-12-12 RX ORDER — BLOOD-GLUCOSE SENSOR
EACH MISCELLANEOUS
Qty: 1 EACH | Status: SHIPPED | OUTPATIENT
Start: 2022-12-12 | End: 2023-11-08

## 2022-12-12 RX ORDER — PEN NEEDLE, DIABETIC 30 GX3/16"
NEEDLE, DISPOSABLE MISCELLANEOUS
Qty: 400 EACH | Refills: 3 | Status: SHIPPED | OUTPATIENT
Start: 2022-12-12

## 2022-12-12 NOTE — TELEPHONE ENCOUNTER
No new care gaps identified.  [facility] Embedded Care Gaps. Reference number: [id number]. 12/12/2022   9:38:49 AM CST

## 2022-12-12 NOTE — PROGRESS NOTES
[facility]   Physical Therapy Treatment Note     Name: Radha Feng  Clinic Number: [id number]    Therapy Diagnosis:   Encounter Diagnoses   Name Primary?    Leg weakness, bilateral Yes    Decreased mobility and endurance          Physician: Deandre Peterson MD    Visit Date: 12/12/2022    Physician Orders: PT Eval and Treat   Medical Diagnosis from Referral: K72.90 (ICD-10-CM) - Liver failure without hepatic coma, unspecified chronicity   Evaluation Date: 11/29/2022  Authorization Period Expiration: 12/31/2022  Plan of Care Expiration: 1/11/2023  Progress Note Due: 12/21/2022  Visit # / Visits authorized: 3/ 6  FOTO: Not taken     PTA Visit #: 1/5     Time In: 12:00 pm  Time Out: 1:00 pm  Total Billable Time: 60 minutes    Precautions: Standard, Diabetes, and Fall, Liver Failure     SUBJECTIVE     Pt reports: doing well today . She has some R knee pain occasionally .   She was compliant with home exercise program.  Response to previous treatment: felt good after   Functional change: limited mobility and strength , amb c/ SPC with daughter assist - will begin to ambulate with RW once received .     Pain: N/A    OBJECTIVE     Objective Measures updated at progress report unless specified.     Treatment     Radha received the treatments listed below:      therapeutic exercises to develop strength, endurance, and ROM for 60 minutes including:    *Rae stand pivot WC<>table    SAQs : 2 x 10 reps B   LAQs 5'' hold BLEs 1min x2   Seated Heel raises 1minx2   Seated toe raises 1 min x2     Seated marching: 3 sets of 1 min   Seated hip add ball : 2 sets of 1 min  , 5'' hold   Seated hip abduction YTB: 2 sets of 1 min  , 5'' hold   Sit to stands 2 sets of 5, Min A-CGA for safety- requires cueing for proper sequence, Bed raised up    Standing in walker toe taps on 2' step RLE: 2 x 10 reps       Patient Education and Home Exercises     Home Exercises Provided and Patient Education Provided      Education provided:   - Updated HEP provided   - Encouraged use of RW vs cane - amb with assistance     Written Home Exercises Provided: yes. Exercises were reviewed and Radha was able to demonstrate them prior to the end of the session.  Radha demonstrated good  understanding of the education provided. See EMR under Patient Instructions for exercises provided during therapy sessions    ASSESSMENT     Pt is progressing well with noted improved R hip flexor strength and ability to perform marching with no assistance . Addition of standing 2' step toe taps performed to work on hip flexor strengthening and control in standing which pt tolerated well . Cues initially for proper sequencing with sit to stands which pt responded well to and was able to perform independently with SBA and min-no cues .  Will continue to progress     Radha Is progressing well towards her goals.   Pt prognosis is Good.     Pt will continue to benefit from skilled outpatient physical therapy to address the deficits listed in the problem list box on initial evaluation, provide pt/family education and to maximize pt's level of independence in the home and community environment.   Pt's spiritual, cultural and educational needs considered and pt agreeable to plan of care and goals.     Anticipated barriers to physical therapy: fall risk    Goals:  Short Term Goals: 3 weeks   Pt will be independent with HEP   Pt will be able to perform one sit to stand independently   Pt will be able to walk with a walker with min A for 10 feet      Long Term Goals: 6 weeks   Pt will be independent with updated HEP   Pt will be SBA with walker for 30 feet with no LOB   Pt will be 4/5 for LE MMTs     PLAN     Continue to progress general strengthening and gait training.     Nicole Disla, PTA

## 2022-12-14 ENCOUNTER — TELEPHONE (OUTPATIENT)
Dept: HEMATOLOGY/ONCOLOGY | Facility: CLINIC | Age: 82
End: 2022-12-14
Payer: MEDICARE

## 2022-12-14 NOTE — PROGRESS NOTES
PATIENT: Radha Feng  MRN: [id number]  DATE: 12/15/2022      Diagnosis:   1. Anemia due to other cause, not classified    2. Thrombocytopenia    3. Autoimmune hepatitis        Chief Complaint: L/M hospital f/u lin jacket, wheel chair and Knee Pain (RT/)      Subjective:    HPI: Ms. Feng is a 82 y.o. female with past medical history of HTN, HL, DMII, and recent diagnosis of autoimmune hepatitis (9/27/22) who presents for hospital follow-up of anemia. Patient admitted to the hospital 9/20-10/14 with initial diagnosis, 10/23-10/28 for hyperglycemia and thrombocytopenia on prednisone + azathioprine, and 11/3-11/11 with symptoms of decompensated liver failure with ascites. Hematology was consulted on initial hospitalization 9/25 with concerns for hemolytic anemia with hgb 10.4 and thrombocytopenia to 76k. Nutritional studies and genny test were negative, she did not have recent transfusions. Coagulation parameter abnormalities contributed to liver dysfunction. No schiscocytes were seen on peripheral smear. 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     Since most recent hospital discharge swelling has improved. Continues on prednisone 10 mg daily with plans to decr to 7.5 mg daily, off of imuran. Appetite has been good with adequate po intake. Nrml weight is 112, up to 131 lbs with fluid retention.     Present today with daughter, Della who is a nurse.     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    Diabetes mellitus     Glaucoma     Hypertension     Other ascites 11/29/2022       Past Surgical HIstory:   Past Surgical History:   Procedure Laterality Date    CATARACT EXTRACTION W/  INTRAOCULAR LENS IMPLANT Left 12/21/2021    Procedure: EXTRACTION, CATARACT, WITH IOL INSERTION;  Surgeon: Shay Chou MD;  Location: [facility];  Service: Ophthalmology;  Laterality: Left;       Family History:   Family History   Problem  "Relation Age of Onset    Cataracts Mother     Diabetes Mother     Glaucoma Mother     Hypertension Mother     Heart attack Father     Colon cancer Sister     Blindness Cousin     Amblyopia Neg Hx     Macular degeneration Neg Hx     Retinal detachment Neg Hx        Social History:  reports that she has quit smoking. She has never used smokeless tobacco. She reports that she does not currently use alcohol. She reports that she does not use drugs.    Allergies:  Review of patient's allergies indicates:  No Known Allergies    Medications:  Current Outpatient Medications   Medication Sig Dispense Refill    blood-glucose meter,continuous (DEXCOM G6 ) Misc Check blood sugar before meals and at bedtime 1 each PRN    blood-glucose sensor (DEXCOM G6 SENSOR) Amanda Check blood sugar before meals and at bedtime 1 each PRN    blood-glucose transmitter (DEXCOM G6 TRANSMITTER) Amanda Check blood sugar before meals and at bedtime 1 each PRN    brimonidine 0.2% (ALPHAGAN) 0.2 % Drop INSTILL 1 DROP INTO RIGHT EYE TWICE A DAY 30 mL 3    dorzolamide-timolol 2-0.5% (COSOPT) 22.3-6.8 mg/mL ophthalmic solution INSTILL 1 DROP INTO RIGHT EYE TWICE A DAY 10 mL 11    furosemide (LASIX) 40 MG tablet Take 1 tablet (40 mg total) by mouth once daily. 30 tablet 0    insulin aspart U-100 (NOVOLOG) 100 unit/mL (3 mL) InPn pen Inject 8 Units into the skin 3 (three) times daily with meals. Hold if pre-meal blood sugar is less than 100 or eating less thant <25% of meal 21 mL 1    insulin detemir U-100 (LEVEMIR FLEXTOUCH) 100 unit/mL (3 mL) SubQ InPn pen Inject 5 Units into the skin every evening. 6 mL 1    latanoprost 0.005 % ophthalmic solution PLACE 1 DROP INTO BOTH EYES ONCE DAILY. 7.5 mL 3    pen needle, diabetic 31 gauge x 5/16" Ndle Use to inject insulin into the skin up to 4 times daily 400 each 3    predniSONE (DELTASONE) 10 MG tablet Take 1 tablet (10 mg total) by mouth once daily. 30 tablet 0    spironolactone (ALDACTONE) 100 MG tablet " "Take 1 tablet (100 mg total) by mouth once daily. (Patient taking differently: Take 100 mg by mouth every 72 hours.) 30 tablet 0    acetaminophen (TYLENOL) 500 MG tablet Take 2 tablets (1,000 mg total) by mouth every 8 (eight) hours as needed for Pain. (Patient not taking: Reported on 11/28/2022)  0    k phos di & mono-sod phos mono (PHOSPHA 250 NEUTRAL) 250 mg Tab Take 1 tablet by mouth once daily. for 5 days (Patient not taking: Reported on 12/15/2022) 5 tablet 0    pantoprazole (PROTONIX) 20 MG tablet Take 1 tablet (20 mg total) by mouth once daily. (Patient not taking: Reported on 12/15/2022) 30 tablet 0     No current facility-administered medications for this visit.       Review of Systems   Constitutional:  Negative for activity change, appetite change, chills, fatigue and fever.   HENT:  Negative for congestion, drooling and tinnitus.    Respiratory:  Negative for apnea, cough, chest tightness and shortness of breath.    Cardiovascular:  Positive for leg swelling. Negative for chest pain and palpitations.   Gastrointestinal:  Positive for abdominal distention. Negative for abdominal pain.   Endocrine: Negative for cold intolerance and heat intolerance.   Genitourinary:  Negative for difficulty urinating and hematuria.   Musculoskeletal:  Negative for arthralgias, back pain and gait problem.   Skin:  Negative for color change and rash.   Neurological:  Positive for weakness. Negative for syncope.   Hematological:  Negative for adenopathy. Does not bruise/bleed easily.   Psychiatric/Behavioral:  Negative for agitation and confusion.      ECOG Performance Status: 1   Objective:      Vitals:   Vitals:    12/15/22 1350   BP: (!) 162/73   BP Location: Left arm   Patient Position: Sitting   BP Method: Medium (Automatic)   Pulse: 70   Resp: 16   Temp: 98.4 °F (36.9 °C)   TempSrc: Oral   SpO2: 97%   Weight: 59.8 kg (131 lb 11.6 oz)   Height: 5' 6" (1.676 m)       Physical Exam  Constitutional:       Appearance: " Normal appearance.   HENT:      Head: Normocephalic and atraumatic.      Mouth/Throat:      Mouth: Mucous membranes are moist.   Eyes:      Extraocular Movements: Extraocular movements intact.      Pupils: Pupils are equal, round, and reactive to light.   Cardiovascular:      Rate and Rhythm: Normal rate and regular rhythm.      Pulses: Normal pulses.      Heart sounds: No murmur heard.  Pulmonary:      Effort: Pulmonary effort is normal. No respiratory distress.      Breath sounds: Normal breath sounds.   Abdominal:      General: Abdomen is flat. There is distension.      Palpations: Abdomen is soft.      Tenderness: There is no abdominal tenderness.   Musculoskeletal:         General: No swelling or tenderness. Normal range of motion.      Cervical back: Normal range of motion. No rigidity.   Skin:     General: Skin is warm and dry.      Coloration: Skin is not jaundiced.   Neurological:      General: No focal deficit present.      Mental Status: She is alert and oriented to person, place, and time.   Psychiatric:         Mood and Affect: Mood normal.         Behavior: Behavior normal.       Laboratory Data:  No visits with results within 1 Week(s) from this visit.   Latest known visit with results is:   Lab Visit on 12/05/2022   Component Date Value Ref Range Status    WBC 12/05/2022 8.37  3.90 - 12.70 K/uL Final    RBC 12/05/2022 2.85 (L)  4.00 - 5.40 M/uL Final    Hemoglobin 12/05/2022 10.2 (L)  12.0 - 16.0 g/dL Final    Hematocrit 12/05/2022 32.4 (L)  37.0 - 48.5 % Final    MCV 12/05/2022 114 (H)  82 - 98 fL Final    MCH 12/05/2022 35.8 (H)  27.0 - 31.0 pg Final    MCHC 12/05/2022 31.5 (L)  32.0 - 36.0 g/dL Final    RDW 12/05/2022 13.2  11.5 - 14.5 % Final    Platelets 12/05/2022 99 (L)  150 - 450 K/uL Final    MPV 12/05/2022 11.2  9.2 - 12.9 fL Final    Immature Granulocytes 12/05/2022 0.5  0.0 - 0.5 % Final    Gran # (ANC) 12/05/2022 6.7  1.8 - 7.7 K/uL Final    Immature Grans (Abs) 12/05/2022 0.04  0.00 -  0.04 K/uL Final    Comment: Mild elevation in immature granulocytes is non specific and   can be seen in a variety of conditions including stress response,   acute inflammation, trauma and pregnancy. Correlation with other   laboratory and clinical findings is essential.      Lymph # 12/05/2022 0.9 (L)  1.0 - 4.8 K/uL Final    Mono # 12/05/2022 0.7  0.3 - 1.0 K/uL Final    Eos # 12/05/2022 0.0  0.0 - 0.5 K/uL Final    Baso # 12/05/2022 0.02  0.00 - 0.20 K/uL Final    nRBC 12/05/2022 0  0 /100 WBC Final    Gran % 12/05/2022 80.0 (H)  38.0 - 73.0 % Final    Lymph % 12/05/2022 11.2 (L)  18.0 - 48.0 % Final    Mono % 12/05/2022 7.9  4.0 - 15.0 % Final    Eosinophil % 12/05/2022 0.2  0.0 - 8.0 % Final    Basophil % 12/05/2022 0.2  0.0 - 1.9 % Final    Differential Method 12/05/2022 Automated   Final    Sodium 12/05/2022 138  136 - 145 mmol/L Final    Potassium 12/05/2022 3.6  3.5 - 5.1 mmol/L Final    Chloride 12/05/2022 109  95 - 110 mmol/L Final    CO2 12/05/2022 24  23 - 29 mmol/L Final    Glucose 12/05/2022 110  70 - 110 mg/dL Final    BUN 12/05/2022 14  8 - 23 mg/dL Final    Creatinine 12/05/2022 0.6  0.5 - 1.4 mg/dL Final    Calcium 12/05/2022 8.2 (L)  8.7 - 10.5 mg/dL Final    Total Protein 12/05/2022 5.6 (L)  6.0 - 8.4 g/dL Final    Albumin 12/05/2022 2.4 (L)  3.5 - 5.2 g/dL Final    Total Bilirubin 12/05/2022 2.1 (H)  0.1 - 1.0 mg/dL Final    Comment: For infants and newborns, interpretation of results should be based  on gestational age, weight and in agreement with clinical  observations.    Premature Infant recommended reference ranges:  Up to 24 hours.............<8.0 mg/dL  Up to 48 hours............<12.0 mg/dL  3-5 days..................<15.0 mg/dL  6-29 days.................<15.0 mg/dL      Alkaline Phosphatase 12/05/2022 91  55 - 135 U/L Final    AST 12/05/2022 42 (H)  10 - 40 U/L Final    ALT 12/05/2022 24  10 - 44 U/L Final    Anion Gap 12/05/2022 5 (L)  8 - 16 mmol/L Final    eGFR 12/05/2022 >60.0   >60 mL/min/1.73 m^2 Final         Imaging: reviewed   Assessment:       1. Anemia due to other cause, not classified    2. Thrombocytopenia    3. Autoimmune hepatitis           Plan:     Macrocytic anemia with broad differential diagnosis including autoimmune, medication induced, hepatic dysfunction and acute illness. Hemolysis and nutritional etiologies were ruled out, with the exception of a copper study, on past hospitalization. Will repeat autoimmune and hemolytic studies at next lab check since these can fluctuate over disease course, she remains on daily steroids which would treat autoimmune etiology. No longer on azathioprine which led to exacerbations of cytopenias. Macrocytosis and thrombocytopenia does raise concern for MDS, will plan to reevaluate in clinic and if cytopenias have not improved with normalization of liver function will consider bone marrow biopsy.   - 9/2022 nutritional studies: b12 >2k, folate 10.8, iron 101, 73% sat, ferritin 1,186  - 9/2022 hemolytic studies: ROMA, , haptoglobin <10     2. Thrombocytopenia - likely due to hepatic dysfunction and medications, no splenomegaly on exam or imaging. Will consider bone marrow biopsy in 3 months time if counts have not normalized.     3. Autoimmmune Hepatitis- Potentially medication induced (HCTZ). follows with Dr. Peraza, HCC screening every 6 months for cirrhosis. Continues on prednisone 10 mg daily, diuretics and HE medications.     Dispo: RTC in 3 months to follow up on blood counts     Patient discussed with attending physician, Dr. Hilario Brody, PGY-VI  Hematology/Oncology Fellow      BMT Chart Routing      Follow up with physician 3 months. Ronn 3/2023   Follow up with CORNEL    Provider visit type    Infusion scheduling note    Injection scheduling note    Labs    Imaging    Pharmacy appointment    Other referrals

## 2022-12-14 NOTE — TELEPHONE ENCOUNTER
Left message regarding confirmation of appointments scheduled for 12/15/22 as well as the clinic callback number.

## 2022-12-15 ENCOUNTER — CLINICAL SUPPORT (OUTPATIENT)
Dept: REHABILITATION | Facility: HOSPITAL | Age: 82
End: 2022-12-15
Payer: MEDICARE

## 2022-12-15 ENCOUNTER — OFFICE VISIT (OUTPATIENT)
Dept: HEMATOLOGY/ONCOLOGY | Facility: CLINIC | Age: 82
End: 2022-12-15
Payer: MEDICARE

## 2022-12-15 VITALS
TEMPERATURE: 98 F | SYSTOLIC BLOOD PRESSURE: 162 MMHG | HEIGHT: 66 IN | WEIGHT: 131.75 LBS | RESPIRATION RATE: 16 BRPM | HEART RATE: 70 BPM | DIASTOLIC BLOOD PRESSURE: 73 MMHG | BODY MASS INDEX: 21.17 KG/M2 | OXYGEN SATURATION: 97 %

## 2022-12-15 DIAGNOSIS — D69.6 THROMBOCYTOPENIA: ICD-10-CM

## 2022-12-15 DIAGNOSIS — D64.89 ANEMIA DUE TO OTHER CAUSE, NOT CLASSIFIED: Primary | ICD-10-CM

## 2022-12-15 DIAGNOSIS — Z74.09 DECREASED MOBILITY AND ENDURANCE: ICD-10-CM

## 2022-12-15 DIAGNOSIS — K75.4 AUTOIMMUNE HEPATITIS: Chronic | ICD-10-CM

## 2022-12-15 DIAGNOSIS — R29.898 LEG WEAKNESS, BILATERAL: Primary | ICD-10-CM

## 2022-12-15 PROCEDURE — 99214 OFFICE O/P EST MOD 30 MIN: CPT | Mod: S$PBB,GC,, | Performed by: STUDENT IN AN ORGANIZED HEALTH CARE EDUCATION/TRAINING PROGRAM

## 2022-12-15 PROCEDURE — 99214 OFFICE O/P EST MOD 30 MIN: CPT | Mod: PBBFAC | Performed by: STUDENT IN AN ORGANIZED HEALTH CARE EDUCATION/TRAINING PROGRAM

## 2022-12-15 PROCEDURE — 97110 THERAPEUTIC EXERCISES: CPT

## 2022-12-15 PROCEDURE — 99214 PR OFFICE/OUTPT VISIT, EST, LEVL IV, 30-39 MIN: ICD-10-PCS | Mod: S$PBB,GC,, | Performed by: STUDENT IN AN ORGANIZED HEALTH CARE EDUCATION/TRAINING PROGRAM

## 2022-12-15 PROCEDURE — 99999 PR PBB SHADOW E&M-EST. PATIENT-LVL IV: ICD-10-PCS | Mod: PBBFAC,GC,, | Performed by: STUDENT IN AN ORGANIZED HEALTH CARE EDUCATION/TRAINING PROGRAM

## 2022-12-15 PROCEDURE — 99999 PR PBB SHADOW E&M-EST. PATIENT-LVL IV: CPT | Mod: PBBFAC,GC,, | Performed by: STUDENT IN AN ORGANIZED HEALTH CARE EDUCATION/TRAINING PROGRAM

## 2022-12-19 ENCOUNTER — PATIENT MESSAGE (OUTPATIENT)
Dept: HEPATOLOGY | Facility: CLINIC | Age: 82
End: 2022-12-19
Payer: MEDICARE

## 2022-12-19 ENCOUNTER — PATIENT MESSAGE (OUTPATIENT)
Dept: UROGYNECOLOGY | Facility: CLINIC | Age: 82
End: 2022-12-19
Payer: MEDICARE

## 2022-12-20 ENCOUNTER — PATIENT MESSAGE (OUTPATIENT)
Dept: HEPATOLOGY | Facility: CLINIC | Age: 82
End: 2022-12-20
Payer: MEDICARE

## 2022-12-20 ENCOUNTER — OFFICE VISIT (OUTPATIENT)
Dept: UROGYNECOLOGY | Facility: CLINIC | Age: 82
End: 2022-12-20
Payer: MEDICARE

## 2022-12-20 ENCOUNTER — TELEPHONE (OUTPATIENT)
Dept: HEPATOLOGY | Facility: CLINIC | Age: 82
End: 2022-12-20
Payer: MEDICARE

## 2022-12-20 ENCOUNTER — TELEPHONE (OUTPATIENT)
Dept: INTERVENTIONAL RADIOLOGY/VASCULAR | Facility: CLINIC | Age: 82
End: 2022-12-20
Payer: MEDICARE

## 2022-12-20 ENCOUNTER — CLINICAL SUPPORT (OUTPATIENT)
Dept: REHABILITATION | Facility: HOSPITAL | Age: 82
End: 2022-12-20
Payer: MEDICARE

## 2022-12-20 VITALS
HEIGHT: 66 IN | BODY MASS INDEX: 21.18 KG/M2 | DIASTOLIC BLOOD PRESSURE: 74 MMHG | SYSTOLIC BLOOD PRESSURE: 126 MMHG | WEIGHT: 131.81 LBS

## 2022-12-20 DIAGNOSIS — R18.8 OTHER ASCITES: Primary | ICD-10-CM

## 2022-12-20 DIAGNOSIS — N81.10 PROLAPSE OF ANTERIOR VAGINAL WALL: ICD-10-CM

## 2022-12-20 DIAGNOSIS — N81.3 UTEROVAGINAL PROLAPSE, COMPLETE: Primary | ICD-10-CM

## 2022-12-20 DIAGNOSIS — Z74.09 DECREASED MOBILITY AND ENDURANCE: ICD-10-CM

## 2022-12-20 DIAGNOSIS — R29.898 LEG WEAKNESS, BILATERAL: Primary | ICD-10-CM

## 2022-12-20 DIAGNOSIS — N81.6 POSTERIOR VAGINAL WALL PROLAPSE: ICD-10-CM

## 2022-12-20 DIAGNOSIS — N39.46 MIXED URGE AND STRESS INCONTINENCE: ICD-10-CM

## 2022-12-20 PROCEDURE — 99203 OFFICE O/P NEW LOW 30 MIN: CPT | Mod: S$PBB,,, | Performed by: OBSTETRICS & GYNECOLOGY

## 2022-12-20 PROCEDURE — 97112 NEUROMUSCULAR REEDUCATION: CPT | Mod: CQ

## 2022-12-20 PROCEDURE — 99214 OFFICE O/P EST MOD 30 MIN: CPT | Mod: PBBFAC | Performed by: OBSTETRICS & GYNECOLOGY

## 2022-12-20 PROCEDURE — 99999 PR PBB SHADOW E&M-EST. PATIENT-LVL IV: CPT | Mod: PBBFAC,,, | Performed by: OBSTETRICS & GYNECOLOGY

## 2022-12-20 PROCEDURE — 99203 PR OFFICE/OUTPT VISIT, NEW, LEVL III, 30-44 MIN: ICD-10-PCS | Mod: S$PBB,,, | Performed by: OBSTETRICS & GYNECOLOGY

## 2022-12-20 PROCEDURE — 87086 URINE CULTURE/COLONY COUNT: CPT | Performed by: OBSTETRICS & GYNECOLOGY

## 2022-12-20 PROCEDURE — 87186 SC STD MICRODIL/AGAR DIL: CPT | Performed by: OBSTETRICS & GYNECOLOGY

## 2022-12-20 PROCEDURE — 87077 CULTURE AEROBIC IDENTIFY: CPT | Performed by: OBSTETRICS & GYNECOLOGY

## 2022-12-20 PROCEDURE — 97110 THERAPEUTIC EXERCISES: CPT | Mod: CQ

## 2022-12-20 PROCEDURE — 87088 URINE BACTERIA CULTURE: CPT | Performed by: OBSTETRICS & GYNECOLOGY

## 2022-12-20 PROCEDURE — 99999 PR PBB SHADOW E&M-EST. PATIENT-LVL IV: ICD-10-PCS | Mod: PBBFAC,,, | Performed by: OBSTETRICS & GYNECOLOGY

## 2022-12-20 NOTE — PROGRESS NOTES
Subjective:       Patient ID: Radha Feng is a 82 y.o. female.    Chief Complaint:  Vaginal Prolapse    History of Present Illness  Female  Problem  The patient's pertinent negatives include no genital itching, genital lesions, genital odor, genital rash, missed menses, pelvic pain, vaginal bleeding or vaginal discharge. This is a chronic problem. The current episode started more than 1 month ago. The problem occurs daily. The problem has been unchanged. The patient is experiencing no pain. She is not pregnant. Pertinent negatives include no abdominal pain, anorexia, back pain, chills, constipation, diarrhea, discolored urine, dysuria, fever, flank pain, frequency, headaches, hematuria, joint pain, joint swelling, nausea, painful intercourse, rash, sore throat, urgency or vomiting. Nothing aggravates the symptoms. She is not sexually active. No, her partner does not have an STD. She is postmenopausal.  82 y.o.  female No obstetric history on file.   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      [facility]    Question 12/20/2022  1:44 PM CST - Filed by Patient   General Urogynecology: Are you experiencing the following?    Dysuria (painful urination) No   Nocturia:  waking up at night to empty your bladder  Yes   If you answered yes to the previous question, how many times does this happen per night? 1-2   Enuresis (urine loss during sleep) No   Dribbling urine after you urinate No   Hematuria (urine appears red) No   Type of stream Weak   Urinary frequency: How often a day are you going to the bathroom per day?  Less than 10   Urinary Tract Infections: How many Urinary Tract Infections have you had in the past year? One (1) in the past year   If you have had a UTI in the past year, what treatments have you had so far?  Prophylactic antibiotics   Urinary Incontinence (General): Are you  "experiencing the following?    Past consultation for incontinence: Have you ever seen someone for the evaluation of incontinence? No   If you answered yes to the previous question, please select all the therapies you have tried.  N/a- I answered no to the previous question   Please note the effectiveness of the therapies.    Need to wear protection to keep clothes dry  Yes   If you answered yes to the previous question, please scar the protection you use.  Poise   If you wear protection, how much wetness is typically on each pad? Slight   If you wear protection, how often do you have to change per day, if applicable?  3-4   Stress Symptoms: Are you experiencing the following?    Leakage of urine with cough, laugh and/or sneeze Yes   If you answered yes to the previous question, what is the frequency in days, weeks and/or months? Daily   Leakage of urine with sex No   Leakage of urine with bending/ lifting No   Leakage of urine with briskly walking or jogging No   If you lose urine for any other reason not previously mentioned, please note it below, if applicable.     Urge Symptoms: Are you experiencing the following?    Urgency ("got to go" feeling) No   Urge: How frequently do you feel an urge to urinate (feeling like you "gotta go" to the bathroom and can't wait) Never   Do you experience a leakage of urine when you have a feeling of urgency?  No   Leakage of urine when unaware No   Past use of anticholinergics (medications used to treat overactive bladder) No   If you answered yes to the previous question, please scar the anticholinergics you have used:     Have you ever used Mirbetriq (aka Mirabegron)?  No   Prolapse Symptoms: Are you experiencing any of the following?     Falling out/ Bulging/ Heaviness in the vagina Yes   Vaginal/ Abdominal Pain/ Pressure No   Need to strain/ Push to void No   Need to wait on the toilet before you void No   Unusual position to urinate (using your hands to push back the vaginal " bulge) No   Sensation of incomplete emptying No   Past use of pessary device No   If you answered yes to the previous question, please list the devices you have used below.     Bowel Symptoms: Are you experiencing any of the following?    Constipation No   Diarrhea  No   Hematochezia (bloody stool) No   Incomplete evacuation of stool No   Involuntary loss of formed stool No   Fecal smearing/urgency No   Involuntary loss of gas Yes   Vaginal Symptoms: Are you experiencing any of the following?     Abnormal vaginal bleeding  No   Vaginal dryness No   Sexually active  No   Dyspareunia (painful intercourse) No   Estrogen use  No     GYN & OB History  No LMP recorded. Patient has had a hysterectomy.   Date of Last Pap: No result found    OB History   No obstetric history on file.     OB     x 5.  C/s x 0.    Largest: 7 # 12 oz   Forceps: yes  Episiotomy:  yes  Degree: 3rd or 4th no    GYN  Menarche:  14  Menstrual cycle: n/a  Menopause:  50's  Hysterectomy: No  Ovaries: Present   Tubal ligation: No   Other abdominal surgeries:  No       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    Diabetes mellitus     Glaucoma     Hypertension     Other ascites 2022     Past Surgical History:   Procedure Laterality Date    CATARACT EXTRACTION W/  INTRAOCULAR LENS IMPLANT Left 2021    Procedure: EXTRACTION, CATARACT, WITH IOL INSERTION;  Surgeon: Shay Chou MD;  Location: [facility];  Service: Ophthalmology;  Laterality: Left;       Review of Systems  Review of Systems   Constitutional:  Negative for chills and fever.   HENT:  Negative for sore throat.    Gastrointestinal:  Negative for abdominal pain, anorexia, constipation, diarrhea, nausea and vomiting.   Genitourinary:  Negative for dysuria, flank pain, frequency, hematuria, missed menses, pelvic pain, urgency and vaginal discharge.   Musculoskeletal:  Negative for back pain and joint pain.   Skin:  Negative  for rash.   Neurological:  Negative for headaches.         Objective:     Physical Exam   Constitutional: She is oriented to person, place, and time. She appears well-developed.   HENT:   Head: Normocephalic and atraumatic.   Eyes: Conjunctivae and EOM are normal.   Cardiovascular: Normal rate, regular rhythm, S1 normal, S2 normal, normal heart sounds and intact distal pulses.   Pulmonary/Chest: Effort normal and breath sounds normal. She exhibits no tenderness.   Abdominal: Soft. Bowel sounds are normal. She exhibits no distension and no mass. There is no splenomegaly or hepatomegaly. There is no abdominal tenderness. There is no rigidity, no rebound and no guarding. No hernia.   Genitourinary: Pelvic exam was performed with patient supine. Rectum normal, vagina normal, uterus normal, cervix normal, skenes normal and bartholins normal. Right labia normal and left labia normal. Urethra exhibits hypermobility. Urethra exhibits no urethral caruncle, no urethral diverticulum and no urethral mass. Right bartholin is not enlarged and not tender. Left bartholin is not enlarged and not tender. Rectal exam shows resting tone normal and active tone normal. Rectal exam shows no external hemorrhoid, no fissure, no tenderness, anal tone normal and no dovetailing. Guaiac negative stool. There is atrophy in the vagina. No foreign body, tenderness, bleeding, unspecified prolapse of vaginal walls, fistula, mesh exposure or lavator tenderness in the vagina. Right adnexum displays no mass and no tenderness. Left adnexum displays no mass and no tenderness. Cervix exhibits no motion tenderness and no discharge. Uterus is not tender and not experiencing uterine prolapse.   PVR: 110 ML  Empty cough stress test: Negative.  Kegel: 2/5    POP-Q  Aa: 3 Ba: 3 C: 5   GH: 4 PB: 2 TVL: 10   Ap: 0 Bp: 0 D: -1                     Musculoskeletal:         General: Normal range of motion.      Cervical back: Normal range of motion and neck supple.    Neurological: She is alert and oriented to person, place, and time. She has normal strength and normal reflexes. Cranial nerves II through XII intact. No cranial nerve deficit.   Skin: Skin is warm and dry.   Psychiatric: She has a normal mood and affect. Her speech is normal and behavior is normal. Judgment normal.        The patient was fit with #2 1/2 pessary.  She was able to tolerate the device comfortabley with bending, squatting, valsalva.  She was able to demonstrate independent removal and placement.  She tolerated the procedure well.         HCM  Pap's: No history of Abnormal pap's   Mammo: BIRADS: 1  ; Last imaging  normal last done    Colonoscopy: N/a:    Dexa:  Normal      Assessment:        1. Uterovaginal prolapse, complete    2. Mixed urge and stress incontinence    3. Prolapse of anterior vaginal wall    4. Posterior vaginal wall prolapse                Plan:    1. Prolapse: Stage 2 apical prolapse, Stage 2 anterior and posterior vaginal wall prolapse   --Pessary benefit discussed with patient, fitted with pessary GH  2 1/2   --Daily pelvic floor exercises as assigned, Pelvic floor PT   --Non surgical options discussed with patient      2.  Mixed urinary incontinence, urge > stress:   --Bladder diary for 3-7 days, write the number of times you go to the rest room and associated symptoms of urgency or leakage.   --Empty bladder every 3 hours.  Empty well: wait a minute, lean forward on toilet.    --Avoid dietary irritants (see sheet).  Keep diary x 3-5 days to determine your irritants.  --KEGELS: do 10 in AM and 10 in PM, holding each x 10 seconds.  When you feel urge to go, STOP, KEGEL, and when urge has passed, then go to bathroom.  Start pelvic floor PT.     --URGE: .  SE profile reviewed.    Takes 2-4 weeks to see if will have effect.  For dry mouth: get sour, sugar free lozenge or gum.    --STRESS:  Non surgical options: Pessary vs. Impressa vs Rivive - which represent urethral and bladder  support devices; Surgical options including 1.  mid urethral sling; retropubic, transobturator vs single incision 2. Fascial slings 3. Hutchison procedure 4. Periurethral bulking       Approximately 45 minutes of total time spent in consult, 75 % in discussion. This includes face to face time and non-face to face time preparing to see the patient, obtaining and/or reviewing separately obtained history, documenting clinical information in the electronic or other health record, independently interpreting results (not separately reported) and communicating results to the patient/family/caregiver, or care coordination (not separately reported).      Thank you for requesting consultation of your patient.  I look forward to participating in her care.    Luisa Evans DO  Female Pelvic Medicine and Reconstructive Surgery  [facility] [place], [place]

## 2022-12-20 NOTE — PROGRESS NOTES
[facility]   Physical Therapy Treatment Note     Name: Radha Feng  Clinic Number: [id number]    Therapy Diagnosis:   Encounter Diagnoses   Name Primary?    Leg weakness, bilateral Yes    Decreased mobility and endurance          Physician: Deandre Peterson MD    Visit Date: 12/20/2022    Physician Orders: PT Eval and Treat   Medical Diagnosis from Referral: K72.90 (ICD-10-CM) - Liver failure without hepatic coma, unspecified chronicity   Evaluation Date: 11/29/2022  Authorization Period Expiration: 12/31/2022  Plan of Care Expiration: 1/11/2023  Progress Note Due: 12/21/2022  Visit # / Visits authorized: 5/ 20  FOTO: Not taken     PTA Visit #: 1/5     Time In: 4:15 pm   Time Out: 5:00pm   Total Billable Time: 45 minutes    Precautions: Standard, Diabetes, and Fall, Liver Failure     SUBJECTIVE     Pt reports: she has a lot of doctor appointments today which all went well. She is feeling good currently.   She was compliant with home exercise program.  Response to previous treatment: felt good after   Functional change: amb with RW , WC longer distances     Pain: N/A    OBJECTIVE     Objective Measures updated at progress report unless specified.     Treatment     Pt presents in WC with daughter assist.     Radha received the treatments listed below:      therapeutic exercises to develop strength, endurance, and ROM for 15 minutes including:    **CGA for all activities below     Step up on 4' step with BUEs and CGA -  2 x 5 reps BLE   Standing march in // bars : 2 x 10 reps   Standing heel raises : 2 x 10 reps       neuromuscular re-education activities to improve: Balance, Coordination, Sense, and Proprioception for 30 minutes. The following activities were included:      WBOS balance // bars : x 30''   NBOS balance // bars  : 2 x 30''  NBOS c/ head turns : 2 x 30''   Tandem stance : x 30 each LE   NBOS with ball toss/catch : 2 x 1'         Patient Education and Home Exercises      Home Exercises Provided and Patient Education Provided     Education provided:   - Updated HEP provided   - Encouraged use of RW vs cane - amb with assistance     Written Home Exercises Provided: yes. Exercises were reviewed and Radha was able to demonstrate them prior to the end of the session.  Radha demonstrated good  understanding of the education provided. See EMR under Patient Instructions for exercises provided during therapy sessions    ASSESSMENT     Progressed with all standing and balancing activities today which pt tolerated very well. Noted increased difficulty with step ups leading with RLE due to continued weakness in R leg although able to perform with min UE assist and only very light UE assist on LLE. Addition of balancing activities performed ( as above) with good tolerance and no UE use while performing . She was challenged with tandem balancing and required occasional UE assist with this activity. Frequent seated rest breaks taken to prevent over exertion. She continues to progress well with function and strengthening.     Radha Is progressing well towards her goals.   Pt prognosis is Good.     Pt will continue to benefit from skilled outpatient physical therapy to address the deficits listed in the problem list box on initial evaluation, provide pt/family education and to maximize pt's level of independence in the home and community environment.   Pt's spiritual, cultural and educational needs considered and pt agreeable to plan of care and goals.     Anticipated barriers to physical therapy: fall risk    Goals:  Short Term Goals: 3 weeks   Pt will be independent with HEP   Pt will be able to perform one sit to stand independently   Pt will be able to walk with a walker with min A for 10 feet      Long Term Goals: 6 weeks   Pt will be independent with updated HEP   Pt will be SBA with walker for 30 feet with no LOB   Pt will be 4/5 for LE MMTs     PLAN     Continue to progress general  strengthening and gait training.     Nicole Disla, PTA

## 2022-12-20 NOTE — TELEPHONE ENCOUNTER
Left message for pt to return my call. Need to schedule IR para.  Please forward call to [phone]. Thanks

## 2022-12-23 LAB — BACTERIA UR CULT: ABNORMAL

## 2022-12-27 ENCOUNTER — LAB VISIT (OUTPATIENT)
Dept: LAB | Facility: HOSPITAL | Age: 82
End: 2022-12-27
Attending: INTERNAL MEDICINE
Payer: MEDICARE

## 2022-12-27 ENCOUNTER — CLINICAL SUPPORT (OUTPATIENT)
Dept: REHABILITATION | Facility: HOSPITAL | Age: 82
End: 2022-12-27
Payer: MEDICARE

## 2022-12-27 DIAGNOSIS — R29.898 LEG WEAKNESS, BILATERAL: Primary | ICD-10-CM

## 2022-12-27 DIAGNOSIS — K74.60 HEPATIC CIRRHOSIS, UNSPECIFIED HEPATIC CIRRHOSIS TYPE, UNSPECIFIED WHETHER ASCITES PRESENT: ICD-10-CM

## 2022-12-27 DIAGNOSIS — K75.4 AUTOIMMUNE HEPATITIS: Chronic | ICD-10-CM

## 2022-12-27 DIAGNOSIS — D64.89 ANEMIA DUE TO OTHER CAUSE, NOT CLASSIFIED: ICD-10-CM

## 2022-12-27 DIAGNOSIS — Z74.09 DECREASED MOBILITY AND ENDURANCE: ICD-10-CM

## 2022-12-27 LAB
ALBUMIN SERPL BCP-MCNC: 2.3 G/DL (ref 3.5–5.2)
ALP SERPL-CCNC: 89 U/L (ref 55–135)
ALT SERPL W/O P-5'-P-CCNC: 17 U/L (ref 10–44)
ANION GAP SERPL CALC-SCNC: 7 MMOL/L (ref 8–16)
AST SERPL-CCNC: 31 U/L (ref 10–40)
BASOPHILS # BLD AUTO: 0.03 K/UL (ref 0–0.2)
BASOPHILS NFR BLD: 0.3 % (ref 0–1.9)
BILIRUB DIRECT SERPL-MCNC: 0.7 MG/DL (ref 0.1–0.3)
BILIRUB SERPL-MCNC: 1.7 MG/DL (ref 0.1–1)
BUN SERPL-MCNC: 12 MG/DL (ref 8–23)
CALCIUM SERPL-MCNC: 8.3 MG/DL (ref 8.7–10.5)
CHLORIDE SERPL-SCNC: 111 MMOL/L (ref 95–110)
CO2 SERPL-SCNC: 23 MMOL/L (ref 23–29)
CREAT SERPL-MCNC: 0.7 MG/DL (ref 0.5–1.4)
DAT IGG-SP REAG RBC-IMP: NORMAL
DIFFERENTIAL METHOD: ABNORMAL
EOSINOPHIL # BLD AUTO: 0.2 K/UL (ref 0–0.5)
EOSINOPHIL NFR BLD: 2 % (ref 0–8)
ERYTHROCYTE [DISTWIDTH] IN BLOOD BY AUTOMATED COUNT: 12.7 % (ref 11.5–14.5)
EST. GFR  (NO RACE VARIABLE): >60 ML/MIN/1.73 M^2
GLUCOSE SERPL-MCNC: 85 MG/DL (ref 70–110)
HAPTOGLOB SERPL-MCNC: 45 MG/DL (ref 30–250)
HCT VFR BLD AUTO: 33 % (ref 37–48.5)
HGB BLD-MCNC: 10.7 G/DL (ref 12–16)
IMM GRANULOCYTES # BLD AUTO: 0.05 K/UL (ref 0–0.04)
IMM GRANULOCYTES NFR BLD AUTO: 0.5 % (ref 0–0.5)
INR PPP: 1.3 (ref 0.8–1.2)
LDH SERPL L TO P-CCNC: 337 U/L (ref 110–260)
LYMPHOCYTES # BLD AUTO: 0.9 K/UL (ref 1–4.8)
LYMPHOCYTES NFR BLD: 8 % (ref 18–48)
MCH RBC QN AUTO: 34.4 PG (ref 27–31)
MCHC RBC AUTO-ENTMCNC: 32.4 G/DL (ref 32–36)
MCV RBC AUTO: 106 FL (ref 82–98)
MONOCYTES # BLD AUTO: 0.5 K/UL (ref 0.3–1)
MONOCYTES NFR BLD: 4.7 % (ref 4–15)
NEUTROPHILS # BLD AUTO: 9.1 K/UL (ref 1.8–7.7)
NEUTROPHILS NFR BLD: 84.5 % (ref 38–73)
NRBC BLD-RTO: 0 /100 WBC
PLATELET # BLD AUTO: 100 K/UL (ref 150–450)
PMV BLD AUTO: 12.3 FL (ref 9.2–12.9)
POTASSIUM SERPL-SCNC: 3.6 MMOL/L (ref 3.5–5.1)
PROT SERPL-MCNC: 5.8 G/DL (ref 6–8.4)
PROTHROMBIN TIME: 13.1 SEC (ref 9–12.5)
RBC # BLD AUTO: 3.11 M/UL (ref 4–5.4)
RETICS/RBC NFR AUTO: 3.5 % (ref 0.5–2.5)
SODIUM SERPL-SCNC: 141 MMOL/L (ref 136–145)
WBC # BLD AUTO: 10.74 K/UL (ref 3.9–12.7)

## 2022-12-27 PROCEDURE — 83010 ASSAY OF HAPTOGLOBIN QUANT: CPT | Performed by: STUDENT IN AN ORGANIZED HEALTH CARE EDUCATION/TRAINING PROGRAM

## 2022-12-27 PROCEDURE — 97110 THERAPEUTIC EXERCISES: CPT | Mod: CQ

## 2022-12-27 PROCEDURE — 36415 COLL VENOUS BLD VENIPUNCTURE: CPT | Performed by: INTERNAL MEDICINE

## 2022-12-27 PROCEDURE — 85025 COMPLETE CBC W/AUTO DIFF WBC: CPT | Performed by: INTERNAL MEDICINE

## 2022-12-27 PROCEDURE — 85610 PROTHROMBIN TIME: CPT | Performed by: INTERNAL MEDICINE

## 2022-12-27 PROCEDURE — 83615 LACTATE (LD) (LDH) ENZYME: CPT | Performed by: STUDENT IN AN ORGANIZED HEALTH CARE EDUCATION/TRAINING PROGRAM

## 2022-12-27 PROCEDURE — 82248 BILIRUBIN DIRECT: CPT | Performed by: INTERNAL MEDICINE

## 2022-12-27 PROCEDURE — 82525 ASSAY OF COPPER: CPT | Performed by: STUDENT IN AN ORGANIZED HEALTH CARE EDUCATION/TRAINING PROGRAM

## 2022-12-27 PROCEDURE — 85045 AUTOMATED RETICULOCYTE COUNT: CPT | Performed by: STUDENT IN AN ORGANIZED HEALTH CARE EDUCATION/TRAINING PROGRAM

## 2022-12-27 PROCEDURE — 80053 COMPREHEN METABOLIC PANEL: CPT | Performed by: INTERNAL MEDICINE

## 2022-12-27 PROCEDURE — 97112 NEUROMUSCULAR REEDUCATION: CPT | Mod: CQ

## 2022-12-27 PROCEDURE — 86880 COOMBS TEST DIRECT: CPT | Performed by: STUDENT IN AN ORGANIZED HEALTH CARE EDUCATION/TRAINING PROGRAM

## 2022-12-27 NOTE — PROGRESS NOTES
[facility]   Physical Therapy Treatment Note     Name: Radha Feng  Clinic Number: [id number]    Therapy Diagnosis:   Encounter Diagnoses   Name Primary?    Leg weakness, bilateral Yes    Decreased mobility and endurance        Physician: Deandre Peterson MD    Visit Date: 12/27/2022    Physician Orders: PT Eval and Treat   Medical Diagnosis from Referral: K72.90 (ICD-10-CM) - Liver failure without hepatic coma, unspecified chronicity   Evaluation Date: 11/29/2022  Authorization Period Expiration: 12/31/2022  Plan of Care Expiration: 1/11/2023  Progress Note Due: 12/21/2022  Visit # / Visits authorized: 6/ 20  FOTO: Not taken     PTA Visit #: 2/5     Time In: 4::00 pm   Time Out: 4:55 pm  Total Billable Time: 55 minutes    Precautions: Standard, Diabetes, and Fall, Liver Failure     SUBJECTIVE     Pt reports: she is doing well today and feels improvements overall.   She was compliant with home exercise program.  Response to previous treatment: felt good after , denies soreness or fatigue .   Functional change: amb with RW , WC longer distances     Pain: N/A    OBJECTIVE     Objective Measures updated at progress report unless specified.     Treatment     Pt presents in WC with daughter assist.     Radha received the treatments listed below:      therapeutic exercises to develop strength, endurance, and ROM for 25 minutes including:    Aerobic activity and endurance training for reciprocal motion of lower limbs on Nustep for 6 minutes at Level 1.0 at > or equal to 50 spm w/ rest to increase mobility, blood flow and improve tissue tolerance      **CGA for all activities below     Step up on 4' step with BUEs and CGA -  2 x 5 reps BLE   Standing march in // bars : 2 x 20 reps   Standing heel raises : 2 x 10 reps   Standing hip abduction : 2 x 10 reps B    neuromuscular re-education activities to improve: Balance, Coordination, Sense, and Proprioception for 30 minutes. The following  activities were included:      NBOS balance // bars  : 2 x 30''  NBOS c/ head turns : 2 x 30''   NBOS eyes closed : 2 x 30''   Tandem stance : x 30 each LE   NBOS with ball toss/catch : 2 x 1'         Patient Education and Home Exercises     Home Exercises Provided and Patient Education Provided     Education provided:   - Updated HEP provided   - Encouraged use of RW vs cane - amb with assistance     Written Home Exercises Provided: yes. Exercises were reviewed and Radha was able to demonstrate them prior to the end of the session.  Radha demonstrated good  understanding of the education provided. See EMR under Patient Instructions for exercises provided during therapy sessions    ASSESSMENT     Pt continues to exhibit improved functional strength and endurance . Continued to focus on standing and balance activities to help return to PLOF. Frequent seated rest breaks taken to prevent over exertion. Good response post session noted .     Radha Is progressing well towards her goals.   Pt prognosis is Good.     Pt will continue to benefit from skilled outpatient physical therapy to address the deficits listed in the problem list box on initial evaluation, provide pt/family education and to maximize pt's level of independence in the home and community environment.   Pt's spiritual, cultural and educational needs considered and pt agreeable to plan of care and goals.     Anticipated barriers to physical therapy: fall risk    Goals:  Short Term Goals: 3 weeks   Pt will be independent with HEP   Pt will be able to perform one sit to stand independently   Pt will be able to walk with a walker with min A for 10 feet      Long Term Goals: 6 weeks   Pt will be independent with updated HEP   Pt will be SBA with walker for 30 feet with no LOB   Pt will be 4/5 for LE MMTs     PLAN     Continue to progress general strengthening and gait training.     Nicole Disla, PTA

## 2022-12-28 ENCOUNTER — PATIENT MESSAGE (OUTPATIENT)
Dept: UROGYNECOLOGY | Facility: CLINIC | Age: 82
End: 2022-12-28
Payer: MEDICARE

## 2022-12-28 DIAGNOSIS — N30.00 ACUTE CYSTITIS WITHOUT HEMATURIA: Primary | ICD-10-CM

## 2022-12-28 DIAGNOSIS — N39.46 MIXED URGE AND STRESS INCONTINENCE: ICD-10-CM

## 2022-12-28 RX ORDER — CEPHALEXIN 500 MG/1
500 CAPSULE ORAL EVERY 12 HOURS
Qty: 10 CAPSULE | Refills: 0 | Status: SHIPPED | OUTPATIENT
Start: 2022-12-28 | End: 2023-01-09

## 2022-12-29 ENCOUNTER — PATIENT MESSAGE (OUTPATIENT)
Dept: HEPATOLOGY | Facility: CLINIC | Age: 82
End: 2022-12-29
Payer: MEDICARE

## 2022-12-29 ENCOUNTER — CLINICAL SUPPORT (OUTPATIENT)
Dept: REHABILITATION | Facility: HOSPITAL | Age: 82
End: 2022-12-29
Payer: MEDICARE

## 2022-12-29 DIAGNOSIS — R29.898 LEG WEAKNESS, BILATERAL: Primary | ICD-10-CM

## 2022-12-29 DIAGNOSIS — Z74.09 DECREASED MOBILITY AND ENDURANCE: ICD-10-CM

## 2022-12-29 PROCEDURE — 97110 THERAPEUTIC EXERCISES: CPT | Mod: CQ

## 2022-12-29 PROCEDURE — 97112 NEUROMUSCULAR REEDUCATION: CPT | Mod: CQ

## 2022-12-29 NOTE — PROGRESS NOTES
[facility]   Physical Therapy Treatment Note     Name: Radha Feng  Clinic Number: [id number]    Therapy Diagnosis:   Encounter Diagnoses   Name Primary?    Leg weakness, bilateral Yes    Decreased mobility and endurance          Physician: Deandre Peterson MD    Visit Date: 12/29/2022    Physician Orders: PT Eval and Treat   Medical Diagnosis from Referral: K72.90 (ICD-10-CM) - Liver failure without hepatic coma, unspecified chronicity   Evaluation Date: 11/29/2022  Authorization Period Expiration: 12/31/2022  Plan of Care Expiration: 1/11/2023  Progress Note Due: 12/21/2022  Visit # / Visits authorized: 7/ 20  FOTO: Not taken     PTA Visit #: 3/5     Time In: 1:08 pm   Time Out: 2:00 pm  Total Billable Time: 52 minutes    Precautions: Standard, Diabetes, and Fall, Liver Failure     SUBJECTIVE     Pt reports: she is doing well today and feels improvements overall.   She was compliant with home exercise program.  Response to previous treatment: felt good after , denies soreness or fatigue .   Functional change: amb with RW , WC longer distances     Pain: N/A    OBJECTIVE     Objective Measures updated at progress report unless specified.     Treatment     Pt presents in WC with daughter assist.     Radha received the treatments listed below:      therapeutic exercises to develop strength, endurance, and ROM for 35 minutes including:    Aerobic activity and endurance training for reciprocal motion of lower limbs on Nustep for 6 minutes at Level 1.0 at > or equal to 50 spm w/ rest to increase mobility, blood flow and improve tissue tolerance      **CGA for all activities below     Step up on 4' step with BUEs and CGA -  x10 reps BLE   Standing march in // bars : 2 x 20 reps   Standing HS curls : 2 x 10 reps B  Standing heel raises : 2 x 10 reps   Standing hip abduction : 2 x 10 reps B    neuromuscular re-education activities to improve: Balance, Coordination, Sense, and  Proprioception for 17 minutes. The following activities were included:      NBOS balance // bars on airex : 2 x 30''  NBOS c/ head turns : 2 x 30''   NBOS eyes closed : 2 x 30''   Tandem stance : x 30 each LE - NT  NBOS with ball toss/catch : 2 x 1' - NT        Patient Education and Home Exercises     Home Exercises Provided and Patient Education Provided     Education provided:   - Updated HEP provided   - Encouraged use of RW vs cane - amb with assistance     Written Home Exercises Provided: yes. Exercises were reviewed and Radha was able to demonstrate them prior to the end of the session.  Radha demonstrated good  understanding of the education provided. See EMR under Patient Instructions for exercises provided during therapy sessions    ASSESSMENT     Continuing to progress strengthening and functional activities and balance with good pt tolerance . Able to progress slightly with repetitions with appropriate pt response . Addition of balance on airex performed which she was fairly challenged with and required occasional UE use for recovery . Frequent seated rest breaks taken to prevent over exertion. Good response post session noted .     Radha Is progressing well towards her goals.   Pt prognosis is Good.     Pt will continue to benefit from skilled outpatient physical therapy to address the deficits listed in the problem list box on initial evaluation, provide pt/family education and to maximize pt's level of independence in the home and community environment.   Pt's spiritual, cultural and educational needs considered and pt agreeable to plan of care and goals.     Anticipated barriers to physical therapy: fall risk    Goals:  Short Term Goals: 3 weeks   Pt will be independent with HEP   Pt will be able to perform one sit to stand independently   Pt will be able to walk with a walker with min A for 10 feet      Long Term Goals: 6 weeks   Pt will be independent with updated HEP   Pt will be SBA with walker  for 30 feet with no LOB   Pt will be 4/5 for LE MMTs     PLAN     Continue to progress general strengthening and gait training.     Nicole Disla, PTA

## 2022-12-30 ENCOUNTER — TELEPHONE (OUTPATIENT)
Dept: HEMATOLOGY/ONCOLOGY | Facility: CLINIC | Age: 82
End: 2022-12-30
Payer: MEDICARE

## 2022-12-30 LAB — COPPER SERPL-MCNC: 960 UG/L (ref 810–1990)

## 2023-01-03 ENCOUNTER — TELEPHONE (OUTPATIENT)
Dept: HEMATOLOGY/ONCOLOGY | Facility: CLINIC | Age: 83
End: 2023-01-03
Payer: MEDICARE

## 2023-01-09 ENCOUNTER — TELEPHONE (OUTPATIENT)
Dept: HEPATOLOGY | Facility: CLINIC | Age: 83
End: 2023-01-09
Payer: MEDICARE

## 2023-01-09 ENCOUNTER — PATIENT MESSAGE (OUTPATIENT)
Dept: HEPATOLOGY | Facility: CLINIC | Age: 83
End: 2023-01-09
Payer: MEDICARE

## 2023-01-09 ENCOUNTER — OFFICE VISIT (OUTPATIENT)
Dept: PRIMARY CARE CLINIC | Facility: CLINIC | Age: 83
End: 2023-01-09
Payer: MEDICARE

## 2023-01-09 VITALS
WEIGHT: 121.5 LBS | BODY MASS INDEX: 19.53 KG/M2 | DIASTOLIC BLOOD PRESSURE: 70 MMHG | TEMPERATURE: 98 F | HEIGHT: 66 IN | SYSTOLIC BLOOD PRESSURE: 154 MMHG | OXYGEN SATURATION: 98 % | HEART RATE: 72 BPM

## 2023-01-09 DIAGNOSIS — J43.2 CENTRILOBULAR EMPHYSEMA: ICD-10-CM

## 2023-01-09 DIAGNOSIS — I25.84 CORONARY ATHEROSCLEROSIS DUE TO CALCIFIED CORONARY LESION: ICD-10-CM

## 2023-01-09 DIAGNOSIS — D69.6 THROMBOCYTOPENIA: ICD-10-CM

## 2023-01-09 DIAGNOSIS — K74.60 CIRRHOSIS OF LIVER WITH ASCITES, UNSPECIFIED HEPATIC CIRRHOSIS TYPE: ICD-10-CM

## 2023-01-09 DIAGNOSIS — I11.0 HYPERTENSIVE HEART DISEASE WITH DIASTOLIC HEART FAILURE: ICD-10-CM

## 2023-01-09 DIAGNOSIS — K75.4 AUTOIMMUNE HEPATITIS: Primary | ICD-10-CM

## 2023-01-09 DIAGNOSIS — I50.30 HYPERTENSIVE HEART DISEASE WITH DIASTOLIC HEART FAILURE: ICD-10-CM

## 2023-01-09 DIAGNOSIS — E11.59 TYPE 2 DIABETES MELLITUS WITH OTHER CIRCULATORY COMPLICATION, WITH LONG-TERM CURRENT USE OF INSULIN: Primary | ICD-10-CM

## 2023-01-09 DIAGNOSIS — Z79.52 LONG TERM CURRENT USE OF SYSTEMIC STEROIDS: ICD-10-CM

## 2023-01-09 DIAGNOSIS — I70.0 AORTIC ATHEROSCLEROSIS: ICD-10-CM

## 2023-01-09 DIAGNOSIS — R18.8 CIRRHOSIS OF LIVER WITH ASCITES, UNSPECIFIED HEPATIC CIRRHOSIS TYPE: ICD-10-CM

## 2023-01-09 DIAGNOSIS — D53.9 MACROCYTIC ANEMIA: ICD-10-CM

## 2023-01-09 DIAGNOSIS — K75.4 AUTOIMMUNE HEPATITIS: ICD-10-CM

## 2023-01-09 DIAGNOSIS — Z79.4 TYPE 2 DIABETES MELLITUS WITH OTHER CIRCULATORY COMPLICATION, WITH LONG-TERM CURRENT USE OF INSULIN: Primary | ICD-10-CM

## 2023-01-09 DIAGNOSIS — I25.10 CORONARY ATHEROSCLEROSIS DUE TO CALCIFIED CORONARY LESION: ICD-10-CM

## 2023-01-09 PROBLEM — N39.46 MIXED URGE AND STRESS INCONTINENCE: Status: ACTIVE | Noted: 2023-01-09

## 2023-01-09 PROCEDURE — 99214 OFFICE O/P EST MOD 30 MIN: CPT | Mod: S$PBB,,, | Performed by: INTERNAL MEDICINE

## 2023-01-09 PROCEDURE — 99214 OFFICE O/P EST MOD 30 MIN: CPT | Mod: PBBFAC,PN | Performed by: INTERNAL MEDICINE

## 2023-01-09 PROCEDURE — 99214 PR OFFICE/OUTPT VISIT, EST, LEVL IV, 30-39 MIN: ICD-10-PCS | Mod: S$PBB,,, | Performed by: INTERNAL MEDICINE

## 2023-01-09 PROCEDURE — 99999 PR PBB SHADOW E&M-EST. PATIENT-LVL IV: CPT | Mod: PBBFAC,,, | Performed by: INTERNAL MEDICINE

## 2023-01-09 PROCEDURE — 99999 PR PBB SHADOW E&M-EST. PATIENT-LVL IV: ICD-10-PCS | Mod: PBBFAC,,, | Performed by: INTERNAL MEDICINE

## 2023-01-09 RX ORDER — INSULIN ASPART 100 [IU]/ML
12 INJECTION, SOLUTION INTRAVENOUS; SUBCUTANEOUS
Qty: 30 ML | Refills: 1 | Status: SHIPPED | OUTPATIENT
Start: 2023-01-09 | End: 2024-01-22

## 2023-01-09 RX ORDER — PREDNISONE 5 MG/1
7.5 TABLET ORAL DAILY
Qty: 45 TABLET | Refills: 5 | Status: SHIPPED | OUTPATIENT
Start: 2023-01-09 | End: 2023-04-14

## 2023-01-10 ENCOUNTER — CLINICAL SUPPORT (OUTPATIENT)
Dept: REHABILITATION | Facility: HOSPITAL | Age: 83
End: 2023-01-10
Payer: MEDICARE

## 2023-01-10 DIAGNOSIS — Z74.09 DECREASED MOBILITY AND ENDURANCE: ICD-10-CM

## 2023-01-10 DIAGNOSIS — R29.898 LEG WEAKNESS, BILATERAL: Primary | ICD-10-CM

## 2023-01-10 PROCEDURE — 97112 NEUROMUSCULAR REEDUCATION: CPT

## 2023-01-10 PROCEDURE — 97110 THERAPEUTIC EXERCISES: CPT

## 2023-01-10 NOTE — PLAN OF CARE
[facility]   Physical Therapy Treatment Note/ POC Update     Name: Radha Feng  Clinic Number: [id number]    Therapy Diagnosis:   Encounter Diagnoses   Name Primary?    Leg weakness, bilateral Yes    Decreased mobility and endurance        Physician: Deandre Peterson MD    Visit Date: 1/10/2023    Physician Orders: PT Eval and Treat   Medical Diagnosis from Referral: K72.90 (ICD-10-CM) - Liver failure without hepatic coma, unspecified chronicity   Evaluation Date: 11/29/2022  Authorization Period Expiration: 12/31/2022  Plan of Care Expiration: 12/7/2023  Progress Note Due: 1/24/2023  Visit # / Visits authorized: 1/20- 9 total   FOTO: Not taken     PTA Visit #: 0/5     Time In: 3:17pm   Time Out: 4:00pm   Total Billable Time: 43  minutes    Precautions: Standard, Diabetes, and Fall, Liver Failure     SUBJECTIVE     Pt reports: She feels she has been doing very well so far, she would like to continue therapy   She was compliant with home exercise program.  Response to previous treatment: felt good after , denies soreness or fatigue .   Functional change: amb with RW , WC longer distances     Pain: N/A    OBJECTIVE     LE MMTs: 4/5   Functional Asssessment: Pt able to perform sit to stand with no UE use   - Pt able to ambulate with SBA 60 feet with walker     Treatment     Pt presents in WC with daughter assist.     Radha received the treatments listed below:      therapeutic exercises to develop strength, endurance, and ROM for  33 minutes including:    Aerobic activity and endurance training for reciprocal motion of lower limbs on Nustep for 4 minutes at Level 1.0 at > or equal to 50 spm w/ rest to increase mobility, blood flow and improve tissue tolerance      **CGA for all activities below     Step up on 4' step with BUEs and CGA -  x10 reps BLE   Standing march in // bars : 2 x 20 reps   Standing HS curls : 2 x 10 reps B  Standing heel raises : 2 x 10 reps- NT   Standing hip  abduction : 2 x 10 reps B- NT   Stair step ups 2x5  Step up and over small steps x1   Sit to stands from high lo 2x3     neuromuscular re-education activities to improve: Balance, Coordination, Sense, and Proprioception for 10 minutes. The following activities were included:      NBOS balance // bars on airex : 2 x 30''  NBOS c/ head turns : 2 x 30''   NBOS eyes closed : 2 x 30''   Tandem stance : x 30 each LE - NT  NBOS with ball toss/catch : 2 x 1' - NT        Patient Education and Home Exercises     Home Exercises Provided and Patient Education Provided     Education provided:   - Updated HEP provided   - Encouraged use of RW vs cane - amb with assistance     Written Home Exercises Provided: yes. Exercises were reviewed and Radha was able to demonstrate them prior to the end of the session.  Radha demonstrated good  understanding of the education provided. See EMR under Patient Instructions for exercises provided during therapy sessions    ASSESSMENT     Pt's measurements show an improvement in strength, function, and safety with ambulation. Pt is able to walk with SBA with the walker with excellent form and no LOB. Pt is tolerating higher level exercises and most of session is now focused on standing exercises. Pt would benefit from therapy to continue progressing balance and increasing independence.     Radha Is progressing well towards her goals.   Pt prognosis is Good.     Pt will continue to benefit from skilled outpatient physical therapy to address the deficits listed in the problem list box on initial evaluation, provide pt/family education and to maximize pt's level of independence in the home and community environment.   Pt's spiritual, cultural and educational needs considered and pt agreeable to plan of care and goals.     Anticipated barriers to physical therapy: fall risk    Goals:  Short Term Goals: 3 weeks   Pt will be independent with HEP MET   Pt will be able to perform one sit to stand  independently MET   Pt will be able to walk with a walker with min A for 10 feet MET      Long Term Goals: 6 weeks   Pt will be independent with updated HEP MET   Pt will be SBA with walker for 30 feet with no LOB MET   Pt will be 4/5 for LE MMTs progressing, not met     New ST weeks   Pt will be independent with HEP   Pt will score under 13 seconds on a TUG   Pt will be able to ambulate 100' with no AD and no LOB     PLAN     Continue to progress general strengthening and gait training- continue with stairs and balance     Bina Hurtado, PT

## 2023-01-10 NOTE — PROGRESS NOTES
See evaluation in POC for goals and assessment     Eval Date: 01/10/2023    Bina Hurtado, PT, DPT, CLT

## 2023-01-10 NOTE — PROGRESS NOTES
Subjective:       Patient ID: Radha Feng is a 82 y.o. female.    Chief Complaint: Diabetes    Seen for initial visit to [facility] care two months ago after hospital admit for autoimmune hepatitis with cirrhosis, ascites and hepatic encephalopathy. Macrocytic anemia and thrombocytopenia have been evaluated by Hematology. Cardiology also following. Here with her daughter today with a log of home glucose readings QID in the month of November, nothing in the past five weeks. Fasting glucose ranges 100-120 on Levemir 5 units nightly, with PM glucose before lunch, before dinner and at bedtime in the upper 100's to upper 200's range, occasionally 300 - taking Novolog 8 units TID. Ms Garcia has a good appetite and has been eating very well since I last saw her. And is still on Prednisone at 10mg daily. Has follow up with Hepatology soon, liver enzymes have been normal for the past 5-6 weeks, I anticipate steroids will be reduced.     PMH: .  Hypertension, EF 65% with Grade II Diastolic Dysfunction, Left Atrial Enlargement, MVP with moderate regurg. Sep. '22.  Diabetes Type 2, HbA1c 6.4% Sep. '22. Glyburide switched to Insulin 10/22.  Hyperlipidemia, LDL 77 , currently off statin therapy.  Atherosclerosis of thoracic and abdominal aorta, and coronaries seen on imaging.  Centrilobular Emphysema with Micronodules and Bilateral Ground Glass Opacities on CT .  Autoimmune Hepatitis (biopsy ) with Cirrhosis, Ascites, Encephalopathy.  Hemolytic Anemia, Thrombocytopenia.   Dysphagia, Protein Malnutrition.     PSH:  2021: CATARACT EXTRACTION W/  INTRAOCULAR LENS IMPLANT; Left    NKDA.    Medications: list reviewed and reconciled.           Review of Systems   Constitutional:  Negative for fever and unexpected weight change.   Respiratory:  Negative for cough and shortness of breath.    Cardiovascular:  Negative for chest pain, palpitations and leg swelling.   Gastrointestinal:  Negative for abdominal  "pain, constipation, nausea and vomiting.   Musculoskeletal:  Negative for arthralgias and myalgias.   Psychiatric/Behavioral:  Negative for agitation, confusion and hallucinations.        Objective:      Vitals:    01/09/23 1139   BP: (!) 154/70   Pulse: 72   Temp: 97.7 °F (36.5 °C)   SpO2: 98%   Weight: 55.1 kg (121 lb 8 oz)   Height: 5' 6" (1.676 m)     Physical Exam  Constitutional:       General: She is not in acute distress.     Appearance: She is not ill-appearing.   Cardiovascular:      Rate and Rhythm: Normal rate and regular rhythm.   Pulmonary:      Effort: Pulmonary effort is normal. No respiratory distress.      Breath sounds: Normal breath sounds. No wheezing or rales.   Musculoskeletal:         General: Normal range of motion.      Right lower leg: No edema.      Left lower leg: No edema.   Skin:     General: Skin is warm and dry.   Neurological:      Mental Status: She is alert. Mental status is at baseline.      Cranial Nerves: No cranial nerve deficit.   Psychiatric:         Mood and Affect: Mood normal.         Behavior: Behavior normal.         Thought Content: Thought content normal.       Lab Visit on 12/27/2022   Component Date Value    WBC 12/27/2022 10.74     RBC 12/27/2022 3.11 (L)     Hemoglobin 12/27/2022 10.7 (L)     Hematocrit 12/27/2022 33.0 (L)     MCV 12/27/2022 106 (H)     MCH 12/27/2022 34.4 (H)     MCHC 12/27/2022 32.4     RDW 12/27/2022 12.7     Platelets 12/27/2022 100 (L)     MPV 12/27/2022 12.3     Immature Granulocytes 12/27/2022 0.5     Gran # (ANC) 12/27/2022 9.1 (H)     Immature Grans (Abs) 12/27/2022 0.05 (H)     Lymph # 12/27/2022 0.9 (L)     Mono # 12/27/2022 0.5     Eos # 12/27/2022 0.2     Baso # 12/27/2022 0.03     nRBC 12/27/2022 0     Gran % 12/27/2022 84.5 (H)     Lymph % 12/27/2022 8.0 (L)     Mono % 12/27/2022 4.7     Eosinophil % 12/27/2022 2.0     Basophil % 12/27/2022 0.3     Differential Method 12/27/2022 Automated     Sodium 12/27/2022 141     Potassium " 12/27/2022 3.6     Chloride 12/27/2022 111 (H)     CO2 12/27/2022 23     Glucose 12/27/2022 85     BUN 12/27/2022 12     Creatinine 12/27/2022 0.7     Calcium 12/27/2022 8.3 (L)     Total Protein 12/27/2022 5.8 (L)     Albumin 12/27/2022 2.3 (L)     Total Bilirubin 12/27/2022 1.7 (H)     Alkaline Phosphatase 12/27/2022 89     AST 12/27/2022 31     ALT 12/27/2022 17     Anion Gap 12/27/2022 7 (L)     eGFR 12/27/2022 >60.0     Prothrombin Time 12/27/2022 13.1 (H)     INR 12/27/2022 1.3 (H)     Bilirubin, Direct 12/27/2022 0.7 (H)     Retic 12/27/2022 3.5 (H)     LD 12/27/2022 337 (H)     Haptoglobin 12/27/2022 45     Direct Richie (ROMA) 12/27/2022 NEG     Copper 12/27/2022 960    Office Visit on 12/20/2022   Component Date Value    Urine Culture, Routine 12/20/2022  (A)                     Value:ESCHERICHIA COLI  >100,000 cfu/ml       Assessment:       Problem List Items Addressed This Visit       Autoimmune hepatitis (Chronic)    Hypertensive heart disease with diastolic heart failure    Thrombocytopenia    Aortic atherosclerosis    Relevant Orders    Lipid Panel    Centrilobular emphysema    Coronary atherosclerosis due to calcified coronary lesion    Relevant Orders    Lipid Panel    Cirrhosis of liver with ascites     Other Visit Diagnoses       Type 2 diabetes mellitus with other circulatory complication, with long-term current use of insulin    -  Primary    Relevant Medications    insulin aspart U-100 (NOVOLOG) 100 unit/mL (3 mL) InPn pen    Other Relevant Orders    Hemoglobin A1C    Ambulatory referral/consult to Endocrinology    Macrocytic anemia        Long term current use of systemic steroids                  Plan:       Type 2 diabetes mellitus with other circulatory complication, with long-term current use of insulin  -     Hemoglobin A1C; Future; Expected date: 01/09/2023  -     Increase Insulin aspart U-100 (NOVOLOG) 100 unit/mL (3 mL) InPn pen; Inject 12 Units into the skin 3 (three) times daily with  meals.  Dispense: 30 mL; Refill: 1  -     Ambulatory referral/consult to Endocrinology; Future; Expected date: 01/16/2023    Hypertensive heart disease with diastolic heart failure        -     continue Lisinopril HCT 20/12.5.    Aortic atherosclerosis  -     Lipid Panel; Future; Expected date: 01/09/2023    Coronary atherosclerosis due to calcified coronary lesion  -     Lipid Panel; Future; Expected date: 01/09/2023    Centrilobular emphysema - asymptomatic.     Autoimmune hepatitis  Cirrhosis of liver with ascites, unspecified hepatic cirrhosis type        -     f/u with Hepatology.     Macrocytic anemia  Thrombocytopenia        -     f/u with Hematology.    Long term current use of systemic steroids

## 2023-01-17 ENCOUNTER — CLINICAL SUPPORT (OUTPATIENT)
Dept: REHABILITATION | Facility: HOSPITAL | Age: 83
End: 2023-01-17
Payer: MEDICARE

## 2023-01-17 DIAGNOSIS — R29.898 LEG WEAKNESS, BILATERAL: Primary | ICD-10-CM

## 2023-01-17 DIAGNOSIS — Z74.09 DECREASED MOBILITY AND ENDURANCE: ICD-10-CM

## 2023-01-17 PROCEDURE — 97110 THERAPEUTIC EXERCISES: CPT

## 2023-01-17 PROCEDURE — 97112 NEUROMUSCULAR REEDUCATION: CPT

## 2023-01-17 NOTE — PROGRESS NOTES
[facility]   Physical Therapy Treatment Note     Name: Radha Feng  Clinic Number: [id number]    Therapy Diagnosis:   Encounter Diagnoses   Name Primary?    Leg weakness, bilateral Yes    Decreased mobility and endurance          Physician: Deandre Peterson MD    Visit Date: 1/17/2023    Physician Orders: PT Eval and Treat   Medical Diagnosis from Referral: K72.90 (ICD-10-CM) - Liver failure without hepatic coma, unspecified chronicity   Evaluation Date: 11/29/2022  Authorization Period Expiration: 1/4/2024  Plan of Care Expiration: 12/7/2023  Progress Note Due: 1/24/2023  Visit # / Visits authorized: 2/ 20  FOTO: Not taken     PTA Visit #: 0/5     Time In: 4:00pm   Time Out: 5:00PM   Total Billable Time: 60 minutes    Precautions: Standard, Diabetes, and Fall, Liver Failure     SUBJECTIVE     Pt reports: she is doing well today and feels improvements overall.   She was compliant with home exercise program.  Response to previous treatment: felt good after , denies soreness or fatigue .   Functional change: amb with RW , WC longer distances     Pain: N/A    OBJECTIVE     Objective Measures updated at progress report unless specified.     Treatment     Pt presents in WC with daughter assist.     Radha received the treatments listed below:      therapeutic exercises to develop strength, endurance, and ROM for 45 minutes including:    Aerobic activity and endurance training for reciprocal motion of lower limbs on Nustep for 6 minutes at Level 1.0 at > or equal to 50 spm w/ rest to increase mobility, blood flow and improve tissue tolerance      **CGA for all activities below     Step up on 4' step with BUEs and CGA -  x10 reps BLE   Standing march in // bars : 2 x 20 reps   Standing HS curls : 2 x 10 reps B  Standing heel raises : 2 x 10 reps   Standing hip abduction : 2 x 10 reps B  Stepping up and over stairs x3     neuromuscular re-education activities to improve: Balance,  Coordination, Sense, and Proprioception for 15 minutes. The following activities were included:      NBOS balance // bars on airex : 2 x 30''  One foot step ups onto Bosu x5   NBOS c/ head turns : 2 x 30''   NBOS eyes closed : 2 x 30''   Tandem stance : x 30 each LE - NT  NBOS with ball toss/catch : 2 x 1' - NT        Patient Education and Home Exercises     Home Exercises Provided and Patient Education Provided     Education provided:   - Updated HEP provided   - Encouraged use of RW vs cane - amb with assistance     Written Home Exercises Provided: yes. Exercises were reviewed and Radha was able to demonstrate them prior to the end of the session.  Radha demonstrated good  understanding of the education provided. See EMR under Patient Instructions for exercises provided during therapy sessions    ASSESSMENT     Pt tolerated treatment well with no reports of pain. Clear improvements in balance noted. Will continue to progress     Radha Is progressing well towards her goals.   Pt prognosis is Good.     Pt will continue to benefit from skilled outpatient physical therapy to address the deficits listed in the problem list box on initial evaluation, provide pt/family education and to maximize pt's level of independence in the home and community environment.   Pt's spiritual, cultural and educational needs considered and pt agreeable to plan of care and goals.     Anticipated barriers to physical therapy: fall risk    Goals:  Short Term Goals: 3 weeks   Pt will be independent with HEP   Pt will be able to perform one sit to stand independently   Pt will be able to walk with a walker with min A for 10 feet      Long Term Goals: 6 weeks   Pt will be independent with updated HEP   Pt will be SBA with walker for 30 feet with no LOB   Pt will be 4/5 for LE MMTs     PLAN     Continue to progress general strengthening and gait training.     Bina Hurtado, PT

## 2023-01-23 ENCOUNTER — CLINICAL SUPPORT (OUTPATIENT)
Dept: REHABILITATION | Facility: HOSPITAL | Age: 83
End: 2023-01-23
Payer: MEDICARE

## 2023-01-23 DIAGNOSIS — R29.898 LEG WEAKNESS, BILATERAL: Primary | ICD-10-CM

## 2023-01-23 DIAGNOSIS — Z74.09 DECREASED MOBILITY AND ENDURANCE: ICD-10-CM

## 2023-01-23 PROCEDURE — 97110 THERAPEUTIC EXERCISES: CPT | Mod: CQ

## 2023-01-23 NOTE — PROGRESS NOTES
[facility]   Physical Therapy Treatment Note     Name: Radha Feng  Clinic Number: [id number]    Therapy Diagnosis:   Encounter Diagnoses   Name Primary?    Leg weakness, bilateral Yes    Decreased mobility and endurance        Physician: Deandre Peterson MD    Visit Date: 1/23/2023    Physician Orders: PT Eval and Treat   Medical Diagnosis from Referral: K72.90 (ICD-10-CM) - Liver failure without hepatic coma, unspecified chronicity   Evaluation Date: 11/29/2022  Authorization Period Expiration: 1/4/2024  Plan of Care Expiration: 12/7/2023  Progress Note Due: 1/24/2023  Visit # / Visits authorized: 3/ 20  FOTO: Not taken     PTA Visit #: 1/5     Time In:  10:20 am ( late arrival )  Time Out: 11:00 am   Total Billable Time: 40 minutes    Precautions: Standard, Diabetes, and Fall, Liver Failure     SUBJECTIVE     Pt reports: she continues to have a lot of pain in her right knee. Pts daughter stated her steroid dosage decreased and she noticed more inflammation/swelling in the knee.   She was compliant with home exercise program.  Response to previous treatment: felt good after , denies soreness or fatigue .   Functional change: amb with RW , WC longer distances     Pain: N/A    OBJECTIVE     Objective Measures updated at progress report unless specified.     Treatment     Pt presents in WC with daughter assist.     Radha received the treatments listed below:      therapeutic exercises to develop strength, endurance, and ROM for 40 minutes including:    Aerobic activity and endurance training for reciprocal motion of lower limbs on Nustep for 5 minutes at Level 1.0 at > or equal to 50 spm w/ rest to increase mobility, blood flow and improve tissue tolerance    **CGA for all activities below     Step up on 4' step with BUEs and CGA -  x10 reps BLE   Standing march in // bars : 2 x 20 reps   Standing HS curls : 2 x 10 reps B   Standing heel raises : 2 x 10 reps   Standing hip abduction  : 2 x 10 reps B  Stepping up and over stairs x3     neuromuscular re-education activities to improve: Balance, Coordination, Sense, and Proprioception for 00 minutes. The following activities were included:      NBOS balance // bars on airex : 2 x 30''  One foot step ups onto Bosu x5   NBOS c/ head turns : 2 x 30''   NBOS eyes closed : 2 x 30''   Tandem stance : x 30 each LE - NT  NBOS with ball toss/catch : 2 x 1' - NT        Patient Education and Home Exercises     Home Exercises Provided and Patient Education Provided     Education provided:   - Updated HEP provided   - Encouraged use of RW vs cane - amb with assistance     Written Home Exercises Provided: yes. Exercises were reviewed and Radha was able to demonstrate them prior to the end of the session.  Radha demonstrated good  understanding of the education provided. See EMR under Patient Instructions for exercises provided during therapy sessions    ASSESSMENT     Pt demonstrated a good tolerance to session today . Monitored reports of knee pain with no exacerbations throughout session reported. She continues to demonstrate increased weakness and instability in R vs LLE although significant improvements noted since initiating therapy.     Radha Is progressing well towards her goals.   Pt prognosis is Good.     Pt will continue to benefit from skilled outpatient physical therapy to address the deficits listed in the problem list box on initial evaluation, provide pt/family education and to maximize pt's level of independence in the home and community environment.   Pt's spiritual, cultural and educational needs considered and pt agreeable to plan of care and goals.     Anticipated barriers to physical therapy: fall risk    Goals:  Short Term Goals: 3 weeks   Pt will be independent with HEP   Pt will be able to perform one sit to stand independently   Pt will be able to walk with a walker with min A for 10 feet      Long Term Goals: 6 weeks   Pt will be  independent with updated HEP   Pt will be SBA with walker for 30 feet with no LOB   Pt will be 4/5 for LE MMTs     PLAN     Continue to progress general strengthening and gait training.     Nicole Disla, PTA

## 2023-01-30 ENCOUNTER — LAB VISIT (OUTPATIENT)
Dept: LAB | Facility: HOSPITAL | Age: 83
End: 2023-01-30
Attending: INTERNAL MEDICINE
Payer: MEDICARE

## 2023-01-30 ENCOUNTER — PATIENT MESSAGE (OUTPATIENT)
Dept: HEMATOLOGY/ONCOLOGY | Facility: CLINIC | Age: 83
End: 2023-01-30
Payer: MEDICARE

## 2023-01-30 ENCOUNTER — OFFICE VISIT (OUTPATIENT)
Dept: HEPATOLOGY | Facility: CLINIC | Age: 83
End: 2023-01-30
Payer: MEDICARE

## 2023-01-30 ENCOUNTER — PATIENT MESSAGE (OUTPATIENT)
Dept: HEPATOLOGY | Facility: CLINIC | Age: 83
End: 2023-01-30

## 2023-01-30 VITALS
BODY MASS INDEX: 19.39 KG/M2 | SYSTOLIC BLOOD PRESSURE: 199 MMHG | HEART RATE: 70 BPM | TEMPERATURE: 99 F | RESPIRATION RATE: 17 BRPM | HEIGHT: 66 IN | DIASTOLIC BLOOD PRESSURE: 95 MMHG | OXYGEN SATURATION: 98 % | WEIGHT: 120.69 LBS

## 2023-01-30 DIAGNOSIS — I25.10 CORONARY ATHEROSCLEROSIS DUE TO CALCIFIED CORONARY LESION: ICD-10-CM

## 2023-01-30 DIAGNOSIS — R18.8 OTHER ASCITES: ICD-10-CM

## 2023-01-30 DIAGNOSIS — E11.59 TYPE 2 DIABETES MELLITUS WITH OTHER CIRCULATORY COMPLICATION, WITH LONG-TERM CURRENT USE OF INSULIN: ICD-10-CM

## 2023-01-30 DIAGNOSIS — K75.4 AUTOIMMUNE HEPATITIS: Chronic | ICD-10-CM

## 2023-01-30 DIAGNOSIS — I70.0 AORTIC ATHEROSCLEROSIS: ICD-10-CM

## 2023-01-30 DIAGNOSIS — M79.89 LEG SWELLING: ICD-10-CM

## 2023-01-30 DIAGNOSIS — K74.60 HEPATIC CIRRHOSIS, UNSPECIFIED HEPATIC CIRRHOSIS TYPE, UNSPECIFIED WHETHER ASCITES PRESENT: ICD-10-CM

## 2023-01-30 DIAGNOSIS — Z79.4 TYPE 2 DIABETES MELLITUS WITH OTHER CIRCULATORY COMPLICATION, WITH LONG-TERM CURRENT USE OF INSULIN: ICD-10-CM

## 2023-01-30 DIAGNOSIS — I25.84 CORONARY ATHEROSCLEROSIS DUE TO CALCIFIED CORONARY LESION: ICD-10-CM

## 2023-01-30 DIAGNOSIS — R18.8 CIRRHOSIS OF LIVER WITH ASCITES, UNSPECIFIED HEPATIC CIRRHOSIS TYPE: ICD-10-CM

## 2023-01-30 DIAGNOSIS — Z78.0 POST-MENOPAUSAL: ICD-10-CM

## 2023-01-30 DIAGNOSIS — K74.60 CIRRHOSIS OF LIVER WITH ASCITES, UNSPECIFIED HEPATIC CIRRHOSIS TYPE: ICD-10-CM

## 2023-01-30 DIAGNOSIS — K76.82 HEPATIC ENCEPHALOPATHY: Primary | ICD-10-CM

## 2023-01-30 DIAGNOSIS — I10 HYPERTENSION, UNSPECIFIED TYPE: Primary | ICD-10-CM

## 2023-01-30 LAB
ALBUMIN SERPL BCP-MCNC: 2.6 G/DL (ref 3.5–5.2)
ALP SERPL-CCNC: 75 U/L (ref 55–135)
ALT SERPL W/O P-5'-P-CCNC: 17 U/L (ref 10–44)
ANION GAP SERPL CALC-SCNC: 7 MMOL/L (ref 8–16)
AST SERPL-CCNC: 35 U/L (ref 10–40)
BASOPHILS # BLD AUTO: 0.03 K/UL (ref 0–0.2)
BASOPHILS NFR BLD: 0.4 % (ref 0–1.9)
BILIRUB DIRECT SERPL-MCNC: 0.6 MG/DL (ref 0.1–0.3)
BILIRUB SERPL-MCNC: 1.5 MG/DL (ref 0.1–1)
BUN SERPL-MCNC: 18 MG/DL (ref 8–23)
CALCIUM SERPL-MCNC: 8.8 MG/DL (ref 8.7–10.5)
CHLORIDE SERPL-SCNC: 109 MMOL/L (ref 95–110)
CHOLEST SERPL-MCNC: 238 MG/DL (ref 120–199)
CHOLEST/HDLC SERPL: 4.8 {RATIO} (ref 2–5)
CO2 SERPL-SCNC: 25 MMOL/L (ref 23–29)
CREAT SERPL-MCNC: 0.7 MG/DL (ref 0.5–1.4)
DIFFERENTIAL METHOD: ABNORMAL
EOSINOPHIL # BLD AUTO: 0.1 K/UL (ref 0–0.5)
EOSINOPHIL NFR BLD: 1.1 % (ref 0–8)
ERYTHROCYTE [DISTWIDTH] IN BLOOD BY AUTOMATED COUNT: 13.6 % (ref 11.5–14.5)
EST. GFR  (NO RACE VARIABLE): >60 ML/MIN/1.73 M^2
ESTIMATED AVG GLUCOSE: 97 MG/DL (ref 68–131)
GLUCOSE SERPL-MCNC: 144 MG/DL (ref 70–110)
HBA1C MFR BLD: 5 % (ref 4–5.6)
HCT VFR BLD AUTO: 35.2 % (ref 37–48.5)
HDLC SERPL-MCNC: 50 MG/DL (ref 40–75)
HDLC SERPL: 21 % (ref 20–50)
HGB BLD-MCNC: 11.1 G/DL (ref 12–16)
IMM GRANULOCYTES # BLD AUTO: 0.03 K/UL (ref 0–0.04)
IMM GRANULOCYTES NFR BLD AUTO: 0.4 % (ref 0–0.5)
INR PPP: 1.1 (ref 0.8–1.2)
LDLC SERPL CALC-MCNC: 168.6 MG/DL (ref 63–159)
LYMPHOCYTES # BLD AUTO: 1.2 K/UL (ref 1–4.8)
LYMPHOCYTES NFR BLD: 13.7 % (ref 18–48)
MCH RBC QN AUTO: 32.2 PG (ref 27–31)
MCHC RBC AUTO-ENTMCNC: 31.5 G/DL (ref 32–36)
MCV RBC AUTO: 102 FL (ref 82–98)
MONOCYTES # BLD AUTO: 0.9 K/UL (ref 0.3–1)
MONOCYTES NFR BLD: 10.9 % (ref 4–15)
NEUTROPHILS # BLD AUTO: 6.3 K/UL (ref 1.8–7.7)
NEUTROPHILS NFR BLD: 73.5 % (ref 38–73)
NONHDLC SERPL-MCNC: 188 MG/DL
NRBC BLD-RTO: 0 /100 WBC
PLATELET # BLD AUTO: 101 K/UL (ref 150–450)
PMV BLD AUTO: 12.6 FL (ref 9.2–12.9)
POTASSIUM SERPL-SCNC: 4.5 MMOL/L (ref 3.5–5.1)
PROT SERPL-MCNC: 6.4 G/DL (ref 6–8.4)
PROTHROMBIN TIME: 11.9 SEC (ref 9–12.5)
RBC # BLD AUTO: 3.45 M/UL (ref 4–5.4)
SODIUM SERPL-SCNC: 141 MMOL/L (ref 136–145)
TRIGL SERPL-MCNC: 97 MG/DL (ref 30–150)
WBC # BLD AUTO: 8.53 K/UL (ref 3.9–12.7)

## 2023-01-30 PROCEDURE — 85610 PROTHROMBIN TIME: CPT | Performed by: INTERNAL MEDICINE

## 2023-01-30 PROCEDURE — 83036 HEMOGLOBIN GLYCOSYLATED A1C: CPT | Performed by: INTERNAL MEDICINE

## 2023-01-30 PROCEDURE — 85025 COMPLETE CBC W/AUTO DIFF WBC: CPT | Performed by: INTERNAL MEDICINE

## 2023-01-30 PROCEDURE — 99214 OFFICE O/P EST MOD 30 MIN: CPT | Mod: S$PBB,,, | Performed by: INTERNAL MEDICINE

## 2023-01-30 PROCEDURE — 99999 PR PBB SHADOW E&M-EST. PATIENT-LVL V: CPT | Mod: PBBFAC,,, | Performed by: INTERNAL MEDICINE

## 2023-01-30 PROCEDURE — 99999 PR PBB SHADOW E&M-EST. PATIENT-LVL V: ICD-10-PCS | Mod: PBBFAC,,, | Performed by: INTERNAL MEDICINE

## 2023-01-30 PROCEDURE — 36415 COLL VENOUS BLD VENIPUNCTURE: CPT | Mod: PN | Performed by: INTERNAL MEDICINE

## 2023-01-30 PROCEDURE — 99215 OFFICE O/P EST HI 40 MIN: CPT | Mod: PBBFAC,PN | Performed by: INTERNAL MEDICINE

## 2023-01-30 PROCEDURE — 80053 COMPREHEN METABOLIC PANEL: CPT | Performed by: INTERNAL MEDICINE

## 2023-01-30 PROCEDURE — 80061 LIPID PANEL: CPT | Performed by: INTERNAL MEDICINE

## 2023-01-30 PROCEDURE — 82248 BILIRUBIN DIRECT: CPT | Performed by: INTERNAL MEDICINE

## 2023-01-30 PROCEDURE — 99214 PR OFFICE/OUTPT VISIT, EST, LEVL IV, 30-39 MIN: ICD-10-PCS | Mod: S$PBB,,, | Performed by: INTERNAL MEDICINE

## 2023-01-30 RX ORDER — PEN NEEDLE, DIABETIC 29 G X1/2"
NEEDLE, DISPOSABLE MISCELLANEOUS
COMMUNITY
Start: 2022-12-28

## 2023-01-30 RX ORDER — LISINOPRIL AND HYDROCHLOROTHIAZIDE 12.5; 2 MG/1; MG/1
1 TABLET ORAL DAILY
Qty: 90 TABLET | Refills: 3 | Status: SHIPPED | OUTPATIENT
Start: 2023-01-30 | End: 2024-01-11

## 2023-01-30 NOTE — PROGRESS NOTES
HEPATOLOGY FOLLOW UP    Referring Physician: Leticia Lim MD   Current Corresponding Physician: Leticia Lim MD     Radha Feng is here for follow up of autoimmune hepatitis-induced cirrhosis    HPI  Ms Feng is an 83 yo PMHx HTN, ID-T2DM, decompensated AIH cirrhosis (biopsy proven) presented and was admitted 11/3/22-11/11/22 with abdo distention from ascites.     Patient hospitalized 09/20-10/15 for SOB found to have elevated liver enzymes and diagnosed with AIH vs RUBIO cirrhosis on biopsy decompensated by ascites. Started on steroids, imuran w/ improvement in enzymes. However due to malaise an leukopenia, imuran has been held. She was diuresed, underwent LVP and discharged on 10 mg prednisone daily with goal to continue but minimize prednisone as outpt.    Interval History  Since Radha's discharge and last visit she has continued diuretics, lactulose and prednisone 7.5 mg daily. She required a paracentesis 11/7/22:    Paracentesis 11/7/22: 2200 ml fluid; cell count 50     Labs 11/21/22: Tbil 3.2 (previously fractionated and found to be mostly indirect), ALT 35, AST 38, ALKP 116  Labs 1/30/23: ALT 17, AST 35, ALKP 75, Tbil 1.5, direct 0.6    Ascites, ongoing: lasix 40 mg daily but no aldactone (urinates more with monotherapy by report)  HE, ongoing: lactulose every few days    MELD-Na score: 9 at 1/30/2023 11:16 AM  MELD score: 9 at 1/30/2023 11:16 AM  Calculated from:  Serum Creatinine: 0.7 mg/dL (Using min of 1 mg/dL) at 1/30/2023 11:16 AM  Serum Sodium: 141 mmol/L (Using max of 137 mmol/L) at 1/30/2023 11:16 AM  Total Bilirubin: 1.5 mg/dL at 1/30/2023 11:16 AM  INR(ratio): 1.1 at 1/30/2023 11:16 AM  Age: 82 years      Outpatient Encounter Medications as of 1/30/2023   Medication Sig Dispense Refill    BD INSULIN SYRINGE ULTRA-FINE 1 mL 31 gauge x 5/16 Syrg       blood-glucose meter,continuous (DEXCOM G6 ) Misc Check blood sugar before meals and at bedtime 1 each PRN    blood-glucose  "sensor (DEXCOM G6 SENSOR) Amanda Check blood sugar before meals and at bedtime 1 each PRN    blood-glucose transmitter (DEXCOM G6 TRANSMITTER) Amanda Check blood sugar before meals and at bedtime 1 each PRN    brimonidine 0.2% (ALPHAGAN) 0.2 % Drop INSTILL 1 DROP INTO RIGHT EYE TWICE A DAY 30 mL 3    dorzolamide-timolol 2-0.5% (COSOPT) 22.3-6.8 mg/mL ophthalmic solution INSTILL 1 DROP INTO RIGHT EYE TWICE A DAY 10 mL 11    furosemide (LASIX) 40 MG tablet Take 1 tablet (40 mg total) by mouth once daily. 30 tablet 5    insulin aspart U-100 (NOVOLOG) 100 unit/mL (3 mL) InPn pen Inject 12 Units into the skin 3 (three) times daily with meals. 30 mL 1    insulin detemir U-100 (LEVEMIR FLEXTOUCH) 100 unit/mL (3 mL) SubQ InPn pen Inject 5 Units into the skin every evening. 6 mL 1    latanoprost 0.005 % ophthalmic solution PLACE 1 DROP INTO BOTH EYES ONCE DAILY. 7.5 mL 3    pen needle, diabetic 31 gauge x 5/16" Ndle Use to inject insulin into the skin up to 4 times daily 400 each 3    predniSONE (DELTASONE) 5 MG tablet Take 1.5 tablets (7.5 mg total) by mouth once daily. 45 tablet 5    acetaminophen (TYLENOL) 500 MG tablet Take 2 tablets (1,000 mg total) by mouth every 8 (eight) hours as needed for Pain. (Patient not taking: Reported on 2023)  0    k phos di & mono-sod phos mono (PHOSPHA 250 NEUTRAL) 250 mg Tab Take 1 tablet by mouth once daily. for 5 days (Patient not taking: Reported on 12/15/2022) 5 tablet 0    pantoprazole (PROTONIX) 20 MG tablet Take 1 tablet (20 mg total) by mouth once daily. (Patient not taking: Reported on 12/15/2022) 30 tablet 0    [] predniSONE (DELTASONE) 10 MG tablet Take 1 tablet (10 mg total) by mouth once daily. 30 tablet 0    spironolactone (ALDACTONE) 100 MG tablet Take 1 tablet (100 mg total) by mouth once daily. (Patient taking differently: Take 100 mg by mouth every 72 hours.) 30 tablet 0    [DISCONTINUED] cephALEXin (KEFLEX) 500 MG capsule Take 1 capsule (500 mg total) by mouth " every 12 (twelve) hours. for 5 days 10 capsule 0    [DISCONTINUED] furosemide (LASIX) 40 MG tablet Take 1 tablet (40 mg total) by mouth once daily. 30 tablet 0    [DISCONTINUED] furosemide (LASIX) 40 MG tablet TAKE 1 TABLET BY MOUTH EVERY DAY 30 tablet 0    [DISCONTINUED] insulin aspart U-100 (NOVOLOG) 100 unit/mL (3 mL) InPn pen Inject 8 Units into the skin 3 (three) times daily with meals. Hold if pre-meal blood sugar is less than 100 or eating less thant <25% of meal 21 mL 1     No facility-administered encounter medications on file as of 1/30/2023.     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    HENT: Negative.     Eyes: Negative.    Respiratory: Negative.     Cardiovascular: Negative.    Gastrointestinal: Negative.    Genitourinary: Negative.    Musculoskeletal: Negative.    Skin: Negative.    Neurological: Negative.    Psychiatric/Behavioral: Negative.     Vitals:    01/30/23 1116   BP: (!) 199/95   Pulse: 70   Resp: 17   Temp: 98.6 °F (37 °C)       Physical Exam  Vitals reviewed.   Constitutional:       Appearance: She is well-developed.   HENT:      Head: Normocephalic and atraumatic.   Eyes:      General: No scleral icterus.     Conjunctiva/sclera: Conjunctivae normal.      Pupils: Pupils are equal, round, and reactive to light.   Neck:      Thyroid: No thyromegaly.   Cardiovascular:      Rate and Rhythm: Normal rate and regular rhythm.      Heart sounds: Normal heart sounds.   Pulmonary:      Effort: Pulmonary effort is normal.      Breath sounds: Normal breath sounds. No rales.   Abdominal:      General: Bowel sounds are normal. There is no distension.      Palpations: Abdomen is soft. There is no mass.      Tenderness: There is no abdominal tenderness.    Musculoskeletal:         General: Normal range of motion.      Cervical back: Normal range of motion and neck supple.   Skin:     General: Skin is warm and dry.      Findings: No rash.   Neurological:      Mental Status: She is alert and oriented to person, place, and time.       MELD-Na score: 11 at 12/27/2022  3:31 PM  MELD score: 11 at 12/27/2022  3:31 PM  Calculated from:  Serum Creatinine: 0.7 mg/dL (Using min of 1 mg/dL) at 12/27/2022  3:31 PM  Serum Sodium: 141 mmol/L (Using max of 137 mmol/L) at 12/27/2022  3:31 PM  Total Bilirubin: 1.7 mg/dL at 12/27/2022  3:31 PM  INR(ratio): 1.3 at 12/27/2022  3:31 PM  Age: 82 years    Lab Results   Component Value Date    GLU 85 12/27/2022    BUN 12 12/27/2022    CREATININE 0.7 12/27/2022    CALCIUM 8.3 (L) 12/27/2022     12/27/2022    K 3.6 12/27/2022     (H) 12/27/2022    PROT 5.8 (L) 12/27/2022    CO2 23 12/27/2022    ANIONGAP 7 (L) 12/27/2022    WBC 10.74 12/27/2022    RBC 3.11 (L) 12/27/2022    HGB 10.7 (L) 12/27/2022    HCT 33.0 (L) 12/27/2022    HCT 30 (L) 10/23/2022     (H) 12/27/2022    MCH 34.4 (H) 12/27/2022    MCHC 32.4 12/27/2022     Lab Results   Component Value Date    RDW 12.7 12/27/2022     (L) 12/27/2022    MPV 12.3 12/27/2022    GRAN 9.1 (H) 12/27/2022    GRAN 84.5 (H) 12/27/2022    LYMPH 0.9 (L) 12/27/2022    LYMPH 8.0 (L) 12/27/2022    MONO 0.5 12/27/2022    MONO 4.7 12/27/2022    EOSINOPHIL 2.0 12/27/2022    BASOPHIL 0.3 12/27/2022    EOS 0.2 12/27/2022    BASO 0.03 12/27/2022    APTT 35.6 (H) 09/29/2022    GROUPTRH O POS 11/07/2022    BNP 66 09/20/2022    CHOL 183 01/13/2022    TRIG 131 01/13/2022    HDL 75 01/13/2022    ALBUMIN 2.3 (L) 12/27/2022    BILIDIR 0.7 (H) 12/27/2022    AST 31 12/27/2022    ALT 17 12/27/2022    ALKPHOS 89 12/27/2022    MG 1.7 11/11/2022    LABPROT 13.1 (H) 12/27/2022    INR 1.3 (H) 12/27/2022       Assessment and Plan:  Radha Feng is a 82 y.o. female with AIH-induced cirrhosis. Current  recommendations:  Cirrhosis: check labs now and monthly; HCC screening every 6 months (05/23); EGD to screen for varices  Autoimmune hepatitis: continue prednisone 7.5 mg daily; will try to lower to 5 mg in the future; neds bone density  Ascites/edema, ongoing: continue current diuretics  HE, ongoing: continue HE meds  HTN: can go back on lisinopril since no longer on spironolactone  DM: agree with endocrinology consult: avoid metformin since has cirrhosis and is at risk for lactic acidosis    Return 12 weeks

## 2023-01-30 NOTE — PATIENT INSTRUCTIONS
Labs today and monthly labs  Agree with seeing endocrinology; consider avoiding metformin  Bone density  Continue lasix 40 mg daily; if potassium low will give a potassium pill  Abdo US 05/23  BP-f/u with pcp; can go back on lisinopril since not on spironolactone  EGD to screen for varices  Return 3 months

## 2023-02-06 ENCOUNTER — CLINICAL SUPPORT (OUTPATIENT)
Dept: REHABILITATION | Facility: HOSPITAL | Age: 83
End: 2023-02-06
Payer: MEDICARE

## 2023-02-06 ENCOUNTER — OFFICE VISIT (OUTPATIENT)
Dept: OPTOMETRY | Facility: CLINIC | Age: 83
End: 2023-02-06
Payer: MEDICARE

## 2023-02-06 DIAGNOSIS — R29.898 LEG WEAKNESS, BILATERAL: Primary | ICD-10-CM

## 2023-02-06 DIAGNOSIS — H40.1113 PRIMARY OPEN ANGLE GLAUCOMA (POAG) OF RIGHT EYE, SEVERE STAGE: ICD-10-CM

## 2023-02-06 DIAGNOSIS — Z96.1 PSEUDOPHAKIA: ICD-10-CM

## 2023-02-06 DIAGNOSIS — H25.11 SENILE NUCLEAR CATARACT, RIGHT: ICD-10-CM

## 2023-02-06 DIAGNOSIS — H52.4 BILATERAL PRESBYOPIA: ICD-10-CM

## 2023-02-06 DIAGNOSIS — H40.1121 PRIMARY OPEN ANGLE GLAUCOMA (POAG) OF LEFT EYE, MILD STAGE: Primary | ICD-10-CM

## 2023-02-06 DIAGNOSIS — Z74.09 DECREASED MOBILITY AND ENDURANCE: ICD-10-CM

## 2023-02-06 DIAGNOSIS — E11.9 TYPE 2 DIABETES MELLITUS WITHOUT RETINOPATHY: ICD-10-CM

## 2023-02-06 DIAGNOSIS — E11.36 TYPE 2 DIABETES MELLITUS WITH CATARACT: ICD-10-CM

## 2023-02-06 PROCEDURE — 99213 OFFICE O/P EST LOW 20 MIN: CPT | Mod: PBBFAC,PO | Performed by: OPTOMETRIST

## 2023-02-06 PROCEDURE — 92014 PR EYE EXAM, EST PATIENT,COMPREHESV: ICD-10-PCS | Mod: S$PBB,,, | Performed by: OPTOMETRIST

## 2023-02-06 PROCEDURE — 92015 PR REFRACTION: ICD-10-PCS | Mod: ,,, | Performed by: OPTOMETRIST

## 2023-02-06 PROCEDURE — 99999 PR PBB SHADOW E&M-EST. PATIENT-LVL III: ICD-10-PCS | Mod: PBBFAC,,, | Performed by: OPTOMETRIST

## 2023-02-06 PROCEDURE — 92014 COMPRE OPH EXAM EST PT 1/>: CPT | Mod: S$PBB,,, | Performed by: OPTOMETRIST

## 2023-02-06 PROCEDURE — 97110 THERAPEUTIC EXERCISES: CPT

## 2023-02-06 PROCEDURE — 99999 PR PBB SHADOW E&M-EST. PATIENT-LVL III: CPT | Mod: PBBFAC,,, | Performed by: OPTOMETRIST

## 2023-02-06 PROCEDURE — 92015 DETERMINE REFRACTIVE STATE: CPT | Mod: ,,, | Performed by: OPTOMETRIST

## 2023-02-06 NOTE — PROGRESS NOTES
See evaluation in POC for goals and assessment     Eval Date: 02/10/2023    Bina Hurtado, PT, DPT, CLT

## 2023-02-06 NOTE — PROGRESS NOTES
HPI    ELO: 08/22  Chief complaint (CC): Patient is here for annual eye exam today.  Patient   hasn't noticed any vision changes since the last exam.  Wears OTC readers   for near and sometimes uses a magnifier.  Patient would like a   prescription for glasses today.  Glasses?  OTC readers, unsure of power  Contacts? -  H/o eye surgery, injections or laser: PC IOL OS  H/o eye injury: -  Known eye conditions? See above  Family h/o eye conditions? Mother with glaucoma  Eye gtts? Using Latanoprost OU Q HS, Cosopt BID OU and Brimonidine BID OD,   last used as directed      (-) Flashes (-)  Floaters (-) Mucous   (-)  Tearing (-) Itching (-) Burning   (-) Headaches (-) Eye Pain/discomfort (-) Irritation   (-)  Redness (-) Double vision (-) Blurry vision    Diabetic? -  A1c? -      Last edited by Larisa Steiner on 2/6/2023  1:35 PM.            Assessment /Plan     For exam results, see Encounter Report.      Primary open angle glaucoma (POAG) of left eye, mild stage  Primary open angle glaucoma (POAG) of right eye, severe stage  (+) FHx- mother. IOP 16 OD, 14 OS. Last 15 OD, 14 OS. Tmax 28 OD, 23 OS. c/d 0.85 OD, 0.75 OS. Pt reports possibly being on drops for this previously.   Pachy 579 OD, 608 OS  8/11/2022  OCT OD borderline NI, Thin T, TI, TS, G, OS thin G, TI, T, borderline TS and NI  9/29/2021 HVF OD severely depressed VF w/some inferior sparing, OS inferionasal step  8/11/2022 HVF OD dense sup arcuate and early inf arcuate, OS nasal defects  Educated pt on findings w/understanding.  Consequences of noncompliance and lack of f/u reviewed.  D/c Timolol BiD OD (8/12/2020).  Cont Latanoprost QHS OU (8/12/2020) and cont Cosopt BiD OU (started 9/21/2020)  No noticeable change in IOP w/Cosopt added.   Cont Brimonidine BID OD (started 3/22/2021)  RTC 4 mo IOP    Type 2 diabetes mellitus without retinopathy  BS control. No signs of diabetic retinopathy. Monitor with annual exam.    Senile nuclear cataract, right  Type 2  diabetes mellitus with cataract  Pseudophakia  Good result OS. Monitor.     Bilateral presbyopia  SRx released to patient. Patient educated on lens options. Normal ocular health. RTC 1 year for routine exam.

## 2023-02-10 ENCOUNTER — PATIENT MESSAGE (OUTPATIENT)
Dept: UROGYNECOLOGY | Facility: CLINIC | Age: 83
End: 2023-02-10
Payer: MEDICARE

## 2023-02-10 NOTE — PLAN OF CARE
[facility]   Physical Therapy Treatment Note / POC Update     Name: Radha Feng  Clinic Number: [id number]    Therapy Diagnosis:   Encounter Diagnoses   Name Primary?    Leg weakness, bilateral Yes    Decreased mobility and endurance          Physician: Deandre Peterson MD    Visit Date: 2/6/2023    Physician Orders: PT Eval and Treat   Medical Diagnosis from Referral: K72.90 (ICD-10-CM) - Liver failure without hepatic coma, unspecified chronicity   Evaluation Date: 11/29/2022  Authorization Period Expiration: 1/4/2024  Plan of Care Expiration: 3/12/2023  Progress Note Due: 2 visits   Visit # / Visits authorized: 4/ 20  FOTO: Not taken     PTA Visit #: 0/5     Time In:  3:00pm   Time Out: 4:00pm   Total Billable Time: 60 minutes    Precautions: Standard, Diabetes, and Fall, Liver Failure     SUBJECTIVE     Pt reports: She has been doing well, feels like she has made good progress so far. Her R knee is sore occasionally   She was compliant with home exercise program.  Response to previous treatment: felt good after , denies soreness or fatigue .   Functional change: amb with RW , WC longer distances     Pain: 0/10 to R knee     OBJECTIVE     Functional assessment: Pt able to perform one sit to stand with no UE use   Pt able to ambulate approx 50' with walker with SBA   Pt able to ambulate approx 30' with SPC and SBA with no LOB       Treatment     Pt presents in WC with daughter assist.     Radha received the treatments listed below:      therapeutic exercises to develop strength, endurance, and ROM for 60 minutes including:    Aerobic activity and endurance training for reciprocal motion of lower limbs on Nustep for 6 minutes at Level 1.0 at > or equal to 50 spm w/ rest to increase mobility, blood flow and improve tissue tolerance    **CGA for all activities below     Step up on 4' step with BUEs and CGA -  x10 reps BLE   Standing march in // bars : 2 x 20 reps   Standing HS curls  : 2 x 10 reps B   Standing heel raises : 2 x 10 reps   Standing hip abduction : 2 x 10 reps B  Stepping up and over stairs x3     neuromuscular re-education activities to improve: Balance, Coordination, Sense, and Proprioception for 00 minutes. The following activities were included:      NBOS balance // bars on airex : 2 x 30''  One foot step ups onto Bosu x5   NBOS c/ head turns : 2 x 30''   NBOS eyes closed : 2 x 30''   Tandem stance : x 30 each LE - NT  NBOS with ball toss/catch : 2 x 1' - NT        Patient Education and Home Exercises     Home Exercises Provided and Patient Education Provided     Education provided:   - Updated HEP provided   - Encouraged use of RW vs cane - amb with assistance     Written Home Exercises Provided: yes. Exercises were reviewed and Radha was able to demonstrate them prior to the end of the session.  Radha demonstrated good  understanding of the education provided. See EMR under Patient Instructions for exercises provided during therapy sessions    ASSESSMENT   Pt returns to clinic with improved balance, strength, and functinal acitivity tolerance. pt and pt's daughter in agreement to continue therapy 1x a week to further improve functional mobility (particularly sit to stands), balance, and safety with ambulation. Pt was given an updated HEP focusing on standing exercises, which she was able to perform with no issues.  Pt would benefit from therapy to decrease weakness, improve balance, and prepare pt for home management     Radha Is progressing well towards her goals.   Pt prognosis is Good.     Pt will continue to benefit from skilled outpatient physical therapy to address the deficits listed in the problem list box on initial evaluation, provide pt/family education and to maximize pt's level of independence in the home and community environment.   Pt's spiritual, cultural and educational needs considered and pt agreeable to plan of care and goals.     Anticipated barriers  to physical therapy: fall risk    Goals:  Short Term Goals: 3 weeks   Pt will be independent with HEP MET   Pt will be able to perform one sit to stand independently MET   Pt will be able to walk with a walker with min A for 10 feet MET      Long Term Goals: 6 weeks   Pt will be independent with updated HEP MET   Pt will be SBA with walker for 30 feet with no LOB MET   Pt will be 4/5 for LE MMTs       New STGs:   1. Pt will be able to perform 5 sit to stands with no UE support   2. Pt will be able to walk over 5 low hurdles with no LOB to indicate an improvement in balance and safety   3. Pt will be 4/5 for BLE MMTs     PLAN     1x a week for 4 weeks     Bina Hurtado, PT

## 2023-02-13 ENCOUNTER — CLINICAL SUPPORT (OUTPATIENT)
Dept: REHABILITATION | Facility: HOSPITAL | Age: 83
End: 2023-02-13
Payer: MEDICARE

## 2023-02-13 DIAGNOSIS — R29.898 LEG WEAKNESS, BILATERAL: Primary | ICD-10-CM

## 2023-02-13 DIAGNOSIS — Z74.09 DECREASED MOBILITY AND ENDURANCE: ICD-10-CM

## 2023-02-13 PROCEDURE — 97110 THERAPEUTIC EXERCISES: CPT

## 2023-02-13 NOTE — PROGRESS NOTES
[facility]   Physical Therapy Treatment Note     Name: Radha Feng  Clinic Number: [id number]    Therapy Diagnosis:   Encounter Diagnoses   Name Primary?    Leg weakness, bilateral Yes    Decreased mobility and endurance          Physician: Deandre Peterson MD    Visit Date: 2/13/2023    Physician Orders: PT Eval and Treat   Medical Diagnosis from Referral: K72.90 (ICD-10-CM) - Liver failure without hepatic coma, unspecified chronicity   Evaluation Date: 11/29/2022  Authorization Period Expiration: 1/4/2024  Plan of Care Expiration: 3/12/2023  Progress Note Due: 1/24/2023  Visit # / Visits authorized: 5/ 20  FOTO: Not taken     PTA Visit #: 0/5     Time In:  3:12pm  ( late arrival )  Time Out: 4:00pm   Total Billable Time: 48 minutes    Precautions: Standard, Diabetes, and Fall, Liver Failure     SUBJECTIVE     Pt reports: She has been doing well, she is moving easier. Her R knee isn't bothering her as much   She was compliant with home exercise program.  Response to previous treatment: felt good after , denies soreness or fatigue .   Functional change: amb with RW , WC longer distances     Pain: none currently     OBJECTIVE     Objective Measures updated at progress report unless specified.     Treatment     Pt presents in WC with daughter assist.     Radha received the treatments listed below:      therapeutic exercises to develop strength, endurance, and ROM for 48 minutes including:    Aerobic activity and endurance training for reciprocal motion of lower limbs on Nustep for 5 minutes at Level 1.0 at > or equal to 50 spm w/ rest to increase mobility, blood flow and improve tissue tolerance    **CGA for all activities below     Step up on 4' step with BUEs and CGA -  x10 reps BLE   Standing march in // bars : 2 x 20 reps   Standing HS curls : 2 x 10 reps B   Standing heel raises : 2 x 10 reps   Standing hip abduction : 2 x 10 reps B  Stepping up and over stairs x3   2 laps  around clinic with SPC  Sit to stands with no UE support 3x3   Low hurdles front step overs and side steps x5 each    neuromuscular re-education activities to improve: Balance, Coordination, Sense, and Proprioception for 00 minutes. The following activities were included:      NBOS balance // bars on airex : 2 x 30''  One foot step ups onto Bosu x5   NBOS c/ head turns : 2 x 30''   NBOS eyes closed : 2 x 30''   Tandem stance : x 30 each LE - NT  NBOS with ball toss/catch : 2 x 1' - NT        Patient Education and Home Exercises     Home Exercises Provided and Patient Education Provided     Education provided:   - Updated HEP provided   - Encouraged use of RW vs cane - amb with assistance     Written Home Exercises Provided: yes. Exercises were reviewed and Radha was able to demonstrate them prior to the end of the session.  Radha demonstrated good  understanding of the education provided. See EMR under Patient Instructions for exercises provided during therapy sessions    ASSESSMENT     Pt continues to show clear improvements in function and activity tolerance. Pt able to perform sit to stands with cueing. Pt shows excellent balance with low hurdles with no LOB. Sit to stand added to HEP. Will continue to progress     Radha Is progressing well towards her goals.   Pt prognosis is Good.     Pt will continue to benefit from skilled outpatient physical therapy to address the deficits listed in the problem list box on initial evaluation, provide pt/family education and to maximize pt's level of independence in the home and community environment.   Pt's spiritual, cultural and educational needs considered and pt agreeable to plan of care and goals.     Anticipated barriers to physical therapy: fall risk    Goals:  Short Term Goals: 3 weeks   Pt will be independent with HEP   Pt will be able to perform one sit to stand independently   Pt will be able to walk with a walker with min A for 10 feet      Long Term Goals: 6  weeks   Pt will be independent with updated HEP   Pt will be SBA with walker for 30 feet with no LOB   Pt will be 4/5 for LE MMTs     PLAN     Continue to progress general strengthening and gait training.     Bina Hurtado, PT

## 2023-02-14 ENCOUNTER — TELEPHONE (OUTPATIENT)
Dept: HEPATOLOGY | Facility: CLINIC | Age: 83
End: 2023-02-14
Payer: MEDICARE

## 2023-02-14 ENCOUNTER — PATIENT MESSAGE (OUTPATIENT)
Dept: HEPATOLOGY | Facility: CLINIC | Age: 83
End: 2023-02-14
Payer: MEDICARE

## 2023-02-14 ENCOUNTER — PATIENT MESSAGE (OUTPATIENT)
Dept: UROGYNECOLOGY | Facility: CLINIC | Age: 83
End: 2023-02-14
Payer: MEDICARE

## 2023-02-14 DIAGNOSIS — U07.1 COVID: Primary | ICD-10-CM

## 2023-02-14 NOTE — PROGRESS NOTES
Paxlovid ordered. Rx sent to specialty pharmacy to check drug to drug interactions.    Dot. Pt with decomp cirrhosis/autoimmune hepatitis. Only on a smlal dose of prednisone for AIH. Now with covid. [facility] to have [facility] check for drug-drug interactions. Please check with them .        Call placed to [facility] at [phone] spoke with Claudia.  MR number given to review chart and message from Dr Peraza.  Unable to see message.  Message resent.    Claudia stated she was able to see message.  [facility]  Informed Dr Peraza sent to [facility] and had questions about drug to drug interactions because of her Dx and being on small dose Prednisone.     We need a message in the chart for Dr Peraza to review.  Claudia stated she will forward the message to her Supervisor.

## 2023-02-15 ENCOUNTER — TELEPHONE (OUTPATIENT)
Dept: UROGYNECOLOGY | Facility: CLINIC | Age: 83
End: 2023-02-15
Payer: MEDICARE

## 2023-02-15 NOTE — TELEPHONE ENCOUNTER
"Call to pt's daughter. Message left on her phone and also sent a portal message:    "I reviewed with the pharmacist. Biggest concern is the degree of liver dysfunction- may not be safe with her degree of liver failure. Best not to take it. Recommend going to ER if she is unwell"  "

## 2023-02-22 ENCOUNTER — LAB VISIT (OUTPATIENT)
Dept: LAB | Facility: HOSPITAL | Age: 83
End: 2023-02-22
Attending: INTERNAL MEDICINE
Payer: MEDICARE

## 2023-02-22 ENCOUNTER — CLINICAL SUPPORT (OUTPATIENT)
Dept: REHABILITATION | Facility: HOSPITAL | Age: 83
End: 2023-02-22
Payer: MEDICARE

## 2023-02-22 DIAGNOSIS — K74.60 CIRRHOSIS OF LIVER WITH ASCITES, UNSPECIFIED HEPATIC CIRRHOSIS TYPE: ICD-10-CM

## 2023-02-22 DIAGNOSIS — R18.8 CIRRHOSIS OF LIVER WITH ASCITES, UNSPECIFIED HEPATIC CIRRHOSIS TYPE: ICD-10-CM

## 2023-02-22 DIAGNOSIS — R29.898 LEG WEAKNESS, BILATERAL: Primary | ICD-10-CM

## 2023-02-22 DIAGNOSIS — Z74.09 DECREASED MOBILITY AND ENDURANCE: ICD-10-CM

## 2023-02-22 LAB
ALBUMIN SERPL BCP-MCNC: 2.7 G/DL (ref 3.5–5.2)
ALP SERPL-CCNC: 75 U/L (ref 55–135)
ALT SERPL W/O P-5'-P-CCNC: 17 U/L (ref 10–44)
ANION GAP SERPL CALC-SCNC: 9 MMOL/L (ref 8–16)
AST SERPL-CCNC: 34 U/L (ref 10–40)
BASOPHILS # BLD AUTO: 0.02 K/UL (ref 0–0.2)
BASOPHILS NFR BLD: 0.3 % (ref 0–1.9)
BILIRUB DIRECT SERPL-MCNC: 0.4 MG/DL (ref 0.1–0.3)
BILIRUB SERPL-MCNC: 1.1 MG/DL (ref 0.1–1)
BUN SERPL-MCNC: 19 MG/DL (ref 8–23)
CALCIUM SERPL-MCNC: 9.2 MG/DL (ref 8.7–10.5)
CHLORIDE SERPL-SCNC: 106 MMOL/L (ref 95–110)
CO2 SERPL-SCNC: 25 MMOL/L (ref 23–29)
CREAT SERPL-MCNC: 0.8 MG/DL (ref 0.5–1.4)
DIFFERENTIAL METHOD: ABNORMAL
EOSINOPHIL # BLD AUTO: 0 K/UL (ref 0–0.5)
EOSINOPHIL NFR BLD: 0.1 % (ref 0–8)
ERYTHROCYTE [DISTWIDTH] IN BLOOD BY AUTOMATED COUNT: 13.3 % (ref 11.5–14.5)
EST. GFR  (NO RACE VARIABLE): >60 ML/MIN/1.73 M^2
GLUCOSE SERPL-MCNC: 196 MG/DL (ref 70–110)
HCT VFR BLD AUTO: 35.8 % (ref 37–48.5)
HGB BLD-MCNC: 11.4 G/DL (ref 12–16)
IMM GRANULOCYTES # BLD AUTO: 0.02 K/UL (ref 0–0.04)
IMM GRANULOCYTES NFR BLD AUTO: 0.3 % (ref 0–0.5)
INR PPP: 1 (ref 0.8–1.2)
LYMPHOCYTES # BLD AUTO: 1.5 K/UL (ref 1–4.8)
LYMPHOCYTES NFR BLD: 18.3 % (ref 18–48)
MCH RBC QN AUTO: 31.7 PG (ref 27–31)
MCHC RBC AUTO-ENTMCNC: 31.8 G/DL (ref 32–36)
MCV RBC AUTO: 99 FL (ref 82–98)
MONOCYTES # BLD AUTO: 0.7 K/UL (ref 0.3–1)
MONOCYTES NFR BLD: 8.1 % (ref 4–15)
NEUTROPHILS # BLD AUTO: 5.8 K/UL (ref 1.8–7.7)
NEUTROPHILS NFR BLD: 72.9 % (ref 38–73)
NRBC BLD-RTO: 0 /100 WBC
PLATELET # BLD AUTO: 115 K/UL (ref 150–450)
PMV BLD AUTO: 11.9 FL (ref 9.2–12.9)
POTASSIUM SERPL-SCNC: 4.6 MMOL/L (ref 3.5–5.1)
PROT SERPL-MCNC: 6.7 G/DL (ref 6–8.4)
PROTHROMBIN TIME: 10.9 SEC (ref 9–12.5)
RBC # BLD AUTO: 3.6 M/UL (ref 4–5.4)
SODIUM SERPL-SCNC: 140 MMOL/L (ref 136–145)
WBC # BLD AUTO: 7.99 K/UL (ref 3.9–12.7)

## 2023-02-22 PROCEDURE — 80053 COMPREHEN METABOLIC PANEL: CPT | Performed by: INTERNAL MEDICINE

## 2023-02-22 PROCEDURE — 85025 COMPLETE CBC W/AUTO DIFF WBC: CPT | Performed by: INTERNAL MEDICINE

## 2023-02-22 PROCEDURE — 85610 PROTHROMBIN TIME: CPT | Performed by: INTERNAL MEDICINE

## 2023-02-22 PROCEDURE — 97116 GAIT TRAINING THERAPY: CPT

## 2023-02-22 PROCEDURE — 97110 THERAPEUTIC EXERCISES: CPT

## 2023-02-22 PROCEDURE — 36415 COLL VENOUS BLD VENIPUNCTURE: CPT | Performed by: INTERNAL MEDICINE

## 2023-02-22 PROCEDURE — 82248 BILIRUBIN DIRECT: CPT | Performed by: INTERNAL MEDICINE

## 2023-02-22 NOTE — PROGRESS NOTES
[facility]   Physical Therapy Treatment Note     Name: Radha Feng  Clinic Number: [id number]    Therapy Diagnosis:   Encounter Diagnoses   Name Primary?    Leg weakness, bilateral Yes    Decreased mobility and endurance      Physician: Deandre Peterson MD    Visit Date: 2/22/2023    Physician Orders: PT Eval and Treat   Medical Diagnosis from Referral: K72.90 (ICD-10-CM) - Liver failure without hepatic coma, unspecified chronicity   Evaluation Date: 11/29/2022  Authorization Period Expiration: 1/4/2024  Plan of Care Expiration: 3/12/2023  Progress Note Due: 1/24/2023  Visit # / Visits authorized: 6/ 20  FOTO: Not taken     PTA Visit #: 0/5     Time In:  2:10pm ( late arrival )  Time Out: 3:00pm   Total Billable Time: 50 minutes    Precautions: Standard, Diabetes, and Fall, Liver Failure     SUBJECTIVE     Pt reports: She has been doing well, she has been doing her exercises and thinks walking is getting easier   She was compliant with home exercise program.  Response to previous treatment: felt good after , denies soreness or fatigue .   Functional change: amb with RW , WC longer distances     Pain: none currently     OBJECTIVE     Objective Measures updated at progress report unless specified.     Treatment     Pt presents in WC with daughter assist.     Radha received the treatments listed below:      therapeutic exercises to develop strength, endurance, and ROM for 25 minutes including:    Aerobic activity and endurance training for reciprocal motion of lower limbs on Nustep for 7 minutes at Level 1.0 at > or equal to 50 spm w/ rest to increase mobility, blood flow and improve tissue tolerance    **CGA for all activities below     Step up on 4' step with BUEs and CGA -  x10 reps BLE   Standing march in // bars : 2 x 20 reps   Standing HS curls : 2 x 10 reps B   Standing heel raises : 2 x 10 reps   Standing hip abduction : 2 x 10 reps B  Stepping up and over stairs x3   Sit to  stands 5x5  Low hurdles front step overs and side steps x5 each    Gait trainin minutes  3 laps around clinic/ hallway with SPC       Patient Education and Home Exercises     Home Exercises Provided and Patient Education Provided     Education provided:   - Updated HEP provided   - Encouraged use of RW vs cane - amb with assistance     Written Home Exercises Provided: Patient instructed to cont prior HEP. Exercises were reviewed and Radha was able to demonstrate them prior to the end of the session.  Radha demonstrated good  understanding of the education provided. See EMR under Patient Instructions for exercises provided during therapy sessions    ASSESSMENT     Pt tolerated treatment well with no reports of pain. Pt continues to struggle with performing sit to stands with no UE use. Pt requires cueing for proper set up for sit to stands. Pt shows consistent improvement in mobility and endurance as seen with tolerating 3 reps of a lap around the clinic with the SPC. Pt in agreement to continue current HEP    Radha Is progressing well towards her goals.   Pt prognosis is Good.     Pt will continue to benefit from skilled outpatient physical therapy to address the deficits listed in the problem list box on initial evaluation, provide pt/family education and to maximize pt's level of independence in the home and community environment.   Pt's spiritual, cultural and educational needs considered and pt agreeable to plan of care and goals.     Anticipated barriers to physical therapy: fall risk    Goals:  Short Term Goals: 3 weeks   Pt will be independent with HEP   Pt will be able to perform one sit to stand independently   Pt will be able to walk with a walker with min A for 10 feet      Long Term Goals: 6 weeks   Pt will be independent with updated HEP   Pt will be SBA with walker for 30 feet with no LOB   Pt will be 4/5 for LE MMTs     PLAN     Continue to progress general strengthening and gait training.      Bina Hurtado, PT

## 2023-02-24 ENCOUNTER — PATIENT MESSAGE (OUTPATIENT)
Dept: PRIMARY CARE CLINIC | Facility: CLINIC | Age: 83
End: 2023-02-24
Payer: MEDICARE

## 2023-02-24 ENCOUNTER — PATIENT MESSAGE (OUTPATIENT)
Dept: OPTOMETRY | Facility: CLINIC | Age: 83
End: 2023-02-24
Payer: MEDICARE

## 2023-02-24 DIAGNOSIS — Z79.4 TYPE 2 DIABETES MELLITUS WITH OTHER CIRCULATORY COMPLICATION, WITH LONG-TERM CURRENT USE OF INSULIN: Primary | ICD-10-CM

## 2023-02-24 DIAGNOSIS — H40.1113 PRIMARY OPEN ANGLE GLAUCOMA (POAG) OF RIGHT EYE, SEVERE STAGE: ICD-10-CM

## 2023-02-24 DIAGNOSIS — H40.1131 PRIMARY OPEN ANGLE GLAUCOMA (POAG) OF BOTH EYES, MILD STAGE: ICD-10-CM

## 2023-02-24 DIAGNOSIS — E11.59 TYPE 2 DIABETES MELLITUS WITH OTHER CIRCULATORY COMPLICATION, WITH LONG-TERM CURRENT USE OF INSULIN: Primary | ICD-10-CM

## 2023-02-24 RX ORDER — LATANOPROST 50 UG/ML
1 SOLUTION/ DROPS OPHTHALMIC DAILY
Qty: 7.5 ML | Refills: 3 | Status: SHIPPED | OUTPATIENT
Start: 2023-02-24 | End: 2023-03-06

## 2023-02-24 NOTE — TELEPHONE ENCOUNTER
No new care gaps identified.  [facility] Embedded Care Gaps. Reference number: [id number]. 2/24/2023   3:33:26 PM CST

## 2023-02-25 RX ORDER — BLOOD-GLUCOSE TRANSMITTER
EACH MISCELLANEOUS
Qty: 1 EACH | Status: SHIPPED | OUTPATIENT
Start: 2023-02-25 | End: 2023-11-08

## 2023-03-06 ENCOUNTER — HOSPITAL ENCOUNTER (OUTPATIENT)
Dept: RADIOLOGY | Facility: OTHER | Age: 83
Discharge: HOME | End: 2023-03-06
Attending: INTERNAL MEDICINE
Payer: MEDICARE

## 2023-03-06 ENCOUNTER — CLINICAL SUPPORT (OUTPATIENT)
Dept: REHABILITATION | Facility: HOSPITAL | Age: 83
End: 2023-03-06
Payer: MEDICARE

## 2023-03-06 DIAGNOSIS — R29.898 LEG WEAKNESS, BILATERAL: Primary | ICD-10-CM

## 2023-03-06 DIAGNOSIS — Z78.0 POST-MENOPAUSAL: ICD-10-CM

## 2023-03-06 DIAGNOSIS — M85.80 OSTEOPENIA, UNSPECIFIED LOCATION: Primary | ICD-10-CM

## 2023-03-06 DIAGNOSIS — K75.4 AUTOIMMUNE HEPATITIS: ICD-10-CM

## 2023-03-06 DIAGNOSIS — Z74.09 DECREASED MOBILITY AND ENDURANCE: ICD-10-CM

## 2023-03-06 PROCEDURE — 77080 DXA BONE DENSITY AXIAL: CPT | Mod: 26,,, | Performed by: RADIOLOGY

## 2023-03-06 PROCEDURE — 77080 DXA BONE DENSITY AXIAL: CPT | Mod: TC

## 2023-03-06 PROCEDURE — 77080 DEXA BONE DENSITY SPINE HIP: ICD-10-PCS | Mod: 26,,, | Performed by: RADIOLOGY

## 2023-03-06 PROCEDURE — 97110 THERAPEUTIC EXERCISES: CPT | Mod: CQ

## 2023-03-06 NOTE — PROGRESS NOTES
[facility]   Physical Therapy Treatment Note     Name: Radha Feng  Clinic Number: [id number]    Therapy Diagnosis:   Encounter Diagnoses   Name Primary?    Leg weakness, bilateral Yes    Decreased mobility and endurance        Physician: Deandre Peterson MD    Visit Date: 3/6/2023    Physician Orders: PT Eval and Treat   Medical Diagnosis from Referral: K72.90 (ICD-10-CM) - Liver failure without hepatic coma, unspecified chronicity   Evaluation Date: 11/29/2022  Authorization Period Expiration: 1/4/2024  Plan of Care Expiration: 3/12/2023  Progress Note Due: 1/24/2023  Visit # / Visits authorized: 7/ 20  FOTO: Not taken     PTA Visit #: 1/5     Time In:  2:10pm ( late arrival )  Time Out: 3:00pm   Total Billable Time: 50 minutes    Precautions: Standard, Diabetes, and Fall, Liver Failure     SUBJECTIVE     Pt reports: they just came from getting a bone density test . She is doing well today with no complaints and has been walking around her house with her cane .   She was compliant with home exercise program.  Response to previous treatment: felt good after , denies soreness or fatigue .   Functional change: amb with RW , WC longer distances     Pain: none currently     OBJECTIVE     Objective Measures updated at progress report unless specified.     Treatment     Pt presents in WC with daughter assist.     Radha received the treatments listed below:      therapeutic exercises to develop strength, endurance, and ROM for 40 minutes including:    Aerobic activity and endurance training for reciprocal motion of lower limbs on Nustep for 5 minutes at Level 2.0 at > or equal to 50 spm w/ rest to increase mobility, blood flow and improve tissue tolerance    **CGA for all activities below     Step up on 4' step with BUEs and CGA -  x10 reps BLE   Standing march in // bars : 2 x 20 reps   Standing HS curls : 2 x 10 reps B   Standing heel raises : 2 x 10 reps   Standing hip abduction : 2 x  10 reps B- NP  Stepping up and over stairs x3   Sit to stands 5x5  + Steamboats : x 10 reps B   Low hurdles front step overs and side steps x5 each- NP      Gait training: 10 minutes  From gym to waiting room with SPC and CGA      Patient Education and Home Exercises     Home Exercises Provided and Patient Education Provided     Education provided:   - Updated HEP provided   - Encouraged use of RW vs cane - amb with assistance     Written Home Exercises Provided: Patient instructed to cont prior HEP. Exercises were reviewed and Radha was able to demonstrate them prior to the end of the session.  Radha demonstrated good  understanding of the education provided. See EMR under Patient Instructions for exercises provided during therapy sessions    ASSESSMENT     Pt demonstrated a good tolerance to session today. No knee pain reported and improved tolerance to standing activities. Pt demonstrates significant improved endurance and was encouraged to try and ambulate from waiting room<>gym with walker and therapist or daughter supervision VS using the wheelchair which she agrees feels she can perform.     Radha Is progressing well towards her goals.   Pt prognosis is Good.     Pt will continue to benefit from skilled outpatient physical therapy to address the deficits listed in the problem list box on initial evaluation, provide pt/family education and to maximize pt's level of independence in the home and community environment.   Pt's spiritual, cultural and educational needs considered and pt agreeable to plan of care and goals.     Anticipated barriers to physical therapy: fall risk    Goals:  Short Term Goals: 3 weeks   Pt will be independent with HEP   Pt will be able to perform one sit to stand independently   Pt will be able to walk with a walker with min A for 10 feet      Long Term Goals: 6 weeks   Pt will be independent with updated HEP   Pt will be SBA with walker for 30 feet with no LOB   Pt will be 4/5 for  LE MMTs     PLAN     Continue to progress general strengthening and gait training.     Nicole Disla, PTA

## 2023-03-06 NOTE — PROGRESS NOTES
Endocrinology New Visit   03/07/2023      Subjective:      Chief Complaint:  Diabetes and Osteoporosis      History of Present Illness  Radha Feng is a 82 y.o. female with autoimmune hepatitis, cirrhosis and hx HE, HTN, HLD, HFpEF, anemia, thrombocytopenia, Vit D def, CAD, and T2DM referred by Dr. Leticia Lim for evaluation of T2DM and newly diagnosed osteoporosis.    Recent admission for new dx of autoimmune hepatitis. Required several readmissions. Doing well now - no longer has ascites, LE edema, or recurrence of HE. Prednisone 7.5 mg qd - started at 40mg in the hospital and slowly tapered. They have f/u soon and will discuss plan for further decrease. Plan to get on lowest dose necessary and then stay on it per the pt's daughter who is an RN.    T2DM  Diagnosed approx 25yrs ago - in 60s  Known complications: neuropathy    Current Diabetes Regimen:  Levemir 12u qd  Novolog 8u TIDAC    She is on prednisone for autoimmune hepatitis, current dose is 7.5 mg qd.     Timing of prandial insulin: Gives it mid-meal or after the meal  Omitted doses: denies    Prior mediations tried:  Glyburide - switched to insulin in 10/2022  Metformin - stopped in 10/2022 due to autoimmune hepatitis and lactic acidosis       Diet/Exercise:  Eats 3 meals per day, overall healthy diet  Walks with cane inside, uses wheelchair for long distances. Signficant improvement in mobility since hospitalization     Recent Hgb A1C:  Lab Results   Component Value Date    HGBA1C 5.0 01/30/2023       Glucose Monitoring:  Dexcom G6 - started with this when started insulin. Report scanned into media tab - reviewed  report with pt and daughter today  Some postprandial hyperglycemia, williams midday. Overnight drifting down       Hypoglycemic Episodes: A few times 70s, but with dexcom has not had anything lower     Screening / DM Complications:    Nephropathy:  ACEi/ARB: Not taking  No results found for: MICALBCREAT    Last Lipid Panel:  Statin: Not  "taking statin 2/2 autoimmune hepatitis   Lab Results   Component Value Date    LDLCALC 168.6 (H) 01/30/2023       Last foot exam Most Recent Foot Exam Date: Not Found - has some neuropathy, cataracts and glaucoma, some nerve damage in R eye not from DM but from glaucoma  Last eye exam : 02/06/2023;  no laser surgery or DR    B12:  Lab Results   Component Value Date    YGFCWRMD31 >2000 (H) 11/04/2022       Vitamin D Deficiency  Last vitamin level was 18 on 1/13/2022  Was on high dose weekly vit d in the past, not on any Vit D supplements in a while      Osteoporosis  Diagnosed: 3/2023    DXA 1/30/2023  FINDINGS:  The bone mineral density measured from L1 through L4 is 0.954g/cm2.  This corresponds to a T score of -1.9 and a Z score of -0.4.     The bone mineral density within the left femoral neck measures 0.654 g/cm2.  This corresponds to a T score of -2.8 and a Z score of -1.2.     The bone mineral density within the right femoral neck measures 0.550 g/cm2.  This corresponds to a T score of -3.5 and a Z score of -2.0.  Values are likely lower than actual due to positioning.     FRAX RESULTS:     10-year Probability of Fracture:     Major Osteoporotic Fracture 12.8%.     Hip Fracture 6%.     Impression:     Osteopenia lumbar spine.  Osteoporosis both hips.      Denies fractures  No recent falls - using cane, walker, or wheelchair and going to PT - making significant improvements in mobility with PT    Dentures were thrown away when she was hospitalized -- wears full dentures bottom and top - does not have any teeth       Not taking calcium or vitamin D    On Prednisone 7.5 mg qd for autoimmune hepatitis     ROS:   As above    Objective:     BP (!) 140/72   Pulse 70   Ht 5' 6" (1.676 m)   Wt 49.7 kg (109 lb 10.9 oz)   SpO2 98%   BMI 17.70 kg/m²   BP Readings from Last 3 Encounters:   03/07/23 (!) 140/72   01/30/23 (!) 199/95   01/09/23 (!) 154/70     Wt Readings from Last 1 Encounters:   03/07/23 1119 49.7 kg " (109 lb 10.9 oz)     Body mass index is 17.7 kg/m².      Physical Exam  Vitals reviewed.   Constitutional:       General: She is not in acute distress.     Appearance: She is not ill-appearing.      Comments: Thin elderly female, comfortable in wheelchair   Eyes:      Conjunctiva/sclera: Conjunctivae normal.   Cardiovascular:      Rate and Rhythm: Normal rate.   Pulmonary:      Effort: Pulmonary effort is normal. No respiratory distress.   Musculoskeletal:      Cervical back: Normal range of motion and neck supple.      Right lower leg: No edema.      Left lower leg: No edema.   Skin:     General: Skin is warm and dry.      Comments: No skin irritation from injections   Neurological:      Mental Status: She is alert and oriented to person, place, and time.   Psychiatric:         Mood and Affect: Mood normal.         Behavior: Behavior normal.     Lab Review:   Lab Results   Component Value Date    HGBA1C 5.0 01/30/2023     Lab Results   Component Value Date    CHOL 238 (H) 01/30/2023    HDL 50 01/30/2023    LDLCALC 168.6 (H) 01/30/2023    TRIG 97 01/30/2023    CHOLHDL 21.0 01/30/2023     Lab Results   Component Value Date     02/22/2023    K 4.6 02/22/2023     02/22/2023    CO2 25 02/22/2023     (H) 02/22/2023    BUN 19 02/22/2023    CREATININE 0.8 02/22/2023    CALCIUM 9.2 02/22/2023    PROT 6.7 02/22/2023    ALBUMIN 2.7 (L) 02/22/2023    BILITOT 1.1 (H) 02/22/2023    ALKPHOS 75 02/22/2023    AST 34 02/22/2023    ALT 17 02/22/2023    ANIONGAP 9 02/22/2023    TSH 0.740 09/21/2022     No results found for: [id number]  Vit D 18 in 1/2022 (Care Everywhere)    Assessment and Plan     Age-related osteoporosis without current pathological fracture  New dx of osteoporosis with DXA 3/6/2023 - low T-scores williams in hips, and high FRAX  No hx fractures or falls  No prior tx  Discussed options for tx with the pt and daughter and reviewed risks/benefits - If fracturing would favor anabolic, but since no  fractures - recommend starting Prolia q6mo  Reviewed risks/benefits as well as frequency of dosing and importance of getting doses on-time to prevent bone loss that occurs with late doses  Pt and daughter agreeable -- therapy plan orders signed  Also discussed goal Ca intake daily and supplementation if not getting 1200 mg daily  Begin OTC Vit D3 2000 IU qd - will check vit d level with labs before next appt  Reviewed fall prevention/fall precautions    Type 2 diabetes mellitus with circulatory disorder, with long-term current use of insulin  Long hx of T2DM on oral agents only until autoimmune hepatitis dx in late 2022 when she was switched to MDI  Currently on Levemir 12u qd and Novolog 8u AC -- some prandial hyperglycemia related likely to timing -- giving insulin mid-meal or after the meal. Discussed pre-meal 10-15min dosing with pt and daughter. If still high with proper timing can consider increasing Novolog (williams given steroids)  BG drifting down o/n -- will decrease levemir from 12u qd down to 10u daily  Pt is interested in insulin pump -- right now she cannot eat a meal unless daughter is there to administer insulin - they feel a pump would allow more flexibility for pt to bolus herself -- we discussed omnipod with them today but will send them to DM education to discuss pump options and be assessed for pump therapy in general  Continue Dexcom G6 - pt and daughter very happy with it  Due for foot exam - will do this next time  Up to date on eye exam    Will get a1c and urine MAC prior to next visit     Autoimmune hepatitis  New dx of autoimmune hepatitis in late 2022   On prednisone 7.5 mg - specific weaning plan unknown but pt and her daughter will let us know anytime steroid dose is changed as this will likely change insulin requirement    Vitamin D deficiency  Vit D 18 in 1/2022 (Care Everywhere)  Not taking vit d currently -- begin OTC Vit D3 2000 IU daily  Will repeat vit d level with other labs prior  to next visit with us      DM education for pump eval  RTC 4mo with labs and urine MAC before      Divina Stock MD  [facility], [address]  [address] [place], [address]    Office: [phone]  Fax: [phone]

## 2023-03-07 ENCOUNTER — OFFICE VISIT (OUTPATIENT)
Dept: ENDOCRINOLOGY | Facility: CLINIC | Age: 83
End: 2023-03-07
Payer: MEDICARE

## 2023-03-07 VITALS
WEIGHT: 109.69 LBS | SYSTOLIC BLOOD PRESSURE: 140 MMHG | OXYGEN SATURATION: 98 % | DIASTOLIC BLOOD PRESSURE: 72 MMHG | HEART RATE: 70 BPM | BODY MASS INDEX: 17.63 KG/M2 | HEIGHT: 66 IN

## 2023-03-07 DIAGNOSIS — K75.4 AUTOIMMUNE HEPATITIS: Chronic | ICD-10-CM

## 2023-03-07 DIAGNOSIS — Z79.4 TYPE 2 DIABETES MELLITUS WITH OTHER CIRCULATORY COMPLICATION, WITH LONG-TERM CURRENT USE OF INSULIN: Primary | ICD-10-CM

## 2023-03-07 DIAGNOSIS — M85.80 OSTEOPENIA, UNSPECIFIED LOCATION: ICD-10-CM

## 2023-03-07 DIAGNOSIS — E55.9 VITAMIN D DEFICIENCY: ICD-10-CM

## 2023-03-07 DIAGNOSIS — M81.0 AGE-RELATED OSTEOPOROSIS WITHOUT CURRENT PATHOLOGICAL FRACTURE: ICD-10-CM

## 2023-03-07 DIAGNOSIS — E11.59 TYPE 2 DIABETES MELLITUS WITH OTHER CIRCULATORY COMPLICATION, WITH LONG-TERM CURRENT USE OF INSULIN: Primary | ICD-10-CM

## 2023-03-07 PROCEDURE — 99999 PR PBB SHADOW E&M-EST. PATIENT-LVL V: ICD-10-PCS | Mod: PBBFAC,GC,, | Performed by: STUDENT IN AN ORGANIZED HEALTH CARE EDUCATION/TRAINING PROGRAM

## 2023-03-07 PROCEDURE — 95251 CONT GLUC MNTR ANALYSIS I&R: CPT | Mod: GC,,, | Performed by: STUDENT IN AN ORGANIZED HEALTH CARE EDUCATION/TRAINING PROGRAM

## 2023-03-07 PROCEDURE — 95251 PR GLUCOSE MONITOR, 72 HOUR, PHYS INTERP: ICD-10-PCS | Mod: GC,,, | Performed by: STUDENT IN AN ORGANIZED HEALTH CARE EDUCATION/TRAINING PROGRAM

## 2023-03-07 PROCEDURE — 99204 PR OFFICE/OUTPT VISIT, NEW, LEVL IV, 45-59 MIN: ICD-10-PCS | Mod: 25,S$PBB,GC, | Performed by: STUDENT IN AN ORGANIZED HEALTH CARE EDUCATION/TRAINING PROGRAM

## 2023-03-07 PROCEDURE — 99204 OFFICE O/P NEW MOD 45 MIN: CPT | Mod: 25,S$PBB,GC, | Performed by: STUDENT IN AN ORGANIZED HEALTH CARE EDUCATION/TRAINING PROGRAM

## 2023-03-07 PROCEDURE — 99999 PR PBB SHADOW E&M-EST. PATIENT-LVL V: CPT | Mod: PBBFAC,GC,, | Performed by: STUDENT IN AN ORGANIZED HEALTH CARE EDUCATION/TRAINING PROGRAM

## 2023-03-07 PROCEDURE — 99215 OFFICE O/P EST HI 40 MIN: CPT | Mod: PBBFAC | Performed by: STUDENT IN AN ORGANIZED HEALTH CARE EDUCATION/TRAINING PROGRAM

## 2023-03-07 NOTE — ASSESSMENT & PLAN NOTE
New dx of osteoporosis with DXA 3/6/2023 - low T-scores williams in hips, and high FRAX  No hx fractures or falls  No prior tx  Discussed options for tx with the pt and daughter and reviewed risks/benefits - If fracturing would favor anabolic, but since no fractures - recommend starting Prolia q6mo  Reviewed risks/benefits as well as frequency of dosing and importance of getting doses on-time to prevent bone loss that occurs with late doses  Pt and daughter agreeable -- therapy plan orders signed  Also discussed goal Ca intake daily and supplementation if not getting 1200 mg daily  Begin OTC Vit D3 2000 IU qd - will check vit d level with labs before next appt  Reviewed fall prevention/fall precautions

## 2023-03-07 NOTE — ASSESSMENT & PLAN NOTE
New dx of autoimmune hepatitis in late 2022   On prednisone 7.5 mg - specific weaning plan unknown but pt and her daughter will let us know anytime steroid dose is changed as this will likely change insulin requirement

## 2023-03-07 NOTE — ASSESSMENT & PLAN NOTE
Vit D 18 in 1/2022 (Care Everywhere)  Not taking vit d currently -- begin OTC Vit D3 2000 IU daily  Will repeat vit d level with other labs prior to next visit with us

## 2023-03-07 NOTE — ASSESSMENT & PLAN NOTE
Long hx of T2DM on oral agents only until autoimmune hepatitis dx in late 2022 when she was switched to MDI  Currently on Levemir 12u qd and Novolog 8u AC -- some prandial hyperglycemia related likely to timing -- giving insulin mid-meal or after the meal. Discussed pre-meal 10-15min dosing with pt and daughter. If still high with proper timing can consider increasing Novolog (williams given steroids)  BG drifting down o/n -- will decrease levemir from 12u qd down to 10u daily  Pt is interested in insulin pump -- right now she cannot eat a meal unless daughter is there to administer insulin - they feel a pump would allow more flexibility for pt to bolus herself -- we discussed omnipod with them today but will send them to DM education to discuss pump options and be assessed for pump therapy in general  Continue Dexcom G6 - pt and daughter very happy with it  Due for foot exam - will do this next time  Up to date on eye exam    Will get a1c and urine MAC prior to next visit

## 2023-03-07 NOTE — PATIENT INSTRUCTIONS
"For Osteoporosis we would like to start a medication called Prolia to reduce risk of fracture  Osteoporosis medications  These are the medications we discussed for osteoporosis treatment:    - Prolia (denosumab):          - slows down bone loss           - it is a subcutaneous injection (under the skin) every 6 months, given in the office    It is important to get these injections on time -- if you are late your bone density can decrease quickly.   You will go to the infusion center for this every 6 months    Insulin:  Lower your Levemir (Detemir - long acting insulin) down to 10 units once per day  Give Novolog (mealtime insulin) 10-15 minutes before the meal, continue the same dose of 8 units before meals -- if your sugar is still high with giving the insulin before the meals we can consider increasing the dose - but let me know if sugars are consistently >200 or low    Please let us know anytime your prednisone/steroid dose is changed - it might change the amount of insulin you need.    We will schedule an appointment for you to see one of our diabetes educators to discuss options for insulin pumps.       "15-15 Rule" for hypoglycemia treatment  - Many people tend to want to eat as much as they can for low blood glucose until they feel better. This can cause blood glucose levels to go very high, which is bad. Using the step-wise approach of the "15-15 Rule" can help you correct a low blood glucose while also preventing subsequent high blood glucose levels  - If you experience an episode of hypoglycemia (glucose less than 70), please follow the "15-15 rule": Take 15 grams of carbohydrate (see examples below) to raise your blood sugar and recheck it after 15 minutes. If still below 70 mg/dL, take another 15 gram serving of carbohydrate  -Repeat these steps until your blood sugar is at least 70 mg/dL. Once your blood sugar is back to normal, eat a small serving of protein to make sure it doesnt lower " again    -Examples of 15g of carbohydrate:  4 ounces (1/2 cup) of juice or regular soda (not diet)  1 tablespoon of sugar, honey, or corn syrup  Hard candies, jellybeans, or gumdrops--see food label for how many to consume  Make a note about any episodes of low blood sugar and talk with your health care team if they are recurrent     - Note: When treating a low blood glucose, the choice of carbohydrate source is important. Complex carbohydrates, or foods that contain fats along with carbs (like chocolate) can slow the absorption of glucose and should not be used to treat an emergency low    - For more information, please visit:  [email or web address].org/diabetes/[email or web address]-management/blood-glucose-testing-[email or web address]-control/hypoglycemia    Start over-the-counter Vitamin D 2000 IU every day    For calcium intake:  I advise you get 1200 mg per day of calcium, split up over the day into 2 to 4 divided doses.  This amount includes calcium from both dietary sources and/or calcium supplements, and it is best to get smaller amounts throughout the day rather than all of the calcium at one time; this allows for better absorption of the calcium.     To estimate calcium content from a nutrition label, the label lists the % calcium in that food.   For example, the label for an 8 oz glass of milk lists the calcium content as 30%.  This is equivalent to 300 mg of calcium (multiply the % by 10 to get the mg of calcium per serving).  It is best to try to get the majority of calcium intake from the diet.  I've included a table below of some calcium-rich foods.    If you are not getting enough calcium in the diet, then you can add low doses of calcium supplements.  For calcium supplements, your body can only absorb about 500-600 mg of calcium at one time.  Thus, it is best to split the tablets up over the course of a day.  It is also best to avoid taking calcium supplements at the same time as a calcium-rich food to maximize your calcium  absorption.  Calcium carbonate is best taken with or after a meal.  Calcium citrate can be taken on an empty stomach or with/after a meal.  Please look at any multivitamin you are taking as these often usually contain calcium as well.    Estimated Calcium Content of Foods:  Produce  Serving Size Estimated Calcium*    Lise greens, frozen 8 oz 360 mg   Broccoli brad 8 oz 200 mg   Kale, frozen 8 oz 180 mg   Soy Beans, green, boiled 8 oz 175 mg   Bok Vasile, cooked, boiled 8 oz 160 mg   Figs, dried 2 figs 65 mg   Broccoli, fresh, cooked 8 oz 60 mg   Oranges 1 whole 55 mg   Seafood Serving Size Estimated Calcium*    Sardines, canned with bones 3 oz 325 mg   Corinne, canned with bones 3 oz 180 mg   Shrimp, canned 3 oz 125 mg   Dairy Serving Size Estimated Calcium*    Ricotta, part-skim 4 oz 335 mg   Yogurt, plain, low-fat 6 oz 310 mg   Milk, skim, low-fat, whole 8 oz 300 mg   Yogurt with fruit, low-fat 6 oz 260 mg   Mozzarella, part-skim 1 oz 210 mg   Cheddar 1 oz 205 mg   Yogurt, Greek 6 oz 200 mg   American Cheese 1 oz 195 mg   Feta Cheese 4 oz 140 mg   Cottage Cheese, 2% 4 oz 105 mg   Frozen yogurt, vanilla 8 oz 105 mg   Ice Cream, vanilla 8 oz 85 mg   Parmesan 1 tbsp 55 mg   Fortified Food Serving Size Estimated Calcium*   Suffield milk, rice milk or soy milk, fortified 8 oz 300 mg   Orange juice and other fruit juices, fortified 8 oz 300 mg   Tofu, prepared with calcium 4 oz 205 mg   Waffle, frozen, fortified 2 pieces 200 mg   Oatmeal, fortified 1 packet 140 mg   English muffin, fortified 1 muffin 100 mg   Cereal, fortified 8 oz 100-1,000 mg   Other Serving Size Estimated Calcium*   Mac & cheese, frozen 1 package 325 mg   Pizza, cheese, frozen 1 serving 115 mg   Pudding, chocolate, prepared with 2% milk 4 oz 160 mg   Beans, baked, canned 4 oz 160 mg   *The calcium content listed for most foods is estimated and can vary due to multiple factors. Check the food label to determine how much calcium is in a particular  product.  If you read the nutrition label for a food source, it lists the % calcium in that food.  For an 8 oz glass of milk, for example, the label states calcium 30%.  This is equivalent to 300 mg of calcium (multiply the listed number by 10).   **Table from the National Osteoporosis Foundation      Follow up with us in 6 months with labs and urine test before that visit

## 2023-03-08 DIAGNOSIS — D53.9 NUTRITIONAL ANEMIA: ICD-10-CM

## 2023-03-08 DIAGNOSIS — D64.89 ANEMIA DUE TO OTHER CAUSE, NOT CLASSIFIED: Primary | ICD-10-CM

## 2023-03-08 NOTE — PROGRESS NOTES
PATIENT: Radha Feng  MRN: [id number]  DATE: 3/9/2023      Diagnosis:   1. Anemia due to other cause, not classified    2. Nutritional anemia    3. Thrombocytopenia    4. Autoimmune hepatitis          Chief Complaint: No chief complaint on file.    Macrocytic anemia (hgb range 10-11) - attributed to medication and chronic disease  Thrombocytopenia (plts sebastian 27k during acute illness, stable ~100k) - attributed to hepatic dysfunction and medications  Autoimmune Hepatitis - diagnosed 2022    Subjective:    HPI: Ms. Feng is a 82 y.o. female with past medical history of HTN, HL, DMII, and autoimmune hepatitis (9/2022) who presents for follow-up of anemia and thrombocytopenia. Liver enzymes stable on prednisone 7.5 mg daily, goal to lower to 5 mg daily in liver clinic. Ascites and lower extremity edema has resolved. Appetite and strength improved.     Present today with daughter, Della who is a nurse and has her own business.    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    Diabetes mellitus     Glaucoma     Hypertension     Other ascites 11/29/2022       Past Surgical HIstory:   Past Surgical History:   Procedure Laterality Date    CATARACT EXTRACTION W/  INTRAOCULAR LENS IMPLANT Left 12/21/2021    Procedure: EXTRACTION, CATARACT, WITH IOL INSERTION;  Surgeon: Shay Chou MD;  [facility];  Service: Ophthalmology;  Laterality: Left;       Family History:   Family History   Problem Relation Age of Onset    Cataracts Mother     Diabetes Mother     Glaucoma Mother     Hypertension Mother     Heart attack Father     Colon cancer Sister     Blindness Cousin     Amblyopia Neg Hx     Macular degeneration Neg Hx     Retinal detachment Neg Hx        Social History:  reports that she has quit smoking. She has never used smokeless tobacco. She reports that she does not currently use alcohol. She reports that she does  "not use drugs.    Allergies:  Review of patient's allergies indicates:  No Known Allergies    Medications:  Current Outpatient Medications   Medication Sig Dispense Refill    acetaminophen (TYLENOL) 500 MG tablet Take 2 tablets (1,000 mg total) by mouth every 8 (eight) hours as needed for Pain.  0    BD INSULIN SYRINGE ULTRA-FINE 1 mL 31 gauge x 5/16 Syrg       blood-glucose meter,continuous (DEXCOM G6 ) Misc Check blood sugar before meals and at bedtime 1 each PRN    blood-glucose sensor (DEXCOM G6 SENSOR) Amanda Check blood sugar before meals and at bedtime 1 each PRN    blood-glucose transmitter (DEXCOM G6 TRANSMITTER) Amanda Check blood sugar before meals and at bedtime 1 each PRN    brimonidine 0.2% (ALPHAGAN) 0.2 % Drop INSTILL 1 DROP INTO RIGHT EYE TWICE A DAY 30 mL 3    dorzolamide-timolol 2-0.5% (COSOPT) 22.3-6.8 mg/mL ophthalmic solution INSTILL 1 DROP INTO RIGHT EYE TWICE A DAY 10 mL 11    furosemide (LASIX) 40 MG tablet Take 1 tablet (40 mg total) by mouth once daily. 30 tablet 5    insulin aspart U-100 (NOVOLOG) 100 unit/mL (3 mL) InPn pen Inject 12 Units into the skin 3 (three) times daily with meals. 30 mL 1    insulin detemir U-100 (LEVEMIR FLEXTOUCH) 100 unit/mL (3 mL) SubQ InPn pen Inject 5 Units into the skin every evening. 6 mL 1    latanoprost 0.005 % ophthalmic solution PLACE 1 DROP INTO BOTH EYES ONCE DAILY 7.5 mL 3    lisinopriL-hydrochlorothiazide (PRINZIDE,ZESTORETIC) 20-12.5 mg per tablet Take 1 tablet by mouth once daily. 90 tablet 3    pen needle, diabetic 31 gauge x 5/16" Ndle Use to inject insulin into the skin up to 4 times daily 400 each 3    predniSONE (DELTASONE) 5 MG tablet Take 1.5 tablets (7.5 mg total) by mouth once daily. 45 tablet 5    k phos di & mono-sod phos mono (PHOSPHA 250 NEUTRAL) 250 mg Tab Take 1 tablet by mouth once daily. for 5 days (Patient not taking: Reported on 12/15/2022) 5 tablet 0    pantoprazole (PROTONIX) 20 MG tablet Take 1 tablet (20 " "mg total) by mouth once daily. (Patient not taking: Reported on 12/15/2022) 30 tablet 0    spironolactone (ALDACTONE) 100 MG tablet Take 1 tablet (100 mg total) by mouth once daily. (Patient taking differently: Take 100 mg by mouth every 72 hours.) 30 tablet 0     No current facility-administered medications for this visit.       Review of Systems   Constitutional:  Positive for appetite change. Negative for activity change, chills, fatigue and fever.   HENT:  Negative for congestion, drooling and tinnitus.    Respiratory:  Negative for apnea, cough, chest tightness and shortness of breath.    Cardiovascular:  Negative for chest pain, palpitations and leg swelling.   Gastrointestinal:  Negative for abdominal distention and abdominal pain.   Endocrine: Negative for cold intolerance and heat intolerance.   Genitourinary:  Negative for difficulty urinating and hematuria.   Musculoskeletal:  Negative for arthralgias, back pain and gait problem.   Skin:  Negative for color change and rash.   Neurological:  Negative for syncope and weakness.   Hematological:  Negative for adenopathy. Does not bruise/bleed easily.   Psychiatric/Behavioral:  Negative for agitation and confusion.      ECOG Performance Status: 1   Objective:      Vitals:   Vitals:    03/09/23 1346   BP: (!) 170/79   Pulse: 60   Resp: 16   SpO2: 100%   Weight: 50 kg (110 lb 1.9 oz)   Height: 5' 6" (1.676 m)         Physical Exam  Constitutional:       Appearance: Normal appearance.   HENT:      Head: Normocephalic and atraumatic.      Mouth/Throat:      Mouth: Mucous membranes are moist.   Eyes:      Extraocular Movements: Extraocular movements intact.      Pupils: Pupils are equal, round, and reactive to light.   Cardiovascular:      Rate and Rhythm: Normal rate and regular rhythm.      Pulses: Normal pulses.      Heart sounds: No murmur heard.  Pulmonary:      Effort: Pulmonary effort is normal. No respiratory distress.      Breath sounds: Normal breath " sounds.   Abdominal:      General: Abdomen is flat. There is no distension.      Palpations: Abdomen is soft.      Tenderness: There is no abdominal tenderness.   Musculoskeletal:         General: No swelling or tenderness. Normal range of motion.      Cervical back: Normal range of motion. No rigidity.   Skin:     General: Skin is warm and dry.      Coloration: Skin is not jaundiced.   Neurological:      General: No focal deficit present.      Mental Status: She is alert and oriented to person, place, and time.   Psychiatric:         Mood and Affect: Mood normal.         Behavior: Behavior normal.       Laboratory Data:  No visits with results within 1 Week(s) from this visit.   Latest known visit with results is:   Lab Visit on 02/22/2023   Component Date Value Ref Range Status    WBC 02/22/2023 7.99  3.90 - 12.70 K/uL Final    RBC 02/22/2023 3.60 (L)  4.00 - 5.40 M/uL Final    Hemoglobin 02/22/2023 11.4 (L)  12.0 - 16.0 g/dL Final    Hematocrit 02/22/2023 35.8 (L)  37.0 - 48.5 % Final    MCV 02/22/2023 99 (H)  82 - 98 fL Final    MCH 02/22/2023 31.7 (H)  27.0 - 31.0 pg Final    MCHC 02/22/2023 31.8 (L)  32.0 - 36.0 g/dL Final    RDW 02/22/2023 13.3  11.5 - 14.5 % Final    Platelets 02/22/2023 115 (L)  150 - 450 K/uL Final    MPV 02/22/2023 11.9  9.2 - 12.9 fL Final    Immature Granulocytes 02/22/2023 0.3  0.0 - 0.5 % Final    Gran # (ANC) 02/22/2023 5.8  1.8 - 7.7 K/uL Final    Immature Grans (Abs) 02/22/2023 0.02  0.00 - 0.04 K/uL Final    Comment: Mild elevation in immature granulocytes is non specific and   can be seen in a variety of conditions including stress response,   acute inflammation, trauma and pregnancy. Correlation with other   laboratory and clinical findings is essential.      Lymph # 02/22/2023 1.5  1.0 - 4.8 K/uL Final    Mono # 02/22/2023 0.7  0.3 - 1.0 K/uL Final    Eos # 02/22/2023 0.0  0.0 - 0.5 K/uL Final    Baso # 02/22/2023 0.02  0.00 - 0.20 K/uL Final    nRBC  02/22/2023 0  0 /100 WBC Final    Gran % 02/22/2023 72.9  38.0 - 73.0 % Final    Lymph % 02/22/2023 18.3  18.0 - 48.0 % Final    Mono % 02/22/2023 8.1  4.0 - 15.0 % Final    Eosinophil % 02/22/2023 0.1  0.0 - 8.0 % Final    Basophil % 02/22/2023 0.3  0.0 - 1.9 % Final    Differential Method 02/22/2023 Automated   Final    Sodium 02/22/2023 140  136 - 145 mmol/L Final    Potassium 02/22/2023 4.6  3.5 - 5.1 mmol/L Final    Chloride 02/22/2023 106  95 - 110 mmol/L Final    CO2 02/22/2023 25  23 - 29 mmol/L Final    Glucose 02/22/2023 196 (H)  70 - 110 mg/dL Final    BUN 02/22/2023 19  8 - 23 mg/dL Final    Creatinine 02/22/2023 0.8  0.5 - 1.4 mg/dL Final    Calcium 02/22/2023 9.2  8.7 - 10.5 mg/dL Final    Total Protein 02/22/2023 6.7  6.0 - 8.4 g/dL Final    Albumin 02/22/2023 2.7 (L)  3.5 - 5.2 g/dL Final    Total Bilirubin 02/22/2023 1.1 (H)  0.1 - 1.0 mg/dL Final    Comment: For infants and newborns, interpretation of results should be based  on gestational age, weight and in agreement with clinical  observations.    Premature Infant recommended reference ranges:  Up to 24 hours.............<8.0 mg/dL  Up to 48 hours............<12.0 mg/dL  3-5 days..................<15.0 mg/dL  6-29 days.................<15.0 mg/dL      Alkaline Phosphatase 02/22/2023 75  55 - 135 U/L Final    AST 02/22/2023 34  10 - 40 U/L Final    ALT 02/22/2023 17  10 - 44 U/L Final    Anion Gap 02/22/2023 9  8 - 16 mmol/L Final    eGFR 02/22/2023 >60.0  >60 mL/min/1.73 m^2 Final    Prothrombin Time 02/22/2023 10.9  9.0 - 12.5 sec Final    INR 02/22/2023 1.0  0.8 - 1.2 Final    Comment: Coumadin Therapy:  2.0 - 3.0 for INR for all indicators except mechanical heart valves  and antiphospholipid syndromes which should use 2.5 - 3.5.      Bilirubin, Direct 02/22/2023 0.4 (H)  0.1 - 0.3 mg/dL Final         Imaging: reviewed   Assessment:       1. Anemia due to other cause, not classified    2. Nutritional anemia    3.  Thrombocytopenia    4. Autoimmune hepatitis             Plan:     Macrocytic anemia with broad differential diagnosis including autoimmune, medication induced, hepatic dysfunction and acute illness. Hemolysis and nutritional etiologies were ruled out 9/2022. No longer on azathioprine which led to exacerbations of cytopenias. Continues on low dose prednisone 7.5 mg daily with plans to decrease to 5 mg daily in hepatology clinic. Macrocytosis and thrombocytopenia does raise concern for MDS, however improvement in last several months makes bone marrow dysplasia less likely.   - 9/2022 nutritional studies: b12 >2k, folate 10.8, iron 101, 73% sat, ferritin 1,186  - 9/2022 hemolytic studies: ROMA, , haptoglobin <10   - repeat nutritional and hemolytic studies in process today    2. Thrombocytopenia - likely due to hepatic dysfunction and medications, no splenomegaly on exam or imaging. Plts stable 117k.     3. Autoimmmune Hepatitis- Potentially medication induced (HCTZ). follows with Dr. Peraza, HCC screening every 6 months for cirrhosis. Continues on prednisone 7.5 mg daily & diuretics. Clinically significantly improved.     Dispo: RTC in 6 months to follow up on blood counts     Patient discussed with attending physician, Dr. Hilario Brody, PGY-VI  Hematology/Oncology Fellow      BMT Chart Routing      Follow up with physician 6 months. [facility] clinic or Hilario 9/2023   Follow up with CORNEL    Provider visit type    Infusion scheduling note    Injection scheduling note    Labs   Scheduling:  Preferred lab:  Lab interval:  No lab apt needed   Imaging    Pharmacy appointment    Other referrals

## 2023-03-09 ENCOUNTER — LAB VISIT (OUTPATIENT)
Dept: LAB | Facility: HOSPITAL | Age: 83
End: 2023-03-09
Payer: MEDICARE

## 2023-03-09 ENCOUNTER — OFFICE VISIT (OUTPATIENT)
Dept: HEMATOLOGY/ONCOLOGY | Facility: CLINIC | Age: 83
End: 2023-03-09
Payer: MEDICARE

## 2023-03-09 ENCOUNTER — PATIENT MESSAGE (OUTPATIENT)
Dept: HEPATOLOGY | Facility: CLINIC | Age: 83
End: 2023-03-09
Payer: MEDICARE

## 2023-03-09 VITALS
HEIGHT: 66 IN | DIASTOLIC BLOOD PRESSURE: 79 MMHG | SYSTOLIC BLOOD PRESSURE: 170 MMHG | WEIGHT: 110.13 LBS | HEART RATE: 60 BPM | RESPIRATION RATE: 16 BRPM | OXYGEN SATURATION: 100 % | BODY MASS INDEX: 17.7 KG/M2

## 2023-03-09 DIAGNOSIS — D53.9 NUTRITIONAL ANEMIA: ICD-10-CM

## 2023-03-09 DIAGNOSIS — D64.89 ANEMIA DUE TO OTHER CAUSE, NOT CLASSIFIED: ICD-10-CM

## 2023-03-09 DIAGNOSIS — D69.6 THROMBOCYTOPENIA: ICD-10-CM

## 2023-03-09 DIAGNOSIS — D64.89 ANEMIA DUE TO OTHER CAUSE, NOT CLASSIFIED: Primary | ICD-10-CM

## 2023-03-09 DIAGNOSIS — D53.9 MACROCYTIC ANEMIA: ICD-10-CM

## 2023-03-09 DIAGNOSIS — K75.4 AUTOIMMUNE HEPATITIS: ICD-10-CM

## 2023-03-09 LAB
ALBUMIN SERPL BCP-MCNC: 2.8 G/DL (ref 3.5–5.2)
ALP SERPL-CCNC: 75 U/L (ref 55–135)
ALT SERPL W/O P-5'-P-CCNC: 40 U/L (ref 10–44)
ANION GAP SERPL CALC-SCNC: 8 MMOL/L (ref 8–16)
AST SERPL-CCNC: 55 U/L (ref 10–40)
BASOPHILS # BLD AUTO: 0.01 K/UL (ref 0–0.2)
BASOPHILS NFR BLD: 0.1 % (ref 0–1.9)
BILIRUB SERPL-MCNC: 1.1 MG/DL (ref 0.1–1)
BUN SERPL-MCNC: 25 MG/DL (ref 8–23)
CALCIUM SERPL-MCNC: 9.2 MG/DL (ref 8.7–10.5)
CHLORIDE SERPL-SCNC: 104 MMOL/L (ref 95–110)
CO2 SERPL-SCNC: 25 MMOL/L (ref 23–29)
CREAT SERPL-MCNC: 0.8 MG/DL (ref 0.5–1.4)
DIFFERENTIAL METHOD: ABNORMAL
EOSINOPHIL # BLD AUTO: 0 K/UL (ref 0–0.5)
EOSINOPHIL NFR BLD: 0.3 % (ref 0–8)
ERYTHROCYTE [DISTWIDTH] IN BLOOD BY AUTOMATED COUNT: 13.3 % (ref 11.5–14.5)
EST. GFR  (NO RACE VARIABLE): >60 ML/MIN/1.73 M^2
FERRITIN SERPL-MCNC: 193 NG/ML (ref 20–300)
FOLATE SERPL-MCNC: 11.6 NG/ML (ref 4–24)
GLUCOSE SERPL-MCNC: 216 MG/DL (ref 70–110)
HCT VFR BLD AUTO: 36.3 % (ref 37–48.5)
HGB BLD-MCNC: 11.6 G/DL (ref 12–16)
IMM GRANULOCYTES # BLD AUTO: 0.01 K/UL (ref 0–0.04)
IMM GRANULOCYTES NFR BLD AUTO: 0.1 % (ref 0–0.5)
IRON SERPL-MCNC: 121 UG/DL (ref 30–160)
LDH SERPL L TO P-CCNC: 261 U/L (ref 110–260)
LYMPHOCYTES # BLD AUTO: 1.2 K/UL (ref 1–4.8)
LYMPHOCYTES NFR BLD: 16.1 % (ref 18–48)
MCH RBC QN AUTO: 31.9 PG (ref 27–31)
MCHC RBC AUTO-ENTMCNC: 32 G/DL (ref 32–36)
MCV RBC AUTO: 100 FL (ref 82–98)
MONOCYTES # BLD AUTO: 0.7 K/UL (ref 0.3–1)
MONOCYTES NFR BLD: 9.6 % (ref 4–15)
NEUTROPHILS # BLD AUTO: 5.6 K/UL (ref 1.8–7.7)
NEUTROPHILS NFR BLD: 73.8 % (ref 38–73)
NRBC BLD-RTO: 0 /100 WBC
PLATELET # BLD AUTO: 117 K/UL (ref 150–450)
PMV BLD AUTO: 11.8 FL (ref 9.2–12.9)
POTASSIUM SERPL-SCNC: 4.2 MMOL/L (ref 3.5–5.1)
PROT SERPL-MCNC: 6.8 G/DL (ref 6–8.4)
RBC # BLD AUTO: 3.64 M/UL (ref 4–5.4)
RETICS/RBC NFR AUTO: 2.7 % (ref 0.5–2.5)
SATURATED IRON: 38 % (ref 20–50)
SODIUM SERPL-SCNC: 137 MMOL/L (ref 136–145)
TOTAL IRON BINDING CAPACITY: 315 UG/DL (ref 250–450)
TRANSFERRIN SERPL-MCNC: 213 MG/DL (ref 200–375)
URATE SERPL-MCNC: 7.8 MG/DL (ref 2.4–5.7)
VIT B12 SERPL-MCNC: 794 PG/ML (ref 210–950)
WBC # BLD AUTO: 7.57 K/UL (ref 3.9–12.7)

## 2023-03-09 PROCEDURE — 82746 ASSAY OF FOLIC ACID SERUM: CPT | Performed by: STUDENT IN AN ORGANIZED HEALTH CARE EDUCATION/TRAINING PROGRAM

## 2023-03-09 PROCEDURE — 99214 OFFICE O/P EST MOD 30 MIN: CPT | Mod: PBBFAC | Performed by: STUDENT IN AN ORGANIZED HEALTH CARE EDUCATION/TRAINING PROGRAM

## 2023-03-09 PROCEDURE — 82607 VITAMIN B-12: CPT | Performed by: STUDENT IN AN ORGANIZED HEALTH CARE EDUCATION/TRAINING PROGRAM

## 2023-03-09 PROCEDURE — 83615 LACTATE (LD) (LDH) ENZYME: CPT | Performed by: STUDENT IN AN ORGANIZED HEALTH CARE EDUCATION/TRAINING PROGRAM

## 2023-03-09 PROCEDURE — 80053 COMPREHEN METABOLIC PANEL: CPT | Performed by: STUDENT IN AN ORGANIZED HEALTH CARE EDUCATION/TRAINING PROGRAM

## 2023-03-09 PROCEDURE — 85025 COMPLETE CBC W/AUTO DIFF WBC: CPT | Performed by: STUDENT IN AN ORGANIZED HEALTH CARE EDUCATION/TRAINING PROGRAM

## 2023-03-09 PROCEDURE — 84550 ASSAY OF BLOOD/URIC ACID: CPT | Performed by: STUDENT IN AN ORGANIZED HEALTH CARE EDUCATION/TRAINING PROGRAM

## 2023-03-09 PROCEDURE — 85045 AUTOMATED RETICULOCYTE COUNT: CPT | Performed by: STUDENT IN AN ORGANIZED HEALTH CARE EDUCATION/TRAINING PROGRAM

## 2023-03-09 PROCEDURE — 99214 OFFICE O/P EST MOD 30 MIN: CPT | Mod: S$PBB,GC,, | Performed by: STUDENT IN AN ORGANIZED HEALTH CARE EDUCATION/TRAINING PROGRAM

## 2023-03-09 PROCEDURE — 99214 PR OFFICE/OUTPT VISIT, EST, LEVL IV, 30-39 MIN: ICD-10-PCS | Mod: S$PBB,GC,, | Performed by: STUDENT IN AN ORGANIZED HEALTH CARE EDUCATION/TRAINING PROGRAM

## 2023-03-09 PROCEDURE — 36415 COLL VENOUS BLD VENIPUNCTURE: CPT | Performed by: STUDENT IN AN ORGANIZED HEALTH CARE EDUCATION/TRAINING PROGRAM

## 2023-03-09 PROCEDURE — 84466 ASSAY OF TRANSFERRIN: CPT | Performed by: STUDENT IN AN ORGANIZED HEALTH CARE EDUCATION/TRAINING PROGRAM

## 2023-03-09 PROCEDURE — 99999 PR PBB SHADOW E&M-EST. PATIENT-LVL IV: ICD-10-PCS | Mod: PBBFAC,GC,, | Performed by: STUDENT IN AN ORGANIZED HEALTH CARE EDUCATION/TRAINING PROGRAM

## 2023-03-09 PROCEDURE — 99999 PR PBB SHADOW E&M-EST. PATIENT-LVL IV: CPT | Mod: PBBFAC,GC,, | Performed by: STUDENT IN AN ORGANIZED HEALTH CARE EDUCATION/TRAINING PROGRAM

## 2023-03-09 PROCEDURE — 82728 ASSAY OF FERRITIN: CPT | Performed by: STUDENT IN AN ORGANIZED HEALTH CARE EDUCATION/TRAINING PROGRAM

## 2023-03-20 ENCOUNTER — CLINICAL SUPPORT (OUTPATIENT)
Dept: REHABILITATION | Facility: HOSPITAL | Age: 83
End: 2023-03-20
Payer: MEDICARE

## 2023-03-20 DIAGNOSIS — Z74.09 DECREASED MOBILITY AND ENDURANCE: ICD-10-CM

## 2023-03-20 DIAGNOSIS — R29.898 LEG WEAKNESS, BILATERAL: Primary | ICD-10-CM

## 2023-03-20 PROCEDURE — 97110 THERAPEUTIC EXERCISES: CPT

## 2023-03-20 NOTE — PROGRESS NOTES
[facility]   Physical Therapy Treatment Note / Discharge      Name: Radha Feng  Clinic Number: [id number]    Therapy Diagnosis:   Encounter Diagnoses   Name Primary?    Leg weakness, bilateral Yes    Decreased mobility and endurance        Physician: Deandre Peterson MD    Visit Date: 3/20/2023    Physician Orders: PT Eval and Treat   Medical Diagnosis from Referral: K72.90 (ICD-10-CM) - Liver failure without hepatic coma, unspecified chronicity   Evaluation Date: 11/29/2022  Authorization Period Expiration: 1/4/2024  Plan of Care Expiration: 3/12/2023  Progress Note Due: 1/24/2023  Visit # / Visits authorized: 8/ 20  FOTO: Not taken     PTA Visit #: 0/5     Time In:  3:00pm   Time Out: 3:30pm   Total Billable Time: 30 minutes    Precautions: Standard, Diabetes, and Fall, Liver Failure     SUBJECTIVE     Pt reports: She has been doing well and has been trying to start walking outside with her rollator.   She was compliant with home exercise program.  Response to previous treatment: felt good after , denies soreness or fatigue .   Functional change: amb with RW , WC longer distances     Pain: none currently     OBJECTIVE     Pt able to perform 3 sit to stands from standard chair with no UE use    Pt able to ambulate 100' around clinic with rollator independently with no LOB     LE MMTs: 4/5 - performed in sitting     Treatment     Pt presents with daughter, ambulating with rollator     Radha received the treatments listed below:      therapeutic exercises to develop strength, endurance, and ROM for 30 minutes including:    Aerobic activity and endurance training for reciprocal motion of lower limbs on Nustep for 5 minutes at Level 2.0 at > or equal to 50 spm w/ rest to increase mobility, blood flow and improve tissue tolerance    **CGA for all activities below     Step up on 4' step with BUEs and CGA -  x10 reps BLE   Standing march in // bars : 2 x 20 reps   Standing HS curls : 2  x 10 reps B   Standing heel raises : 2 x 10 reps   Standing hip abduction : 2 x 10 reps B- NP  Stepping up and over stairs x3   Sit to stands 5x5  + Steamboats : x 10 reps B       Patient Education and Home Exercises     Home Exercises Provided and Patient Education Provided     Education provided:   - Updated HEP provided   - Encouraged use of RW vs cane - amb with assistance     Written Home Exercises Provided: Patient instructed to cont prior HEP. Exercises were reviewed and Radha was able to demonstrate them prior to the end of the session.  Radha demonstrated good  understanding of the education provided. See EMR under Patient Instructions for exercises provided during therapy sessions    ASSESSMENT     Measurements taken today show an excellent improvement in function, strength, and mobility since starting therapy. Pt is now able to stand from a standard chair with no UE use multiple times with no issue and is now able to walk safely with the use of a rollator with no LOB. Pt was given an updated HEP and encouraged to reach out with any questions or concerns. Pt discharged at this time        Radha Is progressing well towards her goals.   Pt prognosis is Good.     Pt will continue to benefit from skilled outpatient physical therapy to address the deficits listed in the problem list box on initial evaluation, provide pt/family education and to maximize pt's level of independence in the home and community environment.   Pt's spiritual, cultural and educational needs considered and pt agreeable to plan of care and goals.     Anticipated barriers to physical therapy: fall risk    Goals:  Short Term Goals: 3 weeks   Pt will be independent with HEP MET   Pt will be able to perform one sit to stand independently MET   Pt will be able to walk with a walker with min A for 10 feet MET      Long Term Goals: 6 weeks   Pt will be independent with updated HEP MET   Pt will be SBA with walker for 30 feet with no LOB MET    Pt will be 4/5 for LE MMTs MET     PLAN     Pt has met all goals and is discharged     Bina KITA Hurtado

## 2023-03-21 ENCOUNTER — TELEPHONE (OUTPATIENT)
Dept: INFECTIOUS DISEASES | Facility: HOSPITAL | Age: 83
End: 2023-03-21
Payer: MEDICARE

## 2023-03-27 ENCOUNTER — LAB VISIT (OUTPATIENT)
Dept: LAB | Facility: HOSPITAL | Age: 83
End: 2023-03-27
Attending: INTERNAL MEDICINE
Payer: MEDICARE

## 2023-03-27 ENCOUNTER — CLINICAL SUPPORT (OUTPATIENT)
Dept: DIABETES | Facility: CLINIC | Age: 83
End: 2023-03-27
Payer: MEDICARE

## 2023-03-27 DIAGNOSIS — K74.60 CIRRHOSIS OF LIVER WITH ASCITES, UNSPECIFIED HEPATIC CIRRHOSIS TYPE: ICD-10-CM

## 2023-03-27 DIAGNOSIS — E11.59 TYPE 2 DIABETES MELLITUS WITH OTHER CIRCULATORY COMPLICATION, WITH LONG-TERM CURRENT USE OF INSULIN: Primary | ICD-10-CM

## 2023-03-27 DIAGNOSIS — R18.8 CIRRHOSIS OF LIVER WITH ASCITES, UNSPECIFIED HEPATIC CIRRHOSIS TYPE: ICD-10-CM

## 2023-03-27 DIAGNOSIS — Z79.4 TYPE 2 DIABETES MELLITUS WITH OTHER CIRCULATORY COMPLICATION, WITH LONG-TERM CURRENT USE OF INSULIN: ICD-10-CM

## 2023-03-27 DIAGNOSIS — Z79.4 TYPE 2 DIABETES MELLITUS WITH OTHER CIRCULATORY COMPLICATION, WITH LONG-TERM CURRENT USE OF INSULIN: Primary | ICD-10-CM

## 2023-03-27 DIAGNOSIS — E11.59 TYPE 2 DIABETES MELLITUS WITH OTHER CIRCULATORY COMPLICATION, WITH LONG-TERM CURRENT USE OF INSULIN: ICD-10-CM

## 2023-03-27 LAB
ALBUMIN SERPL BCP-MCNC: 3 G/DL (ref 3.5–5.2)
ALP SERPL-CCNC: 83 U/L (ref 55–135)
ALT SERPL W/O P-5'-P-CCNC: 92 U/L (ref 10–44)
ANION GAP SERPL CALC-SCNC: 9 MMOL/L (ref 8–16)
AST SERPL-CCNC: 106 U/L (ref 10–40)
BASOPHILS # BLD AUTO: 0.02 K/UL (ref 0–0.2)
BASOPHILS NFR BLD: 0.3 % (ref 0–1.9)
BILIRUB DIRECT SERPL-MCNC: 0.5 MG/DL (ref 0.1–0.3)
BILIRUB SERPL-MCNC: 1.3 MG/DL (ref 0.1–1)
BUN SERPL-MCNC: 33 MG/DL (ref 8–23)
CALCIUM SERPL-MCNC: 9.8 MG/DL (ref 8.7–10.5)
CHLORIDE SERPL-SCNC: 107 MMOL/L (ref 95–110)
CO2 SERPL-SCNC: 25 MMOL/L (ref 23–29)
CREAT SERPL-MCNC: 0.9 MG/DL (ref 0.5–1.4)
DIFFERENTIAL METHOD: ABNORMAL
EOSINOPHIL # BLD AUTO: 0.1 K/UL (ref 0–0.5)
EOSINOPHIL NFR BLD: 1.8 % (ref 0–8)
ERYTHROCYTE [DISTWIDTH] IN BLOOD BY AUTOMATED COUNT: 13.6 % (ref 11.5–14.5)
EST. GFR  (NO RACE VARIABLE): >60 ML/MIN/1.73 M^2
GLUCOSE SERPL-MCNC: 219 MG/DL (ref 70–110)
HCT VFR BLD AUTO: 38.9 % (ref 37–48.5)
HGB BLD-MCNC: 12.5 G/DL (ref 12–16)
IMM GRANULOCYTES # BLD AUTO: 0.03 K/UL (ref 0–0.04)
IMM GRANULOCYTES NFR BLD AUTO: 0.4 % (ref 0–0.5)
INR PPP: 1.1 (ref 0.8–1.2)
LYMPHOCYTES # BLD AUTO: 1.3 K/UL (ref 1–4.8)
LYMPHOCYTES NFR BLD: 16.7 % (ref 18–48)
MCH RBC QN AUTO: 31.9 PG (ref 27–31)
MCHC RBC AUTO-ENTMCNC: 32.1 G/DL (ref 32–36)
MCV RBC AUTO: 99 FL (ref 82–98)
MONOCYTES # BLD AUTO: 1 K/UL (ref 0.3–1)
MONOCYTES NFR BLD: 12.8 % (ref 4–15)
NEUTROPHILS # BLD AUTO: 5.2 K/UL (ref 1.8–7.7)
NEUTROPHILS NFR BLD: 68 % (ref 38–73)
NRBC BLD-RTO: 0 /100 WBC
PLATELET # BLD AUTO: 131 K/UL (ref 150–450)
PMV BLD AUTO: 11.7 FL (ref 9.2–12.9)
POTASSIUM SERPL-SCNC: 4.7 MMOL/L (ref 3.5–5.1)
PROT SERPL-MCNC: 6.9 G/DL (ref 6–8.4)
PROTHROMBIN TIME: 10.9 SEC (ref 9–12.5)
RBC # BLD AUTO: 3.92 M/UL (ref 4–5.4)
SODIUM SERPL-SCNC: 141 MMOL/L (ref 136–145)
WBC # BLD AUTO: 7.71 K/UL (ref 3.9–12.7)

## 2023-03-27 PROCEDURE — 85610 PROTHROMBIN TIME: CPT | Performed by: INTERNAL MEDICINE

## 2023-03-27 PROCEDURE — 99999 PR PBB SHADOW E&M-EST. PATIENT-LVL I: ICD-10-PCS | Mod: PBBFAC,,,

## 2023-03-27 PROCEDURE — 80053 COMPREHEN METABOLIC PANEL: CPT | Performed by: INTERNAL MEDICINE

## 2023-03-27 PROCEDURE — G0108 DIAB MANAGE TRN  PER INDIV: HCPCS | Mod: PBBFAC

## 2023-03-27 PROCEDURE — 99211 OFF/OP EST MAY X REQ PHY/QHP: CPT | Mod: PBBFAC

## 2023-03-27 PROCEDURE — 99999 PR PBB SHADOW E&M-EST. PATIENT-LVL I: CPT | Mod: PBBFAC,,,

## 2023-03-27 PROCEDURE — 36415 COLL VENOUS BLD VENIPUNCTURE: CPT | Performed by: INTERNAL MEDICINE

## 2023-03-27 PROCEDURE — 82248 BILIRUBIN DIRECT: CPT | Performed by: INTERNAL MEDICINE

## 2023-03-27 PROCEDURE — 85025 COMPLETE CBC W/AUTO DIFF WBC: CPT | Performed by: INTERNAL MEDICINE

## 2023-03-27 RX ORDER — INSULIN ASPART 100 [IU]/ML
INJECTION, SOLUTION INTRAVENOUS; SUBCUTANEOUS
Qty: 30 ML | Refills: 5 | Status: SHIPPED | OUTPATIENT
Start: 2023-03-27

## 2023-03-27 RX ORDER — INSULIN PMP CART,AUT,G6/7,CNTR
1 EACH SUBCUTANEOUS ONCE
Qty: 1 EACH | Refills: 0 | Status: SHIPPED | OUTPATIENT
Start: 2023-03-27 | End: 2023-03-27

## 2023-03-28 ENCOUNTER — TELEPHONE (OUTPATIENT)
Dept: INFECTIOUS DISEASES | Facility: HOSPITAL | Age: 83
End: 2023-03-28
Payer: MEDICARE

## 2023-03-30 NOTE — PROGRESS NOTES
Diabetes Care Specialist Progress Note  Author: Michelle Feng RN, CDE  Date: 3/27/2023    Program Intake  Reason for Diabetes Program Visit:: Initial Diabetes Assessment  Current diabetes risk level:: low  In the last 12 months, have you:: been admitted to a hospital  Was the ER or hospital admission related to diabetes?: No  Permission to speak with others about care:: yes (Too (daughter))    Lab Results   Component Value Date    HGBA1C 5.0 01/30/2023       Clinical  Problem Review  Reviewed Problem List with Patient: yes  Active comorbidities affecting diabetes self-care.: yes  Comorbidities: Cardiovascular Disease, Hypertension (Severe protein-calorie malnutrition)  Reviewed health maintenance: no (see comments)    Clinical Assessment  Current Diabetes Treatment: Insulin (Levemir 10 units daily; Novolog 8 unit ac meals)  Have you ever experienced hypoglycemia (low blood sugar)?: yes  In the last month, how often have you experienced low blood sugar?: more than once a week  Are you able to tell when your blood sugar is low?: Yes  What symptoms do you experience?: Shaky  Have you ever been hospitalized because your blood sugar was too low?: no  How do you treat hypoglycemia (low blood sugar)?: 1/2 can soda/fruit juice, 1 tablespoon sugar/honey  Have you ever experienced hyperglycemia (high blood sugar)?: yes  In the last month, how often have you experienced high blood sugar?: more than once a week    Medication Information  How do you obtain your medications?: Family picks up  How many days a week do you miss your medications?: Never  Medication adherence impacting ability to self-manage diabetes?: No    Nutritional Status  Diet: Regular  Meal Plan 24 Hour Recall: Breakfast  Meal Plan 24 Hour Recall - Breakfast: 1 cup cheerios (23 gr); 1/2 glass milk (8 gr); 1/2 banana (44 gram total)  Change in appetite?: No  Recent Changes in Weight: No Recent Weight Change  Current nutritional status an area of need that  is impacting patient's ability to self-manage diabetes?: No    Additional Social History    Support  Does anyone support you with your diabetes care?: yes  Who supports you?: son/daughter  Who takes you to your medical appointments?: son/daughter  Does the current support meet the patient's needs?: Yes  Is Support an area impacting ability to self-manage diabetes?: No    Access to Mass Media & Technology  Does the patient have access to any of the following devices or technologies?: Smart phone  Media or technology needs impacting ability to self-manage diabetes?: No    Cognitive/Behavioral Health  Alert and Oriented: Yes  Difficulty Thinking: No  Requires Prompting: No  Requires assistance for routine expression?: No  Cognitive or behavioral barriers impacting ability to self-manage diabetes?: No    Communication  Language preference: English  Hearing Problems: No  Vision Problems: Yes  Vision Assistance: Glasses  Communication needs impacting ability to self-manage diabetes?: No    Health Literacy  Preferred Learning Method: Face to Face  How often do you need to have someone help you read instructions, pamphlets, or written material from your doctor or pharmacy?: Always  Health literacy needs impacting ability to self-manage diabetes?: No      Diabetes Self-Management Skills Assessment    Diabetes Disease Process/Treatment Options  Patient/caregiver able to state what happens when someone has diabetes.: yes  Patient/caregiver knows what type of diabetes they have.: yes  Diabetes Type : Type II  Diabetes Disease Process/Treatment Options: Skills Assessment Completed: Yes  Assessment indicates:: Adequate understanding  Area of need?: No    Nutrition/Healthy Eating  Method of carbohydrate measurement:: carb counting/reading labels, measuring cups/spoons  Patient can identify foods that impact blood sugar.: yes  Patient-identified foods:: fruit/fruit juice, milk, soda, starchy vegetables (corn, peas, beans), sweets,  starches (bread, pasta, rice, cereal), yogurt  Nutrition/Healthy Eating Skills Assessment Completed:: Yes  Assessment indicates:: Adequate understanding  Area of need?: No    Physical Activity/Exercise  Patient's daily activity level:: sedentary  Patient formally exercises outside of work.: no  Reasons for not exercising:: physically unable to exercise currently (in wheelchair)  Physical Activity/Exercise Skills Assessment Completed: : Yes  Assessment indicates:: Adequate understanding  Area of need?: No    Medications  Patient is able to describe current diabetes management routine.: yes (Daughter administers all insulin doses.)  Diabetes management routine:: insulin  Patient is able to identify current diabetes medications, dosages, and appropriate timing of medications.: yes  Patient understands the purpose of the medications taken for diabetes.: yes  Patient reports problems or concerns with current medication regimen.: no  Medication Skills Assessment Completed:: Yes  Assessment indicates:: Instruction Needed  Area of need?: Yes    Home Blood Glucose Monitoring  Patient states that blood sugar is checked at home daily.: yes  Monitoring Method:: personal continuous glucose monitor  Personal CGM type:: dexcom G6  Patient is able to use personal CGM appropriately.: yes (Daughter assist with insertion and maintenance)  CGM Report reviewed?: yes  Home Blood Glucose Monitoring Skills Assessment Completed: : Yes  Assessment indicates:: Instruction Needed (instructed pt to try to use smart phone for Dexcom; currently using  and wili used on daughters phone)  Area of need?: Yes     Acute Complications  Patient is able to identify types of acute complications: Yes  Patient Identified:: Hypoglycemia  Patient is able to state the basic meaning of hypoglycemia?: Yes  Patient can identify general symptoms of hypoglycemia: yes  Patient identified:: shakiness  Able to state proper treatment of hypoglycemia?:  yes  Patient identified:: 5-6 pieces of hard candy, 1/2 can soda/fruit juice  Acute Complications Skills Assessment Completed: : Yes  Assessment indicates:: Adequate understanding  Area of need?: No    Chronic Complications  Chronic Complications Skills Assessment Completed: : No  Deferred due to:: Time    Psychosocial/Coping  Patient can identify ways of coping with chronic disease.: yes  Patient-stated ways of coping with chronic disease:: support from loved ones  Psychosocial/Coping Skills Assessment Completed: : Yes  Assessment indicates:: Adequate understanding  Area of need?: No      Assessment Summary and Plan    Based on today's diabetes care assessment, the following areas of need were identified:      Social 3/27/2023   Support No   Access to Mass Media/Tech No   Cognitive/Behavioral Health No   Communication No   Health Literacy No        Clinical 3/27/2023   Medication Adherence No   Nutritional Status No        Diabetes Self-Management Skills 3/27/2023   Diabetes Disease Process/Treatment Options No   Nutrition/Healthy Eating No, see care planning   Physical Activity/Exercise No   Medication Yes, see care planning   Home Blood Glucose Monitoring Yes, instructed pt to try to use smart phone for Dexcom; currently using  and wili used on daughters phone   Acute Complications No   Psychosocial/Coping No          Today's interventions were provided through individual discussion, instruction, and written materials were provided.      Patient verbalized understanding of instruction and written materials.  Pt was able to return back demonstration of instructions today. Patient understood key points, needs reinforcement and further instruction.     Diabetes Self-Management Care Plan:    Today's Diabetes Self-Management Care Plan was developed with Radha's input. Radha has agreed to work toward the following goal(s) to improve his/her overall diabetes control.      Care Plan: Diabetes Management    Updates made since 2/28/2023 12:00 AM        Problem: Medications         Goal: Patient Agrees to take Diabetes Medication(s) as prescribed.    Start Date: 3/27/2023   Priority: High   Note:    Daughter and patient in for review of her insulin.  Blood glucoses are labile and Endo felt that pt could be better managed with closed loop system.  Discussed the Omnipod 5 with daughter in detail.  Daughter administers all insulin along with another family member who is willing to help.  Explained how the system works and that the pods are changed every 3 days. Pt was told she would need to wear the pod as well as the CGM which she is amendable to.  We reviewed basic pumping terms. Explained how the closed loop system works with Dexcom in adjusting insulin as needed. Plan: instructed to continue with current insulin regime: Levemir 10 in am and Novolog 8 ac meals plus sliding scale.        Task: Reviewed with patient all current diabetes medications and provided basic review of the purpose, dosage, frequency, side effects, and storage of both oral and injectable diabetes medications. Completed 3/30/2023     Task: Discussed guidelines for preventing, detecting and treating hypoglycemia and hyperglycemia and reviewed the importance of meal and medication timing with diabetes mediations for prevention of hypoglycemia and maximum drug benefit. Completed 3/30/2023        Problem: Healthy Eating         Goal: Eat 2-3 meals daily with 30-45g/2-3 servings of Carbohydrate per meal. Limit snacking in between meal to 1 serving (15 grams).    Start Date: 3/27/2023   Priority: Medium   Note:    Reviewed meal planning and general nutritional counseling with regards to diabetes management. Typical food intake obtained from patient.  Her daughter is meal prepping and measuring all food.  We reviewed some of her meals and carb counting was adequate.  Explained this will be important especially with pump, since carb amounts need to be  entered into the pump.  Daughter is on board with this.        Task: Reviewed the sources and role of Carbohydrate, Protein, and Fat and how each nutrient impacts blood sugar. Completed 3/30/2023        Task: Provided visual examples using dry measuring cups, food models, and other familiar objects such as computer mouse, deck or cards, tennis ball etc. to help with visualization of portions. Completed 3/30/2023        Task: Explained how to count carbohydrates using the food label and the use of dry measuring cups for accurate carb counting. Completed 3/30/2023     Task: Review the importance of balancing carbohydrates with each meal using portion control techniques to count servings of carbohydrate and label reading to identify serving size and amount of total carbs per serving. Completed 3/30/2023        Task: Provided Sample plate method and reviewed the use of the plate to estimate amounts of carbohydrate per meal. Completed 3/30/2023          Follow Up Plan     Plan:  Message sent to Dr. Stock regarding ordering Omnipod 5.  This will be called in to pharmacy along with vial insulin.  Instructed daughter to reach out when they receive their supplies for training.    Today's care plan and follow up schedule was discussed with patient.  Radha verbalized understanding of the care plan, goals, and agrees to follow up plan.        The patient was encouraged to communicate with his/her health care provider/physician and care team regarding his/her condition(s) and treatment.  I provided the patient with my contact information today and encouraged to contact me via phone or [facility] Patient Portal as needed.     Length of Visit   Total Time: 60 Minutes

## 2023-03-31 ENCOUNTER — PATIENT MESSAGE (OUTPATIENT)
Dept: ENDOCRINOLOGY | Facility: CLINIC | Age: 83
End: 2023-03-31
Payer: MEDICARE

## 2023-04-11 ENCOUNTER — PATIENT MESSAGE (OUTPATIENT)
Dept: ENDOCRINOLOGY | Facility: CLINIC | Age: 83
End: 2023-04-11
Payer: MEDICARE

## 2023-04-11 ENCOUNTER — PATIENT MESSAGE (OUTPATIENT)
Dept: HEPATOLOGY | Facility: CLINIC | Age: 83
End: 2023-04-11
Payer: MEDICARE

## 2023-04-11 ENCOUNTER — OFFICE VISIT (OUTPATIENT)
Dept: UROGYNECOLOGY | Facility: CLINIC | Age: 83
End: 2023-04-11
Payer: MEDICARE

## 2023-04-11 VITALS
DIASTOLIC BLOOD PRESSURE: 68 MMHG | HEIGHT: 66 IN | BODY MASS INDEX: 17.72 KG/M2 | SYSTOLIC BLOOD PRESSURE: 144 MMHG | WEIGHT: 110.25 LBS

## 2023-04-11 DIAGNOSIS — N81.3 UTEROVAGINAL PROLAPSE, COMPLETE: Primary | ICD-10-CM

## 2023-04-11 DIAGNOSIS — R39.15 URINARY URGENCY: ICD-10-CM

## 2023-04-11 DIAGNOSIS — N76.0 VAGINAL INFLAMMATION FROM PESSARY, INITIAL ENCOUNTER: ICD-10-CM

## 2023-04-11 DIAGNOSIS — T83.69XA VAGINAL INFLAMMATION FROM PESSARY, INITIAL ENCOUNTER: ICD-10-CM

## 2023-04-11 PROCEDURE — 99214 OFFICE O/P EST MOD 30 MIN: CPT | Mod: PBBFAC | Performed by: OBSTETRICS & GYNECOLOGY

## 2023-04-11 PROCEDURE — 87186 SC STD MICRODIL/AGAR DIL: CPT | Performed by: OBSTETRICS & GYNECOLOGY

## 2023-04-11 PROCEDURE — 99999 PR PBB SHADOW E&M-EST. PATIENT-LVL IV: CPT | Mod: PBBFAC,,, | Performed by: OBSTETRICS & GYNECOLOGY

## 2023-04-11 PROCEDURE — 99213 OFFICE O/P EST LOW 20 MIN: CPT | Mod: S$PBB,,, | Performed by: OBSTETRICS & GYNECOLOGY

## 2023-04-11 PROCEDURE — 81514 NFCT DS BV&VAGINITIS DNA ALG: CPT | Performed by: OBSTETRICS & GYNECOLOGY

## 2023-04-11 PROCEDURE — 99999 PR PBB SHADOW E&M-EST. PATIENT-LVL IV: ICD-10-PCS | Mod: PBBFAC,,, | Performed by: OBSTETRICS & GYNECOLOGY

## 2023-04-11 PROCEDURE — 99213 PR OFFICE/OUTPT VISIT, EST, LEVL III, 20-29 MIN: ICD-10-PCS | Mod: S$PBB,,, | Performed by: OBSTETRICS & GYNECOLOGY

## 2023-04-11 PROCEDURE — 87077 CULTURE AEROBIC IDENTIFY: CPT | Performed by: OBSTETRICS & GYNECOLOGY

## 2023-04-11 PROCEDURE — 87088 URINE BACTERIA CULTURE: CPT | Performed by: OBSTETRICS & GYNECOLOGY

## 2023-04-11 PROCEDURE — 87086 URINE CULTURE/COLONY COUNT: CPT | Performed by: OBSTETRICS & GYNECOLOGY

## 2023-04-11 RX ORDER — METRONIDAZOLE 7.5 MG/G
1 GEL VAGINAL NIGHTLY
Qty: 70 G | Refills: 0 | Status: SHIPPED | OUTPATIENT
Start: 2023-04-11 | End: 2023-04-18

## 2023-04-11 NOTE — PATIENT INSTRUCTIONS
1. Prolapse: Stage 2 apical prolapse, Stage 2 anterior and posterior vaginal wall prolapse   --Pessary benefit discussed with patient, fitted with pessary GH  2 1/2   --Daily pelvic floor exercises as assigned, Pelvic floor PT   --Non surgical options discussed with patient      2.  Mixed urinary incontinence, urge > stress:   --Bladder diary for 3-7 days, write the number of times you go to the rest room and associated symptoms of urgency or leakage.   --Empty bladder every 3 hours.  Empty well: wait a minute, lean forward on toilet.    --Avoid dietary irritants (see sheet).  Keep diary x 3-5 days to determine your irritants.  --KEGELS: do 10 in AM and 10 in PM, holding each x 10 seconds.  When you feel urge to go, STOP, KEGEL, and when urge has passed, then go to bathroom.  start pelvic floor PT.     --URGE: .  SE profile reviewed.    Takes 2-4 weeks to see if will have effect.  For dry mouth: get sour, sugar free lozenge or gum.    --STRESS:  Non surgical options: Pessary vs. Impressa vs Rivive - which represent urethral and bladder support devices; Surgical options including 1.  mid urethral sling; retropubic, transobturator vs single incision 2. Fascial slings 3. Hutchison procedure 4. Periurethral bulking     3. Vaginal discharge  Affirm  Metrogel   Pessary holiday

## 2023-04-11 NOTE — PROGRESS NOTES
Subjective:       Patient ID: Radha Feng is a 82 y.o. female.    Chief Complaint:  Follow-up (pessary)    History of Present Illness  Female  Problem  The patient's pertinent negatives include no genital itching, genital lesions, genital odor, genital rash, missed menses, pelvic pain, vaginal bleeding or vaginal discharge. This is a chronic problem. The current episode started more than 1 month ago. The problem occurs daily. The problem has been unchanged. The patient is experiencing no pain. She is not pregnant. Pertinent negatives include no abdominal pain, anorexia, back pain, chills, constipation, diarrhea, discolored urine, dysuria, fever, flank pain, frequency, headaches, hematuria, joint pain, joint swelling, nausea, painful intercourse, rash, sore throat, urgency or vomiting. Nothing aggravates the symptoms. She is not sexually active. No, her partner does not have an STD. She is postmenopausal.   Follow-up  Pertinent negatives include no abdominal pain, anorexia, chills, fever, headaches, nausea, rash, sore throat or vomiting.  82 y.o.  female No obstetric history on file.   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      [facility]    Question 12/20/2022  1:44 PM CST - Filed by Patient   General Urogynecology: Are you experiencing the following?    Dysuria (painful urination) No   Nocturia:  waking up at night to empty your bladder  Yes   If you answered yes to the previous question, how many times does this happen per night? 1-2   Enuresis (urine loss during sleep) No   Dribbling urine after you urinate No   Hematuria (urine appears red) No   Type of stream Weak   Urinary frequency: How often a day are you going to the bathroom per day?  Less than 10   Urinary Tract Infections: How many Urinary Tract Infections have you had in the past year? One (1) in the past year   If you  "have had a UTI in the past year, what treatments have you had so far?  Prophylactic antibiotics   Urinary Incontinence (General): Are you experiencing the following?    Past consultation for incontinence: Have you ever seen someone for the evaluation of incontinence? No   If you answered yes to the previous question, please select all the therapies you have tried.  N/a- I answered no to the previous question   Please note the effectiveness of the therapies.    Need to wear protection to keep clothes dry  Yes   If you answered yes to the previous question, please scar the protection you use.  Poise   If you wear protection, how much wetness is typically on each pad? Slight   If you wear protection, how often do you have to change per day, if applicable?  3-4   Stress Symptoms: Are you experiencing the following?    Leakage of urine with cough, laugh and/or sneeze Yes   If you answered yes to the previous question, what is the frequency in days, weeks and/or months? Daily   Leakage of urine with sex No   Leakage of urine with bending/ lifting No   Leakage of urine with briskly walking or jogging No   If you lose urine for any other reason not previously mentioned, please note it below, if applicable.     Urge Symptoms: Are you experiencing the following?    Urgency ("got to go" feeling) No   Urge: How frequently do you feel an urge to urinate (feeling like you "gotta go" to the bathroom and can't wait) Never   Do you experience a leakage of urine when you have a feeling of urgency?  No   Leakage of urine when unaware No   Past use of anticholinergics (medications used to treat overactive bladder) No   If you answered yes to the previous question, please scar the anticholinergics you have used:     Have you ever used Mirbetriq (aka Mirabegron)?  No   Prolapse Symptoms: Are you experiencing any of the following?     Falling out/ Bulging/ Heaviness in the vagina Yes   Vaginal/ Abdominal Pain/ Pressure No   Need to " strain/ Push to void No   Need to wait on the toilet before you void No   Unusual position to urinate (using your hands to push back the vaginal bulge) No   Sensation of incomplete emptying No   Past use of pessary device No   If you answered yes to the previous question, please list the devices you have used below.     Bowel Symptoms: Are you experiencing any of the following?    Constipation No   Diarrhea  No   Hematochezia (bloody stool) No   Incomplete evacuation of stool No   Involuntary loss of formed stool No   Fecal smearing/urgency No   Involuntary loss of gas Yes   Vaginal Symptoms: Are you experiencing any of the following?     Abnormal vaginal bleeding  No   Vaginal dryness No   Sexually active  No   Dyspareunia (painful intercourse) No   Estrogen use  No     GYN & OB History  No LMP recorded. Patient has had a hysterectomy.   Date of Last Pap: No result found    OB History   No obstetric history on file.     OB     x 5.  C/s x 0.    Largest: 7 # 12 oz   Forceps: yes  Episiotomy:  yes  Degree: 3rd or 4th no    GYN  Menarche:  14  Menstrual cycle: n/a  Menopause:  50's  Hysterectomy: No  Ovaries: Present   Tubal ligation: No   Other abdominal surgeries:  No       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    Diabetes mellitus     Glaucoma     Hypertension     Other ascites 2022     Past Surgical History:   Procedure Laterality Date    CATARACT EXTRACTION W/  INTRAOCULAR LENS IMPLANT Left 2021    Procedure: EXTRACTION, CATARACT, WITH IOL INSERTION;  Surgeon: Shay Chou MD;  Location: [facility];  Service: Ophthalmology;  Laterality: Left;       Review of Systems  Review of Systems   Constitutional:  Negative for chills and fever.   HENT:  Negative for sore throat.    Gastrointestinal:  Negative for abdominal pain, anorexia, constipation, diarrhea, nausea and vomiting.   Genitourinary:  Negative for dysuria, flank pain, frequency,  hematuria, missed menses, pelvic pain, urgency and vaginal discharge.   Musculoskeletal:  Negative for back pain and joint pain.   Skin:  Negative for rash.   Neurological:  Negative for headaches.         Objective:     Physical Exam   Constitutional: She is oriented to person, place, and time. She appears well-developed.   HENT:   Head: Normocephalic and atraumatic.   Eyes: Conjunctivae and EOM are normal.   Cardiovascular: Normal rate, regular rhythm, S1 normal, S2 normal, normal heart sounds and intact distal pulses.   Pulmonary/Chest: Effort normal and breath sounds normal. She exhibits no tenderness.   Abdominal: Soft. Bowel sounds are normal. She exhibits no distension and no mass. There is no splenomegaly or hepatomegaly. There is no abdominal tenderness. There is no rigidity, no rebound and no guarding. No hernia.   Genitourinary: Pelvic exam was performed with patient supine. Rectum normal, vagina normal, uterus normal, cervix normal, skenes normal and bartholins normal. Right labia normal and left labia normal. Urethra exhibits hypermobility. Urethra exhibits no urethral caruncle, no urethral diverticulum and no urethral mass. Right bartholin is not enlarged and not tender. Left bartholin is not enlarged and not tender. Rectal exam shows resting tone normal and active tone normal. Rectal exam shows no external hemorrhoid, no fissure, no tenderness, anal tone normal and no dovetailing. Guaiac negative stool. There is atrophy in the vagina. No foreign body, tenderness, bleeding, unspecified prolapse of vaginal walls, fistula, mesh exposure or lavator tenderness in the vagina. Right adnexum displays no mass and no tenderness. Left adnexum displays no mass and no tenderness. Cervix exhibits no motion tenderness and no discharge. Uterus is not tender and not experiencing uterine prolapse.   PVR: 110 ML  Empty cough stress test: Negative.  Kegel: 2/5    POP-Q  Aa: 3 Ba: 3 C: 5   GH: 4 PB: 2 TVL: 10   Ap: 0 Bp:  0 D: -1                     Musculoskeletal:         General: Normal range of motion.      Cervical back: Normal range of motion and neck supple.   Neurological: She is alert and oriented to person, place, and time. She has normal strength and normal reflexes. Cranial nerves II through XII intact. No cranial nerve deficit.   Skin: Skin is warm and dry.   Psychiatric: She has a normal mood and affect. Her speech is normal and behavior is normal. Judgment normal.        The patient was fit with #2 1/2 pessary.  She was able to tolerate the device comfortabley with bending, squatting, valsalva.  She was able to demonstrate independent removal and placement.  She tolerated the procedure well.        Vagina full thickness erosion - +discharge      HCM  Pap's: No history of Abnormal pap's   Mammo: BIRADS: 1  ; Last imaging  normal last done    Colonoscopy: N/a:    Dexa:  Normal      Assessment:        1. Uterovaginal prolapse, complete    2. Urinary urgency    3. Vaginal inflammation from pessary, initial encounter                  Plan:    1. Prolapse: Stage 2 apical prolapse, Stage 2 anterior and posterior vaginal wall prolapse   --Pessary removed and left out  --Daily pelvic floor exercises as assigned, Pelvic floor PT   --Non surgical options discussed with patient      2.  Mixed urinary incontinence, urge > stress:   --Bladder diary for 3-7 days, write the number of times you go to the rest room and associated symptoms of urgency or leakage.   --Empty bladder every 3 hours.  Empty well: wait a minute, lean forward on toilet.    --Avoid dietary irritants (see sheet).  Keep diary x 3-5 days to determine your irritants.  --KEGELS: do 10 in AM and 10 in PM, holding each x 10 seconds.  When you feel urge to go, STOP, KEGEL, and when urge has passed, then go to bathroom.  start pelvic floor PT.     --URGE: .  SE profile reviewed.    Takes 2-4 weeks to see if will have effect.  For dry mouth: get sour, sugar free lozenge or  gum.    --STRESS:  Non surgical options: Pessary vs. Impressa vs Rivive - which represent urethral and bladder support devices; Surgical options including 1.  mid urethral sling; retropubic, transobturator vs single incision 2. Fascial slings 3. Hutchison procedure 4. Periurethral bulking     3. Vaginal discharge  Affirm  Metrogel   Pessary holiday       Luisa Evans DO  Female Pelvic Medicine and Reconstructive Surgery  [facility] [place], [place]

## 2023-04-12 ENCOUNTER — PATIENT MESSAGE (OUTPATIENT)
Dept: ENDOCRINOLOGY | Facility: CLINIC | Age: 83
End: 2023-04-12
Payer: MEDICARE

## 2023-04-12 PROBLEM — R39.15 URINARY URGENCY: Status: ACTIVE | Noted: 2023-04-12

## 2023-04-12 PROBLEM — N81.3 UTEROVAGINAL PROLAPSE, COMPLETE: Status: ACTIVE | Noted: 2023-04-12

## 2023-04-13 LAB
BACTERIAL VAGINOSIS DNA: NEGATIVE
CANDIDA GLABRATA DNA: NEGATIVE
CANDIDA KRUSEI DNA: NEGATIVE
CANDIDA RRNA VAG QL PROBE: NEGATIVE
T VAGINALIS RRNA GENITAL QL PROBE: POSITIVE

## 2023-04-14 LAB — BACTERIA UR CULT: ABNORMAL

## 2023-04-16 ENCOUNTER — PATIENT MESSAGE (OUTPATIENT)
Dept: UROGYNECOLOGY | Facility: CLINIC | Age: 83
End: 2023-04-16
Payer: MEDICARE

## 2023-04-16 DIAGNOSIS — N30.00 ACUTE CYSTITIS WITHOUT HEMATURIA: Primary | ICD-10-CM

## 2023-04-17 ENCOUNTER — TELEPHONE (OUTPATIENT)
Dept: UROGYNECOLOGY | Facility: CLINIC | Age: 83
End: 2023-04-17
Payer: MEDICARE

## 2023-04-17 ENCOUNTER — INFUSION (OUTPATIENT)
Dept: INFECTIOUS DISEASES | Facility: HOSPITAL | Age: 83
End: 2023-04-17
Payer: MEDICARE

## 2023-04-17 VITALS
WEIGHT: 112.13 LBS | TEMPERATURE: 99 F | HEART RATE: 55 BPM | HEIGHT: 66 IN | DIASTOLIC BLOOD PRESSURE: 72 MMHG | OXYGEN SATURATION: 97 % | RESPIRATION RATE: 20 BRPM | SYSTOLIC BLOOD PRESSURE: 155 MMHG | BODY MASS INDEX: 18.02 KG/M2

## 2023-04-17 DIAGNOSIS — M81.0 AGE-RELATED OSTEOPOROSIS WITHOUT CURRENT PATHOLOGICAL FRACTURE: Primary | ICD-10-CM

## 2023-04-17 PROCEDURE — 96372 THER/PROPH/DIAG INJ SC/IM: CPT

## 2023-04-17 PROCEDURE — 63600175 PHARM REV CODE 636 W HCPCS: Mod: JZ,JG | Performed by: STUDENT IN AN ORGANIZED HEALTH CARE EDUCATION/TRAINING PROGRAM

## 2023-04-17 RX ORDER — CIPROFLOXACIN 500 MG/1
500 TABLET ORAL 2 TIMES DAILY
Qty: 10 TABLET | Refills: 0 | Status: SHIPPED | OUTPATIENT
Start: 2023-04-17 | End: 2023-04-22

## 2023-04-17 RX ORDER — METRONIDAZOLE 500 MG/1
500 TABLET ORAL EVERY 12 HOURS
Qty: 14 TABLET | Refills: 0 | Status: SHIPPED | OUTPATIENT
Start: 2023-04-17 | End: 2023-04-24

## 2023-04-17 RX ADMIN — DENOSUMAB 60 MG: 60 INJECTION SUBCUTANEOUS at 03:04

## 2023-04-17 NOTE — PROGRESS NOTES
Received 1st Prolia 60mg Injection patient taking Vitamin D denies any dental procedures in past 3 months.  Injection given by Kathrin Mera RN to patient's Left upper back arm SQ tolerated well observed for 15 minutes no adverse reactions noted.  Patient left in wheelchair with daughter in NAD noted.  Next appointment scheduled.

## 2023-04-18 ENCOUNTER — PATIENT MESSAGE (OUTPATIENT)
Dept: UROGYNECOLOGY | Facility: CLINIC | Age: 83
End: 2023-04-18
Payer: MEDICARE

## 2023-04-19 DIAGNOSIS — Z79.4 TYPE 2 DIABETES MELLITUS WITH OTHER CIRCULATORY COMPLICATION, WITH LONG-TERM CURRENT USE OF INSULIN: Primary | ICD-10-CM

## 2023-04-19 DIAGNOSIS — E11.59 TYPE 2 DIABETES MELLITUS WITH OTHER CIRCULATORY COMPLICATION, WITH LONG-TERM CURRENT USE OF INSULIN: Primary | ICD-10-CM

## 2023-04-19 RX ORDER — INSULIN PMP CART,AUT,G6/7,CNTR
1 EACH SUBCUTANEOUS
Qty: 10 EACH | Refills: 11 | Status: SHIPPED | OUTPATIENT
Start: 2023-04-19 | End: 2023-06-27 | Stop reason: SDUPTHER

## 2023-04-22 NOTE — PROGRESS NOTES
[facility]   Physical Therapy Treatment Note     Name: Radha Feng  Clinic Number: [id number]    Therapy Diagnosis:   Encounter Diagnoses   Name Primary?    Leg weakness, bilateral Yes    Decreased mobility and endurance        Physician: Deandre Peterson MD    Visit Date: 12/15/2022    Physician Orders: PT Eval and Treat   Medical Diagnosis from Referral: K72.90 (ICD-10-CM) - Liver failure without hepatic coma, unspecified chronicity   Evaluation Date: 11/29/2022  Authorization Period Expiration: 12/31/2022  Plan of Care Expiration: 1/11/2023  Progress Note Due: 12/21/2022  Visit # / Visits authorized: 4/ 20  FOTO: Not taken     PTA Visit #: 1/5     Time In: 3:05pm   Time Out: 4:00pm   Total Billable Time: 55 minutes    Precautions: Standard, Diabetes, and Fall, Liver Failure     SUBJECTIVE     Pt reports: doing well today, has been feeling good with therapy   She was compliant with home exercise program.  Response to previous treatment: felt good after   Functional change: limited mobility and strength , amb c/ SPC with daughter assist - will begin to ambulate with RW once received .     Pain: N/A    OBJECTIVE     Objective Measures updated at progress report unless specified.     Treatment     Radha received the treatments listed below:      therapeutic exercises to develop strength, endurance, and ROM for 55 minutes including:    *Rae stand pivot WC<>table    SAQs : 2 x 10 reps B   LAQs 5'' hold BLEs 1min x3  Seated Heel raises 1minx3  Seated toe raises 1 min x3    Seated marching: 3 sets of 1 min   Seated hip add ball : 2 sets of 1 min  , 5'' hold   Seated hip abduction YTB: 2 sets of 1 min  , 5'' hold   Sit to stands 2 sets of 5, Min A-CGA for safety- requires cueing for proper sequence, Bed raised up    Standing in walker toe taps on 2' step RLE: 2 x 10 reps   Step up on 2' step with both Ues and CGA- x3      Patient Education and Home Exercises     Home Exercises Provided  and Patient Education Provided     Education provided:   - Updated HEP provided   - Encouraged use of RW vs cane - amb with assistance     Written Home Exercises Provided: yes. Exercises were reviewed and Radha was able to demonstrate them prior to the end of the session.  Radha demonstrated good  understanding of the education provided. See EMR under Patient Instructions for exercises provided during therapy sessions    ASSESSMENT     Excellent progression today with all exercises. Pt show improvement in form with sit to stands and pt was able to step up onto 2' step with onlt CGA and good clearance.  Will continue to progress     Radha Is progressing well towards her goals.   Pt prognosis is Good.     Pt will continue to benefit from skilled outpatient physical therapy to address the deficits listed in the problem list box on initial evaluation, provide pt/family education and to maximize pt's level of independence in the home and community environment.   Pt's spiritual, cultural and educational needs considered and pt agreeable to plan of care and goals.     Anticipated barriers to physical therapy: fall risk    Goals:  Short Term Goals: 3 weeks   Pt will be independent with HEP   Pt will be able to perform one sit to stand independently   Pt will be able to walk with a walker with min A for 10 feet      Long Term Goals: 6 weeks   Pt will be independent with updated HEP   Pt will be SBA with walker for 30 feet with no LOB   Pt will be 4/5 for LE MMTs     PLAN     Continue to progress general strengthening and gait training.     Bina Hurtado, PT              None

## 2023-04-24 ENCOUNTER — LAB VISIT (OUTPATIENT)
Dept: LAB | Facility: HOSPITAL | Age: 83
End: 2023-04-24
Attending: INTERNAL MEDICINE
Payer: MEDICARE

## 2023-04-24 DIAGNOSIS — K74.60 CIRRHOSIS OF LIVER WITH ASCITES, UNSPECIFIED HEPATIC CIRRHOSIS TYPE: ICD-10-CM

## 2023-04-24 DIAGNOSIS — R18.8 CIRRHOSIS OF LIVER WITH ASCITES, UNSPECIFIED HEPATIC CIRRHOSIS TYPE: ICD-10-CM

## 2023-04-24 LAB
ALBUMIN SERPL BCP-MCNC: 3.3 G/DL (ref 3.5–5.2)
ALP SERPL-CCNC: 76 U/L (ref 55–135)
ALT SERPL W/O P-5'-P-CCNC: 42 U/L (ref 10–44)
ANION GAP SERPL CALC-SCNC: 5 MMOL/L (ref 8–16)
AST SERPL-CCNC: 52 U/L (ref 10–40)
BASOPHILS # BLD AUTO: 0.03 K/UL (ref 0–0.2)
BASOPHILS NFR BLD: 0.3 % (ref 0–1.9)
BILIRUB DIRECT SERPL-MCNC: 0.3 MG/DL (ref 0.1–0.3)
BILIRUB SERPL-MCNC: 1 MG/DL (ref 0.1–1)
BUN SERPL-MCNC: 30 MG/DL (ref 8–23)
CALCIUM SERPL-MCNC: 9.7 MG/DL (ref 8.7–10.5)
CHLORIDE SERPL-SCNC: 109 MMOL/L (ref 95–110)
CO2 SERPL-SCNC: 25 MMOL/L (ref 23–29)
CREAT SERPL-MCNC: 0.9 MG/DL (ref 0.5–1.4)
DIFFERENTIAL METHOD: ABNORMAL
EOSINOPHIL # BLD AUTO: 0 K/UL (ref 0–0.5)
EOSINOPHIL NFR BLD: 0.4 % (ref 0–8)
ERYTHROCYTE [DISTWIDTH] IN BLOOD BY AUTOMATED COUNT: 13.2 % (ref 11.5–14.5)
EST. GFR  (NO RACE VARIABLE): >60 ML/MIN/1.73 M^2
GLUCOSE SERPL-MCNC: 133 MG/DL (ref 70–110)
HCT VFR BLD AUTO: 39.8 % (ref 37–48.5)
HGB BLD-MCNC: 12.8 G/DL (ref 12–16)
IMM GRANULOCYTES # BLD AUTO: 0.04 K/UL (ref 0–0.04)
IMM GRANULOCYTES NFR BLD AUTO: 0.4 % (ref 0–0.5)
INR PPP: 1 (ref 0.8–1.2)
LYMPHOCYTES # BLD AUTO: 1.7 K/UL (ref 1–4.8)
LYMPHOCYTES NFR BLD: 18.7 % (ref 18–48)
MCH RBC QN AUTO: 32.2 PG (ref 27–31)
MCHC RBC AUTO-ENTMCNC: 32.2 G/DL (ref 32–36)
MCV RBC AUTO: 100 FL (ref 82–98)
MONOCYTES # BLD AUTO: 0.8 K/UL (ref 0.3–1)
MONOCYTES NFR BLD: 8.5 % (ref 4–15)
NEUTROPHILS # BLD AUTO: 6.4 K/UL (ref 1.8–7.7)
NEUTROPHILS NFR BLD: 71.7 % (ref 38–73)
NRBC BLD-RTO: 0 /100 WBC
PLATELET # BLD AUTO: 171 K/UL (ref 150–450)
PMV BLD AUTO: 11.4 FL (ref 9.2–12.9)
POTASSIUM SERPL-SCNC: 5.3 MMOL/L (ref 3.5–5.1)
PROT SERPL-MCNC: 7.5 G/DL (ref 6–8.4)
PROTHROMBIN TIME: 10.6 SEC (ref 9–12.5)
RBC # BLD AUTO: 3.97 M/UL (ref 4–5.4)
SODIUM SERPL-SCNC: 139 MMOL/L (ref 136–145)
WBC # BLD AUTO: 8.93 K/UL (ref 3.9–12.7)

## 2023-04-24 PROCEDURE — 80053 COMPREHEN METABOLIC PANEL: CPT | Performed by: INTERNAL MEDICINE

## 2023-04-24 PROCEDURE — 85025 COMPLETE CBC W/AUTO DIFF WBC: CPT | Performed by: INTERNAL MEDICINE

## 2023-04-24 PROCEDURE — 82248 BILIRUBIN DIRECT: CPT | Performed by: INTERNAL MEDICINE

## 2023-04-24 PROCEDURE — 36415 COLL VENOUS BLD VENIPUNCTURE: CPT | Mod: PN | Performed by: INTERNAL MEDICINE

## 2023-04-24 PROCEDURE — 85610 PROTHROMBIN TIME: CPT | Performed by: INTERNAL MEDICINE

## 2023-05-05 ENCOUNTER — HOSPITAL ENCOUNTER (OUTPATIENT)
Dept: RADIOLOGY | Facility: OTHER | Age: 83
Discharge: HOME | End: 2023-05-05
Attending: INTERNAL MEDICINE
Payer: MEDICARE

## 2023-05-05 DIAGNOSIS — R18.8 CIRRHOSIS OF LIVER WITH ASCITES, UNSPECIFIED HEPATIC CIRRHOSIS TYPE: ICD-10-CM

## 2023-05-05 DIAGNOSIS — K74.60 CIRRHOSIS OF LIVER WITH ASCITES, UNSPECIFIED HEPATIC CIRRHOSIS TYPE: ICD-10-CM

## 2023-05-05 PROCEDURE — 76700 US ABDOMEN COMPLETE: ICD-10-PCS | Mod: 26,,, | Performed by: RADIOLOGY

## 2023-05-05 PROCEDURE — 76700 US EXAM ABDOM COMPLETE: CPT | Mod: 26,,, | Performed by: RADIOLOGY

## 2023-05-05 PROCEDURE — 76700 US EXAM ABDOM COMPLETE: CPT | Mod: TC

## 2023-05-08 ENCOUNTER — OFFICE VISIT (OUTPATIENT)
Dept: HEPATOLOGY | Facility: CLINIC | Age: 83
End: 2023-05-08
Payer: MEDICARE

## 2023-05-08 VITALS
WEIGHT: 112.63 LBS | BODY MASS INDEX: 18.1 KG/M2 | DIASTOLIC BLOOD PRESSURE: 67 MMHG | HEIGHT: 66 IN | RESPIRATION RATE: 17 BRPM | HEART RATE: 67 BPM | OXYGEN SATURATION: 99 % | SYSTOLIC BLOOD PRESSURE: 141 MMHG

## 2023-05-08 DIAGNOSIS — K74.60 CIRRHOSIS OF LIVER WITH ASCITES, UNSPECIFIED HEPATIC CIRRHOSIS TYPE: ICD-10-CM

## 2023-05-08 DIAGNOSIS — K76.82 HEPATIC ENCEPHALOPATHY: ICD-10-CM

## 2023-05-08 DIAGNOSIS — K75.4 AUTOIMMUNE HEPATITIS: Primary | Chronic | ICD-10-CM

## 2023-05-08 DIAGNOSIS — R18.8 OTHER ASCITES: ICD-10-CM

## 2023-05-08 DIAGNOSIS — R18.8 CIRRHOSIS OF LIVER WITH ASCITES, UNSPECIFIED HEPATIC CIRRHOSIS TYPE: ICD-10-CM

## 2023-05-08 DIAGNOSIS — M79.89 LEG SWELLING: ICD-10-CM

## 2023-05-08 PROCEDURE — 99214 PR OFFICE/OUTPT VISIT, EST, LEVL IV, 30-39 MIN: ICD-10-PCS | Mod: S$PBB,,, | Performed by: INTERNAL MEDICINE

## 2023-05-08 PROCEDURE — 99214 OFFICE O/P EST MOD 30 MIN: CPT | Mod: S$PBB,,, | Performed by: INTERNAL MEDICINE

## 2023-05-08 PROCEDURE — 99215 OFFICE O/P EST HI 40 MIN: CPT | Mod: PBBFAC,PN | Performed by: INTERNAL MEDICINE

## 2023-05-08 PROCEDURE — 99999 PR PBB SHADOW E&M-EST. PATIENT-LVL V: ICD-10-PCS | Mod: PBBFAC,,, | Performed by: INTERNAL MEDICINE

## 2023-05-08 PROCEDURE — 99999 PR PBB SHADOW E&M-EST. PATIENT-LVL V: CPT | Mod: PBBFAC,,, | Performed by: INTERNAL MEDICINE

## 2023-05-08 RX ORDER — LACTULOSE 10 G/10G
10 SOLUTION ORAL 3 TIMES DAILY
COMMUNITY

## 2023-05-08 NOTE — PATIENT INSTRUCTIONS
Continue prednisone 7.5 mg daily  See PCP re lisinopril and elevated K  Continue lasix 40 mg daily  Labs monthly  Abdo US 11/23 with AFP  Return 4 months

## 2023-05-08 NOTE — PROGRESS NOTES
HEPATOLOGY FOLLOW UP    Referring Physician: Leticia Lim MD   Current Corresponding Physician: Leticia Lim MD     Radha Feng is here for follow up of autoimmune hepatitis-induced cirrhosis    HPI  Ms Feng is an 83 yo PMHx HTN, ID-T2DM, decompensated AIH cirrhosis (biopsy proven) presented and was admitted 11/3/22-11/11/22 with abdo distention from ascites.     Patient hospitalized 09/20-10/15 for SOB found to have elevated liver enzymes and diagnosed with AIH vs RUBIO cirrhosis on biopsy decompensated by ascites. Started on steroids, imuran w/ improvement in enzymes. However due to malaise an leukopenia, imuran has been held. She was diuresed, underwent LVP and discharged on 10 mg prednisone daily with goal to continue but minimize prednisone as outpt.    Interval History  Since Radha's discharge and last 2 visits she has continued diuretics, has been able to stop lactulose and continues prednisone 7.5 mg daily. Her last paracentesis was 11/7/22. She is feeling well. She saw endocrinology and is due to start using a dexacom for BS monitoring.    Paracentesis 11/7/22: 2200 ml fluid; cell count 50     Labs 4/24/23: Tbil 1.0, ALT 42, AST 52, ALKP 76, creat 0.9, K 5.3  Abdo US 5/5/23: Liver: 12 cm, normal in size. Coarsened liver echotexture. Hypoechoic lesion in the subcapsular region of the inferior right hepatic lobe measuring 0.9 cm may represent a tiny cyst, though is too small to characterize by imaging.    Bone density: osteoporosis in hips; osteopenia lumbar spine- on tx per endocrinology    Ascites, ongoing: lasix 40 mg daily but no aldactone (urinates more with monotherapy by report)  HE: off lactulose    MELD-Na: 6 at 4/24/2023  2:20 PM  MELD: 6 at 4/24/2023  2:20 PM  Calculated from:  Serum Creatinine: 0.9 mg/dL (Using min of 1 mg/dL) at 4/24/2023  2:20 PM  Serum Sodium: 139 mmol/L (Using max of 137 mmol/L) at 4/24/2023  2:20 PM  Total Bilirubin: 1.0 mg/dL at 4/24/2023  2:20  "PM  INR(ratio): 1.0 at 2023  2:20 PM      Outpatient Encounter Medications as of 2023   Medication Sig Dispense Refill    BD INSULIN SYRINGE ULTRA-FINE 1 mL 31 gauge x 5/16 Syrg       blood-glucose meter,continuous (DEXCOM G6 ) Misc Check blood sugar before meals and at bedtime 1 each PRN    brimonidine 0.2% (ALPHAGAN) 0.2 % Drop INSTILL 1 DROP INTO RIGHT EYE TWICE A DAY 30 mL 3    dorzolamide-timolol 2-0.5% (COSOPT) 22.3-6.8 mg/mL ophthalmic solution INSTILL 1 DROP INTO RIGHT EYE TWICE A DAY 10 mL 11    furosemide (LASIX) 40 MG tablet Take 1 tablet (40 mg total) by mouth once daily. 30 tablet 5    insulin aspart U-100 (NOVOLOG) 100 unit/mL (3 mL) InPn pen Inject 12 Units into the skin 3 (three) times daily with meals. 30 mL 1    insulin detemir U-100 (LEVEMIR FLEXTOUCH) 100 unit/mL (3 mL) SubQ InPn pen Inject 5 Units into the skin every evening. 6 mL 1    lactulose (CEPHULAC) 10 gram packet Take 10 g by mouth 3 (three) times daily.      latanoprost 0.005 % ophthalmic solution PLACE 1 DROP INTO BOTH EYES ONCE DAILY 7.5 mL 3    lisinopriL-hydrochlorothiazide (PRINZIDE,ZESTORETIC) 20-12.5 mg per tablet Take 1 tablet by mouth once daily. 90 tablet 3    pen needle, diabetic 31 gauge x 5/16" Ndle Use to inject insulin into the skin up to 4 times daily 400 each 3    predniSONE (DELTASONE) 5 MG tablet TAKE 1 AND 1/2 TABLETS BY MOUTH ONCE DAILY. 135 tablet 1    acetaminophen (TYLENOL) 500 MG tablet Take 2 tablets (1,000 mg total) by mouth every 8 (eight) hours as needed for Pain. (Patient not taking: Reported on 2023)  0    blood-glucose sensor (DEXCOM G6 SENSOR) Amanda Check blood sugar before meals and at bedtime (Patient not taking: Reported on 2023) 1 each PRN    blood-glucose transmitter (DEXCOM G6 TRANSMITTER) Amanda Check blood sugar before meals and at bedtime (Patient not taking: Reported on 2023) 1 each PRN    [] ciprofloxacin HCl (CIPRO) 500 MG tablet Take 1 tablet (500 mg total) " by mouth 2 (two) times daily. for 5 days 10 tablet 0    insulin aspart U-100 (NOVOLOG) 100 unit/mL injection To use with Omnipod 5 30 mL 5    insulin pump cart,automated,BT (OMNIPOD 5 G6 PODS, GEN 5,) Crtg Inject 1 each into the skin Every 3 (three) days. (Patient not taking: Reported on 2023) 10 each 11    k phos di & mono-sod phos mono (PHOSPHA 250 NEUTRAL) 250 mg Tab Take 1 tablet by mouth once daily. for 5 days (Patient not taking: Reported on 12/15/2022) 5 tablet 0    [] metroNIDAZOLE (FLAGYL) 500 MG tablet Take 1 tablet (500 mg total) by mouth every 12 (twelve) hours. for 7 days 14 tablet 0    [] metroNIDAZOLE (METROGEL) 0.75 % (37.5mg/5 gram) vaginal gel Place 1 applicator vaginally nightly. for 7 days 70 g 0    pantoprazole (PROTONIX) 20 MG tablet Take 1 tablet (20 mg total) by mouth once daily. (Patient not taking: Reported on 12/15/2022) 30 tablet 0    spironolactone (ALDACTONE) 100 MG tablet Take 1 tablet (100 mg total) by mouth once daily. (Patient taking differently: Take 100 mg by mouth every 72 hours.) 30 tablet 0    [DISCONTINUED] predniSONE (DELTASONE) 5 MG tablet Take 1.5 tablets (7.5 mg total) by mouth once daily. 45 tablet 5     No facility-administered encounter medications on file as of 2023.     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    Diabetes mellitus     Glaucoma     Hypertension     Other ascites 2022       Review of Systems   Constitutional: Negative.    HENT: Negative.     Eyes: Negative.    Respiratory: Negative.     Cardiovascular: Negative.    Gastrointestinal: Negative.    Genitourinary: Negative.    Musculoskeletal: Negative.    Skin: Negative.    Neurological: Negative.    Psychiatric/Behavioral: Negative.     Vitals:    23 0935   BP: (!) 141/67   Pulse: 67   Resp: 17       Physical Exam  Vitals reviewed.   Constitutional:        Appearance: She is well-developed.   HENT:      Head: Normocephalic and atraumatic.   Eyes:      General: No scleral icterus.     Conjunctiva/sclera: Conjunctivae normal.      Pupils: Pupils are equal, round, and reactive to light.   Neck:      Thyroid: No thyromegaly.   Cardiovascular:      Rate and Rhythm: Normal rate and regular rhythm.      Heart sounds: Normal heart sounds.   Pulmonary:      Effort: Pulmonary effort is normal.      Breath sounds: Normal breath sounds. No rales.   Abdominal:      General: Bowel sounds are normal. There is no distension.      Palpations: Abdomen is soft. There is no mass.      Tenderness: There is no abdominal tenderness.   Musculoskeletal:         General: Normal range of motion.      Cervical back: Normal range of motion and neck supple.   Skin:     General: Skin is warm and dry.      Findings: No rash.   Neurological:      Mental Status: She is alert and oriented to person, place, and time.       MELD-Na: 6 at 4/24/2023  2:20 PM  MELD: 6 at 4/24/2023  2:20 PM  Calculated from:  Serum Creatinine: 0.9 mg/dL (Using min of 1 mg/dL) at 4/24/2023  2:20 PM  Serum Sodium: 139 mmol/L (Using max of 137 mmol/L) at 4/24/2023  2:20 PM  Total Bilirubin: 1.0 mg/dL at 4/24/2023  2:20 PM  INR(ratio): 1.0 at 4/24/2023  2:20 PM    Lab Results   Component Value Date     (H) 04/24/2023    BUN 30 (H) 04/24/2023    CREATININE 0.9 04/24/2023    CALCIUM 9.7 04/24/2023     04/24/2023    K 5.3 (H) 04/24/2023     04/24/2023    PROT 7.5 04/24/2023    CO2 25 04/24/2023    ANIONGAP 5 (L) 04/24/2023    WBC 8.93 04/24/2023    RBC 3.97 (L) 04/24/2023    HGB 12.8 04/24/2023    HCT 39.8 04/24/2023    HCT 30 (L) 10/23/2022     (H) 04/24/2023    MCH 32.2 (H) 04/24/2023    MCHC 32.2 04/24/2023     Lab Results   Component Value Date    RDW 13.2 04/24/2023     04/24/2023    MPV 11.4 04/24/2023    GRAN 6.4 04/24/2023    GRAN 71.7 04/24/2023    LYMPH 1.7 04/24/2023    LYMPH 18.7  04/24/2023    MONO 0.8 04/24/2023    MONO 8.5 04/24/2023    EOSINOPHIL 0.4 04/24/2023    BASOPHIL 0.3 04/24/2023    EOS 0.0 04/24/2023    BASO 0.03 04/24/2023    APTT 35.6 (H) 09/29/2022    GROUPTRH O POS 11/07/2022    BNP 66 09/20/2022    CHOL 238 (H) 01/30/2023    TRIG 97 01/30/2023    HDL 50 01/30/2023    CHOLHDL 21.0 01/30/2023    TOTALCHOLEST 4.8 01/30/2023    ALBUMIN 3.3 (L) 04/24/2023    BILIDIR 0.3 04/24/2023    AST 52 (H) 04/24/2023    ALT 42 04/24/2023    ALKPHOS 76 04/24/2023    MG 1.7 11/11/2022    LABPROT 10.6 04/24/2023    INR 1.0 04/24/2023       Assessment and Plan:  Radha Feng is a 82 y.o. female with AIH-induced cirrhosis. Current recommendations:  Cirrhosis: check labs monthly; HCC screening every 6 months (abdo 11/23 with AFP); EGD to screen for varices (awaiting scheduling)  Autoimmune hepatitis: continue prednisone 7.5 mg daily; will try to lower to 5 mg in the future;   Ascites/edema, ongoing: continue current diuretics (lasix 40 mg daily)  HE, off lactulose; monitor  HTN: consider changing lisinopril since K high  DM: seeing endocrinology; on insulin;  Osteoporosis: tx per endocriniology    Return 16 weeks

## 2023-05-16 ENCOUNTER — CLINICAL SUPPORT (OUTPATIENT)
Dept: ENDOSCOPY | Facility: HOSPITAL | Age: 83
End: 2023-05-16
Attending: INTERNAL MEDICINE
Payer: MEDICARE

## 2023-05-16 VITALS — HEIGHT: 66 IN | BODY MASS INDEX: 18 KG/M2 | WEIGHT: 112 LBS

## 2023-05-16 DIAGNOSIS — K74.60 CIRRHOSIS OF LIVER WITH ASCITES, UNSPECIFIED HEPATIC CIRRHOSIS TYPE: ICD-10-CM

## 2023-05-16 DIAGNOSIS — R18.8 CIRRHOSIS OF LIVER WITH ASCITES, UNSPECIFIED HEPATIC CIRRHOSIS TYPE: ICD-10-CM

## 2023-05-17 ENCOUNTER — PATIENT MESSAGE (OUTPATIENT)
Dept: HEPATOLOGY | Facility: CLINIC | Age: 83
End: 2023-05-17
Payer: MEDICARE

## 2023-05-17 ENCOUNTER — PATIENT MESSAGE (OUTPATIENT)
Dept: ENDOCRINOLOGY | Facility: CLINIC | Age: 83
End: 2023-05-17
Payer: MEDICARE

## 2023-05-17 DIAGNOSIS — R79.89 ELEVATED LIVER FUNCTION TESTS: Primary | ICD-10-CM

## 2023-05-18 ENCOUNTER — TELEPHONE (OUTPATIENT)
Dept: HEPATOLOGY | Facility: CLINIC | Age: 83
End: 2023-05-18
Payer: MEDICARE

## 2023-05-18 ENCOUNTER — LAB VISIT (OUTPATIENT)
Dept: LAB | Facility: HOSPITAL | Age: 83
End: 2023-05-18
Attending: INTERNAL MEDICINE
Payer: MEDICARE

## 2023-05-18 DIAGNOSIS — R79.89 ELEVATED LIVER FUNCTION TESTS: ICD-10-CM

## 2023-05-18 LAB
ALBUMIN SERPL BCP-MCNC: 3 G/DL (ref 3.5–5.2)
ALP SERPL-CCNC: 70 U/L (ref 55–135)
ALT SERPL W/O P-5'-P-CCNC: 37 U/L (ref 10–44)
ANION GAP SERPL CALC-SCNC: 10 MMOL/L (ref 8–16)
AST SERPL-CCNC: 47 U/L (ref 10–40)
BILIRUB SERPL-MCNC: 0.9 MG/DL (ref 0.1–1)
BUN SERPL-MCNC: 27 MG/DL (ref 8–23)
CALCIUM SERPL-MCNC: 9.2 MG/DL (ref 8.7–10.5)
CHLORIDE SERPL-SCNC: 108 MMOL/L (ref 95–110)
CO2 SERPL-SCNC: 24 MMOL/L (ref 23–29)
CREAT SERPL-MCNC: 1 MG/DL (ref 0.5–1.4)
EST. GFR  (NO RACE VARIABLE): 56.6 ML/MIN/1.73 M^2
GLUCOSE SERPL-MCNC: 130 MG/DL (ref 70–110)
MAGNESIUM SERPL-MCNC: 1.7 MG/DL (ref 1.6–2.6)
POTASSIUM SERPL-SCNC: 4 MMOL/L (ref 3.5–5.1)
PROT SERPL-MCNC: 6.5 G/DL (ref 6–8.4)
SODIUM SERPL-SCNC: 142 MMOL/L (ref 136–145)

## 2023-05-18 PROCEDURE — 80053 COMPREHEN METABOLIC PANEL: CPT | Performed by: INTERNAL MEDICINE

## 2023-05-18 PROCEDURE — 83735 ASSAY OF MAGNESIUM: CPT | Performed by: INTERNAL MEDICINE

## 2023-05-18 PROCEDURE — 36415 COLL VENOUS BLD VENIPUNCTURE: CPT | Mod: PN | Performed by: INTERNAL MEDICINE

## 2023-05-29 ENCOUNTER — LAB VISIT (OUTPATIENT)
Dept: LAB | Facility: HOSPITAL | Age: 83
End: 2023-05-29
Attending: INTERNAL MEDICINE
Payer: MEDICARE

## 2023-05-29 DIAGNOSIS — K74.60 CIRRHOSIS OF LIVER WITH ASCITES, UNSPECIFIED HEPATIC CIRRHOSIS TYPE: ICD-10-CM

## 2023-05-29 DIAGNOSIS — K75.4 AUTOIMMUNE HEPATITIS: Chronic | ICD-10-CM

## 2023-05-29 DIAGNOSIS — R18.8 CIRRHOSIS OF LIVER WITH ASCITES, UNSPECIFIED HEPATIC CIRRHOSIS TYPE: ICD-10-CM

## 2023-05-29 LAB
ALBUMIN SERPL BCP-MCNC: 3.3 G/DL (ref 3.5–5.2)
ALP SERPL-CCNC: 62 U/L (ref 55–135)
ALT SERPL W/O P-5'-P-CCNC: 42 U/L (ref 10–44)
ANION GAP SERPL CALC-SCNC: 9 MMOL/L (ref 8–16)
AST SERPL-CCNC: 48 U/L (ref 10–40)
BASOPHILS # BLD AUTO: 0.03 K/UL (ref 0–0.2)
BASOPHILS NFR BLD: 0.4 % (ref 0–1.9)
BILIRUB DIRECT SERPL-MCNC: 0.4 MG/DL (ref 0.1–0.3)
BILIRUB SERPL-MCNC: 0.9 MG/DL (ref 0.1–1)
BUN SERPL-MCNC: 25 MG/DL (ref 8–23)
CALCIUM SERPL-MCNC: 9.6 MG/DL (ref 8.7–10.5)
CHLORIDE SERPL-SCNC: 108 MMOL/L (ref 95–110)
CO2 SERPL-SCNC: 22 MMOL/L (ref 23–29)
CREAT SERPL-MCNC: 0.8 MG/DL (ref 0.5–1.4)
DIFFERENTIAL METHOD: ABNORMAL
EOSINOPHIL # BLD AUTO: 0.1 K/UL (ref 0–0.5)
EOSINOPHIL NFR BLD: 0.6 % (ref 0–8)
ERYTHROCYTE [DISTWIDTH] IN BLOOD BY AUTOMATED COUNT: 13.1 % (ref 11.5–14.5)
EST. GFR  (NO RACE VARIABLE): >60 ML/MIN/1.73 M^2
GLUCOSE SERPL-MCNC: 111 MG/DL (ref 70–110)
HCT VFR BLD AUTO: 37.2 % (ref 37–48.5)
HGB BLD-MCNC: 12 G/DL (ref 12–16)
IMM GRANULOCYTES # BLD AUTO: 0.02 K/UL (ref 0–0.04)
IMM GRANULOCYTES NFR BLD AUTO: 0.3 % (ref 0–0.5)
INR PPP: 1 (ref 0.8–1.2)
LYMPHOCYTES # BLD AUTO: 1.7 K/UL (ref 1–4.8)
LYMPHOCYTES NFR BLD: 20.8 % (ref 18–48)
MCH RBC QN AUTO: 31.3 PG (ref 27–31)
MCHC RBC AUTO-ENTMCNC: 32.3 G/DL (ref 32–36)
MCV RBC AUTO: 97 FL (ref 82–98)
MONOCYTES # BLD AUTO: 0.6 K/UL (ref 0.3–1)
MONOCYTES NFR BLD: 7.4 % (ref 4–15)
NEUTROPHILS # BLD AUTO: 5.6 K/UL (ref 1.8–7.7)
NEUTROPHILS NFR BLD: 70.5 % (ref 38–73)
NRBC BLD-RTO: 0 /100 WBC
PLATELET # BLD AUTO: 161 K/UL (ref 150–450)
PMV BLD AUTO: 11.5 FL (ref 9.2–12.9)
POTASSIUM SERPL-SCNC: 4.1 MMOL/L (ref 3.5–5.1)
PROT SERPL-MCNC: 7.2 G/DL (ref 6–8.4)
PROTHROMBIN TIME: 10.9 SEC (ref 9–12.5)
RBC # BLD AUTO: 3.83 M/UL (ref 4–5.4)
SODIUM SERPL-SCNC: 139 MMOL/L (ref 136–145)
WBC # BLD AUTO: 7.99 K/UL (ref 3.9–12.7)

## 2023-05-29 PROCEDURE — 85610 PROTHROMBIN TIME: CPT | Performed by: INTERNAL MEDICINE

## 2023-05-29 PROCEDURE — 36415 COLL VENOUS BLD VENIPUNCTURE: CPT | Mod: PN | Performed by: INTERNAL MEDICINE

## 2023-05-29 PROCEDURE — 85025 COMPLETE CBC W/AUTO DIFF WBC: CPT | Performed by: INTERNAL MEDICINE

## 2023-05-29 PROCEDURE — 82248 BILIRUBIN DIRECT: CPT | Performed by: INTERNAL MEDICINE

## 2023-05-29 PROCEDURE — 80053 COMPREHEN METABOLIC PANEL: CPT | Performed by: INTERNAL MEDICINE

## 2023-06-06 ENCOUNTER — PATIENT MESSAGE (OUTPATIENT)
Dept: UROGYNECOLOGY | Facility: CLINIC | Age: 83
End: 2023-06-06
Payer: MEDICARE

## 2023-06-07 ENCOUNTER — TELEPHONE (OUTPATIENT)
Dept: OPTOMETRY | Facility: CLINIC | Age: 83
End: 2023-06-07
Payer: MEDICARE

## 2023-06-07 DIAGNOSIS — H40.1113 PRIMARY OPEN ANGLE GLAUCOMA (POAG) OF RIGHT EYE, SEVERE STAGE: ICD-10-CM

## 2023-06-07 DIAGNOSIS — H40.1121 PRIMARY OPEN ANGLE GLAUCOMA (POAG) OF LEFT EYE, MILD STAGE: ICD-10-CM

## 2023-06-07 RX ORDER — BRIMONIDINE TARTRATE 2 MG/ML
SOLUTION/ DROPS OPHTHALMIC
Qty: 10 ML | Refills: 11 | Status: SHIPPED | OUTPATIENT
Start: 2023-06-07 | End: 2024-02-12 | Stop reason: SDUPTHER

## 2023-06-08 ENCOUNTER — PATIENT MESSAGE (OUTPATIENT)
Dept: DIABETES | Facility: CLINIC | Age: 83
End: 2023-06-08
Payer: MEDICARE

## 2023-06-09 ENCOUNTER — PATIENT OUTREACH (OUTPATIENT)
Dept: DIABETES | Facility: CLINIC | Age: 83
End: 2023-06-09
Payer: MEDICARE

## 2023-06-09 ENCOUNTER — PATIENT MESSAGE (OUTPATIENT)
Dept: DIABETES | Facility: CLINIC | Age: 83
End: 2023-06-09
Payer: MEDICARE

## 2023-06-09 NOTE — PROGRESS NOTES
Left message on daughter's phone requesting call back today to discuss pump training scheduled for Monday 6/12/2023.  Return phone number provided.

## 2023-06-12 ENCOUNTER — OFFICE VISIT (OUTPATIENT)
Dept: UROGYNECOLOGY | Facility: CLINIC | Age: 83
End: 2023-06-12
Payer: MEDICARE

## 2023-06-12 ENCOUNTER — CLINICAL SUPPORT (OUTPATIENT)
Dept: DIABETES | Facility: CLINIC | Age: 83
End: 2023-06-12
Payer: MEDICARE

## 2023-06-12 VITALS
BODY MASS INDEX: 18.65 KG/M2 | WEIGHT: 115.5 LBS | DIASTOLIC BLOOD PRESSURE: 79 MMHG | SYSTOLIC BLOOD PRESSURE: 190 MMHG | HEART RATE: 60 BPM

## 2023-06-12 DIAGNOSIS — N81.6 POSTERIOR VAGINAL WALL PROLAPSE: ICD-10-CM

## 2023-06-12 DIAGNOSIS — Z79.4 TYPE 2 DIABETES MELLITUS WITH OTHER CIRCULATORY COMPLICATION, WITH LONG-TERM CURRENT USE OF INSULIN: Primary | ICD-10-CM

## 2023-06-12 DIAGNOSIS — Z46.89 ENCOUNTER FOR FITTING AND ADJUSTMENT OF PESSARY: ICD-10-CM

## 2023-06-12 DIAGNOSIS — N81.10 PROLAPSE OF ANTERIOR VAGINAL WALL: ICD-10-CM

## 2023-06-12 DIAGNOSIS — N39.46 MIXED URGE AND STRESS INCONTINENCE: ICD-10-CM

## 2023-06-12 DIAGNOSIS — E11.59 TYPE 2 DIABETES MELLITUS WITH OTHER CIRCULATORY COMPLICATION, WITH LONG-TERM CURRENT USE OF INSULIN: Primary | ICD-10-CM

## 2023-06-12 DIAGNOSIS — N81.3 UTEROVAGINAL PROLAPSE, COMPLETE: Primary | ICD-10-CM

## 2023-06-12 PROCEDURE — G0108 DIAB MANAGE TRN  PER INDIV: HCPCS | Mod: PBBFAC | Performed by: DIETITIAN, REGISTERED

## 2023-06-12 PROCEDURE — 99999 PR PBB SHADOW E&M-EST. PATIENT-LVL IV: CPT | Mod: PBBFAC,,, | Performed by: NURSE PRACTITIONER

## 2023-06-12 PROCEDURE — 99213 PR OFFICE/OUTPT VISIT, EST, LEVL III, 20-29 MIN: ICD-10-PCS | Mod: S$PBB,,, | Performed by: NURSE PRACTITIONER

## 2023-06-12 PROCEDURE — 99214 OFFICE O/P EST MOD 30 MIN: CPT | Mod: PBBFAC | Performed by: NURSE PRACTITIONER

## 2023-06-12 PROCEDURE — 99213 OFFICE O/P EST LOW 20 MIN: CPT | Mod: S$PBB,,, | Performed by: NURSE PRACTITIONER

## 2023-06-12 PROCEDURE — 99999 PR PBB SHADOW E&M-EST. PATIENT-LVL IV: ICD-10-PCS | Mod: PBBFAC,,, | Performed by: NURSE PRACTITIONER

## 2023-06-12 NOTE — PROGRESS NOTES
Urogyn follow up  06/12/2023  .  [facility] - UROGYNECOLOGY  [address] [id number]    Radha Feng  [id number]  1940      Radha Feng is a 82 y.o. here for a urogyn follow up for vaginal abrasion and pessary placement.    Last HPI from 04/11/2022  History of Present Illness  Female  Problem  The patient's pertinent negatives include no genital itching, genital lesions, genital odor, genital rash, missed menses, pelvic pain, vaginal bleeding or vaginal discharge. This is a chronic problem. The current episode started more than 1 month ago. The problem occurs daily. The problem has been unchanged. The patient is experiencing no pain. She is not pregnant. Pertinent negatives include no abdominal pain, anorexia, back pain, chills, constipation, diarrhea, discolored urine, dysuria, fever, flank pain, frequency, headaches, hematuria, joint pain, joint swelling, nausea, painful intercourse, rash, sore throat, urgency or vomiting. Nothing aggravates the symptoms. She is not sexually active. No, her partner does not have an STD. She is postmenopausal.   Follow-up  Pertinent negatives include no abdominal pain, anorexia, chills, fever, headaches, nausea, rash, sore throat or vomiting.  82 y.o.  female No obstetric history on file.   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       [facility]     Question 12/20/2022  1:44 PM CST - Filed by Patient   General Urogynecology: Are you experiencing the following?     Dysuria (painful urination) No   Nocturia:  waking up at night to empty your bladder  Yes   If you answered yes to the previous question, how many times does this happen per night? 1-2   Enuresis (urine loss during sleep) No   Dribbling urine after you urinate No   Hematuria (urine appears red) No   Type of stream Weak   Urinary frequency: How often a day are  "you going to the bathroom per day?  Less than 10   Urinary Tract Infections: How many Urinary Tract Infections have you had in the past year? One (1) in the past year   If you have had a UTI in the past year, what treatments have you had so far?  Prophylactic antibiotics   Urinary Incontinence (General): Are you experiencing the following?     Past consultation for incontinence: Have you ever seen someone for the evaluation of incontinence? No   If you answered yes to the previous question, please select all the therapies you have tried.  N/a- I answered no to the previous question   Please note the effectiveness of the therapies.     Need to wear protection to keep clothes dry  Yes   If you answered yes to the previous question, please scar the protection you use.  Poise   If you wear protection, how much wetness is typically on each pad? Slight   If you wear protection, how often do you have to change per day, if applicable?  3-4   Stress Symptoms: Are you experiencing the following?     Leakage of urine with cough, laugh and/or sneeze Yes   If you answered yes to the previous question, what is the frequency in days, weeks and/or months? Daily   Leakage of urine with sex No   Leakage of urine with bending/ lifting No   Leakage of urine with briskly walking or jogging No   If you lose urine for any other reason not previously mentioned, please note it below, if applicable.      Urge Symptoms: Are you experiencing the following?     Urgency ("got to go" feeling) No   Urge: How frequently do you feel an urge to urinate (feeling like you "gotta go" to the bathroom and can't wait) Never   Do you experience a leakage of urine when you have a feeling of urgency?  No   Leakage of urine when unaware No   Past use of anticholinergics (medications used to treat overactive bladder) No   If you answered yes to the previous question, please scar the anticholinergics you have used:      Have you ever used Mirbetriq (aka " Mirabegron)?  No   Prolapse Symptoms: Are you experiencing any of the following?      Falling out/ Bulging/ Heaviness in the vagina Yes   Vaginal/ Abdominal Pain/ Pressure No   Need to strain/ Push to void No   Need to wait on the toilet before you void No   Unusual position to urinate (using your hands to push back the vaginal bulge) No   Sensation of incomplete emptying No   Past use of pessary device No   If you answered yes to the previous question, please list the devices you have used below.      Bowel Symptoms: Are you experiencing any of the following?     Constipation No   Diarrhea  No   Hematochezia (bloody stool) No   Incomplete evacuation of stool No   Involuntary loss of formed stool No   Fecal smearing/urgency No   Involuntary loss of gas Yes   Vaginal Symptoms: Are you experiencing any of the following?      Abnormal vaginal bleeding  No   Vaginal dryness No   Sexually active  No   Dyspareunia (painful intercourse) No   Estrogen use  No          Changes from last visit:  Here for pessary placement. Has had pessary holiday since last visit.    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    Diabetes mellitus     Glaucoma     Hypertension     Other ascites 11/29/2022       Past Surgical History:   Procedure Laterality Date    CATARACT EXTRACTION W/  INTRAOCULAR LENS IMPLANT Left 12/21/2021    Procedure: EXTRACTION, CATARACT, WITH IOL INSERTION;  Surgeon: Shay Chou MD;  Location: [facility];  Service: Ophthalmology;  Laterality: Left;       Family History   Problem Relation Age of Onset    Cataracts Mother     Diabetes Mother     Glaucoma Mother     Hypertension Mother     Heart attack Father     Colon cancer Sister     Blindness Cousin     Amblyopia Neg Hx     Macular degeneration Neg Hx     Retinal detachment Neg Hx        Social History     Socioeconomic History    Marital status: Single   Tobacco Use    Smoking status: Former    Smokeless  tobacco: Never   Substance and Sexual Activity    Alcohol use: Not Currently    Drug use: Never   Social History Narrative    , one daughter local, four sons out of state. Retired . Lives with daughter Joss.     Social Determinants of Health     Financial Resource Strain: Low Risk     Difficulty of Paying Living Expenses: Not hard at all   Food Insecurity: No Food Insecurity    Worried About Running Out of Food in the Last Year: Never true    Ran Out of Food in the Last Year: Never true   Transportation Needs: No Transportation Needs    Lack of Transportation (Medical): No    Lack of Transportation (Non-Medical): No   Physical Activity: Inactive    Days of Exercise per Week: 0 days    Minutes of Exercise per Session: 0 min   Stress: No Stress Concern Present    Feeling of Stress : Only a little   Social Connections: Unknown    Frequency of Communication with Friends and Family: More than three times a week    Frequency of Social Gatherings with Friends and Family: Once a week    Active Member of Clubs or Organizations: No    Attends Club or Organization Meetings: Never    Marital Status:    Housing Stability: Low Risk     Unable to Pay for Housing in the Last Year: No    Number of Places Lived in the Last Year: 1    Unstable Housing in the Last Year: No       Current Outpatient Medications   Medication Sig Dispense Refill    acetaminophen (TYLENOL) 500 MG tablet Take 2 tablets (1,000 mg total) by mouth every 8 (eight) hours as needed for Pain.  0    BD INSULIN SYRINGE ULTRA-FINE 1 mL 31 gauge x 5/16 Syrg       blood-glucose meter,continuous (DEXCOM G6 ) Misc Check blood sugar before meals and at bedtime 1 each PRN    blood-glucose sensor (DEXCOM G6 SENSOR) Amanda Check blood sugar before meals and at bedtime 1 each PRN    blood-glucose transmitter (DEXCOM G6 TRANSMITTER) Amanda Check blood sugar before meals and at bedtime 1 each PRN    brimonidine 0.2% (ALPHAGAN) 0.2 % Drop INSTILL  "1 DROP INTO RIGHT EYE TWICE A DAY 10 mL 11    dorzolamide-timolol 2-0.5% (COSOPT) 22.3-6.8 mg/mL ophthalmic solution INSTILL 1 DROP INTO RIGHT EYE TWICE A DAY 10 mL 11    furosemide (LASIX) 40 MG tablet Take 1 tablet (40 mg total) by mouth once daily. 30 tablet 5    insulin aspart U-100 (NOVOLOG) 100 unit/mL (3 mL) InPn pen Inject 12 Units into the skin 3 (three) times daily with meals. 30 mL 1    insulin aspart U-100 (NOVOLOG) 100 unit/mL injection To use with Omnipod 5 30 mL 5    insulin detemir U-100 (LEVEMIR FLEXTOUCH) 100 unit/mL (3 mL) SubQ InPn pen Inject 5 Units into the skin every evening. 6 mL 1    insulin pump cart,automated,BT (OMNIPOD 5 G6 PODS, GEN 5,) Crtg Inject 1 each into the skin Every 3 (three) days. 10 each 11    lactulose (CEPHULAC) 10 gram packet Take 10 g by mouth 3 (three) times daily.      latanoprost 0.005 % ophthalmic solution PLACE 1 DROP INTO BOTH EYES ONCE DAILY 7.5 mL 3    lisinopriL-hydrochlorothiazide (PRINZIDE,ZESTORETIC) 20-12.5 mg per tablet Take 1 tablet by mouth once daily. 90 tablet 3    pen needle, diabetic 31 gauge x 5/16" Ndle Use to inject insulin into the skin up to 4 times daily 400 each 3    predniSONE (DELTASONE) 5 MG tablet TAKE 1 AND 1/2 TABLETS BY MOUTH ONCE DAILY. 135 tablet 1    k phos di & mono-sod phos mono (PHOSPHA 250 NEUTRAL) 250 mg Tab Take 1 tablet by mouth once daily. for 5 days (Patient not taking: Reported on 12/15/2022) 5 tablet 0    pantoprazole (PROTONIX) 20 MG tablet Take 1 tablet (20 mg total) by mouth once daily. (Patient not taking: Reported on 12/15/2022) 30 tablet 0    spironolactone (ALDACTONE) 100 MG tablet Take 1 tablet (100 mg total) by mouth once daily. (Patient taking differently: Take 100 mg by mouth every 72 hours.) 30 tablet 0     No current facility-administered medications for this visit.       Review of patient's allergies indicates:  No Known Allergies    ROS:  As per HPI.      Exam  BP (!) 190/79   Pulse 60   Wt 52.4 kg (115 lb " "8.3 oz)   BMI 18.65 kg/m²   General: alert and oriented, no acute distress  Respiratory: normal respiratory effort  Abd: soft, non-tender, non-distended    Pelvic  Ext. Genitalia: normal external genitalia. Normal bartholin's and skeens glands  Vagina: + atrophy. Normal vaginal mucosa without lesions. No discharge noted.   Non-tender bladder base without palpable mass.  Cervix no lesions  Uterus:  nontender, normal size, shape, position, mobile  Urethra: no masses or tenderness  Urethral meatus: no lesions, caruncle or prolapse.    #2 1/2 " LS GH pessary inserted.  She was able to tolerate the device comfortabley with bending, squatting, valsalva.  She was not able to demonstrate independent removal and placement.  She tolerated the procedure well.       Impression  1. Uterovaginal prolapse, complete        2. Mixed urge and stress incontinence        3. Prolapse of anterior vaginal wall        4. Posterior vaginal wall prolapse        5. Encounter for fitting and adjustment of pessary          We reviewed the above issues and discussed options for short-term versus long-term management of her problems.   Plan:   1. Prolapse: Stage 2 apical prolapse, Stage 2 anterior and posterior vaginal wall prolapse   --trial #2 1/2 " LS gH pessary  --Daily pelvic floor exercises as assigned, Pelvic floor PT   --Non surgical options discussed with patient       2.  Mixed urinary incontinence, urge > stress:   --Bladder diary for 3-7 days, write the number of times you go to the rest room and associated symptoms of urgency or leakage.   --Empty bladder every 3 hours.  Empty well: wait a minute, lean forward on toilet.    --Avoid dietary irritants (see sheet).  Keep diary x 3-5 days to determine your irritants.  --KEGELS: do 10 in AM and 10 in PM, holding each x 10 seconds.  When you feel urge to go, STOP, KEGEL, and when urge has passed, then go to bathroom.  start pelvic floor PT.     --URGE: .  SE profile reviewed.    Takes 2-4 " weeks to see if will have effect.  For dry mouth: get sour, sugar free lozenge or gum.    --STRESS:  Non surgical options: Pessary vs. Impressa vs Rivive - which represent urethral and bladder support devices; Surgical options including 1.  mid urethral sling; retropubic, transobturator vs single incision 2. Fascial slings 3. Hutchison procedure 4. Periurethral bulking     3. RTC 4 weeks for pc    I spent a total of 20 minutes on the day of the visit.  This includes face to face time and non-face to face time preparing to see the patient (eg, review of tests), obtaining and/or reviewing separately obtained history, documenting clinical information in the electronic or other health record, independently interpreting results and communicating results to the patient/family/caregiver, or care coordinator.       Estephania Lindsay, EVELYN-BC [facility]  Division of Female Pelvic Medicine and Reconstructive Surgery  Department of Obstetrics & Gynecology

## 2023-06-12 NOTE — PATIENT INSTRUCTIONS
"1. Prolapse: Stage 2 apical prolapse, Stage 2 anterior and posterior vaginal wall prolapse   --trial #2 1/2 " LS gH pessary  --Daily pelvic floor exercises as assigned, Pelvic floor PT   --Non surgical options discussed with patient       2.  Mixed urinary incontinence, urge > stress:   --Bladder diary for 3-7 days, write the number of times you go to the rest room and associated symptoms of urgency or leakage.   --Empty bladder every 3 hours.  Empty well: wait a minute, lean forward on toilet.    --Avoid dietary irritants (see sheet).  Keep diary x 3-5 days to determine your irritants.  --KEGELS: do 10 in AM and 10 in PM, holding each x 10 seconds.  When you feel urge to go, STOP, KEGEL, and when urge has passed, then go to bathroom.  start pelvic floor PT.     --URGE: .  SE profile reviewed.    Takes 2-4 weeks to see if will have effect.  For dry mouth: get sour, sugar free lozenge or gum.    --STRESS:  Non surgical options: Pessary vs. Impressa vs Rivive - which represent urethral and bladder support devices; Surgical options including 1.  mid urethral sling; retropubic, transobturator vs single incision 2. Fascial slings 3. Hutchison procedure 4. Periurethral bulking     3. RTC 4 weeks for pc  "

## 2023-06-12 NOTE — PROGRESS NOTES
Diabetes Care Specialist Progress Note  Author: Divina Smith RD, CDE  Date: 2023    Program Intake  Reason for Diabetes Program Visit:: Intervention  Type of Intervention:: Individual  Individual: Device Training  Device Training: Insulin Pump Start (OP5 training)    Current diabetes risk level:: low    Permission to speak with others about care:: yes (daughter)      Lab Results   Component Value Date    HGBA1C 5.0 2023         Clinical  Problem Review  Reviewed Problem List with Patient: yes  Comorbidities:  (Liver disease)    Clinical Assessment  Current Diabetes Treatment: Insulin  Levemir 10 units in AM  Novolog 12 units at B and L, 8 units at D      Have you ever experienced hypoglycemia (low blood sugar)?: yes  In the last month, how often have you experienced low blood sugar?:  (none recently)  Are you able to tell when your blood sugar is low?: Yes  What symptoms do you experience?: Shaky  How do you treat hypoglycemia (low blood sugar)?: 1/2 can soda/fruit juice    Have you ever experienced hyperglycemia (high blood sugar)?: yes  In the last month, how often have you experienced high blood sugar?: once a week      SMBG via Dexcom G6  Average B mg/dL  Time in Range:  67%  (<1% low, 32% high)  GMI:  7.5%          Medication Information  How many days a week do you miss your medications?: Never  Medication adherence impacting ability to self-manage diabetes?: No    Labs  Lab Compliance Barriers: No          Nutritional Status  Current nutritional status an area of need that is impacting patient's ability to self-manage diabetes?: No            Additional Social History  Support  Does anyone support you with your diabetes care?: yes  Who supports you?: son/daughter  Who takes you to your medical appointments?: son/daughter  Does the current support meet the patient's needs?: Yes  Is Support an area impacting ability to self-manage diabetes?: No    Access to [employer]  Does the  "patient have access to any of the following devices or technologies?: Smart phone  Media or technology needs impacting ability to self-manage diabetes?: No    Cognitive/Behavioral Health  Alert and Oriented: Yes  Cognitive or behavioral barriers impacting ability to self-manage diabetes?: No    Communication  Communication needs impacting ability to self-manage diabetes?: No    Health Literacy  Preferred Learning Method: Face to Face  Health literacy needs impacting ability to self-manage diabetes?: No             Diabetes Self-Management Care Plan:    Today's Diabetes Self-Management Care Plan was developed with Radha's input. Radha has agreed to work toward the following goal(s) to improve his/her overall diabetes control.      Care Plan: Diabetes Management   Updates made since 5/13/2023 12:00 AM        Problem: Medications         Goal: Patient Agrees to take Diabetes Medication(s) as prescribed.    Start Date: 3/27/2023   This Visit's Progress: On track   Priority: High   Note:    Pt here to start Omnipod 5 insulin pump with automated insulin delivery.   Switching from MDI  Currently using Dexcom G6 CGM via mobile phone wili.       Reviewed that pt will STOPPING the following medications with start of pump: Levemir      Pt was instructed on the following:   Details of pump therapy, including basic features of PDM programming, filling of pod, automatic pod priming and insertion, setting basal, bolus and other features in the set up menu.  Basic pump features, including site selection of pods, rotation of sites and hard stop on PDM to change every 72 hrs + low insulin in resevoir.   Details of CGM connection and insulin automation, including inputting Dexcom SN into PDM, changing of Dexcom sensor every 10 days still via mobile phone wili, "line of sight" placement between sensor and pod, automation of insulin delivery via automated pump algorithm, and differences between automated, automated limited, and manual " modes.     Initial pump settings per Dr. Stock programmed into pump today.      Basal: 0.5 u/hr   ISF: 60   ICR: 16                 Target: 120-150 mg/dL              AIT:  4 hours      Pt has demonstrated the ability to program PDM and appropriately fill, place, and start pod with Novolog insulin today.     Automated mode started in clinic today.       Reviewed the following with pt:  Insulin vial is good out of refrigeration for 30 days.   Appropriate treatment of hypoglycemia, and hyperglycemia, including sick day care, DKA and troubleshooting of pump.      Omni Pod 24 hour support line provided.     Written materials provided.   Patient verbalized understanding of all instructions given.          [employer] account linked to clinic account for ongoing monitoring.              Task: Instructed patient on how to use OP5 insulin pump system Completed 6/12/2023        Problem: Healthy Eating         Goal: Eat 2-3 meals daily with 30-45g/2-3 servings of Carbohydrate per meal. Limit snacking in between meal to 1 serving (15 grams).    Start Date: 3/27/2023   This Visit's Progress: On track   Priority: Medium   Note:                       Follow Up Plan     F/u in 1 week          Today's care plan and follow up schedule was discussed with patient.  Radha verbalized understanding of the care plan, goals, and agrees to follow up plan.        The patient was encouraged to communicate with his/her health care provider/physician and care team regarding his/her condition(s) and treatment.  I provided the patient with my contact information today and encouraged to contact me via phone or [facility] Patient Portal as needed.           Length of Visit   Total Time: 90 Minutes

## 2023-06-15 ENCOUNTER — CLINICAL SUPPORT (OUTPATIENT)
Dept: REHABILITATION | Facility: OTHER | Age: 83
End: 2023-06-15
Attending: OBSTETRICS & GYNECOLOGY
Payer: MEDICARE

## 2023-06-15 DIAGNOSIS — N81.89 PELVIC FLOOR WEAKNESS IN FEMALE: ICD-10-CM

## 2023-06-15 DIAGNOSIS — N81.3 UTEROVAGINAL PROLAPSE, COMPLETE: ICD-10-CM

## 2023-06-15 PROCEDURE — 97530 THERAPEUTIC ACTIVITIES: CPT

## 2023-06-15 PROCEDURE — 97112 NEUROMUSCULAR REEDUCATION: CPT

## 2023-06-15 PROCEDURE — 97161 PT EVAL LOW COMPLEX 20 MIN: CPT

## 2023-06-16 PROBLEM — N81.89 PELVIC FLOOR WEAKNESS IN FEMALE: Status: ACTIVE | Noted: 2023-06-16

## 2023-06-16 NOTE — PLAN OF CARE
[facility]  Pelvic Health Physical Therapy Initial Evaluation    Date: 6/15/2023   Name: Radha Feng  Clinic Number: [id number]  Therapy Diagnosis: No diagnosis found.  Physician: Luisa Evans,*    Physician Orders: PT Eval and Treat  Medical Diagnosis from Referral: uterovaginal prolapse  Evaluation Date: 6/15/2023  Authorization Period Expiration: 1 visit  Plan of Care Expiration: 23  Visit # / Visits authorized:     Time In: 300  Time Out: 400  Total Appointment Time (timed & untimed codes): 60 minutes    Precautions: universal    Subjective     Date of onset:     History of current condition - Radha reports: Daughter noticed prolapse in September with helping mother dry off after tub. Everything snowballed in September, found out she has autoimmune hepatitis. Got a pessary which helps keep uterus up and she can empty bladder better. In April noticed abrasion so had a rest from pessary but got new one on Monday. Since Monday has been feeling good. Just got Replens and will start using that.     OB/GYN History:  and vaginal delivery  Surgical History: none  Birth Control: menopause  History of chronic yeast, BV or UTIs? Yes - UTIs - 2 in past 6 months  Sexually active? No    Bladder/Bowel History:   Frequency of urination:   Daytime: every 2-3 hours           Nighttime: 4x/night, urge wakes her up, but also has R knee pain that wakes her up  Difficulty initiating urine stream: No  Urine stream: strong  Complete emptying: Yes  Push to empty bladder: No  Bladder leakage: Yes  Frequency of incidents/Type of incontinence: not daily but frequently  Urinary Urgency: Yes  Frequency of bowel movements: once a day  Form of protection: pad  Number of pads required in 24 hours: 3  History of coccyx injury: No    Prolapse Screening:  Feeling of vaginal bulge: No - pessary manages well    Pain:  Location: R knee     Medical History: Radha  has a past medical history of  Cataract, Chondromalacia, knee, right (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     Surgical History: Radha Feng  has a past surgical history that includes Cataract extraction w/  intraocular lens implant (Left, 12/21/2021).    Medications: Radha has a current medication list which includes the following prescription(s): acetaminophen, bd insulin syringe ultra-fine, dexcom g6 , dexcom g6 sensor, dexcom g6 transmitter, brimonidine 0.2%, dorzolamide-timolol 2-0.5%, furosemide, insulin aspart u-100, insulin aspart u-100, insulin detemir u-100 (levemir), omnipod 5 g6 pods (gen 5), k phos di & mono-sod phos mono, lactulose, latanoprost, lisinopril-hydrochlorothiazide, pantoprazole, pen needle, diabetic, prednisone, and spironolactone.    Allergies: Review of patient's allergies indicates:  No Known Allergies     Prior Therapy/Previous treatment included: pessary  Social History:  lives with their daughter since she has been sick, daughter helps with transfers but she can bathe indep, dress indep  Current exercise: walking in house, was going to PT   Occupation: retired  Prior Level of Function: no pressure or urinary symptoms  Current Level of Function: well managed by pessary, but if does not wear pessary has pelvic pressure and incomplete bladder emptying    Types of fluid intake: water - 48 oz water, coffee 8 oz, and cranberry juice - 16 oz, diet coke - 10-20 oz/day  Diet: TBA     Abuse/Neglect: No     Pts goals: to learn how to strengthen    OBJECTIVE     See EMR under MEDIA for written consent provided 6/15/2023  Chaperone: Declined    ORTHO SCREEN  Posture in sitting: slouched   Posture in standing: forward and rounded shoulders , increased kyphosis, and decreased lordosis  Pelvic alignment: no sign of deviations noted in supine     ABDOMINAL WALL ASSESSMENT  Palpation: WNL  Abdominal strength: low tone, poor load transfer  Scarring: none  Pelvic Floor  Muscle and Transverse Abdominus Synergy: absent  Diastasis: absent      BREATHING MECHANICS ASSESSMENT   Thorax Assessment During Quiet Respiration: Decreased excursion of abdominal wall  and Decreased excursion bilaterally of lateral ribs   Thorax Assessment During Deep Respiration: Decreased excursion of abdominal wall  and Decreased excursion bilaterally of lateral ribs     VAGINAL PELVIC FLOOR EXAM    EXTERNAL ASSESSMENT    Comments: performed with TAUS - able to elevate bladder base with contraction and demonstrate 5-10 second holds over several reps. Complete relaxation noted. PFME initiated.       INTERNAL ASSESSMENT    Comments: not performed for pt comfort    Limitation/Restriction for FOTO n/a Survey    Not completed - eval only       TREATMENT     Treatment Time In: 335  Treatment Time Out: 400  Total Treatment time (time-based codes) separate from Evaluation: 25 minutes    Neuromuscular Re-education to develop Coordination and Control for 15 minutes including:   rehabilitative ultrasound imaging to help visualize pelvic floor muscle contraction and relaxation with kegels    Therapeutic Activity Patient participated in dynamic functional therapeutic activities to improve functional performance for 10 minutes. Including: Education as described below.     Patient Education provided:   general anatomy/physiology of urinary/ bowel  system and benefits of treatment were discussed with the pt. Additionally, anatomy/physiology of pelvic floor and kegels were reviewed.     Home Exercises provided:  Written Home Exercises provided: Yes  Exercises were reviewed and Radha was able to demonstrate them prior to the end of the session.    Radha demonstrated good  understanding of the education provided.     See EMR under Patient Instructions for exercises provided 6/15/2023.    Assessment     Radha is a 82 y.o. female referred to outpatient Physical Therapy with a medical diagnosis of uterovaginal prolapse. Pt  presents with altered posture, poor knowledge of body mechanics and posture, poor trunk stability, decreased pelvic muscle strength, and decreased endurance of the pelvic muscles. Pelvic exam completed with TAUS for pt comfort. Noted good elevation of bladder base with contractions of pelvic floor mm with good isolation and relaxation. She also demo good endurance and coordination. PFME initiated. Pt reports her symptoms of pelvic pressure and incomplete bladder emptying are very well managed with pessary. Additionally, her and daughter report that she is going to seek ortho PT for R knee pain which is functionally limiting. Believe ortho PT should be her priority now as pelvic symptoms well managed and difficult for her to come to appts because daughter needs to come with. Will call patient in 3 weeks to ensure everything going well and see if she would like f/u appt to review exercises. Will schedule additional appt or discharge at that time.     Pt prognosis is Excellent  Pt will benefit from skilled outpatient Physical Therapy to address the deficits stated above and in the chart below, provide pt/family education, and to maximize pt's level of independence.     Plan of care discussed with patient: Yes  Pt's spiritual, cultural and educational needs considered and patient is agreeable to the plan of care and goals as stated below:     Anticipated Barriers for therapy: None    Medical Necessity is demonstrated by the following:    History  Co-morbidities and personal factors that may impact the plan of care Co-morbidities   transportation assistance required    Personal Factors  no deficits     moderate   Examination  Body structures and functions, activity limitations and participation restrictions that may impact the plan of care Body Regions/Systems/Functions:  altered posture, poor knowledge of body mechanics and posture, poor trunk stability, decreased pelvic muscle strength, and decreased endurance of the  pelvic muscles     Activity Limitations:  Participating in social activities and ADLs due to pelvic pressure    Participation Restrictions:  Pelvic pressure with ADLs.     Activity limitations:   Learning and applying knowledge  No deficits    General Tasks and Commands  No deficits    Communication  No deficits    Mobility  No deficits    Self care  No deficits    Domestic Life  No deficits    Interactions/Relationships  No deficits    Life Areas  No deficits    Community and Social Life  No deficits       low   Clinical Presentation stable and uncomplicated low   Decision Making/ Complexity Score: low       Goals:  Short Term Goals: 4 weeks   Pt indep in HEP      Plan     Plan of care Certification: 6/15/2023 to 9/15/23.    Outpatient Physical Therapy as needed, no more than once a month for 12 weeks, to include the following interventions: therapeutic exercises, therapeutic activity, neuromuscular re-education, manual therapy, patient/family education and self care/home management    Kimberly Dietz, PT

## 2023-06-19 ENCOUNTER — OFFICE VISIT (OUTPATIENT)
Dept: OPTOMETRY | Facility: CLINIC | Age: 83
End: 2023-06-19
Payer: MEDICARE

## 2023-06-19 DIAGNOSIS — H40.1131 PRIMARY OPEN ANGLE GLAUCOMA (POAG) OF BOTH EYES, MILD STAGE: ICD-10-CM

## 2023-06-19 DIAGNOSIS — H40.1113 PRIMARY OPEN ANGLE GLAUCOMA (POAG) OF RIGHT EYE, SEVERE STAGE: Primary | ICD-10-CM

## 2023-06-19 PROCEDURE — 99214 OFFICE O/P EST MOD 30 MIN: CPT | Mod: S$PBB,,, | Performed by: OPTOMETRIST

## 2023-06-19 PROCEDURE — 99999 PR PBB SHADOW E&M-EST. PATIENT-LVL I: CPT | Mod: PBBFAC,,, | Performed by: OPTOMETRIST

## 2023-06-19 PROCEDURE — 99999 PR PBB SHADOW E&M-EST. PATIENT-LVL I: ICD-10-PCS | Mod: PBBFAC,,, | Performed by: OPTOMETRIST

## 2023-06-19 PROCEDURE — 99211 OFF/OP EST MAY X REQ PHY/QHP: CPT | Mod: PBBFAC,PO | Performed by: OPTOMETRIST

## 2023-06-19 PROCEDURE — 99214 PR OFFICE/OUTPT VISIT, EST, LEVL IV, 30-39 MIN: ICD-10-PCS | Mod: S$PBB,,, | Performed by: OPTOMETRIST

## 2023-06-19 NOTE — PROGRESS NOTES
HPI     Glaucoma    In both eyes.  Side effects of treatment include none.           Comments    Gtts: Latanoprost QHS OU, Cosopt BID OU, Brimonidine BID OD          Last edited by Nj Elliott, OD on 6/19/2023  2:32 PM.            Assessment /Plan     For exam results, see Encounter Report.    Primary open angle glaucoma (POAG) of right eye, severe stage  -     Posterior Segment OCT Optic Nerve- Both eyes; Future  -     Guerra Visual Field - OU - Extended - Both Eyes; Future    Primary open angle glaucoma (POAG) of both eyes, mild stage  -     Posterior Segment OCT Optic Nerve- Both eyes; Future  -     Guerra Visual Field - OU - Extended - Both Eyes; Future      (+) FHx- mother. IOP 20 OD, 19 Os. Last 16 OD, 14 OS. Increased IOP OU compared to prior but pt reports compliance. Tmax 28 OD, 23 OS. c/d 0.85 OD, 0.75 OS. Pt reports possibly being on drops for this prior to 2020..   Pachy 579 OD, 608 OS  8/11/2022  OCT OD borderline NI, Thin T, TI, TS, G, OS thin G, TI, T, borderline TS and NI  9/29/2021 HVF OD severely depressed VF w/some inferior sparing, OS inferionasal step  8/11/2022 HVF OD dense sup arcuate and early inf arcuate, OS nasal defects  Educated pt on findings w/understanding.  Consequences of noncompliance and lack of f/u reviewed.  D/c Timolol BiD OD (8/12/2020).  Cont Latanoprost QAM OU (8/12/2020) and cont Cosopt BiD OU (started 9/21/2020)  No noticeable change in IOP w/Cosopt added.   Cont Brimonidine BID OD (started 3/22/2021)  RTC 4 mo IOP/OCT/24-2 charmaine faster HVF

## 2023-06-21 ENCOUNTER — PATIENT MESSAGE (OUTPATIENT)
Dept: DIABETES | Facility: CLINIC | Age: 83
End: 2023-06-21

## 2023-06-21 ENCOUNTER — CLINICAL SUPPORT (OUTPATIENT)
Dept: DIABETES | Facility: CLINIC | Age: 83
End: 2023-06-21
Payer: MEDICARE

## 2023-06-21 DIAGNOSIS — Z79.4 TYPE 2 DIABETES MELLITUS WITH OTHER CIRCULATORY COMPLICATION, WITH LONG-TERM CURRENT USE OF INSULIN: Primary | ICD-10-CM

## 2023-06-21 DIAGNOSIS — E11.59 TYPE 2 DIABETES MELLITUS WITH OTHER CIRCULATORY COMPLICATION, WITH LONG-TERM CURRENT USE OF INSULIN: Primary | ICD-10-CM

## 2023-06-21 PROCEDURE — G0108 DIAB MANAGE TRN  PER INDIV: HCPCS | Mod: 95,GC,, | Performed by: STUDENT IN AN ORGANIZED HEALTH CARE EDUCATION/TRAINING PROGRAM

## 2023-06-21 PROCEDURE — G0108 PR DIAB MANAGE TRN  PER INDIV: ICD-10-PCS | Mod: 95,GC,, | Performed by: STUDENT IN AN ORGANIZED HEALTH CARE EDUCATION/TRAINING PROGRAM

## 2023-06-22 NOTE — PROGRESS NOTES
Diabetes Care Specialist Virtual Visit Note   The patient location is: Home in [place]  The chief complaint leading to consultation is: Diabetes  Visit type: audiovisual  Total time spent with patient: 45 min   Each patient to whom he or she provides medical services by telemedicine is:  (1) informed of the relationship between the physician and patient and the respective role of any other health care provider with respect to management of the patient; and (2) notified that he or she may decline to receive medical services by telemedicine and may withdraw from such care at any time.       Diabetes Care Specialist Progress Note  Author: Michelle Feng RN, CDE  Date: 6/22/2023    Program Intake  Reason for Diabetes Program Visit:: Intervention  Type of Intervention:: Individual  Individual: Education  Education: Self-Management Skill Review, Pattern Management, Other  Current diabetes risk level:: low  In the last 12 months, have you:: been admitted to a hospital  Was the ER or hospital admission related to diabetes?: No  Permission to speak with others about care::  (Regino daughter)    Lab Results   Component Value Date    HGBA1C 5.0 01/30/2023               Clinical    Problem Review  Reviewed Problem List with Patient: yes  Comorbidities:  (Liver disease)    Clinical Assessment  Have you ever experienced hypoglycemia (low blood sugar)?: yes  In the last month, how often have you experienced low blood sugar?:  (none recently)  What symptoms do you experience?: Shaky  Have you ever experienced hyperglycemia (high blood sugar)?: yes  In the last month, how often have you experienced high blood sugar?: once a week       Additional Social History    Support  Does anyone support you with your diabetes care?: yes    Access to Mass Media & Technology  Does the patient have access to any of the following devices or technologies?: Smart phone    Health Literacy  Preferred Learning Method: Face to Face      Diabetes  Self-Management Skills Assessment    Diabetes Disease Process/Treatment Options  Patient/caregiver able to state what happens when someone has diabetes.: yes  Patient/caregiver knows what type of diabetes they have.: yes  Diabetes Type : Type II  Assessment indicates:: Adequate understanding  Area of need?: No    Nutrition/Healthy Eating  Method of carbohydrate measurement:: carb counting/reading labels, measuring cups/spoons  Patient can identify foods that impact blood sugar.: yes  Patient-identified foods:: fruit/fruit juice, milk, soda, starchy vegetables (corn, peas, beans), sweets, starches (bread, pasta, rice, cereal), yogurt  Assessment indicates:: Adequate understanding  Area of need?: No    Physical Activity/Exercise  Patient's daily activity level:: sedentary  Patient formally exercises outside of work.: no  Reasons for not exercising:: physically unable to exercise currently (in wheelchair)  Assessment indicates:: Adequate understanding  Area of need?: No    Medications  Patient is able to describe current diabetes management routine.: yes (Daughter administers all insulin doses.)  Diabetes management routine:: insulin  Patient is able to identify current diabetes medications, dosages, and appropriate timing of medications.: yes  Patient understands the purpose of the medications taken for diabetes.: yes  Patient reports problems or concerns with current medication regimen.: no  Assessment indicates:: Instruction Needed  Area of need?: Yes    Home Blood Glucose Monitoring  Patient states that blood sugar is checked at home daily.: yes  Monitoring Method:: personal continuous glucose monitor  Personal CGM type:: dexcom G6  Patient is able to use personal CGM appropriately.: yes (Daughter assist with insertion and maintenance)  Assessment indicates:: Instruction Needed (instructed pt to try to use smart phone for Dexcom; currently using  and wili used on daughters phone)  Area of need?: Yes  (instructed pt to try to use smart phone for Dexcom; currently using  and wili used on daughters phone)    Acute Complications  Patient can identify general symptoms of hypoglycemia: yes  Patient identified:: shakiness  Able to state proper treatment of hypoglycemia?: yes  Patient identified:: 5-6 pieces of hard candy, 1/2 can soda/fruit juice  Assessment indicates:: Adequate understanding  Area of need?: No    Chronic Complications  Deferred due to:: Time    Psychosocial/Coping  Patient can identify ways of coping with chronic disease.: yes  Patient-stated ways of coping with chronic disease:: support from loved ones  Assessment indicates:: Adequate understanding  Area of need?: No      Assessment Summary and Plan    Based on today's diabetes care assessment, the following areas of need were identified:      Social 6/12/2023   Support No   Access to Mass Media/Tech No   Cognitive/Behavioral Health No   Communication No   Health Literacy No        Clinical 6/12/2023   Medication Adherence No   Lab Compliance No   Nutritional Status No        Diabetes Self-Management Skills 6/21/2023   Diabetes Disease Process/Treatment Options No   Nutrition/Healthy Eating No   Physical Activity/Exercise No   Medication Yes, see care planning   Home Blood Glucose Monitoring Yes, using Dexcom G6 no issues reported   Acute Complications No   Psychosocial/Coping No          Today's interventions were provided through individual discussion, instruction, and written materials were provided.      Patient verbalized understanding of instruction and written materials.  Pt was able to return back demonstration of instructions today. Patient understood key points, needs reinforcement and further instruction.     Diabetes Self-Management Care Plan:    Today's Diabetes Self-Management Care Plan was developed with Radha's input. Radha has agreed to work toward the following goal(s) to improve his/her overall diabetes control.      Care  Plan: Diabetes Management   Updates made since 5/23/2023 12:00 AM        Problem: Medications         Goal: Patient Agrees to take Diabetes Medication(s) using the Omnipod 5 system    Start Date: 3/27/2023   Expected End Date: 10/10/2023   Recent Progress: On track   Priority: High   Barriers: Knowledge deficit   Note:    OP5    Update to care planning 06/21/2023:  Reviewed Omnipod 5 download with Dexcom integration with patient and her daughter Vinod.  Overall doing well.  Bgs stable during the night and when she is not eating.  Elevations noted at all post meal glucose.  Carbs are controlled and measured by family members.  Will increase her I:C from 16 to 14 as she requires more insulin with meals.  Walked daughter thru changes on her PDM. No other concerns voiced.  We reviewed troubleshooting, what to look for including leaking at the site an unexplained rise in glucose readings.  Advised to monitor for these and to change the pod if this occurs.  Advised that she no longer has long acting insulin and changes must be address immediately to avoid potential issues.  Has back up insulin.  Demonstrated how to use the inhaled and glucagon emergency pens.  Will reach out to HCP to prescribed. Patient states she likes using system as opposed to injections. Advised to call Omnipod for replacement pods as she had to change 2 prematurely.  No other questions voiced.        Task: Instructed patient on how to use OP5 insulin pump system Completed 6/12/2023        Problem: Healthy Eating         Goal: Eat 2-3 meals daily with 30-45g/2-3 servings of Carbohydrate per meal. Limit snacking in between meal to 1 serving (15 grams).    Start Date: 3/27/2023   Expected End Date: 10/10/2023   This Visit's Progress: On track   Recent Progress: On track   Priority: Medium   Note:    Carb counting    Update to care planning 06/21/2023:  no issues reported.  Daughter asked about bolusing for snacks.  Advised to keep snacks at about 15  gram and that it is ok to bolus for these as pump tracks insulin and will make adjustments accordingly.          Follow Up Plan     Virtual f/u scheduled for 10/10/2023    Today's care plan and follow up schedule was discussed with patient.  Radha verbalized understanding of the care plan, goals, and agrees to follow up plan.        The patient was encouraged to communicate with his/her health care provider/physician and care team regarding his/her condition(s) and treatment.  I provided the patient with my contact information today and encouraged to contact me via phone or [facility] Patient Portal as needed.     Length of Visit   Total Time: 45 Minutes

## 2023-06-23 ENCOUNTER — PATIENT MESSAGE (OUTPATIENT)
Dept: HEPATOLOGY | Facility: CLINIC | Age: 83
End: 2023-06-23
Payer: MEDICARE

## 2023-06-25 ENCOUNTER — PATIENT MESSAGE (OUTPATIENT)
Dept: HEPATOLOGY | Facility: CLINIC | Age: 83
End: 2023-06-25
Payer: MEDICARE

## 2023-06-26 ENCOUNTER — ANESTHESIA (OUTPATIENT)
Dept: ENDOSCOPY | Facility: HOSPITAL | Age: 83
End: 2023-06-26
Payer: MEDICARE

## 2023-06-26 ENCOUNTER — HOSPITAL ENCOUNTER (OUTPATIENT)
Facility: HOSPITAL | Age: 83
Discharge: HOME | End: 2023-06-26
Attending: INTERNAL MEDICINE | Admitting: INTERNAL MEDICINE
Payer: MEDICARE

## 2023-06-26 ENCOUNTER — ANESTHESIA EVENT (OUTPATIENT)
Dept: ENDOSCOPY | Facility: HOSPITAL | Age: 83
End: 2023-06-26
Payer: MEDICARE

## 2023-06-26 ENCOUNTER — OFFICE VISIT (OUTPATIENT)
Dept: PRIMARY CARE CLINIC | Facility: CLINIC | Age: 83
End: 2023-06-26
Payer: MEDICARE

## 2023-06-26 VITALS
SYSTOLIC BLOOD PRESSURE: 192 MMHG | WEIGHT: 112 LBS | HEART RATE: 60 BPM | OXYGEN SATURATION: 100 % | DIASTOLIC BLOOD PRESSURE: 88 MMHG | HEIGHT: 65 IN | RESPIRATION RATE: 19 BRPM | TEMPERATURE: 98 F | BODY MASS INDEX: 18.66 KG/M2

## 2023-06-26 VITALS
HEIGHT: 65 IN | OXYGEN SATURATION: 96 % | HEART RATE: 77 BPM | SYSTOLIC BLOOD PRESSURE: 118 MMHG | DIASTOLIC BLOOD PRESSURE: 62 MMHG | WEIGHT: 115.94 LBS | BODY MASS INDEX: 19.32 KG/M2

## 2023-06-26 DIAGNOSIS — M25.561 CHRONIC PAIN OF RIGHT KNEE: ICD-10-CM

## 2023-06-26 DIAGNOSIS — M81.0 AGE-RELATED OSTEOPOROSIS WITHOUT CURRENT PATHOLOGICAL FRACTURE: ICD-10-CM

## 2023-06-26 DIAGNOSIS — Z79.4 TYPE 2 DIABETES MELLITUS WITH OTHER CIRCULATORY COMPLICATION, WITH LONG-TERM CURRENT USE OF INSULIN: ICD-10-CM

## 2023-06-26 DIAGNOSIS — R18.8 CIRRHOSIS OF LIVER WITH ASCITES, UNSPECIFIED HEPATIC CIRRHOSIS TYPE: ICD-10-CM

## 2023-06-26 DIAGNOSIS — I11.0 HYPERTENSIVE HEART DISEASE WITH DIASTOLIC HEART FAILURE: Primary | ICD-10-CM

## 2023-06-26 DIAGNOSIS — K75.4 AUTOIMMUNE HEPATITIS: ICD-10-CM

## 2023-06-26 DIAGNOSIS — I50.30 HYPERTENSIVE HEART DISEASE WITH DIASTOLIC HEART FAILURE: Primary | ICD-10-CM

## 2023-06-26 DIAGNOSIS — D69.6 THROMBOCYTOPENIA: ICD-10-CM

## 2023-06-26 DIAGNOSIS — G89.29 CHRONIC PAIN OF RIGHT KNEE: ICD-10-CM

## 2023-06-26 DIAGNOSIS — E11.59 TYPE 2 DIABETES MELLITUS WITH OTHER CIRCULATORY COMPLICATION, WITH LONG-TERM CURRENT USE OF INSULIN: ICD-10-CM

## 2023-06-26 DIAGNOSIS — K74.60 CIRRHOSIS OF LIVER: ICD-10-CM

## 2023-06-26 DIAGNOSIS — K74.60 CIRRHOSIS OF LIVER WITH ASCITES, UNSPECIFIED HEPATIC CIRRHOSIS TYPE: ICD-10-CM

## 2023-06-26 DIAGNOSIS — Z79.52 LONG TERM CURRENT USE OF SYSTEMIC STEROIDS: ICD-10-CM

## 2023-06-26 PROBLEM — E43 SEVERE PROTEIN-CALORIE MALNUTRITION: Status: RESOLVED | Noted: 2022-09-22 | Resolved: 2023-06-26

## 2023-06-26 PROBLEM — D58.9 HEMOLYTIC ANEMIA: Status: RESOLVED | Noted: 2022-09-23 | Resolved: 2023-06-26

## 2023-06-26 LAB — POCT GLUCOSE: 165 MG/DL (ref 70–110)

## 2023-06-26 PROCEDURE — E9220 PRA ENDO ANESTHESIA: HCPCS | Mod: ,,, | Performed by: NURSE ANESTHETIST, CERTIFIED REGISTERED

## 2023-06-26 PROCEDURE — 25000003 PHARM REV CODE 250: Performed by: NURSE ANESTHETIST, CERTIFIED REGISTERED

## 2023-06-26 PROCEDURE — 99999 PR PBB SHADOW E&M-EST. PATIENT-LVL IV: ICD-10-PCS | Mod: PBBFAC,,, | Performed by: INTERNAL MEDICINE

## 2023-06-26 PROCEDURE — 37000008 HC ANESTHESIA 1ST 15 MINUTES: Performed by: INTERNAL MEDICINE

## 2023-06-26 PROCEDURE — 43235 EGD DIAGNOSTIC BRUSH WASH: CPT | Mod: ,,, | Performed by: INTERNAL MEDICINE

## 2023-06-26 PROCEDURE — 99999 PR PBB SHADOW E&M-EST. PATIENT-LVL IV: CPT | Mod: PBBFAC,,, | Performed by: INTERNAL MEDICINE

## 2023-06-26 PROCEDURE — 99214 OFFICE O/P EST MOD 30 MIN: CPT | Mod: PBBFAC,PN | Performed by: INTERNAL MEDICINE

## 2023-06-26 PROCEDURE — 99214 OFFICE O/P EST MOD 30 MIN: CPT | Mod: S$PBB,,, | Performed by: INTERNAL MEDICINE

## 2023-06-26 PROCEDURE — 99214 PR OFFICE/OUTPT VISIT, EST, LEVL IV, 30-39 MIN: ICD-10-PCS | Mod: S$PBB,,, | Performed by: INTERNAL MEDICINE

## 2023-06-26 PROCEDURE — 63600175 PHARM REV CODE 636 W HCPCS: Performed by: NURSE ANESTHETIST, CERTIFIED REGISTERED

## 2023-06-26 PROCEDURE — 43235 PR EGD, FLEX, DIAGNOSTIC: ICD-10-PCS | Mod: ,,, | Performed by: INTERNAL MEDICINE

## 2023-06-26 PROCEDURE — 43235 EGD DIAGNOSTIC BRUSH WASH: CPT | Performed by: INTERNAL MEDICINE

## 2023-06-26 PROCEDURE — E9220 PRA ENDO ANESTHESIA: ICD-10-PCS | Mod: ,,, | Performed by: NURSE ANESTHETIST, CERTIFIED REGISTERED

## 2023-06-26 PROCEDURE — 37000009 HC ANESTHESIA EA ADD 15 MINS: Performed by: INTERNAL MEDICINE

## 2023-06-26 RX ORDER — LIDOCAINE HYDROCHLORIDE 20 MG/ML
INJECTION INTRAVENOUS
Status: DISCONTINUED | OUTPATIENT
Start: 2023-06-26 | End: 2023-06-26

## 2023-06-26 RX ORDER — AMLODIPINE BESYLATE 2.5 MG/1
2.5 TABLET ORAL DAILY
Qty: 90 TABLET | Refills: 0 | Status: SHIPPED | OUTPATIENT
Start: 2023-06-26 | End: 2023-11-05

## 2023-06-26 RX ORDER — PROPOFOL 10 MG/ML
VIAL (ML) INTRAVENOUS
Status: DISCONTINUED | OUTPATIENT
Start: 2023-06-26 | End: 2023-06-26

## 2023-06-26 RX ORDER — PROPOFOL 10 MG/ML
VIAL (ML) INTRAVENOUS CONTINUOUS PRN
Status: DISCONTINUED | OUTPATIENT
Start: 2023-06-26 | End: 2023-06-26

## 2023-06-26 RX ORDER — SODIUM CHLORIDE 9 MG/ML
INJECTION, SOLUTION INTRAVENOUS CONTINUOUS
Status: DISCONTINUED | OUTPATIENT
Start: 2023-06-26 | End: 2023-06-26 | Stop reason: HOSPADM

## 2023-06-26 RX ORDER — SODIUM CHLORIDE 0.9 % (FLUSH) 0.9 %
10 SYRINGE (ML) INJECTION
Status: DISCONTINUED | OUTPATIENT
Start: 2023-06-26 | End: 2023-06-26 | Stop reason: HOSPADM

## 2023-06-26 RX ADMIN — PROPOFOL 30 MG: 10 INJECTION, EMULSION INTRAVENOUS at 10:06

## 2023-06-26 RX ADMIN — SODIUM CHLORIDE: 9 INJECTION, SOLUTION INTRAVENOUS at 10:06

## 2023-06-26 RX ADMIN — LIDOCAINE HYDROCHLORIDE 50 MG: 20 INJECTION INTRAVENOUS at 10:06

## 2023-06-26 RX ADMIN — PROPOFOL 100 MCG/KG/MIN: 10 INJECTION, EMULSION INTRAVENOUS at 10:06

## 2023-06-26 NOTE — TRANSFER OF CARE
"Anesthesia Transfer of Care Note    Patient: Radha Feng    Procedure(s) Performed: Procedure(s) (LRB):  ESOPHAGOGASTRODUODENOSCOPY (EGD) (N/A)    Patient location: PACU    Anesthesia Type: general    Transport from OR: Transported from OR on room air with adequate spontaneous ventilation    Post pain: adequate analgesia    Post assessment: no apparent anesthetic complications and tolerated procedure well    Post vital signs: stable    Level of consciousness: responds to stimulation    Nausea/Vomiting: no vomiting    Complications: none    Transfer of care protocol was followed      Last vitals:   Visit Vitals  BP (!) 192/90   Pulse 60   Temp 36.3 °C (97.3 °F)   Resp 15   Ht 5' 5" (1.651 m)   Wt 50.8 kg (112 lb)   SpO2 99%   Breastfeeding No   BMI 18.64 kg/m²     "

## 2023-06-26 NOTE — PROGRESS NOTES
Chief Complaint   Chief Complaint  HTN Management    Subjective:      History of Present Illness:  Radha Feng is a 82 y.o. female with who presents for HTN management.     HTN - Patient was taking furosemide 50MG every day and lisinopriL-hydrochlorothiazide 20-12.5 mg per tablet every day. April 24, 2023 labs showed 5.3 for potassium. HTN medication regimen changed to furosemide 50MG every day and lisinopriL-hydrochlorothiazide 20-12.5 mg per tablet every other day. Potassium wnls since change. Blood pressure has been 150s/70-80s per patient. Patient has been on spironolactone and amlodipine in the past. Amlodipine stopped due to lower extremity swelling and edema.     In addition, patient presents with new complaint of right knee pain.     Right knee pain - Ongoing chronic knee pain radiating from right knee to right hip with is worse with walking. Pain is worse at night (8/10) and better in the morning (4/10). Mild weakness in right knee secondary to pain. Pain is interfering with ADLs such as making her bed and walking up stairs. No improvement with OTC pain medication and steroid injection. Prior right knee XR and MRI show no significant findings.     Patient's HM include COVID booster, but hematology does not recommend booster for patient per daughter.       Past Medical History:  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    Diabetes mellitus     Glaucoma     Hypertension     Other ascites 11/29/2022       Past Surgical History:   has a past surgical history that includes Cataract extraction w/  intraocular lens implant (Left, 12/21/2021).    Family History:  family history includes Blindness in her cousin; Cataracts in her mother; Colon cancer in her sister; Diabetes in her mother; Glaucoma in her mother; Heart attack in her father; Hypertension in her mother.     Social History:  Social History     Tobacco Use    Smoking status: Former    Smokeless  "tobacco: Never   Substance Use Topics    Alcohol use: Not Currently    Drug use: Never       Medications:  Outpatient Encounter Medications as of 6/26/2023   Medication Sig Dispense Refill    acetaminophen (TYLENOL) 500 MG tablet Take 2 tablets (1,000 mg total) by mouth every 8 (eight) hours as needed for Pain.  0    BD INSULIN SYRINGE ULTRA-FINE 1 mL 31 gauge x 5/16 Syrg       blood-glucose meter,continuous (DEXCOM G6 ) Misc Check blood sugar before meals and at bedtime 1 each PRN    blood-glucose sensor (DEXCOM G6 SENSOR) Amanda Check blood sugar before meals and at bedtime 1 each PRN    blood-glucose transmitter (DEXCOM G6 TRANSMITTER) Amanda Check blood sugar before meals and at bedtime 1 each PRN    brimonidine 0.2% (ALPHAGAN) 0.2 % Drop INSTILL 1 DROP INTO RIGHT EYE TWICE A DAY 10 mL 11    dorzolamide-timolol 2-0.5% (COSOPT) 22.3-6.8 mg/mL ophthalmic solution INSTILL 1 DROP INTO RIGHT EYE TWICE A DAY 10 mL 11    furosemide (LASIX) 40 MG tablet Take 1 tablet (40 mg total) by mouth once daily. 30 tablet 5    insulin aspart U-100 (NOVOLOG) 100 unit/mL (3 mL) InPn pen Inject 12 Units into the skin 3 (three) times daily with meals. 30 mL 1    insulin aspart U-100 (NOVOLOG) 100 unit/mL injection To use with Omnipod 5 30 mL 5    insulin detemir U-100 (LEVEMIR FLEXTOUCH) 100 unit/mL (3 mL) SubQ InPn pen Inject 5 Units into the skin every evening. 6 mL 1    insulin pump cart,automated,BT (OMNIPOD 5 G6 PODS, GEN 5,) Crtg Inject 1 each into the skin Every 3 (three) days. 10 each 11    lactulose (CEPHULAC) 10 gram packet Take 10 g by mouth 3 (three) times daily.      latanoprost 0.005 % ophthalmic solution PLACE 1 DROP INTO BOTH EYES ONCE DAILY 7.5 mL 3    lisinopriL-hydrochlorothiazide (PRINZIDE,ZESTORETIC) 20-12.5 mg per tablet Take 1 tablet by mouth once daily. 90 tablet 3    pen needle, diabetic 31 gauge x 5/16" Ndle Use to inject insulin into the skin up to 4 times daily 400 each 3    predniSONE (DELTASONE) 5 MG " tablet TAKE 1 AND 1/2 TABLETS BY MOUTH ONCE DAILY. 135 tablet 1    spironolactone (ALDACTONE) 100 MG tablet Take 1 tablet (100 mg total) by mouth once daily. (Patient taking differently: Take 100 mg by mouth every 72 hours.) 30 tablet 0    k phos di & mono-sod phos mono (PHOSPHA 250 NEUTRAL) 250 mg Tab Take 1 tablet by mouth once daily. for 5 days (Patient not taking: Reported on 12/15/2022) 5 tablet 0    [] milex pessary ring/folding #5 Place 1 each vaginally once. for 1 dose 1 each 0    pantoprazole (PROTONIX) 20 MG tablet Take 1 tablet (20 mg total) by mouth once daily. (Patient not taking: Reported on 12/15/2022) 30 tablet 0    [DISCONTINUED] brimonidine 0.2% (ALPHAGAN) 0.2 % Drop INSTILL 1 DROP INTO RIGHT EYE TWICE A DAY 30 mL 3     Facility-Administered Encounter Medications as of 2023   Medication Dose Route Frequency Provider Last Rate Last Admin    [DISCONTINUED] 0.9%  NaCl infusion   Intravenous Continuous Edgar Branch MD        [DISCONTINUED] LIDOcaine (cardiac) injection   Intravenous PRN Fredrick H. ELIESER Pro   50 mg at 23 1051    [DISCONTINUED] propofol (DIPRIVAN) 10 mg/mL infusion   Intravenous PRN Fredrick H. Inocencia CRNA   30 mg at 23 1051    [DISCONTINUED] propofol (DIPRIVAN) 10 mg/mL infusion   Intravenous Continuous PRN Fredrick H. ELIESER Pro   Stopped at 23 1056    [DISCONTINUED] sodium chloride 0.9% flush 10 mL  10 mL Intravenous PRN Edgar Branch MD        [DISCONTINUED] sodium chloride 0.9% infusion   Intravenous Continuous PRN Fredrick H. ELIESER Pro   Stopped at 23 1056       Current Outpatient Medications:     acetaminophen (TYLENOL) 500 MG tablet, Take 2 tablets (1,000 mg total) by mouth every 8 (eight) hours as needed for Pain., Disp: , Rfl: 0    BD INSULIN SYRINGE ULTRA-FINE 1 mL 31 gauge x 5/16 Syrg, , Disp: , Rfl:     blood-glucose meter,continuous (DEXCOM G6 ) Misc, Check blood sugar before meals and at bedtime, Disp: 1 each, Rfl: PRN    blood-glucose  "sensor (DEXCOM G6 SENSOR) Amanda, Check blood sugar before meals and at bedtime, Disp: 1 each, Rfl: PRN    blood-glucose transmitter (DEXCOM G6 TRANSMITTER) Amanda, Check blood sugar before meals and at bedtime, Disp: 1 each, Rfl: PRN    brimonidine 0.2% (ALPHAGAN) 0.2 % Drop, INSTILL 1 DROP INTO RIGHT EYE TWICE A DAY, Disp: 10 mL, Rfl: 11    dorzolamide-timolol 2-0.5% (COSOPT) 22.3-6.8 mg/mL ophthalmic solution, INSTILL 1 DROP INTO RIGHT EYE TWICE A DAY, Disp: 10 mL, Rfl: 11    furosemide (LASIX) 40 MG tablet, Take 1 tablet (40 mg total) by mouth once daily., Disp: 30 tablet, Rfl: 5    insulin aspart U-100 (NOVOLOG) 100 unit/mL (3 mL) InPn pen, Inject 12 Units into the skin 3 (three) times daily with meals., Disp: 30 mL, Rfl: 1    insulin aspart U-100 (NOVOLOG) 100 unit/mL injection, To use with Omnipod 5, Disp: 30 mL, Rfl: 5    insulin detemir U-100 (LEVEMIR FLEXTOUCH) 100 unit/mL (3 mL) SubQ InPn pen, Inject 5 Units into the skin every evening., Disp: 6 mL, Rfl: 1    insulin pump cart,automated,BT (OMNIPOD 5 G6 PODS, GEN 5,) Crtg, Inject 1 each into the skin Every 3 (three) days., Disp: 10 each, Rfl: 11    lactulose (CEPHULAC) 10 gram packet, Take 10 g by mouth 3 (three) times daily., Disp: , Rfl:     latanoprost 0.005 % ophthalmic solution, PLACE 1 DROP INTO BOTH EYES ONCE DAILY, Disp: 7.5 mL, Rfl: 3    lisinopriL-hydrochlorothiazide (PRINZIDE,ZESTORETIC) 20-12.5 mg per tablet, Take 1 tablet by mouth once daily., Disp: 90 tablet, Rfl: 3    pen needle, diabetic 31 gauge x 5/16" Ndle, Use to inject insulin into the skin up to 4 times daily, Disp: 400 each, Rfl: 3    predniSONE (DELTASONE) 5 MG tablet, TAKE 1 AND 1/2 TABLETS BY MOUTH ONCE DAILY., Disp: 135 tablet, Rfl: 1    spironolactone (ALDACTONE) 100 MG tablet, Take 1 tablet (100 mg total) by mouth once daily. (Patient taking differently: Take 100 mg by mouth every 72 hours.), Disp: 30 tablet, Rfl: 0    k phos di & mono-sod phos mono (PHOSPHA 250 NEUTRAL) 250 mg " Tab, Take 1 tablet by mouth once daily. for 5 days (Patient not taking: Reported on 12/15/2022), Disp: 5 tablet, Rfl: 0    pantoprazole (PROTONIX) 20 MG tablet, Take 1 tablet (20 mg total) by mouth once daily. (Patient not taking: Reported on 12/15/2022), Disp: 30 tablet, Rfl: 0  No current facility-administered medications for this visit.    Allergies:  Review of patient's allergies indicates:  No Known Allergies    Health Maintenance:  Immunization History   Administered Date(s) Administered    COVID-19, MRNA, LN-S, PF (Pfizer) (Purple Cap) 01/10/2021, 01/31/2021, 10/16/2021    Influenza (FLUAD) - Quadrivalent - Adjuvanted - PF *Preferred* (65+) 01/26/2022    Influenza - High Dose - PF (65 years and older) 11/02/2017, 11/14/2019    Influenza - Quadrivalent - High Dose - PF (65 years and older) 11/30/2020    Pneumococcal Conjugate - 13 Valent 11/02/2017    Pneumococcal Polysaccharide - 23 Valent 11/14/2019    Tdap 06/28/2021      Health Maintenance   Topic Date Due    Hemoglobin A1c  07/30/2023    Lipid Panel  01/30/2024    Eye Exam  02/06/2024    DEXA Scan  03/06/2026    TETANUS VACCINE  06/28/2031        Health Maintenance         Date Due Completion Date    Diabetes Urine Screening Never done ---    Shingles Vaccine (1 of 2) Never done ---    COVID-19 Vaccine (4 - Pfizer series) 12/11/2021 10/16/2021    Hemoglobin A1c 07/30/2023 1/30/2023    Influenza Vaccine (Season Ended) 09/01/2023 1/26/2022    Lipid Panel 01/30/2024 1/30/2023    Eye Exam 02/06/2024 2/6/2023    DEXA Scan 03/06/2026 3/6/2023    TETANUS VACCINE 06/28/2031 6/28/2021             Review of Systems:  Review of Systems   Constitutional:  Negative for chills and fever.   Cardiovascular:  Negative for chest pain and palpitations.   Gastrointestinal:  Negative for abdominal pain, constipation, diarrhea, nausea and vomiting.   Genitourinary:  Negative for dysuria and hematuria.        No increased urination   Musculoskeletal:  Positive for arthralgias.  "       Right knee pain   Neurological:  Negative for dizziness, syncope, weakness, light-headedness and headaches.   All other systems reviewed and are negative.      Objective:      Physical Exam:  Vitals:    06/26/23 1505   BP: 118/62   BP Location: Right arm   Patient Position: Sitting   Pulse: 77   SpO2: 96%   Weight: 52.6 kg (115 lb 15.4 oz)   Height: 5' 5" (1.651 m)    Body mass index is 19.3 kg/m².  Weight: 52.6 kg (115 lb 15.4 oz)   Height: 5' 5" (165.1 cm)   Physical Exam  Constitutional:       General: She is not in acute distress.     Appearance: Normal appearance. She is not ill-appearing, toxic-appearing or diaphoretic.   HENT:      Head: Normocephalic and atraumatic.   Cardiovascular:      Rate and Rhythm: Normal rate.   Pulmonary:      Effort: Pulmonary effort is normal. No respiratory distress.   Abdominal:      General: There is no distension.      Palpations: Abdomen is soft.   Musculoskeletal:         General: Tenderness (TTP right knee) present.      Cervical back: Normal range of motion.   Skin:     General: Skin is warm and dry.   Neurological:      General: No focal deficit present.      Mental Status: She is alert and oriented to person, place, and time. Mental status is at baseline.   Psychiatric:         Mood and Affect: Mood normal.         Behavior: Behavior normal.         Thought Content: Thought content normal.         Judgment: Judgment normal.       Laboratory:         CBC:  Recent Labs   Lab 04/24/23  1420 05/29/23  1540 06/26/23  1159   WBC 8.93 7.99 9.13   RBC 3.97 L 3.83 L 4.06   Hemoglobin 12.8 12.0 13.2   Hematocrit 39.8 37.2 39.9   Platelets 171 161 146 L    H 97 98   MCH 32.2 H 31.3 H 32.5 H   MCHC 32.2 32.3 33.1       CMP:  Recent Labs   Lab 05/18/23  1615 05/29/23  1540 06/26/23  1159   Glucose 130 H 111 H 203 H   Calcium 9.2 9.6 9.4   Albumin 3.0 L 3.3 L 3.4 L   Total Protein 6.5 7.2 7.6   Sodium 142 139 142   Potassium 4.0 4.1 4.0   CO2 24 22 L 26   Chloride 108 " 108 106   BUN 27 H 25 H 21   Creatinine 1.0 0.8 0.8   Alkaline Phosphatase 70 62 58   ALT 37 42 41   AST 47 H 48 H 45 H   Total Bilirubin 0.9 0.9 1.6 H       URINALYSIS:  Recent Labs   Lab 11/03/22  2240   Color, UA Straw   Specific [place], UA 1.005   pH, UA 5.0   Protein, UA Negative   Bilirubin (UA) Negative   Bacteria Occasional      Recent Labs   Lab 10/23/22  1715 11/03/22  2240   Nitrite, UA Negative Negative   Leukocytes, UA 3+ A 3+ A   Hyaline Casts, UA 0 12 A        LIPIDS:  Recent Labs   Lab 01/13/22  1116 09/21/22  0348 01/30/23  1116   TSH  --  0.740  --    HDL 75  --  50   Cholesterol 183  --  238 H   Triglycerides 131  --  97   LDL Cholesterol  --   --  168.6 H   HDL/Cholesterol Ratio  --   --  21.0   Non-HDL Cholesterol  --   --  188   Total Cholesterol/HDL Ratio  --   --  4.8       TSH:  Recent Labs   Lab 09/21/22  0348   TSH 0.740       A1C:  Recent Labs   Lab 11/30/20  1456 01/13/22  1116 09/21/22  0348 11/09/22  2255 01/30/23  1116   Hemoglobin A1C  --   --  5.3 6.4 H 5.0   Hemoglobin A1c 5.6 5.3  --   --   --        Other:   Lab Results   Component Value Date    TVNZFSWX86 794 03/09/2023    FERRITIN 193 03/09/2023    IRON 121 03/09/2023    TRANSFERRIN 213 03/09/2023    TIBC 315 03/09/2023    FESATURATED 38 03/09/2023     Lab Results   Component Value Date    HEPCAB Non-reactive 11/04/2022          Assessment/Plan:      Assessment and Plan:    Radha Feng is a 82 y.o. female with who presents for HTN management and is doing well.     HTN  Continue furosemide 50MG every day and lisinopriL-hydrochlorothiazide 20-12.5 mg per tablet every other day  Add amlodipine 2.5mg every day     Right knee pain (likely osteoarthritic etiology)   Add diclofenac topical 1% gel     Diabetes, Osteoporosis - Endocrinology managing.    Jose Jones, MS4     Hypertensive heart disease with diastolic heart failure  -     amLODIPine (NORVASC) 2.5 MG tablet; Take 1 tablet (2.5 mg total) by mouth once daily.   Dispense: 90 tablet; Refill: 0    Type 2 diabetes mellitus with other circulatory complication, with long-term current use of insulin        -     controlled, labs for Endo as scheduled next month.     Autoimmune hepatitis - stable on current regimen.     Cirrhosis of liver with ascites, unspecified hepatic cirrhosis type - no varices on EGD today.     Long term current use of systemic steroids    Thrombocytopenia - mild, monitoring.     Age-related osteoporosis without current pathological fracture - Prolia due in October.     Chronic pain of right knee - no effusion, increased warmth or tenderness; topical analgesic cream recommended.     I hereby acknowledge that I am relying upon documentation authored by a medical student working under my supervision and further I hereby attest that I have verified the student documentation or findings by personally re-performing the physical exam and medical decision making activities of the Evaluation and Management service to be billed.    Leticia Lim

## 2023-06-26 NOTE — ANESTHESIA POSTPROCEDURE EVALUATION
Anesthesia Post Evaluation    Patient: Radha Feng    Procedure(s) Performed: Procedure(s) (LRB):  ESOPHAGOGASTRODUODENOSCOPY (EGD) (N/A)    Final Anesthesia Type: general      Patient location during evaluation: PACU  Patient participation: Yes- Able to Participate  Level of consciousness: awake and alert  Post-procedure vital signs: reviewed and stable  Pain management: adequate  Airway patency: patent    PONV status at discharge: No PONV  Anesthetic complications: no      Cardiovascular status: blood pressure returned to baseline  Respiratory status: unassisted  Hydration status: euvolemic  Follow-up not needed.          Vitals Value Taken Time   /88 06/26/23 1130   Temp 36.6 °C (97.9 °F) 06/26/23 1100   Pulse 60 06/26/23 1130   Resp 19 06/26/23 1130   SpO2 100 % 06/26/23 1130         Event Time   Out of Recovery 11:45:00         Pain/Tana Score: Tana Score: 9 (6/26/2023 11:01 AM)

## 2023-06-26 NOTE — PROVATION PATIENT INSTRUCTIONS
Discharge Summary/Instructions after an Endoscopic Procedure  Patient Name: Radha Feng  Patient MRN: [id number]  Patient YOB: 1940 Monday, June 26, 2023  Edgar Branch MD  Dear patient,  As a result of recent federal legislation (The Federal Cures Act), you may   receive lab or pathology results from your procedure in your [facility]   account before your physician is able to contact you. Your physician or   their representative will relay the results to you with their   recommendations at their soonest availability.  Thank you,  RESTRICTIONS:  During your procedure today, you received medications for sedation.  These   medications may affect your judgment, balance and coordination.  Therefore,   for 24 hours, you have the following restrictions:   - DO NOT drive a car, operate machinery, make legal/financial decisions,   sign important papers or drink alcohol.    ACTIVITY:  Today: no heavy lifting, straining or running due to procedural   sedation/anesthesia.  The following day: return to full activity including work.  DIET:  Eat and drink normally unless instructed otherwise.     TREATMENT FOR COMMON SIDE EFFECTS:  - Mild abdominal pain, nausea, belching, bloating or excessive gas:  rest,   eat lightly and use a heating pad.  - Sore Throat: treat with throat lozenges and/or gargle with warm salt   water.  - Because air was used during the procedure, expelling large amounts of air   from your rectum or belching is normal.  - If a bowel prep was taken, you may not have a bowel movement for 1-3 days.    This is normal.  SYMPTOMS TO WATCH FOR AND REPORT TO YOUR PHYSICIAN:  1. Abdominal pain or bloating, other than gas cramps.  2. Chest pain.  3. Back pain.  4. Signs of infection such as: chills or fever occurring within 24 hours   after the procedure.  5. Rectal bleeding, which would show as bright red, maroon, or black stools.   (A tablespoon of blood from the rectum is not serious, especially if    hemorrhoids are present.)  6. Vomiting.  7. Weakness or dizziness.  GO DIRECTLY TO THE NEAREST EMERGENCY ROOM IF YOU HAVE ANY OF THE FOLLOWING:      Difficulty breathing              Chills and/or fever over 101 F   Persistent vomiting and/or vomiting blood   Severe abdominal pain   Severe chest pain   Black, tarry stools   Bleeding- more than one tablespoon   Any other symptom or condition that you feel may need urgent attention  Your doctor recommends these additional instructions:  If any biopsies were taken, your doctors clinic will contact you in 1 to 2   weeks with any results.  - Discharge patient to home (ambulatory).   - Resume previous diet; Discharge to home (ambulatory); Resume outpatient   medications  - Return to liver clinic as previously scheduled.  For questions, problems or results please call your physician - Edgar Branch MD at Work:  [phone].  [facility] [place] [facility], EMERGENCY ROOM PHONE NUMBER: [phone]  IF A COMPLICATION OR EMERGENCY SITUATION ARISES AND YOU ARE UNABLE TO REACH   YOUR PHYSICIAN - GO DIRECTLY TO THE EMERGENCY ROOM.  Edgar Branch MD  6/26/2023 10:57:56 AM  This report has been verified and signed electronically.  Dear patient,  As a result of recent federal legislation (The Federal Cures Act), you may   receive lab or pathology results from your procedure in your [facility]   account before your physician is able to contact you. Your physician or   their representative will relay the results to you with their   recommendations at their soonest availability.  Thank you,  [employer]ATION

## 2023-06-26 NOTE — ANESTHESIA PREPROCEDURE EVALUATION
06/26/2023  Radha Feng is a 82 y.o., female.      Pre-op Assessment    I have reviewed the Patient Summary Reports.     I have reviewed the Nursing Notes. I have reviewed the NPO Status.   I have reviewed the Medications.     Review of Systems  Anesthesia Hx:  No problems with previous Anesthesia  Denies Family Hx of Anesthesia complications.   Denies Personal Hx of Anesthesia complications.   Social:  Non-Smoker    Hematology/Oncology:  Hematology Normal   Oncology Normal     EENT/Dental:EENT/Dental Normal   Cardiovascular:   Exercise tolerance: good Hypertension CHF    Pulmonary:   COPD    Renal/:  Renal/ Normal     Hepatic/GI:   Liver Disease, Hepatitis    Musculoskeletal:  Musculoskeletal Normal    Neurological:  Neurology Normal    Endocrine:   Diabetes    Dermatological:  Skin Normal    Psych:  Psychiatric Normal           Physical Exam  General: Well nourished and Cooperative    Airway:  Mallampati: II   Mouth Opening: Normal  Neck ROM: Normal ROM    Dental:  Edentulous        Anesthesia Plan  Type of Anesthesia, risks & benefits discussed:    Anesthesia Type: Gen Natural Airway, MAC  Intra-op Monitoring Plan: Standard ASA Monitors  Post Op Pain Control Plan: multimodal analgesia and IV/PO Opioids PRN  Induction:  IV  Informed Consent: Informed consent signed with the Patient and all parties understand the risks and agree with anesthesia plan.  All questions answered.   ASA Score: 3    Ready For Surgery From Anesthesia Perspective.     .

## 2023-06-26 NOTE — H&P
Short Stay Endoscopy History and Physical    PCP - Leticia Lim MD  Referring Physician - Yvonne Peraza MD  [address] [place] [address]    Procedure - EGD  ASA - per anesthesia  Mallampati - per anesthesia  History of Anesthesia problems - no  Family history Anesthesia problems -  no   Plan of anesthesia - General    HPI:  This is a 82 y.o. female here for evaluation of: cirrhosis ; r/o varices    Reflux - no  Dysphagia - no  Abdominal pain - no  Diarrhea - no    ROS:  Constitutional: No fevers, chills, No weight loss  CV: No chest pain  Pulm: No cough, No shortness of breath  GI: see HPI    Medical History:  has a past medical history of Cataract, Chondromalacia, knee, right (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    Surgical History:  has a past surgical history that includes Cataract extraction w/  intraocular lens implant (Left, 12/21/2021).    Family History: family history includes Blindness in her cousin; Cataracts in her mother; Colon cancer in her sister; Diabetes in her mother; Glaucoma in her mother; Heart attack in her father; Hypertension in her mother..    Social History:  reports that she has quit smoking. She has never used smokeless tobacco. She reports that she does not currently use alcohol. She reports that she does not use drugs.    Review of patient's allergies indicates:  No Known Allergies    Medications:   Medications Prior to Admission   Medication Sig Dispense Refill Last Dose    acetaminophen (TYLENOL) 500 MG tablet Take 2 tablets (1,000 mg total) by mouth every 8 (eight) hours as needed for Pain.  0 Past Week    BD INSULIN SYRINGE ULTRA-FINE 1 mL 31 gauge x 5/16 Syrg    Past Week    blood-glucose meter,continuous (DEXCOM G6 ) Misc Check blood sugar before meals and at bedtime 1 each PRN Past Week    blood-glucose sensor (DEXCOM G6 SENSOR) Amanda Check blood sugar before meals and at bedtime 1 each PRN  "6/26/2023    blood-glucose transmitter (DEXCOM G6 TRANSMITTER) Amanda Check blood sugar before meals and at bedtime 1 each PRN 6/26/2023    brimonidine 0.2% (ALPHAGAN) 0.2 % Drop INSTILL 1 DROP INTO RIGHT EYE TWICE A DAY 10 mL 11 Past Week    dorzolamide-timolol 2-0.5% (COSOPT) 22.3-6.8 mg/mL ophthalmic solution INSTILL 1 DROP INTO RIGHT EYE TWICE A DAY 10 mL 11 Past Week    furosemide (LASIX) 40 MG tablet Take 1 tablet (40 mg total) by mouth once daily. 30 tablet 5 6/26/2023    insulin aspart U-100 (NOVOLOG) 100 unit/mL (3 mL) InPn pen Inject 12 Units into the skin 3 (three) times daily with meals. 30 mL 1 Past Week    insulin aspart U-100 (NOVOLOG) 100 unit/mL injection To use with Omnipod 5 30 mL 5 6/26/2023    insulin detemir U-100 (LEVEMIR FLEXTOUCH) 100 unit/mL (3 mL) SubQ InPn pen Inject 5 Units into the skin every evening. 6 mL 1 Past Week    insulin pump cart,automated,BT (OMNIPOD 5 G6 PODS, GEN 5,) Crtg Inject 1 each into the skin Every 3 (three) days. 10 each 11 6/26/2023    lactulose (CEPHULAC) 10 gram packet Take 10 g by mouth 3 (three) times daily.   Past Week    latanoprost 0.005 % ophthalmic solution PLACE 1 DROP INTO BOTH EYES ONCE DAILY 7.5 mL 3 Past Week    lisinopriL-hydrochlorothiazide (PRINZIDE,ZESTORETIC) 20-12.5 mg per tablet Take 1 tablet by mouth once daily. 90 tablet 3 Past Week    pen needle, diabetic 31 gauge x 5/16" Ndle Use to inject insulin into the skin up to 4 times daily 400 each 3 Past Week    predniSONE (DELTASONE) 5 MG tablet TAKE 1 AND 1/2 TABLETS BY MOUTH ONCE DAILY. 135 tablet 1 6/26/2023    k phos di & mono-sod phos mono (PHOSPHA 250 NEUTRAL) 250 mg Tab Take 1 tablet by mouth once daily. for 5 days (Patient not taking: Reported on 12/15/2022) 5 tablet 0     pantoprazole (PROTONIX) 20 MG tablet Take 1 tablet (20 mg total) by mouth once daily. (Patient not taking: Reported on 12/15/2022) 30 tablet 0     spironolactone (ALDACTONE) 100 MG tablet Take 1 tablet (100 mg total) by " mouth once daily. (Patient taking differently: Take 100 mg by mouth every 72 hours.) 30 tablet 0        Physical Exam:    Vital Signs:   Vitals:    06/26/23 0950   BP: (!) 192/90   Pulse: 60   Resp: 15   Temp: 97.3 °F (36.3 °C)       General Appearance: Well appearing in no acute distress  Lungs: no labored breathing  CVS:  regular rate  Abdomen: non tender    Labs:  Lab Results   Component Value Date    WBC 7.99 05/29/2023    HGB 12.0 05/29/2023    HCT 37.2 05/29/2023     05/29/2023    CHOL 238 (H) 01/30/2023    TRIG 97 01/30/2023    HDL 50 01/30/2023    ALT 42 05/29/2023    AST 48 (H) 05/29/2023     05/29/2023    K 4.1 05/29/2023     05/29/2023    CREATININE 0.8 05/29/2023    BUN 25 (H) 05/29/2023    CO2 22 (L) 05/29/2023    TSH 0.740 09/21/2022    INR 1.0 05/29/2023    HGBA1C 5.0 01/30/2023       I have explained the risks and benefits of this endoscopic procedure to the patient including but not limited to bleeding, inflammation, infection, perforation, and death.      Edgar Branch MD

## 2023-06-27 ENCOUNTER — PATIENT MESSAGE (OUTPATIENT)
Dept: ENDOCRINOLOGY | Facility: CLINIC | Age: 83
End: 2023-06-27
Payer: MEDICARE

## 2023-06-27 DIAGNOSIS — E11.59 TYPE 2 DIABETES MELLITUS WITH OTHER CIRCULATORY COMPLICATION, WITH LONG-TERM CURRENT USE OF INSULIN: ICD-10-CM

## 2023-06-27 DIAGNOSIS — Z79.4 TYPE 2 DIABETES MELLITUS WITH OTHER CIRCULATORY COMPLICATION, WITH LONG-TERM CURRENT USE OF INSULIN: ICD-10-CM

## 2023-06-27 RX ORDER — INSULIN PMP CART,AUT,G6/7,CNTR
1 EACH SUBCUTANEOUS
Qty: 10 EACH | Refills: 11 | Status: SHIPPED | OUTPATIENT
Start: 2023-06-27 | End: 2023-07-12 | Stop reason: SDUPTHER

## 2023-07-07 RX ORDER — FUROSEMIDE 40 MG/1
TABLET ORAL
Qty: 90 TABLET | Refills: 1 | Status: SHIPPED | OUTPATIENT
Start: 2023-07-07 | End: 2024-01-03

## 2023-07-10 ENCOUNTER — OFFICE VISIT (OUTPATIENT)
Dept: UROGYNECOLOGY | Facility: CLINIC | Age: 83
End: 2023-07-10
Payer: MEDICARE

## 2023-07-10 ENCOUNTER — PATIENT MESSAGE (OUTPATIENT)
Dept: ENDOCRINOLOGY | Facility: CLINIC | Age: 83
End: 2023-07-10
Payer: MEDICARE

## 2023-07-10 VITALS — HEIGHT: 65 IN | BODY MASS INDEX: 19.3 KG/M2 | SYSTOLIC BLOOD PRESSURE: 140 MMHG | DIASTOLIC BLOOD PRESSURE: 70 MMHG

## 2023-07-10 DIAGNOSIS — N81.10 PROLAPSE OF ANTERIOR VAGINAL WALL: ICD-10-CM

## 2023-07-10 DIAGNOSIS — Z46.89 PESSARY MAINTENANCE: ICD-10-CM

## 2023-07-10 DIAGNOSIS — N81.3 UTEROVAGINAL PROLAPSE, COMPLETE: Primary | ICD-10-CM

## 2023-07-10 DIAGNOSIS — N81.6 POSTERIOR VAGINAL WALL PROLAPSE: ICD-10-CM

## 2023-07-10 DIAGNOSIS — N39.46 MIXED URGE AND STRESS INCONTINENCE: ICD-10-CM

## 2023-07-10 PROCEDURE — 99213 OFFICE O/P EST LOW 20 MIN: CPT | Mod: S$PBB,,, | Performed by: NURSE PRACTITIONER

## 2023-07-10 PROCEDURE — 99213 PR OFFICE/OUTPT VISIT, EST, LEVL III, 20-29 MIN: ICD-10-PCS | Mod: S$PBB,,, | Performed by: NURSE PRACTITIONER

## 2023-07-10 PROCEDURE — 99999 PR PBB SHADOW E&M-EST. PATIENT-LVL IV: CPT | Mod: PBBFAC,,, | Performed by: NURSE PRACTITIONER

## 2023-07-10 PROCEDURE — 99999 PR PBB SHADOW E&M-EST. PATIENT-LVL IV: ICD-10-PCS | Mod: PBBFAC,,, | Performed by: NURSE PRACTITIONER

## 2023-07-10 PROCEDURE — 99214 OFFICE O/P EST MOD 30 MIN: CPT | Mod: PBBFAC | Performed by: NURSE PRACTITIONER

## 2023-07-10 NOTE — PATIENT INSTRUCTIONS
"1. Prolapse: Stage 2 apical prolapse, Stage 2 anterior and posterior vaginal wall prolapse   --continue  #2 1/2 " LS gH pessary  --Daily pelvic floor exercises as assigned, Pelvic floor PT   --Non surgical options discussed with patient       2.  Mixed urinary incontinence, urge > stress:   --Empty bladder every 3 hours.  Empty well: wait a minute, lean forward on toilet.    --Avoid dietary irritants (see sheet).  Keep diary x 3-5 days to determine your irritants.  --KEGELS: do 10 in AM and 10 in PM, holding each x 10 seconds.  When you feel urge to go, STOP, KEGEL, and when urge has passed, then go to bathroom.  --URGE: .  SE profile reviewed.    Takes 2-4 weeks to see if will have effect.  For dry mouth: get sour, sugar free lozenge or gum.    --STRESS:  Non surgical options: Pessary vs. Impressa vs Rivive - which represent urethral and bladder support devices; Surgical options including 1.  mid urethral sling; retropubic, transobturator vs single incision 2. Fascial slings 3. Hutchison procedure 4. Periurethral bulking     3. RTC 3 months for for pc  "
Abdomen soft, non-tender, non distended, no rebound, no rigidity, no guarding.

## 2023-07-10 NOTE — PROGRESS NOTES
Urogyn follow up  07/10/2023  .  [facility], [place], operated by [facility] - UROGYNECOLOGY  [address] [id number]    Radha Feng  [id number]  1940      Radha Feng is a 82 y.o. here for a urogyn follow up for vaginal abrasion and pessary placement.    Last HPI from 04/11/2022  History of Present Illness  Female  Problem  The patient's pertinent negatives include no genital itching, genital lesions, genital odor, genital rash, missed menses, pelvic pain, vaginal bleeding or vaginal discharge. This is a chronic problem. The current episode started more than 1 month ago. The problem occurs daily. The problem has been unchanged. The patient is experiencing no pain. She is not pregnant. Pertinent negatives include no abdominal pain, anorexia, back pain, chills, constipation, diarrhea, discolored urine, dysuria, fever, flank pain, frequency, headaches, hematuria, joint pain, joint swelling, nausea, painful intercourse, rash, sore throat, urgency or vomiting. Nothing aggravates the symptoms. She is not sexually active. No, her partner does not have an STD. She is postmenopausal.   Follow-up  Pertinent negatives include no abdominal pain, anorexia, chills, fever, headaches, nausea, rash, sore throat or vomiting.  82 y.o.  female No obstetric history on file.   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       [facility]     Question 12/20/2022  1:44 PM CST - Filed by Patient   General Urogynecology: Are you experiencing the following?     Dysuria (painful urination) No   Nocturia:  waking up at night to empty your bladder  Yes   If you answered yes to the previous question, how many times does this happen per night? 1-2   Enuresis (urine loss during sleep) No   Dribbling urine after you urinate No   Hematuria (urine appears red) No   Type of stream Weak   Urinary frequency: How often a day are  "you going to the bathroom per day?  Less than 10   Urinary Tract Infections: How many Urinary Tract Infections have you had in the past year? One (1) in the past year   If you have had a UTI in the past year, what treatments have you had so far?  Prophylactic antibiotics   Urinary Incontinence (General): Are you experiencing the following?     Past consultation for incontinence: Have you ever seen someone for the evaluation of incontinence? No   If you answered yes to the previous question, please select all the therapies you have tried.  N/a- I answered no to the previous question   Please note the effectiveness of the therapies.     Need to wear protection to keep clothes dry  Yes   If you answered yes to the previous question, please scar the protection you use.  Poise   If you wear protection, how much wetness is typically on each pad? Slight   If you wear protection, how often do you have to change per day, if applicable?  3-4   Stress Symptoms: Are you experiencing the following?     Leakage of urine with cough, laugh and/or sneeze Yes   If you answered yes to the previous question, what is the frequency in days, weeks and/or months? Daily   Leakage of urine with sex No   Leakage of urine with bending/ lifting No   Leakage of urine with briskly walking or jogging No   If you lose urine for any other reason not previously mentioned, please note it below, if applicable.      Urge Symptoms: Are you experiencing the following?     Urgency ("got to go" feeling) No   Urge: How frequently do you feel an urge to urinate (feeling like you "gotta go" to the bathroom and can't wait) Never   Do you experience a leakage of urine when you have a feeling of urgency?  No   Leakage of urine when unaware No   Past use of anticholinergics (medications used to treat overactive bladder) No   If you answered yes to the previous question, please scar the anticholinergics you have used:      Have you ever used Mirbetriq (aka " Mirabegron)?  No   Prolapse Symptoms: Are you experiencing any of the following?      Falling out/ Bulging/ Heaviness in the vagina Yes   Vaginal/ Abdominal Pain/ Pressure No   Need to strain/ Push to void No   Need to wait on the toilet before you void No   Unusual position to urinate (using your hands to push back the vaginal bulge) No   Sensation of incomplete emptying No   Past use of pessary device No   If you answered yes to the previous question, please list the devices you have used below.      Bowel Symptoms: Are you experiencing any of the following?     Constipation No   Diarrhea  No   Hematochezia (bloody stool) No   Incomplete evacuation of stool No   Involuntary loss of formed stool No   Fecal smearing/urgency No   Involuntary loss of gas Yes   Vaginal Symptoms: Are you experiencing any of the following?      Abnormal vaginal bleeding  No   Vaginal dryness No   Sexually active  No   Dyspareunia (painful intercourse) No   Estrogen use  No          06/12/2023  Here for pessary placement. Has had pessary holiday since last visit.    Changes since last visit:  Rare UUI when pulling pants down  Denies pain, bleeding, or discharge  Doing well with pessary--using rephresh weekly  Denies constipation or straining    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    Diabetes mellitus     Glaucoma     Hypertension     Other ascites 11/29/2022       Past Surgical History:   Procedure Laterality Date    CATARACT EXTRACTION W/  INTRAOCULAR LENS IMPLANT Left 12/21/2021    Procedure: EXTRACTION, CATARACT, WITH IOL INSERTION;  Surgeon: Shay Chou MD;  Location: [facility];  Service: Ophthalmology;  Laterality: Left;    ESOPHAGOGASTRODUODENOSCOPY N/A 6/26/2023    Procedure: ESOPHAGOGASTRODUODENOSCOPY (EGD);  Surgeon: Edgar Branch MD;  Location: [address]);  Service: Endoscopy;  Laterality: N/A;  referral Dr Peraza-cirrhosis/labs done on 4/24/23-instr  portal-GT       Family History   Problem Relation Age of Onset    Cataracts Mother     Diabetes Mother     Glaucoma Mother     Hypertension Mother     Heart attack Father     Colon cancer Sister     Blindness Cousin     Amblyopia Neg Hx     Macular degeneration Neg Hx     Retinal detachment Neg Hx        Social History     Socioeconomic History    Marital status: Single   Tobacco Use    Smoking status: Former    Smokeless tobacco: Never   Substance and Sexual Activity    Alcohol use: Not Currently    Drug use: Never   Social History Narrative    , one daughter local, four sons out of state. Retired . Lives with daughter Joss.     Social Determinants of Health     Financial Resource Strain: Low Risk     Difficulty of Paying Living Expenses: Not hard at all   Food Insecurity: No Food Insecurity    Worried About Running Out of Food in the Last Year: Never true    Ran Out of Food in the Last Year: Never true   Transportation Needs: No Transportation Needs    Lack of Transportation (Medical): No    Lack of Transportation (Non-Medical): No   Physical Activity: Inactive    Days of Exercise per Week: 0 days    Minutes of Exercise per Session: 0 min   Stress: No Stress Concern Present    Feeling of Stress : Only a little   Social Connections: Unknown    Frequency of Communication with Friends and Family: More than three times a week    Frequency of Social Gatherings with Friends and Family: Once a week    Active Member of Clubs or Organizations: No    Attends Club or Organization Meetings: Never    Marital Status:    Housing Stability: Low Risk     Unable to Pay for Housing in the Last Year: No    Number of Places Lived in the Last Year: 1    Unstable Housing in the Last Year: No       Current Outpatient Medications   Medication Sig Dispense Refill    acetaminophen (TYLENOL) 500 MG tablet Take 2 tablets (1,000 mg total) by mouth every 8 (eight) hours as needed for Pain.  0    amLODIPine (NORVASC)  "2.5 MG tablet Take 1 tablet (2.5 mg total) by mouth once daily. 90 tablet 0    BD INSULIN SYRINGE ULTRA-FINE 1 mL 31 gauge x 5/16 Syrg       blood-glucose meter,continuous (DEXCOM G6 ) Misc Check blood sugar before meals and at bedtime 1 each PRN    blood-glucose sensor (DEXCOM G6 SENSOR) Amanda Check blood sugar before meals and at bedtime 1 each PRN    blood-glucose transmitter (DEXCOM G6 TRANSMITTER) Amanda Check blood sugar before meals and at bedtime 1 each PRN    brimonidine 0.2% (ALPHAGAN) 0.2 % Drop INSTILL 1 DROP INTO RIGHT EYE TWICE A DAY 10 mL 11    dorzolamide-timolol 2-0.5% (COSOPT) 22.3-6.8 mg/mL ophthalmic solution INSTILL 1 DROP INTO RIGHT EYE TWICE A DAY 10 mL 11    furosemide (LASIX) 40 MG tablet TAKE 1 TABLET BY MOUTH EVERY DAY 90 tablet 1    insulin aspart U-100 (NOVOLOG) 100 unit/mL (3 mL) InPn pen Inject 12 Units into the skin 3 (three) times daily with meals. 30 mL 1    insulin aspart U-100 (NOVOLOG) 100 unit/mL injection To use with Omnipod 5 30 mL 5    insulin detemir U-100 (LEVEMIR FLEXTOUCH) 100 unit/mL (3 mL) SubQ InPn pen Inject 5 Units into the skin every evening. 6 mL 1    insulin pump cart,automated,BT (OMNIPOD 5 G6 PODS, GEN 5,) Crtg Inject 1 each into the skin Every 3 (three) days. 10 each 11    lactulose (CEPHULAC) 10 gram packet Take 10 g by mouth 3 (three) times daily.      latanoprost 0.005 % ophthalmic solution PLACE 1 DROP INTO BOTH EYES ONCE DAILY 7.5 mL 3    lisinopriL-hydrochlorothiazide (PRINZIDE,ZESTORETIC) 20-12.5 mg per tablet Take 1 tablet by mouth once daily. (Patient taking differently: Take 1 tablet by mouth every other day.) 90 tablet 3    pen needle, diabetic 31 gauge x 5/16" Ndle Use to inject insulin into the skin up to 4 times daily 400 each 3    predniSONE (DELTASONE) 5 MG tablet TAKE 1 AND 1/2 TABLETS BY MOUTH ONCE DAILY. 135 tablet 1    k phos di & mono-sod phos mono (PHOSPHA 250 NEUTRAL) 250 mg Tab Take 1 tablet by mouth once daily. for 5 days (Patient " "not taking: Reported on 12/15/2022) 5 tablet 0    pantoprazole (PROTONIX) 20 MG tablet Take 1 tablet (20 mg total) by mouth once daily. (Patient not taking: Reported on 12/15/2022) 30 tablet 0     No current facility-administered medications for this visit.       Review of patient's allergies indicates:  No Known Allergies    Well Woman:  Pap:denies history of abnormal pap  Mammo:no longer screening  Colonoscopy:refused  Dexa:03/2023    Hip Fracture 6%.   Impression:   Osteopenia lumbar spine.  Osteoporosis both hips.      ROS:  As per HPI.      Exam  BP (!) 140/70 (BP Location: Right arm, Patient Position: Sitting, BP Method: Medium (Manual))   Ht 5' 5" (1.651 m)   BMI 19.30 kg/m²   General: alert and oriented, no acute distress  Respiratory: normal respiratory effort  Abd: soft, non-tender, non-distended    Pelvic  Ext. Genitalia: normal external genitalia. Normal bartholin's and skeens glands  Vagina: + atrophy. Normal vaginal mucosa without lesions. No discharge noted.   Non-tender bladder base without palpable mass.  #2 1/2 LS GH pessary in place  Cervix no lesions  Uterus:  nontender, normal size, shape, position, mobile  Urethra: no masses or tenderness  Urethral meatus: no lesions, caruncle or prolapse.    Pessary removed without difficulty. Washed with soap and water. Reinserted without difficulty    Impression  1. Uterovaginal prolapse, complete        2. Mixed urge and stress incontinence        3. Prolapse of anterior vaginal wall        4. Posterior vaginal wall prolapse        5. Pessary maintenance            We reviewed the above issues and discussed options for short-term versus long-term management of her problems.   Plan:   1. Prolapse: Stage 2 apical prolapse, Stage 2 anterior and posterior vaginal wall prolapse   --continue  #2 1/2 " LS gH pessary  --Daily pelvic floor exercises as assigned, Pelvic floor PT   --Non surgical options discussed with patient       2.  Mixed urinary incontinence, " urge > stress:   --Empty bladder every 3 hours.  Empty well: wait a minute, lean forward on toilet.    --Avoid dietary irritants (see sheet).  Keep diary x 3-5 days to determine your irritants.  --KEGELS: do 10 in AM and 10 in PM, holding each x 10 seconds.  When you feel urge to go, STOP, KEGEL, and when urge has passed, then go to bathroom.  --URGE: .  SE profile reviewed.    Takes 2-4 weeks to see if will have effect.  For dry mouth: get sour, sugar free lozenge or gum.    --STRESS:  Non surgical options: Pessary vs. Impressa vs Rivive - which represent urethral and bladder support devices; Surgical options including 1.  mid urethral sling; retropubic, transobturator vs single incision 2. Fascial slings 3. Hutchison procedure 4. Periurethral bulking     3. RTC 3 months for for pc    I spent a total of 20 minutes on the day of the visit.  This includes face to face time and non-face to face time preparing to see the patient (eg, review of tests), obtaining and/or reviewing separately obtained history, documenting clinical information in the electronic or other health record, independently interpreting results and communicating results to the patient/family/caregiver, or care coordinator.       Estephania Lindsay, EVELYN-BC [facility]  Division of Female Pelvic Medicine and Reconstructive Surgery  Department of Obstetrics & Gynecology

## 2023-07-11 ENCOUNTER — PATIENT MESSAGE (OUTPATIENT)
Dept: DIABETES | Facility: CLINIC | Age: 83
End: 2023-07-11
Payer: MEDICARE

## 2023-07-11 DIAGNOSIS — Z79.4 TYPE 2 DIABETES MELLITUS WITH OTHER CIRCULATORY COMPLICATION, WITH LONG-TERM CURRENT USE OF INSULIN: ICD-10-CM

## 2023-07-11 DIAGNOSIS — E11.59 TYPE 2 DIABETES MELLITUS WITH OTHER CIRCULATORY COMPLICATION, WITH LONG-TERM CURRENT USE OF INSULIN: ICD-10-CM

## 2023-07-11 RX ORDER — INSULIN PMP CART,AUT,G6/7,CNTR
1 EACH SUBCUTANEOUS
Qty: 10 EACH | Refills: 11 | Status: CANCELLED | OUTPATIENT
Start: 2023-07-11

## 2023-07-12 ENCOUNTER — PATIENT MESSAGE (OUTPATIENT)
Dept: ENDOCRINOLOGY | Facility: CLINIC | Age: 83
End: 2023-07-12
Payer: MEDICARE

## 2023-07-12 ENCOUNTER — TELEPHONE (OUTPATIENT)
Dept: ENDOCRINOLOGY | Facility: CLINIC | Age: 83
End: 2023-07-12
Payer: MEDICARE

## 2023-07-12 DIAGNOSIS — Z79.4 TYPE 2 DIABETES MELLITUS WITH OTHER CIRCULATORY COMPLICATION, WITH LONG-TERM CURRENT USE OF INSULIN: ICD-10-CM

## 2023-07-12 DIAGNOSIS — E11.59 TYPE 2 DIABETES MELLITUS WITH OTHER CIRCULATORY COMPLICATION, WITH LONG-TERM CURRENT USE OF INSULIN: ICD-10-CM

## 2023-07-12 RX ORDER — INSULIN PMP CART,AUT,G6/7,CNTR
1 EACH SUBCUTANEOUS
Qty: 10 EACH | Refills: 11 | Status: SHIPPED | OUTPATIENT
Start: 2023-07-12

## 2023-07-12 NOTE — TELEPHONE ENCOUNTER
Berna spoke with patient daughter to inform her that her mom pa was still pending for her Omnipods. Her p.a was submitted on July 7 , but it was canceled so Berna did it again today and now it's Pending. Berna also called pharmacy just for verification .

## 2023-07-24 ENCOUNTER — LAB VISIT (OUTPATIENT)
Dept: LAB | Facility: HOSPITAL | Age: 83
End: 2023-07-24
Payer: MEDICARE

## 2023-07-24 ENCOUNTER — OFFICE VISIT (OUTPATIENT)
Dept: ENDOCRINOLOGY | Facility: CLINIC | Age: 83
End: 2023-07-24
Payer: MEDICARE

## 2023-07-24 VITALS
HEART RATE: 62 BPM | OXYGEN SATURATION: 98 % | SYSTOLIC BLOOD PRESSURE: 150 MMHG | BODY MASS INDEX: 19.32 KG/M2 | HEIGHT: 65 IN | DIASTOLIC BLOOD PRESSURE: 70 MMHG | WEIGHT: 115.94 LBS

## 2023-07-24 DIAGNOSIS — E55.9 VITAMIN D DEFICIENCY: ICD-10-CM

## 2023-07-24 DIAGNOSIS — K75.4 AUTOIMMUNE HEPATITIS: Chronic | ICD-10-CM

## 2023-07-24 DIAGNOSIS — Z79.4 TYPE 2 DIABETES MELLITUS WITH OTHER CIRCULATORY COMPLICATION, WITH LONG-TERM CURRENT USE OF INSULIN: Primary | ICD-10-CM

## 2023-07-24 DIAGNOSIS — T38.0X5D ADVERSE EFFECT OF CORTICOSTEROIDS, SUBSEQUENT ENCOUNTER: ICD-10-CM

## 2023-07-24 DIAGNOSIS — M81.0 AGE-RELATED OSTEOPOROSIS WITHOUT CURRENT PATHOLOGICAL FRACTURE: ICD-10-CM

## 2023-07-24 DIAGNOSIS — R18.8 CIRRHOSIS OF LIVER WITH ASCITES, UNSPECIFIED HEPATIC CIRRHOSIS TYPE: ICD-10-CM

## 2023-07-24 DIAGNOSIS — K74.60 CIRRHOSIS OF LIVER WITH ASCITES, UNSPECIFIED HEPATIC CIRRHOSIS TYPE: ICD-10-CM

## 2023-07-24 DIAGNOSIS — E11.59 TYPE 2 DIABETES MELLITUS WITH OTHER CIRCULATORY COMPLICATION, WITH LONG-TERM CURRENT USE OF INSULIN: ICD-10-CM

## 2023-07-24 DIAGNOSIS — E11.59 TYPE 2 DIABETES MELLITUS WITH OTHER CIRCULATORY COMPLICATION, WITH LONG-TERM CURRENT USE OF INSULIN: Primary | ICD-10-CM

## 2023-07-24 DIAGNOSIS — Z79.4 TYPE 2 DIABETES MELLITUS WITH OTHER CIRCULATORY COMPLICATION, WITH LONG-TERM CURRENT USE OF INSULIN: ICD-10-CM

## 2023-07-24 PROBLEM — T38.0X5A ADVERSE EFFECT OF ADRENAL CORTICAL STEROIDS: Status: ACTIVE | Noted: 2023-07-24

## 2023-07-24 LAB
25(OH)D3+25(OH)D2 SERPL-MCNC: 29 NG/ML (ref 30–96)
ALBUMIN SERPL BCP-MCNC: 3.2 G/DL (ref 3.5–5.2)
ALP SERPL-CCNC: 50 U/L (ref 55–135)
ALT SERPL W/O P-5'-P-CCNC: 49 U/L (ref 10–44)
ANION GAP SERPL CALC-SCNC: 11 MMOL/L (ref 8–16)
AST SERPL-CCNC: 50 U/L (ref 10–40)
BASOPHILS # BLD AUTO: 0.03 K/UL (ref 0–0.2)
BASOPHILS NFR BLD: 0.4 % (ref 0–1.9)
BILIRUB DIRECT SERPL-MCNC: 0.3 MG/DL (ref 0.1–0.3)
BILIRUB SERPL-MCNC: 0.8 MG/DL (ref 0.1–1)
BUN SERPL-MCNC: 33 MG/DL (ref 8–23)
CALCIUM SERPL-MCNC: 9.4 MG/DL (ref 8.7–10.5)
CHLORIDE SERPL-SCNC: 110 MMOL/L (ref 95–110)
CO2 SERPL-SCNC: 22 MMOL/L (ref 23–29)
CREAT SERPL-MCNC: 0.9 MG/DL (ref 0.5–1.4)
DIFFERENTIAL METHOD: ABNORMAL
EOSINOPHIL # BLD AUTO: 0.1 K/UL (ref 0–0.5)
EOSINOPHIL NFR BLD: 1.1 % (ref 0–8)
ERYTHROCYTE [DISTWIDTH] IN BLOOD BY AUTOMATED COUNT: 13 % (ref 11.5–14.5)
EST. GFR  (NO RACE VARIABLE): >60 ML/MIN/1.73 M^2
ESTIMATED AVG GLUCOSE: 131 MG/DL (ref 68–131)
GLUCOSE SERPL-MCNC: 169 MG/DL (ref 70–110)
HBA1C MFR BLD: 6.2 % (ref 4–5.6)
HCT VFR BLD AUTO: 36.5 % (ref 37–48.5)
HGB BLD-MCNC: 11.8 G/DL (ref 12–16)
IMM GRANULOCYTES # BLD AUTO: 0.01 K/UL (ref 0–0.04)
IMM GRANULOCYTES NFR BLD AUTO: 0.1 % (ref 0–0.5)
INR PPP: 1 (ref 0.8–1.2)
LYMPHOCYTES # BLD AUTO: 1.3 K/UL (ref 1–4.8)
LYMPHOCYTES NFR BLD: 17.8 % (ref 18–48)
MCH RBC QN AUTO: 31.6 PG (ref 27–31)
MCHC RBC AUTO-ENTMCNC: 32.3 G/DL (ref 32–36)
MCV RBC AUTO: 98 FL (ref 82–98)
MONOCYTES # BLD AUTO: 0.6 K/UL (ref 0.3–1)
MONOCYTES NFR BLD: 8.2 % (ref 4–15)
NEUTROPHILS # BLD AUTO: 5.4 K/UL (ref 1.8–7.7)
NEUTROPHILS NFR BLD: 72.4 % (ref 38–73)
NRBC BLD-RTO: 0 /100 WBC
PLATELET # BLD AUTO: 150 K/UL (ref 150–450)
PMV BLD AUTO: 10.7 FL (ref 9.2–12.9)
POTASSIUM SERPL-SCNC: 4.4 MMOL/L (ref 3.5–5.1)
PROT SERPL-MCNC: 6.9 G/DL (ref 6–8.4)
PROTHROMBIN TIME: 10.7 SEC (ref 9–12.5)
RBC # BLD AUTO: 3.73 M/UL (ref 4–5.4)
SODIUM SERPL-SCNC: 143 MMOL/L (ref 136–145)
WBC # BLD AUTO: 7.47 K/UL (ref 3.9–12.7)

## 2023-07-24 PROCEDURE — 82248 BILIRUBIN DIRECT: CPT | Performed by: INTERNAL MEDICINE

## 2023-07-24 PROCEDURE — 95251 PR GLUCOSE MONITOR, 72 HOUR, PHYS INTERP: ICD-10-PCS | Mod: ,,, | Performed by: INTERNAL MEDICINE

## 2023-07-24 PROCEDURE — 36415 COLL VENOUS BLD VENIPUNCTURE: CPT | Performed by: INTERNAL MEDICINE

## 2023-07-24 PROCEDURE — 99213 OFFICE O/P EST LOW 20 MIN: CPT | Mod: PBBFAC | Performed by: INTERNAL MEDICINE

## 2023-07-24 PROCEDURE — 85610 PROTHROMBIN TIME: CPT | Performed by: INTERNAL MEDICINE

## 2023-07-24 PROCEDURE — 99214 PR OFFICE/OUTPT VISIT, EST, LEVL IV, 30-39 MIN: ICD-10-PCS | Mod: 25,S$PBB,, | Performed by: INTERNAL MEDICINE

## 2023-07-24 PROCEDURE — 95251 CONT GLUC MNTR ANALYSIS I&R: CPT | Mod: ,,, | Performed by: INTERNAL MEDICINE

## 2023-07-24 PROCEDURE — 80053 COMPREHEN METABOLIC PANEL: CPT | Performed by: INTERNAL MEDICINE

## 2023-07-24 PROCEDURE — 99214 OFFICE O/P EST MOD 30 MIN: CPT | Mod: 25,S$PBB,, | Performed by: INTERNAL MEDICINE

## 2023-07-24 PROCEDURE — 99999 PR PBB SHADOW E&M-EST. PATIENT-LVL III: CPT | Mod: PBBFAC,,, | Performed by: INTERNAL MEDICINE

## 2023-07-24 PROCEDURE — 82306 VITAMIN D 25 HYDROXY: CPT | Performed by: STUDENT IN AN ORGANIZED HEALTH CARE EDUCATION/TRAINING PROGRAM

## 2023-07-24 PROCEDURE — 99999 PR PBB SHADOW E&M-EST. PATIENT-LVL III: ICD-10-PCS | Mod: PBBFAC,,, | Performed by: INTERNAL MEDICINE

## 2023-07-24 PROCEDURE — 85025 COMPLETE CBC W/AUTO DIFF WBC: CPT | Performed by: INTERNAL MEDICINE

## 2023-07-24 PROCEDURE — 83036 HEMOGLOBIN GLYCOSYLATED A1C: CPT | Performed by: STUDENT IN AN ORGANIZED HEALTH CARE EDUCATION/TRAINING PROGRAM

## 2023-07-24 RX ORDER — VIT C/E/ZN/COPPR/LUTEIN/ZEAXAN 250MG-90MG
2000 CAPSULE ORAL DAILY
Refills: 0
Start: 2023-07-24

## 2023-07-24 NOTE — ASSESSMENT & PLAN NOTE
Steroids will predominantly effect prandial requirements of insulin.  Advised to let me know if the steroid dose is weaned so that we could monitor the need to adjust insulin.

## 2023-07-24 NOTE — ASSESSMENT & PLAN NOTE
Reviewed goals of therapy are to get the best control we can without hypoglycemia.    Currently meeting glycemic target:  Yes    Doing well with Dexcom G6 plus Omnipod five.  Will add more aggressive carb ratio during the day.  Advised to bolus insulin 10-15 minutes prior to eating to prevent postprandial spikes.    Pump Settings  Basal Rate  12A:0.5    Carb Ratio  12A:14  6A: 12  6P: 14    ISF  12A:60 > 50    Target: 120  Correct above: 150 > 140    IAT: 4 hours     Reviewed patient's current insulin regimen. Clarified proper insulin dose and timing in relation to meals, etc. Insulin injection sites and proper rotation instructed.      Advised frequent self blood glucose monitoring. Patient encouraged to document glucose results and bring them to every clinic visit.    Hypoglycemia precautions discussed.     Discussed diet and exercise.    Diabetes health maintenance topics are addressed in the HPI    Lab Results   Component Value Date    HGBA1C 6.2 (H) 07/24/2023    HGBA1C 5.0 01/30/2023    HGBA1C 6.4 (H) 11/09/2022

## 2023-07-24 NOTE — PROGRESS NOTES
Follow-up visit      Subjective:      Chief Complaint:  Diabetes and osteoporosis    History of Present Illness  Radha Feng is a 82 y.o. female with autoimmune hepatitis, cirrhosis and hx HE, HTN, HLD, HFpEF, anemia, thrombocytopenia, Vit D def, CAD, and T2DM referred for evaluation of T2DM and osteoporosis.    Recent admission for new dx of autoimmune hepatitis. Required several readmissions. Doing well now - no longer has ascites, LE edema, or recurrence of HE. Prednisone 7.5 mg qd - started at 40mg in the hospital and slowly tapered. They have f/u soon and will discuss plan for further decrease. Plan to get on lowest dose necessary and then stay on it per the pt's daughter who is an RN.        With regards to diabetes:    Diagnosed approx 25yrs ago - in 60s  Known complications: neuropathy    Current Diabetes Regimen:  Omnipod 5 + Dexcom G6 with Novolog    Pump Settings  Basal Rate  12A:0.5    Carb Ratio  12A:14    ISF  12A:60    Target: 120  Correct above: 150    IAT: 4 hours      She is on prednisone for autoimmune hepatitis, current dose is 7.5 mg qd.     Timing of prandial insulin: Gives it mid-meal or after the meal  Omitted doses: denies    Prior mediations tried:  Glyburide - switched to insulin in 10/2022  Metformin - stopped in 10/2022 due to autoimmune hepatitis and lactic acidosis       Diet/Exercise:  Eats 3 meals per day, overall healthy diet  Walks with cane inside, uses wheelchair for long distances. Signficant improvement in mobility since hospitalization     Recent Hgb A1C:  Lab Results   Component Value Date    HGBA1C 6.2 (H) 07/24/2023       Glucose Monitoring:  Dexcom G6 + Omnipod pump data was downloaded and reviewed.  She is bolusing at the time of meals. Bolusing for most meals. Post-prandial excursions noted.            Hypoglycemic Episodes: A few times 70s, but with dexcom has not had anything lower     Screening / DM Complications:    Nephropathy:  ACEi/ARB: Not taking  No results  found for: MICALBCREAT    Last Lipid Panel:  Statin: Not taking statin 2/2 autoimmune hepatitis   Lab Results   Component Value Date    LDLCALC 168.6 (H) 01/30/2023       Last foot exam : 07/24/2023 - has some neuropathy, cataracts and glaucoma, some nerve damage in R eye not from DM but from glaucoma  Last eye exam : 02/06/2023;  no laser surgery or DR    B12:  Lab Results   Component Value Date    [id number] 03/09/2023     Diabetes Management Status    Statin: Not taking  ACE/ARB: Taking    Screening or Prevention Patient's value Goal Complete/Controlled?   HgA1C Testing and Control   Lab Results   Component Value Date    HGBA1C 6.2 (H) 07/24/2023      Annually/Less than 8% Yes   Lipid profile : 01/30/2023 Annually Yes   LDL control Lab Results   Component Value Date    LDLCALC 168.6 (H) 01/30/2023    Annually/Less than 100 mg/dl  No   Nephropathy screening No results found for: LABMICR  Lab Results   Component Value Date    PROTEINUA Negative 11/03/2022     No results found for: UTPCR   Annually Yes   Blood pressure BP Readings from Last 1 Encounters:   07/24/23 (!) 150/70    Less than 140/90 No   Dilated retinal exam : 02/06/2023 Annually Yes   Foot exam   : 07/24/2023 Annually No         With regards to osteoporosis and vitamin D deficiency:  Diagnosed: 3/2023    Started on Prolia in 4/2023 - tolerating well so far.    DXA 1/30/2023  FINDINGS:  The bone mineral density measured from L1 through L4 is 0.954g/cm2.  This corresponds to a T score of -1.9 and a Z score of -0.4.     The bone mineral density within the left femoral neck measures 0.654 g/cm2.  This corresponds to a T score of -2.8 and a Z score of -1.2.     The bone mineral density within the right femoral neck measures 0.550 g/cm2.  This corresponds to a T score of -3.5 and a Z score of -2.0.  Values are likely lower than actual due to positioning.     FRAX RESULTS:     10-year Probability of Fracture:     Major Osteoporotic Fracture 12.8%.     Hip  "Fracture 6%.     Impression:     Osteopenia lumbar spine.  Osteoporosis both hips.    Last vitamin level was 18 on 1/13/2022  Was on high dose weekly vit d in the past, not on any Vit D supplements in a while    Lab Results   Component Value Date    [id number] 29 (L) 07/24/2023         Denies fractures  No recent falls - using cane, walker, or wheelchair and going to PT - making significant improvements in mobility with PT    Dentures were thrown away when she was hospitalized -- wears full dentures bottom and top - does not have any teeth       Not taking calcium or vitamin D    On Prednisone 7.5 mg qd for autoimmune hepatitis     ROS:   As above    Objective:     BP (!) 150/70   Pulse 62   Ht 5' 5" (1.651 m)   Wt 52.6 kg (115 lb 15.4 oz)   SpO2 98%   BMI 19.30 kg/m²   BP Readings from Last 3 Encounters:   07/24/23 (!) 150/70   07/10/23 (!) 140/70   06/26/23 118/62     Wt Readings from Last 1 Encounters:   07/24/23 1135 52.6 kg (115 lb 15.4 oz)     Body mass index is 19.3 kg/m².      Physical Exam  Cardiovascular:      Pulses:           Dorsalis pedis pulses are 2+ on the right side and 2+ on the left side.        Posterior tibial pulses are 2+ on the right side and 2+ on the left side.   Musculoskeletal:      Right foot: No deformity.      Left foot: No deformity.   Feet:      Right foot:      Protective Sensation: 7 sites tested.  7 sites sensed.      Skin integrity: No ulcer, blister, skin breakdown, erythema, warmth, callus, dry skin or fissure.      Toenail Condition: Right toenails are normal.      Left foot:      Protective Sensation: 7 sites tested.  7 sites sensed.      Skin integrity: No ulcer, blister, skin breakdown, erythema, warmth, callus, dry skin or fissure.      Toenail Condition: Left toenails are normal.     Lab Review:   Lab Results   Component Value Date    HGBA1C 6.2 (H) 07/24/2023     Lab Results   Component Value Date    CHOL 238 (H) 01/30/2023    HDL 50 01/30/2023    LDLCALC 168.6 " (H) 01/30/2023    TRIG 97 01/30/2023    CHOLHDL 21.0 01/30/2023     Lab Results   Component Value Date     07/24/2023    K 4.4 07/24/2023     07/24/2023    CO2 22 (L) 07/24/2023     (H) 07/24/2023    BUN 33 (H) 07/24/2023    CREATININE 0.9 07/24/2023    CALCIUM 9.4 07/24/2023    PROT 6.9 07/24/2023    ALBUMIN 3.2 (L) 07/24/2023    BILITOT 0.8 07/24/2023    ALKPHOS 50 (L) 07/24/2023    AST 50 (H) 07/24/2023    ALT 49 (H) 07/24/2023    ANIONGAP 11 07/24/2023    TSH 0.740 09/21/2022     Vit D, 25-Hydroxy   Date Value Ref Range Status   07/24/2023 29 (L) 30 - 96 ng/mL Final     Comment:     Vitamin D deficiency.........<10 ng/mL                              Vitamin D insufficiency......10-29 ng/mL       Vitamin D sufficiency........> or equal to 30 ng/mL  Vitamin D toxicity............>100 ng/mL       Vit D 18 in 1/2022 (Care Everywhere)    Assessment and Plan     Adverse effect of adrenal cortical steroids  Steroids will predominantly effect prandial requirements of insulin.  Advised to let me know if the steroid dose is weaned so that we could monitor the need to adjust insulin.    Autoimmune hepatitis  Currently on prednisone 7.5 mg daily.    Vitamin D deficiency  On vitamin-D replacement - cholecalciferol 2000 IU daily  Vitamin-D level is much improved since last visit.  Continue current supplement.    Age-related osteoporosis without current pathological fracture  Tolerating Prolia well so far.  Continue every six months.  Next DXA in January, 2025.    Type 2 diabetes mellitus with circulatory disorder, with long-term current use of insulin  Reviewed goals of therapy are to get the best control we can without hypoglycemia.    Currently meeting glycemic target:  Yes    Doing well with Dexcom G6 plus Omnipod five.  Will add more aggressive carb ratio during the day.  Advised to bolus insulin 10-15 minutes prior to eating to prevent postprandial spikes.    Pump Settings  Basal Rate  12A:0.5    Carb  Ratio  12A:14  6A: 12  6P: 14    ISF  12A:60 > 50    Target: 120  Correct above: 150 > 140    IAT: 4 hours     Reviewed patient's current insulin regimen. Clarified proper insulin dose and timing in relation to meals, etc. Insulin injection sites and proper rotation instructed.      Advised frequent self blood glucose monitoring. Patient encouraged to document glucose results and bring them to every clinic visit.    Hypoglycemia precautions discussed.     Discussed diet and exercise.    Diabetes health maintenance topics are addressed in the HPI    Lab Results   Component Value Date    HGBA1C 6.2 (H) 07/24/2023    HGBA1C 5.0 01/30/2023    HGBA1C 6.4 (H) 11/09/2022       Follow up in about 6 months (around 1/24/2024).

## 2023-07-24 NOTE — ASSESSMENT & PLAN NOTE
On vitamin-D replacement - cholecalciferol 2000 IU daily  Vitamin-D level is much improved since last visit.  Continue current supplement.

## 2023-08-18 ENCOUNTER — PATIENT MESSAGE (OUTPATIENT)
Dept: HEPATOLOGY | Facility: CLINIC | Age: 83
End: 2023-08-18
Payer: MEDICARE

## 2023-08-28 ENCOUNTER — LAB VISIT (OUTPATIENT)
Dept: LAB | Facility: HOSPITAL | Age: 83
End: 2023-08-28
Attending: INTERNAL MEDICINE
Payer: MEDICARE

## 2023-08-28 DIAGNOSIS — K74.60 CIRRHOSIS OF LIVER WITH ASCITES, UNSPECIFIED HEPATIC CIRRHOSIS TYPE: ICD-10-CM

## 2023-08-28 DIAGNOSIS — K75.4 AUTOIMMUNE HEPATITIS: Chronic | ICD-10-CM

## 2023-08-28 DIAGNOSIS — R18.8 CIRRHOSIS OF LIVER WITH ASCITES, UNSPECIFIED HEPATIC CIRRHOSIS TYPE: ICD-10-CM

## 2023-08-28 LAB
ALBUMIN SERPL BCP-MCNC: 3.2 G/DL (ref 3.5–5.2)
ALP SERPL-CCNC: 53 U/L (ref 55–135)
ALT SERPL W/O P-5'-P-CCNC: 40 U/L (ref 10–44)
ANION GAP SERPL CALC-SCNC: 10 MMOL/L (ref 8–16)
AST SERPL-CCNC: 43 U/L (ref 10–40)
BASOPHILS # BLD AUTO: 0.05 K/UL (ref 0–0.2)
BASOPHILS NFR BLD: 0.6 % (ref 0–1.9)
BILIRUB DIRECT SERPL-MCNC: 0.3 MG/DL (ref 0.1–0.3)
BILIRUB SERPL-MCNC: 0.9 MG/DL (ref 0.1–1)
BUN SERPL-MCNC: 36 MG/DL (ref 8–23)
CALCIUM SERPL-MCNC: 9.7 MG/DL (ref 8.7–10.5)
CHLORIDE SERPL-SCNC: 108 MMOL/L (ref 95–110)
CO2 SERPL-SCNC: 21 MMOL/L (ref 23–29)
CREAT SERPL-MCNC: 0.8 MG/DL (ref 0.5–1.4)
DIFFERENTIAL METHOD: ABNORMAL
EOSINOPHIL # BLD AUTO: 0.2 K/UL (ref 0–0.5)
EOSINOPHIL NFR BLD: 1.8 % (ref 0–8)
ERYTHROCYTE [DISTWIDTH] IN BLOOD BY AUTOMATED COUNT: 12.9 % (ref 11.5–14.5)
EST. GFR  (NO RACE VARIABLE): >60 ML/MIN/1.73 M^2
GLUCOSE SERPL-MCNC: 135 MG/DL (ref 70–110)
HCT VFR BLD AUTO: 40.2 % (ref 37–48.5)
HGB BLD-MCNC: 12.9 G/DL (ref 12–16)
IMM GRANULOCYTES # BLD AUTO: 0.04 K/UL (ref 0–0.04)
IMM GRANULOCYTES NFR BLD AUTO: 0.5 % (ref 0–0.5)
INR PPP: 1 (ref 0.8–1.2)
LYMPHOCYTES # BLD AUTO: 1.6 K/UL (ref 1–4.8)
LYMPHOCYTES NFR BLD: 17.5 % (ref 18–48)
MCH RBC QN AUTO: 31.5 PG (ref 27–31)
MCHC RBC AUTO-ENTMCNC: 32.1 G/DL (ref 32–36)
MCV RBC AUTO: 98 FL (ref 82–98)
MONOCYTES # BLD AUTO: 1.1 K/UL (ref 0.3–1)
MONOCYTES NFR BLD: 12.9 % (ref 4–15)
NEUTROPHILS # BLD AUTO: 5.9 K/UL (ref 1.8–7.7)
NEUTROPHILS NFR BLD: 66.7 % (ref 38–73)
NRBC BLD-RTO: 0 /100 WBC
PLATELET # BLD AUTO: 178 K/UL (ref 150–450)
PMV BLD AUTO: 11.5 FL (ref 9.2–12.9)
POTASSIUM SERPL-SCNC: 4 MMOL/L (ref 3.5–5.1)
PROT SERPL-MCNC: 7.1 G/DL (ref 6–8.4)
PROTHROMBIN TIME: 10.3 SEC (ref 9–12.5)
RBC # BLD AUTO: 4.1 M/UL (ref 4–5.4)
SODIUM SERPL-SCNC: 139 MMOL/L (ref 136–145)
WBC # BLD AUTO: 8.86 K/UL (ref 3.9–12.7)

## 2023-08-28 PROCEDURE — 85025 COMPLETE CBC W/AUTO DIFF WBC: CPT | Performed by: INTERNAL MEDICINE

## 2023-08-28 PROCEDURE — 36415 COLL VENOUS BLD VENIPUNCTURE: CPT | Mod: PN | Performed by: INTERNAL MEDICINE

## 2023-08-28 PROCEDURE — 80053 COMPREHEN METABOLIC PANEL: CPT | Performed by: INTERNAL MEDICINE

## 2023-08-28 PROCEDURE — 82248 BILIRUBIN DIRECT: CPT | Performed by: INTERNAL MEDICINE

## 2023-08-28 PROCEDURE — 85610 PROTHROMBIN TIME: CPT | Performed by: INTERNAL MEDICINE

## 2023-08-29 ENCOUNTER — TELEPHONE (OUTPATIENT)
Dept: HEPATOLOGY | Facility: CLINIC | Age: 83
End: 2023-08-29
Payer: MEDICARE

## 2023-08-29 NOTE — TELEPHONE ENCOUNTER
----- Message from Adilene Kurtz RN sent at 8/29/2023  8:41 AM CDT -----    ----- Message -----  From: Yvonne Peraza MD  Sent: 8/28/2023   7:06 PM CDT  To: [facility]    The Labs are stable - please let patient know.

## 2023-09-05 NOTE — TELEPHONE ENCOUNTER
Received In basket message from the patient.  Patient has Monthly Labs scheduled.    The present schedule of Monthly Labs are OK except for 10/19/23 when Patient comes to [facility] for Prolia Inj.  Appt for the Labs were scheduled on same day.      Call placed to the patient at [phone]. No Answer. Left a VM message to return call to clinic to discuss.      Call returned from Della, daughter of the patient. 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    When I get off from work I just want to run in and run out. I don't want to do the Labs on the 10/19. Pt scheduled to see the Dr on Mon 10/23.    Della informed I could not schedule the Labs on [facility] on 10/23 on appt day with Dr Peraza. Labs are not done every day there.    Della stated we have gone to the Dr and had labs done later. She just wants to adjust the Prednisone.    We will do labs on Mon 10/23 at [facility] around 9 am.  Labs cancelled on 10/19 and rescheduled to Mon 10/23/23 at 9:15 am that was available.  Voiced understanding.     Reminder placed in the mail.

## 2023-09-14 NOTE — PROGRESS NOTES
[facility] Medicine  Progress Note    Patient Name: Radha Feng  MRN: [id number]  Patient Class: IP- Inpatient   Admission Date: 9/20/2022  Length of Stay: 10 days  Attending Physician: Malaika Madrigal MD  Primary Care Provider: Primary Doctor No      Subjective:     Principal Problem:Autoimmune hepatitis      HPI:  Radha Feng is a 81 y.o. female with a past medical history of type 2 diabetes, hyperlipidemia, hypertension, glaucoma and cataracts presents the emergency department due to progressive lethargy and weakness. Patient's daughter at the bedside who provides the majority of the history due to the acuity of patient's condition. Per daughter, patient was sent from her cardiology office earlier today due to increased bilateral leg swelling, shortness of breath, and progressive weakness over the past two weeks. She reports that over the past two weeks patient has had a poor appetite with significantly decreased po intake. In addition, she reports that patient's BP has been lower at home compared to baseline and states she has held BP medications (amlodipine and lisinopril-HCTZ) for the past two days. She states her BP is normally 130/80 but as been as low as 98/63. Per daughter, lower extremity swelling was most pronounced last week but has improved significantly with compression stockings.  Additionally, daughter notes that she was helping her change last week and noted that she had a prolapsed uterus/bladder but patient has not seen her OBGYN yet. Patient is requiring more assistance with ADLs which is abnormal for her. Patient denies dysuria but states that she does have difficulty urinating and can only urinate a small amount. Denies chest pain, shortness of breath, abdominal pain, dizziness, nausea, vomiting, or syncope. Patient's only complaint at this time is right knee pain.    ED: vital signs stable, afebrile, no acute distress. Elevated liver enzymes with T.bili of 2.9,  AST//254, and alk phos of 224. Na 133, K 5.0, Cr 1.4 (most recent Cr of 0.8 in 12/21). D-dimer 11.97, CTA negative for PE. CTA showed evidence of distended gallbladder and emphysema. Subsequent RUQ US negative for cholecystitis or gallstones. BNP 66, troponin negative. UA negative for infection. Given 500 mL LR and placed in observation.       Overview/Hospital Course:  Patient admitted to hospitalist service.  She was noted to have elevated liver enzymes, acute renal insuff, hyperbilirubin.  D-dimer was significatnly elevated.   CTA demonstrated NO PTE but did show few bilateral pulmonary micro nodules which can be followed up as OP.   Right UQ US showed distended gallbladder but no sonographically detectable gallstone, and no additional sonographic evidence for acute cholecystitis.   Mild free fluid of the abdomen noted.  MRCP showed normal pancreatic duct, Gallbladder is distended without wall thickening or pericholecystic fluid.  Common bile duct is normal caliber and tapers distally to the ampulla.  No intrahepatic biliary dilatation.  No biliary filling defects identified.   Acute hepatitis panel negative.  Monospot/EBV neg. CMV neg. Ceruloplasmin normal. Elevated IgG and SONNY.   Tentative diagnosis of Autoimmune Hepatitis. Defer to hepatology for treatment.   She was also treated for mild hepatic encephalopathy with lactulose.          Interval History: NAEON. No acute concerns. Daughter at bedside.       Review of Systems   Constitutional:  Negative for chills and fever.   Respiratory:  Negative for shortness of breath.    Cardiovascular:  Negative for chest pain.   Gastrointestinal:  Negative for abdominal pain.   Genitourinary:  Negative for dysuria.   Neurological:  Negative for headaches.   Psychiatric/Behavioral:  Negative for confusion.      Objective:     Vital Signs (Most Recent):  Temp: 97.8 °F (36.6 °C) (10/02/22 1145)  Pulse: 63 (10/02/22 1145)  Resp: 18 (10/02/22 1145)  BP: (!) 179/76  (10/02/22 1145)  SpO2: 95 % (10/02/22 1145)   Vital Signs (24h Range):  Temp:  [97.6 °F (36.4 °C)-98 °F (36.7 °C)] 97.8 °F (36.6 °C)  Pulse:  [60-64] 63  Resp:  [18-20] 18  SpO2:  [93 %-99 %] 95 %  BP: (146-179)/(67-87) 179/76     Weight: 50.8 kg (111 lb 15.9 oz)  Body mass index is 18.08 kg/m².    Intake/Output Summary (Last 24 hours) at 10/2/2022 1500  Last data filed at 10/1/2022 1833  Gross per 24 hour   Intake --   Output 2 ml   Net -2 ml        Physical Exam  Vitals and nursing note reviewed.   Constitutional:       General: She is not in acute distress.     Appearance: She is well-developed.   HENT:      Head: Normocephalic and atraumatic.   Eyes:      Conjunctiva/sclera: Conjunctivae normal.   Neck:      Vascular: No JVD.   Cardiovascular:      Rate and Rhythm: Normal rate and regular rhythm.      Heart sounds: Normal heart sounds.   Pulmonary:      Effort: Pulmonary effort is normal.      Breath sounds: Normal breath sounds.   Abdominal:      General: Bowel sounds are normal. There is no distension.      Palpations: Abdomen is soft.      Tenderness: There is no abdominal tenderness.   Musculoskeletal:      Cervical back: Neck supple.      Right lower leg: Edema present.      Left lower leg: Edema present.   Neurological:      Mental Status: She is alert.   Psychiatric:         Behavior: Behavior normal.       Significant Labs: All pertinent labs within the past 24 hours have been reviewed.  CBC:   Recent Labs   Lab 10/01/22  0806 10/02/22  0519   WBC 8.53 9.93   HGB 9.8* 10.1*   HCT 29.1* 28.6*   PLT 84* 81*       CMP:   Recent Labs   Lab 10/01/22  0806 10/02/22  0519   * 130*   K 4.7 4.4   * 109   CO2 17* 17*   * 316*   BUN 23 26*   CREATININE 0.8 0.7   CALCIUM 8.5* 8.2*   PROT 6.6 6.0   ALBUMIN 1.8* 1.6*   BILITOT 2.4* 2.1*   ALKPHOS 175* 155*   * 130*   * 129*   ANIONGAP 4* 4*       Magnesium:   No results for input(s): MG in the last 48 hours.    POCT Glucose:   Recent  Labs   Lab 10/01/22  2051 10/02/22  0705 10/02/22  1150   POCTGLUCOSE 349* 306* 315*         Significant Imaging: I have reviewed all pertinent imaging results/findings within the past 24 hours.      Assessment/Plan:      * Autoimmune hepatitis  - discussed with hepatology. Continue steroids at 60 mg IV. Continue to trend LFTs.       Uterine prolapse  - GYN plans to have uro- gyn see her tomorrow given her retention/ hesitancy symptoms.       Acute liver failure without hepatic coma  - CTA of ab/pelvix,  subsequent RUQ US, and  MRCP demonstrate no cause of patient's liver failure  - Acute hepatitis panel neg, monospot neg, copper levels normal, EBV neg  - Liver bx on 9/27-- Transjugular liver biopsy with pressure measurements    - ascites survey u/s ordered.   - fu ascites survey us-- daughter states abdomen has more fluid since procedure.      Hemolytic anemia  - with associated iron overload  - direct genny test NEG  - elevated ddimer and low fibrinogen (monitor platelets, coags, evidence of bleeding or clotting)  - etiology unknown  - hematology review of PBS--> target cells, some bethany cells,  very few schistocytes (0-1 per hp), no blast  - 9/28--> secondary to liver failure    - hematology signed off-- no further recs unless counts become unstable.         Oral phase dysphagia   mild oral dysphagia, but with a safe pharyngeal swallow. No further acute ST warranted at this time.      Severe protein-calorie malnutrition  Nutrition following. Maximize oral intake.        Type 2 diabetes mellitus, without long-term current use of insulin  Patient's FSGs are uncontrolled due to hyperglycemia on current medication regimen.  - home regimen: metformin and glyburide  Last A1c reviewed-             Lab Results   Component Value Date     HGBA1C 5.3 09/21/2022      - increased detemir to 25 u bid. Will need to continue to adjust with steroid therapy. Increased pre meal novolog. SSI. Monitor.         Hypertension  -  BP elevated. Resumed norvasc. Monitor with steroid therapy.      Leg swelling  - no previous hx of CHF  - echo w grade II DD-- no acute exacerbation  - dopplers neg for DVT    Daughter says patient has been on Amlodipine for years with no recent dosage changes.        Right knee pain  - Chronic  - reported trauma about 30 years ago  - follows with orthopedics outpatient  - recent MRI in august with chronic findings  - continue tylenol prn for pain        Hepatic encephalopathy  - resolved   - lactulose bid--> adjusted to qday  - adjust dose as needed      Dehydration  - resolved      EFREN (acute kidney injury)  - Resolved          VTE Risk Mitigation (From admission, onward)         Ordered     Place sequential compression device  Until discontinued         09/21/22 0932     IP VTE LOW RISK PATIENT  Once         09/21/22 0156                Discharge Planning   MELVI: 10/1/2022     Code Status: Full Code   Is the patient medically ready for discharge?: No    Reason for patient still in hospital (select all that apply): Patient trending condition, Treatment and Consult recommendations  Discharge Plan A: Home with family   Discharge Delays: None known at this time      Malaika Madrigal MD  Department of Hospital Medicine   [facility]

## 2023-09-25 ENCOUNTER — LAB VISIT (OUTPATIENT)
Dept: LAB | Facility: HOSPITAL | Age: 83
End: 2023-09-25
Attending: INTERNAL MEDICINE
Payer: MEDICARE

## 2023-09-25 DIAGNOSIS — K75.4 AUTOIMMUNE HEPATITIS: Primary | ICD-10-CM

## 2023-09-25 DIAGNOSIS — K75.4 AUTOIMMUNE HEPATITIS: Chronic | ICD-10-CM

## 2023-09-25 DIAGNOSIS — K74.60 CIRRHOSIS OF LIVER WITH ASCITES, UNSPECIFIED HEPATIC CIRRHOSIS TYPE: ICD-10-CM

## 2023-09-25 DIAGNOSIS — R18.8 CIRRHOSIS OF LIVER WITH ASCITES, UNSPECIFIED HEPATIC CIRRHOSIS TYPE: ICD-10-CM

## 2023-09-25 LAB
ALBUMIN SERPL BCP-MCNC: 3.2 G/DL (ref 3.5–5.2)
ALP SERPL-CCNC: 55 U/L (ref 55–135)
ALT SERPL W/O P-5'-P-CCNC: 92 U/L (ref 10–44)
ANION GAP SERPL CALC-SCNC: 5 MMOL/L (ref 8–16)
AST SERPL-CCNC: 86 U/L (ref 10–40)
BASOPHILS # BLD AUTO: 0.03 K/UL (ref 0–0.2)
BASOPHILS NFR BLD: 0.4 % (ref 0–1.9)
BILIRUB DIRECT SERPL-MCNC: 0.4 MG/DL (ref 0.1–0.3)
BILIRUB SERPL-MCNC: 1 MG/DL (ref 0.1–1)
BUN SERPL-MCNC: 29 MG/DL (ref 8–23)
CALCIUM SERPL-MCNC: 9.8 MG/DL (ref 8.7–10.5)
CHLORIDE SERPL-SCNC: 110 MMOL/L (ref 95–110)
CO2 SERPL-SCNC: 26 MMOL/L (ref 23–29)
CREAT SERPL-MCNC: 0.9 MG/DL (ref 0.5–1.4)
DIFFERENTIAL METHOD: ABNORMAL
EOSINOPHIL # BLD AUTO: 0.1 K/UL (ref 0–0.5)
EOSINOPHIL NFR BLD: 1.5 % (ref 0–8)
ERYTHROCYTE [DISTWIDTH] IN BLOOD BY AUTOMATED COUNT: 13.1 % (ref 11.5–14.5)
EST. GFR  (NO RACE VARIABLE): >60 ML/MIN/1.73 M^2
GLUCOSE SERPL-MCNC: 147 MG/DL (ref 70–110)
HCT VFR BLD AUTO: 38.4 % (ref 37–48.5)
HGB BLD-MCNC: 12.5 G/DL (ref 12–16)
IMM GRANULOCYTES # BLD AUTO: 0.04 K/UL (ref 0–0.04)
IMM GRANULOCYTES NFR BLD AUTO: 0.5 % (ref 0–0.5)
INR PPP: 1 (ref 0.8–1.2)
LYMPHOCYTES # BLD AUTO: 1.7 K/UL (ref 1–4.8)
LYMPHOCYTES NFR BLD: 20.3 % (ref 18–48)
MCH RBC QN AUTO: 32.5 PG (ref 27–31)
MCHC RBC AUTO-ENTMCNC: 32.6 G/DL (ref 32–36)
MCV RBC AUTO: 100 FL (ref 82–98)
MONOCYTES # BLD AUTO: 1.1 K/UL (ref 0.3–1)
MONOCYTES NFR BLD: 13.6 % (ref 4–15)
NEUTROPHILS # BLD AUTO: 5.2 K/UL (ref 1.8–7.7)
NEUTROPHILS NFR BLD: 63.7 % (ref 38–73)
NRBC BLD-RTO: 0 /100 WBC
PLATELET # BLD AUTO: 158 K/UL (ref 150–450)
PMV BLD AUTO: 11.8 FL (ref 9.2–12.9)
POTASSIUM SERPL-SCNC: 3.9 MMOL/L (ref 3.5–5.1)
PROT SERPL-MCNC: 7.3 G/DL (ref 6–8.4)
PROTHROMBIN TIME: 10.9 SEC (ref 9–12.5)
RBC # BLD AUTO: 3.85 M/UL (ref 4–5.4)
SODIUM SERPL-SCNC: 141 MMOL/L (ref 136–145)
WBC # BLD AUTO: 8.17 K/UL (ref 3.9–12.7)

## 2023-09-25 PROCEDURE — 80053 COMPREHEN METABOLIC PANEL: CPT | Performed by: INTERNAL MEDICINE

## 2023-09-25 PROCEDURE — 85025 COMPLETE CBC W/AUTO DIFF WBC: CPT | Performed by: INTERNAL MEDICINE

## 2023-09-25 PROCEDURE — 36415 COLL VENOUS BLD VENIPUNCTURE: CPT | Mod: PN | Performed by: INTERNAL MEDICINE

## 2023-09-25 PROCEDURE — 85610 PROTHROMBIN TIME: CPT | Performed by: INTERNAL MEDICINE

## 2023-09-25 PROCEDURE — 82248 BILIRUBIN DIRECT: CPT | Performed by: INTERNAL MEDICINE

## 2023-09-26 ENCOUNTER — PATIENT MESSAGE (OUTPATIENT)
Dept: HEPATOLOGY | Facility: CLINIC | Age: 83
End: 2023-09-26
Payer: MEDICARE

## 2023-09-26 DIAGNOSIS — K75.4 AUTOIMMUNE HEPATITIS: Primary | ICD-10-CM

## 2023-10-04 ENCOUNTER — PATIENT MESSAGE (OUTPATIENT)
Dept: UROGYNECOLOGY | Facility: CLINIC | Age: 83
End: 2023-10-04
Payer: MEDICARE

## 2023-10-09 ENCOUNTER — LAB VISIT (OUTPATIENT)
Dept: LAB | Facility: HOSPITAL | Age: 83
End: 2023-10-09
Attending: INTERNAL MEDICINE
Payer: MEDICARE

## 2023-10-09 DIAGNOSIS — K75.4 AUTOIMMUNE HEPATITIS: ICD-10-CM

## 2023-10-09 LAB
ALBUMIN SERPL BCP-MCNC: 3.3 G/DL (ref 3.5–5.2)
ALP SERPL-CCNC: 68 U/L (ref 55–135)
ALT SERPL W/O P-5'-P-CCNC: 85 U/L (ref 10–44)
ANION GAP SERPL CALC-SCNC: 7 MMOL/L (ref 8–16)
AST SERPL-CCNC: 81 U/L (ref 10–40)
BASOPHILS # BLD AUTO: 0.03 K/UL (ref 0–0.2)
BASOPHILS NFR BLD: 0.4 % (ref 0–1.9)
BILIRUB DIRECT SERPL-MCNC: 0.3 MG/DL (ref 0.1–0.3)
BILIRUB SERPL-MCNC: 0.8 MG/DL (ref 0.1–1)
BUN SERPL-MCNC: 22 MG/DL (ref 8–23)
CALCIUM SERPL-MCNC: 10.1 MG/DL (ref 8.7–10.5)
CHLORIDE SERPL-SCNC: 107 MMOL/L (ref 95–110)
CO2 SERPL-SCNC: 25 MMOL/L (ref 23–29)
CREAT SERPL-MCNC: 0.8 MG/DL (ref 0.5–1.4)
DIFFERENTIAL METHOD: ABNORMAL
EOSINOPHIL # BLD AUTO: 0.2 K/UL (ref 0–0.5)
EOSINOPHIL NFR BLD: 2.1 % (ref 0–8)
ERYTHROCYTE [DISTWIDTH] IN BLOOD BY AUTOMATED COUNT: 13.2 % (ref 11.5–14.5)
EST. GFR  (NO RACE VARIABLE): >60 ML/MIN/1.73 M^2
GLUCOSE SERPL-MCNC: 131 MG/DL (ref 70–110)
HCT VFR BLD AUTO: 40.8 % (ref 37–48.5)
HGB BLD-MCNC: 13.1 G/DL (ref 12–16)
IMM GRANULOCYTES # BLD AUTO: 0.06 K/UL (ref 0–0.04)
IMM GRANULOCYTES NFR BLD AUTO: 0.7 % (ref 0–0.5)
INR PPP: 1 (ref 0.8–1.2)
LYMPHOCYTES # BLD AUTO: 1.8 K/UL (ref 1–4.8)
LYMPHOCYTES NFR BLD: 20.7 % (ref 18–48)
MCH RBC QN AUTO: 32.3 PG (ref 27–31)
MCHC RBC AUTO-ENTMCNC: 32.1 G/DL (ref 32–36)
MCV RBC AUTO: 101 FL (ref 82–98)
MONOCYTES # BLD AUTO: 1 K/UL (ref 0.3–1)
MONOCYTES NFR BLD: 11.9 % (ref 4–15)
NEUTROPHILS # BLD AUTO: 5.5 K/UL (ref 1.8–7.7)
NEUTROPHILS NFR BLD: 64.2 % (ref 38–73)
NRBC BLD-RTO: 0 /100 WBC
PLATELET # BLD AUTO: 196 K/UL (ref 150–450)
PMV BLD AUTO: 11.5 FL (ref 9.2–12.9)
POTASSIUM SERPL-SCNC: 4.4 MMOL/L (ref 3.5–5.1)
PROT SERPL-MCNC: 7.9 G/DL (ref 6–8.4)
PROTHROMBIN TIME: 10.6 SEC (ref 9–12.5)
RBC # BLD AUTO: 4.06 M/UL (ref 4–5.4)
SODIUM SERPL-SCNC: 139 MMOL/L (ref 136–145)
WBC # BLD AUTO: 8.5 K/UL (ref 3.9–12.7)

## 2023-10-09 PROCEDURE — 85025 COMPLETE CBC W/AUTO DIFF WBC: CPT | Performed by: INTERNAL MEDICINE

## 2023-10-09 PROCEDURE — 80053 COMPREHEN METABOLIC PANEL: CPT | Performed by: INTERNAL MEDICINE

## 2023-10-09 PROCEDURE — 85610 PROTHROMBIN TIME: CPT | Performed by: INTERNAL MEDICINE

## 2023-10-09 PROCEDURE — 82248 BILIRUBIN DIRECT: CPT | Performed by: INTERNAL MEDICINE

## 2023-10-09 PROCEDURE — 36415 COLL VENOUS BLD VENIPUNCTURE: CPT | Mod: PN | Performed by: INTERNAL MEDICINE

## 2023-10-10 ENCOUNTER — CLINICAL SUPPORT (OUTPATIENT)
Dept: DIABETES | Facility: CLINIC | Age: 83
End: 2023-10-10
Payer: MEDICARE

## 2023-10-10 DIAGNOSIS — E11.59 TYPE 2 DIABETES MELLITUS WITH OTHER CIRCULATORY COMPLICATION, WITH LONG-TERM CURRENT USE OF INSULIN: Primary | ICD-10-CM

## 2023-10-10 DIAGNOSIS — Z79.4 TYPE 2 DIABETES MELLITUS WITH OTHER CIRCULATORY COMPLICATION, WITH LONG-TERM CURRENT USE OF INSULIN: Primary | ICD-10-CM

## 2023-10-10 PROCEDURE — G0108 DIAB MANAGE TRN  PER INDIV: HCPCS | Mod: 95,GC,, | Performed by: STUDENT IN AN ORGANIZED HEALTH CARE EDUCATION/TRAINING PROGRAM

## 2023-10-10 PROCEDURE — G0108 PR DIAB MANAGE TRN  PER INDIV: ICD-10-PCS | Mod: 95,GC,, | Performed by: STUDENT IN AN ORGANIZED HEALTH CARE EDUCATION/TRAINING PROGRAM

## 2023-10-11 NOTE — PROGRESS NOTES
Diabetes Care Specialist Virtual Visit Note   The patient location is: Home in [place]  The chief complaint leading to consultation is: Diabetes  Visit type: audiovisual  Total time spent with patient: 30 min   Each patient to whom he or she provides medical services by telemedicine is:  (1) informed of the relationship between the physician and patient and the respective role of any other health care provider with respect to management of the patient; and (2) notified that he or she may decline to receive medical services by telemedicine and may withdraw from such care at any time.       Diabetes Care Specialist Follow-up Note  Author: Michelle Feng RN, CDE  Date: 10/11/2023    Program Intake  Reason for Diabetes Program Visit:: Intervention  Type of Intervention:: Individual  Individual: Education  Education: Pattern Management  Current diabetes risk level:: low  In the last 12 months, have you:: none    Lab Results   Component Value Date    HGBA1C 6.2 (H) 07/24/2023       Clinical      There is no height or weight on file to calculate BMI.    Problem Review  Reviewed Problem List with Patient: yes  Comorbidities:  (Liver disease)    Clinical Assessment  Have you ever experienced hypoglycemia (low blood sugar)?: yes  In the last month, how often have you experienced low blood sugar?:  (none recently)  What symptoms do you experience?: Shaky  Have you ever experienced hyperglycemia (high blood sugar)?: yes  In the last month, how often have you experienced high blood sugar?: once a week    Additional Social History    Support  Does anyone support you with your diabetes care?: yes    Access to Mass Media & Technology  Does the patient have access to any of the following devices or technologies?: Smart phone    Health Literacy  Preferred Learning Method: Face to Face      Diabetes Self-Management Skills Assessment     Diabetes Disease Process/Treatment Options  Patient/caregiver able to state what happens when  someone has diabetes.: yes  Patient/caregiver knows what type of diabetes they have.: yes  Diabetes Type : Type II  Assessment indicates:: Adequate understanding  Area of need?: No    Nutrition/Healthy Eating  Method of carbohydrate measurement:: carb counting/reading labels, measuring cups/spoons  Patient can identify foods that impact blood sugar.: yes  Patient-identified foods:: fruit/fruit juice, milk, soda, starchy vegetables (corn, peas, beans), sweets, starches (bread, pasta, rice, cereal), yogurt  Assessment indicates:: Adequate understanding  Area of need?: No    Physical Activity/Exercise  Patient's daily activity level:: sedentary  Patient formally exercises outside of work.: no  Reasons for not exercising:: physically unable to exercise currently (in wheelchair)  Assessment indicates:: Adequate understanding  Area of need?: No    Medications  Patient is able to describe current diabetes management routine.: yes (Daughter administers all insulin doses.)  Diabetes management routine:: insulin  Patient is able to identify current diabetes medications, dosages, and appropriate timing of medications.: yes  Patient understands the purpose of the medications taken for diabetes.: yes  Patient reports problems or concerns with current medication regimen.: no  Assessment indicates:: Instruction Needed  Area of need?: Yes    Home Blood Glucose Monitoring  Patient states that blood sugar is checked at home daily.: yes  Monitoring Method:: personal continuous glucose monitor  Personal CGM type:: dexcom G6  Patient is able to use personal CGM appropriately.: yes (Daughter assist with insertion and maintenance)  Assessment indicates:: Instruction Needed (instructed pt to try to use smart phone for Dexcom; currently using  and wili used on daughters phone)  Area of need?: Yes (instructed pt to try to use smart phone for Dexcom; currently using  and wili used on daughters phone)    Acute  Complications  Patient can identify general symptoms of hypoglycemia: yes  Patient identified:: shakiness  Able to state proper treatment of hypoglycemia?: yes  Patient identified:: 5-6 pieces of hard candy, 1/2 can soda/fruit juice  Assessment indicates:: Adequate understanding  Area of need?: No    Chronic Complications  Deferred due to:: Time    Psychosocial/Coping  Patient can identify ways of coping with chronic disease.: yes  Patient-stated ways of coping with chronic disease:: support from loved ones  Assessment indicates:: Adequate understanding  Area of need?: No      During today's follow-up visit,  the following areas required further assessment and content was provided/reviewed.    Based on today's diabetes care assessment, the following areas of need were identified:            10/10/2023    12:01 AM   Diabetes Self-Management Skills   Diabetes Disease Process/Treatment Options No   Nutrition/Healthy Eating No   Physical Activity/Exercise No   Medication Yes, see care planning   Home Blood Glucose Monitoring Yes, continues to use Dexcom without issue   Acute Complications No   Psychosocial/Coping No        Today's interventions were provided through individual discussion, instruction, and written materials were provided.    Patient verbalized understanding of instruction and written materials.  Pt was able to return back demonstration of instructions today. Patient understood key points, needs reinforcement and further instruction.     Diabetes Self-Management Care Plan Review and Evaluation of Progress:    During today's follow-up Radha's Diabetes Self-Management Care Plan progress was reviewed and progress was evaluated including his/her input. Radha has agreed to continue his/her journey to improve/maintain overall diabetes control by continuing to set health goals. See care plan progress below.      Care Plan: Diabetes Management   Updates made since 9/11/2023 12:00 AM        Problem: Medications          Goal: Patient Agrees to take Diabetes Medication(s) using the Omnipod 5 system    Start Date: 3/27/2023   Expected End Date: 4/2/2024   This Visit's Progress: On track   Recent Progress: On track   Priority: High   Barriers: Knowledge deficit   Note:    OP5    Update to care planning 06/21/2023:  Reviewed Omnipod 5 download with Dexcom integration with patient and her daughter Vinod.  Overall doing well.  Bgs stable during the night and when she is not eating.  Elevations noted at all post meal glucose.  Carbs are controlled and measured by family members.  Will increase her I:C from 16 to 14 as she requires more insulin with meals.  Walked daughter thru changes on her PDM. No other concerns voiced.  We reviewed troubleshooting, what to look for including leaking at the site an unexplained rise in glucose readings.  Advised to monitor for these and to change the pod if this occurs.  Advised that she no longer has long acting insulin and changes must be address immediately to avoid potential issues.  Has back up insulin.  Demonstrated how to use the inhaled and glucagon emergency pens.  Will reach out to HCP to prescribed. Patient states she likes using system as opposed to injections. Advised to call Omnipod for replacement pods as she had to change 2 prematurely.  No other questions voiced.     Update to care planning 10/10/23:  Patient and her daughter seen for pump f/u.  Doing well on Omnipod 5.  We reviewed when changing the pod to make sure she is in [employer].  Had changed pod last and forgot to switch.  Discussed troubleshooting with pump and how to identify issues.  Advised to always change pod if numbers do not respond to correction bolus.  Instructed Too how to do a correction bolus with pump.          Follow Up Plan     Follow up in 6 mths April 2024    Today's care plan and follow up schedule was discussed with patient.  Radha verbalized understanding of the care plan, goals, and agrees to  follow up plan.        The patient was encouraged to communicate with his/her health care provider/physician and care team regarding his/her condition(s) and treatment.  I provided the patient with my contact information today and encouraged to contact me via phone or [facility] Patient Portal as needed.     Length of Visit   Total Time: 30 Minutes

## 2023-10-12 ENCOUNTER — OFFICE VISIT (OUTPATIENT)
Dept: UROGYNECOLOGY | Facility: CLINIC | Age: 83
End: 2023-10-12
Payer: MEDICARE

## 2023-10-12 VITALS
HEIGHT: 65 IN | WEIGHT: 121.69 LBS | BODY MASS INDEX: 20.28 KG/M2 | HEART RATE: 76 BPM | DIASTOLIC BLOOD PRESSURE: 75 MMHG | SYSTOLIC BLOOD PRESSURE: 140 MMHG

## 2023-10-12 DIAGNOSIS — Z46.89 PESSARY MAINTENANCE: ICD-10-CM

## 2023-10-12 DIAGNOSIS — N81.3 UTEROVAGINAL PROLAPSE, COMPLETE: Primary | ICD-10-CM

## 2023-10-12 DIAGNOSIS — N39.46 MIXED URGE AND STRESS INCONTINENCE: ICD-10-CM

## 2023-10-12 PROCEDURE — 99999 PR PBB SHADOW E&M-EST. PATIENT-LVL IV: CPT | Mod: PBBFAC,,, | Performed by: OBSTETRICS & GYNECOLOGY

## 2023-10-12 PROCEDURE — 99213 OFFICE O/P EST LOW 20 MIN: CPT | Mod: S$PBB,,, | Performed by: OBSTETRICS & GYNECOLOGY

## 2023-10-12 PROCEDURE — 99213 PR OFFICE/OUTPT VISIT, EST, LEVL III, 20-29 MIN: ICD-10-PCS | Mod: S$PBB,,, | Performed by: OBSTETRICS & GYNECOLOGY

## 2023-10-12 PROCEDURE — 99214 OFFICE O/P EST MOD 30 MIN: CPT | Mod: PBBFAC | Performed by: OBSTETRICS & GYNECOLOGY

## 2023-10-12 PROCEDURE — 99999 PR PBB SHADOW E&M-EST. PATIENT-LVL IV: ICD-10-PCS | Mod: PBBFAC,,, | Performed by: OBSTETRICS & GYNECOLOGY

## 2023-10-12 RX ORDER — ESTRADIOL 0.1 MG/G
0.5 CREAM VAGINAL
Qty: 45 G | Refills: 4 | Status: SHIPPED | OUTPATIENT
Start: 2023-10-12

## 2023-10-12 NOTE — PROGRESS NOTES
Urogyn follow up      [facility]  [address] [address]    Radha Feng  [id number]  1940      Radha Feng is a 82 y.o. here for a urogyn follow up for pelvic organ prolapse.     History of Present Illness   82 y.o.  female has a past medical history of Cataract, Chondromalacia, knee, right (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       06/12/2023  Here for pessary placement. Has had pessary holiday since last visit.    Changes since last visit:  Rare UUI when pulling pants down  Denies pain, bleeding, or discharge  Doing well with pessary--using rephresh weekly  Denies constipation or straining    10/12/2023:  Patient presents for follow up   She is currently using the pessary has had issues with abrasions in the past  No vaginal discharge or bleeding   +JUAN MIGUEL and +UU stable with the pessary     POP + doing well with the pessary       [facility]     Question 12/20/2022  1:44 PM CST - Filed by Patient   General Urogynecology: Are you experiencing the following?     Dysuria (painful urination) No   Nocturia:  waking up at night to empty your bladder  Yes   If you answered yes to the previous question, how many times does this happen per night? 1-2   Enuresis (urine loss during sleep) No   Dribbling urine after you urinate No   Hematuria (urine appears red) No   Type of stream Weak   Urinary frequency: How often a day are you going to the bathroom per day?  Less than 10   Urinary Tract Infections: How many Urinary Tract Infections have you had in the past year? One (1) in the past year   If you have had a UTI in the past year, what treatments have you had so far?  Prophylactic antibiotics   Urinary Incontinence (General): Are you experiencing the following?     Past consultation for incontinence: Have you ever seen someone for the evaluation of incontinence? No   If you answered yes to the previous question,  "please select all the therapies you have tried.  N/a- I answered no to the previous question   Please note the effectiveness of the therapies.     Need to wear protection to keep clothes dry  Yes   If you answered yes to the previous question, please scar the protection you use.  Poise   If you wear protection, how much wetness is typically on each pad? Slight   If you wear protection, how often do you have to change per day, if applicable?  3-4   Stress Symptoms: Are you experiencing the following?     Leakage of urine with cough, laugh and/or sneeze Yes   If you answered yes to the previous question, what is the frequency in days, weeks and/or months? Daily   Leakage of urine with sex No   Leakage of urine with bending/ lifting No   Leakage of urine with briskly walking or jogging No   If you lose urine for any other reason not previously mentioned, please note it below, if applicable.      Urge Symptoms: Are you experiencing the following?     Urgency ("got to go" feeling) No   Urge: How frequently do you feel an urge to urinate (feeling like you "gotta go" to the bathroom and can't wait) Never   Do you experience a leakage of urine when you have a feeling of urgency?  No   Leakage of urine when unaware No   Past use of anticholinergics (medications used to treat overactive bladder) No   If you answered yes to the previous question, please scar the anticholinergics you have used:      Have you ever used Mirbetriq (aka Mirabegron)?  No   Prolapse Symptoms: Are you experiencing any of the following?      Falling out/ Bulging/ Heaviness in the vagina Yes   Vaginal/ Abdominal Pain/ Pressure No   Need to strain/ Push to void No   Need to wait on the toilet before you void No   Unusual position to urinate (using your hands to push back the vaginal bulge) No   Sensation of incomplete emptying No   Past use of pessary device No   If you answered yes to the previous question, please list the devices you have used below.   "    Bowel Symptoms: Are you experiencing any of the following?     Constipation No   Diarrhea  No   Hematochezia (bloody stool) No   Incomplete evacuation of stool No   Involuntary loss of formed stool No   Fecal smearing/urgency No   Involuntary loss of gas Yes   Vaginal Symptoms: Are you experiencing any of the following?      Abnormal vaginal bleeding  No   Vaginal dryness No   Sexually active  No   Dyspareunia (painful intercourse) No   Estrogen use  No        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    Diabetes mellitus     Glaucoma     Hypertension     Other ascites 11/29/2022       Past Surgical History:   Procedure Laterality Date    CATARACT EXTRACTION W/  INTRAOCULAR LENS IMPLANT Left 12/21/2021    Procedure: EXTRACTION, CATARACT, WITH IOL INSERTION;  Surgeon: Shay Chou MD;  Location: [facility];  Service: Ophthalmology;  Laterality: Left;    ESOPHAGOGASTRODUODENOSCOPY N/A 6/26/2023    Procedure: ESOPHAGOGASTRODUODENOSCOPY (EGD);  Surgeon: Edgar Branch MD;  Location: [address]);  Service: Endoscopy;  Laterality: N/A;  referral Dr Peraza-cirrhosis/labs done on 4/24/23-instr portal-GT       Family History   Problem Relation Age of Onset    Cataracts Mother     Diabetes Mother     Glaucoma Mother     Hypertension Mother     Heart attack Father     Colon cancer Sister     Blindness Cousin     Amblyopia Neg Hx     Macular degeneration Neg Hx     Retinal detachment Neg Hx        Social History     Socioeconomic History    Marital status: Single   Tobacco Use    Smoking status: Former    Smokeless tobacco: Never   Substance and Sexual Activity    Alcohol use: Not Currently    Drug use: Never   Social History Narrative    , one daughter local, four sons out of state. Retired . Lives with daughter Joss.     Social Determinants of Health     Financial Resource Strain: Low Risk  (10/23/2022)    Overall Financial Resource  Strain (CARDIA)     Difficulty of Paying Living Expenses: Not hard at all   Recent Concern: Financial Resource Strain - Medium Risk (9/29/2022)    Overall Financial Resource Strain (CARDIA)     Difficulty of Paying Living Expenses: Somewhat hard   Food Insecurity: No Food Insecurity (10/23/2022)    Hunger Vital Sign     Worried About Running Out of Food in the Last Year: Never true     Ran Out of Food in the Last Year: Never true   Recent Concern: Food Insecurity - Food Insecurity Present (9/29/2022)    Hunger Vital Sign     Worried About Running Out of Food in the Last Year: Sometimes true     Ran Out of Food in the Last Year: Sometimes true   Transportation Needs: No Transportation Needs (10/23/2022)    PRAPARE - Transportation     Lack of Transportation (Medical): No     Lack of Transportation (Non-Medical): No   Physical Activity: Inactive (10/23/2022)    Exercise Vital Sign     Days of Exercise per Week: 0 days     Minutes of Exercise per Session: 0 min   Stress: No Stress Concern Present (10/23/2022)    [employer] - Occupational Stress Questionnaire     Feeling of Stress : Only a little   Recent Concern: Stress - Stress Concern Present (9/29/2022)    [employer] - Occupational Stress Questionnaire     Feeling of Stress : Rather much   Social Connections: Unknown (10/23/2022)    Social Connection and Isolation Panel [NHANES]     Frequency of Communication with Friends and Family: More than three times a week     Frequency of Social Gatherings with Friends and Family: Once a week     Active Member of Clubs or Organizations: No     Attends Club or Organization Meetings: Never     Marital Status:    Housing Stability: Low Risk  (10/23/2022)    Housing Stability Vital Sign     Unable to Pay for Housing in the Last Year: No     Number of Places Lived in the Last Year: 1     Unstable Housing in the Last Year: No       Current Outpatient Medications  "  Medication Sig Dispense Refill    acetaminophen (TYLENOL) 500 MG tablet Take 2 tablets (1,000 mg total) by mouth every 8 (eight) hours as needed for Pain.  0    amLODIPine (NORVASC) 2.5 MG tablet Take 1 tablet (2.5 mg total) by mouth once daily. 90 tablet 0    BD INSULIN SYRINGE ULTRA-FINE 1 mL 31 gauge x 5/16 Syrg       blood-glucose meter,continuous (DEXCOM G6 ) Misc Check blood sugar before meals and at bedtime 1 each PRN    blood-glucose sensor (DEXCOM G6 SENSOR) Amanda Check blood sugar before meals and at bedtime 1 each PRN    blood-glucose transmitter (DEXCOM G6 TRANSMITTER) Amanda Check blood sugar before meals and at bedtime 1 each PRN    brimonidine 0.2% (ALPHAGAN) 0.2 % Drop INSTILL 1 DROP INTO RIGHT EYE TWICE A DAY 10 mL 11    cholecalciferol, vitamin D3, (VITAMIN D3) 25 mcg (1,000 unit) capsule Take 2 capsules (2,000 Units total) by mouth once daily.  0    dorzolamide-timolol 2-0.5% (COSOPT) 22.3-6.8 mg/mL ophthalmic solution INSTILL 1 DROP INTO RIGHT EYE TWICE A DAY 10 mL 11    furosemide (LASIX) 40 MG tablet TAKE 1 TABLET BY MOUTH EVERY DAY 90 tablet 1    insulin aspart U-100 (NOVOLOG) 100 unit/mL injection To use with Omnipod 5 30 mL 5    insulin pump cart,automated,BT (OMNIPOD 5 G6 PODS, GEN 5,) Crtg Inject 1 each into the skin Every 3 (three) days. 10 each 11    lactulose (CEPHULAC) 10 gram packet Take 10 g by mouth 3 (three) times daily.      latanoprost 0.005 % ophthalmic solution PLACE 1 DROP INTO BOTH EYES ONCE DAILY 7.5 mL 3    lisinopriL-hydrochlorothiazide (PRINZIDE,ZESTORETIC) 20-12.5 mg per tablet Take 1 tablet by mouth once daily. (Patient taking differently: Take 1 tablet by mouth every other day.) 90 tablet 3    pen needle, diabetic 31 gauge x 5/16" Ndle Use to inject insulin into the skin up to 4 times daily 400 each 3    predniSONE (DELTASONE) 5 MG tablet TAKE 1 AND 1/2 TABLETS BY MOUTH ONCE DAILY. 135 tablet 1    estradioL (ESTRACE) 0.01 % (0.1 mg/gram) vaginal cream Place 0.5 " "g vaginally twice a week. Apply to the vagina daily for two weeks then twice a week. 45 g 4    insulin aspart U-100 (NOVOLOG) 100 unit/mL (3 mL) InPn pen Inject 12 Units into the skin 3 (three) times daily with meals. 30 mL 1    insulin detemir U-100 (LEVEMIR FLEXTOUCH) 100 unit/mL (3 mL) SubQ InPn pen Inject 5 Units into the skin every evening. (Patient not taking: Reported on 7/24/2023) 6 mL 1    k phos di & mono-sod phos mono (PHOSPHA 250 NEUTRAL) 250 mg Tab Take 1 tablet by mouth once daily. for 5 days (Patient not taking: Reported on 12/15/2022) 5 tablet 0     No current facility-administered medications for this visit.       Review of patient's allergies indicates:  No Known Allergies    Well Woman:  Pap:denies history of abnormal pap  Mammo:no longer screening  Colonoscopy:refused  Dexa:03/2023    Hip Fracture 6%.   Impression:   Osteopenia lumbar spine.  Osteoporosis both hips.      ROS:  As per HPI.      Exam  BP (!) 140/75   Pulse 76   Ht 5' 5" (1.651 m)   Wt 55.2 kg (121 lb 11.1 oz)   BMI 20.25 kg/m²   General: alert and oriented, no acute distress  Respiratory: normal respiratory effort  Abd: soft, non-tender, non-distended    Pelvic  Ext. Genitalia: normal external genitalia. Normal bartholin's and skeens glands  Vagina: + atrophy. Normal vaginal mucosa without lesions. Small discharge noted.  Superficial abrasions along the apex and posterior vagina.  Non-tender bladder base without palpable mass.  #2 1/2 LS GH pessary in place  Cervix no lesions  Uterus:  nontender, normal size, shape, position, mobile  Urethra: no masses or tenderness  Urethral meatus: no lesions, caruncle or prolapse.    Pessary removed without difficulty. Washed with soap and water. Reinserted without difficulty    Impression  1. Uterovaginal prolapse, complete        2. Mixed urge and stress incontinence        3. Pessary maintenance              We reviewed the above issues and discussed options for short-term versus " "long-term management of her problems.     Plan:   1. Prolapse: Stage 2 apical prolapse, Stage 2 anterior and posterior vaginal wall prolapse -  --continue  #2 1/2 " LS gH pessary  --Daily pelvic floor exercises as assigned, Pelvic floor PT   --Non surgical options discussed with patient    --superficial discharge noted recommend vaginal estrogen and add replens  on opposite days.      2.  Mixed urinary incontinence, urge > stress:   --Empty bladder every 3 hours.  Empty well: wait a minute, lean forward on toilet.    --Avoid dietary irritants (see sheet).  Keep diary x 3-5 days to determine your irritants.  --KEGELS: do 10 in AM and 10 in PM, holding each x 10 seconds.  When you feel urge to go, STOP, KEGEL, and when urge has passed, then go to bathroom.  --URGE: .  SE profile reviewed.    Takes 2-4 weeks to see if will have effect.  For dry mouth: get sour, sugar free lozenge or gum.    --STRESS:  Non surgical options: Pessary vs. Impressa vs Rivive - which represent urethral and bladder support devices; Surgical options including 1.  mid urethral sling; retropubic, transobturator vs single incision 2. Fascial slings 3. Hutchison procedure 4. Periurethral bulking     3. Vaginal atrophy (dryness):  Use .5 gram of estrogen cream in vagina reese for two weeks then twice a week .  Please start Use REPLENS or REFRESH OTC: 1/2 applicator full in vagina twice a week.        4. RTC 3 months for for pc       Luisa Evans DO  Female Pelvic Medicine and Reconstructive Surgery  [facility] [place], [place]      "

## 2023-10-12 NOTE — PATIENT INSTRUCTIONS
"1. Prolapse: Stage 2 apical prolapse, Stage 2 anterior and posterior vaginal wall prolapse   --continue  #2 1/2 " LS gH pessary  --Daily pelvic floor exercises as assigned, Pelvic floor PT   --Non surgical options discussed with patient       2.  Mixed urinary incontinence, urge > stress:   --Empty bladder every 3 hours.  Empty well: wait a minute, lean forward on toilet.    --Avoid dietary irritants (see sheet).  Keep diary x 3-5 days to determine your irritants.  --KEGELS: do 10 in AM and 10 in PM, holding each x 10 seconds.  When you feel urge to go, STOP, KEGEL, and when urge has passed, then go to bathroom.  --URGE: .  SE profile reviewed.    Takes 2-4 weeks to see if will have effect.  For dry mouth: get sour, sugar free lozenge or gum.    --STRESS:  Non surgical options: Pessary vs. Impressa vs Rivive - which represent urethral and bladder support devices; Surgical options including 1.  mid urethral sling; retropubic, transobturator vs single incision 2. Fascial slings 3. Hutchison procedure 4. Periurethral bulking     3. Vaginal atrophy (dryness):  Use .5 gram of estrogen cream in vagina twice a week .  Use REPLENS or REFRESH OTC: 1/2 applicator full in vagina twice a week.        4. RTC 3 months for for pc  "

## 2023-10-20 ENCOUNTER — INFUSION (OUTPATIENT)
Dept: INFECTIOUS DISEASES | Facility: HOSPITAL | Age: 83
End: 2023-10-20
Attending: INTERNAL MEDICINE
Payer: MEDICARE

## 2023-10-20 VITALS
RESPIRATION RATE: 16 BRPM | OXYGEN SATURATION: 95 % | SYSTOLIC BLOOD PRESSURE: 160 MMHG | TEMPERATURE: 98 F | HEIGHT: 65 IN | HEART RATE: 69 BPM | BODY MASS INDEX: 20.28 KG/M2 | DIASTOLIC BLOOD PRESSURE: 72 MMHG | WEIGHT: 121.69 LBS

## 2023-10-20 DIAGNOSIS — M81.0 AGE-RELATED OSTEOPOROSIS WITHOUT CURRENT PATHOLOGICAL FRACTURE: Primary | ICD-10-CM

## 2023-10-20 PROCEDURE — 96372 THER/PROPH/DIAG INJ SC/IM: CPT

## 2023-10-20 PROCEDURE — 63600175 PHARM REV CODE 636 W HCPCS: Mod: JZ,JG | Performed by: INTERNAL MEDICINE

## 2023-10-20 RX ADMIN — DENOSUMAB 60 MG: 60 INJECTION SUBCUTANEOUS at 10:10

## 2023-10-20 NOTE — PROGRESS NOTES
Patient arrives for prolia injection - confirms use of calcium and vitamin D supplements and denies dental procedures over past 3 months - administered per guidelines.    Limited head-to-toe assessment due to privacy issues and visit reason though the opportunity was given for patient to express any concerns

## 2023-10-23 ENCOUNTER — OFFICE VISIT (OUTPATIENT)
Dept: HEPATOLOGY | Facility: CLINIC | Age: 83
End: 2023-10-23
Payer: MEDICARE

## 2023-10-23 ENCOUNTER — LAB VISIT (OUTPATIENT)
Dept: LAB | Facility: HOSPITAL | Age: 83
End: 2023-10-23
Attending: INTERNAL MEDICINE
Payer: MEDICARE

## 2023-10-23 VITALS
WEIGHT: 121.5 LBS | OXYGEN SATURATION: 97 % | SYSTOLIC BLOOD PRESSURE: 165 MMHG | DIASTOLIC BLOOD PRESSURE: 74 MMHG | HEART RATE: 64 BPM | HEIGHT: 65 IN | BODY MASS INDEX: 20.24 KG/M2 | RESPIRATION RATE: 18 BRPM

## 2023-10-23 DIAGNOSIS — K74.60 CIRRHOSIS OF LIVER WITH ASCITES, UNSPECIFIED HEPATIC CIRRHOSIS TYPE: ICD-10-CM

## 2023-10-23 DIAGNOSIS — R18.8 CIRRHOSIS OF LIVER WITH ASCITES, UNSPECIFIED HEPATIC CIRRHOSIS TYPE: ICD-10-CM

## 2023-10-23 DIAGNOSIS — K75.4 AUTOIMMUNE HEPATITIS: Primary | Chronic | ICD-10-CM

## 2023-10-23 LAB
BASOPHILS # BLD AUTO: 0.03 K/UL (ref 0–0.2)
BASOPHILS NFR BLD: 0.3 % (ref 0–1.9)
DIFFERENTIAL METHOD: ABNORMAL
EOSINOPHIL # BLD AUTO: 0.1 K/UL (ref 0–0.5)
EOSINOPHIL NFR BLD: 1.6 % (ref 0–8)
ERYTHROCYTE [DISTWIDTH] IN BLOOD BY AUTOMATED COUNT: 13.2 % (ref 11.5–14.5)
HCT VFR BLD AUTO: 38.1 % (ref 37–48.5)
HGB BLD-MCNC: 12.4 G/DL (ref 12–16)
IMM GRANULOCYTES # BLD AUTO: 0.03 K/UL (ref 0–0.04)
IMM GRANULOCYTES NFR BLD AUTO: 0.3 % (ref 0–0.5)
INR PPP: 1 (ref 0.8–1.2)
LYMPHOCYTES # BLD AUTO: 1.7 K/UL (ref 1–4.8)
LYMPHOCYTES NFR BLD: 20 % (ref 18–48)
MCH RBC QN AUTO: 32 PG (ref 27–31)
MCHC RBC AUTO-ENTMCNC: 32.5 G/DL (ref 32–36)
MCV RBC AUTO: 98 FL (ref 82–98)
MONOCYTES # BLD AUTO: 1 K/UL (ref 0.3–1)
MONOCYTES NFR BLD: 11.6 % (ref 4–15)
NEUTROPHILS # BLD AUTO: 5.7 K/UL (ref 1.8–7.7)
NEUTROPHILS NFR BLD: 66.2 % (ref 38–73)
NRBC BLD-RTO: 0 /100 WBC
PLATELET # BLD AUTO: 184 K/UL (ref 150–450)
PMV BLD AUTO: 11.5 FL (ref 9.2–12.9)
PROTHROMBIN TIME: 10.5 SEC (ref 9–12.5)
RBC # BLD AUTO: 3.87 M/UL (ref 4–5.4)
WBC # BLD AUTO: 8.6 K/UL (ref 3.9–12.7)

## 2023-10-23 PROCEDURE — 36415 COLL VENOUS BLD VENIPUNCTURE: CPT | Mod: PN | Performed by: INTERNAL MEDICINE

## 2023-10-23 PROCEDURE — 85025 COMPLETE CBC W/AUTO DIFF WBC: CPT | Performed by: INTERNAL MEDICINE

## 2023-10-23 PROCEDURE — 99999 PR PBB SHADOW E&M-EST. PATIENT-LVL V: CPT | Mod: PBBFAC,,, | Performed by: INTERNAL MEDICINE

## 2023-10-23 PROCEDURE — 99215 OFFICE O/P EST HI 40 MIN: CPT | Mod: PBBFAC,PN | Performed by: INTERNAL MEDICINE

## 2023-10-23 PROCEDURE — 99214 PR OFFICE/OUTPT VISIT, EST, LEVL IV, 30-39 MIN: ICD-10-PCS | Mod: S$PBB,,, | Performed by: INTERNAL MEDICINE

## 2023-10-23 PROCEDURE — 99214 OFFICE O/P EST MOD 30 MIN: CPT | Mod: S$PBB,,, | Performed by: INTERNAL MEDICINE

## 2023-10-23 PROCEDURE — 85610 PROTHROMBIN TIME: CPT | Performed by: INTERNAL MEDICINE

## 2023-10-23 PROCEDURE — 99999 PR PBB SHADOW E&M-EST. PATIENT-LVL V: ICD-10-PCS | Mod: PBBFAC,,, | Performed by: INTERNAL MEDICINE

## 2023-10-23 PROCEDURE — 82248 BILIRUBIN DIRECT: CPT | Performed by: INTERNAL MEDICINE

## 2023-10-23 PROCEDURE — 80053 COMPREHEN METABOLIC PANEL: CPT | Performed by: INTERNAL MEDICINE

## 2023-10-23 NOTE — PATIENT INSTRUCTIONS
Will review labs from today- if the same then continue pred 5 mg and labs every 8 weeks  If numbers up then will increase pred to 7.5  Nov jim ROTHMAN with AFP  Return 4 months

## 2023-10-23 NOTE — PROGRESS NOTES
HEPATOLOGY FOLLOW UP    Referring Physician: Leticia Lim MD   Current Corresponding Physician: Leticia Lim MD, Nestor Agarwal MD, Divina Stock MD    Radha Feng is here for follow up of autoimmune hepatitis-induced cirrhosis    HPI  Ms Feng is an 84 yo PMHx HTN, ID-T2DM, decompensated AIH cirrhosis (biopsy proven) presented and was admitted 11/3/22-11/11/22 with abdo distention from ascites.     Patient hospitalized 09/20-10/15 for SOB found to have elevated liver enzymes and diagnosed with AIH vs RUBIO cirrhosis on biopsy decompensated by ascites. Started on steroids, imuran w/ improvement in enzymes. However due to malaise an leukopenia, imuran has been held. She was diuresed, underwent LVP and discharged on 10 mg prednisone daily with goal to continue but minimize prednisone as outpt.    Interval History  Since Radha's continues diuretics, has been able to stop lactulose and continues on prednisone but is no longer on 7.5 mg daily. She is now on 5 mg daily. No recent paracentesis.. She is feeling well.     Paracentesis 11/7/22: 2200 ml fluid; cell count 50   EGD 6/26/23: no varices    Labs 10/9/23: Tbil -0.8, ALT 85, AST 81, ALKP 68, creat 0.8  Abdo US 5/5/23: Liver: 12 cm, normal in size. Coarsened liver echotexture. Hypoechoic lesion in the subcapsular region of the inferior right hepatic lobe measuring 0.9 cm may represent a tiny cyst, though is too small to characterize by imaging.    Bone density: osteoporosis in hips; osteopenia lumbar spine- on tx per endocrinology    Ascites, ongoing: lasix 40 mg daily but no aldactone (urinates more with monotherapy by report)  HE: off lactulose    MELD 3.0: 8 at 10/9/2023  9:34 AM  MELD-Na: 6 at 10/9/2023  9:34 AM  Calculated from:  Serum Creatinine: 0.8 mg/dL (Using min of 1 mg/dL) at 10/9/2023  9:34 AM  Serum Sodium: 139 mmol/L (Using max of 137 mmol/L) at 10/9/2023  9:34 AM  Total Bilirubin: 0.8 mg/dL (Using min of 1 mg/dL) at  "10/9/2023  9:34 AM  Serum Albumin: 3.3 g/dL at 10/9/2023  9:34 AM  INR(ratio): 1.0 at 10/9/2023  9:34 AM  Age at listing (hypothetical): 82 years  Sex: Female at 10/9/2023  9:34 AM        Outpatient Encounter Medications as of 10/23/2023   Medication Sig Dispense Refill    amLODIPine (NORVASC) 2.5 MG tablet Take 1 tablet (2.5 mg total) by mouth once daily. 90 tablet 0    BD INSULIN SYRINGE ULTRA-FINE 1 mL 31 gauge x 5/16 Syrg       blood-glucose meter,continuous (DEXCOM G6 ) Misc Check blood sugar before meals and at bedtime 1 each PRN    blood-glucose sensor (DEXCOM G6 SENSOR) Amanda Check blood sugar before meals and at bedtime 1 each PRN    blood-glucose transmitter (DEXCOM G6 TRANSMITTER) Amanda Check blood sugar before meals and at bedtime 1 each PRN    brimonidine 0.2% (ALPHAGAN) 0.2 % Drop INSTILL 1 DROP INTO RIGHT EYE TWICE A DAY 10 mL 11    cholecalciferol, vitamin D3, (VITAMIN D3) 25 mcg (1,000 unit) capsule Take 2 capsules (2,000 Units total) by mouth once daily.  0    dorzolamide-timolol 2-0.5% (COSOPT) 22.3-6.8 mg/mL ophthalmic solution INSTILL 1 DROP INTO RIGHT EYE TWICE A DAY 10 mL 11    estradioL (ESTRACE) 0.01 % (0.1 mg/gram) vaginal cream Place 0.5 g vaginally twice a week. Apply to the vagina daily for two weeks then twice a week. 45 g 4    furosemide (LASIX) 40 MG tablet TAKE 1 TABLET BY MOUTH EVERY DAY 90 tablet 1    insulin aspart U-100 (NOVOLOG) 100 unit/mL injection To use with Omnipod 5 30 mL 5    insulin pump cart,automated,BT (OMNIPOD 5 G6 PODS, GEN 5,) Crtg Inject 1 each into the skin Every 3 (three) days. 10 each 11    latanoprost 0.005 % ophthalmic solution PLACE 1 DROP INTO BOTH EYES ONCE DAILY 7.5 mL 3    lisinopriL-hydrochlorothiazide (PRINZIDE,ZESTORETIC) 20-12.5 mg per tablet Take 1 tablet by mouth once daily. (Patient taking differently: Take 1 tablet by mouth every other day.) 90 tablet 3    pen needle, diabetic 31 gauge x 5/16" Ndle Use to inject insulin into the skin up to " 4 times daily 400 each 3    predniSONE (DELTASONE) 5 MG tablet TAKE 1 AND 1/2 TABLETS BY MOUTH ONCE DAILY. 135 tablet 1    acetaminophen (TYLENOL) 500 MG tablet Take 2 tablets (1,000 mg total) by mouth every 8 (eight) hours as needed for Pain.  0    insulin aspart U-100 (NOVOLOG) 100 unit/mL (3 mL) InPn pen Inject 12 Units into the skin 3 (three) times daily with meals. 30 mL 1    insulin detemir U-100 (LEVEMIR FLEXTOUCH) 100 unit/mL (3 mL) SubQ InPn pen Inject 5 Units into the skin every evening. (Patient not taking: Reported on 7/24/2023) 6 mL 1    k phos di & mono-sod phos mono (PHOSPHA 250 NEUTRAL) 250 mg Tab Take 1 tablet by mouth once daily. for 5 days (Patient not taking: Reported on 12/15/2022) 5 tablet 0    lactulose (CEPHULAC) 10 gram packet Take 10 g by mouth 3 (three) times daily.       No facility-administered encounter medications on file as of 10/23/2023.     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    HENT: Negative.     Eyes: Negative.    Respiratory: Negative.     Cardiovascular: Negative.    Gastrointestinal: Negative.    Genitourinary: Negative.    Musculoskeletal: Negative.    Skin: Negative.    Neurological: Negative.    Psychiatric/Behavioral: Negative.       Vitals:    10/23/23 1035   BP: (!) 165/74   Pulse: 64   Resp: 18       Physical Exam  Vitals reviewed.   Constitutional:       Appearance: She is well-developed.   HENT:      Head: Normocephalic and atraumatic.   Eyes:      General: No scleral icterus.     Conjunctiva/sclera: Conjunctivae normal.      Pupils: Pupils are equal, round, and reactive to light.   Neck:      Thyroid: No thyromegaly.   Cardiovascular:      Rate and Rhythm: Normal rate and regular rhythm.      Heart sounds: Normal heart  sounds.   Pulmonary:      Effort: Pulmonary effort is normal.      Breath sounds: Normal breath sounds. No rales.   Abdominal:      General: Bowel sounds are normal. There is no distension.      Palpations: Abdomen is soft. There is no mass.      Tenderness: There is no abdominal tenderness.   Musculoskeletal:         General: Normal range of motion.      Cervical back: Normal range of motion and neck supple.   Skin:     General: Skin is warm and dry.      Findings: No rash.   Neurological:      Mental Status: She is alert and oriented to person, place, and time.         MELD 3.0: 8 at 10/9/2023  9:34 AM  MELD-Na: 6 at 10/9/2023  9:34 AM  Calculated from:  Serum Creatinine: 0.8 mg/dL (Using min of 1 mg/dL) at 10/9/2023  9:34 AM  Serum Sodium: 139 mmol/L (Using max of 137 mmol/L) at 10/9/2023  9:34 AM  Total Bilirubin: 0.8 mg/dL (Using min of 1 mg/dL) at 10/9/2023  9:34 AM  Serum Albumin: 3.3 g/dL at 10/9/2023  9:34 AM  INR(ratio): 1.0 at 10/9/2023  9:34 AM  Age at listing (hypothetical): 82 years  Sex: Female at 10/9/2023  9:34 AM      Lab Results   Component Value Date     (H) 10/09/2023    BUN 22 10/09/2023    CREATININE 0.8 10/09/2023    CALCIUM 10.1 10/09/2023     10/09/2023    K 4.4 10/09/2023     10/09/2023    PROT 7.9 10/09/2023    CO2 25 10/09/2023    ANIONGAP 7 (L) 10/09/2023    WBC 8.50 10/09/2023    RBC 4.06 10/09/2023    HGB 13.1 10/09/2023    HCT 40.8 10/09/2023    HCT 30 (L) 10/23/2022     (H) 10/09/2023    MCH 32.3 (H) 10/09/2023    MCHC 32.1 10/09/2023     Lab Results   Component Value Date    RDW 13.2 10/09/2023     10/09/2023    MPV 11.5 10/09/2023    GRAN 5.5 10/09/2023    GRAN 64.2 10/09/2023    LYMPH 1.8 10/09/2023    LYMPH 20.7 10/09/2023    MONO 1.0 10/09/2023    MONO 11.9 10/09/2023    EOSINOPHIL 2.1 10/09/2023    BASOPHIL 0.4 10/09/2023    EOS 0.2 10/09/2023    BASO 0.03 10/09/2023    APTT 35.6 (H) 09/29/2022    GROUPTRH O POS 11/07/2022    BNP 66 09/20/2022     CHOL 238 (H) 01/30/2023    TRIG 97 01/30/2023    HDL 50 01/30/2023    CHOLHDL 21.0 01/30/2023    TOTALCHOLEST 4.8 01/30/2023    ALBUMIN 3.3 (L) 10/09/2023    BILIDIR 0.3 10/09/2023    AST 81 (H) 10/09/2023    ALT 85 (H) 10/09/2023    ALKPHOS 68 10/09/2023    MG 1.7 05/18/2023    LABPROT 10.6 10/09/2023    INR 1.0 10/09/2023       Assessment and Plan:  Radha Feng is a 82 y.o. female with AIH-induced cirrhosis. Current recommendations:  Cirrhosis: check labs every 8 weeks. Will check las now- if elevated then will consider raising prednisone back up to 7.5 mg daily; HCC screening every 6 months (abdo 11/23 with AFP); EGD -repeat 2025 to screen for varices  Autoimmune hepatitis: continue prednisone 5 mg daily; as above- if liver enzymes are up will consider increasing back up to 7.5 mg daily (realizing she does have osteoporosis)  Ascites/edema, ongoing: continue current diuretics (lasix 40 mg daily)  HE, off lactulose; monitor  Osteoporosis: tx per endocriniology    Return 16 weeks  A total of 35 minutes was spent reviewing the chart, imaging, labs, examining the patient and counseling the patient about their liver disease.

## 2023-10-24 LAB
ALBUMIN SERPL BCP-MCNC: 3.2 G/DL (ref 3.5–5.2)
ALP SERPL-CCNC: 68 U/L (ref 55–135)
ALT SERPL W/O P-5'-P-CCNC: 83 U/L (ref 10–44)
ANION GAP SERPL CALC-SCNC: 9 MMOL/L (ref 8–16)
AST SERPL-CCNC: 92 U/L (ref 10–40)
BILIRUB DIRECT SERPL-MCNC: 0.3 MG/DL (ref 0.1–0.3)
BILIRUB SERPL-MCNC: 0.8 MG/DL (ref 0.1–1)
BUN SERPL-MCNC: 26 MG/DL (ref 8–23)
CALCIUM SERPL-MCNC: 9.3 MG/DL (ref 8.7–10.5)
CHLORIDE SERPL-SCNC: 110 MMOL/L (ref 95–110)
CO2 SERPL-SCNC: 21 MMOL/L (ref 23–29)
CREAT SERPL-MCNC: 0.8 MG/DL (ref 0.5–1.4)
EST. GFR  (NO RACE VARIABLE): >60 ML/MIN/1.73 M^2
GLUCOSE SERPL-MCNC: 132 MG/DL (ref 70–110)
POTASSIUM SERPL-SCNC: 4.3 MMOL/L (ref 3.5–5.1)
PROT SERPL-MCNC: 7.7 G/DL (ref 6–8.4)
SODIUM SERPL-SCNC: 140 MMOL/L (ref 136–145)

## 2023-10-30 ENCOUNTER — OFFICE VISIT (OUTPATIENT)
Dept: OPTOMETRY | Facility: CLINIC | Age: 83
End: 2023-10-30
Payer: MEDICARE

## 2023-10-30 DIAGNOSIS — H40.1121 PRIMARY OPEN ANGLE GLAUCOMA (POAG) OF LEFT EYE, MILD STAGE: ICD-10-CM

## 2023-10-30 DIAGNOSIS — H40.1113 PRIMARY OPEN ANGLE GLAUCOMA (POAG) OF RIGHT EYE, SEVERE STAGE: Primary | ICD-10-CM

## 2023-10-30 PROCEDURE — 99999 PR PBB SHADOW E&M-EST. PATIENT-LVL III: ICD-10-PCS | Mod: PBBFAC,,, | Performed by: OPTOMETRIST

## 2023-10-30 PROCEDURE — 99214 OFFICE O/P EST MOD 30 MIN: CPT | Mod: S$PBB,,, | Performed by: OPTOMETRIST

## 2023-10-30 PROCEDURE — 99999 PR PBB SHADOW E&M-EST. PATIENT-LVL III: CPT | Mod: PBBFAC,,, | Performed by: OPTOMETRIST

## 2023-10-30 PROCEDURE — 99213 OFFICE O/P EST LOW 20 MIN: CPT | Mod: PBBFAC,PO | Performed by: OPTOMETRIST

## 2023-10-30 PROCEDURE — 99214 PR OFFICE/OUTPT VISIT, EST, LEVL IV, 30-39 MIN: ICD-10-PCS | Mod: S$PBB,,, | Performed by: OPTOMETRIST

## 2023-10-30 NOTE — PROGRESS NOTES
HPI     Glaucoma     Additional comments: Patient Radha Feng is an 82 year old female.           Comments    Pt here for 4 month IOP check. Pt states that VA OU is the same since last   visit, does not have any glasses. Pt states that eyelids OD itch all of   the time but denies any eye pain. Pt states that she is using all drops as   instructed.    DLS: 06/19/2023 with Dr. Elliott    Gtts:  1. Brimonidine BID OD  2. Cosopt BID OU  3. Latanoprost qAM OS    POHx:  1. Primary open angle glaucoma (POAG) of right eye, severe stage  2. Primary open angle glaucoma (POAG) of both eyes, mild stage          Last edited by Raya Boo on 10/30/2023 10:50 AM.            Assessment /Plan     For exam results, see Encounter Report.    Primary open angle glaucoma (POAG) of right eye, severe stage    Primary open angle glaucoma (POAG) of left eye, mild stage      (+) FHx- mother. IOP 12 OD, 15 OS. Last 20 OD, 19 Os.Increased IOP OU compared to prior but pt reports compliance. Tmax 28 OD, 23 OS. c/d 0.85 OD, 0.75 OS. Pt reports possibly being on drops for this prior to 2020..   Pachy 579 OD, 608 OS  8/11/2022  OCT OD borderline NI, Thin T, TI, TS, G, OS thin G, TI, T, borderline TS and NI  9/29/2021 HVF OD severely depressed VF w/some inferior sparing, OS inferionasal step  8/11/2022 HVF OD dense sup arcuate and early inf arcuate, OS nasal defects  Educated pt on findings w/understanding.  Consequences of noncompliance and lack of f/u reviewed.  D/c Timolol BiD OD (8/12/2020).  Cont Latanoprost QAM OU (8/12/2020) and cont Cosopt BiD OU (started 9/21/2020)  No noticeable change in IOP w/Cosopt added.   Cont Brimonidine BID OD (started 3/22/2021)  RTC 4 mo Routine/OCT/24-2 charmaine faster HVF

## 2023-11-04 DIAGNOSIS — I50.30 HYPERTENSIVE HEART DISEASE WITH DIASTOLIC HEART FAILURE: ICD-10-CM

## 2023-11-04 DIAGNOSIS — I11.0 HYPERTENSIVE HEART DISEASE WITH DIASTOLIC HEART FAILURE: ICD-10-CM

## 2023-11-04 NOTE — TELEPHONE ENCOUNTER
Care Due:                  Date            Visit Type   Department     Provider  --------------------------------------------------------------------------------                                EP [facility]   Leticia Lizarraga  Last Visit: 06-      CARE ([facility])   CARE           Raphael                              MYCBISMARKT                              FOLLOWUP/OF  [facility]   Leticia Lizarraga  Next Visit: 01-      FICE VISIT   CARE           Raphael                                                            Last  Test          Frequency    Reason                     Performed    Due Date  --------------------------------------------------------------------------------    HBA1C.......  6 months...  insulin..................  07- 01-    Health [facility] Embedded Care Due Messages. Reference number: [id number].   11/04/2023 1:08:44 PM CDT

## 2023-11-05 RX ORDER — AMLODIPINE BESYLATE 2.5 MG/1
2.5 TABLET ORAL
Qty: 90 TABLET | Refills: 2 | Status: SHIPPED | OUTPATIENT
Start: 2023-11-05

## 2023-11-05 NOTE — TELEPHONE ENCOUNTER
Refill Routing Note   Medication(s) are not appropriate for processing by [facility] for the following reason(s):      Required vitals abnormal    [facility] action(s):  Defer Care Due:  Labs due            Appointments  past 12m or future 3m with PCP    Date Provider   Last Visit   6/26/2023 Leticia Lim MD   Next Visit   1/22/2024 Leticia Lim MD   ED visits in past 90 days: 0        Note composed:2:00 AM 11/05/2023

## 2023-11-08 ENCOUNTER — PATIENT MESSAGE (OUTPATIENT)
Dept: PRIMARY CARE CLINIC | Facility: CLINIC | Age: 83
End: 2023-11-08
Payer: MEDICARE

## 2023-11-08 ENCOUNTER — PATIENT MESSAGE (OUTPATIENT)
Dept: DIABETES | Facility: CLINIC | Age: 83
End: 2023-11-08
Payer: MEDICARE

## 2023-11-08 ENCOUNTER — PATIENT MESSAGE (OUTPATIENT)
Dept: ENDOCRINOLOGY | Facility: CLINIC | Age: 83
End: 2023-11-08
Payer: MEDICARE

## 2023-11-08 DIAGNOSIS — E11.59 TYPE 2 DIABETES MELLITUS WITH OTHER CIRCULATORY COMPLICATION, WITH LONG-TERM CURRENT USE OF INSULIN: Primary | ICD-10-CM

## 2023-11-08 DIAGNOSIS — Z79.4 TYPE 2 DIABETES MELLITUS WITH OTHER CIRCULATORY COMPLICATION, WITH LONG-TERM CURRENT USE OF INSULIN: Primary | ICD-10-CM

## 2023-11-08 RX ORDER — BLOOD-GLUCOSE TRANSMITTER
1 EACH MISCELLANEOUS
Qty: 1 EACH | Refills: 3 | Status: SHIPPED | OUTPATIENT
Start: 2023-11-08

## 2023-11-08 RX ORDER — BLOOD-GLUCOSE SENSOR
3 EACH MISCELLANEOUS
Qty: 3 EACH | Refills: 11 | Status: SHIPPED | OUTPATIENT
Start: 2023-11-08

## 2023-11-20 ENCOUNTER — HOSPITAL ENCOUNTER (OUTPATIENT)
Dept: RADIOLOGY | Facility: OTHER | Age: 83
Discharge: HOME | End: 2023-11-20
Attending: INTERNAL MEDICINE
Payer: MEDICARE

## 2023-11-20 DIAGNOSIS — K74.60 CIRRHOSIS OF LIVER WITH ASCITES, UNSPECIFIED HEPATIC CIRRHOSIS TYPE: ICD-10-CM

## 2023-11-20 DIAGNOSIS — R18.8 CIRRHOSIS OF LIVER WITH ASCITES, UNSPECIFIED HEPATIC CIRRHOSIS TYPE: ICD-10-CM

## 2023-11-20 DIAGNOSIS — K75.4 AUTOIMMUNE HEPATITIS: Chronic | ICD-10-CM

## 2023-11-20 PROCEDURE — 76700 US ABDOMEN COMPLETE: ICD-10-PCS | Mod: 26,,, | Performed by: RADIOLOGY

## 2023-11-20 PROCEDURE — 76700 US EXAM ABDOM COMPLETE: CPT | Mod: 26,,, | Performed by: RADIOLOGY

## 2023-11-20 PROCEDURE — 76700 US EXAM ABDOM COMPLETE: CPT | Mod: TC

## 2023-11-21 ENCOUNTER — LAB VISIT (OUTPATIENT)
Dept: LAB | Facility: HOSPITAL | Age: 83
End: 2023-11-21
Attending: INTERNAL MEDICINE
Payer: MEDICARE

## 2023-11-21 DIAGNOSIS — R18.8 CIRRHOSIS OF LIVER WITH ASCITES, UNSPECIFIED HEPATIC CIRRHOSIS TYPE: ICD-10-CM

## 2023-11-21 DIAGNOSIS — K74.60 CIRRHOSIS OF LIVER WITH ASCITES, UNSPECIFIED HEPATIC CIRRHOSIS TYPE: ICD-10-CM

## 2023-11-21 DIAGNOSIS — K75.4 AUTOIMMUNE HEPATITIS: Chronic | ICD-10-CM

## 2023-11-21 LAB
ALBUMIN SERPL BCP-MCNC: 3.1 G/DL (ref 3.5–5.2)
ALP SERPL-CCNC: 69 U/L (ref 55–135)
ALT SERPL W/O P-5'-P-CCNC: 67 U/L (ref 10–44)
ANION GAP SERPL CALC-SCNC: 8 MMOL/L (ref 8–16)
AST SERPL-CCNC: 77 U/L (ref 10–40)
BASOPHILS # BLD AUTO: 0.03 K/UL (ref 0–0.2)
BASOPHILS NFR BLD: 0.4 % (ref 0–1.9)
BILIRUB DIRECT SERPL-MCNC: 0.3 MG/DL (ref 0.1–0.3)
BILIRUB SERPL-MCNC: 0.8 MG/DL (ref 0.1–1)
BUN SERPL-MCNC: 25 MG/DL (ref 8–23)
CALCIUM SERPL-MCNC: 9.4 MG/DL (ref 8.7–10.5)
CHLORIDE SERPL-SCNC: 105 MMOL/L (ref 95–110)
CO2 SERPL-SCNC: 25 MMOL/L (ref 23–29)
CREAT SERPL-MCNC: 0.8 MG/DL (ref 0.5–1.4)
DIFFERENTIAL METHOD: ABNORMAL
EOSINOPHIL # BLD AUTO: 0.2 K/UL (ref 0–0.5)
EOSINOPHIL NFR BLD: 1.9 % (ref 0–8)
ERYTHROCYTE [DISTWIDTH] IN BLOOD BY AUTOMATED COUNT: 13.2 % (ref 11.5–14.5)
EST. GFR  (NO RACE VARIABLE): >60 ML/MIN/1.73 M^2
GLUCOSE SERPL-MCNC: 131 MG/DL (ref 70–110)
HCT VFR BLD AUTO: 38.5 % (ref 37–48.5)
HGB BLD-MCNC: 12.6 G/DL (ref 12–16)
IMM GRANULOCYTES # BLD AUTO: 0.03 K/UL (ref 0–0.04)
IMM GRANULOCYTES NFR BLD AUTO: 0.4 % (ref 0–0.5)
INR PPP: 1 (ref 0.8–1.2)
LYMPHOCYTES # BLD AUTO: 1.5 K/UL (ref 1–4.8)
LYMPHOCYTES NFR BLD: 19.2 % (ref 18–48)
MCH RBC QN AUTO: 31.7 PG (ref 27–31)
MCHC RBC AUTO-ENTMCNC: 32.7 G/DL (ref 32–36)
MCV RBC AUTO: 97 FL (ref 82–98)
MONOCYTES # BLD AUTO: 1 K/UL (ref 0.3–1)
MONOCYTES NFR BLD: 13.1 % (ref 4–15)
NEUTROPHILS # BLD AUTO: 5.2 K/UL (ref 1.8–7.7)
NEUTROPHILS NFR BLD: 65 % (ref 38–73)
NRBC BLD-RTO: 0 /100 WBC
PLATELET # BLD AUTO: 168 K/UL (ref 150–450)
PMV BLD AUTO: 11.2 FL (ref 9.2–12.9)
POTASSIUM SERPL-SCNC: 4.2 MMOL/L (ref 3.5–5.1)
PROT SERPL-MCNC: 7.6 G/DL (ref 6–8.4)
PROTHROMBIN TIME: 10.8 SEC (ref 9–12.5)
RBC # BLD AUTO: 3.98 M/UL (ref 4–5.4)
SODIUM SERPL-SCNC: 138 MMOL/L (ref 136–145)
WBC # BLD AUTO: 7.93 K/UL (ref 3.9–12.7)

## 2023-11-21 PROCEDURE — 85610 PROTHROMBIN TIME: CPT | Performed by: INTERNAL MEDICINE

## 2023-11-21 PROCEDURE — 82248 BILIRUBIN DIRECT: CPT | Performed by: INTERNAL MEDICINE

## 2023-11-21 PROCEDURE — 36415 COLL VENOUS BLD VENIPUNCTURE: CPT | Mod: PN | Performed by: INTERNAL MEDICINE

## 2023-11-21 PROCEDURE — 80053 COMPREHEN METABOLIC PANEL: CPT | Performed by: INTERNAL MEDICINE

## 2023-11-21 PROCEDURE — 85025 COMPLETE CBC W/AUTO DIFF WBC: CPT | Performed by: INTERNAL MEDICINE

## 2023-11-22 DIAGNOSIS — K75.4 AUTOIMMUNE HEPATITIS: ICD-10-CM

## 2023-11-24 RX ORDER — PREDNISONE 5 MG/1
TABLET ORAL
Qty: 135 TABLET | Refills: 1 | Status: SHIPPED | OUTPATIENT
Start: 2023-11-24

## 2023-12-07 ENCOUNTER — OFFICE VISIT (OUTPATIENT)
Dept: PODIATRY | Facility: CLINIC | Age: 83
End: 2023-12-07
Payer: MEDICARE

## 2023-12-07 VITALS
BODY MASS INDEX: 20.24 KG/M2 | SYSTOLIC BLOOD PRESSURE: 159 MMHG | WEIGHT: 121.5 LBS | DIASTOLIC BLOOD PRESSURE: 91 MMHG | HEIGHT: 65 IN | HEART RATE: 72 BPM

## 2023-12-07 DIAGNOSIS — I73.9 PVD (PERIPHERAL VASCULAR DISEASE): Primary | ICD-10-CM

## 2023-12-07 DIAGNOSIS — L60.9 DISEASE OF NAIL: ICD-10-CM

## 2023-12-07 DIAGNOSIS — L60.0 INGROWN NAIL: ICD-10-CM

## 2023-12-07 PROCEDURE — 99203 PR OFFICE/OUTPT VISIT, NEW, LEVL III, 30-44 MIN: ICD-10-PCS | Mod: 25,S$PBB,, | Performed by: PODIATRIST

## 2023-12-07 PROCEDURE — 11721 DEBRIDE NAIL 6 OR MORE: CPT | Mod: Q8,S$PBB,, | Performed by: PODIATRIST

## 2023-12-07 PROCEDURE — 11721 PR DEBRIDEMENT OF NAILS, 6 OR MORE: ICD-10-PCS | Mod: Q8,S$PBB,, | Performed by: PODIATRIST

## 2023-12-07 PROCEDURE — 99999 PR PBB SHADOW E&M-EST. PATIENT-LVL IV: CPT | Mod: PBBFAC,,, | Performed by: PODIATRIST

## 2023-12-07 PROCEDURE — 99999 PR PBB SHADOW E&M-EST. PATIENT-LVL IV: ICD-10-PCS | Mod: PBBFAC,,, | Performed by: PODIATRIST

## 2023-12-07 PROCEDURE — 99214 OFFICE O/P EST MOD 30 MIN: CPT | Mod: PBBFAC | Performed by: PODIATRIST

## 2023-12-07 PROCEDURE — 11721 DEBRIDE NAIL 6 OR MORE: CPT | Mod: Q8,PBBFAC | Performed by: PODIATRIST

## 2023-12-07 PROCEDURE — 99203 OFFICE O/P NEW LOW 30 MIN: CPT | Mod: 25,S$PBB,, | Performed by: PODIATRIST

## 2023-12-07 NOTE — PROGRESS NOTES
Subjective:      Patient ID: Radha Feng is a 83 y.o. female.    Chief Complaint: Ingrown Toenail (Left foot great toenail )    Radha is a 83 y.o. female who presents to the clinic for evaluation and treatment of high risk feet. Radha has a past medical history of Cataract, Chondromalacia, knee, right (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 The patient's chief complaint is long, thick toenails. This patient has documented high risk feet requiring routine maintenance secondary to peripheral vascular disease.    PCP: Leticia Lim MD    Date Last Seen by PCP:   Chief Complaint   Patient presents with    Ingrown Toenail     Left foot great toenail          Current shoe gear:  Affected Foot: Casual shoes     Unaffected Foot: Casual shoes    Hemoglobin A1C   Date Value Ref Range Status   07/24/2023 6.2 (H) 4.0 - 5.6 % Final     Comment:     ADA Screening Guidelines:  5.7-6.4%  Consistent with prediabetes  >or=6.5%  Consistent with diabetes    High levels of fetal hemoglobin interfere with the HbA1C  assay. Heterozygous hemoglobin variants (HbS, HgC, etc)do  not significantly interfere with this assay.   However, presence of multiple variants may affect accuracy.     01/30/2023 5.0 4.0 - 5.6 % Final     Comment:     ADA Screening Guidelines:  5.7-6.4%  Consistent with prediabetes  >or=6.5%  Consistent with diabetes    High levels of fetal hemoglobin interfere with the HbA1C  assay. Heterozygous hemoglobin variants (HbS, HgC, etc)do  not significantly interfere with this assay.   However, presence of multiple variants may affect accuracy.     11/09/2022 6.4 (H) 4.0 - 5.6 % Final     Comment:     ADA Screening Guidelines:  5.7-6.4%  Consistent with prediabetes  >or=6.5%  Consistent with diabetes    High levels of fetal hemoglobin interfere with the HbA1C  assay. Heterozygous hemoglobin variants (HbS, HgC, etc)do  not significantly interfere with this assay.    However, presence of multiple variants may affect accuracy.         Review of Systems   Constitutional: Negative for chills, fever and malaise/fatigue.   HENT:  Negative for hearing loss.    Cardiovascular:  Negative for claudication.   Respiratory:  Negative for shortness of breath.    Skin:  Positive for color change, dry skin, nail changes and unusual hair distribution. Negative for flushing and rash.   Musculoskeletal:  Negative for joint pain and myalgias.   Gastrointestinal:  Negative for nausea and vomiting.   Neurological:  Negative for loss of balance, numbness, paresthesias and sensory change.   Psychiatric/Behavioral:  Negative for altered mental status.            Objective:      Physical Exam  Vitals reviewed.   Constitutional:       Appearance: She is well-developed.   Cardiovascular:      Pulses:           Dorsalis pedis pulses are 0 on the right side and 0 on the left side.        Posterior tibial pulses are 1+ on the right side and 1+ on the left side.   Musculoskeletal:      Right ankle: Decreased range of motion. Abnormal pulse.      Right Achilles Tendon: Gamez's test negative.      Left ankle: Decreased range of motion. Abnormal pulse.      Left Achilles Tendon: Gamez's test negative.      Right foot: Decreased range of motion.      Left foot: Decreased range of motion.   Feet:      Right foot:      Protective Sensation: 5 sites tested.  5 sites sensed.      Left foot:      Protective Sensation: 5 sites tested.  5 sites sensed.   Skin:     General: Skin is cool and dry.      Capillary Refill: Capillary refill takes more than 3 seconds.      Coloration: Skin is not cyanotic.      Findings: No wound.      Comments:  No open lesions noted.   Left hallux nail incurvated, painful  No paronychia or SOI    Neurological:      Mental Status: She is alert.      Sensory: Sensation is intact.   Psychiatric:         Behavior: Behavior normal. Behavior is cooperative.       Nails x10 are elongated by   5-6mm's, thickened by 2-3 mm's, dystrophic, and are yellowish in  coloration . Xerosis Bilaterally. No open lesions noted.             Assessment:       Encounter Diagnoses   Name Primary?    PVD (peripheral vascular disease) Yes    Ingrown nail     Disease of nail          Plan:       Radha was seen today for ingrown toenail.    Diagnoses and all orders for this visit:    PVD (peripheral vascular disease)    Ingrown nail    Disease of nail      I counseled the patient on her conditions, their implications and medical management.        - Shoe inspection. Diabetic Foot Education. Patient reminded of the importance of good nutrition and blood sugar control to help prevent podiatric complications of diabetes. Patient instructed on proper foot hygeine. We discussed wearing proper shoe gear, daily foot inspections, never walking without protective shoe gear, never putting sharp instruments to feet, routine podiatric nail visits every 2-3 months.    Utilizing sterile toenail clippers I aggressively trimmed  the offending left hallux  nail border approximately 3 mm from its edge and carried the nail plate incision down at an angle in order to wedge out the offending cryptotic portion of the nail plate. The offending border was then removed in toto. The remaining nail was grinded down with an electric  down to nail bed.  No blood was drawn. Patient tolerated the procedure well and related significant relief.    - With patient's permission, nails were aggressively reduced and debrided x 10 to their soft tissue attachment mechanically and with electric , removing all offending nail and debris. Patient relates relief following the procedure. She will continue to monitor the areas daily, inspect her feet, wear protective shoe gear when ambulatory, moisturizer to maintain skin integrity and follow in this office in approximately 2-3 months, sooner p.r.n.

## 2023-12-18 ENCOUNTER — LAB VISIT (OUTPATIENT)
Dept: LAB | Facility: HOSPITAL | Age: 83
End: 2023-12-18
Attending: INTERNAL MEDICINE
Payer: MEDICARE

## 2023-12-18 DIAGNOSIS — R18.8 CIRRHOSIS OF LIVER WITH ASCITES, UNSPECIFIED HEPATIC CIRRHOSIS TYPE: ICD-10-CM

## 2023-12-18 DIAGNOSIS — K74.60 CIRRHOSIS OF LIVER WITH ASCITES, UNSPECIFIED HEPATIC CIRRHOSIS TYPE: ICD-10-CM

## 2023-12-18 DIAGNOSIS — K75.4 AUTOIMMUNE HEPATITIS: Chronic | ICD-10-CM

## 2023-12-18 LAB
ALBUMIN SERPL BCP-MCNC: 3 G/DL (ref 3.5–5.2)
ALP SERPL-CCNC: 54 U/L (ref 55–135)
ALT SERPL W/O P-5'-P-CCNC: 56 U/L (ref 10–44)
ANION GAP SERPL CALC-SCNC: 7 MMOL/L (ref 8–16)
AST SERPL-CCNC: 71 U/L (ref 10–40)
BASOPHILS # BLD AUTO: 0.04 K/UL (ref 0–0.2)
BASOPHILS NFR BLD: 0.4 % (ref 0–1.9)
BILIRUB DIRECT SERPL-MCNC: 0.3 MG/DL (ref 0.1–0.3)
BILIRUB SERPL-MCNC: 0.8 MG/DL (ref 0.1–1)
BUN SERPL-MCNC: 28 MG/DL (ref 8–23)
CALCIUM SERPL-MCNC: 9.4 MG/DL (ref 8.7–10.5)
CHLORIDE SERPL-SCNC: 111 MMOL/L (ref 95–110)
CO2 SERPL-SCNC: 23 MMOL/L (ref 23–29)
CREAT SERPL-MCNC: 0.9 MG/DL (ref 0.5–1.4)
DIFFERENTIAL METHOD: ABNORMAL
EOSINOPHIL # BLD AUTO: 0.2 K/UL (ref 0–0.5)
EOSINOPHIL NFR BLD: 2.2 % (ref 0–8)
ERYTHROCYTE [DISTWIDTH] IN BLOOD BY AUTOMATED COUNT: 13.5 % (ref 11.5–14.5)
EST. GFR  (NO RACE VARIABLE): >60 ML/MIN/1.73 M^2
GLUCOSE SERPL-MCNC: 104 MG/DL (ref 70–110)
HCT VFR BLD AUTO: 37.5 % (ref 37–48.5)
HGB BLD-MCNC: 12.2 G/DL (ref 12–16)
IMM GRANULOCYTES # BLD AUTO: 0.07 K/UL (ref 0–0.04)
IMM GRANULOCYTES NFR BLD AUTO: 0.8 % (ref 0–0.5)
INR PPP: 1 (ref 0.8–1.2)
LYMPHOCYTES # BLD AUTO: 1.4 K/UL (ref 1–4.8)
LYMPHOCYTES NFR BLD: 15.5 % (ref 18–48)
MCH RBC QN AUTO: 31.4 PG (ref 27–31)
MCHC RBC AUTO-ENTMCNC: 32.5 G/DL (ref 32–36)
MCV RBC AUTO: 97 FL (ref 82–98)
MONOCYTES # BLD AUTO: 1.1 K/UL (ref 0.3–1)
MONOCYTES NFR BLD: 12.6 % (ref 4–15)
NEUTROPHILS # BLD AUTO: 6.1 K/UL (ref 1.8–7.7)
NEUTROPHILS NFR BLD: 68.5 % (ref 38–73)
NRBC BLD-RTO: 0 /100 WBC
PLATELET # BLD AUTO: 194 K/UL (ref 150–450)
PMV BLD AUTO: 11.1 FL (ref 9.2–12.9)
POTASSIUM SERPL-SCNC: 4.4 MMOL/L (ref 3.5–5.1)
PROT SERPL-MCNC: 7.4 G/DL (ref 6–8.4)
PROTHROMBIN TIME: 10.8 SEC (ref 9–12.5)
RBC # BLD AUTO: 3.88 M/UL (ref 4–5.4)
SODIUM SERPL-SCNC: 141 MMOL/L (ref 136–145)
WBC # BLD AUTO: 8.91 K/UL (ref 3.9–12.7)

## 2023-12-18 PROCEDURE — 85025 COMPLETE CBC W/AUTO DIFF WBC: CPT | Performed by: INTERNAL MEDICINE

## 2023-12-18 PROCEDURE — 85610 PROTHROMBIN TIME: CPT | Performed by: INTERNAL MEDICINE

## 2023-12-18 PROCEDURE — 82248 BILIRUBIN DIRECT: CPT | Performed by: INTERNAL MEDICINE

## 2023-12-18 PROCEDURE — 36415 COLL VENOUS BLD VENIPUNCTURE: CPT | Mod: PN | Performed by: INTERNAL MEDICINE

## 2023-12-18 PROCEDURE — 80053 COMPREHEN METABOLIC PANEL: CPT | Performed by: INTERNAL MEDICINE

## 2024-01-03 RX ORDER — FUROSEMIDE 40 MG/1
TABLET ORAL
Qty: 90 TABLET | Refills: 1 | Status: SHIPPED | OUTPATIENT
Start: 2024-01-03

## 2024-01-03 NOTE — TELEPHONE ENCOUNTER
Care Due:                  Date            Visit Type   Department     Provider  --------------------------------------------------------------------------------                                EP [facility]   Leticia Lizarraga  Last Visit: 06-      CARE ([facility])   CARE           Raphael CAST                              FOLLOWUP/OF  [facility]   Leticia Lizarraga  Next Visit: 01-      FICE VISIT   CARE           Raphael                                                            Last  Test          Frequency    Reason                     Performed    Due Date  --------------------------------------------------------------------------------    HBA1C.......  6 months...  insulin..................  07- 01-    Health [facility] Embedded Care Due Messages. Reference number: [id number].   1/03/2024 3:08:17 PM CST

## 2024-01-03 NOTE — TELEPHONE ENCOUNTER
Refill Routing Note   Medication(s) are not appropriate for processing by [facility] for the following reason(s):        No active prescription written by provider  Required vitals abnormal    [facility] action(s):  Defer     Requires labs : Yes             Appointments  past 12m or future 3m with PCP    Date Provider   Last Visit   6/26/2023 Leticia Lim MD   Next Visit   1/22/2024 Leticia Lim MD   ED visits in past 90 days: 0        Note composed:5:18 PM 01/03/2024

## 2024-01-11 DIAGNOSIS — I10 HYPERTENSION, UNSPECIFIED TYPE: ICD-10-CM

## 2024-01-11 RX ORDER — LISINOPRIL AND HYDROCHLOROTHIAZIDE 12.5; 2 MG/1; MG/1
1 TABLET ORAL DAILY
Qty: 90 TABLET | Refills: 1 | Status: SHIPPED | OUTPATIENT
Start: 2024-01-11

## 2024-01-11 NOTE — TELEPHONE ENCOUNTER
Refill Routing Note   Medication(s) are not appropriate for processing by [facility] for the following reason(s):        Responsible provider unclear  Required vitals abnormal    [facility] action(s):  Defer        Medication Therapy Plan: Previous and only approving provider Yvonne Peraza MD, Primary provider Leticia Lim MD      Appointments  past 12m or future 3m with PCP    Date Provider   Last Visit   6/26/2023 Leticia Lim MD   Next Visit   1/22/2024 Leticia Lim MD   ED visits in past 90 days: 0        Note composed:10:21 AM 01/11/2024

## 2024-01-11 NOTE — TELEPHONE ENCOUNTER
No care due was identified.  Health [facility] Embedded Care Due Messages. Reference number: [id number].   1/11/2024 9:46:20 AM CST

## 2024-01-18 ENCOUNTER — OFFICE VISIT (OUTPATIENT)
Dept: UROGYNECOLOGY | Facility: CLINIC | Age: 84
End: 2024-01-18
Payer: MEDICARE

## 2024-01-18 VITALS
RESPIRATION RATE: 17 BRPM | WEIGHT: 121.5 LBS | HEART RATE: 89 BPM | BODY MASS INDEX: 20.24 KG/M2 | HEIGHT: 65 IN | SYSTOLIC BLOOD PRESSURE: 149 MMHG | DIASTOLIC BLOOD PRESSURE: 75 MMHG | OXYGEN SATURATION: 97 %

## 2024-01-18 DIAGNOSIS — Z46.89 PESSARY MAINTENANCE: ICD-10-CM

## 2024-01-18 DIAGNOSIS — N39.46 MIXED URGE AND STRESS INCONTINENCE: ICD-10-CM

## 2024-01-18 DIAGNOSIS — N81.3 UTEROVAGINAL PROLAPSE, COMPLETE: Primary | ICD-10-CM

## 2024-01-18 PROCEDURE — 99214 OFFICE O/P EST MOD 30 MIN: CPT | Mod: PBBFAC | Performed by: OBSTETRICS & GYNECOLOGY

## 2024-01-18 PROCEDURE — 99999 PR PBB SHADOW E&M-EST. PATIENT-LVL IV: CPT | Mod: PBBFAC,,, | Performed by: OBSTETRICS & GYNECOLOGY

## 2024-01-18 PROCEDURE — 99213 OFFICE O/P EST LOW 20 MIN: CPT | Mod: S$PBB,,, | Performed by: OBSTETRICS & GYNECOLOGY

## 2024-01-18 NOTE — PROGRESS NOTES
Urogyn follow up      [facility]  [address] [address]    Radha Feng  [id number]  1940      Radha Feng is a 83 y.o. here for a urogyn follow up for pelvic organ prolapse.     History of Present Illness   82 y.o.  female has a past medical history of Cataract, Chondromalacia, knee, right (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       06/12/2023  Here for pessary placement. Has had pessary holiday since last visit.    Changes since last visit:  Rare UUI when pulling pants down  Denies pain, bleeding, or discharge  Doing well with pessary--using rephresh weekly  Denies constipation or straining    10/12/2023:  Patient presents for follow up   She is currently using the pessary has had issues with abrasions in the past  No vaginal discharge or bleeding   +JUAN MIGUEL and +UU stable with the pessary     POP + doing well with the pessary     1/18/202:  Patient here for follow up doing well no issues  No discharge, no bleeding   Voiding well no constipation       [facility]     Question 12/20/2022  1:44 PM CST - Filed by Patient   General Urogynecology: Are you experiencing the following?     Dysuria (painful urination) No   Nocturia:  waking up at night to empty your bladder  Yes   If you answered yes to the previous question, how many times does this happen per night? 1-2   Enuresis (urine loss during sleep) No   Dribbling urine after you urinate No   Hematuria (urine appears red) No   Type of stream Weak   Urinary frequency: How often a day are you going to the bathroom per day?  Less than 10   Urinary Tract Infections: How many Urinary Tract Infections have you had in the past year? One (1) in the past year   If you have had a UTI in the past year, what treatments have you had so far?  Prophylactic antibiotics   Urinary Incontinence (General): Are you experiencing the following?     Past consultation for  "incontinence: Have you ever seen someone for the evaluation of incontinence? No   If you answered yes to the previous question, please select all the therapies you have tried.  N/a- I answered no to the previous question   Please note the effectiveness of the therapies.     Need to wear protection to keep clothes dry  Yes   If you answered yes to the previous question, please scar the protection you use.  Poise   If you wear protection, how much wetness is typically on each pad? Slight   If you wear protection, how often do you have to change per day, if applicable?  3-4   Stress Symptoms: Are you experiencing the following?     Leakage of urine with cough, laugh and/or sneeze Yes   If you answered yes to the previous question, what is the frequency in days, weeks and/or months? Daily   Leakage of urine with sex No   Leakage of urine with bending/ lifting No   Leakage of urine with briskly walking or jogging No   If you lose urine for any other reason not previously mentioned, please note it below, if applicable.      Urge Symptoms: Are you experiencing the following?     Urgency ("got to go" feeling) No   Urge: How frequently do you feel an urge to urinate (feeling like you "gotta go" to the bathroom and can't wait) Never   Do you experience a leakage of urine when you have a feeling of urgency?  No   Leakage of urine when unaware No   Past use of anticholinergics (medications used to treat overactive bladder) No   If you answered yes to the previous question, please scar the anticholinergics you have used:      Have you ever used Mirbetriq (aka Mirabegron)?  No   Prolapse Symptoms: Are you experiencing any of the following?      Falling out/ Bulging/ Heaviness in the vagina Yes   Vaginal/ Abdominal Pain/ Pressure No   Need to strain/ Push to void No   Need to wait on the toilet before you void No   Unusual position to urinate (using your hands to push back the vaginal bulge) No   Sensation of incomplete emptying " No   Past use of pessary device No   If you answered yes to the previous question, please list the devices you have used below.      Bowel Symptoms: Are you experiencing any of the following?     Constipation No   Diarrhea  No   Hematochezia (bloody stool) No   Incomplete evacuation of stool No   Involuntary loss of formed stool No   Fecal smearing/urgency No   Involuntary loss of gas Yes   Vaginal Symptoms: Are you experiencing any of the following?      Abnormal vaginal bleeding  No   Vaginal dryness No   Sexually active  No   Dyspareunia (painful intercourse) No   Estrogen use  No        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    Diabetes mellitus     Glaucoma     Hypertension     Other ascites 11/29/2022       Past Surgical History:   Procedure Laterality Date    CATARACT EXTRACTION W/  INTRAOCULAR LENS IMPLANT Left 12/21/2021    Procedure: EXTRACTION, CATARACT, WITH IOL INSERTION;  Surgeon: Shay Chou MD;  Location: [facility];  Service: Ophthalmology;  Laterality: Left;    ESOPHAGOGASTRODUODENOSCOPY N/A 6/26/2023    Procedure: ESOPHAGOGASTRODUODENOSCOPY (EGD);  Surgeon: Edgar Branch MD;  Location: [address]);  Service: Endoscopy;  Laterality: N/A;  referral Dr Peraza-cirrhosis/labs done on 4/24/23-instr portal-GT       Family History   Problem Relation Age of Onset    Cataracts Mother     Diabetes Mother     Glaucoma Mother     Hypertension Mother     Heart attack Father     Colon cancer Sister     Blindness Cousin     Amblyopia Neg Hx     Macular degeneration Neg Hx     Retinal detachment Neg Hx        Social History     Socioeconomic History    Marital status: Single   Tobacco Use    Smoking status: Former    Smokeless tobacco: Never   Substance and Sexual Activity    Alcohol use: Not Currently    Drug use: Never   Social History Narrative    , one daughter local, four sons out of state. Retired . Lives with daughter  Joss.     Social Determinants of Health     Financial Resource Strain: Low Risk  (12/6/2023)    Overall Financial Resource Strain (CARDIA)     Difficulty of Paying Living Expenses: Not hard at all   Food Insecurity: No Food Insecurity (12/6/2023)    Hunger Vital Sign     Worried About Running Out of Food in the Last Year: Never true     Ran Out of Food in the Last Year: Never true   Transportation Needs: No Transportation Needs (12/6/2023)    PRAPARE - Transportation     Lack of Transportation (Medical): No     Lack of Transportation (Non-Medical): No   Physical Activity: Inactive (12/6/2023)    Exercise Vital Sign     Days of Exercise per Week: 0 days     Minutes of Exercise per Session: 0 min   Stress: No Stress Concern Present (12/6/2023)    [place] [employer] - Occupational Stress Questionnaire     Feeling of Stress : Only a little   Social Connections: Unknown (12/6/2023)    Social Connection and Isolation Panel [NHANES]     Frequency of Communication with Friends and Family: More than three times a week     Frequency of Social Gatherings with Friends and Family: Once a week     Active Member of Clubs or Organizations: No     Attends Club or Organization Meetings: Never     Marital Status:    Housing Stability: Unknown (12/6/2023)    Housing Stability Vital Sign     Unable to Pay for Housing in the Last Year: No     Unstable Housing in the Last Year: No       Current Outpatient Medications   Medication Sig Dispense Refill    acetaminophen (TYLENOL) 500 MG tablet Take 2 tablets (1,000 mg total) by mouth every 8 (eight) hours as needed for Pain.  0    amLODIPine (NORVASC) 2.5 MG tablet TAKE 1 TABLET BY MOUTH EVERY DAY 90 tablet 2    BD INSULIN SYRINGE ULTRA-FINE 1 mL 31 gauge x 5/16 Syrg       blood-glucose meter,continuous (DEXCOM G6 ) Misc Check blood sugar before meals and at bedtime 1 each PRN    blood-glucose sensor (DEXCOM G6 SENSOR) Amanda 3 each by Misc.(Non-Drug;  "Combo Route) route every 10 days. 3 each 11    blood-glucose transmitter (DEXCOM G6 TRANSMITTER) Amanda 1 each by Misc.(Non-Drug; Combo Route) route every 3 (three) months. 1 each 3    brimonidine 0.2% (ALPHAGAN) 0.2 % Drop INSTILL 1 DROP INTO RIGHT EYE TWICE A DAY 10 mL 11    cholecalciferol, vitamin D3, (VITAMIN D3) 25 mcg (1,000 unit) capsule Take 2 capsules (2,000 Units total) by mouth once daily.  0    dorzolamide-timolol 2-0.5% (COSOPT) 22.3-6.8 mg/mL ophthalmic solution INSTILL 1 DROP INTO RIGHT EYE TWICE A DAY 10 mL 11    estradioL (ESTRACE) 0.01 % (0.1 mg/gram) vaginal cream Place 0.5 g vaginally twice a week. Apply to the vagina daily for two weeks then twice a week. 45 g 4    furosemide (LASIX) 40 MG tablet TAKE 1 TABLET BY MOUTH EVERY DAY 90 tablet 1    insulin aspart U-100 (NOVOLOG) 100 unit/mL injection To use with Omnipod 5 30 mL 5    insulin pump cart,automated,BT (OMNIPOD 5 G6 PODS, GEN 5,) Crtg Inject 1 each into the skin Every 3 (three) days. 10 each 11    lactulose (CEPHULAC) 10 gram packet Take 10 g by mouth 3 (three) times daily.      latanoprost 0.005 % ophthalmic solution PLACE 1 DROP INTO BOTH EYES ONCE DAILY 7.5 mL 3    lisinopriL-hydrochlorothiazide (PRINZIDE,ZESTORETIC) 20-12.5 mg per tablet Take 1 tablet by mouth once daily. 90 tablet 1    pen needle, diabetic 31 gauge x 5/16" Ndle Use to inject insulin into the skin up to 4 times daily 400 each 3    predniSONE (DELTASONE) 5 MG tablet TAKE 1 AND 1/2 TABLETS BY MOUTH ONCE DAILY 135 tablet 1    insulin detemir U-100 (LEVEMIR FLEXTOUCH) 100 unit/mL (3 mL) SubQ InPn pen Inject 5 Units into the skin every evening. (Patient not taking: Reported on 7/24/2023) 6 mL 1     No current facility-administered medications for this visit.       Review of patient's allergies indicates:  No Known Allergies    Well Woman:  Pap:denies history of abnormal pap  Mammo:no longer screening  Colonoscopy:refused  Dexa:03/2023    Hip Fracture 6%.   Impression: " "  Osteopenia lumbar spine.  Osteoporosis both hips.      ROS:  As per HPI.      Exam  BP (!) 149/75 (BP Location: Right arm, Patient Position: Sitting, BP Method: Medium (Automatic))   Pulse 89   Resp 17   Ht 5' 5" (1.651 m)   Wt 55.1 kg (121 lb 7.6 oz)   SpO2 97%   BMI 20.21 kg/m²   General: alert and oriented, no acute distress  Respiratory: normal respiratory effort  Abd: soft, non-tender, non-distended    Pelvic  Ext. Genitalia: normal external genitalia. Normal bartholin's and skeens glands  Vagina: + atrophy. Normal vaginal mucosa without lesions. Small discharge noted.  Superficial abrasions along the apex and posterior vagina.  Non-tender bladder base without palpable mass.  #2 1/2 LS GH pessary in place  Cervix no lesions  Uterus:  nontender, normal size, shape, position, mobile  Urethra: no masses or tenderness  Urethral meatus: no lesions, caruncle or prolapse.    Pessary removed without difficulty. Washed with soap and water. Reinserted without difficulty    Impression  1. Uterovaginal prolapse, complete        2. Mixed urge and stress incontinence        3. Pessary maintenance              We reviewed the above issues and discussed options for short-term versus long-term management of her problems.     Plan:   1. Prolapse: Stage 2 apical prolapse, Stage 2 anterior and posterior vaginal wall prolapse -  --continue  #2 1/2 " LS gH pessary- an  --Daily pelvic floor exercises as assigned, Pelvic floor PT   --Non surgical options discussed with patient    --superficial discharge noted recommend vaginal estrogen and add replens  on opposite days.      2.  Mixed urinary incontinence, urge > stress:   --Empty bladder every 3 hours.  Empty well: wait a minute, lean forward on toilet.    --Avoid dietary irritants (see sheet).  Keep diary x 3-5 days to determine your irritants.  --KEGELS: do 10 in AM and 10 in PM, holding each x 10 seconds.  When you feel urge to go, STOP, KEGEL, and when urge has passed, " then go to bathroom.  --URGE: .  SE profile reviewed.    Takes 2-4 weeks to see if will have effect.  For dry mouth: get sour, sugar free lozenge or gum.    --STRESS:  Non surgical options: Pessary vs. Impressa vs Rivive - which represent urethral and bladder support devices; Surgical options including 1.  mid urethral sling; retropubic, transobturator vs single incision 2. Fascial slings 3. Hutchison procedure 4. Periurethral bulking     3. Vaginal atrophy (dryness):  Use .5 gram of estrogen cream in vagina reese for two weeks then twice a week .  Please start Use REPLENS or REFRESH OTC: 1/2 applicator full in vagina twice a week.      4. RTC 3 months for for pc    Approximately 30 minutes of total time spent in consult, 75 % in discussion. This includes face to face time and non-face to face time preparing to see the patient, obtaining and/or reviewing separately obtained history, documenting clinical information in the electronic or other health record, independently interpreting results (not separately reported) and communicating results to the patient/family/caregiver, or care coordination (not separately reported).     Luisa Evans DO  Female Pelvic Medicine and Reconstructive Surgery  [facility] [place], [place]

## 2024-01-21 ENCOUNTER — PATIENT MESSAGE (OUTPATIENT)
Dept: ENDOCRINOLOGY | Facility: CLINIC | Age: 84
End: 2024-01-21
Payer: MEDICARE

## 2024-01-21 ENCOUNTER — PATIENT MESSAGE (OUTPATIENT)
Dept: HEPATOLOGY | Facility: CLINIC | Age: 84
End: 2024-01-21
Payer: MEDICARE

## 2024-01-22 ENCOUNTER — OFFICE VISIT (OUTPATIENT)
Dept: PRIMARY CARE CLINIC | Facility: CLINIC | Age: 84
End: 2024-01-22
Payer: MEDICARE

## 2024-01-22 ENCOUNTER — LAB VISIT (OUTPATIENT)
Dept: LAB | Facility: HOSPITAL | Age: 84
End: 2024-01-22
Attending: INTERNAL MEDICINE
Payer: MEDICARE

## 2024-01-22 VITALS
BODY MASS INDEX: 20.94 KG/M2 | DIASTOLIC BLOOD PRESSURE: 88 MMHG | SYSTOLIC BLOOD PRESSURE: 142 MMHG | HEIGHT: 65 IN | OXYGEN SATURATION: 98 % | WEIGHT: 125.69 LBS | HEART RATE: 80 BPM

## 2024-01-22 DIAGNOSIS — Z79.4 TYPE 2 DIABETES MELLITUS WITH OTHER CIRCULATORY COMPLICATION, WITH LONG-TERM CURRENT USE OF INSULIN: ICD-10-CM

## 2024-01-22 DIAGNOSIS — I73.9 PVD (PERIPHERAL VASCULAR DISEASE): ICD-10-CM

## 2024-01-22 DIAGNOSIS — I11.0 HYPERTENSIVE HEART DISEASE WITH DIASTOLIC HEART FAILURE: Primary | ICD-10-CM

## 2024-01-22 DIAGNOSIS — Z79.52 LONG TERM CURRENT USE OF SYSTEMIC STEROIDS: ICD-10-CM

## 2024-01-22 DIAGNOSIS — R18.8 CIRRHOSIS OF LIVER WITH ASCITES, UNSPECIFIED HEPATIC CIRRHOSIS TYPE: ICD-10-CM

## 2024-01-22 DIAGNOSIS — E78.5 HYPERLIPIDEMIA, UNSPECIFIED HYPERLIPIDEMIA TYPE: ICD-10-CM

## 2024-01-22 DIAGNOSIS — E11.59 TYPE 2 DIABETES MELLITUS WITH OTHER CIRCULATORY COMPLICATION, WITH LONG-TERM CURRENT USE OF INSULIN: ICD-10-CM

## 2024-01-22 DIAGNOSIS — K75.4 AUTOIMMUNE HEPATITIS: Chronic | ICD-10-CM

## 2024-01-22 DIAGNOSIS — K75.4 AUTOIMMUNE HEPATITIS: ICD-10-CM

## 2024-01-22 DIAGNOSIS — K74.60 CIRRHOSIS OF LIVER WITH ASCITES, UNSPECIFIED HEPATIC CIRRHOSIS TYPE: ICD-10-CM

## 2024-01-22 DIAGNOSIS — G89.29 CHRONIC PAIN OF RIGHT KNEE: ICD-10-CM

## 2024-01-22 DIAGNOSIS — J43.2 CENTRILOBULAR EMPHYSEMA: ICD-10-CM

## 2024-01-22 DIAGNOSIS — M81.0 AGE-RELATED OSTEOPOROSIS WITHOUT CURRENT PATHOLOGICAL FRACTURE: ICD-10-CM

## 2024-01-22 DIAGNOSIS — M25.561 CHRONIC PAIN OF RIGHT KNEE: ICD-10-CM

## 2024-01-22 DIAGNOSIS — I50.30 HYPERTENSIVE HEART DISEASE WITH DIASTOLIC HEART FAILURE: Primary | ICD-10-CM

## 2024-01-22 PROBLEM — K76.82 HEPATIC ENCEPHALOPATHY: Status: RESOLVED | Noted: 2022-09-23 | Resolved: 2024-01-22

## 2024-01-22 PROBLEM — K72.90 LIVER FAILURE WITHOUT HEPATIC COMA: Status: RESOLVED | Noted: 2022-11-04 | Resolved: 2024-01-22

## 2024-01-22 PROBLEM — D69.6 THROMBOCYTOPENIA: Status: RESOLVED | Noted: 2022-10-26 | Resolved: 2024-01-22

## 2024-01-22 LAB
ALBUMIN SERPL BCP-MCNC: 3.1 G/DL (ref 3.5–5.2)
ALP SERPL-CCNC: 58 U/L (ref 55–135)
ALT SERPL W/O P-5'-P-CCNC: 51 U/L (ref 10–44)
ANION GAP SERPL CALC-SCNC: 9 MMOL/L (ref 8–16)
AST SERPL-CCNC: 61 U/L (ref 10–40)
BASOPHILS # BLD AUTO: 0.04 K/UL (ref 0–0.2)
BASOPHILS NFR BLD: 0.4 % (ref 0–1.9)
BILIRUB DIRECT SERPL-MCNC: 0.3 MG/DL (ref 0.1–0.3)
BILIRUB SERPL-MCNC: 0.8 MG/DL (ref 0.1–1)
BUN SERPL-MCNC: 30 MG/DL (ref 8–23)
CALCIUM SERPL-MCNC: 9.3 MG/DL (ref 8.7–10.5)
CHLORIDE SERPL-SCNC: 105 MMOL/L (ref 95–110)
CHOLEST SERPL-MCNC: 195 MG/DL (ref 120–199)
CHOLEST/HDLC SERPL: 3.6 {RATIO} (ref 2–5)
CO2 SERPL-SCNC: 24 MMOL/L (ref 23–29)
CREAT SERPL-MCNC: 0.8 MG/DL (ref 0.5–1.4)
DIFFERENTIAL METHOD BLD: ABNORMAL
EOSINOPHIL # BLD AUTO: 0.1 K/UL (ref 0–0.5)
EOSINOPHIL NFR BLD: 1 % (ref 0–8)
ERYTHROCYTE [DISTWIDTH] IN BLOOD BY AUTOMATED COUNT: 13.2 % (ref 11.5–14.5)
EST. GFR  (NO RACE VARIABLE): >60 ML/MIN/1.73 M^2
ESTIMATED AVG GLUCOSE: 131 MG/DL (ref 68–131)
GLUCOSE SERPL-MCNC: 224 MG/DL (ref 70–110)
HBA1C MFR BLD: 6.2 % (ref 4–5.6)
HCT VFR BLD AUTO: 40.3 % (ref 37–48.5)
HDLC SERPL-MCNC: 54 MG/DL (ref 40–75)
HDLC SERPL: 27.7 % (ref 20–50)
HGB BLD-MCNC: 12.6 G/DL (ref 12–16)
IMM GRANULOCYTES # BLD AUTO: 0.04 K/UL (ref 0–0.04)
IMM GRANULOCYTES NFR BLD AUTO: 0.4 % (ref 0–0.5)
INR PPP: 1 (ref 0.8–1.2)
LDLC SERPL CALC-MCNC: 123.2 MG/DL (ref 63–159)
LYMPHOCYTES # BLD AUTO: 1.3 K/UL (ref 1–4.8)
LYMPHOCYTES NFR BLD: 12.1 % (ref 18–48)
MCH RBC QN AUTO: 31.4 PG (ref 27–31)
MCHC RBC AUTO-ENTMCNC: 31.3 G/DL (ref 32–36)
MCV RBC AUTO: 101 FL (ref 82–98)
MONOCYTES # BLD AUTO: 0.9 K/UL (ref 0.3–1)
MONOCYTES NFR BLD: 8.4 % (ref 4–15)
NEUTROPHILS # BLD AUTO: 8.1 K/UL (ref 1.8–7.7)
NEUTROPHILS NFR BLD: 77.7 % (ref 38–73)
NONHDLC SERPL-MCNC: 141 MG/DL
NRBC BLD-RTO: 0 /100 WBC
PLATELET # BLD AUTO: 204 K/UL (ref 150–450)
PMV BLD AUTO: 11.6 FL (ref 9.2–12.9)
POTASSIUM SERPL-SCNC: 4.8 MMOL/L (ref 3.5–5.1)
PROT SERPL-MCNC: 7.9 G/DL (ref 6–8.4)
PROTHROMBIN TIME: 10.9 SEC (ref 9–12.5)
RBC # BLD AUTO: 4.01 M/UL (ref 4–5.4)
SODIUM SERPL-SCNC: 138 MMOL/L (ref 136–145)
TRIGL SERPL-MCNC: 89 MG/DL (ref 30–150)
WBC # BLD AUTO: 10.44 K/UL (ref 3.9–12.7)

## 2024-01-22 PROCEDURE — 80061 LIPID PANEL: CPT | Performed by: INTERNAL MEDICINE

## 2024-01-22 PROCEDURE — 99999 PR PBB SHADOW E&M-EST. PATIENT-LVL V: CPT | Mod: PBBFAC,,, | Performed by: INTERNAL MEDICINE

## 2024-01-22 PROCEDURE — 82248 BILIRUBIN DIRECT: CPT | Performed by: INTERNAL MEDICINE

## 2024-01-22 PROCEDURE — 99214 OFFICE O/P EST MOD 30 MIN: CPT | Mod: S$PBB,,, | Performed by: INTERNAL MEDICINE

## 2024-01-22 PROCEDURE — 80053 COMPREHEN METABOLIC PANEL: CPT | Performed by: INTERNAL MEDICINE

## 2024-01-22 PROCEDURE — 82043 UR ALBUMIN QUANTITATIVE: CPT | Performed by: INTERNAL MEDICINE

## 2024-01-22 PROCEDURE — 36415 COLL VENOUS BLD VENIPUNCTURE: CPT | Mod: PN | Performed by: INTERNAL MEDICINE

## 2024-01-22 PROCEDURE — 85610 PROTHROMBIN TIME: CPT | Performed by: INTERNAL MEDICINE

## 2024-01-22 PROCEDURE — 83036 HEMOGLOBIN GLYCOSYLATED A1C: CPT | Performed by: INTERNAL MEDICINE

## 2024-01-22 PROCEDURE — 99215 OFFICE O/P EST HI 40 MIN: CPT | Mod: PBBFAC,PN | Performed by: INTERNAL MEDICINE

## 2024-01-22 PROCEDURE — 85025 COMPLETE CBC W/AUTO DIFF WBC: CPT | Performed by: INTERNAL MEDICINE

## 2024-01-22 NOTE — PROGRESS NOTES
Subjective     Patient ID: Radha Feng is a 83 y.o. female.    Chief Complaint: Follow-up    Last seen by me 7 months ago. Followed regularly by Hepatology, Endocrinology. Here for follow up with no new complaints. Chronic right knee pain unchanged, uses OTC topical analgesics, ambulatory with can/rollator at home, has a shower chair. Diabetes has been well controlled, heart failure stable (does not see a Cardiologist regularly). Blood pressure has been running a bit high, today's reading is improved. Compliant with daily meds as prescribed.     PMH: .  Hypertension, EF 65% with Grade II Diastolic Dysfunction, Left Atrial Enlargement, MVP with moderate regurg. Sep. '22.  Diabetes Type 2, HbA1c 6.2%  on Novolog by Omnipod, and low dose Levemir. Endo , Eye 10/23, Podiatry .   Hyperlipidemia,   off statin therapy due to liver disease.  Atherosclerosis of thoracic and abdominal aorta, and coronaries seen on imaging.  Centrilobular Emphysema with Micronodules and Bilateral Ground Glass Opacities on CT .  Autoimmune Hepatitis (biopsy ) with Cirrhosis, Ascites, Encephalopathy.  Hemolytic Anemia, Thrombocytopenia - resolved.   Osteoporosis T -3.5 at the hip, Vitamin D insuff, Prolia 10/23.     PSH:  2021: CATARACT EXTRACTION W/  INTRAOCULAR LENS IMPLANT; Left    NKDA.    Medications: list reviewed and reconciled.             Review of Systems   Constitutional:  Negative for activity change, diaphoresis, fever and unexpected weight change.   HENT:  Negative for hearing loss, rhinorrhea and trouble swallowing.    Eyes:  Negative for discharge and visual disturbance.   Respiratory:  Negative for cough, chest tightness, shortness of breath and wheezing.    Cardiovascular:  Negative for chest pain, palpitations and leg swelling.   Gastrointestinal:  Negative for abdominal pain, blood in stool, constipation, diarrhea, nausea and vomiting.   Endocrine: Negative for polydipsia  "and polyuria.   Genitourinary:  Negative for difficulty urinating, dysuria and hematuria.   Musculoskeletal:  Positive for arthralgias. Negative for joint swelling and neck pain.   Neurological:  Negative for seizures, syncope, weakness and headaches.   Psychiatric/Behavioral:  Negative for confusion and dysphoric mood.           Objective   Vitals:    01/22/24 1047   BP: (!) 142/88   Pulse: 80   SpO2: 98%   Weight: 57 kg (125 lb 11.2 oz)   Height: 5' 5" (1.651 m)     Physical Exam  Constitutional:       General: She is not in acute distress.     Appearance: She is not ill-appearing.      Comments: Appropriately groomed, in WC accompanied by family.   HENT:      Head: Normocephalic and atraumatic.      Nose: Nose normal. No congestion.      Mouth/Throat:      Mouth: Mucous membranes are moist.      Pharynx: Oropharynx is clear.   Eyes:      Extraocular Movements: Extraocular movements intact.      Conjunctiva/sclera: Conjunctivae normal.   Cardiovascular:      Rate and Rhythm: Normal rate and regular rhythm.      Heart sounds: Normal heart sounds.   Pulmonary:      Effort: Pulmonary effort is normal. No respiratory distress.      Breath sounds: Normal breath sounds. No stridor. No wheezing, rhonchi or rales.   Abdominal:      General: There is no distension.      Palpations: Abdomen is soft.      Tenderness: There is no abdominal tenderness.   Musculoskeletal:         General: No tenderness or deformity. Normal range of motion.      Cervical back: Normal range of motion.      Right lower leg: No edema.      Left lower leg: No edema.   Skin:     General: Skin is warm and dry.      Findings: No rash.   Neurological:      Mental Status: She is alert and oriented to person, place, and time.      Cranial Nerves: No cranial nerve deficit.   Psychiatric:         Mood and Affect: Mood normal.         Behavior: Behavior normal.         Thought Content: Thought content normal.       No visits with results within 3 Week(s) " from this visit.   Latest known visit with results is:   Lab Visit on 12/18/2023   Component Date Value    WBC 12/18/2023 8.91     RBC 12/18/2023 3.88 (L)     Hemoglobin 12/18/2023 12.2     Hematocrit 12/18/2023 37.5     MCV 12/18/2023 97     MCH 12/18/2023 31.4 (H)     MCHC 12/18/2023 32.5     RDW 12/18/2023 13.5     Platelets 12/18/2023 194     MPV 12/18/2023 11.1     Immature Granulocytes 12/18/2023 0.8 (H)     Gran # (ANC) 12/18/2023 6.1     Immature Grans (Abs) 12/18/2023 0.07 (H)     Lymph # 12/18/2023 1.4     Mono # 12/18/2023 1.1 (H)     Eos # 12/18/2023 0.2     Baso # 12/18/2023 0.04     nRBC 12/18/2023 0     Gran % 12/18/2023 68.5     Lymph % 12/18/2023 15.5 (L)     Mono % 12/18/2023 12.6     Eosinophil % 12/18/2023 2.2     Basophil % 12/18/2023 0.4     Differential Method 12/18/2023 Automated     Sodium 12/18/2023 141     Potassium 12/18/2023 4.4     Chloride 12/18/2023 111 (H)     CO2 12/18/2023 23     Glucose 12/18/2023 104     BUN 12/18/2023 28 (H)     Creatinine 12/18/2023 0.9     Calcium 12/18/2023 9.4     Total Protein 12/18/2023 7.4     Albumin 12/18/2023 3.0 (L)     Total Bilirubin 12/18/2023 0.8     Alkaline Phosphatase 12/18/2023 54 (L)     AST 12/18/2023 71 (H)     ALT 12/18/2023 56 (H)     eGFR 12/18/2023 >60.0     Anion Gap 12/18/2023 7 (L)     Prothrombin Time 12/18/2023 10.8     INR 12/18/2023 1.0     Bilirubin, Direct 12/18/2023 0.3         Assessment and Plan     1. Hypertensive heart disease with diastolic heart failure - fair control, continue same.        -     Amlodipine 2.5 mg may be increased to 5 mg daily for BP persisting >145/90.        -     continue Lisinopril HCT 20/12.5 one daily; both recently refilled.    2. Type 2 diabetes mellitus with other circulatory complication, with long-term current use of insulin  -     Hemoglobin A1C; Future; Expected date: 01/22/2024  -     Microalbumin/Creatinine Ratio, Urine    3. Hyperlipidemia, unspecified hyperlipidemia type  -     Lipid  Panel; Future; Expected date: 01/22/2024    4. PVD (peripheral vascular disease) - statin therapy on hold due to liver disease.     5. Centrilobular emphysema - asymptomatic.     6. Autoimmune hepatitis - stable on low dose Prednisone, has labs scheduled for Hepatology today.    7. Long term current use of systemic steroids - glucose is controlled, managing osteoporosis.     8. Age-related osteoporosis without current pathological fracture - continue Prolia - due in April.    9. Chronic pain of right knee - topical analgesic cream prn.     Vaccines declined.       Follow up in about 9 months (around 10/22/2024) for Blood Pressure Check.

## 2024-01-23 LAB
ALBUMIN/CREAT UR: 20 UG/MG (ref 0–30)
CREAT UR-MCNC: 25 MG/DL (ref 15–325)
MICROALBUMIN UR DL<=1MG/L-MCNC: 5 UG/ML

## 2024-01-24 ENCOUNTER — PATIENT MESSAGE (OUTPATIENT)
Dept: PRIMARY CARE CLINIC | Facility: CLINIC | Age: 84
End: 2024-01-24
Payer: MEDICARE

## 2024-01-29 ENCOUNTER — OFFICE VISIT (OUTPATIENT)
Dept: ENDOCRINOLOGY | Facility: CLINIC | Age: 84
End: 2024-01-29
Payer: MEDICARE

## 2024-01-29 ENCOUNTER — PATIENT MESSAGE (OUTPATIENT)
Dept: ENDOCRINOLOGY | Facility: CLINIC | Age: 84
End: 2024-01-29

## 2024-01-29 DIAGNOSIS — E11.59 TYPE 2 DIABETES MELLITUS WITH OTHER CIRCULATORY COMPLICATION, WITH LONG-TERM CURRENT USE OF INSULIN: Primary | ICD-10-CM

## 2024-01-29 DIAGNOSIS — E55.9 VITAMIN D DEFICIENCY: ICD-10-CM

## 2024-01-29 DIAGNOSIS — T38.0X5D ADVERSE EFFECT OF CORTICOSTEROIDS, SUBSEQUENT ENCOUNTER: ICD-10-CM

## 2024-01-29 DIAGNOSIS — M81.0 AGE-RELATED OSTEOPOROSIS WITHOUT CURRENT PATHOLOGICAL FRACTURE: ICD-10-CM

## 2024-01-29 DIAGNOSIS — K75.4 AUTOIMMUNE HEPATITIS: Chronic | ICD-10-CM

## 2024-01-29 DIAGNOSIS — Z96.41 INSULIN PUMP STATUS: ICD-10-CM

## 2024-01-29 DIAGNOSIS — Z79.4 TYPE 2 DIABETES MELLITUS WITH OTHER CIRCULATORY COMPLICATION, WITH LONG-TERM CURRENT USE OF INSULIN: Primary | ICD-10-CM

## 2024-01-29 PROCEDURE — 99214 OFFICE O/P EST MOD 30 MIN: CPT | Mod: 25,95,, | Performed by: INTERNAL MEDICINE

## 2024-01-29 PROCEDURE — 95251 CONT GLUC MNTR ANALYSIS I&R: CPT | Mod: S$PBB,NDTC,, | Performed by: INTERNAL MEDICINE

## 2024-01-29 NOTE — ASSESSMENT & PLAN NOTE
Reviewed goals of therapy are to get the best control we can without hypoglycemia.    Currently meeting glycemic target:  Yes    Doing well with Dexcom G6 plus Omnipod five.  She is still having postprandial excursions.  Will increase carb ratios accordingly.  Advised to bolus insulin 15-30 mins prior to eating to prevent postprandial spikes.    Will reduce manual basal rate as this has resulted in some mild hypoglycemia overnight when switching to limited automated mode.    Pump Settings  Basal Rate  12A:  0.45 units/hr      Carb Ratio  12A:12  6A: 10  6P: 12     ISF  12A: 50 (no change)     Target: 120 (no change)  Correct above: 140 (no change)     IAT: 4 hours (no change)    Reviewed patient's current insulin regimen. Clarified proper insulin dose and timing in relation to meals, etc. Insulin injection sites and proper rotation instructed.      Advised frequent self blood glucose monitoring. Patient encouraged to document glucose results and bring them to every clinic visit.    Hypoglycemia precautions discussed.     Discussed diet and exercise.    Diabetes health maintenance topics are addressed in the HPI    Lab Results   Component Value Date    HGBA1C 6.2 (H) 01/22/2024    HGBA1C 6.2 (H) 07/24/2023    HGBA1C 5.0 01/30/2023

## 2024-01-29 NOTE — ASSESSMENT & PLAN NOTE
On vitamin-D replacement - increase cholecalciferol to 2000 IU daily  Vitamin-D level is improving.

## 2024-01-29 NOTE — PROGRESS NOTES
Follow-up visit      Subjective:      Chief Complaint:  Diabetes and osteoporosis    History of Present Illness  Radha Feng is a 83 y.o. female with autoimmune hepatitis, cirrhosis and hx HE, HTN, HLD, HFpEF, anemia, thrombocytopenia, Vit D def, CAD, and T2DM referred for evaluation of T2DM and osteoporosis.    With regards to diabetes:    Diagnosed approx 25yrs ago - in 60s  Known complications: neuropathy    PDN for autoimmune hepatitis reduced to 5 mg daily.     Current Diabetes Regimen:  Omnipod 5 + Dexcom G6 with Novolog      Pump Settings  Basal Rate  12A:0.5     Carb Ratio  12A:14  6A: 12  6P: 14     ISF  12A: 50     Target: 120  Correct above: 140     IAT: 4 hours     Timing of prandial insulin: Gives at time of meal or 30 mins before (daughter has been experimenting with timing). In either case she's noted post-prandial spikes.     Omitted doses: none    Prior mediations tried:  Glyburide - switched to insulin in 10/2022  Metformin - stopped in 10/2022 due to autoimmune hepatitis and lactic acidosis       Diet/Exercise:  Eats 3 meals per day, overall healthy diet  Walks with cane inside, uses wheelchair for long distances. Signficant improvement in mobility since hospitalization     Recent Hgb A1C:  Lab Results   Component Value Date    HGBA1C 6.2 (H) 01/22/2024       Glucose Monitoring:  Dexcom G6 + Omnipod pump data was downloaded and reviewed.  Still with post-prandial excursions. AM sugars running good aside from when she briefly switched into limited automated mode that resulted in low blood sugar. No hypos noted when in regular automated mode.          Hypoglycemic Episodes: A few times 70s, but with dexcom has not had anything lower     Screening / DM Complications:    Nephropathy:  ACEi/ARB: Not taking  Lab Results   Component Value Date    MICALBCREAT 20.0 01/22/2024       Last Lipid Panel:  Statin: Not taking statin 2/2 autoimmune hepatitis   Lab Results   Component Value Date    LDLCALC  "123.2 01/22/2024       Last foot exam : 07/24/2023 - has some neuropathy, cataracts and glaucoma, some nerve damage in R eye not from DM but from glaucoma  Last eye exam : 02/06/2023;  no laser surgery or DR    B12:  Lab Results   Component Value Date    [id number] 794 03/09/2023     Diabetes Management Status    Statin: Not taking  ACE/ARB: Taking    Screening or Prevention Patient's value Goal Complete/Controlled?   HgA1C Testing and Control   Lab Results   Component Value Date    HGBA1C 6.2 (H) 01/22/2024      Annually/Less than 8% Yes   Lipid profile : 01/22/2024 Annually Yes   LDL control Lab Results   Component Value Date    LDLCALC 123.2 01/22/2024    Annually/Less than 100 mg/dl  No   Nephropathy screening Lab Results   Component Value Date    LABMICR 5.0 01/22/2024     Lab Results   Component Value Date    PROTEINUA Negative 11/03/2022     No results found for: "UTPCR"   Annually Yes   Blood pressure BP Readings from Last 1 Encounters:   01/22/24 (!) 142/88    Less than 140/90 No   Dilated retinal exam : 02/06/2023 Annually Yes   Foot exam   : 07/24/2023 Annually No         With regards to osteoporosis and vitamin D deficiency:  Diagnosed: 3/2023    Started on Prolia in 4/2023 - tolerating well so far.    DXA 1/30/2023  FINDINGS:  The bone mineral density measured from L1 through L4 is 0.954g/cm2.  This corresponds to a T score of -1.9 and a Z score of -0.4.     The bone mineral density within the left femoral neck measures 0.654 g/cm2.  This corresponds to a T score of -2.8 and a Z score of -1.2.     The bone mineral density within the right femoral neck measures 0.550 g/cm2.  This corresponds to a T score of -3.5 and a Z score of -2.0.  Values are likely lower than actual due to positioning.     FRAX RESULTS:     10-year Probability of Fracture:     Major Osteoporotic Fracture 12.8%.     Hip Fracture 6%.     Impression:     Osteopenia lumbar spine.  Osteoporosis both hips.     Taking Vitamin D3 - 1000 IU " daily     Lab Results   Component Value Date    [id number] 29 (L) 07/24/2023         Denies fractures  No recent falls - using cane, walker, or wheelchair and going to PT - making significant improvements in mobility with PT    Dentures were thrown away when she was hospitalized -- wears full dentures bottom and top - does not have any teeth     On Prednisone 7.5 mg qd for autoimmune hepatitis     ROS:   As above    Objective:     There were no vitals taken for this visit.  BP Readings from Last 3 Encounters:   01/22/24 (!) 142/88   01/18/24 (!) 149/75   12/07/23 (!) 159/91     Wt Readings from Last 1 Encounters:   01/22/24 1047 57 kg (125 lb 11.2 oz)     There is no height or weight on file to calculate BMI.      Physical Exam  Cardiovascular:      Pulses:           Dorsalis pedis pulses are 2+ on the right side and 2+ on the left side.        Posterior tibial pulses are 2+ on the right side and 2+ on the left side.   Musculoskeletal:      Right foot: No deformity.      Left foot: No deformity.   Feet:      Right foot:      Protective Sensation: 7 sites tested.  7 sites sensed.      Skin integrity: No ulcer, blister, skin breakdown, erythema, warmth, callus, dry skin or fissure.      Toenail Condition: Right toenails are normal.      Left foot:      Protective Sensation: 7 sites tested.  7 sites sensed.      Skin integrity: No ulcer, blister, skin breakdown, erythema, warmth, callus, dry skin or fissure.      Toenail Condition: Left toenails are normal.       Lab Review:   Lab Results   Component Value Date    HGBA1C 6.2 (H) 01/22/2024     Lab Results   Component Value Date    CHOL 195 01/22/2024    HDL 54 01/22/2024    LDLCALC 123.2 01/22/2024    TRIG 89 01/22/2024    CHOLHDL 27.7 01/22/2024     Lab Results   Component Value Date     01/22/2024    K 4.8 01/22/2024     01/22/2024    CO2 24 01/22/2024     (H) 01/22/2024    BUN 30 (H) 01/22/2024    CREATININE 0.8 01/22/2024    CALCIUM 9.3  01/22/2024    PROT 7.9 01/22/2024    ALBUMIN 3.1 (L) 01/22/2024    BILITOT 0.8 01/22/2024    ALKPHOS 58 01/22/2024    AST 61 (H) 01/22/2024    ALT 51 (H) 01/22/2024    ANIONGAP 9 01/22/2024    TSH 0.740 09/21/2022     Vit D, 25-Hydroxy   Date Value Ref Range Status   07/24/2023 29 (L) 30 - 96 ng/mL Final     Comment:     Vitamin D deficiency.........<10 ng/mL                              Vitamin D insufficiency......10-29 ng/mL       Vitamin D sufficiency........> or equal to 30 ng/mL  Vitamin D toxicity............>100 ng/mL       Vit D 18 in 1/2022 (Care Everywhere)    Assessment and Plan     Type 2 diabetes mellitus with circulatory disorder, with long-term current use of insulin  Reviewed goals of therapy are to get the best control we can without hypoglycemia.    Currently meeting glycemic target:  Yes    Doing well with Dexcom G6 plus Omnipod five.  She is still having postprandial excursions.  Will increase carb ratios accordingly.  Advised to bolus insulin 15-30 mins prior to eating to prevent postprandial spikes.    Will reduce manual basal rate as this has resulted in some mild hypoglycemia overnight when switching to limited automated mode.    Pump Settings  Basal Rate  12A:  0.45 units/hr      Carb Ratio  12A:12  6A: 10  6P: 12     ISF  12A: 50 (no change)     Target: 120 (no change)  Correct above: 140 (no change)     IAT: 4 hours (no change)    Reviewed patient's current insulin regimen. Clarified proper insulin dose and timing in relation to meals, etc. Insulin injection sites and proper rotation instructed.      Advised frequent self blood glucose monitoring. Patient encouraged to document glucose results and bring them to every clinic visit.    Hypoglycemia precautions discussed.     Discussed diet and exercise.    Diabetes health maintenance topics are addressed in the HPI    Lab Results   Component Value Date    HGBA1C 6.2 (H) 01/22/2024    HGBA1C 6.2 (H) 07/24/2023    HGBA1C 5.0 01/30/2023          Age-related osteoporosis without current pathological fracture  Tolerating Prolia well so far.  Continue every six months.  Next DXA in March, 2025.    Vitamin D deficiency  On vitamin-D replacement - increase cholecalciferol to 2000 IU daily  Vitamin-D level is improving.    Autoimmune hepatitis  Currently on prednisone 5 mg daily.    Adverse effect of adrenal cortical steroids  Steroids will predominantly effect prandial requirements of insulin.  Advised to let me know if the steroid dose is weaned so that we could monitor the need to adjust insulin.    Insulin pump status  See above    The patient location is: home  The chief complaint leading to consultation is: diabetes    Visit type: audiovisual    Face to Face time with patient: 24 minutes of total time spent on the encounter, which includes face to face time and non-face to face time preparing to see the patient (eg, review of tests), Obtaining and/or reviewing separately obtained history, Documenting clinical information in the electronic or other health record, Independently interpreting results (not separately reported) and communicating results to the patient/family/caregiver, or Care coordination (not separately reported).     Each patient to whom he or she provides medical services by telemedicine is:  (1) informed of the relationship between the physician and patient and the respective role of any other health care provider with respect to management of the patient; and (2) notified that he or she may decline to receive medical services by telemedicine and may withdraw from such care at any time.    Notes:     Follow up in about 6 months (around 7/29/2024).

## 2024-02-01 ENCOUNTER — HOSPITAL ENCOUNTER (EMERGENCY)
Facility: HOSPITAL | Age: 84
Discharge: HOME | End: 2024-02-01
Attending: STUDENT IN AN ORGANIZED HEALTH CARE EDUCATION/TRAINING PROGRAM
Payer: MEDICARE

## 2024-02-01 VITALS
BODY MASS INDEX: 20.66 KG/M2 | OXYGEN SATURATION: 99 % | HEART RATE: 82 BPM | HEIGHT: 65 IN | WEIGHT: 124 LBS | TEMPERATURE: 98 F | DIASTOLIC BLOOD PRESSURE: 82 MMHG | RESPIRATION RATE: 18 BRPM | SYSTOLIC BLOOD PRESSURE: 146 MMHG
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
## 2024-02-01 DIAGNOSIS — R93.89 ABNORMAL CT SCAN: ICD-10-CM

## 2024-02-01 PROCEDURE — 99284 EMERGENCY DEPT VISIT MOD MDM: CPT | Mod: 25

## 2024-02-01 NOTE — ED NOTES
Patient identifiers verified and correct for Ms Feng  C/C: Fall with possible head injury SEE NN  APPEARANCE: awake and alert in NAD. PAIN  0/10  SKIN: warm, dry and intact. No breakdown or bruising.  MUSCULOSKELETAL: Patient moving all extremities spontaneously, no obvious swelling or deformities noted. Ambulates independently.  RESPIRATORY: Denies shortness of breath.Respirations unlabored.   CARDIAC: Denies CP, 2+ distal pulses; no peripheral edema  ABDOMEN: S/ND/NT, Denies nausea  : voids spontaneously, denies difficulty  Neurologic: AAO x 4; follows commands equal strength in all extremities; denies numbness/tingling. Denies dizziness  Denies new weakness

## 2024-02-01 NOTE — ED PROVIDER NOTES
Encounter Date: 2/1/2024       History     Chief Complaint   Patient presents with    Fall     Was bending over , fell over hit head on tv stand, no loc, not on blood thinners, denies neck pain     83-year-old female medical history of diabetes mellitus, hypertension and autoimmune hepatitis presents emergency department due to unwitnessed fall with head injury, no LOC that occurred 10:30 a.m. patient reports that she was bending over to pick something up off the floor when she hit her forehead against the TV stand causing her to fall.  She states her grandson was in another room, he was 28 and he was able to assist her back up.  Patient has mild discomfort on the left frontal scalp.  At baseline patient uses a cane for short distances in a wheelchair for long distances.  She has chronic right knee discomfort that she follows Orthopedics with.  She denies any back pain or neck pain. Also denies headache, changes in vision nausea vomiting or dizziness.  Patient was present with her daughter who was a nurse at [employer].      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    Diabetes mellitus     Glaucoma     Hypertension     Other ascites 11/29/2022     Past Surgical History:   Procedure Laterality Date    CATARACT EXTRACTION W/  INTRAOCULAR LENS IMPLANT Left 12/21/2021    Procedure: EXTRACTION, CATARACT, WITH IOL INSERTION;  Surgeon: Shay Chou MD;  Location: [facility];  Service: Ophthalmology;  Laterality: Left;    ESOPHAGOGASTRODUODENOSCOPY N/A 6/26/2023    Procedure: ESOPHAGOGASTRODUODENOSCOPY (EGD);  Surgeon: Edgar Branch MD;  Location: [address];  Service: Endoscopy;  Laterality: N/A;  referral Dr Peraza-cirrhosis/labs done on 4/24/23-instr portal-GT     Family History   Problem Relation Age of Onset    Cataracts Mother     Diabetes Mother     Glaucoma Mother     Hypertension Mother      Heart attack Father     Colon cancer Sister     Blindness Cousin     Amblyopia Neg Hx     Macular degeneration Neg Hx     Retinal detachment Neg Hx      Social History     Tobacco Use    Smoking status: Former    Smokeless tobacco: Never   Substance Use Topics    Alcohol use: Not Currently    Drug use: Never     Review of Systems  See HPI  Physical Exam     Initial Vitals [02/01/24 1150]   BP Pulse Resp Temp SpO2   (!) 160/81 80 18 98.4 °F (36.9 °C) 98 %      MAP       --         Physical Exam    Vitals reviewed.  Constitutional: She appears well-developed and well-nourished.   HENT:   Head: Normocephalic and atraumatic.   2 cm hematoma of the left side of the frontal scalp.   Eyes: Conjunctivae and EOM are normal.   Neck:   Normal range of motion.  Cardiovascular:  Normal rate.           Pulmonary/Chest: No respiratory distress.   Abdominal: Abdomen is soft. She exhibits no distension.   Musculoskeletal:         General: Normal range of motion.      Cervical back: Normal range of motion.      Comments: Patient was able to passive/actively move upper and lower extremities at her baseline.  Chronic deformity of little finger of left hand that PIP joint.  I was able to witnessed the patient ambulate with her walker.     Neurological: She is alert and oriented to person, place, and time.   Skin: Skin is warm and dry.   Psychiatric: She has a normal mood and affect. Thought content normal.         ED Course   Procedures  Labs Reviewed - No data to display       Imaging Results               CT Head Without Contrast (Final result)  Result time 02/01/24 14:28:03      Final result by Yonny Layton DO (02/01/24 14:28:03)                   Impression:      CT head: No acute intracranial findings specifically without evidence for acute intracranial hemorrhage or sulcal effacement to suggest large territory recent infarction.  Clinical correlation and further evaluation as warranted.    CT cervical spine: Multilevel  degenerative change of the cervical spine as detailed above without evidence for acute fracture or traumatic subluxation    Please note there is emphysematous change in the visualized lungs with partially visualized consolidative opacity in the medial right upper lobe while this may represent rounded atelectasis active airspace process cannot be excluded.  Clinical correlation and further evaluation with dedicated CT thorax advised.      Electronically signed by: Yonny Layton DO  Date:    02/01/2024  Time:    14:28               Narrative:    EXAMINATION:  CT HEAD WITHOUT CONTRAST; CT CERVICAL SPINE WITHOUT CONTRAST    CLINICAL HISTORY:  Head trauma, minor (Age >= 65y);; Neck trauma (Age >= 65y);    TECHNIQUE:  CT head: Multiple sequential 5 mm axial images of the head without contrast.  Coronal and sagittal reformatted imaging from the axial acquisition.    CT cervical spine: Multiple sequential 1.25 mm axial images of the cervical spine without contrast.  Coronal and sagittal reformatted imaging from the axial acquisition.    COMPARISON:  None    FINDINGS:  CT head: There is no evidence for acute intracranial hemorrhage or sulcal effacement.  There is small region of hypoattenuation within the left subinsular white matter concerning for possible prior infarction with slight volume loss.  The ventricles are normal in size without hydrocephalus.  There is no midline shift or mass effect.  Visualized paranasal sinuses and mastoid air cells are clear.    CT cervical spine: There is straightening of the expected normal cervical lordosis.  There is scattered degenerative disc disease with intervertebral disc height loss and endplate degeneration at all levels most pronounced at C2/C3 C5/C6 and T2/T3 levels with severe height loss and endplate degeneration.  Allowing for this the cervical vertebral body heights and contours are within normal is without evidence for acute fracture.  Emphysematous change in the visualized  lungs with scattered probable traction bronchiectasis.  There is probable biapical scarring with few punctate right apical calcifications    Few ill-defined peribronchial ower nodules within the right upper lobe overall indeterminate largest measuring approximately 4 mm image 281 series 3.  Please note there is partially imaged focal consolidative opacity in the medial aspect of the right upper lobe extending to the pleural surface this measures approximately 1.9 cm image 295 series 3 while this may represent focus of rounded atelectasis active airspace filling process including underlying mass can not be excluded.  Clinical correlation and follow-up dedicated CT thorax advised    Central canal neural foramen distorted by CT technique allowing for this degenerative change as follows:    C2/C3: Posterior disc osteophyte with uncovertebral joint hypertrophy and facet arthropathy mild moderate central canal stenosis and bilateral neural foraminal stenosis.    C3/C4: Bulging disc with uncovertebral joint hypertrophy and facet arthropathy mild moderate central canal stenosis with out significant right neural foraminal stenosis with moderate to severe left bony neural foraminal stenosis.    C4/C5: Posterior disc osteophyte with uncovertebral joint hypertrophy and facet arthropathy without significant central canal stenosis and mild moderate bilateral foraminal stenosis.    C5/C6: Posterior disc osteophyte with uncovertebral joint hypertrophy and facet arthropathy with probable mild central canal and neural foraminal stenosis.    C6/C7: No significant disc bulge, central canal or neural foraminal stenosis.    C7/T1: No significant disc bulge, central canal or neural foraminal stenosis.    This report was flagged in Epic as abnormal.                                        CT Cervical Spine Without Contrast (Final result)  Result time 02/01/24 14:28:03      Final result by Yonny Layton DO (02/01/24 14:28:03)                    Impression:      CT head: No acute intracranial findings specifically without evidence for acute intracranial hemorrhage or sulcal effacement to suggest large territory recent infarction.  Clinical correlation and further evaluation as warranted.    CT cervical spine: Multilevel degenerative change of the cervical spine as detailed above without evidence for acute fracture or traumatic subluxation    Please note there is emphysematous change in the visualized lungs with partially visualized consolidative opacity in the medial right upper lobe while this may represent rounded atelectasis active airspace process cannot be excluded.  Clinical correlation and further evaluation with dedicated CT thorax advised.      Electronically signed by: Yonny Layton DO  Date:    02/01/2024  Time:    14:28               Narrative:    EXAMINATION:  CT HEAD WITHOUT CONTRAST; CT CERVICAL SPINE WITHOUT CONTRAST    CLINICAL HISTORY:  Head trauma, minor (Age >= 65y);; Neck trauma (Age >= 65y);    TECHNIQUE:  CT head: Multiple sequential 5 mm axial images of the head without contrast.  Coronal and sagittal reformatted imaging from the axial acquisition.    CT cervical spine: Multiple sequential 1.25 mm axial images of the cervical spine without contrast.  Coronal and sagittal reformatted imaging from the axial acquisition.    COMPARISON:  None    FINDINGS:  CT head: There is no evidence for acute intracranial hemorrhage or sulcal effacement.  There is small region of hypoattenuation within the left subinsular white matter concerning for possible prior infarction with slight volume loss.  The ventricles are normal in size without hydrocephalus.  There is no midline shift or mass effect.  Visualized paranasal sinuses and mastoid air cells are clear.    CT cervical spine: There is straightening of the expected normal cervical lordosis.  There is scattered degenerative disc disease with intervertebral disc height loss and endplate  degeneration at all levels most pronounced at C2/C3 C5/C6 and T2/T3 levels with severe height loss and endplate degeneration.  Allowing for this the cervical vertebral body heights and contours are within normal is without evidence for acute fracture.  Emphysematous change in the visualized lungs with scattered probable traction bronchiectasis.  There is probable biapical scarring with few punctate right apical calcifications    Few ill-defined peribronchial ower nodules within the right upper lobe overall indeterminate largest measuring approximately 4 mm image 281 series 3.  Please note there is partially imaged focal consolidative opacity in the medial aspect of the right upper lobe extending to the pleural surface this measures approximately 1.9 cm image 295 series 3 while this may represent focus of rounded atelectasis active airspace filling process including underlying mass can not be excluded.  Clinical correlation and follow-up dedicated CT thorax advised    Central canal neural foramen distorted by CT technique allowing for this degenerative change as follows:    C2/C3: Posterior disc osteophyte with uncovertebral joint hypertrophy and facet arthropathy mild moderate central canal stenosis and bilateral neural foraminal stenosis.    C3/C4: Bulging disc with uncovertebral joint hypertrophy and facet arthropathy mild moderate central canal stenosis with out significant right neural foraminal stenosis with moderate to severe left bony neural foraminal stenosis.    C4/C5: Posterior disc osteophyte with uncovertebral joint hypertrophy and facet arthropathy without significant central canal stenosis and mild moderate bilateral foraminal stenosis.    C5/C6: Posterior disc osteophyte with uncovertebral joint hypertrophy and facet arthropathy with probable mild central canal and neural foraminal stenosis.    C6/C7: No significant disc bulge, central canal or neural foraminal stenosis.    C7/T1: No significant disc  "bulge, central canal or neural foraminal stenosis.    This report was flagged in Epic as abnormal.                                       Medications - No data to display  Medical Decision Making  83-year-old female presents emergency department due to fall with head injury no LOC.  Differential diagnosis includes cervical fracture, subdural/epidural hematoma, skull fracture  CT scans not reveal acute findings related to fall however there was not incidental finding of  "Please note there is emphysematous change in the visualized lungs with partially visualized consolidative opacity in the medial right upper lobe while this may represent rounded atelectasis active airspace process cannot be excluded.  Clinical correlation and further evaluation with dedicated CT thorax"   I spoke with patient and her daughter who is a nurse regarding the results.  They declined any recent fever chills cough or shortness a breath.  I offered to do the CT in the emergency department however they expressed preference to follow up outpatient with primary care physician since she was not having symptoms.  I am agreeable with this plan they are discharged home with return precautions    Amount and/or Complexity of Data Reviewed  Radiology: ordered.                                      Clinical Impression:  Final diagnoses:  [W19.XXXA] Fall, initial encounter (Primary)  [R93.89] Abnormal CT scan          ED Disposition Condition    Discharge Stable          ED Prescriptions    None       Follow-up Information       Follow up With Specialties Details Why Contact Info    Leticia Lim MD Internal Medicine Schedule an appointment as soon as possible for a visit in 1 day  [address]  [place] [address]  [phone]               Naomi Mendez PA-C  02/01/24 1455    "

## 2024-02-01 NOTE — DISCHARGE INSTRUCTIONS
"As discussed the fall did not cause a brain bleed or fracture within the neck.  You can take Tylenol or ibuprofen as needed for headaches  As discussed on the CT scan there was not incidental findings of  " Please note there is emphysematous change in the visualized lungs with partially visualized consolidative opacity in the medial right upper lobe while this may represent rounded atelectasis active airspace process cannot be excluded.  Clinical correlation and further evaluation with dedicated CT thorax advised. "    It is very  "

## 2024-02-01 NOTE — ED NOTES
Patient states she fell at 1040 today, states she hit her head on TV stand after bending over, Denies LOC

## 2024-02-02 ENCOUNTER — TELEPHONE (OUTPATIENT)
Dept: PRIMARY CARE CLINIC | Facility: CLINIC | Age: 84
End: 2024-02-02
Payer: MEDICARE

## 2024-02-02 ENCOUNTER — PATIENT OUTREACH (OUTPATIENT)
Dept: EMERGENCY MEDICINE | Facility: HOSPITAL | Age: 84
End: 2024-02-02
Payer: MEDICARE

## 2024-02-02 NOTE — TELEPHONE ENCOUNTER
----- Message from Ofelia Solorio sent at 2/2/2024 12:13 PM CST -----  Regarding: ED F/U  Hi! This patient was seen in the ED on 2/1/24 for a fall; abnormal CT scan. They are part of the [facility] Medicare quality work for the system with 2 or more chronic conditions that requires a post ED 7 day appointment. Could you please contact this patient to schedule either an in-office or virtual F/U appt by 2/12/24 if possible? Thank you!

## 2024-02-02 NOTE — TELEPHONE ENCOUNTER
Spoke with Pt daughter (Della) Offer appointment to see Dr. Lim daughter will call Office back to schedule ER follow up When she have her Job Schedule

## 2024-02-02 NOTE — PROGRESS NOTES
Patient was seen in the ED on 2/1/24 for fall, abnormal CT. Patient's daughter was contacted for post ED discharge navigation. She states she will call back to schedule F/U appt after she looks at their schedules.

## 2024-02-12 ENCOUNTER — OFFICE VISIT (OUTPATIENT)
Dept: OPTOMETRY | Facility: CLINIC | Age: 84
End: 2024-02-12
Payer: MEDICARE

## 2024-02-12 ENCOUNTER — CLINICAL SUPPORT (OUTPATIENT)
Dept: OPHTHALMOLOGY | Facility: CLINIC | Age: 84
End: 2024-02-12
Payer: MEDICARE

## 2024-02-12 DIAGNOSIS — H25.11 SENILE NUCLEAR CATARACT, RIGHT: ICD-10-CM

## 2024-02-12 DIAGNOSIS — H40.1131 PRIMARY OPEN ANGLE GLAUCOMA (POAG) OF BOTH EYES, MILD STAGE: ICD-10-CM

## 2024-02-12 DIAGNOSIS — E11.36 TYPE 2 DIABETES MELLITUS WITH CATARACT: ICD-10-CM

## 2024-02-12 DIAGNOSIS — H40.1121 PRIMARY OPEN ANGLE GLAUCOMA (POAG) OF LEFT EYE, MILD STAGE: ICD-10-CM

## 2024-02-12 DIAGNOSIS — H40.1113 PRIMARY OPEN ANGLE GLAUCOMA (POAG) OF RIGHT EYE, SEVERE STAGE: Primary | ICD-10-CM

## 2024-02-12 DIAGNOSIS — E11.9 TYPE 2 DIABETES MELLITUS WITHOUT RETINOPATHY: ICD-10-CM

## 2024-02-12 DIAGNOSIS — Z96.1 PSEUDOPHAKIA: ICD-10-CM

## 2024-02-12 DIAGNOSIS — H40.1113 PRIMARY OPEN ANGLE GLAUCOMA (POAG) OF RIGHT EYE, SEVERE STAGE: ICD-10-CM

## 2024-02-12 PROCEDURE — 99214 OFFICE O/P EST MOD 30 MIN: CPT | Mod: S$PBB,,, | Performed by: OPTOMETRIST

## 2024-02-12 PROCEDURE — 99213 OFFICE O/P EST LOW 20 MIN: CPT | Mod: PBBFAC,PO | Performed by: OPTOMETRIST

## 2024-02-12 PROCEDURE — 99999 PR PBB SHADOW E&M-EST. PATIENT-LVL III: CPT | Mod: PBBFAC,,, | Performed by: OPTOMETRIST

## 2024-02-12 RX ORDER — BRIMONIDINE TARTRATE 2 MG/ML
1 SOLUTION/ DROPS OPHTHALMIC 3 TIMES DAILY
Qty: 30 ML | Refills: 3 | Status: SHIPPED | OUTPATIENT
Start: 2024-02-12 | End: 2025-02-11

## 2024-02-12 NOTE — PROGRESS NOTES
HVF/OCT rel/fix fair coop good OU/ POOR fixation OCT may need to be repeated/chat checked for latex allergy/1.00 + 0.752 x 155 OD -1.75 + 2.25 x 120 OS - BJ

## 2024-02-21 DIAGNOSIS — H40.1121 PRIMARY OPEN ANGLE GLAUCOMA (POAG) OF LEFT EYE, MILD STAGE: ICD-10-CM

## 2024-02-21 DIAGNOSIS — H40.1113 PRIMARY OPEN ANGLE GLAUCOMA (POAG) OF RIGHT EYE, SEVERE STAGE: ICD-10-CM

## 2024-02-21 NOTE — PROGRESS NOTES
CHARLES    ELO: 02/06/2023  Chief complaint (CC): Patient is here for annual eye exam today.  Patient   hasn't noticed any vision changes since the exam. Pt would like an Rx   glasses   Glasses?  OTC readers,   Contacts? -  H/o eye surgery, injections or laser: PC IOL OS  H/o eye injury: -  Known eye conditions? See above  Family h/o eye conditions? Mother with glaucoma  Eye gtts? Using Latanoprost OU Q HS, Cosopt BID OU and Brimonidine BID OD,   last used as directed      (-) Flashes (-)  Floaters (-) Mucous   (-)  Tearing (-) Itching (-) Burning   (-) Headaches (-) Eye Pain/discomfort (-) Irritation   (-)  Redness (-) Double vision (-) Blurry vision    Diabetic? -  A1c? -      Last edited by Obdulio Wright MA on 2/12/2024 10:56 AM.            Assessment /Plan     For exam results, see Encounter Report.      Primary open angle glaucoma (POAG) of right eye, severe stage  -     brimonidine 0.2% (ALPHAGAN) 0.2 % Drop; Place 1 drop into both eyes 3 (three) times daily.  Dispense: 30 mL; Refill: 3  Primary open angle glaucoma (POAG) of left eye, mild stage  -     brimonidine 0.2% (ALPHAGAN) 0.2 % Drop; Place 1 drop into both eyes 3 (three) times daily.  Dispense: 30 mL; Refill: 3  (+) FHx- mother. IOP 16 OD, OS. Last 12 OD, 15 OS. Tmax 28 OD, 23 OS. c/d 0.85 OD, 0.75 OS. Pt reports possibly being on drops for this prior to 2020..   Pachy 579 OD, 608 OS  8/11/2022  OCT OD borderline NS, Thin T, TI, TS, G, OS thin G, TI, T, borderline TS and NI  2/12/2024 OCT OD borderline NS, thin all other quadrants (worse N and NI), OS thin N, NI, TI and G, borderline TS (worse NS, N, NI, TI and G)  9/29/2021 HVF OD severely depressed VF w/some inferior sparing, OS inferionasal step  8/11/2022 HVF OD dense sup arcuate and early inf arcuate, OS nasal defects  2/12/2024 HVF OD dense  sup and inf arc, OS nasal defect  Educated pt on findings w/understanding.  Consequences of noncompliance and lack of f/u reviewed.  D/c Timolol BiD OD  (8/12/2020).  Cont Latanoprost QHS OU (8/12/2020) and cont Cosopt BiD OU (started 9/21/2020)  No noticeable change in IOP w/Cosopt added.   Increase Brimonidine BID OD to TiD OU (started 3/22/2021)  Referral to Dr Garber due to worsening of OCT. Also consider benefit of cataract surgery OD. Pt was prev able to correct to 20/150 OD in 2/2023 but is CF today.     Type 2 diabetes mellitus without retinopathy  BS control. No signs of diabetic retinopathy. Monitor with annual exam.     Senile nuclear cataract, right  Type 2 diabetes mellitus with cataract  Pseudophakia, left eye  See above

## 2024-02-22 ENCOUNTER — LAB VISIT (OUTPATIENT)
Dept: LAB | Facility: HOSPITAL | Age: 84
End: 2024-02-22
Attending: INTERNAL MEDICINE
Payer: MEDICARE

## 2024-02-22 DIAGNOSIS — K74.60 CIRRHOSIS OF LIVER WITH ASCITES, UNSPECIFIED HEPATIC CIRRHOSIS TYPE: ICD-10-CM

## 2024-02-22 DIAGNOSIS — K75.4 AUTOIMMUNE HEPATITIS: Chronic | ICD-10-CM

## 2024-02-22 DIAGNOSIS — R18.8 CIRRHOSIS OF LIVER WITH ASCITES, UNSPECIFIED HEPATIC CIRRHOSIS TYPE: ICD-10-CM

## 2024-02-22 LAB
ALBUMIN SERPL BCP-MCNC: 3.1 G/DL (ref 3.5–5.2)
ALP SERPL-CCNC: 56 U/L (ref 55–135)
ALT SERPL W/O P-5'-P-CCNC: 43 U/L (ref 10–44)
ANION GAP SERPL CALC-SCNC: 10 MMOL/L (ref 8–16)
AST SERPL-CCNC: 51 U/L (ref 10–40)
BASOPHILS # BLD AUTO: 0.04 K/UL (ref 0–0.2)
BASOPHILS NFR BLD: 0.5 % (ref 0–1.9)
BILIRUB DIRECT SERPL-MCNC: 0.3 MG/DL (ref 0.1–0.3)
BILIRUB SERPL-MCNC: 0.8 MG/DL (ref 0.1–1)
BUN SERPL-MCNC: 23 MG/DL (ref 8–23)
CALCIUM SERPL-MCNC: 10.1 MG/DL (ref 8.7–10.5)
CHLORIDE SERPL-SCNC: 106 MMOL/L (ref 95–110)
CO2 SERPL-SCNC: 23 MMOL/L (ref 23–29)
CREAT SERPL-MCNC: 0.8 MG/DL (ref 0.5–1.4)
DIFFERENTIAL METHOD BLD: ABNORMAL
EOSINOPHIL # BLD AUTO: 0.2 K/UL (ref 0–0.5)
EOSINOPHIL NFR BLD: 2 % (ref 0–8)
ERYTHROCYTE [DISTWIDTH] IN BLOOD BY AUTOMATED COUNT: 13.4 % (ref 11.5–14.5)
EST. GFR  (NO RACE VARIABLE): >60 ML/MIN/1.73 M^2
GLUCOSE SERPL-MCNC: 160 MG/DL (ref 70–110)
HCT VFR BLD AUTO: 38.3 % (ref 37–48.5)
HGB BLD-MCNC: 12.4 G/DL (ref 12–16)
IMM GRANULOCYTES # BLD AUTO: 0.03 K/UL (ref 0–0.04)
IMM GRANULOCYTES NFR BLD AUTO: 0.4 % (ref 0–0.5)
INR PPP: 1 (ref 0.8–1.2)
LYMPHOCYTES # BLD AUTO: 1.3 K/UL (ref 1–4.8)
LYMPHOCYTES NFR BLD: 16.6 % (ref 18–48)
MCH RBC QN AUTO: 31.6 PG (ref 27–31)
MCHC RBC AUTO-ENTMCNC: 32.4 G/DL (ref 32–36)
MCV RBC AUTO: 98 FL (ref 82–98)
MONOCYTES # BLD AUTO: 1 K/UL (ref 0.3–1)
MONOCYTES NFR BLD: 13.6 % (ref 4–15)
NEUTROPHILS # BLD AUTO: 5.1 K/UL (ref 1.8–7.7)
NEUTROPHILS NFR BLD: 66.9 % (ref 38–73)
NRBC BLD-RTO: 0 /100 WBC
PLATELET # BLD AUTO: 180 K/UL (ref 150–450)
PMV BLD AUTO: 11.3 FL (ref 9.2–12.9)
POTASSIUM SERPL-SCNC: 4 MMOL/L (ref 3.5–5.1)
PROT SERPL-MCNC: 7.4 G/DL (ref 6–8.4)
PROTHROMBIN TIME: 10.5 SEC (ref 9–12.5)
RBC # BLD AUTO: 3.93 M/UL (ref 4–5.4)
SODIUM SERPL-SCNC: 139 MMOL/L (ref 136–145)
WBC # BLD AUTO: 7.63 K/UL (ref 3.9–12.7)

## 2024-02-22 PROCEDURE — 36415 COLL VENOUS BLD VENIPUNCTURE: CPT | Mod: PN | Performed by: INTERNAL MEDICINE

## 2024-02-22 PROCEDURE — 82248 BILIRUBIN DIRECT: CPT | Performed by: INTERNAL MEDICINE

## 2024-02-22 PROCEDURE — 80053 COMPREHEN METABOLIC PANEL: CPT | Performed by: INTERNAL MEDICINE

## 2024-02-22 PROCEDURE — 85610 PROTHROMBIN TIME: CPT | Performed by: INTERNAL MEDICINE

## 2024-02-22 PROCEDURE — 85025 COMPLETE CBC W/AUTO DIFF WBC: CPT | Performed by: INTERNAL MEDICINE

## 2024-02-22 RX ORDER — DORZOLAMIDE HYDROCHLORIDE AND TIMOLOL MALEATE 20; 5 MG/ML; MG/ML
SOLUTION/ DROPS OPHTHALMIC
Qty: 10 ML | Refills: 11 | Status: SHIPPED | OUTPATIENT
Start: 2024-02-22

## 2024-02-27 DIAGNOSIS — Z00.00 ENCOUNTER FOR MEDICARE ANNUAL WELLNESS EXAM: ICD-10-CM

## 2024-03-03 NOTE — PROGRESS NOTES
HPI    DLS :2/12/2024 with Dr. Elliott    Pt here for Glaucoma Eval per Dr. Elliott- Pt had a visual field/OCT in   [place] on 2/12/2024  Pt states at times her OD would hurt.     Meds;  Brimonidine TID OU  Latanoprost QAM OU  Dorzolamide BID OD      Last edited by Denisse Burnett on 3/7/2024  3:20 PM.              Assessment /Plan     For exam results, see Encounter Report.    Primary open angle glaucoma (POAG) of right eye, severe stage    Primary open-angle glaucoma, left eye, mild stage    Nuclear sclerosis, right    Pseudophakia of left eye      Old Patient of dr Lawson   Last seen by Dr belcher - last seen at [facility] with Dr belcher 2/12/2024         1.   Glaucoma (type and duration)    x years    First HVF   2021   First photos   pending   Treatment / Drops started   ? Years ago            Family history    ?        Glaucoma meds    latanoprost ou q am / brimonidine tid ou / cosopt  bid od         H/O adverse rxn to glaucoma drops    ?         LASERS    ?        GLAUCOMA SURGERIES    none        OTHER EYE SURGERIES    PC IOL os - [facility]         CDR    ?        Tbase    ?          Tmax    28/33            Ttarget    ?             HVF    3 test 2021 to  2024 - nearly extinguished  od // ? IAD / SNS  os        Gonio    +3 ou        CCT    579/608        OCT    3 test 2021 to 2024 - RNFL - dec thru out  od // dec G/N/NI/TI, bord TS (?prog os)  os        Disc photos    ??    - Ttoday    17/17   - Test done today     establish care / IOP / gonio / chart review / VF review / OCT review       ? APD os -    Recheck     NS od    No real reason to remove - poor prognosis 2/2 ON and VF loss     PC IOL os    Kofilmet  12/221/2023     PLAN  CSM   If needed can add cosopt os // if needed can do slt's   F/U 4 months with DFE and disc photos (none on file)   Pts daughter prefers Monday appointment

## 2024-03-06 ENCOUNTER — PATIENT MESSAGE (OUTPATIENT)
Dept: HEPATOLOGY | Facility: CLINIC | Age: 84
End: 2024-03-06
Payer: MEDICARE

## 2024-03-07 ENCOUNTER — OFFICE VISIT (OUTPATIENT)
Dept: OPHTHALMOLOGY | Facility: CLINIC | Age: 84
End: 2024-03-07
Payer: MEDICARE

## 2024-03-07 DIAGNOSIS — H25.11 NUCLEAR SCLEROSIS, RIGHT: ICD-10-CM

## 2024-03-07 DIAGNOSIS — H40.1121 PRIMARY OPEN-ANGLE GLAUCOMA, LEFT EYE, MILD STAGE: ICD-10-CM

## 2024-03-07 DIAGNOSIS — Z96.1 PSEUDOPHAKIA OF LEFT EYE: ICD-10-CM

## 2024-03-07 DIAGNOSIS — H40.1113 PRIMARY OPEN ANGLE GLAUCOMA (POAG) OF RIGHT EYE, SEVERE STAGE: Primary | ICD-10-CM

## 2024-03-07 PROCEDURE — 99999 PR PBB SHADOW E&M-EST. PATIENT-LVL III: CPT | Mod: PBBFAC,,, | Performed by: OPHTHALMOLOGY

## 2024-03-07 PROCEDURE — 99213 OFFICE O/P EST LOW 20 MIN: CPT | Mod: PBBFAC,25 | Performed by: OPHTHALMOLOGY

## 2024-03-07 PROCEDURE — 99214 OFFICE O/P EST MOD 30 MIN: CPT | Mod: S$PBB,,, | Performed by: OPHTHALMOLOGY

## 2024-03-07 PROCEDURE — 92020 GONIOSCOPY: CPT | Mod: PBBFAC | Performed by: OPHTHALMOLOGY

## 2024-03-07 PROCEDURE — 92020 GONIOSCOPY: CPT | Mod: S$PBB,,, | Performed by: OPHTHALMOLOGY

## 2024-03-21 ENCOUNTER — LAB VISIT (OUTPATIENT)
Dept: LAB | Facility: HOSPITAL | Age: 84
End: 2024-03-21
Attending: INTERNAL MEDICINE
Payer: MEDICARE

## 2024-03-21 DIAGNOSIS — R18.8 CIRRHOSIS OF LIVER WITH ASCITES, UNSPECIFIED HEPATIC CIRRHOSIS TYPE: ICD-10-CM

## 2024-03-21 DIAGNOSIS — K75.4 AUTOIMMUNE HEPATITIS: Chronic | ICD-10-CM

## 2024-03-21 DIAGNOSIS — K74.60 CIRRHOSIS OF LIVER WITH ASCITES, UNSPECIFIED HEPATIC CIRRHOSIS TYPE: ICD-10-CM

## 2024-03-21 LAB
ALBUMIN SERPL BCP-MCNC: 3.1 G/DL (ref 3.5–5.2)
ALP SERPL-CCNC: 79 U/L (ref 55–135)
ALT SERPL W/O P-5'-P-CCNC: 32 U/L (ref 10–44)
ANION GAP SERPL CALC-SCNC: 9 MMOL/L (ref 8–16)
AST SERPL-CCNC: 40 U/L (ref 10–40)
BASOPHILS # BLD AUTO: 0.04 K/UL (ref 0–0.2)
BASOPHILS NFR BLD: 0.5 % (ref 0–1.9)
BILIRUB DIRECT SERPL-MCNC: 0.3 MG/DL (ref 0.1–0.3)
BILIRUB SERPL-MCNC: 0.8 MG/DL (ref 0.1–1)
BUN SERPL-MCNC: 25 MG/DL (ref 8–23)
CALCIUM SERPL-MCNC: 9.7 MG/DL (ref 8.7–10.5)
CHLORIDE SERPL-SCNC: 108 MMOL/L (ref 95–110)
CO2 SERPL-SCNC: 24 MMOL/L (ref 23–29)
CREAT SERPL-MCNC: 0.8 MG/DL (ref 0.5–1.4)
DIFFERENTIAL METHOD BLD: ABNORMAL
EOSINOPHIL # BLD AUTO: 0.2 K/UL (ref 0–0.5)
EOSINOPHIL NFR BLD: 1.9 % (ref 0–8)
ERYTHROCYTE [DISTWIDTH] IN BLOOD BY AUTOMATED COUNT: 13.4 % (ref 11.5–14.5)
EST. GFR  (NO RACE VARIABLE): >60 ML/MIN/1.73 M^2
GLUCOSE SERPL-MCNC: 116 MG/DL (ref 70–110)
HCT VFR BLD AUTO: 39.7 % (ref 37–48.5)
HGB BLD-MCNC: 12.8 G/DL (ref 12–16)
IMM GRANULOCYTES # BLD AUTO: 0.04 K/UL (ref 0–0.04)
IMM GRANULOCYTES NFR BLD AUTO: 0.5 % (ref 0–0.5)
INR PPP: 1 (ref 0.8–1.2)
LYMPHOCYTES # BLD AUTO: 1.8 K/UL (ref 1–4.8)
LYMPHOCYTES NFR BLD: 21.1 % (ref 18–48)
MCH RBC QN AUTO: 31.9 PG (ref 27–31)
MCHC RBC AUTO-ENTMCNC: 32.2 G/DL (ref 32–36)
MCV RBC AUTO: 99 FL (ref 82–98)
MONOCYTES # BLD AUTO: 1.2 K/UL (ref 0.3–1)
MONOCYTES NFR BLD: 13.5 % (ref 4–15)
NEUTROPHILS # BLD AUTO: 5.3 K/UL (ref 1.8–7.7)
NEUTROPHILS NFR BLD: 62.5 % (ref 38–73)
NRBC BLD-RTO: 0 /100 WBC
PLATELET # BLD AUTO: 193 K/UL (ref 150–450)
PMV BLD AUTO: 11.3 FL (ref 9.2–12.9)
POTASSIUM SERPL-SCNC: 4.1 MMOL/L (ref 3.5–5.1)
PROT SERPL-MCNC: 7 G/DL (ref 6–8.4)
PROTHROMBIN TIME: 10.9 SEC (ref 9–12.5)
RBC # BLD AUTO: 4.01 M/UL (ref 4–5.4)
SODIUM SERPL-SCNC: 141 MMOL/L (ref 136–145)
WBC # BLD AUTO: 8.49 K/UL (ref 3.9–12.7)

## 2024-03-21 PROCEDURE — 85025 COMPLETE CBC W/AUTO DIFF WBC: CPT | Performed by: INTERNAL MEDICINE

## 2024-03-21 PROCEDURE — 80053 COMPREHEN METABOLIC PANEL: CPT | Performed by: INTERNAL MEDICINE

## 2024-03-21 PROCEDURE — 36415 COLL VENOUS BLD VENIPUNCTURE: CPT | Mod: PN | Performed by: INTERNAL MEDICINE

## 2024-03-21 PROCEDURE — 82248 BILIRUBIN DIRECT: CPT | Performed by: INTERNAL MEDICINE

## 2024-03-21 PROCEDURE — 85610 PROTHROMBIN TIME: CPT | Performed by: INTERNAL MEDICINE

## 2024-04-17 ENCOUNTER — CLINICAL SUPPORT (OUTPATIENT)
Dept: DIABETES | Facility: CLINIC | Age: 84
End: 2024-04-17
Payer: MEDICARE

## 2024-04-17 DIAGNOSIS — Z79.4 TYPE 2 DIABETES MELLITUS WITH OTHER CIRCULATORY COMPLICATION, WITH LONG-TERM CURRENT USE OF INSULIN: Primary | ICD-10-CM

## 2024-04-17 DIAGNOSIS — E11.59 TYPE 2 DIABETES MELLITUS WITH OTHER CIRCULATORY COMPLICATION, WITH LONG-TERM CURRENT USE OF INSULIN: Primary | ICD-10-CM

## 2024-04-17 PROCEDURE — G0108 DIAB MANAGE TRN  PER INDIV: HCPCS | Mod: 95,,, | Performed by: INTERNAL MEDICINE

## 2024-04-18 NOTE — PROGRESS NOTES
Diabetes Care Specialist Virtual Visit Note   The patient location is: Home in [place]  The chief complaint leading to consultation is: Diabetes  Visit type: audiovisual  Total time spent with patient: 30 min   Each patient to whom he or she provides medical services by telemedicine is:  (1) informed of the relationship between the physician and patient and the respective role of any other health care provider with respect to management of the patient; and (2) notified that he or she may decline to receive medical services by telemedicine and may withdraw from such care at any time.    Diabetes Care Specialist Progress Note  Author: Michelle Feng RN, CDE  Date: 4/17/2024    Program Intake  Reason for Diabetes Program Visit:: Intervention  Type of Intervention:: Individual  Individual: Education  Education: Self-Management Skill Review  Current diabetes risk level:: moderate  In the last 12 months, have you:: none  Permission to speak with others about care:: yes (Regino barnett)  Continuous Glucose Monitoring  Patient has CGM: Yes  Personal CGM type:: Dexcom G6    Lab Results   Component Value Date    HGBA1C 6.2 (H) 01/22/2024             Additional Social History    Support  Does anyone support you with your diabetes care?: yes    Access to [employer]  Does the patient have access to any of the following devices or technologies?: Smart phone    Health Literacy  Preferred Learning Method: Face to Face      Diabetes Self-Management Skills Assessment    Home Blood Glucose Monitoring  Personal CGM type:: Dexcom G6      Assessment Summary and Plan    Based on today's diabetes care assessment, the following areas of need were identified:               10/10/2023    12:01 AM   Diabetes Self-Management Skills   Diabetes Disease Process/Treatment Options No   Nutrition/Healthy Eating No   Physical Activity/Exercise No   Medication Yes, see care planning   Home Blood Glucose Monitoring Yes, continues with  G6   Acute Complications No   Psychosocial/Coping No          Today's interventions were provided through individual discussion, instruction, and written materials were provided.      Patient verbalized understanding of instruction and written materials.  Pt was able to return back demonstration of instructions today. Patient understood key points, needs reinforcement and further instruction.     Diabetes Self-Management Care Plan:    Today's Diabetes Self-Management Care Plan was developed with Radha's input. Radha has agreed to work toward the following goal(s) to improve his/her overall diabetes control.      Care Plan: Diabetes Management   Updates made since 3/19/2024 12:00 AM        Problem: Medications         Goal: Patient Agrees to take Diabetes Medication(s) using the Omnipod 5 system    Start Date: 3/27/2023   Expected End Date: 10/15/2024   This Visit's Progress: On track   Recent Progress: On track   Priority: High   Barriers: Knowledge deficit   Note:    OP5    Update to care planning 06/21/2023:  Reviewed Omnipod 5 download with Dexcom integration with patient and her daughter Vinod.  Overall doing well.  Bgs stable during the night and when she is not eating.  Elevations noted at all post meal glucose.  Carbs are controlled and measured by family members.  Will increase her I:C from 16 to 14 as she requires more insulin with meals.  Walked daughter thru changes on her PDM. No other concerns voiced.  We reviewed troubleshooting, what to look for including leaking at the site an unexplained rise in glucose readings.  Advised to monitor for these and to change the pod if this occurs.  Advised that she no longer has long acting insulin and changes must be address immediately to avoid potential issues.  Has back up insulin.  Demonstrated how to use the inhaled and glucagon emergency pens.  Will reach out to HCP to prescribed. Patient states she likes using system as opposed to injections. Advised to  call Omnipod for replacement pods as she had to change 2 prematurely.  No other questions voiced.     Update to care planning 10/10/23:  Patient and her daughter seen for pump f/u.  Doing well on Omnipod 5.  We reviewed when changing the pod to make sure she is in [employer].  Had changed pod last and forgot to switch.  Discussed troubleshooting with pump and how to identify issues.  Advised to always change pod if numbers do not respond to correction bolus.  Instructed Too how to do a correction bolus with pump.     Update to care planning 4/17/2024:  Patient seen virtually for her 6 mth f/u visit.  Tish (daughter) in attendance. Download was reviewed for Omnipod 5.  Continues to do very well on pump, reports no issues.  Assisted with changing IC ratio and basal rates per Dr. Agarwal.  No other issues, continues to enter her meals appropriately.        Problem: Healthy Eating         Goal: Eat 2-3 meals daily with 30-45g/2-3 servings of Carbohydrate per meal. Limit snacking in between meal to 1 serving (15 grams).    Start Date: 3/27/2023   Expected End Date: 10/10/2023   This Visit's Progress: On track   Recent Progress: On track   Priority: Medium   Note:    Carb counting    Update to care planning 06/21/2023:  no issues reported.  Daughter asked about bolusing for snacks.  Advised to keep snacks at about 15 gram and that it is ok to bolus for these as pump tracks insulin and will make adjustments accordingly.          Follow Up Plan     F/u in 6 mths.    Today's care plan and follow up schedule was discussed with patient.  Radha verbalized understanding of the care plan, goals, and agrees to follow up plan.        The patient was encouraged to communicate with his/her health care provider/physician and care team regarding his/her condition(s) and treatment.  I provided the patient with my contact information today and encouraged to contact me via phone or [facility] Patient Portal as needed.     Length of  Visit   Total Time: 30 Minutes

## 2024-04-22 ENCOUNTER — PATIENT MESSAGE (OUTPATIENT)
Dept: HEPATOLOGY | Facility: CLINIC | Age: 84
End: 2024-04-22
Payer: MEDICARE

## 2024-04-22 ENCOUNTER — PATIENT MESSAGE (OUTPATIENT)
Dept: ENDOCRINOLOGY | Facility: CLINIC | Age: 84
End: 2024-04-22
Payer: MEDICARE

## 2024-04-22 ENCOUNTER — INFUSION (OUTPATIENT)
Dept: INFECTIOUS DISEASES | Facility: HOSPITAL | Age: 84
End: 2024-04-22
Payer: MEDICARE

## 2024-04-22 VITALS
HEART RATE: 82 BPM | BODY MASS INDEX: 20.92 KG/M2 | HEIGHT: 65 IN | SYSTOLIC BLOOD PRESSURE: 140 MMHG | DIASTOLIC BLOOD PRESSURE: 66 MMHG | OXYGEN SATURATION: 95 % | WEIGHT: 125.56 LBS | RESPIRATION RATE: 19 BRPM | TEMPERATURE: 100 F

## 2024-04-22 DIAGNOSIS — M25.551 RIGHT HIP PAIN: Primary | ICD-10-CM

## 2024-04-22 DIAGNOSIS — M81.0 AGE-RELATED OSTEOPOROSIS WITHOUT CURRENT PATHOLOGICAL FRACTURE: Primary | ICD-10-CM

## 2024-04-22 DIAGNOSIS — K74.60 HEPATIC CIRRHOSIS, UNSPECIFIED HEPATIC CIRRHOSIS TYPE, UNSPECIFIED WHETHER ASCITES PRESENT: Primary | ICD-10-CM

## 2024-04-22 PROCEDURE — 96372 THER/PROPH/DIAG INJ SC/IM: CPT

## 2024-04-22 PROCEDURE — 63600175 PHARM REV CODE 636 W HCPCS: Mod: JZ,JG | Performed by: INTERNAL MEDICINE

## 2024-04-22 RX ADMIN — DENOSUMAB 60 MG: 60 INJECTION SUBCUTANEOUS at 03:04

## 2024-04-22 NOTE — PROGRESS NOTES
Patient arrives for Prolia injection - confirms use of vitamin D supplements and denies dental procedures over past 3 months - administered per guidelines.    Return appointment provided to patient and daughter.    Limited head-to-toe assessment due to privacy issues and visit reason though the opportunity was given for patient to express any concerns.

## 2024-04-24 ENCOUNTER — HOSPITAL ENCOUNTER (OUTPATIENT)
Dept: RADIOLOGY | Facility: HOSPITAL | Age: 84
Discharge: HOME | End: 2024-04-24
Attending: INTERNAL MEDICINE
Payer: MEDICARE

## 2024-04-24 DIAGNOSIS — M25.551 RIGHT HIP PAIN: ICD-10-CM

## 2024-04-24 PROCEDURE — 73552 X-RAY EXAM OF FEMUR 2/>: CPT | Mod: 26,RT,, | Performed by: RADIOLOGY

## 2024-04-24 PROCEDURE — 73552 X-RAY EXAM OF FEMUR 2/>: CPT | Mod: TC,RT

## 2024-04-25 ENCOUNTER — PATIENT MESSAGE (OUTPATIENT)
Dept: ENDOCRINOLOGY | Facility: CLINIC | Age: 84
End: 2024-04-25
Payer: MEDICARE

## 2024-04-25 DIAGNOSIS — M87.051 AVASCULAR NECROSIS OF HIP, RIGHT: Primary | ICD-10-CM

## 2024-04-25 DIAGNOSIS — T38.0X5D ADVERSE EFFECT OF CORTICOSTEROIDS, SUBSEQUENT ENCOUNTER: ICD-10-CM

## 2024-05-01 DIAGNOSIS — Z79.4 TYPE 2 DIABETES MELLITUS WITH OTHER CIRCULATORY COMPLICATION, WITH LONG-TERM CURRENT USE OF INSULIN: ICD-10-CM

## 2024-05-01 DIAGNOSIS — E11.59 TYPE 2 DIABETES MELLITUS WITH OTHER CIRCULATORY COMPLICATION, WITH LONG-TERM CURRENT USE OF INSULIN: ICD-10-CM

## 2024-05-02 RX ORDER — INSULIN ASPART 100 [IU]/ML
INJECTION, SOLUTION INTRAVENOUS; SUBCUTANEOUS
Qty: 40 ML | Refills: 4 | Status: SHIPPED | OUTPATIENT
Start: 2024-05-02

## 2024-05-06 DIAGNOSIS — K76.9 LIVER DISEASE, UNSPECIFIED: ICD-10-CM

## 2024-05-06 DIAGNOSIS — R79.89 ELEVATED LIVER FUNCTION TESTS: Primary | ICD-10-CM

## 2024-05-07 ENCOUNTER — TELEPHONE (OUTPATIENT)
Dept: HEPATOLOGY | Facility: CLINIC | Age: 84
End: 2024-05-07
Payer: MEDICARE

## 2024-05-11 ENCOUNTER — PATIENT MESSAGE (OUTPATIENT)
Dept: HEPATOLOGY | Facility: CLINIC | Age: 84
End: 2024-05-11
Payer: MEDICARE

## 2024-05-11 ENCOUNTER — HOSPITAL ENCOUNTER (OUTPATIENT)
Dept: RADIOLOGY | Facility: HOSPITAL | Age: 84
Discharge: HOME | End: 2024-05-11
Attending: INTERNAL MEDICINE
Payer: MEDICARE

## 2024-05-11 DIAGNOSIS — K74.60 CIRRHOSIS OF LIVER WITH ASCITES, UNSPECIFIED HEPATIC CIRRHOSIS TYPE: ICD-10-CM

## 2024-05-11 DIAGNOSIS — R18.8 CIRRHOSIS OF LIVER WITH ASCITES, UNSPECIFIED HEPATIC CIRRHOSIS TYPE: ICD-10-CM

## 2024-05-11 PROCEDURE — 76700 US EXAM ABDOM COMPLETE: CPT | Mod: 26,,, | Performed by: RADIOLOGY

## 2024-05-11 PROCEDURE — 76700 US EXAM ABDOM COMPLETE: CPT | Mod: TC

## 2024-05-13 ENCOUNTER — LAB VISIT (OUTPATIENT)
Dept: LAB | Facility: HOSPITAL | Age: 84
End: 2024-05-13
Attending: INTERNAL MEDICINE
Payer: MEDICARE

## 2024-05-13 ENCOUNTER — OFFICE VISIT (OUTPATIENT)
Dept: HEPATOLOGY | Facility: CLINIC | Age: 84
End: 2024-05-13
Payer: MEDICARE

## 2024-05-13 VITALS — OXYGEN SATURATION: 98 % | DIASTOLIC BLOOD PRESSURE: 70 MMHG | HEART RATE: 86 BPM | SYSTOLIC BLOOD PRESSURE: 130 MMHG

## 2024-05-13 DIAGNOSIS — K75.4 AUTOIMMUNE HEPATITIS: Chronic | ICD-10-CM

## 2024-05-13 DIAGNOSIS — R18.8 CIRRHOSIS OF LIVER WITH ASCITES, UNSPECIFIED HEPATIC CIRRHOSIS TYPE: ICD-10-CM

## 2024-05-13 DIAGNOSIS — K75.4 AUTOIMMUNE HEPATITIS: Primary | Chronic | ICD-10-CM

## 2024-05-13 DIAGNOSIS — K74.60 CIRRHOSIS OF LIVER WITH ASCITES, UNSPECIFIED HEPATIC CIRRHOSIS TYPE: ICD-10-CM

## 2024-05-13 DIAGNOSIS — M79.89 LEG SWELLING: ICD-10-CM

## 2024-05-13 LAB
ALBUMIN SERPL BCP-MCNC: 3 G/DL (ref 3.5–5.2)
ALP SERPL-CCNC: 56 U/L (ref 55–135)
ALT SERPL W/O P-5'-P-CCNC: 25 U/L (ref 10–44)
ANION GAP SERPL CALC-SCNC: 11 MMOL/L (ref 8–16)
AST SERPL-CCNC: 34 U/L (ref 10–40)
BASOPHILS # BLD AUTO: 0.03 K/UL (ref 0–0.2)
BASOPHILS NFR BLD: 0.4 % (ref 0–1.9)
BILIRUB DIRECT SERPL-MCNC: 0.4 MG/DL (ref 0.1–0.3)
BILIRUB SERPL-MCNC: 1 MG/DL (ref 0.1–1)
BUN SERPL-MCNC: 29 MG/DL (ref 8–23)
CALCIUM SERPL-MCNC: 9.3 MG/DL (ref 8.7–10.5)
CHLORIDE SERPL-SCNC: 105 MMOL/L (ref 95–110)
CO2 SERPL-SCNC: 21 MMOL/L (ref 23–29)
CREAT SERPL-MCNC: 0.9 MG/DL (ref 0.5–1.4)
DIFFERENTIAL METHOD BLD: ABNORMAL
EOSINOPHIL # BLD AUTO: 0.1 K/UL (ref 0–0.5)
EOSINOPHIL NFR BLD: 1.6 % (ref 0–8)
ERYTHROCYTE [DISTWIDTH] IN BLOOD BY AUTOMATED COUNT: 13.5 % (ref 11.5–14.5)
EST. GFR  (NO RACE VARIABLE): >60 ML/MIN/1.73 M^2
GLUCOSE SERPL-MCNC: 162 MG/DL (ref 70–110)
HCT VFR BLD AUTO: 37.1 % (ref 37–48.5)
HGB BLD-MCNC: 11.9 G/DL (ref 12–16)
IMM GRANULOCYTES # BLD AUTO: 0.04 K/UL (ref 0–0.04)
IMM GRANULOCYTES NFR BLD AUTO: 0.5 % (ref 0–0.5)
INR PPP: 1 (ref 0.8–1.2)
LYMPHOCYTES # BLD AUTO: 1.7 K/UL (ref 1–4.8)
LYMPHOCYTES NFR BLD: 20.1 % (ref 18–48)
MCH RBC QN AUTO: 31.3 PG (ref 27–31)
MCHC RBC AUTO-ENTMCNC: 32.1 G/DL (ref 32–36)
MCV RBC AUTO: 98 FL (ref 82–98)
MONOCYTES # BLD AUTO: 0.9 K/UL (ref 0.3–1)
MONOCYTES NFR BLD: 11 % (ref 4–15)
NEUTROPHILS # BLD AUTO: 5.5 K/UL (ref 1.8–7.7)
NEUTROPHILS NFR BLD: 66.4 % (ref 38–73)
NRBC BLD-RTO: 0 /100 WBC
PLATELET # BLD AUTO: 214 K/UL (ref 150–450)
PMV BLD AUTO: 10.9 FL (ref 9.2–12.9)
POTASSIUM SERPL-SCNC: 4 MMOL/L (ref 3.5–5.1)
PROT SERPL-MCNC: 7.6 G/DL (ref 6–8.4)
PROTHROMBIN TIME: 11.3 SEC (ref 9–12.5)
RBC # BLD AUTO: 3.8 M/UL (ref 4–5.4)
SODIUM SERPL-SCNC: 137 MMOL/L (ref 136–145)
WBC # BLD AUTO: 8.31 K/UL (ref 3.9–12.7)

## 2024-05-13 PROCEDURE — 85610 PROTHROMBIN TIME: CPT | Performed by: INTERNAL MEDICINE

## 2024-05-13 PROCEDURE — 99999 PR PBB SHADOW E&M-EST. PATIENT-LVL IV: CPT | Mod: PBBFAC,,, | Performed by: INTERNAL MEDICINE

## 2024-05-13 PROCEDURE — 85025 COMPLETE CBC W/AUTO DIFF WBC: CPT | Performed by: INTERNAL MEDICINE

## 2024-05-13 PROCEDURE — 82248 BILIRUBIN DIRECT: CPT | Performed by: INTERNAL MEDICINE

## 2024-05-13 PROCEDURE — 36415 COLL VENOUS BLD VENIPUNCTURE: CPT | Mod: PN | Performed by: INTERNAL MEDICINE

## 2024-05-13 PROCEDURE — 99214 OFFICE O/P EST MOD 30 MIN: CPT | Mod: PBBFAC,PN | Performed by: INTERNAL MEDICINE

## 2024-05-13 PROCEDURE — 80053 COMPREHEN METABOLIC PANEL: CPT | Performed by: INTERNAL MEDICINE

## 2024-05-13 PROCEDURE — 99214 OFFICE O/P EST MOD 30 MIN: CPT | Mod: S$PBB,,, | Performed by: INTERNAL MEDICINE

## 2024-05-13 RX ORDER — PREDNISONE 5 MG/1
2.5 TABLET ORAL DAILY
Qty: 15 TABLET | Refills: 3 | Status: SHIPPED | OUTPATIENT
Start: 2024-05-13

## 2024-05-13 NOTE — PROGRESS NOTES
HEPATOLOGY FOLLOW UP    Referring Physician: Leticia Lim MD   Current Corresponding Physician: Leticia Lim MD, Nestor Agarwal MD, Divina Stock MD    Radha Feng is here for follow up of autoimmune hepatitis-induced cirrhosis    HPI  Ms Feng is an 84 yo PMHx HTN, ID-T2DM, decompensated AIH cirrhosis (biopsy proven) presented and was admitted 11/3/22-11/11/22 with abdo distention from ascites.     Patient hospitalized 09/20-10/15 for SOB found to have elevated liver enzymes and diagnosed with AIH vs RUBIO cirrhosis on biopsy decompensated by ascites. Started on steroids, imuran w/ improvement in enzymes. However due to malaise an leukopenia, imuran has been held. She was diuresed, underwent LVP and discharged on 10 mg prednisone daily with goal to continue but minimize prednisone as outpt.    Interval History  Since Radha's last few visits, continues on diuretics; has been able to stop lactulose and continues on prednisone but is no longer on 7.5 mg daily. She is now on 5 mg daily. No recent paracentesis.. She is feeling well except has hip pain and there is a suspicion of avalscular necrosis of the hip.    Paracentesis 11/7/22: 2200 ml fluid; cell count 50   EGD 6/26/23: no varices    Labs 3/21/24: : Tbil 0.8, ALT 32, AST 40, ALKP 79, creat 0.8  Abdo US 5/11/24: Liver: Normal in size, measuring 12.7 cm. Coarsened echotexture. Right hepatic simple cyst 0.8 x 0.8 x 0.7 cm, previously 0.8 x 0.8 x 0.6 cm. No ascites  Bone density 3/6/23: osteoporosis in hips; osteopenia lumbar spine- on tx per endocrinology    Ascites, ongoing: lasix 40 mg daily but no aldactone (urinates more with monotherapy by report)  HE: off lactulose    MELD 3.0: 8 at 3/21/2024  9:15 AM  MELD-Na: 6 at 3/21/2024  9:15 AM  Calculated from:  Serum Creatinine: 0.8 mg/dL (Using min of 1 mg/dL) at 3/21/2024  9:15 AM  Serum Sodium: 141 mmol/L (Using max of 137 mmol/L) at 3/21/2024  9:15 AM  Total Bilirubin: 0.8 mg/dL  (Using min of 1 mg/dL) at 3/21/2024  9:15 AM  Serum Albumin: 3.1 g/dL at 3/21/2024  9:15 AM  INR(ratio): 1.0 at 3/21/2024  9:15 AM  Age at listing (hypothetical): 83 years  Sex: Female at 3/21/2024  9:15 AM        Outpatient Encounter Medications as of 5/13/2024   Medication Sig Dispense Refill    acetaminophen (TYLENOL) 500 MG tablet Take 2 tablets (1,000 mg total) by mouth every 8 (eight) hours as needed for Pain.  0    amLODIPine (NORVASC) 2.5 MG tablet TAKE 1 TABLET BY MOUTH EVERY DAY 90 tablet 2    BD INSULIN SYRINGE ULTRA-FINE 1 mL 31 gauge x 5/16 Syrg       blood-glucose meter,continuous (DEXCOM G6 ) Misc Check blood sugar before meals and at bedtime 1 each PRN    blood-glucose sensor (DEXCOM G6 SENSOR) Amanda 3 each by Misc.(Non-Drug; Combo Route) route every 10 days. 3 each 11    blood-glucose transmitter (DEXCOM G6 TRANSMITTER) Amanda 1 each by Misc.(Non-Drug; Combo Route) route every 3 (three) months. 1 each 3    brimonidine 0.2% (ALPHAGAN) 0.2 % Drop Place 1 drop into both eyes 3 (three) times daily. 30 mL 3    cholecalciferol, vitamin D3, (VITAMIN D3) 25 mcg (1,000 unit) capsule Take 2 capsules (2,000 Units total) by mouth once daily.  0    dorzolamide-timolol 2-0.5% (COSOPT) 22.3-6.8 mg/mL ophthalmic solution INSTILL 1 DROP INTO RIGHT EYE TWICE A DAY 10 mL 11    estradioL (ESTRACE) 0.01 % (0.1 mg/gram) vaginal cream Place 0.5 g vaginally twice a week. Apply to the vagina daily for two weeks then twice a week. 45 g 4    furosemide (LASIX) 40 MG tablet TAKE 1 TABLET BY MOUTH EVERY DAY 90 tablet 1    insulin aspart U-100 (NOVOLOG U-100 INSULIN ASPART) 100 unit/mL injection TO USE WITH OMNIPOD 5. TOTAL DAILY DOSE UP TO 40 UNITS 40 mL 4    insulin detemir U-100 (LEVEMIR FLEXTOUCH) 100 unit/mL (3 mL) SubQ InPn pen Inject 5 Units into the skin every evening. 6 mL 1    insulin pump cart,automated,BT (OMNIPOD 5 G6 PODS, GEN 5,) Crtg Inject 1 each into the skin Every 3 (three) days. 10 each 11    lactulose  "(CEPHULAC) 10 gram packet Take 10 g by mouth 3 (three) times daily.      latanoprost 0.005 % ophthalmic solution PLACE 1 DROP INTO BOTH EYES ONCE DAILY 7.5 mL 3    lisinopriL-hydrochlorothiazide (PRINZIDE,ZESTORETIC) 20-12.5 mg per tablet Take 1 tablet by mouth once daily. 90 tablet 1    pen needle, diabetic 31 gauge x 5/16" Ndle Use to inject insulin into the skin up to 4 times daily 400 each 3    predniSONE (DELTASONE) 5 MG tablet TAKE 1 AND 1/2 TABLETS BY MOUTH ONCE DAILY 135 tablet 1    [DISCONTINUED] insulin aspart U-100 (NOVOLOG) 100 unit/mL injection To use with Omnipod 5 30 mL 5     No facility-administered encounter medications on file as of 5/13/2024.     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    HENT: Negative.     Eyes: Negative.    Respiratory: Negative.     Cardiovascular: Negative.    Gastrointestinal: Negative.    Genitourinary: Negative.    Musculoskeletal: Negative.    Skin: Negative.    Neurological: Negative.    Psychiatric/Behavioral: Negative.       Vitals:    05/13/24 1140   BP: 130/70   Pulse: 86          Physical Exam  Vitals reviewed.   Constitutional:       Appearance: She is well-developed.   HENT:      Head: Normocephalic and atraumatic.   Eyes:      General: No scleral icterus.     Conjunctiva/sclera: Conjunctivae normal.      Pupils: Pupils are equal, round, and reactive to light.   Neck:      Thyroid: No thyromegaly.   Cardiovascular:      Rate and Rhythm: Normal rate and regular rhythm.      Heart sounds: Normal heart sounds.   Pulmonary:      Effort: Pulmonary effort is normal.      Breath sounds: Normal breath sounds. No rales.   Abdominal:      General: Bowel sounds are normal. There is no distension.      Palpations: Abdomen is soft. " There is no mass.      Tenderness: There is no abdominal tenderness.   Musculoskeletal:         General: Normal range of motion.      Cervical back: Normal range of motion and neck supple.   Skin:     General: Skin is warm and dry.      Findings: No rash.   Neurological:      Mental Status: She is alert and oriented to person, place, and time.         MELD 3.0: 8 at 3/21/2024  9:15 AM  MELD-Na: 6 at 3/21/2024  9:15 AM  Calculated from:  Serum Creatinine: 0.8 mg/dL (Using min of 1 mg/dL) at 3/21/2024  9:15 AM  Serum Sodium: 141 mmol/L (Using max of 137 mmol/L) at 3/21/2024  9:15 AM  Total Bilirubin: 0.8 mg/dL (Using min of 1 mg/dL) at 3/21/2024  9:15 AM  Serum Albumin: 3.1 g/dL at 3/21/2024  9:15 AM  INR(ratio): 1.0 at 3/21/2024  9:15 AM  Age at listing (hypothetical): 83 years  Sex: Female at 3/21/2024  9:15 AM      Lab Results   Component Value Date     (H) 03/21/2024    BUN 25 (H) 03/21/2024    CREATININE 0.8 03/21/2024    CALCIUM 9.7 03/21/2024     03/21/2024    K 4.1 03/21/2024     03/21/2024    PROT 7.0 03/21/2024    CO2 24 03/21/2024    ANIONGAP 9 03/21/2024    WBC 8.49 03/21/2024    RBC 4.01 03/21/2024    HGB 12.8 03/21/2024    HCT 39.7 03/21/2024    HCT 30 (L) 10/23/2022    MCV 99 (H) 03/21/2024    MCH 31.9 (H) 03/21/2024    MCHC 32.2 03/21/2024     Lab Results   Component Value Date    RDW 13.4 03/21/2024     03/21/2024    MPV 11.3 03/21/2024    GRAN 5.3 03/21/2024    GRAN 62.5 03/21/2024    LYMPH 1.8 03/21/2024    LYMPH 21.1 03/21/2024    MONO 1.2 (H) 03/21/2024    MONO 13.5 03/21/2024    EOSINOPHIL 1.9 03/21/2024    BASOPHIL 0.5 03/21/2024    EOS 0.2 03/21/2024    BASO 0.04 03/21/2024    APTT 35.6 (H) 09/29/2022    GROUPTRH O POS 11/07/2022    BNP 66 09/20/2022    CHOL 195 01/22/2024    TRIG 89 01/22/2024    HDL 54 01/22/2024    CHOLHDL 27.7 01/22/2024    TOTALCHOLEST 3.6 01/22/2024    ALBUMIN 3.1 (L) 03/21/2024    BILIDIR 0.3 03/21/2024    AST 40 03/21/2024    ALT 32 03/21/2024     ALKPHOS 79 03/21/2024    MG 1.7 05/18/2023    LABPROT 10.9 03/21/2024    INR 1.0 03/21/2024       Assessment and Plan:  Radha Feng is a 83 y.o. female with AIH-induced cirrhosis. She may have avascular necrosis of the hip. She and her daughter would like to lower prednisone to 2.5 mg daily. Current recommendations:  Cirrhosis: check labs every 8 weeks; HCC screening every 6 months (abdo 09/24 with AFP); EGD -repeat 6/2025 to screen for varices  Autoimmune hepatitis: continue prednisone but lower to 2.5 mg daily;   Ascites/edema, ongoing: continue current diuretics   HE, off lactulose; monitor  Osteoporosis: tx per endocriniology  Possible avascular necrosis of the hip: f/u ortho    Return 16 weeks  A total of 35 minutes was spent reviewing the chart, imaging, labs, examining the patient and counseling the patient about their liver disease.

## 2024-05-13 NOTE — PATIENT INSTRUCTIONS
Possible avascular necrosis of the hip- to see orthopedics  Reduce prednisone to 2.5 mg daily  Abdo US every 6 months (due next 09/24) with AFP  Labs every 8 weeks (due now and every 8 weeks)  Repeat EGD 6/25  Return 4 months

## 2024-05-19 ENCOUNTER — PATIENT MESSAGE (OUTPATIENT)
Dept: HEPATOLOGY | Facility: CLINIC | Age: 84
End: 2024-05-19
Payer: MEDICARE

## 2024-05-22 ENCOUNTER — OFFICE VISIT (OUTPATIENT)
Dept: UROGYNECOLOGY | Facility: CLINIC | Age: 84
End: 2024-05-22
Payer: MEDICARE

## 2024-05-22 VITALS
DIASTOLIC BLOOD PRESSURE: 79 MMHG | SYSTOLIC BLOOD PRESSURE: 163 MMHG | BODY MASS INDEX: 18.73 KG/M2 | HEART RATE: 59 BPM | WEIGHT: 112.44 LBS | HEIGHT: 65 IN

## 2024-05-22 DIAGNOSIS — R39.15 URINARY URGENCY: ICD-10-CM

## 2024-05-22 DIAGNOSIS — N39.46 MIXED URGE AND STRESS INCONTINENCE: ICD-10-CM

## 2024-05-22 DIAGNOSIS — Z46.89 PESSARY MAINTENANCE: Primary | ICD-10-CM

## 2024-05-22 PROCEDURE — 99999 PR PBB SHADOW E&M-EST. PATIENT-LVL III: CPT | Mod: PBBFAC,,, | Performed by: OBSTETRICS & GYNECOLOGY

## 2024-05-22 PROCEDURE — 99213 OFFICE O/P EST LOW 20 MIN: CPT | Mod: S$PBB,,, | Performed by: OBSTETRICS & GYNECOLOGY

## 2024-05-22 PROCEDURE — 99213 OFFICE O/P EST LOW 20 MIN: CPT | Mod: PBBFAC | Performed by: OBSTETRICS & GYNECOLOGY

## 2024-05-22 NOTE — PROGRESS NOTES
Urogyn follow up      [facility]  [address] [id number]    Radha Feng  [id number]  1940      Radha Feng is a 83 y.o. here for a urogyn follow up for pelvic organ prolapse.     History of Present Illness   82 y.o.  female has a past medical history of Cataract, Chondromalacia, knee, right (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       06/12/2023  Here for pessary placement. Has had pessary holiday since last visit.    Changes since last visit:  Rare UUI when pulling pants down  Denies pain, bleeding, or discharge  Doing well with pessary--using rephresh weekly  Denies constipation or straining    10/12/2023:  Patient presents for follow up   She is currently using the pessary has had issues with abrasions in the past  No vaginal discharge or bleeding   +JUAN MIGUEL and +UU stable with the pessary     POP + doing well with the pessary     1/18/202:  Patient here for follow up doing well no issues  No discharge, no bleeding   Voiding well no constipation     5/22/2024:  Patient doing well no issues  Pessary in place     [facility]     Question 12/20/2022  1:44 PM CST - Filed by Patient   General Urogynecology: Are you experiencing the following?     Dysuria (painful urination) No   Nocturia:  waking up at night to empty your bladder  Yes   If you answered yes to the previous question, how many times does this happen per night? 1-2   Enuresis (urine loss during sleep) No   Dribbling urine after you urinate No   Hematuria (urine appears red) No   Type of stream Weak   Urinary frequency: How often a day are you going to the bathroom per day?  Less than 10   Urinary Tract Infections: How many Urinary Tract Infections have you had in the past year? One (1) in the past year   If you have had a UTI in the past year, what treatments have you had so far?  Prophylactic antibiotics   Urinary Incontinence (General): Are you  "experiencing the following?     Past consultation for incontinence: Have you ever seen someone for the evaluation of incontinence? No   If you answered yes to the previous question, please select all the therapies you have tried.  N/a- I answered no to the previous question   Please note the effectiveness of the therapies.     Need to wear protection to keep clothes dry  Yes   If you answered yes to the previous question, please scar the protection you use.  Poise   If you wear protection, how much wetness is typically on each pad? Slight   If you wear protection, how often do you have to change per day, if applicable?  3-4   Stress Symptoms: Are you experiencing the following?     Leakage of urine with cough, laugh and/or sneeze Yes   If you answered yes to the previous question, what is the frequency in days, weeks and/or months? Daily   Leakage of urine with sex No   Leakage of urine with bending/ lifting No   Leakage of urine with briskly walking or jogging No   If you lose urine for any other reason not previously mentioned, please note it below, if applicable.      Urge Symptoms: Are you experiencing the following?     Urgency ("got to go" feeling) No   Urge: How frequently do you feel an urge to urinate (feeling like you "gotta go" to the bathroom and can't wait) Never   Do you experience a leakage of urine when you have a feeling of urgency?  No   Leakage of urine when unaware No   Past use of anticholinergics (medications used to treat overactive bladder) No   If you answered yes to the previous question, please scar the anticholinergics you have used:      Have you ever used Mirbetriq (aka Mirabegron)?  No   Prolapse Symptoms: Are you experiencing any of the following?      Falling out/ Bulging/ Heaviness in the vagina Yes   Vaginal/ Abdominal Pain/ Pressure No   Need to strain/ Push to void No   Need to wait on the toilet before you void No   Unusual position to urinate (using your hands to push back the " vaginal bulge) No   Sensation of incomplete emptying No   Past use of pessary device No   If you answered yes to the previous question, please list the devices you have used below.      Bowel Symptoms: Are you experiencing any of the following?     Constipation No   Diarrhea  No   Hematochezia (bloody stool) No   Incomplete evacuation of stool No   Involuntary loss of formed stool No   Fecal smearing/urgency No   Involuntary loss of gas Yes   Vaginal Symptoms: Are you experiencing any of the following?      Abnormal vaginal bleeding  No   Vaginal dryness No   Sexually active  No   Dyspareunia (painful intercourse) No   Estrogen use  No        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    Diabetes mellitus     Glaucoma     Hypertension     Other ascites 11/29/2022       Past Surgical History:   Procedure Laterality Date    CATARACT EXTRACTION W/  INTRAOCULAR LENS IMPLANT Left 12/21/2021    Procedure: EXTRACTION, CATARACT, WITH IOL INSERTION;  Surgeon: Shay Chou MD;  Location: [facility];  Service: Ophthalmology;  Laterality: Left;    ESOPHAGOGASTRODUODENOSCOPY N/A 6/26/2023    Procedure: ESOPHAGOGASTRODUODENOSCOPY (EGD);  Surgeon: Edgar Branch MD;  Location: [address]);  Service: Endoscopy;  Laterality: N/A;  referral Dr Peraza-cirrhosis/labs done on 4/24/23-instr portal-GT       Family History   Problem Relation Name Age of Onset    Cataracts Mother      Diabetes Mother      Glaucoma Mother      Hypertension Mother      Heart attack Father      Colon cancer Sister      Blindness Cousin      Amblyopia Neg Hx      Macular degeneration Neg Hx      Retinal detachment Neg Hx         Social History     Socioeconomic History    Marital status: Single   Tobacco Use    Smoking status: Former    Smokeless tobacco: Never   Substance and Sexual Activity    Alcohol use: Not Currently    Drug use: Never   Social History Narrative    , one daughter  local, four sons out of state. Retired . Lives with daughter Joss.     Social Determinants of Health     Financial Resource Strain: Low Risk  (12/6/2023)    Overall Financial Resource Strain (CARDIA)     Difficulty of Paying Living Expenses: Not hard at all   Food Insecurity: No Food Insecurity (12/6/2023)    Hunger Vital Sign     Worried About Running Out of Food in the Last Year: Never true     Ran Out of Food in the Last Year: Never true   Transportation Needs: No Transportation Needs (12/6/2023)    PRAPARE - Transportation     Lack of Transportation (Medical): No     Lack of Transportation (Non-Medical): No   Physical Activity: Inactive (12/6/2023)    Exercise Vital Sign     Days of Exercise per Week: 0 days     Minutes of Exercise per Session: 0 min   Stress: No Stress Concern Present (12/6/2023)    [employer] - Occupational Stress Questionnaire     Feeling of Stress : Only a little   Housing Stability: Unknown (12/6/2023)    Housing Stability Vital Sign     Unable to Pay for Housing in the Last Year: No     Unstable Housing in the Last Year: No       Current Outpatient Medications   Medication Sig Dispense Refill    amLODIPine (NORVASC) 2.5 MG tablet TAKE 1 TABLET BY MOUTH EVERY DAY 90 tablet 2    BD INSULIN SYRINGE ULTRA-FINE 1 mL 31 gauge x 5/16 Syrg       blood-glucose meter,continuous (DEXCOM G6 ) Misc Check blood sugar before meals and at bedtime 1 each PRN    blood-glucose sensor (DEXCOM G6 SENSOR) Amanda 3 each by Misc.(Non-Drug; Combo Route) route every 10 days. 3 each 11    blood-glucose transmitter (DEXCOM G6 TRANSMITTER) Amanda 1 each by Misc.(Non-Drug; Combo Route) route every 3 (three) months. 1 each 3    brimonidine 0.2% (ALPHAGAN) 0.2 % Drop Place 1 drop into both eyes 3 (three) times daily. 30 mL 3    cholecalciferol, vitamin D3, (VITAMIN D3) 25 mcg (1,000 unit) capsule Take 2 capsules (2,000 Units total) by mouth once daily.  0     "dorzolamide-timolol 2-0.5% (COSOPT) 22.3-6.8 mg/mL ophthalmic solution INSTILL 1 DROP INTO RIGHT EYE TWICE A DAY 10 mL 11    estradioL (ESTRACE) 0.01 % (0.1 mg/gram) vaginal cream Place 0.5 g vaginally twice a week. Apply to the vagina daily for two weeks then twice a week. 45 g 4    furosemide (LASIX) 40 MG tablet TAKE 1 TABLET BY MOUTH EVERY DAY 90 tablet 1    insulin aspart U-100 (NOVOLOG U-100 INSULIN ASPART) 100 unit/mL injection TO USE WITH OMNIPOD 5. TOTAL DAILY DOSE UP TO 40 UNITS 40 mL 4    insulin pump cart,automated,BT (OMNIPOD 5 G6 PODS, GEN 5,) Crtg Inject 1 each into the skin Every 3 (three) days. 10 each 11    lactulose (CEPHULAC) 10 gram packet Take 10 g by mouth 3 (three) times daily.      latanoprost 0.005 % ophthalmic solution PLACE 1 DROP INTO BOTH EYES ONCE DAILY 7.5 mL 3    lisinopriL-hydrochlorothiazide (PRINZIDE,ZESTORETIC) 20-12.5 mg per tablet Take 1 tablet by mouth once daily. 90 tablet 1    pen needle, diabetic 31 gauge x 5/16" Ndle Use to inject insulin into the skin up to 4 times daily 400 each 3    predniSONE (DELTASONE) 5 MG tablet Take 0.5 tablets (2.5 mg total) by mouth once daily. 15 tablet 3    acetaminophen (TYLENOL) 500 MG tablet Take 2 tablets (1,000 mg total) by mouth every 8 (eight) hours as needed for Pain. (Patient not taking: Reported on 5/13/2024)  0    insulin detemir U-100 (LEVEMIR FLEXTOUCH) 100 unit/mL (3 mL) SubQ InPn pen Inject 5 Units into the skin every evening. 6 mL 1     No current facility-administered medications for this visit.       Review of patient's allergies indicates:  No Known Allergies    Well Woman:  Pap:denies history of abnormal pap  Mammo:no longer screening  Colonoscopy:refused  Dexa:03/2023    Hip Fracture 6%.   Impression:   Osteopenia lumbar spine.  Osteoporosis both hips.      ROS:  As per HPI.      Exam  BP (!) 163/79 (BP Location: Right arm, Patient Position: Sitting, BP Method: Small (Automatic))   Pulse (!) 59   Ht 5' 5" (1.651 m)   Wt " "51 kg (112 lb 7 oz)   BMI 18.71 kg/m²   General: alert and oriented, no acute distress  Respiratory: normal respiratory effort  Abd: soft, non-tender, non-distended    Pelvic  Ext. Genitalia: normal external genitalia. Normal bartholin's and skeens glands  Vagina: + atrophy. Normal vaginal mucosa without lesions. No abrasion noted  Non-tender bladder base without palpable mass.  #2 1/2 LS GH pessary in place- removed and cleaned   Cervix no lesions  Uterus:  nontender, normal size, shape, position, mobile  Urethra: no masses or tenderness  Urethral meatus: no lesions, caruncle or prolapse.    Pessary removed without difficulty. Washed with soap and water. Reinserted without difficulty    Impression  1. Pessary maintenance        2. Mixed urge and stress incontinence        3. Urinary urgency              Plan:   1. Prolapse: Stage 2 apical prolapse, Stage 2 anterior and posterior vaginal wall prolapse -  --continue  #2 1/2 " LS gH pessary- an  --Daily pelvic floor exercises as assigned, Pelvic floor PT   --Non surgical options discussed with patient    --no discharge continue vaginal estrogen and add replens  on opposite days.      2.  Mixed urinary incontinence, urge > stress:   --Empty bladder every 3 hours.  Empty well: wait a minute, lean forward on toilet.    --Avoid dietary irritants (see sheet).  Keep diary x 3-5 days to determine your irritants.  --KEGELS: do 10 in AM and 10 in PM, holding each x 10 seconds.  When you feel urge to go, STOP, KEGEL, and when urge has passed, then go to bathroom.  --URGE: .  SE profile reviewed.    Takes 2-4 weeks to see if will have effect.  For dry mouth: get sour, sugar free lozenge or gum.    --STRESS:  Non surgical options: Pessary vs. Impressa vs Rivive - which represent urethral and bladder support devices; Surgical options including 1.  mid urethral sling; retropubic, transobturator vs single incision 2. Fascial slings 3. Hutchison procedure 4. Periurethral bulking     3. " Vaginal atrophy (dryness):  Use .5 gram of estrogen cream in vagina reese for two weeks then twice a week .  Please start Use REPLENS or REFRESH OTC: 1/2 applicator full in vagina twice a week.      4. RTC 3 months for for pc    Luisa Evans DO  Female Pelvic Medicine and Reconstructive Surgery  [facility] [place], [place]

## 2024-05-30 DIAGNOSIS — Z79.4 TYPE 2 DIABETES MELLITUS WITH OTHER CIRCULATORY COMPLICATION, WITH LONG-TERM CURRENT USE OF INSULIN: ICD-10-CM

## 2024-05-30 DIAGNOSIS — E11.59 TYPE 2 DIABETES MELLITUS WITH OTHER CIRCULATORY COMPLICATION, WITH LONG-TERM CURRENT USE OF INSULIN: ICD-10-CM

## 2024-05-31 ENCOUNTER — TELEPHONE (OUTPATIENT)
Dept: ENDOCRINOLOGY | Facility: CLINIC | Age: 84
End: 2024-05-31
Payer: MEDICARE

## 2024-05-31 RX ORDER — INSULIN PMP CART,AUT,G6/7,CNTR
1 EACH SUBCUTANEOUS
Qty: 10 EACH | Refills: 11 | Status: SHIPPED | OUTPATIENT
Start: 2024-05-31

## 2024-06-05 ENCOUNTER — PATIENT MESSAGE (OUTPATIENT)
Dept: HEPATOLOGY | Facility: CLINIC | Age: 84
End: 2024-06-05
Payer: MEDICARE

## 2024-06-07 ENCOUNTER — TELEPHONE (OUTPATIENT)
Dept: HEPATOLOGY | Facility: CLINIC | Age: 84
End: 2024-06-07
Payer: MEDICARE

## 2024-06-07 DIAGNOSIS — K75.4 AUTOIMMUNE HEPATITIS: Primary | ICD-10-CM

## 2024-06-10 ENCOUNTER — LAB VISIT (OUTPATIENT)
Dept: LAB | Facility: HOSPITAL | Age: 84
End: 2024-06-10
Attending: INTERNAL MEDICINE
Payer: MEDICARE

## 2024-06-10 DIAGNOSIS — K75.4 AUTOIMMUNE HEPATITIS: ICD-10-CM

## 2024-06-10 LAB
BASOPHILS # BLD AUTO: 0.04 K/UL (ref 0–0.2)
BASOPHILS NFR BLD: 0.4 % (ref 0–1.9)
DIFFERENTIAL METHOD BLD: ABNORMAL
EOSINOPHIL # BLD AUTO: 0.1 K/UL (ref 0–0.5)
EOSINOPHIL NFR BLD: 0.9 % (ref 0–8)
ERYTHROCYTE [DISTWIDTH] IN BLOOD BY AUTOMATED COUNT: 13.2 % (ref 11.5–14.5)
HCT VFR BLD AUTO: 34.9 % (ref 37–48.5)
HGB BLD-MCNC: 11.4 G/DL (ref 12–16)
IMM GRANULOCYTES # BLD AUTO: 0.05 K/UL (ref 0–0.04)
IMM GRANULOCYTES NFR BLD AUTO: 0.5 % (ref 0–0.5)
INR PPP: 1 (ref 0.8–1.2)
LYMPHOCYTES # BLD AUTO: 1.7 K/UL (ref 1–4.8)
LYMPHOCYTES NFR BLD: 17.4 % (ref 18–48)
MCH RBC QN AUTO: 31.3 PG (ref 27–31)
MCHC RBC AUTO-ENTMCNC: 32.7 G/DL (ref 32–36)
MCV RBC AUTO: 96 FL (ref 82–98)
MONOCYTES # BLD AUTO: 1 K/UL (ref 0.3–1)
MONOCYTES NFR BLD: 10.4 % (ref 4–15)
NEUTROPHILS # BLD AUTO: 6.9 K/UL (ref 1.8–7.7)
NEUTROPHILS NFR BLD: 70.4 % (ref 38–73)
NRBC BLD-RTO: 0 /100 WBC
PLATELET # BLD AUTO: 230 K/UL (ref 150–450)
PMV BLD AUTO: 10.7 FL (ref 9.2–12.9)
PROTHROMBIN TIME: 10.9 SEC (ref 9–12.5)
RBC # BLD AUTO: 3.64 M/UL (ref 4–5.4)
WBC # BLD AUTO: 9.79 K/UL (ref 3.9–12.7)

## 2024-06-10 PROCEDURE — 85610 PROTHROMBIN TIME: CPT | Performed by: INTERNAL MEDICINE

## 2024-06-10 PROCEDURE — 85025 COMPLETE CBC W/AUTO DIFF WBC: CPT | Performed by: INTERNAL MEDICINE

## 2024-06-10 PROCEDURE — 82248 BILIRUBIN DIRECT: CPT | Performed by: INTERNAL MEDICINE

## 2024-06-10 PROCEDURE — 36415 COLL VENOUS BLD VENIPUNCTURE: CPT | Mod: PN | Performed by: INTERNAL MEDICINE

## 2024-06-10 PROCEDURE — 80053 COMPREHEN METABOLIC PANEL: CPT | Performed by: INTERNAL MEDICINE

## 2024-06-11 LAB
ALBUMIN SERPL BCP-MCNC: 3 G/DL (ref 3.5–5.2)
ALP SERPL-CCNC: 45 U/L (ref 55–135)
ALT SERPL W/O P-5'-P-CCNC: 18 U/L (ref 10–44)
ANION GAP SERPL CALC-SCNC: 12 MMOL/L (ref 8–16)
AST SERPL-CCNC: 30 U/L (ref 10–40)
BILIRUB DIRECT SERPL-MCNC: 0.3 MG/DL (ref 0.1–0.3)
BILIRUB SERPL-MCNC: 0.6 MG/DL (ref 0.1–1)
BUN SERPL-MCNC: 20 MG/DL (ref 8–23)
CALCIUM SERPL-MCNC: 9.9 MG/DL (ref 8.7–10.5)
CHLORIDE SERPL-SCNC: 103 MMOL/L (ref 95–110)
CO2 SERPL-SCNC: 21 MMOL/L (ref 23–29)
CREAT SERPL-MCNC: 0.8 MG/DL (ref 0.5–1.4)
EST. GFR  (NO RACE VARIABLE): >60 ML/MIN/1.73 M^2
GLUCOSE SERPL-MCNC: 111 MG/DL (ref 70–110)
POTASSIUM SERPL-SCNC: 4.5 MMOL/L (ref 3.5–5.1)
PROT SERPL-MCNC: 7.5 G/DL (ref 6–8.4)
SODIUM SERPL-SCNC: 136 MMOL/L (ref 136–145)

## 2024-06-20 ENCOUNTER — PATIENT MESSAGE (OUTPATIENT)
Dept: HEPATOLOGY | Facility: CLINIC | Age: 84
End: 2024-06-20
Payer: MEDICARE

## 2024-06-20 ENCOUNTER — HOSPITAL ENCOUNTER (OUTPATIENT)
Facility: HOSPITAL | Age: 84
Discharge: HOME | End: 2024-06-21
Attending: STUDENT IN AN ORGANIZED HEALTH CARE EDUCATION/TRAINING PROGRAM | Admitting: HOSPITALIST
Payer: MEDICARE

## 2024-06-20 DIAGNOSIS — R04.2 HEMOPTYSIS: Primary | ICD-10-CM

## 2024-06-20 DIAGNOSIS — R91.8 LUNG MASS: ICD-10-CM

## 2024-06-20 DIAGNOSIS — R05.9 COUGH: ICD-10-CM

## 2024-06-20 LAB
ABO + RH BLD: NORMAL
ALBUMIN SERPL BCP-MCNC: 2.8 G/DL (ref 3.5–5.2)
ALP SERPL-CCNC: 47 U/L (ref 55–135)
ALT SERPL W/O P-5'-P-CCNC: 15 U/L (ref 10–44)
ANION GAP SERPL CALC-SCNC: 9 MMOL/L (ref 8–16)
APTT PPP: 28.6 SEC (ref 21–32)
AST SERPL-CCNC: 22 U/L (ref 10–40)
BASOPHILS # BLD AUTO: 0.03 K/UL (ref 0–0.2)
BASOPHILS NFR BLD: 0.3 % (ref 0–1.9)
BILIRUB SERPL-MCNC: 0.5 MG/DL (ref 0.1–1)
BLD GP AB SCN CELLS X3 SERPL QL: NORMAL
BUN SERPL-MCNC: 19 MG/DL (ref 8–23)
CALCIUM SERPL-MCNC: 9.2 MG/DL (ref 8.7–10.5)
CHLORIDE SERPL-SCNC: 105 MMOL/L (ref 95–110)
CO2 SERPL-SCNC: 22 MMOL/L (ref 23–29)
CREAT SERPL-MCNC: 0.8 MG/DL (ref 0.5–1.4)
DIFFERENTIAL METHOD BLD: ABNORMAL
EOSINOPHIL # BLD AUTO: 0.1 K/UL (ref 0–0.5)
EOSINOPHIL NFR BLD: 1.1 % (ref 0–8)
ERYTHROCYTE [DISTWIDTH] IN BLOOD BY AUTOMATED COUNT: 13.1 % (ref 11.5–14.5)
EST. GFR  (NO RACE VARIABLE): >60 ML/MIN/1.73 M^2
GLUCOSE SERPL-MCNC: 98 MG/DL (ref 70–110)
HCT VFR BLD AUTO: 34.5 % (ref 37–48.5)
HCV AB SERPL QL IA: NORMAL
HGB BLD-MCNC: 11.3 G/DL (ref 12–16)
HIV 1+2 AB+HIV1 P24 AG SERPL QL IA: NORMAL
IMM GRANULOCYTES # BLD AUTO: 0.04 K/UL (ref 0–0.04)
IMM GRANULOCYTES NFR BLD AUTO: 0.5 % (ref 0–0.5)
INR PPP: 1 (ref 0.8–1.2)
LYMPHOCYTES # BLD AUTO: 1.9 K/UL (ref 1–4.8)
LYMPHOCYTES NFR BLD: 21.1 % (ref 18–48)
MAGNESIUM SERPL-MCNC: 1.7 MG/DL (ref 1.6–2.6)
MCH RBC QN AUTO: 31.7 PG (ref 27–31)
MCHC RBC AUTO-ENTMCNC: 32.8 G/DL (ref 32–36)
MCV RBC AUTO: 97 FL (ref 82–98)
MONOCYTES # BLD AUTO: 1 K/UL (ref 0.3–1)
MONOCYTES NFR BLD: 11.7 % (ref 4–15)
NEUTROPHILS # BLD AUTO: 5.8 K/UL (ref 1.8–7.7)
NEUTROPHILS NFR BLD: 65.3 % (ref 38–73)
NRBC BLD-RTO: 0 /100 WBC
PLATELET # BLD AUTO: 231 K/UL (ref 150–450)
PMV BLD AUTO: 9.6 FL (ref 9.2–12.9)
POTASSIUM SERPL-SCNC: 4.3 MMOL/L (ref 3.5–5.1)
PROT SERPL-MCNC: 7.4 G/DL (ref 6–8.4)
PROTHROMBIN TIME: 11.2 SEC (ref 9–12.5)
RBC # BLD AUTO: 3.57 M/UL (ref 4–5.4)
SODIUM SERPL-SCNC: 136 MMOL/L (ref 136–145)
SPECIMEN OUTDATE: NORMAL
WBC # BLD AUTO: 8.82 K/UL (ref 3.9–12.7)

## 2024-06-20 PROCEDURE — 93005 ELECTROCARDIOGRAM TRACING: CPT

## 2024-06-20 PROCEDURE — 86900 BLOOD TYPING SEROLOGIC ABO: CPT | Performed by: STUDENT IN AN ORGANIZED HEALTH CARE EDUCATION/TRAINING PROGRAM

## 2024-06-20 PROCEDURE — 83735 ASSAY OF MAGNESIUM: CPT | Performed by: STUDENT IN AN ORGANIZED HEALTH CARE EDUCATION/TRAINING PROGRAM

## 2024-06-20 PROCEDURE — 85025 COMPLETE CBC W/AUTO DIFF WBC: CPT | Performed by: STUDENT IN AN ORGANIZED HEALTH CARE EDUCATION/TRAINING PROGRAM

## 2024-06-20 PROCEDURE — 86803 HEPATITIS C AB TEST: CPT | Performed by: PHYSICIAN ASSISTANT

## 2024-06-20 PROCEDURE — 25500020 PHARM REV CODE 255: Performed by: STUDENT IN AN ORGANIZED HEALTH CARE EDUCATION/TRAINING PROGRAM

## 2024-06-20 PROCEDURE — 87389 HIV-1 AG W/HIV-1&-2 AB AG IA: CPT | Performed by: PHYSICIAN ASSISTANT

## 2024-06-20 PROCEDURE — G0378 HOSPITAL OBSERVATION PER HR: HCPCS

## 2024-06-20 PROCEDURE — 99285 EMERGENCY DEPT VISIT HI MDM: CPT | Mod: 25

## 2024-06-20 PROCEDURE — 85730 THROMBOPLASTIN TIME PARTIAL: CPT | Performed by: PHYSICIAN ASSISTANT

## 2024-06-20 PROCEDURE — 80053 COMPREHEN METABOLIC PANEL: CPT | Performed by: STUDENT IN AN ORGANIZED HEALTH CARE EDUCATION/TRAINING PROGRAM

## 2024-06-20 PROCEDURE — 86901 BLOOD TYPING SEROLOGIC RH(D): CPT | Performed by: STUDENT IN AN ORGANIZED HEALTH CARE EDUCATION/TRAINING PROGRAM

## 2024-06-20 PROCEDURE — 85610 PROTHROMBIN TIME: CPT | Performed by: STUDENT IN AN ORGANIZED HEALTH CARE EDUCATION/TRAINING PROGRAM

## 2024-06-20 PROCEDURE — 93010 ELECTROCARDIOGRAM REPORT: CPT | Mod: ,,, | Performed by: INTERNAL MEDICINE

## 2024-06-20 RX ORDER — IBUPROFEN 200 MG
24 TABLET ORAL
Status: DISCONTINUED | OUTPATIENT
Start: 2024-06-21 | End: 2024-06-21

## 2024-06-20 RX ORDER — AMLODIPINE BESYLATE 2.5 MG/1
2.5 TABLET ORAL DAILY
Status: DISCONTINUED | OUTPATIENT
Start: 2024-06-21 | End: 2024-06-21 | Stop reason: HOSPADM

## 2024-06-20 RX ORDER — LISINOPRIL AND HYDROCHLOROTHIAZIDE 12.5; 2 MG/1; MG/1
1 TABLET ORAL DAILY
Status: DISCONTINUED | OUTPATIENT
Start: 2024-06-21 | End: 2024-06-20

## 2024-06-20 RX ORDER — HYDROCHLOROTHIAZIDE 12.5 MG/1
12.5 TABLET ORAL DAILY
Status: DISCONTINUED | OUTPATIENT
Start: 2024-06-21 | End: 2024-06-21 | Stop reason: HOSPADM

## 2024-06-20 RX ORDER — LISINOPRIL 20 MG/1
20 TABLET ORAL DAILY
Status: DISCONTINUED | OUTPATIENT
Start: 2024-06-21 | End: 2024-06-21 | Stop reason: HOSPADM

## 2024-06-20 RX ORDER — GLUCAGON 1 MG
1 KIT INJECTION
Status: DISCONTINUED | OUTPATIENT
Start: 2024-06-21 | End: 2024-06-21

## 2024-06-20 RX ORDER — PREDNISONE 2.5 MG/1
2.5 TABLET ORAL DAILY
Status: DISCONTINUED | OUTPATIENT
Start: 2024-06-21 | End: 2024-06-21

## 2024-06-20 RX ORDER — FUROSEMIDE 40 MG/1
40 TABLET ORAL DAILY
Status: DISCONTINUED | OUTPATIENT
Start: 2024-06-21 | End: 2024-06-21 | Stop reason: HOSPADM

## 2024-06-20 RX ORDER — IBUPROFEN 200 MG
16 TABLET ORAL
Status: DISCONTINUED | OUTPATIENT
Start: 2024-06-21 | End: 2024-06-21

## 2024-06-20 RX ORDER — INSULIN ASPART 100 [IU]/ML
0-5 INJECTION, SOLUTION INTRAVENOUS; SUBCUTANEOUS
Status: DISCONTINUED | OUTPATIENT
Start: 2024-06-21 | End: 2024-06-21

## 2024-06-20 RX ADMIN — IOHEXOL 75 ML: 350 INJECTION, SOLUTION INTRAVENOUS at 06:06

## 2024-06-20 NOTE — FIRST PROVIDER EVALUATION
"Medical screening examination initiated.  I have conducted a focused provider triage encounter, findings are as follows:    Brief history of present illness:  coughing up red clots, not on any blood thinners    Vitals:    06/20/24 1711   BP: 122/82   Pulse: 85   Resp: 16   Temp: 98.2 °F (36.8 °C)   TempSrc: Oral   SpO2: 98%   Weight: 54.4 kg (120 lb)   Height: 5' 5" (1.651 m)       Pertinent physical exam:  blood on tissue paper from clots    Brief workup plan:  Labs, CT, EKG    Preliminary workup initiated; this workup will be continued and followed by the physician or advanced practice provider that is assigned to the patient when roomed.  "

## 2024-06-21 VITALS
HEART RATE: 70 BPM | DIASTOLIC BLOOD PRESSURE: 54 MMHG | SYSTOLIC BLOOD PRESSURE: 94 MMHG | OXYGEN SATURATION: 96 % | WEIGHT: 120 LBS | HEIGHT: 65 IN | TEMPERATURE: 98 F | RESPIRATION RATE: 16 BRPM | BODY MASS INDEX: 19.99 KG/M2

## 2024-06-21 DIAGNOSIS — R91.8 LUNG MASS: Primary | ICD-10-CM

## 2024-06-21 LAB
ALBUMIN SERPL BCP-MCNC: 2.7 G/DL (ref 3.5–5.2)
ALP SERPL-CCNC: 55 U/L (ref 55–135)
ALT SERPL W/O P-5'-P-CCNC: 15 U/L (ref 10–44)
ANION GAP SERPL CALC-SCNC: 9 MMOL/L (ref 8–16)
AST SERPL-CCNC: 25 U/L (ref 10–40)
BASOPHILS # BLD AUTO: 0.03 K/UL (ref 0–0.2)
BASOPHILS NFR BLD: 0.3 % (ref 0–1.9)
BILIRUB SERPL-MCNC: 0.7 MG/DL (ref 0.1–1)
BUN SERPL-MCNC: 16 MG/DL (ref 8–23)
CALCIUM SERPL-MCNC: 9.1 MG/DL (ref 8.7–10.5)
CHLORIDE SERPL-SCNC: 107 MMOL/L (ref 95–110)
CO2 SERPL-SCNC: 19 MMOL/L (ref 23–29)
CREAT SERPL-MCNC: 0.8 MG/DL (ref 0.5–1.4)
DIFFERENTIAL METHOD BLD: ABNORMAL
EOSINOPHIL # BLD AUTO: 0.2 K/UL (ref 0–0.5)
EOSINOPHIL NFR BLD: 1.6 % (ref 0–8)
ERYTHROCYTE [DISTWIDTH] IN BLOOD BY AUTOMATED COUNT: 13.1 % (ref 11.5–14.5)
EST. GFR  (NO RACE VARIABLE): >60 ML/MIN/1.73 M^2
ESTIMATED AVG GLUCOSE: 126 MG/DL (ref 68–131)
GLUCOSE SERPL-MCNC: 152 MG/DL (ref 70–110)
HBA1C MFR BLD: 6 % (ref 4–5.6)
HCT VFR BLD AUTO: 35.6 % (ref 37–48.5)
HGB BLD-MCNC: 12 G/DL (ref 12–16)
IMM GRANULOCYTES # BLD AUTO: 0.05 K/UL (ref 0–0.04)
IMM GRANULOCYTES NFR BLD AUTO: 0.5 % (ref 0–0.5)
LYMPHOCYTES # BLD AUTO: 1.8 K/UL (ref 1–4.8)
LYMPHOCYTES NFR BLD: 17.3 % (ref 18–48)
MCH RBC QN AUTO: 31.2 PG (ref 27–31)
MCHC RBC AUTO-ENTMCNC: 33.7 G/DL (ref 32–36)
MCV RBC AUTO: 93 FL (ref 82–98)
MONOCYTES # BLD AUTO: 1.1 K/UL (ref 0.3–1)
MONOCYTES NFR BLD: 11.1 % (ref 4–15)
NEUTROPHILS # BLD AUTO: 7.1 K/UL (ref 1.8–7.7)
NEUTROPHILS NFR BLD: 69.2 % (ref 38–73)
NRBC BLD-RTO: 0 /100 WBC
OHS QRS DURATION: 72 MS
OHS QRS DURATION: 74 MS
OHS QTC CALCULATION: 452 MS
OHS QTC CALCULATION: 457 MS
PLATELET # BLD AUTO: 243 K/UL (ref 150–450)
PMV BLD AUTO: 9.7 FL (ref 9.2–12.9)
POTASSIUM SERPL-SCNC: 4 MMOL/L (ref 3.5–5.1)
PROT SERPL-MCNC: 7.3 G/DL (ref 6–8.4)
RBC # BLD AUTO: 3.85 M/UL (ref 4–5.4)
SODIUM SERPL-SCNC: 135 MMOL/L (ref 136–145)
WBC # BLD AUTO: 10.31 K/UL (ref 3.9–12.7)

## 2024-06-21 PROCEDURE — 83036 HEMOGLOBIN GLYCOSYLATED A1C: CPT | Performed by: INTERNAL MEDICINE

## 2024-06-21 PROCEDURE — 80053 COMPREHEN METABOLIC PANEL: CPT | Performed by: INTERNAL MEDICINE

## 2024-06-21 PROCEDURE — 99204 OFFICE O/P NEW MOD 45 MIN: CPT | Mod: GC,,, | Performed by: EMERGENCY MEDICINE

## 2024-06-21 PROCEDURE — 99214 OFFICE O/P EST MOD 30 MIN: CPT | Mod: ,,,

## 2024-06-21 PROCEDURE — 25000003 PHARM REV CODE 250: Performed by: INTERNAL MEDICINE

## 2024-06-21 PROCEDURE — 25000003 PHARM REV CODE 250: Performed by: HOSPITALIST

## 2024-06-21 PROCEDURE — 85025 COMPLETE CBC W/AUTO DIFF WBC: CPT | Performed by: INTERNAL MEDICINE

## 2024-06-21 PROCEDURE — G0378 HOSPITAL OBSERVATION PER HR: HCPCS

## 2024-06-21 RX ORDER — GLUCAGON 1 MG
1 KIT INJECTION
Status: DISCONTINUED | OUTPATIENT
Start: 2024-06-21 | End: 2024-06-21 | Stop reason: HOSPADM

## 2024-06-21 RX ORDER — IBUPROFEN 200 MG
16 TABLET ORAL
Status: DISCONTINUED | OUTPATIENT
Start: 2024-06-21 | End: 2024-06-21 | Stop reason: HOSPADM

## 2024-06-21 RX ORDER — IBUPROFEN 200 MG
24 TABLET ORAL
Status: DISCONTINUED | OUTPATIENT
Start: 2024-06-21 | End: 2024-06-21 | Stop reason: HOSPADM

## 2024-06-21 RX ADMIN — LISINOPRIL 20 MG: 20 TABLET ORAL at 08:06

## 2024-06-21 RX ADMIN — FUROSEMIDE 40 MG: 40 TABLET ORAL at 08:06

## 2024-06-21 RX ADMIN — HYDROCHLOROTHIAZIDE 12.5 MG: 12.5 TABLET ORAL at 08:06

## 2024-06-21 NOTE — HPI
Reason for Consult: Management of T2DM, Hyperglycemia     Diabetes diagnosis year: > 5 years ago     Home Diabetes Medications:    Omnipod 5  Basal Rate  12A:  0.45 units/hr      Carb Ratio  12A:12  6A: 10  6P: 12     ISF  12A: 50      Target: 120  Correct above: 140     IAT: 4 hours      How often checking glucose at home? >4 x day Dexcom   BG readings on regimen: 100s-200s  Hypoglycemia on the regimen?  {YES NO:65615}  Missed doses on regimen?  No    Diabetes Complications include:     Hyperglycemia    Complicating diabetes co morbidities:   Glucocorticoid use       HPI:   83-year-old female medical history of diabetes mellitus, hypertension and autoimmune hepatitis presents emergency department for hemoptysis. Pt states an hour before her presentation to ED she coughed blood . She is not taking any blood thinner. She denies CP, SOB, fever, cough, dizziness or headache. In ED her CTA was showing lung mass. Endocrine consulted for DM management.

## 2024-06-21 NOTE — ED NOTES
Dr FREEDOM Glover and [facility] med D notified pt had a dexacom and an omnipod that delivers her insulin

## 2024-06-21 NOTE — H&P (VIEW-ONLY)
[facility] - Emergency Dept  Pulmonology  Consult Note    Patient Name: Radha Feng  MRN: [id number]  Admission Date: 6/20/2024  Hospital Length of Stay: 0 days  Code Status: Full Code  Attending Physician: Victor Manuel Glover MD  Primary Care Provider: Leticia Lim MD   Principal Problem: Lung mass    Inpatient consult to Pulmonology  Consult performed by: Gwen Farrar MD  Consult ordered by: Ann Baxter MD        Subjective:     HPI:   is a 83-year-old female medical history of diabetes mellitus, hypertension, and autoimmune hepatitis (on prednisone, previously imuran discontinued due to thrombocytopenia) who presented to the emergency department  for hemoptysis. Pt states an hour before her presentation to ED she coughed blood . She is not taking any blood thinner. She denies CP, SOB, fever, cough, dizziness or headache, unintentional weight loss, poor appetite. She is a former smoker, having smoked for 35 years and quit 30 years ago. She has never undergone bronchoscopy before. She has not been hospitalized for recurrent pneumonias lately. She coughed up 6 quarter-sized clots yesterday and scant blood this morning. At baseline, patient is independent with her ADLs, needing help with getting out of the bathtub and for transportation. She uses a walker mostly to get around. Her last fall was in Feb 2024.     History obtained from patient and her daughter at bedside.     Vitals stable, patient is on room air. CTA from 6/20 reveals a right hilar/pulmonary mass, encasing several venous structures and airways. Scattered pulmonary nodules are noted elsewhere, malignancy remains a concern given hilar mass. bilateral basilar dependent atelectasis/scarring. No findings suggestive of pulmonary embolism. On previous CTA in September 2022, pulmonary micronodules were seen.  Pulmonology consulted for lung mass.      Past Medical History:   Diagnosis Date    Cataract     Chondromalacia, knee, right  10/28/2022    Diabetes mellitus     Glaucoma     Hypertension     Other ascites 11/29/2022       Past Surgical History:   Procedure Laterality Date    CATARACT EXTRACTION W/  INTRAOCULAR LENS IMPLANT Left 12/21/2021    Procedure: EXTRACTION, CATARACT, WITH IOL INSERTION;  Surgeon: Shay Chou MD;  Location: [facility];  Service: Ophthalmology;  Laterality: Left;    ESOPHAGOGASTRODUODENOSCOPY N/A 6/26/2023    Procedure: ESOPHAGOGASTRODUODENOSCOPY (EGD);  Surgeon: Edgar Branch MD;  Location: [address]);  Service: Endoscopy;  Laterality: N/A;  referral Dr Peraza-cirrhosis/labs done on 4/24/23-instr portal-GT       Review of patient's allergies indicates:  No Known Allergies    Family History       Problem Relation (Age of Onset)    Blindness Cousin    Cataracts Mother    Colon cancer Sister    Diabetes Mother    Glaucoma Mother    Heart attack Father    Hypertension Mother          Tobacco Use    Smoking status: Former    Smokeless tobacco: Never   Substance and Sexual Activity    Alcohol use: Not Currently    Drug use: Never    Sexual activity: Not on file         Review of Systems   Constitutional:  Negative for activity change, appetite change and unexpected weight change.   Respiratory:  Negative for cough and shortness of breath.         Hemoptysis    Cardiovascular:  Negative for chest pain and leg swelling.   Gastrointestinal:  Negative for abdominal pain, diarrhea, nausea and vomiting.   Musculoskeletal:  Positive for arthralgias.   Psychiatric/Behavioral:  Negative for agitation and behavioral problems.      Objective:     Vital Signs (Most Recent):  Temp: 98.4 °F (36.9 °C) (06/21/24 0718)  Pulse: 67 (06/21/24 0718)  Resp: 15 (06/21/24 0718)  BP: 137/60 (06/21/24 0718)  SpO2: 95 % (06/21/24 0718) Vital Signs (24h Range):  Temp:  [98.1 °F (36.7 °C)-98.4 °F (36.9 °C)] 98.4 °F (36.9 °C)  Pulse:  [64-85] 67  Resp:  [14-16] 15  SpO2:  [95 %-98 %] 95 %  BP: (122-154)/(54-82) 137/60     Weight:  54.4 kg (120 lb)  Body mass index is 19.97 kg/m².    No intake or output data in the 24 hours ending 06/21/24 0858     Physical Exam  Constitutional:       General: She is not in acute distress.     Appearance: Normal appearance. She is not ill-appearing.      Comments: Very pleasant,elderly lady   HENT:      Head: Normocephalic and atraumatic.      Mouth/Throat:      Mouth: Mucous membranes are moist.   Eyes:      General: No scleral icterus.  Cardiovascular:      Rate and Rhythm: Normal rate and regular rhythm.      Pulses: Normal pulses.      Heart sounds: Normal heart sounds.   Pulmonary:      Effort: Pulmonary effort is normal. No respiratory distress.      Breath sounds: Normal breath sounds. No wheezing or rales.   Abdominal:      General: Abdomen is flat. Bowel sounds are normal. There is no distension.      Palpations: Abdomen is soft.      Tenderness: There is no abdominal tenderness. There is no guarding.   Musculoskeletal:      Right lower leg: No edema.      Left lower leg: No edema.   Skin:     General: Skin is warm and dry.   Neurological:      General: No focal deficit present.      Mental Status: She is alert and oriented to person, place, and time.   Psychiatric:         Mood and Affect: Mood normal.         Behavior: Behavior normal.          Vents:       Lines/Drains/Airways       Peripheral Intravenous Line  Duration                  Peripheral IV - Single Lumen 06/20/24 1801 20 G Left Antecubital <1 day                    Significant Labs:    CBC/Anemia Profile:  Recent Labs   Lab 06/20/24  1801 06/21/24  0320   WBC 8.82 10.31   HGB 11.3* 12.0   HCT 34.5* 35.6*    243   MCV 97 93   RDW 13.1 13.1        Chemistries:  Recent Labs   Lab 06/20/24  1801 06/21/24  0320    135*   K 4.3 4.0    107   CO2 22* 19*   BUN 19 16   CREATININE 0.8 0.8   CALCIUM 9.2 9.1   ALBUMIN 2.8* 2.7*   PROT 7.4 7.3   BILITOT 0.5 0.7   ALKPHOS 47* 55   ALT 15 15   AST 22 25   MG 1.7  --      Significant  Imaging:   I have reviewed all pertinent imaging results/findings within the past 24 hours.  Assessment/Plan:     Pulmonary  * Lung mass  83-year-old female medical history of diabetes mellitus, hypertension, and autoimmune hepatitis (on prednisone, previously imuran discontinued due to thrombocytopenia) who presented to the emergency department  for hemoptysis. CTA from 6/20 reveals a right hilar/pulmonary mass, encasing several venous structures and airways. Scattered pulmonary nodules are noted elsewhere, malignancy remains a concern given hilar mass. Pulmonology consulted for lung mass. No findings suggestive of pulmonary embolism. No blood thinners, NSAIDs, including aspirin.     - No need for urgent EBUS inpatient  - can allow her to have diet  - ok to discharge for pulmonology standpoint  - Will message pulm coordinator to get patient in to do EBUS and biopsy with Dr. Wilkinson - aiming for July 5 to allow for patient to go to her trip to [facility].  - Discussed with patient and she is willing to undergo biopsy and consider potential chemo/radiation if pathology is concerning for malignancy.               Discussed with Dr. Wilkinson  Thank you for your consult. I will sign off. Please contact us if you have any additional questions.     Gwen Farrar MD  Pulmonology  [facility] - Emergency Dept

## 2024-06-21 NOTE — ED NOTES
Message sent to hospital med D and Dr FREEDOM Glover that family is refusing prednisone 2.5 mg and wants to give her  home dose of 3.75 mg which she has with her

## 2024-06-21 NOTE — SUBJECTIVE & OBJECTIVE
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    Diabetes mellitus     Glaucoma     Hypertension     Other ascites 11/29/2022       Past Surgical History:   Procedure Laterality Date    CATARACT EXTRACTION W/  INTRAOCULAR LENS IMPLANT Left 12/21/2021    Procedure: EXTRACTION, CATARACT, WITH IOL INSERTION;  Surgeon: Shay Chou MD;  Location: [facility];  Service: Ophthalmology;  Laterality: Left;    ESOPHAGOGASTRODUODENOSCOPY N/A 6/26/2023    Procedure: ESOPHAGOGASTRODUODENOSCOPY (EGD);  Surgeon: Edgar Branch MD;  Location: [address]);  Service: Endoscopy;  Laterality: N/A;  referral Dr Peraza-cirrhosis/labs done on 4/24/23-[facility]-       Review of patient's allergies indicates:  No Known Allergies    No current facility-administered medications on file prior to encounter.     Current Outpatient Medications on File Prior to Encounter   Medication Sig    acetaminophen (TYLENOL) 500 MG tablet Take 2 tablets (1,000 mg total) by mouth every 8 (eight) hours as needed for Pain. (Patient not taking: Reported on 5/13/2024)    amLODIPine (NORVASC) 2.5 MG tablet TAKE 1 TABLET BY MOUTH EVERY DAY    BD INSULIN SYRINGE ULTRA-FINE 1 mL 31 gauge x 5/16 Syrg     blood-glucose meter,continuous (DEXCOM G6 ) Misc Check blood sugar before meals and at bedtime    blood-glucose sensor (DEXCOM G6 SENSOR) Amanda 3 each by Misc.(Non-Drug; Combo Route) route every 10 days.    blood-glucose transmitter (DEXCOM G6 TRANSMITTER) Amanda 1 each by Misc.(Non-Drug; Combo Route) route every 3 (three) months.    brimonidine 0.2% (ALPHAGAN) 0.2 % Drop Place 1 drop into both eyes 3 (three) times daily.    cholecalciferol, vitamin D3, (VITAMIN D3) 25 mcg (1,000 unit) capsule Take 2 capsules (2,000 Units total) by mouth once daily.    dorzolamide-timolol 2-0.5% (COSOPT) 22.3-6.8 mg/mL ophthalmic solution INSTILL 1 DROP INTO RIGHT EYE TWICE A DAY    estradioL (ESTRACE) 0.01 %  "(0.1 mg/gram) vaginal cream Place 0.5 g vaginally twice a week. Apply to the vagina daily for two weeks then twice a week.    furosemide (LASIX) 40 MG tablet TAKE 1 TABLET BY MOUTH EVERY DAY    insulin aspart U-100 (NOVOLOG U-100 INSULIN ASPART) 100 unit/mL injection TO USE WITH OMNIPOD 5. TOTAL DAILY DOSE UP TO 40 UNITS    insulin detemir U-100 (LEVEMIR FLEXTOUCH) 100 unit/mL (3 mL) SubQ InPn pen Inject 5 Units into the skin every evening.    insulin pump cart,automated,BT (OMNIPOD 5 G6 PODS, GEN 5,) Crtg Inject 1 each into the skin Every 3 (three) days.    lactulose (CEPHULAC) 10 gram packet Take 10 g by mouth 3 (three) times daily.    latanoprost 0.005 % ophthalmic solution PLACE 1 DROP INTO BOTH EYES ONCE DAILY    lisinopriL-hydrochlorothiazide (PRINZIDE,ZESTORETIC) 20-12.5 mg per tablet Take 1 tablet by mouth once daily.    pen needle, diabetic 31 gauge x 5/16" Ndle Use to inject insulin into the skin up to 4 times daily    predniSONE (DELTASONE) 5 MG tablet Take 0.5 tablets (2.5 mg total) by mouth once daily.     Family History       Problem Relation (Age of Onset)    Blindness Cousin    Cataracts Mother    Colon cancer Sister    Diabetes Mother    Glaucoma Mother    Heart attack Father    Hypertension Mother          Tobacco Use    Smoking status: Former    Smokeless tobacco: Never   Substance and Sexual Activity    Alcohol use: Not Currently    Drug use: Never    Sexual activity: Not on file     Review of Systems   Constitutional:  Negative for appetite change.   Respiratory:  Negative for cough and shortness of breath.         Hemoptysis   Cardiovascular:  Negative for chest pain and leg swelling.   Gastrointestinal:  Negative for abdominal distention, abdominal pain, constipation and diarrhea.   Genitourinary:  Negative for difficulty urinating and dysuria.   Neurological:  Negative for dizziness and headaches.     Objective:     Vital Signs (Most Recent):  Temp: 98.2 °F (36.8 °C) (06/20/24 1711)  Pulse: " 65 (06/20/24 2215)  Resp: 16 (06/20/24 2215)  BP: (!) 154/69 (06/20/24 2215)  SpO2: 97 % (06/20/24 2215) Vital Signs (24h Range):  Temp:  [98.2 °F (36.8 °C)] 98.2 °F (36.8 °C)  Pulse:  [65-85] 65  Resp:  [16] 16  SpO2:  [97 %-98 %] 97 %  BP: (122-154)/(69-82) 154/69     Weight: 54.4 kg (120 lb)  Body mass index is 19.97 kg/m².     Physical Exam  Constitutional:       Appearance: Normal appearance.   HENT:      Head: Normocephalic and atraumatic.      Mouth/Throat:      Mouth: Mucous membranes are moist.   Cardiovascular:      Rate and Rhythm: Normal rate and regular rhythm.      Pulses: Normal pulses.      Heart sounds: Normal heart sounds.   Pulmonary:      Effort: Pulmonary effort is normal.      Breath sounds: Normal breath sounds. No rales.   Abdominal:      General: Abdomen is flat. Bowel sounds are normal. There is no distension.      Palpations: Abdomen is soft.      Tenderness: There is no abdominal tenderness.   Musculoskeletal:         General: Normal range of motion.      Cervical back: Normal range of motion and neck supple.   Skin:     General: Skin is warm and dry.   Neurological:      General: No focal deficit present.      Mental Status: She is alert and oriented to person, place, and time.   Psychiatric:         Mood and Affect: Mood normal.                Significant Labs: All pertinent labs within the past 24 hours have been reviewed.    Significant Imaging: I have reviewed all pertinent imaging results/findings within the past 24 hours.

## 2024-06-21 NOTE — HPI
83-year-old female medical history of diabetes mellitus, hypertension and autoimmune hepatitis presents emergency department  for hemoptysis. Pt states an hour before her presentation to ED she coughed blood . She is not taking any blood thinner. She denies CP, SOB, fever, cough, dizziness or headache. In ED her CTA was showing lung mass.

## 2024-06-21 NOTE — ED PROVIDER NOTES
Chief Complaint   Hemoptysis (Hx of autoimmune hepatitis. Began approx 45 min ago. Denies blood thinner use, SOB, chest pain, or BRB to stool)      History Of Present Illness   Radha Feng is a 83 y.o. female with a PMHx including DM, HTN, autoimmune hepatitis  presenting with hemoptysis.  Patient states that starting this afternoon she has been coughing up some blood clots and pink tinged sputum.  She reports no shortness of breath or chest pain.  She reports no history of hemoptysis.  She reports no known nosebleed and does not think that she has been swallowing blood.  She reports no vomiting of blood.  She reports no known history of DVT or PE.  She reports no history of lung cancer. She reports a history of smoking but states that she quit 30 years ago.  She reports no congestion, rhinorrhea, fevers, or chills.  She reports no heavy coughing lately.      Independent Historian: Yes  Other Historian or Collateral: Chart review  Interpretor: No      Review of patient's allergies indicates:  No Known Allergies    No current facility-administered medications on file prior to encounter.     Current Outpatient Medications on File Prior to Encounter   Medication Sig Dispense Refill    acetaminophen (TYLENOL) 500 MG tablet Take 2 tablets (1,000 mg total) by mouth every 8 (eight) hours as needed for Pain. (Patient not taking: Reported on 5/13/2024)  0    amLODIPine (NORVASC) 2.5 MG tablet TAKE 1 TABLET BY MOUTH EVERY DAY 90 tablet 2    BD INSULIN SYRINGE ULTRA-FINE 1 mL 31 gauge x 5/16 Syrg       blood-glucose meter,continuous (DEXCOM G6 ) Misc Check blood sugar before meals and at bedtime 1 each PRN    blood-glucose sensor (DEXCOM G6 SENSOR) Amanda 3 each by Misc.(Non-Drug; Combo Route) route every 10 days. 3 each 11    blood-glucose transmitter (DEXCOM G6 TRANSMITTER) Amanda 1 each by Misc.(Non-Drug; Combo Route) route every 3 (three) months. 1 each 3    brimonidine 0.2% (ALPHAGAN) 0.2 % Drop Place 1 drop into  "both eyes 3 (three) times daily. 30 mL 3    cholecalciferol, vitamin D3, (VITAMIN D3) 25 mcg (1,000 unit) capsule Take 2 capsules (2,000 Units total) by mouth once daily.  0    dorzolamide-timolol 2-0.5% (COSOPT) 22.3-6.8 mg/mL ophthalmic solution INSTILL 1 DROP INTO RIGHT EYE TWICE A DAY 10 mL 11    estradioL (ESTRACE) 0.01 % (0.1 mg/gram) vaginal cream Place 0.5 g vaginally twice a week. Apply to the vagina daily for two weeks then twice a week. 45 g 4    furosemide (LASIX) 40 MG tablet TAKE 1 TABLET BY MOUTH EVERY DAY 90 tablet 1    insulin aspart U-100 (NOVOLOG U-100 INSULIN ASPART) 100 unit/mL injection TO USE WITH OMNIPOD 5. TOTAL DAILY DOSE UP TO 40 UNITS 40 mL 4    insulin detemir U-100 (LEVEMIR FLEXTOUCH) 100 unit/mL (3 mL) SubQ InPn pen Inject 5 Units into the skin every evening. 6 mL 1    insulin pump cart,automated,BT (OMNIPOD 5 G6 PODS, GEN 5,) Crtg Inject 1 each into the skin Every 3 (three) days. 10 each 11    lactulose (CEPHULAC) 10 gram packet Take 10 g by mouth 3 (three) times daily.      latanoprost 0.005 % ophthalmic solution PLACE 1 DROP INTO BOTH EYES ONCE DAILY 7.5 mL 3    lisinopriL-hydrochlorothiazide (PRINZIDE,ZESTORETIC) 20-12.5 mg per tablet Take 1 tablet by mouth once daily. 90 tablet 1    pen needle, diabetic 31 gauge x 5/16" Ndle Use to inject insulin into the skin up to 4 times daily 400 each 3    predniSONE (DELTASONE) 5 MG tablet Take 0.5 tablets (2.5 mg total) by mouth once daily. 15 tablet 3       Past History   As per HPI and below:  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    Diabetes mellitus     Glaucoma     Hypertension     Other ascites 11/29/2022     Past Surgical History:   Procedure Laterality Date    CATARACT EXTRACTION W/  INTRAOCULAR LENS IMPLANT Left 12/21/2021    Procedure: EXTRACTION, CATARACT, WITH IOL INSERTION;  Surgeon: Shay Chou MD;  Location: [facility];  Service: Ophthalmology;  Laterality: Left; "    ESOPHAGOGASTRODUODENOSCOPY N/A 6/26/2023    Procedure: ESOPHAGOGASTRODUODENOSCOPY (EGD);  Surgeon: Edgar Branch MD;  Location: [address]);  Service: Endoscopy;  Laterality: N/A;  referral Dr Peraza-cirrhosis/labs done on 4/24/23-[facility] portal-[facility]       Social History     Socioeconomic History    Marital status: Single   Tobacco Use    Smoking status: Former    Smokeless tobacco: Never   Substance and Sexual Activity    Alcohol use: Not Currently    Drug use: Never   Social History Narrative    , one daughter local, four sons out of state. Retired . Lives with daughter Joss.     Social Determinants of Health     Financial Resource Strain: Low Risk  (6/21/2024)    Overall Financial Resource Strain (CARDIA)     Difficulty of Paying Living Expenses: Not hard at all   Food Insecurity: No Food Insecurity (6/21/2024)    Hunger Vital Sign     Worried About Running Out of Food in the Last Year: Never true     Ran Out of Food in the Last Year: Never true   Transportation Needs: No Transportation Needs (6/21/2024)    TRANSPORTATION NEEDS     Transportation : No   Physical Activity: Inactive (6/21/2024)    Exercise Vital Sign     Days of Exercise per Week: 1 day     Minutes of Exercise per Session: 0 min   Stress: No Stress Concern Present (6/21/2024)    [employer] - Occupational Stress Questionnaire     Feeling of Stress : Only a little   Recent Concern: Stress - Stress Concern Present (6/21/2024)    [employer] - Occupational Stress Questionnaire     Feeling of Stress : To some extent   Housing Stability: Low Risk  (6/21/2024)    Housing Stability Vital Sign     Unable to Pay for Housing in the Last Year: No     Homeless in the Last Year: No       Family History   Problem Relation Name Age of Onset    Cataracts Mother      Diabetes Mother      Glaucoma Mother      Hypertension Mother      Heart attack Father      Colon cancer Sister       Blindness Cousin      Amblyopia Neg Hx      Macular degeneration Neg Hx      Retinal detachment Neg Hx         Physical Exam     Vitals:    06/21/24 1100 06/21/24 1200 06/21/24 1300 06/21/24 1400   BP: 115/62 104/60 (!) 98/57 (!) 94/54   BP Location: Left arm      Patient Position: Lying      Pulse: 72 61 66 70   Resp:  16 18 16   Temp: 98 °F (36.7 °C)      TempSrc: Oral      SpO2: 96% 95% 97% 96%   Weight:       Height:           Physical Exam  Vitals reviewed.   Constitutional:       General: She is not in acute distress.     Appearance: Normal appearance. She is not toxic-appearing.   HENT:      Head: Normocephalic and atraumatic.      Nose: No congestion.      Mouth/Throat:      Mouth: Mucous membranes are moist.   Eyes:      General: No scleral icterus.     Extraocular Movements: Extraocular movements intact.   Cardiovascular:      Rate and Rhythm: Normal rate and regular rhythm.   Pulmonary:      Effort: No respiratory distress.      Breath sounds: No wheezing or rhonchi.   Abdominal:      General: There is no distension.      Palpations: Abdomen is soft.      Tenderness: There is no abdominal tenderness. There is no guarding.   Musculoskeletal:         General: No deformity.      Cervical back: No rigidity.      Right lower leg: No edema.      Left lower leg: No edema.   Skin:     General: Skin is warm and dry.      Capillary Refill: Capillary refill takes less than 2 seconds.   Neurological:      General: No focal deficit present.      Mental Status: She is alert and oriented to person, place, and time.             Results     Labs Reviewed   CBC W/ AUTO DIFFERENTIAL - Abnormal; Notable for the following components:       Result Value    RBC 3.57 (*)     Hemoglobin 11.3 (*)     Hematocrit 34.5 (*)     MCH 31.7 (*)     All other components within normal limits   COMPREHENSIVE METABOLIC PANEL - Abnormal; Notable for the following components:    CO2 22 (*)     Albumin 2.8 (*)     Alkaline Phosphatase 47 (*)      All other components within normal limits   HEMOGLOBIN A1C - Abnormal; Notable for the following components:    Hemoglobin A1C 6.0 (*)     All other components within normal limits   CBC W/ AUTO DIFFERENTIAL - Abnormal; Notable for the following components:    RBC 3.85 (*)     Hematocrit 35.6 (*)     MCH 31.2 (*)     Immature Grans (Abs) 0.05 (*)     Mono # 1.1 (*)     Lymph % 17.3 (*)     All other components within normal limits   COMPREHENSIVE METABOLIC PANEL - Abnormal; Notable for the following components:    Sodium 135 (*)     CO2 19 (*)     Glucose 152 (*)     Albumin 2.7 (*)     All other components within normal limits   MAGNESIUM   PROTIME-INR    Narrative:     ADD ON PTT PER DR AKOSUA MÁRQUEZ/ORDER# [id number] @ 18:10                  Release to patient->Immediate   HIV 1 / 2 ANTIBODY    Narrative:     ADD ON PTT PER DR AKOSUA MÁRQUEZ/ORDER# [id number] @ 18:10                  Release to patient->Immediate   HEPATITIS C ANTIBODY    Narrative:     ADD ON PTT PER DR AKOSUA MÁRQUEZ/ORDER# [id number] @ 18:10                  Release to patient->Immediate   APTT   APTT    Narrative:     ADD ON PTT PER DR AKOSUA MÁRQUEZ/ORDER# [id number] @ 18:10                  Release to patient->Immediate   TYPE & SCREEN   POCT GLUCOSE MONITORING CONTINUOUS       Imaging Results               CTA Chest Non-Coronary (PE Studies) (Final result)  Result time 06/20/24 19:54:50      Final result by Juventino Rosas MD (06/20/24 19:54:50)                   Impression:      This report was flagged in Epic as abnormal.    1. Allowing for motion artifact, no convincing pulmonary thromboembolism.  2. Right hilar/pulmonary mass, encasing several venous structures and airways.  This is new since the prior exam however no recent exams are available for comparison.  Correlation is advised as finding is most concerning for malignancy.  3. There is strand-like debris within the right mainstem bronchus, placing patient at risk for  aspiration.  4. Scattered pulmonary nodules are noted elsewhere, malignancy remains a concern given hilar mass.  Continued follow-up is recommended.  5. Pulmonary facet minutes change noting scattered regions of bandlike atelectasis or scarring.  6. Pleural based masslike focus along the left upper lobe pleural margin, increased in size since the previous exam although is of primarily fat attenuation suggesting lipoma.  Follow-up as warranted.  7. Please see above for several additional findings.      Electronically signed by: Juventino Rosas MD  Date:    06/20/2024  Time:    19:54               Narrative:    EXAMINATION:  CTA CHEST NON CORONARY (PE STUDIES)    CLINICAL HISTORY:  Pulmonary embolism (PE) suspected, unknown D-dimer;    TECHNIQUE:  Low dose axial images, sagittal and coronal reformations were obtained from the thoracic inlet to the lung bases following the IV administration of 75 mL of Omnipaque 350.  Contrast timing was optimized to evaluate the pulmonary arteries.  MIP images were performed.    COMPARISON:  Radiograph 11/03/2022, CTA chest 09/20/2022    FINDINGS:  The structures at the base of the neck are unremarkable.  No significant mediastinal lymphadenopathy.  The heart is not enlarged.  The thoracic aorta tapers normally noting atherosclerotic calcification along its course and in the distribution of the coronary arteries.  Allowing for motion artifact, the visualized portions of the kidneys, spleen, pancreas, adrenal glands, liver and gallbladder are grossly unremarkable.  There is a small hiatal hernia.    Motion artifact limits evaluation of the airways and pulmonary parenchyma.  Allowing for this, there is web like secretion within the proximal aspect of the right mainstem bronchus extending to the right lower lobe bronchus, finding places patient at risk for aspiration.  There is bilateral diffuse emphysematous change.  There is biapical atelectasis or scarring.  Scattered interlobular  septal thickening suggests superimposed edema.  There are a few scattered nodular foci throughout the parenchyma bilaterally measuring 3-4 mm, several of which in a tree-in-bud type configuration.  There is a mass at the level of the right hilum, encasing several vascular structures measuring 3.9 x 2.5 cm, developed since the previous examination.  There is scattered ground-glass attenuation about the focus.  There is an eccentric pleural based soft tissue focus along the lateral aspect of the left upper lobe, grossly stable in configuration to somewhat thickened since the previous exam.  This measures 2.8 x 1.2 cm.  There is bilateral basilar dependent atelectasis/scarring.  No pneumothorax.  No pleural effusion.    Bolus timing is adequate for evaluation of pulmonary thromboembolism however motion artifact limits evaluation.  Allowing for this, no convincing pulmonary arterial filling defect to the level of the proximal segmental branches bilaterally to suggest pulmonary thromboembolism.  Please note, the pulmonary arterial branches to the right upper lobe are significantly narrowed by right hilar mass as are the airways without central occlusion.    There is osteopenia.  There are remote appearing left rib injuries.  No significant axillary lymphadenopathy.                                            Initial MDM   Medical Decision Making  Patient is an 83-year-old female presenting with hemoptysis.  Workup initiated prior to my evaluation including a CTA of her chest.  CTA notable for masslike structure in the hilum and strained like debris in the right mainstem bronchus.  Given these findings, most concerning for malignancy as cause of the patient's symptoms today.  No evidence of PE.  Patient is otherwise maintaining her airway without difficulty, speaking without difficulty, and maintaining her oxygen saturation on room air.  Discussed results and findings so far with patient and daughter at bedside and they  expressed understanding.  Discussed with hospital medicine for admission.               Medications Given / Interventions     Medications   amLODIPine tablet 2.5 mg (0 mg Oral Hold 6/21/24 0842)   furosemide tablet 40 mg (40 mg Oral Given 6/21/24 0848)   dextrose 10% bolus 125 mL 125 mL (has no administration in time range)   dextrose 10% bolus 250 mL 250 mL (has no administration in time range)   lisinopriL tablet 20 mg (20 mg Oral Given 6/21/24 0848)   hydroCHLOROthiazide tablet 12.5 mg (12.5 mg Oral Given 6/21/24 0848)   glucose chewable tablet 16 g (has no administration in time range)   glucose chewable tablet 24 g (has no administration in time range)   glucagon (human recombinant) injection 1 mg (has no administration in time range)   dextrose 10% bolus 125 mL 125 mL (has no administration in time range)   dextrose 10% bolus 250 mL 250 mL (has no administration in time range)   iohexoL (OMNIPAQUE 350) injection 75 mL (75 mLs Intravenous Given 6/20/24 8215)       Procedures     ED POCUS Performed: No    Reassessment and ED Course               Final diagnoses:  [R05.9] Cough  [R04.2] Hemoptysis (Primary)  [R91.8] Lung mass           Dispo      ED Disposition Condition    Discharge             ED Prescriptions    None       Follow-up Information    None                     Mariana Patino MD  06/21/24 7904

## 2024-06-21 NOTE — HPI
is a 83-year-old female medical history of diabetes mellitus, hypertension, and autoimmune hepatitis (on prednisone, previously imuran discontinued due to thrombocytopenia) who presented to the emergency department  for hemoptysis. Pt states an hour before her presentation to ED she coughed blood . She is not taking any blood thinner. She denies CP, SOB, fever, cough, dizziness or headache, unintentional weight loss, poor appetite. She is a former smoker, having smoked for 35 years and quit 30 years ago. She has never undergone bronchoscopy before. She has not been hospitalized for recurrent pneumonias lately. She coughed up 6 quarter-sized clots yesterday and scant blood this morning. At baseline, patient is independent with her ADLs, needing help with getting out of the bathtub and for transportation. She uses a walker mostly to get around. Her last fall was in Feb 2024.     History obtained from patient and her daughter at bedside.     Vitals stable, patient is on room air. CTA from 6/20 reveals a right hilar/pulmonary mass, encasing several venous structures and airways. Scattered pulmonary nodules are noted elsewhere, malignancy remains a concern given hilar mass. bilateral basilar dependent atelectasis/scarring. No findings suggestive of pulmonary embolism. On previous CTA in September 2022, pulmonary micronodules were seen.  Pulmonology consulted for lung mass.

## 2024-06-21 NOTE — ED NOTES
Pt identifiers Radha Feng were checked and are correct  LOC: The patient is awake, alert, aware of environment with an appropriate affect. Oriented x4, speaking appropriately  APPEARANCE: Pt rates chronic right knee pain an 8/10 , in no acute distress, pt is clean and well groomed, clothing properly fastened  SKIN: Skin warm, dry and intact, normal skin turgor, moist mucus membranes  RESPIRATORY: Airway is open and patent, respirations are spontaneous, even and unlabored, normal effort and rate  CARDIAC: Normal rate and rhythm, no peripheral edema noted, capillary refill < 3 seconds, bilateral radial pulses 2+  ABDOMEN: Soft, nontender, nondistended. Bowel sounds present   NEUROLOGIC: PERRL, facial expression is symmetrical, patient moving all extremities spontaneously, normal sensation in all extremities when touched with a finger.  Follows all commands appropriately  MUSCULOSKELETAL: No obvious deformities.

## 2024-06-21 NOTE — SUBJECTIVE & OBJECTIVE
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    Diabetes mellitus     Glaucoma     Hypertension     Other ascites 11/29/2022       Past Surgical History:   Procedure Laterality Date    CATARACT EXTRACTION W/  INTRAOCULAR LENS IMPLANT Left 12/21/2021    Procedure: EXTRACTION, CATARACT, WITH IOL INSERTION;  Surgeon: Shay Chou MD;  Location: [facility];  Service: Ophthalmology;  Laterality: Left;    ESOPHAGOGASTRODUODENOSCOPY N/A 6/26/2023    Procedure: ESOPHAGOGASTRODUODENOSCOPY (EGD);  Surgeon: Edgar Branch MD;  [address]  Service: Endoscopy;  Laterality: N/A;  referral Dr Peraza-cirrhosis/labs done on 4/24/23-[facility]-       Review of patient's allergies indicates:  No Known Allergies    Family History       Problem Relation (Age of Onset)    Blindness Cousin    Cataracts Mother    Colon cancer Sister    Diabetes Mother    Glaucoma Mother    Heart attack Father    Hypertension Mother          Tobacco Use    Smoking status: Former    Smokeless tobacco: Never   Substance and Sexual Activity    Alcohol use: Not Currently    Drug use: Never    Sexual activity: Not on file         Review of Systems   Constitutional:  Negative for activity change, appetite change and unexpected weight change.   Respiratory:  Negative for cough and shortness of breath.         Hemoptysis    Cardiovascular:  Negative for chest pain and leg swelling.   Gastrointestinal:  Negative for abdominal pain, diarrhea, nausea and vomiting.   Musculoskeletal:  Positive for arthralgias.   Psychiatric/Behavioral:  Negative for agitation and behavioral problems.      Objective:     Vital Signs (Most Recent):  Temp: 98.4 °F (36.9 °C) (06/21/24 0718)  Pulse: 67 (06/21/24 0718)  Resp: 15 (06/21/24 0718)  BP: 137/60 (06/21/24 0718)  SpO2: 95 % (06/21/24 0718) Vital Signs (24h Range):  Temp:  [98.1 °F (36.7 °C)-98.4 °F (36.9 °C)] 98.4 °F (36.9 °C)  Pulse:  [64-85] 67  Resp:   [14-16] 15  SpO2:  [95 %-98 %] 95 %  BP: (122-154)/(54-82) 137/60     Weight: 54.4 kg (120 lb)  Body mass index is 19.97 kg/m².    No intake or output data in the 24 hours ending 06/21/24 0858     Physical Exam  Constitutional:       General: She is not in acute distress.     Appearance: Normal appearance. She is not ill-appearing.      Comments: Very pleasant,elderly lady   HENT:      Head: Normocephalic and atraumatic.      Mouth/Throat:      Mouth: Mucous membranes are moist.   Eyes:      General: No scleral icterus.  Cardiovascular:      Rate and Rhythm: Normal rate and regular rhythm.      Pulses: Normal pulses.      Heart sounds: Normal heart sounds.   Pulmonary:      Effort: Pulmonary effort is normal. No respiratory distress.      Breath sounds: Normal breath sounds. No wheezing or rales.   Abdominal:      General: Abdomen is flat. Bowel sounds are normal. There is no distension.      Palpations: Abdomen is soft.      Tenderness: There is no abdominal tenderness. There is no guarding.   Musculoskeletal:      Right lower leg: No edema.      Left lower leg: No edema.   Skin:     General: Skin is warm and dry.   Neurological:      General: No focal deficit present.      Mental Status: She is alert and oriented to person, place, and time.   Psychiatric:         Mood and Affect: Mood normal.         Behavior: Behavior normal.          Vents:       Lines/Drains/Airways       Peripheral Intravenous Line  Duration                  Peripheral IV - Single Lumen 06/20/24 1801 20 G Left Antecubital <1 day                    Significant Labs:    CBC/Anemia Profile:  Recent Labs   Lab 06/20/24  1801 06/21/24  0320   WBC 8.82 10.31   HGB 11.3* 12.0   HCT 34.5* 35.6*    243   MCV 97 93   RDW 13.1 13.1        Chemistries:  Recent Labs   Lab 06/20/24  1801 06/21/24  0320    135*   K 4.3 4.0    107   CO2 22* 19*   BUN 19 16   CREATININE 0.8 0.8   CALCIUM 9.2 9.1   ALBUMIN 2.8* 2.7*   PROT 7.4 7.3   BILITOT  0.5 0.7   ALKPHOS 47* 55   ALT 15 15   AST 22 25   MG 1.7  --      Significant Imaging:   I have reviewed all pertinent imaging results/findings within the past 24 hours.

## 2024-06-21 NOTE — H&P
[facility] - Emergency Dept  [facility] Medicine  History & Physical    Patient Name: Radha Feng  MRN: [id number]  Patient Class: OP- Observation  Admission Date: 6/20/2024  Attending Physician: Rolo Estrada MD   Primary Care Provider: Leticia Lim MD         Patient information was obtained from patient and ER records.     Subjective:     Principal Problem:Lung mass    Chief Complaint:   Chief Complaint   Patient presents with    Hemoptysis     Hx of autoimmune hepatitis. Began approx 45 min ago. Denies blood thinner use, SOB, chest pain, or BRB to stool        HPI: 83-year-old female medical history of diabetes mellitus, hypertension and autoimmune hepatitis presents emergency department  for hemoptysis. Pt states an hour before her presentation to ED she coughed blood . She is not taking any blood thinner. She denies CP, SOB, fever, cough, dizziness or headache. In ED her CTA was showing lung mass.     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    Diabetes mellitus     Glaucoma     Hypertension     Other ascites 11/29/2022       Past Surgical History:   Procedure Laterality Date    CATARACT EXTRACTION W/  INTRAOCULAR LENS IMPLANT Left 12/21/2021    Procedure: EXTRACTION, CATARACT, WITH IOL INSERTION;  Surgeon: Shay Chou MD;  Location: [facility];  Service: Ophthalmology;  Laterality: Left;    ESOPHAGOGASTRODUODENOSCOPY N/A 6/26/2023    Procedure: ESOPHAGOGASTRODUODENOSCOPY (EGD);  Surgeon: Edgar Branch MD;  Location: [address]);  Service: Endoscopy;  Laterality: N/A;  referral Dr Peraza-cirrhosis/labs done on 4/24/23-[facility]-       Review of patient's allergies indicates:  No Known Allergies    No current facility-administered medications on file prior to encounter.     Current Outpatient Medications on File Prior to Encounter   Medication Sig    acetaminophen (TYLENOL) 500 MG tablet Take 2 tablets (1,000 mg total) by  "mouth every 8 (eight) hours as needed for Pain. (Patient not taking: Reported on 5/13/2024)    amLODIPine (NORVASC) 2.5 MG tablet TAKE 1 TABLET BY MOUTH EVERY DAY    BD INSULIN SYRINGE ULTRA-FINE 1 mL 31 gauge x 5/16 Syrg     blood-glucose meter,continuous (DEXCOM G6 ) Misc Check blood sugar before meals and at bedtime    blood-glucose sensor (DEXCOM G6 SENSOR) Amanda 3 each by Misc.(Non-Drug; Combo Route) route every 10 days.    blood-glucose transmitter (DEXCOM G6 TRANSMITTER) Amanda 1 each by Misc.(Non-Drug; Combo Route) route every 3 (three) months.    brimonidine 0.2% (ALPHAGAN) 0.2 % Drop Place 1 drop into both eyes 3 (three) times daily.    cholecalciferol, vitamin D3, (VITAMIN D3) 25 mcg (1,000 unit) capsule Take 2 capsules (2,000 Units total) by mouth once daily.    dorzolamide-timolol 2-0.5% (COSOPT) 22.3-6.8 mg/mL ophthalmic solution INSTILL 1 DROP INTO RIGHT EYE TWICE A DAY    estradioL (ESTRACE) 0.01 % (0.1 mg/gram) vaginal cream Place 0.5 g vaginally twice a week. Apply to the vagina daily for two weeks then twice a week.    furosemide (LASIX) 40 MG tablet TAKE 1 TABLET BY MOUTH EVERY DAY    insulin aspart U-100 (NOVOLOG U-100 INSULIN ASPART) 100 unit/mL injection TO USE WITH OMNIPOD 5. TOTAL DAILY DOSE UP TO 40 UNITS    insulin detemir U-100 (LEVEMIR FLEXTOUCH) 100 unit/mL (3 mL) SubQ InPn pen Inject 5 Units into the skin every evening.    insulin pump cart,automated,BT (OMNIPOD 5 G6 PODS, GEN 5,) Crtg Inject 1 each into the skin Every 3 (three) days.    lactulose (CEPHULAC) 10 gram packet Take 10 g by mouth 3 (three) times daily.    latanoprost 0.005 % ophthalmic solution PLACE 1 DROP INTO BOTH EYES ONCE DAILY    lisinopriL-hydrochlorothiazide (PRINZIDE,ZESTORETIC) 20-12.5 mg per tablet Take 1 tablet by mouth once daily.    pen needle, diabetic 31 gauge x 5/16" Ndle Use to inject insulin into the skin up to 4 times daily    predniSONE (DELTASONE) 5 MG tablet Take 0.5 tablets (2.5 mg total) by " mouth once daily.     Family History       Problem Relation (Age of Onset)    Blindness Cousin    Cataracts Mother    Colon cancer Sister    Diabetes Mother    Glaucoma Mother    Heart attack Father    Hypertension Mother          Tobacco Use    Smoking status: Former    Smokeless tobacco: Never   Substance and Sexual Activity    Alcohol use: Not Currently    Drug use: Never    Sexual activity: Not on file     Review of Systems   Constitutional:  Negative for appetite change.   Respiratory:  Negative for cough and shortness of breath.         Hemoptysis   Cardiovascular:  Negative for chest pain and leg swelling.   Gastrointestinal:  Negative for abdominal distention, abdominal pain, constipation and diarrhea.   Genitourinary:  Negative for difficulty urinating and dysuria.   Neurological:  Negative for dizziness and headaches.     Objective:     Vital Signs (Most Recent):  Temp: 98.2 °F (36.8 °C) (06/20/24 1711)  Pulse: 65 (06/20/24 2215)  Resp: 16 (06/20/24 2215)  BP: (!) 154/69 (06/20/24 2215)  SpO2: 97 % (06/20/24 2215) Vital Signs (24h Range):  Temp:  [98.2 °F (36.8 °C)] 98.2 °F (36.8 °C)  Pulse:  [65-85] 65  Resp:  [16] 16  SpO2:  [97 %-98 %] 97 %  BP: (122-154)/(69-82) 154/69     Weight: 54.4 kg (120 lb)  Body mass index is 19.97 kg/m².     Physical Exam  Constitutional:       Appearance: Normal appearance.   HENT:      Head: Normocephalic and atraumatic.      Mouth/Throat:      Mouth: Mucous membranes are moist.   Cardiovascular:      Rate and Rhythm: Normal rate and regular rhythm.      Pulses: Normal pulses.      Heart sounds: Normal heart sounds.   Pulmonary:      Effort: Pulmonary effort is normal.      Breath sounds: Normal breath sounds. No rales.   Abdominal:      General: Abdomen is flat. Bowel sounds are normal. There is no distension.      Palpations: Abdomen is soft.      Tenderness: There is no abdominal tenderness.   Musculoskeletal:         General: Normal range of motion.      Cervical back:  Normal range of motion and neck supple.   Skin:     General: Skin is warm and dry.   Neurological:      General: No focal deficit present.      Mental Status: She is alert and oriented to person, place, and time.   Psychiatric:         Mood and Affect: Mood normal.                Significant Labs: All pertinent labs within the past 24 hours have been reviewed.    Significant Imaging: I have reviewed all pertinent imaging results/findings within the past 24 hours.  Assessment/Plan:     * Lung mass  - CTA with R hilar mass and LAP  - pulm consulted  - NPO after midnight  - H/H , Will monitor for hemoptysis  - consider Onc consult am       Autoimmune hepatitis  On prednisone resumed       Type 2 diabetes mellitus with circulatory disorder, with long-term current use of insulin  - ISS  - ADA       Hypertensive heart disease with diastolic heart failure  Patient is identified as having Diastolic (HFpEF) heart failure that is Chronic. CHF is currently controlled. Latest ECHO performed and demonstrates- Results for orders placed during the hospital encounter of 09/20/22    Echo    Interpretation Summary  · The estimated ejection fraction is 65%.  · The left ventricle is normal in size with normal systolic function.  · Grade II left ventricular diastolic dysfunction.  · Normal right ventricular size with normal right ventricular systolic function.  · Moderate left atrial enlargement.  · There is bileaflet mitral prolapse. Eccentric anteriorly directed MR.  · Moderate mitral regurgitation. ERO=0.2 cm2,  · Mild tricuspid regurgitation. No prior studies available for comparison.  · The estimated PA systolic pressure is 36 mmHg.  · Normal central venous pressure (3 mmHg).  . Continue ACE/ARB and Furosemide and monitor clinical status closely. Monitor on telemetry. Patient is on CHF pathway.  Monitor strict Is&Os and daily weights.  Place on fluid restriction of 1.5 L. Cardiology has not been consulted. Continue to stress to  "patient importance of self efficacy and  on diet for CHF. Last BNP reviewed- and noted below No results for input(s): "BNP", "BNPTRIAGEBLO" in the last 168 hours.      VTE Risk Mitigation (From admission, onward)      None               On 06/20/2024, patient should be placed in hospital observation services under my care.             Ann Baxter MD  Department of Hospital Medicine  [facility] - Emergency Dept          "

## 2024-06-21 NOTE — CONSULTS
[facility] - Emergency Dept  Endocrinology  Diabetes Consult Note    Consult Requested by: Victor Manuel Glover MD   Reason for admit: Lung mass    HISTORY OF PRESENT ILLNESS:  Reason for Consult: Management of T2DM, Hyperglycemia     Diabetes diagnosis year: > 5 years ago     Home Diabetes Medications:    Omnipod 5  Basal Rate  12A:  0.45 units/hr      Carb Ratio  12A:12  6A: 10  6P: 12     ISF  12A: 50      Target: 120  Correct above: 140     IAT: 4 hours      How often checking glucose at home? >4 x day Dexcom   BG readings on regimen: 100s-200s  Hypoglycemia on the regimen? No   Missed doses on regimen?  No    Diabetes Complications include:     Hyperglycemia    Complicating diabetes co morbidities:   Glucocorticoid use       HPI:   83-year-old female medical history of diabetes mellitus, hypertension and autoimmune hepatitis presents emergency department for hemoptysis. Pt states an hour before her presentation to ED she coughed blood . She is not taking any blood thinner. She denies CP, SOB, fever, cough, dizziness or headache. In ED her CTA was showing lung mass. Endocrine consulted for DM management.         Interval HPI:   No acute events overnight. Patient in room ED 29/29. Blood glucose stable. BG at goal on current insulin regimen (Home Insulin Pump). Steroid use- Prednisone 3.5 mg QD.      Renal function- Normal   Vasopressors-  None     Diet NPO     Eating:   NPO  Nausea: No  Hypoglycemia and intervention: No  Fever: No  TPN and/or TF: No    PMH, PSH, FH, SH updated and reviewed     ROS:  Review of Systems     Current Medications and/or Treatments Impacting Glycemic Control  Immunotherapy:    Immunosuppressants       None          Steroids:   Hormones (From admission, onward)      Start     Stop Route Frequency Ordered    06/22/24 0900  predniSONE tablet 3.5 mg         -- Oral Daily 06/21/24 0936          Pressors:    Autonomic Drugs (From admission, onward)      None          Hyperglycemia/Diabetes  "Medications:   Antihyperglycemics (From admission, onward)      Start     Stop Route Frequency Ordered    06/21/24 0003  insulin aspart U-100 pen 0-5 Units         -- SubQ Before meals & nightly PRN 06/20/24 2303             PHYSICAL EXAMINATION:  Vitals:    06/21/24 0955   BP: 111/61   Pulse: 63   Resp: 16   Temp: 98.1 °F (36.7 °C)     Body mass index is 19.97 kg/m².     Physical Exam  Vitals reviewed.   Constitutional:       Appearance: Normal appearance.   HENT:      Head: Normocephalic.   Neurological:      General: No focal deficit present.      Mental Status: She is alert and oriented to person, place, and time.   Psychiatric:         Mood and Affect: Mood normal.         Behavior: Behavior normal.         Thought Content: Thought content normal.            Labs Reviewed and Include   Recent Labs   Lab 06/21/24  0320   *   CALCIUM 9.1   ALBUMIN 2.7*   PROT 7.3   *   K 4.0   CO2 19*      BUN 16   CREATININE 0.8   ALKPHOS 55   ALT 15   AST 25   BILITOT 0.7     Lab Results   Component Value Date    WBC 10.31 06/21/2024    HGB 12.0 06/21/2024    HCT 35.6 (L) 06/21/2024    MCV 93 06/21/2024     06/21/2024     No results for input(s): "TSH", "FREET4" in the last 168 hours.  Lab Results   Component Value Date    HGBA1C 6.0 (H) 06/21/2024       Nutritional status:   Body mass index is 19.97 kg/m².  Lab Results   Component Value Date    ALBUMIN 2.7 (L) 06/21/2024    ALBUMIN 2.8 (L) 06/20/2024    ALBUMIN 3.0 (L) 06/10/2024     No results found for: "PREALBUMIN"    Estimated Creatinine Clearance: 46.6 mL/min (based on SCr of 0.8 mg/dL).    Accu-Checks  No results for input(s): "POCTGLUCOSE" in the last 72 hours.     ASSESSMENT and PLAN    Pulmonary  * Lung mass  Managed per primary team        Endocrine  Insulin pump in place  At time of evaluation, pt meets criteria to continue home insulin pump usage.  - Has all adequate supplies   - Bolus settings reviewed    - No changes to home regimen.   - " Nurse to check BG qac/hs/0200 & record in epic   - Patient to input glucose into pump and use bolus wizard for prandial needs   - Will continue to monitor accuchecks and titrate insulin as clinically indicated .     - Discussed above plan with patient, patient verbalized understanding.   - Understands in case of pump malfunction or cognitive decline in which pt can no longer safely use insulin pump, will transition to SC MDI     If pump malfunctions or is disconnected, please contact endocrine.         Type 2 diabetes mellitus with circulatory disorder, with long-term current use of insulin  BG goal: 140-180    - Continue home insulin pump  - POCT Glucose before meals, at bedtime and at 2 am  - Hypoglycemia protocol in place      ** Please notify Endocrine for any change and/or advance in diet**  ** Please call Endocrine for any BG related issues **     Discharge Planning:   Pt stating she is discharging today. Can resume home insulin pump and continue follow up with OP endocrine.               Candace Ambriz PA-C  Endocrinology  [facility] - Emergency Dept

## 2024-06-21 NOTE — PLAN OF CARE
[facility] - Emergency Dept  Initial Discharge Assessment       Primary Care Provider: Leticia Lim MD    Admission Diagnosis: Cough [R05.9]    Admission Date: 6/20/2024  Expected Discharge Date:     Pt stated she uses a straight cane to assist with ambulation and a wheelchair when out in the home.  Pt stated she is independent with her ADL's and does not require assistance.  Pt stated she lives with her daughter who is a nurse and does not require any post acute services.    Pt to d/c home with no needs when ready    Transition of Care Barriers: (P) None    Payor: MEDICARE / Plan: MEDICARE PART A & B / Product Type: Government /     Extended Emergency Contact Information  Primary Emergency Contact: Della Feng  Mobile Phone: [phone]  Relation: Daughter  Preferred language: English   needed? No  Secondary Emergency Contact: Raymon Morin  Mobile Phone: [phone]  Relation: Grandchild    Discharge Plan A: (P) Home  Discharge Plan B: (P) Home      [id number] - [place], [place] - [address]  [address]  [place] [address]  Phone: [phone] Fax: [phone]    [facility]  [address] [facility] [address]  Phone: [phone] Fax: [phone]    [facility]  [address] [place] [address]  Phone: [phone] Fax: [phone]      Initial Assessment (most recent)       Adult Discharge Assessment - 06/21/24 0938          Discharge Assessment    Assessment Type Discharge Planning Assessment     Confirmed/corrected address, phone number and insurance Yes     Confirmed Demographics Correct on Facesheet     Source of Information patient     Reason For Admission Lung mass (P)      People in Home child(keith), adult (P)      Facility Arrived From: home (P)      Do you expect to return to your current living situation? Yes (P)      Do you have help at home or someone to help you manage your care  at home? No (P)      Prior to hospitilization cognitive status: Alert/Oriented;No Deficits (P)      Current cognitive status: No Deficits;Alert/Oriented (P)      Walking or Climbing Stairs Difficulty yes (P)      Walking or Climbing Stairs ambulation difficulty, requires equipment (P)      Mobility Management wheelchair for outside of the home; straight cane (P)      Dressing/Bathing Difficulty no (P)      Home Accessibility stairs to enter home (P)      Number of Stairs, Main Entrance six (P)      Home Layout Able to live on 1st floor (P)      Equipment Currently Used at Home wheelchair;cane, straight (P)      Patient currently being followed by outpatient case management? No (P)      Do you currently have service(s) that help you manage your care at home? No (P)      Do you have any problems affording any of your prescribed medications? No (P)      Is the patient taking medications as prescribed? yes (P)      Who is going to help you get home at discharge? family/friends (P)      How do you get to doctors appointments? family or friend will provide (P)      Are you on dialysis? No (P)      Do you take coumadin? No (P)      Discharge Plan A Home (P)      Discharge Plan B Home (P)      DME Needed Upon Discharge  none (P)      Discharge Plan discussed with: Patient (P)      Transition of Care Barriers None (P)         Physical Activity    On average, how many days per week do you engage in moderate to strenuous exercise (like a brisk walk)? 0 days (P)      On average, how many minutes do you engage in exercise at this level? 0 min (P)         Financial Resource Strain    How hard is it for you to pay for the very basics like food, housing, medical care, and heating? Not very hard (P)         Housing Stability    In the last 12 months, was there a time when you were not able to pay the mortgage or rent on time? No (P)      At any time in the past 12 months, were you homeless or living in a shelter (including now)? No  (P)         Transportation Needs    Has the lack of transportation kept you from medical appointments, meetings, work or from getting things needed for daily living? No (P)         Food Insecurity    Within the past 12 months, you worried that your food would run out before you got the money to buy more. Never true (P)      Within the past 12 months, the food you bought just didn't last and you didn't have money to get more. Never true (P)         Stress    Do you feel stress - tense, restless, nervous, or anxious, or unable to sleep at night because your mind is troubled all the time - these days? To some extent (P)         Social Isolation    How often do you feel lonely or isolated from those around you?  Rarely (P)         Alcohol Use    Q1: How often do you have a drink containing alcohol? Never (P)      Q2: How many drinks containing alcohol do you have on a typical day when you are drinking? Patient does not drink (P)      Q3: How often do you have six or more drinks on one occasion? Never (P)         Utilities    In the past 12 months has the electric, gas, oil, or water company threatened to shut off services in your home? No (P)         Health Literacy    How often do you need to have someone help you when you read instructions, pamphlets, or other written material from your doctor or pharmacy? Rarely (P)         OTHER    Name(s) of People in Home daughter Della (P)                    Eileen Rodríguez CD, MSW, LMSW, RSW   Case Management  [facility]  Email: [email or web address]

## 2024-06-21 NOTE — CONSULTS
[facility] - Emergency Dept  Pulmonology  Consult Note    Patient Name: Radha Feng  MRN: [id number]  Admission Date: 6/20/2024  Hospital Length of Stay: 0 days  Code Status: Full Code  Attending Physician: Victor Manuel Glover MD  Primary Care Provider: Leticia Lim MD   Principal Problem: Lung mass    Inpatient consult to Pulmonology  Consult performed by: Gwen Farrar MD  Consult ordered by: Ann Baxter MD        Subjective:     HPI:   is a 83-year-old female medical history of diabetes mellitus, hypertension, and autoimmune hepatitis (on prednisone, previously imuran discontinued due to thrombocytopenia) who presented to the emergency department  for hemoptysis. Pt states an hour before her presentation to ED she coughed blood . She is not taking any blood thinner. She denies CP, SOB, fever, cough, dizziness or headache, unintentional weight loss, poor appetite. She is a former smoker, having smoked for 35 years and quit 30 years ago. She has never undergone bronchoscopy before. She has not been hospitalized for recurrent pneumonias lately. She coughed up 6 quarter-sized clots yesterday and scant blood this morning. At baseline, patient is independent with her ADLs, needing help with getting out of the bathtub and for transportation. She uses a walker mostly to get around. Her last fall was in Feb 2024.     History obtained from patient and her daughter at bedside.     Vitals stable, patient is on room air. CTA from 6/20 reveals a right hilar/pulmonary mass, encasing several venous structures and airways. Scattered pulmonary nodules are noted elsewhere, malignancy remains a concern given hilar mass. bilateral basilar dependent atelectasis/scarring. No findings suggestive of pulmonary embolism. On previous CTA in September 2022, pulmonary micronodules were seen.  Pulmonology consulted for lung mass.      Past Medical History:   Diagnosis Date    Cataract     Chondromalacia, knee, right  10/28/2022    Diabetes mellitus     Glaucoma     Hypertension     Other ascites 11/29/2022       Past Surgical History:   Procedure Laterality Date    CATARACT EXTRACTION W/  INTRAOCULAR LENS IMPLANT Left 12/21/2021    Procedure: EXTRACTION, CATARACT, WITH IOL INSERTION;  Surgeon: Shay Chou MD;  Location: [facility];  Service: Ophthalmology;  Laterality: Left;    ESOPHAGOGASTRODUODENOSCOPY N/A 6/26/2023    Procedure: ESOPHAGOGASTRODUODENOSCOPY (EGD);  Surgeon: Edgar Branch MD;  Location: [address]);  Service: Endoscopy;  Laterality: N/A;  referral Dr Peraza-cirrhosis/labs done on 4/24/23-instr portal-GT       Review of patient's allergies indicates:  No Known Allergies    Family History       Problem Relation (Age of Onset)    Blindness Cousin    Cataracts Mother    Colon cancer Sister    Diabetes Mother    Glaucoma Mother    Heart attack Father    Hypertension Mother          Tobacco Use    Smoking status: Former    Smokeless tobacco: Never   Substance and Sexual Activity    Alcohol use: Not Currently    Drug use: Never    Sexual activity: Not on file         Review of Systems   Constitutional:  Negative for activity change, appetite change and unexpected weight change.   Respiratory:  Negative for cough and shortness of breath.         Hemoptysis    Cardiovascular:  Negative for chest pain and leg swelling.   Gastrointestinal:  Negative for abdominal pain, diarrhea, nausea and vomiting.   Musculoskeletal:  Positive for arthralgias.   Psychiatric/Behavioral:  Negative for agitation and behavioral problems.      Objective:     Vital Signs (Most Recent):  Temp: 98.4 °F (36.9 °C) (06/21/24 0718)  Pulse: 67 (06/21/24 0718)  Resp: 15 (06/21/24 0718)  BP: 137/60 (06/21/24 0718)  SpO2: 95 % (06/21/24 0718) Vital Signs (24h Range):  Temp:  [98.1 °F (36.7 °C)-98.4 °F (36.9 °C)] 98.4 °F (36.9 °C)  Pulse:  [64-85] 67  Resp:  [14-16] 15  SpO2:  [95 %-98 %] 95 %  BP: (122-154)/(54-82) 137/60     Weight:  54.4 kg (120 lb)  Body mass index is 19.97 kg/m².    No intake or output data in the 24 hours ending 06/21/24 0858     Physical Exam  Constitutional:       General: She is not in acute distress.     Appearance: Normal appearance. She is not ill-appearing.      Comments: Very pleasant,elderly lady   HENT:      Head: Normocephalic and atraumatic.      Mouth/Throat:      Mouth: Mucous membranes are moist.   Eyes:      General: No scleral icterus.  Cardiovascular:      Rate and Rhythm: Normal rate and regular rhythm.      Pulses: Normal pulses.      Heart sounds: Normal heart sounds.   Pulmonary:      Effort: Pulmonary effort is normal. No respiratory distress.      Breath sounds: Normal breath sounds. No wheezing or rales.   Abdominal:      General: Abdomen is flat. Bowel sounds are normal. There is no distension.      Palpations: Abdomen is soft.      Tenderness: There is no abdominal tenderness. There is no guarding.   Musculoskeletal:      Right lower leg: No edema.      Left lower leg: No edema.   Skin:     General: Skin is warm and dry.   Neurological:      General: No focal deficit present.      Mental Status: She is alert and oriented to person, place, and time.   Psychiatric:         Mood and Affect: Mood normal.         Behavior: Behavior normal.          Vents:       Lines/Drains/Airways       Peripheral Intravenous Line  Duration                  Peripheral IV - Single Lumen 06/20/24 1801 20 G Left Antecubital <1 day                    Significant Labs:    CBC/Anemia Profile:  Recent Labs   Lab 06/20/24  1801 06/21/24  0320   WBC 8.82 10.31   HGB 11.3* 12.0   HCT 34.5* 35.6*    243   MCV 97 93   RDW 13.1 13.1        Chemistries:  Recent Labs   Lab 06/20/24  1801 06/21/24  0320    135*   K 4.3 4.0    107   CO2 22* 19*   BUN 19 16   CREATININE 0.8 0.8   CALCIUM 9.2 9.1   ALBUMIN 2.8* 2.7*   PROT 7.4 7.3   BILITOT 0.5 0.7   ALKPHOS 47* 55   ALT 15 15   AST 22 25   MG 1.7  --      Significant  Imaging:   I have reviewed all pertinent imaging results/findings within the past 24 hours.  Assessment/Plan:     Pulmonary  * Lung mass  83-year-old female medical history of diabetes mellitus, hypertension, and autoimmune hepatitis (on prednisone, previously imuran discontinued due to thrombocytopenia) who presented to the emergency department  for hemoptysis. CTA from 6/20 reveals a right hilar/pulmonary mass, encasing several venous structures and airways. Scattered pulmonary nodules are noted elsewhere, malignancy remains a concern given hilar mass. Pulmonology consulted for lung mass. No findings suggestive of pulmonary embolism. No blood thinners, NSAIDs, including aspirin.     - No need for urgent EBUS inpatient  - can allow her to have diet  - ok to discharge for pulmonology standpoint  - Will message pulm coordinator to get patient in to do EBUS and biopsy with Dr. Wilkinson - aiming for July 5 to allow for patient to go to her trip to [facility].  - Discussed with patient and she is willing to undergo biopsy and consider potential chemo/radiation if pathology is concerning for malignancy.               Discussed with Dr. Wilkinson  Thank you for your consult. I will sign off. Please contact us if you have any additional questions.     Gwen Farrar MD  Pulmonology  [facility] - Emergency Dept

## 2024-06-21 NOTE — SUBJECTIVE & OBJECTIVE
Interval HPI:   No acute events overnight. Patient in room ED 29/29. Blood glucose stable. BG at goal on current insulin regimen (Home Insulin Pump). Steroid use- Prednisone 3.5 mg QD.      Renal function- Normal   Vasopressors-  None     Diet NPO     Eating:   NPO  Nausea: No  Hypoglycemia and intervention: No  Fever: No  TPN and/or TF: No    PMH, PSH, FH, SH updated and reviewed     ROS:  Review of Systems   Constitutional:  Negative for unexpected weight change.   Gastrointestinal:  Negative for constipation, diarrhea, nausea and vomiting.   Endocrine: Negative for polydipsia and polyuria.       Current Medications and/or Treatments Impacting Glycemic Control  Immunotherapy:    Immunosuppressants       None          Steroids:   Hormones (From admission, onward)      Start     Stop Route Frequency Ordered    06/22/24 0900  predniSONE tablet 3.5 mg         -- Oral Daily 06/21/24 0936          Pressors:    Autonomic Drugs (From admission, onward)      None          Hyperglycemia/Diabetes Medications:   Antihyperglycemics (From admission, onward)      Start     Stop Route Frequency Ordered    06/21/24 0003  insulin aspart U-100 pen 0-5 Units         -- SubQ Before meals & nightly PRN 06/20/24 2303             PHYSICAL EXAMINATION:  Vitals:    06/21/24 0955   BP: 111/61   Pulse: 63   Resp: 16   Temp: 98.1 °F (36.7 °C)     Body mass index is 19.97 kg/m².     Physical Exam  Vitals reviewed.   Constitutional:       Appearance: Normal appearance.   HENT:      Head: Normocephalic.   Neurological:      General: No focal deficit present.      Mental Status: She is alert and oriented to person, place, and time.   Psychiatric:         Mood and Affect: Mood normal.         Behavior: Behavior normal.         Thought Content: Thought content normal.

## 2024-06-22 NOTE — DISCHARGE SUMMARY
[facility] - Emergency Dept  [facility] Medicine  Discharge Summary      Patient Name: Radha Feng  MRN: [id number]  BISMARK: [id number]  Patient Class: OP- Observation  Admission Date: 6/20/2024  Hospital Length of Stay: 0 days  Discharge Date and Time: 6/21/2024  2:41 PM  Attending Physician: No att. providers found   Discharging Provider: Victor Manuel Glover MD  Primary Care Provider: Leticia Lim MD  [facility] Medicine Team: [facility]  Victor Manuel Glover MD  Primary Care Team: [facility]    HPI:   83-year-old female medical history of diabetes mellitus, hypertension and autoimmune hepatitis presents emergency department  for hemoptysis. Pt states an hour before her presentation to ED she coughed blood . She is not taking any blood thinner. She denies CP, SOB, fever, cough, dizziness or headache. In ED her CTA was showing lung mass.     * No surgery found *      Hospital Course:   Pulmonology consulted.  After discussion with pulmonology, plan made with patient and her daughter to pursue outpatient lung biopsy on July 5th 2024.  Discussed plan of care with patient and daughter and given discharge precautions.  Patient's daughter comfortable managing in washing symptoms given her experience as a RN.  Patient to continue to avoid antiplatelet and NSAID usage.  Not on active anticoagulation.  Discharged with follow-up.      Patient deemed appropriate for discharge. I personally saw, examined, and evaluated patient prior to departure. Plan discussed with patient, who was agreeable and amenable; medications were discussed and reviewed, outpatient follow-up scheduled, ER precautions were given, all questions were answered to the patient's satisfaction, and Radha Feng was subsequently discharged.       Goals of Care Treatment Preferences:  Code Status: Full Code      Consults:   Consults (From admission, onward)          Status Ordering Provider     Inpatient consult to Endocrinology  Once         Provider:  (Not yet assigned)    Completed BUNNY ARIAS     Inpatient consult to Pulmonology  Once        Provider:  (Not yet assigned)    Completed LARRY CAPONE            No new Assessment & Plan notes have been filed under this hospital service since the last note was generated.  Service: Hospital Medicine    Final Active Diagnoses:    Diagnosis Date Noted POA    PRINCIPAL PROBLEM:  Lung mass [R91.8] 06/20/2024 Yes    Insulin pump in place [Z96.41] 01/29/2024 Not Applicable    Autoimmune hepatitis [K75.4] 09/28/2022 Yes     Chronic    Hypertensive heart disease with diastolic heart failure [I11.0, I50.30] 09/21/2022 Yes    Type 2 diabetes mellitus with circulatory disorder, with long-term current use of insulin [E11.59, Z79.4] 09/21/2022 Not Applicable      Problems Resolved During this Admission:       Discharged Condition: good    Disposition: Home or Self Care    Follow Up:    Patient Instructions:   No discharge procedures on file.    Significant Diagnostic Studies: Labs: All labs within the past 24 hours have been reviewed    Pending Diagnostic Studies:       None           Medications:  Reconciled Home Medications:      Medication List        ASK your doctor about these medications      acetaminophen 500 MG tablet  Commonly known as: TYLENOL  Take 2 tablets (1,000 mg total) by mouth every 8 (eight) hours as needed for Pain.     amLODIPine 2.5 MG tablet  Commonly known as: NORVASC  TAKE 1 TABLET BY MOUTH EVERY DAY     BD INSULIN SYRINGE ULTRA-FINE 1 mL 31 gauge x 5/16 Syrg  Generic drug: insulin syringe-needle U-100     brimonidine 0.2% 0.2 % Drop  Commonly known as: ALPHAGAN  Place 1 drop into both eyes 3 (three) times daily.     cholecalciferol (vitamin D3) 25 mcg (1,000 unit) capsule  Commonly known as: VITAMIN D3  Take 2 capsules (2,000 Units total) by mouth once daily.     DEXCOM G6  Misc  Generic drug: blood-glucose meter,continuous  Check blood sugar before meals and at bedtime    "  DEXCOM G6 SENSOR Amanda  Generic drug: blood-glucose sensor  3 each by Misc.(Non-Drug; Combo Route) route every 10 days.     DEXCOM G6 TRANSMITTER Amanda  Generic drug: blood-glucose transmitter  1 each by Misc.(Non-Drug; Combo Route) route every 3 (three) months.     dorzolamide-timolol 2-0.5% 22.3-6.8 mg/mL ophthalmic solution  Commonly known as: COSOPT  INSTILL 1 DROP INTO RIGHT EYE TWICE A DAY     estradioL 0.01 % (0.1 mg/gram) vaginal cream  Commonly known as: ESTRACE  Place 0.5 g vaginally twice a week. Apply to the vagina daily for two weeks then twice a week.     furosemide 40 MG tablet  Commonly known as: LASIX  TAKE 1 TABLET BY MOUTH EVERY DAY     insulin aspart U-100 100 unit/mL injection  Commonly known as: NovoLOG U-100 Insulin aspart  TO USE WITH OMNIPOD 5. TOTAL DAILY DOSE UP TO 40 UNITS     insulin detemir U-100 (Levemir) 100 unit/mL (3 mL) Inpn pen  Inject 5 Units into the skin every evening.     lactulose 10 gram packet  Commonly known as: CEPHULAC  Take 10 g by mouth 3 (three) times daily.     latanoprost 0.005 % ophthalmic solution  PLACE 1 DROP INTO BOTH EYES ONCE DAILY     lisinopriL-hydrochlorothiazide 20-12.5 mg per tablet  Commonly known as: PRINZIDE,ZESTORETIC  Take 1 tablet by mouth once daily.     OMNIPOD 5 G6 PODS (GEN 5) Crtg  Generic drug: insulin pump cart,automated,BT  Inject 1 each into the skin Every 3 (three) days.     pen needle, diabetic 31 gauge x 5/16" Ndle  Use to inject insulin into the skin up to 4 times daily     predniSONE 5 MG tablet  Commonly known as: DELTASONE  Take 0.5 tablets (2.5 mg total) by mouth once daily.              Indwelling Lines/Drains at time of discharge:   Lines/Drains/Airways       None                   Time spent on the discharge of patient: 35 minutes         Victor Manuel Glover MD  Department of Hospital Medicine  [facility] - Emergency Dept  "

## 2024-06-22 NOTE — HOSPITAL COURSE
Pulmonology consulted.  After discussion with pulmonology, plan made with patient and her daughter to pursue outpatient lung biopsy on July 5th 2024.  Discussed plan of care with patient and daughter and given discharge precautions.  Patient's daughter comfortable managing in washing symptoms given her experience as a RN.  Patient to continue to avoid antiplatelet and NSAID usage.  Not on active anticoagulation.  Discharged with follow-up.      Patient deemed appropriate for discharge. I personally saw, examined, and evaluated patient prior to departure. Plan discussed with patient, who was agreeable and amenable; medications were discussed and reviewed, outpatient follow-up scheduled, ER precautions were given, all questions were answered to the patient's satisfaction, and Radha Feng was subsequently discharged.

## 2024-06-24 ENCOUNTER — PATIENT OUTREACH (OUTPATIENT)
Dept: EMERGENCY MEDICINE | Facility: HOSPITAL | Age: 84
End: 2024-06-24
Payer: MEDICARE

## 2024-06-24 NOTE — PROGRESS NOTES
Call placed per ED Navigator to f/u from last encounter. No answer. V/m left. ED navigator will follow-up with patient to assist.

## 2024-06-25 ENCOUNTER — PATIENT OUTREACH (OUTPATIENT)
Dept: ADMINISTRATIVE | Facility: CLINIC | Age: 84
End: 2024-06-25
Payer: MEDICARE

## 2024-06-26 NOTE — PROGRESS NOTES
HPI     Glaucoma            Comments: 4 month IOP and DFE today and pt states no changes since last   exam           Comments    DLS: 3/7/24    1. POAG OD>OS  2. NS OD  3. PCIOL OS    MEDS:  Dorzolamide BID OD  Brimonidine TID OU  Latanoprost QDAY OU          Last edited by Marce Howard MA on 7/15/2024  3:37 PM.            Assessment /Plan     For exam results, see Encounter Report.    Primary open angle glaucoma (POAG) of right eye, severe stage    Primary open-angle glaucoma, left eye, mild stage    Nuclear sclerosis, right    Pseudophakia of left eye        Old Patient of dr Lawson   Last seen by Dr belcher - last seen at [facility] with Dr belcher 2/12/2024         1.   Glaucoma (type and duration)    x years // severe od and moderate os    First HVF   2021   First photos  7/2024    Treatment / Drops started   ? Years ago            Family history    ?        Glaucoma meds    latanoprost ou q am / brimonidine tid ou / cosopt  bid od         H/O adverse rxn to glaucoma drops    ?         LASERS    ?        GLAUCOMA SURGERIES    none        OTHER EYE SURGERIES    PC IOL os - [facility]         CDR   0.9-0.95 / 0.8-0.85        Tbase    ?          Tmax    28/33            Ttarget    ?             HVF    3 test 2021 to  2024 - nearly extinguished  od // ? IAD / SNS  os        Gonio    +3 ou        CCT    579/608        OCT    3 test 2021 to 2024 - RNFL - dec thru out  od // dec G/N/NI/TI, bord TS (?prog os)  os        Disc photos    7/2024    - Ttoday    17/16  - Test done today    IOP / DFE / photos       ? APD OD  -    Recheck     NS od    No real reason to remove - poor prognosis 2/2 ON and VF loss     PC IOL os    Guillmet  12/221/2023     PLAN  CSM   If needed can add cosopt os // if needed can do slt's   F/U 4 months with IOP // gonio   Pts daughter prefers Monday appointment

## 2024-07-02 ENCOUNTER — TELEPHONE (OUTPATIENT)
Dept: PULMONOLOGY | Facility: CLINIC | Age: 84
End: 2024-07-02
Payer: MEDICARE

## 2024-07-02 NOTE — TELEPHONE ENCOUNTER
Phone call to patient to confirm prior appointment instructions for EBUS/Robotic procedure, no prior instructions were given to patient per patient.    Patient instructed by phone to arrive on the 2nd floor [facility] check in desk on 7/5/24 at 0900am with a designated .  Patient is to be NPO after midnight the night prior to scheduled procedure.  Patient not currently taking any GLP1 or antiplatelet/anticoags.  Patient verbalizes understanding of all above instructions given.

## 2024-07-02 NOTE — TELEPHONE ENCOUNTER
----- Message from Rolo Wilkinson MD sent at 6/28/2024  2:16 PM CDT -----  Regarding: RE:  Yes  ----- Message -----  From: No Crandall MA  Sent: 6/28/2024  10:01 AM CDT  To: Rolo Wilkinson MD    Is the CTA from 6/20/24 good?  ----- Message -----  From: Rolo Wilkinson MD  Sent: 6/21/2024  10:14 AM CDT  To: DMITRIY Mckinney,     I saw her in hospital. Scheduled for robotic + EBUS on 7/5.   CT is good.   Can you please call her daughter: Della Feng [phone].. To provide instructions     Thanks,   DV   Pt returning call on labs, notified.

## 2024-07-02 NOTE — TELEPHONE ENCOUNTER
I spoke with patient's daughter, Della to go over robotic bronchoscopy instructions for 7/5/24 arrival to [facility] for 9am with Dr Wilkinson. NPO after midnight. Della confirmed and verbalized understanding.

## 2024-07-04 ENCOUNTER — ANESTHESIA EVENT (OUTPATIENT)
Dept: SURGERY | Facility: HOSPITAL | Age: 84
End: 2024-07-04
Payer: MEDICARE

## 2024-07-04 NOTE — ANESTHESIA PREPROCEDURE EVALUATION
[facility]  Anesthesia Pre-Operative Evaluation         Patient Name: Radha Feng  YOB: 1940  MRN: [id number]    SUBJECTIVE:     Pre-operative evaluation for Procedure(s) (LRB):  ROBOTIC BRONCHOSCOPY (N/A)     07/04/2024    Radha Feng is a 83 y.o. female w/ a significant PMHx of autoimmune hepatitis, PVD, HTN, DM2, emphysema, and newly found lung mass.    Patient now presents for the above procedure(s).      TTE: 9/22/22  The estimated ejection fraction is 65%.  The left ventricle is normal in size with normal systolic function.  Grade II left ventricular diastolic dysfunction.  Normal right ventricular size with normal right ventricular systolic function.  Moderate left atrial enlargement.  There is bileaflet mitral prolapse. Eccentric anteriorly directed MR.  Moderate mitral regurgitation. ERO=0.2 cm2,  Mild tricuspid regurgitation. No prior studies available for comparison.  The estimated PA systolic pressure is 36 mmHg.  Normal central venous pressure (3 mmHg).    LDA:  None documented     Prev airway: None documented.    Drips: None documented.    Patient Active Problem List   Diagnosis    Nuclear sclerotic cataract of left eye    PVD (peripheral vascular disease)    Idiopathic hypotension    Lethargy    SOB (shortness of breath)    Leg swelling    Acute liver failure without hepatic coma    Hypertensive heart disease with diastolic heart failure    Type 2 diabetes mellitus with circulatory disorder, with long-term current use of insulin    Oral phase dysphagia    Autoimmune hepatitis    Chondromalacia, knee, right    Aortic atherosclerosis    Centrilobular emphysema    Coronary atherosclerosis due to calcified coronary lesion    Cirrhosis of liver with ascites    Leg weakness, bilateral    Decreased mobility and endurance    Mixed urge and stress incontinence    Age-related osteoporosis without current pathological fracture    Vitamin D deficiency    Uterovaginal  prolapse, complete    Urinary urgency    Pelvic floor weakness in female    Adverse effect of adrenal cortical steroids    Insulin pump in place    Lung mass       Review of patient's allergies indicates:  No Known Allergies    Current Outpatient Medications:  No current facility-administered medications for this encounter.    Current Outpatient Medications:     acetaminophen (TYLENOL) 500 MG tablet, Take 2 tablets (1,000 mg total) by mouth every 8 (eight) hours as needed for Pain. (Patient not taking: Reported on 5/13/2024), Disp: , Rfl: 0    amLODIPine (NORVASC) 2.5 MG tablet, TAKE 1 TABLET BY MOUTH EVERY DAY, Disp: 90 tablet, Rfl: 2    BD INSULIN SYRINGE ULTRA-FINE 1 mL 31 gauge x 5/16 Syrg, , Disp: , Rfl:     blood-glucose meter,continuous (DEXCOM G6 ) Misc, Check blood sugar before meals and at bedtime, Disp: 1 each, Rfl: PRN    blood-glucose sensor (DEXCOM G6 SENSOR) Amanda, 3 each by Misc.(Non-Drug; Combo Route) route every 10 days., Disp: 3 each, Rfl: 11    blood-glucose transmitter (DEXCOM G6 TRANSMITTER) Amanda, 1 each by Misc.(Non-Drug; Combo Route) route every 3 (three) months., Disp: 1 each, Rfl: 3    brimonidine 0.2% (ALPHAGAN) 0.2 % Drop, Place 1 drop into both eyes 3 (three) times daily., Disp: 30 mL, Rfl: 3    cholecalciferol, vitamin D3, (VITAMIN D3) 25 mcg (1,000 unit) capsule, Take 2 capsules (2,000 Units total) by mouth once daily., Disp: , Rfl: 0    dorzolamide-timolol 2-0.5% (COSOPT) 22.3-6.8 mg/mL ophthalmic solution, INSTILL 1 DROP INTO RIGHT EYE TWICE A DAY, Disp: 10 mL, Rfl: 11    estradioL (ESTRACE) 0.01 % (0.1 mg/gram) vaginal cream, Place 0.5 g vaginally twice a week. Apply to the vagina daily for two weeks then twice a week., Disp: 45 g, Rfl: 4    furosemide (LASIX) 40 MG tablet, TAKE 1 TABLET BY MOUTH EVERY DAY, Disp: 90 tablet, Rfl: 1    insulin aspart U-100 (NOVOLOG U-100 INSULIN ASPART) 100 unit/mL injection, TO USE WITH OMNIPOD 5. TOTAL DAILY DOSE UP TO 40 UNITS, Disp: 40 mL,  "Rfl: 4    insulin detemir U-100 (LEVEMIR FLEXTOUCH) 100 unit/mL (3 mL) SubQ InPn pen, Inject 5 Units into the skin every evening., Disp: 6 mL, Rfl: 1    insulin pump cart,automated,BT (OMNIPOD 5 G6 PODS, GEN 5,) Crtg, Inject 1 each into the skin Every 3 (three) days., Disp: 10 each, Rfl: 11    lactulose (CEPHULAC) 10 gram packet, Take 10 g by mouth 3 (three) times daily., Disp: , Rfl:     latanoprost 0.005 % ophthalmic solution, PLACE 1 DROP INTO BOTH EYES ONCE DAILY, Disp: 7.5 mL, Rfl: 3    lisinopriL-hydrochlorothiazide (PRINZIDE,ZESTORETIC) 20-12.5 mg per tablet, Take 1 tablet by mouth once daily., Disp: 90 tablet, Rfl: 1    pen needle, diabetic 31 gauge x 5/16" Ndle, Use to inject insulin into the skin up to 4 times daily, Disp: 400 each, Rfl: 3    predniSONE (DELTASONE) 5 MG tablet, Take 0.5 tablets (2.5 mg total) by mouth once daily., Disp: 15 tablet, Rfl: 3    Past Surgical History:   Procedure Laterality Date    CATARACT EXTRACTION W/  INTRAOCULAR LENS IMPLANT Left 12/21/2021    Procedure: EXTRACTION, CATARACT, WITH IOL INSERTION;  Surgeon: Shay Chou MD;  Location: [facility];  Service: Ophthalmology;  Laterality: Left;    ESOPHAGOGASTRODUODENOSCOPY N/A 6/26/2023    Procedure: ESOPHAGOGASTRODUODENOSCOPY (EGD);  Surgeon: Edgar Branch MD;  Location: [address]);  Service: Endoscopy;  Laterality: N/A;  referral Dr Peraza-cirrhosis/labs done on 4/24/23-instr portal-GT       Social History     Socioeconomic History    Marital status: Single   Tobacco Use    Smoking status: Former    Smokeless tobacco: Never   Substance and Sexual Activity    Alcohol use: Not Currently    Drug use: Never   Social History Narrative    , one daughter local, four sons out of state. Retired . Lives with daughter Joss.     Social Determinants of Health     Financial Resource Strain: Low Risk  (6/21/2024)    Overall Financial Resource Strain (CARDIA)     Difficulty of Paying Living Expenses: Not " hard at all   Food Insecurity: No Food Insecurity (6/21/2024)    Hunger Vital Sign     Worried About Running Out of Food in the Last Year: Never true     Ran Out of Food in the Last Year: Never true   Transportation Needs: No Transportation Needs (6/21/2024)    TRANSPORTATION NEEDS     Transportation : No   Physical Activity: Inactive (6/21/2024)    Exercise Vital Sign     Days of Exercise per Week: 1 day     Minutes of Exercise per Session: 0 min   Stress: No Stress Concern Present (6/21/2024)    [facility]ational [facility] - Occupational Stress Questionnaire     Feeling of Stress : Only a little   Recent Concern: Stress - Stress Concern Present (6/21/2024)    [facility] of Occupational Health - Occupational Stress Questionnaire     Feeling of Stress : To some extent   Housing Stability: Low Risk  (6/21/2024)    Housing Stability Vital Sign     Unable to Pay for Housing in the Last Year: No     Homeless in the Last Year: No       OBJECTIVE:     Vital Signs Range (Last 24H):         Significant Labs:  Lab Results   Component Value Date    WBC 10.31 06/21/2024    HGB 12.0 06/21/2024    HCT 35.6 (L) 06/21/2024     06/21/2024    CHOL 195 01/22/2024    TRIG 89 01/22/2024    HDL 54 01/22/2024    ALT 15 06/21/2024    AST 25 06/21/2024     (L) 06/21/2024    K 4.0 06/21/2024     06/21/2024    CREATININE 0.8 06/21/2024    BUN 16 06/21/2024    CO2 19 (L) 06/21/2024    TSH 0.740 09/21/2022    INR 1.0 06/20/2024    HGBA1C 6.0 (H) 06/21/2024       Diagnostic Studies: No relevant studies.    EKG:   Results for orders placed or performed during the hospital encounter of 06/20/24   EKG 12-lead    Collection Time: 06/20/24  5:14 PM   Result Value Ref Range    QRS Duration 72 ms    [facility] QTC Calculation 457 ms    Narrative    Test Reason : R05.9,    Vent. Rate : 089 BPM     Atrial Rate : 089 BPM     P-R Int : 150 ms          QRS Dur : 072 ms      QT Int : 376 ms       P-R-T Axes : 051 033 061  "degrees     QTc Int : 457 ms    Sinus rhythm with Premature atrial complexes in a pattern of bigeminy  Anteroseptal infarct (cited on or before 20-SEP-2022)  Abnormal ECG  When compared with ECG of 03-NOV-2022 15:04,  Premature atrial complexes are now Present  Confirmed by Nancy Schmid MD ([id number]) on 6/21/2024 4:51:01 PM    Referred By: [email or web address]   SELF           Confirmed By:Nancy Schmid MD       2D ECHO:  TTE:  Results for orders placed or performed during the hospital encounter of 09/20/22   Echo   Result Value Ref Range    BSA 1.54 m2    TDI SEPTAL 0.05 m/s    LV LATERAL E/E' RATIO 12.88 m/s    LV SEPTAL E/E' RATIO 20.60 m/s    LA WIDTH 4.01 cm    TDI LATERAL 0.08 m/s    LVIDd 4.08 3.5 - 6.0 cm    IVS 0.83 0.6 - 1.1 cm    Posterior Wall 0.85 0.6 - 1.1 cm    LVIDs 2.94 2.1 - 4.0 cm    FS 28 28 - 44 %    LA volume 71.68 cm3    Sinus 2.98 cm    STJ 2.69 cm    Ascending aorta 2.90 cm    LV mass 103.09 g    LA size 3.94 cm    RVDD 2.92 cm    TAPSE 1.55 cm    Left Ventricle Relative Wall Thickness 0.42 cm    AV mean gradient 5 mmHg    AV valve area 2.86 cm2    AV Velocity Ratio 0.66     AV index (prosthetic) 0.91     MV valve area p 1/2 method 3.43 cm2    E/A ratio 1.07     Mean e' 0.07 m/s    E wave deceleration time 221.19 msec    MV "A" wave duration 14.46 msec    Pulm vein S/D ratio 0.84     LVOT diameter 2.00 cm    LVOT area 3.1 cm2    LVOT peak bull 1.07 m/s    LVOT peak VTI 25.18 cm    Ao peak bull 1.63 m/s    Ao VTI 27.68 cm    Mr max bull 6.3 m/s    LVOT stroke volume 79.07 cm3    AV peak gradient 11 mmHg    E/E' ratio 15.85 m/s    MV Peak E Bull 1.03 m/s    TR Max Bull 2.87 m/s    MV stenosis pressure 1/2 time 64.15 ms    MV Peak A Bull 0.96 m/s    PV Peak S Bull 0.54 m/s    PV Peak D Bull 0.64 m/s    LV Systolic Volume 33.24 mL    LV Systolic Volume Index 21.3 mL/m2    LV Diastolic Volume 73.38 mL    LV Diastolic Volume Index 47.04 mL/m2    LA Volume Index 45.9 mL/m2    LV Mass Index 66 g/m2    RA Major Axis 3.89 " cm    Left Atrium Minor Axis 5.46 cm    Left Atrium Major Axis 5.22 cm    Triscuspid Valve Regurgitation Peak Gradient 33 mmHg    LA Volume Index (Mod) 30.3 mL/m2    LA volume (mod) 47.33 cm3    RA Width 2.14 cm    Right Atrial Pressure (from IVC) 3 mmHg    EF 65 %    Vn Nyquist 0.35 m/s    Radius 0.9 cm    TV resting pulmonary artery pressure 36 mmHg    MR PISA EROA 0.28 cm2    Narrative    · The estimated ejection fraction is 65%.  · The left ventricle is normal in size with normal systolic function.  · Grade II left ventricular diastolic dysfunction.  · Normal right ventricular size with normal right ventricular systolic   function.  · Moderate left atrial enlargement.  · There is bileaflet mitral prolapse. Eccentric anteriorly directed MR.  · Moderate mitral regurgitation. ERO=0.2 cm2,  · Mild tricuspid regurgitation. No prior studies available for comparison.  · The estimated PA systolic pressure is 36 mmHg.  · Normal central venous pressure (3 mmHg).          PHILL:  No results found for this or any previous visit.    ASSESSMENT/PLAN:                                                                                                                  07/04/2024  Radha Feng is a 83 y.o., female.      Pre-op Assessment    I have reviewed the Patient Summary Reports.     I have reviewed the Nursing Notes. I have reviewed the NPO Status.   I have reviewed the Medications.     Review of Systems  Anesthesia Hx:  No problems with previous Anesthesia   History of prior surgery of interest to airway management or planning:          Denies Family Hx of Anesthesia complications.    Denies Personal Hx of Anesthesia complications.                    Cardiovascular:  Exercise tolerance: poor   Hypertension Valvular problems/Murmurs, MR  CAD    Dysrhythmias (Hx of atrial flutter)   CHF   PVD                              Pulmonary:   COPD   Shortness of breath                  Hepatic/GI:      Denies GERD. Liver Disease,  Hepatitis (Autoimmune)           Musculoskeletal:  Arthritis               Neurological:  Neurology Normal                                      Endocrine:  Diabetes, type 2         Denies Obesity / BMI > 30      Physical Exam  General: Well nourished, Cooperative and Alert    Airway:  Mallampati: II   Mouth Opening: Normal  TM Distance: Normal  Tongue: Normal  Neck ROM: Normal ROM    Dental:  Edentulous    Chest/Lungs:  Normal Respiratory Rate    Heart:  Rate: Normal        Anesthesia Plan  Type of Anesthesia, risks & benefits discussed:    Anesthesia Type: Gen ETT  Intra-op Monitoring Plan: Standard ASA Monitors  Post Op Pain Control Plan: multimodal analgesia and IV/PO Opioids PRN  Induction:  IV  Airway Plan: Direct, Post-Induction  Informed Consent: Informed consent signed with the Patient and all parties understand the risks and agree with anesthesia plan.  All questions answered.   ASA Score: 3  Day of Surgery Review of History & Physical: H&P Update referred to the surgeon/provider.    Ready For Surgery From Anesthesia Perspective.     .

## 2024-07-05 ENCOUNTER — HOSPITAL ENCOUNTER (OUTPATIENT)
Facility: HOSPITAL | Age: 84
Discharge: HOME | End: 2024-07-05
Attending: EMERGENCY MEDICINE | Admitting: EMERGENCY MEDICINE
Payer: MEDICARE

## 2024-07-05 ENCOUNTER — ANESTHESIA (OUTPATIENT)
Dept: SURGERY | Facility: HOSPITAL | Age: 84
End: 2024-07-05
Payer: MEDICARE

## 2024-07-05 VITALS
WEIGHT: 119.94 LBS | OXYGEN SATURATION: 95 % | TEMPERATURE: 97 F | SYSTOLIC BLOOD PRESSURE: 148 MMHG | HEART RATE: 60 BPM | HEIGHT: 65 IN | DIASTOLIC BLOOD PRESSURE: 72 MMHG | BODY MASS INDEX: 19.98 KG/M2 | RESPIRATION RATE: 18 BRPM

## 2024-07-05 DIAGNOSIS — R91.8 LUNG MASS: ICD-10-CM

## 2024-07-05 PROCEDURE — 36000704 HC OR TIME LEV I 1ST 15 MIN: Performed by: EMERGENCY MEDICINE

## 2024-07-05 PROCEDURE — 31629 BRONCHOSCOPY/NEEDLE BX EACH: CPT | Mod: 51,,, | Performed by: EMERGENCY MEDICINE

## 2024-07-05 PROCEDURE — 27201423 OPTIME MED/SURG SUP & DEVICES STERILE SUPPLY: Performed by: EMERGENCY MEDICINE

## 2024-07-05 PROCEDURE — 25000003 PHARM REV CODE 250

## 2024-07-05 PROCEDURE — 71000015 HC POSTOP RECOV 1ST HR: Performed by: EMERGENCY MEDICINE

## 2024-07-05 PROCEDURE — 31653 BRONCH EBUS SAMPLNG 3/> NODE: CPT | Mod: ,,, | Performed by: EMERGENCY MEDICINE

## 2024-07-05 PROCEDURE — C1726 CATH, BAL DIL, NON-VASCULAR: HCPCS | Performed by: EMERGENCY MEDICINE

## 2024-07-05 PROCEDURE — 31654 BRONCH EBUS IVNTJ PERPH LES: CPT | Mod: ,,, | Performed by: EMERGENCY MEDICINE

## 2024-07-05 PROCEDURE — 37000008 HC ANESTHESIA 1ST 15 MINUTES: Performed by: EMERGENCY MEDICINE

## 2024-07-05 PROCEDURE — 37000009 HC ANESTHESIA EA ADD 15 MINS: Performed by: EMERGENCY MEDICINE

## 2024-07-05 PROCEDURE — 82962 GLUCOSE BLOOD TEST: CPT | Performed by: EMERGENCY MEDICINE

## 2024-07-05 PROCEDURE — 71000044 HC DOSC ROUTINE RECOVERY FIRST HOUR: Performed by: EMERGENCY MEDICINE

## 2024-07-05 PROCEDURE — 63600175 PHARM REV CODE 636 W HCPCS

## 2024-07-05 PROCEDURE — 25000003 PHARM REV CODE 250: Performed by: EMERGENCY MEDICINE

## 2024-07-05 PROCEDURE — 36000705 HC OR TIME LEV I EA ADD 15 MIN: Performed by: EMERGENCY MEDICINE

## 2024-07-05 RX ORDER — PHENYLEPHRINE HCL IN 0.9% NACL 1 MG/10 ML
SYRINGE (ML) INTRAVENOUS
Status: DISCONTINUED | OUTPATIENT
Start: 2024-07-05 | End: 2024-07-05

## 2024-07-05 RX ORDER — LIDOCAINE HYDROCHLORIDE 10 MG/ML
INJECTION, SOLUTION EPIDURAL; INFILTRATION; INTRACAUDAL; PERINEURAL
Status: DISCONTINUED | OUTPATIENT
Start: 2024-07-05 | End: 2024-07-05 | Stop reason: HOSPADM

## 2024-07-05 RX ORDER — LIDOCAINE HYDROCHLORIDE 20 MG/ML
INJECTION, SOLUTION EPIDURAL; INFILTRATION; INTRACAUDAL; PERINEURAL
Status: DISCONTINUED | OUTPATIENT
Start: 2024-07-05 | End: 2024-07-05

## 2024-07-05 RX ORDER — FENTANYL CITRATE 50 UG/ML
INJECTION, SOLUTION INTRAMUSCULAR; INTRAVENOUS
Status: DISCONTINUED | OUTPATIENT
Start: 2024-07-05 | End: 2024-07-05

## 2024-07-05 RX ORDER — ACETAMINOPHEN 500 MG
1000 TABLET ORAL ONCE
Status: COMPLETED | OUTPATIENT
Start: 2024-07-05 | End: 2024-07-05

## 2024-07-05 RX ORDER — HALOPERIDOL 5 MG/ML
0.5 INJECTION INTRAMUSCULAR EVERY 10 MIN PRN
Status: DISCONTINUED | OUTPATIENT
Start: 2024-07-05 | End: 2024-07-05 | Stop reason: HOSPADM

## 2024-07-05 RX ORDER — PROPOFOL 10 MG/ML
INJECTION, EMULSION INTRAVENOUS CONTINUOUS PRN
Status: DISCONTINUED | OUTPATIENT
Start: 2024-07-05 | End: 2024-07-05

## 2024-07-05 RX ORDER — ROCURONIUM BROMIDE 10 MG/ML
INJECTION, SOLUTION INTRAVENOUS
Status: DISCONTINUED | OUTPATIENT
Start: 2024-07-05 | End: 2024-07-05

## 2024-07-05 RX ORDER — SODIUM CHLORIDE 0.9 % (FLUSH) 0.9 %
10 SYRINGE (ML) INJECTION
Status: DISCONTINUED | OUTPATIENT
Start: 2024-07-05 | End: 2024-07-05 | Stop reason: HOSPADM

## 2024-07-05 RX ORDER — OXYCODONE HYDROCHLORIDE 5 MG/1
5 TABLET ORAL
Status: DISCONTINUED | OUTPATIENT
Start: 2024-07-05 | End: 2024-07-05 | Stop reason: HOSPADM

## 2024-07-05 RX ORDER — DEXAMETHASONE SODIUM PHOSPHATE 4 MG/ML
INJECTION, SOLUTION INTRA-ARTICULAR; INTRALESIONAL; INTRAMUSCULAR; INTRAVENOUS; SOFT TISSUE
Status: DISCONTINUED | OUTPATIENT
Start: 2024-07-05 | End: 2024-07-05

## 2024-07-05 RX ORDER — ONDANSETRON HYDROCHLORIDE 2 MG/ML
INJECTION, SOLUTION INTRAVENOUS
Status: DISCONTINUED | OUTPATIENT
Start: 2024-07-05 | End: 2024-07-05

## 2024-07-05 RX ORDER — PHENYLEPHRINE HYDROCHLORIDE 10 MG/ML
INJECTION INTRAVENOUS
Status: DISCONTINUED | OUTPATIENT
Start: 2024-07-05 | End: 2024-07-05

## 2024-07-05 RX ORDER — PROPOFOL 10 MG/ML
VIAL (ML) INTRAVENOUS
Status: DISCONTINUED | OUTPATIENT
Start: 2024-07-05 | End: 2024-07-05

## 2024-07-05 RX ADMIN — ROCURONIUM BROMIDE 40 MG: 10 INJECTION, SOLUTION INTRAVENOUS at 10:07

## 2024-07-05 RX ADMIN — PROPOFOL 100 MCG/KG/MIN: 10 INJECTION, EMULSION INTRAVENOUS at 10:07

## 2024-07-05 RX ADMIN — SUGAMMADEX 200 MG: 100 INJECTION, SOLUTION INTRAVENOUS at 11:07

## 2024-07-05 RX ADMIN — GLYCOPYRROLATE 0.2 MG: 0.2 INJECTION, SOLUTION INTRAMUSCULAR; INTRAVENOUS at 11:07

## 2024-07-05 RX ADMIN — PROPOFOL 130 MG: 10 INJECTION, EMULSION INTRAVENOUS at 10:07

## 2024-07-05 RX ADMIN — ONDANSETRON 4 MG: 2 INJECTION INTRAMUSCULAR; INTRAVENOUS at 11:07

## 2024-07-05 RX ADMIN — SODIUM CHLORIDE: 0.9 INJECTION, SOLUTION INTRAVENOUS at 09:07

## 2024-07-05 RX ADMIN — FENTANYL CITRATE 50 MCG: 50 INJECTION, SOLUTION INTRAMUSCULAR; INTRAVENOUS at 10:07

## 2024-07-05 RX ADMIN — ACETAMINOPHEN 1000 MG: 500 TABLET ORAL at 09:07

## 2024-07-05 RX ADMIN — LIDOCAINE HYDROCHLORIDE 80 MG: 20 INJECTION, SOLUTION EPIDURAL; INFILTRATION; INTRACAUDAL; PERINEURAL at 10:07

## 2024-07-05 RX ADMIN — Medication 100 MCG: at 10:07

## 2024-07-05 RX ADMIN — DEXAMETHASONE SODIUM PHOSPHATE 8 MG: 4 INJECTION, SOLUTION INTRAMUSCULAR; INTRAVENOUS at 10:07

## 2024-07-05 RX ADMIN — PHENYLEPHRINE HYDROCHLORIDE 100 MCG: 10 INJECTION INTRAVENOUS at 10:07

## 2024-07-05 NOTE — DISCHARGE SUMMARY
[facility] - [facility] (2nd Fl)  Discharge Note  Short Stay    Procedure(s) (LRB):  Endobronchial Ultrasound    EBUS Station 11L, 7, 4R, 11R, Lung Mass    OUTCOME: Patient tolerated treatment/procedure well without complication and is now ready for discharge.    DISPOSITION: Home or Self Care    FINAL DIAGNOSIS:  Lung mass    FOLLOWUP: In clinic as scheduled. Will call with results.     DISCHARGE INSTRUCTIONS:  Written and verbal discharge instructions provided.     TIME SPENT ON DISCHARGE: 25 minutes    Alejandro Parekh M.D.  [facility]

## 2024-07-05 NOTE — INTERVAL H&P NOTE
The patient has been examined and the H&P has been reviewed:    I concur with the findings and no changes have occurred since H&P was written.    Procedure risks, benefits and alternative options discussed and understood by patient/family.      No additional episodes of hemoptysis. Went to DC and no issues.   Here today for scheduled bronchoscopy +/- robotic + EBUS for RUL mass.     Discussed procedure in detail with patient and daughter. Risks including bleeding, PTX, respiratory failure, Non-diagnostic specimen, need for additional procedures and numerous additional complications up to death outlined. She verbalized understanding and in agreement to proceed. Written consent signed and on chart.     Rolo Wilkinson MD   [facility]

## 2024-07-05 NOTE — TRANSFER OF CARE
"Anesthesia Transfer of Care Note    Patient: Radha Feng    Procedure(s) Performed: Procedure(s) (LRB):  ENDOBRONCHIAL ULTRASOUND (EBUS) (N/A)    Patient location: [facility]    Anesthesia Type: general    Transport from OR: Transported from OR on 6-10 L/min O2 by face mask with adequate spontaneous ventilation    Post pain: adequate analgesia    Post assessment: no apparent anesthetic complications and tolerated procedure well    Post vital signs: stable    Level of consciousness: awake and alert    Nausea/Vomiting: no nausea/vomiting    Complications: none    Transfer of care protocol was followed      Last vitals: Visit Vitals  BP (!) 149/69   Pulse 68   Temp 36.2 °C (97.2 °F) (Skin)   Resp 16   Ht 5' 5" (1.651 m)   Wt 54.4 kg (119 lb 14.9 oz)   SpO2 100%   Breastfeeding No   BMI 19.96 kg/m²     "

## 2024-07-05 NOTE — PLAN OF CARE
Pt is AAOX4,VSS. Discharge instructions reviewed and pt verbalizes understanding. All questions answered. IV removed. No new prescriptions ordered. Pt ready for discharge. Daughter at bedside.

## 2024-07-08 ENCOUNTER — LAB VISIT (OUTPATIENT)
Dept: LAB | Facility: HOSPITAL | Age: 84
End: 2024-07-08
Attending: INTERNAL MEDICINE
Payer: MEDICARE

## 2024-07-08 DIAGNOSIS — K75.4 AUTOIMMUNE HEPATITIS: Chronic | ICD-10-CM

## 2024-07-08 LAB
AFP SERPL-MCNC: <2 NG/ML (ref 0–8.4)
ALBUMIN SERPL BCP-MCNC: 2.6 G/DL (ref 3.5–5.2)
ALP SERPL-CCNC: 52 U/L (ref 55–135)
ALT SERPL W/O P-5'-P-CCNC: 12 U/L (ref 10–44)
ANION GAP SERPL CALC-SCNC: 8 MMOL/L (ref 8–16)
AST SERPL-CCNC: 18 U/L (ref 10–40)
BASOPHILS # BLD AUTO: 0.02 K/UL (ref 0–0.2)
BASOPHILS NFR BLD: 0.2 % (ref 0–1.9)
BILIRUB DIRECT SERPL-MCNC: 0.2 MG/DL (ref 0.1–0.3)
BILIRUB SERPL-MCNC: 0.5 MG/DL (ref 0.1–1)
BUN SERPL-MCNC: 15 MG/DL (ref 8–23)
CALCIUM SERPL-MCNC: 9.4 MG/DL (ref 8.7–10.5)
CHLORIDE SERPL-SCNC: 105 MMOL/L (ref 95–110)
CO2 SERPL-SCNC: 24 MMOL/L (ref 23–29)
CREAT SERPL-MCNC: 0.7 MG/DL (ref 0.5–1.4)
DIFFERENTIAL METHOD BLD: ABNORMAL
EOSINOPHIL # BLD AUTO: 0.1 K/UL (ref 0–0.5)
EOSINOPHIL NFR BLD: 0.8 % (ref 0–8)
ERYTHROCYTE [DISTWIDTH] IN BLOOD BY AUTOMATED COUNT: 13.6 % (ref 11.5–14.5)
EST. GFR  (NO RACE VARIABLE): >60 ML/MIN/1.73 M^2
GLUCOSE SERPL-MCNC: 217 MG/DL (ref 70–110)
HCT VFR BLD AUTO: 33.4 % (ref 37–48.5)
HGB BLD-MCNC: 10.8 G/DL (ref 12–16)
IMM GRANULOCYTES # BLD AUTO: 0.05 K/UL (ref 0–0.04)
IMM GRANULOCYTES NFR BLD AUTO: 0.5 % (ref 0–0.5)
INR PPP: 1 (ref 0.8–1.2)
LYMPHOCYTES # BLD AUTO: 1.7 K/UL (ref 1–4.8)
LYMPHOCYTES NFR BLD: 16.2 % (ref 18–48)
MCH RBC QN AUTO: 30.8 PG (ref 27–31)
MCHC RBC AUTO-ENTMCNC: 32.3 G/DL (ref 32–36)
MCV RBC AUTO: 95 FL (ref 82–98)
MONOCYTES # BLD AUTO: 1.2 K/UL (ref 0.3–1)
MONOCYTES NFR BLD: 11.3 % (ref 4–15)
NEUTROPHILS # BLD AUTO: 7.5 K/UL (ref 1.8–7.7)
NEUTROPHILS NFR BLD: 71 % (ref 38–73)
NRBC BLD-RTO: 0 /100 WBC
PLATELET # BLD AUTO: 218 K/UL (ref 150–450)
PMV BLD AUTO: 10.6 FL (ref 9.2–12.9)
POTASSIUM SERPL-SCNC: 4 MMOL/L (ref 3.5–5.1)
PROT SERPL-MCNC: 6.6 G/DL (ref 6–8.4)
PROTHROMBIN TIME: 11 SEC (ref 9–12.5)
RBC # BLD AUTO: 3.51 M/UL (ref 4–5.4)
SODIUM SERPL-SCNC: 137 MMOL/L (ref 136–145)
WBC # BLD AUTO: 10.49 K/UL (ref 3.9–12.7)

## 2024-07-08 PROCEDURE — 85610 PROTHROMBIN TIME: CPT | Performed by: INTERNAL MEDICINE

## 2024-07-08 PROCEDURE — 85025 COMPLETE CBC W/AUTO DIFF WBC: CPT | Performed by: INTERNAL MEDICINE

## 2024-07-08 PROCEDURE — 82248 BILIRUBIN DIRECT: CPT | Performed by: INTERNAL MEDICINE

## 2024-07-08 PROCEDURE — 80053 COMPREHEN METABOLIC PANEL: CPT | Performed by: INTERNAL MEDICINE

## 2024-07-08 PROCEDURE — 82105 ALPHA-FETOPROTEIN SERUM: CPT | Performed by: INTERNAL MEDICINE

## 2024-07-08 PROCEDURE — 36415 COLL VENOUS BLD VENIPUNCTURE: CPT | Mod: PN | Performed by: INTERNAL MEDICINE

## 2024-07-08 NOTE — ANESTHESIA POSTPROCEDURE EVALUATION
Anesthesia Post Evaluation    Patient: Radha Feng    Procedure(s) Performed: Procedure(s) (LRB):  ENDOBRONCHIAL ULTRASOUND (EBUS) (N/A)    Final Anesthesia Type: general      Patient location during evaluation: PACU  Patient participation: Yes- Able to Participate  Level of consciousness: awake and alert  Post-procedure vital signs: reviewed and stable  Pain management: adequate  Airway patency: patent  JOSE GUADALUPE mitigation strategies: Extubation while patient is awake, Multimodal analgesia and Verification of full reversal of neuromuscular block  PONV status at discharge: No PONV  Anesthetic complications: no      Cardiovascular status: stable  Respiratory status: unassisted and spontaneous ventilation  Hydration status: euvolemic  Follow-up not needed.              Vitals Value Taken Time   /72 07/05/24 1308   Temp 36.2 °C (97.2 °F) 07/05/24 1308   Pulse 60 07/05/24 1308   Resp 18 07/05/24 1308   SpO2 95 % 07/05/24 1308         No case tracking events are documented in the log.      Pain/Tana Score: No data recorded

## 2024-07-10 DIAGNOSIS — C34.91 ADENOCARCINOMA OF RIGHT LUNG: Primary | ICD-10-CM

## 2024-07-10 LAB
ADEQUACY: ABNORMAL
FINAL PATHOLOGIC DIAGNOSIS: ABNORMAL
Lab: ABNORMAL

## 2024-07-10 NOTE — PROGRESS NOTES
EBUS results reviewed- RUL mass and station 4R positive NSCLC (adenocarcinoma)   Discussed results with patients daughter by phone.   Referral to med oncology placed.     Rolo Wilkinson MD   [facility]

## 2024-07-11 ENCOUNTER — TELEPHONE (OUTPATIENT)
Dept: HEMATOLOGY/ONCOLOGY | Facility: CLINIC | Age: 84
End: 2024-07-11
Payer: MEDICARE

## 2024-07-11 DIAGNOSIS — C34.11 ADENOCARCINOMA OF UPPER LOBE OF RIGHT LUNG: Primary | ICD-10-CM

## 2024-07-11 DIAGNOSIS — C77.9 METASTATIC ADENOCARCINOMA TO LYMPH NODE: ICD-10-CM

## 2024-07-11 DIAGNOSIS — C34.90 MALIGNANT NEOPLASM OF UNSPECIFIED PART OF UNSPECIFIED BRONCHUS OR LUNG: ICD-10-CM

## 2024-07-11 NOTE — TELEPHONE ENCOUNTER
Attempted to contact Ms. Feng' daughter.  Left a detailed message and included my call back information.  Second attempt to contact Ms. Feng' daughter at number listed.  No answer.  Call alternate contact (grandchild) declined to schedule an appointment.  Asked that he give my contact information to Mother.

## 2024-07-12 ENCOUNTER — TELEPHONE (OUTPATIENT)
Dept: HEMATOLOGY/ONCOLOGY | Facility: CLINIC | Age: 84
End: 2024-07-12
Payer: MEDICARE

## 2024-07-12 NOTE — NURSING
Spoke with patient's daughter.  Notified of the scheduled  appointments.  Provided my direct contact information  Oncology Navigation   Intake  Date of Diagnosis: 07/10/24  Cancer Type: Thoracic  Type of Referral: Internal  Date of Referral: 07/11/24  Initial Nurse Navigator Contact: 07/11/24  Referral to Initial Contact Timeline (days): 0  First Appointment Available: 07/24/24  Appointment Date: 07/26/24  First Available Date vs. Scheduled Date (days): 2  Multiple appointments: Yes  Reason if booked > 7 days after scheduling: Additional tests/procedures; Patient request     Treatment  Current Status: Staging work-up    Surgery: N/A    Medical Oncologist: Jose F  Consult Date: 07/26/24       Procedures: PET scan             Support Systems: Children  Barriers of Care: Transportation     Acuity  Treatment Tolerability: Has not started treatment yet/treatment fully completed and side effects resolved  Navigation Acuity: 0     Follow Up  No follow-ups on file.     .

## 2024-07-15 ENCOUNTER — OFFICE VISIT (OUTPATIENT)
Dept: OPHTHALMOLOGY | Facility: CLINIC | Age: 84
End: 2024-07-15
Payer: MEDICARE

## 2024-07-15 DIAGNOSIS — I11.0 HYPERTENSIVE HEART DISEASE WITH DIASTOLIC HEART FAILURE: ICD-10-CM

## 2024-07-15 DIAGNOSIS — H25.11 NUCLEAR SCLEROSIS, RIGHT: ICD-10-CM

## 2024-07-15 DIAGNOSIS — H40.1122 PRIMARY OPEN-ANGLE GLAUCOMA, LEFT EYE, MODERATE STAGE: ICD-10-CM

## 2024-07-15 DIAGNOSIS — H40.1113 PRIMARY OPEN ANGLE GLAUCOMA (POAG) OF RIGHT EYE, SEVERE STAGE: Primary | ICD-10-CM

## 2024-07-15 DIAGNOSIS — Z96.1 PSEUDOPHAKIA OF LEFT EYE: ICD-10-CM

## 2024-07-15 DIAGNOSIS — I50.30 HYPERTENSIVE HEART DISEASE WITH DIASTOLIC HEART FAILURE: ICD-10-CM

## 2024-07-15 DIAGNOSIS — I10 HYPERTENSION, UNSPECIFIED TYPE: ICD-10-CM

## 2024-07-15 PROCEDURE — 99999 PR PBB SHADOW E&M-EST. PATIENT-LVL III: CPT | Mod: PBBFAC,,, | Performed by: OPHTHALMOLOGY

## 2024-07-15 PROCEDURE — 99213 OFFICE O/P EST LOW 20 MIN: CPT | Mod: PBBFAC | Performed by: OPHTHALMOLOGY

## 2024-07-15 PROCEDURE — 92250 FUNDUS PHOTOGRAPHY W/I&R: CPT | Mod: PBBFAC | Performed by: OPHTHALMOLOGY

## 2024-07-15 PROCEDURE — 99214 OFFICE O/P EST MOD 30 MIN: CPT | Mod: S$PBB,,, | Performed by: OPHTHALMOLOGY

## 2024-07-15 RX ORDER — AMLODIPINE BESYLATE 2.5 MG/1
2.5 TABLET ORAL
Qty: 90 TABLET | Refills: 1 | Status: SHIPPED | OUTPATIENT
Start: 2024-07-15

## 2024-07-15 RX ORDER — LISINOPRIL AND HYDROCHLOROTHIAZIDE 12.5; 2 MG/1; MG/1
1 TABLET ORAL
Qty: 90 TABLET | Refills: 1 | Status: SHIPPED | OUTPATIENT
Start: 2024-07-15

## 2024-07-15 NOTE — TELEPHONE ENCOUNTER
No care due was identified.  [facility] Embedded Care Due Messages. Reference number: [id number].   7/15/2024 12:42:17 AM CDT

## 2024-07-18 ENCOUNTER — HOSPITAL ENCOUNTER (OUTPATIENT)
Dept: RADIOLOGY | Facility: HOSPITAL | Age: 84
Discharge: HOME | End: 2024-07-18
Attending: HOSPITALIST
Payer: MEDICARE

## 2024-07-18 DIAGNOSIS — C34.90 MALIGNANT NEOPLASM OF UNSPECIFIED PART OF UNSPECIFIED BRONCHUS OR LUNG: ICD-10-CM

## 2024-07-18 LAB — POCT GLUCOSE: 170 MG/DL (ref 70–110)

## 2024-07-18 PROCEDURE — 78815 PET IMAGE W/CT SKULL-THIGH: CPT | Mod: 26,PI,, | Performed by: STUDENT IN AN ORGANIZED HEALTH CARE EDUCATION/TRAINING PROGRAM

## 2024-07-18 PROCEDURE — A9552 F18 FDG: HCPCS | Performed by: HOSPITALIST

## 2024-07-18 PROCEDURE — 78815 PET IMAGE W/CT SKULL-THIGH: CPT | Mod: TC

## 2024-07-18 RX ORDER — FLUDEOXYGLUCOSE F18 500 MCI/ML
13.23 INJECTION INTRAVENOUS
Status: COMPLETED | OUTPATIENT
Start: 2024-07-18 | End: 2024-07-18

## 2024-07-18 RX ADMIN — FLUDEOXYGLUCOSE F-18 13.23 MILLICURIE: 500 INJECTION INTRAVENOUS at 08:07

## 2024-07-23 ENCOUNTER — PATIENT MESSAGE (OUTPATIENT)
Dept: HEMATOLOGY/ONCOLOGY | Facility: CLINIC | Age: 84
End: 2024-07-23
Payer: MEDICARE

## 2024-07-25 ENCOUNTER — PATIENT MESSAGE (OUTPATIENT)
Dept: ENDOCRINOLOGY | Facility: CLINIC | Age: 84
End: 2024-07-25
Payer: MEDICARE

## 2024-07-25 DIAGNOSIS — H40.1121 PRIMARY OPEN ANGLE GLAUCOMA (POAG) OF LEFT EYE, MILD STAGE: ICD-10-CM

## 2024-07-25 DIAGNOSIS — H40.1113 PRIMARY OPEN ANGLE GLAUCOMA (POAG) OF RIGHT EYE, SEVERE STAGE: ICD-10-CM

## 2024-07-25 PROBLEM — C34.11 PRIMARY ADENOCARCINOMA OF UPPER LOBE OF RIGHT LUNG: Status: ACTIVE | Noted: 2024-07-25

## 2024-07-25 RX ORDER — BRIMONIDINE TARTRATE 2 MG/ML
1 SOLUTION/ DROPS OPHTHALMIC 3 TIMES DAILY
Qty: 10 ML | Refills: 11 | Status: SHIPPED | OUTPATIENT
Start: 2024-07-25

## 2024-07-25 NOTE — PROGRESS NOTES
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 Feng is an 83-year-old female with multiple comorbidities including diabetes, hypertension, and autoimmune hepatitis on chronic prednisone, who is diagnosed with right lung cancer after she presented with 1 episode of hemoptysis.  A CTA chest on June 20, 2024 revealed a mass of the level of the right hilum measuring 3.9 x 2.5 cm, encasing several venous structures and airways, as well as an eccentric pleural-based soft tissue focus along the lateral aspect of the left upper lobe measuring 2.8 x 1.2 cm (stable since the 2022).  EBUS and biopsy on July 5, 2024 revealed the right upper lobe mass positive for malignancy, adenocarcinoma (TTF 1-).  Lymph nodes from level 4R, 7, 11 R, and 11 L were sampled out of which, 4R is positive for malignancy consistent metastatic adenocarcinoma.  A PET scan on July 18, 2024 reveals hypermetabolic right perihilar mass with suspected endobronchial extension with SUV max 11 with several additional subcentimeter satellite nodules which are too small to characterize.  There was hypermetabolic lymphadenopathy including 1 cm right hilar lymph node with SUV max 4.7 and 1.3 cm right paratracheal lymph node with SUV max 4.8.  The left upper lobe subpleural lesion is negative for uptake.  She is here today for recommendations regarding further treatment.    She has 35 pack history of smoking which she quit approximately 25 years ago.  She reports occasional cough and worsening fatigue.  She walks with a cane at home due to right knee pain.  She denies fever, night sweats, or weight loss.  She and her daughter live together.     REVIEW OF SYSTEMS:  As above.  In addition, patient denies headaches, visual problems, dizziness, chest pain, shortness of breath, cough, nausea, vomiting, diarrhea, or any new bony pains. Patient also  denies easy bruising, skin rashes, or numbness or tingling.    ECO    PAST MEDICAL HISTORY:  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    Diabetes mellitus     Glaucoma     Hypertension     Other ascites 2022       PAST SURGICAL HISTORY:  Past Surgical History:   Procedure Laterality Date    CATARACT EXTRACTION W/  INTRAOCULAR LENS IMPLANT Left 2021        ENDOBRONCHIAL ULTRASOUND N/A 2024    Procedure: ENDOBRONCHIAL ULTRASOUND (EBUS);  Surgeon: Rolo Wilkinson MD;  Location: [facility] FLR;  Service: Pulmonary;  Laterality: N/A;    ESOPHAGOGASTRODUODENOSCOPY N/A 2023    Procedure: ESOPHAGOGASTRODUODENOSCOPY (EGD);  Surgeon: Edgar Branch MD;  Location: [facility] (4TH FLR);  Service: Endoscopy;  Laterality: N/A;  referral Dr Peraza-cirrhosis/labs done on 23-instr portal-GT       ALLERGIES:   Review of patient's allergies indicates:  No Known Allergies    MEDICATIONS:  Current Outpatient Medications   Medication Sig    acetaminophen (TYLENOL) 500 MG tablet Take 2 tablets (1,000 mg total) by mouth every 8 (eight) hours as needed for Pain.    amLODIPine (NORVASC) 2.5 MG tablet TAKE 1 TABLET BY MOUTH EVERY DAY    BD INSULIN SYRINGE ULTRA-FINE 1 mL 31 gauge x 5/16 Syrg     blood-glucose meter,continuous (DEXCOM G6 ) Misc Check blood sugar before meals and at bedtime    blood-glucose sensor (DEXCOM G6 SENSOR) Amanda 3 each by Misc.(Non-Drug; Combo Route) route every 10 days.    blood-glucose transmitter (DEXCOM G6 TRANSMITTER) Amanda 1 each by Misc.(Non-Drug; Combo Route) route every 3 (three) months.    brimonidine 0.2% (ALPHAGAN) 0.2 % Drop Place 1 drop into both eyes 3 (three) times daily.    cholecalciferol, vitamin D3, (VITAMIN D3) 25 mcg (1,000 unit) capsule Take 2 capsules (2,000 Units total) by mouth once daily.    dorzolamide-timolol 2-0.5% (COSOPT) 22.3-6.8 mg/mL ophthalmic solution INSTILL 1 DROP INTO  "RIGHT EYE TWICE A DAY    estradioL (ESTRACE) 0.01 % (0.1 mg/gram) vaginal cream Place 0.5 g vaginally twice a week. Apply to the vagina daily for two weeks then twice a week.    furosemide (LASIX) 40 MG tablet TAKE 1 TABLET BY MOUTH EVERY DAY    insulin aspart U-100 (NOVOLOG U-100 INSULIN ASPART) 100 unit/mL injection TO USE WITH OMNIPOD 5. TOTAL DAILY DOSE UP TO 40 UNITS    insulin pump cart,automated,BT (OMNIPOD 5 G6 PODS, GEN 5,) Crtg Inject 1 each into the skin Every 3 (three) days.    lactulose (CEPHULAC) 10 gram packet Take 10 g by mouth 3 (three) times daily.    latanoprost 0.005 % ophthalmic solution PLACE 1 DROP INTO BOTH EYES ONCE DAILY    lisinopriL-hydrochlorothiazide (PRINZIDE,ZESTORETIC) 20-12.5 mg per tablet TAKE 1 TABLET BY MOUTH EVERY DAY    pen needle, diabetic 31 gauge x 5/16" Ndle Use to inject insulin into the skin up to 4 times daily    predniSONE (DELTASONE) 5 MG tablet Take 0.5 tablets (2.5 mg total) by mouth once daily.     No current facility-administered medications for this visit.       SOCIAL HISTORY:  Social History     Socioeconomic History    Marital status: Single   Tobacco Use    Smoking status: Former    Smokeless tobacco: Never   Substance and Sexual Activity    Alcohol use: Not Currently    Drug use: Never   Social History Narrative    , one daughter local, four sons out of state. Retired . Lives with daughter Joss.     Social Determinants of Health     Financial Resource Strain: Low Risk  (6/21/2024)    Overall Financial Resource Strain (CARDIA)     Difficulty of Paying Living Expenses: Not hard at all   Food Insecurity: No Food Insecurity (6/21/2024)    Hunger Vital Sign     Worried About Running Out of Food in the Last Year: Never true     Ran Out of Food in the Last Year: Never true   Transportation Needs: No Transportation Needs (6/21/2024)    TRANSPORTATION NEEDS     Transportation : No   Physical Activity: Inactive (6/21/2024)    Exercise Vital Sign " "    Days of Exercise per Week: 1 day     Minutes of Exercise per Session: 0 min   Stress: No Stress Concern Present (6/21/2024)    [place] [employer] - Occupational Stress Questionnaire     Feeling of Stress : Only a little   Recent Concern: Stress - Stress Concern Present (6/21/2024)    [place] [employer] - Occupational Stress Questionnaire     Feeling of Stress : To some extent   Housing Stability: Low Risk  (6/21/2024)    Housing Stability Vital Sign     Unable to Pay for Housing in the Last Year: No     Homeless in the Last Year: No       FAMILY HISTORY:  Family History   Problem Relation Name Age of Onset    Cataracts Mother      Diabetes Mother      Glaucoma Mother      Hypertension Mother      Heart attack Father      Colon cancer Sister      Blindness Cousin      Amblyopia Neg Hx      Macular degeneration Neg Hx      Retinal detachment Neg Hx      Strabismus Neg Hx           PHYSICAL EXAMINATION:  Vitals:    07/26/24 1021   BP: 121/68   BP Location: Right arm   Patient Position: Sitting   BP Method: Medium (Automatic)   Pulse: 81   Temp: 98.2 °F (36.8 °C)   TempSrc: Oral   SpO2: 96%   Weight: 54.8 kg (120 lb 13 oz)   Height: 5' 5" (1.651 m)   Body mass index is 20.1 kg/m².   GENERAL: Patient is alert and oriented, in no acute distress.  HEENT:Extraocular muscles are intact.  Oropharynx is clear without lesions.  There is no cervical or supraclavicular lymphadenopathy palpated.  No thyromegaly noted.  HEART: Regular rate and rhythm.  LUNGS: Clear to auscultation bilaterally.  ABDOMEN:Soft, nontender, nondistended, without hepatosplenomegaly.  Normoactive bowel sounds.  EXTREMITIES: No clubbing, cyanosis, or edema.  NEUROLOGICAL: Cranial nerve II through XII grossly intact.  Sensation is intact.  Strength is 5 out of 5 in the upper and lower extremities bilaterally.     ASSESSMENT:   This is a an 83-year-old female with multiple comorbidities including autoimmune " hepatitis and history of smoking, with [id number], stage IIIA, adenocarcinoma of the right upper lobe with right hilar mass and right hilar and mediastinal lymphadenopathy.    PLAN:   After review of the pathology and images of the radiological studies, Ms. Feng is noted to have stage IIIA right lung cancer.  She is recommended to obtain MRI of the brain to rule out metastatic disease.  She is scheduled to see Dr. Kohler later today regarding systemic treatment which may be difficult due to her age and comorbidities.  [facility] and PD-L1 pending.  If the brain is negative for metastatic disease, I recommend radiation to the right lung and mediastinum with or without chemotherapy based on medical oncology recommendation.  I plan to deliver approximately 6000 cGy.    The risks, benefits, and side effects of radiation were explained in detail to the patient and her daughter.  All questions were answered and informed consent was signed.  I plan to see the patient back for radiation planning CT during the next week.    Psychosocial Distress screening score of Distress Score: 5 noted and reviewed.  Patient declines intervention.    I spent approximately 60 minutes reviewing the available records and evaluating the patient, out of which over 50% of the time was spent face to face with the patient in counseling and coordinating this patient's care.

## 2024-07-26 ENCOUNTER — OFFICE VISIT (OUTPATIENT)
Dept: HEMATOLOGY/ONCOLOGY | Facility: CLINIC | Age: 84
End: 2024-07-26
Payer: MEDICARE

## 2024-07-26 ENCOUNTER — PATIENT MESSAGE (OUTPATIENT)
Dept: ENDOCRINOLOGY | Facility: CLINIC | Age: 84
End: 2024-07-26
Payer: MEDICARE

## 2024-07-26 ENCOUNTER — OFFICE VISIT (OUTPATIENT)
Dept: RADIATION ONCOLOGY | Facility: CLINIC | Age: 84
End: 2024-07-26
Payer: MEDICARE

## 2024-07-26 ENCOUNTER — PATIENT MESSAGE (OUTPATIENT)
Dept: HEMATOLOGY/ONCOLOGY | Facility: CLINIC | Age: 84
End: 2024-07-26

## 2024-07-26 VITALS
WEIGHT: 120.81 LBS | BODY MASS INDEX: 20.13 KG/M2 | DIASTOLIC BLOOD PRESSURE: 68 MMHG | OXYGEN SATURATION: 96 % | OXYGEN SATURATION: 96 % | TEMPERATURE: 98 F | BODY MASS INDEX: 20.13 KG/M2 | SYSTOLIC BLOOD PRESSURE: 121 MMHG | SYSTOLIC BLOOD PRESSURE: 121 MMHG | TEMPERATURE: 98 F | WEIGHT: 120.81 LBS | HEIGHT: 65 IN | HEIGHT: 65 IN | HEART RATE: 81 BPM | HEART RATE: 81 BPM | DIASTOLIC BLOOD PRESSURE: 68 MMHG

## 2024-07-26 DIAGNOSIS — C34.11 PRIMARY ADENOCARCINOMA OF UPPER LOBE OF RIGHT LUNG: Primary | ICD-10-CM

## 2024-07-26 DIAGNOSIS — C77.1 SECONDARY MALIGNANT NEOPLASM OF INTRATHORACIC LYMPH NODES: ICD-10-CM

## 2024-07-26 DIAGNOSIS — C34.91 ADENOCARCINOMA OF RIGHT LUNG: Primary | ICD-10-CM

## 2024-07-26 DIAGNOSIS — C34.90 MALIGNANT NEOPLASM OF UNSPECIFIED PART OF UNSPECIFIED BRONCHUS OR LUNG: ICD-10-CM

## 2024-07-26 PROCEDURE — 99999 PR PBB SHADOW E&M-EST. PATIENT-LVL IV: CPT | Mod: PBBFAC,,, | Performed by: HOSPITALIST

## 2024-07-26 PROCEDURE — 99214 OFFICE O/P EST MOD 30 MIN: CPT | Mod: PBBFAC | Performed by: RADIOLOGY

## 2024-07-26 PROCEDURE — 99214 OFFICE O/P EST MOD 30 MIN: CPT | Mod: PBBFAC,27 | Performed by: HOSPITALIST

## 2024-07-26 PROCEDURE — 99215 OFFICE O/P EST HI 40 MIN: CPT | Mod: S$PBB,,, | Performed by: HOSPITALIST

## 2024-07-26 PROCEDURE — 99999 PR PBB SHADOW E&M-EST. PATIENT-LVL IV: CPT | Mod: PBBFAC,,, | Performed by: RADIOLOGY

## 2024-07-26 RX ORDER — FUROSEMIDE 40 MG/1
TABLET ORAL
Qty: 90 TABLET | Refills: 0 | Status: SHIPPED | OUTPATIENT
Start: 2024-07-26

## 2024-07-26 NOTE — PROGRESS NOTES
[facility] MEDICAL ONCOLOGY - NEW PATIENT VISIT    Reason for visit: ***      Oncology History   Primary adenocarcinoma of upper lobe of right lung   6/20/2024 Imaging Significant Findings    CTA PE (suspected PE)  - 3.9 x 2.5 cm mass at level of right hilum encasing several vascular structures, new compared to 9/20/22  - Scattered ground-glass attenuation around right hilar mass  - 2.8 x 1.2 cm eccentric pleural-based lateral left upper lung mass, stable compared to prior  - Known pulmonary embolism     7/5/2024 Procedure    Bronch w/ EBUS  RUL, FNA  - Adenocarcinoma (Positive: CK-7; Negative: TTF-1)    LN  - Positive: Station 4R  - Negative: Station 7, 11R, 11L    [facility] NGS / PD-L1  - PD-L1 TPS 5%         7/11/2024 Tumor Genotyping    [facility] Liquid Biopsy  - BAP1, MAP2K1, TP53 (LOF)  - BRCA VUS, PTEN VUS     7/18/2024 Imaging Significant Findings    PET CT  - 3.9 cm right perihilar mass with suspected endobronchial extension, SUV 11  - Several additional subcentimeter satellite pulmonary nodules too small to characterize on PET  - 1.0 cm right hilar lymph node, SUV 4.7  - 1.3 cm right paratracheal lymph node, SUV 4.8  - 3.1 x 1.2 cm KAY subpleural fat attenuating structure, no hypermetabolic activity     7/25/2024 Cancer Staged    Staging form: Lung, AJCC 8th Edition  - Clinical stage from 7/25/2024: Stage IIIA (cT2a, cN2, cM0)          Oncology History        HPI:     Radha Feng is a 83 y.o. female with pmh significant for COPD, PVD, HFpEF, aortic and coronary atherosclerosis, urinary incontinence, T2DM, osteroporosis, autoimmune hepatitis on chronic prednisone, and likely Stage IIIB (cT3, pN2, cMx) with intralobar mets (PD-L1 5%, no targetable mutations), initially dx'd 7/5/24, currently treatment naive, who presents to [facility] care.     Living Situation: ***    Tobacco Use: ***    EtOH Use: ***    Employment / Exposure History: ***    Family Cancer History:  "***    PLAN:   - Studies   - ***  - Imaging   - MRI brain prior to treatment  - Treatment   - 83 with multiple comorbidities  - RTC    - ***  - [place] today  - Add Tempus snap shot  - Knee pain  - Wants to maintain quality of life      History has been obtained by chart review and discussion with the patient.      ROS:   As per HPI.       Physical Exam:       /68 (BP Location: Right arm, Patient Position: Sitting, BP Method: Medium (Automatic))   Pulse 81   Temp 98.2 °F (36.8 °C) (Oral)   Ht 5' 5" (1.651 m)   Wt 54.8 kg (120 lb 13 oz)   SpO2 96%   BMI 20.10 kg/m²                Physical Exam      Labs:   Recent Results (from the past 48 hour(s))   Tumor NGS Blood    Collection Time: 07/25/24 12:28 PM    Specimen: Blood   Result Value Ref Range    Reason for Study       To identify mutations relevant to patient's cancer.    Genetic Diseases Assessed Cancer     Description of Ranges of DNA Sequences Examined 105 gene liquid biopsy     Overall Interpretation inconclusive     MSI Not detected     [facility] Portal       [email or web address]    Low Coverage Regions JAK1, SPOP     Trial Count 3     [facility]: Clinical Trial Match 1       Clinical Trial NCT ID: [id number]  Clinical Trial Title: The Evaluation of [id number] in Patients With Advanced Solid Tumors Harboring a TP53 Y220C Mutation (PYNNACLE)  Clinical Trial URL: https://clinicaltrials.gov/ct2/show/CCT89911861  Clinical Phase: Phase 1/Phase 2  Matched criteria: TP53 p.Y220C mutation      [facility]: Clinical Trial Match 2       Clinical Trial NCT ID: [id number]  Clinical Trial Title: Mirdametinib + [id number] in Advanced Solid Tumors  Clinical Trial URL: https://clinicaltrials.gov/ct2/show/QBD07987275  Clinical Phase: Phase 1/Phase 2  Matched criteria: MAP2K1 p.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    Genetic Diseases Assessed Cancer     Description of Ranges of DNA Sequences Examined 648 gene panel     Overall Interpretation inconclusive     MSI Stable     TMB 15.3 m/MB    [facility] Portal       [email or web address]    PD-L1 Interpretation by 22C3 positive     PD-L1 (22C3) Combined Positive Score 30     PD-L1 (22C3) Tumor Proportion Score 5 %    Fusion Addendum Issue Type       NEGATIVE  Negative - This addendum is being issued to report that no gene fusions or altered splicing variants from RNA sequencing analysis were found. This report is being issued to report the results of gene rearrangement and altered splicing analysis from RNA sequencing. No gene rearrangements nor reportable altered splicing events were identified from RNA sequencing.      Pertinent Negatives       EGFR, KRAS, BRAF, ALK, ROS1, RET, MET, ERBB2 (HER2)    Low Coverage Regions [id number]     Trial Count 3     [facility]: Clinical Trial Match 1       Clinical Trial NCT ID: [id number]  Clinical Trial Title: A Beta-only IL-2 ImmunoTherapY Study  Clinical Trial URL: https://clinicaltrials.gov/ct2/show/EZT07158429  Clinical Phase: Phase 1/Phase 2      [facility]: Clinical Trial Match 2       Clinical Trial NCT ID: [id number]  Clinical Trial Title: Mirdametinib + BGB-3245 in Advanced Solid Tumors  Clinical Trial URL: https://clinicaltrials.gov/ct2/show/RXH91052977  Clinical Phase: Phase  1/Phase 2  Matched criteria: MAP2K1 p.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     Path:  See oncologic history above.      Assessment and Plan:       The above information has been reviewed with the patient and all questions have been answered to their apparent satisfaction.  They understand that they can call the clinic with any questions.    Amandeep Kohler MD  Hematology/Oncology  [facility]      Route Chart for Scheduling  Med Onc Route Chart for Scheduling    {BPA    (This text will automatically delete):90809}    Disclaimer: This note was prepared using voice recognition system and is likely to have sound alike errors.  Please call me with any questions.

## 2024-07-26 NOTE — TELEPHONE ENCOUNTER
No care due was identified.  [facility] Embedded Care Due Messages. Reference number: [id number].   7/26/2024 12:37:40 AM CDT

## 2024-07-26 NOTE — PROGRESS NOTES
[facility] MEDICAL ONCOLOGY - NEW PATIENT VISIT    Reason for visit: Newly diagnosed non-small-cell lung cancer.      Oncology History   Primary adenocarcinoma of upper lobe of right lung   6/20/2024 Imaging Significant Findings    CTA PE (suspected PE, hemoptysis)  - 3.9 x 2.5 cm mass at level of right hilum encasing several vascular structures, new compared to 9/20/22  - Scattered ground-glass attenuation around right hilar mass  - 2.8 x 1.2 cm eccentric pleural-based lateral left upper lung mass, stable compared to prior  - Known pulmonary embolism     7/5/2024 Procedure    Bronch w/ EBUS  RUL, FNA  - Adenocarcinoma (Positive: CK-7; Negative: TTF-1)    LN  - Positive: Station 4R  - Negative: Station 7, 11R, 11L    [facility] NGS / PD-L1  - PD-L1 TPS 5%         7/11/2024 Tumor Genotyping    [facility] Liquid Biopsy  - BAP1, MAP2K1, TP53 (LOF)  - BRCA VUS, PTEN VUS     7/18/2024 Imaging Significant Findings    PET CT  - 3.9 cm right perihilar mass with suspected endobronchial extension, SUV 11  - Several additional subcentimeter satellite pulmonary nodules too small to characterize on PET  - 1.0 cm right hilar lymph node, SUV 4.7  - 1.3 cm right paratracheal lymph node, SUV 4.8  - 3.1 x 1.2 cm KAY subpleural fat attenuating structure, no hypermetabolic activity     7/25/2024 Cancer Staged    Staging form: Lung, AJCC 8th Edition  - Clinical stage from 7/25/2024: Stage IIIA (cT2a, cN2, cM0)          Oncology History        HPI:     Radha Feng is a 83 y.o. female with pmh significant for COPD, PVD, HFpEF, aortic and coronary atherosclerosis, urinary incontinence, T2DM, osteroporosis, autoimmune hepatitis on chronic prednisone, and likely Stage IIIB (cT3, pN2, cMx) with intralobar mets (PD-L1 5%, no targetable mutations), initially dx'd 7/5/24, currently treatment naive, who presents to [facility] care.     Right lung mass was identified in the setting of hemoptysis. CT scan did  "show a right perihilar mass with venous structure encasement. Now status post EBUS and mediastinal staging with histologic diagnosis    Living Situation:  Lives in [facility] with daughter    Tobacco Use:  Previous smoker of more > 40 year. Quit 29 years ago    EtOH Use: Not active. Used to drink 3-4 cans of beer daily  for > 30 years. 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 Colon cancer in elder sister and HNSCC in 2nd sister.    History has been obtained by chart review and discussion with the patient.      ROS:   As per HPI.       Physical Exam:       /68 (BP Location: Right arm, Patient Position: Sitting, BP Method: Medium (Automatic))   Pulse 81   Temp 98.2 °F (36.8 °C) (Oral)   Ht 5' 5" (1.651 m)   Wt 54.8 kg (120 lb 13 oz)   SpO2 96%   BMI 20.10 kg/m²                Physical Exam  Vitals reviewed.   Constitutional:       General: She is not in acute distress.     Appearance: She is not toxic-appearing.   HENT:      Head: Normocephalic and atraumatic.   Cardiovascular:      Rate and Rhythm: Normal rate.      Pulses: Normal pulses.   Pulmonary:      Effort: Pulmonary effort is normal. No respiratory distress.   Abdominal:      General: There is no distension.      Tenderness: There is no abdominal tenderness.   Musculoskeletal:      Right lower leg: No edema.      Left lower leg: No edema.   Skin:     Coloration: Skin is not jaundiced or pale.   Neurological:      Mental Status: She is alert and oriented to person, place, and time. Mental status is at baseline.           Labs:   Recent Results (from the past 48 hour(s))   [facility] NGS Blood    Collection Time: 07/25/24 12:28 PM    Specimen: Blood   Result Value Ref Range    Reason for Study       To identify mutations relevant to patient's cancer.    Genetic Diseases Assessed Cancer     Description of Ranges of DNA Sequences Examined 105 gene liquid " biopsy     Overall Interpretation inconclusive     MSI Not detected     [facility] Portal       [email or web address]    Low Coverage Regions JAK1, SPOP     Trial Count 3     [facility]: Clinical Trial Match 1       Clinical Trial NCT ID: [id number]  Clinical Trial Title: The Evaluation of [id number] in Patients With Advanced Solid Tumors Harboring a TP53 Y220C Mutation (PYNNACLE)  Clinical Trial URL: https://clinicaltrials.gov/ct2/show/IJK99812292  Clinical Phase: Phase 1/Phase 2  Matched criteria: TP53 p.Y220C mutation      [facility]: Clinical Trial Match 2       Clinical Trial NCT ID: [id number]  Clinical Trial Title: Mirdametinib + BGB-3245 in Advanced Solid Tumors  Clinical Trial URL: https://clinicaltrials.gov/ct2/show/MUY73678345  Clinical Phase: Phase 1/Phase 2  Matched criteria: MAP2K1 p.C121S mutation      [facility]: Clinical Trial Match 3       Clinical Trial NCT ID: [id number]  Clinical Trial Title: Measuring the Effects of Talazoparib in Patients With Advanced Cancer and DNA Repair Variations  Clinical Trial URL: https://clinicaltrials.gov/ct2/show/MMY95538963  Clinical Phase: Phase 2  Matched criteria: BAP1 p.Q272* mutation     Tumor NGS Tissue    Collection Time: 07/25/24  1:52 PM    Specimen: Tissue   Result Value Ref Range    Reason for Study       To identify somatic and germline mutations relevant to patient's cancer.    Genetic Diseases Assessed Cancer     Description of Ranges of DNA Sequences Examined 648 gene panel     Overall Interpretation inconclusive     MSI Stable     TMB 15.3 m/MB    [facility] Portal       [email or web address]    PD-L1 Interpretation by 22C3 positive     PD-L1 (22C3) Combined Positive Score 30     PD-L1 (22C3) Tumor Proportion Score 5 %    Fusion Addendum Issue Type        NEGATIVE  Negative - This addendum is being issued to report that no gene fusions or altered splicing variants from RNA sequencing analysis were found. This report is being issued to report the results of gene rearrangement and altered splicing analysis from RNA sequencing. No gene rearrangements nor reportable altered splicing events were identified from RNA sequencing.      Pertinent Negatives       EGFR, KRAS, BRAF, ALK, ROS1, RET, MET, ERBB2 (HER2)    Low Coverage Regions KDM5D     Trial Count 3     [facility]: Clinical Trial Match 1       Clinical Trial NCT ID: [id number]  Clinical Trial Title: A Beta-only IL-2 ImmunoTherapY Study  Clinical Trial URL: https://clinicaltrials.gov/ct2/show/CPO31002899  Clinical Phase: Phase 1/Phase 2      [facility]: Clinical Trial Match 2       Clinical Trial NCT ID: [id number]  Clinical Trial Title: Mirdametinib + BGB-3245 in Advanced Solid Tumors  Clinical Trial URL: https://clinicaltrials.gov/ct2/show/GUT25821490  Clinical Phase: Phase 1/Phase 2  Matched criteria: MAP2K1 p.C121S mutation      [facility]: Clinical Trial Match 3       Clinical Trial NCT ID: [id number]  Clinical Trial Title: Measuring the Effects of Talazoparib in Patients With Advanced Cancer and DNA Repair Variations  Clinical Trial URL: https://clinicaltrials.gov/ct2/show/TUD19274238  Clinical Phase: Phase 2  Matched criteria: BAP1 p.Q272* mutation          Imaging:    See oncologic history above.     Path:  See oncologic history above.      Assessment and Plan:      Radha is a 84 yo female with newly diagnosed likely stage III B (cT3, pN2, cMx) adenocarcinoma who is here with daughter to establish care. We had a long discussion about the diagnosis, cancer stage, potential therapies and survival outcomes.   Her medical problems are  notable for autoimmune hepatitis, peripheral vascular disease, hypertensive heart disease with diastolic heart failure, T2 DM, centrilobar emphysema, osteoporosis, and coronary artery  disease.     She emphasizes that her goal is to optimize her quality of life. Following shared decision making with the goal to optimize quality of life, We agreed to hold off chemotherapy and immunotherapy. She/ daughter understands that the goal of therapy is palliative. She was re    PLAN:   - Studies   - None  - Imaging   - MRI brain prior to radiation treatment  - Treatment   - None  - RTC    - Not required. Per patient's discretion.     The above information has been reviewed with the patient and all questions have been answered to their apparent satisfaction.  They understand that they can call the clinic with any questions.    Godfrey Riddle MD  Hematology & Oncology Fellow, PGY-V  [facility]           Route Chart for Scheduling  Med Onc Route Chart for Scheduling        Disclaimer: This note was prepared using voice recognition system and is likely to have sound alike errors.  Please call me with any questions.

## 2024-08-01 ENCOUNTER — APPOINTMENT (OUTPATIENT)
Dept: RADIATION THERAPY | Facility: OTHER | Age: 84
End: 2024-08-01
Attending: RADIOLOGY
Payer: MEDICARE

## 2024-08-01 ENCOUNTER — HOSPITAL ENCOUNTER (OUTPATIENT)
Dept: RADIATION THERAPY | Facility: HOSPITAL | Age: 84
Discharge: HOME | End: 2024-08-01
Payer: MEDICARE

## 2024-08-11 ENCOUNTER — PATIENT MESSAGE (OUTPATIENT)
Dept: HEPATOLOGY | Facility: CLINIC | Age: 84
End: 2024-08-11
Payer: MEDICARE

## 2024-08-12 ENCOUNTER — PATIENT MESSAGE (OUTPATIENT)
Dept: RADIATION ONCOLOGY | Facility: CLINIC | Age: 84
End: 2024-08-12
Payer: MEDICARE

## 2024-08-12 ENCOUNTER — TELEPHONE (OUTPATIENT)
Dept: HEPATOLOGY | Facility: CLINIC | Age: 84
End: 2024-08-12
Payer: MEDICARE

## 2024-08-12 ENCOUNTER — LAB VISIT (OUTPATIENT)
Dept: LAB | Facility: HOSPITAL | Age: 84
End: 2024-08-12
Attending: INTERNAL MEDICINE
Payer: MEDICARE

## 2024-08-12 ENCOUNTER — TELEPHONE (OUTPATIENT)
Dept: PRIMARY CARE CLINIC | Facility: CLINIC | Age: 84
End: 2024-08-12
Payer: MEDICARE

## 2024-08-12 DIAGNOSIS — N39.0 URINARY TRACT INFECTION WITHOUT HEMATURIA, SITE UNSPECIFIED: Primary | ICD-10-CM

## 2024-08-12 DIAGNOSIS — K75.4 AUTOIMMUNE HEPATITIS: Chronic | ICD-10-CM

## 2024-08-12 LAB
AFP SERPL-MCNC: <2 NG/ML (ref 0–8.4)
ALBUMIN SERPL BCP-MCNC: 2.3 G/DL (ref 3.5–5.2)
ALP SERPL-CCNC: 58 U/L (ref 55–135)
ALT SERPL W/O P-5'-P-CCNC: 10 U/L (ref 10–44)
ANION GAP SERPL CALC-SCNC: 12 MMOL/L (ref 8–16)
AST SERPL-CCNC: 19 U/L (ref 10–40)
BASOPHILS # BLD AUTO: 0.02 K/UL (ref 0–0.2)
BASOPHILS NFR BLD: 0.2 % (ref 0–1.9)
BILIRUB DIRECT SERPL-MCNC: 0.3 MG/DL (ref 0.1–0.3)
BILIRUB SERPL-MCNC: 0.9 MG/DL (ref 0.1–1)
BUN SERPL-MCNC: 23 MG/DL (ref 8–23)
CALCIUM SERPL-MCNC: 9.2 MG/DL (ref 8.7–10.5)
CHLORIDE SERPL-SCNC: 101 MMOL/L (ref 95–110)
CO2 SERPL-SCNC: 20 MMOL/L (ref 23–29)
CREAT SERPL-MCNC: 1 MG/DL (ref 0.5–1.4)
DIFFERENTIAL METHOD BLD: ABNORMAL
EOSINOPHIL # BLD AUTO: 0 K/UL (ref 0–0.5)
EOSINOPHIL NFR BLD: 0.2 % (ref 0–8)
ERYTHROCYTE [DISTWIDTH] IN BLOOD BY AUTOMATED COUNT: 13.8 % (ref 11.5–14.5)
EST. GFR  (NO RACE VARIABLE): 56.2 ML/MIN/1.73 M^2
GLUCOSE SERPL-MCNC: 244 MG/DL (ref 70–110)
HCT VFR BLD AUTO: 33.7 % (ref 37–48.5)
HGB BLD-MCNC: 10.5 G/DL (ref 12–16)
IMM GRANULOCYTES # BLD AUTO: 0.05 K/UL (ref 0–0.04)
IMM GRANULOCYTES NFR BLD AUTO: 0.4 % (ref 0–0.5)
INR PPP: 1.1 (ref 0.8–1.2)
LYMPHOCYTES # BLD AUTO: 1 K/UL (ref 1–4.8)
LYMPHOCYTES NFR BLD: 8.2 % (ref 18–48)
MCH RBC QN AUTO: 29.6 PG (ref 27–31)
MCHC RBC AUTO-ENTMCNC: 31.2 G/DL (ref 32–36)
MCV RBC AUTO: 95 FL (ref 82–98)
MONOCYTES # BLD AUTO: 1.4 K/UL (ref 0.3–1)
MONOCYTES NFR BLD: 11.6 % (ref 4–15)
NEUTROPHILS # BLD AUTO: 9.7 K/UL (ref 1.8–7.7)
NEUTROPHILS NFR BLD: 79.4 % (ref 38–73)
NRBC BLD-RTO: 0 /100 WBC
PLATELET # BLD AUTO: 179 K/UL (ref 150–450)
PMV BLD AUTO: 11.9 FL (ref 9.2–12.9)
POTASSIUM SERPL-SCNC: 3.2 MMOL/L (ref 3.5–5.1)
PROT SERPL-MCNC: 6.7 G/DL (ref 6–8.4)
PROTHROMBIN TIME: 11.9 SEC (ref 9–12.5)
RBC # BLD AUTO: 3.55 M/UL (ref 4–5.4)
SODIUM SERPL-SCNC: 133 MMOL/L (ref 136–145)
WBC # BLD AUTO: 12.22 K/UL (ref 3.9–12.7)

## 2024-08-12 PROCEDURE — 85025 COMPLETE CBC W/AUTO DIFF WBC: CPT | Performed by: INTERNAL MEDICINE

## 2024-08-12 PROCEDURE — 85610 PROTHROMBIN TIME: CPT | Performed by: INTERNAL MEDICINE

## 2024-08-12 PROCEDURE — 80053 COMPREHEN METABOLIC PANEL: CPT | Performed by: INTERNAL MEDICINE

## 2024-08-12 PROCEDURE — 36415 COLL VENOUS BLD VENIPUNCTURE: CPT | Mod: PN | Performed by: INTERNAL MEDICINE

## 2024-08-12 PROCEDURE — 82105 ALPHA-FETOPROTEIN SERUM: CPT | Performed by: INTERNAL MEDICINE

## 2024-08-12 PROCEDURE — 82248 BILIRUBIN DIRECT: CPT | Performed by: INTERNAL MEDICINE

## 2024-08-12 NOTE — TELEPHONE ENCOUNTER
----- Message from Yvonne Peraza MD sent at 8/12/2024  2:35 PM CDT -----  Regarding: RE: added labs  Order is in  ----- Message -----  From: Monique Young MA  Sent: 8/12/2024   2:25 PM CDT  To: Yvonne Peraza MD  Subject: FW: added labs                                     ----- Message -----  From: Deloris Carrera  Sent: 8/12/2024   2:18 PM CDT  To: [facility] Staff  Subject: added labs                                       Good afternoon,     Patient`s daughter is requesting a urinalysis. She said she sent the doctor a message to have one ordered. She left a urine sample if you would like to place an order for it.    Thank you

## 2024-08-12 NOTE — TELEPHONE ENCOUNTER
Called the patient to notified about the urine order.  No answer, left message with call back # [phone]. Message sent to [employer] too.

## 2024-08-14 PROCEDURE — 77427 RADIATION TX MANAGEMENT X5: CPT | Mod: ,,, | Performed by: RADIOLOGY

## 2024-08-14 PROCEDURE — G6002 STEREOSCOPIC X-RAY GUIDANCE: HCPCS | Mod: 26,,, | Performed by: RADIOLOGY

## 2024-08-14 PROCEDURE — 77386 HC IMRT, COMPLEX: CPT | Performed by: RADIOLOGY

## 2024-08-15 ENCOUNTER — PATIENT MESSAGE (OUTPATIENT)
Dept: HEPATOLOGY | Facility: CLINIC | Age: 84
End: 2024-08-15
Payer: MEDICARE

## 2024-08-15 DIAGNOSIS — N39.0 URINARY TRACT INFECTION WITHOUT HEMATURIA, SITE UNSPECIFIED: Primary | ICD-10-CM

## 2024-08-16 ENCOUNTER — HOSPITAL ENCOUNTER (OUTPATIENT)
Dept: RADIOLOGY | Facility: HOSPITAL | Age: 84
Discharge: HOME | End: 2024-08-16
Attending: RADIOLOGY
Payer: MEDICARE

## 2024-08-16 DIAGNOSIS — C34.90 MALIGNANT NEOPLASM OF UNSPECIFIED PART OF UNSPECIFIED BRONCHUS OR LUNG: ICD-10-CM

## 2024-08-16 PROCEDURE — 70553 MRI BRAIN STEM W/O & W/DYE: CPT | Mod: TC

## 2024-08-16 PROCEDURE — 70553 MRI BRAIN STEM W/O & W/DYE: CPT | Mod: 26,,, | Performed by: RADIOLOGY

## 2024-08-16 PROCEDURE — 77386 HC IMRT, COMPLEX: CPT | Performed by: RADIOLOGY

## 2024-08-16 PROCEDURE — A9585 GADOBUTROL INJECTION: HCPCS | Performed by: RADIOLOGY

## 2024-08-16 PROCEDURE — G6002 STEREOSCOPIC X-RAY GUIDANCE: HCPCS | Mod: 26,,, | Performed by: RADIOLOGY

## 2024-08-16 PROCEDURE — 25500020 PHARM REV CODE 255: Performed by: RADIOLOGY

## 2024-08-16 RX ORDER — GADOBUTROL 604.72 MG/ML
6 INJECTION INTRAVENOUS
Status: COMPLETED | OUTPATIENT
Start: 2024-08-16 | End: 2024-08-16

## 2024-08-16 RX ADMIN — GADOBUTROL 6 ML: 604.72 INJECTION INTRAVENOUS at 08:08

## 2024-08-19 PROCEDURE — 77386 HC IMRT, COMPLEX: CPT | Performed by: RADIOLOGY

## 2024-08-19 PROCEDURE — G6002 STEREOSCOPIC X-RAY GUIDANCE: HCPCS | Mod: 26,,, | Performed by: RADIOLOGY

## 2024-08-20 ENCOUNTER — PATIENT MESSAGE (OUTPATIENT)
Dept: RADIATION ONCOLOGY | Facility: CLINIC | Age: 84
End: 2024-08-20
Payer: MEDICARE

## 2024-08-20 ENCOUNTER — OFFICE VISIT (OUTPATIENT)
Dept: UROGYNECOLOGY | Facility: CLINIC | Age: 84
End: 2024-08-20
Payer: MEDICARE

## 2024-08-20 VITALS
DIASTOLIC BLOOD PRESSURE: 64 MMHG | HEART RATE: 78 BPM | WEIGHT: 117.31 LBS | SYSTOLIC BLOOD PRESSURE: 119 MMHG | BODY MASS INDEX: 19.54 KG/M2 | HEIGHT: 65 IN

## 2024-08-20 DIAGNOSIS — N81.3 UTEROVAGINAL PROLAPSE, COMPLETE: ICD-10-CM

## 2024-08-20 DIAGNOSIS — N81.10 PROLAPSE OF ANTERIOR VAGINAL WALL: ICD-10-CM

## 2024-08-20 DIAGNOSIS — Z46.89 PESSARY MAINTENANCE: Primary | ICD-10-CM

## 2024-08-20 PROCEDURE — 77014 PR  CT GUIDANCE PLACEMENT RAD THERAPY FIELDS: CPT | Mod: 26,,, | Performed by: RADIOLOGY

## 2024-08-20 PROCEDURE — 99999 PR PBB SHADOW E&M-EST. PATIENT-LVL III: CPT | Mod: PBBFAC,,, | Performed by: OBSTETRICS & GYNECOLOGY

## 2024-08-20 PROCEDURE — 99213 OFFICE O/P EST LOW 20 MIN: CPT | Mod: S$PBB,,, | Performed by: OBSTETRICS & GYNECOLOGY

## 2024-08-20 PROCEDURE — 99213 OFFICE O/P EST LOW 20 MIN: CPT | Mod: PBBFAC | Performed by: OBSTETRICS & GYNECOLOGY

## 2024-08-20 PROCEDURE — 77386 HC IMRT, COMPLEX: CPT | Performed by: RADIOLOGY

## 2024-08-21 PROCEDURE — 77427 RADIATION TX MANAGEMENT X5: CPT | Mod: ,,, | Performed by: RADIOLOGY

## 2024-08-21 PROCEDURE — 77386 HC IMRT, COMPLEX: CPT | Performed by: RADIOLOGY

## 2024-08-21 PROCEDURE — G6002 STEREOSCOPIC X-RAY GUIDANCE: HCPCS | Mod: 26,,, | Performed by: RADIOLOGY

## 2024-08-21 NOTE — PROGRESS NOTES
Urogyn follow up      [facility] - UROGYNECOLOGY  [address] [address]    Radha Feng  [id number]  1940      Radha Feng is a 83 y.o. here for a urogyn follow up for pelvic organ prolapse.     History of Present Illness   82 y.o.  female has a past medical history of Cataract, Chondromalacia, knee, right (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       06/12/2023  Here for pessary placement. Has had pessary holiday since last visit.    Changes since last visit:  Rare UUI when pulling pants down  Denies pain, bleeding, or discharge  Doing well with pessary--using rephresh weekly  Denies constipation or straining    10/12/2023:  Patient presents for follow up   She is currently using the pessary has had issues with abrasions in the past  No vaginal discharge or bleeding   +JUAN MIGUEL and +UU stable with the pessary     POP + doing well with the pessary     1/18/202:  Patient here for follow up doing well no issues  No discharge, no bleeding   Voiding well no constipation     5/22/2024:  Patient doing well no issues  Pessary in place     8/202/2024:  Newly diagnosed with lung cancer- getting radiation tolerating things well   Patient doing well no issues  Pessary in place     [facility] Urogyn Hpi     Question 12/20/2022  1:44 PM CST - Filed by Patient   General Urogynecology: Are you experiencing the following?     Dysuria (painful urination) No   Nocturia:  waking up at night to empty your bladder  Yes   If you answered yes to the previous question, how many times does this happen per night? 1-2   Enuresis (urine loss during sleep) No   Dribbling urine after you urinate No   Hematuria (urine appears red) No   Type of stream Weak   Urinary frequency: How often a day are you going to the bathroom per day?  Less than 10   Urinary Tract Infections: How many Urinary Tract Infections have you had in the past year? One (1) in the past year   If you  "have had a UTI in the past year, what treatments have you had so far?  Prophylactic antibiotics   Urinary Incontinence (General): Are you experiencing the following?     Past consultation for incontinence: Have you ever seen someone for the evaluation of incontinence? No   If you answered yes to the previous question, please select all the therapies you have tried.  N/a- I answered no to the previous question   Please note the effectiveness of the therapies.     Need to wear protection to keep clothes dry  Yes   If you answered yes to the previous question, please scar the protection you use.  Poise   If you wear protection, how much wetness is typically on each pad? Slight   If you wear protection, how often do you have to change per day, if applicable?  3-4   Stress Symptoms: Are you experiencing the following?     Leakage of urine with cough, laugh and/or sneeze Yes   If you answered yes to the previous question, what is the frequency in days, weeks and/or months? Daily   Leakage of urine with sex No   Leakage of urine with bending/ lifting No   Leakage of urine with briskly walking or jogging No   If you lose urine for any other reason not previously mentioned, please note it below, if applicable.      Urge Symptoms: Are you experiencing the following?     Urgency ("got to go" feeling) No   Urge: How frequently do you feel an urge to urinate (feeling like you "gotta go" to the bathroom and can't wait) Never   Do you experience a leakage of urine when you have a feeling of urgency?  No   Leakage of urine when unaware No   Past use of anticholinergics (medications used to treat overactive bladder) No   If you answered yes to the previous question, please scar the anticholinergics you have used:      Have you ever used Mirbetriq (aka Mirabegron)?  No   Prolapse Symptoms: Are you experiencing any of the following?      Falling out/ Bulging/ Heaviness in the vagina Yes   Vaginal/ Abdominal Pain/ Pressure No   Need to " strain/ Push to void No   Need to wait on the toilet before you void No   Unusual position to urinate (using your hands to push back the vaginal bulge) No   Sensation of incomplete emptying No   Past use of pessary device No   If you answered yes to the previous question, please list the devices you have used below.      Bowel Symptoms: Are you experiencing any of the following?     Constipation No   Diarrhea  No   Hematochezia (bloody stool) No   Incomplete evacuation of stool No   Involuntary loss of formed stool No   Fecal smearing/urgency No   Involuntary loss of gas Yes   Vaginal Symptoms: Are you experiencing any of the following?      Abnormal vaginal bleeding  No   Vaginal dryness No   Sexually active  No   Dyspareunia (painful intercourse) No   Estrogen use  No        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    Diabetes mellitus     Glaucoma     Hypertension     Other ascites 11/29/2022       Past Surgical History:   Procedure Laterality Date    CATARACT EXTRACTION W/  INTRAOCULAR LENS IMPLANT Left 12/21/2021        ENDOBRONCHIAL ULTRASOUND N/A 07/05/2024    Procedure: ENDOBRONCHIAL ULTRASOUND (EBUS);  Surgeon: Rolo Wilkinson MD;  Location: [facility] OR [address] [place] [address];  Service: Pulmonary;  Laterality: N/A;    ESOPHAGOGASTRODUODENOSCOPY N/A 06/26/2023    Procedure: ESOPHAGOGASTRODUODENOSCOPY (EGD);  Surgeon: Edgar Branch MD;  Location: [facility] (4TH FLR);  Service: Endoscopy;  Laterality: N/A;  referral Dr Peraza-cirrhosis/labs done on 4/24/23-instr portal-GT       Family History   Problem Relation Name Age of Onset    Cataracts Mother      Diabetes Mother      Glaucoma Mother      Hypertension Mother      Heart attack Father      Colon cancer Sister      Blindness Cousin      Amblyopia Neg Hx      Macular degeneration Neg Hx      Retinal detachment Neg Hx      Strabismus Neg Hx         Social History     Socioeconomic History    Marital status:  Single   Tobacco Use    Smoking status: Former    Smokeless tobacco: Never   Substance and Sexual Activity    Alcohol use: Not Currently    Drug use: Never   Social History Narrative    , one daughter local, four sons out of state. Retired . Lives with daughter Joss.     Social Determinants of Health     Financial Resource Strain: Low Risk  (6/21/2024)    Overall Financial Resource Strain (CARDIA)     Difficulty of Paying Living Expenses: Not hard at all   Food Insecurity: No Food Insecurity (6/21/2024)    Hunger Vital Sign     Worried About Running Out of Food in the Last Year: Never true     Ran Out of Food in the Last Year: Never true   Transportation Needs: No Transportation Needs (6/21/2024)    TRANSPORTATION NEEDS     Transportation : No   Physical Activity: Inactive (6/21/2024)    Exercise Vital Sign     Days of Exercise per Week: 1 day     Minutes of Exercise per Session: 0 min   Stress: No Stress Concern Present (6/21/2024)    [employer] - Occupational Stress Questionnaire     Feeling of Stress : Only a little   Recent Concern: Stress - Stress Concern Present (6/21/2024)    [employer] - Occupational Stress Questionnaire     Feeling of Stress : To some extent   Housing Stability: Low Risk  (6/21/2024)    Housing Stability Vital Sign     Unable to Pay for Housing in the Last Year: No     Homeless in the Last Year: No       Current Outpatient Medications   Medication Sig Dispense Refill    acetaminophen (TYLENOL) 500 MG tablet Take 2 tablets (1,000 mg total) by mouth every 8 (eight) hours as needed for Pain.  0    amLODIPine (NORVASC) 2.5 MG tablet TAKE 1 TABLET BY MOUTH EVERY DAY 90 tablet 1    BD INSULIN SYRINGE ULTRA-FINE 1 mL 31 gauge x 5/16 Syrg       blood-glucose meter,continuous (DEXCOM G6 ) Misc Check blood sugar before meals and at bedtime 1 each PRN    blood-glucose sensor (DEXCOM G6 SENSOR) Amanda 3 each by  "Misc.(Non-Drug; Combo Route) route every 10 days. 3 each 11    blood-glucose transmitter (DEXCOM G6 TRANSMITTER) Amanda 1 each by Misc.(Non-Drug; Combo Route) route every 3 (three) months. 1 each 3    brimonidine 0.2% (ALPHAGAN) 0.2 % Drop Place 1 drop into both eyes 3 (three) times daily. 10 mL 11    cholecalciferol, vitamin D3, (VITAMIN D3) 25 mcg (1,000 unit) capsule Take 2 capsules (2,000 Units total) by mouth once daily.  0    dorzolamide-timolol 2-0.5% (COSOPT) 22.3-6.8 mg/mL ophthalmic solution INSTILL 1 DROP INTO RIGHT EYE TWICE A DAY 10 mL 11    estradioL (ESTRACE) 0.01 % (0.1 mg/gram) vaginal cream Place 0.5 g vaginally twice a week. Apply to the vagina daily for two weeks then twice a week. 45 g 4    furosemide (LASIX) 40 MG tablet TAKE 1 TABLET BY MOUTH EVERY DAY 90 tablet 0    insulin aspart U-100 (NOVOLOG U-100 INSULIN ASPART) 100 unit/mL injection TO USE WITH OMNIPOD 5. TOTAL DAILY DOSE UP TO 40 UNITS 40 mL 4    insulin pump cart,automated,BT (OMNIPOD 5 G6 PODS, GEN 5,) Crtg Inject 1 each into the skin Every 3 (three) days. 10 each 11    latanoprost 0.005 % ophthalmic solution PLACE 1 DROP INTO BOTH EYES ONCE DAILY 7.5 mL 3    lisinopriL-hydrochlorothiazide (PRINZIDE,ZESTORETIC) 20-12.5 mg per tablet TAKE 1 TABLET BY MOUTH EVERY DAY 90 tablet 1    pen needle, diabetic 31 gauge x 5/16" Ndle Use to inject insulin into the skin up to 4 times daily 400 each 3    predniSONE (DELTASONE) 5 MG tablet Take 0.5 tablets (2.5 mg total) by mouth once daily. 15 tablet 3    lactulose (CEPHULAC) 10 gram packet Take 10 g by mouth 3 (three) times daily. (Patient not taking: Reported on 8/20/2024)       No current facility-administered medications for this visit.       Review of patient's allergies indicates:  No Known Allergies    Well Woman:  Pap:denies history of abnormal pap  Mammo:no longer screening  Colonoscopy:refused  Dexa:03/2023    Hip Fracture 6%.   Impression:   Osteopenia lumbar spine.  Osteoporosis both " "hips.      ROS:  As per HPI.      Exam  /64   Pulse 78   Ht 5' 5" (1.651 m)   Wt 53.2 kg (117 lb 4.6 oz)   BMI 19.52 kg/m²   General: alert and oriented, no acute distress  Respiratory: normal respiratory effort  Abd: soft, non-tender, non-distended    Pelvic  Ext. Genitalia: normal external genitalia. Normal bartholin's and skeens glands  Vagina: + atrophy. Normal vaginal mucosa without lesions. No abrasion noted  Non-tender bladder base without palpable mass.  #2 1/2 LS GH pessary in place- removed and cleaned   Cervix no lesions  Uterus:  nontender, normal size, shape, position, mobile  Urethra: no masses or tenderness  Urethral meatus: no lesions, caruncle or prolapse.    Pessary removed without difficulty. Washed with soap and water. Reinserted without difficulty    Impression  1. Pessary maintenance        2. Uterovaginal prolapse, complete        3. Prolapse of anterior vaginal wall                Plan:   1. Prolapse: Stage 2 apical prolapse, Stage 2 anterior and posterior vaginal wall prolapse -  --continue  #2 1/2 " LS gH pessary- an  --Daily pelvic floor exercises as assigned, Pelvic floor PT   --Non surgical options discussed with patient    --no discharge continue vaginal estrogen and add replens  on opposite days.      2.  Mixed urinary incontinence, urge > stress:   --Empty bladder every 3 hours.  Empty well: wait a minute, lean forward on toilet.    --Avoid dietary irritants (see sheet).  Keep diary x 3-5 days to determine your irritants.  --KEGELS: do 10 in AM and 10 in PM, holding each x 10 seconds.  When you feel urge to go, STOP, KEGEL, and when urge has passed, then go to bathroom.  --URGE: .  SE profile reviewed.    Takes 2-4 weeks to see if will have effect.  For dry mouth: get sour, sugar free lozenge or gum.    --STRESS:  Non surgical options: Pessary vs. Impressa vs Rivive - which represent urethral and bladder support devices; Surgical options including 1.  mid urethral sling; " retropubic, transobturator vs single incision 2. Fascial slings 3. Hutchison procedure 4. Periurethral bulking     3. Vaginal atrophy (dryness):  Use .5 gram of estrogen cream in vagina reese for two weeks then twice a week .  Please start Use REPLENS or REFRESH OTC: 1/2 applicator full in vagina twice a week.      4. RTC 3 months for for pc    Luisa Evans DO  Female Pelvic Medicine and Reconstructive Surgery  [facility] [place], [place]

## 2024-08-22 PROCEDURE — 77386 HC IMRT, COMPLEX: CPT | Performed by: RADIOLOGY

## 2024-08-22 PROCEDURE — 77336 RADIATION PHYSICS CONSULT: CPT | Performed by: RADIOLOGY

## 2024-08-22 PROCEDURE — G6002 STEREOSCOPIC X-RAY GUIDANCE: HCPCS | Mod: 26,,, | Performed by: RADIOLOGY

## 2024-08-23 PROCEDURE — 77386 HC IMRT, COMPLEX: CPT | Performed by: RADIOLOGY

## 2024-08-23 PROCEDURE — G6002 STEREOSCOPIC X-RAY GUIDANCE: HCPCS | Mod: 26,,, | Performed by: RADIOLOGY

## 2024-08-26 PROCEDURE — G6002 STEREOSCOPIC X-RAY GUIDANCE: HCPCS | Mod: 26,,, | Performed by: RADIOLOGY

## 2024-08-26 PROCEDURE — 77386 HC IMRT, COMPLEX: CPT | Performed by: RADIOLOGY

## 2024-08-27 PROCEDURE — 77386 HC IMRT, COMPLEX: CPT | Performed by: RADIOLOGY

## 2024-08-27 PROCEDURE — 77014 PR  CT GUIDANCE PLACEMENT RAD THERAPY FIELDS: CPT | Mod: 26,,, | Performed by: RADIOLOGY

## 2024-08-28 PROCEDURE — 77386 HC IMRT, COMPLEX: CPT | Performed by: RADIOLOGY

## 2024-08-28 PROCEDURE — G6002 STEREOSCOPIC X-RAY GUIDANCE: HCPCS | Mod: 26,,, | Performed by: RADIOLOGY

## 2024-08-28 PROCEDURE — 77427 RADIATION TX MANAGEMENT X5: CPT | Mod: ,,, | Performed by: RADIOLOGY

## 2024-08-29 PROCEDURE — G6002 STEREOSCOPIC X-RAY GUIDANCE: HCPCS | Mod: 26,,, | Performed by: RADIOLOGY

## 2024-08-29 PROCEDURE — 77386 HC IMRT, COMPLEX: CPT | Performed by: RADIOLOGY

## 2024-08-29 PROCEDURE — 77336 RADIATION PHYSICS CONSULT: CPT | Performed by: RADIOLOGY

## 2024-08-30 PROCEDURE — 77386 HC IMRT, COMPLEX: CPT | Performed by: RADIOLOGY

## 2024-08-30 PROCEDURE — G6002 STEREOSCOPIC X-RAY GUIDANCE: HCPCS | Mod: 26,,, | Performed by: RADIOLOGY

## 2024-09-03 ENCOUNTER — APPOINTMENT (OUTPATIENT)
Dept: RADIATION THERAPY | Facility: OTHER | Age: 84
End: 2024-09-03
Attending: RADIOLOGY
Payer: MEDICARE

## 2024-09-03 PROCEDURE — G6002 STEREOSCOPIC X-RAY GUIDANCE: HCPCS | Mod: 26,,, | Performed by: RADIOLOGY

## 2024-09-03 PROCEDURE — 77386 HC IMRT, COMPLEX: CPT | Performed by: RADIOLOGY

## 2024-09-04 PROCEDURE — G6002 STEREOSCOPIC X-RAY GUIDANCE: HCPCS | Mod: 26,,, | Performed by: RADIOLOGY

## 2024-09-04 PROCEDURE — 77386 HC IMRT, COMPLEX: CPT | Performed by: RADIOLOGY

## 2024-09-05 PROCEDURE — 77336 RADIATION PHYSICS CONSULT: CPT | Performed by: RADIOLOGY

## 2024-09-09 ENCOUNTER — LAB VISIT (OUTPATIENT)
Dept: LAB | Facility: HOSPITAL | Age: 84
End: 2024-09-09
Attending: INTERNAL MEDICINE
Payer: MEDICARE

## 2024-09-09 DIAGNOSIS — K75.4 AUTOIMMUNE HEPATITIS: ICD-10-CM

## 2024-09-09 LAB
ALBUMIN SERPL BCP-MCNC: 2.8 G/DL (ref 3.5–5.2)
ALP SERPL-CCNC: 56 U/L (ref 55–135)
ALT SERPL W/O P-5'-P-CCNC: 14 U/L (ref 10–44)
ANION GAP SERPL CALC-SCNC: 10 MMOL/L (ref 8–16)
AST SERPL-CCNC: 25 U/L (ref 10–40)
BASOPHILS # BLD AUTO: 0.03 K/UL (ref 0–0.2)
BASOPHILS NFR BLD: 0.4 % (ref 0–1.9)
BILIRUB DIRECT SERPL-MCNC: 0.2 MG/DL (ref 0.1–0.3)
BILIRUB SERPL-MCNC: 0.4 MG/DL (ref 0.1–1)
BUN SERPL-MCNC: 23 MG/DL (ref 8–23)
CALCIUM SERPL-MCNC: 10.1 MG/DL (ref 8.7–10.5)
CHLORIDE SERPL-SCNC: 103 MMOL/L (ref 95–110)
CO2 SERPL-SCNC: 23 MMOL/L (ref 23–29)
CREAT SERPL-MCNC: 0.8 MG/DL (ref 0.5–1.4)
DIFFERENTIAL METHOD BLD: ABNORMAL
EOSINOPHIL # BLD AUTO: 0.1 K/UL (ref 0–0.5)
EOSINOPHIL NFR BLD: 1.3 % (ref 0–8)
ERYTHROCYTE [DISTWIDTH] IN BLOOD BY AUTOMATED COUNT: 15.1 % (ref 11.5–14.5)
EST. GFR  (NO RACE VARIABLE): >60 ML/MIN/1.73 M^2
GLUCOSE SERPL-MCNC: 119 MG/DL (ref 70–110)
HCT VFR BLD AUTO: 34.2 % (ref 37–48.5)
HGB BLD-MCNC: 10.8 G/DL (ref 12–16)
IMM GRANULOCYTES # BLD AUTO: 0.03 K/UL (ref 0–0.04)
IMM GRANULOCYTES NFR BLD AUTO: 0.4 % (ref 0–0.5)
INR PPP: 1 (ref 0.8–1.2)
LYMPHOCYTES # BLD AUTO: 0.4 K/UL (ref 1–4.8)
LYMPHOCYTES NFR BLD: 5.6 % (ref 18–48)
MCH RBC QN AUTO: 30 PG (ref 27–31)
MCHC RBC AUTO-ENTMCNC: 31.6 G/DL (ref 32–36)
MCV RBC AUTO: 95 FL (ref 82–98)
MONOCYTES # BLD AUTO: 0.7 K/UL (ref 0.3–1)
MONOCYTES NFR BLD: 10.4 % (ref 4–15)
NEUTROPHILS # BLD AUTO: 5.8 K/UL (ref 1.8–7.7)
NEUTROPHILS NFR BLD: 81.9 % (ref 38–73)
NRBC BLD-RTO: 0 /100 WBC
PLATELET # BLD AUTO: 217 K/UL (ref 150–450)
PMV BLD AUTO: 10.5 FL (ref 9.2–12.9)
POTASSIUM SERPL-SCNC: 3.9 MMOL/L (ref 3.5–5.1)
PROT SERPL-MCNC: 7.3 G/DL (ref 6–8.4)
PROTHROMBIN TIME: 11.2 SEC (ref 9–12.5)
RBC # BLD AUTO: 3.6 M/UL (ref 4–5.4)
SODIUM SERPL-SCNC: 136 MMOL/L (ref 136–145)
WBC # BLD AUTO: 7.12 K/UL (ref 3.9–12.7)

## 2024-09-09 PROCEDURE — 36415 COLL VENOUS BLD VENIPUNCTURE: CPT | Mod: PN | Performed by: INTERNAL MEDICINE

## 2024-09-09 PROCEDURE — 85025 COMPLETE CBC W/AUTO DIFF WBC: CPT | Performed by: INTERNAL MEDICINE

## 2024-09-09 PROCEDURE — 80053 COMPREHEN METABOLIC PANEL: CPT | Performed by: INTERNAL MEDICINE

## 2024-09-09 PROCEDURE — 85610 PROTHROMBIN TIME: CPT | Performed by: INTERNAL MEDICINE

## 2024-09-09 PROCEDURE — 82248 BILIRUBIN DIRECT: CPT | Performed by: INTERNAL MEDICINE

## 2024-09-12 ENCOUNTER — TELEPHONE (OUTPATIENT)
Dept: HEMATOLOGY/ONCOLOGY | Facility: CLINIC | Age: 84
End: 2024-09-12
Payer: MEDICARE

## 2024-09-13 ENCOUNTER — TELEPHONE (OUTPATIENT)
Dept: RADIATION ONCOLOGY | Facility: CLINIC | Age: 84
End: 2024-09-13
Payer: MEDICARE

## 2024-09-13 NOTE — TELEPHONE ENCOUNTER
Call to pt to schedule f/u with Dr Wade after completing right lung radiation.Spoke with pt's daughter Della. Appt scheduled 10/11/2024 at 1:30 PM at the [facility]. Appt reminder mailed to pt.

## 2024-10-01 ENCOUNTER — CLINICAL SUPPORT (OUTPATIENT)
Dept: DIABETES | Facility: CLINIC | Age: 84
End: 2024-10-01
Payer: MEDICARE

## 2024-10-01 DIAGNOSIS — E11.59 TYPE 2 DIABETES MELLITUS WITH OTHER CIRCULATORY COMPLICATION, WITH LONG-TERM CURRENT USE OF INSULIN: Primary | ICD-10-CM

## 2024-10-01 DIAGNOSIS — Z79.4 TYPE 2 DIABETES MELLITUS WITH OTHER CIRCULATORY COMPLICATION, WITH LONG-TERM CURRENT USE OF INSULIN: Primary | ICD-10-CM

## 2024-10-01 PROCEDURE — G0108 DIAB MANAGE TRN  PER INDIV: HCPCS | Mod: 95,,, | Performed by: INTERNAL MEDICINE

## 2024-10-04 NOTE — PROGRESS NOTES
Diabetes Care Specialist Virtual Visit Note   The patient location is: Home in [place]  The chief complaint leading to consultation is: Diabetes  Visit type: audiovisual  Total time spent with patient: 30 min   Each patient to whom he or she provides medical services by telemedicine is:  (1) informed of the relationship between the physician and patient and the respective role of any other health care provider with respect to management of the patient; and (2) notified that he or she may decline to receive medical services by telemedicine and may withdraw from such care at any time.      Diabetes Care Specialist Follow-up Note  Author: Michelle Feng RN, CDE  Date: 10/1/2024    Intake    Program Intake  Reason for Diabetes Program Visit:: Intervention  Type of Intervention:: Individual  Individual: Education  Education: Self-Management Skill Review  Current diabetes risk level:: moderate  Permission to speak with others about care:: yes    Current Diabetes Treatment: Insulin  Method of insulin delivery?: Insulin Pump  Type of Pump: Omnipod 5  Does patient have back-up plan?: Yes  Any problems obtaining supplies?: No    Continuous Glucose Monitoring  Patient has CGM: Yes  Personal CGM type:: Dexcom G6    Lab Results   Component Value Date    HGBA1C 6.0 (H) 06/21/2024     Diabetes Self-Management Skills Assessment    Medication Skills Assessment  Patient is able to identify current diabetes medications, dosages, and appropriate timing of medications.: yes  Patient reports problems or concerns with current medication regimen.: no  Patient is  aware that some diabetes medications can cause low blood sugar?: Yes  Medication Skills Assessment Completed:: Yes  Assessment indicates:: Adequate understanding  Area of need?: No    Home Blood Glucose Monitoring  Personal CGM type:: Dexcom G6    During today's follow-up visit,  the following areas required further assessment and content was provided/reviewed.    Based on  today's diabetes care assessment, the following areas of need were identified:          10/4/2024   Areas of Need   Medications/Current Diabetes Treatment No, see care planning          Today's interventions were provided through individual discussion, instruction, and written materials were provided.    Patient verbalized understanding of instruction and written materials.  Pt was able to return back demonstration of instructions today. Patient understood key points, needs reinforcement and further instruction.     Diabetes Self-Management Care Plan Review and Evaluation of Progress:    During today's follow-up Radha's Diabetes Self-Management Care Plan progress was reviewed and progress was evaluated including his/her input. Radha has agreed to continue his/her journey to improve/maintain overall diabetes control by continuing to set health goals. See care plan progress below.      Care Plan: Diabetes Management   Updates made since 9/4/2024 12:00 AM        Problem: Medications         Goal: Patient Agrees to take Diabetes Medication(s) using the Omnipod 5 system    Start Date: 3/27/2023   Expected End Date: 10/15/2024   This Visit's Progress: On track   Recent Progress: On track   Priority: High   Barriers: Knowledge deficit   Note:    OP5    Update to care planning 06/21/2023:  Reviewed Omnipod 5 download with Dexcom integration with patient and her daughter Vinod.  Overall doing well.  Bgs stable during the night and when she is not eating.  Elevations noted at all post meal glucose.  Carbs are controlled and measured by family members.  Will increase her I:C from 16 to 14 as she requires more insulin with meals.  Walked daughter thru changes on her PDM. No other concerns voiced.  We reviewed troubleshooting, what to look for including leaking at the site an unexplained rise in glucose readings.  Advised to monitor for these and to change the pod if this occurs.  Advised that she no longer has long acting  insulin and changes must be address immediately to avoid potential issues.  Has back up insulin.  Demonstrated how to use the inhaled and glucagon emergency pens.  Will reach out to HCP to prescribed. Patient states she likes using system as opposed to injections. Advised to call Omnipod for replacement pods as she had to change 2 prematurely.  No other questions voiced.     Update to care planning 10/10/23:  Patient and her daughter seen for pump f/u.  Doing well on Omnipod 5.  We reviewed when changing the pod to make sure she is in [employer].  Had changed pod last and forgot to switch.  Discussed troubleshooting with pump and how to identify issues.  Advised to always change pod if numbers do not respond to correction bolus.  Instructed Too how to do a correction bolus with pump.     Update to care planning 4/17/2024:  Patient seen virtually for her 6 mth f/u visit.  Tish (daughter) in attendance. Download was reviewed for Omnipod 5.  Continues to do very well on pump, reports no issues.  Assisted with changing IC ratio and basal rates per Dr. Agarwal.  No other issues, continues to enter her meals appropriately.     Update to care planning 10/1/2024: Patient seen virtually for f/u visit. Regino daughter in attendance. Overall doing well with the pump. Daughter stated that sometimes the patient's glucose will drop overnight.  Download reviewed.  Adjusting her targets as follows: 12 am target 140/correct over 150; 6 am target 120 correct over 140.  Advised will look at download next week to see if further changes are needed.          Follow Up Plan     F/u in 6 mths    Today's care plan and follow up schedule was discussed with patient.  Radha verbalized understanding of the care plan, goals, and agrees to follow up plan.        The patient was encouraged to communicate with his/her health care provider/physician and care team regarding his/her condition(s) and treatment.  I provided the patient with my  contact information today and encouraged to contact me via phone or [facility] Patient Portal as needed.     Length of Visit   Total Time: 30 Minutes

## 2024-10-09 ENCOUNTER — TELEPHONE (OUTPATIENT)
Dept: HEMATOLOGY/ONCOLOGY | Facility: CLINIC | Age: 84
End: 2024-10-09
Payer: MEDICARE

## 2024-10-09 NOTE — PROGRESS NOTES
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 Feng is an 83-year-old female with multiple comorbidities including diabetes, hypertension, and autoimmune hepatitis on chronic prednisone, who received definitive radiation to the right lung and mediastinum as above who returns for follow up.    She was diagnosed with right lung cancer after she presented with 1 episode of hemoptysis. A CTA chest on June 20, 2024 revealed a mass of the level of the right hilum measuring 3.9 x 2.5 cm, encasing several venous structures and airways, as well as an eccentric pleural-based soft tissue focus along the lateral aspect of the left upper lobe measuring 2.8 x 1.2 cm (stable since the 2022). EBUS and biopsy on July 5, 2024 revealed the right upper lobe mass positive for malignancy, adenocarcinoma (TTF 1-). Lymph nodes from level 4R, 7, 11 R, and 11 L were sampled out of which, 4R is positive for malignancy consistent metastatic adenocarcinoma. A PET scan on July 18, 2024 reveals hypermetabolic right perihilar mass with suspected endobronchial extension with SUV max 11 with several additional subcentimeter satellite nodules which are too small to characterize. There was hypermetabolic lymphadenopathy including 1 cm right hilar lymph node with SUV max 4.7 and 1.3 cm right paratracheal lymph node with SUV max 4.8. The left upper lobe subpleural lesion is negative for uptake. She is not a candidate for concurrent chemotherapy due to her comorbidities after evaluation by medical oncology. She received definitive radiation to the right upper lobe mass and mediastinum as above.  She returns for a  "follow-up.    Since completion of radiation, she reports improvement in cough, shortness of breath, and her energy.  Her appetite is good.  She denies fever, night sweats, or weight loss.  She walks with a cane at home due to knee pain.    PHYSICAL EXAMINATION:  Vitals:    10/11/24 1346   BP: (!) 157/89   Pulse: 85   Resp: 14   Temp: 98.1 °F (36.7 °C)   TempSrc: Oral   SpO2: 97%   Weight: 52.4 kg (115 lb 10.1 oz)   Height: 5' 5" (1.651 m)   Body mass index is 19.24 kg/m².   GENERAL: Patient is alert and oriented, in no acute distress.  HEENT: Extraocular muscles are intact.  Oropharynx is clear without lesions.  There is no cervical or supraclavicular adenopathy palpated.    CHEST: Breath sounds clear bilaterally.  No rales.  No rhonchi.  Unlabored respirations.  CARDIOVASCULAR: Normal S1, S2.  Normal rate.  Regular rhythm.  ABDOMEN: Bowel sounds normal.  No tenderness.  No abdominal distention.  No hepatomegaly.  No splenomegaly.  EXTREMITIES: No clubbing, cyanosis, edema  NEUROLOGIC: Cranial nerves II through XII are grossly intact.  Sensation is intact.  Strength is 5 out of 5 in the upper and lower extremities bilaterally.    ASSESSMENT:   This is a an 83-year-old female with multiple comorbidities including autoimmune hepatitis and history of smoking, with [id number], stage IIIA, adenocarcinoma of the right upper lobe with right hilar mass and right hilar and mediastinal lymphadenopathy who completed definitive radiation.    PLAN:   Ms. Feng is recovering from radiation without any unexpected side effects.  She was not a candidate for systemic treatment due to her performance status and autoimmune hepatitis after consultation with Dr. Kohler.  She was recommended to repeat a PET scan in 3 months and return for follow up.  However, patient requests to repeat PET scan in 6 months.  This was ordered today.  She will return for follow-up with radiation oncology after the PET scan results are available.    "

## 2024-10-10 ENCOUNTER — OFFICE VISIT (OUTPATIENT)
Dept: HEMATOLOGY/ONCOLOGY | Facility: CLINIC | Age: 84
End: 2024-10-10
Payer: MEDICARE

## 2024-10-10 VITALS — WEIGHT: 115.31 LBS | HEIGHT: 65 IN | BODY MASS INDEX: 19.21 KG/M2

## 2024-10-10 DIAGNOSIS — C34.11 ADENOCARCINOMA OF UPPER LOBE OF RIGHT LUNG: Primary | ICD-10-CM

## 2024-10-10 DIAGNOSIS — C77.1 SECONDARY MALIGNANT NEOPLASM OF INTRATHORACIC LYMPH NODES: ICD-10-CM

## 2024-10-10 PROCEDURE — 99999 PR PBB SHADOW E&M-EST. PATIENT-LVL I: CPT | Mod: PBBFAC,,, | Performed by: HOSPITALIST

## 2024-10-10 PROCEDURE — 99211 OFF/OP EST MAY X REQ PHY/QHP: CPT | Mod: PBBFAC | Performed by: HOSPITALIST

## 2024-10-10 PROCEDURE — 99214 OFFICE O/P EST MOD 30 MIN: CPT | Mod: S$PBB,,, | Performed by: HOSPITALIST

## 2024-10-10 NOTE — PROGRESS NOTES
[facility] MEDICAL ONCOLOGY - FOLLOW UP VISIT    Reason for visit: Non-small-cell lung cancer.      Oncology History   Primary adenocarcinoma of upper lobe of right lung   6/20/2024 Imaging Significant Findings    CTA PE (suspected PE, hemoptysis)  - 3.9 x 2.5 cm mass at level of right hilum encasing several vascular structures, new compared to 9/20/22  - Scattered ground-glass attenuation around right hilar mass  - 2.8 x 1.2 cm eccentric pleural-based lateral left upper lung mass, stable compared to prior  - Known pulmonary embolism     7/5/2024 Procedure    Bronch w/ EBUS  RUL, FNA  - Adenocarcinoma (Positive: CK-7; Negative: TTF-1)    LN  - Positive: Station 4R  - Negative: Station 7, 11R, 11L    [facility] NGS / PD-L1  - PD-L1 TPS 5%         7/11/2024 Tumor Genotyping    [facility] Liquid Biopsy  - BAP1, MAP2K1, TP53 (LOF)  - BRCA VUS, PTEN VUS     7/18/2024 Imaging Significant Findings    PET CT  - 3.9 cm right perihilar mass with suspected endobronchial extension, SUV 11  - Several additional subcentimeter satellite pulmonary nodules too small to characterize on PET  - 1.0 cm right hilar lymph node, SUV 4.7  - 1.3 cm right paratracheal lymph node, SUV 4.8  - 3.1 x 1.2 cm KAY subpleural fat attenuating structure, no hypermetabolic activity     7/25/2024 Cancer Staged    Staging form: Lung, AJCC 8th Edition  - Clinical stage from 7/25/2024: Stage IIIA (cT2a, cN2, cM0)     8/14/2024 - 9/4/2024 Radiation Therapy      Treatment Summary  Course: C1 Lung 2024  Treatment Site Energy Dose/Fx (Gy) #Fx Dose Correction (Gy) Total Dose (Gy) Start Date End Date Elapsed Days   Right lung/mediastinum 6X 3 15 / 15 0 45 8/14/2024 9/4/2024 21             Oncology History        HPI:     Radha Feng is a 83 y.o. female with pmh significant for COPD, PVD, HFpEF, aortic and coronary atherosclerosis, urinary incontinence, T2DM, osteroporosis, and autoimmune hepatitis on chronic prednisone,  who presents for follow up of her below lung cancer    1) Stage IIIB (cT3, pN2, cMx) adenocarcinoma of the RUL with intralobar mets (PD-L1 5%, no targetable mutations), initially dx'd 7/5/24, s/p radiation therapy to the R lung/mediastinum (45 Gy/15 Fx, 8/14/24-9/4/24)    Last clinic 7/26/24, plan for palliative radiation therapy due to goals of care.    Interval History:  8/14/24-9/4/24: Radiation therapy    The pt states that she tolerated her radiation therapy well, and denies any bothersome symptoms at this time. She says that she did get fatigued while she was receiving radiation therapy, but says this has since resolved.     Her daughter states that she hasn't been symptomatic. She says that her cough is significantly improved, and notes that she hasn't had any further episodes of hemoptysis. She describes stable VERA, with things like making her bed and changing her summer clothes to her winter clothes. Her daughter feels this is better than it was previously. The pt states that the biggest limiting factor for her activity is her knee pain.     ROS:   As per HPI.       Physical Exam:       There were no vitals taken for this visit.               Physical Exam  Constitutional:       Appearance: Normal appearance.      Comments: Sitting in wheelchair   HENT:      Head: Normocephalic and atraumatic.   Eyes:      Extraocular Movements: Extraocular movements intact.      Conjunctiva/sclera: Conjunctivae normal.      Pupils: Pupils are equal, round, and reactive to light.   Cardiovascular:      Rate and Rhythm: Normal rate and regular rhythm.      Heart sounds: Murmur (2/6 systolic murmur) heard.      No friction rub. No gallop.   Pulmonary:      Effort: Pulmonary effort is normal.      Breath sounds: No wheezing, rhonchi or rales.   Musculoskeletal:         General: Normal range of motion.      Right lower leg: No edema.      Left lower leg: No edema.   Skin:     General: Skin is warm and dry.   Neurological:       Mental Status: She is alert and oriented to person, place, and time.   Psychiatric:         Mood and Affect: Mood normal.         Thought Content: Thought content normal.         Judgment: Judgment normal.           Labs:   No results found for this or any previous visit (from the past 48 hours).       Imaging:    See oncologic history above.     Path:  See oncologic history above.      Assessment and Plan:      Radha Feng is a 83 y.o. female with pmh significant for COPD, PVD, HFpEF, aortic and coronary atherosclerosis, urinary incontinence, T2DM, osteroporosis, and autoimmune hepatitis on chronic prednisone, who presents for follow up of her below lung cancer    1) Stage IIIB (cT3, pN2, cMx) adenocarcinoma of the RUL with intralobar mets (PD-L1 5%, no targetable mutations), initially dx'd 7/5/24, s/p radiation therapy to the R lung/mediastinum (45 Gy/15 Fx, 8/14/24-9/4/24)    Stage IIIB Adenocarcinoma of the RUL, PD-L1 5%, No Actionable Genetic Alteration  ECOG PS 2 (limited by knee pain, in wheelchair).  Patient presents for follow up after completing palliative radiation to her RUL.  As per previous conversation, systemic therapy was avoided due to age, performance status, and patient's personal goals.  Now that she has completed palliative radiation therapy, plan is to transition to surveillance.  Discussed that, at this time, there is no role for systemic cancer directed therapy, though the patient may continue to return to clinic as needed.    PLAN:   Radiation oncology follow up tomorrow (10/11/24), surveillance imaging per radiation oncology  RTC PRN      The above information has been reviewed with the patient and all questions have been answered to their apparent satisfaction.  They understand that they can call the clinic with any questions.      Route Chart for Scheduling  Med Onc Route Chart for Scheduling        Disclaimer: This note was prepared using voice recognition system and is likely to  have sound alike errors.  Please call me with any questions.

## 2024-10-11 ENCOUNTER — OFFICE VISIT (OUTPATIENT)
Dept: RADIATION ONCOLOGY | Facility: CLINIC | Age: 84
End: 2024-10-11
Payer: MEDICARE

## 2024-10-11 VITALS
HEART RATE: 85 BPM | TEMPERATURE: 98 F | HEIGHT: 65 IN | OXYGEN SATURATION: 97 % | SYSTOLIC BLOOD PRESSURE: 157 MMHG | WEIGHT: 115.63 LBS | DIASTOLIC BLOOD PRESSURE: 89 MMHG | RESPIRATION RATE: 14 BRPM | BODY MASS INDEX: 19.27 KG/M2

## 2024-10-11 DIAGNOSIS — C34.11 PRIMARY ADENOCARCINOMA OF UPPER LOBE OF RIGHT LUNG: Primary | ICD-10-CM

## 2024-10-11 PROCEDURE — 99214 OFFICE O/P EST MOD 30 MIN: CPT | Mod: PBBFAC | Performed by: RADIOLOGY

## 2024-10-11 PROCEDURE — 99999 PR PBB SHADOW E&M-EST. PATIENT-LVL IV: CPT | Mod: PBBFAC,,, | Performed by: RADIOLOGY

## 2024-10-15 ENCOUNTER — LAB VISIT (OUTPATIENT)
Dept: LAB | Facility: HOSPITAL | Age: 84
End: 2024-10-15
Attending: INTERNAL MEDICINE
Payer: MEDICARE

## 2024-10-15 DIAGNOSIS — K75.4 AUTOIMMUNE HEPATITIS: ICD-10-CM

## 2024-10-15 LAB
ALBUMIN SERPL BCP-MCNC: 3.2 G/DL (ref 3.5–5.2)
ALP SERPL-CCNC: 56 U/L (ref 55–135)
ALT SERPL W/O P-5'-P-CCNC: 21 U/L (ref 10–44)
ANION GAP SERPL CALC-SCNC: 8 MMOL/L (ref 8–16)
AST SERPL-CCNC: 30 U/L (ref 10–40)
BASOPHILS # BLD AUTO: 0.03 K/UL (ref 0–0.2)
BASOPHILS NFR BLD: 0.5 % (ref 0–1.9)
BILIRUB DIRECT SERPL-MCNC: 0.2 MG/DL (ref 0.1–0.3)
BILIRUB SERPL-MCNC: 0.7 MG/DL (ref 0.1–1)
BUN SERPL-MCNC: 23 MG/DL (ref 8–23)
CALCIUM SERPL-MCNC: 10.3 MG/DL (ref 8.7–10.5)
CHLORIDE SERPL-SCNC: 108 MMOL/L (ref 95–110)
CO2 SERPL-SCNC: 24 MMOL/L (ref 23–29)
CREAT SERPL-MCNC: 0.7 MG/DL (ref 0.5–1.4)
DIFFERENTIAL METHOD BLD: ABNORMAL
EOSINOPHIL # BLD AUTO: 0.3 K/UL (ref 0–0.5)
EOSINOPHIL NFR BLD: 4 % (ref 0–8)
ERYTHROCYTE [DISTWIDTH] IN BLOOD BY AUTOMATED COUNT: 15.4 % (ref 11.5–14.5)
EST. GFR  (NO RACE VARIABLE): >60 ML/MIN/1.73 M^2
GLUCOSE SERPL-MCNC: 145 MG/DL (ref 70–110)
HCT VFR BLD AUTO: 36.3 % (ref 37–48.5)
HGB BLD-MCNC: 11.4 G/DL (ref 12–16)
IMM GRANULOCYTES # BLD AUTO: 0.03 K/UL (ref 0–0.04)
IMM GRANULOCYTES NFR BLD AUTO: 0.5 % (ref 0–0.5)
INR PPP: 1 (ref 0.8–1.2)
LYMPHOCYTES # BLD AUTO: 0.7 K/UL (ref 1–4.8)
LYMPHOCYTES NFR BLD: 10.4 % (ref 18–48)
MCH RBC QN AUTO: 30.2 PG (ref 27–31)
MCHC RBC AUTO-ENTMCNC: 31.4 G/DL (ref 32–36)
MCV RBC AUTO: 96 FL (ref 82–98)
MONOCYTES # BLD AUTO: 0.9 K/UL (ref 0.3–1)
MONOCYTES NFR BLD: 13.9 % (ref 4–15)
NEUTROPHILS # BLD AUTO: 4.6 K/UL (ref 1.8–7.7)
NEUTROPHILS NFR BLD: 70.7 % (ref 38–73)
NRBC BLD-RTO: 0 /100 WBC
PLATELET # BLD AUTO: 176 K/UL (ref 150–450)
PMV BLD AUTO: 11.3 FL (ref 9.2–12.9)
POTASSIUM SERPL-SCNC: 4.1 MMOL/L (ref 3.5–5.1)
PROT SERPL-MCNC: 7.2 G/DL (ref 6–8.4)
PROTHROMBIN TIME: 10.8 SEC (ref 9–12.5)
RBC # BLD AUTO: 3.78 M/UL (ref 4–5.4)
SODIUM SERPL-SCNC: 140 MMOL/L (ref 136–145)
WBC # BLD AUTO: 6.53 K/UL (ref 3.9–12.7)

## 2024-10-15 PROCEDURE — 80053 COMPREHEN METABOLIC PANEL: CPT | Performed by: INTERNAL MEDICINE

## 2024-10-15 PROCEDURE — 85025 COMPLETE CBC W/AUTO DIFF WBC: CPT | Performed by: INTERNAL MEDICINE

## 2024-10-15 PROCEDURE — 82248 BILIRUBIN DIRECT: CPT | Performed by: INTERNAL MEDICINE

## 2024-10-15 PROCEDURE — 36415 COLL VENOUS BLD VENIPUNCTURE: CPT | Mod: PN | Performed by: INTERNAL MEDICINE

## 2024-10-15 PROCEDURE — 85610 PROTHROMBIN TIME: CPT | Performed by: INTERNAL MEDICINE

## 2024-10-30 ENCOUNTER — PATIENT MESSAGE (OUTPATIENT)
Dept: PRIMARY CARE CLINIC | Facility: CLINIC | Age: 84
End: 2024-10-30
Payer: MEDICARE

## 2024-10-30 RX ORDER — FUROSEMIDE 40 MG/1
40 TABLET ORAL DAILY
Qty: 90 TABLET | Refills: 0 | OUTPATIENT
Start: 2024-10-30

## 2024-10-30 RX ORDER — FUROSEMIDE 40 MG/1
40 TABLET ORAL DAILY
Qty: 90 TABLET | Refills: 0 | Status: SHIPPED | OUTPATIENT
Start: 2024-10-30

## 2024-11-11 ENCOUNTER — LAB VISIT (OUTPATIENT)
Dept: LAB | Facility: HOSPITAL | Age: 84
End: 2024-11-11
Attending: INTERNAL MEDICINE
Payer: MEDICARE

## 2024-11-11 DIAGNOSIS — K75.4 AUTOIMMUNE HEPATITIS: ICD-10-CM

## 2024-11-11 PROCEDURE — 36415 COLL VENOUS BLD VENIPUNCTURE: CPT | Mod: PN | Performed by: INTERNAL MEDICINE

## 2024-11-11 PROCEDURE — 85610 PROTHROMBIN TIME: CPT | Performed by: INTERNAL MEDICINE

## 2024-11-11 PROCEDURE — 85025 COMPLETE CBC W/AUTO DIFF WBC: CPT | Performed by: INTERNAL MEDICINE

## 2024-11-11 PROCEDURE — 80053 COMPREHEN METABOLIC PANEL: CPT | Performed by: INTERNAL MEDICINE

## 2024-11-11 PROCEDURE — 82248 BILIRUBIN DIRECT: CPT | Performed by: INTERNAL MEDICINE

## 2024-11-12 LAB
ALBUMIN SERPL BCP-MCNC: 3.1 G/DL (ref 3.5–5.2)
ALP SERPL-CCNC: 63 U/L (ref 40–150)
ALT SERPL W/O P-5'-P-CCNC: 18 U/L (ref 10–44)
ANION GAP SERPL CALC-SCNC: 10 MMOL/L (ref 8–16)
AST SERPL-CCNC: 25 U/L (ref 10–40)
BASOPHILS # BLD AUTO: 0.02 K/UL (ref 0–0.2)
BASOPHILS NFR BLD: 0.3 % (ref 0–1.9)
BILIRUB DIRECT SERPL-MCNC: 0.3 MG/DL (ref 0.1–0.3)
BILIRUB SERPL-MCNC: 0.7 MG/DL (ref 0.1–1)
BUN SERPL-MCNC: 29 MG/DL (ref 8–23)
CALCIUM SERPL-MCNC: 9.9 MG/DL (ref 8.7–10.5)
CHLORIDE SERPL-SCNC: 106 MMOL/L (ref 95–110)
CO2 SERPL-SCNC: 24 MMOL/L (ref 23–29)
CREAT SERPL-MCNC: 0.9 MG/DL (ref 0.5–1.4)
DIFFERENTIAL METHOD BLD: ABNORMAL
EOSINOPHIL # BLD AUTO: 0.1 K/UL (ref 0–0.5)
EOSINOPHIL NFR BLD: 1 % (ref 0–8)
ERYTHROCYTE [DISTWIDTH] IN BLOOD BY AUTOMATED COUNT: 14.5 % (ref 11.5–14.5)
EST. GFR  (NO RACE VARIABLE): >60 ML/MIN/1.73 M^2
GLUCOSE SERPL-MCNC: 200 MG/DL (ref 70–110)
HCT VFR BLD AUTO: 35.2 % (ref 37–48.5)
HGB BLD-MCNC: 10.9 G/DL (ref 12–16)
IMM GRANULOCYTES # BLD AUTO: 0.04 K/UL (ref 0–0.04)
IMM GRANULOCYTES NFR BLD AUTO: 0.6 % (ref 0–0.5)
INR PPP: 1 (ref 0.8–1.2)
LYMPHOCYTES # BLD AUTO: 0.6 K/UL (ref 1–4.8)
LYMPHOCYTES NFR BLD: 8.5 % (ref 18–48)
MCH RBC QN AUTO: 31 PG (ref 27–31)
MCHC RBC AUTO-ENTMCNC: 31 G/DL (ref 32–36)
MCV RBC AUTO: 100 FL (ref 82–98)
MONOCYTES # BLD AUTO: 0.6 K/UL (ref 0.3–1)
MONOCYTES NFR BLD: 8.4 % (ref 4–15)
NEUTROPHILS # BLD AUTO: 5.8 K/UL (ref 1.8–7.7)
NEUTROPHILS NFR BLD: 81.2 % (ref 38–73)
NRBC BLD-RTO: 0 /100 WBC
PLATELET # BLD AUTO: 173 K/UL (ref 150–450)
PMV BLD AUTO: 11.5 FL (ref 9.2–12.9)
POTASSIUM SERPL-SCNC: 4.5 MMOL/L (ref 3.5–5.1)
PROT SERPL-MCNC: 6.9 G/DL (ref 6–8.4)
PROTHROMBIN TIME: 11 SEC (ref 9–12.5)
RBC # BLD AUTO: 3.52 M/UL (ref 4–5.4)
SODIUM SERPL-SCNC: 140 MMOL/L (ref 136–145)
WBC # BLD AUTO: 7.16 K/UL (ref 3.9–12.7)

## 2024-11-14 ENCOUNTER — OFFICE VISIT (OUTPATIENT)
Dept: HEPATOLOGY | Facility: CLINIC | Age: 84
End: 2024-11-14
Payer: MEDICARE

## 2024-11-14 VITALS
WEIGHT: 118.06 LBS | BODY MASS INDEX: 19.67 KG/M2 | HEART RATE: 77 BPM | DIASTOLIC BLOOD PRESSURE: 68 MMHG | SYSTOLIC BLOOD PRESSURE: 104 MMHG | HEIGHT: 65 IN

## 2024-11-14 DIAGNOSIS — R18.8 CIRRHOSIS OF LIVER WITH ASCITES, UNSPECIFIED HEPATIC CIRRHOSIS TYPE: Primary | ICD-10-CM

## 2024-11-14 DIAGNOSIS — K74.60 CIRRHOSIS OF LIVER WITH ASCITES, UNSPECIFIED HEPATIC CIRRHOSIS TYPE: Primary | ICD-10-CM

## 2024-11-14 DIAGNOSIS — Z78.0 POSTMENOPAUSAL: ICD-10-CM

## 2024-11-14 DIAGNOSIS — K75.4 AUTOIMMUNE HEPATITIS: Chronic | ICD-10-CM

## 2024-11-14 PROCEDURE — 99214 OFFICE O/P EST MOD 30 MIN: CPT | Mod: S$PBB,,, | Performed by: INTERNAL MEDICINE

## 2024-11-14 PROCEDURE — 99214 OFFICE O/P EST MOD 30 MIN: CPT | Mod: PBBFAC,PN | Performed by: INTERNAL MEDICINE

## 2024-11-14 PROCEDURE — 99999 PR PBB SHADOW E&M-EST. PATIENT-LVL IV: CPT | Mod: PBBFAC,,, | Performed by: INTERNAL MEDICINE

## 2024-11-14 NOTE — PATIENT INSTRUCTIONS
Ok to continue 3.75 mg daily of prednisone  Will check liver with PET-CT  Continue lasix  Monthly labs  AFP now and every 6 months  Return 6 months

## 2024-11-14 NOTE — PROGRESS NOTES
HEPATOLOGY FOLLOW UP    Referring Physician: Leticia Lim MD   Current Corresponding Physician: Leticia Lim MD, Nestor Agarwal MD, Divina Stock MD    Radha Feng is here for follow up of autoimmune hepatitis-induced cirrhosis    HPI  Ms Feng is an 83 yo PMHx HTN, ID-T2DM, decompensated AIH cirrhosis (biopsy proven) presented and was admitted 11/3/22-11/11/22 with abdo distention from ascites.     Patient hospitalized 09/20-10/15 for SOB found to have elevated liver enzymes and diagnosed with AIH vs RUBIO cirrhosis on biopsy decompensated by ascites. Started on steroids, imuran w/ improvement in enzymes. However due to malaise an leukopenia, imuran has been held. She was diuresed, underwent LVP and discharged on 10 mg prednisone daily with goal to continue but minimize prednisone as outpt.    Interval History  Since Radha's last few visits, continues on diuretics; has been able to stop lactulose and continues on prednisone but is no longer on 7.5 mg daily. Was on 5 mg daily but since 6/24 is now only on 3.75 mg daily. Still taking 40 mg daily of lasix. No recent paracentesis.. She is feeling well except has hip pain and there is a suspicion of avalscular necrosis of the hip. New dx of lung cancer- s/p radiation. Has lost weight. BPs low- antihypertensives have been lowered.     Paracentesis 11/7/22: 2200 ml fluid; cell count 50   EGD 6/26/23: no varices    Labs 11/11/24: : Tbil 0.7, ALT 18, AST 25, ALKP 63, creat 0.9  Abdo US 5/11/24: Liver: Normal in size, measuring 12.7 cm. Coarsened echotexture. Right hepatic simple cyst 0.8 x 0.8 x 0.7 cm, previously 0.8 x 0.8 x 0.6 cm. No ascites  PET-CT 7/18/24: In the abdomen and pelvis, there is physiologic tracer distribution within the abdominal organs and excretion into the genitourinary system.   Bone density 3/6/23: osteoporosis in hips; osteopenia lumbar spine- on tx per endocrinology    Ascites, ongoing: lasix 40 mg daily but no  aldactone (urinates more with monotherapy by report)  HE: off lactulose    MELD 3.0: 8 at 11/11/2024  1:02 PM  MELD-Na: 6 at 11/11/2024  1:02 PM  Calculated from:  Serum Creatinine: 0.9 mg/dL (Using min of 1 mg/dL) at 11/11/2024  1:02 PM  Serum Sodium: 140 mmol/L (Using max of 137 mmol/L) at 11/11/2024  1:02 PM  Total Bilirubin: 0.7 mg/dL (Using min of 1 mg/dL) at 11/11/2024  1:02 PM  Serum Albumin: 3.1 g/dL at 11/11/2024  1:02 PM  INR(ratio): 1 at 11/11/2024  1:02 PM  Age at listing (hypothetical): 84 years  Sex: Female at 11/11/2024  1:02 PM        Outpatient Encounter Medications as of 11/14/2024   Medication Sig Dispense Refill    acetaminophen (TYLENOL) 500 MG tablet Take 2 tablets (1,000 mg total) by mouth every 8 (eight) hours as needed for Pain.  0    BD INSULIN SYRINGE ULTRA-FINE 1 mL 31 gauge x 5/16 Syrg       blood-glucose meter,continuous (DEXCOM G6 ) Misc Check blood sugar before meals and at bedtime 1 each PRN    blood-glucose transmitter (DEXCOM G6 TRANSMITTER) Amanda 1 each by Misc.(Non-Drug; Combo Route) route every 3 (three) months. 1 each 3    brimonidine 0.2% (ALPHAGAN) 0.2 % Drop Place 1 drop into both eyes 3 (three) times daily. 10 mL 11    cholecalciferol, vitamin D3, (VITAMIN D3) 25 mcg (1,000 unit) capsule Take 2 capsules (2,000 Units total) by mouth once daily.  0    DEXCOM G6 SENSOR Amanda 3 EACH BY MISC.(NON-DRUG COMBO ROUTE) ROUTE EVERY 10 DAYS. 9 each 0    dorzolamide-timolol 2-0.5% (COSOPT) 22.3-6.8 mg/mL ophthalmic solution INSTILL 1 DROP INTO RIGHT EYE TWICE A DAY 10 mL 11    estradioL (ESTRACE) 0.01 % (0.1 mg/gram) vaginal cream Place 0.5 g vaginally twice a week. Apply to the vagina daily for two weeks then twice a week. 45 g 4    furosemide (LASIX) 40 MG tablet Take 1 tablet (40 mg total) by mouth once daily. 90 tablet 0    insulin aspart U-100 (NOVOLOG U-100 INSULIN ASPART) 100 unit/mL injection TO USE WITH OMNIPOD 5. TOTAL DAILY DOSE UP TO 40 UNITS 40 mL 4    insulin pump  "cart,automated,BT (OMNIPOD 5 G6 PODS, GEN 5,) Crtg Inject 1 each into the skin Every 3 (three) days. 10 each 11    lactulose (CEPHULAC) 10 gram packet Take 10 g by mouth 3 (three) times daily.      lisinopriL-hydrochlorothiazide (PRINZIDE,ZESTORETIC) 20-12.5 mg per tablet TAKE 1 TABLET BY MOUTH EVERY DAY 90 tablet 1    pen needle, diabetic 31 gauge x 5/16" Ndle Use to inject insulin into the skin up to 4 times daily 400 each 3    predniSONE (DELTASONE) 5 MG tablet Take 0.5 tablets (2.5 mg total) by mouth once daily. 15 tablet 3    amLODIPine (NORVASC) 2.5 MG tablet TAKE 1 TABLET BY MOUTH EVERY DAY (Patient not taking: Reported on 11/14/2024) 90 tablet 1    latanoprost 0.005 % ophthalmic solution PLACE 1 DROP INTO BOTH EYES ONCE DAILY (Patient not taking: Reported on 11/14/2024) 7.5 mL 3    [DISCONTINUED] blood-glucose sensor (DEXCOM G6 SENSOR) Amanda 3 each by Misc.(Non-Drug; Combo Route) route every 10 days. 3 each 11    [DISCONTINUED] furosemide (LASIX) 40 MG tablet TAKE 1 TABLET BY MOUTH EVERY DAY 90 tablet 0     No facility-administered encounter medications on file as of 11/14/2024.     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    HENT: Negative.     Eyes: Negative.    Respiratory: Negative.     Cardiovascular: Negative.    Gastrointestinal: Negative.    Genitourinary: Negative.    Musculoskeletal: Negative.    Skin: Negative.    Neurological: Negative.    Psychiatric/Behavioral: Negative.       Vitals:    11/14/24 1542   BP: 104/68   Pulse: 77          Physical Exam  Vitals reviewed.   Constitutional:       Appearance: She is well-developed.   HENT:      Head: Normocephalic and atraumatic.   Eyes:      General: No scleral icterus.     Conjunctiva/sclera: Conjunctivae " normal.      Pupils: Pupils are equal, round, and reactive to light.   Neck:      Thyroid: No thyromegaly.   Cardiovascular:      Rate and Rhythm: Normal rate and regular rhythm.      Heart sounds: Normal heart sounds.   Pulmonary:      Effort: Pulmonary effort is normal.      Breath sounds: Normal breath sounds. No rales.   Abdominal:      General: Bowel sounds are normal. There is no distension.      Palpations: Abdomen is soft. There is no mass.      Tenderness: There is no abdominal tenderness.   Musculoskeletal:         General: Normal range of motion.      Cervical back: Normal range of motion and neck supple.   Skin:     General: Skin is warm and dry.      Findings: No rash.   Neurological:      Mental Status: She is alert and oriented to person, place, and time.         MELD 3.0: 8 at 11/11/2024  1:02 PM  MELD-Na: 6 at 11/11/2024  1:02 PM  Calculated from:  Serum Creatinine: 0.9 mg/dL (Using min of 1 mg/dL) at 11/11/2024  1:02 PM  Serum Sodium: 140 mmol/L (Using max of 137 mmol/L) at 11/11/2024  1:02 PM  Total Bilirubin: 0.7 mg/dL (Using min of 1 mg/dL) at 11/11/2024  1:02 PM  Serum Albumin: 3.1 g/dL at 11/11/2024  1:02 PM  INR(ratio): 1 at 11/11/2024  1:02 PM  Age at listing (hypothetical): 84 years  Sex: Female at 11/11/2024  1:02 PM      Lab Results   Component Value Date     (H) 11/11/2024    BUN 29 (H) 11/11/2024    CREATININE 0.9 11/11/2024    CALCIUM 9.9 11/11/2024     11/11/2024    K 4.5 11/11/2024     11/11/2024    PROT 6.9 11/11/2024    CO2 24 11/11/2024    ANIONGAP 10 11/11/2024    WBC 7.16 11/11/2024    RBC 3.52 (L) 11/11/2024    HGB 10.9 (L) 11/11/2024    HCT 35.2 (L) 11/11/2024    HCT 30 (L) 10/23/2022     (H) 11/11/2024    MCH 31.0 11/11/2024    MCHC 31.0 (L) 11/11/2024     Lab Results   Component Value Date    RDW 14.5 11/11/2024     11/11/2024    MPV 11.5 11/11/2024    GRAN 5.8 11/11/2024    GRAN 81.2 (H) 11/11/2024    LYMPH 0.6 (L) 11/11/2024    LYMPH 8.5  (L) 11/11/2024    MONO 0.6 11/11/2024    MONO 8.4 11/11/2024    EOSINOPHIL 1.0 11/11/2024    BASOPHIL 0.3 11/11/2024    EOS 0.1 11/11/2024    BASO 0.02 11/11/2024    APTT 28.6 06/20/2024    GROUPTRH O POS 06/20/2024    BNP 66 09/20/2022    CHOL 195 01/22/2024    TRIG 89 01/22/2024    HDL 54 01/22/2024    CHOLHDL 27.7 01/22/2024    TOTALCHOLEST 3.6 01/22/2024    ALBUMIN 3.1 (L) 11/11/2024    BILIDIR 0.3 11/11/2024    AST 25 11/11/2024    ALT 18 11/11/2024    ALKPHOS 63 11/11/2024    MG 1.7 06/20/2024    LABPROT 11.0 11/11/2024    INR 1.0 11/11/2024       Assessment and Plan:  Radha Feng is a 84 y.o. female with AIH-induced cirrhosis. She may have avascular necrosis of the hip. And now she has lung cancer, s/p radiation therapy. Current recommendations:  Cirrhosis: check labs every 4 weeks; HCC screening every 6 months w AFP (imagine per oncology); AFP now; EGD -repeat 6/2025 to screen for varices  Autoimmune hepatitis: continue prednisone 3.75 mg daily;   Ascites/edema, ongoing: continue current diuretics   HE, off lactulose; monitor  Osteoporosis: tx per endocriniology  Possible avascular necrosis of the hip: f/u ortho    Return 6 months  A total of 35 minutes was spent reviewing the chart, imaging, labs, examining the patient and counseling the patient about their liver disease.

## 2024-11-16 ENCOUNTER — PATIENT MESSAGE (OUTPATIENT)
Dept: HEMATOLOGY/ONCOLOGY | Facility: CLINIC | Age: 84
End: 2024-11-16
Payer: MEDICARE

## 2024-11-16 NOTE — PROGRESS NOTES
HPI    DLS: 7/15/2024    Pt here for 4 Month Check;  Pt states at times her OD does itch and both eyes run water. Pt denies any   vision changes.    Meds;  Dorzolamide BID OD   Brimonidine TID OU   Latanoprost QDAY OU     1. POAG OD>OS   2. NS OD   3. PCIOL OS       Last edited by Denisse Burnett on 11/18/2024  3:17 PM.            Assessment /Plan     For exam results, see Encounter Report.    Primary open angle glaucoma (POAG) of right eye, severe stage    Primary open-angle glaucoma, left eye, moderate stage    Nuclear sclerosis, right    Pseudophakia of left eye    Other orders  -     erythromycin (ROMYCIN) ophthalmic ointment; Apply to lids and lashes at night as needed for itchy eyelids  Dispense: 1 each; Refill: 6        Old Patient of dr Lawson   Last seen by Dr belcher - last seen at [facility] with Dr belcher 2/12/2024         1.   Glaucoma (type and duration)    x years // severe od and moderate os    First HVF   2021   First photos  7/2024    Treatment / Drops started   ? Years ago            Family history    ?        Glaucoma meds    latanoprost ou q am / brimonidine tid ou / cosopt  bid od         H/O adverse rxn to glaucoma drops    ?         LASERS    ?        GLAUCOMA SURGERIES    none        OTHER EYE SURGERIES    PC IOL os - [facility] [place]         CDR   0.9-0.95 / 0.8-0.85        Tbase    ?          Tmax    28/33            Ttarget    ?             HVF    3 test 2021 to  2024 - nearly extinguished  od // ? IAD / SNS  os        Gonio    +3 ou        CCT    579/608        OCT    3 test 2021 to 2024 - RNFL - dec thru out  od // dec G/N/NI/TI, bord TS (?prog os)  os        Disc photos    7/2024    - Ttoday    16/16  - Test done today    IOP /gonio       ? APD OD  -    Recheck     NS od    No real reason to remove - poor prognosis 2/2 ON and VF loss     PC IOL os    Kofilmet  12/221/2023     PLAN  CSM   If needed can add cosopt os // if needed can do slt's     F/U 4 months with IOP // HVF - OD 24-2 stm V // OS  24-2 ss /// DFE OCT   Pts daughter prefers Monday appointment

## 2024-11-18 ENCOUNTER — OFFICE VISIT (OUTPATIENT)
Dept: OPHTHALMOLOGY | Facility: CLINIC | Age: 84
End: 2024-11-18
Payer: MEDICARE

## 2024-11-18 DIAGNOSIS — H40.1113 PRIMARY OPEN ANGLE GLAUCOMA (POAG) OF RIGHT EYE, SEVERE STAGE: Primary | ICD-10-CM

## 2024-11-18 DIAGNOSIS — Z96.1 PSEUDOPHAKIA OF LEFT EYE: ICD-10-CM

## 2024-11-18 DIAGNOSIS — H40.1122 PRIMARY OPEN-ANGLE GLAUCOMA, LEFT EYE, MODERATE STAGE: ICD-10-CM

## 2024-11-18 DIAGNOSIS — H25.11 NUCLEAR SCLEROSIS, RIGHT: ICD-10-CM

## 2024-11-18 PROCEDURE — 99213 OFFICE O/P EST LOW 20 MIN: CPT | Mod: PBBFAC | Performed by: OPHTHALMOLOGY

## 2024-11-18 PROCEDURE — 92020 GONIOSCOPY: CPT | Mod: S$PBB,,, | Performed by: OPHTHALMOLOGY

## 2024-11-18 PROCEDURE — 99214 OFFICE O/P EST MOD 30 MIN: CPT | Mod: S$PBB,,, | Performed by: OPHTHALMOLOGY

## 2024-11-18 PROCEDURE — 99999 PR PBB SHADOW E&M-EST. PATIENT-LVL III: CPT | Mod: PBBFAC,,, | Performed by: OPHTHALMOLOGY

## 2024-11-18 PROCEDURE — 92020 GONIOSCOPY: CPT | Mod: PBBFAC | Performed by: OPHTHALMOLOGY

## 2024-11-18 RX ORDER — ERYTHROMYCIN 5 MG/G
OINTMENT OPHTHALMIC
Qty: 1 EACH | Refills: 6 | Status: SHIPPED | OUTPATIENT
Start: 2024-11-18

## 2024-11-22 ENCOUNTER — PATIENT MESSAGE (OUTPATIENT)
Dept: ADMINISTRATIVE | Facility: HOSPITAL | Age: 84
End: 2024-11-22
Payer: MEDICARE

## 2024-11-22 ENCOUNTER — TELEPHONE (OUTPATIENT)
Dept: HEMATOLOGY/ONCOLOGY | Facility: CLINIC | Age: 84
End: 2024-11-22
Payer: MEDICARE

## 2024-11-22 DIAGNOSIS — M75.91 LESION OF RIGHT SHOULDER: Primary | ICD-10-CM

## 2024-11-22 NOTE — TELEPHONE ENCOUNTER
"Contacted by patient's daughter regarding a new lesion over the R shoulder. She describes the evolution of "little red pin pricks" into a more conglomerated area which is now firm and tender both to touch and and baseline. She denies fever, chills, or purulent drainage. An image is uploaded in the media tab. It does not appear consistent with typical post-radiation hyperpigmentation. Will place dermatology e-consult, and pt's daughter will attempt a PCP visit ASAP. She has a f/u scheduled w/ me on 12/5/24, in case she is unable to get her in sooner. Discussed to go to urgent care if worsening in the interim.     "

## 2024-11-26 ENCOUNTER — E-CONSULT (OUTPATIENT)
Dept: DERMATOLOGY | Facility: CLINIC | Age: 84
End: 2024-11-26

## 2024-11-26 DIAGNOSIS — L72.0 EPIDERMAL INCLUSION CYST: Primary | ICD-10-CM

## 2024-11-26 NOTE — CONSULTS
[facility]  Response for E-Consult     Patient Name: Radha Feng  MRN: [id number]  Primary Care Provider: Leticia Lim MD   Requesting Provider: Amandeep Kohler IV, MD  E-Consult to Dermatology  Consult performed by: Fernanda Alegria MD  Consult ordered by: Amandeep Kohler IV, MD  Reason for consult: Skin lesion  Assessment/Recommendations: Thank you for this consult. I see what appears to be a punctum, and so my suspicion is this is an inflamed epidermal inclusion cysts. They may become abscessed once inflamed. If an abscess occurs, I recommend expedited dermatology evaluation for drainage then resection at a later time.           Recommendation: as above     Contingency that warrants a repeat eConsult or referral: Reconsult if the condition worsens or fails to improve       Total time of Consultation: 10 minute    I did not speak to the requesting provider verbally about this.     *This eConsult is based on the clinical data available to me and is furnished without benefit of a physical examination. The eConsult will need to be interpreted in light of any clinical issues or changes in patient status not available to me at the time of filing this eConsults. Significant changes in patient condition or level of acuity should result in immediate formal consultation and reevaluation. Please alert me if you have further questions.    Thank you for this eConsult referral.     Fernanda Alegria MD  [facility]

## 2024-11-27 ENCOUNTER — TELEPHONE (OUTPATIENT)
Dept: HEMATOLOGY/ONCOLOGY | Facility: CLINIC | Age: 84
End: 2024-11-27
Payer: MEDICARE

## 2024-11-27 ENCOUNTER — PATIENT MESSAGE (OUTPATIENT)
Dept: HEMATOLOGY/ONCOLOGY | Facility: CLINIC | Age: 84
End: 2024-11-27
Payer: MEDICARE

## 2024-12-17 ENCOUNTER — LAB VISIT (OUTPATIENT)
Dept: LAB | Facility: HOSPITAL | Age: 84
End: 2024-12-17
Attending: INTERNAL MEDICINE
Payer: MEDICARE

## 2024-12-17 DIAGNOSIS — K74.60 CIRRHOSIS OF LIVER WITH ASCITES, UNSPECIFIED HEPATIC CIRRHOSIS TYPE: ICD-10-CM

## 2024-12-17 DIAGNOSIS — K75.4 AUTOIMMUNE HEPATITIS: Chronic | ICD-10-CM

## 2024-12-17 DIAGNOSIS — R18.8 CIRRHOSIS OF LIVER WITH ASCITES, UNSPECIFIED HEPATIC CIRRHOSIS TYPE: ICD-10-CM

## 2024-12-17 LAB
AFP SERPL-MCNC: <2 NG/ML (ref 0–8.4)
AFP SERPL-MCNC: <2 NG/ML (ref 0–8.4)
ALBUMIN SERPL BCP-MCNC: 3 G/DL (ref 3.5–5.2)
ALP SERPL-CCNC: 69 U/L (ref 40–150)
ALT SERPL W/O P-5'-P-CCNC: 15 U/L (ref 10–44)
ANION GAP SERPL CALC-SCNC: 9 MMOL/L (ref 8–16)
AST SERPL-CCNC: 27 U/L (ref 10–40)
BASOPHILS # BLD AUTO: 0.02 K/UL (ref 0–0.2)
BASOPHILS NFR BLD: 0.3 % (ref 0–1.9)
BILIRUB DIRECT SERPL-MCNC: 0.3 MG/DL (ref 0.1–0.3)
BILIRUB SERPL-MCNC: 0.8 MG/DL (ref 0.1–1)
BUN SERPL-MCNC: 22 MG/DL (ref 8–23)
CALCIUM SERPL-MCNC: 9.8 MG/DL (ref 8.7–10.5)
CHLORIDE SERPL-SCNC: 106 MMOL/L (ref 95–110)
CO2 SERPL-SCNC: 24 MMOL/L (ref 23–29)
CREAT SERPL-MCNC: 0.9 MG/DL (ref 0.5–1.4)
DIFFERENTIAL METHOD BLD: ABNORMAL
EOSINOPHIL # BLD AUTO: 0.1 K/UL (ref 0–0.5)
EOSINOPHIL NFR BLD: 1.8 % (ref 0–8)
ERYTHROCYTE [DISTWIDTH] IN BLOOD BY AUTOMATED COUNT: 13.2 % (ref 11.5–14.5)
EST. GFR  (NO RACE VARIABLE): >60 ML/MIN/1.73 M^2
GLUCOSE SERPL-MCNC: 136 MG/DL (ref 70–110)
HCT VFR BLD AUTO: 36.2 % (ref 37–48.5)
HGB BLD-MCNC: 11.7 G/DL (ref 12–16)
IMM GRANULOCYTES # BLD AUTO: 0.02 K/UL (ref 0–0.04)
IMM GRANULOCYTES NFR BLD AUTO: 0.3 % (ref 0–0.5)
INR PPP: 1 (ref 0.8–1.2)
LYMPHOCYTES # BLD AUTO: 0.7 K/UL (ref 1–4.8)
LYMPHOCYTES NFR BLD: 9.3 % (ref 18–48)
MCH RBC QN AUTO: 31.4 PG (ref 27–31)
MCHC RBC AUTO-ENTMCNC: 32.3 G/DL (ref 32–36)
MCV RBC AUTO: 97 FL (ref 82–98)
MONOCYTES # BLD AUTO: 1 K/UL (ref 0.3–1)
MONOCYTES NFR BLD: 13 % (ref 4–15)
NEUTROPHILS # BLD AUTO: 5.8 K/UL (ref 1.8–7.7)
NEUTROPHILS NFR BLD: 75.3 % (ref 38–73)
NRBC BLD-RTO: 0 /100 WBC
PLATELET # BLD AUTO: 213 K/UL (ref 150–450)
PMV BLD AUTO: 10.9 FL (ref 9.2–12.9)
POTASSIUM SERPL-SCNC: 4.3 MMOL/L (ref 3.5–5.1)
PROT SERPL-MCNC: 7.2 G/DL (ref 6–8.4)
PROTHROMBIN TIME: 11 SEC (ref 9–12.5)
RBC # BLD AUTO: 3.73 M/UL (ref 4–5.4)
SODIUM SERPL-SCNC: 139 MMOL/L (ref 136–145)
WBC # BLD AUTO: 7.74 K/UL (ref 3.9–12.7)

## 2024-12-17 PROCEDURE — 85610 PROTHROMBIN TIME: CPT | Performed by: INTERNAL MEDICINE

## 2024-12-17 PROCEDURE — 36415 COLL VENOUS BLD VENIPUNCTURE: CPT | Mod: PN | Performed by: INTERNAL MEDICINE

## 2024-12-17 PROCEDURE — 80053 COMPREHEN METABOLIC PANEL: CPT | Performed by: INTERNAL MEDICINE

## 2024-12-17 PROCEDURE — 82105 ALPHA-FETOPROTEIN SERUM: CPT | Performed by: INTERNAL MEDICINE

## 2024-12-17 PROCEDURE — 85025 COMPLETE CBC W/AUTO DIFF WBC: CPT | Performed by: INTERNAL MEDICINE

## 2024-12-17 PROCEDURE — 82248 BILIRUBIN DIRECT: CPT | Performed by: INTERNAL MEDICINE

## 2024-12-19 ENCOUNTER — OFFICE VISIT (OUTPATIENT)
Dept: UROGYNECOLOGY | Facility: CLINIC | Age: 84
End: 2024-12-19
Payer: MEDICARE

## 2024-12-19 VITALS
HEIGHT: 65 IN | HEART RATE: 84 BPM | BODY MASS INDEX: 19.94 KG/M2 | SYSTOLIC BLOOD PRESSURE: 138 MMHG | DIASTOLIC BLOOD PRESSURE: 82 MMHG | WEIGHT: 119.69 LBS

## 2024-12-19 DIAGNOSIS — N81.6 POSTERIOR VAGINAL WALL PROLAPSE: ICD-10-CM

## 2024-12-19 DIAGNOSIS — N81.10 PROLAPSE OF ANTERIOR VAGINAL WALL: ICD-10-CM

## 2024-12-19 DIAGNOSIS — N81.3 UTEROVAGINAL PROLAPSE, COMPLETE: Primary | ICD-10-CM

## 2024-12-19 PROCEDURE — 99999 PR PBB SHADOW E&M-EST. PATIENT-LVL V: CPT | Mod: PBBFAC,,, | Performed by: OBSTETRICS & GYNECOLOGY

## 2024-12-19 PROCEDURE — 99215 OFFICE O/P EST HI 40 MIN: CPT | Mod: PBBFAC | Performed by: OBSTETRICS & GYNECOLOGY

## 2024-12-19 NOTE — PATIENT INSTRUCTIONS
"1. Prolapse: Stage 2 apical prolapse, Stage 2 anterior and posterior vaginal wall prolapse -  --continue  #2 1/2 " LS gH pessary- an  --Daily pelvic floor exercises as assigned, Pelvic floor PT   --Non surgical options discussed with patient    --no discharge continue vaginal estrogen and add replens  on opposite days.   --Pessary benefit discussed with patient, will schedule for pessary fitting   --Surgical options discussed such as : Colpocleisis recommend office [facility]. Tentatively scheduled for 314/2024      2.  Mixed urinary incontinence, urge > stress:   --Empty bladder every 3 hours.  Empty well: wait a minute, lean forward on toilet.    --Avoid dietary irritants (see sheet).  Keep diary x 3-5 days to determine your irritants.  --KEGELS: do 10 in AM and 10 in PM, holding each x 10 seconds.  When you feel urge to go, STOP, KEGEL, and when urge has passed, then go to bathroom.  --URGE: .  SE profile reviewed.    Takes 2-4 weeks to see if will have effect.  For dry mouth: get sour, sugar free lozenge or gum.    --STRESS:  Non surgical options: Pessary vs. Impressa vs Rivive - which represent urethral and bladder support devices; Surgical options including 1.  mid urethral sling; retropubic, transobturator vs single incision 2. Fascial slings 3. Hutchison procedure 4. Periurethral bulking     3. Vaginal atrophy (dryness):  Use .5 gram of estrogen cream in vagina reese for two weeks then twice a week .  Please start Use REPLENS or REFRESH OTC: 1/2 applicator full in vagina twice a week.      4. RTC 3 months for for pc for removal and leave the pessary out.   "

## 2024-12-19 NOTE — PROGRESS NOTES
Urogyn follow up      [facility]  [address] [address]    Radha Feng  [id number]  1940      Radha Feng is a 84 y.o. here for a urogyn follow up for pelvic organ prolapse.     History of Present Illness   84 y.o.  female has a past medical history of Cataract, Chondromalacia, knee, right (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       06/12/2023  Here for pessary placement. Has had pessary holiday since last visit.    Changes since last visit:  Rare UUI when pulling pants down  Denies pain, bleeding, or discharge  Doing well with pessary--using rephresh weekly  Denies constipation or straining    10/12/2023:  Patient presents for follow up   She is currently using the pessary has had issues with abrasions in the past  No vaginal discharge or bleeding   +JUAN MIGUEL and +UU stable with the pessary     POP + doing well with the pessary     1/18/202:  Patient here for follow up doing well no issues  No discharge, no bleeding   Voiding well no constipation     5/22/2024:  Patient doing well no issues  Pessary in place     8/202/2024:  Newly diagnosed with lung cancer- getting radiation tolerating things well   Patient doing well no issues  Pessary in place     12/19/2024  Patient states that she hates these pessary cleaning and is interested in discussing surgery       [facility]     Question 12/20/2022  1:44 PM CST - Filed by Patient   General Urogynecology: Are you experiencing the following?     Dysuria (painful urination) No   Nocturia:  waking up at night to empty your bladder  Yes   If you answered yes to the previous question, how many times does this happen per night? 1-2   Enuresis (urine loss during sleep) No   Dribbling urine after you urinate No   Hematuria (urine appears red) No   Type of stream Weak   Urinary frequency: How often a day are you going to the bathroom per day?  Less than 10   Urinary Tract  "Infections: How many Urinary Tract Infections have you had in the past year? One (1) in the past year   If you have had a UTI in the past year, what treatments have you had so far?  Prophylactic antibiotics   Urinary Incontinence (General): Are you experiencing the following?     Past consultation for incontinence: Have you ever seen someone for the evaluation of incontinence? No   If you answered yes to the previous question, please select all the therapies you have tried.  N/a- I answered no to the previous question   Please note the effectiveness of the therapies.     Need to wear protection to keep clothes dry  Yes   If you answered yes to the previous question, please scar the protection you use.  Poise   If you wear protection, how much wetness is typically on each pad? Slight   If you wear protection, how often do you have to change per day, if applicable?  3-4   Stress Symptoms: Are you experiencing the following?     Leakage of urine with cough, laugh and/or sneeze Yes   If you answered yes to the previous question, what is the frequency in days, weeks and/or months? Daily   Leakage of urine with sex No   Leakage of urine with bending/ lifting No   Leakage of urine with briskly walking or jogging No   If you lose urine for any other reason not previously mentioned, please note it below, if applicable.      Urge Symptoms: Are you experiencing the following?     Urgency ("got to go" feeling) No   Urge: How frequently do you feel an urge to urinate (feeling like you "gotta go" to the bathroom and can't wait) Never   Do you experience a leakage of urine when you have a feeling of urgency?  No   Leakage of urine when unaware No   Past use of anticholinergics (medications used to treat overactive bladder) No   If you answered yes to the previous question, please scar the anticholinergics you have used:      Have you ever used Mirbetriq (aka Mirabegron)?  No   Prolapse Symptoms: Are you experiencing any of the " following?      Falling out/ Bulging/ Heaviness in the vagina Yes   Vaginal/ Abdominal Pain/ Pressure No   Need to strain/ Push to void No   Need to wait on the toilet before you void No   Unusual position to urinate (using your hands to push back the vaginal bulge) No   Sensation of incomplete emptying No   Past use of pessary device No   If you answered yes to the previous question, please list the devices you have used below.      Bowel Symptoms: Are you experiencing any of the following?     Constipation No   Diarrhea  No   Hematochezia (bloody stool) No   Incomplete evacuation of stool No   Involuntary loss of formed stool No   Fecal smearing/urgency No   Involuntary loss of gas Yes   Vaginal Symptoms: Are you experiencing any of the following?      Abnormal vaginal bleeding  No   Vaginal dryness No   Sexually active  No   Dyspareunia (painful intercourse) No   Estrogen use  No        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    Diabetes mellitus     Glaucoma     Hypertension     Other ascites 11/29/2022       Past Surgical History:   Procedure Laterality Date    CATARACT EXTRACTION W/  INTRAOCULAR LENS IMPLANT Left 12/21/2021        ENDOBRONCHIAL ULTRASOUND N/A 07/05/2024    Procedure: ENDOBRONCHIAL ULTRASOUND (EBUS);  Surgeon: Rolo Wilkinson MD;  Location: [facility] OR [address] [place] [address];  Service: Pulmonary;  Laterality: N/A;    ESOPHAGOGASTRODUODENOSCOPY N/A 06/26/2023    Procedure: ESOPHAGOGASTRODUODENOSCOPY (EGD);  Surgeon: Edgar Branch MD;  Location: [facility] (4TH FLR);  Service: Endoscopy;  Laterality: N/A;  referral Dr Peraza-cirrhosis/labs done on 4/24/23-[facility] portal-GT       Family History   Problem Relation Name Age of Onset    Cataracts Mother      Diabetes Mother      Glaucoma Mother      Hypertension Mother      Heart attack Father      Colon cancer Sister      Blindness Cousin      Amblyopia Neg Hx      Macular degeneration Neg Hx       Retinal detachment Neg Hx      Strabismus Neg Hx         Social History     Socioeconomic History    Marital status: Single   Tobacco Use    Smoking status: Former    Smokeless tobacco: Never   Substance and Sexual Activity    Alcohol use: Not Currently    Drug use: Never   Social History Narrative    , one daughter local, four sons out of state. Retired . Lives with daughter Joss.     Social Drivers of Health     Financial Resource Strain: Low Risk  (6/21/2024)    Overall Financial Resource Strain (CARDIA)     Difficulty of Paying Living Expenses: Not hard at all   Food Insecurity: No Food Insecurity (6/21/2024)    Hunger Vital Sign     Worried About Running Out of Food in the Last Year: Never true     Ran Out of Food in the Last Year: Never true   Transportation Needs: No Transportation Needs (6/21/2024)    TRANSPORTATION NEEDS     Transportation : No   Physical Activity: Inactive (6/21/2024)    Exercise Vital Sign     Days of Exercise per Week: 1 day     Minutes of Exercise per Session: 0 min   Stress: No Stress Concern Present (6/21/2024)    [employer] - Occupational Stress Questionnaire     Feeling of Stress : Only a little   Recent Concern: Stress - Stress Concern Present (6/21/2024)    [employer] - Occupational Stress Questionnaire     Feeling of Stress : To some extent   Housing Stability: Low Risk  (6/21/2024)    Housing Stability Vital Sign     Unable to Pay for Housing in the Last Year: No     Homeless in the Last Year: No       Current Outpatient Medications   Medication Sig Dispense Refill    acetaminophen (TYLENOL) 500 MG tablet Take 2 tablets (1,000 mg total) by mouth every 8 (eight) hours as needed for Pain.  0    BD INSULIN SYRINGE ULTRA-FINE 1 mL 31 gauge x 5/16 Syrg       blood-glucose meter,continuous (DEXCOM G6 ) Misc Check blood sugar before meals and at bedtime 1 each PRN    blood-glucose transmitter  "(DEXCOM G6 TRANSMITTER) Amanda 1 each by Misc.(Non-Drug; Combo Route) route every 3 (three) months. 1 each 3    brimonidine 0.2% (ALPHAGAN) 0.2 % Drop Place 1 drop into both eyes 3 (three) times daily. 10 mL 11    cholecalciferol, vitamin D3, (VITAMIN D3) 25 mcg (1,000 unit) capsule Take 2 capsules (2,000 Units total) by mouth once daily.  0    DEXCOM G6 SENSOR Amanda 3 EACH BY MISC.(NON-DRUG COMBO ROUTE) ROUTE EVERY 10 DAYS. 9 each 0    dorzolamide-timolol 2-0.5% (COSOPT) 22.3-6.8 mg/mL ophthalmic solution INSTILL 1 DROP INTO RIGHT EYE TWICE A DAY 10 mL 11    erythromycin (ROMYCIN) ophthalmic ointment Apply to lids and lashes at night as needed for itchy eyelids 1 each 6    estradioL (ESTRACE) 0.01 % (0.1 mg/gram) vaginal cream Place 0.5 g vaginally twice a week. Apply to the vagina daily for two weeks then twice a week. 45 g 4    furosemide (LASIX) 40 MG tablet Take 1 tablet (40 mg total) by mouth once daily. 90 tablet 0    insulin aspart U-100 (NOVOLOG U-100 INSULIN ASPART) 100 unit/mL injection TO USE WITH OMNIPOD 5. TOTAL DAILY DOSE UP TO 40 UNITS 40 mL 4    insulin pump cart,automated,BT (OMNIPOD 5 G6 PODS, GEN 5,) Crtg Inject 1 each into the skin Every 3 (three) days. 10 each 11    lactulose (CEPHULAC) 10 gram packet Take 10 g by mouth 3 (three) times daily.      lisinopriL-hydrochlorothiazide (PRINZIDE,ZESTORETIC) 20-12.5 mg per tablet TAKE 1 TABLET BY MOUTH EVERY DAY 90 tablet 1    pen needle, diabetic 31 gauge x 5/16" Ndle Use to inject insulin into the skin up to 4 times daily 400 each 3    predniSONE (DELTASONE) 5 MG tablet Take 0.5 tablets (2.5 mg total) by mouth once daily. 15 tablet 3    amLODIPine (NORVASC) 2.5 MG tablet TAKE 1 TABLET BY MOUTH EVERY DAY (Patient not taking: Reported on 12/19/2024) 90 tablet 1    latanoprost 0.005 % ophthalmic solution PLACE 1 DROP INTO BOTH EYES ONCE DAILY 7.5 mL 3     No current facility-administered medications for this visit.       Review of patient's allergies " "indicates:  No Known Allergies    Well Woman:  Pap:denies history of abnormal pap  Mammo:no longer screening  Colonoscopy:refused  Dexa:03/2023    Hip Fracture 6%.   Impression:   Osteopenia lumbar spine.  Osteoporosis both hips.      ROS:  As per HPI.      Exam  /82   Pulse 84   Ht 5' 5" (1.651 m)   Wt 54.3 kg (119 lb 11.4 oz)   BMI 19.92 kg/m²   General: alert and oriented, no acute distress  Respiratory: normal respiratory effort  Abd: soft, non-tender, non-distended    Pelvic  Ext. Genitalia: normal external genitalia. Normal bartholin's and skeens glands  Vagina: + atrophy. Normal vaginal mucosa without lesions. No abrasion noted  Non-tender bladder base without palpable mass.  #2 1/2 LS GH pessary in place- removed and cleaned   Cervix no lesions  Uterus:  nontender, normal size, shape, position, mobile  Urethra: no masses or tenderness  Urethral meatus: no lesions, caruncle or prolapse.    Pessary removed without difficulty. Washed with soap and water. Reinserted without difficulty    Impression  1. Uterovaginal prolapse, complete  Case Request Operating Room: COLPOCLEISIS, CYSTOSCOPY, WITH PERIURETHRAL BULKING AGENT INJECTION, REPAIR, PERINEUM    Basic Metabolic Panel    X-Ray Chest 1 View    Type & Screen    EKG 12-lead    US Pelvis Comp with Transvag NON-OB (xpd      2. Posterior vaginal wall prolapse        3. Prolapse of anterior vaginal wall                  Plan:   1. Prolapse: Stage 2 apical prolapse, Stage 2 anterior and posterior vaginal wall prolapse -  --continue  #2 1/2 " LS gH pessary-  --Non surgical options discussed with patient    --no discharge continue vaginal estrogen and add replens  on opposite days.   --Pessary benefit discussed with patient, will schedule for pessary fitting   --Surgical options discussed such as : Colpocleisis recommend office [facility]. Tentatively scheduled for 3/14/2024. Transvaginal sonogram prior to surgery.      2.  Mixed urinary incontinence, urge > " stress:   --Empty bladder every 3 hours.  Empty well: wait a minute, lean forward on toilet.    --Avoid dietary irritants (see sheet).  Keep diary x 3-5 days to determine your irritants.  --KEGELS: do 10 in AM and 10 in PM, holding each x 10 seconds.  When you feel urge to go, STOP, KEGEL, and when urge has passed, then go to bathroom.  --URGE: .  SE profile reviewed.    Takes 2-4 weeks to see if will have effect.  For dry mouth: get sour, sugar free lozenge or gum.    --STRESS:  Non surgical options: Pessary vs. Impressa vs Rivive - which represent urethral and bladder support devices; Surgical options including 1.  mid urethral sling; retropubic, transobturator vs single incision 2. Fascial slings 3. Hutchison procedure 4. Periurethral bulking     3. Vaginal atrophy (dryness):  Use .5 gram of estrogen cream in vagina reese for two weeks then twice a week .  Please start Use REPLENS or REFRESH OTC: 1/2 applicator full in vagina twice a week.      4. RTC 3 months for for pc for removal and leave the pessary out.     Luisa Evans DO  Female Pelvic Medicine and Reconstructive Surgery  [facility] [place], [place]

## 2024-12-24 ENCOUNTER — HOSPITAL ENCOUNTER (OUTPATIENT)
Dept: CARDIOLOGY | Facility: OTHER | Age: 84
Discharge: HOME | End: 2024-12-24
Attending: OBSTETRICS & GYNECOLOGY
Payer: MEDICARE

## 2024-12-24 DIAGNOSIS — N81.3 UTEROVAGINAL PROLAPSE, COMPLETE: ICD-10-CM

## 2024-12-24 PROCEDURE — 93010 ELECTROCARDIOGRAM REPORT: CPT | Mod: ,,, | Performed by: INTERNAL MEDICINE

## 2024-12-24 PROCEDURE — 93005 ELECTROCARDIOGRAM TRACING: CPT

## 2024-12-27 LAB
OHS QRS DURATION: 84 MS
OHS QTC CALCULATION: 436 MS

## 2024-12-28 ENCOUNTER — HOSPITAL ENCOUNTER (OUTPATIENT)
Dept: RADIOLOGY | Facility: OTHER | Age: 84
Discharge: HOME | End: 2024-12-28
Attending: OBSTETRICS & GYNECOLOGY
Payer: MEDICARE

## 2024-12-28 DIAGNOSIS — N81.3 UTEROVAGINAL PROLAPSE, COMPLETE: ICD-10-CM

## 2024-12-28 PROCEDURE — 71045 X-RAY EXAM CHEST 1 VIEW: CPT | Mod: TC,FY

## 2024-12-28 PROCEDURE — 71045 X-RAY EXAM CHEST 1 VIEW: CPT | Mod: 26,,, | Performed by: RADIOLOGY

## 2024-12-30 ENCOUNTER — TELEPHONE (OUTPATIENT)
Dept: PRIMARY CARE CLINIC | Facility: CLINIC | Age: 84
End: 2024-12-30
Payer: MEDICARE

## 2024-12-30 NOTE — TELEPHONE ENCOUNTER
Surgery Colpocleisis with Dr Evans on 3/14/25     Spoke With Pt Daughter Della Will Call back to Schedule Pre-op Clearance or schedule from Portal  Pt daughter Said She done EKG,12/24/24   Chest X-Ray 12/28/24  Labs  schedule For 2/11/25

## 2024-12-30 NOTE — TELEPHONE ENCOUNTER
Last seen by me Jan. '24, needs office visit - but it should be within 30-45 days of surgery - please schedule. I will get labs I need at that time.

## 2024-12-30 NOTE — TELEPHONE ENCOUNTER
----- Message from Med Assistant Hollins sent at 12/20/2024 12:24 PM CST -----  Regarding: Surgical Clearance  Radha is scheduled for a Colpocleisis with Dr Evans on 3/14/25. Please assist with surgical clearance (within a 30-45 day window of surgery date). Please obtain a CBC, CMP, Hgb A1C, TSH, 12 Lead EKG, Chest X-Ray & any other testing you deem necessary. Once completed, please place a statement at the end of the clinic note in EPIC stating whether the patient is cleared or fax a report to our office at [phone]. If you have any questions, please don't hesitate to reach out to us. Thank you!    Gunjan Jackson  Surgery Scheduler  [facility]  [phone] (Main Office)  [phone] (Direct Line)

## 2025-01-06 ENCOUNTER — OFFICE VISIT (OUTPATIENT)
Dept: DERMATOLOGY | Facility: CLINIC | Age: 85
End: 2025-01-06
Payer: MEDICARE

## 2025-01-06 DIAGNOSIS — L72.0 EPIDERMAL INCLUSION CYST: Primary | ICD-10-CM

## 2025-01-06 PROCEDURE — 99203 OFFICE O/P NEW LOW 30 MIN: CPT | Mod: S$PBB,,, | Performed by: DERMATOLOGY

## 2025-01-06 PROCEDURE — 99213 OFFICE O/P EST LOW 20 MIN: CPT | Mod: PBBFAC | Performed by: DERMATOLOGY

## 2025-01-06 PROCEDURE — 99999 PR PBB SHADOW E&M-EST. PATIENT-LVL III: CPT | Mod: PBBFAC,,, | Performed by: DERMATOLOGY

## 2025-01-06 RX ORDER — DOXYCYCLINE HYCLATE 100 MG
100 TABLET ORAL 2 TIMES DAILY
Qty: 28 TABLET | Refills: 0 | Status: SHIPPED | OUTPATIENT
Start: 2025-01-06 | End: 2025-01-20

## 2025-01-06 NOTE — PROGRESS NOTES
Subjective:      Patient ID:  Radha Feng is a 84 y.o. female who presents for   Chief Complaint   Patient presents with    Cyst     Cyst - Initial  Affected locations: right shoulder  Duration: 3 months  Signs / symptoms: itching and tender  Aggravated by: nothing  Relieving factors/Treatments tried: OTC antibiotic cream  Improvement on treatment: mild    Ms. Feng is an 84 YOF presenting for initial evaluation of a mass on the right posterior shoulder. At first, she noticed a dark flat spot at the site in October. Over time, it began to grow, become inflamed, and become painful prompting her to schedule this appointment. It is painful when touched but also at rest. The site has never drained any contents. She is currently using Neosporin and oragel on the site for pain control. She has had no other similar lesions in the past.     Review of Systems    Objective:   Physical Exam   Skin:   Areas Examined (abnormalities noted in diagram):   Back Inspection Performed  RUE Inspected            Diagram Legend     Erythematous scaling macule/papule c/w actinic keratosis       Vascular papule c/w angioma      Pigmented verrucoid papule/plaque c/w seborrheic keratosis      Yellow umbilicated papule c/w sebaceous hyperplasia      Irregularly shaped tan macule c/w lentigo     1-2 mm smooth white papules consistent with Milia      Movable subcutaneous cyst with punctum c/w epidermal inclusion cyst      Subcutaneous movable cyst c/w pilar cyst      Firm pink to brown papule c/w dermatofibroma      Pedunculated fleshy papule(s) c/w skin tag(s)      Evenly pigmented macule c/w junctional nevus     Mildly variegated pigmented, slightly irregular-bordered macule c/w mildly atypical nevus      Flesh colored to evenly pigmented papule c/w intradermal nevus       Pink pearly papule/plaque c/w basal cell carcinoma      Erythematous hyperkeratotic cursted plaque c/w SCC      Surgical scar with no sign of skin cancer recurrence       Open and closed comedones      Inflammatory papules and pustules      Verrucoid papule consistent consistent with wart     Erythematous eczematous patches and plaques     Dystrophic onycholytic nail with subungual debris c/w onychomycosis     Umbilicated papule    Erythematous-base heme-crusted tan verrucoid plaque consistent with inflamed seborrheic keratosis     Erythematous Silvery Scaling Plaque c/w Psoriasis     See annotation        Assessment / Plan:        Epidermal inclusion cyst  - Discussed likely etiology of lesion, most consistent with EIC. Patient does have semi-recent diagnosis of lung cancer however cutaneous metastasis is lower on the differential at this time.  - Discussed various treatment options including but not limited to excision +- antibiotics.   - As the lesion is currently inflamed, will require antibiotic treatment before excision is considered.  - Start doxycycline (VIBRA-TABS) 100 MG tablet; Take 1 tablet (100 mg total) by mouth 2 (two) times daily. for 14 days  Dispense: 28 tablet; Refill: 0. Take with food. Counseled on side effects of this medication.  - Due to size of the lesion, Ambulatory referral/consult to General Surgery; Future; Expected date: 01/13/2025         Follow up if symptoms worsen or fail to improve.

## 2025-01-06 NOTE — PROGRESS NOTES
Subjective:      Patient ID:  Radha Feng is a 84 y.o. female who presents for   Chief Complaint   Patient presents with    Cyst     Ms. Feng is an 84 YOF presenting for initial evaluation of a mass on the right posterior shoulder. At first, she noticed a dark flat spot at the site in October. Over time, it began to grow, become inflamed, and become painful prompting her to schedule this appointment. It is painful when touched but also at rest. The site has never drained any contents. She is currently using Neosporin and oragel on the site for pain control. She has had no other similar lesions in the past.     Review of Systems   Constitutional:  Negative for fever and chills.     Objective:   Physical Exam   Constitutional: She appears well-developed.   Neurological: She is alert and oriented to person, place, and time.   Psychiatric: She has a normal mood and affect.   Skin:   Areas Examined (abnormalities noted in diagram):   Back Inspection Performed  RUE Inspected            Diagram Legend     Erythematous scaling macule/papule c/w actinic keratosis       Vascular papule c/w angioma      Pigmented verrucoid papule/plaque c/w seborrheic keratosis      Yellow umbilicated papule c/w sebaceous hyperplasia      Irregularly shaped tan macule c/w lentigo     1-2 mm smooth white papules consistent with Milia      Movable subcutaneous cyst with punctum c/w epidermal inclusion cyst      Subcutaneous movable cyst c/w pilar cyst      Firm pink to brown papule c/w dermatofibroma      Pedunculated fleshy papule(s) c/w skin tag(s)      Evenly pigmented macule c/w junctional nevus     Mildly variegated pigmented, slightly irregular-bordered macule c/w mildly atypical nevus      Flesh colored to evenly pigmented papule c/w intradermal nevus       Pink pearly papule/plaque c/w basal cell carcinoma      Erythematous hyperkeratotic cursted plaque c/w SCC      Surgical scar with no sign of skin cancer recurrence      Open  and closed comedones      Inflammatory papules and pustules      Verrucoid papule consistent consistent with wart     Erythematous eczematous patches and plaques     Dystrophic onycholytic nail with subungual debris c/w onychomycosis     Umbilicated papule    Erythematous-base heme-crusted tan verrucoid plaque consistent with inflamed seborrheic keratosis     Erythematous Silvery Scaling Plaque c/w Psoriasis     See annotation      Assessment / Plan:        Epidermal inclusion cyst  - Discussed likely etiology of lesion, most consistent with EIC. Patient does have semi-recent diagnosis of lung cancer however cutaneous metastasis is lower on the differential at this time.  - Discussed various treatment options including but not limited to excision and/or antibiotics.   - As the lesion is currently inflamed, will require antibiotic treatment before excision is considered.  - Start doxycycline (VIBRA-TABS) 100 MG tablet; Take 1 tablet (100 mg total) by mouth 2 (two) times daily. for 14 days  Dispense: 28 tablet; Refill: 0. Take with food. Counseled on side effects of this medication.  - Due to size of the lesion, Ambulatory referral/consult to General Surgery; Future; Expected date: 01/13/2025.          Follow up if symptoms worsen or fail to improve.

## 2025-01-14 ENCOUNTER — LAB VISIT (OUTPATIENT)
Dept: LAB | Facility: HOSPITAL | Age: 85
End: 2025-01-14
Attending: INTERNAL MEDICINE
Payer: MEDICARE

## 2025-01-14 DIAGNOSIS — K74.60 CIRRHOSIS OF LIVER WITH ASCITES, UNSPECIFIED HEPATIC CIRRHOSIS TYPE: ICD-10-CM

## 2025-01-14 DIAGNOSIS — Z00.00 ENCOUNTER FOR MEDICARE ANNUAL WELLNESS EXAM: ICD-10-CM

## 2025-01-14 DIAGNOSIS — R18.8 CIRRHOSIS OF LIVER WITH ASCITES, UNSPECIFIED HEPATIC CIRRHOSIS TYPE: ICD-10-CM

## 2025-01-14 LAB
ALBUMIN SERPL BCP-MCNC: 2.8 G/DL (ref 3.5–5.2)
ALP SERPL-CCNC: 75 U/L (ref 40–150)
ALT SERPL W/O P-5'-P-CCNC: 14 U/L (ref 10–44)
ANION GAP SERPL CALC-SCNC: 8 MMOL/L (ref 8–16)
AST SERPL-CCNC: 24 U/L (ref 10–40)
BASOPHILS # BLD AUTO: 0.03 K/UL (ref 0–0.2)
BASOPHILS NFR BLD: 0.4 % (ref 0–1.9)
BILIRUB DIRECT SERPL-MCNC: 0.2 MG/DL (ref 0.1–0.3)
BILIRUB SERPL-MCNC: 0.5 MG/DL (ref 0.1–1)
BUN SERPL-MCNC: 22 MG/DL (ref 8–23)
CALCIUM SERPL-MCNC: 9.7 MG/DL (ref 8.7–10.5)
CHLORIDE SERPL-SCNC: 105 MMOL/L (ref 95–110)
CO2 SERPL-SCNC: 25 MMOL/L (ref 23–29)
CREAT SERPL-MCNC: 0.7 MG/DL (ref 0.5–1.4)
DIFFERENTIAL METHOD BLD: ABNORMAL
EOSINOPHIL # BLD AUTO: 0 K/UL (ref 0–0.5)
EOSINOPHIL NFR BLD: 0.5 % (ref 0–8)
ERYTHROCYTE [DISTWIDTH] IN BLOOD BY AUTOMATED COUNT: 13.2 % (ref 11.5–14.5)
EST. GFR  (NO RACE VARIABLE): >60 ML/MIN/1.73 M^2
GLUCOSE SERPL-MCNC: 103 MG/DL (ref 70–110)
HCT VFR BLD AUTO: 34.3 % (ref 37–48.5)
HGB BLD-MCNC: 11.1 G/DL (ref 12–16)
IMM GRANULOCYTES # BLD AUTO: 0.02 K/UL (ref 0–0.04)
IMM GRANULOCYTES NFR BLD AUTO: 0.3 % (ref 0–0.5)
INR PPP: 1.1 (ref 0.8–1.2)
LYMPHOCYTES # BLD AUTO: 0.8 K/UL (ref 1–4.8)
LYMPHOCYTES NFR BLD: 10.4 % (ref 18–48)
MCH RBC QN AUTO: 31 PG (ref 27–31)
MCHC RBC AUTO-ENTMCNC: 32.4 G/DL (ref 32–36)
MCV RBC AUTO: 96 FL (ref 82–98)
MONOCYTES # BLD AUTO: 1 K/UL (ref 0.3–1)
MONOCYTES NFR BLD: 12.8 % (ref 4–15)
NEUTROPHILS # BLD AUTO: 6.1 K/UL (ref 1.8–7.7)
NEUTROPHILS NFR BLD: 75.6 % (ref 38–73)
NRBC BLD-RTO: 0 /100 WBC
PLATELET # BLD AUTO: 225 K/UL (ref 150–450)
PMV BLD AUTO: 10.4 FL (ref 9.2–12.9)
POTASSIUM SERPL-SCNC: 4.2 MMOL/L (ref 3.5–5.1)
PROT SERPL-MCNC: 7.8 G/DL (ref 6–8.4)
PROTHROMBIN TIME: 11.7 SEC (ref 9–12.5)
RBC # BLD AUTO: 3.58 M/UL (ref 4–5.4)
SODIUM SERPL-SCNC: 138 MMOL/L (ref 136–145)
WBC # BLD AUTO: 7.99 K/UL (ref 3.9–12.7)

## 2025-01-14 PROCEDURE — 82248 BILIRUBIN DIRECT: CPT | Performed by: INTERNAL MEDICINE

## 2025-01-14 PROCEDURE — 85610 PROTHROMBIN TIME: CPT | Performed by: INTERNAL MEDICINE

## 2025-01-14 PROCEDURE — 80053 COMPREHEN METABOLIC PANEL: CPT | Performed by: INTERNAL MEDICINE

## 2025-01-14 PROCEDURE — 85025 COMPLETE CBC W/AUTO DIFF WBC: CPT | Performed by: INTERNAL MEDICINE

## 2025-01-14 PROCEDURE — 36415 COLL VENOUS BLD VENIPUNCTURE: CPT | Mod: PN | Performed by: INTERNAL MEDICINE

## 2025-01-15 ENCOUNTER — OFFICE VISIT (OUTPATIENT)
Dept: SURGERY | Facility: CLINIC | Age: 85
End: 2025-01-15
Payer: MEDICARE

## 2025-01-15 ENCOUNTER — PATIENT MESSAGE (OUTPATIENT)
Dept: HEMATOLOGY/ONCOLOGY | Facility: CLINIC | Age: 85
End: 2025-01-15
Payer: MEDICARE

## 2025-01-15 ENCOUNTER — PATIENT MESSAGE (OUTPATIENT)
Dept: DERMATOLOGY | Facility: CLINIC | Age: 85
End: 2025-01-15
Payer: MEDICARE

## 2025-01-15 VITALS
HEART RATE: 78 BPM | DIASTOLIC BLOOD PRESSURE: 75 MMHG | WEIGHT: 119.69 LBS | HEIGHT: 65 IN | BODY MASS INDEX: 19.94 KG/M2 | SYSTOLIC BLOOD PRESSURE: 150 MMHG | OXYGEN SATURATION: 98 %

## 2025-01-15 DIAGNOSIS — R22.31 MASS OF SKIN OF SHOULDER, RIGHT: Primary | ICD-10-CM

## 2025-01-15 DIAGNOSIS — L72.0 EPIDERMAL INCLUSION CYST: ICD-10-CM

## 2025-01-15 PROCEDURE — 99999 PR PBB SHADOW E&M-EST. PATIENT-LVL V: CPT | Mod: PBBFAC,,, | Performed by: STUDENT IN AN ORGANIZED HEALTH CARE EDUCATION/TRAINING PROGRAM

## 2025-01-15 PROCEDURE — 88342 IMHCHEM/IMCYTCHM 1ST ANTB: CPT | Mod: 26,,, | Performed by: PATHOLOGY

## 2025-01-15 PROCEDURE — 88341 IMHCHEM/IMCYTCHM EA ADD ANTB: CPT | Mod: 59 | Performed by: PATHOLOGY

## 2025-01-15 PROCEDURE — 88342 IMHCHEM/IMCYTCHM 1ST ANTB: CPT | Performed by: PATHOLOGY

## 2025-01-15 PROCEDURE — 88341 IMHCHEM/IMCYTCHM EA ADD ANTB: CPT | Mod: 26,,, | Performed by: PATHOLOGY

## 2025-01-15 PROCEDURE — 11106 INCAL BX SKN SINGLE LES: CPT | Mod: PBBFAC | Performed by: STUDENT IN AN ORGANIZED HEALTH CARE EDUCATION/TRAINING PROGRAM

## 2025-01-15 PROCEDURE — 88305 TISSUE EXAM BY PATHOLOGIST: CPT | Performed by: PATHOLOGY

## 2025-01-15 PROCEDURE — 11106 INCAL BX SKN SINGLE LES: CPT | Mod: S$PBB,,, | Performed by: STUDENT IN AN ORGANIZED HEALTH CARE EDUCATION/TRAINING PROGRAM

## 2025-01-15 PROCEDURE — 99204 OFFICE O/P NEW MOD 45 MIN: CPT | Mod: S$PBB,25,, | Performed by: STUDENT IN AN ORGANIZED HEALTH CARE EDUCATION/TRAINING PROGRAM

## 2025-01-15 PROCEDURE — 88305 TISSUE EXAM BY PATHOLOGIST: CPT | Mod: 26,,, | Performed by: PATHOLOGY

## 2025-01-15 PROCEDURE — 99215 OFFICE O/P EST HI 40 MIN: CPT | Mod: PBBFAC | Performed by: STUDENT IN AN ORGANIZED HEALTH CARE EDUCATION/TRAINING PROGRAM

## 2025-01-15 RX ORDER — INSULIN PMP CART,AUT,G6/7,CNTR
EACH SUBCUTANEOUS
COMMUNITY
Start: 2024-12-17

## 2025-01-15 NOTE — PROGRESS NOTES
[facility]  General Surgery  History & Physical  Date: 01/15/2025  Referring Provider: Lakshmi Lmia    SUBJECTIVE:     Chief complaint:   Chief Complaint   Patient presents with    Consult       History of Present Illness:    Radha Feng is an 84 y.o. woman with a history of HTN, CAD, PVD, RUL lung cancer s/p XRT (not a candidate for chemo due to functional status and autoimmune hepatitis per rad onc note), type 2 DM, and chronic liver disease presents with a right posterior should skin lesion. She was seen by Dermatology and was referred to us for excision. Onset of symptoms was abrupt starting 2ish months ago in Oct/November as flat itchy lesion, but by mid November it increased in size and became painful. Given doxycycline course by derm, decreasing redness. Pain is achy and itchy, comes and goes, rated 8/10. Patient denies N/V/F/C.    She is a former smoker. She does not drink much alcohol. She is not on any anticoagulation.    Review of patient's allergies indicates:  No Known Allergies    Current Outpatient Medications   Medication Sig Dispense Refill    acetaminophen (TYLENOL) 500 MG tablet Take 2 tablets (1,000 mg total) by mouth every 8 (eight) hours as needed for Pain.  0    amLODIPine (NORVASC) 2.5 MG tablet TAKE 1 TABLET BY MOUTH EVERY DAY 90 tablet 1    BD INSULIN SYRINGE ULTRA-FINE 1 mL 31 gauge x 5/16 Syrg       blood-glucose meter,continuous (DEXCOM G6 ) Misc Check blood sugar before meals and at bedtime 1 each PRN    blood-glucose transmitter (DEXCOM G6 TRANSMITTER) Amanda 1 each by Misc.(Non-Drug; Combo Route) route every 3 (three) months. 1 each 3    brimonidine 0.2% (ALPHAGAN) 0.2 % Drop Place 1 drop into both eyes 3 (three) times daily. 10 mL 11    cholecalciferol, vitamin D3, (VITAMIN D3) 25 mcg (1,000 unit) capsule Take 2 capsules (2,000 Units total) by mouth once daily.  0    DEXCOM G6 SENSOR Amanda 3 EACH BY MISC.(NON-DRUG COMBO ROUTE) ROUTE EVERY 10 DAYS. 9 each  "0    dorzolamide-timolol 2-0.5% (COSOPT) 22.3-6.8 mg/mL ophthalmic solution INSTILL 1 DROP INTO RIGHT EYE TWICE A DAY 10 mL 11    doxycycline (VIBRA-TABS) 100 MG tablet Take 1 tablet (100 mg total) by mouth 2 (two) times daily. for 14 days 28 tablet 0    erythromycin (ROMYCIN) ophthalmic ointment Apply to lids and lashes at night as needed for itchy eyelids 1 each 6    estradioL (ESTRACE) 0.01 % (0.1 mg/gram) vaginal cream Place 0.5 g vaginally twice a week. Apply to the vagina daily for two weeks then twice a week. 45 g 4    furosemide (LASIX) 40 MG tablet Take 1 tablet (40 mg total) by mouth once daily. 90 tablet 0    insulin aspart U-100 (NOVOLOG U-100 INSULIN ASPART) 100 unit/mL injection TO USE WITH OMNIPOD 5. TOTAL DAILY DOSE UP TO 40 UNITS 40 mL 4    insulin pump cart,automated,BT (OMNIPOD 5 G6 PODS, GEN 5,) Crtg Inject 1 each into the skin Every 3 (three) days. 10 each 11    lactulose (CEPHULAC) 10 gram packet Take 10 g by mouth 3 (three) times daily.      lisinopriL-hydrochlorothiazide (PRINZIDE,ZESTORETIC) 20-12.5 mg per tablet TAKE 1 TABLET BY MOUTH EVERY DAY 90 tablet 1    OMNIPOD 5 G6-G7 PODS, GEN 5, Crtg SMARTSI Each SUB-Q Once a Month      pen needle, diabetic 31 gauge x 5/16" Ndle Use to inject insulin into the skin up to 4 times daily 400 each 3    predniSONE (DELTASONE) 5 MG tablet Take 0.5 tablets (2.5 mg total) by mouth once daily. 15 tablet 3    latanoprost 0.005 % ophthalmic solution PLACE 1 DROP INTO BOTH EYES ONCE DAILY 7.5 mL 3     No current facility-administered medications for this visit.       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    Diabetes mellitus     Glaucoma     Hypertension     Other ascites 2022     Past Surgical History:   Procedure Laterality Date    CATARACT EXTRACTION W/  INTRAOCULAR LENS IMPLANT Left 2021        ENDOBRONCHIAL ULTRASOUND N/A 2024    Procedure: ENDOBRONCHIAL ULTRASOUND " (EBUS);  Surgeon: Rolo Wilkinson MD;  Location: [facility] OR 2ND FLR;  Service: Pulmonary;  Laterality: N/A;    ESOPHAGOGASTRODUODENOSCOPY N/A 06/26/2023    Procedure: ESOPHAGOGASTRODUODENOSCOPY (EGD);  Surgeon: Edgar Branch MD;  Location: [facility] (4TH FLR);  Service: Endoscopy;  Laterality: N/A;  referral Dr Peraza-cirrhosis/labs done on 4/24/23-instr portal-GT     Family History   Problem Relation Name Age of Onset    Cataracts Mother      Diabetes Mother      Glaucoma Mother      Hypertension Mother      Heart attack Father      Colon cancer Sister      Blindness Cousin      Amblyopia Neg Hx      Macular degeneration Neg Hx      Retinal detachment Neg Hx      Strabismus Neg Hx       Social History     Tobacco Use    Smoking status: Former    Smokeless tobacco: Never   Substance Use Topics    Alcohol use: Not Currently    Drug use: Never        Review of Systems:  A detailed review of systems has been reviewed with the patient, pertinent positives and negatives are presented in the note and is otherwise negative.  Review of Systems   Constitutional:  Negative for activity change, chills, fatigue and fever.   HENT:  Negative for congestion, rhinorrhea and trouble swallowing.    Eyes:  Negative for discharge.   Respiratory:  Positive for shortness of breath. Negative for cough, chest tightness and wheezing.    Cardiovascular:  Negative for chest pain and palpitations.   Gastrointestinal:  Negative for abdominal pain, anal bleeding, constipation, diarrhea, nausea and vomiting.   Endocrine: Negative for cold intolerance and heat intolerance.   Genitourinary:  Negative for decreased urine volume, difficulty urinating, dysuria and urgency.   Musculoskeletal:  Negative for back pain, joint swelling and neck pain.   Skin:  Positive for color change. Negative for wound.        Large dark mass on right shoulder   Neurological:  Negative for syncope, weakness and light-headedness.   Hematological:  Negative for  "adenopathy. Does not bruise/bleed easily.       OBJECTIVE:     Vital Signs (Most Recent)  Pulse: 78 (01/15/25 1414)  BP: (!) 150/75 (01/15/25 1414)  SpO2: 98 % (01/15/25 1414)  5' 5" (1.651 m)  54.3 kg (119 lb 11.4 oz)     Physical Exam:  Physical Exam  Constitutional:       General: She is not in acute distress.     Appearance: Normal appearance. She is normal weight. She is not ill-appearing or toxic-appearing.      Comments: In wheelchair   HENT:      Head: Normocephalic and atraumatic.   Eyes:      General: Vision grossly intact. No scleral icterus.     Extraocular Movements: Extraocular movements intact.   Cardiovascular:      Rate and Rhythm: Normal rate and regular rhythm.      Pulses: Normal pulses.      Heart sounds: Normal heart sounds. No murmur heard.  Pulmonary:      Effort: Pulmonary effort is normal. No respiratory distress.      Breath sounds: Normal breath sounds. No wheezing.   Abdominal:      General: Abdomen is flat. Bowel sounds are normal. There is no distension.      Palpations: Abdomen is soft. There is no fluid wave or mass.   Musculoskeletal:         General: No swelling, tenderness or deformity.      Cervical back: Normal range of motion and neck supple. No rigidity or tenderness. No muscular tenderness.   Lymphadenopathy:      Upper Body:      Right upper body: No supraclavicular adenopathy.      Left upper body: No supraclavicular adenopathy.   Skin:     General: Skin is warm and dry.      Coloration: Skin is not cyanotic or jaundiced.      Findings: Ecchymosis and lesion present. No abrasion, erythema, rash or wound.             Comments: Approximately 4 x 2.5 area of hyperpigmentation with uneven borders. There is erythema and tenderness to palpation. No fluctuation. No pit noted. Not mobile on exam.   Neurological:      General: No focal deficit present.      Mental Status: She is alert and oriented to person, place, and time.      Sensory: Sensation is intact.   Psychiatric:         " Mood and Affect: Mood normal.         Behavior: Behavior normal. Behavior is cooperative.       Laboratory:  I personally and independently reviewed relevant lab test results, including the following:  CBC  1/14/2025: Reviewed - wnl  CMP  1/14/2025: Reviewed - wnl  PT-INR 1/14/2025: Reviewed - wnl  Direct bilirubin  1/14/2025: Reviewed - wnl    Diagnostic Results:  I personally and independently reviewed, visualized and interpreted the images of the below listed radiology studies and my findings are notable for:  None relevant    ASSESSMENT/PLAN:   Radha Feng is an 84 year-old woman with multiple medical co-morbidities presenting with a concerning right posterior shoulder skin lesion    PLAN:  - Incision biopsy of right posterior shoulder mass to be done today   - Recommend local wound care to biopsy site  - Will call patient with results    Gretta Desir DPM   General Surgery     ATTESTATION: I have reviewed and concur with the resident's history, physical, assessment, and plan.  I have personally interviewed and examined the patient at bedside.  See below addendum for my evaluation and additional findings.    Radha Feng is an 84 year-old woman with a history of HTN, type 2 DM, CAD, PVD, autoimmune hepatitis and lung cancer s/p XRT (completed Sept 2024).  She noted an itchy skin lesion at the right posterior shoulder towards the end of October and two early November that progressively increased in size and changed in color.  She was seen by Dermatology and at the time no biopsy was done and she was referred to surgery for intervention as she it was initially thought that she had a skin cyst.  Since the skin lesion was initially noted, the patient and her daughter state that it has increased in size and remains painful.  She was started on oral antibiotics by the dermatologist given the concern for an infected cyst.  Today she is feeling well but is concerned about the skin lesion.  On my exam she has a  proximally 3.5 x 3.5 hyperpigmented lesion of the right posterior shoulder that is firm, tender to palpation, and raised.  It does appear atypical for a cyst and I discussed with her and her daughter, Ms. Della Feng, about doing an incisional biopsy given these findings.  I expressed to the patient and her daughter that I am concerned that this lesion may not be benign so we will do the biopsy.  Written consent was obtained.  I discussed with him once we get the pathology results, I will call them discuss treatment options based on the results. All of their questions and concerns were addressed.      Nicole Bautista MD, FACS  General Surgery and Surgical Critical Care  [facility] [address]

## 2025-01-16 ENCOUNTER — PATIENT MESSAGE (OUTPATIENT)
Dept: SURGERY | Facility: CLINIC | Age: 85
End: 2025-01-16
Payer: MEDICARE

## 2025-01-16 ENCOUNTER — TELEPHONE (OUTPATIENT)
Dept: DERMATOLOGY | Facility: CLINIC | Age: 85
End: 2025-01-16
Payer: MEDICARE

## 2025-01-16 NOTE — TELEPHONE ENCOUNTER
Called pt's daughter in regards to her portal message. Left voicemail that I was sorry she had that experience at her visit and am glad that her mother has seen the surgeon and had a specimen taken, I will be following up those results as well and if she would like to speak with me further she should reach back out to me.

## 2025-01-17 ENCOUNTER — PATIENT MESSAGE (OUTPATIENT)
Dept: PODIATRY | Facility: CLINIC | Age: 85
End: 2025-01-17
Payer: MEDICARE

## 2025-01-17 ENCOUNTER — TELEPHONE (OUTPATIENT)
Dept: PODIATRY | Facility: CLINIC | Age: 85
End: 2025-01-17
Payer: MEDICARE

## 2025-01-17 NOTE — TELEPHONE ENCOUNTER
CHECKED PATIENT. PATIENT AWAKE RESPIRATION EVEN UNLABORED ON ROOM AIR. NO DISTRESS NOTED. 
WILL CONTINUE TO MONITOR. Call pt to get rescheduled from 01/21/2025 with Dr. Abreu . Pt daughter verbally confirmed appointment

## 2025-01-18 ENCOUNTER — PATIENT MESSAGE (OUTPATIENT)
Dept: PODIATRY | Facility: CLINIC | Age: 85
End: 2025-01-18
Payer: MEDICARE

## 2025-01-20 NOTE — PROGRESS NOTES
Incisional Biopsy Procedure Note    Pre-operative Diagnosis: Suspicious lesion    Post-operative Diagnosis: normal    Locations: Right posterior shoulder    Indications: 84 year-old woman with a history of RUL lung cancer, DM, HTN and CAD presenting with a suspicious skin lesion in the right posterior shoulder. Biopsy was indicated given these findings.    Anesthesia: Lidocaine 1% with epinephrine with added sodium bicarbonate    Procedure Details   The risks, benefits, indications, potential complications, and alternatives were explained to the patient and informed consent obtained.    The lesion and surrounding area was given a sterile prep using  chloraprep  and draped in the usual sterile fashion. Local analgesia was injection in the area where the biopsy would be done. A scalpel was used to excise an elliptical area of skin approximately 1cm by 1cm. Another sample was taken and sent with the first specimen. The wound was left open given how friable the tissues were. A sterile dressing applied. The specimen was sent for pathologic examination. The patient tolerated the procedure well.    EBL: minimal    Findings: Incisional biopsy done with two samples taken for evaluation of the skin lesion    Condition: Stable    Complications:  None    Plan:  1. Instructed to keep the wound dry and covered for 24-48 hours and clean thereafter.  2. Warning signs of infection were reviewed.    3. Recommended that the patient use OTC analgesics as needed for pain.   4. Will call patient with results.      Nicole Bautista MD, [facility]  General Surgery and Surgical Critical Care  [address]

## 2025-01-22 ENCOUNTER — PATIENT MESSAGE (OUTPATIENT)
Dept: DIABETES | Facility: CLINIC | Age: 85
End: 2025-01-22
Payer: MEDICARE

## 2025-01-22 ENCOUNTER — PATIENT MESSAGE (OUTPATIENT)
Dept: ENDOCRINOLOGY | Facility: CLINIC | Age: 85
End: 2025-01-22
Payer: MEDICARE

## 2025-01-22 ENCOUNTER — TELEPHONE (OUTPATIENT)
Dept: PODIATRY | Facility: CLINIC | Age: 85
End: 2025-01-22
Payer: MEDICARE

## 2025-01-22 DIAGNOSIS — Z79.4 TYPE 2 DIABETES MELLITUS WITH OTHER CIRCULATORY COMPLICATION, WITH LONG-TERM CURRENT USE OF INSULIN: Primary | ICD-10-CM

## 2025-01-22 DIAGNOSIS — E11.59 TYPE 2 DIABETES MELLITUS WITH OTHER CIRCULATORY COMPLICATION, WITH LONG-TERM CURRENT USE OF INSULIN: Primary | ICD-10-CM

## 2025-01-22 RX ORDER — BLOOD-GLUCOSE SENSOR
1 EACH MISCELLANEOUS
Qty: 9 EACH | Refills: 3 | Status: SHIPPED | OUTPATIENT
Start: 2025-01-22 | End: 2026-01-22

## 2025-01-23 DIAGNOSIS — I11.0 HYPERTENSIVE HEART DISEASE WITH DIASTOLIC HEART FAILURE: Primary | ICD-10-CM

## 2025-01-23 DIAGNOSIS — I50.30 HYPERTENSIVE HEART DISEASE WITH DIASTOLIC HEART FAILURE: Primary | ICD-10-CM

## 2025-01-23 DIAGNOSIS — I10 HYPERTENSION, UNSPECIFIED TYPE: ICD-10-CM

## 2025-01-23 NOTE — TELEPHONE ENCOUNTER
Care Due:                  Date            Visit Type   Department     Provider  --------------------------------------------------------------------------------                                MYCHART                              FOLLOWUP/OF  [facility] PRIMARY   Leticia Lizarraga  Last Visit: 01-      [facility]ICE VISIT   CARE           Raphael  Next Visit: None Scheduled  None         None Found                                                            Last  Test          Frequency    Reason                     Performed    Due Date  --------------------------------------------------------------------------------    Office Visit  15 months..  amLODIPine, furosemide,    01- 04-                             lisinopriL-hydrochlorothi                             azide....................    [employer] Embedded Care Due Messages. Reference number: [id number].   1/23/2025 11:34:07 AM CST

## 2025-01-24 ENCOUNTER — CLINICAL SUPPORT (OUTPATIENT)
Dept: DIABETES | Facility: CLINIC | Age: 85
End: 2025-01-24
Payer: MEDICARE

## 2025-01-24 DIAGNOSIS — Z79.4 TYPE 2 DIABETES MELLITUS WITH DIABETIC PERIPHERAL ANGIOPATHY AND GANGRENE, WITH LONG-TERM CURRENT USE OF INSULIN: ICD-10-CM

## 2025-01-24 DIAGNOSIS — E11.52 TYPE 2 DIABETES MELLITUS WITH DIABETIC PERIPHERAL ANGIOPATHY AND GANGRENE, WITH LONG-TERM CURRENT USE OF INSULIN: ICD-10-CM

## 2025-01-24 DIAGNOSIS — Z79.4 TYPE 2 DIABETES MELLITUS WITH DIABETIC PERIPHERAL ANGIOPATHY AND GANGRENE, WITH LONG-TERM CURRENT USE OF INSULIN: Primary | ICD-10-CM

## 2025-01-24 DIAGNOSIS — E11.52 TYPE 2 DIABETES MELLITUS WITH DIABETIC PERIPHERAL ANGIOPATHY AND GANGRENE, WITH LONG-TERM CURRENT USE OF INSULIN: Primary | ICD-10-CM

## 2025-01-24 PROCEDURE — G0108 DIAB MANAGE TRN  PER INDIV: HCPCS | Mod: 95,,, | Performed by: INTERNAL MEDICINE

## 2025-01-24 RX ORDER — FUROSEMIDE 40 MG/1
40 TABLET ORAL
Qty: 90 TABLET | Refills: 0 | Status: SHIPPED | OUTPATIENT
Start: 2025-01-24

## 2025-01-24 RX ORDER — LISINOPRIL AND HYDROCHLOROTHIAZIDE 12.5; 2 MG/1; MG/1
1 TABLET ORAL
Qty: 90 TABLET | Refills: 0 | Status: SHIPPED | OUTPATIENT
Start: 2025-01-24

## 2025-01-24 NOTE — PROGRESS NOTES
Diabetes Care Specialist Virtual Visit Note   The patient location is: Home in [place]  The chief complaint leading to consultation is: Diabetes  Visit type: audiovisual  Total time spent with patient: 60 min   Each patient to whom he or she provides medical services by telemedicine is:  (1) informed of the relationship between the physician and patient and the respective role of any other health care provider with respect to management of the patient; and (2) notified that he or she may decline to receive medical services by telemedicine and may withdraw from such care at any time.    Diabetes Care Specialist Follow-up Note  Author: Michelle Feng RN, CDE  Date: 1/24/2025    Intake    Program Intake  Reason for Diabetes Program Visit:: Intervention  Type of Intervention:: Individual  Individual: Device Training  Current diabetes risk level:: moderate  In the last month, have you used the ER or been admitted to the hospital: No  Was the ER or hospital admission related to diabetes?: No  Permission to speak with others about care:: yes (Daughter Bebo caregiver)    Current Diabetes Treatment: Insulin  Method of insulin delivery?: Insulin Pump  Type of Pump: Omnipod 5  Does patient have back-up plan?: Yes  Any problems obtaining supplies?: No    Continuous Glucose Monitoring  Patient has CGM: Yes  Personal CGM type:: Dexcom G6    Lab Results   Component Value Date    HGBA1C 6.0 (H) 06/21/2024       Diabetes Self-Management Skills Assessment    Medication Skills Assessment  Patient is able to identify current diabetes medications, dosages, and appropriate timing of medications.: yes  Patient reports problems or concerns with current medication regimen.: no  Patient is  aware that some diabetes medications can cause low blood sugar?: Yes  Medication Skills Assessment Completed:: Yes  Assessment indicates:: Adequate understanding  Area of need?: No    Home Blood Glucose Monitoring  Personal CGM type:: Dexcom  G6    During today's follow-up visit,  the following areas required further assessment and content was provided/reviewed.    Based on today's diabetes care assessment, the following areas of need were identified:      Identified Areas of Need      Medication/Current Diabetes Treatment: No, see care planning     Today's interventions were provided through individual discussion, instruction, and written materials were provided.    Patient verbalized understanding of instruction and written materials.  Pt was able to return back demonstration of instructions today. Patient understood key points, needs reinforcement and further instruction.     Diabetes Self-Management Care Plan Review and Evaluation of Progress:    During today's follow-up Radha's Diabetes Self-Management Care Plan progress was reviewed and progress was evaluated including his/her input. Radha has agreed to continue his/her journey to improve/maintain overall diabetes control by continuing to set health goals. See care plan progress below.      Care Plan: Diabetes Management   Updates made since 1/25/2024 12:00 AM        Problem: Medications         Goal: Patient Agrees to take Diabetes Medication(s) using the Omnipod 5 system    Start Date: 3/27/2023   Expected End Date: 10/15/2024   Recent Progress: On track   Priority: High   Barriers: Knowledge deficit   Note:    OP5    Update to care planning 06/21/2023:  Reviewed Omnipod 5 download with Dexcom integration with patient and her daughter Vinod.  Overall doing well.  Bgs stable during the night and when she is not eating.  Elevations noted at all post meal glucose.  Carbs are controlled and measured by family members.  Will increase her I:C from 16 to 14 as she requires more insulin with meals.  Walked daughter thru changes on her PDM. No other concerns voiced.  We reviewed troubleshooting, what to look for including leaking at the site an unexplained rise in glucose readings.  Advised to monitor  for these and to change the pod if this occurs.  Advised that she no longer has long acting insulin and changes must be address immediately to avoid potential issues.  Has back up insulin.  Demonstrated how to use the inhaled and glucagon emergency pens.  Will reach out to HCP to prescribed. Patient states she likes using system as opposed to injections. Advised to call Omnipod for replacement pods as she had to change 2 prematurely.  No other questions voiced.     Update to care planning 10/10/23:  Patient and her daughter seen for pump f/u.  Doing well on Omnipod 5.  We reviewed when changing the pod to make sure she is in [employer].  Had changed pod last and forgot to switch.  Discussed troubleshooting with pump and how to identify issues.  Advised to always change pod if numbers do not respond to correction bolus.  Instructed Too how to do a correction bolus with pump.     Update to care planning 4/17/2024:  Patient seen virtually for her 6 mth f/u visit.  Tish (daughter) in attendance. Download was reviewed for Omnipod 5.  Continues to do very well on pump, reports no issues.  Assisted with changing IC ratio and basal rates per Dr. Agarwal.  No other issues, continues to enter her meals appropriately.     Update to care planning 10/1/2024: Patient seen virtually for f/u visit. Regino daughter in attendance. Overall doing well with the pump. Daughter stated that sometimes the patient's glucose will drop overnight.  Download reviewed.  Adjusting her targets as follows: 12 am target 140/correct over 150; 6 am target 120 correct over 140.  Advised will look at download next week to see if further changes are needed.     Update to care planing 1/24/2024:  Assisted daughter with programming Dexcom G7 in pump. Dexcom G7 training completed. Daughter stating Mother experienced a couple of concerning lows.  Adjusted ISF from 50 to 60 and adjusted carb ratios as follows: 12 am 12; 6 am 10; 4 pm 12       Problem:  Healthy Eating         Goal: Eat 2-3 meals daily with 30-45g/2-3 servings of Carbohydrate per meal. Limit snacking in between meal to 1 serving (15 grams).    Start Date: 3/27/2023   Expected End Date: 10/10/2023   This Visit's Progress: On track   Recent Progress: On track   Priority: Medium   Note:    Carb counting    Update to care planning 06/21/2023:  no issues reported.  Daughter asked about bolusing for snacks.  Advised to keep snacks at about 15 gram and that it is ok to bolus for these as pump tracks insulin and will make adjustments accordingly.          Follow Up Plan     As needed    Today's care plan and follow up schedule was discussed with patient.  Radha verbalized understanding of the care plan, goals, and agrees to follow up plan.        The patient was encouraged to communicate with his/her health care provider/physician and care team regarding his/her condition(s) and treatment.  I provided the patient with my contact information today and encouraged to contact me via phone or [facility] Patient Portal as needed.     Length of Visit   Total Time: 60 Minutes

## 2025-01-27 LAB
FINAL PATHOLOGIC DIAGNOSIS: NORMAL
GROSS: NORMAL
Lab: NORMAL

## 2025-01-28 ENCOUNTER — OFFICE VISIT (OUTPATIENT)
Dept: PODIATRY | Facility: CLINIC | Age: 85
End: 2025-01-28
Payer: MEDICARE

## 2025-01-28 ENCOUNTER — PATIENT MESSAGE (OUTPATIENT)
Dept: PODIATRY | Facility: CLINIC | Age: 85
End: 2025-01-28

## 2025-01-28 VITALS — HEIGHT: 65 IN | WEIGHT: 119.69 LBS | BODY MASS INDEX: 19.94 KG/M2

## 2025-01-28 DIAGNOSIS — L60.0 INGROWN NAIL: Primary | ICD-10-CM

## 2025-01-28 PROCEDURE — 99999 PR PBB SHADOW E&M-EST. PATIENT-LVL III: CPT | Mod: PBBFAC,,, | Performed by: PODIATRIST

## 2025-01-28 PROCEDURE — 99213 OFFICE O/P EST LOW 20 MIN: CPT | Mod: PBBFAC,PN | Performed by: PODIATRIST

## 2025-01-28 RX ORDER — TOBRAMYCIN 3 MG/ML
SOLUTION/ DROPS OPHTHALMIC
Qty: 5 ML | Refills: 3 | Status: SHIPPED | OUTPATIENT
Start: 2025-01-28 | End: 2025-01-29

## 2025-01-28 NOTE — PROGRESS NOTES
Subjective:      Patient ID: Radha Feng is a 84 y.o. female.    Chief Complaint: Ingrown Toenail (L great toe)    Sharp, throbbing pain left big toe/toenail.  Chronic condition present on and off for years.  This exacerbations been gradual onset, worsening over the past few days.  Aggravated with socks shoes pressure ambulation.  No prior medical treatment.  Self-treatment topical Neosporin as helped some.  Denies trauma and surgery left hallux.    Review of Systems   Constitutional: Negative for chills, diaphoresis, fever, malaise/fatigue and night sweats.   Cardiovascular:  Negative for claudication, cyanosis, leg swelling and syncope.   Skin:  Positive for nail changes. Negative for color change, dry skin, rash, suspicious lesions and unusual hair distribution.   Musculoskeletal:  Negative for falls, joint pain, joint swelling, muscle cramps, muscle weakness and stiffness.   Gastrointestinal:  Negative for constipation, diarrhea, nausea and vomiting.   Neurological:  Negative for brief paralysis, disturbances in coordination, focal weakness, numbness, paresthesias, sensory change and tremors.         Objective:      Physical Exam  Constitutional:       General: She is not in acute distress.     Appearance: She is well-developed. She is not diaphoretic.   Cardiovascular:      Pulses:           Popliteal pulses are 2+ on the right side and 2+ on the left side.        Dorsalis pedis pulses are 2+ on the right side and 2+ on the left side.        Posterior tibial pulses are 2+ on the right side and 2+ on the left side.      Comments: Capillary refill 3 seconds all toes/distal feet, all toes/both feet warm to touch.      Negative lymphadenopathy bilateral popliteal fossa and tarsal tunnel.      Negavie lower extremity edema bilateral.    Musculoskeletal:      Right ankle: No swelling, deformity, ecchymosis or lacerations. Normal range of motion. Normal pulse.      Right Achilles Tendon: Normal. No defects.  Gamez's test negative.   Lymphadenopathy:      Lower Body: No right inguinal adenopathy. No left inguinal adenopathy.      Comments: Negative lymphadenopathy bilateral popliteal fossa and tarsal tunnel.    Negative lymphangitic streaking bilateral feet/ankles/legs.   Skin:     General: Skin is warm and dry.      Capillary Refill: Capillary refill takes 2 to 3 seconds.      Coloration: Skin is not pale.      Findings: No abrasion, bruising, burn, ecchymosis, erythema, laceration, lesion or rash.      Nails: There is no clubbing.      Comments:   Visible and palpable ingrowth of toenail medial border left hallux with pain to palpation, and focal localized erythema and edema,  without ulceration, drainage, pus, tracking, fluctuance, malodor, or cardinal signs infection.    Otherwise, Skin is normal age and health appropriate color, turgor, texture, and temperature bilateral lower extremities without ulceration, hyperpigmentation, discoloration, masses nodules or cords palpated.  No ecchymosis, erythema, edema, or cardinal signs of infection bilateral lower extremities.      Neurological:      Mental Status: She is alert and oriented to person, place, and time.      Sensory: No sensory deficit.      Motor: No tremor, atrophy or abnormal muscle tone.      Gait: Gait normal.      Deep Tendon Reflexes:      Reflex Scores:       Patellar reflexes are 2+ on the right side and 2+ on the left side.       Achilles reflexes are 2+ on the right side and 2+ on the left side.  Psychiatric:         Behavior: Behavior is cooperative.           Assessment:       Encounter Diagnosis   Name Primary?    Ingrown nail Yes         Plan:       Radha was seen today for ingrown toenail.    Diagnoses and all orders for this visit:    Ingrown nail    Other orders  -     tobramycin sulfate 0.3% (TOBREX) 0.3 % ophthalmic solution; 1-2 drops topically twice daily to affected toe(s).      I counseled the patient on her conditions, their  implications and medical management.        Patient declines trimming of the nail to remove fragment she does not feel it has penetrated in the skin at this point just some residual infection.      Topical tobramycin drops twice daily left hallux.      Recommend covering left hallux all times with Band-Aid or similar changing daily until completely healed.      Discussed conservative treatment with shoes of adequate dimensions, material, and style to alleviate symptoms and delay or prevent surgical intervention.      Follow up one-week for re-evaluation if still symptomatic, sooner if worsening.          No follow-ups on file.

## 2025-01-29 ENCOUNTER — TELEPHONE (OUTPATIENT)
Dept: HEMATOLOGY/ONCOLOGY | Facility: CLINIC | Age: 85
End: 2025-01-29
Payer: MEDICARE

## 2025-01-29 RX ORDER — TOBRAMYCIN 3 MG/ML
SOLUTION/ DROPS OPHTHALMIC
Qty: 5 ML | Refills: 9 | Status: SHIPPED | OUTPATIENT
Start: 2025-01-29

## 2025-01-29 NOTE — TELEPHONE ENCOUNTER
Contacted the patient's daughter per her request to discuss the recent biopsy results. Anticipate the need for PET CT to evaluate for other sites of disease, however she would like to discuss with her mother whether she wants the scans before or after meeting in clinic. She will discuss and reach out via the patient portal with their decision.

## 2025-01-30 ENCOUNTER — TELEPHONE (OUTPATIENT)
Dept: HEMATOLOGY/ONCOLOGY | Facility: CLINIC | Age: 85
End: 2025-01-30
Payer: MEDICARE

## 2025-01-30 DIAGNOSIS — M75.91 LESION OF RIGHT SHOULDER: ICD-10-CM

## 2025-01-30 DIAGNOSIS — K75.4 AUTOIMMUNE HEPATITIS: Chronic | ICD-10-CM

## 2025-01-30 DIAGNOSIS — C34.11 ADENOCARCINOMA OF UPPER LOBE OF RIGHT LUNG: Primary | ICD-10-CM

## 2025-01-30 RX ORDER — PREDNISONE 5 MG/1
2.5 TABLET ORAL
Qty: 15 TABLET | Refills: 3 | Status: SHIPPED | OUTPATIENT
Start: 2025-01-30

## 2025-01-30 NOTE — PROGRESS NOTES
Instructions:   1. Medications: Continue current meds  2. Labs: BNP,BMP, Mag  3. Follow up: 1 month  4. Daily weight: Call for increase 3 lbs/day or 5 lbs/week. 5. 2 gm sodium diet:  6. Fluid Restriction: 64 oz.   7.Referred to [employer] for Education:  Yes Please call and claify Pt prefers to have imaging prior to follow up. PET CT ordered.

## 2025-01-30 NOTE — TELEPHONE ENCOUNTER
Called pt to schedule PET CT and appt with Dr Guzmán. Patient still deciding on if she wants to see provider first or have PET CT first per daughter. Patient will send a message via patient portal once she decides.

## 2025-01-31 ENCOUNTER — TELEPHONE (OUTPATIENT)
Dept: HEMATOLOGY/ONCOLOGY | Facility: CLINIC | Age: 85
End: 2025-01-31
Payer: MEDICARE

## 2025-02-04 ENCOUNTER — TELEPHONE (OUTPATIENT)
Dept: SURGERY | Facility: CLINIC | Age: 85
End: 2025-02-04
Payer: MEDICARE

## 2025-02-04 ENCOUNTER — TELEPHONE (OUTPATIENT)
Dept: HEMATOLOGY/ONCOLOGY | Facility: CLINIC | Age: 85
End: 2025-02-04
Payer: MEDICARE

## 2025-02-04 NOTE — TELEPHONE ENCOUNTER
Called patient's daughter, Ms. Della Feng, to discuss pathology results. Call went to voicemail. Pt's daughter has already been in touch with medical oncologist. Will send message via portal.      Nicole Bautista MD, FACS  General Surgery and Surgical Critical Care  [facility] [address]

## 2025-02-11 ENCOUNTER — LAB VISIT (OUTPATIENT)
Dept: LAB | Facility: HOSPITAL | Age: 85
End: 2025-02-11
Attending: INTERNAL MEDICINE
Payer: MEDICARE

## 2025-02-11 DIAGNOSIS — K74.60 CIRRHOSIS OF LIVER WITH ASCITES, UNSPECIFIED HEPATIC CIRRHOSIS TYPE: ICD-10-CM

## 2025-02-11 DIAGNOSIS — R18.8 CIRRHOSIS OF LIVER WITH ASCITES, UNSPECIFIED HEPATIC CIRRHOSIS TYPE: ICD-10-CM

## 2025-02-11 LAB
ALBUMIN SERPL BCP-MCNC: 2.4 G/DL (ref 3.5–5.2)
ALP SERPL-CCNC: 72 U/L (ref 40–150)
ALT SERPL W/O P-5'-P-CCNC: 16 U/L (ref 10–44)
ANION GAP SERPL CALC-SCNC: 10 MMOL/L (ref 8–16)
AST SERPL-CCNC: 22 U/L (ref 10–40)
BASOPHILS # BLD AUTO: 0.03 K/UL (ref 0–0.2)
BASOPHILS NFR BLD: 0.3 % (ref 0–1.9)
BILIRUB DIRECT SERPL-MCNC: 0.2 MG/DL (ref 0.1–0.3)
BILIRUB SERPL-MCNC: 0.5 MG/DL (ref 0.1–1)
BUN SERPL-MCNC: 18 MG/DL (ref 8–23)
CALCIUM SERPL-MCNC: 10.2 MG/DL (ref 8.7–10.5)
CHLORIDE SERPL-SCNC: 104 MMOL/L (ref 95–110)
CO2 SERPL-SCNC: 25 MMOL/L (ref 23–29)
CREAT SERPL-MCNC: 0.7 MG/DL (ref 0.5–1.4)
DIFFERENTIAL METHOD BLD: ABNORMAL
EOSINOPHIL # BLD AUTO: 0.1 K/UL (ref 0–0.5)
EOSINOPHIL NFR BLD: 1.3 % (ref 0–8)
ERYTHROCYTE [DISTWIDTH] IN BLOOD BY AUTOMATED COUNT: 14.2 % (ref 11.5–14.5)
EST. GFR  (NO RACE VARIABLE): >60 ML/MIN/1.73 M^2
GLUCOSE SERPL-MCNC: 148 MG/DL (ref 70–110)
HCT VFR BLD AUTO: 32.2 % (ref 37–48.5)
HGB BLD-MCNC: 10.1 G/DL (ref 12–16)
IMM GRANULOCYTES # BLD AUTO: 0.07 K/UL (ref 0–0.04)
IMM GRANULOCYTES NFR BLD AUTO: 0.7 % (ref 0–0.5)
INR PPP: 1.1 (ref 0.8–1.2)
LYMPHOCYTES # BLD AUTO: 0.8 K/UL (ref 1–4.8)
LYMPHOCYTES NFR BLD: 8.2 % (ref 18–48)
MCH RBC QN AUTO: 30.1 PG (ref 27–31)
MCHC RBC AUTO-ENTMCNC: 31.4 G/DL (ref 32–36)
MCV RBC AUTO: 96 FL (ref 82–98)
MONOCYTES # BLD AUTO: 1.2 K/UL (ref 0.3–1)
MONOCYTES NFR BLD: 11.7 % (ref 4–15)
NEUTROPHILS # BLD AUTO: 7.9 K/UL (ref 1.8–7.7)
NEUTROPHILS NFR BLD: 77.8 % (ref 38–73)
NRBC BLD-RTO: 0 /100 WBC
PLATELET # BLD AUTO: 294 K/UL (ref 150–450)
PMV BLD AUTO: 10.1 FL (ref 9.2–12.9)
POTASSIUM SERPL-SCNC: 4.8 MMOL/L (ref 3.5–5.1)
PROT SERPL-MCNC: 7.7 G/DL (ref 6–8.4)
PROTHROMBIN TIME: 12.1 SEC (ref 9–12.5)
RBC # BLD AUTO: 3.36 M/UL (ref 4–5.4)
SODIUM SERPL-SCNC: 139 MMOL/L (ref 136–145)
WBC # BLD AUTO: 10.12 K/UL (ref 3.9–12.7)

## 2025-02-11 PROCEDURE — 82248 BILIRUBIN DIRECT: CPT | Performed by: INTERNAL MEDICINE

## 2025-02-11 PROCEDURE — 85025 COMPLETE CBC W/AUTO DIFF WBC: CPT | Performed by: INTERNAL MEDICINE

## 2025-02-11 PROCEDURE — 36415 COLL VENOUS BLD VENIPUNCTURE: CPT | Mod: PN | Performed by: INTERNAL MEDICINE

## 2025-02-11 PROCEDURE — 80053 COMPREHEN METABOLIC PANEL: CPT | Performed by: INTERNAL MEDICINE

## 2025-02-11 PROCEDURE — 85610 PROTHROMBIN TIME: CPT | Performed by: INTERNAL MEDICINE

## 2025-02-12 ENCOUNTER — PATIENT MESSAGE (OUTPATIENT)
Dept: HEMATOLOGY/ONCOLOGY | Facility: CLINIC | Age: 85
End: 2025-02-12
Payer: MEDICARE

## 2025-02-16 ENCOUNTER — RESULTS FOLLOW-UP (OUTPATIENT)
Dept: TRANSPLANT | Facility: CLINIC | Age: 85
End: 2025-02-16

## 2025-02-20 ENCOUNTER — PATIENT MESSAGE (OUTPATIENT)
Dept: PREADMISSION TESTING | Facility: OTHER | Age: 85
End: 2025-02-20
Payer: MEDICARE

## 2025-03-01 ENCOUNTER — PATIENT MESSAGE (OUTPATIENT)
Dept: UROGYNECOLOGY | Facility: CLINIC | Age: 85
End: 2025-03-01
Payer: MEDICARE

## 2025-03-03 ENCOUNTER — TELEPHONE (OUTPATIENT)
Dept: UROGYNECOLOGY | Facility: CLINIC | Age: 85
End: 2025-03-03
Payer: MEDICARE

## 2025-03-03 ENCOUNTER — TELEPHONE (OUTPATIENT)
Dept: PRIMARY CARE CLINIC | Facility: CLINIC | Age: 85
End: 2025-03-03
Payer: MEDICARE

## 2025-03-03 ENCOUNTER — PATIENT MESSAGE (OUTPATIENT)
Dept: UROGYNECOLOGY | Facility: CLINIC | Age: 85
End: 2025-03-03
Payer: MEDICARE

## 2025-03-03 NOTE — TELEPHONE ENCOUNTER
----- Message from Med Assistant Hollins sent at 3/3/2025 10:47 AM CST -----  Regarding: RE: Surgical Clearance  Was this patient ever able to get a clearance appointment per this message back in Dec? She's scheduled for surgery March 14. Thank you.Gunjan Contrerasurgery SchedulerOchCentral [facility] [phone] (Main Office)[phone] (Direct Line)  ----- Message -----  From: Gunjan Jackson MA  Sent: 12/20/2024  12:27 PM CST  To: Estephania Lindsay NP; Leticia Velez#  Subject: Surgical Clearance                               Radha is scheduled for a Colpocleisis with Dr Evans on 3/14/25. Please assist with surgical clearance (within a 30-45 day window of surgery date). Please obtain a CBC, CMP, Hgb A1C, TSH, 12 Lead EKG, Chest X-Ray & any other testing you deem necessary. Once completed, please place a statement at the end of the clinic note in EPIC stating whether the patient is cleared or fax a report to our office at [phone]. If you have any questions, please don't hesitate to reach out to us. Thank you!    Gunjan Jackson  Surgery Scheduler  [facility]  [phone] (Main Office)  [phone] (Direct Line)

## 2025-03-03 NOTE — TELEPHONE ENCOUNTER
LOV 1/22/24  RTC 7/1/25    Pt Daughter didn't want to Schedule for Pre-Op    Spoke With Pt Daughter Della Would like for Dr Evans office To Call her  Pt And Pt daughter is Worried About the Surgery         Please advised

## 2025-03-06 ENCOUNTER — TELEPHONE (OUTPATIENT)
Dept: UROGYNECOLOGY | Facility: CLINIC | Age: 85
End: 2025-03-06
Payer: MEDICARE

## 2025-03-06 NOTE — TELEPHONE ENCOUNTER
Attempted to return call to daughter to discuss if she was wanting to proceed with surgery. Voicemail left.

## 2025-03-10 ENCOUNTER — TELEPHONE (OUTPATIENT)
Dept: PRIMARY CARE CLINIC | Facility: CLINIC | Age: 85
End: 2025-03-10
Payer: MEDICARE

## 2025-03-10 ENCOUNTER — TELEPHONE (OUTPATIENT)
Dept: UROGYNECOLOGY | Facility: CLINIC | Age: 85
End: 2025-03-10
Payer: MEDICARE

## 2025-03-10 NOTE — TELEPHONE ENCOUNTER
Spoke with pt's daughter. Pt isn't wanting to proceed with surgery at this time. Gave her my contact info & told her to give me a call if she needs anything or decides she wants to go through with the surgery. She verbalized understanding.

## 2025-03-10 NOTE — TELEPHONE ENCOUNTER
----- Message from Leticia Lim MD sent at 3/9/2025  2:58 PM CDT -----  Regarding: RE: Surgical Clearance  Last seen January 2024. She (daughter) was advised on 12/30/24 that a visit is required, she declined to schedule at the time. She has not been seen. She is not cleared.  ----- Message -----  From: Gunjan Jackson MA  Sent: 3/3/2025  10:49 AM CDT  To: Estephania Lindsay NP; Leticia Velez#  Subject: RE: Surgical Clearance                           Was this patient ever able to get a clearance appointment per this message back in Dec? She's scheduled for surgery March 14. Thank you.Gunjan Contrerasurgery SchedulerOchsner Baptist [phone] (Main Office)[phone] (Direct Line)  ----- Message -----  From: Gunjan Jackson MA  Sent: 12/20/2024  12:27 PM CST  To: Estephania Lindsay NP; Leticia Velez#  Subject: Surgical Clearance                               Radha is scheduled for a Colpocleisis with Dr Evans on 3/14/25. Please assist with surgical clearance (within a 30-45 day window of surgery date). Please obtain a CBC, CMP, Hgb A1C, TSH, 12 Lead EKG, Chest X-Ray & any other testing you deem necessary. Once completed, please place a statement at the end of the clinic note in EPIC stating whether the patient is cleared or fax a report to our office at [phone]. If you have any questions, please don't hesitate to reach out to us. Thank you!    Gunjan Jackson  Surgery Scheduler  [facility]  [phone] (Main Office)  [phone] (Direct Line)

## 2025-03-10 NOTE — TELEPHONE ENCOUNTER
3/3/25  2:25 PM  Note  LOV 1/22/24  RTC 7/1/25     Pt Daughter didn't want to Schedule for Pre-Op     Spoke With Pt Daughter Della Would like for Dr Evans office To Call her  Pt And Pt daughter is Worried About the Surgery            Please advised         Send message to [employer] to Gunjan Jackson Ma on 3/3/25 That Pt Daughter Della have Concerns About Anesthesia if her mother would have to be put To Sleep or not .  Pt daughter Don't want to schedule no Pre-Op.      Please Advised

## 2025-03-11 ENCOUNTER — LAB VISIT (OUTPATIENT)
Dept: LAB | Facility: HOSPITAL | Age: 85
End: 2025-03-11
Attending: INTERNAL MEDICINE
Payer: MEDICARE

## 2025-03-11 ENCOUNTER — PATIENT MESSAGE (OUTPATIENT)
Dept: HEMATOLOGY/ONCOLOGY | Facility: CLINIC | Age: 85
End: 2025-03-11
Payer: MEDICARE

## 2025-03-11 DIAGNOSIS — K74.60 CIRRHOSIS OF LIVER WITH ASCITES, UNSPECIFIED HEPATIC CIRRHOSIS TYPE: ICD-10-CM

## 2025-03-11 DIAGNOSIS — R18.8 CIRRHOSIS OF LIVER WITH ASCITES, UNSPECIFIED HEPATIC CIRRHOSIS TYPE: ICD-10-CM

## 2025-03-11 LAB
ALBUMIN SERPL BCP-MCNC: 2.1 G/DL (ref 3.5–5.2)
ALP SERPL-CCNC: 71 U/L (ref 40–150)
ALT SERPL W/O P-5'-P-CCNC: 18 U/L (ref 10–44)
ANION GAP SERPL CALC-SCNC: 10 MMOL/L (ref 8–16)
AST SERPL-CCNC: 39 U/L (ref 10–40)
BASOPHILS # BLD AUTO: 0.03 K/UL (ref 0–0.2)
BASOPHILS NFR BLD: 0.2 % (ref 0–1.9)
BILIRUB DIRECT SERPL-MCNC: 0.2 MG/DL (ref 0.1–0.3)
BILIRUB SERPL-MCNC: 0.5 MG/DL (ref 0.1–1)
BUN SERPL-MCNC: 18 MG/DL (ref 8–23)
CALCIUM SERPL-MCNC: 9.6 MG/DL (ref 8.7–10.5)
CHLORIDE SERPL-SCNC: 103 MMOL/L (ref 95–110)
CO2 SERPL-SCNC: 25 MMOL/L (ref 23–29)
CREAT SERPL-MCNC: 0.6 MG/DL (ref 0.5–1.4)
DIFFERENTIAL METHOD BLD: ABNORMAL
EOSINOPHIL # BLD AUTO: 0.1 K/UL (ref 0–0.5)
EOSINOPHIL NFR BLD: 1.1 % (ref 0–8)
ERYTHROCYTE [DISTWIDTH] IN BLOOD BY AUTOMATED COUNT: 15.9 % (ref 11.5–14.5)
EST. GFR  (NO RACE VARIABLE): >60 ML/MIN/1.73 M^2
GLUCOSE SERPL-MCNC: 175 MG/DL (ref 70–110)
HCT VFR BLD AUTO: 29.6 % (ref 37–48.5)
HGB BLD-MCNC: 9.4 G/DL (ref 12–16)
IMM GRANULOCYTES # BLD AUTO: 0.12 K/UL (ref 0–0.04)
IMM GRANULOCYTES NFR BLD AUTO: 1 % (ref 0–0.5)
INR PPP: 1.2 (ref 0.8–1.2)
LYMPHOCYTES # BLD AUTO: 1.1 K/UL (ref 1–4.8)
LYMPHOCYTES NFR BLD: 8.9 % (ref 18–48)
MCH RBC QN AUTO: 30.4 PG (ref 27–31)
MCHC RBC AUTO-ENTMCNC: 31.8 G/DL (ref 32–36)
MCV RBC AUTO: 96 FL (ref 82–98)
MONOCYTES # BLD AUTO: 1.4 K/UL (ref 0.3–1)
MONOCYTES NFR BLD: 11.1 % (ref 4–15)
NEUTROPHILS # BLD AUTO: 9.6 K/UL (ref 1.8–7.7)
NEUTROPHILS NFR BLD: 77.7 % (ref 38–73)
NRBC BLD-RTO: 0 /100 WBC
PLATELET # BLD AUTO: 344 K/UL (ref 150–450)
PMV BLD AUTO: 10.3 FL (ref 9.2–12.9)
POTASSIUM SERPL-SCNC: 4.3 MMOL/L (ref 3.5–5.1)
PROT SERPL-MCNC: 7.5 G/DL (ref 6–8.4)
PROTHROMBIN TIME: 12.9 SEC (ref 9–12.5)
RBC # BLD AUTO: 3.09 M/UL (ref 4–5.4)
SODIUM SERPL-SCNC: 138 MMOL/L (ref 136–145)
WBC # BLD AUTO: 12.29 K/UL (ref 3.9–12.7)

## 2025-03-11 PROCEDURE — 82248 BILIRUBIN DIRECT: CPT | Performed by: INTERNAL MEDICINE

## 2025-03-11 PROCEDURE — 85610 PROTHROMBIN TIME: CPT | Performed by: INTERNAL MEDICINE

## 2025-03-11 PROCEDURE — 80053 COMPREHEN METABOLIC PANEL: CPT | Performed by: INTERNAL MEDICINE

## 2025-03-11 PROCEDURE — 36415 COLL VENOUS BLD VENIPUNCTURE: CPT | Mod: PN | Performed by: INTERNAL MEDICINE

## 2025-03-11 PROCEDURE — 85025 COMPLETE CBC W/AUTO DIFF WBC: CPT | Performed by: INTERNAL MEDICINE

## 2025-03-15 NOTE — PROGRESS NOTES
HPI     Glaucoma            Comments: HVF and OCT review today and pt states no changes since last   exam           Comments    DLS: 11/18/24    1. POAG OD>OS  2. NS OD  3. PCIOL OS  4. ? AOD OD    MEDS:  Cosopt BID OD  Brimonidine TID OU  Latanoprost QDAY OU          Last edited by Marce Howard MA on 3/18/2025  2:55 PM.            Assessment /Plan     For exam results, see Encounter Report.    Primary open angle glaucoma (POAG) of right eye, severe stage  -     Guerra Visual Field - OU - Extended - Both Eyes  -     Posterior Segment OCT Optic Nerve- Both eyes    Primary open-angle glaucoma, left eye, moderate stage  -     Guerra Visual Field - OU - Extended - Both Eyes  -     Posterior Segment OCT Optic Nerve- Both eyes    Nuclear sclerosis, right    Pseudophakia of left eye        Old Patient of dr Lawson   Last seen by Dr belcher - last seen at [facility] with Dr belcher 2/12/2024         1.   Glaucoma (type and duration)    x years // severe od and moderate os    First HVF   2021   First photos  7/2024    Treatment / Drops started   ? Years ago            Family history    ?        Glaucoma meds    latanoprost ou q am / brimonidine tid ou / cosopt  bid od         H/O adverse rxn to glaucoma drops    ?         LASERS    ?        GLAUCOMA SURGERIES    none        OTHER EYE SURGERIES    PC IOL os - [facility]         CDR   0.9-0.95 / 0.8-0.85        Tbase    ?          Tmax    28/33            Ttarget    ?             HVF    3 test 2021 to  2024 - nearly extinguished  od // ? IAD / SNS  os   NO MORE VF TESTING OD - EXT TO STIM V         Gonio    +3 ou        CCT    579/608        OCT    2 test 2020 to 2025 - RNFL - dec thru out  od // dec G/N/NI/TI, bord TS (?prog os)  os        Disc photos    7/2024    - Ttoday    16/17  - Test done today    IOP /hvf / dfe / oct      ? APD OD  -    Recheck     NS od    No real reason to remove - poor prognosis 2/2 ON and VF loss     PC IOL os    Henna  12/221/2023      PLAN  CSM   Change cosopt from bid od to bid OU (TO BE AS PROTECTIVE AS POSSIBLE OF HER BETTER EYE)    // if needed can do slt's     F/U 4 months with IOP //  GONIO   Pts daughter prefers Tuesday  appointment

## 2025-03-18 ENCOUNTER — CLINICAL SUPPORT (OUTPATIENT)
Dept: OPHTHALMOLOGY | Facility: CLINIC | Age: 85
End: 2025-03-18
Payer: MEDICARE

## 2025-03-18 ENCOUNTER — OFFICE VISIT (OUTPATIENT)
Dept: OPHTHALMOLOGY | Facility: CLINIC | Age: 85
End: 2025-03-18
Payer: MEDICARE

## 2025-03-18 DIAGNOSIS — H40.1121 PRIMARY OPEN ANGLE GLAUCOMA (POAG) OF LEFT EYE, MILD STAGE: ICD-10-CM

## 2025-03-18 DIAGNOSIS — H25.11 NUCLEAR SCLEROSIS, RIGHT: ICD-10-CM

## 2025-03-18 DIAGNOSIS — Z96.1 PSEUDOPHAKIA OF LEFT EYE: ICD-10-CM

## 2025-03-18 DIAGNOSIS — H40.1122 PRIMARY OPEN-ANGLE GLAUCOMA, LEFT EYE, MODERATE STAGE: ICD-10-CM

## 2025-03-18 DIAGNOSIS — H40.1113 PRIMARY OPEN ANGLE GLAUCOMA (POAG) OF RIGHT EYE, SEVERE STAGE: Primary | ICD-10-CM

## 2025-03-18 PROCEDURE — 92133 CPTRZD OPH DX IMG PST SGM ON: CPT | Mod: PBBFAC | Performed by: OPHTHALMOLOGY

## 2025-03-18 PROCEDURE — 99214 OFFICE O/P EST MOD 30 MIN: CPT | Mod: S$PBB,,, | Performed by: OPHTHALMOLOGY

## 2025-03-18 PROCEDURE — 92083 EXTENDED VISUAL FIELD XM: CPT | Mod: PBBFAC | Performed by: OPHTHALMOLOGY

## 2025-03-18 PROCEDURE — 99213 OFFICE O/P EST LOW 20 MIN: CPT | Mod: PBBFAC | Performed by: OPHTHALMOLOGY

## 2025-03-18 PROCEDURE — 99999 PR PBB SHADOW E&M-EST. PATIENT-LVL III: CPT | Mod: PBBFAC,,, | Performed by: OPHTHALMOLOGY

## 2025-03-18 RX ORDER — DORZOLAMIDE HYDROCHLORIDE AND TIMOLOL MALEATE 20; 5 MG/ML; MG/ML
1 SOLUTION/ DROPS OPHTHALMIC 2 TIMES DAILY
Qty: 20 ML | Refills: 3 | Status: SHIPPED | OUTPATIENT
Start: 2025-03-18

## 2025-03-18 NOTE — PROGRESS NOTES
oct/hvf ou done/ou/rel/fix/coop.fair/patient chart checked for allergies/od.+1.00 +0.75 x146/os.-1.00 +2.25 x115.ej        Assessment /Plan     For exam results, see Encounter Report.    There are no diagnoses linked to this encounter.

## 2025-03-20 ENCOUNTER — TELEPHONE (OUTPATIENT)
Dept: HEMATOLOGY/ONCOLOGY | Facility: CLINIC | Age: 85
End: 2025-03-20
Payer: MEDICARE

## 2025-03-20 NOTE — SUBJECTIVE & OBJECTIVE
Interval History:   Overall, the patient feels better.  She is more awake and alert since receiving lactulose.  Continues to have poor PO intake and difficulty swallowing.  She has early satiety.  Denies ab pain. Afebrile     Review of Systems   Constitutional:  Positive for activity change, appetite change and unexpected weight change.   HENT:  Positive for trouble swallowing.    Respiratory:  Negative for cough and shortness of breath.    Cardiovascular:  Negative for chest pain and leg swelling.   Gastrointestinal:  Negative for abdominal pain and blood in stool.   Genitourinary:  Negative for difficulty urinating and hematuria.   Skin:  Negative for rash and wound.   Neurological:  Positive for weakness. Negative for light-headedness.   Psychiatric/Behavioral:  Negative for confusion.    Objective:     Vital Signs (Most Recent):  Temp: 98.4 °F (36.9 °C) (09/25/22 0800)  Pulse: 63 (09/25/22 1148)  Resp: 14 (09/25/22 1148)  BP: (!) 140/72 (09/25/22 1148)  SpO2: 95 % (09/25/22 1148)   Vital Signs (24h Range):  Temp:  [97.5 °F (36.4 °C)-98.4 °F (36.9 °C)] 98.4 °F (36.9 °C)  Pulse:  [61-96] 63  Resp:  [14-18] 14  SpO2:  [94 %-96 %] 95 %  BP: (127-160)/(63-86) 140/72     Weight: 50.8 kg (111 lb 15.9 oz)  Body mass index is 18.08 kg/m².    Intake/Output Summary (Last 24 hours) at 9/25/2022 1438  Last data filed at 9/25/2022 1000  Gross per 24 hour   Intake 1919.51 ml   Output --   Net 1919.51 ml      Physical Exam  Vitals reviewed.   Constitutional:       General: She is awake.      Appearance: She is underweight. She is ill-appearing.   HENT:      Head: Normocephalic and atraumatic.      Mouth/Throat:      Mouth: Mucous membranes are dry.      Pharynx: Oropharynx is clear. No oropharyngeal exudate or posterior oropharyngeal erythema.   Eyes:      General: Lids are normal.      Conjunctiva/sclera: Conjunctivae normal.   Neck:      Thyroid: No thyroid mass or thyromegaly.   Cardiovascular:      Rate and Rhythm: Normal  rate and regular rhythm.      Heart sounds: Murmur heard.   Pulmonary:      Effort: Pulmonary effort is normal.      Breath sounds: Normal breath sounds.   Abdominal:      General: Bowel sounds are normal. There is no distension.      Palpations: Abdomen is soft. There is no mass.      Tenderness: There is no abdominal tenderness.   Musculoskeletal:         General: Normal range of motion.      Right lower leg: No edema.      Left lower leg: No edema.   Skin:     General: Skin is warm and dry.      Coloration: Skin is not jaundiced.   Neurological:      General: No focal deficit present.      Mental Status: She is alert.      Motor: Weakness present.   Psychiatric:         Attention and Perception: Attention normal.         Mood and Affect: Mood normal.         Behavior: Behavior is cooperative.       Significant Labs: All pertinent labs within the past 24 hours have been reviewed.  Bilirubin:   Recent Labs   Lab 09/21/22  0127 09/21/22  0348 09/22/22  0520 09/23/22  0307 09/24/22  0428 09/25/22  0545   BILIDIR 1.6*  --   --   --  1.6*  --    BILITOT 2.8* 2.7* 2.6* 2.3* 2.6* 2.3*     BMP:   Recent Labs   Lab 09/25/22  0545   *   *   K 3.5      CO2 17*   BUN 27*   CREATININE 0.8   CALCIUM 8.2*   MG 1.8     CBC:   Recent Labs   Lab 09/23/22  1605 09/24/22  0428 09/25/22  0545   WBC 7.14 6.29 6.73   HGB 10.5* 9.9* 10.4*   HCT 30.9* 29.4* 30.6*   PLT 93* 56* 76*     CMP:   Recent Labs   Lab 09/24/22  0428 09/25/22  0545   * 131*   K 3.4* 3.5   * 109   CO2 16* 17*   * 213*   BUN 28* 27*   CREATININE 0.8 0.8   CALCIUM 8.0* 8.2*   PROT 6.0 5.8*   ALBUMIN 1.8* 1.7*   BILITOT 2.6* 2.3*   ALKPHOS 198* 193*   * 190*   * 173*   ANIONGAP 7* 5*     Magnesium:   Recent Labs   Lab 09/24/22  0428 09/25/22  0545   MG 1.6 1.8       Significant Imaging: I have reviewed all pertinent imaging results/findings within the past 24 hours.   Other

## 2025-03-20 NOTE — TELEPHONE ENCOUNTER
NA LVM offering pt earlier appt to discuss treatment plan. Told pt to call or message back to let us know about if they wanted the appt on 3/21 at 3 pm.

## 2025-03-24 DIAGNOSIS — K75.4 AUTOIMMUNE HEPATITIS: Chronic | ICD-10-CM

## 2025-03-24 RX ORDER — PREDNISONE 5 MG/1
2.5 TABLET ORAL DAILY
Qty: 45 TABLET | Refills: 3 | Status: SHIPPED | OUTPATIENT
Start: 2025-03-24

## 2025-03-25 ENCOUNTER — TELEPHONE (OUTPATIENT)
Dept: HEMATOLOGY/ONCOLOGY | Facility: CLINIC | Age: 85
End: 2025-03-25
Payer: MEDICARE

## 2025-03-25 NOTE — TELEPHONE ENCOUNTER
Spoke to pt's daughter to see if they would be able to do the virtual today. She stated that the pt doesn't want to do the virtual but that she is going to talk to her to see if she would be willing to join. Informed her that the pt has to be on the virtual for Dr. Kohler to have the visit. Informed me that she would send a message in the portal by 2:30 today to let us know if they would be doing the visit. I told her someone in the office would reach out if we hadn't heard anything from her.

## 2025-04-15 ENCOUNTER — LAB VISIT (OUTPATIENT)
Dept: LAB | Facility: HOSPITAL | Age: 85
End: 2025-04-15
Attending: INTERNAL MEDICINE
Payer: MEDICARE

## 2025-04-15 DIAGNOSIS — R18.8 CIRRHOSIS OF LIVER WITH ASCITES, UNSPECIFIED HEPATIC CIRRHOSIS TYPE: ICD-10-CM

## 2025-04-15 DIAGNOSIS — K74.60 CIRRHOSIS OF LIVER WITH ASCITES, UNSPECIFIED HEPATIC CIRRHOSIS TYPE: ICD-10-CM

## 2025-04-15 LAB
ABSOLUTE EOSINOPHIL (OHS): 0.15 K/UL
ABSOLUTE MONOCYTE (OHS): 1.38 K/UL (ref 0.3–1)
ABSOLUTE NEUTROPHIL COUNT (OHS): 10.06 K/UL (ref 1.8–7.7)
ALBUMIN SERPL BCP-MCNC: 1.9 G/DL (ref 3.5–5.2)
ALP SERPL-CCNC: 73 UNIT/L (ref 40–150)
ALT SERPL W/O P-5'-P-CCNC: 21 UNIT/L (ref 10–44)
ANION GAP (OHS): 8 MMOL/L (ref 8–16)
AST SERPL-CCNC: 38 UNIT/L (ref 11–45)
BASOPHILS # BLD AUTO: 0.02 K/UL
BASOPHILS NFR BLD AUTO: 0.2 %
BILIRUB DIRECT SERPL-MCNC: 0.2 MG/DL (ref 0.1–0.3)
BILIRUB SERPL-MCNC: 0.4 MG/DL (ref 0.1–1)
BUN SERPL-MCNC: 23 MG/DL (ref 8–23)
CALCIUM SERPL-MCNC: 9.1 MG/DL (ref 8.7–10.5)
CHLORIDE SERPL-SCNC: 101 MMOL/L (ref 95–110)
CO2 SERPL-SCNC: 24 MMOL/L (ref 23–29)
CREAT SERPL-MCNC: 0.6 MG/DL (ref 0.5–1.4)
ERYTHROCYTE [DISTWIDTH] IN BLOOD BY AUTOMATED COUNT: 17.2 % (ref 11.5–14.5)
GFR SERPLBLD CREATININE-BSD FMLA CKD-EPI: >60 ML/MIN/1.73/M2
GLUCOSE SERPL-MCNC: 97 MG/DL (ref 70–110)
HCT VFR BLD AUTO: 25.3 % (ref 37–48.5)
HGB BLD-MCNC: 7.8 GM/DL (ref 12–16)
IMM GRANULOCYTES # BLD AUTO: 0.08 K/UL (ref 0–0.04)
IMM GRANULOCYTES NFR BLD AUTO: 0.6 % (ref 0–0.5)
INR PPP: 1.2 (ref 0.8–1.2)
LYMPHOCYTES # BLD AUTO: 1 K/UL (ref 1–4.8)
MCH RBC QN AUTO: 28.7 PG (ref 27–31)
MCHC RBC AUTO-ENTMCNC: 30.8 G/DL (ref 32–36)
MCV RBC AUTO: 93 FL (ref 82–98)
NUCLEATED RBC (/100WBC) (OHS): 0 /100 WBC
PLATELET # BLD AUTO: 310 K/UL (ref 150–450)
PMV BLD AUTO: 10.4 FL (ref 9.2–12.9)
POTASSIUM SERPL-SCNC: 3.8 MMOL/L (ref 3.5–5.1)
PROT SERPL-MCNC: 7.1 GM/DL (ref 6–8.4)
PROTHROMBIN TIME: 13 SECONDS (ref 9–12.5)
RBC # BLD AUTO: 2.72 M/UL (ref 4–5.4)
RELATIVE EOSINOPHIL (OHS): 1.2 %
RELATIVE LYMPHOCYTE (OHS): 7.9 % (ref 18–48)
RELATIVE MONOCYTE (OHS): 10.9 % (ref 4–15)
RELATIVE NEUTROPHIL (OHS): 79.2 % (ref 38–73)
SODIUM SERPL-SCNC: 133 MMOL/L (ref 136–145)
WBC # BLD AUTO: 12.69 K/UL (ref 3.9–12.7)

## 2025-04-15 PROCEDURE — 36415 COLL VENOUS BLD VENIPUNCTURE: CPT | Mod: PN

## 2025-04-15 PROCEDURE — 85610 PROTHROMBIN TIME: CPT

## 2025-04-15 PROCEDURE — 80053 COMPREHEN METABOLIC PANEL: CPT

## 2025-04-15 PROCEDURE — 82248 BILIRUBIN DIRECT: CPT

## 2025-04-15 PROCEDURE — 85025 COMPLETE CBC W/AUTO DIFF WBC: CPT

## 2025-04-16 ENCOUNTER — PATIENT MESSAGE (OUTPATIENT)
Dept: TRANSPLANT | Facility: CLINIC | Age: 85
End: 2025-04-16
Payer: MEDICARE

## 2025-04-16 ENCOUNTER — PATIENT MESSAGE (OUTPATIENT)
Dept: HEMATOLOGY/ONCOLOGY | Facility: CLINIC | Age: 85
End: 2025-04-16
Payer: MEDICARE

## 2025-04-16 DIAGNOSIS — D64.9 ANEMIA, UNSPECIFIED TYPE: Primary | ICD-10-CM

## 2025-04-17 ENCOUNTER — LAB VISIT (OUTPATIENT)
Dept: LAB | Facility: HOSPITAL | Age: 85
End: 2025-04-17
Attending: HOSPITALIST
Payer: MEDICARE

## 2025-04-17 DIAGNOSIS — D64.9 ANEMIA, UNSPECIFIED TYPE: ICD-10-CM

## 2025-04-17 LAB
ABSOLUTE EOSINOPHIL (OHS): 0.18 K/UL
ABSOLUTE MONOCYTE (OHS): 1.52 K/UL (ref 0.3–1)
ABSOLUTE NEUTROPHIL COUNT (OHS): 10.13 K/UL (ref 1.8–7.7)
BASOPHILS # BLD AUTO: 0.03 K/UL
BASOPHILS NFR BLD AUTO: 0.2 %
ERYTHROCYTE [DISTWIDTH] IN BLOOD BY AUTOMATED COUNT: 17.3 % (ref 11.5–14.5)
FERRITIN SERPL-MCNC: 387 NG/ML (ref 20–300)
HCT VFR BLD AUTO: 26.8 % (ref 37–48.5)
HGB BLD-MCNC: 8 GM/DL (ref 12–16)
IMM GRANULOCYTES # BLD AUTO: 0.07 K/UL (ref 0–0.04)
IMM GRANULOCYTES NFR BLD AUTO: 0.5 % (ref 0–0.5)
IRON SATN MFR SERPL: 15 % (ref 20–50)
IRON SERPL-MCNC: 26 UG/DL (ref 30–160)
LYMPHOCYTES # BLD AUTO: 1.11 K/UL (ref 1–4.8)
MCH RBC QN AUTO: 28.6 PG (ref 27–31)
MCHC RBC AUTO-ENTMCNC: 29.9 G/DL (ref 32–36)
MCV RBC AUTO: 96 FL (ref 82–98)
NUCLEATED RBC (/100WBC) (OHS): 0 /100 WBC
PLATELET # BLD AUTO: 311 K/UL (ref 150–450)
PMV BLD AUTO: 10.8 FL (ref 9.2–12.9)
RBC # BLD AUTO: 2.8 M/UL (ref 4–5.4)
RELATIVE EOSINOPHIL (OHS): 1.4 %
RELATIVE LYMPHOCYTE (OHS): 8.5 % (ref 18–48)
RELATIVE MONOCYTE (OHS): 11.7 % (ref 4–15)
RELATIVE NEUTROPHIL (OHS): 77.7 % (ref 38–73)
TIBC SERPL-MCNC: 172 UG/DL (ref 250–450)
TRANSFERRIN SERPL-MCNC: 116 MG/DL (ref 200–375)
WBC # BLD AUTO: 13.04 K/UL (ref 3.9–12.7)

## 2025-04-17 PROCEDURE — 82728 ASSAY OF FERRITIN: CPT

## 2025-04-17 PROCEDURE — 36415 COLL VENOUS BLD VENIPUNCTURE: CPT | Mod: PN

## 2025-04-17 PROCEDURE — 84466 ASSAY OF TRANSFERRIN: CPT

## 2025-04-17 PROCEDURE — 85025 COMPLETE CBC W/AUTO DIFF WBC: CPT

## 2025-04-20 ENCOUNTER — RESULTS FOLLOW-UP (OUTPATIENT)
Dept: TRANSPLANT | Facility: CLINIC | Age: 85
End: 2025-04-20

## 2025-04-22 DIAGNOSIS — I11.0 HYPERTENSIVE HEART DISEASE WITH DIASTOLIC HEART FAILURE: ICD-10-CM

## 2025-04-22 DIAGNOSIS — I50.30 HYPERTENSIVE HEART DISEASE WITH DIASTOLIC HEART FAILURE: ICD-10-CM

## 2025-04-22 RX ORDER — FUROSEMIDE 40 MG/1
40 TABLET ORAL
Qty: 90 TABLET | Refills: 0 | Status: SHIPPED | OUTPATIENT
Start: 2025-04-22

## 2025-04-22 RX ORDER — LISINOPRIL AND HYDROCHLOROTHIAZIDE 12.5; 2 MG/1; MG/1
1 TABLET ORAL
Qty: 90 TABLET | Refills: 0 | Status: SHIPPED | OUTPATIENT
Start: 2025-04-22

## 2025-04-22 NOTE — PROGRESS NOTES
[facility] - UROGYNECOLOGY  [address] [address]    Radha Feng  [id number]  1940      Radha Feng is a 84 y.o. here for a urogyn follow up for pelvic organ prolapse.     History of Present Illness   84 y.o.  female has a past medical history of Cataract, Chondromalacia, knee, right (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       06/12/2023  Here for pessary placement. Has had pessary holiday since last visit.    Changes since last visit:  Rare UUI when pulling pants down  Denies pain, bleeding, or discharge  Doing well with pessary--using rephresh weekly  Denies constipation or straining    10/12/2023:  Patient presents for follow up   She is currently using the pessary has had issues with abrasions in the past  No vaginal discharge or bleeding   +JUAN MIGUEL and +UU stable with the pessary     POP + doing well with the pessary     1/18/202:  Patient here for follow up doing well no issues  No discharge, no bleeding   Voiding well no constipation     5/22/2024:  Patient doing well no issues  Pessary in place     8/202/2024:  Newly diagnosed with lung cancer- getting radiation tolerating things well   Patient doing well no issues  Pessary in place     12/19/2024  Patient states that she hates these pessary cleaning and is interested in discussing surgery     4/23/2025:  Patient here for follow up pessary fitting     [facility] Urogyn [facility]     Question 12/20/2022  1:44 PM CST - Filed by Patient   General Urogynecology: Are you experiencing the following?     Dysuria (painful urination) No   Nocturia:  waking up at night to empty your bladder  Yes   If you answered yes to the previous question, how many times does this happen per night? 1-2   Enuresis (urine loss during sleep) No   Dribbling urine after you urinate No   Hematuria (urine appears red) No   Type of stream Weak   Urinary frequency: How often a day are you going to the  "bathroom per day?  Less than 10   Urinary Tract Infections: How many Urinary Tract Infections have you had in the past year? One (1) in the past year   If you have had a UTI in the past year, what treatments have you had so far?  Prophylactic antibiotics   Urinary Incontinence (General): Are you experiencing the following?     Past consultation for incontinence: Have you ever seen someone for the evaluation of incontinence? No   If you answered yes to the previous question, please select all the therapies you have tried.  N/a- I answered no to the previous question   Please note the effectiveness of the therapies.     Need to wear protection to keep clothes dry  Yes   If you answered yes to the previous question, please scar the protection you use.  Poise   If you wear protection, how much wetness is typically on each pad? Slight   If you wear protection, how often do you have to change per day, if applicable?  3-4   Stress Symptoms: Are you experiencing the following?     Leakage of urine with cough, laugh and/or sneeze Yes   If you answered yes to the previous question, what is the frequency in days, weeks and/or months? Daily   Leakage of urine with sex No   Leakage of urine with bending/ lifting No   Leakage of urine with briskly walking or jogging No   If you lose urine for any other reason not previously mentioned, please note it below, if applicable.      Urge Symptoms: Are you experiencing the following?     Urgency ("got to go" feeling) No   Urge: How frequently do you feel an urge to urinate (feeling like you "gotta go" to the bathroom and can't wait) Never   Do you experience a leakage of urine when you have a feeling of urgency?  No   Leakage of urine when unaware No   Past use of anticholinergics (medications used to treat overactive bladder) No   If you answered yes to the previous question, please scar the anticholinergics you have used:      Have you ever used Mirbetriq (aka Mirabegron)?  No "   Prolapse Symptoms: Are you experiencing any of the following?      Falling out/ Bulging/ Heaviness in the vagina Yes   Vaginal/ Abdominal Pain/ Pressure No   Need to strain/ Push to void No   Need to wait on the toilet before you void No   Unusual position to urinate (using your hands to push back the vaginal bulge) No   Sensation of incomplete emptying No   Past use of pessary device No   If you answered yes to the previous question, please list the devices you have used below.      Bowel Symptoms: Are you experiencing any of the following?     Constipation No   Diarrhea  No   Hematochezia (bloody stool) No   Incomplete evacuation of stool No   Involuntary loss of formed stool No   Fecal smearing/urgency No   Involuntary loss of gas Yes   Vaginal Symptoms: Are you experiencing any of the following?      Abnormal vaginal bleeding  No   Vaginal dryness No   Sexually active  No   Dyspareunia (painful intercourse) No   Estrogen use  No        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    Diabetes mellitus     Glaucoma     Hypertension     Other ascites 11/29/2022       Past Surgical History:   Procedure Laterality Date    CATARACT EXTRACTION W/  INTRAOCULAR LENS IMPLANT Left 12/21/2021        ENDOBRONCHIAL ULTRASOUND N/A 07/05/2024    Procedure: ENDOBRONCHIAL ULTRASOUND (EBUS);  Surgeon: Rolo Wilkinson MD;  Location: [facility] OR [address] [place] [address];  Service: Pulmonary;  Laterality: N/A;    ESOPHAGOGASTRODUODENOSCOPY N/A 06/26/2023    Procedure: ESOPHAGOGASTRODUODENOSCOPY (EGD);  Surgeon: Edgar Branch MD;  Location: [facility] (4TH FLR);  Service: Endoscopy;  Laterality: N/A;  referral Dr Peraza-cirrhosis/labs done on 4/24/23-[facility] portal-GT       Family History   Problem Relation Name Age of Onset    Cataracts Mother      Diabetes Mother      Glaucoma Mother      Hypertension Mother      Heart attack Father      Colon cancer Sister      Blindness Cousin       Amblyopia Neg Hx      Macular degeneration Neg Hx      Retinal detachment Neg Hx      Strabismus Neg Hx         Social History     Socioeconomic History    Marital status: Single   Tobacco Use    Smoking status: Former    Smokeless tobacco: Never   Substance and Sexual Activity    Alcohol use: Not Currently    Drug use: Never   Social History Narrative    , one daughter local, four sons out of state. Retired . Lives with daughter Joss.     Social Drivers of Health     Financial Resource Strain: Low Risk  (6/21/2024)    Overall Financial Resource Strain (CARDIA)     Difficulty of Paying Living Expenses: Not hard at all   Food Insecurity: No Food Insecurity (6/21/2024)    Hunger Vital Sign     Worried About Running Out of Food in the Last Year: Never true     Ran Out of Food in the Last Year: Never true   Transportation Needs: No Transportation Needs (6/21/2024)    TRANSPORTATION NEEDS     Transportation : No   Physical Activity: Inactive (6/21/2024)    Exercise Vital Sign     Days of Exercise per Week: 1 day     Minutes of Exercise per Session: 0 min   Stress: No Stress Concern Present (6/21/2024)    [employer] [employer] - Occupational Stress Questionnaire     Feeling of Stress : Only a little   Recent Concern: Stress - Stress Concern Present (6/21/2024)    [employer] [place] [employer] - Occupational Stress Questionnaire     Feeling of Stress : To some extent   Housing Stability: Unknown (6/21/2024)    Housing Stability Vital Sign     Unable to Pay for Housing in the Last Year: No     Homeless in the Last Year: No       Current Outpatient Medications   Medication Sig Dispense Refill    acetaminophen (TYLENOL) 500 MG tablet Take 2 tablets (1,000 mg total) by mouth every 8 (eight) hours as needed for Pain.  0    amLODIPine (NORVASC) 2.5 MG tablet TAKE 1 TABLET BY MOUTH EVERY DAY 90 tablet 1    BD INSULIN SYRINGE ULTRA-FINE 1 mL 31 gauge x 5/16 Syrg        "blood-glucose meter,continuous Misc Dispsense 1 Dexcom G7  1 each 0    blood-glucose transmitter (DEXCOM G6 TRANSMITTER) Amanda 1 each by Misc.(Non-Drug; Combo Route) route every 3 (three) months. 1 each 3    brimonidine 0.2% (ALPHAGAN) 0.2 % Drop Place 1 drop into both eyes 3 (three) times daily. 10 mL 11    cholecalciferol, vitamin D3, (VITAMIN D3) 25 mcg (1,000 unit) capsule Take 2 capsules (2,000 Units total) by mouth once daily.  0    DEXCOM G7 SENSOR Amanda 1 Device by Misc.(Non-Drug; Combo Route) route every 10 days. 9 each 3    dorzolamide-timolol 2-0.5% (COSOPT) 22.3-6.8 mg/mL ophthalmic solution Place 1 drop into both eyes 2 (two) times daily. 20 mL 3    estradioL (ESTRACE) 0.01 % (0.1 mg/gram) vaginal cream Place 0.5 g vaginally twice a week. Apply to the vagina daily for two weeks then twice a week. 45 g 4    furosemide (LASIX) 40 MG tablet TAKE 1 TABLET BY MOUTH EVERY DAY 90 tablet 0    insulin aspart U-100 (NOVOLOG U-100 INSULIN ASPART) 100 unit/mL injection TO USE WITH OMNIPOD 5. TOTAL DAILY DOSE UP TO 40 UNITS 40 mL 4    insulin pump cart,automated,BT (OMNIPOD 5 G6 PODS, GEN 5,) Crtg Inject 1 each into the skin Every 3 (three) days. 10 each 11    lactulose (CEPHULAC) 10 gram packet Take 10 g by mouth 3 (three) times daily.      latanoprost 0.005 % ophthalmic solution PLACE 1 DROP INTO BOTH EYES ONCE DAILY 7.5 mL 3    lisinopriL-hydrochlorothiazide (PRINZIDE,ZESTORETIC) 20-12.5 mg per tablet TAKE 1 TABLET BY MOUTH EVERY DAY 90 tablet 0    OMNIPOD 5 G6-G7 PODS, GEN 5, Crtg SMARTSI Each SUB-Q Once a Month      pen needle, diabetic 31 gauge x 5/16" Ndle Use to inject insulin into the skin up to 4 times daily 400 each 3    predniSONE (DELTASONE) 5 MG tablet Take 0.5 tablets (2.5 mg total) by mouth once daily. 45 tablet 3    tobramycin sulfate 0.3% (TOBREX) 0.3 % ophthalmic solution 1-2 drops topically twice daily to affected toe(s). 5 mL 9     No current facility-administered medications for this " "visit.       Review of patient's allergies indicates:  No Known Allergies    Well Woman:  Pap:denies history of abnormal pap  Mammo:no longer screening  Colonoscopy:refused  Dexa:03/2023    Hip Fracture 6%.   Impression:   Osteopenia lumbar spine.  Osteoporosis both hips.      ROS:  As per HPI.      Exam  There were no vitals taken for this visit.  General: alert and oriented, no acute distress  Respiratory: normal respiratory effort  Abd: soft, non-tender, non-distended    Pelvic  Ext. Genitalia: normal external genitalia. Normal bartholin's and skeens glands  Vagina: + atrophy. Normal vaginal mucosa without lesions. No abrasion noted  Non-tender bladder base without palpable mass.  #2 1/2 LS GH pessary in place- removed and cleaned   Cervix no lesions  Uterus:  nontender, normal size, shape, position, mobile  Urethra: no masses or tenderness  Urethral meatus: no lesions, caruncle or prolapse.    Pessary removed without difficulty. Washed with soap and water. Reinserted without difficulty    Impression  No diagnosis found.            Plan:   1. Prolapse: Stage 2 apical prolapse, Stage 2 anterior and posterior vaginal wall prolapse -  --continue  #2 1/2 " LS gH pessary-  --Non surgical options discussed with patient    --no discharge continue vaginal estrogen and add replens  on opposite days.   --Pessary benefit discussed with patient, will schedule for pessary fitting   --Surgical options discussed such as : Colpocleisis recommend office [facility]. Tentatively scheduled for 3/14/2024. Transvaginal sonogram prior to surgery.      2.  Mixed urinary incontinence, urge > stress:   --Empty bladder every 3 hours.  Empty well: wait a minute, lean forward on toilet.    --Avoid dietary irritants (see sheet).  Keep diary x 3-5 days to determine your irritants.  --KEGELS: do 10 in AM and 10 in PM, holding each x 10 seconds.  When you feel urge to go, STOP, KEGEL, and when urge has passed, then go to bathroom.  --URGE: .  SE " profile reviewed.    Takes 2-4 weeks to see if will have effect.  For dry mouth: get sour, sugar free lozenge or gum.    --STRESS:  Non surgical options: Pessary vs. Impressa vs Rivive - which represent urethral and bladder support devices; Surgical options including 1.  mid urethral sling; retropubic, transobturator vs single incision 2. Fascial slings 3. Hutchison procedure 4. Periurethral bulking     3. Vaginal atrophy (dryness):  Use .5 gram of estrogen cream in vagina reese for two weeks then twice a week .  Please start Use REPLENS or REFRESH OTC: 1/2 applicator full in vagina twice a week.      4. RTC 3 months for for pc for removal and leave the pessary out.     Luisa Evans DO  Female Pelvic Medicine and Reconstructive Surgery  [facility] [place], [place]

## 2025-04-22 NOTE — TELEPHONE ENCOUNTER
No care due was identified.  [facility] Embedded Care Due Messages. Reference number: [id number].   4/22/2025 12:10:00 AM CDT

## 2025-04-23 ENCOUNTER — OFFICE VISIT (OUTPATIENT)
Dept: UROGYNECOLOGY | Facility: CLINIC | Age: 85
End: 2025-04-23
Payer: MEDICARE

## 2025-04-23 VITALS
HEIGHT: 65 IN | DIASTOLIC BLOOD PRESSURE: 77 MMHG | SYSTOLIC BLOOD PRESSURE: 139 MMHG | HEART RATE: 97 BPM | WEIGHT: 110.69 LBS | BODY MASS INDEX: 18.44 KG/M2

## 2025-04-23 DIAGNOSIS — N81.3 UTEROVAGINAL PROLAPSE, COMPLETE: Primary | ICD-10-CM

## 2025-04-23 PROCEDURE — 99999 PR PBB SHADOW E&M-EST. PATIENT-LVL III: CPT | Mod: PBBFAC,,, | Performed by: OBSTETRICS & GYNECOLOGY

## 2025-04-23 PROCEDURE — 99213 OFFICE O/P EST LOW 20 MIN: CPT | Mod: S$PBB,,, | Performed by: OBSTETRICS & GYNECOLOGY

## 2025-04-23 PROCEDURE — 99213 OFFICE O/P EST LOW 20 MIN: CPT | Mod: PBBFAC | Performed by: OBSTETRICS & GYNECOLOGY

## 2025-04-25 ENCOUNTER — OFFICE VISIT (OUTPATIENT)
Dept: HEMATOLOGY/ONCOLOGY | Facility: CLINIC | Age: 85
End: 2025-04-25
Payer: MEDICARE

## 2025-04-25 VITALS
DIASTOLIC BLOOD PRESSURE: 64 MMHG | SYSTOLIC BLOOD PRESSURE: 140 MMHG | BODY MASS INDEX: 18.22 KG/M2 | WEIGHT: 109.38 LBS | HEART RATE: 93 BPM | OXYGEN SATURATION: 100 % | TEMPERATURE: 99 F | HEIGHT: 65 IN

## 2025-04-25 DIAGNOSIS — D64.9 ANEMIA, UNSPECIFIED TYPE: ICD-10-CM

## 2025-04-25 DIAGNOSIS — C79.89 SECONDARY MALIGNANT NEOPLASM OF SOFT TISSUES OF SHOULDER: ICD-10-CM

## 2025-04-25 DIAGNOSIS — C34.11 ADENOCARCINOMA OF UPPER LOBE OF RIGHT LUNG: Primary | ICD-10-CM

## 2025-04-25 DIAGNOSIS — C77.1 SECONDARY MALIGNANT NEOPLASM OF INTRATHORACIC LYMPH NODES: ICD-10-CM

## 2025-04-25 DIAGNOSIS — G89.3 CANCER-RELATED PAIN: ICD-10-CM

## 2025-04-25 PROCEDURE — G2211 COMPLEX E/M VISIT ADD ON: HCPCS | Mod: S$PBB,,, | Performed by: HOSPITALIST

## 2025-04-25 PROCEDURE — 99215 OFFICE O/P EST HI 40 MIN: CPT | Mod: S$PBB,,, | Performed by: HOSPITALIST

## 2025-04-25 PROCEDURE — 99214 OFFICE O/P EST MOD 30 MIN: CPT | Mod: PBBFAC | Performed by: HOSPITALIST

## 2025-04-25 PROCEDURE — 99999 PR PBB SHADOW E&M-EST. PATIENT-LVL IV: CPT | Mod: PBBFAC,,, | Performed by: HOSPITALIST

## 2025-04-25 NOTE — PROGRESS NOTES
[facility] MEDICAL ONCOLOGY - FOLLOW UP VISIT    Reason for visit: Non-small-cell lung cancer.      Oncology History   Primary adenocarcinoma of upper lobe of right lung   6/20/2024 Imaging Significant Findings    CTA PE (suspected PE, hemoptysis)  - 3.9 x 2.5 cm mass at level of right hilum encasing several vascular structures, new compared to 9/20/22  - Scattered ground-glass attenuation around right hilar mass  - 2.8 x 1.2 cm eccentric pleural-based lateral left upper lung mass, stable compared to prior  - Known pulmonary embolism     7/5/2024 Procedure    Bronch w/ EBUS  RUL, FNA  - Adenocarcinoma (Positive: CK-7; Negative: TTF-1)    LN  - Positive: Station 4R  - Negative: Station 7, 11R, 11L    [facility] NGS / PD-L1  - PD-L1 TPS 5%         7/11/2024 Tumor Genotyping    [facility] Liquid Biopsy  - BAP1, MAP2K1, TP53 (LOF)  - BRCA VUS, PTEN VUS     7/18/2024 Imaging Significant Findings    PET CT  - 3.9 cm right perihilar mass with suspected endobronchial extension, SUV 11  - Several additional subcentimeter satellite pulmonary nodules too small to characterize on PET  - 1.0 cm right hilar lymph node, SUV 4.7  - 1.3 cm right paratracheal lymph node, SUV 4.8  - 3.1 x 1.2 cm KAY subpleural fat attenuating structure, no hypermetabolic activity     7/25/2024 Cancer Staged    Staging form: Lung, AJCC 8th Edition  - Clinical stage from 7/25/2024: Stage IIIA (cT2a, cN2, cM0)     8/14/2024 - 9/4/2024 Radiation Therapy      Treatment Summary  Course: C1 Lung 2024  Treatment Site Energy Dose/Fx (Gy) #Fx Dose Correction (Gy) Total Dose (Gy) Start Date End Date Elapsed Days   Right lung/mediastinum 6X 3 15 / 15 0 45 8/14/2024 9/4/2024 21        1/15/2025 Procedure    R Shoulder Lesion Incisional Bx  Metastatic Carcinoma  Positive: CK7, ER (weakly positive in 30%), GATA3 (weakly positive in 50%), p40 (few scattered tumor cells w/ positive staining)  Negative: TTF-1, CK20, CDX2  Comment:  This staining profile of this tumor is nonspecific and although a lung primary metastatic adenocarcinoma is favored, however a primary from breast or other location can not be definitively ruled out based on the immunostaining pattern.  Clinical correlation and radiographic correlation is required.             Oncology History        HPI:     Radha Feng is a 84 y.o. 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    Interval History:  10/11/24: Radiation oncology follow up, recommendation for repeat PET scan 3 months however patient requests repeat in 6 months instead.  11/22/24:  Med Onc telephone call, new lesion over right shoulder, does not appear typical for post radiation hyperpigmentation in, plan for Dermatology eConsult.  11/26/24: Dermatology eConsult, imaging concerning for inflamed epidermal inclusion cyst.  1/6/25:  Dermatology, lesion most consistent with epidermal inclusion cyst however cannot rule out cutaneous metastasis.  Trial of antibiotics prior to excision due to inflammation  1/15/25:  Right posterior shoulder excisional biopsy, positive for metastatic carcinoma  1/29/25: Med Onc telephone call, recommendation for PET-CT to evaluate other sites of disease however patient's daughter would like to discuss with the patient first  1/30/25:  Patient stated she would like to have imaging prior to follow up, PET-CT ordered  1/31/25: Attempted to contact patient to schedule PET-CT, unable to reach  2/4/25:  Attempted to  contact patient to schedule PET-CT, unable to reach  2/20/25:  Patient's daughter states patient is not interested in coming to discuss metastatic cancer pembrolizumab to discuss prognosis  3/11/25:  Patient message, patient okay with follow up appointment but does not want to discuss time lines  3/18/25: Med Onc, no show  3/25/25: Med Onc follow up, no show  4/4/25:  Radiation oncology follow up, canceled    The pt complains of pain over her growing R shoulder lesion. She takes acetaminophen to moderate effect. The patient states she would like to discuss this lesion and potential treatment options.    The pt's daughter notes that she has lost ~14 lbs over the past 3-4 months.     ROS:   As per HPI.       Physical Exam:       There were no vitals taken for this visit.               Physical Exam  Constitutional:       Appearance: Normal appearance.      Comments: Sitting in wheelchair   HENT:      Head: Normocephalic and atraumatic.   Eyes:      Extraocular Movements: Extraocular movements intact.      Conjunctiva/sclera: Conjunctivae normal.      Pupils: Pupils are equal, round, and reactive to light.   Pulmonary:      Effort: Pulmonary effort is normal.   Musculoskeletal:         General: Normal range of motion.      Right lower leg: No edema.      Left lower leg: No edema.      Comments: Exophytic lesion overlying posterior R shoulder, see media tab   Skin:     General: Skin is warm and dry.   Neurological:      Mental Status: She is alert and oriented to person, place, and time.   Psychiatric:         Mood and Affect: Mood normal.         Thought Content: Thought content normal.         Judgment: Judgment normal.           Labs:   No results found for this or any previous visit (from the past 48 hours).       Imaging:    See oncologic history above.     Path:  See oncologic history above.      Assessment and Plan:      Radha Feng is a 83 y.o. 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  The origin of this lesion is somewhat unclear.  The patient did have an adenocarcinoma of the RUL which was CK7 positive and TTF-1 negative, as is the new lesion.  That said, the new lesion is also positive for estrogen receptor and GATA3, which may be more consistent with a breast primary.  Will also discuss with pathology to see if further staining may be performed on the lung specimen from 7/5/24 to determine if the two lesions are related. Will also obtain a PET-CT to evaluate for other lesions and help to clinically discern the organ of origin. Finally, given that the lesion is causing the patient discomfort, will refer to radiation oncology to evaluate for possible palliative radiation therapy, recognizing need for full body imaging first.     Of note, the patient's daughter has sent many messages regarding the patient's reluctance to discuss her diagnosis and her persistent desire to avoid discussing prognosis. Due to this, the patient has previously requested either less frequent imaging than recommended or no imaging at all, and has no-showed to multiple appointments (see HPI). She has also multiple times discussed that she is not interested in chemotherapy (see previous notes). Will continue to frame conversations with this in mind.     PLAN  PET CT scheduled 4/29/25  Referral to radiation therapy for palliative XRT to the R shoulder lesion  Message  sent to pathology  RTC after PET CT complete      Stage IIIB Adenocarcinoma of the RUL, PD-L1 5%, No Actionable Genetic Alteration  ECOG PS 2 (limited by knee pain, in wheelchair).  Patient presents for follow up after completing palliative radiation to her RUL.  As per previous conversation, systemic therapy was avoided due to age, performance status, and patient's personal goals. Pt was transitioned to surveillance after completion of palliative radiation therapy. Pt had previously requested q6m f/u, though q3m follow up was recommended by Dr. Wade.     PLAN:   PET CT as above      Anemia  Hgb 8.0. Labs may represent mixed KIA and anemia of chronic inflammation, noting only a mildly elevated ferritin (387) with a significantly decreased iron saturation (15) and iron (26).  STFR at next lab draw      The above information has been reviewed with the patient and all questions have been answered to their apparent satisfaction.  They understand that they can call the clinic with any questions.      Route Chart for Scheduling    Med Onc Chart Routing      Follow up with physician . PET scheduled on 4/29/25, RTC to me at least 1 day afterwards. Radiation oncology referral in place, please help arrange.   Follow up with CORNEL    Infusion scheduling note    Injection scheduling note    Labs    Imaging    Pharmacy appointment    Other referrals                      Disclaimer: This note was prepared using voice recognition system and is likely to have sound alike errors.  Please call me with any questions.

## 2025-04-28 ENCOUNTER — PATIENT MESSAGE (OUTPATIENT)
Dept: TRANSPLANT | Facility: CLINIC | Age: 85
End: 2025-04-28
Payer: MEDICARE

## 2025-04-28 ENCOUNTER — PATIENT MESSAGE (OUTPATIENT)
Dept: HEMATOLOGY/ONCOLOGY | Facility: CLINIC | Age: 85
End: 2025-04-28
Payer: MEDICARE

## 2025-04-29 ENCOUNTER — HOSPITAL ENCOUNTER (OUTPATIENT)
Dept: RADIOLOGY | Facility: HOSPITAL | Age: 85
Discharge: HOME | End: 2025-04-29
Attending: HOSPITALIST
Payer: MEDICARE

## 2025-04-29 ENCOUNTER — CLINICAL SUPPORT (OUTPATIENT)
Dept: DIABETES | Facility: CLINIC | Age: 85
End: 2025-04-29
Payer: MEDICARE

## 2025-04-29 DIAGNOSIS — R50.9 FEVER, UNSPECIFIED FEVER CAUSE: Primary | ICD-10-CM

## 2025-04-29 DIAGNOSIS — E11.59 TYPE 2 DIABETES MELLITUS WITH OTHER CIRCULATORY COMPLICATION, WITH LONG-TERM CURRENT USE OF INSULIN: Primary | ICD-10-CM

## 2025-04-29 DIAGNOSIS — C34.11 ADENOCARCINOMA OF UPPER LOBE OF RIGHT LUNG: ICD-10-CM

## 2025-04-29 DIAGNOSIS — M75.91 LESION OF RIGHT SHOULDER: ICD-10-CM

## 2025-04-29 DIAGNOSIS — Z79.4 TYPE 2 DIABETES MELLITUS WITH OTHER CIRCULATORY COMPLICATION, WITH LONG-TERM CURRENT USE OF INSULIN: Primary | ICD-10-CM

## 2025-04-29 LAB — POCT GLUCOSE: 135 MG/DL (ref 70–110)

## 2025-04-29 PROCEDURE — 78815 PET IMAGE W/CT SKULL-THIGH: CPT | Mod: 26,PS,, | Performed by: STUDENT IN AN ORGANIZED HEALTH CARE EDUCATION/TRAINING PROGRAM

## 2025-04-29 PROCEDURE — A9552 F18 FDG: HCPCS | Performed by: HOSPITALIST

## 2025-04-29 PROCEDURE — 78815 PET IMAGE W/CT SKULL-THIGH: CPT | Mod: TC

## 2025-04-29 RX ORDER — FLUDEOXYGLUCOSE F18 500 MCI/ML
12.27 INJECTION INTRAVENOUS
Status: COMPLETED | OUTPATIENT
Start: 2025-04-29 | End: 2025-04-29

## 2025-04-29 RX ADMIN — FLUDEOXYGLUCOSE F-18 12.27 MILLICURIE: 500 INJECTION INTRAVENOUS at 01:04

## 2025-04-29 NOTE — PROGRESS NOTES
Pt reports a fever of 101.3 at home, resolved with tylenol and cooling. She notes a worsening cough and rhinorrhea. Initially declined to go the ED. In setting of previous lung cancer w/ possible recurrence, CXR now to rule out possible postobstructive process, along w/ CBC and urinalysis.

## 2025-04-30 ENCOUNTER — TELEPHONE (OUTPATIENT)
Dept: HEMATOLOGY/ONCOLOGY | Facility: CLINIC | Age: 85
End: 2025-04-30
Payer: MEDICARE

## 2025-04-30 ENCOUNTER — HOSPITAL ENCOUNTER (OUTPATIENT)
Dept: RADIOLOGY | Facility: HOSPITAL | Age: 85
Discharge: HOME | End: 2025-04-30
Attending: HOSPITALIST
Payer: MEDICARE

## 2025-04-30 ENCOUNTER — PATIENT MESSAGE (OUTPATIENT)
Dept: HEMATOLOGY/ONCOLOGY | Facility: CLINIC | Age: 85
End: 2025-04-30
Payer: MEDICARE

## 2025-04-30 DIAGNOSIS — R50.9 FEVER, UNSPECIFIED FEVER CAUSE: ICD-10-CM

## 2025-04-30 PROCEDURE — 71046 X-RAY EXAM CHEST 2 VIEWS: CPT | Mod: 26,,, | Performed by: RADIOLOGY

## 2025-04-30 PROCEDURE — 71046 X-RAY EXAM CHEST 2 VIEWS: CPT | Mod: TC

## 2025-04-30 NOTE — PROGRESS NOTES
`Diabetes Care Specialist Virtual Visit Note   The patient location is: Home in [place]  The chief complaint leading to consultation is: Diabetes  Visit type: audiovisual  Total time spent with patient: 30 min   Each patient to whom he or she provides medical services by telemedicine is:  (1) informed of the relationship between the physician and patient and the respective role of any other health care provider with respect to management of the patient; and (2) notified that he or she may decline to receive medical services by telemedicine and may withdraw from such care at any time.    Diabetes Care Specialist Follow-up Note  Author: Michelle Feng RN, [facility]  Date: 4/29/2025    Intake    Program Intake  Reason for Diabetes Program Visit:: Intervention  Type of Intervention:: Individual  Individual: Education  Education: Self-Management Skill Review  Current diabetes risk level:: moderate  Permission to speak with others about care:: yes (Sherett daughter)    Current Diabetes Treatment: Insulin  Method of insulin delivery?: Insulin Pump  Type of Pump: Omnipod 5  Does patient have back-up plan?: Yes  Any problems obtaining supplies?: No    Continuous Glucose Monitoring  Patient has CGM: Yes  Personal CGM type:: Dexcom G6  GMI Date: 04/29/25  GMI Value: 7.3 %    Lab Results   Component Value Date    HGBA1C 6.0 (H) 06/21/2024     l  Lifestyle Coping Support & Clinical  Problem Review  Active Comorbidities:  (Liver disease)    Diabetes Self-Management Skills Assessment    Medication Skills Assessment  Patient is able to identify current diabetes medications, dosages, and appropriate timing of medications.: yes  Patient reports problems or concerns with current medication regimen.: no  Medication Skills Assessment Completed:: Yes  Assessment indicates:: Adequate understanding  Area of need?: No    Home Blood Glucose Monitoring  Personal CGM type:: Dexcom G6      During today's follow-up visit,  the following areas  required further assessment and content was provided/reviewed.    Based on today's diabetes care assessment, the following areas of need were identified:      Identified Areas of Need      Medication/Current Diabetes Treatment: No, see care planning       Today's interventions were provided through individual discussion, instruction, and written materials were provided.    Patient verbalized understanding of instruction and written materials.  Pt was able to return back demonstration of instructions today. Patient understood key points, needs reinforcement and further instruction.     Diabetes Self-Management Care Plan Review and Evaluation of Progress:    During today's follow-up Radha's Diabetes Self-Management Care Plan progress was reviewed and progress was evaluated including his/her input. Radha has agreed to continue his/her journey to improve/maintain overall diabetes control by continuing to set health goals. See care plan progress below.      Care Plan: Diabetes Management   Updates made since 4/30/2024 12:00 AM        Problem: Medications         Goal: Patient Agrees to take Diabetes Medication(s) using the Omnipod 5 system    Start Date: 3/27/2023   Expected End Date: 5/13/2025   Recent Progress: On track   Priority: High   Barriers: Knowledge deficit   Note:    OP5    Update to care planning 06/21/2023:  Reviewed Omnipod 5 download with Dexcom integration with patient and her daughter Vinod.  Overall doing well.  Bgs stable during the night and when she is not eating.  Elevations noted at all post meal glucose.  Carbs are controlled and measured by family members.  Will increase her I:C from 16 to 14 as she requires more insulin with meals.  Walked daughter thru changes on her PDM. No other concerns voiced.  We reviewed troubleshooting, what to look for including leaking at the site an unexplained rise in glucose readings.  Advised to monitor for these and to change the pod if this occurs.  Advised  that she no longer has long acting insulin and changes must be address immediately to avoid potential issues.  Has back up insulin.  Demonstrated how to use the inhaled and glucagon emergency pens.  Will reach out to HCP to prescribed. Patient states she likes using system as opposed to injections. Advised to call Omnipod for replacement pods as she had to change 2 prematurely.  No other questions voiced.     Update to care planning 10/10/23:  Patient and her daughter seen for pump f/u.  Doing well on Omnipod 5.  We reviewed when changing the pod to make sure she is in [employer].  Had changed pod last and forgot to switch.  Discussed troubleshooting with pump and how to identify issues.  Advised to always change pod if numbers do not respond to correction bolus.  Instructed Too how to do a correction bolus with pump.     Update to care planning 4/17/2024:  Patient seen virtually for her 6 mth f/u visit.  Tish (daughter) in attendance. Download was reviewed for Omnipod 5.  Continues to do very well on pump, reports no issues.  Assisted with changing IC ratio and basal rates per Dr. Agarwal.  No other issues, continues to enter her meals appropriately.     Update to care planning 10/1/2024: Patient seen virtually for f/u visit. Regino daughter in attendance. Overall doing well with the pump. Daughter stated that sometimes the patient's glucose will drop overnight.  Download reviewed.  Adjusting her targets as follows: 12 am target 140/correct over 150; 6 am target 120 correct over 140.  Advised will look at download next week to see if further changes are needed.     Update to care planing 1/24/2024:  Assisted daughter with programming Dexcom G7 in pump. Dexcom G7 training completed. Daughter stating Mother experienced a couple of concerning lows.  Adjusted ISF from 50 to 60 and adjusted carb ratios as follows: 12 am 12; 6 am 10; 4 pm 12    Update to care planning 04/29/2025:  Omnipod 5 download reviewed with  patient and her daughter.  Daughter states that a few times her mother's sugar went low after bolusing for a meal.  She lately has not been giving insulin for some of her meals.  There are some missed meal boluses noted especially in the evening.    Will make the following adjustments  I:C ratios  12 am change from 12 to 14; 6 am same at 10; 4 pm change from 12 to 14  ISF change from 60 to 70    Explained to daughter that these changes in her carb ratio will result in less insulin being taken and the increase in ISF will lessen the amount of insulin given for correction.  Understanding was verbalized.  Changes made by daughter.    Will f/u in 2 week to see if her numbers level out.         Follow Up Plan     2 weeks    Today's care plan and follow up schedule was discussed with patient.  Radha verbalized understanding of the care plan, goals, and agrees to follow up plan.        The patient was encouraged to communicate with his/her health care provider/physician and care team regarding his/her condition(s) and treatment.  I provided the patient with my contact information today and encouraged to contact me via phone or [facility] Patient Portal as needed.     Length of Visit   Total Time: 30 Minutes

## 2025-04-30 NOTE — TELEPHONE ENCOUNTER
"----- Message from Hayes sent at 4/30/2025  9:10 AM CDT -----  Consult/AdvisoryName Of Caller: SelfContact Preference?: [phone] Provider Name: Terell patient feel the need to be seen today? NoWhat is the nature of the call?: Returning call to [facility] Notes: Pt requesting to receive reply via portal message"Thank you for all that you do for our patients"  ----- Message -----  From: Hayes Blanchard  Sent: 4/30/2025   9:12 AM CDT  To: [facility]  Pool    Consult/AdvisoryName Of Caller: SelfContact Preference?: [phone] Provider Name: Terell patient feel the need to be seen today? NoWhat is the nature of the call?: Returning call to [facility] Notes: Requesting to receive reply via portal message"Thank you for all that you do for our patients"  "

## 2025-05-01 ENCOUNTER — RESULTS FOLLOW-UP (OUTPATIENT)
Dept: HEMATOLOGY/ONCOLOGY | Facility: CLINIC | Age: 85
End: 2025-05-01
Payer: MEDICARE

## 2025-05-01 ENCOUNTER — TELEPHONE (OUTPATIENT)
Dept: HEMATOLOGY/ONCOLOGY | Facility: CLINIC | Age: 85
End: 2025-05-01
Payer: MEDICARE

## 2025-05-01 NOTE — PROGRESS NOTES
[facility] MEDICAL ONCOLOGY - FOLLOW UP VISIT    Reason for visit: Non-small-cell lung cancer.    The patient location is: [place] - [place]    Visit type: Audiovisual    Each patient to who is provided medical services by telemedicine has been: (1) informed of the relationship between the physician and patient and the respective role of any other health care provider with respect to management of the patient; and (2) notified that he or she may decline to receive medical services by telemedicine and may withdraw from such care at any time.      Oncology History   Primary adenocarcinoma of upper lobe of right lung   6/20/2024 Imaging Significant Findings    CTA PE (suspected PE, hemoptysis)  - 3.9 x 2.5 cm mass at level of right hilum encasing several vascular structures, new compared to 9/20/22  - Scattered ground-glass attenuation around right hilar mass  - 2.8 x 1.2 cm eccentric pleural-based lateral left upper lung mass, stable compared to prior  - Known pulmonary embolism     7/5/2024 Procedure    Bronch w/ EBUS  RUL, FNA  - Adenocarcinoma (Positive: CK-7; Negative: TTF-1)    LN  - Positive: Station 4R  - Negative: Station 7, 11R, 11L    [facility] NGS / PD-L1  - PD-L1 TPS 5%         7/11/2024 Tumor Genotyping    [facility] Liquid Biopsy  - BAP1, MAP2K1, TP53 (LOF)  - BRCA VUS, PTEN VUS     7/18/2024 Imaging Significant Findings    PET CT  - 3.9 cm right perihilar mass with suspected endobronchial extension, SUV 11  - Several additional subcentimeter satellite pulmonary nodules too small to characterize on PET  - 1.0 cm right hilar lymph node, SUV 4.7  - 1.3 cm right paratracheal lymph node, SUV 4.8  - 3.1 x 1.2 cm KAY subpleural fat attenuating structure, no hypermetabolic activity     7/25/2024 Cancer Staged    Staging form: Lung, AJCC 8th Edition  - Clinical stage from 7/25/2024: Stage IIIA (cT2a, cN2, cM0)     8/14/2024 - 9/4/2024 Radiation Therapy      Treatment  Summary  Course: C1 Lung 2024  Treatment Site Energy Dose/Fx (Gy) #Fx Dose Correction (Gy) Total Dose (Gy) Start Date End Date Elapsed Days   Right lung/mediastinum 6X 3 15 / 15 0 45 8/14/2024 9/4/2024 21        1/15/2025 Procedure    R Shoulder Lesion Incisional Bx  Metastatic Carcinoma  Positive: CK7, ER (weakly positive in 30%), GATA3 (weakly positive in 50%), p40 (few scattered tumor cells w/ positive staining)  Negative: TTF-1, CK20, CDX2  Comment: This staining profile of this tumor is nonspecific and although a lung primary metastatic adenocarcinoma is favored, however a primary from breast or other location can not be definitively ruled out based on the immunostaining pattern.  Clinical correlation and radiographic correlation is required.             Oncology History        HPI:     Radha Feng is a 84 y.o. 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  She continues to endorse pain over her right shoulder.  She states that, since the PET scan, she has been somewhat more tired.    ROS:   As per HPI.       Physical Exam:       There were no vitals taken for this visit.               Physical Exam  Constitutional:       Appearance: Normal appearance.      Comments: Sitting in wheelchair   HENT:      Head: Normocephalic and atraumatic.   Eyes:      Extraocular Movements: Extraocular movements intact.      Conjunctiva/sclera: Conjunctivae normal.      Pupils: Pupils are equal, round, and reactive to  light.   Pulmonary:      Effort: Pulmonary effort is normal.   Musculoskeletal:         General: Normal range of motion.      Right lower leg: No edema.      Left lower leg: No edema.      Comments: Exophytic lesion overlying posterior R shoulder, see media tab   Skin:     General: Skin is warm and dry.   Neurological:      Mental Status: She is alert and oriented to person, place, and time.   Psychiatric:         Mood and Affect: Mood normal.         Thought Content: Thought content normal.         Judgment: Judgment normal.         Labs:   Recent Results (from the past 48 hours)   CBC with Differential    Collection Time: 04/30/25  4:15 PM   Result Value Ref Range    WBC 12.59 3.90 - 12.70 K/uL    RBC 2.82 (L) 4.00 - 5.40 M/uL    HGB 7.7 (L) 12.0 - 16.0 gm/dL    HCT 26.0 (L) 37.0 - 48.5 %    MCV 92 82 - 98 fL    MCH 27.3 27.0 - 31.0 pg    MCHC 29.6 (L) 32.0 - 36.0 g/dL    RDW 17.5 (H) 11.5 - 14.5 %    Platelet Count 317 150 - 450 K/uL    MPV 10.7 9.2 - 12.9 fL    Nucleated RBC 0 <=0 /100 WBC    Neut % 76.4 (H) 38 - 73 %    Lymph % 8.3 (L) 18 - 48 %    Mono % 12.6 4 - 15 %    Eos % 1.3 <=8 %    Basophil % 0.2 <=1.9 %    Imm Grans % 1.2 (H) 0.0 - 0.5 %    Neut # 9.63 (H) 1.8 - 7.7 K/uL    Lymph # 1.04 1 - 4.8 K/uL    Mono # 1.59 (H) 0.3 - 1 K/uL    Eos # 0.16 <=0.5 K/uL    Baso # 0.02 <=0.2 K/uL    Imm Grans # 0.15 (H) 0.00 - 0.04 K/uL          Imaging:    See oncologic history above.     Path:  See oncologic history above.      Assessment and Plan:      Radha Feng is a 83 y.o. 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  Since last visit, a PET-CT demonstrates a significantly hypermetabolic 8.2 cm soft tissue mass along the posterior aspect of the right acromion corresponding with the previously described right shoulder lesion, a new supraclavicular lymph node, new right axillary adenopathy, and enlargement of a right paratracheal lymph node.  On discussion with the pathology, ER and EMERALD 3 were also added to the initial lung cancer biopsy specimen and, like the shoulder mass, showed faint focal positivity for both.  Noting that morphology is likewise similar between the lung biopsy specimen and the shoulder specimen, as well as distribution of disease on the new PET imaging, this appears consistent with likely metastatic lung cancer rather than a second primary (see previous note for considerations). In the absence of actionable genomic alterations, as well as with the PD-L1 TPS of 5%, the ideal therapy would be a combination of chemotherapy and immunotherapy.  That said, the patient has many times discussed her aversion to chemotherapy, and she is actively receiving systemic steroids for her autoimmune hepatitis which makes immunotherapy a less favorable proposition.  Beyond that, the patient's daughter has sent many messages regarding the patient's reluctance to discuss her diagnosis and her persistent desire to avoid discussing prognosis. Due to this, the patient has previously requested either less frequent imaging than recommended or no imaging at all, and has no-showed to multiple appointments (see previous note for chronology). Per my conversation today (5/2/25), will discuss with Dr. Peraza (hepatology) regarding the potential feasibility of immunotherapy monotherapy, noting that even without a history of autoimmune hepatitis, this  would not be the ideal therapy course. Should pt not be a candidate for systemic therapy, she may still be a candidate for at least palliative radiation therapy to the shoulder. The patient and her daughter would like to take time to think about this.     PLAN  Message sent to Dr. Peraza  Pt's daughter to reach out regarding decision for radiation therapy, will hold on referral in interim  Tentative RTC in 2-3 weeks      Anemia  Hgb 8.0. Labs may represent mixed KIA and anemia of chronic inflammation, noting only a mildly elevated ferritin (387) with a significantly decreased iron saturation (15) and iron (26).  STFR pending      The above information has been reviewed with the patient and all questions have been answered to their apparent satisfaction.  They understand that they can call the clinic with any questions.      Route Chart for Scheduling    Med Onc Chart Routing      Follow up with physician . RTC in 2-3 weeks.   Follow up with CORNEL    Infusion scheduling note    Injection scheduling note    Labs    Imaging    Pharmacy appointment    Other referrals                    Disclaimer: This note was prepared using voice recognition system and is likely to have sound alike errors.  Please call me with any questions.

## 2025-05-02 ENCOUNTER — OFFICE VISIT (OUTPATIENT)
Dept: HEMATOLOGY/ONCOLOGY | Facility: CLINIC | Age: 85
End: 2025-05-02
Payer: MEDICARE

## 2025-05-02 DIAGNOSIS — C79.89 SECONDARY MALIGNANT NEOPLASM OF SOFT TISSUES OF SHOULDER: ICD-10-CM

## 2025-05-02 DIAGNOSIS — C77.1 SECONDARY MALIGNANT NEOPLASM OF INTRATHORACIC LYMPH NODES: ICD-10-CM

## 2025-05-02 DIAGNOSIS — D64.9 ANEMIA, UNSPECIFIED TYPE: ICD-10-CM

## 2025-05-02 DIAGNOSIS — G89.3 CANCER-RELATED PAIN: ICD-10-CM

## 2025-05-02 DIAGNOSIS — C34.11 ADENOCARCINOMA OF UPPER LOBE OF RIGHT LUNG: Primary | ICD-10-CM

## 2025-05-02 NOTE — Clinical Note
Good afternoon,   Hoping to touch base about this patient. She has newly metastatic lung cancer, but is very reluctant to receive cytotoxic chemotherapy, which is not unreasonable given her age.   We do also have the option of immunotherapy (less preferred given a low PD-L1 expression, but still approved as monotherapy), however she of course also has a history of autoimmune hepatitis which we run the risk of flaring.   Wondering your thoughts regarding how well controlled her hepatitis is, as well as the potential feasibility of challenging her with immunotherapy from a hepatology standpoint?   Thanks! Antonio

## 2025-05-05 ENCOUNTER — PATIENT MESSAGE (OUTPATIENT)
Dept: HEMATOLOGY/ONCOLOGY | Facility: CLINIC | Age: 85
End: 2025-05-05
Payer: MEDICARE

## 2025-05-13 ENCOUNTER — LAB VISIT (OUTPATIENT)
Dept: LAB | Facility: HOSPITAL | Age: 85
End: 2025-05-13
Attending: INTERNAL MEDICINE
Payer: MEDICARE

## 2025-05-13 ENCOUNTER — PATIENT MESSAGE (OUTPATIENT)
Dept: DIABETES | Facility: CLINIC | Age: 85
End: 2025-05-13
Payer: MEDICARE

## 2025-05-13 DIAGNOSIS — R18.8 CIRRHOSIS OF LIVER WITH ASCITES, UNSPECIFIED HEPATIC CIRRHOSIS TYPE: ICD-10-CM

## 2025-05-13 DIAGNOSIS — K74.60 CIRRHOSIS OF LIVER WITH ASCITES, UNSPECIFIED HEPATIC CIRRHOSIS TYPE: ICD-10-CM

## 2025-05-13 LAB
INR PPP: 1.2 (ref 0.8–1.2)
PROTHROMBIN TIME: 12.8 SECONDS (ref 9–12.5)

## 2025-05-13 PROCEDURE — 82248 BILIRUBIN DIRECT: CPT

## 2025-05-13 PROCEDURE — 85610 PROTHROMBIN TIME: CPT

## 2025-05-13 PROCEDURE — 85025 COMPLETE CBC W/AUTO DIFF WBC: CPT

## 2025-05-13 PROCEDURE — 80053 COMPREHEN METABOLIC PANEL: CPT

## 2025-05-13 PROCEDURE — 82105 ALPHA-FETOPROTEIN SERUM: CPT

## 2025-05-13 PROCEDURE — 36415 COLL VENOUS BLD VENIPUNCTURE: CPT | Mod: PN

## 2025-05-13 NOTE — PROGRESS NOTES
[facility] MEDICAL ONCOLOGY - FOLLOW UP VISIT    Reason for visit: Non-small-cell lung cancer.    Oncology History   Primary adenocarcinoma of upper lobe of right lung   6/20/2024 Imaging Significant Findings    CTA PE (suspected PE, hemoptysis)  - 3.9 x 2.5 cm mass at level of right hilum encasing several vascular structures, new compared to 9/20/22  - Scattered ground-glass attenuation around right hilar mass  - 2.8 x 1.2 cm eccentric pleural-based lateral left upper lung mass, stable compared to prior  - Known pulmonary embolism     7/5/2024 Procedure    Bronch w/ EBUS  RUL, FNA  - Adenocarcinoma (Positive: CK-7; Negative: TTF-1)    LN  - Positive: Station 4R  - Negative: Station 7, 11R, 11L    Update, 5/6/25:  Per clinican's request few additional immunostains have been performed. The original diagnosis remains unchanged. These results have been conveyed to Dr. Guzmán by Dr. Burnett thorough EPIC message on 5/1/25     ER, GATA3 - Both faint focal staining.     Morphological findings of this lung biopsy has also been compared with more recent shoulder biopsy (S-).  Tumor has similar morphological and immunophenotypic findings; however, these are nonspecific findings.  The primary location of the tumor   could be lung or elsewhere.  A thorough clinical and radiological correlation is recommended.      [facility] NGS / PD-L1  - PD-L1 TPS 5%         7/11/2024 Tumor Genotyping    [facility] Liquid Biopsy  - BAP1, MAP2K1, TP53 (LOF)  - BRCA VUS, PTEN VUS     7/18/2024 Imaging Significant Findings    PET CT  - 3.9 cm right perihilar mass with suspected endobronchial extension, SUV 11  - Several additional subcentimeter satellite pulmonary nodules too small to characterize on PET  - 1.0 cm right hilar lymph node, SUV 4.7  - 1.3 cm right paratracheal lymph node, SUV 4.8  - 3.1 x 1.2 cm KAY subpleural fat attenuating structure, no hypermetabolic activity     7/25/2024 Cancer  Staged    Staging form: Lung, AJCC 8th Edition  - Clinical stage from 7/25/2024: Stage IIIA (cT2a, cN2, cM0)     8/14/2024 - 9/4/2024 Radiation Therapy      Treatment Summary  Course: C1 Lung 2024  Treatment Site Energy Dose/Fx (Gy) #Fx Dose Correction (Gy) Total Dose (Gy) Start Date End Date Elapsed Days   Right lung/mediastinum 6X 3 15 / 15 0 45 8/14/2024 9/4/2024 21        1/15/2025 Procedure    R Shoulder Lesion Incisional Bx  Metastatic Carcinoma  Positive: CK7, ER (weakly positive in 30%), GATA3 (weakly positive in 50%), p40 (few scattered tumor cells w/ positive staining)  Negative: TTF-1, CK20, CDX2  Comment: This staining profile of this tumor is nonspecific and although a lung primary metastatic adenocarcinoma is favored, however a primary from breast or other location can not be definitively ruled out based on the immunostaining pattern.  Clinical correlation and radiographic correlation is required.        4/29/2025 Imaging Significant Findings    PET CT  New 8.2 x 6.4 cm soft tissue mass along posterior aspect of right acromion, SUV 26  New 1.4 x 1.4 cm supraclavicular lymph node, SUV 22  New 3.4 x 1.8 cm right axillary lymphadenopathy, SUV 17  Enlargement of 2.3 x 2.4 cm right paratracheal lymph node (SUV 37), previously 1.3 cm (SUV 4.8)  Decrease in size of right perihilar mass, 2.2 x 2.8 cm (SUV 32), previously 3.9 x 3.9 cm (SUV 11)  No evidence of hypermetabolic disease below the diaphragm          Oncology History        HPI:     Radha Feng is a 84 y.o. 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     Her daughter states that she was recently diagnosed with thrush involving the mouth and throat. She was seen by urgent care and was given swish and swallow as well as magic mouthwash. She feels that this has "improved a lot". There was no discussion regarding the etiology of the thrush.     She confirms that she uses her cane at home, but a wheelchair when in the clinic because of chronic leg pain, SOB, and fatigue. Her daughter says that she won't walk from her room to the front door unless going somewhere, and she becomes SOB and tired when she does this. She is unable to go up or down her porch steps without assistance. he requires assistance with showering, as she cannot get into and out of the tub without assistance. She can use the toilet by herself. She is not able to make her own food. She says she spends "all day" in the bed.     ROS:   As per HPI.       Physical Exam:       /63 (BP Location: Right arm, Patient Position: Sitting)   Pulse 71   Temp 97.8 °F (36.6 °C) (Oral)   Resp 16   Ht 5' 5" (1.651 m)   SpO2 100%   BMI 18.20 kg/m²                Physical Exam  Constitutional:       Appearance: Normal appearance.      Comments: Sitting in wheelchair   HENT:      Head: Normocephalic and atraumatic.   Eyes:      Extraocular Movements: Extraocular movements intact.      Conjunctiva/sclera: Conjunctivae normal.      Pupils: Pupils are equal, round, and reactive to light.   Pulmonary:      Effort: Pulmonary effort is normal.   Musculoskeletal:         General: Normal range of motion.      Right lower leg: No edema.      Left lower leg: No edema.      Comments: Exophytic lesion overlying posterior R shoulder, see media tab   Skin:     General: Skin is warm and dry.   Neurological:      Mental Status: She is alert and oriented to person, place, and " time.   Psychiatric:         Mood and Affect: Mood normal.         Thought Content: Thought content normal.         Judgment: Judgment normal.           Imaging:    See oncologic history above.     Path:  See oncologic history above.      Assessment and Plan:      Radha Feng is a 83 y.o. 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 Patient presents today for follow up. See note from 5/2/25 for previous considerations.  Since last visit, I discussed the patient with Dr. Peraza (hepatology), who endorsed her concerns regarding possible immunotherapy given both autoimmune hepatitis as well as mildly decompensated cirrhosis. Per Dr. Peraza, while this is currently well-controlled, a flare of the autoimmune hepatitis would risk further decompensation of the patient's cirrhosis. In light of this, favor against utilizing immunotherapy for this patient. Today (5/15/25), I reiterated our conversation from last visit (see note from 5/2/25). At this time, there are likely no systemic cancer-directed options which are both tolerable and effective. Given this, we discussed proceeding with either comfort care or hospice, and I explained the scope and goals of both. After this  conversation, the patient and her daughter stated the would like to proceed with comfort care rather than hospice. They are interested in meeting with radiation oncology to discuss palliative radiation therapy to the R shoulder. All questions were answered to apparent satisfaction.     PLAN  Radiation oncology referral in place  Integrative oncology referral in place  Palliative care referral in place  RTC PRN     Anemia  Hgb 8.0. Labs may represent mixed KIA and anemia of chronic inflammation, noting only a mildly elevated ferritin (387) with a significantly decreased iron saturation (15) and iron (26). STFR elevation may indicate some KIA.       The above information has been reviewed with the patient and all questions have been answered to their apparent satisfaction.  They understand that they can call the clinic with any questions.      Route Chart for Scheduling    Med Onc Chart Routing      Follow up with physician . Referrals to palliative, integrative, and radiation oncology. RTC PRN.   Follow up with [facility]    Infusion scheduling note    Injection scheduling note    Labs    Imaging    Pharmacy appointment    Other referrals                    Disclaimer: This note was prepared using voice recognition system and is likely to have sound alike errors.  Please call me with any questions.

## 2025-05-14 ENCOUNTER — RESULTS FOLLOW-UP (OUTPATIENT)
Dept: TRANSPLANT | Facility: CLINIC | Age: 85
End: 2025-05-14

## 2025-05-14 LAB
ABSOLUTE EOSINOPHIL (OHS): 0.12 K/UL
ABSOLUTE MONOCYTE (OHS): 1.45 K/UL (ref 0.3–1)
ABSOLUTE NEUTROPHIL COUNT (OHS): 12.78 K/UL (ref 1.8–7.7)
AFP SERPL-MCNC: <2 NG/ML
ALBUMIN SERPL BCP-MCNC: 1.8 G/DL (ref 3.5–5.2)
ALP SERPL-CCNC: 81 UNIT/L (ref 40–150)
ALT SERPL W/O P-5'-P-CCNC: 9 UNIT/L (ref 10–44)
ANION GAP (OHS): 12 MMOL/L (ref 8–16)
AST SERPL-CCNC: 23 UNIT/L (ref 11–45)
BASOPHILS # BLD AUTO: 0.03 K/UL
BASOPHILS NFR BLD AUTO: 0.2 %
BILIRUB DIRECT SERPL-MCNC: 0.2 MG/DL (ref 0.1–0.3)
BILIRUB SERPL-MCNC: 0.5 MG/DL (ref 0.1–1)
BUN SERPL-MCNC: 18 MG/DL (ref 8–23)
CALCIUM SERPL-MCNC: 9.6 MG/DL (ref 8.7–10.5)
CHLORIDE SERPL-SCNC: 107 MMOL/L (ref 95–110)
CO2 SERPL-SCNC: 24 MMOL/L (ref 23–29)
CREAT SERPL-MCNC: 0.9 MG/DL (ref 0.5–1.4)
ERYTHROCYTE [DISTWIDTH] IN BLOOD BY AUTOMATED COUNT: 18.1 % (ref 11.5–14.5)
GFR SERPLBLD CREATININE-BSD FMLA CKD-EPI: >60 ML/MIN/1.73/M2
GLUCOSE SERPL-MCNC: 114 MG/DL (ref 70–110)
HCT VFR BLD AUTO: 28 % (ref 37–48.5)
HGB BLD-MCNC: 8.1 GM/DL (ref 12–16)
IMM GRANULOCYTES # BLD AUTO: 0.16 K/UL (ref 0–0.04)
IMM GRANULOCYTES NFR BLD AUTO: 1 % (ref 0–0.5)
LYMPHOCYTES # BLD AUTO: 1.26 K/UL (ref 1–4.8)
MCH RBC QN AUTO: 27.4 PG (ref 27–31)
MCHC RBC AUTO-ENTMCNC: 28.9 G/DL (ref 32–36)
MCV RBC AUTO: 95 FL (ref 82–98)
NUCLEATED RBC (/100WBC) (OHS): 0 /100 WBC
PLATELET # BLD AUTO: 363 K/UL (ref 150–450)
PMV BLD AUTO: 10.9 FL (ref 9.2–12.9)
POTASSIUM SERPL-SCNC: 4.3 MMOL/L (ref 3.5–5.1)
PROT SERPL-MCNC: 7.5 GM/DL (ref 6–8.4)
RBC # BLD AUTO: 2.96 M/UL (ref 4–5.4)
RELATIVE EOSINOPHIL (OHS): 0.8 %
RELATIVE LYMPHOCYTE (OHS): 8 % (ref 18–48)
RELATIVE MONOCYTE (OHS): 9.2 % (ref 4–15)
RELATIVE NEUTROPHIL (OHS): 80.8 % (ref 38–73)
SODIUM SERPL-SCNC: 143 MMOL/L (ref 136–145)
WBC # BLD AUTO: 15.8 K/UL (ref 3.9–12.7)

## 2025-05-15 ENCOUNTER — OFFICE VISIT (OUTPATIENT)
Dept: HEMATOLOGY/ONCOLOGY | Facility: CLINIC | Age: 85
DRG: 552 | End: 2025-05-15
Payer: MEDICARE

## 2025-05-15 VITALS
HEART RATE: 71 BPM | BODY MASS INDEX: 18.2 KG/M2 | OXYGEN SATURATION: 100 % | HEIGHT: 65 IN | TEMPERATURE: 98 F | SYSTOLIC BLOOD PRESSURE: 135 MMHG | RESPIRATION RATE: 16 BRPM | DIASTOLIC BLOOD PRESSURE: 63 MMHG

## 2025-05-15 DIAGNOSIS — C79.89 SECONDARY MALIGNANT NEOPLASM OF SOFT TISSUES OF SHOULDER: ICD-10-CM

## 2025-05-15 DIAGNOSIS — D64.9 ANEMIA, UNSPECIFIED TYPE: ICD-10-CM

## 2025-05-15 DIAGNOSIS — G89.3 CANCER-RELATED PAIN: ICD-10-CM

## 2025-05-15 DIAGNOSIS — C77.1 SECONDARY MALIGNANT NEOPLASM OF INTRATHORACIC LYMPH NODES: ICD-10-CM

## 2025-05-15 DIAGNOSIS — C34.11 ADENOCARCINOMA OF UPPER LOBE OF RIGHT LUNG: Primary | ICD-10-CM

## 2025-05-15 PROCEDURE — 99215 OFFICE O/P EST HI 40 MIN: CPT | Mod: PBBFAC | Performed by: HOSPITALIST

## 2025-05-15 PROCEDURE — 99999 PR PBB SHADOW E&M-EST. PATIENT-LVL V: CPT | Mod: PBBFAC,,, | Performed by: HOSPITALIST

## 2025-05-15 PROCEDURE — 99215 OFFICE O/P EST HI 40 MIN: CPT | Mod: S$PBB,,, | Performed by: HOSPITALIST

## 2025-05-15 PROCEDURE — G2211 COMPLEX E/M VISIT ADD ON: HCPCS | Mod: S$PBB,,, | Performed by: HOSPITALIST

## 2025-05-15 RX ORDER — NYSTATIN 100000 [USP'U]/ML
SUSPENSION ORAL
COMMUNITY
Start: 2025-05-11

## 2025-05-15 NOTE — Clinical Note
Good afternoon!  This is a patient who doesn't have any good systemic therapy options (I wrote a lot about my thoughts in the previous note on 5/2). She has previously been reluctant to get scans or discuss prognosis, and it took me awhile to get her back into clinic. She and her daughter are not interested in hospice at this time but understand that there are no good treatment options for her cancer.   Possible to get her in with palliative care sooner than later to discuss further?   I am working tog et her in with radiation oncology to help with her shoulder pain.   Thanks! Antonio

## 2025-05-17 ENCOUNTER — HOSPITAL ENCOUNTER (INPATIENT)
Facility: HOSPITAL | Age: 85
LOS: 2 days | Discharge: HOME | DRG: 552 | End: 2025-05-19
Attending: EMERGENCY MEDICINE | Admitting: EMERGENCY MEDICINE
Payer: MEDICARE

## 2025-05-17 DIAGNOSIS — E11.59 TYPE 2 DIABETES MELLITUS WITH OTHER CIRCULATORY COMPLICATION, WITH LONG-TERM CURRENT USE OF INSULIN: ICD-10-CM

## 2025-05-17 DIAGNOSIS — R50.9 FEVER, UNSPECIFIED FEVER CAUSE: ICD-10-CM

## 2025-05-17 DIAGNOSIS — Z13.6 SCREENING FOR CARDIOVASCULAR CONDITION: ICD-10-CM

## 2025-05-17 DIAGNOSIS — R07.9 CHEST PAIN: ICD-10-CM

## 2025-05-17 DIAGNOSIS — Z96.41 INSULIN PUMP IN PLACE: ICD-10-CM

## 2025-05-17 DIAGNOSIS — S32.010S COMPRESSION FRACTURE OF L1 VERTEBRA, SEQUELA: Primary | ICD-10-CM

## 2025-05-17 DIAGNOSIS — Z79.4 TYPE 2 DIABETES MELLITUS WITH OTHER CIRCULATORY COMPLICATION, WITH LONG-TERM CURRENT USE OF INSULIN: ICD-10-CM

## 2025-05-17 PROBLEM — D72.829 LEUKOCYTOSIS: Status: ACTIVE | Noted: 2025-05-17

## 2025-05-17 PROBLEM — S32.010A COMPRESSION FRACTURE OF L1 LUMBAR VERTEBRA: Status: ACTIVE | Noted: 2025-05-17

## 2025-05-17 PROBLEM — M75.91 SHOULDER LESION, RIGHT: Status: ACTIVE | Noted: 2025-05-17

## 2025-05-17 PROBLEM — R10.9 ABDOMINAL PAIN: Status: ACTIVE | Noted: 2025-05-17

## 2025-05-17 PROBLEM — N39.0 UTI (URINARY TRACT INFECTION): Status: ACTIVE | Noted: 2025-05-17

## 2025-05-17 PROBLEM — H40.1130 PRIMARY OPEN ANGLE GLAUCOMA (POAG) OF BOTH EYES: Status: ACTIVE | Noted: 2025-05-17

## 2025-05-17 PROBLEM — I10 PRIMARY HYPERTENSION: Status: ACTIVE | Noted: 2025-05-17

## 2025-05-17 LAB
ABSOLUTE EOSINOPHIL (OHS): 0.04 K/UL
ABSOLUTE MONOCYTE (OHS): 1.24 K/UL (ref 0.3–1)
ABSOLUTE NEUTROPHIL COUNT (OHS): 15.02 K/UL (ref 1.8–7.7)
ALBUMIN SERPL BCP-MCNC: 1.6 G/DL (ref 3.5–5.2)
ALP SERPL-CCNC: 72 UNIT/L (ref 40–150)
ALT SERPL W/O P-5'-P-CCNC: 14 UNIT/L (ref 10–44)
ANION GAP (OHS): 11 MMOL/L (ref 8–16)
AST SERPL-CCNC: 36 UNIT/L (ref 11–45)
BACTERIA #/AREA URNS AUTO: ABNORMAL /HPF
BASOPHILS # BLD AUTO: 0.02 K/UL
BASOPHILS NFR BLD AUTO: 0.1 %
BILIRUB SERPL-MCNC: 0.5 MG/DL (ref 0.1–1)
BILIRUB UR QL STRIP.AUTO: NEGATIVE
BIPAP: 0
BUN SERPL-MCNC: 16 MG/DL (ref 8–23)
CALCIUM SERPL-MCNC: 8.9 MG/DL (ref 8.7–10.5)
CHLORIDE SERPL-SCNC: 104 MMOL/L (ref 95–110)
CLARITY UR: ABNORMAL
CO2 SERPL-SCNC: 22 MMOL/L (ref 23–29)
COLOR UR AUTO: YELLOW
CREAT SERPL-MCNC: 0.7 MG/DL (ref 0.5–1.4)
ERYTHROCYTE [DISTWIDTH] IN BLOOD BY AUTOMATED COUNT: 18.1 % (ref 11.5–14.5)
FERRITIN SERPL-MCNC: 741 NG/ML (ref 20–300)
FIO2: 21 %
GFR SERPLBLD CREATININE-BSD FMLA CKD-EPI: >60 ML/MIN/1.73/M2
GLUCOSE SERPL-MCNC: 207 MG/DL (ref 70–110)
GLUCOSE UR QL STRIP: NEGATIVE
HAPTOGLOB SERPL-MCNC: 573 MG/DL (ref 30–250)
HCT VFR BLD AUTO: 25 % (ref 37–48.5)
HCV AB SERPL QL IA: NORMAL
HGB BLD-MCNC: 7.5 GM/DL (ref 12–16)
HGB UR QL STRIP: NEGATIVE
HIV 1+2 AB+HIV1 P24 AG SERPL QL IA: NORMAL
HOLD SPECIMEN: NORMAL
HOLD SPECIMEN: NORMAL
HYALINE CASTS UR QL AUTO: 25 /LPF (ref 0–1)
IMM GRANULOCYTES # BLD AUTO: 0.13 K/UL (ref 0–0.04)
IMM GRANULOCYTES NFR BLD AUTO: 0.8 % (ref 0–0.5)
INFLUENZA A MOLECULAR (OHS): NEGATIVE
INFLUENZA B MOLECULAR (OHS): NEGATIVE
IRON SATN MFR SERPL: 10 % (ref 20–50)
IRON SERPL-MCNC: 15 UG/DL (ref 30–160)
KETONES UR QL STRIP: NEGATIVE
LDH SERPL L TO P-CCNC: 2.3 MMOL/L
LDH SERPL-CCNC: 527 U/L (ref 110–260)
LEUKOCYTE ESTERASE UR QL STRIP: ABNORMAL
LIPASE SERPL-CCNC: 5 U/L (ref 4–60)
LYMPHOCYTES # BLD AUTO: 0.88 K/UL (ref 1–4.8)
MCH RBC QN AUTO: 27.1 PG (ref 27–31)
MCHC RBC AUTO-ENTMCNC: 30 G/DL (ref 32–36)
MCV RBC AUTO: 90 FL (ref 82–98)
MICROSCOPIC COMMENT: ABNORMAL
NITRITE UR QL STRIP: NEGATIVE
NUCLEATED RBC (/100WBC) (OHS): 0 /100 WBC
PH UR STRIP: 8 [PH]
PLATELET # BLD AUTO: 271 K/UL (ref 150–450)
PMV BLD AUTO: 10.2 FL (ref 9.2–12.9)
POC PERFORMED BY: NORMAL
POC TEMPERATURE: 37 C
POCT GLUCOSE: 148 MG/DL (ref 70–110)
POTASSIUM SERPL-SCNC: 3.3 MMOL/L (ref 3.5–5.1)
PROT SERPL-MCNC: 6.7 GM/DL (ref 6–8.4)
PROT UR QL STRIP: ABNORMAL
RBC # BLD AUTO: 2.77 M/UL (ref 4–5.4)
RBC #/AREA URNS AUTO: 2 /HPF (ref 0–4)
RELATIVE EOSINOPHIL (OHS): 0.2 %
RELATIVE LYMPHOCYTE (OHS): 5.1 % (ref 18–48)
RELATIVE MONOCYTE (OHS): 7.2 % (ref 4–15)
RELATIVE NEUTROPHIL (OHS): 86.6 % (ref 38–73)
RETICS/RBC NFR AUTO: 4 % (ref 0.5–2.5)
SARS-COV-2 RDRP RESP QL NAA+PROBE: NEGATIVE
SODIUM SERPL-SCNC: 137 MMOL/L (ref 136–145)
SP GR UR STRIP: >=1.03
SPECIMEN SOURCE: NORMAL
SQUAMOUS #/AREA URNS AUTO: 7 /HPF
TIBC SERPL-MCNC: 154 UG/DL (ref 250–450)
TRANSFERRIN SERPL-MCNC: 104 MG/DL (ref 200–375)
UROBILINOGEN UR STRIP-ACNC: ABNORMAL EU/DL
WBC # BLD AUTO: 17.33 K/UL (ref 3.9–12.7)
WBC #/AREA URNS AUTO: 60 /HPF (ref 0–5)

## 2025-05-17 PROCEDURE — 25000003 PHARM REV CODE 250

## 2025-05-17 PROCEDURE — 93005 ELECTROCARDIOGRAM TRACING: CPT

## 2025-05-17 PROCEDURE — 85025 COMPLETE CBC W/AUTO DIFF WBC: CPT | Performed by: EMERGENCY MEDICINE

## 2025-05-17 PROCEDURE — U0002 COVID-19 LAB TEST NON-CDC: HCPCS | Performed by: EMERGENCY MEDICINE

## 2025-05-17 PROCEDURE — 99223 1ST HOSP IP/OBS HIGH 75: CPT | Mod: ,,, | Performed by: NEUROLOGICAL SURGERY

## 2025-05-17 PROCEDURE — 83605 ASSAY OF LACTIC ACID: CPT

## 2025-05-17 PROCEDURE — 25000003 PHARM REV CODE 250: Performed by: EMERGENCY MEDICINE

## 2025-05-17 PROCEDURE — 21400001 HC TELEMETRY ROOM

## 2025-05-17 PROCEDURE — 63600175 PHARM REV CODE 636 W HCPCS: Performed by: EMERGENCY MEDICINE

## 2025-05-17 PROCEDURE — 83010 ASSAY OF HAPTOGLOBIN QUANT: CPT | Performed by: STUDENT IN AN ORGANIZED HEALTH CARE EDUCATION/TRAINING PROGRAM

## 2025-05-17 PROCEDURE — 96365 THER/PROPH/DIAG IV INF INIT: CPT

## 2025-05-17 PROCEDURE — 86803 HEPATITIS C AB TEST: CPT | Performed by: PHYSICIAN ASSISTANT

## 2025-05-17 PROCEDURE — 63600175 PHARM REV CODE 636 W HCPCS: Performed by: NURSE PRACTITIONER

## 2025-05-17 PROCEDURE — 93010 ELECTROCARDIOGRAM REPORT: CPT | Mod: ,,, | Performed by: INTERNAL MEDICINE

## 2025-05-17 PROCEDURE — 87502 INFLUENZA DNA AMP PROBE: CPT | Performed by: EMERGENCY MEDICINE

## 2025-05-17 PROCEDURE — 99285 EMERGENCY DEPT VISIT HI MDM: CPT | Mod: 25

## 2025-05-17 PROCEDURE — 81003 URINALYSIS AUTO W/O SCOPE: CPT | Performed by: EMERGENCY MEDICINE

## 2025-05-17 PROCEDURE — 83690 ASSAY OF LIPASE: CPT

## 2025-05-17 PROCEDURE — 11000001 HC ACUTE MED/SURG PRIVATE ROOM

## 2025-05-17 PROCEDURE — 85045 AUTOMATED RETICULOCYTE COUNT: CPT | Performed by: STUDENT IN AN ORGANIZED HEALTH CARE EDUCATION/TRAINING PROGRAM

## 2025-05-17 PROCEDURE — 82728 ASSAY OF FERRITIN: CPT | Performed by: STUDENT IN AN ORGANIZED HEALTH CARE EDUCATION/TRAINING PROGRAM

## 2025-05-17 PROCEDURE — 87389 HIV-1 AG W/HIV-1&-2 AB AG IA: CPT | Performed by: PHYSICIAN ASSISTANT

## 2025-05-17 PROCEDURE — 83615 LACTATE (LD) (LDH) ENZYME: CPT | Performed by: STUDENT IN AN ORGANIZED HEALTH CARE EDUCATION/TRAINING PROGRAM

## 2025-05-17 PROCEDURE — 63600175 PHARM REV CODE 636 W HCPCS

## 2025-05-17 PROCEDURE — 87186 SC STD MICRODIL/AGAR DIL: CPT | Performed by: EMERGENCY MEDICINE

## 2025-05-17 PROCEDURE — 87040 BLOOD CULTURE FOR BACTERIA: CPT | Performed by: EMERGENCY MEDICINE

## 2025-05-17 PROCEDURE — 84466 ASSAY OF TRANSFERRIN: CPT | Performed by: STUDENT IN AN ORGANIZED HEALTH CARE EDUCATION/TRAINING PROGRAM

## 2025-05-17 PROCEDURE — 99900035 HC TECH TIME PER 15 MIN (STAT)

## 2025-05-17 PROCEDURE — 80053 COMPREHEN METABOLIC PANEL: CPT | Performed by: EMERGENCY MEDICINE

## 2025-05-17 RX ORDER — PREDNISONE 2.5 MG/1
2.5 TABLET ORAL DAILY
Status: DISCONTINUED | OUTPATIENT
Start: 2025-05-18 | End: 2025-05-17

## 2025-05-17 RX ORDER — IBUPROFEN 200 MG
16 TABLET ORAL
Status: DISCONTINUED | OUTPATIENT
Start: 2025-05-17 | End: 2025-05-18

## 2025-05-17 RX ORDER — TALC
6 POWDER (GRAM) TOPICAL NIGHTLY PRN
Status: CANCELLED | OUTPATIENT
Start: 2025-05-17

## 2025-05-17 RX ORDER — LATANOPROST 50 UG/ML
1 SOLUTION/ DROPS OPHTHALMIC DAILY
Status: DISCONTINUED | OUTPATIENT
Start: 2025-05-18 | End: 2025-05-19 | Stop reason: HOSPADM

## 2025-05-17 RX ORDER — IBUPROFEN 200 MG
16 TABLET ORAL
Status: DISCONTINUED | OUTPATIENT
Start: 2025-05-17 | End: 2025-05-19 | Stop reason: HOSPADM

## 2025-05-17 RX ORDER — ACETAMINOPHEN 500 MG
1000 TABLET ORAL 3 TIMES DAILY
Status: DISCONTINUED | OUTPATIENT
Start: 2025-05-17 | End: 2025-05-19 | Stop reason: HOSPADM

## 2025-05-17 RX ORDER — GLUCAGON 1 MG
1 KIT INJECTION
Status: DISCONTINUED | OUTPATIENT
Start: 2025-05-17 | End: 2025-05-19 | Stop reason: HOSPADM

## 2025-05-17 RX ORDER — IBUPROFEN 200 MG
24 TABLET ORAL
Status: DISCONTINUED | OUTPATIENT
Start: 2025-05-17 | End: 2025-05-18

## 2025-05-17 RX ORDER — BRIMONIDINE TARTRATE 2 MG/ML
1 SOLUTION/ DROPS OPHTHALMIC 3 TIMES DAILY
Status: DISCONTINUED | OUTPATIENT
Start: 2025-05-17 | End: 2025-05-19 | Stop reason: HOSPADM

## 2025-05-17 RX ORDER — CHOLECALCIFEROL (VITAMIN D3) 25 MCG
2000 TABLET ORAL DAILY
Status: DISCONTINUED | OUTPATIENT
Start: 2025-05-18 | End: 2025-05-19 | Stop reason: HOSPADM

## 2025-05-17 RX ORDER — SODIUM CHLORIDE 0.9 % (FLUSH) 0.9 %
10 SYRINGE (ML) INJECTION EVERY 12 HOURS PRN
Status: DISCONTINUED | OUTPATIENT
Start: 2025-05-17 | End: 2025-05-19 | Stop reason: HOSPADM

## 2025-05-17 RX ORDER — DORZOLAMIDE HYDROCHLORIDE AND TIMOLOL MALEATE 20; 5 MG/ML; MG/ML
1 SOLUTION/ DROPS OPHTHALMIC 2 TIMES DAILY
Status: DISCONTINUED | OUTPATIENT
Start: 2025-05-17 | End: 2025-05-19 | Stop reason: HOSPADM

## 2025-05-17 RX ORDER — OXYCODONE HYDROCHLORIDE 5 MG/1
5 TABLET ORAL EVERY 6 HOURS PRN
Refills: 0 | Status: DISCONTINUED | OUTPATIENT
Start: 2025-05-17 | End: 2025-05-19 | Stop reason: HOSPADM

## 2025-05-17 RX ORDER — LIDOCAINE 50 MG/G
2 PATCH TOPICAL
Status: DISCONTINUED | OUTPATIENT
Start: 2025-05-17 | End: 2025-05-19 | Stop reason: HOSPADM

## 2025-05-17 RX ORDER — INSULIN ASPART 100 [IU]/ML
0-10 INJECTION, SOLUTION INTRAVENOUS; SUBCUTANEOUS
Status: DISCONTINUED | OUTPATIENT
Start: 2025-05-17 | End: 2025-05-19 | Stop reason: HOSPADM

## 2025-05-17 RX ORDER — INSULIN ASPART 100 [IU]/ML
0.45 INJECTION, SOLUTION INTRAVENOUS; SUBCUTANEOUS CONTINUOUS
Status: DISCONTINUED | OUTPATIENT
Start: 2025-05-17 | End: 2025-05-19 | Stop reason: HOSPADM

## 2025-05-17 RX ORDER — CEFTRIAXONE 1 G/1
1 INJECTION, POWDER, FOR SOLUTION INTRAMUSCULAR; INTRAVENOUS
Status: DISCONTINUED | OUTPATIENT
Start: 2025-05-17 | End: 2025-05-19 | Stop reason: HOSPADM

## 2025-05-17 RX ORDER — SODIUM CHLORIDE 0.9 % (FLUSH) 0.9 %
10 SYRINGE (ML) INJECTION
Status: CANCELLED | OUTPATIENT
Start: 2025-05-17

## 2025-05-17 RX ORDER — FUROSEMIDE 20 MG/1
40 TABLET ORAL DAILY
Status: DISCONTINUED | OUTPATIENT
Start: 2025-05-18 | End: 2025-05-18

## 2025-05-17 RX ORDER — MORPHINE SULFATE 2 MG/ML
2 INJECTION, SOLUTION INTRAMUSCULAR; INTRAVENOUS EVERY 6 HOURS PRN
Status: DISCONTINUED | OUTPATIENT
Start: 2025-05-17 | End: 2025-05-19 | Stop reason: HOSPADM

## 2025-05-17 RX ORDER — NYSTATIN 100000 [USP'U]/ML
500000 SUSPENSION ORAL
Status: DISCONTINUED | OUTPATIENT
Start: 2025-05-17 | End: 2025-05-19 | Stop reason: HOSPADM

## 2025-05-17 RX ORDER — NALOXONE HCL 0.4 MG/ML
0.02 VIAL (ML) INJECTION
Status: DISCONTINUED | OUTPATIENT
Start: 2025-05-17 | End: 2025-05-19 | Stop reason: HOSPADM

## 2025-05-17 RX ORDER — IBUPROFEN 200 MG
24 TABLET ORAL
Status: DISCONTINUED | OUTPATIENT
Start: 2025-05-17 | End: 2025-05-19 | Stop reason: HOSPADM

## 2025-05-17 RX ORDER — INSULIN ASPART 100 [IU]/ML
0-5 INJECTION, SOLUTION INTRAVENOUS; SUBCUTANEOUS
Status: DISCONTINUED | OUTPATIENT
Start: 2025-05-17 | End: 2025-05-17

## 2025-05-17 RX ORDER — ACETAMINOPHEN 325 MG/1
650 TABLET ORAL EVERY 4 HOURS PRN
Status: DISCONTINUED | OUTPATIENT
Start: 2025-05-17 | End: 2025-05-17

## 2025-05-17 RX ORDER — ENOXAPARIN SODIUM 100 MG/ML
40 INJECTION SUBCUTANEOUS EVERY 24 HOURS
Status: DISCONTINUED | OUTPATIENT
Start: 2025-05-17 | End: 2025-05-18

## 2025-05-17 RX ADMIN — OXYCODONE 5 MG: 5 TABLET ORAL at 09:05

## 2025-05-17 RX ADMIN — CEFTRIAXONE 1 G: 1 INJECTION, POWDER, FOR SOLUTION INTRAMUSCULAR; INTRAVENOUS at 06:05

## 2025-05-17 RX ADMIN — LIDOCAINE 2 PATCH: 50 PATCH CUTANEOUS at 05:05

## 2025-05-17 RX ADMIN — ACETAMINOPHEN 1000 MG: 500 TABLET ORAL at 09:05

## 2025-05-17 RX ADMIN — POTASSIUM BICARBONATE 25 MEQ: 978 TABLET, EFFERVESCENT ORAL at 03:05

## 2025-05-17 RX ADMIN — BRIMONIDINE TARTRATE 1 DROP: 2 SOLUTION OPHTHALMIC at 09:05

## 2025-05-17 RX ADMIN — INSULIN ASPART 0.45 UNITS/HR: 100 INJECTION, SOLUTION INTRAVENOUS; SUBCUTANEOUS at 09:05

## 2025-05-17 RX ADMIN — NYSTATIN 500000 UNITS: 100000 SUSPENSION ORAL at 09:05

## 2025-05-17 RX ADMIN — VANCOMYCIN HYDROCHLORIDE 1500 MG: 1.5 INJECTION, POWDER, LYOPHILIZED, FOR SOLUTION INTRAVENOUS at 02:05

## 2025-05-17 RX ADMIN — PIPERACILLIN SODIUM AND TAZOBACTAM SODIUM 4.5 G: 4; .5 INJECTION, POWDER, FOR SOLUTION INTRAVENOUS at 04:05

## 2025-05-17 RX ADMIN — SODIUM CHLORIDE, POTASSIUM CHLORIDE, SODIUM LACTATE AND CALCIUM CHLORIDE 1000 ML: 600; 310; 30; 20 INJECTION, SOLUTION INTRAVENOUS at 02:05

## 2025-05-17 RX ADMIN — DORZOLAMIDE HYDROCHLORIDE AND TIMOLOL MALEATE 1 DROP: 20; 5 SOLUTION OPHTHALMIC at 09:05

## 2025-05-17 NOTE — ED NOTES
Telemetry Verification   Patient placed on Telemetry Box  Verified with War Room  Box # [id number]   Monitor Tech    Rate    Rhythm

## 2025-05-17 NOTE — ASSESSMENT & PLAN NOTE
Patient's most recent potassium results are listed below. No correlating EKG changes or palpitations.   Recent Labs     05/17/25  1348   K 3.3*     Plan  - Replete potassium per protocol  - Monitor potassium Daily  - Patient's hypokalemia is being monitored.

## 2025-05-17 NOTE — ASSESSMENT & PLAN NOTE
Pt noted abdominal pain beginning in ED. Emesis night prior to admission.    - prn antiemetics  - f/u CT abdomen  - f/u lipase, UA

## 2025-05-17 NOTE — ASSESSMENT & PLAN NOTE
Patient with known Cirrhosis with Child's class B. Co-morbidities are present and inclusive of malnutrition and anemia/pancytopenia.  MELD-Na score calculated; MELD 3.0: 12 at 5/13/2025  4:05 PM  MELD-Na: 8 at 5/13/2025  4:05 PM  Calculated from:  Serum Creatinine: 0.9 mg/dL (Using min of 1 mg/dL) at 5/13/2025  4:05 PM  Serum Sodium: 143 mmol/L (Using max of 137 mmol/L) at 5/13/2025  4:05 PM  Total Bilirubin: 0.5 mg/dL (Using min of 1 mg/dL) at 5/13/2025  4:05 PM  Serum Albumin: 1.8 g/dL at 5/13/2025  4:05 PM  INR(ratio): 1.2 at 5/13/2025  4:05 PM  Age at listing (hypothetical): 84 years  Sex: Female at 5/13/2025  4:05 PM      Continue chronic meds. Etiology likely Autoimmune. Will avoid any hepatotoxic meds, and monitor CBC/CMP/INR for synthetic function.

## 2025-05-17 NOTE — SUBJECTIVE & OBJECTIVE
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    Diabetes mellitus     Glaucoma     Hypertension     Lung cancer     Other ascites 11/29/2022       Past Surgical History:   Procedure Laterality Date    CATARACT EXTRACTION W/  INTRAOCULAR LENS IMPLANT Left 12/21/2021        ENDOBRONCHIAL ULTRASOUND N/A 07/05/2024    Procedure: ENDOBRONCHIAL ULTRASOUND (EBUS);  Surgeon: Rolo Wilkinson MD;  [facility];  Service: Pulmonary;  Laterality: N/A;    ESOPHAGOGASTRODUODENOSCOPY N/A 06/26/2023    Procedure: ESOPHAGOGASTRODUODENOSCOPY (EGD);  Surgeon: Edgar Branch MD;  Location: [facility] ENDO (4TH FLR);  Service: Endoscopy;  Laterality: N/A;  referral Dr Peraza-cirrhosis/labs done on 4/24/23-[facility] portal-GT       Review of patient's allergies indicates:  No Known Allergies    No current facility-administered medications on file prior to encounter.     Current Outpatient Medications on File Prior to Encounter   Medication Sig    acetaminophen (TYLENOL) 500 MG tablet Take 2 tablets (1,000 mg total) by mouth every 8 (eight) hours as needed for Pain.    amLODIPine (NORVASC) 2.5 MG tablet TAKE 1 TABLET BY MOUTH EVERY DAY (Patient not taking: Reported on 5/15/2025)    BD INSULIN SYRINGE ULTRA-FINE 1 mL 31 gauge x 5/16 Syrg     blood-glucose meter,continuous Misc Dispsense 1 Dexcom G7     blood-glucose transmitter (DEXCOM G6 TRANSMITTER) Amanda 1 each by Misc.(Non-Drug; Combo Route) route every 3 (three) months.    brimonidine 0.2% (ALPHAGAN) 0.2 % Drop Place 1 drop into both eyes 3 (three) times daily.    cholecalciferol, vitamin D3, (VITAMIN D3) 25 mcg (1,000 unit) capsule Take 2 capsules (2,000 Units total) by mouth once daily.    DEXCOM G7 SENSOR Amanda 1 Device by Misc.(Non-Drug; Combo Route) route every 10 days.    dorzolamide-timolol 2-0.5% (COSOPT) 22.3-6.8 mg/mL ophthalmic solution Place 1 drop into both eyes 2 (two) times daily.    estradioL  "(ESTRACE) 0.01 % (0.1 mg/gram) vaginal cream Place 0.5 g vaginally twice a week. Apply to the vagina daily for two weeks then twice a week. (Patient not taking: Reported on 5/15/2025)    furosemide (LASIX) 40 MG tablet TAKE 1 TABLET BY MOUTH EVERY DAY    insulin aspart U-100 (NOVOLOG U-100 INSULIN ASPART) 100 unit/mL injection TO USE WITH OMNIPOD 5. TOTAL DAILY DOSE UP TO 40 UNITS    insulin pump cart,automated,BT (OMNIPOD 5 G6 PODS, GEN 5,) Crtg Inject 1 each into the skin Every 3 (three) days.    lactulose (CEPHULAC) 10 gram packet Take 10 g by mouth 3 (three) times daily. (Patient not taking: Reported on 5/15/2025)    latanoprost 0.005 % ophthalmic solution PLACE 1 DROP INTO BOTH EYES ONCE DAILY    lisinopriL-hydrochlorothiazide (PRINZIDE,ZESTORETIC) 20-12.5 mg per tablet TAKE 1 TABLET BY MOUTH EVERY DAY    nystatin (MYCOSTATIN) 100,000 unit/mL suspension SWISH WITH 6MLS BY MOUTH THEN SWALLOW 4 TIMES A DAY FOR 14 DAYS    OMNIPOD 5 G6-G7 PODS, GEN 5, Crtg SMARTSI Each SUB-Q Once a Month    pen needle, diabetic 31 gauge x 5/16" Ndle Use to inject insulin into the skin up to 4 times daily    predniSONE (DELTASONE) 5 MG tablet Take 0.5 tablets (2.5 mg total) by mouth once daily.    tobramycin sulfate 0.3% (TOBREX) 0.3 % ophthalmic solution 1-2 drops topically twice daily to affected toe(s).     Family History       Problem Relation (Age of Onset)    Blindness Cousin    Cataracts Mother    Colon cancer Sister    Diabetes Mother    Glaucoma Mother    Heart attack Father    Hypertension Mother          Tobacco Use    Smoking status: Former    Smokeless tobacco: Never   Substance and Sexual Activity    Alcohol use: Not Currently    Drug use: Never    Sexual activity: Not on file     Review of Systems   Constitutional:  Positive for appetite change and fever. Negative for chills.   Eyes:  Negative for visual disturbance.   Respiratory:  Negative for shortness of breath.    Cardiovascular:  Negative for chest pain, " palpitations and leg swelling.   Gastrointestinal:  Positive for abdominal pain. Negative for constipation, diarrhea, nausea and vomiting.   Genitourinary:  Negative for dysuria and hematuria.   Musculoskeletal:  Positive for arthralgias and back pain. Negative for joint swelling and neck pain.   Skin:  Negative for wound.   Neurological:  Positive for weakness. Negative for dizziness, syncope, facial asymmetry, numbness and headaches.   Psychiatric/Behavioral:  Negative for confusion.      Objective:     Vital Signs (Most Recent):  Temp: 97.5 °F (36.4 °C) (05/17/25 1304)  Pulse: 78 (05/17/25 1600)  Resp: 18 (05/17/25 1304)  BP: (!) 118/56 (05/17/25 1304)  SpO2: 95 % (05/17/25 1600) Vital Signs (24h Range):  Temp:  [97.5 °F (36.4 °C)] 97.5 °F (36.4 °C)  Pulse:  [74-78] 78  Resp:  [18] 18  SpO2:  [95 %-97 %] 95 %  BP: (118)/(56) 118/56     Weight: 49 kg (108 lb)  Body mass index is 17.97 kg/m².     Physical Exam  Vitals and nursing note reviewed.   Constitutional:       General: She is not in acute distress.     Appearance: She is underweight. She is ill-appearing.      Comments: Lying on L side   HENT:      Head: Normocephalic and atraumatic.   Eyes:      Extraocular Movements: Extraocular movements intact.   Cardiovascular:      Rate and Rhythm: Normal rate and regular rhythm.      Heart sounds: No murmur heard.  Pulmonary:      Effort: Pulmonary effort is normal. No respiratory distress.      Breath sounds: Normal breath sounds. No wheezing or rales.   Abdominal:      General: Abdomen is flat. Bowel sounds are decreased. There is no distension.      Palpations: Abdomen is soft.      Tenderness: There is generalized abdominal tenderness. There is no guarding.   Musculoskeletal:      Cervical back: No deformity or bony tenderness.      Thoracic back: No deformity or bony tenderness.      Lumbar back: Bony tenderness present. No deformity.      Right lower leg: No edema.      Left lower leg: No edema.      Comments:  Large chronic R shoulder lesion with small ulcerations with no active drainage or purulence. See photo   Skin:     General: Skin is warm and dry.   Neurological:      Mental Status: She is alert.                    Significant Labs: All pertinent labs within the past 24 hours have been reviewed.  CBC:   Recent Labs   Lab 05/17/25  1348   WBC 17.33*   HGB 7.5*   HCT 25.0*        CMP:   Recent Labs   Lab 05/17/25  1348      K 3.3*      CO2 22*   *   BUN 16   CREATININE 0.7   CALCIUM 8.9   PROT 6.7   ALBUMIN 1.6*   BILITOT 0.5   ALKPHOS 72   AST 36   ALT 14   ANIONGAP 11       Significant Imaging: I have reviewed all pertinent imaging results/findings within the past 24 hours.    CT Lumbar Spine Without Contrast  Result Date: 5/17/2025  Acute L1 compression fracture with approximately 30% height loss.  No retropulsion of fracture fragments. Osteopenia and degenerative change of the lumbar spine detailed above. Electronically signed by: Katarina Grace MD Date:    05/17/2025 Time:    15:43    X-Ray Chest AP Portable  Result Date: 5/17/2025  No acute abnormality. Electronically signed by: Gerardo Angelo MD Date:    05/17/2025 Time:    14:26

## 2025-05-17 NOTE — ASSESSMENT & PLAN NOTE
Stage IIIB Lung adenocarcinoma dx 7/2/24 followed by Dr. Kohler. S/p palliative radiation to the R lung/mediastinum 8/14/24-9/4/24. Cutaneous mets to the right shoulder noted in 11/2024. Pt not interested in chemotherapy at this time.

## 2025-05-17 NOTE — ASSESSMENT & PLAN NOTE
Underwent biopsy of shoulder mass Jan 2025, positive for metastatic carcinoma on pathology, favored to be metastatic lung adenocarcinoma but second primary not entirely ruled out.     Has progressed in size based on photos in media tab. No active purulence or drainage. Does not appear acutely infected but high index of suspicion for possible source of fevers    Plan:  -- continue to monitor  -- complete remainder of infectious w/u  -- f/u CT chest

## 2025-05-17 NOTE — ASSESSMENT & PLAN NOTE
Patient's blood pressure range in the last 24 hours was: BP  Min: 118/56  Max: 118/56.The patient's inpatient anti-hypertensive regimen is listed below:  Current Antihypertensives  furosemide tablet 40 mg, Daily, Oral  dorzolamide-timolol 2-0.5% ophthalmic solution 1 drop, 2 times daily, Both Eyes    Plan  - holding lisinopril-hctz, will restart if indicated

## 2025-05-17 NOTE — ASSESSMENT & PLAN NOTE
Brought in by daughter for evaluation of fall with back pain as well as a 101F fever last night. Afebrile here. Leukocytosis - wbc 17. Recent URI resolved a couple of weeks ago. Currently denies URI or UTI sx. Possible fever is related to L1 compression fx, but given pt's chronic steroid use and active malignancy will complete infectious workup.    - f/u UA  - f/u blood cx  - f/u CT chest, ct AP w/ contrast  - on vanc/zosyn, de-escalate when able  - daily CBC for monitoring of leukocytosis  - monitor for return of fever

## 2025-05-17 NOTE — H&P
[facility] - Internal Medicine [facility] Medicine  History & Physical    Patient Name: Radha Feng  MRN: [id number]  Patient Class: IP- Inpatient  Admission Date: 5/17/2025  Attending Physician: Catherine Trujillo MD   Primary Care Provider: Leticia Lim MD         Patient information was obtained from patient, relative(s), past medical records, and ER records.     Subjective:     Principal Problem:Compression fracture of L1 lumbar vertebra    Chief Complaint:   Chief Complaint   Patient presents with    Fall     Was pulling on dresser drawer yest and fell backwards, no loc, not on blood thinners, lower back pain , denies bowel/bladder incontinence    Fever     Temp 101 last night,  given doxy yest for poss uti        HPI: Radha Feng is a 84 y.o. 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 She is accompanied by her daughter, Della, who is a nurse and is able to provide the majority of her following history. Around 7pm yesterday (5/16/25), patient was attempted to open drawer of her dresser when she lost balance and fell backward and noted acute back pain. She denies hitting her head or LOC. Not on blood thinners. No preceding symptoms of chest pain, dizziness, SOB prior to fall. Patient called her daughter following fall. A few hours later her daughter noted she had a measured temperature of 101F at home and noted x1 episode of non-bloody vomiting. She gave her a dose of doxycycline and 1g tylenol. At that time she encouraged her mother to go to the ED but she declined. This morning she continued to  complain of back pain and was agreeable to go to the ED for further evaluation. Daughter notes 3 days prior to fall worsening appetite and generalized weakness. Normally able to ambulate with cane/walker.     Currently she reports low midline back pain and mild diffuse abdominal pain that started while in the ED. Chronic bilateral knee and ankle pain that is unchanged. No other injuries that she reports as a result of the fall. She denies headache, dizziness, vision changes, neck pain, chest pain, SOB, dysuria. Denies numbness of the lower extremities, urinary or bowel incontinence, saddle anesthesia. She has a chronic wound on her R shoulder that is unchanged. Per recent heme/onc note, shoulder lesion is suspected metastatic lung cancer rather than second primary.    In the ED she was hemodynamically stable on room air and afebrile. Lab investigations significant for leukocytosis to 17k, Hgb 7.5, K 3.3, CO2 22, glucose 207. Remainder of CMP unremarkable. CT lumbar spine with acute L1 compression fracture with about 30% height loss. CXR with no acute abnormality. Blood cultures drawn. UA pending. She was started on vanc/zosyn, given 1L LR and replacement potassium. She is admitted to hospital medicine for L1 compression fracture and recent fever and fall.     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    Diabetes mellitus     Glaucoma     Hypertension     Lung cancer     Other ascites 11/29/2022       Past Surgical History:   Procedure Laterality Date    CATARACT EXTRACTION W/  INTRAOCULAR LENS IMPLANT Left 12/21/2021        ENDOBRONCHIAL ULTRASOUND N/A 07/05/2024    Procedure: ENDOBRONCHIAL ULTRASOUND (EBUS);  Surgeon: Rolo Wilkinson MD;  Location: [facility] OR [address] [place] [address];  Service: Pulmonary;  Laterality: N/A;    ESOPHAGOGASTRODUODENOSCOPY N/A 06/26/2023    Procedure: ESOPHAGOGASTRODUODENOSCOPY (EGD);  Surgeon: Edgar Branch MD;  Location: [facility]  (4TH FLR);  Service: Endoscopy;  Laterality: N/A;  referral Dr Peraza-cirrhosis/labs done on 4/24/23-[facility] [[email or web address]] portal-GT       Review of patient's allergies indicates:  No Known Allergies    No current facility-administered medications on file prior to encounter.     Current Outpatient Medications on File Prior to Encounter   Medication Sig    acetaminophen (TYLENOL) 500 MG tablet Take 2 tablets (1,000 mg total) by mouth every 8 (eight) hours as needed for Pain.    amLODIPine (NORVASC) 2.5 MG tablet TAKE 1 TABLET BY MOUTH EVERY DAY (Patient not taking: Reported on 5/15/2025)    BD INSULIN SYRINGE ULTRA-FINE 1 mL 31 gauge x 5/16 Syrg     blood-glucose meter,continuous Misc Dispsense 1 Dexcom G7     blood-glucose transmitter (DEXCOM G6 TRANSMITTER) Amanda 1 each by Misc.(Non-Drug; Combo Route) route every 3 (three) months.    brimonidine 0.2% (ALPHAGAN) 0.2 % Drop Place 1 drop into both eyes 3 (three) times daily.    cholecalciferol, vitamin D3, (VITAMIN D3) 25 mcg (1,000 unit) capsule Take 2 capsules (2,000 Units total) by mouth once daily.    DEXCOM G7 SENSOR Amanda 1 Device by Misc.(Non-Drug; Combo Route) route every 10 days.    dorzolamide-timolol 2-0.5% (COSOPT) 22.3-6.8 mg/mL ophthalmic solution Place 1 drop into both eyes 2 (two) times daily.    estradioL (ESTRACE) 0.01 % (0.1 mg/gram) vaginal cream Place 0.5 g vaginally twice a week. Apply to the vagina daily for two weeks then twice a week. (Patient not taking: Reported on 5/15/2025)    furosemide (LASIX) 40 MG tablet TAKE 1 TABLET BY MOUTH EVERY DAY    insulin aspart U-100 (NOVOLOG U-100 INSULIN ASPART) 100 unit/mL injection TO USE WITH OMNIPOD 5. TOTAL DAILY DOSE UP TO 40 UNITS    insulin pump cart,automated,BT (OMNIPOD 5 G6 PODS, GEN 5,) Crtg Inject 1 each into the skin Every 3 (three) days.    lactulose (CEPHULAC) 10 gram packet Take 10 g by mouth 3 (three) times daily. (Patient not taking: Reported on 5/15/2025)    latanoprost 0.005 % ophthalmic solution  "PLACE 1 DROP INTO BOTH EYES ONCE DAILY    lisinopriL-hydrochlorothiazide (PRINZIDE,ZESTORETIC) 20-12.5 mg per tablet TAKE 1 TABLET BY MOUTH EVERY DAY    nystatin (MYCOSTATIN) 100,000 unit/mL suspension SWISH WITH 6MLS BY MOUTH THEN SWALLOW 4 TIMES A DAY FOR 14 DAYS    OMNIPOD 5 G6-G7 PODS, GEN 5, Crtg SMARTSI Each SUB-Q Once a Month    pen needle, diabetic 31 gauge x 5/16" Ndle Use to inject insulin into the skin up to 4 times daily    predniSONE (DELTASONE) 5 MG tablet Take 0.5 tablets (2.5 mg total) by mouth once daily.    tobramycin sulfate 0.3% (TOBREX) 0.3 % ophthalmic solution 1-2 drops topically twice daily to affected toe(s).     Family History       Problem Relation (Age of Onset)    Blindness Cousin    Cataracts Mother    Colon cancer Sister    Diabetes Mother    Glaucoma Mother    Heart attack Father    Hypertension Mother          Tobacco Use    Smoking status: Former    Smokeless tobacco: Never   Substance and Sexual Activity    Alcohol use: Not Currently    Drug use: Never    Sexual activity: Not on file     Review of Systems   Constitutional:  Positive for appetite change and fever. Negative for chills.   Eyes:  Negative for visual disturbance.   Respiratory:  Negative for shortness of breath.    Cardiovascular:  Negative for chest pain, palpitations and leg swelling.   Gastrointestinal:  Positive for abdominal pain. Negative for constipation, diarrhea, nausea and vomiting.   Genitourinary:  Negative for dysuria and hematuria.   Musculoskeletal:  Positive for arthralgias and back pain. Negative for joint swelling and neck pain.   Skin:  Negative for wound.   Neurological:  Positive for weakness. Negative for dizziness, syncope, facial asymmetry, numbness and headaches.   Psychiatric/Behavioral:  Negative for confusion.      Objective:     Vital Signs (Most Recent):  Temp: 97.5 °F (36.4 °C) (25 1304)  Pulse: 78 (25 1600)  Resp: 18 (25 1304)  BP: (!) 118/56 (25 1304)  SpO2: 95 " % (05/17/25 1600) Vital Signs (24h Range):  Temp:  [97.5 °F (36.4 °C)] 97.5 °F (36.4 °C)  Pulse:  [74-78] 78  Resp:  [18] 18  SpO2:  [95 %-97 %] 95 %  BP: (118)/(56) 118/56     Weight: 49 kg (108 lb)  Body mass index is 17.97 kg/m².     Physical Exam  Vitals and nursing note reviewed.   Constitutional:       General: She is not in acute distress.     Appearance: She is underweight. She is ill-appearing.      Comments: Lying on L side   HENT:      Head: Normocephalic and atraumatic.   Eyes:      Extraocular Movements: Extraocular movements intact.   Cardiovascular:      Rate and Rhythm: Normal rate and regular rhythm.      Heart sounds: No murmur heard.  Pulmonary:      Effort: Pulmonary effort is normal. No respiratory distress.      Breath sounds: Normal breath sounds. No wheezing or rales.   Abdominal:      General: Abdomen is flat. Bowel sounds are decreased. There is no distension.      Palpations: Abdomen is soft.      Tenderness: There is generalized abdominal tenderness. There is no guarding.   Musculoskeletal:      Cervical back: No deformity or bony tenderness.      Thoracic back: No deformity or bony tenderness.      Lumbar back: Bony tenderness present. No deformity.      Right lower leg: No edema.      Left lower leg: No edema.      Comments: Large chronic R shoulder lesion with small ulcerations with no active drainage or purulence. See photo   Skin:     General: Skin is warm and dry.   Neurological:      Mental Status: She is alert.                    Significant Labs: All pertinent labs within the past 24 hours have been reviewed.  CBC:   Recent Labs   Lab 05/17/25  1348   WBC 17.33*   HGB 7.5*   HCT 25.0*        CMP:   Recent Labs   Lab 05/17/25  1348      K 3.3*      CO2 22*   *   BUN 16   CREATININE 0.7   CALCIUM 8.9   PROT 6.7   ALBUMIN 1.6*   BILITOT 0.5   ALKPHOS 72   AST 36   ALT 14   ANIONGAP 11       Significant Imaging: I have reviewed all pertinent imaging  results/findings within the past 24 hours.    CT Lumbar Spine Without Contrast  Result Date: 5/17/2025  Acute L1 compression fracture with approximately 30% height loss.  No retropulsion of fracture fragments. Osteopenia and degenerative change of the lumbar spine detailed above. Electronically signed by: Katarina Grace MD Date:    05/17/2025 Time:    15:43    X-Ray Chest AP Portable  Result Date: 5/17/2025  No acute abnormality. Electronically signed by: Gerardo Angelo MD Date:    05/17/2025 Time:    14:26    Assessment/Plan:     Assessment & Plan  Compression fracture of L1 lumbar vertebra  85yo female with a hx of lung adenocarcinoma with cutaneous mets, COPD, PVD, HFpEF, aortic and coronary atherosclerosis, urinary incontinence, T2DM, osteroporosis, and autoimmune hepatitis presenting after a fall backwards from standing. She denies any head trauma but notes significant lower back pain. CT on arrival notable for L1 compression fracture with 30% height loss of the vertebral body and no disc protrusion. Per daughter, pt normally takes 1g tylenol TID for cancer-related pain control. Opioid naive.     - pain control   - consult placed to ortho spine, appreciate recs  - will obtain upright XR in TSLO brace  - TSLO brace when out of bed  - low threshold for additional spine imaging given hx of malignancy, no other bony tenderness on exam  Fever of unknown origin  Leukocytosis  Brought in by daughter for evaluation of fall with back pain as well as a 101F fever last night. Afebrile here. Leukocytosis - wbc 17. Recent URI resolved a couple of weeks ago. Currently denies URI or UTI sx. Possible fever is related to L1 compression fx, but given pt's chronic steroid use and active malignancy will complete infectious workup.    - f/u UA  - f/u blood cx  - f/u CT chest, ct AP w/ contrast  - on vanc/zosyn, de-escalate when able  - daily CBC for monitoring of leukocytosis  - monitor for return of fever    Shoulder lesion,  right  Underwent biopsy of shoulder mass Jan 2025, positive for metastatic carcinoma on pathology, favored to be metastatic lung adenocarcinoma but second primary not entirely ruled out.     Has progressed in size based on photos in media tab. No active purulence or drainage. Does not appear acutely infected but high index of suspicion for possible source of fevers    Plan:  -- continue to monitor  -- complete remainder of infectious w/u  -- f/u CT chest    Type 2 diabetes mellitus with circulatory disorder, with long-term current use of insulin  Pt has communicating dexcom and omnipod. Last A1c 11 months ago 6.0.    - consult to endocrine for management with omnipod  - will perform occasional glucose checks to confirm dexcom accuracy  Autoimmune hepatitis  Follows with Dr. Peraza in hepatology clinic, last seen 11/16/2024. Stable on home regimen of lasix 40mg daily and prednisone 2.5mg daily. No requirement for aldactone or lactulose per hepatology note. No paracentesis required since 2022. No physical exam findings concerning for ascites. Albumin on admission 1.6. LFT wnl.     - hold prednisone 2.5mg daily in setting of acute infection  - continue lasix 40mg daily  Hypokalemia  Patient's most recent potassium results are listed below. No correlating EKG changes or palpitations.   Recent Labs     05/17/25  1348   K 3.3*     Plan  - Replete potassium per protocol  - Monitor potassium Daily  - Patient's hypokalemia is being monitored.  Cirrhosis of liver with ascites  Patient with known Cirrhosis with Child's class B. Co-morbidities are present and inclusive of malnutrition and anemia/pancytopenia.  MELD-Na score calculated; MELD 3.0: 12 at 5/13/2025  4:05 PM  MELD-Na: 8 at 5/13/2025  4:05 PM  Calculated from:  Serum Creatinine: 0.9 mg/dL (Using min of 1 mg/dL) at 5/13/2025  4:05 PM  Serum Sodium: 143 mmol/L (Using max of 137 mmol/L) at 5/13/2025  4:05 PM  Total Bilirubin: 0.5 mg/dL (Using min of 1 mg/dL) at  5/13/2025  4:05 PM  Serum Albumin: 1.8 g/dL at 5/13/2025  4:05 PM  INR(ratio): 1.2 at 5/13/2025  4:05 PM  Age at listing (hypothetical): 84 years  Sex: Female at 5/13/2025  4:05 PM      Continue chronic meds. Etiology likely Autoimmune. Will avoid any hepatotoxic meds, and monitor CBC/CMP/INR for synthetic function.   Primary adenocarcinoma of upper lobe of right lung  Stage IIIB Lung adenocarcinoma dx 7/2/24 followed by Dr. Kohler. S/p palliative radiation to the R lung/mediastinum 8/14/24-9/4/24. Cutaneous mets to the right shoulder noted in 11/2024. Pt not interested in chemotherapy at this time.   Primary hypertension  Patient's blood pressure range in the last 24 hours was: BP  Min: 118/56  Max: 118/56.The patient's inpatient anti-hypertensive regimen is listed below:  Current Antihypertensives  furosemide tablet 40 mg, Daily, Oral  dorzolamide-timolol 2-0.5% ophthalmic solution 1 drop, 2 times daily, Both Eyes    Plan  - holding lisinopril-hctz, will restart if indicated  Abdominal pain  Pt noted abdominal pain beginning in ED. Emesis night prior to admission.    - prn antiemetics  - f/u CT abdomen  - f/u lipase, UA    Primary open angle glaucoma (POAG) of both eyes  Plan:  -- continue home eyedrops    VTE Risk Mitigation (From admission, onward)           Ordered     Reason for No Pharmacological VTE Prophylaxis  Once        Question:  Reasons:  Answer:  Physician Provided (leave comment)  Comment:  possible operative intervention    05/17/25 1620     IP VTE HIGH RISK PATIENT  Once         05/17/25 1620     Place sequential compression device  Until discontinued         05/17/25 1620                             Navin Javier MD  Department of Hospital Medicine  [facility] - Internal Medicine Telemetry

## 2025-05-17 NOTE — HPI
Radha Feng is a 84 y.o. 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 She is accompanied by her daughter, Della, who is a nurse and is able to provide the majority of her following history. Around 7pm yesterday (5/16/25), patient was attempted to open drawer of her dresser when she lost balance and fell backward and noted acute back pain. She denies hitting her head or LOC. Not on blood thinners. No preceding symptoms of chest pain, dizziness, SOB prior to fall. Patient called her daughter following fall. A few hours later her daughter noted she had a measured temperature of 101F at home and noted x1 episode of non-bloody vomiting. She gave her a dose of doxycycline and 1g tylenol. At that time she encouraged her mother to go to the ED but she declined. This morning she continued to complain of back pain and was agreeable to go to the ED for further evaluation. Daughter notes 3 days prior to fall worsening appetite and generalized weakness. Normally able to ambulate with cane/walker.     Currently she reports low midline back pain and mild diffuse abdominal pain that started while in the ED. Chronic bilateral knee and ankle pain that is unchanged. No other injuries that she reports as a result of the fall. She denies headache, dizziness, vision changes, neck pain, chest pain, SOB, dysuria. Denies numbness of the lower extremities, urinary or bowel incontinence, saddle anesthesia. She has a chronic wound on her R shoulder that is unchanged. Per recent heme/onc note, shoulder lesion is suspected metastatic lung cancer rather than  second primary.    In the ED she was hemodynamically stable on room air and afebrile. Lab investigations significant for leukocytosis to 17k, Hgb 7.5, K 3.3, CO2 22, glucose 207. Remainder of CMP unremarkable. CT lumbar spine with acute L1 compression fracture with about 30% height loss. CXR with no acute abnormality. Blood cultures drawn. UA pending. She was started on vanc/zosyn, given 1L LR and replacement potassium. She is admitted to hospital medicine for L1 compression fracture and recent fever and fall.

## 2025-05-17 NOTE — ASSESSMENT & PLAN NOTE
Pt has communicating dexcom and omnipod. Last A1c 11 months ago 6.0.    - consult to endocrine for management with omnipod  - will perform occasional glucose checks to confirm dexcom accuracy

## 2025-05-17 NOTE — ASSESSMENT & PLAN NOTE
85yo female with a hx of lung adenocarcinoma with cutaneous mets, COPD, PVD, HFpEF, aortic and coronary atherosclerosis, urinary incontinence, T2DM, osteroporosis, and autoimmune hepatitis presenting after a fall backwards from standing. She denies any head trauma but notes significant lower back pain. CT on arrival notable for L1 compression fracture with 30% height loss of the vertebral body and no disc protrusion. Per daughter, pt normally takes 1g tylenol TID for cancer-related pain control. Opioid naive.     - pain control   - consult placed to ortho spine, appreciate recs  - will obtain upright XR in TSLO brace  - TSLO brace when out of bed  - low threshold for additional spine imaging given hx of malignancy, no other bony tenderness on exam

## 2025-05-17 NOTE — ASSESSMENT & PLAN NOTE
Follows with Dr. Peraza in hepatology clinic, last seen 11/16/2024. Stable on home regimen of lasix 40mg daily and prednisone 2.5mg daily. No requirement for aldactone or lactulose per hepatology note. No paracentesis required since 2022. No physical exam findings concerning for ascites. Albumin on admission 1.6. LFT wnl.     - hold prednisone 2.5mg daily in setting of acute infection  - continue lasix 40mg daily

## 2025-05-17 NOTE — ED NOTES
Fell while trying to pull drawer out of dresser, fell back, no head injury or wound. Lower back pain. Fever up to 101.8, denies urinary symptoms . Cold symptoms a week ago. Family member with her is also sick with URI at this time that she got from her.     Patient identifiers for Radha Feng 84 y.o. female checked and correct.  Chief Complaint   Patient presents with    Fall     Was pulling on dresser drawer yest and fell backwards, no loc, not on blood thinners, lower back pain , denies bowel/bladder incontinence    Fever     Temp 101 last night,  given doxy yest for poss uti     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    Diabetes mellitus     Glaucoma     Hypertension     Other ascites 11/29/2022     Allergies reported: Review of patient's allergies indicates:  No Known Allergies    Appearance: Pt awake, alert & oriented to person, place & time. Pt in no acute distress at present time. Pt is clean and well groomed with clothes appropriately fastened.   Skin: Skin warm, dry & intact. Color consistent with ethnicity. Mucous membranes moist. No breakdown or brusing noted.   Musculoskeletal: Patient moving all extremities well, no obvious swelling or deformities noted.   Respiratory: Respirations spontaneous, even, and non-labored. Visible chest rise noted. Airway is open and patent. No accessory muscle use noted.   Neurologic: Sensation is intact. Speech is clear and appropriate. Eyes open spontaneously, behavior appropriate to situation, follows commands, facial expression symmetrical, bilateral hand grasp equal and even, purposeful motor response noted.  Cardiac: All peripheral pulses present. No Bilateral lower extremity edema. Cap refill is <3 seconds.  Abdomen: Abdomen soft, non distended, non tender to palpation.   : Pt voids independently, denies dysuria, hematuria, frequency.

## 2025-05-17 NOTE — ED PROVIDER NOTES
Encounter Date: 5/17/2025       History     Chief Complaint   Patient presents with    Fall     Was pulling on dresser drawer yest and fell backwards, no loc, not on blood thinners, lower back pain , denies bowel/bladder incontinence    Fever     Temp 101 last night,  given doxy yest for poss uti     84-year-old female, history of diabetes, hypertension, autoimmune hepatitis on daily steroids, stage IV lung CA, not on any active treatment right now, recent PET scan several weeks ago shows worsening and widely metastatic disease, now brought in by daughter for a fall and fever.  Last night, patient was opening a drawer when she fell backwards, landing on her lower back.  She says that she did not hit her head.  Since that fall she is complaining of lower back pain.  She has no numbness or weakness in her extremities.  In addition, last night she had a temperature of a 101°.  Daughter gave her doxycycline x1 from an old prescription last night and also Tylenol.  No fever today.  She has had some URI symptoms for several weeks but these have improved.  No new cough, no vomiting or diarrhea, no new rashes.  Patient does have a large metastatic lesion over her right shoulder but there has been in no acute change in the appearance of this large lesion.    The history is provided by the patient and a relative.     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    Diabetes mellitus     Glaucoma     Hypertension     Lung cancer     Other ascites 11/29/2022     Past Surgical History:   Procedure Laterality Date    CATARACT EXTRACTION W/  INTRAOCULAR LENS IMPLANT Left 12/21/2021        ENDOBRONCHIAL ULTRASOUND N/A 07/05/2024    Procedure: ENDOBRONCHIAL ULTRASOUND (EBUS);  Surgeon: Rolo Wilkinson MD;  Location: [facility] OR [address] [place] [address];  Service: Pulmonary;  Laterality: N/A;    ESOPHAGOGASTRODUODENOSCOPY N/A  06/26/2023    Procedure: ESOPHAGOGASTRODUODENOSCOPY (EGD);  Surgeon: Edgar Branch MD;  Location: [facility] ([address] [place] [address]);  Service: Endoscopy;  Laterality: N/A;  referral Dr Peraza-cirrhosis/labs done on 4/24/23-instr portal-GT     Family History   Problem Relation Name Age of Onset    Cataracts Mother      Diabetes Mother      Glaucoma Mother      Hypertension Mother      Heart attack Father      Colon cancer Sister      Blindness Cousin      Amblyopia Neg Hx      Macular degeneration Neg Hx      Retinal detachment Neg Hx      Strabismus Neg Hx       Social History[1]  Review of Systems    Physical Exam     Initial Vitals [05/17/25 1304]   BP Pulse Resp Temp SpO2   (!) 118/56 74 18 97.5 °F (36.4 °C) 97 %      MAP       --         Physical Exam    Nursing note and vitals reviewed.  Constitutional: She appears well-developed and well-nourished. She is not diaphoretic. No distress.   Eyes: Conjunctivae are normal.   Neck: Neck supple.   Cardiovascular:  Normal rate.           Pulmonary/Chest: Breath sounds normal. No respiratory distress. She has no wheezes. She has no rales.   Abdominal: Abdomen is soft. She exhibits no distension. There is no abdominal tenderness. There is no rebound and no guarding.   Musculoskeletal:         General: No edema.      Cervical back: Neck supple.      Comments: R shoulder:  Large known metastatic lesion with nodular component, open wound to posterior right shoulder.    Back:  No reproducible midline spinal tenderness    Lower extremities:  No obvious bony tenderness.  No pain in hips with passive ROM.  No obvious deformities     Neurological: She is alert and oriented to person, place, and time. She has normal strength. GCS score is 15. GCS eye subscore is 4. GCS verbal subscore is 5. GCS motor subscore is 6.         ED Course   Procedures  Labs Reviewed   COMPREHENSIVE METABOLIC PANEL - Abnormal       Result Value    Sodium 137      Potassium 3.3 (*)     Chloride 104      CO2 22 (*)      Glucose 207 (*)     BUN 16      Creatinine 0.7      Calcium 8.9      Protein Total 6.7      Albumin 1.6 (*)     Bilirubin Total 0.5      ALP 72      AST 36      ALT 14      Anion Gap 11      eGFR >60     CBC WITH DIFFERENTIAL - Abnormal    WBC 17.33 (*)     RBC 2.77 (*)     HGB 7.5 (*)     HCT 25.0 (*)     MCV 90      MCH 27.1      MCHC 30.0 (*)     RDW 18.1 (*)     Platelet Count 271      MPV 10.2      Nucleated RBC 0      Neut % 86.6 (*)     Lymph % 5.1 (*)     Mono % 7.2      Eos % 0.2      Basophil % 0.1      Imm Grans % 0.8 (*)     Neut # 15.02 (*)     Lymph # 0.88 (*)     Mono # 1.24 (*)     Eos # 0.04      Baso # 0.02      Imm Grans # 0.13 (*)    INFLUENZA A & B BY MOLECULAR - Normal    INFLUENZA A MOLECULAR Negative      INFLUENZA B MOLECULAR  Negative     HEPATITIS C ANTIBODY - Normal    Hep C Ab Interp Non-Reactive     HIV 1 / 2 ANTIBODY - Normal    HIV 1/2 Ag/Ab Non-Reactive     SARS-COV-2 RNA AMPLIFICATION, QUAL - Normal    SARS COV-2 Molecular Negative     HEP C VIRUS HOLD SPECIMEN    Extra Tube Hold for add-ons.     CBC W/ AUTO DIFFERENTIAL    Narrative:     The following orders were created for panel order CBC auto differential.  Procedure                               Abnormality         Status                     ---------                               -----------         ------                     CBC with Differential[[id number]]       Abnormal            Final result                 Please view results for these tests on the individual orders.   POCT GLUCOSE, HAND-HELD DEVICE        ECG Results              EKG 12-lead (Final result)        Collection Time Result Time QRS Duration OHS QTC Calculation    05/17/25 13:53:24 05/18/25 10:15:41 92 465                     Final result by Interface, Lab In [facility] (05/18/25 10:15:47)                   Narrative:    Test Reason : Z13.6,    Vent. Rate :  76 BPM     Atrial Rate :  76 BPM     P-R Int : 138 ms          QRS Dur :  92 ms      QT Int : 414 ms        P-R-T Axes :  28 -37 -16 degrees    QTcB Int : 465 ms    Sinus rhythm with Premature atrial complexes  Left axis deviation  Anterior infarct (cited on or before 20-Sep-2022)  T wave abnormality, consider inferolateral ischemia  Abnormal ECG  When compared with ECG of 24-Dec-2024 15:12,  Premature atrial complexes are now Present  The axis Shifted left  T wave inversion now evident in Inferior leads  T wave inversion now evident in Anterior-lateral leads  Confirmed by Amaris Craft ([id number]) on 5/18/2025 10:15:38 AM    Referred By: ALISONREFERRAL SELF           Confirmed By: Amaris Craft                                  Imaging Results              CT Lumbar Spine Without Contrast (Final result)  Result time 05/17/25 15:43:40      Final result by Katarina Grace MD (05/17/25 15:43:40)                   Impression:      Acute L1 compression fracture with approximately 30% height loss.  No retropulsion of fracture fragments.    Osteopenia and degenerative change of the lumbar spine detailed above.      Electronically signed by: Katarina Grace MD  Date:    05/17/2025  Time:    15:43               Narrative:    EXAMINATION:  CT LUMBAR SPINE WITHOUT CONTRAST    CLINICAL HISTORY:  Low back pain, trauma;    TECHNIQUE:  Low-dose axial, sagittal and coronal reformations are obtained through the lumbar spine.  Contrast was not administered.    COMPARISON:  Prior PET-CT dated 04/29/2025.    FINDINGS:  There is diffuse osteopenia.  There is an acute compression fracture involving the inferior endplate of L1 with approximately 30% height loss and no retropulsion of fracture fragments.    No additional fractures are identified.  AP alignment is satisfactory.  Degenerative changes are detailed by level as follows:    T12-L1: There is no neural foraminal or central canal stenosis    L1-2: There is facet arthropathy without canal or foraminal narrowing    L2-3: There is a mild broad-based disc bulge facet arthropathy  and probable mild central canal stenosis without definite neural foraminal narrowing    L3-4: There is a posterior broad-based disc bulge facet arthropathy and ligamentum flavum thickening resulting in mild central canal stenosis and mild bilateral neural foraminal narrowing    L4-5: There is a posterior broad-based disc bulge facet arthropathy and ligamentum flavum thickening resulting in moderate central canal stenosis and mild bilateral neural foraminal narrowing    L5-S1: There is a posterior broad-based disc bulge facet arthropathy and ligamentum flavum thickening resulting in mild central canal stenosis and bilateral neural foraminal narrowing.    There is aortoiliac atherosclerotic calcification.  Vascular calcifications are noted at the right renal hilum.                                       X-Ray Chest AP Portable (Final result)  Result time 05/17/25 14:26:29      Final result by Gerardo Angelo MD (05/17/25 14:26:29)                   Impression:      No acute abnormality.      Electronically signed by: Gerardo Angelo MD  Date:    05/17/2025  Time:    14:26               Narrative:    EXAMINATION:  XR CHEST AP PORTABLE    CLINICAL HISTORY:  Sepsis;    TECHNIQUE:  Single frontal view of the chest was performed.    COMPARISON:  Prior exams dating back to 2022    FINDINGS:  Lungs are clear.  No focal consolidation.    No effusion or pneumothorax.    Unremarkable cardiomediastinal silhouette.    No acute bone abnormality.                                       Medications   sodium chloride 0.9% flush 10 mL (has no administration in time range)   naloxone 0.4 mg/mL injection 0.02 mg (has no administration in time range)   glucose chewable tablet 16 g (has no administration in time range)   glucose chewable tablet 24 g (has no administration in time range)   dextrose 50% injection 12.5 g (has no administration in time range)   dextrose 50% injection 25 g (has no administration in time range)   glucagon (human  recombinant) injection 1 mg (has no administration in time range)   oxyCODONE immediate release tablet 5 mg (5 mg Oral Given 5/17/25 2144)   morphine injection 2 mg (has no administration in time range)   vitamin D 1000 units tablet 2,000 Units (2,000 Units Oral Given 5/18/25 0953)   Lactobacillus rhamnosus GG capsule 1 capsule (1 capsule Oral Not Given 5/18/25 0900)   nystatin 100,000 unit/mL suspension 500,000 Units (500,000 Units Mouth/Throat Given 5/18/25 1151)   latanoprost 0.005 % ophthalmic solution 1 drop (1 drop Both Eyes Given 5/18/25 0951)   brimonidine 0.2% ophthalmic solution 1 drop (1 drop Both Eyes Given 5/18/25 0951)   dorzolamide-timolol 2-0.5% ophthalmic solution 1 drop (1 drop Both Eyes Given 5/18/25 0951)   acetaminophen tablet 1,000 mg (1,000 mg Oral Given 5/18/25 0952)   LIDOcaine 5 % patch 2 patch (2 patches Transdermal Patch Removed 5/18/25 0529)   cefTRIAXone injection 1 g (1 g Intravenous Given 5/17/25 1851)   glucagon (human recombinant) injection 1 mg (has no administration in time range)   insulin aspart U-100 insulin pump from home (0.45 Units/hr Subcutaneous Given by Other 5/17/25 2100)   insulin aspart U-100 insulin pump from home 0-10 Units (has no administration in time range)   potassium chloride CR capsule 30 mEq (30 mEq Oral Given 5/18/25 0958)   enoxaparin injection 30 mg (has no administration in time range)   lactated ringers bolus 1,000 mL (0 mLs Intravenous Stopped 5/17/25 1539)   vancomycin 1,500 mg in 0.9% NaCl 250 mL IVPB (admixture device) (0 mg Intravenous Stopped 5/17/25 1611)   piperacillin-tazobactam (ZOSYN) 4.5 g in D5W 100 mL IVPB (MB+) (0 g Intravenous Stopped 5/17/25 1712)   potassium bicarbonate disintegrating tablet 25 mEq (25 mEq Oral Given 5/17/25 1545)   magnesium sulfate 2g in water 50mL IVPB (premix) (0 g Intravenous Stopped 5/18/25 1159)     Medical Decision Making  1. Fall - nonsyncopal fall.  No head trauma.  Patient reporting lower back pain though I  am unable to reproduce on exam.  No external signs of trauma.  Out of an abundance of caution will get a CT L-spine given that this is the place where patient reports pain no other obvious injuries on my initial assessment.    2. Fever.  In this patient with a fever, I am concerned about ruling out a serious infection and/or sepsis.  In terms of a source, possibilities include but are not limited to a UTI/pyelonephritis, pneumonia, skin/soft tissue infection, bacteremia, intra-abdominal process, discitis/osteomyelitis, septic arthritis, meningitis and endocarditis.  Tumor fever also possible. A viral illness like influenza, COVID is also possible.    The following tests will be ordered in order to determine the source of the patient's infection:  -Labs: blood cultures, CBC, CMP, UA, COVID and flu testing  -Imaging studies:  Chest x-ray  -Empiric antibiotics will not be started prior to determining source of the infection.    -Will plan for frequent VS checks, close monitoring    -Disposition:  Uncertain pending ED workup      Amount and/or Complexity of Data Reviewed  External Data Reviewed: labs, radiology and notes.  Labs: ordered. Decision-making details documented in ED Course.  Radiology: ordered and independent interpretation performed. Decision-making details documented in ED Course.  ECG/medicine tests: ordered and independent interpretation performed. Decision-making details documented in ED Course.    Risk  Prescription drug management.  Decision regarding hospitalization.               ED Course as of 05/18/25 1234   Sat May 17, 2025   1414 WBC(!): 17.33 [AS]   1414 Hemoglobin(!): 7.5 [AS]   1414 Worsening leukocytosis.  Will treat empirically with antibiotics [AS]   1415 POC Lactate: 2.3 [AS]   1439 Chest x-ray does not show an acute infiltrate [AS]      ED Course User Index  [AS] Libby Talbot MD                           Clinical Impression:  Final diagnoses:  [Z13.6] Screening for cardiovascular  condition  [R50.9] Fever, unspecified fever cause (Primary)          ED Disposition Condition    Admit                     [1]   Social History  Tobacco Use    Smoking status: Former    Smokeless tobacco: Never   Substance Use Topics    Alcohol use: Not Currently    Drug use: Never        Libby Talbot MD  05/18/25 123

## 2025-05-18 PROBLEM — D64.9 ANEMIA: Status: ACTIVE | Noted: 2025-05-18

## 2025-05-18 LAB
ABSOLUTE EOSINOPHIL (OHS): 0.31 K/UL
ABSOLUTE MONOCYTE (OHS): 2.05 K/UL (ref 0.3–1)
ABSOLUTE NEUTROPHIL COUNT (OHS): 12.96 K/UL (ref 1.8–7.7)
ALBUMIN SERPL BCP-MCNC: 1.4 G/DL (ref 3.5–5.2)
ALP SERPL-CCNC: 68 UNIT/L (ref 40–150)
ALT SERPL W/O P-5'-P-CCNC: 11 UNIT/L (ref 10–44)
ANION GAP (OHS): 8 MMOL/L (ref 8–16)
AST SERPL-CCNC: 32 UNIT/L (ref 11–45)
BASOPHILS # BLD AUTO: 0.02 K/UL
BASOPHILS NFR BLD AUTO: 0.1 %
BILIRUB SERPL-MCNC: 0.3 MG/DL (ref 0.1–1)
BUN SERPL-MCNC: 14 MG/DL (ref 8–23)
CALCIUM SERPL-MCNC: 8.6 MG/DL (ref 8.7–10.5)
CHLORIDE SERPL-SCNC: 105 MMOL/L (ref 95–110)
CO2 SERPL-SCNC: 25 MMOL/L (ref 23–29)
CREAT SERPL-MCNC: 0.6 MG/DL (ref 0.5–1.4)
ERYTHROCYTE [DISTWIDTH] IN BLOOD BY AUTOMATED COUNT: 18.6 % (ref 11.5–14.5)
GFR SERPLBLD CREATININE-BSD FMLA CKD-EPI: >60 ML/MIN/1.73/M2
GLUCOSE SERPL-MCNC: 97 MG/DL (ref 70–110)
HCT VFR BLD AUTO: 26 % (ref 37–48.5)
HGB BLD-MCNC: 8 GM/DL (ref 12–16)
IMM GRANULOCYTES # BLD AUTO: 0.16 K/UL (ref 0–0.04)
IMM GRANULOCYTES NFR BLD AUTO: 1 % (ref 0–0.5)
INR PPP: 1.2 (ref 0.8–1.2)
LYMPHOCYTES # BLD AUTO: 1.17 K/UL (ref 1–4.8)
MAGNESIUM SERPL-MCNC: 1.6 MG/DL (ref 1.6–2.6)
MCH RBC QN AUTO: 28.1 PG (ref 27–31)
MCHC RBC AUTO-ENTMCNC: 30.8 G/DL (ref 32–36)
MCV RBC AUTO: 91 FL (ref 82–98)
NUCLEATED RBC (/100WBC) (OHS): 0 /100 WBC
OHS QRS DURATION: 100 MS
OHS QRS DURATION: 92 MS
OHS QTC CALCULATION: 457 MS
OHS QTC CALCULATION: 465 MS
PHOSPHATE SERPL-MCNC: 3.2 MG/DL (ref 2.7–4.5)
PLATELET # BLD AUTO: 258 K/UL (ref 150–450)
PMV BLD AUTO: 11.1 FL (ref 9.2–12.9)
POCT GLUCOSE: 159 MG/DL (ref 70–110)
POTASSIUM SERPL-SCNC: 3.4 MMOL/L (ref 3.5–5.1)
PROT SERPL-MCNC: 6.2 GM/DL (ref 6–8.4)
PROTHROMBIN TIME: 12.7 SECONDS (ref 9–12.5)
RBC # BLD AUTO: 2.85 M/UL (ref 4–5.4)
RELATIVE EOSINOPHIL (OHS): 1.9 %
RELATIVE LYMPHOCYTE (OHS): 7 % (ref 18–48)
RELATIVE MONOCYTE (OHS): 12.3 % (ref 4–15)
RELATIVE NEUTROPHIL (OHS): 77.7 % (ref 38–73)
SODIUM SERPL-SCNC: 138 MMOL/L (ref 136–145)
WBC # BLD AUTO: 16.67 K/UL (ref 3.9–12.7)

## 2025-05-18 PROCEDURE — 85025 COMPLETE CBC W/AUTO DIFF WBC: CPT

## 2025-05-18 PROCEDURE — 63600175 PHARM REV CODE 636 W HCPCS: Performed by: STUDENT IN AN ORGANIZED HEALTH CARE EDUCATION/TRAINING PROGRAM

## 2025-05-18 PROCEDURE — 63600175 PHARM REV CODE 636 W HCPCS

## 2025-05-18 PROCEDURE — 82374 ASSAY BLOOD CARBON DIOXIDE: CPT

## 2025-05-18 PROCEDURE — 99222 1ST HOSP IP/OBS MODERATE 55: CPT | Mod: ,,, | Performed by: NURSE PRACTITIONER

## 2025-05-18 PROCEDURE — 25000003 PHARM REV CODE 250

## 2025-05-18 PROCEDURE — 84100 ASSAY OF PHOSPHORUS: CPT

## 2025-05-18 PROCEDURE — 36415 COLL VENOUS BLD VENIPUNCTURE: CPT

## 2025-05-18 PROCEDURE — 25000003 PHARM REV CODE 250: Performed by: STUDENT IN AN ORGANIZED HEALTH CARE EDUCATION/TRAINING PROGRAM

## 2025-05-18 PROCEDURE — 85610 PROTHROMBIN TIME: CPT | Performed by: STUDENT IN AN ORGANIZED HEALTH CARE EDUCATION/TRAINING PROGRAM

## 2025-05-18 PROCEDURE — 21400001 HC TELEMETRY ROOM

## 2025-05-18 PROCEDURE — 83735 ASSAY OF MAGNESIUM: CPT

## 2025-05-18 RX ORDER — POTASSIUM CHLORIDE 750 MG/1
30 CAPSULE, EXTENDED RELEASE ORAL
Status: COMPLETED | OUTPATIENT
Start: 2025-05-18 | End: 2025-05-18

## 2025-05-18 RX ORDER — ENOXAPARIN SODIUM 100 MG/ML
30 INJECTION SUBCUTANEOUS EVERY 24 HOURS
Status: DISCONTINUED | OUTPATIENT
Start: 2025-05-18 | End: 2025-05-19 | Stop reason: HOSPADM

## 2025-05-18 RX ORDER — MAGNESIUM SULFATE HEPTAHYDRATE 40 MG/ML
2 INJECTION, SOLUTION INTRAVENOUS ONCE
Status: COMPLETED | OUTPATIENT
Start: 2025-05-18 | End: 2025-05-18

## 2025-05-18 RX ADMIN — MAGNESIUM SULFATE HEPTAHYDRATE 2 G: 40 INJECTION, SOLUTION INTRAVENOUS at 09:05

## 2025-05-18 RX ADMIN — POTASSIUM CHLORIDE 30 MEQ: 750 CAPSULE, EXTENDED RELEASE ORAL at 12:05

## 2025-05-18 RX ADMIN — OXYCODONE 5 MG: 5 TABLET ORAL at 10:05

## 2025-05-18 RX ADMIN — ACETAMINOPHEN 1000 MG: 500 TABLET ORAL at 09:05

## 2025-05-18 RX ADMIN — NYSTATIN 500000 UNITS: 100000 SUSPENSION ORAL at 04:05

## 2025-05-18 RX ADMIN — BRIMONIDINE TARTRATE 1 DROP: 2 SOLUTION OPHTHALMIC at 03:05

## 2025-05-18 RX ADMIN — BRIMONIDINE TARTRATE 1 DROP: 2 SOLUTION OPHTHALMIC at 09:05

## 2025-05-18 RX ADMIN — DORZOLAMIDE HYDROCHLORIDE AND TIMOLOL MALEATE 1 DROP: 20; 5 SOLUTION OPHTHALMIC at 09:05

## 2025-05-18 RX ADMIN — LATANOPROST 1 DROP: 50 SOLUTION OPHTHALMIC at 09:05

## 2025-05-18 RX ADMIN — NYSTATIN 500000 UNITS: 100000 SUSPENSION ORAL at 09:05

## 2025-05-18 RX ADMIN — Medication 2000 UNITS: at 09:05

## 2025-05-18 RX ADMIN — ACETAMINOPHEN 1000 MG: 500 TABLET ORAL at 03:05

## 2025-05-18 RX ADMIN — NYSTATIN 500000 UNITS: 100000 SUSPENSION ORAL at 11:05

## 2025-05-18 RX ADMIN — CEFTRIAXONE 1 G: 1 INJECTION, POWDER, FOR SOLUTION INTRAMUSCULAR; INTRAVENOUS at 05:05

## 2025-05-18 RX ADMIN — POTASSIUM CHLORIDE 30 MEQ: 750 CAPSULE, EXTENDED RELEASE ORAL at 09:05

## 2025-05-18 RX ADMIN — LIDOCAINE 2 PATCH: 50 PATCH CUTANEOUS at 05:05

## 2025-05-18 NOTE — ASSESSMENT & PLAN NOTE
84F hx adenocarcinoma s/p rads presenting after fall w CT demonstrating L1 compression fx with 30% height loss  She does have low back pain that is managed with just tylenol and lidocaine patches  She has no weakness on exam  Discussed conservative management including TLSO brace with XR upright to ensure no kyphosing over fracture , increased height loss when standing up due to gravity. As long as pain manageable additionally then no need for surgical intervention. Intervention could include kyphoplasty vs surgical fixation, discussed with patient and daughter, would be amenable to kyphoplasty if needed    Plan  XR upright in TLSO brace pending  Will schedule follow up 6 weeks with repeat XR in TLSO

## 2025-05-18 NOTE — PROGRESS NOTES
[facility] - Internal Medicine [facility]  Progress Note    Patient Name: Radha Feng  MRN: [id number]  Patient Class: IP- Inpatient   Admission Date: 5/17/2025  Length of Stay: 1 days  Attending Physician: Catherine Trujillo MD  Primary Care Provider: Leticia Lim MD        Subjective     Principal Problem:Compression fracture of L1 lumbar vertebra        HPI:  Radha Feng is a 84 y.o. 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 She is accompanied by her daughter, Della, who is a nurse and is able to provide the majority of her following history. Around 7pm yesterday (5/16/25), patient was attempted to open drawer of her dresser when she lost balance and fell backward and noted acute back pain. She denies hitting her head or LOC. Not on blood thinners. No preceding symptoms of chest pain, dizziness, SOB prior to fall. Patient called her daughter following fall. A few hours later her daughter noted she had a measured temperature of 101F at home and noted x1 episode of non-bloody vomiting. She gave her a dose of doxycycline and 1g tylenol. At that time she encouraged her mother to go to the ED but she declined. This morning she continued to complain of back pain and was agreeable to go to the ED for further evaluation. Daughter notes 3 days prior to fall worsening appetite and generalized weakness. Normally able to ambulate with cane/walker.     Currently she reports low midline back pain and mild diffuse abdominal pain that started while in the ED. Chronic bilateral knee and ankle pain that is  unchanged. No other injuries that she reports as a result of the fall. She denies headache, dizziness, vision changes, neck pain, chest pain, SOB, dysuria. Denies numbness of the lower extremities, urinary or bowel incontinence, saddle anesthesia. She has a chronic wound on her R shoulder that is unchanged. Per recent heme/onc note, shoulder lesion is suspected metastatic lung cancer rather than second primary.    In the ED she was hemodynamically stable on room air and afebrile. Lab investigations significant for leukocytosis to 17k, Hgb 7.5, K 3.3, CO2 22, glucose 207. Remainder of CMP unremarkable. CT lumbar spine with acute L1 compression fracture with about 30% height loss. CXR with no acute abnormality. Blood cultures drawn. UA pending. She was started on vanc/zosyn, given 1L LR and replacement potassium. She is admitted to hospital medicine for L1 compression fracture and recent fever and fall.     Overview/Hospital Course:  Admitted for L1 compression fracture after fall and UTI. Abdominal pain significantly improved on with conservative management, will defer additional imaging at this time. NSGY consulted for fracture, recommend TLSO brace and XR with brace which is pending. Pain controlled with scheduled tylenol and prn oxycodone 5mg. Urine culture pending, on empiric CTX.     Interval History: NAEON. Slept well following 5mg oxycodone. Abdominal pain significantly improved, will discontinue CT scan. Awaiting TLSO brace delivery followed by XR in brace. Urine culture in process.     Objective:     Vital Signs (Most Recent):  Temp: 98.2 °F (36.8 °C) (05/18/25 0812)  Pulse: 82 (05/18/25 0812)  Resp: 18 (05/18/25 0812)  BP: 122/72 (05/18/25 0812)  SpO2: 97 % (05/18/25 0812) Vital Signs (24h Range):  Temp:  [97.5 °F (36.4 °C)-98.7 °F (37.1 °C)] 98.2 °F (36.8 °C)  Pulse:  [73-83] 82  Resp:  [16-18] 18  SpO2:  [94 %-97 %] 97 %  BP: (114-136)/(56-79) 122/72     Weight: 49 kg (108 lb)  Body mass index is 17.97  kg/m².    Intake/Output Summary (Last 24 hours) at 5/18/2025 0850  Last data filed at 5/17/2025 1611  Gross per 24 hour   Intake 1247.54 ml   Output --   Net 1247.54 ml         Physical Exam  Vitals and nursing note reviewed.   Constitutional:       General: She is not in acute distress.     Appearance: She is underweight. She is ill-appearing.      Comments: Lying on L side   HENT:      Head: Normocephalic and atraumatic.   Eyes:      Extraocular Movements: Extraocular movements intact.   Cardiovascular:      Rate and Rhythm: Normal rate and regular rhythm.      Heart sounds: No murmur heard.  Pulmonary:      Effort: Pulmonary effort is normal. No respiratory distress.      Breath sounds: Normal breath sounds. No wheezing or rales.   Abdominal:      General: Abdomen is flat. Bowel sounds are normal. There is no distension.      Palpations: Abdomen is soft.      Tenderness: There is no abdominal tenderness. There is no guarding.   Musculoskeletal:      Cervical back: No deformity or bony tenderness.      Thoracic back: No deformity or bony tenderness.      Lumbar back: Bony tenderness present. No deformity.      Right lower leg: No edema.      Left lower leg: No edema.      Comments: Large chronic R shoulder lesion with small ulcerations with no active drainage or purulence. See photo   Skin:     General: Skin is warm and dry.   Neurological:      Mental Status: She is alert.               Significant Labs: All pertinent labs within the past 24 hours have been reviewed.  CBC:   Recent Labs   Lab 05/17/25  1348 05/18/25  0548   WBC 17.33* 16.67*   HGB 7.5* 8.0*   HCT 25.0* 26.0*    258     CMP:   Recent Labs   Lab 05/17/25  1348 05/18/25  0548    138   K 3.3* 3.4*    105   CO2 22* 25   * 97   BUN 16 14   CREATININE 0.7 0.6   CALCIUM 8.9 8.6*   PROT 6.7 6.2   ALBUMIN 1.6* 1.4*   BILITOT 0.5 0.3   ALKPHOS 72 68   AST 36 32   ALT 14 11   ANIONGAP 11 8       Significant Imaging: I have reviewed  all pertinent imaging results/findings within the past 24 hours.        Assessment & Plan  Compression fracture of L1 lumbar vertebra  83yo female with a hx of lung adenocarcinoma with cutaneous mets, COPD, PVD, HFpEF, aortic and coronary atherosclerosis, urinary incontinence, T2DM, osteroporosis, and autoimmune hepatitis presenting after a fall backwards from standing. She denies any head trauma but notes significant lower back pain. CT on arrival notable for L1 compression fracture with 30% height loss of the vertebral body and no disc protrusion. Per daughter, pt normally takes 1g tylenol TID for cancer-related pain control. Opioid naive.     - pain control   - consult placed to ortho spine, appreciate recs  - will obtain upright XR in TSLO brace  - TSLO brace when out of bed  - low threshold for additional spine imaging given hx of malignancy, no other bony tenderness on exam  UTI (urinary tract infection)  Leukocytosis  Brought in by daughter for evaluation of fall with back pain as well as a 101F fever last night. Afebrile here. Leukocytosis - wbc 17. Recent URI resolved a couple of weeks ago. Currently denies URI or UTI sx. Possible fever is related to L1 compression fx, but given pt's chronic steroid use and active malignancy will complete infectious workup.    Suspect urine source. Ucx pending. Abx deescalated.     Plan:  - f/u blood cx, urine cx  - CT scan deferred at this time  - empiric CTX pending UCX  - daily CBC for monitoring of leukocytosis  - monitor for return of fever    Shoulder lesion, right  Underwent biopsy of shoulder mass Jan 2025, positive for metastatic carcinoma on pathology, favored to be metastatic lung adenocarcinoma but second primary not entirely ruled out.     Has progressed in size based on photos in media tab. No active purulence or drainage. Does not appear acutely infected but high index of suspicion for possible source of fevers    Plan:  -- continue to monitor  -- complete  remainder of infectious w/u  -- low suspicion shoulder lesion is source of active infection  -- CT deferred at this time, improving with current management    Primary adenocarcinoma of upper lobe of right lung  Stage IIIB Lung adenocarcinoma dx 7/2/24 followed by Dr. Kohler. S/p palliative radiation to the R lung/mediastinum 8/14/24-9/4/24. Cutaneous mets to the right shoulder noted in 11/2024. Pt not interested in chemotherapy at this time.   Type 2 diabetes mellitus with circulatory disorder, with long-term current use of insulin  Pt has communicating dexcom and omnipod. Last A1c 11 months ago 6.0.    - consult to endocrine for management with omnipod  - will perform occasional glucose checks to confirm dexcom accuracy  Autoimmune hepatitis  Follows with Dr. Peraza in hepatology clinic, last seen 11/16/2024. Stable on home regimen of lasix 40mg daily and prednisone 2.5mg daily. No requirement for aldactone or lactulose per hepatology note. No paracentesis required since 2022. No physical exam findings concerning for ascites. Albumin on admission 1.6. LFT wnl.     - hold prednisone 2.5mg daily in setting of acute infection  - continue lasix 40mg daily    Cirrhosis of liver with ascites  Patient with known Cirrhosis with Child's class B. Co-morbidities are present and inclusive of malnutrition and anemia/pancytopenia.  MELD-Na score calculated; MELD 3.0: 13 at 5/18/2025  5:48 AM  MELD-Na: 8 at 5/18/2025  5:48 AM  Calculated from:  Serum Creatinine: 0.6 mg/dL (Using min of 1 mg/dL) at 5/18/2025  5:48 AM  Serum Sodium: 138 mmol/L (Using max of 137 mmol/L) at 5/18/2025  5:48 AM  Total Bilirubin: 0.3 mg/dL (Using min of 1 mg/dL) at 5/18/2025  5:48 AM  Serum Albumin: 1.4 g/dL (Using min of 1.5 g/dL) at 5/18/2025  5:48 AM  INR(ratio): 1.2 at 5/18/2025  5:48 AM  Age at listing (hypothetical): 84 years  Sex: Female at 5/18/2025  5:48 AM      Continue chronic meds. Etiology likely Autoimmune. Will avoid any hepatotoxic  meds, and monitor CBC/CMP/INR for synthetic function.   Hypokalemia  Patient's most recent potassium results are listed below. No correlating EKG changes or palpitations.   Recent Labs     05/17/25  1348 05/18/25  0548   K 3.3* 3.4*     Plan  - Replete potassium per protocol  - Monitor potassium Daily  - Patient's hypokalemia is being monitored.  Primary hypertension  Patient's blood pressure range in the last 24 hours was: BP  Min: 114/57  Max: 136/79.The patient's inpatient anti-hypertensive regimen is listed below:  Current Antihypertensives  dorzolamide-timolol 2-0.5% ophthalmic solution 1 drop, 2 times daily, Both Eyes    Plan  - holding lisinopril-hctz, will restart if indicated  Abdominal pain  Pt noted abdominal pain beginning in ED. Emesis night prior to admission.    - prn antiemetics  - f/u CT abdomen  - f/u lipase, UA    Primary open angle glaucoma (POAG) of both eyes  Plan:  -- continue home eyedrops    VTE Risk Mitigation (From admission, onward)           Ordered     enoxaparin injection 40 mg  Daily         05/17/25 1727     IP VTE HIGH RISK PATIENT  Once         05/17/25 1727     Place sequential compression device  Until discontinued         05/17/25 1620                    Discharge Planning   MELVI:      Code Status: Full Code   Medical Readiness for Discharge Date:                Navin Javier MD  Department of Hospital Medicine   [facility] - Internal Medicine Telemetry

## 2025-05-18 NOTE — CONSULTS
[facility] - Internal Medicine Telemetry  Neurosurgery  Consult Note    Inpatient consult to Neurosurgery  Consult performed by: Taran Cuellar MD  Consult ordered by: Navin Javier MD        Subjective:     Chief Complaint/Reason for Admission: fall    History of Present Illness: Radha Feng is a 84 y.o. 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 She is accompanied by her daughter, Della, who is a nurse and is able to provide the majority of her following history. Around 7pm yesterday (5/16/25), patient was attempted to open drawer of her dresser when she lost balance and fell backward and noted acute back pain. She denies hitting her head or LOC. Not on blood thinners. No preceding symptoms of chest pain, dizziness, SOB prior to fall. Patient called her daughter following fall. A few hours later her daughter noted she had a measured temperature of 101F at home and noted x1 episode of non-bloody vomiting. She gave her a dose of doxycycline and 1g tylenol. At that time she encouraged her mother to go to the ED but she declined. This morning she continued to complain of back pain and was agreeable to go to the ED for further evaluation. Daughter notes 3 days prior to fall worsening appetite and generalized weakness. Normally able to ambulate with cane/walker.      Currently she reports low midline back pain and mild diffuse abdominal pain that started while in the ED. Chronic bilateral knee and ankle pain that is unchanged. No other injuries that she reports as a result of the fall. She denies headache, dizziness, vision changes,  neck pain, chest pain, SOB, dysuria. Denies numbness of the lower extremities, urinary or bowel incontinence, saddle anesthesia. She has a chronic wound on her R shoulder that is unchanged. Per recent heme/onc note, shoulder lesion is suspected metastatic lung cancer rather than second primary.    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    Diabetes mellitus     Glaucoma     Hypertension     Lung cancer     Other ascites 11/29/2022     Past Surgical History:   Procedure Laterality Date    CATARACT EXTRACTION W/  INTRAOCULAR LENS IMPLANT Left 12/21/2021        ENDOBRONCHIAL ULTRASOUND N/A 07/05/2024    Procedure: ENDOBRONCHIAL ULTRASOUND (EBUS);  Surgeon: Rolo Wilkinson MD;  [facility];  Service: Pulmonary;  Laterality: N/A;    ESOPHAGOGASTRODUODENOSCOPY N/A 06/26/2023    Procedure: ESOPHAGOGASTRODUODENOSCOPY (EGD);  Surgeon: Edgar Branch MD;  Location: [facility] (4TH FLR);  Service: Endoscopy;  Laterality: N/A;  referral Dr Peraza-cirrhosis/labs done on 4/24/23-instr portal-GT     Family History       Problem Relation (Age of Onset)    Blindness Cousin    Cataracts Mother    Colon cancer Sister    Diabetes Mother    Glaucoma Mother    Heart attack Father    Hypertension Mother          Tobacco Use    Smoking status: Former    Smokeless tobacco: Never   Substance and Sexual Activity    Alcohol use: Not Currently    Drug use: Never    Sexual activity: Not on file     Review of Systems   All other systems reviewed and are negative.    Objective:     Weight: 49 kg (108 lb)  Body mass index is 17.97 kg/m².  Vital Signs (Most Recent):  Temp: 98.7 °F (37.1 °C) (05/17/25 2020)  Pulse: 83 (05/17/25 2020)  Resp: 16 (05/17/25 2020)  BP: 136/79 (05/17/25 2020)  SpO2: 95 % (05/17/25 2020) Vital Signs (24h Range):  Temp:  [97.5 °F (36.4  "°C)-98.7 °F (37.1 °C)] 98.7 °F (37.1 °C)  Pulse:  [74-83] 83  Resp:  [16-18] 16  SpO2:  [94 %-97 %] 95 %  BP: (118-136)/(56-79) 136/79     Date 05/17/25 0700 - 05/18/25 0659   Shift 8143-9464 2923-0912 9075-7865 24 Hour Total   INTAKE   IV Piggyback  1247.5  1247.5   Shift Total(mL/kg)  1247.5(25.5)  1247.5(25.5)   OUTPUT   Shift Total(mL/kg)       Weight (kg) 49 49 49 49                       Female External Urinary Catheter w/ Suction 05/17/25 1447 (Active)   Skin no redness;no breakdown 05/17/25 1734   Tolerance no signs/symptoms of discomfort 05/17/25 1734          Physical Exam     GENERAL: resting comfortably  HEENT: NCAT, PERRL, mucous membranes moist  NECK: supple, trachea midline  CV: normal capillary refill  PULM: aerating well, symmetric expansion, no distress  ABD: soft, NT, ND  EXT: no c/c/e    NEURO:    AAO x 3  CN II-XII grossly intact  Fc x 4 antigravity  SILT    No drift or dysmetria      Neurosurgery Physical Exam    Significant Labs:  Recent Labs   Lab 05/17/25  1348   *      K 3.3*      CO2 22*   BUN 16   CREATININE 0.7   CALCIUM 8.9     Recent Labs   Lab 05/17/25  1348   WBC 17.33*   HGB 7.5*   HCT 25.0*        No results for input(s): "LABPT", "INR", "APTT" in the last 48 hours.  Microbiology Results (last 7 days)       Procedure Component Value Units Date/Time    Urine culture [[id number]] Collected: 05/17/25 1703    Order Status: Sent Specimen: Urine Updated: 05/17/25 1730    Influenza A & B by Molecular [[id number]]  (Normal) Collected: 05/17/25 1348    Order Status: Completed Specimen: Nasal Swab Updated: 05/17/25 1505     INFLUENZA A MOLECULAR Negative     INFLUENZA B MOLECULAR  Negative    Blood culture x two cultures. Draw prior to antibiotics. [[id number]] Collected: 05/17/25 1350    Order Status: Sent Specimen: Blood from Peripheral, Antecubital, Right Updated: 05/17/25 1356    Blood culture x two cultures. Draw prior to antibiotics. [[id number]] Collected: " 05/17/25 1348    Order Status: Sent Specimen: Blood from Peripheral, Lower Arm, Left Updated: 05/17/25 4297          All pertinent labs from the last 24 hours have been reviewed.    Significant Diagnostics:  I have reviewed all pertinent imaging results/findings within the past 24 hours.  I have reviewed and interpreted all pertinent imaging results/findings within the past 24 hours.  CT Lumbar Spine Without Contrast  Result Date: 5/17/2025  Acute L1 compression fracture with approximately 30% height loss.  No retropulsion of fracture fragments. Osteopenia and degenerative change of the lumbar spine detailed above. Electronically signed by: Katarina Grace MD Date:    05/17/2025 Time:    15:43    X-Ray Chest AP Portable  Result Date: 5/17/2025  No acute abnormality. Electronically signed by: Gerardo Angelo MD Date:    05/17/2025 Time:    14:26      Assessment/Plan:     * Compression fracture of L1 lumbar vertebra  84F hx adenocarcinoma s/p rads presenting after fall w CT demonstrating L1 compression fx with 30% height loss  She does have low back pain that is managed with just tylenol and lidocaine patches  She has no weakness on exam  Discussed conservative management including TLSO brace with XR upright to ensure no kyphosing over fracture , increased height loss when standing up due to gravity. As long as pain manageable additionally then no need for surgical intervention. Intervention could include kyphoplasty vs surgical fixation, discussed with patient and daughter, would be amenable to kyphoplasty if needed    Plan  XR upright in TLSO brace pending  Will schedule follow up 6 weeks with repeat XR in TLSO        Taran Cuellar MD  Neurosurgery  [facility] - Internal Medicine Telemetry       [1]   Medications Prior to Admission   Medication Sig Dispense Refill Last Dose/Taking    acetaminophen (TYLENOL) 500 MG tablet Take 2 tablets (1,000 mg total) by mouth every 8 (eight) hours as needed for Pain.  0      amLODIPine (NORVASC) 2.5 MG tablet TAKE 1 TABLET BY MOUTH EVERY DAY (Patient not taking: Reported on 5/15/2025) 90 tablet 1     BD INSULIN SYRINGE ULTRA-FINE 1 mL 31 gauge x / Syrg        blood-glucose meter,continuous Misc Dispsense 1 Dexcom G7  1 each 0     blood-glucose transmitter (DEXCOM G6 TRANSMITTER) Amanda 1 each by Misc.(Non-Drug; Combo Route) route every 3 (three) months. 1 each 3     brimonidine 0.2% (ALPHAGAN) 0.2 % Drop Place 1 drop into both eyes 3 (three) times daily. 10 mL 11     cholecalciferol, vitamin D3, (VITAMIN D3) 25 mcg (1,000 unit) capsule Take 2 capsules (2,000 Units total) by mouth once daily.  0     DEXCOM G7 SENSOR Amanda 1 Device by Misc.(Non-Drug; Combo Route) route every 10 days. 9 each 3     dorzolamide-timolol 2-0.5% (COSOPT) 22.3-6.8 mg/mL ophthalmic solution Place 1 drop into both eyes 2 (two) times daily. 20 mL 3     estradioL (ESTRACE) 0.01 % (0.1 mg/gram) vaginal cream Place 0.5 g vaginally twice a week. Apply to the vagina daily for two weeks then twice a week. (Patient not taking: Reported on 5/15/2025) 45 g 4     furosemide (LASIX) 40 MG tablet TAKE 1 TABLET BY MOUTH EVERY DAY 90 tablet 0     insulin aspart U-100 (NOVOLOG U-100 INSULIN ASPART) 100 unit/mL injection TO USE WITH OMNIPOD 5. TOTAL DAILY DOSE UP TO 40 UNITS 40 mL 4     insulin pump cart,automated,BT (OMNIPOD 5 G6 PODS, GEN 5,) Crtg Inject 1 each into the skin Every 3 (three) days. 10 each 11     lactulose (CEPHULAC) 10 gram packet Take 10 g by mouth 3 (three) times daily. (Patient not taking: Reported on 5/15/2025)       latanoprost 0.005 % ophthalmic solution PLACE 1 DROP INTO BOTH EYES ONCE DAILY 7.5 mL 3     lisinopriL-hydrochlorothiazide (PRINZIDE,ZESTORETIC) 20-12.5 mg per tablet TAKE 1 TABLET BY MOUTH EVERY DAY 90 tablet 0     nystatin (MYCOSTATIN) 100,000 unit/mL suspension SWISH WITH 6MLS BY MOUTH THEN SWALLOW 4 TIMES A DAY FOR 14 DAYS       OMNIPOD 5 G6-G7 PODS, GEN 5, Crtg SMARTSI Each SUB-Q  "Once a Month       pen needle, diabetic 31 gauge x 5/16" Ndle Use to inject insulin into the skin up to 4 times daily 400 each 3     predniSONE (DELTASONE) 5 MG tablet Take 0.5 tablets (2.5 mg total) by mouth once daily. 45 tablet 3     tobramycin sulfate 0.3% (TOBREX) 0.3 % ophthalmic solution 1-2 drops topically twice daily to affected toe(s). 5 mL 9      "

## 2025-05-18 NOTE — ASSESSMENT & PLAN NOTE
Underwent biopsy of shoulder mass Jan 2025, positive for metastatic carcinoma on pathology, favored to be metastatic lung adenocarcinoma but second primary not entirely ruled out.     Has progressed in size based on photos in media tab. No active purulence or drainage. Does not appear acutely infected but high index of suspicion for possible source of fevers    Plan:  -- continue to monitor  -- complete remainder of infectious w/u  -- low suspicion shoulder lesion is source of active infection  -- CT deferred at this time, improving with current management

## 2025-05-18 NOTE — NURSING
Patient's daughter was given Insulin Pump Log to document insulin intake and carbohydrate intake.

## 2025-05-18 NOTE — ASSESSMENT & PLAN NOTE
Patient with known Cirrhosis with Child's class B. Co-morbidities are present and inclusive of malnutrition and anemia/pancytopenia.  MELD-Na score calculated; MELD 3.0: 13 at 5/18/2025  5:48 AM  MELD-Na: 8 at 5/18/2025  5:48 AM  Calculated from:  Serum Creatinine: 0.6 mg/dL (Using min of 1 mg/dL) at 5/18/2025  5:48 AM  Serum Sodium: 138 mmol/L (Using max of 137 mmol/L) at 5/18/2025  5:48 AM  Total Bilirubin: 0.3 mg/dL (Using min of 1 mg/dL) at 5/18/2025  5:48 AM  Serum Albumin: 1.4 g/dL (Using min of 1.5 g/dL) at 5/18/2025  5:48 AM  INR(ratio): 1.2 at 5/18/2025  5:48 AM  Age at listing (hypothetical): 84 years  Sex: Female at 5/18/2025  5:48 AM      Continue chronic meds. Etiology likely Autoimmune. Will avoid any hepatotoxic meds, and monitor CBC/CMP/INR for synthetic function.

## 2025-05-18 NOTE — ASSESSMENT & PLAN NOTE
Patient's most recent potassium results are listed below. No correlating EKG changes or palpitations.   Recent Labs     05/17/25  1348 05/18/25  0548   K 3.3* 3.4*     Plan  - Replete potassium per protocol  - Monitor potassium Daily  - Patient's hypokalemia is being monitored.

## 2025-05-18 NOTE — CARE UPDATE
Care Update:     83-year-old female medical history of diabetes mellitus, hypertension and autoimmune hepatitis. Consult acknowledged, full consult note to come in the morning. Patient in room 1150/1150 A. Blood glucose stable on current inpatient regimen.No hypoglycemia noted over the past 24-hours. Endocrine will follow and manage insulin orders inpatient.       Steroid use- None.   Renal function-   Lab Results   Component Value Date    CREATININE 0.7 05/17/2025        Diet Consistent Carbohydrate 2000 Calories (up to 75 gm per meal)     POCT Glucose   Date Value Ref Range Status   05/17/2025 148 (H) 70 - 110 mg/dL Final     Lab Results   Component Value Date    HGBA1C 6.0 (H) 06/21/2024         Home Medications   Diabetes Medications              insulin aspart U-100 (NOVOLOG U-100 INSULIN ASPART) 100 unit/mL injection TO USE WITH OMNIPOD 5. TOTAL DAILY DOSE UP TO 40 UNITS          Endocrinology consulted for BG management.   BG goal 140-180    - Continue home insulin pump in the inpatient setting  - BG checks AC/HS/0200  - Hypoglycemia protocol in place    At this time, pt meets criteria to continue home insulin pump usage.  - Has all adequate supplies    - Bolus settings reviewed    - No changes to home regimen.   - Nurse to check BG qac/hs/0200 & record in epic   - Patient to input glucose into pump and use bolus wizard for prandial needs   - Will continue to monitor accuchecks and titrate insulin as clinically indicated .     - Discussed above plan with patient, patient verbalized understanding.   - Understands in case of pump malfunction or cognitive decline in which pt can no longer safely use insulin pump, will transition to SC MDI     Omnipod 5  Basal Rate  12A:  0.45 units/hr      Carb Ratio  12A:12  6A: 10  6P: 12     ISF  12A: 50      Target: 120  Correct above: 140     IAT: 4 hours         If pump malfunctions or is disconnected, PLEASE CALL ENDOCRINE ON-CALL      ** Please notify Endocrine for any  change and/or advance in diet**  ** Please call Endocrine for any BG related issues **    Discharge Planning:   TBD. Please notify endocrinology prior to discharge.      Damian Roland DNP, FNP-C  Department of Endocrinology  Inpatient Glycemic Management

## 2025-05-18 NOTE — SUBJECTIVE & OBJECTIVE
Interval HPI:   Overnight events: No acute events overnight. Patient in room 1150/1150 A. Blood glucose stable. BG at goal on current insulin regimen (Home Insulin Pump). Steroid use- None.    Renal function- Normal   Vasopressors-  None       Endocrine will continue to follow and manage insulin orders inpatient.         Diet Consistent Carbohydrate 2000 Calories (up to 75 gm per meal)     Eatin%  Nausea: No  Hypoglycemia and intervention: No  Fever: No  TPN and/or TF: No    PMH, PSH, FH, SH updated and reviewed     ROS:  Review of Systems   Constitutional:  Negative for unexpected weight change.   Eyes:  Negative for visual disturbance.   Respiratory:  Negative for cough.    Cardiovascular:  Negative for chest pain.   Gastrointestinal:  Negative for nausea and vomiting.   Endocrine: Negative for polydipsia and polyuria.   Musculoskeletal:  Negative for back pain.   Skin:  Negative for rash.   Neurological:  Negative for syncope.   Psychiatric/Behavioral:  Negative for agitation and dysphoric mood.        Current Medications and/or Treatments Impacting Glycemic Control  Immunotherapy:    Immunosuppressants       None          Steroids:   Hormones (From admission, onward)      None          Pressors:    Autonomic Drugs (From admission, onward)      None          Hyperglycemia/Diabetes Medications:   Antihyperglycemics (From admission, onward)      Start     Stop Route Frequency Ordered    25 2100  insulin aspart U-100 insulin pump from home        Question Answer Comment   Target number 120    Basal Rate #1 0.45    Basal rate #1 time 9129-3699        -- SubQ Continuous 25 1947    25 2047  insulin aspart U-100 insulin pump from home 0-10 Units        Question Answer Comment   Target number 120    Carbohydrate coverage #1 1:12    Carbohydrate coverage #1 time [id number]    Carbohydrate coverage #2 1:10    Carbohydrate coverage #2 time [id number]    Carbohydrate coverage #3 1:12    Carbohydrate  coverage #3 time [id number]    Sensitivity #1 1:50    Sensitivity #1 time [id number]        -- SubQ As needed (PRN) 05/17/25 1947             PHYSICAL EXAMINATION:  Vitals:    05/18/25 0812   BP: 122/72   Pulse: 82   Resp: 18   Temp: 98.2 °F (36.8 °C)     Body mass index is 17.97 kg/m².     Physical Exam  Constitutional:       Appearance: She is well-developed.   HENT:      Head: Normocephalic.   Eyes:      Conjunctiva/sclera: Conjunctivae normal.   Pulmonary:      Effort: Pulmonary effort is normal.   Musculoskeletal:         General: Normal range of motion.   Skin:     General: Skin is warm.      Findings: No rash.   Neurological:      Mental Status: She is alert and oriented to person, place, and time.

## 2025-05-18 NOTE — ASSESSMENT & PLAN NOTE
Brought in by daughter for evaluation of fall with back pain as well as a 101F fever last night. Afebrile here. Leukocytosis - wbc 17. Recent URI resolved a couple of weeks ago. Currently denies URI or UTI sx. Possible fever is related to L1 compression fx, but given pt's chronic steroid use and active malignancy will complete infectious workup.    Suspect urine source. Ucx pending. Abx deescalated.     Plan:  - f/u blood cx, urine cx  - CT scan deferred at this time  - empiric CTX pending UCX  - daily CBC for monitoring of leukocytosis  - monitor for return of fever

## 2025-05-18 NOTE — PT/OT/SLP PROGRESS
Occupational Therapy      Patient Name:  Rahda Feng   MRN:  [id number]    Patient not seen today secondary to  TLSO not present in room prior to OT arrival.    OT will follow-up on 05/19/2025.    5/18/2025

## 2025-05-18 NOTE — PLAN OF CARE
Pending XR upright of Lsp in TLSO brace  Follow up scheduled for 6 weeks in clinic w repeat XR    Kevin Aria , MD [facility]

## 2025-05-18 NOTE — HPI
Reason for Consult: Management of T2DM, Hyperglycemia      Diabetes diagnosis year: > 5 years ago      Home Diabetes Medications:    Omnipod 5  Basal Rate  12A:  0.45 units/hr      Carb Ratio  12A:12  6A: 10  6P: 12     ISF  12A: 50      Target: 120  Correct above: 140     IAT: 4 hours        How often checking glucose at home? >4 x day Dexcom   BG readings on regimen: 100s-200s    Hypoglycemia on the regimen? No   Missed doses on regimen?  No     Diabetes Complications include:     Hyperglycemia     Complicating diabetes co morbidities:   Glucocorticoid use         HPI: Radha Feng is a 84 y.o. 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 Endocrine consulted to manage hyperglycemia and type 2 diabetes in the context of the inpatient setting. Patient may continue to utilize her home insulin pump.     Lab Results   Component Value Date    HGBA1C 6.0 (H) 06/21/2024

## 2025-05-18 NOTE — CONSULTS
[facility] - Internal Medicine Telemetry  Endocrinology  Diabetes Consult Note    Consult Requested by: Catherine Trujillo MD   Reason for admit: Compression fracture of L1 lumbar vertebra    HISTORY OF PRESENT ILLNESS:  Reason for Consult: Management of T2DM, Hyperglycemia      Diabetes diagnosis year: > 5 years ago      Home Diabetes Medications:    Omnipod 5  Basal Rate  12A:  0.45 units/hr      Carb Ratio  12A:12  6A: 10  6P: 12     ISF  12A: 50      Target: 120  Correct above: 140     IAT: 4 hours        How often checking glucose at home? >4 x day Dexcom   BG readings on regimen: 100s-200s  Hypoglycemia on the regimen? No   Missed doses on regimen?  No     Diabetes Complications include:     Hyperglycemia     Complicating diabetes co morbidities:   Glucocorticoid use         HPI: Radha Feng is a 84 y.o. 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 Endocrine consulted to manage hyperglycemia and type 2 diabetes in the context of the inpatient setting. Patient may continue to utilize her home insulin pump.     Lab Results   Component Value Date    HGBA1C 6.0 (H) 2024         Interval HPI:   Overnight events: No acute events overnight. Patient in room 1150/1150 A. Blood glucose stable. BG at goal on current insulin regimen (Home Insulin Pump). Steroid use- None.    Renal function- Normal   Vasopressors-  None       Endocrine will continue to follow and manage insulin orders inpatient.         Diet Consistent Carbohydrate 2000 Calories (up to 75 gm per meal)     Eatin%  Nausea: No  Hypoglycemia and intervention:  No  Fever: No  TPN and/or TF: No    PMH, PSH, FH, SH updated and reviewed     ROS:  Review of Systems   Constitutional:  Negative for unexpected weight change.   Eyes:  Negative for visual disturbance.   Respiratory:  Negative for cough.    Cardiovascular:  Negative for chest pain.   Gastrointestinal:  Negative for nausea and vomiting.   Endocrine: Negative for polydipsia and polyuria.   Musculoskeletal:  Negative for back pain.   Skin:  Negative for rash.   Neurological:  Negative for syncope.   Psychiatric/Behavioral:  Negative for agitation and dysphoric mood.        Current Medications and/or Treatments Impacting Glycemic Control  Immunotherapy:    Immunosuppressants       None          Steroids:   Hormones (From admission, onward)      None          Pressors:    Autonomic Drugs (From admission, onward)      None          Hyperglycemia/Diabetes Medications:   Antihyperglycemics (From admission, onward)      Start     Stop Route Frequency Ordered    05/17/25 2100  insulin aspart U-100 insulin pump from home        Question Answer Comment   Target number 120    Basal Rate #1 0.45    Basal rate #1 time 1230-1452        -- SubQ Continuous 05/17/25 1947 05/17/25 2047  insulin aspart U-100 insulin pump from home 0-10 Units        Question Answer Comment   Target number 120    Carbohydrate coverage #1 1:12    Carbohydrate coverage #1 time 0725-0050    Carbohydrate coverage #2 1:10    Carbohydrate coverage #2 time 7443-0256    Carbohydrate coverage #3 1:12    Carbohydrate coverage #3 time 6330-5104    Sensitivity #1 1:50    Sensitivity #1 time [id number]        -- SubQ As needed (PRN) 05/17/25 1947             PHYSICAL EXAMINATION:  Vitals:    05/18/25 0812   BP: 122/72   Pulse: 82   Resp: 18   Temp: 98.2 °F (36.8 °C)     Body mass index is 17.97 kg/m².     Physical Exam  Constitutional:       Appearance: She is well-developed.   HENT:      Head: Normocephalic.   Eyes:      Conjunctiva/sclera: Conjunctivae normal.  "  Pulmonary:      Effort: Pulmonary effort is normal.   Musculoskeletal:         General: Normal range of motion.   Skin:     General: Skin is warm.      Findings: No rash.   Neurological:      Mental Status: She is alert and oriented to person, place, and time.            Labs Reviewed and Include   Recent Labs   Lab 05/18/25  0548   GLU 97   CALCIUM 8.6*   ALBUMIN 1.4*   PROT 6.2      K 3.4*   CO2 25      BUN 14   CREATININE 0.6   ALKPHOS 68   ALT 11   AST 32   BILITOT 0.3     Lab Results   Component Value Date    WBC 16.67 (H) 05/18/2025    HGB 8.0 (L) 05/18/2025    HCT 26.0 (L) 05/18/2025    MCV 91 05/18/2025     05/18/2025     No results for input(s): "TSH", "FREET4" in the last 168 hours.  Lab Results   Component Value Date    HGBA1C 6.0 (H) 06/21/2024       Nutritional status:   Body mass index is 17.97 kg/m².  Lab Results   Component Value Date    ALBUMIN 1.4 (L) 05/18/2025    ALBUMIN 1.6 (L) 05/17/2025    ALBUMIN 1.8 (L) 05/13/2025     No results found for: "PREALBUMIN"    Estimated Creatinine Clearance: 55 mL/min (based on SCr of 0.6 mg/dL).    Accu-Checks  Recent Labs     05/17/25  1924   POCTGLUCOSE 148*        ASSESSMENT and PLAN    Neuro  * Compression fracture of L1 lumbar vertebra  Managed per primary team  Avoid hypoglycemia  Optimize BG control to improve wound healing        Endocrine  Insulin pump in place  At time of evaluation, pt meets criteria to continue home insulin pump usage.  - Has all adequate supplies   - Insulin pump site change on 05/17/2025.    - Bolus settings reviewed    - No changes to home regimen.   - Nurse to check BG qac/hs/0200 & record in epic   - Patient to input glucose into pump and use bolus wizard for prandial needs   - Will continue to monitor accuchecks and titrate insulin as clinically indicated .     - Discussed above plan with patient, patient verbalized understanding.   - Understands in case of pump malfunction or cognitive decline in which pt " can no longer safely use insulin pump, will transition to SC MDI     Patient may continue to wear Be/ Dexcom CGM, but must have a finger stick BG check AC/HS.   Treatment decision inpatient must be confirmed via fingerstick.   Always confirm hypo and hyperglycemia with finger sticks.     If the patient refuses accuchecks, the nurse should document the refusal on the CGM flowsheet or in a nurses note and record the CGM glucose reading on the flowsheet. POCT BG checks can not be discontinued while inpatient.     If pump malfunctions or is disconnected, please call Endocrine On-Call!      Type 2 diabetes mellitus with circulatory disorder, with long-term current use of insulin  Endocrinology consulted for BG management.   BG goal 140-180    - Home Insulin Pump  - BG checks AC/HS/0200  - Hypoglycemia protocol in place  - If blood glucose greater than 300, please ask patient not to eat food or drink anything other than water until correctional insulin has brought it back below 250    ** Please notify Endocrine for any change and/or advance in diet**  ** Please call Endocrine for any BG related issues **    Discharge Planning:   TBD. Please notify endocrinology prior to discharge.            Plan discussed with patient, family, and RN at bedside.        Damian Roland, DNP, FNP  Endocrinology  [facility] - Internal Medicine Telemetry

## 2025-05-18 NOTE — PLAN OF CARE
[facility] - Internal Medicine Telemetry  Initial Discharge Assessment       Primary Care Provider: Leticia Lim MD    Admission Diagnosis: Screening for cardiovascular condition [Z13.6]  Chest pain [R07.9]  Fever, unspecified fever cause [R50.9]    Admission Date: 5/17/2025  Expected Discharge Date:     Transition of Care Barriers: None    Payor: MEDICARE / Plan: MEDICARE PART A & B / Product Type: Government /     Extended Emergency Contact Information  Primary Emergency Contact: Della Feng  Mobile Phone: [phone]  Relation: Daughter  Preferred language: English   needed? No  Secondary Emergency Contact: Raymon Morin  Mobile Phone: [phone]  Relation: Grandchild    Discharge Plan A: Home with family  Discharge Plan B: Home with family      [id number] - [place], [place] - [address]  [address]  [facility] [address]  Phone: [phone] Fax: [phone]    [facility]  [address]  [facility] [address]  Phone: [phone] Fax: [phone]    [facility]  [address] [facility] [address]  Phone: [phone] Fax: [phone]      Initial Assessment (most recent)       Adult Discharge Assessment - 05/18/25 1152          Discharge Assessment    Assessment Type Discharge Planning Assessment     Confirmed/corrected address, phone number and insurance Yes     Confirmed Demographics Correct on Facesheet     Source of Information patient;family     Communicated MELVI with patient/caregiver Date not available/Unable to determine     Reason For Admission Compression fracture of L1 lumbar vertebra     People in Home child(keith), adult;grandchild(keith)     Facility Arrived From: home     Do you expect to return to your current living situation? Yes     Do you have help at home or someone to help you manage your care at home? Yes     Who are your caregiver(s) and their phone number(s)? Farrukh Hull  [phone]     Prior to hospitilization cognitive status: Alert/Oriented     Current cognitive status: Alert/Oriented     Walking or Climbing Stairs Difficulty yes     Walking or Climbing Stairs ambulation difficulty, requires equipment;stair climbing difficulty, requires equipment;transferring difficulty, requires equipment     Mobility Management walker, cane , rollator and wc     Home Accessibility not wheelchair accessible;stairs to enter home     Number of Stairs, Main Entrance six     Stair Railings, Main Entrance railings safe and in good condition     Home Layout Able to live on 1st floor     Equipment Currently Used at Home wheelchair;walker, rolling;cane, quad     Readmission within 30 days? No     Patient currently being followed by outpatient case management? No     Do you currently have service(s) that help you manage your care at home? No     Do you take prescription medications? Yes     Do you have prescription coverage? Yes     Do you have any problems affording any of your prescribed medications? TBD     Is the patient taking medications as prescribed? yes     Who is going to help you get home at discharge? Daughter     How do you get to doctors appointments? family or friend will provide     Are you on dialysis? No     Do you take coumadin? No     Discharge Plan A Home with family     Discharge Plan B Home with family     DME Needed Upon Discharge  none     Discharge Plan discussed with: Adult children;Patient     Transition of Care Barriers None        Financial Resource Strain    How hard is it for you to pay for the very basics like food, housing, medical care, and heating? Not very hard        Housing Stability    In the last 12 months, was there a time when you were not able to pay the mortgage or rent on time? No     At any time in the past 12 months, were you homeless or living in a shelter (including now)? No        Transportation Needs    In the past 12 months, has lack of transportation  kept you from medical appointments or from getting medications? No     In the past 12 months, has lack of transportation kept you from meetings, work, or from getting things needed for daily living? No        Food Insecurity    Within the past 12 months, you worried that your food would run out before you got the money to buy more. Never true     Within the past 12 months, the food you bought just didn't last and you didn't have money to get more. Never true        Social Isolation    How often do you feel lonely or isolated from those around you?  Rarely        Alcohol Use    Q2: How many drinks containing alcohol do you have on a typical day when you are drinking? Patient does not drink        Utilities    In the past 12 months has the electric, gas, oil, or water company threatened to shut off services in your home? No        OTHER    Name(s) of People in Home Annamaria                     Discharge Plan A and Plan B have been determined by review of patient's clinical status, future medical and therapeutic needs, and coverage/benefits for post-acute care in coordination with multidisciplinary team members.     CM spoke with patient and daughter in Room at bedside.   All information was verified on facesheet. Patient lives in a 1 story house with daughter and grandson, 6 steps to enter.  Patient ambulates with can or Rolator pending distance also has a WC if needed. NO current HH at this time.   Patient is not on dialysis nor use Coumadin as a blood thinner.   Patient takes medication as prescribed and has resources for all prescriptive needs. Patient will have help from family member upon discharge.   Family will provide transportation home.  Discharge booklet given to family at bedside.    All Questions and concerns addressed and whiteboard updated with CM contact information. Will continue to follow for course of hospitalization.         Tori Willoughby RN  Case Management  [phone]

## 2025-05-18 NOTE — ASSESSMENT & PLAN NOTE
At time of evaluation, pt meets criteria to continue home insulin pump usage.  - Has all adequate supplies   - Insulin pump site change on 05/17/2025.    - Bolus settings reviewed    - No changes to home regimen.   - Nurse to check BG qac/hs/0200 & record in epic   - Patient to input glucose into pump and use bolus wizard for prandial needs   - Will continue to monitor accuchecks and titrate insulin as clinically indicated .     - Discussed above plan with patient, patient verbalized understanding.   - Understands in case of pump malfunction or cognitive decline in which pt can no longer safely use insulin pump, will transition to SC MDI     Patient may continue to wear Be/ Dexcom CGM, but must have a finger stick BG check AC/HS.   Treatment decision inpatient must be confirmed via fingerstick.   Always confirm hypo and hyperglycemia with finger sticks.     If the patient refuses accuchecks, the nurse should document the refusal on the CGM flowsheet or in a nurses note and record the CGM glucose reading on the flowsheet. POCT BG checks can not be discontinued while inpatient.     If pump malfunctions or is disconnected, please call Endocrine On-Call!

## 2025-05-18 NOTE — SUBJECTIVE & OBJECTIVE
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    Diabetes mellitus     Glaucoma     Hypertension     Lung cancer     Other ascites 11/29/2022     Past Surgical History:   Procedure Laterality Date    CATARACT EXTRACTION W/  INTRAOCULAR LENS IMPLANT Left 12/21/2021        ENDOBRONCHIAL ULTRASOUND N/A 07/05/2024    Procedure: ENDOBRONCHIAL ULTRASOUND (EBUS);  Surgeon: Rolo Wilkinson MD;  Location: [facility] OR [facility] FLR;  Service: Pulmonary;  Laterality: N/A;    ESOPHAGOGASTRODUODENOSCOPY N/A 06/26/2023    Procedure: ESOPHAGOGASTRODUODENOSCOPY (EGD);  Surgeon: Edgar Branch MD;  Location: [facility] ENDO (4TH FLR);  Service: Endoscopy;  Laterality: N/A;  referral Dr Peraza-cirrhosis/labs done on 4/24/23-instr portal-GT     Family History       Problem Relation (Age of Onset)    Blindness Cousin    Cataracts Mother    Colon cancer Sister    Diabetes Mother    Glaucoma Mother    Heart attack Father    Hypertension Mother          Tobacco Use    Smoking status: Former    Smokeless tobacco: Never   Substance and Sexual Activity    Alcohol use: Not Currently    Drug use: Never    Sexual activity: Not on file     Review of Systems   All other systems reviewed and are negative.    Objective:     Weight: 49 kg (108 lb)  Body mass index is 17.97 kg/m².  Vital Signs (Most Recent):  Temp: 98.7 °F (37.1 °C) (05/17/25 2020)  Pulse: 83 (05/17/25 2020)  Resp: 16 (05/17/25 2020)  BP: 136/79 (05/17/25 2020)  SpO2: 95 % (05/17/25 2020) Vital Signs (24h Range):  Temp:  [97.5 °F (36.4 °C)-98.7 °F (37.1 °C)] 98.7 °F (37.1 °C)  Pulse:  [74-83] 83  Resp:  [16-18] 16  SpO2:  [94 %-97 %] 95 %  BP: (118-136)/(56-79) 136/79     Date 05/17/25 0700 - 05/18/25 0659   Shift 8057-5582 3924-5682 0861-5292 24 Hour Total   INTAKE   IV Piggyback  1247.5  1247.5   Shift Total(mL/kg)  1247.5(25.5)   "1247.5(25.5)   OUTPUT   Shift Total(mL/kg)       Weight (kg) 49 49 49 49                       Female External Urinary Catheter w/ Suction 05/17/25 1447 (Active)   Skin no redness;no breakdown 05/17/25 1734   Tolerance no signs/symptoms of discomfort 05/17/25 1734          Physical Exam     GENERAL: resting comfortably  HEENT: NCAT, PERRL, mucous membranes moist  NECK: supple, trachea midline  CV: normal capillary refill  PULM: aerating well, symmetric expansion, no distress  ABD: soft, NT, ND  EXT: no c/c/e    NEURO:    AAO x 3  CN II-XII grossly intact  Fc x 4 antigravity  SILT    No drift or dysmetria      Neurosurgery Physical Exam    Significant Labs:  Recent Labs   Lab 05/17/25  1348   *      K 3.3*      CO2 22*   BUN 16   CREATININE 0.7   CALCIUM 8.9     Recent Labs   Lab 05/17/25  1348   WBC 17.33*   HGB 7.5*   HCT 25.0*        No results for input(s): "LABPT", "INR", "APTT" in the last 48 hours.  Microbiology Results (last 7 days)       Procedure Component Value Units Date/Time    Urine culture [[id number]] Collected: 05/17/25 1703    Order Status: Sent Specimen: Urine Updated: 05/17/25 1730    Influenza A & B by Molecular [[id number]]  (Normal) Collected: 05/17/25 1348    Order Status: Completed Specimen: Nasal Swab Updated: 05/17/25 1505     INFLUENZA A MOLECULAR Negative     INFLUENZA B MOLECULAR  Negative    Blood culture x two cultures. Draw prior to antibiotics. [[id number]] Collected: 05/17/25 1350    Order Status: Sent Specimen: Blood from Peripheral, Antecubital, Right Updated: 05/17/25 1357    Blood culture x two cultures. Draw prior to antibiotics. [[id number]] Collected: 05/17/25 1348    Order Status: Sent Specimen: Blood from Peripheral, Lower Arm, Left Updated: 05/17/25 1357          All pertinent labs from the last 24 hours have been reviewed.    Significant Diagnostics:  I have reviewed all pertinent imaging results/findings within the past 24 hours.  I have " reviewed and interpreted all pertinent imaging results/findings within the past 24 hours.  CT Lumbar Spine Without Contrast  Result Date: 5/17/2025  Acute L1 compression fracture with approximately 30% height loss.  No retropulsion of fracture fragments. Osteopenia and degenerative change of the lumbar spine detailed above. Electronically signed by: Katarina Grace MD Date:    05/17/2025 Time:    15:43    X-Ray Chest AP Portable  Result Date: 5/17/2025  No acute abnormality. Electronically signed by: Gerardo Angelo MD Date:    05/17/2025 Time:    14:26           [1]   Medications Prior to Admission   Medication Sig Dispense Refill Last Dose/Taking    acetaminophen (TYLENOL) 500 MG tablet Take 2 tablets (1,000 mg total) by mouth every 8 (eight) hours as needed for Pain.  0     amLODIPine (NORVASC) 2.5 MG tablet TAKE 1 TABLET BY MOUTH EVERY DAY (Patient not taking: Reported on 5/15/2025) 90 tablet 1     BD INSULIN SYRINGE ULTRA-FINE 1 mL 31 gauge x 5/16 Syrg        blood-glucose meter,continuous Misc Dispsense 1 Dexcom G7  1 each 0     blood-glucose transmitter (DEXCOM G6 TRANSMITTER) Amanda 1 each by Misc.(Non-Drug; Combo Route) route every 3 (three) months. 1 each 3     brimonidine 0.2% (ALPHAGAN) 0.2 % Drop Place 1 drop into both eyes 3 (three) times daily. 10 mL 11     cholecalciferol, vitamin D3, (VITAMIN D3) 25 mcg (1,000 unit) capsule Take 2 capsules (2,000 Units total) by mouth once daily.  0     DEXCOM G7 SENSOR Amanda 1 Device by Misc.(Non-Drug; Combo Route) route every 10 days. 9 each 3     dorzolamide-timolol 2-0.5% (COSOPT) 22.3-6.8 mg/mL ophthalmic solution Place 1 drop into both eyes 2 (two) times daily. 20 mL 3     estradioL (ESTRACE) 0.01 % (0.1 mg/gram) vaginal cream Place 0.5 g vaginally twice a week. Apply to the vagina daily for two weeks then twice a week. (Patient not taking: Reported on 5/15/2025) 45 g 4     furosemide (LASIX) 40 MG tablet TAKE 1 TABLET BY MOUTH EVERY DAY 90 tablet 0      "insulin aspart U-100 (NOVOLOG U-100 INSULIN ASPART) 100 unit/mL injection TO USE WITH OMNIPOD 5. TOTAL DAILY DOSE UP TO 40 UNITS 40 mL 4     insulin pump cart,automated,BT (OMNIPOD 5 G6 PODS, GEN 5,) Crtg Inject 1 each into the skin Every 3 (three) days. 10 each 11     lactulose (CEPHULAC) 10 gram packet Take 10 g by mouth 3 (three) times daily. (Patient not taking: Reported on 5/15/2025)       latanoprost 0.005 % ophthalmic solution PLACE 1 DROP INTO BOTH EYES ONCE DAILY 7.5 mL 3     lisinopriL-hydrochlorothiazide (PRINZIDE,ZESTORETIC) 20-12.5 mg per tablet TAKE 1 TABLET BY MOUTH EVERY DAY 90 tablet 0     nystatin (MYCOSTATIN) 100,000 unit/mL suspension SWISH WITH 6MLS BY MOUTH THEN SWALLOW 4 TIMES A DAY FOR 14 DAYS       OMNIPOD 5 G6-G7 PODS, GEN 5, Crtg SMARTSI Each SUB-Q Once a Month       pen needle, diabetic 31 gauge x 5/16" Ndle Use to inject insulin into the skin up to 4 times daily 400 each 3     predniSONE (DELTASONE) 5 MG tablet Take 0.5 tablets (2.5 mg total) by mouth once daily. 45 tablet 3     tobramycin sulfate 0.3% (TOBREX) 0.3 % ophthalmic solution 1-2 drops topically twice daily to affected toe(s). 5 mL 9      "

## 2025-05-18 NOTE — HOSPITAL COURSE
Admitted for L1 compression fracture after fall and UTI. Abdominal pain significantly improved with conservative management, will defer additional imaging at this time. NSGY consulted for fracture, recommend TLSO brace and XR with brace which is pending. Pain controlled with scheduled tylenol and prn oxycodone 5mg. Urine culture pending, on empiric CTX, will d/c with ciprofloxacin to complete 7 day course for complicated UTI given reported fever and based on recent susceptibilities.   Repeat spinal x-ray noted. NSGY recommended outpatient follow up. Seen by PT/OT, who recommend Low Intensity Therapy. Patient stable for discharge home.

## 2025-05-18 NOTE — ASSESSMENT & PLAN NOTE
Patient's blood pressure range in the last 24 hours was: BP  Min: 114/57  Max: 136/79.The patient's inpatient anti-hypertensive regimen is listed below:  Current Antihypertensives  dorzolamide-timolol 2-0.5% ophthalmic solution 1 drop, 2 times daily, Both Eyes    Plan  - holding lisinopril-hctz, will restart if indicated

## 2025-05-18 NOTE — HPI
Radha Feng is a 84 y.o. 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 She is accompanied by her daughter, Della, who is a nurse and is able to provide the majority of her following history. Around 7pm yesterday (5/16/25), patient was attempted to open drawer of her dresser when she lost balance and fell backward and noted acute back pain. She denies hitting her head or LOC. Not on blood thinners. No preceding symptoms of chest pain, dizziness, SOB prior to fall. Patient called her daughter following fall. A few hours later her daughter noted she had a measured temperature of 101F at home and noted x1 episode of non-bloody vomiting. She gave her a dose of doxycycline and 1g tylenol. At that time she encouraged her mother to go to the ED but she declined. This morning she continued to complain of back pain and was agreeable to go to the ED for further evaluation. Daughter notes 3 days prior to fall worsening appetite and generalized weakness. Normally able to ambulate with cane/walker.      Currently she reports low midline back pain and mild diffuse abdominal pain that started while in the ED. Chronic bilateral knee and ankle pain that is unchanged. No other injuries that she reports as a result of the fall. She denies headache, dizziness, vision changes, neck pain, chest pain, SOB, dysuria. Denies numbness of the lower extremities, urinary or bowel incontinence, saddle anesthesia. She has a chronic wound on her R shoulder that is unchanged. Per recent heme/onc note, shoulder lesion is suspected metastatic lung cancer rather than  second primary.

## 2025-05-18 NOTE — NURSING
Nurse obtained  insulin pump patient log sheet and pt signature for insulin pump sheet.per Damian Roland, DNP, FNP.. At 0624pm on 05/17/25 Pt BS was 148.      Nurse made AM Nurse Miracle VELAZCO aware of new order for insulin pump patient log sheet. No acute s/s of distress. Pt AAOx4, resting comfortably in bed, respirations E/UL, updated on POC, wheels locked and in low position, call bell within reach, Comfort positioning and restroom needs were addressed. Necessary items were placed within reach. Plan of care ongoing. No new orders at this time.

## 2025-05-18 NOTE — SUBJECTIVE & OBJECTIVE
Interval History: NAEON. Slept well following 5mg oxycodone. Abdominal pain significantly improved, will discontinue CT scan. Awaiting TLSO brace delivery followed by XR in brace. Urine culture in process.     Objective:     Vital Signs (Most Recent):  Temp: 98.2 °F (36.8 °C) (05/18/25 0812)  Pulse: 82 (05/18/25 0812)  Resp: 18 (05/18/25 0812)  BP: 122/72 (05/18/25 0812)  SpO2: 97 % (05/18/25 0812) Vital Signs (24h Range):  Temp:  [97.5 °F (36.4 °C)-98.7 °F (37.1 °C)] 98.2 °F (36.8 °C)  Pulse:  [73-83] 82  Resp:  [16-18] 18  SpO2:  [94 %-97 %] 97 %  BP: (114-136)/(56-79) 122/72     Weight: 49 kg (108 lb)  Body mass index is 17.97 kg/m².    Intake/Output Summary (Last 24 hours) at 5/18/2025 0850  Last data filed at 5/17/2025 1611  Gross per 24 hour   Intake 1247.54 ml   Output --   Net 1247.54 ml         Physical Exam  Vitals and nursing note reviewed.   Constitutional:       General: She is not in acute distress.     Appearance: She is underweight. She is ill-appearing.      Comments: Lying on L side   HENT:      Head: Normocephalic and atraumatic.   Eyes:      Extraocular Movements: Extraocular movements intact.   Cardiovascular:      Rate and Rhythm: Normal rate and regular rhythm.      Heart sounds: No murmur heard.  Pulmonary:      Effort: Pulmonary effort is normal. No respiratory distress.      Breath sounds: Normal breath sounds. No wheezing or rales.   Abdominal:      General: Abdomen is flat. Bowel sounds are normal. There is no distension.      Palpations: Abdomen is soft.      Tenderness: There is no abdominal tenderness. There is no guarding.   Musculoskeletal:      Cervical back: No deformity or bony tenderness.      Thoracic back: No deformity or bony tenderness.      Lumbar back: Bony tenderness present. No deformity.      Right lower leg: No edema.      Left lower leg: No edema.      Comments: Large chronic R shoulder lesion with small ulcerations with no active drainage or purulence. See photo    Skin:     General: Skin is warm and dry.   Neurological:      Mental Status: She is alert.               Significant Labs: All pertinent labs within the past 24 hours have been reviewed.  CBC:   Recent Labs   Lab 05/17/25  1348 05/18/25  0548   WBC 17.33* 16.67*   HGB 7.5* 8.0*   HCT 25.0* 26.0*    258     CMP:   Recent Labs   Lab 05/17/25  1348 05/18/25  0548    138   K 3.3* 3.4*    105   CO2 22* 25   * 97   BUN 16 14   CREATININE 0.7 0.6   CALCIUM 8.9 8.6*   PROT 6.7 6.2   ALBUMIN 1.6* 1.4*   BILITOT 0.5 0.3   ALKPHOS 72 68   AST 36 32   ALT 14 11   ANIONGAP 11 8       Significant Imaging: I have reviewed all pertinent imaging results/findings within the past 24 hours.

## 2025-05-19 ENCOUNTER — TELEPHONE (OUTPATIENT)
Dept: NEUROSURGERY | Facility: CLINIC | Age: 85
End: 2025-05-19
Payer: MEDICARE

## 2025-05-19 VITALS
RESPIRATION RATE: 18 BRPM | SYSTOLIC BLOOD PRESSURE: 143 MMHG | WEIGHT: 108 LBS | OXYGEN SATURATION: 91 % | HEART RATE: 85 BPM | TEMPERATURE: 98 F | BODY MASS INDEX: 17.99 KG/M2 | HEIGHT: 65 IN | DIASTOLIC BLOOD PRESSURE: 84 MMHG

## 2025-05-19 DIAGNOSIS — S32.010S COMPRESSION FRACTURE OF L1 VERTEBRA, SEQUELA: Primary | ICD-10-CM

## 2025-05-19 LAB
ABSOLUTE EOSINOPHIL (OHS): 0.29 K/UL
ABSOLUTE MONOCYTE (OHS): 1.95 K/UL (ref 0.3–1)
ABSOLUTE NEUTROPHIL COUNT (OHS): 11.68 K/UL (ref 1.8–7.7)
ALBUMIN SERPL BCP-MCNC: 1.4 G/DL (ref 3.5–5.2)
ANION GAP (OHS): 8 MMOL/L (ref 8–16)
BACTERIA UR CULT: ABNORMAL
BASOPHILS # BLD AUTO: 0.05 K/UL
BASOPHILS NFR BLD AUTO: 0.3 %
BUN SERPL-MCNC: 15 MG/DL (ref 8–23)
CALCIUM SERPL-MCNC: 8.6 MG/DL (ref 8.7–10.5)
CHLORIDE SERPL-SCNC: 108 MMOL/L (ref 95–110)
CO2 SERPL-SCNC: 21 MMOL/L (ref 23–29)
CREAT SERPL-MCNC: 0.6 MG/DL (ref 0.5–1.4)
ERYTHROCYTE [DISTWIDTH] IN BLOOD BY AUTOMATED COUNT: 18.7 % (ref 11.5–14.5)
GFR SERPLBLD CREATININE-BSD FMLA CKD-EPI: >60 ML/MIN/1.73/M2
GLUCOSE SERPL-MCNC: 106 MG/DL (ref 70–110)
HCT VFR BLD AUTO: 26.4 % (ref 37–48.5)
HGB BLD-MCNC: 7.7 GM/DL (ref 12–16)
IMM GRANULOCYTES # BLD AUTO: 0.12 K/UL (ref 0–0.04)
IMM GRANULOCYTES NFR BLD AUTO: 0.8 % (ref 0–0.5)
LYMPHOCYTES # BLD AUTO: 1.15 K/UL (ref 1–4.8)
MAGNESIUM SERPL-MCNC: 2.2 MG/DL (ref 1.6–2.6)
MCH RBC QN AUTO: 27.3 PG (ref 27–31)
MCHC RBC AUTO-ENTMCNC: 29.2 G/DL (ref 32–36)
MCV RBC AUTO: 94 FL (ref 82–98)
NUCLEATED RBC (/100WBC) (OHS): 0 /100 WBC
PHOSPHATE SERPL-MCNC: 3.2 MG/DL (ref 2.7–4.5)
PLATELET # BLD AUTO: 239 K/UL (ref 150–450)
PMV BLD AUTO: 10.1 FL (ref 9.2–12.9)
POTASSIUM SERPL-SCNC: 4.4 MMOL/L (ref 3.5–5.1)
RBC # BLD AUTO: 2.82 M/UL (ref 4–5.4)
RELATIVE EOSINOPHIL (OHS): 1.9 %
RELATIVE LYMPHOCYTE (OHS): 7.5 % (ref 18–48)
RELATIVE MONOCYTE (OHS): 12.8 % (ref 4–15)
RELATIVE NEUTROPHIL (OHS): 76.7 % (ref 38–73)
SODIUM SERPL-SCNC: 137 MMOL/L (ref 136–145)
WBC # BLD AUTO: 15.24 K/UL (ref 3.9–12.7)

## 2025-05-19 PROCEDURE — 97165 OT EVAL LOW COMPLEX 30 MIN: CPT

## 2025-05-19 PROCEDURE — 85025 COMPLETE CBC W/AUTO DIFF WBC: CPT

## 2025-05-19 PROCEDURE — 83735 ASSAY OF MAGNESIUM: CPT

## 2025-05-19 PROCEDURE — 97116 GAIT TRAINING THERAPY: CPT

## 2025-05-19 PROCEDURE — 36415 COLL VENOUS BLD VENIPUNCTURE: CPT

## 2025-05-19 PROCEDURE — 25000003 PHARM REV CODE 250

## 2025-05-19 PROCEDURE — 97161 PT EVAL LOW COMPLEX 20 MIN: CPT

## 2025-05-19 PROCEDURE — 97535 SELF CARE MNGMENT TRAINING: CPT

## 2025-05-19 PROCEDURE — 99233 SBSQ HOSP IP/OBS HIGH 50: CPT | Mod: ,,,

## 2025-05-19 PROCEDURE — 99232 SBSQ HOSP IP/OBS MODERATE 35: CPT | Mod: ,,, | Performed by: NURSE PRACTITIONER

## 2025-05-19 PROCEDURE — 80069 RENAL FUNCTION PANEL: CPT | Performed by: STUDENT IN AN ORGANIZED HEALTH CARE EDUCATION/TRAINING PROGRAM

## 2025-05-19 RX ORDER — CIPROFLOXACIN 500 MG/1
500 TABLET, FILM COATED ORAL 2 TIMES DAILY
Qty: 10 TABLET | Refills: 0 | Status: SHIPPED | OUTPATIENT
Start: 2025-05-19

## 2025-05-19 RX ORDER — OXYCODONE HYDROCHLORIDE 5 MG/1
2.5 TABLET ORAL EVERY 6 HOURS PRN
Qty: 10 TABLET | Refills: 0 | Status: SHIPPED | OUTPATIENT
Start: 2025-05-19

## 2025-05-19 RX ORDER — LIDOCAINE 50 MG/G
1 PATCH TOPICAL DAILY
Qty: 30 PATCH | Refills: 0 | Status: SHIPPED | OUTPATIENT
Start: 2025-05-19

## 2025-05-19 RX ADMIN — ACETAMINOPHEN 1000 MG: 500 TABLET ORAL at 03:05

## 2025-05-19 RX ADMIN — DORZOLAMIDE HYDROCHLORIDE AND TIMOLOL MALEATE 1 DROP: 20; 5 SOLUTION OPHTHALMIC at 09:05

## 2025-05-19 RX ADMIN — BRIMONIDINE TARTRATE 1 DROP: 2 SOLUTION OPHTHALMIC at 09:05

## 2025-05-19 RX ADMIN — Medication 2000 UNITS: at 08:05

## 2025-05-19 RX ADMIN — ACETAMINOPHEN 1000 MG: 500 TABLET ORAL at 08:05

## 2025-05-19 RX ADMIN — NYSTATIN 500000 UNITS: 100000 SUSPENSION ORAL at 08:05

## 2025-05-19 RX ADMIN — Medication 1 CAPSULE: at 09:05

## 2025-05-19 RX ADMIN — NYSTATIN 500000 UNITS: 100000 SUSPENSION ORAL at 12:05

## 2025-05-19 RX ADMIN — BRIMONIDINE TARTRATE 1 DROP: 2 SOLUTION OPHTHALMIC at 03:05

## 2025-05-19 RX ADMIN — LATANOPROST 1 DROP: 50 SOLUTION OPHTHALMIC at 09:05

## 2025-05-19 NOTE — ASSESSMENT & PLAN NOTE
Brought in by daughter for evaluation of fall with back pain as well as a 101F fever last night. Afebrile here. Leukocytosis - wbc 17. Recent URI resolved a couple of weeks ago. Currently denies URI or UTI sx. Possible fever is related to L1 compression fx, but given pt's chronic steroid use and active malignancy will complete infectious workup.    Suspect urine source. Ucx pending. Abx deescalated.     Plan:  - blood cultures negative to date, Urine Culture with >100k Proteus mirabilis  - CT scan deferred at this time  - empiric CTX- discharge with course of ciprofloxacin

## 2025-05-19 NOTE — TELEPHONE ENCOUNTER
Pt scheduled for XR and f/u on:    Future Appointments   Date Time Provider Department Center   5/27/2025  4:00 PM Michelle Feng RN, [facility] DIABETE [facility]   6/3/2025  2:20 PM [facility], DEXA1 [facility] BMD [facility]   6/10/2025  8:00 AM Nestor Agarwal MD [facility] ENDODIA [facility]   7/1/2025 10:30 AM Leticia Lim MD [facility]   7/2/2025  9:00 AM [facility] OI EOS [facility] EOS [facility]   7/2/2025 10:00 AM Leeanne Solorio, PA-C [facility] NEUROS8 [facility]   7/10/2025  3:00 PM Yvonne Peraza MD [facility] HEPAT [facility]   7/15/2025  2:15 PM Nicole Garber MD [facility] OPHTHAL [facility]   7/22/2025  3:45 PM BAP USOP3 [facility] [place] USOUNDO [facility]       Will mail appt notice to address on file.    ----- Message from Taran Cuellar sent at 5/17/2025  9:27 PM CDT -----  Hey this patient needs follow up  6 weeks with repeat XR upright of lumbar spine in TLSO brace with a PA ; L1 compression fracture 30% height loss , pain controlledThanksKevin

## 2025-05-19 NOTE — PLAN OF CARE
Problem: Adult Inpatient Plan of Care  Goal: Plan of Care Review  Outcome: Progressing  Goal: Patient-Specific Goal (Individualized)  Outcome: Progressing  Goal: Absence of Hospital-Acquired Illness or Injury  Outcome: Progressing  Goal: Optimal Comfort and Wellbeing  Outcome: Progressing  Goal: Readiness for Transition of Care  Outcome: Progressing       Pt A&Ox4, on 1.5L of NC. Family remain at bedside. X-Ray obtained. Prn pain med given. VSS

## 2025-05-19 NOTE — PLAN OF CARE
Problem: Occupational Therapy  Goal: Occupational Therapy Goal  Description: Goals to be met by: 06/19/2025     Patient will increase functional independence with ADLs by performing:    UE Dressing with Set-up Assistance.  LE Dressing with Minimal Assistance with AE as needed.  Grooming while seated with Set-up Assistance.  Toileting from bedside commode with Stand-by Assistance for hygiene and clothing management.   Supine to sit with Modified [place].  Step transfer with Supervision  Toilet transfer to bedside commode with Supervision.    Outcome: Progressing     OT eval complete & goals established.

## 2025-05-19 NOTE — PROGRESS NOTES
[facility] - Internal Medicine Telemetry  Neurosurgery  Progress Note    Subjective:     History of Present Illness: Radha Feng is a 84 y.o. 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 She is accompanied by her daughter, Della, who is a nurse and is able to provide the majority of her following history. Around 7pm yesterday (5/16/25), patient was attempted to open drawer of her dresser when she lost balance and fell backward and noted acute back pain. She denies hitting her head or LOC. Not on blood thinners. No preceding symptoms of chest pain, dizziness, SOB prior to fall. Patient called her daughter following fall. A few hours later her daughter noted she had a measured temperature of 101F at home and noted x1 episode of non-bloody vomiting. She gave her a dose of doxycycline and 1g tylenol. At that time she encouraged her mother to go to the ED but she declined. This morning she continued to complain of back pain and was agreeable to go to the ED for further evaluation. Daughter notes 3 days prior to fall worsening appetite and generalized weakness. Normally able to ambulate with cane/walker.      Currently she reports low midline back pain and mild diffuse abdominal pain that started while in the ED. Chronic bilateral knee and ankle pain that is unchanged. No other injuries that she reports as a result of the fall. She denies headache, dizziness, vision changes, neck pain, chest pain, SOB, dysuria. Denies numbness of the lower extremities, urinary or bowel incontinence, saddle anesthesia. She has a chronic wound on her R  shoulder that is unchanged. Per recent heme/onc note, shoulder lesion is suspected metastatic lung cancer rather than second primary.    Post-Op Info:  * No surgery found *       Interval History: Low back pain improved. Exam is stable. X-ray reviewed. Okay for patient to continue brace and follow up in clinic in 6 weeks.     Medications:  Continuous Infusions:   insulin aspart U-100  0.45 Units/hr Subcutaneous Continuous 0.01 mL/hr at 05/17/25 2100 0.45 Units/hr at 05/17/25 2100     Scheduled Meds:   acetaminophen  1,000 mg Oral TID    brimonidine 0.2%  1 drop Both Eyes TID    cefTRIAXone (Rocephin) IV (PEDS and ADULTS)  1 g Intravenous Q24H    dorzolamide-timolol 2-0.5%  1 drop Both Eyes BID    enoxparin  30 mg Subcutaneous Daily    Lactobacillus rhamnosus GG  1 capsule Oral Daily    latanoprost  1 drop Both Eyes Daily    LIDOcaine  2 patch Transdermal Q24H    nystatin  500,000 Units Mouth/Throat QID (WM & HS)    vitamin D  2,000 Units Oral Daily     PRN Meds:  Current Facility-Administered Medications:     dextrose 50%, 12.5 g, Intravenous, PRN    dextrose 50%, 25 g, Intravenous, PRN    glucagon (human recombinant), 1 mg, Intramuscular, PRN    glucagon (human recombinant), 1 mg, Intramuscular, PRN    glucose, 16 g, Oral, PRN    glucose, 24 g, Oral, PRN    insulin aspart U-100, 0-10 Units, Subcutaneous, PRN    morphine, 2 mg, Intravenous, Q6H PRN    naloxone, 0.02 mg, Intravenous, PRN    oxyCODONE, 5 mg, Oral, Q6H PRN    sodium chloride 0.9%, 10 mL, Intravenous, Q12H PRN     Review of Systems  Objective:     Weight: 49 kg (108 lb)  Body mass index is 17.97 kg/m².  Vital Signs (Most Recent):  Temp: 98.2 °F (36.8 °C) (05/19/25 1226)  Pulse: 89 (05/19/25 1226)  Resp: 18 (05/19/25 1226)  BP: 121/82 (05/19/25 1226)  SpO2: 97 % (05/19/25 1226) Vital Signs (24h Range):  Temp:  [97.9 °F (36.6 °C)-98.8 °F (37.1 °C)] 98.2 °F (36.8 °C)  Pulse:  [73-90] 89  Resp:  [18-20] 18  SpO2:  [93 %-99 %] 97 %  BP: (108-143)/(67-82)  121/82                Oxygen Concentration (%):  [0-1.5] 0        Female External Urinary Catheter w/ Suction 05/17/25 1447 (Active)   Skin no redness;no breakdown 05/18/25 2150   Tolerance no signs/symptoms of discomfort 05/18/25 2150   Suction Continuous suction at 70 mmHg 05/18/25 2150            Neurosurgery Physical Exam  GENERAL: resting comfortably  HEENT: NCAT, PERRL, mucous membranes moist  NECK: supple, trachea midline  CV: normal capillary refill  PULM: aerating well, symmetric expansion, no distress  ABD: soft, NT, ND  EXT: no c/c/e     NEURO:  AAO x 3  CN II-XII grossly intact  Fc x 4 non focal, strength intact  SILT     No drift or dysmetria    Significant Labs:  Recent Labs   Lab 05/17/25  1348 05/18/25  0548 05/19/25  0508   * 97 106    138 137   K 3.3* 3.4* 4.4    105 108   CO2 22* 25 21*   BUN 16 14 15   CREATININE 0.7 0.6 0.6   CALCIUM 8.9 8.6* 8.6*   MG  --  1.6 2.2     Recent Labs   Lab 05/17/25  1348 05/18/25  0548 05/19/25  0508   WBC 17.33* 16.67* 15.24*   HGB 7.5* 8.0* 7.7*   HCT 25.0* 26.0* 26.4*    258 239     Recent Labs   Lab 05/18/25  0548   INR 1.2     Microbiology Results (last 7 days)       Procedure Component Value Units Date/Time    Blood culture x two cultures. Draw prior to antibiotics. [[id number]]  (Normal) Collected: 05/17/25 1348    Order Status: Completed Specimen: Blood from Peripheral, Lower Arm, Left Updated: 05/19/25 1000     Blood Culture No Growth After 36 Hours    Blood culture x two cultures. Draw prior to antibiotics. [[id number]]  (Normal) Collected: 05/17/25 1350    Order Status: Completed Specimen: Blood from Peripheral, Antecubital, Right Updated: 05/19/25 1000     Blood Culture No Growth After 36 Hours    Urine culture [[id number]]  (Abnormal) Collected: 05/17/25 1703    Order Status: Completed Specimen: Urine Updated: 05/18/25 2352     Urine Culture >100,000 cfu/ml Gram-negative Rods     Comment: Identification and susceptibility  pending       Influenza A & B by Molecular [[id number]]  (Normal) Collected: 05/17/25 1348    Order Status: Completed Specimen: Nasal Swab Updated: 05/17/25 1505     INFLUENZA A MOLECULAR Negative     INFLUENZA B MOLECULAR  Negative          All pertinent labs from the last 24 hours have been reviewed.    Significant Diagnostics:  I have reviewed and interpreted all pertinent imaging results/findings within the past 24 hours.  Assessment/Plan:     * Compression fracture of L1 lumbar vertebra  84F hx adenocarcinoma s/p rads presenting after fall w CT demonstrating L1 compression fx with 30% height loss. She does have low back pain that is managed with just tylenol and lidocaine patches. She has no weakness on exam.    Discussed conservative management with TLSO brace. X-ray lumbar spine lateral upright and supine completed to ensure no kyphosis or increased height loss when standing up due to gravity. As long as pain is manageable then there is no need for surgical intervention. Intervention could include kyphoplasty vs surgical fixation. Discussed with patient and daughter and they would be amenable to kyphoplasty if needed. X-ray reviewed, no kyphosis or increased height loss when standing up. Patient will follow up in clinic in 6 weeks. Continue TLSO brace whenever sitting upright or out of bed.             Maribell Eisenberg PA-C  Neurosurgery  [facility] - Internal Medicine Telemetry

## 2025-05-19 NOTE — ASSESSMENT & PLAN NOTE
Follows with Dr. Peraza in hepatology clinic, last seen 11/16/2024. Stable on home regimen of lasix 40mg daily and prednisone 2.5mg daily. No requirement for aldactone or lactulose per hepatology note. No paracentesis required since 2022. No physical exam findings concerning for ascites. Albumin on admission 1.6. LFT wnl.     - hold prednisone 2.5mg daily in setting of acute infection  - continue lasix 40mg daily   no

## 2025-05-19 NOTE — NURSING
AVS virtually reviewed with patient and daughter in its entirety with emphasis on diet, medications, follow-up appointments and reasons to return to the ED. Patient also encouraged to utilize their patient portal. Ease and convenience of use reiterated. Education complete and patient voiced understanding. All questions answered. Discharge teaching complete.

## 2025-05-19 NOTE — PLAN OF CARE
05/19/25 1603   Medicare Message   Important Message from Medicare regarding Discharge Appeal Rights Given to patient/caregiver;Explained to patient/caregiver;Signed/date by patient/caregiver   Date IMM was signed 05/19/25   Time IMM was signed 1600     IMM explained to Patient.  All questions answered.  Pt Verbalized understanding.

## 2025-05-19 NOTE — PROGRESS NOTES
[facility] - [facility] Medicine Telemetry  Endocrinology  Progress Note    Admit Date: 2025     Reason for Consult: Management of T2DM, Hyperglycemia      Diabetes diagnosis year: > 5 years ago      Home Diabetes Medications:    Omnipod 5  Basal Rate  12A:  0.45 units/hr      Carb Ratio  12A:12  6A: 10  6P: 12     ISF  12A: 50      Target: 120  Correct above: 140     IAT: 4 hours        How often checking glucose at home? >4 x day Dexcom   BG readings on regimen: 100s-200s    Hypoglycemia on the regimen? No   Missed doses on regimen?  No     Diabetes Complications include:     Hyperglycemia     Complicating diabetes co morbidities:   Glucocorticoid use         HPI: Radha Feng is a 84 y.o. 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 Endocrine consulted to manage hyperglycemia and type 2 diabetes in the context of the inpatient setting. Patient may continue to utilize her home insulin pump.     Lab Results   Component Value Date    HGBA1C 6.0 (H) 2024         Interval HPI:   Overnight events: No acute events overnight. Patient in room 1150/1150 A. Blood glucose stable. BG at and below goal on current insulin regimen (Home Insulin Pump). Steroid use- None .    Renal function- Normal   Vasopressors-  None       Endocrine will continue to follow and manage insulin orders inpatient.     Last 24-hour CGM:      Diet Consistent Carbohydrate 2000 Calories (up to 75 gm per meal)     Eatin%  Nausea: No  Hypoglycemia and intervention: Yes, (patient reports self-treatment with one walter cracker. May have been a compression low  "vs. True hypoglycemia)   Fever: No  TPN and/or TF: No      /76   Pulse 90   Temp 98.7 °F (37.1 °C) (Oral)   Resp 18   Ht 5' 5" (1.651 m)   Wt 49 kg (108 lb)   SpO2 (!) 94%   BMI 17.97 kg/m²     Labs Reviewed and Include    Recent Labs   Lab 05/19/25  0508      CALCIUM 8.6*   ALBUMIN 1.4*      K 4.4   CO2 21*      BUN 15   CREATININE 0.6     Lab Results   Component Value Date    WBC 15.24 (H) 05/19/2025    HGB 7.7 (L) 05/19/2025    HCT 26.4 (L) 05/19/2025    MCV 94 05/19/2025     05/19/2025     No results for input(s): "TSH", "FREET4" in the last 168 hours.  Lab Results   Component Value Date    HGBA1C 6.0 (H) 06/21/2024       Nutritional status:   Body mass index is 17.97 kg/m².  Lab Results   Component Value Date    ALBUMIN 1.4 (L) 05/19/2025    ALBUMIN 1.4 (L) 05/18/2025    ALBUMIN 1.6 (L) 05/17/2025     No results found for: "PREALBUMIN"    Estimated Creatinine Clearance: 55 mL/min (based on SCr of 0.6 mg/dL).    Accu-Checks  Recent Labs     05/17/25 1924 05/18/25 1920   POCTGLUCOSE 148* 159*       Current Medications and/or Treatments Impacting Glycemic Control  Immunotherapy:    Immunosuppressants       None          Steroids:   Hormones (From admission, onward)      None          Pressors:    Autonomic Drugs (From admission, onward)      None          Hyperglycemia/Diabetes Medications:   Antihyperglycemics (From admission, onward)      Start     Stop Route Frequency Ordered    05/17/25 2100  insulin aspart U-100 insulin pump from home        Question Answer Comment   Target number 120    Basal Rate #1 0.45    Basal rate #1 time 2913-3609        -- SubQ Continuous 05/17/25 1947 05/17/25 2047  insulin aspart U-100 insulin pump from home 0-10 Units        Question Answer Comment   Target number 120    Carbohydrate coverage #1 1:12    Carbohydrate coverage #1 time [phone]    Carbohydrate coverage #2 1:10    Carbohydrate coverage #2 time [phone]    Carbohydrate " coverage #3 1:12    Carbohydrate coverage #3 time 7520-2621    Sensitivity #1 1:50    Sensitivity #1 time [id number]        -- SubQ As needed (PRN) 05/17/25 1947            ASSESSMENT and PLAN    Neuro  * Compression fracture of L1 lumbar vertebra  Managed per primary team  Avoid hypoglycemia  Optimize BG control to improve wound healing        Endocrine  Insulin pump in place  At time of evaluation, pt meets criteria to continue home insulin pump usage.  - Has all adequate supplies   - Insulin pump site change on 05/17/2025.    - Bolus settings reviewed    - No changes to home regimen.   - Nurse to check BG qac/hs/0200 & record in epic   - Patient to input glucose into pump and use bolus wizard for prandial needs   - Will continue to monitor accuchecks and titrate insulin as clinically indicated .     - Discussed above plan with patient, patient verbalized understanding.   - Understands in case of pump malfunction or cognitive decline in which pt can no longer safely use insulin pump, will transition to SC MDI     Patient may continue to wear Be/ Dexcom CGM, but must have a finger stick BG check AC/HS.   Treatment decision inpatient must be confirmed via fingerstick.   Always confirm hypo and hyperglycemia with finger sticks.     If the patient refuses accuchecks, the nurse should document the refusal on the CGM flowsheet or in a nurses note and record the CGM glucose reading on the flowsheet. POCT BG checks can not be discontinued while inpatient.     If pump malfunctions or is disconnected, please call Endocrine On-Call!      Type 2 diabetes mellitus with circulatory disorder, with long-term current use of insulin  Endocrinology consulted for BG management.   BG goal 140-180    - Home Insulin Pump  - BG checks AC/HS/0200  - Hypoglycemia protocol in place  - If blood glucose greater than 300, please ask patient not to eat food or drink anything other than water until correctional insulin has brought it back  below 250    ** Please notify Endocrine for any change and/or advance in diet**  ** Please call Endocrine for any BG related issues **    Discharge Planning:   TBD. Please notify endocrinology prior to discharge.             Damian Roland, BILL, FNP  Endocrinology  [facility] - Internal Medicine Telemetry   [Adequate] : adequate

## 2025-05-19 NOTE — PLAN OF CARE
Problem: Physical Therapy  Goal: Physical Therapy Goal  Description: Goals to be met by: 2025     Patient will increase functional independence with mobility by performin. Supine to sit with Set-up [place]  2. Sit to supine with Set-up [place]  3. Sit to stand transfer with Supervision  4. Bed to chair transfer with Supervision using Rolling Walker  5. Gait  x 150 feet with Supervision using Rolling Walker.   6. Ascend/descend 6 stair with no Handrails Minimal Assistance using HHA.   7. Lower extremity exercise program x15 reps per handout, with supervision    Outcome: Progressing

## 2025-05-19 NOTE — PT/OT/SLP EVAL
Physical Therapy Evaluation    Patient Name:  Radha Feng   MRN:  [id number]    Recommendations:     Discharge Recommendations: Low Intensity Therapy   Discharge Equipment Recommendations: none   Barriers to discharge: None    Assessment:     Radha Feng is a 84 y.o. female admitted with a medical diagnosis of Compression fracture of L1 lumbar vertebra.  She presents with the following impairments/functional limitations: weakness, impaired endurance, impaired self care skills, impaired functional mobility, gait instability, impaired balance Pt. cooperative and tolerated treatment well. Pt. progressing with mobility with RW.    Rehab Prognosis: Good; patient would benefit from acute skilled PT services to address these deficits and reach maximum level of function.    Recent Surgery: * No surgery found *      Plan:     During this hospitalization, patient to be seen 4 x/week to address the identified rehab impairments via gait training, therapeutic activities, therapeutic exercises, neuromuscular re-education and progress toward the following goals:    Plan of Care Expires:  06/18/25    Subjective     Chief Complaint: back soreness  Patient/Family Comments/goals: pt. Agreeable to PT  Pain/Comfort:  Pain Rating 1:  (pt. did not rate, but stated she was sore)  Location - Orientation 1: lower  Location 1: back  Pain Addressed 1: Reposition, Distraction    Patients cultural, spiritual, Anabaptist conflicts given the current situation: no    Living Environment:  Pt. Lives with daughter and grandson in [facility] with 6 ARIEL and no handrails  Prior to admission, patients level of function was amb. with SC and rollator.  Equipment used at home: wheelchair, cane, straight, rollator, bedside commode, shower chair.  Upon discharge, patient will have assistance from daughter and grandson.    Objective:     Communicated with nursing prior to session.  Patient found up in chair with peripheral IV, telemetry, PureWick  upon  PT entry to room.    General Precautions: Standard, fall  Orthopedic Precautions:spinal precautions   Braces: TLSO  Respiratory Status: Room air    Exams:  RLE ROM: WFL  RLE Strength: WFL  LLE ROM: WFL  LLE Strength: WFL    Functional Mobility:  Bed Mobility:     Sit to Supine: minimum assistance  Transfers:     Sit to Stand:  minimum assistance with rolling walker  Bed to Chair: minimum assistance with  rolling walker  using  Stand Pivot  Gait: 70' with RW and CGA with decreased step length/milton and VCs to look straight ahead  Balance: fair      AM-PAC 6 CLICK MOBILITY  Total Score:18       Treatment & Education:  Discussed therapy needs, goals, safety with mobility/transfers, and POC.    Patient left supine with all lines intact and call button in reach.    GOALS:   Multidisciplinary Problems       Physical Therapy Goals          Problem: Physical Therapy    Goal Priority Disciplines Outcome Interventions   Physical Therapy Goal     PT, PT/OT Progressing    Description: Goals to be met by: 2025     Patient will increase functional independence with mobility by performin. Supine to sit with Set-up [place]  2. Sit to supine with Set-up [place]  3. Sit to stand transfer with Supervision  4. Bed to chair transfer with Supervision using Rolling Walker  5. Gait  x 150 feet with Supervision using Rolling Walker.   6. Ascend/descend 6 stair with no Handrails Minimal Assistance using HHA.   7. Lower extremity exercise program x15 reps per handout, with supervision                         DME Justifications:  No DME recommended requiring DME justifications    History:     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    Diabetes mellitus     Glaucoma     Hypertension     Lung cancer     Other ascites 2022       Past Surgical History:   Procedure Laterality Date    CATARACT EXTRACTION W/  INTRAOCULAR LENS IMPLANT Left 2021         ENDOBRONCHIAL ULTRASOUND N/A 07/05/2024    Procedure: ENDOBRONCHIAL ULTRASOUND (EBUS);  Surgeon: Rolo Wilkinson MD;  Location: [facility] OR 2ND FLR;  Service: Pulmonary;  Laterality: N/A;    ESOPHAGOGASTRODUODENOSCOPY N/A 06/26/2023    Procedure: ESOPHAGOGASTRODUODENOSCOPY (EGD);  Surgeon: Edgar Branch MD;  Location: [facility] (4TH FLR);  Service: Endoscopy;  Laterality: N/A;  referral Dr Peraza-cirrhosis/labs done on 4/24/23-instr portal-GT       Time Tracking:     PT Received On: 05/19/25  PT Start Time: 1319     PT Stop Time: 1345  PT Total Time (min): 26 min     Billable Minutes: Evaluation 11 and Gait Training 15      05/19/2025

## 2025-05-19 NOTE — ASSESSMENT & PLAN NOTE
Patient with known Cirrhosis with Child's class B. Co-morbidities are present and inclusive of malnutrition and anemia/pancytopenia.  MELD-Na score calculated; MELD 3.0: 13 at 5/19/2025  5:08 AM  MELD-Na: 8 at 5/19/2025  5:08 AM  Calculated from:  Serum Creatinine: 0.6 mg/dL (Using min of 1 mg/dL) at 5/19/2025  5:08 AM  Serum Sodium: 137 mmol/L at 5/19/2025  5:08 AM  Total Bilirubin: 0.3 mg/dL (Using min of 1 mg/dL) at 5/18/2025  5:48 AM  Serum Albumin: 1.4 g/dL (Using min of 1.5 g/dL) at 5/19/2025  5:08 AM  INR(ratio): 1.2 at 5/18/2025  5:48 AM  Age at listing (hypothetical): 84 years  Sex: Female at 5/19/2025  5:08 AM      Continue chronic meds. Etiology likely Autoimmune. Will avoid any hepatotoxic meds, and monitor CBC/CMP/INR for synthetic function.

## 2025-05-19 NOTE — ASSESSMENT & PLAN NOTE
Anemia is likely due to Iron deficiency. Most recent hemoglobin and hematocrit are listed below.  Recent Labs     05/17/25  1348 05/18/25  0548 05/19/25  0508   HGB 7.5* 8.0* 7.7*   HCT 25.0* 26.0* 26.4*     Plan  - Monitor serial CBC: Daily

## 2025-05-19 NOTE — SUBJECTIVE & OBJECTIVE
"Interval HPI:   Overnight events: No acute events overnight. Patient in room 1150/1150 A. Blood glucose stable. BG at and below goal on current insulin regimen (Home Insulin Pump). Steroid use- None .    Renal function- Normal   Vasopressors-  None       Endocrine will continue to follow and manage insulin orders inpatient.     Last 24-hour CGM:      Diet Consistent Carbohydrate 2000 Calories (up to 75 gm per meal)     Eatin%  Nausea: No  Hypoglycemia and intervention: No  Fever: No  TPN and/or TF: No      /76   Pulse 90   Temp 98.7 °F (37.1 °C) (Oral)   Resp 18   Ht 5' 5" (1.651 m)   Wt 49 kg (108 lb)   SpO2 (!) 94%   BMI 17.97 kg/m²     Labs Reviewed and Include    Recent Labs   Lab 25  0508      CALCIUM 8.6*   ALBUMIN 1.4*      K 4.4   CO2 21*      BUN 15   CREATININE 0.6     Lab Results   Component Value Date    WBC 15.24 (H) 2025    HGB 7.7 (L) 2025    HCT 26.4 (L) 2025    MCV 94 2025     2025     No results for input(s): "TSH", "FREET4" in the last 168 hours.  Lab Results   Component Value Date    HGBA1C 6.0 (H) 2024       Nutritional status:   Body mass index is 17.97 kg/m².  Lab Results   Component Value Date    ALBUMIN 1.4 (L) 2025    ALBUMIN 1.4 (L) 2025    ALBUMIN 1.6 (L) 2025     No results found for: "PREALBUMIN"    Estimated Creatinine Clearance: 55 mL/min (based on SCr of 0.6 mg/dL).    Accu-Checks  Recent Labs     25  1924 25  1920   POCTGLUCOSE 148* 159*       Current Medications and/or Treatments Impacting Glycemic Control  Immunotherapy:    Immunosuppressants       None          Steroids:   Hormones (From admission, onward)      None          Pressors:    Autonomic Drugs (From admission, onward)      None          Hyperglycemia/Diabetes Medications:   Antihyperglycemics (From admission, onward)      Start     Stop Route Frequency Ordered    25 2100  insulin aspart U-100 " insulin pump from home        Question Answer Comment   Target number 120    Basal Rate #1 0.45    Basal rate #1 time 6978-6481        -- SubQ Continuous 05/17/25 1947 05/17/25 2047  insulin aspart U-100 insulin pump from home 0-10 Units        Question Answer Comment   Target number 120    Carbohydrate coverage #1 1:12    Carbohydrate coverage #1 time 1694-0395    Carbohydrate coverage #2 1:10    Carbohydrate coverage #2 time [phone]    Carbohydrate coverage #3 1:12    Carbohydrate coverage #3 time [phone]    Sensitivity #1 1:50    Sensitivity #1 time [id number]        -- SubQ As needed (PRN) 05/17/25 1947

## 2025-05-19 NOTE — ASSESSMENT & PLAN NOTE
Patient's blood pressure range in the last 24 hours was: BP  Min: 116/67  Max: 143/84.  Continue prior home regimen

## 2025-05-19 NOTE — SUBJECTIVE & OBJECTIVE
Interval History: Low back pain improved. Exam is stable. X-ray reviewed. Okay for patient to continue brace and follow up in clinic in 6 weeks.     Medications:  Continuous Infusions:   insulin aspart U-100  0.45 Units/hr Subcutaneous Continuous 0.01 mL/hr at 05/17/25 2100 0.45 Units/hr at 05/17/25 2100     Scheduled Meds:   acetaminophen  1,000 mg Oral TID    brimonidine 0.2%  1 drop Both Eyes TID    cefTRIAXone (Rocephin) IV (PEDS and ADULTS)  1 g Intravenous Q24H    dorzolamide-timolol 2-0.5%  1 drop Both Eyes BID    enoxparin  30 mg Subcutaneous Daily    Lactobacillus rhamnosus GG  1 capsule Oral Daily    latanoprost  1 drop Both Eyes Daily    LIDOcaine  2 patch Transdermal Q24H    nystatin  500,000 Units Mouth/Throat QID (WM & HS)    vitamin D  2,000 Units Oral Daily     PRN Meds:  Current Facility-Administered Medications:     dextrose 50%, 12.5 g, Intravenous, PRN    dextrose 50%, 25 g, Intravenous, PRN    glucagon (human recombinant), 1 mg, Intramuscular, PRN    glucagon (human recombinant), 1 mg, Intramuscular, PRN    glucose, 16 g, Oral, PRN    glucose, 24 g, Oral, PRN    insulin aspart U-100, 0-10 Units, Subcutaneous, PRN    morphine, 2 mg, Intravenous, Q6H PRN    naloxone, 0.02 mg, Intravenous, PRN    oxyCODONE, 5 mg, Oral, Q6H PRN    sodium chloride 0.9%, 10 mL, Intravenous, Q12H PRN     Review of Systems  Objective:     Weight: 49 kg (108 lb)  Body mass index is 17.97 kg/m².  Vital Signs (Most Recent):  Temp: 98.2 °F (36.8 °C) (05/19/25 1226)  Pulse: 89 (05/19/25 1226)  Resp: 18 (05/19/25 1226)  BP: 121/82 (05/19/25 1226)  SpO2: 97 % (05/19/25 1226) Vital Signs (24h Range):  Temp:  [97.9 °F (36.6 °C)-98.8 °F (37.1 °C)] 98.2 °F (36.8 °C)  Pulse:  [73-90] 89  Resp:  [18-20] 18  SpO2:  [93 %-99 %] 97 %  BP: (108-143)/(67-82) 121/82                Oxygen Concentration (%):  [0-1.5] 0        Female External Urinary Catheter w/ Suction 05/17/25 1447 (Active)   Skin no redness;no breakdown 05/18/25 8727    Tolerance no signs/symptoms of discomfort 05/18/25 2150   Suction Continuous suction at 70 mmHg 05/18/25 2150            Neurosurgery Physical Exam  GENERAL: resting comfortably  HEENT: NCAT, PERRL, mucous membranes moist  NECK: supple, trachea midline  CV: normal capillary refill  PULM: aerating well, symmetric expansion, no distress  ABD: soft, NT, ND  EXT: no c/c/e     NEURO:  AAO x 3  CN II-XII grossly intact  Fc x 4 non focal, strength intact  SILT     No drift or dysmetria    Significant Labs:  Recent Labs   Lab 05/17/25  1348 05/18/25  0548 05/19/25  0508   * 97 106    138 137   K 3.3* 3.4* 4.4    105 108   CO2 22* 25 21*   BUN 16 14 15   CREATININE 0.7 0.6 0.6   CALCIUM 8.9 8.6* 8.6*   MG  --  1.6 2.2     Recent Labs   Lab 05/17/25  1348 05/18/25  0548 05/19/25  0508   WBC 17.33* 16.67* 15.24*   HGB 7.5* 8.0* 7.7*   HCT 25.0* 26.0* 26.4*    258 239     Recent Labs   Lab 05/18/25  0548   INR 1.2     Microbiology Results (last 7 days)       Procedure Component Value Units Date/Time    Blood culture x two cultures. Draw prior to antibiotics. [[id number]]  (Normal) Collected: 05/17/25 1348    Order Status: Completed Specimen: Blood from Peripheral, Lower Arm, Left Updated: 05/19/25 1000     Blood Culture No Growth After 36 Hours    Blood culture x two cultures. Draw prior to antibiotics. [[id number]]  (Normal) Collected: 05/17/25 1350    Order Status: Completed Specimen: Blood from Peripheral, Antecubital, Right Updated: 05/19/25 1000     Blood Culture No Growth After 36 Hours    Urine culture [[id number]]  (Abnormal) Collected: 05/17/25 1703    Order Status: Completed Specimen: Urine Updated: 05/18/25 2352     Urine Culture >100,000 cfu/ml Gram-negative Rods     Comment: Identification and susceptibility pending       Influenza A & B by Molecular [[id number]]  (Normal) Collected: 05/17/25 1348    Order Status: Completed Specimen: Nasal Swab Updated: 05/17/25 1505     INFLUENZA A  MOLECULAR Negative     INFLUENZA B MOLECULAR  Negative          All pertinent labs from the last 24 hours have been reviewed.    Significant Diagnostics:  I have reviewed and interpreted all pertinent imaging results/findings within the past 24 hours.

## 2025-05-19 NOTE — ASSESSMENT & PLAN NOTE
84F hx adenocarcinoma s/p rads presenting after fall w CT demonstrating L1 compression fx with 30% height loss. She does have low back pain that is managed with just tylenol and lidocaine patches. She has no weakness on exam.    Discussed conservative management with TLSO brace. X-ray lumbar spine lateral upright and supine completed to ensure no kyphosis or increased height loss when standing up due to gravity. As long as pain is manageable then there is no need for surgical intervention. Intervention could include kyphoplasty vs surgical fixation. Discussed with patient and daughter and they would be amenable to kyphoplasty if needed. X-ray reviewed, no kyphosis or increased height loss when standing up. Patient will follow up in clinic in 6 weeks. Continue TLSO brace whenever sitting upright or out of bed.

## 2025-05-19 NOTE — PT/OT/SLP EVAL
Occupational Therapy   Evaluation    Name: Radha Feng  MRN: [id number]  Admitting Diagnosis: Compression fracture of L1 lumbar vertebra  Recent Surgery: * No surgery found *      Recommendations:     Discharge Recommendations: Low Intensity Therapy  Discharge Equipment Recommendations:  walker, rolling  Barriers to discharge:   (does not have 24/7 supervision at this time)    Assessment:     Radha Feng is a 84 y.o. female with a medical diagnosis of Compression fracture of L1 lumbar vertebra.  She presents with the following performance deficits affecting function: weakness, impaired self care skills, impaired functional mobility, impaired balance, impaired cognition, decreased lower extremity function, pain.      Pt agreeable to therapy and tolerated fairly. However, pt remains limited in ADLs, functional mobility, and functional transfers and is currently not performing tasks at [facility].    Pt would continue to benefit from skilled OT services to maximize functional independence with ADLs and functional mobility, reduce caregiver burden, and facilitate safe discharge in the least restrictive environment.      Rehab Prognosis: Good; patient would benefit from acute skilled OT services to address these deficits and reach maximum level of function.       Plan:     Patient to be seen 4 x/week to address the above listed problems via self-care/home management, therapeutic activities, therapeutic exercises  Plan of Care Expires: 06/19/25  Plan of Care Reviewed with: patient, daughter    Subjective     Chief Complaint: Nausea  Patient/Family Comments/goals: Pt & daughter would like to return home    Occupational Profile:  Living Environment: pt lives with daughter & grandson in a [facility] with 6 ARIEL. Bathroom: tub only  Previous level of function: Mod I  Equipment Used at Home: wheelchair, cane, straight, bedside commode, rollator  Assistance upon Discharge: Family (including grandson)    Pain/Comfort:  Pain  Rating 1: other (see comments) (did not rate but demonstrated via facial grimacing)  Location 1: back    Patients cultural, spiritual, Yazidi conflicts given the current situation: no    Objective:     Communicated with: nurse prior to session.  Patient found HOB elevated with telemetry, [place] upon OT entry to room.    General Precautions: Standard, fall  Orthopedic Precautions: spinal precautions  Braces: TLSO  Respiratory Status: Room air    Occupational Performance:    Bed Mobility:    Patient completed Scooting to EOB with minimum assistance    Patient completed Supine to Sit with minimum assistance    Functional Mobility/Transfers:  Patient completed Sit <> Stand Transfer (from EOB)with minimum assistance  with  rolling walker     Patient completed Chair Transfer Step Transfer technique with minimum assistance with  rolling walker    Functional Mobility:  pt ambulated in room to simulate household environment to improve overall strength, coordination, activity tolerance & safety awareness required for participation/independence wit MRADLs/IADLs  Pt ambulated approximately 12 ft with min A using rolling walker  Pt did well and only required assistance with hand placement and direction (1 time)    Activities of Daily Living:  Upper Body Dressing: total assistance to don TLSO while seated EOB    Lower Body Dressing: total assistance to dof socks & don shoes while seated EOB  Pt has spinal precautions & is unable to demonstrate figure 4    Cognitive/Visual Perceptual:  Cognitive/Psychosocial Skills:     -       Follows Commands/attention:Follows multistep  commands    Physical Exam:  Dominant hand: -       Left  Upper Extremity Range of Motion:     -       Right Upper Extremity: WFL  -       Left Upper Extremity: WFL  Upper Extremity Strength:    -       Right Upper Extremity: WFL  -       Left Upper Extremity: WFL   Strength:    -       Right Upper Extremity: WFL  -       Left Upper Extremity: WFL  Fine  Motor Coordination:    -       Intact  Left hand thumb/finger opposition skills and Right hand thumb/finger opposition skills  Gross motor coordination:   WFL    AMPAC 6 Click ADL:  AMPAC Total Score: 14    Treatment & Education:  -Education on task modification to maximize safety and (I) during bed mobility, ADLs and mobility/transfers  -Education on importance of OOB activity to improve/maintain overall strength, activity tolerance and promote recovery  - Education on spinal precautions & TLSO  -Pt educated to call for assistance and to transfer with hospital staff only  -Provided education regarding role of OT & POC with pt verbalizing understanding. Pt had no further questions & when asked whether there were any concerns pt reported none.     Patient left up in chair with all lines intact, call button in reach, and pt's daughter & MD present    GOALS:   Multidisciplinary Problems       Occupational Therapy Goals          Problem: Occupational Therapy    Goal Priority Disciplines Outcome Interventions   Occupational Therapy Goal     OT, PT/OT Progressing    Description: Goals to be met by: 06/19/2025     Patient will increase functional independence with ADLs by performing:    UE Dressing with Set-up Assistance.  LE Dressing with Minimal Assistance with AE as needed.  Grooming while seated with Set-up Assistance.  Toileting from bedside commode with Stand-by Assistance for hygiene and clothing management.   Supine to sit with Modified [place].  Step transfer with Supervision  Toilet transfer to bedside commode with Supervision.                         DME Justifications:   Radha's mobility limitation cannot be sufficiently resolved by the use of a cane. Her functional mobility deficit can be sufficiently resolved with the use of a Rolling Walker. Patient's mobility limitation significantly impairs their ability to participate in one of more activities of daily living.  The use of a RW will significantly  improve the patient's ability to participate in MRADLS and the patient will use it on regular basis in the home.    History:     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    Diabetes mellitus     Glaucoma     Hypertension     Lung cancer     Other ascites 11/29/2022         Past Surgical History:   Procedure Laterality Date    CATARACT EXTRACTION W/  INTRAOCULAR LENS IMPLANT Left 12/21/2021        ENDOBRONCHIAL ULTRASOUND N/A 07/05/2024    Procedure: ENDOBRONCHIAL ULTRASOUND (EBUS);  Surgeon: Rolo Wilkinson MD;  Location: [facility] OR [address] [place] [address];  Service: Pulmonary;  Laterality: N/A;    ESOPHAGOGASTRODUODENOSCOPY N/A 06/26/2023    Procedure: ESOPHAGOGASTRODUODENOSCOPY (EGD);  Surgeon: Edgar Branch MD;  [facility] ([address] [place] [address]);  Service: Endoscopy;  Laterality: N/A;  referral Dr Peraza-cirrhosis/labs done on 4/24/23-instr portal-GT       Time Tracking:     OT Date of Treatment: 05/19/25  OT Start Time: 0825  OT Stop Time: 0854  OT Total Time (min): 29 min    Billable Minutes:Evaluation 15  Self Care/Home Management 14    5/19/2025

## 2025-05-19 NOTE — PLAN OF CARE
[facility] - Internal Medicine Telemetry  Discharge Final Note    Primary Care Provider: Leticia Lim MD    Expected Discharge Date: 5/19/2025    Final Discharge Note (most recent)       Final Note - 05/19/25 1620          Final Note    Assessment Type Final Discharge Note (P)      Anticipated Discharge Disposition Home or Self Care (P)      What phone number can be called within the next 1-3 days to see how you are doing after discharge? [phone] (P)         Post-Acute Status    Discharge Delays None known at this time (P)                    [facility] - Internal Medicine Telemetry  Discharge Final Note    Primary Care Provider: Leticia Lim MD    Expected Discharge Date: 5/19/2025            Final Discharge Note (most recent)       Final Note - 05/19/25 1620          Final Note    Assessment Type Final Discharge Note     Anticipated Discharge Disposition Home or Self Care     What phone number can be called within the next 1-3 days to see how you are doing after discharge? [phone]        Post-Acute Status    Discharge Delays None known at this time                       Future Appointments   Date Time Provider Department Center   5/27/2025  4:00 PM Michelle Feng RN, [facility] DI[facility]ETE [facility]   6/3/2025  2:20 PM [facility], DEXA1 [facility] BMD [facility]   6/10/2025  8:00 AM Nestor Agarwal MD [facility] ENDODIA [facility]   7/1/2025 10:30 AM Leticia Lim MD [facility]   7/2/2025  9:00 AM [facility] OIC EOS [facility] EOS [facility]   7/2/2025 10:00 AM Leeanne Solorio, ARSEN [facility] NEUROS8 [facility]   7/10/2025  3:00 PM Yvonne Peraza MD [facility] HEPAT [facility]   7/15/2025  2:15 PM Nicole Garber MD [facility] OPHTHAL [facility]   7/22/2025  3:45 PM [facility] USOP3 [facility] US[facility]O [facility]            Important Message from Medicare  Important Message from Medicare regarding Discharge Appeal Rights: Given to patient/caregiver, Explained to patient/caregiver, Signed/date by patient/caregiver      Date IMM was signed: 05/19/25  Time IMM was signed: 1600    Pt to discharge home with DTR.  No DME needed or HH needed. Order for OPCM placed.    Discharge Plan A and Plan B have been determined by review of patient's clinical status, future medical and therapeutic needs, and coverage/benefits for post-acute care in coordination with multidisciplinary team members.     Halle Oliveira RN, BSN  Case Management  [phone]

## 2025-05-19 NOTE — NURSING
Patient's IV taken out, discharge instructions given, belongings packed. Patient transferred via wheelchair.

## 2025-05-19 NOTE — DISCHARGE SUMMARY
Mundo Diaz - Internal Medicine [facility] Medicine  Discharge Summary      Patient Name: Radha Feng  MRN: [id number]  BISMARK: [id number]  Patient Class: IP- Inpatient  Admission Date: 5/17/2025  Hospital Length of Stay: 2 days  Discharge Date and Time: 05/19/2025 3:27 PM  Attending Physician: Catherine Trujillo MD   Discharging Provider: Cyndee Barrett MD  Primary Care Provider: Leticia Lim MD  Hospital Medicine Team: [facility] 2 Cyndee Barrett MD  Primary Care Team: [facility] 2    HPI:   Radha Feng is a 84 y.o. 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 She is accompanied by her daughter, Della, who is a nurse and is able to provide the majority of her following history. Around 7pm yesterday (5/16/25), patient was attempted to open drawer of her dresser when she lost balance and fell backward and noted acute back pain. She denies hitting her head or LOC. Not on blood thinners. No preceding symptoms of chest pain, dizziness, SOB prior to fall. Patient called her daughter following fall. A few hours later her daughter noted she had a measured temperature of 101F at home and noted x1 episode of non-bloody vomiting. She gave her a dose of doxycycline and 1g tylenol. At that time she encouraged her mother to go to the ED but she declined. This morning she continued to complain of back pain and was agreeable to go to the ED for further evaluation. Daughter notes 3 days prior to fall worsening appetite and generalized weakness. Normally able to ambulate with cane/walker.     Currently she reports low  midline back pain and mild diffuse abdominal pain that started while in the ED. Chronic bilateral knee and ankle pain that is unchanged. No other injuries that she reports as a result of the fall. She denies headache, dizziness, vision changes, neck pain, chest pain, SOB, dysuria. Denies numbness of the lower extremities, urinary or bowel incontinence, saddle anesthesia. She has a chronic wound on her R shoulder that is unchanged. Per recent heme/onc note, shoulder lesion is suspected metastatic lung cancer rather than second primary.    In the ED she was hemodynamically stable on room air and afebrile. Lab investigations significant for leukocytosis to 17k, Hgb 7.5, K 3.3, CO2 22, glucose 207. Remainder of CMP unremarkable. CT lumbar spine with acute L1 compression fracture with about 30% height loss. CXR with no acute abnormality. Blood cultures drawn. UA pending. She was started on vanc/zosyn, given 1L LR and replacement potassium. She is admitted to hospital medicine for L1 compression fracture and recent fever and fall.     * No surgery found *      Hospital Course:   Admitted for L1 compression fracture after fall and UTI. Abdominal pain significantly improved with conservative management, will defer additional imaging at this time. NSGY consulted for fracture, recommend TLSO brace and XR with brace which is pending. Pain controlled with scheduled tylenol and prn oxycodone 5mg. Urine culture pending, on empiric CTX, will d/c with ciprofloxacin to complete 7 day course for complicated UTI given reported fever and based on recent susceptibilities.   Repeat spinal x-ray noted. NSGY recommended outpatient follow up. Patient stable for discharge home.      Goals of Care Treatment Preferences:  Code Status: Full Code      SDOH Screening:  The patient was screened for utility difficulties, food insecurity, transport difficulties, housing insecurity, and interpersonal safety and there were no concerns identified this  admission.     Physical Exam  Vitals and nursing note reviewed.   Constitutional:       General: She is not in acute distress.     Appearance: She is underweight. She is ill-appearing.   HENT:      Head: Normocephalic and atraumatic.   Eyes:      Extraocular Movements: Extraocular movements intact.   Cardiovascular:      Rate and Rhythm: Normal rate and regular rhythm.      Heart sounds: No murmur heard.  Pulmonary:      Effort: Pulmonary effort is normal. No respiratory distress.      Breath sounds: Normal breath sounds. No wheezing or rales.   Abdominal:      General: Abdomen is flat. Bowel sounds are normal. There is no distension.      Palpations: Abdomen is soft.      Tenderness: There is no abdominal tenderness. There is no guarding.   Musculoskeletal:      Cervical back: No deformity or bony tenderness.      Thoracic back: No deformity or bony tenderness.      Lumbar back: Bony tenderness present. No deformity.      Right lower leg: No edema.      Left lower leg: No edema.      Comments: Large chronic R shoulder lesion with small ulcerations with no active drainage or purulence. See photo   Skin:     General: Skin is warm and dry.   Neurological:      Mental Status: She is alert.       Consults:   Consults (From admission, onward)          Status Ordering Provider     Inpatient consult to Neurosurgery  Once        Provider:  (Not yet assigned)    Completed [address]     Inpatient consult to Endocrinology  Once        Provider:  (Not yet assigned)    Completed [address]            Assessment & Plan  Compression fracture of L1 lumbar vertebra  83yo female with a hx of lung adenocarcinoma with cutaneous mets, COPD, PVD, HFpEF, aortic and coronary atherosclerosis, urinary incontinence, T2DM, osteroporosis, and autoimmune hepatitis presenting after a fall backwards from standing. She denies any head trauma but notes significant lower back pain. CT on arrival notable for L1 compression fracture with 30%  height loss of the vertebral body and no disc protrusion. Per daughter, pt normally takes 1g tylenol TID for cancer-related pain control. Opioid naive.     - pain control   - consult placed to ortho spine, appreciate recs  - will obtain upright XR in TSLO brace  - TSLO brace when out of bed  - low threshold for additional spine imaging given hx of malignancy, no other bony tenderness on exam  UTI (urinary tract infection)  Leukocytosis  Brought in by daughter for evaluation of fall with back pain as well as a 101F fever last night. Afebrile here. Leukocytosis - wbc 17. Recent URI resolved a couple of weeks ago. Currently denies URI or UTI sx. Possible fever is related to L1 compression fx, but given pt's chronic steroid use and active malignancy will complete infectious workup.    Suspect urine source. Ucx pending. Abx deescalated.     Plan:  - blood cultures negative to date, Urine Culture with >100k Proteus mirabilis  - CT scan deferred at this time  - empiric CTX- discharge with course of ciprofloxacin    Shoulder lesion, right  Underwent biopsy of shoulder mass Jan 2025, positive for metastatic carcinoma on pathology, favored to be metastatic lung adenocarcinoma but second primary not entirely ruled out.     Has progressed in size based on photos in media tab. No active purulence or drainage. Does not appear acutely infected but high index of suspicion for possible source of fevers    Plan:  -- continue to monitor  -- complete remainder of infectious w/u  -- low suspicion shoulder lesion is source of active infection  -- CT deferred at this time, improving with current management    Primary adenocarcinoma of upper lobe of right lung  Stage IIIB Lung adenocarcinoma dx 7/2/24 followed by Dr. Kohler. S/p palliative radiation to the R lung/mediastinum 8/14/24-9/4/24. Cutaneous mets to the right shoulder noted in 11/2024. Pt not interested in chemotherapy at this time.   Type 2 diabetes mellitus with circulatory  disorder, with long-term current use of insulin  Pt has communicating dexcom and omnipod. Last A1c 11 months ago 6.0.    - consult to endocrine for management with omnipod  - will perform occasional glucose checks to confirm dexcom accuracy  Autoimmune hepatitis  Follows with Dr. Peraza in hepatology clinic, last seen 11/16/2024. Stable on home regimen of lasix 40mg daily and prednisone 2.5mg daily. No requirement for aldactone or lactulose per hepatology note. No paracentesis required since 2022. No physical exam findings concerning for ascites. Albumin on admission 1.6. LFT wnl.     - hold prednisone 2.5mg daily in setting of acute infection  - continue lasix 40mg daily    Cirrhosis of liver with ascites  Patient with known Cirrhosis with Child's class B. Co-morbidities are present and inclusive of malnutrition and anemia/pancytopenia.  MELD-Na score calculated; MELD 3.0: 13 at 5/19/2025  5:08 AM  MELD-Na: 8 at 5/19/2025  5:08 AM  Calculated from:  Serum Creatinine: 0.6 mg/dL (Using min of 1 mg/dL) at 5/19/2025  5:08 AM  Serum Sodium: 137 mmol/L at 5/19/2025  5:08 AM  Total Bilirubin: 0.3 mg/dL (Using min of 1 mg/dL) at 5/18/2025  5:48 AM  Serum Albumin: 1.4 g/dL (Using min of 1.5 g/dL) at 5/19/2025  5:08 AM  INR(ratio): 1.2 at 5/18/2025  5:48 AM  Age at listing (hypothetical): 84 years  Sex: Female at 5/19/2025  5:08 AM      Continue chronic meds. Etiology likely Autoimmune. Will avoid any hepatotoxic meds, and monitor CBC/CMP/INR for synthetic function.   Hypokalemia  Patient's most recent potassium results are listed below. No correlating EKG changes or palpitations.   Recent Labs     05/17/25  1348 05/18/25  0548 05/19/25  0508   K 3.3* 3.4* 4.4     Plan  - Replete potassium per protocol  - Monitor potassium Daily  - Patient's hypokalemia is being monitored.  Primary hypertension  Patient's blood pressure range in the last 24 hours was: BP  Min: 116/67  Max: 143/84.  Continue prior home regimen    Abdominal  pain  Pt noted abdominal pain beginning in ED. Emesis night prior to admission. Suspect due to above issues. Now resolved.  Primary open angle glaucoma (POAG) of both eyes  Plan:  -- continue home eyedrops    Anemia  Anemia is likely due to Iron deficiency. Most recent hemoglobin and hematocrit are listed below.  Recent Labs     05/17/25  1348 05/18/25  0548 05/19/25  0508   HGB 7.5* 8.0* 7.7*   HCT 25.0* 26.0* 26.4*     Plan  - Monitor serial CBC: Daily  Final Active Diagnoses:    Diagnosis Date Noted POA    PRINCIPAL PROBLEM:  Compression fracture of L1 lumbar vertebra [S32.010A] 05/17/2025 Yes    Anemia [D64.9] 05/18/2025 Yes    UTI (urinary tract infection) [N39.0] 05/17/2025 Yes    Leukocytosis [D72.829] 05/17/2025 Yes    Primary hypertension [I10] 05/17/2025 Yes    Abdominal pain [R10.9] 05/17/2025 Yes    Primary open angle glaucoma (POAG) of both eyes [H40.1130] 05/17/2025 Yes    Shoulder lesion, right [M75.91] 05/17/2025 Yes    Primary adenocarcinoma of upper lobe of right lung [C34.11] 07/25/2024 Yes    Insulin pump in place [Z96.41] 01/29/2024 Not Applicable    Cirrhosis of liver with ascites [K74.60, R18.8] 11/14/2022 Yes    Hypokalemia [E87.6] 11/07/2022 Yes    Autoimmune hepatitis [K75.4] 09/28/2022 Yes     Chronic    Type 2 diabetes mellitus with circulatory disorder, with long-term current use of insulin [E11.59, Z79.4] 09/21/2022 Not Applicable      Problems Resolved During this Admission:       Discharged Condition: stable    Disposition: Home or Self Care    Follow Up: PCP  Oncology  Neurosurgery    Patient Instructions:   No discharge procedures on file.    Significant Diagnostic Studies: N/A    Pending Diagnostic Studies:       None           Medications:  Reconciled Home Medications:      Medication List        PAUSE taking these medications      lisinopriL-hydrochlorothiazide 20-12.5 mg per tablet  Wait to take this until your doctor or other care provider tells you to start again.  Commonly  known as: PRINZIDE,ZESTORETIC  TAKE 1 TABLET BY MOUTH EVERY DAY            START taking these medications      ciprofloxacin HCl 500 MG tablet  Commonly known as: CIPRO  Take 1 tablet (500 mg total) by mouth 2 (two) times daily.            CONTINUE taking these medications      acetaminophen 500 MG tablet  Commonly known as: TYLENOL  Take 2 tablets (1,000 mg total) by mouth every 8 (eight) hours as needed for Pain.     BD INSULIN SYRINGE ULTRA-FINE 1 mL 31 gauge x 5/16 Syrg  Generic drug: insulin syringe-needle U-100     blood-glucose,,cont Misc  Dispsense 1 Dexcom G7      brimonidine 0.2% 0.2 % Drop  Commonly known as: ALPHAGAN  Place 1 drop into both eyes 3 (three) times daily.     cholecalciferol (vitamin D3) 25 mcg (1,000 unit) capsule  Commonly known as: VITAMIN D3  Take 2 capsules (2,000 Units total) by mouth once daily.     DEXCOM G6 TRANSMITTER Amanda  Generic drug: blood-glucose transmitter  1 each by Misc.(Non-Drug; Combo Route) route every 3 (three) months.     DEXCOM G7 SENSOR Amanda  Generic drug: blood-glucose sensor  1 Device by Misc.(Non-Drug; Combo Route) route every 10 days.     dorzolamide-timolol 2-0.5% 22.3-6.8 mg/mL ophthalmic solution  Commonly known as: COSOPT  Place 1 drop into both eyes 2 (two) times daily.     furosemide 40 MG tablet  Commonly known as: LASIX  TAKE 1 TABLET BY MOUTH EVERY DAY     insulin aspart U-100 100 unit/mL injection  Commonly known as: NovoLOG U-100 Insulin aspart  TO USE WITH OMNIPOD 5. TOTAL DAILY DOSE UP TO 40 UNITS     latanoprost 0.005 % ophthalmic solution  PLACE 1 DROP INTO BOTH EYES ONCE DAILY     nystatin 100,000 unit/mL suspension  Commonly known as: MYCOSTATIN  SWISH WITH 6MLS BY MOUTH THEN SWALLOW 4 TIMES A DAY FOR 14 DAYS     OMNIPOD 5 G6 PODS (GEN 5) Crtg  Generic drug: insulin pump cart,automated,BT  Inject 1 each into the skin Every 3 (three) days.     OMNIPOD 5 G6-G7 PODS (GEN 5) Crtg  Generic drug: insulin pump  "cart,auto,BT,G6/7  SMARTSI Each SUB-Q Once a Month     pen needle, diabetic 31 gauge x " Ndle  Use to inject insulin into the skin up to 4 times daily     predniSONE 5 MG tablet  Commonly known as: DELTASONE  Take 0.5 tablets (2.5 mg total) by mouth once daily.     tobramycin sulfate 0.3% 0.3 % ophthalmic solution  Commonly known as: TOBREX  1-2 drops topically twice daily to affected toe(s).            STOP taking these medications      amLODIPine 2.5 MG tablet  Commonly known as: NORVASC            ASK your doctor about these medications      estradioL 0.01 % (0.1 mg/gram) vaginal cream  Commonly known as: ESTRACE  Place 0.5 g vaginally twice a week. Apply to the vagina daily for two weeks then twice a week.     lactulose 10 gram packet  Commonly known as: CEPHULAC  Take 10 g by mouth 3 (three) times daily.              Indwelling Lines/Drains at time of discharge:   Lines/Drains/Airways       Drain  Duration             Female External Urinary Catheter w/ Suction 25 1447 2 days              Line  Duration                  Subcutaneous Infusion Pump 25 Abdomen (Comment) 3 days                 Time spent on the discharge of patient: 55 minutes         Cyndee Barrett MD  Department of Hospital Medicine  [facility] - Internal Medicine Telemetry  "

## 2025-05-19 NOTE — ASSESSMENT & PLAN NOTE
Patient's most recent potassium results are listed below. No correlating EKG changes or palpitations.   Recent Labs     05/17/25  1348 05/18/25  0548 05/19/25  0508   K 3.3* 3.4* 4.4     Plan  - Replete potassium per protocol  - Monitor potassium Daily  - Patient's hypokalemia is being monitored.

## 2025-05-19 NOTE — ASSESSMENT & PLAN NOTE
Pt noted abdominal pain beginning in ED. Emesis night prior to admission. Suspect due to above issues. Now resolved.

## 2025-05-20 ENCOUNTER — PATIENT MESSAGE (OUTPATIENT)
Dept: TRANSPLANT | Facility: CLINIC | Age: 85
End: 2025-05-20
Payer: MEDICARE

## 2025-05-21 ENCOUNTER — PATIENT OUTREACH (OUTPATIENT)
Dept: ADMINISTRATIVE | Facility: CLINIC | Age: 85
End: 2025-05-21
Payer: MEDICARE

## 2025-05-21 ENCOUNTER — TELEPHONE (OUTPATIENT)
Dept: HEMATOLOGY/ONCOLOGY | Facility: CLINIC | Age: 85
End: 2025-05-21
Payer: MEDICARE

## 2025-05-21 DIAGNOSIS — C34.11 ADENOCARCINOMA OF UPPER LOBE OF RIGHT LUNG: Primary | ICD-10-CM

## 2025-05-21 DIAGNOSIS — M54.59 OTHER LOW BACK PAIN: ICD-10-CM

## 2025-05-21 DIAGNOSIS — C79.89 SECONDARY MALIGNANT NEOPLASM OF SOFT TISSUES OF SHOULDER: ICD-10-CM

## 2025-05-21 DIAGNOSIS — E11.59 TYPE 2 DIABETES MELLITUS WITH CIRCULATORY DISORDER, WITH LONG-TERM CURRENT USE OF INSULIN: ICD-10-CM

## 2025-05-21 DIAGNOSIS — Z79.4 TYPE 2 DIABETES MELLITUS WITH CIRCULATORY DISORDER, WITH LONG-TERM CURRENT USE OF INSULIN: ICD-10-CM

## 2025-05-21 NOTE — NURSING
FARZAD Wild reviewed details of Integrative Oncology referral to patient's daughter.     Patient will begin radiation therapy to the site of shoulder metastasis. She is not electing to have additional treatment at this time.     Desires to pursue acupuncture for low back pain and dietitian support for appetite loss and wt loss.     Hopes to address QOL, symptom support and overall well-being virtually with Josefa Adams NP.     Virtual appts confirmed w/ daughter who will access these for patient. 6/3/25 at 11am with Josefa Adams NP and 6/10/25 at 10am with Kemar Perez RD. FARZAD Mittal.

## 2025-05-21 NOTE — TELEPHONE ENCOUNTER
Spoke to the patient's daughter. Attempted to schedule a hospital follow up. She is asking if the patient appointment can be virtual because her mother has quite a few appointments in the coming weeks and she needs to prioritize the appointments her mother needs to visit in-person. I informed her I would send a message to Dr. Lim to find out if it could be virtual. Her daughter states that if the appointment cannot be virtual, they will wait to see Dr. Lim for her annual visit on 7/1/25.

## 2025-05-21 NOTE — PROGRESS NOTES
C3 nurse attempted to contact Radha Feng  for a TCC post hospital discharge follow up call. No answer. LVM requesting a callback at [phone].    The patient does not have a scheduled HOSFU appointment. Message sent to PCP's staff to assist with HOSFU appointment scheduling.

## 2025-05-22 LAB
BACTERIA BLD CULT: NORMAL
BACTERIA BLD CULT: NORMAL

## 2025-05-22 NOTE — PROGRESS NOTES
C3 nurse offered to scheduled [facility] hospital follow-up discharge follow-up call. The Della declined appointment at this time.

## 2025-05-27 ENCOUNTER — CLINICAL SUPPORT (OUTPATIENT)
Dept: DIABETES | Facility: CLINIC | Age: 85
End: 2025-05-27
Payer: MEDICARE

## 2025-05-27 DIAGNOSIS — E11.65 TYPE 2 DIABETES MELLITUS WITH HYPERGLYCEMIA, WITH LONG-TERM CURRENT USE OF INSULIN: Primary | ICD-10-CM

## 2025-05-27 DIAGNOSIS — Z79.4 TYPE 2 DIABETES MELLITUS WITH HYPERGLYCEMIA, WITH LONG-TERM CURRENT USE OF INSULIN: Primary | ICD-10-CM

## 2025-05-30 ENCOUNTER — OFFICE VISIT (OUTPATIENT)
Dept: RADIATION ONCOLOGY | Facility: CLINIC | Age: 85
End: 2025-05-30
Payer: MEDICARE

## 2025-05-30 VITALS
SYSTOLIC BLOOD PRESSURE: 131 MMHG | TEMPERATURE: 97 F | DIASTOLIC BLOOD PRESSURE: 75 MMHG | HEART RATE: 83 BPM | OXYGEN SATURATION: 96 %

## 2025-05-30 DIAGNOSIS — C34.11 ADENOCARCINOMA OF UPPER LOBE OF RIGHT LUNG: ICD-10-CM

## 2025-05-30 PROCEDURE — 99214 OFFICE O/P EST MOD 30 MIN: CPT | Mod: S$PBB,,, | Performed by: STUDENT IN AN ORGANIZED HEALTH CARE EDUCATION/TRAINING PROGRAM

## 2025-05-30 PROCEDURE — 99212 OFFICE O/P EST SF 10 MIN: CPT | Mod: PBBFAC | Performed by: STUDENT IN AN ORGANIZED HEALTH CARE EDUCATION/TRAINING PROGRAM

## 2025-05-30 PROCEDURE — 99999 PR PBB SHADOW E&M-EST. PATIENT-LVL II: CPT | Mod: PBBFAC,,, | Performed by: STUDENT IN AN ORGANIZED HEALTH CARE EDUCATION/TRAINING PROGRAM

## 2025-06-02 ENCOUNTER — PATIENT MESSAGE (OUTPATIENT)
Dept: UROGYNECOLOGY | Facility: CLINIC | Age: 85
End: 2025-06-02
Payer: MEDICARE

## 2025-06-02 ENCOUNTER — TELEPHONE (OUTPATIENT)
Dept: HEMATOLOGY/ONCOLOGY | Facility: CLINIC | Age: 85
End: 2025-06-02
Payer: MEDICARE

## 2025-06-02 ENCOUNTER — PATIENT MESSAGE (OUTPATIENT)
Dept: TRANSPLANT | Facility: CLINIC | Age: 85
End: 2025-06-02
Payer: MEDICARE

## 2025-06-02 ENCOUNTER — PATIENT MESSAGE (OUTPATIENT)
Dept: HEMATOLOGY/ONCOLOGY | Facility: CLINIC | Age: 85
End: 2025-06-02
Payer: MEDICARE

## 2025-06-03 ENCOUNTER — PATIENT MESSAGE (OUTPATIENT)
Dept: TRANSPLANT | Facility: CLINIC | Age: 85
End: 2025-06-03
Payer: MEDICARE

## 2025-06-03 ENCOUNTER — PATIENT MESSAGE (OUTPATIENT)
Dept: RADIATION ONCOLOGY | Facility: CLINIC | Age: 85
End: 2025-06-03
Payer: MEDICARE

## 2025-06-03 ENCOUNTER — HOSPITAL ENCOUNTER (INPATIENT)
Facility: HOSPITAL | Age: 85
LOS: 4 days | Discharge: HOME | DRG: 177 | End: 2025-06-07
Attending: EMERGENCY MEDICINE | Admitting: STUDENT IN AN ORGANIZED HEALTH CARE EDUCATION/TRAINING PROGRAM
Payer: MEDICARE

## 2025-06-03 DIAGNOSIS — C79.51 NON-SMALL CELL LUNG CANCER METASTATIC TO BONE: ICD-10-CM

## 2025-06-03 DIAGNOSIS — Z96.41 INSULIN PUMP IN PLACE: ICD-10-CM

## 2025-06-03 DIAGNOSIS — J96.01 ACUTE HYPOXEMIC RESPIRATORY FAILURE: ICD-10-CM

## 2025-06-03 DIAGNOSIS — R06.02 SOB (SHORTNESS OF BREATH): ICD-10-CM

## 2025-06-03 DIAGNOSIS — I50.30 (HFPEF) HEART FAILURE WITH PRESERVED EJECTION FRACTION: ICD-10-CM

## 2025-06-03 DIAGNOSIS — J43.2 CENTRILOBULAR EMPHYSEMA: ICD-10-CM

## 2025-06-03 DIAGNOSIS — C34.90 NON-SMALL CELL LUNG CANCER METASTATIC TO BONE: ICD-10-CM

## 2025-06-03 DIAGNOSIS — E11.59 TYPE 2 DIABETES MELLITUS WITH OTHER CIRCULATORY COMPLICATION, WITH LONG-TERM CURRENT USE OF INSULIN: ICD-10-CM

## 2025-06-03 DIAGNOSIS — C34.90 STAGE 4 MALIGNANT NEOPLASM OF LUNG, UNSPECIFIED LATERALITY: ICD-10-CM

## 2025-06-03 DIAGNOSIS — N81.3 UTEROVAGINAL PROLAPSE, COMPLETE: ICD-10-CM

## 2025-06-03 DIAGNOSIS — Z79.4 TYPE 2 DIABETES MELLITUS WITH OTHER CIRCULATORY COMPLICATION, WITH LONG-TERM CURRENT USE OF INSULIN: ICD-10-CM

## 2025-06-03 DIAGNOSIS — R07.9 CHEST PAIN: ICD-10-CM

## 2025-06-03 DIAGNOSIS — J90 PLEURAL EFFUSION: ICD-10-CM

## 2025-06-03 DIAGNOSIS — U07.1 COVID-19: Primary | ICD-10-CM

## 2025-06-03 DIAGNOSIS — C78.2 METASTASIS TO PLEURA: ICD-10-CM

## 2025-06-03 PROBLEM — R79.89 ELEVATED TROPONIN: Status: ACTIVE | Noted: 2025-06-03

## 2025-06-03 PROBLEM — Z71.89 ACP (ADVANCE CARE PLANNING): Status: ACTIVE | Noted: 2025-06-03

## 2025-06-03 PROBLEM — R19.7 DIARRHEA: Status: ACTIVE | Noted: 2025-06-03

## 2025-06-03 PROBLEM — J98.11 ATELECTASIS: Status: ACTIVE | Noted: 2025-06-03

## 2025-06-03 PROBLEM — M79.676 TOE PAIN: Status: ACTIVE | Noted: 2025-06-03

## 2025-06-03 LAB
ABSOLUTE EOSINOPHIL (OHS): 0.01 K/UL
ABSOLUTE MONOCYTE (OHS): 1.09 K/UL (ref 0.3–1)
ABSOLUTE NEUTROPHIL COUNT (OHS): 13.16 K/UL (ref 1.8–7.7)
ALBUMIN SERPL BCP-MCNC: 1.9 G/DL (ref 3.5–5.2)
ALP SERPL-CCNC: 93 UNIT/L (ref 40–150)
ALT SERPL W/O P-5'-P-CCNC: 16 UNIT/L (ref 10–44)
ANION GAP (OHS): 10 MMOL/L (ref 8–16)
ANION GAP (OHS): 9 MMOL/L (ref 8–16)
AST SERPL-CCNC: 42 UNIT/L (ref 11–45)
BACTERIA #/AREA URNS AUTO: NORMAL /HPF
BASOPHILS # BLD AUTO: 0.01 K/UL
BASOPHILS NFR BLD AUTO: 0.1 %
BILIRUB SERPL-MCNC: 0.7 MG/DL (ref 0.1–1)
BILIRUB UR QL STRIP.AUTO: NEGATIVE
BIPAP: 0
BNP SERPL-MCNC: 2717 PG/ML (ref 0–99)
BUN SERPL-MCNC: 10 MG/DL (ref 8–23)
BUN SERPL-MCNC: 11 MG/DL (ref 8–23)
CALCIUM SERPL-MCNC: 8.4 MG/DL (ref 8.7–10.5)
CALCIUM SERPL-MCNC: 8.8 MG/DL (ref 8.7–10.5)
CHLORIDE SERPL-SCNC: 109 MMOL/L (ref 95–110)
CHLORIDE SERPL-SCNC: 109 MMOL/L (ref 95–110)
CLARITY UR: CLEAR
CO2 SERPL-SCNC: 20 MMOL/L (ref 23–29)
CO2 SERPL-SCNC: 24 MMOL/L (ref 23–29)
COLOR UR AUTO: YELLOW
CORRECTED TEMPERATURE (PCO2): 39.9 MMHG
CORRECTED TEMPERATURE (PH): 7.43
CORRECTED TEMPERATURE (PO2): 20.8 MMHG
CREAT SERPL-MCNC: 0.6 MG/DL (ref 0.5–1.4)
CREAT SERPL-MCNC: 0.6 MG/DL (ref 0.5–1.4)
ERYTHROCYTE [DISTWIDTH] IN BLOOD BY AUTOMATED COUNT: 21.9 % (ref 11.5–14.5)
FIO2: 21 %
GFR SERPLBLD CREATININE-BSD FMLA CKD-EPI: >60 ML/MIN/1.73/M2
GFR SERPLBLD CREATININE-BSD FMLA CKD-EPI: >60 ML/MIN/1.73/M2
GLUCOSE SERPL-MCNC: 108 MG/DL (ref 70–110)
GLUCOSE SERPL-MCNC: 272 MG/DL (ref 70–110)
GLUCOSE UR QL STRIP: NEGATIVE
HCT VFR BLD AUTO: 30 % (ref 37–48.5)
HCT VFR BLD CALC: 24.3 % (ref 36–54)
HGB BLD-MCNC: 9 GM/DL (ref 12–16)
HGB UR QL STRIP: NEGATIVE
HOLD SPECIMEN: NORMAL
IMM GRANULOCYTES # BLD AUTO: 0.12 K/UL (ref 0–0.04)
IMM GRANULOCYTES NFR BLD AUTO: 0.8 % (ref 0–0.5)
INDIRECT COOMBS: NORMAL
INFLUENZA A MOLECULAR (OHS): NEGATIVE
INFLUENZA B MOLECULAR (OHS): NEGATIVE
KETONES UR QL STRIP: NEGATIVE
LDH SERPL L TO P-CCNC: 1.4 MMOL/L (ref 0.5–2.2)
LEUKOCYTE ESTERASE UR QL STRIP: ABNORMAL
LIPASE SERPL-CCNC: 10 U/L (ref 4–60)
LYMPHOCYTES # BLD AUTO: 0.73 K/UL (ref 1–4.8)
MCH RBC QN AUTO: 27.4 PG (ref 27–31)
MCHC RBC AUTO-ENTMCNC: 30 G/DL (ref 32–36)
MCV RBC AUTO: 91 FL (ref 82–98)
MICROSCOPIC COMMENT: NORMAL
NITRITE UR QL STRIP: NEGATIVE
NUCLEATED RBC (/100WBC) (OHS): 0 /100 WBC
OHS QRS DURATION: 78 MS
OHS QTC CALCULATION: 493 MS
PCO2 BLDA: 39.9 MMHG (ref 35–45)
PH SMN: 7.43 [PH] (ref 7.35–7.45)
PH UR STRIP: 6 [PH]
PLATELET # BLD AUTO: 143 K/UL (ref 150–450)
PMV BLD AUTO: 11.1 FL (ref 9.2–12.9)
PO2 BLDA: 20.8 MMHG (ref 40–60)
POC BASE DEFICIT: 2.3 MMOL/L
POC HCO3: 25.5 MMOL/L (ref 24–28)
POC PERFORMED BY: ABNORMAL
POC TEMPERATURE: 37 C
POTASSIUM SERPL-SCNC: 3.3 MMOL/L (ref 3.5–5.1)
POTASSIUM SERPL-SCNC: 5.1 MMOL/L (ref 3.5–5.1)
PROT SERPL-MCNC: 6.9 GM/DL (ref 6–8.4)
PROT UR QL STRIP: ABNORMAL
RBC # BLD AUTO: 3.29 M/UL (ref 4–5.4)
RBC #/AREA URNS AUTO: 3 /HPF (ref 0–4)
RELATIVE EOSINOPHIL (OHS): 0.1 %
RELATIVE LYMPHOCYTE (OHS): 4.8 % (ref 18–48)
RELATIVE MONOCYTE (OHS): 7.2 % (ref 4–15)
RELATIVE NEUTROPHIL (OHS): 87 % (ref 38–73)
RH BLD: NORMAL
SARS-COV-2 RDRP RESP QL NAA+PROBE: POSITIVE
SODIUM SERPL-SCNC: 139 MMOL/L (ref 136–145)
SODIUM SERPL-SCNC: 142 MMOL/L (ref 136–145)
SP GR UR STRIP: >=1.03
SPECIMEN OUTDATE: NORMAL
SPECIMEN SOURCE: ABNORMAL
SQUAMOUS #/AREA URNS AUTO: 4 /HPF
TROPONIN I SERPL HS-MCNC: 614 NG/L
TROPONIN I SERPL HS-MCNC: 753 NG/L
UROBILINOGEN UR STRIP-ACNC: NEGATIVE EU/DL
WBC # BLD AUTO: 15.12 K/UL (ref 3.9–12.7)
WBC #/AREA URNS AUTO: 2 /HPF (ref 0–5)

## 2025-06-03 PROCEDURE — 99900035 HC TECH TIME PER 15 MIN (STAT)

## 2025-06-03 PROCEDURE — 87427 SHIGA-LIKE TOXIN AG IA: CPT | Performed by: STUDENT IN AN ORGANIZED HEALTH CARE EDUCATION/TRAINING PROGRAM

## 2025-06-03 PROCEDURE — 86900 BLOOD TYPING SEROLOGIC ABO: CPT | Performed by: EMERGENCY MEDICINE

## 2025-06-03 PROCEDURE — 87502 INFLUENZA DNA AMP PROBE: CPT | Performed by: EMERGENCY MEDICINE

## 2025-06-03 PROCEDURE — 85025 COMPLETE CBC W/AUTO DIFF WBC: CPT | Performed by: EMERGENCY MEDICINE

## 2025-06-03 PROCEDURE — 21400001 HC TELEMETRY ROOM

## 2025-06-03 PROCEDURE — 25000003 PHARM REV CODE 250: Performed by: STUDENT IN AN ORGANIZED HEALTH CARE EDUCATION/TRAINING PROGRAM

## 2025-06-03 PROCEDURE — 83880 ASSAY OF NATRIURETIC PEPTIDE: CPT | Performed by: EMERGENCY MEDICINE

## 2025-06-03 PROCEDURE — 25000003 PHARM REV CODE 250: Performed by: EMERGENCY MEDICINE

## 2025-06-03 PROCEDURE — 36415 COLL VENOUS BLD VENIPUNCTURE: CPT | Performed by: STUDENT IN AN ORGANIZED HEALTH CARE EDUCATION/TRAINING PROGRAM

## 2025-06-03 PROCEDURE — 96361 HYDRATE IV INFUSION ADD-ON: CPT

## 2025-06-03 PROCEDURE — 84484 ASSAY OF TROPONIN QUANT: CPT | Performed by: EMERGENCY MEDICINE

## 2025-06-03 PROCEDURE — 87077 CULTURE AEROBIC IDENTIFY: CPT | Performed by: STUDENT IN AN ORGANIZED HEALTH CARE EDUCATION/TRAINING PROGRAM

## 2025-06-03 PROCEDURE — 27000207 HC ISOLATION

## 2025-06-03 PROCEDURE — 63600175 PHARM REV CODE 636 W HCPCS: Performed by: STUDENT IN AN ORGANIZED HEALTH CARE EDUCATION/TRAINING PROGRAM

## 2025-06-03 PROCEDURE — 96374 THER/PROPH/DIAG INJ IV PUSH: CPT

## 2025-06-03 PROCEDURE — 82310 ASSAY OF CALCIUM: CPT | Performed by: STUDENT IN AN ORGANIZED HEALTH CARE EDUCATION/TRAINING PROGRAM

## 2025-06-03 PROCEDURE — 96375 TX/PRO/DX INJ NEW DRUG ADDON: CPT

## 2025-06-03 PROCEDURE — 83690 ASSAY OF LIPASE: CPT | Performed by: EMERGENCY MEDICINE

## 2025-06-03 PROCEDURE — U0002 COVID-19 LAB TEST NON-CDC: HCPCS | Performed by: EMERGENCY MEDICINE

## 2025-06-03 PROCEDURE — 81003 URINALYSIS AUTO W/O SCOPE: CPT | Performed by: EMERGENCY MEDICINE

## 2025-06-03 PROCEDURE — 87449 NOS EACH ORGANISM AG IA: CPT | Performed by: STUDENT IN AN ORGANIZED HEALTH CARE EDUCATION/TRAINING PROGRAM

## 2025-06-03 PROCEDURE — 93010 ELECTROCARDIOGRAM REPORT: CPT | Mod: ,,, | Performed by: INTERNAL MEDICINE

## 2025-06-03 PROCEDURE — 82803 BLOOD GASES ANY COMBINATION: CPT

## 2025-06-03 PROCEDURE — XW033E5 INTRODUCTION OF REMDESIVIR ANTI-INFECTIVE INTO PERIPHERAL VEIN, PERCUTANEOUS APPROACH, NEW TECHNOLOGY GROUP 5: ICD-10-PCS

## 2025-06-03 PROCEDURE — 25500020 PHARM REV CODE 255: Performed by: EMERGENCY MEDICINE

## 2025-06-03 PROCEDURE — 63600175 PHARM REV CODE 636 W HCPCS: Performed by: EMERGENCY MEDICINE

## 2025-06-03 PROCEDURE — 83605 ASSAY OF LACTIC ACID: CPT

## 2025-06-03 PROCEDURE — 99285 EMERGENCY DEPT VISIT HI MDM: CPT | Mod: 25

## 2025-06-03 PROCEDURE — 80053 COMPREHEN METABOLIC PANEL: CPT | Performed by: EMERGENCY MEDICINE

## 2025-06-03 PROCEDURE — 99222 1ST HOSP IP/OBS MODERATE 55: CPT | Mod: ,,, | Performed by: PHYSICIAN ASSISTANT

## 2025-06-03 PROCEDURE — 87507 IADNA-DNA/RNA PROBE TQ 12-25: CPT | Performed by: STUDENT IN AN ORGANIZED HEALTH CARE EDUCATION/TRAINING PROGRAM

## 2025-06-03 PROCEDURE — 87046 STOOL CULTR AEROBIC BACT EA: CPT | Performed by: STUDENT IN AN ORGANIZED HEALTH CARE EDUCATION/TRAINING PROGRAM

## 2025-06-03 PROCEDURE — 93005 ELECTROCARDIOGRAM TRACING: CPT

## 2025-06-03 PROCEDURE — 63600175 PHARM REV CODE 636 W HCPCS: Mod: JZ,TB | Performed by: STUDENT IN AN ORGANIZED HEALTH CARE EDUCATION/TRAINING PROGRAM

## 2025-06-03 RX ORDER — LIDOCAINE 50 MG/G
1 PATCH TOPICAL DAILY
Status: DISCONTINUED | OUTPATIENT
Start: 2025-06-04 | End: 2025-06-05

## 2025-06-03 RX ORDER — POTASSIUM CHLORIDE 750 MG/1
30 CAPSULE, EXTENDED RELEASE ORAL
Status: COMPLETED | OUTPATIENT
Start: 2025-06-03 | End: 2025-06-03

## 2025-06-03 RX ORDER — CHOLECALCIFEROL (VITAMIN D3) 25 MCG
2000 TABLET ORAL DAILY
Status: DISCONTINUED | OUTPATIENT
Start: 2025-06-04 | End: 2025-06-07 | Stop reason: HOSPADM

## 2025-06-03 RX ORDER — ACETAMINOPHEN 500 MG
1000 TABLET ORAL EVERY 8 HOURS PRN
Status: DISCONTINUED | OUTPATIENT
Start: 2025-06-03 | End: 2025-06-07 | Stop reason: HOSPADM

## 2025-06-03 RX ORDER — IBUPROFEN 200 MG
16 TABLET ORAL
Status: DISCONTINUED | OUTPATIENT
Start: 2025-06-03 | End: 2025-06-03

## 2025-06-03 RX ORDER — DICLOFENAC SODIUM 10 MG/G
2 GEL TOPICAL 2 TIMES DAILY
Status: DISCONTINUED | OUTPATIENT
Start: 2025-06-03 | End: 2025-06-07 | Stop reason: HOSPADM

## 2025-06-03 RX ORDER — SODIUM CHLORIDE 0.9 % (FLUSH) 0.9 %
10 SYRINGE (ML) INJECTION
Status: DISCONTINUED | OUTPATIENT
Start: 2025-06-03 | End: 2025-06-07 | Stop reason: HOSPADM

## 2025-06-03 RX ORDER — DEXAMETHASONE SODIUM PHOSPHATE 4 MG/ML
6 INJECTION, SOLUTION INTRA-ARTICULAR; INTRALESIONAL; INTRAMUSCULAR; INTRAVENOUS; SOFT TISSUE
Status: COMPLETED | OUTPATIENT
Start: 2025-06-03 | End: 2025-06-03

## 2025-06-03 RX ORDER — PREDNISONE 2.5 MG/1
2.5 TABLET ORAL DAILY
Status: DISCONTINUED | OUTPATIENT
Start: 2025-06-04 | End: 2025-06-07 | Stop reason: HOSPADM

## 2025-06-03 RX ORDER — NALOXONE HCL 0.4 MG/ML
0.02 VIAL (ML) INJECTION
Status: DISCONTINUED | OUTPATIENT
Start: 2025-06-03 | End: 2025-06-07 | Stop reason: HOSPADM

## 2025-06-03 RX ORDER — TALC
6 POWDER (GRAM) TOPICAL NIGHTLY PRN
Status: DISCONTINUED | OUTPATIENT
Start: 2025-06-03 | End: 2025-06-07 | Stop reason: HOSPADM

## 2025-06-03 RX ORDER — GLUCAGON 1 MG
1 KIT INJECTION
Status: DISCONTINUED | OUTPATIENT
Start: 2025-06-03 | End: 2025-06-07 | Stop reason: HOSPADM

## 2025-06-03 RX ORDER — INSULIN ASPART 100 [IU]/ML
0-10 INJECTION, SOLUTION INTRAVENOUS; SUBCUTANEOUS
Status: DISCONTINUED | OUTPATIENT
Start: 2025-06-03 | End: 2025-06-07 | Stop reason: HOSPADM

## 2025-06-03 RX ORDER — BRIMONIDINE TARTRATE 2 MG/ML
1 SOLUTION/ DROPS OPHTHALMIC 3 TIMES DAILY
Status: DISCONTINUED | OUTPATIENT
Start: 2025-06-03 | End: 2025-06-07 | Stop reason: HOSPADM

## 2025-06-03 RX ORDER — INSULIN ASPART 100 [IU]/ML
0-1 INJECTION, SOLUTION INTRAVENOUS; SUBCUTANEOUS CONTINUOUS
Status: DISCONTINUED | OUTPATIENT
Start: 2025-06-03 | End: 2025-06-07 | Stop reason: HOSPADM

## 2025-06-03 RX ORDER — IBUPROFEN 200 MG
24 TABLET ORAL
Status: DISCONTINUED | OUTPATIENT
Start: 2025-06-03 | End: 2025-06-07 | Stop reason: HOSPADM

## 2025-06-03 RX ORDER — DORZOLAMIDE HYDROCHLORIDE AND TIMOLOL MALEATE 20; 5 MG/ML; MG/ML
1 SOLUTION/ DROPS OPHTHALMIC 2 TIMES DAILY
Status: DISCONTINUED | OUTPATIENT
Start: 2025-06-03 | End: 2025-06-03

## 2025-06-03 RX ORDER — IPRATROPIUM BROMIDE AND ALBUTEROL SULFATE 2.5; .5 MG/3ML; MG/3ML
3 SOLUTION RESPIRATORY (INHALATION) EVERY 6 HOURS PRN
Status: DISCONTINUED | OUTPATIENT
Start: 2025-06-03 | End: 2025-06-07 | Stop reason: HOSPADM

## 2025-06-03 RX ORDER — HEPARIN SODIUM 5000 [USP'U]/ML
5000 INJECTION, SOLUTION INTRAVENOUS; SUBCUTANEOUS EVERY 8 HOURS
Status: DISCONTINUED | OUTPATIENT
Start: 2025-06-03 | End: 2025-06-03

## 2025-06-03 RX ORDER — LATANOPROST 50 UG/ML
1 SOLUTION/ DROPS OPHTHALMIC DAILY
Status: DISCONTINUED | OUTPATIENT
Start: 2025-06-04 | End: 2025-06-07 | Stop reason: HOSPADM

## 2025-06-03 RX ORDER — ASPIRIN 325 MG
325 TABLET ORAL
Status: COMPLETED | OUTPATIENT
Start: 2025-06-03 | End: 2025-06-03

## 2025-06-03 RX ORDER — IBUPROFEN 200 MG
24 TABLET ORAL
Status: DISCONTINUED | OUTPATIENT
Start: 2025-06-03 | End: 2025-06-03

## 2025-06-03 RX ORDER — ACETAMINOPHEN 500 MG
1000 TABLET ORAL
Status: COMPLETED | OUTPATIENT
Start: 2025-06-03 | End: 2025-06-03

## 2025-06-03 RX ORDER — FAMOTIDINE 20 MG/1
20 TABLET, FILM COATED ORAL 2 TIMES DAILY
Status: DISCONTINUED | OUTPATIENT
Start: 2025-06-03 | End: 2025-06-04

## 2025-06-03 RX ORDER — FUROSEMIDE 10 MG/ML
60 INJECTION INTRAMUSCULAR; INTRAVENOUS 2 TIMES DAILY
Status: DISCONTINUED | OUTPATIENT
Start: 2025-06-03 | End: 2025-06-06

## 2025-06-03 RX ORDER — FUROSEMIDE 10 MG/ML
40 INJECTION INTRAMUSCULAR; INTRAVENOUS
Status: COMPLETED | OUTPATIENT
Start: 2025-06-03 | End: 2025-06-03

## 2025-06-03 RX ORDER — IBUPROFEN 200 MG
16 TABLET ORAL
Status: DISCONTINUED | OUTPATIENT
Start: 2025-06-03 | End: 2025-06-07 | Stop reason: HOSPADM

## 2025-06-03 RX ORDER — DORZOLAMIDE HCL 20 MG/ML
1 SOLUTION/ DROPS OPHTHALMIC 2 TIMES DAILY
Status: DISCONTINUED | OUTPATIENT
Start: 2025-06-03 | End: 2025-06-04

## 2025-06-03 RX ORDER — TIMOLOL MALEATE 5 MG/ML
1 SOLUTION/ DROPS OPHTHALMIC 2 TIMES DAILY
Status: DISCONTINUED | OUTPATIENT
Start: 2025-06-03 | End: 2025-06-04

## 2025-06-03 RX ORDER — SODIUM CHLORIDE 0.9 % (FLUSH) 0.9 %
10 SYRINGE (ML) INJECTION EVERY 12 HOURS PRN
Status: DISCONTINUED | OUTPATIENT
Start: 2025-06-03 | End: 2025-06-07 | Stop reason: HOSPADM

## 2025-06-03 RX ORDER — GLUCAGON 1 MG
1 KIT INJECTION
Status: DISCONTINUED | OUTPATIENT
Start: 2025-06-03 | End: 2025-06-03

## 2025-06-03 RX ADMIN — POTASSIUM CHLORIDE 30 MEQ: 750 CAPSULE, EXTENDED RELEASE ORAL at 03:06

## 2025-06-03 RX ADMIN — DICLOFENAC SODIUM 2 G: 10 GEL TOPICAL at 09:06

## 2025-06-03 RX ADMIN — FUROSEMIDE 40 MG: 10 INJECTION, SOLUTION INTRAVENOUS at 02:06

## 2025-06-03 RX ADMIN — ACETAMINOPHEN 1000 MG: 500 TABLET ORAL at 12:06

## 2025-06-03 RX ADMIN — DEXAMETHASONE SODIUM PHOSPHATE 6 MG: 4 INJECTION INTRA-ARTICULAR; INTRALESIONAL; INTRAMUSCULAR; INTRAVENOUS; SOFT TISSUE at 01:06

## 2025-06-03 RX ADMIN — ASPIRIN 325 MG ORAL TABLET 325 MG: 325 PILL ORAL at 02:06

## 2025-06-03 RX ADMIN — BRIMONIDINE TARTRATE 1 DROP: 2 SOLUTION OPHTHALMIC at 09:06

## 2025-06-03 RX ADMIN — SODIUM CHLORIDE 500 ML: 9 INJECTION, SOLUTION INTRAVENOUS at 12:06

## 2025-06-03 RX ADMIN — REMDESIVIR 200 MG: 100 INJECTION, POWDER, LYOPHILIZED, FOR SOLUTION INTRAVENOUS at 04:06

## 2025-06-03 RX ADMIN — FUROSEMIDE 60 MG: 10 INJECTION, SOLUTION INTRAMUSCULAR; INTRAVENOUS at 09:06

## 2025-06-03 RX ADMIN — IOHEXOL 75 ML: 350 INJECTION, SOLUTION INTRAVENOUS at 01:06

## 2025-06-03 NOTE — ASSESSMENT & PLAN NOTE
BG goal: 140-180. T2DM on Omnipod 5. COVID (+). Received Dex 6 mg in ED. Plan for 2.5 mg Prednisone QD. Will monitor on insulin pump for now.      - Continue Home insulin pump   - POCT Glucose before meals, at bedtime and at 2 am  - Hypoglycemia protocol in place      ** Please notify Endocrine for any change and/or advance in diet**  ** Please call Endocrine for any BG related issues **     Discharge Planning:   TBD. Please notify endocrinology prior to discharge.

## 2025-06-03 NOTE — ASSESSMENT & PLAN NOTE
Recent Cipro exposure as well as IV abx in hospital. COVID can also cause diarrhea.   Cdiff pending  GI panel pending  Stool culture pending  COVID management as above

## 2025-06-03 NOTE — ASSESSMENT & PLAN NOTE
Patient with Hypoxic Respiratory failure which is Acute.  she is not on home oxygen. Supplemental oxygen was provided and noted-      .   Signs/symptoms of respiratory failure include- tachypnea and respiratory distress. Contributing diagnoses includes - ARDS, CHF, COPD, Pneumonia, and Lung CA Labs and images were reviewed. Patient Has not had a recent ABG. Will treat underlying causes and adjust management of respiratory failure as follows-     - BNP elevated 2717 on admission  - COVID positive on admission  - CTA chest and abdomen 6/3 : New RUL atelectasis, presumably related to occlusion of RUL bronchus and progression of malignancy. New large R and moderate L pleural effusions, partially loculated.  Malignant pleural fluid is favored.  B/l Interstitial and airspace opacities.    Large mass in the right posterior shoulder presumably metastatic with questionable subtle osseous metastatic disease. Enlarged mediastinal and right axillary lymph nodes suggestive of metastatic disease. Indeterminate liver lesions.   No evidence of acute PE. Asymmetric decreased opacification of the left common iliac artery- limited by suboptimal bolus timing.  Chronic or recent occlusion     - For COVID - started remdesivir after talking to Dr. Peraza - no limitation to steroids use. Got Dexamethasone 6mg IV  x1 dose in ED - will start if no improvement with steroids and diuresis  - For loculated pleural effusions and HF exacerbation - lasix 40 in the ED , start lasix 60 IV BID for now  -pulmonary consulted for need drainage as potential of malignancy effusion  - known lung CA - now seems metastatic  - possible obstruction of RUL bronchus by mass extension   - will benefit from palliative care consult - to be discussed with pt and daughter  - Has COPD but does not seem to be in exacerbation - steroids if resp status not improving    - Echo ordered  - Pulmonary consulted  - Lasix 60 IV BID - strict I/O  - Remdesivir x 5 days  -  Dexamethasone on admission x1 - will order if clinical status worsens of not improving with remdesivir and diuresis   - Diana PRN  - Encourage IS

## 2025-06-03 NOTE — ASSESSMENT & PLAN NOTE
R 4th toe pain   Xray unremarkable -negative for fracture  No cellulitis or swelling concerning for gout   Per daughter was using tobramycin drops for toe at home as recommended    Diclofenac gel ordered

## 2025-06-03 NOTE — SUBJECTIVE & OBJECTIVE
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    Diabetes mellitus     Glaucoma     Hypertension     Lung cancer     Other ascites 11/29/2022       Past Surgical History:   Procedure Laterality Date    CATARACT EXTRACTION W/  INTRAOCULAR LENS IMPLANT Left 12/21/2021        ENDOBRONCHIAL ULTRASOUND N/A 07/05/2024    Procedure: ENDOBRONCHIAL ULTRASOUND (EBUS);  Surgeon: Rolo Wilkinson MD;  [facility];  Service: Pulmonary;  Laterality: N/A;    ESOPHAGOGASTRODUODENOSCOPY N/A 06/26/2023    Procedure: ESOPHAGOGASTRODUODENOSCOPY (EGD);  Surgeon: Edgar Branch MD;  Location: [facility] ENDO (4TH FLR);  Service: Endoscopy;  Laterality: N/A;  referral Dr Peraza-cirrhosis/labs done on 4/24/23-[facility] portal-GT       Review of patient's allergies indicates:  No Known Allergies    No current facility-administered medications on file prior to encounter.     Current Outpatient Medications on File Prior to Encounter   Medication Sig    acetaminophen (TYLENOL) 500 MG tablet Take 2 tablets (1,000 mg total) by mouth every 8 (eight) hours as needed for Pain.    BD INSULIN SYRINGE ULTRA-FINE 1 mL 31 gauge x 5/16 Syrg     blood-glucose meter,continuous Misc Dispsense 1 Dexcom G7     blood-glucose transmitter (DEXCOM G6 TRANSMITTER) Amanda 1 each by Misc.(Non-Drug; Combo Route) route every 3 (three) months. (Patient not taking: Reported on 5/22/2025)    brimonidine 0.2% (ALPHAGAN) 0.2 % Drop Place 1 drop into both eyes 3 (three) times daily.    cholecalciferol, vitamin D3, (VITAMIN D3) 25 mcg (1,000 unit) capsule Take 2 capsules (2,000 Units total) by mouth once daily.    ciprofloxacin HCl (CIPRO) 500 MG tablet Take 1 tablet (500 mg total) by mouth 2 (two) times daily.    DEXCOM G7 SENSOR Amanda 1 Device by Misc.(Non-Drug; Combo Route) route every 10 days.    dorzolamide-timolol 2-0.5% (COSOPT) 22.3-6.8 mg/mL ophthalmic solution Place 1 drop into both eyes 2  "(two) times daily.    estradioL (ESTRACE) 0.01 % (0.1 mg/gram) vaginal cream Place 0.5 g vaginally twice a week. Apply to the vagina daily for two weeks then twice a week. (Patient not taking: Reported on 2025)    furosemide (LASIX) 40 MG tablet TAKE 1 TABLET BY MOUTH EVERY DAY    insulin aspart U-100 (NOVOLOG U-100 INSULIN ASPART) 100 unit/mL injection TO USE WITH OMNIPOD 5. TOTAL DAILY DOSE UP TO 40 UNITS    insulin pump cart,automated,BT (OMNIPOD 5 G6 PODS, GEN 5,) Crtg Inject 1 each into the skin Every 3 (three) days.    lactulose (CEPHULAC) 10 gram packet Take 10 g by mouth 3 (three) times daily. (Patient not taking: Reported on 2025)    latanoprost 0.005 % ophthalmic solution PLACE 1 DROP INTO BOTH EYES ONCE DAILY    LIDOcaine (LIDODERM) 5 % Place 1 patch onto the skin once daily. Remove & Discard patch within 12 hours or as directed by MD    [Paused] lisinopriL-hydrochlorothiazide (PRINZIDE,ZESTORETIC) 20-12.5 mg per tablet TAKE 1 TABLET BY MOUTH EVERY DAY (Patient not taking: Reported on 2025)    nystatin (MYCOSTATIN) 100,000 unit/mL suspension SWISH WITH 6MLS BY MOUTH THEN SWALLOW 4 TIMES A DAY FOR 14 DAYS    OMNIPOD 5 G6-G7 PODS, GEN 5, Crtg SMARTSI Each SUB-Q Once a Month (Patient not taking: Reported on 2025)    oxyCODONE (ROXICODONE) 5 MG immediate release tablet Take 0.5 tablets (2.5 mg total) by mouth every 6 (six) hours as needed for Pain.    pen needle, diabetic 31 gauge x 5/16" Ndle Use to inject insulin into the skin up to 4 times daily    predniSONE (DELTASONE) 5 MG tablet Take 0.5 tablets (2.5 mg total) by mouth once daily.    tobramycin sulfate 0.3% (TOBREX) 0.3 % ophthalmic solution 1-2 drops topically twice daily to affected toe(s).     Family History       Problem Relation (Age of Onset)    Blindness Cousin    Cataracts Mother    Colon cancer Sister    Diabetes Mother    Glaucoma Mother    Heart attack Father    Hypertension Mother          Tobacco Use    Smoking " status: Former    Smokeless tobacco: Never   Substance and Sexual Activity    Alcohol use: Not Currently    Drug use: Never    Sexual activity: Not on file     Review of Systems   Constitutional:  Positive for activity change and appetite change.   Respiratory:  Positive for cough and shortness of breath. Negative for chest tightness.    Cardiovascular:  Negative for chest pain and leg swelling.   Gastrointestinal:  Positive for abdominal pain, diarrhea, nausea and vomiting. Negative for abdominal distention.   Genitourinary:  Negative for dysuria and hematuria.   Musculoskeletal:  Positive for joint swelling. Negative for neck stiffness.   Skin:  Negative for color change and rash.   Psychiatric/Behavioral:  Negative for agitation and behavioral problems.      Objective:     Vital Signs (Most Recent):  Temp: 99 °F (37.2 °C) (06/03/25 1123)  Pulse: 68 (06/03/25 1600)  Resp: 20 (06/03/25 1530)  BP: (!) 162/76 (06/03/25 1600)  SpO2: 97 % (06/03/25 1600) Vital Signs (24h Range):  Temp:  [97.9 °F (36.6 °C)-99 °F (37.2 °C)] 99 °F (37.2 °C)  Pulse:  [67-78] 68  Resp:  [16-24] 20  SpO2:  [89 %-97 %] 97 %  BP: (137-171)/(64-77) 162/76     Weight: 49 kg (108 lb)  Body mass index is 17.97 kg/m².     Physical Exam  Constitutional:       Appearance: She is ill-appearing.   HENT:      Head: Normocephalic and atraumatic.      Nose: Nose normal.      Mouth/Throat:      Mouth: Mucous membranes are moist.      Pharynx: Oropharynx is clear.   Eyes:      Extraocular Movements: Extraocular movements intact.      Pupils: Pupils are equal, round, and reactive to light.   Cardiovascular:      Rate and Rhythm: Normal rate.   Pulmonary:      Effort: Pulmonary effort is normal. No respiratory distress.      Breath sounds: Rales present.      Comments: Diminished sounds in lower lung fields.   Abdominal:      General: Abdomen is flat. There is no distension.      Palpations: Abdomen is soft.      Tenderness: There is no abdominal tenderness.    Musculoskeletal:         General: Normal range of motion.      Cervical back: Normal range of motion. No rigidity.      Right lower leg: No edema.      Left lower leg: No edema.      Comments: R 4th toe pain to palpation - no erythema or swelling   Skin:     General: Skin is warm and dry.   Neurological:      General: No focal deficit present.      Mental Status: She is alert and oriented to person, place, and time.   Psychiatric:         Mood and Affect: Mood normal.         Behavior: Behavior normal.              CRANIAL NERVES     CN III, IV, VI   Pupils are equal, round, and reactive to light.       Significant Labs: All pertinent labs within the past 24 hours have been reviewed.    Significant Imaging: I have reviewed all pertinent imaging results/findings within the past 24 hours.

## 2025-06-03 NOTE — ASSESSMENT & PLAN NOTE
Patient's most recent potassium results are listed below.   Recent Labs     06/03/25  1208   K 3.3*     Plan  - Replete potassium per protocol  - Monitor potassium Every 12 hours  - Patient's hypokalemia is pending repeat  - Monitor with diuresis

## 2025-06-03 NOTE — ASSESSMENT & PLAN NOTE
Has insulin pump and dexcom  Endocrinology consulted  - appreciate   Anticipate hyperglycemia iso steroids on admission

## 2025-06-03 NOTE — ASSESSMENT & PLAN NOTE
I have personally reviewed CT Scan. Patient with atelectasis on interpretation. Likely Obstructive atelectasis secondary to malignancy. Signs and symptoms include Shortness of breath Will begin treatment with incentive spirometry.

## 2025-06-03 NOTE — ED PROVIDER NOTES
Encounter Date: 6/3/2025       History     Chief Complaint   Patient presents with    Diarrhea     On PO Cipro     Melena     X 2 days, was soft and watery prior to that      HPI    84 y.o. 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    The primary issue is that she has been having diarrhea over the last week.  Cipro stopped about 1 week ago.  Diarrhea was loose stool not truly liquidy but in the last couple of days has become more liquid and then this morning was melanotic.  She did get Pepto-Bismol yesterday.  This morning she also complain of abdominal pain which is a new complaint for the patient.  Family also noticed that her stool were foul-smelling.  No urinary complaint.  Pain is not localized.  She had a few episodes of vomiting about 1 week ago in the setting of stopping the antibiotic but none since, nausea this morning with no vomiting.    Secondarily daughter says that she has been more short of breath with sats going from 88-94% on room air where she is normally 95-97% on room air.  She does have a new cough that is occasionally productive of sputum.  No chest pain.  She has some mild right ankle swelling but no calf swelling or redness.  She is complaining of pain in her right toes which is new.    She also is having pain in her right 4th toe which started a few days ago.  No new trauma.    She has not had anything for pain today.  Normally takes Tylenol.    Review of patient's allergies indicates:  No Known  Allergies  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    Diabetes mellitus     Glaucoma     Hypertension     Lung cancer     Other ascites 11/29/2022     Past Surgical History:   Procedure Laterality Date    CATARACT EXTRACTION W/  INTRAOCULAR LENS IMPLANT Left 12/21/2021        ENDOBRONCHIAL ULTRASOUND N/A 07/05/2024    Procedure: ENDOBRONCHIAL ULTRASOUND (EBUS);  Surgeon: Rolo Wilkinson MD;  [facility];  Service: Pulmonary;  Laterality: N/A;    ESOPHAGOGASTRODUODENOSCOPY N/A 06/26/2023    Procedure: ESOPHAGOGASTRODUODENOSCOPY (EGD);  Surgeon: Edgar Branch MD;  Location: [facility] ENDO (4TH FLR);  Service: Endoscopy;  Laterality: N/A;  referral Dr Peraza-cirrhosis/labs done on 4/24/23-instr portal-GT     Family History   Problem Relation Name Age of Onset    Cataracts Mother      Diabetes Mother      Glaucoma Mother      Hypertension Mother      Heart attack Father      Colon cancer Sister      Blindness Cousin      Amblyopia Neg Hx      Macular degeneration Neg Hx      Retinal detachment Neg Hx      Strabismus Neg Hx       Social History[1]  Review of Systems    Physical Exam     Initial Vitals [06/03/25 1032]   BP Pulse Resp Temp SpO2   137/64 76 16 97.9 °F (36.6 °C) 95 %      MAP       --         Physical Exam    Physical Exam:  CONSTITUTIONAL: Well developed, well nourished, in no acute distress.  HENT: Normocephalic, atraumatic    EYES: Sclerae anicteric   NECK: Supple, no thyroid enlargement  CARDIOVASCULAR: Regular rate and rhythm without any murmurs, gallops, rubs.  No significant lower extremity swelling, trace in the right ankle.  RESPIRATORY: Speaking in full sentences. Breathing comfortably. Auscultation of the lungs revealed normal breath sounds b/l, no wheezing, no rales, no rhonchi.  ABDOMEN: Soft and nontender, no masses, no rebound or guarding, there is some discomfort to the right hip area (family says it is old and  has been there since her fall which they attribute to the L1 spinal fracture)  RECTAL:  Vault is empty no stool on my glove, secretions are guaiac negative.  NEUROLOGIC: Alert, interacting normally. No facial droop. Voice is clear. Speech is fluent.  MSK:  There is a mass in the right shoulder which is known as meds from her cancer.  Otherwise no deformities.  No tenderness throughout except some mild tenderness at the right 4th toe.  Moving all four extremities.  SKIN: Warm and dry. No visible rash on exposed areas of skin.    Psych:  Somewhat anxious appearing      ED Course   Procedures  Labs Reviewed   COMPREHENSIVE METABOLIC PANEL - Abnormal       Result Value    Sodium 142      Potassium 3.3 (*)     Chloride 109      CO2 24      Glucose 108      BUN 10      Creatinine 0.6      Calcium 8.8      Protein Total 6.9      Albumin 1.9 (*)     Bilirubin Total 0.7      ALP 93      AST 42      ALT 16      Anion Gap 9      eGFR >60     B-TYPE NATRIURETIC PEPTIDE - Abnormal    BNP 2,717 (*)    TROPONIN I HIGH SENSITIVITY - Abnormal    Troponin High Sensitive 753 (*)    URINALYSIS, REFLEX TO URINE CULTURE - Abnormal    Color, UA Yellow      Appearance, UA Clear      pH, UA 6.0      Spec Grav UA >=1.030 (*)     Protein, UA Trace (*)     Glucose, UA Negative      Ketones, UA Negative      Bilirubin, UA Negative      Blood, UA Negative      Nitrites, UA Negative      Urobilinogen, UA Negative      Leukocyte Esterase, UA 1+ (*)    SARS-COV-2 RNA AMPLIFICATION, QUAL - Abnormal    SARS COV-2 Molecular Positive (*)    CBC WITH DIFFERENTIAL - Abnormal    WBC 15.12 (*)     RBC 3.29 (*)     HGB 9.0 (*)     HCT 30.0 (*)     MCV 91      MCH 27.4      MCHC 30.0 (*)     RDW 21.9 (*)     Platelet Count 143 (*)     MPV 11.1      Nucleated RBC 0      Neut % 87.0 (*)     Lymph % 4.8 (*)     Mono % 7.2      Eos % 0.1      Basophil % 0.1      Imm Grans % 0.8 (*)     Neut # 13.16 (*)     Lymph # 0.73 (*)     Mono # 1.09 (*)     Eos # 0.01       Baso # 0.01      Imm Grans # 0.12 (*)    TROPONIN I HIGH SENSITIVITY - Abnormal    Troponin High Sensitive 614 (*)    INFLUENZA A & B BY MOLECULAR - Normal    INFLUENZA A MOLECULAR Negative      INFLUENZA B MOLECULAR  Negative     LIPASE - Normal    Lipase Level 10     CLOSTRIDIOIDES DIFFICILE   CULTURE, STOOL   CBC W/ AUTO DIFFERENTIAL    Narrative:     The following orders were created for panel order CBC auto differential.  Procedure                               Abnormality         Status                     ---------                               -----------         ------                     CBC with Differential[[id number]]       Abnormal            Final result                 Please view results for these tests on the individual orders.   GREY TOP URINE HOLD    Extra Tube Hold for add-ons.     URINALYSIS MICROSCOPIC    RBC, UA 3      WBC, UA 2      Bacteria, UA Rare      Squamous Epithelial Cells, UA 4      Microscopic Comment       GASTROINTESTINAL PATHOGENS PANEL, PCR   TYPE & SCREEN    Specimen Outdate 06/06/2025 23:59      Group & Rh O POS      Indirect Richie NEG     POCT GLUCOSE MONITORING CONTINUOUS        ECG Results              EKG 12-lead (Final result)        Collection Time Result Time QRS Duration OHS QTC Calculation    06/03/25 11:34:54 06/03/25 14:47:38 78 493                     Final result by Interface, Lab In [facility] (06/03/25 14:47:42)                   Narrative:    Test Reason : R06.02,    Vent. Rate :  80 BPM     Atrial Rate :  80 BPM     P-R Int : 142 ms          QRS Dur :  78 ms      QT Int : 428 ms       P-R-T Axes :  40  -4 253 degrees    QTcB Int : 493 ms    Normal sinus rhythm  Low voltage QRS  Cannot rule out Anteroseptal infarct ,age undetermined  Nonspecific T wave abnormality  Abnormal ECG  When compared with ECG of 17-May-2025 19:39,  Limb lead reversal is no longer present  Confirmed by Heron Melendez ([id number]) on 6/3/2025 2:47:36 PM    Referred By: AAAREFERRAL SELF            Confirmed By: Heron Melendez                                  Imaging Results               CTA Chest Non-Coronary (PE Studies) (Final result)  Result time 06/03/25 14:33:08      Final result by Deandre Becerra MD (06/03/25 14:33:08)                   Impression:      New right upper lobe atelectasis, presumably related to occlusion of the right upper lobe bronchus and progression of malignancy.  Consider bronchoscopy follow-up if it would alter management.    New large right and moderate left pleural effusions, partially loculated.  Malignant pleural fluid is favored.  Interstitial and airspace opacities in both lungs, potentially exaggerated by passive atelectasis.    Large mass in the right posterior shoulder presumably metastatic disease with questionable subtle osseous metastatic disease.    Enlarged mediastinal and right axillary lymph nodes suggestive of metastatic disease.    Indeterminate liver lesions.    No evidence of acute pulmonary embolus.    Asymmetric decreased opacification of the left common iliac artery, with reconstitution at the level of the left external iliac artery.  Findings are limited by suboptimal bolus timing.  Chronic or recent occlusion would be included in the differential without prior contrast enhanced imaging available for comparison.    Multiple additional findings discussed in the body of the report.    This report was flagged in Epic as abnormal.      Electronically signed by: Deandre Becerra MD  Date:    06/03/2025  Time:    14:33               Narrative:    EXAMINATION:  CT ABDOMEN PELVIS WITH IV CONTRAST; CTA CHEST NON CORONARY (PE STUDIES)    CLINICAL HISTORY:  Diarrhea;Abdominal pain, acute, nonlocalized;Abdo pain, diarrhea, recently on cipro.;; Pulmonary embolism (PE) suspected, high prob;SOB, intermittent hypoxia, hx of lung cancer with mets and mostly staying in bed;    TECHNIQUE:  Low dose axial images, sagittal and coronal reformations were obtained from the  thoracic inlet to the pubic symphysis following the IV administration of 75 mL of Omnipaque 350 .  Oral contrast was not given. CTA chest obtained utilizing PE protocol with MIP reformats.  CT abdomen pelvis obtained in the portal venous phase as a separate acquisition.    COMPARISON:  CTA, 06/20/2024.  PET-CT, 04/29/2025.    FINDINGS:  Evaluation is limited by extensive streak artifact due to the patient's arms overlying the field of view.  Exam quality also limited by motion.    CTA CHEST:    Examination of the soft tissue and vascular structures at the base of the neck is negative for acute finding.  There is a partially imaged suspected soft tissue mass in the right upper shoulder region measuring roughly 10 cm in size (axial image 1).  Enlarged right axillary lymph nodes measuring up to 1.8 cm in short axis (axial image 166).    Thoracic aorta is negative for dissection and demonstrates atherosclerosis.  No significant aneurysm.    No large or central pulmonary embolus.  No obvious acute pulmonary embolus to the proximal segmental level, allowing for motion and artifact limitations related to the patient's arms overlying the field of view.    Trachea is patent.  There is occlusion of the right upper lobe bronchus.  Retained secretions in the lower lobe airways.    Complete atelectasis of the right upper lobe, new from prior PET-CT.  Interstitial and airspace opacities in both lungs.  Moderate to large right and small left pleural effusions.  Left pleural effusion is partially loculated, likely malignant effusion.    Heart is at the upper limit of normal in size.  Coronary artery calcifications.  Minimal pericardial fluid.  No abnormal bowing of the interventricular septum.    Mildly enlarged mediastinal and hilar lymph nodes as seen on prior PET-CT.    CT ABDOMEN PELVIS:    Abdomen:    Liver is similar and negative for acute finding.  Few hypodensities in the liver which are too small to definitively  characterize.  Suggest attention on follow-up.  Focal fat infiltration or liver lesion in the anterior aspect of the liver (series 3, image 49).  Gallbladder is unremarkable.  No intrahepatic biliary ductal dilatation.    Spleen, adrenals, and pancreas are unremarkable.    The kidneys are symmetric.  No hydronephrosis. No asymmetric perinephric fat stranding.  Right renal stone versus vascular calcification.    No bowel obstruction.  Colonic diverticulosis.    No pneumoperitoneum or organized fluid collection.  Mild mesenteric edema.    No bulky lymphadenopathy.    Abdominal aorta is normal in caliber with advanced calcific atherosclerosis.  There is relatively decreased opacification of the left common iliac artery for which thrombus or chronic occlusion is difficult to exclude without prior contrast enhanced imaging for comparison.  Significant iliofemoral atherosclerosis.  External iliac and common femoral arteries are grossly patent.    Portal, splenic, and superior mesenteric veins are patent.  No portal venous gas.  There is non opacification of the iliofemoral veins for which thrombosis is difficult to exclude, though this could be related to phase of contrast.    Pelvis:    Urinary bladder is decompressed and not well evaluated.  Liquid stool in the rectum.  Presumed pessary device in the pelvis.  No significant pelvic free fluid.    Bones and soft tissues:    Few scattered questionable aggressive osseous lesions involving multiple ribs with questionable small lucent lesions in the spine.  Unclear if findings are related to metastatic disease or bony demineralization.  There is height loss of the L1 vertebral body as seen on prior CT dated 05/17/2025. Consider attention on follow-up PET-CT and correlation with symptoms.  There are advanced degenerative changes in the right hip with deformity of the right femoral head as seen previously.  Right-sided acetabular protrusio.                                         CT Abdomen Pelvis With IV Contrast NO Oral Contrast (Final result)  Result time 06/03/25 14:33:08      Final result by Deandre Becerra MD (06/03/25 14:33:08)                   Impression:      New right upper lobe atelectasis, presumably related to occlusion of the right upper lobe bronchus and progression of malignancy.  Consider bronchoscopy follow-up if it would alter management.    New large right and moderate left pleural effusions, partially loculated.  Malignant pleural fluid is favored.  Interstitial and airspace opacities in both lungs, potentially exaggerated by passive atelectasis.    Large mass in the right posterior shoulder presumably metastatic disease with questionable subtle osseous metastatic disease.    Enlarged mediastinal and right axillary lymph nodes suggestive of metastatic disease.    Indeterminate liver lesions.    No evidence of acute pulmonary embolus.    Asymmetric decreased opacification of the left common iliac artery, with reconstitution at the level of the left external iliac artery.  Findings are limited by suboptimal bolus timing.  Chronic or recent occlusion would be included in the differential without prior contrast enhanced imaging available for comparison.    Multiple additional findings discussed in the body of the report.    This report was flagged in Epic as abnormal.      Electronically signed by: Deandre Becerra MD  Date:    06/03/2025  Time:    14:33               Narrative:    EXAMINATION:  CT ABDOMEN PELVIS WITH IV CONTRAST; CTA CHEST NON CORONARY (PE STUDIES)    CLINICAL HISTORY:  Diarrhea;Abdominal pain, acute, nonlocalized;Abdo pain, diarrhea, recently on cipro.;; Pulmonary embolism (PE) suspected, high prob;SOB, intermittent hypoxia, hx of lung cancer with mets and mostly staying in bed;    TECHNIQUE:  Low dose axial images, sagittal and coronal reformations were obtained from the thoracic inlet to the pubic symphysis following the IV administration of 75 mL  of Omnipaque 350 .  Oral contrast was not given. CTA chest obtained utilizing PE protocol with MIP reformats.  CT abdomen pelvis obtained in the portal venous phase as a separate acquisition.    COMPARISON:  CTA, 06/20/2024.  PET-CT, 04/29/2025.    FINDINGS:  Evaluation is limited by extensive streak artifact due to the patient's arms overlying the field of view.  Exam quality also limited by motion.    CTA CHEST:    Examination of the soft tissue and vascular structures at the base of the neck is negative for acute finding.  There is a partially imaged suspected soft tissue mass in the right upper shoulder region measuring roughly 10 cm in size (axial image 1).  Enlarged right axillary lymph nodes measuring up to 1.8 cm in short axis (axial image 166).    Thoracic aorta is negative for dissection and demonstrates atherosclerosis.  No significant aneurysm.    No large or central pulmonary embolus.  No obvious acute pulmonary embolus to the proximal segmental level, allowing for motion and artifact limitations related to the patient's arms overlying the field of view.    Trachea is patent.  There is occlusion of the right upper lobe bronchus.  Retained secretions in the lower lobe airways.    Complete atelectasis of the right upper lobe, new from prior PET-CT.  Interstitial and airspace opacities in both lungs.  Moderate to large right and small left pleural effusions.  Left pleural effusion is partially loculated, likely malignant effusion.    Heart is at the upper limit of normal in size.  Coronary artery calcifications.  Minimal pericardial fluid.  No abnormal bowing of the interventricular septum.    Mildly enlarged mediastinal and hilar lymph nodes as seen on prior PET-CT.    CT ABDOMEN PELVIS:    Abdomen:    Liver is similar and negative for acute finding.  Few hypodensities in the liver which are too small to definitively characterize.  Suggest attention on follow-up.  Focal fat infiltration or liver lesion in  the anterior aspect of the liver (series 3, image 49).  Gallbladder is unremarkable.  No intrahepatic biliary ductal dilatation.    Spleen, adrenals, and pancreas are unremarkable.    The kidneys are symmetric.  No hydronephrosis. No asymmetric perinephric fat stranding.  Right renal stone versus vascular calcification.    No bowel obstruction.  Colonic diverticulosis.    No pneumoperitoneum or organized fluid collection.  Mild mesenteric edema.    No bulky lymphadenopathy.    Abdominal aorta is normal in caliber with advanced calcific atherosclerosis.  There is relatively decreased opacification of the left common iliac artery for which thrombus or chronic occlusion is difficult to exclude without prior contrast enhanced imaging for comparison.  Significant iliofemoral atherosclerosis.  External iliac and common femoral arteries are grossly patent.    Portal, splenic, and superior mesenteric veins are patent.  No portal venous gas.  There is non opacification of the iliofemoral veins for which thrombosis is difficult to exclude, though this could be related to phase of contrast.    Pelvis:    Urinary bladder is decompressed and not well evaluated.  Liquid stool in the rectum.  Presumed pessary device in the pelvis.  No significant pelvic free fluid.    Bones and soft tissues:    Few scattered questionable aggressive osseous lesions involving multiple ribs with questionable small lucent lesions in the spine.  Unclear if findings are related to metastatic disease or bony demineralization.  There is height loss of the L1 vertebral body as seen on prior CT dated 05/17/2025. Consider attention on follow-up PET-CT and correlation with symptoms.  There are advanced degenerative changes in the right hip with deformity of the right femoral head as seen previously.  Right-sided acetabular protrusio.                                       X-Ray Toe 2 or More Views Right (Final result)  Result time 06/03/25 12:36:21      Final  result by Matt Curran III, MD (06/03/25 12:36:21)                   Narrative:    EXAMINATION:  XR TOE 2 OR MORE VIEWS RIGHT    CLINICAL HISTORY:  Fourth toe pain and tenderness.  Fall 2 weeks ago.;    FINDINGS:  Toe three views right.    No fracture dislocation bone destruction seen.      Electronically signed by: Matt Curran MD  Date:    06/03/2025  Time:    12:36                                     X-Ray Chest AP Portable (Final result)  Result time 06/03/25 12:31:39      Final result by Matt Curran III, MD (06/03/25 12:31:39)                   Impression:      Perihilar and lower lobe edema with pleural fluid.  Pneumonia cannot be excluded.      Electronically signed by: Matt Curran MD  Date:    06/03/2025  Time:    12:31               Narrative:    EXAMINATION:  XR CHEST AP PORTABLE    CLINICAL HISTORY:  SOB;    FINDINGS:  Chest one view AP portable.    Heart size is normal.  There is elevation of the right diaphragm and or a right sub pulmonic pleural effusion.  There is aortic plaque.  There is mild perihilar and lower lobe edema with pleural fluid.                                       Medications   sodium chloride 0.9% flush 10 mL (has no administration in time range)   melatonin tablet 6 mg (has no administration in time range)   acetaminophen tablet 1,000 mg (has no administration in time range)   brimonidine 0.2% ophthalmic solution 1 drop (1 drop Both Eyes Given 6/3/25 8991)   latanoprost 0.005 % ophthalmic solution 1 drop (has no administration in time range)   predniSONE tablet 2.5 mg (has no administration in time range)   vitamin D 1000 units tablet 2,000 Units (has no administration in time range)   LIDOcaine 5 % patch 1 patch (has no administration in time range)   sodium chloride 0.9% flush 10 mL (has no administration in time range)   naloxone 0.4 mg/mL injection 0.02 mg (has no administration in time range)   dorzolamide 2 % ophthalmic solution 1 drop (1 drop Both Eyes Not  Given 6/3/25 2151)   timolol maleate 0.5% ophthalmic solution 1 drop (1 drop Both Eyes Not Given 6/3/25 2151)   remdesivir 200 mg in 0.9% NaCl 250 mL infusion (0 mg Intravenous Stopped 6/3/25 1713)     Followed by   remdesivir 100 mg in 0.9% NaCl 250 mL infusion (has no administration in time range)   diclofenac sodium 1 % gel 2 g (2 g Topical (Top) Given 6/3/25 2151)   glucose chewable tablet 16 g (has no administration in time range)   glucose chewable tablet 24 g (has no administration in time range)   dextrose 50% injection 12.5 g (has no administration in time range)   dextrose 50% injection 25 g (has no administration in time range)   glucagon (human recombinant) injection 1 mg (has no administration in time range)   albuterol-ipratropium 2.5 mg-0.5 mg/3 mL nebulizer solution 3 mL (has no administration in time range)   furosemide injection 60 mg (60 mg Intravenous Given 6/3/25 2151)   famotidine tablet 20 mg (20 mg Oral Not Given 6/3/25 2100)   insulin aspart U-100 insulin pump from home (has no administration in time range)   insulin aspart U-100 insulin pump from home 0-10 Units (has no administration in time range)   sodium chloride 0.9% bolus 500 mL 500 mL (0 mLs Intravenous Stopped 6/3/25 1323)   acetaminophen tablet 1,000 mg (1,000 mg Oral Given 6/3/25 1228)   dexAMETHasone injection 6 mg (6 mg Intravenous Given 6/3/25 1338)   iohexoL (OMNIPAQUE 350) injection 75 mL (75 mLs Intravenous Given 6/3/25 1332)   aspirin tablet 325 mg (325 mg Oral Given 6/3/25 1455)   furosemide injection 40 mg (40 mg Intravenous Given 6/3/25 1455)   potassium chloride CR capsule 30 mEq (30 mEq Oral Given 6/3/25 1547)     Medical Decision Making  Amount and/or Complexity of Data Reviewed  Labs: ordered.  Radiology: ordered.    Risk  OTC drugs.  Prescription drug management.  Decision regarding hospitalization.      84-year-old female with past history as noted above coming in with abdominal pain diarrhea and dark stool.  Also  coming in with shortness of breath and hypoxia.    Exam as noted above.    Regarding her abdominal pain, diarrhea antibiotic induced diarrhea remains high in the differential GI bleed, C diff, colitis.  Guaiac was negative, with empty vault.  Nonsurgical abdomen at this time.    Regarding her shortness breath and hypoxia PE is high in the differential, aspiration, pneumonia.  Viral illness remains possible.   She is satting between 90 and 94% on room air in the emergency department.  No increased work of breathing.    Will undertake broad workup including labs to look for any metabolic hematologic abnormalities, repeat urine studies, CT abdomen pelvis to look for signs of dangerous intra-abdominal pathology, C diff test if she is able to provide a sample.    Will undertake CT of the chest to look for PE, aspiration, pneumonia.     Will start with Tylenol for pain control.  Will give small bolus of IV fluids for hydration in the context of diarrhea.    Will undertake x-ray of the right toes to look for any fractures.  Difficult to feel foot pulses will ask nurse to Doppler, feet are of equal temperature bilaterally low suspicion for ischemic disease.     Disposition based on ED workup and patient's symptomatology.    EKG, independently interpreted, normal sinus rhythm at a rate of 80 with inferior lateral T-wave inversions, normal axis, intervals are unremarkable.  T-wave inversions has been intermittently present in old EKG he has.    Update:  Found to have positive COVID swab given 6 of dex given hypoxia.  Also BNP and troponin are elevated, likely secondary to demand at this point as she has no chest pain.  She did get 500 cc of fluids in the context of diarrhea but will switch to diuresis at this point with Lasix.    CT scan with no PE however she has new effusion as well as atelectasis.  This could explain her hypoxia and shortness a breath as well.  Will place him 2 L nasal cannula    Hemoglobin is above  "previous baseline, guaiac negative, in the setting of Pepto-Bismol dark stool as expected, this time not consistent with a GI bleed.    Lactate is not elevated, VBG not remarkable.  COVID and pleural effusion are very likely explanation for her shortness of breath and hypoxia lactate is not elevated will hold off antibiotics at this time, as she just had recent likely antibiotic induced diarrhea.    Critical Care Time:     The high probability of sudden, clinically significant deterioration in the patient's condition required the highest level of my preparedness to intervene urgently.    Services included the following: chart data review, reviewing nursing notes and/or old charts, documentation time, medication orders and management, direct patient care, vital sign assessments and ordering, interpreting and reviewing diagnostic studies/lab tests.     I spent 40 minutes on total Critical Care time, which includes only time during which I was engaged in work directly related to the patient's care, as described above, whether at the bedside or elsewhere in the Emergency Department.  It did not include time spent on separately billable procedures nor did it include the time spent by residents, students, nurses or physician assistants on this patient's care.    Critical Care was needed secondary to the following conditions:  COVID, hypoxia, large pleural effusion               Sepsis Perfusion Assessment: "I attest a sepsis perfusion exam was performed within 6 hours of sepsis, severe sepsis, or septic shock presentation, following fluid resuscitation."                      Clinical Impression:  Final diagnoses:  [R06.02] SOB (shortness of breath)  [U07.1] COVID-19 (Primary)  [J90] Pleural effusion          ED Disposition Condition    Admit                       [1]   Social History  Tobacco Use    Smoking status: Former    Smokeless tobacco: Never   Substance Use Topics    Alcohol use: Not Currently    Drug use: Never "        David Younger MD  06/03/25 1697

## 2025-06-03 NOTE — ED TRIAGE NOTES
Radha Feng, a 84 y.o. female presents to the ED w/ complaint of diarrhea and dark tarry stools. Pt arrives to ED joe personal vehicle with co of diarrhea and dark tarry stools. Pt was recently admitted to hospital and sent home on abx. Pt reports loose stools and dark stools x1wk. Pt reports generalized abdominal pain x1day. Hx of lung cancer.     Triage note:  Chief Complaint   Patient presents with    Diarrhea     On PO Cipro     Melena     X 2 days, was soft and watery prior to that      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    Diabetes mellitus     Glaucoma     Hypertension     Lung cancer     Other ascites 11/29/2022

## 2025-06-03 NOTE — ASSESSMENT & PLAN NOTE
Trop [phone] - [phone]  Ru demand ischemia iso COVID, HF and pleural effusions  Daughter does not even want lovenox or heparin dvt ppx and was discontinued - SCDs for dvt ppx.

## 2025-06-03 NOTE — ASSESSMENT & PLAN NOTE
Patient is identified as Severe COVID-19 based on hypoxemia with O2 saturations <94% on room air or on ambulation   Initiate standard COVID protocols; COVID-19 testing ,Infection Control notification  and isolation- respiratory, contact and droplet per protocol    Diagnostics: CBC, CMP, Ferritin, CRP, and Portable CXR    Management: Initiate targeted therapy with Remdesivir, 200mg IV x1, followed by 100mg IV daily x5 days total, Maintain oxygen saturations 92-96% via Nasal Cannula 2 LPM and monitor with continuous/intermittent pulse oximetry. , and Inhaled bronchodilators as needed for shortness of breath. Got one dose of dexamethaosne on admission - will start if not improving or worsening as sats were 90-94% before O2 was placed.     Advance Care Planning  Current advance care plan has been discussed with patient/family/POA and patient currently wishes DNR (Do Not Resuscitate).

## 2025-06-03 NOTE — ASSESSMENT & PLAN NOTE
Advance Care Planning   Discussed with patient and daughter at bedside on admission. DNR/DNI. Status updated.     Will further discuss palliative care involvement given metastatic disease.         Total of  10 minutes spent in GOC and ACP conversations.

## 2025-06-03 NOTE — ASSESSMENT & PLAN NOTE
Patient found to have large pleural effusion on imaging. I have personally reviewed and interpreted the following imaging: CT. A thoracentesis was deferred. Most likely etiology includes Congestive Heart Failure. Management to include Diuresis and Pulm consult

## 2025-06-03 NOTE — CONSULTS
[facility] - Emergency Dept  Endocrinology  Diabetes Consult Note    Consult Requested by: Rafa Carrero MD   Reason for admit: Acute hypoxemic respiratory failure    HISTORY OF PRESENT ILLNESS:  Reason for Consult: Management of T2DM, Hyperglycemia      Diabetes diagnosis year: > 5 years ago      Home Diabetes Medications:    Omnipod 5  Basal Rate  12A:  0.45 units/hr      Carb Ratio  12A:12  6A: 10  6P: 12     ISF  12A: 50      Target: 120  Correct above: 140     IAT: 4 hours        How often checking glucose at home? >4 x day Dexcom   BG readings on regimen: 100s-200s    Hypoglycemia on the regimen? No   Missed doses on regimen?  No     Diabetes Complications include:     Hyperglycemia     Complicating diabetes co morbidities:   Glucocorticoid use         HPI: Radha Feng is a 84 y.o. 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 Endocrine consulted for BG management.       Interval HPI:   No acute events overnight. Patient in room ED . Blood glucose stable. BG at goal on current insulin regimen (Home Insulin Pump). Steroid use- Dexamethasone  6 mg QD.      Renal function- Normal   Vasopressors-  None     Diet Adult Regular     Eatin%  Nausea: No  Hypoglycemia and intervention: No  Fever: No  TPN and/or TF: No    PMH, PSH, FH, SH updated and reviewed     ROS:  Review of Systems   Constitutional:  Negative for unexpected weight change.   Gastrointestinal:  Negative for constipation, diarrhea, nausea and vomiting.   Endocrine: Negative for polydipsia and polyuria.       Current Medications and/or Treatments  "Impacting Glycemic Control  Immunotherapy:    Immunosuppressants       None          Steroids:   Hormones (From admission, onward)      Start     Stop Route Frequency Ordered    06/04/25 0900  predniSONE tablet 2.5 mg         -- Oral Daily 06/03/25 1527    06/03/25 1611  melatonin tablet 6 mg         -- Oral Nightly PRN 06/03/25 1527          Pressors:    Autonomic Drugs (From admission, onward)      None          Hyperglycemia/Diabetes Medications:   Antihyperglycemics (From admission, onward)      None             PHYSICAL EXAMINATION:  Vitals:    06/03/25 1530   BP: (!) 171/77   Pulse: 70   Resp: 20   Temp:      Body mass index is 17.97 kg/m².     Physical Exam  Constitutional:       General: She is not in acute distress.     Appearance: Normal appearance. She is not ill-appearing.   HENT:      Head: Normocephalic and atraumatic.      Right Ear: External ear normal.      Left Ear: External ear normal.      Nose: Nose normal.   Pulmonary:      Effort: No respiratory distress.   Skin:     Coloration: Skin is not jaundiced.   Neurological:      Mental Status: She is alert. Mental status is at baseline.   Psychiatric:         Mood and Affect: Mood normal.         Behavior: Behavior normal.         Thought Content: Thought content normal.         Judgment: Judgment normal.            Labs Reviewed and Include   Recent Labs   Lab 06/03/25  1208      CALCIUM 8.8   ALBUMIN 1.9*   PROT 6.9      K 3.3*   CO2 24      BUN 10   CREATININE 0.6   ALKPHOS 93   ALT 16   AST 42   BILITOT 0.7     Lab Results   Component Value Date    WBC 15.12 (H) 06/03/2025    HGB 9.0 (L) 06/03/2025    HCT 24.3 06/03/2025    MCV 91 06/03/2025     (L) 06/03/2025     No results for input(s): "TSH", "FREET4" in the last 168 hours.  Lab Results   Component Value Date    HGBA1C 6.0 (H) 06/21/2024       Nutritional status:   Body mass index is 17.97 kg/m².  Lab Results   Component Value Date    ALBUMIN 1.9 (L) 06/03/2025    " "ALBUMIN 1.4 (L) 05/19/2025    ALBUMIN 1.4 (L) 05/18/2025     No results found for: "PREALBUMIN"    Estimated Creatinine Clearance: 55 mL/min (based on SCr of 0.6 mg/dL).    Accu-Checks  No results for input(s): "POCTGLUCOSE" in the last 72 hours.     ASSESSMENT and PLAN    ID  COVID-19  Managed per primary team        Endocrine  Insulin pump in place  At time of evaluation, pt meets criteria to continue home insulin pump usage.  - Has all adequate supplies   - Insulin pump site change on 7/23/2021.    - Bolus settings reviewed    - No changes to home regimen.   - Nurse to check BG qac/hs/0200 & record in epic   - Patient to input glucose into pump and use bolus wizard for prandial needs   - Will continue to monitor accuchecks and titrate insulin as clinically indicated .     - Discussed above plan with patient, patient verbalized understanding.   - Understands in case of pump malfunction or cognitive decline in which pt can no longer safely use insulin pump, will transition to SC MDI       If pump malfunctions or is disconnected, please call endocrine.         Type 2 diabetes mellitus with circulatory disorder, with long-term current use of insulin  BG goal: 140-180. T2DM on Omnipod 5. COVID (+). Received Dex 6 mg in ED. Plan for 2.5 mg Prednisone QD. Will monitor on insulin pump for now.      - Continue Home insulin pump   - POCT Glucose before meals, at bedtime and at 2 am  - Hypoglycemia protocol in place      ** Please notify Endocrine for any change and/or advance in diet**  ** Please call Endocrine for any BG related issues **     Discharge Planning:   TBD. Please notify endocrinology prior to discharge.        Other  Adrenal cortical steroids causing adverse effect in therapeutic use  Glucocorticoids markedly increase glucose levels. Expect the steroid taper will help glucose control.             Plan discussed with patient, family, and RN at bedside.     Se Nunes PA-C  Endocrinology  [facility] - Emergency Dept

## 2025-06-03 NOTE — PROGRESS NOTES
Pt arrived on MSU from Ed, she is in isolation for positive covid and r/o cdiff. Assisted to the bed oriented to the room including nurse call button. Daughter is at her side and is active in her care.

## 2025-06-03 NOTE — CARE UPDATE
[facility] Care Support Interaction      I have reviewed the chart of Radha Feng who is hospitalized for <principal problem not specified>. The patient is currently located in the following unit: ED       Clinical support - ordered nutrition consult for malnutrition evaluation for BMI 17.97       Leanne Lemons PA-C  [facility]

## 2025-06-03 NOTE — HPI
Reason for Consult: Management of T2DM, Hyperglycemia      Diabetes diagnosis year: > 5 years ago      Home Diabetes Medications:    Omnipod 5  Basal Rate  12A:  0.45 units/hr      Carb Ratio  12A:12  6A: 10  6P: 12     ISF  12A: 50      Target: 120  Correct above: 140     IAT: 4 hours        How often checking glucose at home? >4 x day Dexcom   BG readings on regimen: 100s-200s    Hypoglycemia on the regimen? No   Missed doses on regimen?  No     Diabetes Complications include:     Hyperglycemia     Complicating diabetes co morbidities:   Glucocorticoid use         HPI: Radha Feng is a 84 y.o. 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 Endocrine consulted for BG management.

## 2025-06-03 NOTE — H&P
[facility] Emergency Dept  Hospital Medicine  History & Physical    Patient Name: Radha Feng  MRN: [id number]  Patient Class: IP- Inpatient  Admission Date: 6/3/2025  Attending Physician: Rafa Carrero MD   Primary Care Provider: Leticia Lim MD         Patient information was obtained from patient, relative(s), and ER records.     Subjective:     Principal Problem:Acute hypoxemic respiratory failure    Chief Complaint:   Chief Complaint   Patient presents with    Diarrhea     On PO Cipro     Melena     X 2 days, was soft and watery prior to that         HPI: 84 y.o. 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     Daughter reports that patient has been having loose stools for almost a week - they were somwhat formed but loose initially but for the last 2-3 days they have been watery - 4 times a day. This morning associated with some abdominal discomfort as well. She was discharged on ciprofloxacin for UTI after admission for L1 fracture post fall and being treated with IV abx in house. She has been taking it. This am daughter who is an RN and has a private job noticed stool to be melanotic.  She did get Pepto-Bismol yesterday.  Daughter also noticed that her stool were foul-smelling.  She denies any urinary complaints.  She had a few episodes of vomiting about 1 week ago in the setting of stopping the antibiotic but none since, nausea this morning with no vomiting.     Secondarily daughter says that  she has been more short of breath with sats going from 88-94% on room air where she is normally 95-97% on room air.  She does have a new cough that is occasionally productive of sputum.  No chest pain. Daughter has been holding home PO lasix as she was worried about dehydration related to diarrhea.   She has some mild right ankle swelling but no calf swelling or redness.  Patient was also complaining of pain in her right 4th toe which is new and is not related to redness or any trauma. She takes tylenol 1g TID at home. She uses dexcom and insulin pump at home. Daughter wanted to make sure I talk to Dr. Orr before ordering steroids or redesivir for COVID - I talked to Dr. HO and was told that there are no limitations to use either or both medications.     In the ED she was found to have COVID 19 infection and CT showed bilateral loculated pleural effusions with possible RUL bronchus obstruction. Being admitted for workup and management. She was initially given 500 ml VF for diarrhea but then iso pleural effusions was given lasix 40 IV. She was given dexamethasone IV 6 mg x1 . Placed on O2 for saturation 90-94%.     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    Diabetes mellitus     Glaucoma     Hypertension     Lung cancer     Other ascites 11/29/2022       Past Surgical History:   Procedure Laterality Date    CATARACT EXTRACTION W/  INTRAOCULAR LENS IMPLANT Left 12/21/2021        ENDOBRONCHIAL ULTRASOUND N/A 07/05/2024    Procedure: ENDOBRONCHIAL ULTRASOUND (EBUS);  Surgeon: Rolo Wilkinson MD;  Location: [facility] OR [address] [place] [address];  Service: Pulmonary;  Laterality: N/A;    ESOPHAGOGASTRODUODENOSCOPY N/A 06/26/2023    Procedure: ESOPHAGOGASTRODUODENOSCOPY (EGD);  Surgeon: Edgar Branch MD;  Location: [facility] (4TH FLR);  Service: Endoscopy;  Laterality: N/A;  referral Dr Peraza-cirrhosis/labs done on 4/24/23-instr portal-GT       Review of patient's  allergies indicates:  No Known Allergies    No current facility-administered medications on file prior to encounter.     Current Outpatient Medications on File Prior to Encounter   Medication Sig    acetaminophen (TYLENOL) 500 MG tablet Take 2 tablets (1,000 mg total) by mouth every 8 (eight) hours as needed for Pain.    BD INSULIN SYRINGE ULTRA-FINE 1 mL 31 gauge x 5/16 Syrg     blood-glucose meter,continuous Misc Dispsense 1 Dexcom G7     blood-glucose transmitter (DEXCOM G6 TRANSMITTER) Amanda 1 each by Misc.(Non-Drug; Combo Route) route every 3 (three) months. (Patient not taking: Reported on 5/22/2025)    brimonidine 0.2% (ALPHAGAN) 0.2 % Drop Place 1 drop into both eyes 3 (three) times daily.    cholecalciferol, vitamin D3, (VITAMIN D3) 25 mcg (1,000 unit) capsule Take 2 capsules (2,000 Units total) by mouth once daily.    ciprofloxacin HCl (CIPRO) 500 MG tablet Take 1 tablet (500 mg total) by mouth 2 (two) times daily.    DEXCOM G7 SENSOR Amanda 1 Device by Misc.(Non-Drug; Combo Route) route every 10 days.    dorzolamide-timolol 2-0.5% (COSOPT) 22.3-6.8 mg/mL ophthalmic solution Place 1 drop into both eyes 2 (two) times daily.    estradioL (ESTRACE) 0.01 % (0.1 mg/gram) vaginal cream Place 0.5 g vaginally twice a week. Apply to the vagina daily for two weeks then twice a week. (Patient not taking: Reported on 5/22/2025)    furosemide (LASIX) 40 MG tablet TAKE 1 TABLET BY MOUTH EVERY DAY    insulin aspart U-100 (NOVOLOG U-100 INSULIN ASPART) 100 unit/mL injection TO USE WITH OMNIPOD 5. TOTAL DAILY DOSE UP TO 40 UNITS    insulin pump cart,automated,BT (OMNIPOD 5 G6 PODS, GEN 5,) Crtg Inject 1 each into the skin Every 3 (three) days.    lactulose (CEPHULAC) 10 gram packet Take 10 g by mouth 3 (three) times daily. (Patient not taking: Reported on 5/22/2025)    latanoprost 0.005 % ophthalmic solution PLACE 1 DROP INTO BOTH EYES ONCE DAILY    LIDOcaine (LIDODERM) 5 % Place 1 patch onto the skin once daily.  "Remove & Discard patch within 12 hours or as directed by MD    [Paused] lisinopriL-hydrochlorothiazide (PRINZIDE,ZESTORETIC) 20-12.5 mg per tablet TAKE 1 TABLET BY MOUTH EVERY DAY (Patient not taking: Reported on 2025)    nystatin (MYCOSTATIN) 100,000 unit/mL suspension SWISH WITH 6MLS BY MOUTH THEN SWALLOW 4 TIMES A DAY FOR 14 DAYS    OMNIPOD 5 G6-G7 PODS, GEN 5, Crtg SMARTSI Each SUB-Q Once a Month (Patient not taking: Reported on 2025)    oxyCODONE (ROXICODONE) 5 MG immediate release tablet Take 0.5 tablets (2.5 mg total) by mouth every 6 (six) hours as needed for Pain.    pen needle, diabetic 31 gauge x 5/16" Ndle Use to inject insulin into the skin up to 4 times daily    predniSONE (DELTASONE) 5 MG tablet Take 0.5 tablets (2.5 mg total) by mouth once daily.    tobramycin sulfate 0.3% (TOBREX) 0.3 % ophthalmic solution 1-2 drops topically twice daily to affected toe(s).     Family History       Problem Relation (Age of Onset)    Blindness Cousin    Cataracts Mother    Colon cancer Sister    Diabetes Mother    Glaucoma Mother    Heart attack Father    Hypertension Mother          Tobacco Use    Smoking status: Former    Smokeless tobacco: Never   Substance and Sexual Activity    Alcohol use: Not Currently    Drug use: Never    Sexual activity: Not on file     Review of Systems   Constitutional:  Positive for activity change and appetite change.   Respiratory:  Positive for cough and shortness of breath. Negative for chest tightness.    Cardiovascular:  Negative for chest pain and leg swelling.   Gastrointestinal:  Positive for abdominal pain, diarrhea, nausea and vomiting. Negative for abdominal distention.   Genitourinary:  Negative for dysuria and hematuria.   Musculoskeletal:  Positive for joint swelling. Negative for neck stiffness.   Skin:  Negative for color change and rash.   Psychiatric/Behavioral:  Negative for agitation and behavioral problems.      Objective:     Vital Signs (Most " Recent):  Temp: 99 °F (37.2 °C) (06/03/25 1123)  Pulse: 68 (06/03/25 1600)  Resp: 20 (06/03/25 1530)  BP: (!) 162/76 (06/03/25 1600)  SpO2: 97 % (06/03/25 1600) Vital Signs (24h Range):  Temp:  [97.9 °F (36.6 °C)-99 °F (37.2 °C)] 99 °F (37.2 °C)  Pulse:  [67-78] 68  Resp:  [16-24] 20  SpO2:  [89 %-97 %] 97 %  BP: (137-171)/(64-77) 162/76     Weight: 49 kg (108 lb)  Body mass index is 17.97 kg/m².     Physical Exam  Constitutional:       Appearance: She is ill-appearing.   HENT:      Head: Normocephalic and atraumatic.      Nose: Nose normal.      Mouth/Throat:      Mouth: Mucous membranes are moist.      Pharynx: Oropharynx is clear.   Eyes:      Extraocular Movements: Extraocular movements intact.      Pupils: Pupils are equal, round, and reactive to light.   Cardiovascular:      Rate and Rhythm: Normal rate.   Pulmonary:      Effort: Pulmonary effort is normal. No respiratory distress.      Breath sounds: Rales present.      Comments: Diminished sounds in lower lung fields.   Abdominal:      General: Abdomen is flat. There is no distension.      Palpations: Abdomen is soft.      Tenderness: There is no abdominal tenderness.   Musculoskeletal:         General: Normal range of motion.      Cervical back: Normal range of motion. No rigidity.      Right lower leg: No edema.      Left lower leg: No edema.      Comments: R 4th toe pain to palpation - no erythema or swelling   Skin:     General: Skin is warm and dry.   Neurological:      General: No focal deficit present.      Mental Status: She is alert and oriented to person, place, and time.   Psychiatric:         Mood and Affect: Mood normal.         Behavior: Behavior normal.              CRANIAL NERVES     CN III, IV, VI   Pupils are equal, round, and reactive to light.       Significant Labs: All pertinent labs within the past 24 hours have been reviewed.    Significant Imaging: I have reviewed all pertinent imaging results/findings within the past 24  hours.  Assessment/Plan:     Assessment & Plan  Acute hypoxemic respiratory failure  Patient with Hypoxic Respiratory failure which is Acute.  she is not on home oxygen. Supplemental oxygen was provided and noted-      .   Signs/symptoms of respiratory failure include- tachypnea and respiratory distress. Contributing diagnoses includes - ARDS, CHF, COPD, Pneumonia, and Lung CA Labs and images were reviewed. Patient Has not had a recent ABG. Will treat underlying causes and adjust management of respiratory failure as follows-     - BNP elevated 2717 on admission  - COVID positive on admission  - CTA chest and abdomen 6/3 : New RUL atelectasis, presumably related to occlusion of RUL bronchus and progression of malignancy. New large R and moderate L pleural effusions, partially loculated.  Malignant pleural fluid is favored.  B/l Interstitial and airspace opacities.    Large mass in the right posterior shoulder presumably metastatic with questionable subtle osseous metastatic disease. Enlarged mediastinal and right axillary lymph nodes suggestive of metastatic disease. Indeterminate liver lesions.   No evidence of acute PE. Asymmetric decreased opacification of the left common iliac artery- limited by suboptimal bolus timing.  Chronic or recent occlusion     - For COVID - started remdesivir after talking to Dr. Peraza - no limitation to steroids use. Got Dexamethasone 6mg IV  x1 dose in ED - will start if no improvement with steroids and diuresis  - For loculated pleural effusions and HF exacerbation - lasix 40 in the ED , start lasix 60 IV BID for now  -pulmonary consulted for need drainage as potential of malignancy effusion  - known lung CA - now seems metastatic  - possible obstruction of RUL bronchus by mass extension   - will benefit from palliative care consult - to be discussed with pt and daughter  - Has COPD but does not seem to be in exacerbation - steroids if resp status not improving    - Echo ordered  -  Pulmonary consulted  - Lasix 60 IV BID - strict I/O  - Remdesivir x 5 days  - Dexamethasone on admission x1 - will order if clinical status worsens of not improving with remdesivir and diuresis   - [place] PRN  - Encourage IS      Type 2 diabetes mellitus with circulatory disorder, with long-term current use of insulin  Has insulin pump and dexcom  Endocrinology consulted  - appreciate   Anticipate hyperglycemia iso steroids on admission    Hypokalemia  Patient's most recent potassium results are listed below.   Recent Labs     06/03/25  1208   K 3.3*     Plan  - Replete potassium per protocol  - Monitor potassium Every 12 hours  - Patient's hypokalemia is pending repeat  - Monitor with diuresis  Adrenal cortical steroids causing adverse effect in therapeutic use      Insulin pump in place      COVID-19  Patient is identified as Severe COVID-19 based on hypoxemia with O2 saturations <94% on room air or on ambulation   Initiate standard COVID protocols; COVID-19 testing ,Infection Control notification  and isolation- respiratory, contact and droplet per protocol    Diagnostics: CBC, CMP, Ferritin, CRP, and Portable CXR    Management: Initiate targeted therapy with Remdesivir, 200mg IV x1, followed by 100mg IV daily x5 days total, Maintain oxygen saturations 92-96% via Nasal Cannula 2 LPM and monitor with continuous/intermittent pulse oximetry. , and Inhaled bronchodilators as needed for shortness of breath. Got one dose of dexamethaosne on admission - will start if not improving or worsening as sats were 90-94% before O2 was placed.     Advance Care Planning  Current advance care plan has been discussed with patient/family/POA and patient currently wishes DNR (Do Not Resuscitate).   BMI < 18.5  Nutrition consult    Atelectasis  I have personally reviewed CT Scan. Patient with atelectasis on interpretation. Likely Obstructive atelectasis secondary to malignancy. Signs and symptoms include Shortness of breath Will  begin treatment with incentive spirometry.  Pleural effusion  Patient found to have large pleural effusion on imaging. I have personally reviewed and interpreted the following imaging: CT. A thoracentesis was deferred. Most likely etiology includes Congestive Heart Failure. Management to include Diuresis and Pulm consult    Diarrhea  Recent Cipro exposure as well as IV abx in hospital. COVID can also cause diarrhea.   Cdiff pending  GI panel pending  Stool culture pending  COVID management as above      Elevated troponin    Trop 753 - 614  Likley demand ischemia iso COVID, HF and pleural effusions  Daughter does not even want lovenox or heparin dvt ppx and was discontinued - SCDs for dvt ppx.   ACP (advance care planning)  Advance Care Planning   Discussed with patient and daughter at bedside on admission. DNR/DNI. Status updated.     Will further discuss palliative care involvement given metastatic disease.         Total of  10 minutes spent in GOC and ACP conversations.   Toe pain  R 4th toe pain   Xray unremarkable -negative for fracture  No cellulitis or swelling concerning for gout   Per daughter was using tobramycin drops for toe at home as recommended    Diclofenac gel ordered    VTE Risk Mitigation (From admission, onward)           Ordered     IP VTE HIGH RISK PATIENT  Once         06/03/25 1527     Place sequential compression device  Until discontinued         06/03/25 1527     IP VTE HIGH RISK PATIENT  Once         06/03/25 1527     Place sequential compression device  Until discontinued         06/03/25 1527                                    Rafa Carrero MD  Department of Hospital Medicine  [facility] Dept

## 2025-06-03 NOTE — HPI
84 y.o. 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     Daughter reports that patient has been having loose stools for almost a week - they were somwhat formed but loose initially but for the last 2-3 days they have been watery - 4 times a day. This morning associated with some abdominal discomfort as well. She was discharged on ciprofloxacin for UTI after admission for L1 fracture post fall and being treated with IV abx in house. She has been taking it. This am daughter who is an RN and has a private job noticed stool to be melanotic.  She did get Pepto-Bismol yesterday.  Daughter also noticed that her stool were foul-smelling.  She denies any urinary complaints.  She had a few episodes of vomiting about 1 week ago in the setting of stopping the antibiotic but none since, nausea this morning with no vomiting.     Secondarily daughter says that she has been more short of breath with sats going from 88-94% on room air where she is normally 95-97% on room air.  She does have a new cough that is occasionally productive of sputum.  No chest pain. Daughter has been holding home PO lasix as she was worried about dehydration related to diarrhea.   She has some mild right ankle swelling but no calf swelling or redness.  Patient was also complaining of pain in her right 4th toe which is new and is not related to redness or any trauma. She takes tylenol 1g TID at home. She uses dexcom and insulin pump at home. Daughter wanted to  make sure I talk to Dr. Orr before ordering steroids or redesivir for COVID - I talked to Dr. HO and was told that there are no limitations to use either or both medications.     In the ED she was found to have COVID 19 infection and CT showed bilateral loculated pleural effusions with possible RUL bronchus obstruction. Being admitted for workup and management. She was initially given 500 ml VF for diarrhea but then iso pleural effusions was given lasix 40 IV. She was given dexamethasone IV 6 mg x1 . Placed on O2 for saturation 90-94%.

## 2025-06-03 NOTE — ASSESSMENT & PLAN NOTE
At time of evaluation, pt meets criteria to continue home insulin pump usage.  - Has all adequate supplies   - Insulin pump site change on 7/23/2021.    - Bolus settings reviewed    - No changes to home regimen.   - Nurse to check BG qac/hs/0200 & record in epic   - Patient to input glucose into pump and use bolus wizard for prandial needs   - Will continue to monitor accuchecks and titrate insulin as clinically indicated .     - Discussed above plan with patient, patient verbalized understanding.   - Understands in case of pump malfunction or cognitive decline in which pt can no longer safely use insulin pump, will transition to SC MDI       If pump malfunctions or is disconnected, please call endocrine.

## 2025-06-04 PROBLEM — Z51.5 ENCOUNTER FOR PALLIATIVE CARE: Status: ACTIVE | Noted: 2025-06-04

## 2025-06-04 PROBLEM — C34.91 ADENOCARCINOMA OF RIGHT LUNG: Status: ACTIVE | Noted: 2024-07-25

## 2025-06-04 LAB
ABSOLUTE EOSINOPHIL (OHS): 0 K/UL
ABSOLUTE MONOCYTE (OHS): 0.77 K/UL (ref 0.3–1)
ABSOLUTE NEUTROPHIL COUNT (OHS): 11.52 K/UL (ref 1.8–7.7)
ADV 40+41 DNA STL QL NAA+NON-PROBE: NOT DETECTED
ALBUMIN SERPL BCP-MCNC: 1.6 G/DL (ref 3.5–5.2)
ALP SERPL-CCNC: 87 UNIT/L (ref 40–150)
ALT SERPL W/O P-5'-P-CCNC: 16 UNIT/L (ref 10–44)
ANION GAP (OHS): 10 MMOL/L (ref 8–16)
ANION GAP (OHS): 11 MMOL/L (ref 8–16)
AORTIC SIZE INDEX (SOV): 1.8 CM/M2
AORTIC SIZE INDEX: 2 CM/M2
ASCENDING AORTA: 3 CM
AST SERPL-CCNC: 44 UNIT/L (ref 11–45)
ASTRO TYP 1-8 RNA STL QL NAA+NON-PROBE: NOT DETECTED
AV AREA BY CONTINUOUS VTI: 1.7 CM2
AV INDEX (PROSTH): 0.54
AV LVOT MEAN GRADIENT: 1 MMHG
AV LVOT PEAK GRADIENT: 2 MMHG
AV MEAN GRADIENT: 4 MMHG
AV PEAK GRADIENT: 9 MMHG
AV VALVE AREA BY VELOCITY RATIO: 1.5 CM²
AV VALVE AREA: 1.7 CM2
AV VELOCITY RATIO: 0.47
BASOPHILS # BLD AUTO: 0.01 K/UL
BASOPHILS NFR BLD AUTO: 0.1 %
BILIRUB SERPL-MCNC: 0.3 MG/DL (ref 0.1–1)
BSA FOR ECHO PROCEDURE: 1.5 M2
BUN SERPL-MCNC: 16 MG/DL (ref 8–23)
BUN SERPL-MCNC: 18 MG/DL (ref 8–23)
C CAYETANENSIS DNA STL QL NAA+NON-PROBE: NOT DETECTED
C COLI+JEJ+UPSA DNA STL QL NAA+NON-PROBE: NEGATIVE
C COLI+JEJ+UPSA DNA STL QL NAA+NON-PROBE: NOT DETECTED
C DIFF GDH STL QL: NEGATIVE
C DIFF TOX A+B STL QL IA: NEGATIVE
CALCIUM SERPL-MCNC: 8.5 MG/DL (ref 8.7–10.5)
CALCIUM SERPL-MCNC: 8.5 MG/DL (ref 8.7–10.5)
CHLORIDE SERPL-SCNC: 107 MMOL/L (ref 95–110)
CHLORIDE SERPL-SCNC: 111 MMOL/L (ref 95–110)
CO2 SERPL-SCNC: 20 MMOL/L (ref 23–29)
CO2 SERPL-SCNC: 24 MMOL/L (ref 23–29)
CREAT SERPL-MCNC: 0.6 MG/DL (ref 0.5–1.4)
CREAT SERPL-MCNC: 0.6 MG/DL (ref 0.5–1.4)
CRYPTOSP DNA STL QL NAA+NON-PROBE: NOT DETECTED
CV ECHO LV RWT: 0.32 CM
DOP CALC AO PEAK VEL: 1.5 M/S
DOP CALC AO VTI: 30.2 CM
DOP CALC LVOT AREA: 3.1 CM2
DOP CALC LVOT DIAMETER: 2 CM
DOP CALC LVOT PEAK VEL: 0.7 M/S
DOP CALC LVOT STROKE VOLUME: 51.5 CM3
DOP CALCLVOT PEAK VEL VTI: 16.4 CM
E HISTOLYT DNA STL QL NAA+NON-PROBE: NOT DETECTED
E WAVE DECELERATION TIME: 198 MS
E/A RATIO: 1.53
E/E' RATIO: 18 M/S
EAEC PAA PLAS AGGR+AATA ST NAA+NON-PRB: NOT DETECTED
EC STX1+STX2 GENES STL QL NAA+NON-PROBE: NOT DETECTED
ECHO EF ESTIMATED: 43 %
ECHO LV POSTERIOR WALL: 0.8 CM (ref 0.6–1.1)
EPEC EAE GENE STL QL NAA+NON-PROBE: NOT DETECTED
ERYTHROCYTE [DISTWIDTH] IN BLOOD BY AUTOMATED COUNT: 21.9 % (ref 11.5–14.5)
ETEC LTA+ST1A+ST1B TOX ST NAA+NON-PROBE: NOT DETECTED
FRACTIONAL SHORTENING: 20 % (ref 28–44)
G LAMBLIA DNA STL QL NAA+NON-PROBE: NOT DETECTED
GFR SERPLBLD CREATININE-BSD FMLA CKD-EPI: >60 ML/MIN/1.73/M2
GFR SERPLBLD CREATININE-BSD FMLA CKD-EPI: >60 ML/MIN/1.73/M2
GLUCOSE SERPL-MCNC: 164 MG/DL (ref 70–110)
GLUCOSE SERPL-MCNC: 194 MG/DL (ref 70–110)
HCT VFR BLD AUTO: 24.4 % (ref 37–48.5)
HGB BLD-MCNC: 7.4 GM/DL (ref 12–16)
IMM GRANULOCYTES # BLD AUTO: 0.07 K/UL (ref 0–0.04)
IMM GRANULOCYTES NFR BLD AUTO: 0.5 % (ref 0–0.5)
INTERVENTRICULAR SEPTUM: 1 CM (ref 0.6–1.1)
LA MAJOR: 5.1 CM
LA MINOR: 5.2 CM
LA WIDTH: 4.3 CM
LEFT ATRIUM SIZE: 4.2 CM
LEFT ATRIUM VOLUME INDEX MOD: 49 ML/M2
LEFT ATRIUM VOLUME INDEX: 52 ML/M2
LEFT ATRIUM VOLUME MOD: 75 ML
LEFT ATRIUM VOLUME: 79 CM3
LEFT INTERNAL DIMENSION IN SYSTOLE: 4 CM (ref 2.1–4)
LEFT VENTRICLE DIASTOLIC VOLUME INDEX: 79.61 ML/M2
LEFT VENTRICLE DIASTOLIC VOLUME: 121 ML
LEFT VENTRICLE MASS INDEX: 104.1 G/M2
LEFT VENTRICLE SYSTOLIC VOLUME INDEX: 45.4 ML/M2
LEFT VENTRICLE SYSTOLIC VOLUME: 69 ML
LEFT VENTRICULAR INTERNAL DIMENSION IN DIASTOLE: 5 CM (ref 3.5–6)
LEFT VENTRICULAR MASS: 158.2 G
LV LATERAL E/E' RATIO: 13.4
LV SEPTAL E/E' RATIO: 26.8
LYMPHOCYTES # BLD AUTO: 0.56 K/UL (ref 1–4.8)
MAGNESIUM SERPL-MCNC: 1.7 MG/DL (ref 1.6–2.6)
MCH RBC QN AUTO: 27.7 PG (ref 27–31)
MCHC RBC AUTO-ENTMCNC: 30.3 G/DL (ref 32–36)
MCV RBC AUTO: 91 FL (ref 82–98)
MV A" WAVE DURATION": 133.21 MS
MV MEAN GRADIENT: 2 MMHG
MV PEAK A VEL: 0.7 M/S
MV PEAK E VEL: 1.07 M/S
MV PEAK GRADIENT: 7 MMHG
NOROVIRUS GI+II RNA STL QL NAA+NON-PROBE: NOT DETECTED
NUCLEATED RBC (/100WBC) (OHS): 0 /100 WBC
OHS CV RV/LV RATIO: 0.72 CM
P SHIGELLOIDES DNA STL QL NAA+NON-PROBE: NOT DETECTED
PHOSPHATE SERPL-MCNC: 3.6 MG/DL (ref 2.7–4.5)
PISA TR MAX VEL: 3 M/S
PLATELET # BLD AUTO: 143 K/UL (ref 150–450)
PMV BLD AUTO: 11.6 FL (ref 9.2–12.9)
POTASSIUM SERPL-SCNC: 3.7 MMOL/L (ref 3.5–5.1)
POTASSIUM SERPL-SCNC: 4.3 MMOL/L (ref 3.5–5.1)
PROT SERPL-MCNC: 6.2 GM/DL (ref 6–8.4)
PULM VEIN A" WAVE DURATION": 133.21 MS
PULM VEIN S/D RATIO: 0.98
PULMONIC VEIN PEAK A VELOCITY: 0.3 M/S
PV PEAK D VEL: 0.66 M/S
PV PEAK S VEL: 0.65 M/S
RA MAJOR: 5.01 CM
RA PRESSURE ESTIMATED: 3 MMHG
RA WIDTH: 3.52 CM
RBC # BLD AUTO: 2.67 M/UL (ref 4–5.4)
RELATIVE EOSINOPHIL (OHS): 0 %
RELATIVE LYMPHOCYTE (OHS): 4.3 % (ref 18–48)
RELATIVE MONOCYTE (OHS): 6 % (ref 4–15)
RELATIVE NEUTROPHIL (OHS): 89.1 % (ref 38–73)
RIGHT VENTRICLE DIASTOLIC BASEL DIMENSION: 3.6 CM
RV TB RVSP: 6 MMHG
RVA RNA STL QL NAA+NON-PROBE: NOT DETECTED
S ENT+BONG DNA STL QL NAA+NON-PROBE: NOT DETECTED
SAPO I+II+IV+V RNA STL QL NAA+NON-PROBE: NOT DETECTED
SHIGELLA SP+EIEC IPAH ST NAA+NON-PROBE: NOT DETECTED
SINUS: 2.8 CM
SODIUM SERPL-SCNC: 141 MMOL/L (ref 136–145)
SODIUM SERPL-SCNC: 142 MMOL/L (ref 136–145)
STJ: 2.3 CM
TDI LATERAL: 0.08 M/S
TDI SEPTAL: 0.04 M/S
TDI: 0.06 M/S
TRICUSPID ANNULAR PLANE SYSTOLIC EXCURSION: 1.3 CM
TV PEAK GRADIENT: 36 MMHG
TV REST PULMONARY ARTERY PRESSURE: 39 MMHG
V CHOL+PARA+VUL DNA STL QL NAA+NON-PROBE: NOT DETECTED
V CHOLERAE DNA STL QL NAA+NON-PROBE: NOT DETECTED
WBC # BLD AUTO: 12.93 K/UL (ref 3.9–12.7)
Y ENTEROCOL DNA STL QL NAA+NON-PROBE: NOT DETECTED
Z-SCORE OF LEFT VENTRICULAR DIMENSION IN END DIASTOLE: 1.16
Z-SCORE OF LEFT VENTRICULAR DIMENSION IN END SYSTOLE: 2.92

## 2025-06-04 PROCEDURE — 63600175 PHARM REV CODE 636 W HCPCS: Performed by: STUDENT IN AN ORGANIZED HEALTH CARE EDUCATION/TRAINING PROGRAM

## 2025-06-04 PROCEDURE — 36415 COLL VENOUS BLD VENIPUNCTURE: CPT | Performed by: STUDENT IN AN ORGANIZED HEALTH CARE EDUCATION/TRAINING PROGRAM

## 2025-06-04 PROCEDURE — 83735 ASSAY OF MAGNESIUM: CPT | Performed by: STUDENT IN AN ORGANIZED HEALTH CARE EDUCATION/TRAINING PROGRAM

## 2025-06-04 PROCEDURE — 85025 COMPLETE CBC W/AUTO DIFF WBC: CPT | Performed by: STUDENT IN AN ORGANIZED HEALTH CARE EDUCATION/TRAINING PROGRAM

## 2025-06-04 PROCEDURE — 25000003 PHARM REV CODE 250: Performed by: STUDENT IN AN ORGANIZED HEALTH CARE EDUCATION/TRAINING PROGRAM

## 2025-06-04 PROCEDURE — 21400001 HC TELEMETRY ROOM

## 2025-06-04 PROCEDURE — 84100 ASSAY OF PHOSPHORUS: CPT | Performed by: STUDENT IN AN ORGANIZED HEALTH CARE EDUCATION/TRAINING PROGRAM

## 2025-06-04 PROCEDURE — 27000221 HC OXYGEN, UP TO 24 HOURS

## 2025-06-04 PROCEDURE — 99223 1ST HOSP IP/OBS HIGH 75: CPT | Mod: ,,, | Performed by: STUDENT IN AN ORGANIZED HEALTH CARE EDUCATION/TRAINING PROGRAM

## 2025-06-04 PROCEDURE — 80053 COMPREHEN METABOLIC PANEL: CPT | Performed by: STUDENT IN AN ORGANIZED HEALTH CARE EDUCATION/TRAINING PROGRAM

## 2025-06-04 PROCEDURE — 27000207 HC ISOLATION

## 2025-06-04 RX ORDER — MIRTAZAPINE 7.5 MG/1
7.5 TABLET, FILM COATED ORAL NIGHTLY
Status: DISCONTINUED | OUTPATIENT
Start: 2025-06-04 | End: 2025-06-05

## 2025-06-04 RX ORDER — POTASSIUM CHLORIDE 750 MG/1
30 CAPSULE, EXTENDED RELEASE ORAL ONCE
Status: COMPLETED | OUTPATIENT
Start: 2025-06-04 | End: 2025-06-04

## 2025-06-04 RX ORDER — MUPIROCIN 20 MG/G
OINTMENT TOPICAL 2 TIMES DAILY
Status: DISCONTINUED | OUTPATIENT
Start: 2025-06-04 | End: 2025-06-07 | Stop reason: HOSPADM

## 2025-06-04 RX ORDER — DORZOLAMIDE HYDROCHLORIDE AND TIMOLOL MALEATE 20; 5 MG/ML; MG/ML
1 SOLUTION/ DROPS OPHTHALMIC 2 TIMES DAILY
Status: DISCONTINUED | OUTPATIENT
Start: 2025-06-04 | End: 2025-06-07 | Stop reason: HOSPADM

## 2025-06-04 RX ORDER — MAGNESIUM SULFATE HEPTAHYDRATE 40 MG/ML
2 INJECTION, SOLUTION INTRAVENOUS ONCE
Status: COMPLETED | OUTPATIENT
Start: 2025-06-04 | End: 2025-06-04

## 2025-06-04 RX ORDER — OXYCODONE HCL 5 MG/5 ML
2.5 SOLUTION, ORAL ORAL EVERY 6 HOURS PRN
Refills: 0 | Status: DISCONTINUED | OUTPATIENT
Start: 2025-06-04 | End: 2025-06-07 | Stop reason: HOSPADM

## 2025-06-04 RX ORDER — FAMOTIDINE 20 MG/1
20 TABLET, FILM COATED ORAL DAILY
Status: DISCONTINUED | OUTPATIENT
Start: 2025-06-05 | End: 2025-06-07 | Stop reason: HOSPADM

## 2025-06-04 RX ADMIN — FUROSEMIDE 60 MG: 10 INJECTION, SOLUTION INTRAMUSCULAR; INTRAVENOUS at 08:06

## 2025-06-04 RX ADMIN — DORZOLAMIDE HYDROCHLORIDE AND TIMOLOL MALEATE 1 DROP: 20; 5 SOLUTION/ DROPS OPHTHALMIC at 10:06

## 2025-06-04 RX ADMIN — ACETAMINOPHEN 1000 MG: 500 TABLET ORAL at 10:06

## 2025-06-04 RX ADMIN — BRIMONIDINE TARTRATE 1 DROP: 2 SOLUTION OPHTHALMIC at 10:06

## 2025-06-04 RX ADMIN — LATANOPROST 1 DROP: 50 SOLUTION OPHTHALMIC at 08:06

## 2025-06-04 RX ADMIN — MIRTAZAPINE 7.5 MG: 7.5 TABLET, FILM COATED ORAL at 10:06

## 2025-06-04 RX ADMIN — DICLOFENAC SODIUM 2 G: 10 GEL TOPICAL at 08:06

## 2025-06-04 RX ADMIN — PREDNISONE 2.5 MG: 2.5 TABLET ORAL at 08:06

## 2025-06-04 RX ADMIN — DICLOFENAC SODIUM 2 G: 10 GEL TOPICAL at 10:06

## 2025-06-04 RX ADMIN — BRIMONIDINE TARTRATE 1 DROP: 2 SOLUTION OPHTHALMIC at 09:06

## 2025-06-04 RX ADMIN — ACETAMINOPHEN 1000 MG: 500 TABLET ORAL at 12:06

## 2025-06-04 RX ADMIN — LIDOCAINE 1 PATCH: 50 PATCH TOPICAL at 08:06

## 2025-06-04 RX ADMIN — Medication 2000 UNITS: at 08:06

## 2025-06-04 RX ADMIN — MAGNESIUM SULFATE HEPTAHYDRATE 2 G: 40 INJECTION, SOLUTION INTRAVENOUS at 10:06

## 2025-06-04 RX ADMIN — REMDESIVIR 100 MG: 100 INJECTION, POWDER, LYOPHILIZED, FOR SOLUTION INTRAVENOUS at 09:06

## 2025-06-04 RX ADMIN — POTASSIUM CHLORIDE 30 MEQ: 750 CAPSULE, EXTENDED RELEASE ORAL at 09:06

## 2025-06-04 RX ADMIN — MUPIROCIN: 20 OINTMENT TOPICAL at 09:06

## 2025-06-04 RX ADMIN — FUROSEMIDE 60 MG: 10 INJECTION, SOLUTION INTRAMUSCULAR; INTRAVENOUS at 10:06

## 2025-06-04 RX ADMIN — MUPIROCIN: 20 OINTMENT TOPICAL at 08:06

## 2025-06-04 RX ADMIN — BRIMONIDINE TARTRATE 1 DROP: 2 SOLUTION OPHTHALMIC at 03:06

## 2025-06-04 NOTE — NURSING
While doing 4 eyes check nurse noticed small skin tear to left buttocks. Daughter is aware at bedside and stated she may have done that at home probably scratched herself. Charge nurse aware, Picture uploaded to chart, MD will be made aware, Wound care will be consulted. No draining or bleeding noted. No complaints of pain at this time will continue to monitor.

## 2025-06-04 NOTE — ASSESSMENT & PLAN NOTE
Recent Cipro exposure as well as IV abx in hospital. COVID can also cause diarrhea.   Cdiff negative  GI panel pending  Stool culture pending  COVID management as above       no abdominal pain, no bloating, no constipation, no diarrhea, no nausea and no vomiting.

## 2025-06-04 NOTE — PROGRESS NOTES
Pt did not eat lunch because a tray was not delivered to her room I notified charge nurse and unit secretary called the kitchen to request a tray be brought to her room. I offered pt food from the nutrition room she declined, daughter offered her mother some of the food from home she prepared for herself but pt refused that as well.

## 2025-06-04 NOTE — PROGRESS NOTES
Stool collected and sent to lab, the stool did not meet C-Diff criteria. Was sent for gastrointestinal pathogens panel and stool culture

## 2025-06-04 NOTE — CARE UPDATE
"-Glucose Goal 140-180    -A1C:   Hemoglobin A1C   Date Value Ref Range Status   06/21/2024 6.0 (H) 4.0 - 5.6 % Final     Comment:     ADA Screening Guidelines:  5.7-6.4%  Consistent with prediabetes  >or=6.5%  Consistent with diabetes    High levels of fetal hemoglobin interfere with the HbA1C  assay. Heterozygous hemoglobin variants (HbS, HgC, etc)do  not significantly interfere with this assay.   However, presence of multiple variants may affect accuracy.           -HOME REGIMEN:   Omnipod 5  Basal Rate  12A:  0.45 units/hr      Carb Ratio  12A:12  6A: 10  6P: 12     ISF  12A: 50      Target: 120  Correct above: 140     IAT: 4 hours    -GLUCOSE TREND FOR THE PAST 24HRS:   No results for input(s): "POCTGLUCOSE" in the last 168 hours.      -NO HYPOGYCEMIAS NOTED     - Diet  Diet Adult Regular    BG goal: 140-180.   T2DM on Omnipod 5. COVID (+). Received Dex 6 mg in ED. Plan for 2.5 mg Prednisone QD. Will monitor on insulin pump for now.  Spoke with daughter on phone who reports no issues and supplies at hand.     - Continue Home insulin pump   - POCT Glucose before meals, at bedtime and at 2 am  - Hypoglycemia protocol in place      ** Please notify Endocrine for any change and/or advance in diet**  ** Please call Endocrine for any BG related issues **     Discharge Planning:   TBD. Please notify endocrinology prior to discharge.    "

## 2025-06-04 NOTE — HPI
Radha Feng is an 84-year-old woman with a history of stage IV lung adenocarcinoma of the RUL, metastasis to the right shoulder, diagnosed in July 2024, s/p cancer-directed therapies, autoimmune hepatitis on chronic prednisone with compensated cirrhosis, T2DM on continuous insulin pump, fall who was admitted for acute hypoxic respiratory failure 2/2 COVID pneumonia, obstructive pneumonia and diarrhea 2/2 likely COVID infection (C difficile ruled out). Palliative and Supportive Care was consulted to explore goals of care and malignant symptom management.

## 2025-06-04 NOTE — SUBJECTIVE & OBJECTIVE
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    Diabetes mellitus     Glaucoma     Hypertension     Lung cancer     Other ascites 11/29/2022       Past Surgical History:   Procedure Laterality Date    CATARACT EXTRACTION W/  INTRAOCULAR LENS IMPLANT Left 12/21/2021        ENDOBRONCHIAL ULTRASOUND N/A 07/05/2024    Procedure: ENDOBRONCHIAL ULTRASOUND (EBUS);  Surgeon: Rolo Wilkinson MD;  Location: [facility] OR 2ND FLR;  Service: Pulmonary;  Laterality: N/A;    ESOPHAGOGASTRODUODENOSCOPY N/A 06/26/2023    Procedure: ESOPHAGOGASTRODUODENOSCOPY (EGD);  Surgeon: Edgar Branch MD;  Location: [facility] (4TH FLR);  Service: Endoscopy;  Laterality: N/A;  referral Dr Peraza-cirrhosis/labs done on 4/24/23-[facility] portal-GT       Review of patient's allergies indicates:  No Known Allergies    Family History       Problem Relation (Age of Onset)    Blindness Cousin    Cataracts Mother    Colon cancer Sister    Diabetes Mother    Glaucoma Mother    Heart attack Father    Hypertension Mother          Tobacco Use    Smoking status: Former    Smokeless tobacco: Never   Substance and Sexual Activity    Alcohol use: Not Currently    Drug use: Never    Sexual activity: Not on file         Review of Systems   Constitutional:  Positive for fever. Negative for chills.   Respiratory:  Positive for cough and shortness of breath.    Cardiovascular:  Negative for leg swelling.     Objective:     Vital Signs (Most Recent):  Temp: 98.3 °F (36.8 °C) (06/04/25 0758)  Pulse: 67 (06/04/25 0758)  Resp: 18 (06/04/25 0758)  BP: (!) 149/60 (06/04/25 0758)  SpO2: 99 % (06/04/25 0758) Vital Signs (24h Range):  Temp:  [97.5 °F (36.4 °C)-99 °F (37.2 °C)] 98.3 °F (36.8 °C)  Pulse:  [60-82] 67  Resp:  [16-24] 18  SpO2:  [89 %-99 %] 99 %  BP: (137-171)/(60-77) 149/60     Weight: 49 kg (108 lb 0.4 oz)  Body mass index is 17.98 kg/m².      Intake/Output Summary (Last 24 hours) at 6/4/2025 0936  Last  data filed at 6/4/2025 0528  Gross per 24 hour   Intake --   Output 400 ml   Net -400 ml        Physical Exam  Constitutional:       Appearance: She is ill-appearing (chronically).   HENT:      Mouth/Throat:      Pharynx: Oropharynx is clear.   Eyes:      Conjunctiva/sclera: Conjunctivae normal.   Cardiovascular:      Rate and Rhythm: Normal rate and regular rhythm.      Heart sounds: Normal heart sounds. No murmur heard.  Pulmonary:      Effort: Pulmonary effort is normal. No respiratory distress.      Breath sounds: No wheezing.      Comments: Decreased breath sounds at the bases bilaterally. Scattered crackles on the right lower lung field.  Musculoskeletal:      Right lower leg: No edema.      Left lower leg: No edema.   Skin:     General: Skin is warm and dry.   Neurological:      Mental Status: She is alert and oriented to person, place, and time.          Vents:       Lines/Drains/Airways       Line  Duration                  Subcutaneous Infusion Pump 05/16/25 Abdomen (Comment) 19 days              Peripheral Intravenous Line  Duration                  Peripheral IV - Single Lumen 06/03/25 2300 20 G Long PIVC Anterior;Right Upper Arm <1 day                    Significant Labs:    CBC/Anemia Profile:  Recent Labs   Lab 06/03/25  1208 06/03/25  1228 06/04/25  0453   WBC 15.12*  --  12.93*   HGB 9.0*  --  7.4*   HCT 30.0* 24.3 24.4*   *  --  143*   MCV 91  --  91   RDW 21.9*  --  21.9*        Chemistries:  Recent Labs   Lab 06/03/25  1208 06/03/25  2004 06/04/25  0453    139 142   K 3.3* 5.1 3.7    109 111*   CO2 24 20* 20*   BUN 10 11 16   CREATININE 0.6 0.6 0.6   CALCIUM 8.8 8.4* 8.5*   ALBUMIN 1.9*  --  1.6*   PROT 6.9  --  6.2   BILITOT 0.7  --  0.3   ALKPHOS 93  --  87   ALT 16  --  16   AST 42  --  44   MG  --   --  1.7   PHOS  --   --  3.6       All pertinent labs within the past 24 hours have been reviewed.    Significant Imaging:   I have reviewed all pertinent imaging  results/findings within the past 24 hours.

## 2025-06-04 NOTE — PLAN OF CARE
Recommendations  Continue Regular diet  If intake < 50% add Boost Plus BID to assist in meeting nutrition needs (provides 720 kcals, 28 g PRO, 90 CHO)   Nursing, continue documenting PO intake via flowsheets  Monitor labs, meds, weight, skin    Goals: meet % een/epn by next RD f/u  Nutrition Goal Status: new  Communication of RD Recs: other (comment) (poc)

## 2025-06-04 NOTE — ASSESSMENT & PLAN NOTE
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 Blood loss. Most recent hemoglobin and hematocrit are listed below.  Recent Labs     06/03/25  1208 06/03/25  1228 06/04/25  0453   HGB 9.0*  --  7.4*   HCT 30.0* 24.3 24.4*     Plan  - Monitor serial CBC: Daily  - Transfuse PRBC if patient becomes hemodynamically unstable, symptomatic or H/H drops below 7/21.  - Patient has not received any PRBC transfusions to date  - Patient's anemia is currently worsening. Will Monitor  - Dark stools have resolved and were possibly related to Pepto bismol intake at home per daughter  - Will involve GI if hb drops further or if dark stools re start.    - Encourage pepcid BID use

## 2025-06-04 NOTE — SUBJECTIVE & OBJECTIVE
Interval History:    Seen and examined at bedside with daughter. On 2 L NC - making urine and cough is better - discussed palliative care involvement and agree to see them as was supposed to see on 6/19 as outpatient but was pre-poned to 6/9 or 6/10.     Toe pain is better.     Diarrhea is also improving - not black anymore per daughter  and is greenish now- discussed GI involvement but reports hb has fluctuated before and she has been told that it may be related to neovascularization of the R shoulder mass. We discussed that she should take pepcid and if hb drops more or has dark stools , will consult GI for potential scope. Cdiff negative.     PT consulted - pulm to see the patient.       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    Diabetes mellitus     Glaucoma     Hypertension     Lung cancer     Other ascites 11/29/2022       Past Surgical History:   Procedure Laterality Date    CATARACT EXTRACTION W/  INTRAOCULAR LENS IMPLANT Left 12/21/2021        ENDOBRONCHIAL ULTRASOUND N/A 07/05/2024    Procedure: ENDOBRONCHIAL ULTRASOUND (EBUS);  Surgeon: Rolo Wilkinson MD;  Location: [facility] OR [address] [place] [address];  Service: Pulmonary;  Laterality: N/A;    ESOPHAGOGASTRODUODENOSCOPY N/A 06/26/2023    Procedure: ESOPHAGOGASTRODUODENOSCOPY (EGD);  Surgeon: Edgar Branch MD;  Location: [facility] (4TH FLR);  Service: Endoscopy;  Laterality: N/A;  referral Dr Peraza-cirrhosis/labs done on 4/24/23-[facility] portal-GT       Review of patient's allergies indicates:  No Known Allergies    No current facility-administered medications on file prior to encounter.     Current Outpatient Medications on File Prior to Encounter   Medication Sig    acetaminophen (TYLENOL) 500 MG tablet Take 2 tablets (1,000 mg total) by mouth every 8 (eight) hours as needed for Pain.    BD INSULIN SYRINGE ULTRA-FINE 1 mL 31 gauge x 5/16 Syrg     blood-glucose meter,continuous Misc Dispsense 1 Dexcom  G7     blood-glucose transmitter (DEXCOM G6 TRANSMITTER) Amanda 1 each by Misc.(Non-Drug; Combo Route) route every 3 (three) months. (Patient not taking: Reported on 2025)    brimonidine 0.2% (ALPHAGAN) 0.2 % Drop Place 1 drop into both eyes 3 (three) times daily.    cholecalciferol, vitamin D3, (VITAMIN D3) 25 mcg (1,000 unit) capsule Take 2 capsules (2,000 Units total) by mouth once daily.    ciprofloxacin HCl (CIPRO) 500 MG tablet Take 1 tablet (500 mg total) by mouth 2 (two) times daily.    DEXCOM G7 SENSOR Amanda 1 Device by Misc.(Non-Drug; Combo Route) route every 10 days.    dorzolamide-timolol 2-0.5% (COSOPT) 22.3-6.8 mg/mL ophthalmic solution Place 1 drop into both eyes 2 (two) times daily.    estradioL (ESTRACE) 0.01 % (0.1 mg/gram) vaginal cream Place 0.5 g vaginally twice a week. Apply to the vagina daily for two weeks then twice a week. (Patient not taking: Reported on 2025)    furosemide (LASIX) 40 MG tablet TAKE 1 TABLET BY MOUTH EVERY DAY    insulin aspart U-100 (NOVOLOG U-100 INSULIN ASPART) 100 unit/mL injection TO USE WITH OMNIPOD 5. TOTAL DAILY DOSE UP TO 40 UNITS    insulin pump cart,automated,BT (OMNIPOD 5 G6 PODS, GEN 5,) Crtg Inject 1 each into the skin Every 3 (three) days.    lactulose (CEPHULAC) 10 gram packet Take 10 g by mouth 3 (three) times daily. (Patient not taking: Reported on 2025)    latanoprost 0.005 % ophthalmic solution PLACE 1 DROP INTO BOTH EYES ONCE DAILY    LIDOcaine (LIDODERM) 5 % Place 1 patch onto the skin once daily. Remove & Discard patch within 12 hours or as directed by MD    [Paused] lisinopriL-hydrochlorothiazide (PRINZIDE,ZESTORETIC) 20-12.5 mg per tablet TAKE 1 TABLET BY MOUTH EVERY DAY (Patient not taking: Reported on 2025)    nystatin (MYCOSTATIN) 100,000 unit/mL suspension SWISH WITH 6MLS BY MOUTH THEN SWALLOW 4 TIMES A DAY FOR 14 DAYS    OMNIPOD 5 G6-G7 PODS, GEN 5, Crtg SMARTSI Each SUB-Q Once a Month (Patient not taking: Reported  "on 5/22/2025)    oxyCODONE (ROXICODONE) 5 MG immediate release tablet Take 0.5 tablets (2.5 mg total) by mouth every 6 (six) hours as needed for Pain.    pen needle, diabetic 31 gauge x 5/16" Ndle Use to inject insulin into the skin up to 4 times daily    predniSONE (DELTASONE) 5 MG tablet Take 0.5 tablets (2.5 mg total) by mouth once daily.    tobramycin sulfate 0.3% (TOBREX) 0.3 % ophthalmic solution 1-2 drops topically twice daily to affected toe(s).     Family History       Problem Relation (Age of Onset)    Blindness Cousin    Cataracts Mother    Colon cancer Sister    Diabetes Mother    Glaucoma Mother    Heart attack Father    Hypertension Mother          Tobacco Use    Smoking status: Former    Smokeless tobacco: Never   Substance and Sexual Activity    Alcohol use: Not Currently    Drug use: Never    Sexual activity: Not on file     Review of Systems   Constitutional:  Positive for activity change and appetite change.   Respiratory:  Positive for cough and shortness of breath. Negative for chest tightness.    Cardiovascular:  Negative for chest pain and leg swelling.   Gastrointestinal:  Positive for abdominal pain, diarrhea, nausea and vomiting. Negative for abdominal distention.   Genitourinary:  Negative for dysuria and hematuria.   Musculoskeletal:  Positive for joint swelling. Negative for neck stiffness.   Skin:  Negative for color change and rash.   Psychiatric/Behavioral:  Negative for agitation and behavioral problems.      Objective:     Vital Signs (Most Recent):  Temp: 98.3 °F (36.8 °C) (06/04/25 0758)  Pulse: 73 (06/04/25 1048)  Resp: 18 (06/04/25 0758)  BP: (!) 149/60 (06/04/25 0758)  SpO2: 99 % (06/04/25 0758) Vital Signs (24h Range):  Temp:  [97.5 °F (36.4 °C)-99 °F (37.2 °C)] 98.3 °F (36.8 °C)  Pulse:  [60-82] 73  Resp:  [16-24] 18  SpO2:  [89 %-99 %] 99 %  BP: (139-171)/(60-77) 149/60     Weight: 49 kg (108 lb 0.4 oz)  Body mass index is 17.98 kg/m².     Physical Exam  Constitutional:      "  Appearance: She is ill-appearing.   HENT:      Head: Normocephalic and atraumatic.      Nose: Nose normal.      Mouth/Throat:      Mouth: Mucous membranes are moist.      Pharynx: Oropharynx is clear.   Eyes:      Extraocular Movements: Extraocular movements intact.      Pupils: Pupils are equal, round, and reactive to light.   Cardiovascular:      Rate and Rhythm: Normal rate.   Pulmonary:      Effort: Pulmonary effort is normal. No respiratory distress.      Breath sounds: Rales present.      Comments: Diminished sounds in lower lung fields.   Abdominal:      General: Abdomen is flat. There is no distension.      Palpations: Abdomen is soft.      Tenderness: There is no abdominal tenderness.   Musculoskeletal:         General: Normal range of motion.      Cervical back: Normal range of motion. No rigidity.      Right lower leg: No edema.      Left lower leg: No edema.      Comments: R 4th toe pain significantly improved - no erythema or swelling   Skin:     General: Skin is warm and dry.   Neurological:      General: No focal deficit present.      Mental Status: She is alert and oriented to person, place, and time.   Psychiatric:         Mood and Affect: Mood normal.         Behavior: Behavior normal.              CRANIAL NERVES     CN III, IV, VI   Pupils are equal, round, and reactive to light.       Significant Labs: All pertinent labs within the past 24 hours have been reviewed.    Significant Imaging: I have reviewed all pertinent imaging results/findings within the past 24 hours.

## 2025-06-04 NOTE — ASSESSMENT & PLAN NOTE
Patient found to have large pleural effusion on imaging. I have personally reviewed and interpreted the following imaging: CT. A thoracentesis was deferred. Most likely etiology includes Congestive Heart Failure. Management to include Diuresis and Pulm consult     no

## 2025-06-04 NOTE — CONSULTS
[facility] - [facility] S[facility]  Palliative Medicine  Consult Note    Patient Name: Radha Feng  MRN: [id number]  Admission Date: 6/3/2025  Hospital Length of Stay: 1 days  Code Status: DNR   Attending Provider: Rafa Carrero MD  Consulting Provider: Divina Choi MD  Primary Care Physician: Leticia Lim MD  Principal Problem:Acute hypoxemic respiratory failure    Patient information was obtained from patient, relative(s), and primary team.      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 Palliative and Supportive Care was consulted to explore goals of care and malignant symptom management.    Advance Care Planning  Goals of Care  - Code status: DNAR/DNI  - Next of kin: adult children  - ACP documents: none  - Patient has decision making capacity  - Prognosis: poor  - Goals: improvement in condition, symptom control, quality of life  - Plans: will continue to follow along    Goals of Care Conversation:  - 6/4/25: Supportive conversation with Ms. Feng and her daughter Della at bedside. They are very welcoming of my visit. 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 Della explains to her mother my field (palliative), that it is a focus on alleviating symptom burdens to improve  "quality of life. I asked about their last clinic visit with her oncologist Dr. Kohler and Della explains to me that Dr. Peraza was worried that immunotherapy would worsen her mother's autoimmune hepatitis. They agreed that in order to protect Ms. Feng that they would avoid further immunotherapy. I asked about their conversation regarding hospice. Della explains that they hope to be able to extend her life and "focus on living." She feels that because her mother is not actively dying, hospice is not what would be best for her care and goals. Her mother tells me that she is not afraid of dying, [place] understands that death comes for all of us. She embraces that she will one day pass and has peace with it. Her daughter reminds her mother many times throughout our conversation that Ms. Feng is not dying and encourages her mother to "focus on living." Validated both that we can accept that we will all die one day, and until that day comes, we can focus on ways to improve quality of life to our best ability in order to protect her ortega and priorities during whatever days God has planned for her. Both nodded in response to this. Della admits that she feels that since her mother was diagnosed formally with lung cancer, her mother suddenly stopped eating due to appetite loss and was more depressed. Della alludes that in being given bad news, this affected her mother's mentality severely and now her mother keeps talking about negative things, like her death. Della admits that they are very different - that Della is an optimist and her mother is a pessimist. Ms. Feng shakes her head to this and reminds me that she is just accepting that she will one day pass, just as many family members before her have. 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  - I took time to explain really what our " department is - stressing that unlike hospice, we do not make house visits, are not available 24/7 or at a whim's notice. Della is very understanding of this, and appreciative that they can have palliative support for symptom management while they continue to explore disease-modifying treatments to buy her as much time as possible. Ms. Feng says that she just wants to focus on being happy. She admits that over the last couple of years, losing her independence has been very difficult to accept. She loves cooking and now her daughter Della cooks for her. Even though Della is an excellent cook, Ms. Feng will always modify her food to claim some control and sense of cooking again. However her appetite is poor and she has lost a lot of weight. In addition to this, she has poor sleep, wakes up due to excruciating RLE pain. Della wonders how a component of depression or anxiety may be influencing her mother's symptom burdens and feels that if these three things can be addressed, her mother might feel better and do better as well.        Neoplasm Related Pain  RLE Pain  - LA  reviewed. Prescribed short course of oxycodone 5 mg which daughter was breaking into 2.5 mg doses for severe RLE pain at nighttime only  - Has lidocaine patch applied to right shoulder, where site of metastasis is located  - Likely has arthritis of RLE. Was evaluated extensively in the past and counseled by outpatient Orthopedic surgery that she has mild arthritis, and tried steroid shot that daughter believes triggered autoimmune hepatitis. High dose steroid treatment for autoimmune hepatitis took away the RLE pain, and pain returned during downtitration of steroids  - Of note, was referred to acupuncture and they attended this appointment, only to be told by the acupuncturist that he doesn't treat bone pain, only muscle pain and wouldn't treat any of her site of malignant pain. It was a very disappointing visit and wanted to let us  know that for others being referred to acupuncture therapy for holistic approach to care, that this acupuncturist made it very clear that he will not treat malignant pain.  - Continue APAP with modified limits in setting of known autoimmune hepatitis  - Continue diclofenac gel to joints  - Will resume home oxycodone - because tablets cannot be broken in half, will order liquid oxycodone 2.5 mg PO q6h PRN severe pain    Poor Appetite  Mood Disturbance  Sleep Disturbance  - Will trial mirtazapine 7.5 mg qHS. Discussed discontinuation after a couple of days if it is not providing a meaningful benefit for appetite/sleep  - Continue melatonin 6 mg qHS PRN insomnia        Thank you for your consult. I will follow-up with patient. Please contact us if you have any additional questions.    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 Palliative and Supportive Care was consulted to explore goals of care and malignant symptom management.    Hospital Course:  No notes on file    Interval History: Pleasant, interactive, daughter at bedside.     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    Diabetes mellitus     Glaucoma     Hypertension     Lung cancer     Other ascites 11/29/2022       Past Surgical History:   Procedure Laterality Date    CATARACT EXTRACTION W/  INTRAOCULAR LENS IMPLANT Left 12/21/2021        ENDOBRONCHIAL ULTRASOUND N/A 07/05/2024    Procedure: ENDOBRONCHIAL ULTRASOUND (EBUS);  Surgeon: Rolo Wilkinson MD;   Location: [facility] OR 2ND FLR;  Service: Pulmonary;  Laterality: N/A;    ESOPHAGOGASTRODUODENOSCOPY N/A 06/26/2023    Procedure: ESOPHAGOGASTRODUODENOSCOPY (EGD);  Surgeon: Edgar Branch MD;  Location: [facility] (4TH FLR);  Service: Endoscopy;  Laterality: N/A;  referral Dr Peraza-cirrhosis/labs done on 4/24/23-instr portal-GT       Review of patient's allergies indicates:  No Known Allergies    Medications:  Continuous Infusions:   insulin aspart U-100  0-1 Units/hr Subcutaneous Continuous         Scheduled Meds:   brimonidine 0.2%  1 drop Both Eyes TID    diclofenac sodium  2 g Topical (Top) BID    dorzolamide-timolol 2-0.5%  1 drop Both Eyes BID    [START ON 6/5/2025] famotidine  20 mg Oral Daily    furosemide (LASIX) injection  60 mg Intravenous BID    latanoprost  1 drop Both Eyes Daily    LIDOcaine  1 patch Transdermal Daily    mupirocin   Nasal BID    predniSONE  2.5 mg Oral Daily    remdesivir infusion  100 mg Intravenous Daily    vitamin D  2,000 Units Oral Daily     PRN Meds:  Current Facility-Administered Medications:     acetaminophen, 1,000 mg, Oral, Q8H PRN    albuterol-ipratropium, 3 mL, Nebulization, Q6H PRN    dextrose 50%, 12.5 g, Intravenous, PRN    dextrose 50%, 25 g, Intravenous, PRN    glucagon (human recombinant), 1 mg, Intramuscular, PRN    glucose, 16 g, Oral, PRN    glucose, 24 g, Oral, PRN    insulin aspart U-100, 0-10 Units, Subcutaneous, PRN    melatonin, 6 mg, Oral, Nightly PRN    naloxone, 0.02 mg, Intravenous, PRN    oxyCODONE, 2.5 mg, Oral, Q6H PRN    sodium chloride 0.9%, 10 mL, Intravenous, PRN    sodium chloride 0.9%, 10 mL, Intravenous, Q12H PRN    Family History       Problem Relation (Age of Onset)    Blindness Cousin    Cataracts Mother    Colon cancer Sister    Diabetes Mother    Glaucoma Mother    Heart attack Father    Hypertension Mother          Tobacco Use    Smoking status: Former    Smokeless tobacco: Never   Substance and Sexual Activity    Alcohol use: Not Currently     Drug use: Never    Sexual activity: Not on file       Review of Systems   Constitutional:  Positive for activity change, appetite change and fatigue.   Musculoskeletal:  Positive for arthralgias.   Psychiatric/Behavioral:  Positive for sleep disturbance.      Objective:     Vital Signs (Most Recent):  Temp: 96.7 °F (35.9 °C) (06/04/25 1203)  Pulse: 63 (06/04/25 1322)  Resp: 18 (06/04/25 1203)  BP: 122/70 (06/04/25 1322)  SpO2: 97 % (06/04/25 1203) Vital Signs (24h Range):  Temp:  [96.7 °F (35.9 °C)-98.3 °F (36.8 °C)] 96.7 °F (35.9 °C)  Pulse:  [60-82] 63  Resp:  [16-22] 18  SpO2:  [95 %-99 %] 97 %  BP: (122-171)/(60-77) 122/70     Weight: 49 kg (108 lb 0.4 oz)  Body mass index is 17.98 kg/m².       Physical Exam  Constitutional:       General: She is not in acute distress.  HENT:      Head: Normocephalic and atraumatic.      Right Ear: External ear normal.      Left Ear: External ear normal.      Nose: Nose normal.      Mouth/Throat:      Mouth: Mucous membranes are moist.   Eyes:      Extraocular Movements: Extraocular movements intact.      Conjunctiva/sclera: Conjunctivae normal.   Cardiovascular:      Rate and Rhythm: Normal rate.   Pulmonary:      Effort: Pulmonary effort is normal.      Breath sounds: Normal breath sounds.   Abdominal:      General: Bowel sounds are normal.      Palpations: Abdomen is soft.   Skin:     General: Skin is warm.   Neurological:      Mental Status: She is alert and oriented to person, place, and time. Mental status is at baseline.   Psychiatric:         Mood and Affect: Mood normal.         Behavior: Behavior normal.            Review of Symptoms      Symptom Assessment (ESAS 0-10 Scale)  Pain:  0  Dyspnea:  0  Anxiety:  0  Nausea:  0  Depression:  0  Anorexia:  0  Fatigue:  0  Insomnia:  0  Restlessness:  0  Agitation:  0         Living Arrangements:  Lives with family    Psychosocial/Cultural:   See Palliative Psychosocial Note: No  Has daughter Della and son who are very  "supportive. She is a twin, but her twin sister passed away a few years ago. She is one of ten children, six are . 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 Loves going to [employer] and [employer]  **Primary  to Follow**  Palliative Care  Consult: No    Spiritual:  F - Tamar and Belief:  Quaker  I - Importance:  Important  C - Community:  Involved  A - Address in Care:  Engage  support        Advance Care Planning  Advance Directives:   Living Will: No    LaPOST: No    Do Not Resuscitate Status: Yes    Medical Power of : No      Decision Making:  Patient answered questions  Goals of Care: 25: Supportive conversation with Ms. Feng and her daughter Della at bedside. They are very welcoming of my visit. 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 Della explains to her mother my field (palliative), that it is a focus on alleviating symptom burdens to improve quality of life. I asked about their last clinic visit with her oncologist Dr. Kohler and Della explains to me that Dr. Peraza was worried that immunotherapy would worsen her mother's autoimmune hepatitis. They agreed that in order to protect Ms. Feng that they would avoid further immunotherapy. I asked about their conversation regarding hospice. Della explains that they hope to be able to extend her life and "focus on living." She feels that because her mother is not actively dying, hospice is not what would be best for her care and goals. Her mother tells me that she is not afraid of dying, [place] and understands that death comes for all of us. She embraces that she will one day pass and has peace with it. Her daughter reminds her mother many times throughout our conversation that Ms. Feng is not dying and " "encourages her mother to "focus on living." Validated both that we can accept that we will all die one day, and until that day comes, we can focus on ways to improve quality of life to our best ability in order to protect her ortega and priorities during whatever days God has planned for her. Both nodded in response to this. Della admits that she feels that since her mother was diagnosed formally with lung cancer, her mother suddenly stopped eating due to appetite loss and was more depressed. Della alludes that in being given bad news, this affected her mother's mentality severely and now her mother keeps talking about negative things, like her death. Della admits that they are very different - that Della is an optimist and her mother is a pessimist. Ms. Feng shakes her head to this and reminds me that she is just accepting that she will one day pass, just as many family members before her have. 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  - I took time to explain really what our department is - stressing that unlike hospice, we do not make house visits, are not available 24/7 or at a whim's notice. Della is very understanding of this, and appreciative that they can have palliative support for symptom management while they continue to explore disease-modifying treatments to buy her as much time as possible. Ms. Feng says that she just wants to focus on being happy. She admits that over the last couple of years, losing her independence has been very difficult to accept. She loves cooking and now her daughter Della cooks for her. Even though Della is an excellent cook, Ms. Feng will always modify her food to claim some control and sense of cooking again. However her appetite is poor and she has lost a lot of weight. In addition to this, she has poor sleep, wakes up due to excruciating RLE pain. Della wonders how " a component of depression or anxiety may be influencing her mother's symptom burdens and feels that if these three things can be addressed, her mother might feel better and do better as well.         Significant Labs: CBC:   Recent Labs   Lab 06/03/25  1208 06/03/25  1228 06/04/25  0453   WBC 15.12*  --  12.93*   HGB 9.0*  --  7.4*   HCT 30.0* 24.3 24.4*   *  --  143*     CMP:   Recent Labs   Lab 06/03/25  1208 06/03/25 2004 06/04/25  0453    139 142   K 3.3* 5.1 3.7    109 111*   CO2 24 20* 20*    272* 164*   BUN 10 11 16   CREATININE 0.6 0.6 0.6   CALCIUM 8.8 8.4* 8.5*   PROT 6.9  --  6.2   ALBUMIN 1.9*  --  1.6*   BILITOT 0.7  --  0.3   ALKPHOS 93  --  87   AST 42  --  44   ALT 16  --  16   ANIONGAP 9 10 11     CBC:   Recent Labs   Lab 06/04/25  0453   WBC 12.93*   HGB 7.4*   HCT 24.4*   MCV 91   *     BMP:  Recent Labs   Lab 06/04/25  0453   *      K 3.7   *   CO2 20*   BUN 16   CREATININE 0.6   CALCIUM 8.5*   MG 1.7     LFT:  Lab Results   Component Value Date    AST 44 06/04/2025    ALKPHOS 87 06/04/2025    BILITOT 0.3 06/04/2025     Albumin:   Albumin   Date Value Ref Range Status   06/04/2025 1.6 (L) 3.5 - 5.2 g/dL Final   03/11/2025 2.1 (L) 3.5 - 5.2 g/dL Final     Protein:   Protein Total   Date Value Ref Range Status   06/04/2025 6.2 6.0 - 8.4 gm/dL Final     Total Protein   Date Value Ref Range Status   03/11/2025 7.5 6.0 - 8.4 g/dL Final     Lactic acid:   Lab Results   Component Value Date    LACTATE 6.1 (HH) 10/23/2022       Significant Imaging: I have reviewed all pertinent imaging results/findings within the past 24 hours.      I spent a total of 80 minutes on the day of the visit. This includes face to face time in discussion of goals of care, symptom assessment, coordination of care and emotional support.  This also includes non-face to face time preparing to see the patient (eg, review of tests/imaging), obtaining and/or reviewing separately  obtained history, documenting clinical information in the electronic or other health record, independently interpreting results and communicating results to the patient/family/caregiver, or care coordinator.    Divina Choi MD  Palliative Medicine  [facility] Surg

## 2025-06-04 NOTE — HPI
Ms. Feng is a 84 y.o. female with PMHx of Stage IIIB (cT3, pN2, cMx) adenocarcinoma of the RUL with intralobar mets dx'd 7/5/24, s/p radiation therapy to the R lung/mediastinum (45 Gy/15 Fx, 8/14/24-9/4/24), COPD, PVD, HFpEF, CAD, urinary incontinence, T2DM, and autoimmune hepatitis on chronic prednisone, presented yesterday for worsening SOB and diarrhea. Of note, she was admitted 1 month ago for a fall found to have UTI at that time placed on Ciprofloxacin. She reports that she she returned home, she began experiencing worsening sob and VERA. She now can only ambulate a few steps before becoming short of breath. She describes 2 pillow orthopnea and sleeping in a recliner for years.  She also has a persistent wet cough for approximately 1 month..  She reports subjective fever, no chills. She defers more specific history to her daughter who is a nurse.

## 2025-06-04 NOTE — PROGRESS NOTES
Blood sugar 168 per dex com7. Daughter and pt decline finger sticks because  insulin administered per omni pod is a result of the dex com reading.Endo Dr. Nunes placed orders to check blood sugars per finger stick ac, hs and q2am.

## 2025-06-04 NOTE — ASSESSMENT & PLAN NOTE
Patient with Hypoxic Respiratory failure which is Acute.  she is not on home oxygen. Supplemental oxygen was provided and noted-      .   Signs/symptoms of respiratory failure include- tachypnea and respiratory distress. Contributing diagnoses includes - ARDS, CHF, COPD, Pneumonia, and Lung CA Labs and images were reviewed. Patient Has not had a recent ABG. Will treat underlying causes and adjust management of respiratory failure as follows-     - BNP elevated 2717 on admission  - COVID positive on admission  - CTA chest and abdomen 6/3 : New RUL atelectasis, presumably related to occlusion of RUL bronchus and progression of malignancy. New large R and moderate L pleural effusions, partially loculated.  Malignant pleural fluid is favored.  B/l Interstitial and airspace opacities.    Large mass in the right posterior shoulder presumably metastatic with questionable subtle osseous metastatic disease. Enlarged mediastinal and right axillary lymph nodes suggestive of metastatic disease. Indeterminate liver lesions.   No evidence of acute PE. Asymmetric decreased opacification of the left common iliac artery- limited by suboptimal bolus timing.  Chronic or recent occlusion     - For COVID - started remdesivir after talking to Dr. Peraza - no limitation to steroids use. Got Dexamethasone 6mg IV  x1 dose in ED - will start if no improvement with steroids and diuresis  - For loculated pleural effusions and HF exacerbation - lasix 40 in the ED , start lasix 60 IV BID for now  -pulmonary consulted for need drainage as potential of malignancy effusion  - known lung CA - now seems metastatic  - possible obstruction of RUL bronchus by mass extension   - will benefit from palliative care consult - to be discussed with pt and daughter  - Has COPD but does not seem to be in exacerbation - steroids if resp status not improving    - Echo ordered  - Pulmonary consulted - ? need for bronch vs thoracentesis  - c/w Lasix 60 IV BID -  strict I/O  - Remdesivir x 5 days starting 6/3  - Dexamethasone on admission x1 - will order if clinical status worsens or not improving with remdesivir and diuresis   - Diana PRN  - Encourage IS

## 2025-06-04 NOTE — ASSESSMENT & PLAN NOTE
No chest pain , breathing improved with diuresis and remdesivir    Trop [phone] - [phone]  Likley demand ischemia iso COVID, HF and pleural effusions  Daughter does not even want lovenox or heparin dvt ppx and was discontinued - SCDs for dvt ppx.

## 2025-06-04 NOTE — CONSULTS
"  [facility] - [facility]  Adult Nutrition  Consult Note    SUMMARY     Recommendations  Continue Regular diet  If intake < 50% add Boost Plus BID to assist in meeting nutrition needs (provides 720 kcals, 28 g PRO, 90 CHO)   Nursing, continue documenting PO intake via flowsheets  Monitor labs, meds, weight, skin    Goals: meet % een/epn by next RD f/u  Nutrition Goal Status: new  Communication of RD Recs: other (comment) (poc)    Nutrition Discharge Planning     Nutrition Discharge Planning: General healthy diet    Reason for Assessment    Reason For Assessment: consult  Diagnosis: pulmonary disease  General Information Comments: 84 y.o. 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 RD assessment for BMI <18. Currently Covid positive. No intake since admission. No significant wounds noted. RD to f/u and completed NFPE.  Nutrition Related Social Determinants of Health: SDOH: None Identified  Food Insecurity: No Food Insecurity (6/3/2025)    Hunger Vital Sign     Worried About Running Out of Food in the Last Year: Never true     Ran Out of Food in the Last Year: Never true     Nutrition/Diet History    Spiritual, Cultural Beliefs, [employer] Practices, Values that Affect Care: no  Food Allergies: NKFA    Anthropometrics    Height: 5' 5" (165.1 cm)  Height (inches): 65 in  Weight: 49 kg (108 lb 0.4 oz)  Weight (lb): 108.03 lb  Weight Method: Standard Scale  Ideal Body Weight (IBW), Female: 125 lb  % Ideal Body Weight, Female (lb): 86.42 %  BMI (Calculated): 18  BMI Grade: less than 18.5 - underweight, less than 23 (older than 65 years) " - underweight       Lab/Procedures/Meds    Pertinent Labs Reviewed: reviewed  Pertinent Labs Comments: Glu 164 (H)  Pertinent Medications Reviewed: reviewed  Pertinent Medications Comments: Famotidine, mag sulfate, vitamin D, insulin    Estimated/Assessed Needs    Weight Used For Calorie Calculations: 49 kg (108 lb 0.4 oz)  Energy Calorie Requirements (kcal): 6121-4054 (25-30 kcal/kg)  Energy Need Method: Kcal/kg  Protein Requirements: 49-58 (1-1.2 g/kg)  Weight Used For Protein Calculations: 49 kg (108 lb 0.4 oz)     Estimated Fluid Requirement Method: RDA Method  RDA Method (mL): 1225  CHO Requirement: 137-202 (45-55%)      Nutrition Prescription Ordered    Current Diet Order: Regular    Evaluation of Received Nutrient/Fluid Intake    I/O: -  Comments: LBM 6/4  % Intake of Estimated Energy Needs: 0 - 25 %  % Meal Intake: 0 - 25 %    PES Statement  Underweight related to Chronic illness as evidenced by Other (comment) (BMI 17.98, hxt of cancer)  Status: New    Nutrition Risk    Level of Risk/Frequency of Follow-up: moderate (f/u 1-2x/week)       Monitor and Evaluation    Monitor and Evaluation: Food and beverage intake, Diet order, Weight, Electrolyte and renal panel, Gastrointestinal profile, Glucose/endocrine profile, Lipid profile, Inflammatory profile, Nutrition focused physical findings       Nutrition Follow-Up    RD Follow-up?: Yes

## 2025-06-04 NOTE — ASSESSMENT & PLAN NOTE
84 y.o. 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     CTA demonstrating large right and moderate left pleural effusions, partially loculated. New RUL atelectasis.   BNP: 2,717  TTE 06/04/2025: EF 45 - 50%. Grade II diastolic dysfunction.    Plan:  - Continue diuresis, currently receiving IV lasix 60mg bid  - Strict Ins and Outs q4hrs  - Discussed potential thoracentesis with patient and daughter at bedside. The patient's pleural effusion would likely return if 2/2 her RUL adenocarcinoma, definitive tx of her cancer would provide the best result. Her volume overload 2/2 HFmrEF and G2DD also contributing and would better respond to more aggressive diuresis with IV lasix. Considering the risks, benefits, and alternatives the patient expressed the desire to defer thoracentesis, as her SOB and VERA have improved with diuresis. She wants to avoid additional procedures if at all possible.

## 2025-06-04 NOTE — ASSESSMENT & PLAN NOTE
Patient's most recent potassium results are listed below.   Recent Labs     06/03/25  1208 06/03/25 2004 06/04/25  0453   K 3.3* 5.1 3.7     Plan  - Replete potassium per protocol  - Monitor potassium Every 12 hours  - Patient's hypokalemia is pending repeat  - Monitor with diuresis

## 2025-06-04 NOTE — ASSESSMENT & PLAN NOTE
- CTA chest and abdomen 6/3 : New RUL atelectasis, presumably related to occlusion of RUL bronchus and progression of malignancy. New large R and moderate L pleural effusions, partially loculated.  Malignant pleural fluid is favored.  B/l Interstitial and airspace opacities.     Large mass in the right posterior shoulder presumably metastatic with questionable subtle osseous metastatic disease. Enlarged mediastinal and right axillary lymph nodes suggestive of metastatic disease. Indeterminate liver lesions.   No evidence of acute PE. Asymmetric decreased opacification of the left common iliac artery- limited by suboptimal bolus timing.  Chronic or recent occlusion can not be excluded    - holding of on AC as not confirmed and family does not want AC at this time - also given possible GI bleed  - Will discuss with vascular to review  - Palliative consulted  - Rad onc and heme/onc as outpatient    Per Rad onc 5/30 -   -With AI hepatitis on steroids, IO is not recommended by med onc and with age and co morbidities, she does not wish to proceed with cytotoxic chemotherapy. She already received a high dose to the mediastinum and hilum and given asymptomatic, I do not favor re-irradiation.  -Discussed will treat R posterior shoulder but will also evaluate if the R SCV and Axillary adenopathy can safely be treated as well.   -Paliative care meet up

## 2025-06-04 NOTE — SUBJECTIVE & OBJECTIVE
Interval History: Pleasant, interactive, daughter at bedside.     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    Diabetes mellitus     Glaucoma     Hypertension     Lung cancer     Other ascites 11/29/2022       Past Surgical History:   Procedure Laterality Date    CATARACT EXTRACTION W/  INTRAOCULAR LENS IMPLANT Left 12/21/2021        ENDOBRONCHIAL ULTRASOUND N/A 07/05/2024    Procedure: ENDOBRONCHIAL ULTRASOUND (EBUS);  Surgeon: Rolo Wilkinson MD;  [facility];  Service: Pulmonary;  Laterality: N/A;    ESOPHAGOGASTRODUODENOSCOPY N/A 06/26/2023    Procedure: ESOPHAGOGASTRODUODENOSCOPY (EGD);  Surgeon: Edgar Branch MD;  Location: [facility] (4TH FLR);  Service: Endoscopy;  Laterality: N/A;  referral Dr Peraza-cirrhosis/labs done on 4/24/23-[facility] portal-       Review of patient's allergies indicates:  No Known Allergies    Medications:  Continuous Infusions:   insulin aspart U-100  0-1 Units/hr Subcutaneous Continuous         Scheduled Meds:   brimonidine 0.2%  1 drop Both Eyes TID    diclofenac sodium  2 g Topical (Top) BID    dorzolamide-timolol 2-0.5%  1 drop Both Eyes BID    [START ON 6/5/2025] famotidine  20 mg Oral Daily    furosemide (LASIX) injection  60 mg Intravenous BID    latanoprost  1 drop Both Eyes Daily    LIDOcaine  1 patch Transdermal Daily    mupirocin   Nasal BID    predniSONE  2.5 mg Oral Daily    remdesivir infusion  100 mg Intravenous Daily    vitamin D  2,000 Units Oral Daily     PRN Meds:  Current Facility-Administered Medications:     acetaminophen, 1,000 mg, Oral, Q8H PRN    albuterol-ipratropium, 3 mL, Nebulization, Q6H PRN    dextrose 50%, 12.5 g, Intravenous, PRN    dextrose 50%, 25 g, Intravenous, PRN    glucagon (human recombinant), 1 mg, Intramuscular, PRN    glucose, 16 g, Oral, PRN    glucose, 24 g, Oral, PRN    insulin aspart U-100, 0-10 Units, Subcutaneous, PRN    melatonin, 6 mg, Oral, Nightly  PRN    naloxone, 0.02 mg, Intravenous, PRN    oxyCODONE, 2.5 mg, Oral, Q6H PRN    sodium chloride 0.9%, 10 mL, Intravenous, PRN    sodium chloride 0.9%, 10 mL, Intravenous, Q12H PRN    Family History       Problem Relation (Age of Onset)    Blindness Cousin    Cataracts Mother    Colon cancer Sister    Diabetes Mother    Glaucoma Mother    Heart attack Father    Hypertension Mother          Tobacco Use    Smoking status: Former    Smokeless tobacco: Never   Substance and Sexual Activity    Alcohol use: Not Currently    Drug use: Never    Sexual activity: Not on file       Review of Systems   Constitutional:  Positive for activity change, appetite change and fatigue.   Musculoskeletal:  Positive for arthralgias.   Psychiatric/Behavioral:  Positive for sleep disturbance.      Objective:     Vital Signs (Most Recent):  Temp: 96.7 °F (35.9 °C) (06/04/25 1203)  Pulse: 63 (06/04/25 1322)  Resp: 18 (06/04/25 1203)  BP: 122/70 (06/04/25 1322)  SpO2: 97 % (06/04/25 1203) Vital Signs (24h Range):  Temp:  [96.7 °F (35.9 °C)-98.3 °F (36.8 °C)] 96.7 °F (35.9 °C)  Pulse:  [60-82] 63  Resp:  [16-22] 18  SpO2:  [95 %-99 %] 97 %  BP: (122-171)/(60-77) 122/70     Weight: 49 kg (108 lb 0.4 oz)  Body mass index is 17.98 kg/m².       Physical Exam  Constitutional:       General: She is not in acute distress.  HENT:      Head: Normocephalic and atraumatic.      Right Ear: External ear normal.      Left Ear: External ear normal.      Nose: Nose normal.      Mouth/Throat:      Mouth: Mucous membranes are moist.   Eyes:      Extraocular Movements: Extraocular movements intact.      Conjunctiva/sclera: Conjunctivae normal.   Cardiovascular:      Rate and Rhythm: Normal rate.   Pulmonary:      Effort: Pulmonary effort is normal.      Breath sounds: Normal breath sounds.   Abdominal:      General: Bowel sounds are normal.      Palpations: Abdomen is soft.   Skin:     General: Skin is warm.   Neurological:      Mental Status: She is alert and  oriented to person, place, and time. Mental status is at baseline.   Psychiatric:         Mood and Affect: Mood normal.         Behavior: Behavior normal.            Review of Symptoms      Symptom Assessment (ESAS 0-10 Scale)  Pain:  0  Dyspnea:  0  Anxiety:  0  Nausea:  0  Depression:  0  Anorexia:  0  Fatigue:  0  Insomnia:  0  Restlessness:  0  Agitation:  0         Living Arrangements:  Lives with family    Psychosocial/Cultural:   See Palliative Psychosocial Note: No  Has daughter Della and son who are very supportive. She is a twin, but her twin sister passed away a few years ago. She is one of ten children, six are . 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 Loves going to [employer] and [employer]  **Primary  to Follow**  Palliative Care  Consult: No    Spiritual:  F - Tamar and Belief:  Synagogue  I - Importance:  Important  C - Community:  Involved  A - Address in Care:  Engage  support        Advance Care Planning   Advance Directives:   Living Will: No    LaPOST: No    Do Not Resuscitate Status: Yes    Medical Power of : No      Decision Making:  Patient answered questions  Goals of Care: 25: Supportive conversation with Ms. Feng and her daughter Della at bedside. They are very welcoming of my visit. 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 Della explains to her mother my field (palliative), that it is a focus on alleviating symptom burdens to improve quality of life. I asked about their last clinic visit with her oncologist Dr. Kohler and Della explains to me that Dr. Peraza was worried that immunotherapy would worsen her mother's autoimmune hepatitis. They agreed that in order to protect Ms. Feng that they would avoid further immunotherapy. I asked about their  "conversation regarding hospice. Della explains that they hope to be able to extend her life and "focus on living." She feels that because her mother is not actively dying, hospice is not what would be best for her care and goals. Her mother tells me that she is not afraid of dying, [place] and understands that death comes for all of us. She embraces that she will one day pass and has peace with it. Her daughter reminds her mother many times throughout our conversation that Ms. Feng is not dying and encourages her mother to "focus on living." Validated both that we can accept that we will all die one day, and until that day comes, we can focus on ways to improve quality of life to our best ability in order to protect her ortega and priorities during whatever days God has planned for her. Both nodded in response to this. Della admits that she feels that since her mother was diagnosed formally with lung cancer, her mother suddenly stopped eating due to appetite loss and was more depressed. Della alludes that in being given bad news, this affected her mother's mentality severely and now her mother keeps talking about negative things, like her death. Della admits that they are very different - that Della is an optimist and her mother is a pessimist. Ms. Feng shakes her head to this and reminds me that she is just accepting that she will one day pass, just as many family members before her have. 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  - I took time to explain really what our department is - stressing that unlike hospice, we do not make house visits, are not available 24/7 or at a whim's notice. Della is very understanding of this, and appreciative that they can have palliative support for symptom management while they continue to explore disease-modifying treatments to buy her as much time as " possible. Ms. Feng says that she just wants to focus on being happy. She admits that over the last couple of years, losing her independence has been very difficult to accept. She loves cooking and now her daughter Della cooks for her. Even though Della is an excellent cook, Ms. Feng will always modify her food to claim some control and sense of cooking again. However her appetite is poor and she has lost a lot of weight. In addition to this, she has poor sleep, wakes up due to excruciating RLE pain. Della wonders how a component of depression or anxiety may be influencing her mother's symptom burdens and feels that if these three things can be addressed, her mother might feel better and do better as well.         Significant Labs: CBC:   Recent Labs   Lab 06/03/25  1208 06/03/25  1228 06/04/25  0453   WBC 15.12*  --  12.93*   HGB 9.0*  --  7.4*   HCT 30.0* 24.3 24.4*   *  --  143*     CMP:   Recent Labs   Lab 06/03/25  1208 06/03/25  2004 06/04/25  0453    139 142   K 3.3* 5.1 3.7    109 111*   CO2 24 20* 20*    272* 164*   BUN 10 11 16   CREATININE 0.6 0.6 0.6   CALCIUM 8.8 8.4* 8.5*   PROT 6.9  --  6.2   ALBUMIN 1.9*  --  1.6*   BILITOT 0.7  --  0.3   ALKPHOS 93  --  87   AST 42  --  44   ALT 16  --  16   ANIONGAP 9 10 11     CBC:   Recent Labs   Lab 06/04/25  0453   WBC 12.93*   HGB 7.4*   HCT 24.4*   MCV 91   *     BMP:  Recent Labs   Lab 06/04/25  0453   *      K 3.7   *   CO2 20*   BUN 16   CREATININE 0.6   CALCIUM 8.5*   MG 1.7     LFT:  Lab Results   Component Value Date    AST 44 06/04/2025    ALKPHOS 87 06/04/2025    BILITOT 0.3 06/04/2025     Albumin:   Albumin   Date Value Ref Range Status   06/04/2025 1.6 (L) 3.5 - 5.2 g/dL Final   03/11/2025 2.1 (L) 3.5 - 5.2 g/dL Final     Protein:   Protein Total   Date Value Ref Range Status   06/04/2025 6.2 6.0 - 8.4 gm/dL Final     Total Protein   Date Value Ref Range Status   03/11/2025 7.5 6.0 -  8.4 g/dL Final     Lactic acid:   Lab Results   Component Value Date    LACTATE 6.1 (HH) 10/23/2022       Significant Imaging: I have reviewed all pertinent imaging results/findings within the past 24 hours.

## 2025-06-04 NOTE — ASSESSMENT & PLAN NOTE
Advance Care Planning   Discussed with patient and daughter at bedside on admission. DNR/DNI. Status updated.     Further discussed palliative care involvement given metastatic disease - daughter and patient agreed to see palliative care as was supposed to see on 6/19 as outpatient but was pre-poned to 6/9 or 6/10 after hospitalization.         Total of 15 minutes spent in GOC and ACP conversations on 6/4.

## 2025-06-04 NOTE — CONSULTS
[facility] - Med Surg  Wound Care    Patient Name:  Radha Feng   MRN:  [id number]  Date: 6/4/2025  Diagnosis: Acute hypoxemic respiratory failure    History:     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    Diabetes mellitus     Glaucoma     Hypertension     Lung cancer     Other ascites 11/29/2022       Social History[1]    Precautions:     Allergies as of 06/03/2025    (No Known Allergies)       [facility] Assessment Details/Treatment   Patient seen for wound care consultation.  Chart reviewed for this encounter.  See flow sheet for findings.    Pt in bed with family at the bedside. Pt independently positioned on her left side. Partial thickness skin loss to the left buttock - likely related to moisture, incontinence and friction. Pt's family reports the wound is from home r/t pt's diarrhea. Triad applied for protection, prevention and moist wound healing. Pt and pt's family educated on the wound care and the importance of turning ever 2 hours.     Recommendations:  - Buttocks: cleanse with bath wipes, pat dry and apply triad bid/prn  - Turning every 2 hours  - Waffle mattress overlay   - Chair cushion      06/04/25 1230        Wound 06/03/25 2000 Moisture associated dermatitis Left Buttocks   Date First Assessed/Time First Assessed: 06/03/25 2000   Present on Original Admission: Yes  Primary Wound Type: Moisture associated dermatitis  Side: Left  Location: Buttocks  Is this injury device related?: No   Wound Image    Dressing Appearance Dry;Intact   Drainage Amount None   Appearance Intact;Dry;Pink;Tan   Periwound Area Intact;Dry   Care Cleansed with:;Sterile normal saline   Dressing Removed;Foam;Applied;Other (comment)  (triad)     Documented Cheng Score: 18  Recommendations made to primary team for above plan via secure chat. Wound care will follow-up as needed.   06/04/2025         [1]   Social History  Socioeconomic History    Marital status: Single   Tobacco  Use    Smoking status: Former    Smokeless tobacco: Never   Substance and Sexual Activity    Alcohol use: Not Currently    Drug use: Never   Social History Narrative    , one daughter local, four sons out of state. Retired . Lives with daughter Joss.     Social Drivers of Health     Financial Resource Strain: Low Risk  (6/3/2025)    Overall Financial Resource Strain (CARDIA)     Difficulty of Paying Living Expenses: Not hard at all   Food Insecurity: No Food Insecurity (6/3/2025)    Hunger Vital Sign     Worried About Running Out of Food in the Last Year: Never true     Ran Out of Food in the Last Year: Never true   Transportation Needs: No Transportation Needs (6/3/2025)    PRAPARE - Transportation     Lack of Transportation (Medical): No     Lack of Transportation (Non-Medical): No   Physical Activity: Inactive (6/21/2024)    Exercise Vital Sign     Days of Exercise per Week: 1 day     Minutes of Exercise per Session: 0 min   Stress: Stress Concern Present (6/3/2025)    [employer] - Occupational Stress Questionnaire     Feeling of Stress : To some extent   Housing Stability: Low Risk  (6/3/2025)    Housing Stability Vital Sign     Unable to Pay for Housing in the Last Year: No     Homeless in the Last Year: No

## 2025-06-04 NOTE — PROGRESS NOTES
[facility] Medicine  Progress Note    Patient Name: Radha Feng  MRN: [id number]  Patient Class: IP- Inpatient   Admission Date: 6/3/2025  Length of Stay: 1 days  Attending Physician: Rafa Carrero MD  Primary Care Provider: Leticia Lim MD        Subjective     Principal Problem:Acute hypoxemic respiratory failure        HPI:  84 y.o. 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     Daughter reports that patient has been having loose stools for almost a week - they were somwhat formed but loose initially but for the last 2-3 days they have been watery - 4 times a day. This morning associated with some abdominal discomfort as well. She was discharged on ciprofloxacin for UTI after admission for L1 fracture post fall and being treated with IV abx in house. She has been taking it. This am daughter who is an RN and has a private job noticed stool to be melanotic.  She did get Pepto-Bismol yesterday.  Daughter also noticed that her stool were foul-smelling.  She denies any urinary complaints.  She had a few episodes of vomiting about 1 week ago in the setting of stopping the antibiotic but none since, nausea this morning with no vomiting.     Secondarily daughter says that she has been more short of breath with sats going from 88-94% on room air where she is normally 95-97% on room air.  She does have a new cough that is occasionally productive of sputum.  No chest pain. Daughter has been  holding home PO lasix as she was worried about dehydration related to diarrhea.   She has some mild right ankle swelling but no calf swelling or redness.  Patient was also complaining of pain in her right 4th toe which is new and is not related to redness or any trauma. She takes tylenol 1g TID at home. She uses dexcom and insulin pump at home. Daughter wanted to make sure I talk to Dr. Orr before ordering steroids or redesivir for COVID - I talked to Dr. HO and was told that there are no limitations to use either or both medications.     In the ED she was found to have COVID 19 infection and CT showed bilateral loculated pleural effusions with possible RUL bronchus obstruction. Being admitted for workup and management. She was initially given 500 ml VF for diarrhea but then iso pleural effusions was given lasix 40 IV. She was given dexamethasone IV 6 mg x1 . Placed on O2 for saturation 90-94%.     Overview/Hospital Course:  No notes on file    Interval History:    Seen and examined at bedside with daughter. On 2 L NC - making urine and cough is better - discussed palliative care involvement and agree to see them as was supposed to see on 6/19 as outpatient but was pre-poned to 6/9 or 6/10.     Toe pain is better.     Diarrhea is also improving - not black anymore per daughter  and is greenish now- discussed GI involvement but reports hb has fluctuated before and she has been told that it may be related to neovascularization of the R shoulder mass. We discussed that she should take pepcid and if hb drops more or has dark stools , will consult GI for potential scope. Cdiff negative.     PT consulted - pulm to see the patient.       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    Diabetes mellitus     Glaucoma     Hypertension     Lung cancer     Other ascites 11/29/2022       Past Surgical History:   Procedure Laterality Date    CATARACT EXTRACTION W/  INTRAOCULAR  LENS IMPLANT Left 12/21/2021        ENDOBRONCHIAL ULTRASOUND N/A 07/05/2024    Procedure: ENDOBRONCHIAL ULTRASOUND (EBUS);  Surgeon: Rolo Wilkinson MD;  Location: [facility] OR 2ND FLR;  Service: Pulmonary;  Laterality: N/A;    ESOPHAGOGASTRODUODENOSCOPY N/A 06/26/2023    Procedure: ESOPHAGOGASTRODUODENOSCOPY (EGD);  Surgeon: Edgar Branch MD;  Location: [facility] (4TH FLR);  Service: Endoscopy;  Laterality: N/A;  referral Dr Peraza-cirrhosis/labs done on 4/24/23-[facility] portal-GT       Review of patient's allergies indicates:  No Known Allergies    No current facility-administered medications on file prior to encounter.     Current Outpatient Medications on File Prior to Encounter   Medication Sig    acetaminophen (TYLENOL) 500 MG tablet Take 2 tablets (1,000 mg total) by mouth every 8 (eight) hours as needed for Pain.    BD INSULIN SYRINGE ULTRA-FINE 1 mL 31 gauge x 5/16 Syrg     blood-glucose meter,continuous Misc Dispsense 1 Dexcom G7     blood-glucose transmitter (DEXCOM G6 TRANSMITTER) Amanda 1 each by Misc.(Non-Drug; Combo Route) route every 3 (three) months. (Patient not taking: Reported on 5/22/2025)    brimonidine 0.2% (ALPHAGAN) 0.2 % Drop Place 1 drop into both eyes 3 (three) times daily.    cholecalciferol, vitamin D3, (VITAMIN D3) 25 mcg (1,000 unit) capsule Take 2 capsules (2,000 Units total) by mouth once daily.    ciprofloxacin HCl (CIPRO) 500 MG tablet Take 1 tablet (500 mg total) by mouth 2 (two) times daily.    DEXCOM G7 SENSOR Amanda 1 Device by Misc.(Non-Drug; Combo Route) route every 10 days.    dorzolamide-timolol 2-0.5% (COSOPT) 22.3-6.8 mg/mL ophthalmic solution Place 1 drop into both eyes 2 (two) times daily.    estradioL (ESTRACE) 0.01 % (0.1 mg/gram) vaginal cream Place 0.5 g vaginally twice a week. Apply to the vagina daily for two weeks then twice a week. (Patient not taking: Reported on 5/22/2025)    furosemide (LASIX) 40 MG tablet TAKE 1 TABLET BY MOUTH EVERY DAY     "insulin aspart U-100 (NOVOLOG U-100 INSULIN ASPART) 100 unit/mL injection TO USE WITH OMNIPOD 5. TOTAL DAILY DOSE UP TO 40 UNITS    insulin pump cart,automated,BT (OMNIPOD 5 G6 PODS, GEN 5,) Crtg Inject 1 each into the skin Every 3 (three) days.    lactulose (CEPHULAC) 10 gram packet Take 10 g by mouth 3 (three) times daily. (Patient not taking: Reported on 2025)    latanoprost 0.005 % ophthalmic solution PLACE 1 DROP INTO BOTH EYES ONCE DAILY    LIDOcaine (LIDODERM) 5 % Place 1 patch onto the skin once daily. Remove & Discard patch within 12 hours or as directed by MD    [Paused] lisinopriL-hydrochlorothiazide (PRINZIDE,ZESTORETIC) 20-12.5 mg per tablet TAKE 1 TABLET BY MOUTH EVERY DAY (Patient not taking: Reported on 2025)    nystatin (MYCOSTATIN) 100,000 unit/mL suspension SWISH WITH 6MLS BY MOUTH THEN SWALLOW 4 TIMES A DAY FOR 14 DAYS    OMNIPOD 5 G6-G7 PODS, GEN 5, Crtg SMARTSI Each SUB-Q Once a Month (Patient not taking: Reported on 2025)    oxyCODONE (ROXICODONE) 5 MG immediate release tablet Take 0.5 tablets (2.5 mg total) by mouth every 6 (six) hours as needed for Pain.    pen needle, diabetic 31 gauge x 5/16" Ndle Use to inject insulin into the skin up to 4 times daily    predniSONE (DELTASONE) 5 MG tablet Take 0.5 tablets (2.5 mg total) by mouth once daily.    tobramycin sulfate 0.3% (TOBREX) 0.3 % ophthalmic solution 1-2 drops topically twice daily to affected toe(s).     Family History       Problem Relation (Age of Onset)    Blindness Cousin    Cataracts Mother    Colon cancer Sister    Diabetes Mother    Glaucoma Mother    Heart attack Father    Hypertension Mother          Tobacco Use    Smoking status: Former    Smokeless tobacco: Never   Substance and Sexual Activity    Alcohol use: Not Currently    Drug use: Never    Sexual activity: Not on file     Review of Systems   Constitutional:  Positive for activity change and appetite change.   Respiratory:  Positive for cough and " shortness of breath. Negative for chest tightness.    Cardiovascular:  Negative for chest pain and leg swelling.   Gastrointestinal:  Positive for abdominal pain, diarrhea, nausea and vomiting. Negative for abdominal distention.   Genitourinary:  Negative for dysuria and hematuria.   Musculoskeletal:  Positive for joint swelling. Negative for neck stiffness.   Skin:  Negative for color change and rash.   Psychiatric/Behavioral:  Negative for agitation and behavioral problems.      Objective:     Vital Signs (Most Recent):  Temp: 98.3 °F (36.8 °C) (06/04/25 0758)  Pulse: 73 (06/04/25 1048)  Resp: 18 (06/04/25 0758)  BP: (!) 149/60 (06/04/25 0758)  SpO2: 99 % (06/04/25 0758) Vital Signs (24h Range):  Temp:  [97.5 °F (36.4 °C)-99 °F (37.2 °C)] 98.3 °F (36.8 °C)  Pulse:  [60-82] 73  Resp:  [16-24] 18  SpO2:  [89 %-99 %] 99 %  BP: (139-171)/(60-77) 149/60     Weight: 49 kg (108 lb 0.4 oz)  Body mass index is 17.98 kg/m².     Physical Exam  Constitutional:       Appearance: She is ill-appearing.   HENT:      Head: Normocephalic and atraumatic.      Nose: Nose normal.      Mouth/Throat:      Mouth: Mucous membranes are moist.      Pharynx: Oropharynx is clear.   Eyes:      Extraocular Movements: Extraocular movements intact.      Pupils: Pupils are equal, round, and reactive to light.   Cardiovascular:      Rate and Rhythm: Normal rate.   Pulmonary:      Effort: Pulmonary effort is normal. No respiratory distress.      Breath sounds: Rales present.      Comments: Diminished sounds in lower lung fields.   Abdominal:      General: Abdomen is flat. There is no distension.      Palpations: Abdomen is soft.      Tenderness: There is no abdominal tenderness.   Musculoskeletal:         General: Normal range of motion.      Cervical back: Normal range of motion. No rigidity.      Right lower leg: No edema.      Left lower leg: No edema.      Comments: R 4th toe pain significantly improved - no erythema or swelling   Skin:      General: Skin is warm and dry.   Neurological:      General: No focal deficit present.      Mental Status: She is alert and oriented to person, place, and time.   Psychiatric:         Mood and Affect: Mood normal.         Behavior: Behavior normal.              CRANIAL NERVES     CN III, IV, VI   Pupils are equal, round, and reactive to light.       Significant Labs: All pertinent labs within the past 24 hours have been reviewed.    Significant Imaging: I have reviewed all pertinent imaging results/findings within the past 24 hours.      Assessment & Plan  Acute hypoxemic respiratory failure  Patient with Hypoxic Respiratory failure which is Acute.  she is not on home oxygen. Supplemental oxygen was provided and noted-      .   Signs/symptoms of respiratory failure include- tachypnea and respiratory distress. Contributing diagnoses includes - ARDS, CHF, COPD, Pneumonia, and Lung CA Labs and images were reviewed. Patient Has not had a recent ABG. Will treat underlying causes and adjust management of respiratory failure as follows-     - BNP elevated 2717 on admission  - COVID positive on admission  - CTA chest and abdomen 6/3 : New RUL atelectasis, presumably related to occlusion of RUL bronchus and progression of malignancy. New large R and moderate L pleural effusions, partially loculated.  Malignant pleural fluid is favored.  B/l Interstitial and airspace opacities.    Large mass in the right posterior shoulder presumably metastatic with questionable subtle osseous metastatic disease. Enlarged mediastinal and right axillary lymph nodes suggestive of metastatic disease. Indeterminate liver lesions.   No evidence of acute PE. Asymmetric decreased opacification of the left common iliac artery- limited by suboptimal bolus timing.  Chronic or recent occlusion     - For COVID - started remdesivir after talking to Dr. Peraza - no limitation to steroids use. Got Dexamethasone 6mg IV  x1 dose in ED - will start if no  improvement with steroids and diuresis  - For loculated pleural effusions and HF exacerbation - lasix 40 in the ED , start lasix 60 IV BID for now  -pulmonary consulted for need drainage as potential of malignancy effusion  - known lung CA - now seems metastatic  - possible obstruction of RUL bronchus by mass extension   - will benefit from palliative care consult - to be discussed with pt and daughter  - Has COPD but does not seem to be in exacerbation - steroids if resp status not improving    - Echo ordered  - Pulmonary consulted - ? need for bronch vs thoracentesis  - c/w Lasix 60 IV BID - strict I/O  - Remdesivir x 5 days starting 6/3  - Dexamethasone on admission x1 - will order if clinical status worsens or not improving with remdesivir and diuresis   - Duonebs PRN  - Encourage IS      Type 2 diabetes mellitus with circulatory disorder, with long-term current use of insulin  Has insulin pump and dexcom  Endocrinology consulted  - appreciate   Anticipate hyperglycemia iso steroids on admission    Hypokalemia  Patient's most recent potassium results are listed below.   Recent Labs     06/03/25  1208 06/03/25 2004 06/04/25  0453   K 3.3* 5.1 3.7     Plan  - Replete potassium per protocol  - Monitor potassium Every 12 hours  - Patient's hypokalemia is pending repeat  - Monitor with diuresis  Adrenal cortical steroids causing adverse effect in therapeutic use      Insulin pump in place      COVID-19  Patient is identified as Severe COVID-19 based on hypoxemia with O2 saturations <94% on room air or on ambulation   Initiate standard COVID protocols; COVID-19 testing ,Infection Control notification  and isolation- respiratory, contact and droplet per protocol    Diagnostics: CBC, CMP, Ferritin, CRP, and Portable CXR    Management: Initiate targeted therapy with Remdesivir, 200mg IV x1, followed by 100mg IV daily x5 days total, Maintain oxygen saturations 92-96% via Nasal Cannula 2 LPM and monitor with  continuous/intermittent pulse oximetry. , and Inhaled bronchodilators as needed for shortness of breath. Got one dose of dexamethaosne on admission - will start if not improving or worsening as sats were 90-94% before O2 was placed.     Advance Care Planning  Current advance care plan has been discussed with patient/family/POA and patient currently wishes DNR (Do Not Resuscitate).   BMI < 18.5  Nutrition consult    Atelectasis  I have personally reviewed CT Scan. Patient with atelectasis on interpretation. Likely Obstructive atelectasis secondary to malignancy. Signs and symptoms include Shortness of breath Will begin treatment with incentive spirometry.  Pleural effusion  Patient found to have large pleural effusion on imaging. I have personally reviewed and interpreted the following imaging: CT. A thoracentesis was deferred. Most likely etiology includes Congestive Heart Failure. Management to include Diuresis and Pulm consult    Diarrhea  Recent Cipro exposure as well as IV abx in hospital. COVID can also cause diarrhea.   Cdiff negative  GI panel pending  Stool culture pending  COVID management as above      Elevated troponin  No chest pain , breathing improved with diuresis and remdesivir    Trop 753 - 614  Likley demand ischemia iso COVID, HF and pleural effusions  Daughter does not even want lovenox or heparin dvt ppx and was discontinued - SCDs for dvt ppx.   ACP (advance care planning)  Advance Care Planning   Discussed with patient and daughter at bedside on admission. DNR/DNI. Status updated.     Further discussed palliative care involvement given metastatic disease - daughter and patient agreed to see palliative care as was supposed to see on 6/19 as outpatient but was pre-poned to 6/9 or 6/10 after hospitalization.         Total of 15 minutes spent in GOC and ACP conversations on 6/4.   Toe pain  R 4th toe pain   Xray unremarkable -negative for fracture  No cellulitis or swelling concerning for gout    Per daughter was using tobramycin drops for toe at home as recommended    Diclofenac gel  -with improvement in pain    Autoimmune hepatitis  C/w prednisone 2.5 daily    Adenocarcinoma of right lung  - CTA chest and abdomen 6/3 : New RUL atelectasis, presumably related to occlusion of RUL bronchus and progression of malignancy. New large R and moderate L pleural effusions, partially loculated.  Malignant pleural fluid is favored.  B/l Interstitial and airspace opacities.     Large mass in the right posterior shoulder presumably metastatic with questionable subtle osseous metastatic disease. Enlarged mediastinal and right axillary lymph nodes suggestive of metastatic disease. Indeterminate liver lesions.   No evidence of acute PE. Asymmetric decreased opacification of the left common iliac artery- limited by suboptimal bolus timing.  Chronic or recent occlusion can not be excluded    - holding of on AC as not confirmed and family does not want AC at this time - also given possible GI bleed  - Will discuss with vascular to review  - Palliative consulted  - Rad onc and heme/onc as outpatient    Per Rad onc 5/30 -   -With AI hepatitis on steroids, IO is not recommended by med onc and with age and co morbidities, she does not wish to proceed with cytotoxic chemotherapy. She already received a high dose to the mediastinum and hilum and given asymptomatic, I do not favor re-irradiation.  -Discussed will treat R posterior shoulder but will also evaluate if the R SCV and Axillary adenopathy can safely be treated as well.   -Paliative care meet up   Anemia  Anemia is likely due to ACD iso cancer with ? Blood loss. Most recent hemoglobin and hematocrit are listed below.  Recent Labs     06/03/25  1208 06/03/25  1228 06/04/25  0453   HGB 9.0*  --  7.4*   HCT 30.0* 24.3 24.4*     Plan  - Monitor serial CBC: Daily  - Transfuse PRBC if patient becomes hemodynamically unstable, symptomatic or H/H drops below 7/21.  - Patient has  not received any PRBC transfusions to date  - Patient's anemia is currently worsening. Will Monitor  - Dark stools have resolved and were possibly related to Pepto bismol intake at home per daughter  - Will involve GI if hb drops further or if dark stools re start.    - Encourage pepcid BID use  VTE Risk Mitigation (From admission, onward)           Ordered     IP VTE HIGH RISK PATIENT  Once         06/03/25 1527     Place sequential compression device  Until discontinued         06/03/25 1527     IP VTE HIGH RISK PATIENT  Once         06/03/25 1527     Place sequential compression device  Until discontinued         06/03/25 1527                    Discharge Planning   MELVI:      Code Status: DNR   Medical Readiness for Discharge Date:                    Please place Justification for DME        Rafa Carrero MD  Department of Hospital Medicine   [facility]

## 2025-06-04 NOTE — PLAN OF CARE
Problem: Adult Inpatient Plan of Care  Goal: Plan of Care Review  Outcome: Not Progressing  Goal: Patient-Specific Goal (Individualized)  Outcome: Not Progressing  Goal: Absence of Hospital-Acquired Illness or Injury  Outcome: Not Progressing  Goal: Optimal Comfort and Wellbeing  Outcome: Not Progressing  Goal: Readiness for Transition of Care  Outcome: Not Progressing     Problem: Diabetes Comorbidity  Goal: Blood Glucose Level Within Targeted Range  Outcome: Not Progressing     Problem: Wound  Goal: Optimal Coping  Outcome: Not Progressing  Goal: Optimal Functional Ability  Outcome: Not Progressing  Goal: Absence of Infection Signs and Symptoms  Outcome: Not Progressing  Goal: Improved Oral Intake  Outcome: Not Progressing  Goal: Optimal Pain Control and Function  Outcome: Not Progressing  Goal: Skin Health and Integrity  Outcome: Not Progressing  Goal: Optimal Wound Healing  Outcome: Not Progressing     Problem: Skin Injury Risk Increased  Goal: Skin Health and Integrity  Outcome: Not Progressing

## 2025-06-04 NOTE — ASSESSMENT & PLAN NOTE
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 Palliative and Supportive Care was consulted to explore goals of care and malignant symptom management.    Advance Care Planning   Goals of Care  - Code status: DNAR/DNI  - Next of kin: adult children  - ACP documents: none  - Patient has decision making capacity  - Prognosis: poor  - Goals: improvement in condition, symptom control, quality of life  - Plans: will continue to follow along    Goals of Care Conversation:  - 6/4/25: Supportive conversation with Ms. Feng and her daughter Della at bedside. They are very welcoming of my visit. 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 Della explains to her mother my field (palliative), that it is a focus on alleviating symptom burdens to improve quality of life. I asked about their last clinic visit with her oncologist Dr. Kohler and Della explains to me that Dr. Peraza was worried that immunotherapy would worsen her mother's autoimmune hepatitis. They agreed that in order to protect Ms. Feng that they would avoid further immunotherapy. I asked about their conversation regarding hospice. Della explains that they hope to be able to extend her life and "focus on living." She feels that because her mother is not actively dying, hospice is not what would be best for her care and goals. Her mother tells me that she is not afraid of dying, [place] " "understands that death comes for all of us. She embraces that she will one day pass and has peace with it. Her daughter reminds her mother many times throughout our conversation that Ms. Feng is not dying and encourages her mother to "focus on living." Validated both that we can accept that we will all die one day, and until that day comes, we can focus on ways to improve quality of life to our best ability in order to protect her ortega and priorities during whatever days God has planned for her. Both nodded in response to this. Della admits that she feels that since her mother was diagnosed formally with lung cancer, her mother suddenly stopped eating due to appetite loss and was more depressed. Della alludes that in being given bad news, this affected her mother's mentality severely and now her mother keeps talking about negative things, like her death. Della admits that they are very different - that Della is an optimist and her mother is a pessimist. Ms. Feng shakes her head to this and reminds me that she is just accepting that she will one day pass, just as many family members before her have. 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  - I took time to explain really what our department is - stressing that unlike hospice, we do not make house visits, are not available 24/7 or at a whim's notice. Della is very understanding of this, and appreciative that they can have palliative support for symptom management while they continue to explore disease-modifying treatments to buy her as much time as possible. Ms. Feng says that she just wants to focus on being happy. She admits that over the last couple of years, losing her independence has been very difficult to accept. She loves cooking and now her daughter Della cooks for her. Even though Della is an excellent cook, Ms. Feng will always modify her food to " claim some control and sense of cooking again. However her appetite is poor and she has lost a lot of weight. In addition to this, she has poor sleep, wakes up due to excruciating RLE pain. Della wonders how a component of depression or anxiety may be influencing her mother's symptom burdens and feels that if these three things can be addressed, her mother might feel better and do better as well.        Neoplasm Related Pain  RLE Pain  - [place]  reviewed. Prescribed short course of oxycodone 5 mg which daughter was breaking into 2.5 mg doses for severe RLE pain at nighttime only  - Has lidocaine patch applied to right shoulder, where site of metastasis is located  - Likely has arthritis of RLE. Was evaluated extensively in the past and counseled by outpatient Orthopedic surgery that she has mild arthritis, and tried steroid shot that daughter believes triggered autoimmune hepatitis. High dose steroid treatment for autoimmune hepatitis took away the RLE pain, and pain returned during downtitration of steroids  - Of note, was referred to acupuncture and they attended this appointment, only to be told by the acupuncturist that he doesn't treat bone pain, only muscle pain and wouldn't treat any of her site of malignant pain. It was a very disappointing visit and wanted to let us know that for others being referred to acupuncture therapy for holistic approach to care, that this acupuncturist made it very clear that he will not treat malignant pain.  - Continue APAP with modified limits in setting of known autoimmune hepatitis  - Continue diclofenac gel to joints  - Will resume home oxycodone - because tablets cannot be broken in half, will order liquid oxycodone 2.5 mg PO q6h PRN severe pain    Poor Appetite  Mood Disturbance  Sleep Disturbance  - Will trial mirtazapine 7.5 mg qHS. Discussed discontinuation after a couple of days if it is not providing a meaningful benefit for appetite/sleep  - Continue melatonin 6 mg  qHS PRN insomnia

## 2025-06-04 NOTE — ASSESSMENT & PLAN NOTE
R 4th toe pain   Xray unremarkable -negative for fracture  No cellulitis or swelling concerning for gout   Per daughter was using tobramycin drops for toe at home as recommended    Diclofenac gel  -with improvement in pain

## 2025-06-04 NOTE — PROGRESS NOTES
Notified Dr Nunes that pt and her daughter decline the finger sticks, daughter tracks the carbs pt eats and records them r/t insulin administration

## 2025-06-04 NOTE — CONSULTS
[facility] Surg  Pulmonology  Consult Note    Patient Name: Radha Feng  MRN: [id number]  Admission Date: 6/3/2025  Hospital Length of Stay: 1 days  Code Status: DNR  Attending Physician: Rafa Carrero MD  Primary Care Provider: Leticia Lim MD   Principal Problem: Acute hypoxemic respiratory failure    Inpatient consult to Pulmonology  Consult performed by: Keith Hernandez MD  Consult ordered by: Rafa Carrero MD        Subjective:     HPI:  Ms. Feng is a 84 y.o. female with PMHx of Stage IIIB (cT3, pN2, cMx) adenocarcinoma of the RUL with intralobar mets dx'd 7/5/24, s/p radiation therapy to the R lung/mediastinum (45 Gy/15 Fx, 8/14/24-9/4/24), COPD, PVD, HFpEF, CAD, urinary incontinence, T2DM, and autoimmune hepatitis on chronic prednisone, presented yesterday for worsening SOB and diarrhea. Of note, she was admitted 1 month ago for a fall found to have UTI at that time placed on Ciprofloxacin. She reports that she she returned home, she began experiencing worsening sob and VERA. She now can only ambulate a few steps before becoming short of breath. She describes 2 pillow orthopnea and sleeping in a recliner for years.  She also has a persistent wet cough for approximately 1 month..  She reports subjective fever, no chills. She defers more specific history to her daughter who is a nurse.    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    Diabetes mellitus     Glaucoma     Hypertension     Lung cancer     Other ascites 11/29/2022       Past Surgical History:   Procedure Laterality Date    CATARACT EXTRACTION W/  INTRAOCULAR LENS IMPLANT Left 12/21/2021        ENDOBRONCHIAL ULTRASOUND N/A 07/05/2024    Procedure: ENDOBRONCHIAL ULTRASOUND (EBUS);  Surgeon: Rolo Wilkinson MD;  Location: [facility] OR [address] [place] [address];  Service: Pulmonary;  Laterality: N/A;    ESOPHAGOGASTRODUODENOSCOPY N/A 06/26/2023    Procedure:  ESOPHAGOGASTRODUODENOSCOPY (EGD);  Surgeon: Edgar Branch MD;  Location: [facility] ([address] [place] [address]);  Service: Endoscopy;  Laterality: N/A;  referral Dr Peraza-cirrhosis/labs done on 4/24/23-[facility] portal-GT       Review of patient's allergies indicates:  No Known Allergies    Family History       Problem Relation (Age of Onset)    Blindness Cousin    Cataracts Mother    Colon cancer Sister    Diabetes Mother    Glaucoma Mother    Heart attack Father    Hypertension Mother          Tobacco Use    Smoking status: Former    Smokeless tobacco: Never   Substance and Sexual Activity    Alcohol use: Not Currently    Drug use: Never    Sexual activity: Not on file         Review of Systems   Constitutional:  Positive for fever. Negative for chills.   Respiratory:  Positive for cough and shortness of breath.    Cardiovascular:  Negative for leg swelling.     Objective:     Vital Signs (Most Recent):  Temp: 98.3 °F (36.8 °C) (06/04/25 0758)  Pulse: 67 (06/04/25 0758)  Resp: 18 (06/04/25 0758)  BP: (!) 149/60 (06/04/25 0758)  SpO2: 99 % (06/04/25 0758) Vital Signs (24h Range):  Temp:  [97.5 °F (36.4 °C)-99 °F (37.2 °C)] 98.3 °F (36.8 °C)  Pulse:  [60-82] 67  Resp:  [16-24] 18  SpO2:  [89 %-99 %] 99 %  BP: (137-171)/(60-77) 149/60     Weight: 49 kg (108 lb 0.4 oz)  Body mass index is 17.98 kg/m².      Intake/Output Summary (Last 24 hours) at 6/4/2025 0919  Last data filed at 6/4/2025 0528  Gross per 24 hour   Intake --   Output 400 ml   Net -400 ml        Physical Exam  Constitutional:       Appearance: She is ill-appearing (chronically).   HENT:      Mouth/Throat:      Pharynx: Oropharynx is clear.   Eyes:      Conjunctiva/sclera: Conjunctivae normal.   Cardiovascular:      Rate and Rhythm: Normal rate and regular rhythm.      Heart sounds: Normal heart sounds. No murmur heard.  Pulmonary:      Effort: Pulmonary effort is normal. No respiratory distress.      Breath sounds: No wheezing.      Comments: Decreased breath sounds at the  bases bilaterally. Scattered crackles on the right lower lung field.  Musculoskeletal:      Right lower leg: No edema.      Left lower leg: No edema.   Skin:     General: Skin is warm and dry.   Neurological:      Mental Status: She is alert and oriented to person, place, and time.          Vents:       Lines/Drains/Airways       Line  Duration                  Subcutaneous Infusion Pump 05/16/25 Abdomen (Comment) 19 days              Peripheral Intravenous Line  Duration                  Peripheral IV - Single Lumen 06/03/25 2300 20 G Long PIVC Anterior;Right Upper Arm <1 day                    Significant Labs:    CBC/Anemia Profile:  Recent Labs   Lab 06/03/25  1208 06/03/25  1228 06/04/25  0453   WBC 15.12*  --  12.93*   HGB 9.0*  --  7.4*   HCT 30.0* 24.3 24.4*   *  --  143*   MCV 91  --  91   RDW 21.9*  --  21.9*        Chemistries:  Recent Labs   Lab 06/03/25  1208 06/03/25  2004 06/04/25  0453    139 142   K 3.3* 5.1 3.7    109 111*   CO2 24 20* 20*   BUN 10 11 16   CREATININE 0.6 0.6 0.6   CALCIUM 8.8 8.4* 8.5*   ALBUMIN 1.9*  --  1.6*   PROT 6.9  --  6.2   BILITOT 0.7  --  0.3   ALKPHOS 93  --  87   ALT 16  --  16   AST 42  --  44   MG  --   --  1.7   PHOS  --   --  3.6       All pertinent labs within the past 24 hours have been reviewed.    Significant Imaging:   I have reviewed all pertinent imaging results/findings within the past 24 hours.  Assessment/Plan:     Pulmonary  Pleural effusion  84 y.o. 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     CTA demonstrating large right and moderate left pleural effusions, partially loculated. New RUL atelectasis.   BNP: 2,717  TTE 06/04/2025: EF 45 - 50%. Grade II diastolic dysfunction.    Plan:  - Continue diuresis, currently receiving IV lasix 60mg  bid  - Strict Ins and Outs q4hrs  - Discussed potential thoracentesis with patient and daughter at bedside. The patient's pleural effusion would likely return if 2/2 her RUL adenocarcinoma, definitive tx of her cancer would provide the best result. Her volume overload 2/2 HFmrEF and G2DD also contributing and would better respond to more aggressive diuresis with IV lasix. Considering the risks, benefits, and alternatives the patient expressed the desire to defer thoracentesis, as her SOB and VERA have improved with diuresis. She wants to avoid additional procedures if at all possible.          Thank you for your consult. I will sign off. Please contact us if you have any additional questions.     Keith Rodriguez MD  Pulmonology  [facility]

## 2025-06-04 NOTE — PROGRESS NOTES
Pharmacist Renal Dose Adjustment Note    Radha Feng is a 84 y.o. female being treated with the medication famotidine    Patient Data:    Vital Signs (Most Recent):  Temp: 98.3 °F (36.8 °C) (06/04/25 0758)  Pulse: 67 (06/04/25 0758)  Resp: 18 (06/04/25 0758)  BP: (!) 149/60 (06/04/25 0758)  SpO2: 99 % (06/04/25 0758) Vital Signs (72h Range):  Temp:  [97.5 °F (36.4 °C)-99 °F (37.2 °C)]   Pulse:  [60-82]   Resp:  [16-24]   BP: (137-171)/(60-77)   SpO2:  [89 %-99 %]      Recent Labs   Lab 06/03/25  1208 06/03/25 2004 06/04/25 0453   CREATININE 0.6 0.6 0.6     Serum creatinine: 0.6 mg/dL 06/04/25 0453  Estimated creatinine clearance: 55 mL/min    Famotidine 20mg twice daily will be changed to famotidine 20mg daily    Pharmacist's Name: Leobardo Sanchez  Pharmacist's Extension: [phone]

## 2025-06-05 PROBLEM — C78.2 METASTASIS TO PLEURA: Status: ACTIVE | Noted: 2025-06-05

## 2025-06-05 PROBLEM — C34.90 NON-SMALL CELL LUNG CANCER METASTATIC TO BONE: Status: ACTIVE | Noted: 2025-06-05

## 2025-06-05 PROBLEM — C79.51 NON-SMALL CELL LUNG CANCER METASTATIC TO BONE: Status: ACTIVE | Noted: 2025-06-05

## 2025-06-05 PROBLEM — C34.90 STAGE 4 MALIGNANT NEOPLASM OF LUNG: Status: ACTIVE | Noted: 2025-06-05

## 2025-06-05 LAB
ABSOLUTE EOSINOPHIL (OHS): 0 K/UL
ABSOLUTE MONOCYTE (OHS): 1.48 K/UL (ref 0.3–1)
ABSOLUTE NEUTROPHIL COUNT (OHS): 14.59 K/UL (ref 1.8–7.7)
ALBUMIN SERPL BCP-MCNC: 1.7 G/DL (ref 3.5–5.2)
ALP SERPL-CCNC: 88 UNIT/L (ref 40–150)
ALT SERPL W/O P-5'-P-CCNC: 16 UNIT/L (ref 10–44)
ANION GAP (OHS): 9 MMOL/L (ref 8–16)
AST SERPL-CCNC: 34 UNIT/L (ref 11–45)
BASOPHILS # BLD AUTO: 0.01 K/UL
BASOPHILS NFR BLD AUTO: 0.1 %
BILIRUB SERPL-MCNC: 0.4 MG/DL (ref 0.1–1)
BUN SERPL-MCNC: 20 MG/DL (ref 8–23)
CALCIUM SERPL-MCNC: 8.8 MG/DL (ref 8.7–10.5)
CHLORIDE SERPL-SCNC: 105 MMOL/L (ref 95–110)
CO2 SERPL-SCNC: 26 MMOL/L (ref 23–29)
CREAT SERPL-MCNC: 0.6 MG/DL (ref 0.5–1.4)
E COLI SXT1 STL QL IA: NEGATIVE
E COLI SXT2 STL QL IA: NEGATIVE
ERYTHROCYTE [DISTWIDTH] IN BLOOD BY AUTOMATED COUNT: 21.5 % (ref 11.5–14.5)
GFR SERPLBLD CREATININE-BSD FMLA CKD-EPI: >60 ML/MIN/1.73/M2
GLUCOSE SERPL-MCNC: 116 MG/DL (ref 70–110)
HCT VFR BLD AUTO: 27.3 % (ref 37–48.5)
HGB BLD-MCNC: 8.5 GM/DL (ref 12–16)
IMM GRANULOCYTES # BLD AUTO: 0.1 K/UL (ref 0–0.04)
IMM GRANULOCYTES NFR BLD AUTO: 0.6 % (ref 0–0.5)
LYMPHOCYTES # BLD AUTO: 0.64 K/UL (ref 1–4.8)
MAGNESIUM SERPL-MCNC: 1.9 MG/DL (ref 1.6–2.6)
MCH RBC QN AUTO: 28 PG (ref 27–31)
MCHC RBC AUTO-ENTMCNC: 31.1 G/DL (ref 32–36)
MCV RBC AUTO: 90 FL (ref 82–98)
NUCLEATED RBC (/100WBC) (OHS): 0 /100 WBC
PHOSPHATE SERPL-MCNC: 3.4 MG/DL (ref 2.7–4.5)
PLATELET # BLD AUTO: 170 K/UL (ref 150–450)
PMV BLD AUTO: 11.8 FL (ref 9.2–12.9)
POTASSIUM SERPL-SCNC: 3.3 MMOL/L (ref 3.5–5.1)
PROT SERPL-MCNC: 6.4 GM/DL (ref 6–8.4)
RBC # BLD AUTO: 3.04 M/UL (ref 4–5.4)
RELATIVE EOSINOPHIL (OHS): 0 %
RELATIVE LYMPHOCYTE (OHS): 3.8 % (ref 18–48)
RELATIVE MONOCYTE (OHS): 8.8 % (ref 4–15)
RELATIVE NEUTROPHIL (OHS): 86.7 % (ref 38–73)
SODIUM SERPL-SCNC: 140 MMOL/L (ref 136–145)
WBC # BLD AUTO: 16.82 K/UL (ref 3.9–12.7)

## 2025-06-05 PROCEDURE — 83735 ASSAY OF MAGNESIUM: CPT | Performed by: STUDENT IN AN ORGANIZED HEALTH CARE EDUCATION/TRAINING PROGRAM

## 2025-06-05 PROCEDURE — 97162 PT EVAL MOD COMPLEX 30 MIN: CPT

## 2025-06-05 PROCEDURE — 80053 COMPREHEN METABOLIC PANEL: CPT | Performed by: STUDENT IN AN ORGANIZED HEALTH CARE EDUCATION/TRAINING PROGRAM

## 2025-06-05 PROCEDURE — 63600175 PHARM REV CODE 636 W HCPCS: Mod: JZ,TB | Performed by: STUDENT IN AN ORGANIZED HEALTH CARE EDUCATION/TRAINING PROGRAM

## 2025-06-05 PROCEDURE — 99233 SBSQ HOSP IP/OBS HIGH 50: CPT | Mod: ,,, | Performed by: STUDENT IN AN ORGANIZED HEALTH CARE EDUCATION/TRAINING PROGRAM

## 2025-06-05 PROCEDURE — 94761 N-INVAS EAR/PLS OXIMETRY MLT: CPT

## 2025-06-05 PROCEDURE — 36415 COLL VENOUS BLD VENIPUNCTURE: CPT | Performed by: STUDENT IN AN ORGANIZED HEALTH CARE EDUCATION/TRAINING PROGRAM

## 2025-06-05 PROCEDURE — 99223 1ST HOSP IP/OBS HIGH 75: CPT | Mod: GC,,, | Performed by: INTERNAL MEDICINE

## 2025-06-05 PROCEDURE — 25000003 PHARM REV CODE 250: Performed by: STUDENT IN AN ORGANIZED HEALTH CARE EDUCATION/TRAINING PROGRAM

## 2025-06-05 PROCEDURE — 97166 OT EVAL MOD COMPLEX 45 MIN: CPT

## 2025-06-05 PROCEDURE — 21400001 HC TELEMETRY ROOM

## 2025-06-05 PROCEDURE — 85025 COMPLETE CBC W/AUTO DIFF WBC: CPT | Performed by: STUDENT IN AN ORGANIZED HEALTH CARE EDUCATION/TRAINING PROGRAM

## 2025-06-05 PROCEDURE — 84100 ASSAY OF PHOSPHORUS: CPT | Performed by: STUDENT IN AN ORGANIZED HEALTH CARE EDUCATION/TRAINING PROGRAM

## 2025-06-05 PROCEDURE — 97535 SELF CARE MNGMENT TRAINING: CPT

## 2025-06-05 PROCEDURE — 97116 GAIT TRAINING THERAPY: CPT

## 2025-06-05 PROCEDURE — 27000207 HC ISOLATION

## 2025-06-05 RX ORDER — POTASSIUM CHLORIDE 750 MG/1
30 CAPSULE, EXTENDED RELEASE ORAL
Status: COMPLETED | OUTPATIENT
Start: 2025-06-05 | End: 2025-06-05

## 2025-06-05 RX ORDER — LIDOCAINE 50 MG/G
1 PATCH TOPICAL NIGHTLY
Status: DISCONTINUED | OUTPATIENT
Start: 2025-06-06 | End: 2025-06-07 | Stop reason: HOSPADM

## 2025-06-05 RX ADMIN — Medication 2000 UNITS: at 09:06

## 2025-06-05 RX ADMIN — FAMOTIDINE 20 MG: 20 TABLET, FILM COATED ORAL at 09:06

## 2025-06-05 RX ADMIN — PREDNISONE 2.5 MG: 2.5 TABLET ORAL at 09:06

## 2025-06-05 RX ADMIN — BRIMONIDINE TARTRATE 1 DROP: 2 SOLUTION OPHTHALMIC at 09:06

## 2025-06-05 RX ADMIN — BRIMONIDINE TARTRATE 1 DROP: 2 SOLUTION OPHTHALMIC at 02:06

## 2025-06-05 RX ADMIN — FUROSEMIDE 60 MG: 10 INJECTION, SOLUTION INTRAMUSCULAR; INTRAVENOUS at 09:06

## 2025-06-05 RX ADMIN — MUPIROCIN: 20 OINTMENT TOPICAL at 09:06

## 2025-06-05 RX ADMIN — LATANOPROST 1 DROP: 50 SOLUTION OPHTHALMIC at 09:06

## 2025-06-05 RX ADMIN — DORZOLAMIDE HYDROCHLORIDE AND TIMOLOL MALEATE 1 DROP: 20; 5 SOLUTION/ DROPS OPHTHALMIC at 09:06

## 2025-06-05 RX ADMIN — POTASSIUM CHLORIDE 30 MEQ: 750 CAPSULE, EXTENDED RELEASE ORAL at 12:06

## 2025-06-05 RX ADMIN — DICLOFENAC SODIUM 2 G: 10 GEL TOPICAL at 09:06

## 2025-06-05 RX ADMIN — ACETAMINOPHEN 1000 MG: 500 TABLET ORAL at 09:06

## 2025-06-05 RX ADMIN — REMDESIVIR 100 MG: 100 INJECTION, POWDER, LYOPHILIZED, FOR SOLUTION INTRAVENOUS at 09:06

## 2025-06-05 RX ADMIN — POTASSIUM CHLORIDE 30 MEQ: 750 CAPSULE, EXTENDED RELEASE ORAL at 09:06

## 2025-06-05 RX ADMIN — ACETAMINOPHEN 1000 MG: 500 TABLET ORAL at 10:06

## 2025-06-05 NOTE — CARE UPDATE
-Glucose Goal 140-180    -A1C:   Hemoglobin A1C   Date Value Ref Range Status   06/21/2024 6.0 (H) 4.0 - 5.6 % Final     Comment:     ADA Screening Guidelines:  5.7-6.4%  Consistent with prediabetes  >or=6.5%  Consistent with diabetes    High levels of fetal hemoglobin interfere with the HbA1C  assay. Heterozygous hemoglobin variants (HbS, HgC, etc)do  not significantly interfere with this assay.   However, presence of multiple variants may affect accuracy.           -HOME REGIMEN:   Omnipod 5  Basal Rate  12A:  0.45 units/hr      Carb Ratio  12A:12  6A: 10  6P: 12     ISF  12A: 50      Target: 120  Correct above: 140     IAT: 4 hours    -GLUCOSE TREND FOR THE PAST 24HRS: 116-194    -NO HYPOGYCEMIAS NOTED     - Diet  Diet Adult Regular    BG goal: 140-180.   T2DM on Omnipod 5. COVID (+). Received Dex 6 mg in ED. Plan for 2.5 mg Prednisone QD. Will monitor on insulin pump for now.  Spoke with daughter on phone who reports no issues and supplies at hand.    Plan:  - Continue Home insulin pump   - POCT Glucose before meals, at bedtime and at 2 am  - Hypoglycemia protocol in place      ** Please notify Endocrine for any change and/or advance in diet**  ** Please call Endocrine for any BG related issues **     Discharge Planning:   TBD. Please notify endocrinology prior to discharge.

## 2025-06-05 NOTE — PROGRESS NOTES
[facility] Medicine  Progress Note    Patient Name: Radha Feng  MRN: [id number]  Patient Class: IP- Inpatient   Admission Date: 6/3/2025  Length of Stay: 2 days  Attending Physician: Rafa Carrero MD  Primary Care Provider: Leticia Lim MD        Subjective     Principal Problem:Acute hypoxemic respiratory failure        HPI:  84 y.o. 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     Daughter reports that patient has been having loose stools for almost a week - they were somwhat formed but loose initially but for the last 2-3 days they have been watery - 4 times a day. This morning associated with some abdominal discomfort as well. She was discharged on ciprofloxacin for UTI after admission for L1 fracture post fall and being treated with IV abx in house. She has been taking it. This am daughter who is an RN and has a private job noticed stool to be melanotic.  She did get Pepto-Bismol yesterday.  Daughter also noticed that her stool were foul-smelling.  She denies any urinary complaints.  She had a few episodes of vomiting about 1 week ago in the setting of stopping the antibiotic but none since, nausea this morning with no vomiting.     Secondarily daughter says that she has been more short of breath with sats going from 88-94% on room air where she is normally 95-97% on room air.  She does have a new cough that is occasionally productive of sputum.  No chest pain. Daughter has been  holding home PO lasix as she was worried about dehydration related to diarrhea.   She has some mild right ankle swelling but no calf swelling or redness.  Patient was also complaining of pain in her right 4th toe which is new and is not related to redness or any trauma. She takes tylenol 1g TID at home. She uses dexcom and insulin pump at home. Daughter wanted to make sure I talk to Dr. Orr before ordering steroids or redesivir for COVID - I talked to Dr. HO and was told that there are no limitations to use either or both medications.     In the ED she was found to have COVID 19 infection and CT showed bilateral loculated pleural effusions with possible RUL bronchus obstruction. Being admitted for workup and management. She was initially given 500 ml VF for diarrhea but then iso pleural effusions was given lasix 40 IV. She was given dexamethasone IV 6 mg x1 . Placed on O2 for saturation 90-94%.     Overview/Hospital Course:  No notes on file    Interval History:    Seen and examined at bedside with daughter. On 1 L NC - making urine and cough is better - Net negative 1.9 L in last 24 hours with stable renal function. Echo 6/4; 45 - 50%. Grade II diastolic dysfunction. Normal RV size and systolic function. Moderate MR,  PASP 39 mmHg. IVC 3 mmHg. small  pericardial effusion adjacent to the left atrium. Left pleural effusion. Plan to keep lasix IV for now - as respiratory status improving and stable renal function.       Seen by palliative care and pulm. No plan for bronch or thoracentesis per pulm and recommend to continue diuresis, also recommend to hold off on steroids for COVID as improving oxygen requirement and has DM with hyperglycemia.     Toe pain is better. Diarrhea is also improving - and not black anymore per daughter. Cdiff was negative. Hb 7.4 > 8.5 - continue Pepcid and hold off on GI consult for now. PT consulted and gerald yet to work with the patient.     Also dicussed with vascular surgery regarding  about CT mention of L common iliac incomplete assessment about chronic or recent occlusion. Pulses palpable bilaterally, no discoloration - per vascular no surveillance or further imaging needed. For management of atherosclerotic risk factors can consider recommend ASA 81, statin if no GI bleed and no concern for autoimmune hepatitis perspective. Will defer for now as hb starting to come up with dark stools on admission and reluctance on meds iso AI hepatitis - will discuss as stabilizes more or as outpatient as its for primary ppx.       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    Diabetes mellitus     Glaucoma     Hypertension     Lung cancer     Other ascites 11/29/2022       Past Surgical History:   Procedure Laterality Date    CATARACT EXTRACTION W/  INTRAOCULAR LENS IMPLANT Left 12/21/2021        ENDOBRONCHIAL ULTRASOUND N/A 07/05/2024    Procedure: ENDOBRONCHIAL ULTRASOUND (EBUS);  Surgeon: Rolo Wilkinson MD;  [facility];  Service: Pulmonary;  Laterality: N/A;    ESOPHAGOGASTRODUODENOSCOPY N/A 06/26/2023    Procedure: ESOPHAGOGASTRODUODENOSCOPY (EGD);  Surgeon: Edgar Branch MD;  Location: [facility] (4TH FLR);  Service: Endoscopy;  Laterality: N/A;  referral Dr Peraza-cirrhosis/labs done on 4/24/23-instr portal-GT       Review of patient's allergies indicates:  No Known Allergies    No current facility-administered medications on file prior to encounter.     Current Outpatient Medications on File Prior to Encounter   Medication Sig    acetaminophen (TYLENOL) 500 MG tablet Take 2 tablets (1,000 mg total) by mouth every 8 (eight) hours as needed for Pain.    BD INSULIN SYRINGE ULTRA-FINE 1 mL 31 gauge x 5/16 Syrg     blood-glucose meter,continuous Misc Dispsense 1 Dexcom G7     blood-glucose transmitter (DEXCOM G6 TRANSMITTER) Amanda 1 each by Misc.(Non-Drug; Combo Route) route every 3 (three) months. (Patient not  taking: Reported on 2025)    brimonidine 0.2% (ALPHAGAN) 0.2 % Drop Place 1 drop into both eyes 3 (three) times daily.    cholecalciferol, vitamin D3, (VITAMIN D3) 25 mcg (1,000 unit) capsule Take 2 capsules (2,000 Units total) by mouth once daily.    ciprofloxacin HCl (CIPRO) 500 MG tablet Take 1 tablet (500 mg total) by mouth 2 (two) times daily.    DEXCOM G7 SENSOR Amanda 1 Device by Misc.(Non-Drug; Combo Route) route every 10 days.    dorzolamide-timolol 2-0.5% (COSOPT) 22.3-6.8 mg/mL ophthalmic solution Place 1 drop into both eyes 2 (two) times daily.    estradioL (ESTRACE) 0.01 % (0.1 mg/gram) vaginal cream Place 0.5 g vaginally twice a week. Apply to the vagina daily for two weeks then twice a week. (Patient not taking: Reported on 2025)    furosemide (LASIX) 40 MG tablet TAKE 1 TABLET BY MOUTH EVERY DAY    insulin aspart U-100 (NOVOLOG U-100 INSULIN ASPART) 100 unit/mL injection TO USE WITH OMNIPOD 5. TOTAL DAILY DOSE UP TO 40 UNITS    insulin pump cart,automated,BT (OMNIPOD 5 G6 PODS, GEN 5,) Crtg Inject 1 each into the skin Every 3 (three) days.    lactulose (CEPHULAC) 10 gram packet Take 10 g by mouth 3 (three) times daily. (Patient not taking: Reported on 2025)    latanoprost 0.005 % ophthalmic solution PLACE 1 DROP INTO BOTH EYES ONCE DAILY    LIDOcaine (LIDODERM) 5 % Place 1 patch onto the skin once daily. Remove & Discard patch within 12 hours or as directed by MD    [Paused] lisinopriL-hydrochlorothiazide (PRINZIDE,ZESTORETIC) 20-12.5 mg per tablet TAKE 1 TABLET BY MOUTH EVERY DAY (Patient not taking: Reported on 2025)    nystatin (MYCOSTATIN) 100,000 unit/mL suspension SWISH WITH 6MLS BY MOUTH THEN SWALLOW 4 TIMES A DAY FOR 14 DAYS    OMNIPOD 5 G6-G7 PODS, GEN 5, Crtg SMARTSI Each SUB-Q Once a Month (Patient not taking: Reported on 2025)    oxyCODONE (ROXICODONE) 5 MG immediate release tablet Take 0.5 tablets (2.5 mg total) by mouth every 6 (six) hours as needed for Pain.  "   pen needle, diabetic 31 gauge x 5/16" Ndle Use to inject insulin into the skin up to 4 times daily    predniSONE (DELTASONE) 5 MG tablet Take 0.5 tablets (2.5 mg total) by mouth once daily.    tobramycin sulfate 0.3% (TOBREX) 0.3 % ophthalmic solution 1-2 drops topically twice daily to affected toe(s).     Family History       Problem Relation (Age of Onset)    Blindness Cousin    Cataracts Mother    Colon cancer Sister    Diabetes Mother    Glaucoma Mother    Heart attack Father    Hypertension Mother          Tobacco Use    Smoking status: Former    Smokeless tobacco: Never   Substance and Sexual Activity    Alcohol use: Not Currently    Drug use: Never    Sexual activity: Not on file     Review of Systems   Constitutional:  Positive for activity change and appetite change.   Respiratory:  Positive for cough and shortness of breath. Negative for chest tightness.    Cardiovascular:  Negative for chest pain and leg swelling.   Gastrointestinal:  Positive for abdominal pain, diarrhea, nausea and vomiting. Negative for abdominal distention.   Genitourinary:  Negative for dysuria and hematuria.   Musculoskeletal:  Positive for joint swelling. Negative for neck stiffness.   Skin:  Negative for color change and rash.   Psychiatric/Behavioral:  Negative for agitation and behavioral problems.      Objective:     Vital Signs (Most Recent):  Temp: 97.7 °F (36.5 °C) (06/05/25 0743)  Pulse: 66 (06/05/25 1111)  Resp: 18 (06/05/25 0743)  BP: (!) 146/64 (06/05/25 0743)  SpO2: 95 % (06/05/25 0743) Vital Signs (24h Range):  Temp:  [96.7 °F (35.9 °C)-97.8 °F (36.6 °C)] 97.7 °F (36.5 °C)  Pulse:  [57-77] 66  Resp:  [18] 18  SpO2:  [93 %-97 %] 95 %  BP: (114-146)/(64-83) 146/64     Weight: 49 kg (108 lb 0.4 oz)  Body mass index is 17.98 kg/m².     Physical Exam  Constitutional:       Appearance: She is ill-appearing.   HENT:      Head: Normocephalic and atraumatic.      Nose: Nose normal.      Mouth/Throat:      Mouth: Mucous " membranes are moist.      Pharynx: Oropharynx is clear.   Eyes:      Extraocular Movements: Extraocular movements intact.      Pupils: Pupils are equal, round, and reactive to light.   Cardiovascular:      Rate and Rhythm: Normal rate.   Pulmonary:      Effort: Pulmonary effort is normal. No respiratory distress.      Breath sounds: Rales present.      Comments: Diminished sounds in lower lung fields.   Abdominal:      General: Abdomen is flat. There is no distension.      Palpations: Abdomen is soft.      Tenderness: There is no abdominal tenderness.   Musculoskeletal:         General: Normal range of motion.      Cervical back: Normal range of motion. No rigidity.      Right lower leg: No edema.      Left lower leg: No edema.      Comments: R 4th toe pain significantly improved - no erythema or swelling   Skin:     General: Skin is warm and dry.   Neurological:      General: No focal deficit present.      Mental Status: She is alert and oriented to person, place, and time.   Psychiatric:         Mood and Affect: Mood normal.         Behavior: Behavior normal.              CRANIAL NERVES     CN III, IV, VI   Pupils are equal, round, and reactive to light.       Significant Labs: All pertinent labs within the past 24 hours have been reviewed.    Significant Imaging: I have reviewed all pertinent imaging results/findings within the past 24 hours.      Assessment & Plan  Acute hypoxemic respiratory failure  Patient with Hypoxic Respiratory failure which is Acute.  she is not on home oxygen. Supplemental oxygen was provided and noted-      .   Signs/symptoms of respiratory failure include- tachypnea and respiratory distress. Contributing diagnoses includes - ARDS, CHF, COPD, Pneumonia, and Lung CA Labs and images were reviewed. Patient Has not had a recent ABG. Will treat underlying causes and adjust management of respiratory failure as follows-     - BNP elevated 2717 on admission  - COVID positive on admission  - CTA  chest and abdomen 6/3 : New RUL atelectasis, presumably related to occlusion of RUL bronchus and progression of malignancy. New large R and moderate L pleural effusions, partially loculated.  Malignant pleural fluid is favored.  B/l Interstitial and airspace opacities.    Large mass in the right posterior shoulder presumably metastatic with questionable subtle osseous metastatic disease. Enlarged mediastinal and right axillary lymph nodes suggestive of metastatic disease. Indeterminate liver lesions.   No evidence of acute PE. Asymmetric decreased opacification of the left common iliac artery- limited by suboptimal bolus timing.  Chronic or recent occlusion     - For COVID - started remdesivir after talking to Dr. Peraza - no limitation to steroids use. Got Dexamethasone 6mg IV  x1 dose in ED - will start if no improvement with steroids and diuresis  - For loculated pleural effusions and HF exacerbation - lasix 40 in the ED , start lasix 60 IV BID for now  -pulmonary consulted for need drainage as potential of malignancy effusion  - known lung CA - now seems metastatic  - possible obstruction of RUL bronchus by mass extension   - will benefit from palliative care consult - to be discussed with pt and daughter  - Has COPD but does not seem to be in exacerbation - steroids if resp status not improving    - Echo 6/4; 45 - 50%. Grade II diastolic dysfunction. Normal RV size and systolic function. Moderate MR,  PASP 39 mmHg. IVC 3 mmHg. small  pericardial effusion adjacent to the left atrium. Left pleural effusion.  - Pulmonary consulted - recommend against bronch and thoracentesis at this time based on discussion with pt and family  - c/w Lasix 60 IV BID - strict I/O - will consider switching to PO in 24 to 48 hours  - CXR ordered for 6/6  - Remdesivir x 5 days starting 6/3  - Dexamethasone on admission x1 - will order if clinical status worsens or not improving with remdesivir and diuresis   - Duonebs PRN  - Encourage  IS      Type 2 diabetes mellitus with circulatory disorder, with long-term current use of insulin  Has insulin pump and dexcom  Endocrinology consulted  - appreciate   Anticipate hyperglycemia iso steroids on admission    Hypokalemia  Patient's most recent potassium results are listed below.   Recent Labs     06/04/25  0453 06/04/25 1951 06/05/25 0459   K 3.7 4.3 3.3*     Plan  - Replete potassium per protocol  - Monitor potassium Every 12 hours  - Patient's hypokalemia is worsening. Will adjust treatment as follows: replace PO and monitor  - Monitor with diuresis  Adrenal cortical steroids causing adverse effect in therapeutic use      Insulin pump in place  Endo consulted    COVID-19  Patient is identified as Severe COVID-19 based on hypoxemia with O2 saturations <94% on room air or on ambulation   Initiate standard COVID protocols; COVID-19 testing ,Infection Control notification  and isolation- respiratory, contact and droplet per protocol    Diagnostics: CBC, CMP, Ferritin, CRP, and Portable CXR    Management: Initiate targeted therapy with Remdesivir, 200mg IV x1, followed by 100mg IV daily x5 days total, Maintain oxygen saturations 92-96% via Nasal Cannula 2 LPM and monitor with continuous/intermittent pulse oximetry. , and Inhaled bronchodilators as needed for shortness of breath. Got one dose of dexamethaosne on admission - will start if not improving or worsening as sats were 90-94% before O2 was placed.     Advance Care Planning  Current advance care plan has been discussed with patient/family/POA and patient currently wishes DNR (Do Not Resuscitate).   BMI < 18.5  Nutrition consult    Atelectasis  I have personally reviewed CT Scan. Patient with atelectasis on interpretation. Likely Obstructive atelectasis secondary to malignancy. Signs and symptoms include Shortness of breath Will begin treatment with incentive spirometry.  Pleural effusion  Patient found to have large pleural effusion on imaging. I  have personally reviewed and interpreted the following imaging: CT. A thoracentesis was deferred. Most likely etiology includes Congestive Heart Failure. Management to include Diuresis and Pulm consult    - Seen by pulm - no plan for bronch or thoracentesis at this time - risk of re accumulation  - Diuresis as above  Diarrhea  Improved  Recent Cipro exposure as well as IV abx in hospital. COVID can also cause diarrhea.   Cdiff negative  GI panel negative  Stool culture pending  COVID management as above  PRN meds not needed so far            Elevated troponin  No chest pain , breathing improved with diuresis and remdesivir    Trop 753 - 614  Likley demand ischemia iso COVID, HF and pleural effusions  Daughter does not even want lovenox or heparin dvt ppx so was discontinued - SCDs for dvt ppx.   ACP (advance care planning)  Advance Care Planning   Discussed with patient and daughter at bedside on admission. DNR/DNI. Status updated.     Further discussed palliative care involvement given metastatic disease - daughter and patient agreed to see palliative care as was supposed to see on 6/19 as outpatient but was pre-poned to 6/9 or 6/10 after hospitalization. Palliative team following        Total of 15 minutes spent in GOC and ACP conversations on 6/4.   Toe pain  Likely iso arthritis  R 4th toe pain   Xray unremarkable -negative for fracture  No cellulitis or swelling concerning for gout   Per daughter was using tobramycin drops for toe at home as recommended    Diclofenac gel  -with improvement in pain    Also dicussed with vascular surgery on 6/4/25 regarding about CT mention of L common iliac incomplete assessment about chronic or recent occlusion. Pulses palpable bilaterally, no discoloration - per vascular no surveillance or further imaging needed. For management of atherosclerotic risk factors can consider recommend ASA 81, statin if no GI bleed and no concern for autoimmune hepatitis perspective. Will defer  for now as hb starting to come up with dark stools on admission and reluctance on meds iso AI hepatitis - will discuss as stabilizes more or as outpatient as its for primary ppx.     Autoimmune hepatitis  C/w prednisone 2.5 daily    Adenocarcinoma of right lung  - CTA chest and abdomen 6/3 : New RUL atelectasis, presumably related to occlusion of RUL bronchus and progression of malignancy. New large R and moderate L pleural effusions, partially loculated.  Malignant pleural fluid is favored.  B/l Interstitial and airspace opacities.     Large mass in the right posterior shoulder presumably metastatic with questionable subtle osseous metastatic disease. Enlarged mediastinal and right axillary lymph nodes suggestive of metastatic disease. Indeterminate liver lesions.   No evidence of acute PE. Asymmetric decreased opacification of the left common iliac artery- limited by suboptimal bolus timing.  Chronic or recent occlusion can not be excluded    - holding of on AC as not confirmed and family does not want AC at this time - also given possible GI bleed  - Will discuss with vascular to review  - Palliative consulted  - Rad onc and heme/onc as outpatient    Per Rad onc 5/30 -   -With AI hepatitis on steroids, IO is not recommended by med onc and with age and co morbidities, she does not wish to proceed with cytotoxic chemotherapy. She already received a high dose to the mediastinum and hilum and given asymptomatic, I do not favor re-irradiation.  -Discussed will treat R posterior shoulder but will also evaluate if the R SCV and Axillary adenopathy can safely be treated as well.   -Paliative care meet up   Anemia  Anemia is likely due to ACD iso cancer with ? Blood loss. Most recent hemoglobin and hematocrit are listed below.  Recent Labs     06/03/25  1208 06/03/25  1228 06/04/25  0453 06/05/25  0459   HGB 9.0*  --  7.4* 8.5*   HCT 30.0* 24.3 24.4* 27.3*     Plan  - Monitor serial CBC: Daily  - Transfuse PRBC if patient  becomes hemodynamically unstable, symptomatic or H/H drops below 7/21.  - Patient has not received any PRBC transfusions to date  - Patient's anemia is currently worsening. Will Monitor  - Dark stools have resolved and were possibly related to Pepto bismol intake at home per daughter  - Will involve GI if hb drops further or if dark stools re start.    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 The cancer has metastasized to shoulder. The patient is under the care of an outpatient oncologist. The patient is not undergoing active chemotherapy. .Their staging information is listed below.    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        VTE Risk Mitigation (From admission, onward)           Ordered     IP VTE HIGH RISK PATIENT  Once         06/03/25 1527     Place sequential compression device  Until discontinued         06/03/25 1527     IP VTE HIGH RISK PATIENT  Once         06/03/25 1527     Place sequential compression device  Until discontinued         06/03/25 1527                    Discharge Planning   MELVI: 6/7/2025     Code Status: DNR   Medical Readiness for Discharge Date:   Discharge Plan A: Home, Home with family, Other (Referral for outpatient case management.)                Please place Justification for DME        Rafa Carrero MD  Department of Hospital Medicine   [facility]

## 2025-06-05 NOTE — ASSESSMENT & PLAN NOTE
Patient has metastatic cancer of lung primary. The cancer has metastasized to shoulder. The patient is under the care of an outpatient oncologist. The patient is not undergoing active chemotherapy. .Their staging information is listed below.    Cancer Staging   Adenocarcinoma of right lung  Staging form: Lung, AJCC 8th Edition  - Clinical stage from 7/25/2024: Stage IIIA (cT2a, cN2, cM0) - Signed by Kasey Wade MD on 7/26/2024

## 2025-06-05 NOTE — ASSESSMENT & PLAN NOTE
Patient's most recent potassium results are listed below.   Recent Labs     06/04/25  0453 06/04/25 1951 06/05/25  0459   K 3.7 4.3 3.3*     Plan  - Replete potassium per protocol  - Monitor potassium Every 12 hours  - Patient's hypokalemia is worsening. Will adjust treatment as follows: replace PO and monitor  - Monitor with diuresis

## 2025-06-05 NOTE — PROGRESS NOTES
[facility]  Palliative Medicine  Progress Note    Patient Name: Radha Feng  MRN: [id number]  Admission Date: 6/3/2025  Hospital Length of Stay: 2 days  Code Status: DNR   Attending Provider: Rafa Carrero MD  Consulting Provider: Divina Choi MD  Primary Care Physician: Leticia Lim MD  Principal Problem:Acute hypoxemic respiratory failure    Patient information was obtained from patient, relative(s), and primary team.      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 Palliative and Supportive Care was consulted to explore goals of care and malignant symptom management.    Advance Care Planning  Goals of Care  - Code status: DNAR/DNI  - Next of kin: adult children  - ACP documents: none  - Patient has decision making capacity  - Prognosis: poor  - Goals: improvement in condition, symptom control, quality of life  - Plans: will continue to follow along    Goals of Care Conversation:  - 6/4/25: Supportive conversation with Ms. Feng and her daughter Della at bedside. They are very welcoming of my visit. 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 Della explains to her mother my field (palliative), that it is a focus on alleviating symptom burdens to improve quality of life. I asked about their last clinic visit with her oncologist Dr. Kohler and Della explains to me that   "Stu was worried that immunotherapy would worsen her mother's autoimmune hepatitis. They agreed that in order to protect Ms. Feng that they would avoid further immunotherapy. I asked about their conversation regarding hospice. Della explains that they hope to be able to extend her life and "focus on living." She feels that because her mother is not actively dying, hospice is not what would be best for her care and goals. Her mother tells me that she is not afraid of dying, [place] understands that death comes for all of us. She embraces that she will one day pass and has peace with it. Her daughter reminds her mother many times throughout our conversation that Ms. Feng is not dying and encourages her mother to "focus on living." Validated both that we can accept that we will all die one day, and until that day comes, we can focus on ways to improve quality of life to our best ability in order to protect her ortega and priorities during whatever days God has planned for her. Both nodded in response to this. Della admits that she feels that since her mother was diagnosed formally with lung cancer, her mother suddenly stopped eating due to appetite loss and was more depressed. Della alludes that in being given bad news, this affected her mother's mentality severely and now her mother keeps talking about negative things, like her death. Della admits that they are very different - that Della is an optimist and her mother is a pessimist. Ms. Feng shakes her head to this and reminds me that she is just accepting that she will one day pass, just as many family members before her have. 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  - I took time to explain really what our department is - stressing that unlike hospice, we do not make house visits, are not available 24/7 or at a whim's notice. Della " is very understanding of this, and appreciative that they can have palliative support for symptom management while they continue to explore disease-modifying treatments to buy her as much time as possible. Ms. Feng says that she just wants to focus on being happy. She admits that over the last couple of years, losing her independence has been very difficult to accept. She loves cooking and now her daughter Della cooks for her. Even though Della is an excellent cook, Ms. Feng will always modify her food to claim some control and sense of cooking again. However her appetite is poor and she has lost a lot of weight. In addition to this, she has poor sleep, wakes up due to excruciating RLE pain. Della wonders how a component of depression or anxiety may be influencing her mother's symptom burdens and feels that if these three things can be addressed, her mother might feel better and do better as well.        Neoplasm Related Pain  RLE Pain  - [place]  reviewed. Prescribed short course of oxycodone 5 mg which daughter was breaking into 2.5 mg doses for severe RLE pain at nighttime only  - Has lidocaine patch applied to right shoulder, where site of metastasis is located  - Likely has arthritis of RLE. Was evaluated extensively in the past and counseled by outpatient Orthopedic surgery that she has mild arthritis, and tried steroid shot that daughter believes triggered autoimmune hepatitis. High dose steroid treatment for autoimmune hepatitis took away the RLE pain, and pain returned during downtitration of steroids  - Of note, was referred to acupuncture and they attended this appointment, only to be told by the acupuncturist that he doesn't treat bone pain, only muscle pain and wouldn't treat any of her site of malignant pain. It was a very disappointing visit and wanted to let us know that for others being referred to acupuncture therapy for holistic approach to care, that this acupuncturist made it very  clear that he will not treat malignant pain.  - Continue APAP with modified limits in setting of known autoimmune hepatitis  - Continue diclofenac gel to joints  - Resume home oxycodone - because tablets cannot be broken in half, will order liquid oxycodone 2.5 mg PO q6h PRN severe pain    Poor Appetite  Mood Disturbance  Sleep Disturbance  - Discontinued mirtazapine 7.5 mg, which she tried for the first time last evening. Had delirium overnight. In setting of polypharmacy, will hold mirtazapine at this time  - Continue melatonin 6 mg qHS PRN insomnia        I will follow-up with patient. Please contact us if you have any additional questions.    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 Palliative and Supportive Care was consulted to explore goals of care and malignant symptom management.    Hospital Course:  No notes on file    Interval History: Tried mirtazapine last night but was up until 3 am, very fidgety and difficult to redirect. Feeling tired this morning. Agreed that we can stop mirtazapine    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    Diabetes mellitus     Glaucoma     Hypertension     Lung cancer     Other ascites 11/29/2022       Past Surgical History:   Procedure Laterality Date    CATARACT EXTRACTION W/  INTRAOCULAR LENS IMPLANT Left 12/21/2021         ENDOBRONCHIAL ULTRASOUND N/A 07/05/2024    Procedure: ENDOBRONCHIAL ULTRASOUND (EBUS);  Surgeon: Rolo Wilkinson MD;  Location: [facility] OR 2ND FLR;  Service: Pulmonary;  Laterality: N/A;    ESOPHAGOGASTRODUODENOSCOPY N/A 06/26/2023    Procedure: ESOPHAGOGASTRODUODENOSCOPY (EGD);  Surgeon: Edgar Branch MD;  Location: [facility] (4TH FLR);  Service: Endoscopy;  Laterality: N/A;  referral Dr Peraza-cirrhosis/labs done on 4/24/23-instr portal-GT       Review of patient's allergies indicates:  No Known Allergies    Medications:  Continuous Infusions:   insulin aspart U-100  0-1 Units/hr Subcutaneous Continuous         Scheduled Meds:   brimonidine 0.2%  1 drop Both Eyes TID    diclofenac sodium  2 g Topical (Top) BID    dorzolamide-timolol 2-0.5%  1 drop Both Eyes BID    famotidine  20 mg Oral Daily    furosemide (LASIX) injection  60 mg Intravenous BID    latanoprost  1 drop Both Eyes Daily    [START ON 6/6/2025] LIDOcaine  1 patch Transdermal QHS    mupirocin   Nasal BID    potassium chloride  30 mEq Oral 6 times per day    predniSONE  2.5 mg Oral Daily    remdesivir infusion  100 mg Intravenous Daily    vitamin D  2,000 Units Oral Daily     PRN Meds:  Current Facility-Administered Medications:     acetaminophen, 1,000 mg, Oral, Q8H PRN    albuterol-ipratropium, 3 mL, Nebulization, Q6H PRN    dextrose 50%, 12.5 g, Intravenous, PRN    dextrose 50%, 25 g, Intravenous, PRN    glucagon (human recombinant), 1 mg, Intramuscular, PRN    glucose, 16 g, Oral, PRN    glucose, 24 g, Oral, PRN    insulin aspart U-100, 0-10 Units, Subcutaneous, PRN    melatonin, 6 mg, Oral, Nightly PRN    naloxone, 0.02 mg, Intravenous, PRN    oxyCODONE, 2.5 mg, Oral, Q6H PRN    sodium chloride 0.9%, 10 mL, Intravenous, PRN    sodium chloride 0.9%, 10 mL, Intravenous, Q12H PRN    Family History       Problem Relation (Age of Onset)    Blindness Cousin    Cataracts Mother    Colon cancer Sister    Diabetes Mother    Glaucoma Mother    Heart  attack Father    Hypertension Mother          Tobacco Use    Smoking status: Former    Smokeless tobacco: Never   Substance and Sexual Activity    Alcohol use: Not Currently    Drug use: Never    Sexual activity: Not on file       Review of Systems   Constitutional:  Positive for activity change, appetite change and fatigue.   Musculoskeletal:  Positive for arthralgias.   Psychiatric/Behavioral:  Positive for sleep disturbance.      Objective:     Vital Signs (Most Recent):  Temp: 97.7 °F (36.5 °C) (06/05/25 0743)  Pulse: 66 (06/05/25 1111)  Resp: 18 (06/05/25 0743)  BP: (!) 146/64 (06/05/25 0743)  SpO2: 95 % (06/05/25 0743) Vital Signs (24h Range):  Temp:  [96.7 °F (35.9 °C)-97.8 °F (36.6 °C)] 97.7 °F (36.5 °C)  Pulse:  [57-77] 66  Resp:  [18] 18  SpO2:  [93 %-97 %] 95 %  BP: (114-146)/(64-83) 146/64     Weight: 49 kg (108 lb 0.4 oz)  Body mass index is 17.98 kg/m².       Physical Exam  Constitutional:       General: She is not in acute distress.  HENT:      Head: Normocephalic and atraumatic.      Right Ear: External ear normal.      Left Ear: External ear normal.      Nose: Nose normal.      Mouth/Throat:      Mouth: Mucous membranes are moist.   Eyes:      Extraocular Movements: Extraocular movements intact.      Conjunctiva/sclera: Conjunctivae normal.   Cardiovascular:      Rate and Rhythm: Normal rate.   Pulmonary:      Effort: Pulmonary effort is normal.      Breath sounds: Normal breath sounds.   Abdominal:      General: Bowel sounds are normal.      Palpations: Abdomen is soft.   Skin:     General: Skin is warm.   Neurological:      Mental Status: She is alert and oriented to person, place, and time. Mental status is at baseline.   Psychiatric:         Mood and Affect: Mood normal.         Behavior: Behavior normal.            Review of Symptoms      Symptom Assessment (ESAS 0-10 Scale)  Pain:  0  Dyspnea:  0  Anxiety:  0  Nausea:  0  Depression:  0  Anorexia:  0  Fatigue:  0  Insomnia:  0  Restlessness:   "0  Agitation:  0         Living Arrangements:  Lives with family    Psychosocial/Cultural:   See Palliative Psychosocial Note: No  Has daughter Della and son who are very supportive. She is a twin, but her twin sister passed away a few years ago. She is one of ten children, six are . 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 Loves going to [employer] and [email or web address]  **Primary  to Follow**  Palliative Care  Consult: No    Spiritual:  F - Tamar and Belief:  Gnosticist  I - Importance:  Important  C - Community:  Involved  A - Address in Care:  Engage  support        Advance Care Planning  Advance Directives:   Living Will: No    LaPOST: No    Do Not Resuscitate Status: Yes    Medical Power of : No      Decision Making:  Patient answered questions  Goals of Care: 25: Supportive conversation with Ms. Feng and her daughter Della at bedside. They are very welcoming of my visit. 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 Della explains to her mother my field (palliative), that it is a focus on alleviating symptom burdens to improve quality of life. I asked about their last clinic visit with her oncologist Dr. Kohler and Della explains to me that Dr. Peraza was worried that immunotherapy would worsen her mother's autoimmune hepatitis. They agreed that in order to protect Ms. Feng that they would avoid further immunotherapy. I asked about their conversation regarding hospice. Della explains that they hope to be able to extend her life and "focus on living." She feels that because her mother is not actively dying, hospice is not what would be best for her care and goals. Her mother tells me that she is not afraid of dying, [place] understands that death comes for all " "of us. She embraces that she will one day pass and has peace with it. Her daughter reminds her mother many times throughout our conversation that Ms. Feng is not dying and encourages her mother to "focus on living." Validated both that we can accept that we will all die one day, and until that day comes, we can focus on ways to improve quality of life to our best ability in order to protect her ortega and priorities during whatever days God has planned for her. Both nodded in response to this. Della admits that she feels that since her mother was diagnosed formally with lung cancer, her mother suddenly stopped eating due to appetite loss and was more depressed. Della alludes that in being given bad news, this affected her mother's mentality severely and now her mother keeps talking about negative things, like her death. Della admits that they are very different - that Della is an optimist and her mother is a pessimist. Ms. Feng shakes her head to this and reminds me that she is just accepting that she will one day pass, just as many family members before her have. 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  - I took time to explain really what our department is - stressing that unlike hospice, we do not make house visits, are not available 24/7 or at a whim's notice. Della is very understanding of this, and appreciative that they can have palliative support for symptom management while they continue to explore disease-modifying treatments to buy her as much time as possible. Ms. Feng says that she just wants to focus on being happy. She admits that over the last couple of years, losing her independence has been very difficult to accept. She loves cooking and now her daughter Della cooks for her. Even though Della is an excellent cook, Ms. Feng will always modify her food to claim some control and sense of " cooking again. However her appetite is poor and she has lost a lot of weight. In addition to this, she has poor sleep, wakes up due to excruciating RLE pain. Della wonders how a component of depression or anxiety may be influencing her mother's symptom burdens and feels that if these three things can be addressed, her mother might feel better and do better as well.         Significant Labs: CBC:   Recent Labs   Lab 06/03/25  1208 06/03/25  1228 06/04/25 0453 06/05/25 0459   WBC 15.12*  --  12.93* 16.82*   HGB 9.0*  --  7.4* 8.5*   HCT 30.0* 24.3 24.4* 27.3*   *  --  143* 170     CMP:   Recent Labs   Lab 06/03/25  1208 06/03/25 2004 06/04/25 0453 06/04/25 1951 06/05/25 0459      < > 142 141 140   K 3.3*   < > 3.7 4.3 3.3*      < > 111* 107 105   CO2 24   < > 20* 24 26      < > 164* 194* 116*   BUN 10   < > 16 18 20   CREATININE 0.6   < > 0.6 0.6 0.6   CALCIUM 8.8   < > 8.5* 8.5* 8.8   PROT 6.9  --  6.2  --  6.4   ALBUMIN 1.9*  --  1.6*  --  1.7*   BILITOT 0.7  --  0.3  --  0.4   ALKPHOS 93  --  87  --  88   AST 42  --  44  --  34   ALT 16  --  16  --  16   ANIONGAP 9   < > 11 10 9    < > = values in this interval not displayed.     CBC:   Recent Labs   Lab 06/05/25 0459   WBC 16.82*   HGB 8.5*   HCT 27.3*   MCV 90        BMP:  Recent Labs   Lab 06/05/25 0459   *      K 3.3*      CO2 26   BUN 20   CREATININE 0.6   CALCIUM 8.8   MG 1.9     LFT:  Lab Results   Component Value Date    AST 34 06/05/2025    ALKPHOS 88 06/05/2025    BILITOT 0.4 06/05/2025     Albumin:   Albumin   Date Value Ref Range Status   06/05/2025 1.7 (L) 3.5 - 5.2 g/dL Final   03/11/2025 2.1 (L) 3.5 - 5.2 g/dL Final     Protein:   Protein Total   Date Value Ref Range Status   06/05/2025 6.4 6.0 - 8.4 gm/dL Final     Total Protein   Date Value Ref Range Status   03/11/2025 7.5 6.0 - 8.4 g/dL Final     Lactic acid:   Lab Results   Component Value Date    LACTATE 6.1 (HH) 10/23/2022        Significant Imaging: I have reviewed all pertinent imaging results/findings within the past 24 hours.    I spent a total of 50 minutes on the day of the visit. This includes face to face time in discussion of goals of care, symptom assessment, coordination of care and emotional support. This also includes non-face to face time preparing to see the patient (eg, review of tests/imaging), obtaining and/or reviewing separately obtained history, documenting clinical information in the electronic or other health record, independently interpreting results and communicating results to the patient/family/caregiver, or care coordinator.       Divina Choi MD  Palliative Medicine  [facility]

## 2025-06-05 NOTE — PLAN OF CARE
[facility] - Med Surg  Initial Discharge Assessment       Primary Care Provider: Leticia Lim MD    Admission Diagnosis: SOB (shortness of breath) [R06.02]  Pleural effusion [J90]  Chest pain [R07.9]  (HFpEF) heart failure with preserved ejection fraction [I50.30]  COVID-19 [U07.1]    Admission Date: 6/3/2025  Expected Discharge Date: 6/7/2025    Transition of Care Barriers: None    Payor: MEDICARE / Plan: MEDICARE PART A & B / Product Type: Government /     Extended Emergency Contact Information  Primary Emergency Contact: Della Feng  Mobile Phone: [phone]  Relation: Daughter  Preferred language: English   needed? No  Secondary Emergency Contact: Raymon Morin  Mobile Phone: [phone]  Relation: Grandchild    Discharge Plan A: Home, Home with family, Other (Referral for outpatient case management.)  Discharge Plan B: Home, Home with family      [id number] - [place], [place] - [address]  [address]  [place] [address]  Phone: [phone] Fax: [phone]    [facility]  [address]  [facility] [address]  Phone: [phone] Fax: [phone]    [facility]  [address] [place] [address]  Phone: [phone] Fax: [phone]      Initial Assessment (most recent)       Adult Discharge Assessment - 06/05/25 0942          Discharge Assessment    Assessment Type Discharge Planning Assessment     Confirmed/corrected address, phone number and insurance Yes     Confirmed Demographics Correct on Facesheet     Source of Information family;health record     Communicated MELVI with patient/caregiver Yes     People in Home child(keith), adult;grandchild(keith)     Name(s) of People in Home Della-FarrukhRaymon-Grandson     Facility Arrived From: N/A     Do you expect to return to your current living situation? No     Do you have help at home or someone to help you manage your care at home? Yes      Who are your caregiver(s) and their phone number(s)? [phone]     Prior to hospitilization cognitive status: Alert/Oriented     Current cognitive status: Alert/Oriented     Walking or Climbing Stairs Difficulty yes     Walking or Climbing Stairs ambulation difficulty, requires equipment;stair climbing difficulty, requires equipment     Mobility Management rollator, cane     Dressing/Bathing Difficulty no     Equipment Currently Used at Home wheelchair;rollator;cane, straight;shower chair;bedside commode     Readmission within 30 days? Yes     Patient currently being followed by outpatient case management? Yes     If yes, name of outpatient case management following: [facility]     Do you currently have service(s) that help you manage your care at home? No     Do you take prescription medications? Yes     Do you have prescription coverage? Yes     Do you have any problems affording any of your prescribed medications? No     Is the patient taking medications as prescribed? yes     Who is going to help you get home at discharge? Pt's daughter will provide transportation home.     How do you get to doctors appointments? family or friend will provide     Are you on dialysis? No     Do you take coumadin? No     Discharge Plan A Home;Home with family;Other   Referral for outpatient case management.    Discharge Plan B Home;Home with family     DME Needed Upon Discharge  none     Discharge Plan discussed with: Adult children     Transition of Care Barriers None        Physical Activity    On average, how many days per week do you engage in moderate to strenuous exercise (like a brisk walk)? 0 days     On average, how many minutes do you engage in exercise at this level? 0 min        Financial Resource Strain    How hard is it for you to pay for the very basics like food, housing, medical care, and heating? Not hard at all        Housing Stability    In the last 12 months, was  "there a time when you were not able to pay the mortgage or rent on time? No     At any time in the past 12 months, were you homeless or living in a shelter (including now)? No        Transportation Needs    In the past 12 months, has lack of transportation kept you from medical appointments or from getting medications? No     In the past 12 months, has lack of transportation kept you from meetings, work, or from getting things needed for daily living? No        Food Insecurity    Within the past 12 months, you worried that your food would run out before you got the money to buy more. Never true     Within the past 12 months, the food you bought just didn't last and you didn't have money to get more. Never true        Alcohol Use    Q1: How often do you have a drink containing alcohol? Never     Q2: How many drinks containing alcohol do you have on a typical day when you are drinking? Patient does not drink     Q3: How often do you have six or more drinks on one occasion? Never        Utilities    In the past 12 months has the electric, gas, oil, or water company threatened to shut off services in your home? No                     Readmission Assessment (most recent)       Readmission Assessment - 06/05/25 0930          Readmission    Was this a planned readmission? No     Why were you hospitalized in the last 30 days? Compression fracture of L1 vertebra, sequela .     Why were you readmitted? Alarmed about signs/symptoms;New medical problem     When you left the hospital how did you feel? "Pt's daughter                    SW completed initial discharge assessment with daughter and chart review.  Pt has hospital readmissions within the last 30 days.  Pt's daughter will provide transportation home.  Verified pt's PCP.      Pt lives with her daughter and grandson in a [facility] w/ 6 steps for entry.  Pt mod independent with her mobility.  Pt uses a cane and rollator for mobility.  Pt independent with her ADLs.  Pt does not " receive coumadin, dialysis, or HH services.  Pt's daughter not interested in HH services at this time.      Disp:  Pt not medically ready for d/c.  1. Home w/ family.  2. Home.    Discharge Plan A and Plan B have been determined by review of patient's clinical status, future medical and therapeutic needs, and coverage/benefits for post-acute care in coordination with multidisciplinary team members.    Ariana Traylor LMSW  Part-Time-  [facility]  Ext. [phone]

## 2025-06-05 NOTE — ASSESSMENT & PLAN NOTE
Likely iso arthritis  R 4th toe pain   Xray unremarkable -negative for fracture  No cellulitis or swelling concerning for gout   Per daughter was using tobramycin drops for toe at home as recommended    Diclofenac gel  -with improvement in pain    Also dicussed with vascular surgery on 6/4/25 regarding about CT mention of L common iliac incomplete assessment about chronic or recent occlusion. Pulses palpable bilaterally, no discoloration - per vascular no surveillance or further imaging needed. For management of atherosclerotic risk factors can consider recommend ASA 81, statin if no GI bleed and no concern for autoimmune hepatitis perspective. Will defer for now as hb starting to come up with dark stools on admission and reluctance on meds iso AI hepatitis - will discuss as stabilizes more or as outpatient as its for primary ppx.

## 2025-06-05 NOTE — ASSESSMENT & PLAN NOTE
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 Palliative and Supportive Care was consulted to explore goals of care and malignant symptom management.    Advance Care Planning   Goals of Care  - Code status: DNAR/DNI  - Next of kin: adult children  - ACP documents: none  - Patient has decision making capacity  - Prognosis: poor  - Goals: improvement in condition, symptom control, quality of life  - Plans: will continue to follow along    Goals of Care Conversation:  - 6/4/25: Supportive conversation with Ms. Feng and her daughter Della at bedside. They are very welcoming of my visit. 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 Della explains to her mother my field (palliative), that it is a focus on alleviating symptom burdens to improve quality of life. I asked about their last clinic visit with her oncologist Dr. Kohler and Della explains to me that Dr. Peraza was worried that immunotherapy would worsen her mother's autoimmune hepatitis. They agreed that in order to protect Ms. Feng that they would avoid further immunotherapy. I asked about their conversation regarding hospice. Della explains that they hope to be able to extend her life and "focus on living." She feels that because her mother is not actively dying, hospice is not what would be best for her care and goals. Her mother tells me that she is not afraid of dying, [place] and " "understands that death comes for all of us. She embraces that she will one day pass and has peace with it. Her daughter reminds her mother many times throughout our conversation that Ms. Feng is not dying and encourages her mother to "focus on living." Validated both that we can accept that we will all die one day, and until that day comes, we can focus on ways to improve quality of life to our best ability in order to protect her ortega and priorities during whatever days God has planned for her. Both nodded in response to this. Della admits that she feels that since her mother was diagnosed formally with lung cancer, her mother suddenly stopped eating due to appetite loss and was more depressed. Della alludes that in being given bad news, this affected her mother's mentality severely and now her mother keeps talking about negative things, like her death. Della admits that they are very different - that Della is an optimist and her mother is a pessimist. Ms. Feng shakes her head to this and reminds me that she is just accepting that she will one day pass, just as many family members before her have. 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  - I took time to explain really what our department is - stressing that unlike hospice, we do not make house visits, are not available 24/7 or at a whim's notice. Della is very understanding of this, and appreciative that they can have palliative support for symptom management while they continue to explore disease-modifying treatments to buy her as much time as possible. Ms. Feng says that she just wants to focus on being happy. She admits that over the last couple of years, losing her independence has been very difficult to accept. She loves cooking and now her daughter Della cooks for her. Even though Della is an excellent cook, Ms. Feng will always modify her food to " claim some control and sense of cooking again. However her appetite is poor and she has lost a lot of weight. In addition to this, she has poor sleep, wakes up due to excruciating RLE pain. Della wonders how a component of depression or anxiety may be influencing her mother's symptom burdens and feels that if these three things can be addressed, her mother might feel better and do better as well.        Neoplasm Related Pain  RLE Pain  - [place]  reviewed. Prescribed short course of oxycodone 5 mg which daughter was breaking into 2.5 mg doses for severe RLE pain at nighttime only  - Has lidocaine patch applied to right shoulder, where site of metastasis is located  - Likely has arthritis of RLE. Was evaluated extensively in the past and counseled by outpatient Orthopedic surgery that she has mild arthritis, and tried steroid shot that daughter believes triggered autoimmune hepatitis. High dose steroid treatment for autoimmune hepatitis took away the RLE pain, and pain returned during downtitration of steroids  - Of note, was referred to acupuncture and they attended this appointment, only to be told by the acupuncturist that he doesn't treat bone pain, only muscle pain and wouldn't treat any of her site of malignant pain. It was a very disappointing visit and wanted to let us know that for others being referred to acupuncture therapy for holistic approach to care, that this acupuncturist made it very clear that he will not treat malignant pain.  - Continue APAP with modified limits in setting of known autoimmune hepatitis  - Continue diclofenac gel to joints  - Resume home oxycodone - because tablets cannot be broken in half, will order liquid oxycodone 2.5 mg PO q6h PRN severe pain    Poor Appetite  Mood Disturbance  Sleep Disturbance  - Discontinued mirtazapine 7.5 mg, which she tried for the first time last evening. Had delirium overnight. In setting of polypharmacy, will hold mirtazapine at this time  -  Continue melatonin 6 mg qHS PRN insomnia

## 2025-06-05 NOTE — ASSESSMENT & PLAN NOTE
Advance Care Planning   Discussed with patient and daughter at bedside on admission. DNR/DNI. Status updated.     Further discussed palliative care involvement given metastatic disease - daughter and patient agreed to see palliative care as was supposed to see on 6/19 as outpatient but was pre-poned to 6/9 or 6/10 after hospitalization. Palliative team following        Total of 15 minutes spent in GOC and ACP conversations on 6/4.

## 2025-06-05 NOTE — SUBJECTIVE & OBJECTIVE
Interval History: Tried mirtazapine last night but was up until 3 am, very fidgety and difficult to redirect. Feeling tired this morning. 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 OR 2ND FLR;  Service: Pulmonary;  Laterality: N/A;    ESOPHAGOGASTRODUODENOSCOPY N/A 06/26/2023    Procedure: ESOPHAGOGASTRODUODENOSCOPY (EGD);  Surgeon: Edgar Branch MD;  Location: [facility] (4TH FLR);  Service: Endoscopy;  Laterality: N/A;  referral Dr Peraza-cirrhosis/labs done on 4/24/23-[facility]       Review of patient's allergies indicates:  No Known Allergies    Medications:  Continuous Infusions:   insulin aspart U-100  0-1 Units/hr Subcutaneous Continuous         Scheduled Meds:   brimonidine 0.2%  1 drop Both Eyes TID    diclofenac sodium  2 g Topical (Top) BID    dorzolamide-timolol 2-0.5%  1 drop Both Eyes BID    famotidine  20 mg Oral Daily    furosemide (LASIX) injection  60 mg Intravenous BID    latanoprost  1 drop Both Eyes Daily    [START ON 6/6/2025] LIDOcaine  1 patch Transdermal QHS    mupirocin   Nasal BID    potassium chloride  30 mEq Oral 6 times per day    predniSONE  2.5 mg Oral Daily    remdesivir infusion  100 mg Intravenous Daily    vitamin D  2,000 Units Oral Daily     PRN Meds:  Current Facility-Administered Medications:     acetaminophen, 1,000 mg, Oral, Q8H PRN    albuterol-ipratropium, 3 mL, Nebulization, Q6H PRN    dextrose 50%, 12.5 g, Intravenous, PRN    dextrose 50%, 25 g, Intravenous, PRN    glucagon (human recombinant), 1 mg,  Intramuscular, PRN    glucose, 16 g, Oral, PRN    glucose, 24 g, Oral, PRN    insulin aspart U-100, 0-10 Units, Subcutaneous, PRN    melatonin, 6 mg, Oral, Nightly PRN    naloxone, 0.02 mg, Intravenous, PRN    oxyCODONE, 2.5 mg, Oral, Q6H PRN    sodium chloride 0.9%, 10 mL, Intravenous, PRN    sodium chloride 0.9%, 10 mL, Intravenous, Q12H PRN    Family History       Problem Relation (Age of Onset)    Blindness Cousin    Cataracts Mother    Colon cancer Sister    Diabetes Mother    Glaucoma Mother    Heart attack Father    Hypertension Mother          Tobacco Use    Smoking status: Former    Smokeless tobacco: Never   Substance and Sexual Activity    Alcohol use: Not Currently    Drug use: Never    Sexual activity: Not on file       Review of Systems   Constitutional:  Positive for activity change, appetite change and fatigue.   Musculoskeletal:  Positive for arthralgias.   Psychiatric/Behavioral:  Positive for sleep disturbance.      Objective:     Vital Signs (Most Recent):  Temp: 97.7 °F (36.5 °C) (06/05/25 0743)  Pulse: 66 (06/05/25 1111)  Resp: 18 (06/05/25 0743)  BP: (!) 146/64 (06/05/25 0743)  SpO2: 95 % (06/05/25 0743) Vital Signs (24h Range):  Temp:  [96.7 °F (35.9 °C)-97.8 °F (36.6 °C)] 97.7 °F (36.5 °C)  Pulse:  [57-77] 66  Resp:  [18] 18  SpO2:  [93 %-97 %] 95 %  BP: (114-146)/(64-83) 146/64     Weight: 49 kg (108 lb 0.4 oz)  Body mass index is 17.98 kg/m².       Physical Exam  Constitutional:       General: She is not in acute distress.  HENT:      Head: Normocephalic and atraumatic.      Right Ear: External ear normal.      Left Ear: External ear normal.      Nose: Nose normal.      Mouth/Throat:      Mouth: Mucous membranes are moist.   Eyes:      Extraocular Movements: Extraocular movements intact.      Conjunctiva/sclera: Conjunctivae normal.   Cardiovascular:      Rate and Rhythm: Normal rate.   Pulmonary:      Effort: Pulmonary effort is normal.      Breath sounds: Normal breath sounds.    Abdominal:      General: Bowel sounds are normal.      Palpations: Abdomen is soft.   Skin:     General: Skin is warm.   Neurological:      Mental Status: She is alert and oriented to person, place, and time. Mental status is at baseline.   Psychiatric:         Mood and Affect: Mood normal.         Behavior: Behavior normal.            Review of Symptoms      Symptom Assessment (ESAS 0-10 Scale)  Pain:  0  Dyspnea:  0  Anxiety:  0  Nausea:  0  Depression:  0  Anorexia:  0  Fatigue:  0  Insomnia:  0  Restlessness:  0  Agitation:  0         Living Arrangements:  Lives with family    Psychosocial/Cultural:   See Palliative Psychosocial Note: No  Has daughter Della and son who are very supportive. She is a twin, but her twin sister passed away a few years ago. She is one of ten children, six are . 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 Loves going to [place] and [place]  **Primary  to Follow**  Palliative Care  Consult: No    Spiritual:  F - Tamar and Belief:  Episcopal  I - Importance:  Important  C - Community:  Involved  A - Address in Care:  Engage  support        Advance Care Planning   Advance Directives:   Living Will: No    LaPOST: No    Do Not Resuscitate Status: Yes    Medical Power of : No      Decision Making:  Patient answered questions  Goals of Care: 25: Supportive conversation with Ms. Feng and her daughter Della at bedside. They are very welcoming of my visit. 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 Della explains to her mother my field (palliative), that it is a focus on alleviating symptom burdens to improve quality of life. I asked about their last clinic visit with her oncologist Dr. Kohler and Della explains to me that Dr. Peraza was worried that  "immunotherapy would worsen her mother's autoimmune hepatitis. They agreed that in order to protect Ms. Feng that they would avoid further immunotherapy. I asked about their conversation regarding hospice. Della explains that they hope to be able to extend her life and "focus on living." She feels that because her mother is not actively dying, hospice is not what would be best for her care and goals. Her mother tells me that she is not afraid of dying, [place] and understands that death comes for all of us. She embraces that she will one day pass and has peace with it. Her daughter reminds her mother many times throughout our conversation that Ms. Feng is not dying and encourages her mother to "focus on living." Validated both that we can accept that we will all die one day, and until that day comes, we can focus on ways to improve quality of life to our best ability in order to protect her ortega and priorities during whatever days God has planned for her. Both nodded in response to this. Della admits that she feels that since her mother was diagnosed formally with lung cancer, her mother suddenly stopped eating due to appetite loss and was more depressed. Della alludes that in being given bad news, this affected her mother's mentality severely and now her mother keeps talking about negative things, like her death. Della admits that they are very different - that Della is an optimist and her mother is a pessimist. Ms. Feng shakes her head to this and reminds me that she is just accepting that she will one day pass, just as many family members before her have. 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  - I took time to explain really what our department is - stressing that unlike hospice, we do not make house visits, are not available 24/7 or at a whim's notice. Della is very understanding " of this, and appreciative that they can have palliative support for symptom management while they continue to explore disease-modifying treatments to buy her as much time as possible. Ms. Feng says that she just wants to focus on being happy. She admits that over the last couple of years, losing her independence has been very difficult to accept. She loves cooking and now her daughter Della cooks for her. Even though Della is an excellent cook, Ms. Feng will always modify her food to claim some control and sense of cooking again. However her appetite is poor and she has lost a lot of weight. In addition to this, she has poor sleep, wakes up due to excruciating RLE pain. Della wonders how a component of depression or anxiety may be influencing her mother's symptom burdens and feels that if these three things can be addressed, her mother might feel better and do better as well.         Significant Labs: CBC:   Recent Labs   Lab 06/03/25  1208 06/03/25 1228 06/04/25 0453 06/05/25  0459   WBC 15.12*  --  12.93* 16.82*   HGB 9.0*  --  7.4* 8.5*   HCT 30.0* 24.3 24.4* 27.3*   *  --  143* 170     CMP:   Recent Labs   Lab 06/03/25  1208 06/03/25 2004 06/04/25 0453 06/04/25 1951 06/05/25 0459      < > 142 141 140   K 3.3*   < > 3.7 4.3 3.3*      < > 111* 107 105   CO2 24   < > 20* 24 26      < > 164* 194* 116*   BUN 10   < > 16 18 20   CREATININE 0.6   < > 0.6 0.6 0.6   CALCIUM 8.8   < > 8.5* 8.5* 8.8   PROT 6.9  --  6.2  --  6.4   ALBUMIN 1.9*  --  1.6*  --  1.7*   BILITOT 0.7  --  0.3  --  0.4   ALKPHOS 93  --  87  --  88   AST 42  --  44  --  34   ALT 16  --  16  --  16   ANIONGAP 9   < > 11 10 9    < > = values in this interval not displayed.     CBC:   Recent Labs   Lab 06/05/25  0459   WBC 16.82*   HGB 8.5*   HCT 27.3*   MCV 90        BMP:  Recent Labs   Lab 06/05/25 0459   *      K 3.3*      CO2 26   BUN 20   CREATININE 0.6   CALCIUM 8.8   MG 1.9      LFT:  Lab Results   Component Value Date    AST 34 06/05/2025    ALKPHOS 88 06/05/2025    BILITOT 0.4 06/05/2025     Albumin:   Albumin   Date Value Ref Range Status   06/05/2025 1.7 (L) 3.5 - 5.2 g/dL Final   03/11/2025 2.1 (L) 3.5 - 5.2 g/dL Final     Protein:   Protein Total   Date Value Ref Range Status   06/05/2025 6.4 6.0 - 8.4 gm/dL Final     Total Protein   Date Value Ref Range Status   03/11/2025 7.5 6.0 - 8.4 g/dL Final     Lactic acid:   Lab Results   Component Value Date    LACTATE 6.1 (HH) 10/23/2022       Significant Imaging: I have reviewed all pertinent imaging results/findings within the past 24 hours.

## 2025-06-05 NOTE — ASSESSMENT & PLAN NOTE
No chest pain , breathing improved with diuresis and remdesivir    Trop [phone] - [phone]  Likley demand ischemia iso COVID, HF and pleural effusions  Daughter does not even want lovenox or heparin dvt ppx so was discontinued - SCDs for dvt ppx.

## 2025-06-05 NOTE — ASSESSMENT & PLAN NOTE
Patient with Hypoxic Respiratory failure which is Acute.  she is not on home oxygen. Supplemental oxygen was provided and noted-      .   Signs/symptoms of respiratory failure include- tachypnea and respiratory distress. Contributing diagnoses includes - ARDS, CHF, COPD, Pneumonia, and Lung CA Labs and images were reviewed. Patient Has not had a recent ABG. Will treat underlying causes and adjust management of respiratory failure as follows-     - BNP elevated 2717 on admission  - COVID positive on admission  - CTA chest and abdomen 6/3 : New RUL atelectasis, presumably related to occlusion of RUL bronchus and progression of malignancy. New large R and moderate L pleural effusions, partially loculated.  Malignant pleural fluid is favored.  B/l Interstitial and airspace opacities.    Large mass in the right posterior shoulder presumably metastatic with questionable subtle osseous metastatic disease. Enlarged mediastinal and right axillary lymph nodes suggestive of metastatic disease. Indeterminate liver lesions.   No evidence of acute PE. Asymmetric decreased opacification of the left common iliac artery- limited by suboptimal bolus timing.  Chronic or recent occlusion     - For COVID - started remdesivir after talking to Dr. Peraza - no limitation to steroids use. Got Dexamethasone 6mg IV  x1 dose in ED - will start if no improvement with steroids and diuresis  - For loculated pleural effusions and HF exacerbation - lasix 40 in the ED , start lasix 60 IV BID for now  -pulmonary consulted for need drainage as potential of malignancy effusion  - known lung CA - now seems metastatic  - possible obstruction of RUL bronchus by mass extension   - will benefit from palliative care consult - to be discussed with pt and daughter  - Has COPD but does not seem to be in exacerbation - steroids if resp status not improving    - Echo 6/4; 45 - 50%. Grade II diastolic dysfunction. Normal RV size and systolic function. Moderate MR,   PASP 39 mmHg. IVC 3 mmHg. small  pericardial effusion adjacent to the left atrium. Left pleural effusion.  - Pulmonary consulted - recommend against bronch and thoracentesis at this time based on discussion with pt and family  - c/w Lasix 60 IV BID - strict I/O - will consider switching to PO in 24 to 48 hours  - CXR ordered for 6/6  - Remdesivir x 5 days starting 6/3  - Dexamethasone on admission x1 - will order if clinical status worsens or not improving with remdesivir and diuresis   - Duonebs PRN  - Encourage IS

## 2025-06-05 NOTE — PLAN OF CARE
Problem: Adult Inpatient Plan of Care  Goal: Plan of Care Review  Outcome: Not Progressing  Goal: Patient-Specific Goal (Individualized)  Outcome: Not Progressing  Goal: Absence of Hospital-Acquired Illness or Injury  Outcome: Not Progressing  Goal: Optimal Comfort and Wellbeing  Outcome: Not Progressing  Goal: Readiness for Transition of Care  Outcome: Not Progressing     Problem: Diabetes Comorbidity  Goal: Blood Glucose Level Within Targeted Range  Outcome: Not Progressing     Problem: Wound  Goal: Optimal Coping  Outcome: Not Progressing  Goal: Optimal Functional Ability  Outcome: Not Progressing  Goal: Absence of Infection Signs and Symptoms  Outcome: Not Progressing  Goal: Improved Oral Intake  Outcome: Not Progressing  Goal: Optimal Pain Control and Function  Outcome: Not Progressing  Goal: Skin Health and Integrity  Outcome: Not Progressing  Goal: Optimal Wound Healing  Outcome: Not Progressing     Problem: Skin Injury Risk Increased  Goal: Skin Health and Integrity  Outcome: Not Progressing     Problem: Coping Ineffective  Goal: Effective Coping  Outcome: Not Progressing

## 2025-06-05 NOTE — SUBJECTIVE & OBJECTIVE
Interval History:    Seen and examined at bedside with daughter. On 1 L NC - making urine and cough is better - Net negative 1.9 L in last 24 hours with stable renal function. Echo 6/4; 45 - 50%. Grade II diastolic dysfunction. Normal RV size and systolic function. Moderate MR,  PASP 39 mmHg. IVC 3 mmHg. small  pericardial effusion adjacent to the left atrium. Left pleural effusion. Plan to keep lasix IV for now - as respiratory status improving and stable renal function.       Seen by palliative care and pulm. No plan for bronch or thoracentesis per pulm and recommend to continue diuresis, also recommend to hold off on steroids for COVID as improving oxygen requirement and has DM with hyperglycemia.     Toe pain is better. Diarrhea is also improving - and not black anymore per daughter. Cdiff was negative. Hb 7.4 > 8.5 - continue Pepcid and hold off on GI consult for now. PT consulted and gerald yet to work with the patient.     Also dicussed with vascular surgery regarding about CT mention of L common iliac incomplete assessment about chronic or recent occlusion. Pulses palpable bilaterally, no discoloration - per vascular no surveillance or further imaging needed. For management of atherosclerotic risk factors can consider recommend ASA 81, statin if no GI bleed and no concern for autoimmune hepatitis perspective. Will defer for now as hb starting to come up with dark stools on admission and reluctance on meds iso AI hepatitis - will discuss as stabilizes more or as outpatient as its for primary ppx.       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    Diabetes mellitus     Glaucoma     Hypertension     Lung cancer     Other ascites 11/29/2022       Past Surgical History:   Procedure Laterality Date    CATARACT EXTRACTION W/  INTRAOCULAR LENS IMPLANT Left 12/21/2021        ENDOBRONCHIAL ULTRASOUND N/A 07/05/2024    Procedure: ENDOBRONCHIAL ULTRASOUND  (EBUS);  Surgeon: Rolo Wilkinson MD;  Location: [facility] OR 2ND FLR;  Service: Pulmonary;  Laterality: N/A;    ESOPHAGOGASTRODUODENOSCOPY N/A 06/26/2023    Procedure: ESOPHAGOGASTRODUODENOSCOPY (EGD);  Surgeon: Edgar Branch MD;  Location: [facility] (4TH FLR);  Service: Endoscopy;  Laterality: N/A;  referral Dr Peraza-cirrhosis/labs done on 4/24/23-[facility] portal-GT       Review of patient's allergies indicates:  No Known Allergies    No current facility-administered medications on file prior to encounter.     Current Outpatient Medications on File Prior to Encounter   Medication Sig    acetaminophen (TYLENOL) 500 MG tablet Take 2 tablets (1,000 mg total) by mouth every 8 (eight) hours as needed for Pain.    BD INSULIN SYRINGE ULTRA-FINE 1 mL 31 gauge x 5/16 Syrg     blood-glucose meter,continuous Misc Dispsense 1 Dexcom G7     blood-glucose transmitter (DEXCOM G6 TRANSMITTER) Amanda 1 each by Misc.(Non-Drug; Combo Route) route every 3 (three) months. (Patient not taking: Reported on 5/22/2025)    brimonidine 0.2% (ALPHAGAN) 0.2 % Drop Place 1 drop into both eyes 3 (three) times daily.    cholecalciferol, vitamin D3, (VITAMIN D3) 25 mcg (1,000 unit) capsule Take 2 capsules (2,000 Units total) by mouth once daily.    ciprofloxacin HCl (CIPRO) 500 MG tablet Take 1 tablet (500 mg total) by mouth 2 (two) times daily.    DEXCOM G7 SENSOR Amanda 1 Device by Misc.(Non-Drug; Combo Route) route every 10 days.    dorzolamide-timolol 2-0.5% (COSOPT) 22.3-6.8 mg/mL ophthalmic solution Place 1 drop into both eyes 2 (two) times daily.    estradioL (ESTRACE) 0.01 % (0.1 mg/gram) vaginal cream Place 0.5 g vaginally twice a week. Apply to the vagina daily for two weeks then twice a week. (Patient not taking: Reported on 5/22/2025)    furosemide (LASIX) 40 MG tablet TAKE 1 TABLET BY MOUTH EVERY DAY    insulin aspart U-100 (NOVOLOG U-100 INSULIN ASPART) 100 unit/mL injection TO USE WITH OMNIPOD 5. TOTAL DAILY DOSE UP TO 40 UNITS  "   insulin pump cart,automated,BT (OMNIPOD 5 G6 PODS, GEN 5,) Crtg Inject 1 each into the skin Every 3 (three) days.    lactulose (CEPHULAC) 10 gram packet Take 10 g by mouth 3 (three) times daily. (Patient not taking: Reported on 2025)    latanoprost 0.005 % ophthalmic solution PLACE 1 DROP INTO BOTH EYES ONCE DAILY    LIDOcaine (LIDODERM) 5 % Place 1 patch onto the skin once daily. Remove & Discard patch within 12 hours or as directed by MD    [Paused] lisinopriL-hydrochlorothiazide (PRINZIDE,ZESTORETIC) 20-12.5 mg per tablet TAKE 1 TABLET BY MOUTH EVERY DAY (Patient not taking: Reported on 2025)    nystatin (MYCOSTATIN) 100,000 unit/mL suspension SWISH WITH 6MLS BY MOUTH THEN SWALLOW 4 TIMES A DAY FOR 14 DAYS    OMNIPOD 5 G6-G7 PODS, GEN 5, Crtg SMARTSI Each SUB-Q Once a Month (Patient not taking: Reported on 2025)    oxyCODONE (ROXICODONE) 5 MG immediate release tablet Take 0.5 tablets (2.5 mg total) by mouth every 6 (six) hours as needed for Pain.    pen needle, diabetic 31 gauge x 5/16" Ndle Use to inject insulin into the skin up to 4 times daily    predniSONE (DELTASONE) 5 MG tablet Take 0.5 tablets (2.5 mg total) by mouth once daily.    tobramycin sulfate 0.3% (TOBREX) 0.3 % ophthalmic solution 1-2 drops topically twice daily to affected toe(s).     Family History       Problem Relation (Age of Onset)    Blindness Cousin    Cataracts Mother    Colon cancer Sister    Diabetes Mother    Glaucoma Mother    Heart attack Father    Hypertension Mother          Tobacco Use    Smoking status: Former    Smokeless tobacco: Never   Substance and Sexual Activity    Alcohol use: Not Currently    Drug use: Never    Sexual activity: Not on file     Review of Systems   Constitutional:  Positive for activity change and appetite change.   Respiratory:  Positive for cough and shortness of breath. Negative for chest tightness.    Cardiovascular:  Negative for chest pain and leg swelling.   Gastrointestinal:  " Positive for abdominal pain, diarrhea, nausea and vomiting. Negative for abdominal distention.   Genitourinary:  Negative for dysuria and hematuria.   Musculoskeletal:  Positive for joint swelling. Negative for neck stiffness.   Skin:  Negative for color change and rash.   Psychiatric/Behavioral:  Negative for agitation and behavioral problems.      Objective:     Vital Signs (Most Recent):  Temp: 97.7 °F (36.5 °C) (06/05/25 0743)  Pulse: 66 (06/05/25 1111)  Resp: 18 (06/05/25 0743)  BP: (!) 146/64 (06/05/25 0743)  SpO2: 95 % (06/05/25 0743) Vital Signs (24h Range):  Temp:  [96.7 °F (35.9 °C)-97.8 °F (36.6 °C)] 97.7 °F (36.5 °C)  Pulse:  [57-77] 66  Resp:  [18] 18  SpO2:  [93 %-97 %] 95 %  BP: (114-146)/(64-83) 146/64     Weight: 49 kg (108 lb 0.4 oz)  Body mass index is 17.98 kg/m².     Physical Exam  Constitutional:       Appearance: She is ill-appearing.   HENT:      Head: Normocephalic and atraumatic.      Nose: Nose normal.      Mouth/Throat:      Mouth: Mucous membranes are moist.      Pharynx: Oropharynx is clear.   Eyes:      Extraocular Movements: Extraocular movements intact.      Pupils: Pupils are equal, round, and reactive to light.   Cardiovascular:      Rate and Rhythm: Normal rate.   Pulmonary:      Effort: Pulmonary effort is normal. No respiratory distress.      Breath sounds: Rales present.      Comments: Diminished sounds in lower lung fields.   Abdominal:      General: Abdomen is flat. There is no distension.      Palpations: Abdomen is soft.      Tenderness: There is no abdominal tenderness.   Musculoskeletal:         General: Normal range of motion.      Cervical back: Normal range of motion. No rigidity.      Right lower leg: No edema.      Left lower leg: No edema.      Comments: R 4th toe pain significantly improved - no erythema or swelling   Skin:     General: Skin is warm and dry.   Neurological:      General: No focal deficit present.      Mental Status: She is alert and oriented to  person, place, and time.   Psychiatric:         Mood and Affect: Mood normal.         Behavior: Behavior normal.              CRANIAL NERVES     CN III, IV, VI   Pupils are equal, round, and reactive to light.       Significant Labs: All pertinent labs within the past 24 hours have been reviewed.    Significant Imaging: I have reviewed all pertinent imaging results/findings within the past 24 hours.

## 2025-06-05 NOTE — PT/OT/SLP EVAL
Physical Therapy Co-Evaluation  Co-eval performed due to anticipated need for 2 skilled therapists to appropriately and safely assess patient's strength and endurance to facilitate functional and safe mobility in addition to accommodating for patient's activity tolerance.    Patient Name:  Radha Feng   MRN:  [id number]    Recommendations:     Discharge Recommendations: Moderate Intensity Therapy   Discharge Equipment Recommendations:   bath bench, rolling walker  Barriers to discharge: Inaccessible home and increased level of assistance    Assessment:     Radha Feng is a 84 y.o. female admitted with a medical diagnosis of Acute hypoxemic respiratory failure.  She presents with the following impairments/functional limitations: weakness, impaired endurance, impaired self care skills, impaired functional mobility, gait instability, impaired balance, decreased lower extremity function, decreased safety awareness, orthopedic precautions. Pt would benefit from a moderate intensity/frequency therapy for: Dynamic/static standing/sitting balance through skilled balance training, strengthening with the use of skilled therapeutic exercises interventions, and mobility through adaptive equipment training. Pt continues to benefit from a collaborative PT/OT program to improve quality of life and focus on recovery of impairments.    Rehab Prognosis: Good; patient would benefit from acute skilled PT services to address these deficits and reach maximum level of function.    Recent Surgery: * No surgery found *      Plan:     During this hospitalization, patient to be seen 4 x/week to address the identified rehab impairments via gait training, therapeutic activities, therapeutic exercises, neuromuscular re-education and progress toward the following goals:    Plan of Care Expires:  07/05/25    Subjective     Chief Complaint: impaired endurance  Patient/Family Comments/goals: return to OF where she can rgain her leg  strength and be able to do the stairs to get into her home  Pain/Comfort:  Pain Rating 1: 0/10  Pain Rating Post-Intervention 1: 0/10    Patients cultural, spiritual, Congregation conflicts given the current situation: no    Living Environment:  Living Environment: Pt lives with her daughter and grandson in [facility] with 6 ARIEL and L/R HR/half wall going up steps.  Previous level of function: mobility with mod I with spc in bedroom, rollator in home, and wheelchair in community; daughter assists with bathing or dressing as needed. After last admit pt was so weak she was unable to get inside the house.  Equipment Used at Home: wheelchair, rollator, cane, straight, shower chair, bedside commode  Assistance upon Discharge: family, daughter is a RN (unsure if 24/7 care is available)    Objective:     Communicated with RN prior to session.  Patient found HOB elevated with telemetry, peripheral IV, PureWick, oxygen  upon PT entry to room.    General Precautions: Standard, fall  Orthopedic Precautions:spinal precautions   Braces: TLSO (for fracture pt acquired 2 weeks ago per daughter)  Respiratory Status: Nasal cannula, flow 2 L/min    Exams:  Cognitive Exam:  Patient is oriented to Person, Place, Time, and Situation  Gross Motor Coordination:  WFL  Postural Exam:  Patient presented with the following abnormalities:    -       Rounded shoulders  -       Forward head  -       Kyphosis  Sensation:    -       Intact BLEs  RLE ROM: WFL  RLE Strength: hip flexion 3/5, knee extension 4/5, knee flexion 4/5, DF 4/5  LLE ROM: WFL  LLE Strength: hip flexion 3/5, knee extension 4/5, knee flexion 4/5, DF 4/5    Functional Mobility:  Bed Mobility:     Scooting: minimum assistance  Supine to Sit: minimum assistance  Transfers:     Sit to Stand:  contact guard assistance with no AD  Sit to Stand: minimum assistance with no RW  Stand to Sit: Asmita with RW  Gait: 3 ft, minAx2, RW; decreased gait speed and step length, impaired BLE clearance,  assistance with managing RW, pt significantly fatigued and assisted into chair      AM-PAC 6 CLICK MOBILITY  Total Score:17       Treatment & Education:  Patient educated on role of therapy, goals of session, and benefits of mobilizing.   Discussed PT plan of care during hospitalization.   Patient educated on calling for assistance.   Patient educated on how their diagnosis impacts their mobility within PT scope of practice.   Communication board up to date.  All questions answered within PT scope of practice.    Patient left up in chair with all lines intact, call button in reach, RN notified, and pt's daughter present.    GOALS:   Multidisciplinary Problems       Physical Therapy Goals          Problem: Physical Therapy    Goal Priority Disciplines Outcome Interventions   Physical Therapy Goal     PT, PT/OT Progressing    Description: Goals to be met by: 2025     Patient will increase functional independence with mobility by performin. Supine to sit with Stand-by Assistance  2. Sit to supine with Stand-by Assistance  3. Sit to stand transfer with Stand-by Assistance with RW  4. Bed to chair transfer with Stand-by Assistance using Rolling Walker  5. Gait  x 50 feet with Stand-by Assistance using Rolling Walker.   6. Ascend/descend 6 stair with left or right Handrails Stand-by Assistance using Single-point Cane or no AD.   7. Lower extremity exercise program x10 reps per handout, with supervision                         DME Justifications:   Radha's mobility limitation cannot be sufficiently resolved by the use of a cane. Her functional mobility deficit can be sufficiently resolved with the use of a Rolling Walker. Patient's mobility limitation significantly impairs their ability to participate in one of more activities of daily living.  The use of a RW will significantly improve the patient's ability to participate in MRADLS and the patient will use it on regular basis in the home.    History:     Past  Medical History:   Diagnosis Date    Cataract     Chondromalacia, knee, 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    Diabetes mellitus     Glaucoma     Hypertension     Lung cancer     Other ascites 11/29/2022       Past Surgical History:   Procedure Laterality Date    CATARACT EXTRACTION W/  INTRAOCULAR LENS IMPLANT Left 12/21/2021        ENDOBRONCHIAL ULTRASOUND N/A 07/05/2024    Procedure: ENDOBRONCHIAL ULTRASOUND (EBUS);  Surgeon: Rolo Wilkinson MD;  Location: [facility] OR [address] [place] [address];  Service: Pulmonary;  Laterality: N/A;    ESOPHAGOGASTRODUODENOSCOPY N/A 06/26/2023    Procedure: ESOPHAGOGASTRODUODENOSCOPY (EGD);  Surgeon: Edgar Branch MD;  Location: [facility] (4TH FLR);  Service: Endoscopy;  Laterality: N/A;  referral Dr Peraza-cirrhosis/labs done on 4/24/23-instr portal-GT       Time Tracking:     PT Received On: 06/05/25  PT Start Time: 1048     PT Stop Time: 1111  PT Total Time (min): 23 min     Billable Minutes: Evaluation 13 and Gait Training 10      06/05/2025

## 2025-06-05 NOTE — PLAN OF CARE
06/05/25 0942   Rounds   Attendance ;Nurse ;Assigned nurse;Provider  ([facility])   Discharge Plan A Home;Home with family;Other  (Referral for outpatient case management.)   Why the patient remains in the hospital Requires continued medical care   Transition of Care Barriers None     Pt not medically ready for d/c, Pt COVID postive and placed on remdesivir through Saturday.  Pt on 1 liter O2 and IV lasix.  Palliative following.  Once medically ready, pt will d/c home with daughter, who is a nurse, with no needs.  Pt's daughter not interested in HH for patient.  OPCM referred.     Ariana Traylor LMSW  Part-Time-  [facility]  Ext. [phone]

## 2025-06-05 NOTE — ASSESSMENT & PLAN NOTE
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 Blood loss. Most recent hemoglobin and hematocrit are listed below.  Recent Labs     06/03/25  1208 06/03/25  1228 06/04/25  0453 06/05/25  0459   HGB 9.0*  --  7.4* 8.5*   HCT 30.0* 24.3 24.4* 27.3*     Plan  - Monitor serial CBC: Daily  - Transfuse PRBC if patient becomes hemodynamically unstable, symptomatic or H/H drops below 7/21.  - Patient has not received any PRBC transfusions to date  - Patient's anemia is currently worsening. Will Monitor  - Dark stools have resolved and were possibly related to Pepto bismol intake at home per daughter  - Will involve GI if hb drops further or if dark stools re start.    - Encourage pepcid BID use

## 2025-06-05 NOTE — PT/OT/SLP EVAL
Occupational Therapy  Co -  Evaluation with PT    Co-evaluation/treatment performed due to patient's multiple deficits requiring two skilled therapists to appropriately and safely assess patient's strength and endurance while facilitating functional tasks in addition to accommodating for patient's activity tolerance.       Name: Radha Feng  MRN: [id number]  Admitting Diagnosis: Acute hypoxemic respiratory failure  Recent Surgery: * No surgery found *      Recommendations:     Discharge Recommendations: Moderate Intensity Therapy  Discharge Equipment Recommendations:  bath bench  Barriers to discharge:   (increased skilled A needed)    Assessment:     Radha Feng is a 84 y.o. female with a medical diagnosis of Acute hypoxemic respiratory failure.  She presents with performance deficits affecting function: weakness, impaired endurance, impaired self care skills, impaired functional mobility, gait instability, impaired balance, decreased upper extremity function, decreased lower extremity function, pain, decreased safety awareness, impaired coordination.      Pt engaged well in therapy this date, encountered with HOB raised, pleasant demeanor and agreeable to therapy with therapists' and daughter's encouragement. Pt able to complete bed mobility with light assistance, presenting EOB with kyphotic posture requiring multiple cues for upright stature. Pt able to complete STS transfer with light assistance and RW, and then bed to chair transfer with light assistance increasing to significant assistance as pt began to fatigue and present uncontrolled lower to upright chair, made comfortable.  Patient currently demonstrates a need for moderate intensity therapy on a daily basis post acute secondary to a decline in functional status due to illness.       Rehab Prognosis: Good; patient would benefit from acute skilled OT services to address these deficits and reach maximum level of function.       Plan:  "    Patient to be seen 4 x/week to address the above listed problems via self-care/home management, therapeutic activities, therapeutic exercises  Plan of Care Expires: 06/26/25  Plan of Care Reviewed with: patient, daughter    Subjective     Chief Complaint: "I'm tired but I'll get up if you need me to"   Patient/Family Comments/goals: Get better, return home     Occupational Profile:  Living Environment: Pt lives with daughter and grandson in [facility] with 6 ARIEL 0 HR  Previous level of function: mobility with mod I with spc in bedroom, rollator in home, and wheelchair in community; daughter assists with bathing or dressing as needed.   Roles and Routines: mother, grandmother  Equipment Used at Home: wheelchair, rollator, cane, straight, shower chair, bedside commode  Assistance upon Discharge: family, daughter is a RN    Pain/Comfort:  Pain Rating 1:  (not rated)  Pain Rating Post-Intervention 1:  (not rated)    Patients cultural, spiritual, Jain conflicts given the current situation: no    Objective:     Communicated with: RN prior to session.  Patient found HOB elevated with telemetry, oxygen, peripheral IV, PureWick upon OT entry to room.    General Precautions: Standard, fall  Orthopedic Precautions: spinal precautions  Braces: TLSO  Respiratory Status: Nasal cannula, flow 1 L/min    Occupational Performance:    Bed Mobility:    Patient completed Rolling/Turning to Right with minimum assistance  Patient completed Scooting/Bridging with minimum assistance  Patient completed Supine to Sit with minimum assistance  Pt able to sit EOB with kyphotic posture, forward flexion and required cues for upright posture     Functional Mobility/Transfers:  Patient completed Sit <> Stand Transfer:   1st trial from EOB with contact guard assistance  with  RW    2nd trial: from EOB with CGA/RW  Patient completed Bed <> Chair Transfer using Step Transfer technique with contact guard assistance to minimum assistance with rolling " walker as pt began to fatigue    Activities of Daily Living:  Upper Body Dressing: contact guard assistance don 2nd gown as huntere  Lower Body Dressing: maximal assistance doffing socks and donning fresh socks   Toileting: OT/PT managed matilda     Cognitive/Visual Perceptual:  Cognitive/Psychosocial Skills:     -       Oriented to: aOX4   -       Follows Commands/attention:Follows multistep  commands  -       Communication: clear/fluent  -       Memory: Poor immediate recall  -       Safety awareness/insight to disability: impaired   -       Mood/Affect/Coping skills/emotional control: Pleasant  Visual/Perceptual:      -Intact      Physical Exam:  Balance:    -       Sitting: Impaired; Standing: Impaired  Postural examination/scapula alignment:    -       Rounded shoulders  Upper Extremity Range of Motion:     -       Right Upper Extremity: unable to flex >80deg  -       Left Upper Extremity: WNL  Upper Extremity Strength:    -       Right Upper Extremity: Not assessed this date 2* cancer in R shoulder  -       Left Upper Extremity: WNL   Strength:    -       Right Upper Extremity: WNL  -       Left Upper Extremity: WNL  Fine Motor Coordination:    -       Intact  Neurological:    -       intact    AMPAC 6 Click ADL:  AMPAC Total Score: 19    Treatment & Education:  Pt educated on role of OT, POC, and goals for therapy.    POC was dicussed with patient/caregiver, who was included in its development and is in agreement with the identified goals and treatment plan.   Patient and family aware of patient's deficits and therapy progression.   Time provided for therapeutic counseling and discussion of health disposition.   Educated on importance of EOB/OOB mobility, maintaining routine, sitting up in chair, and maximizing independence with ADLs during admission   Pt completed ADLs and functional mobility for treatment session as noted above   Pt/caregiver verbalized understanding and expressed no further  concerns/questions.  Updated communication board with level of assist required      Patient left up in chair with all lines intact and call button in reach    GOALS:   Multidisciplinary Problems       Occupational Therapy Goals          Problem: Occupational Therapy    Goal Priority Disciplines Outcome Interventions   Occupational Therapy Goal     OT, PT/OT Progressing    Description: Goals to be met by: 6/26/25     Patient will increase functional independence with ADLs by performing:    UE Dressing with Stand-by Assistance.  LE Dressing with Stand-by Assistance.  Grooming while bedside chair with Stand-by Assistance.  Toileting from bedside commode with Stand-by Assistance for hygiene and clothing management.   Supine to sit with Contact Guard Assistance.  Step transfer with Contact Guard Assistance  Toilet transfer to bedside commode with Contact Guard Assistance.                         DME Justifications:  No DME recommended requiring DME justifications    History:     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    Diabetes mellitus     Glaucoma     Hypertension     Lung cancer     Other ascites 11/29/2022         Past Surgical History:   Procedure Laterality Date    CATARACT EXTRACTION W/  INTRAOCULAR LENS IMPLANT Left 12/21/2021        ENDOBRONCHIAL ULTRASOUND N/A 07/05/2024    Procedure: ENDOBRONCHIAL ULTRASOUND (EBUS);  Surgeon: Rolo Wilkinson MD;  Location: [facility] OR [address] [place] [address];  Service: Pulmonary;  Laterality: N/A;    ESOPHAGOGASTRODUODENOSCOPY N/A 06/26/2023    Procedure: ESOPHAGOGASTRODUODENOSCOPY (EGD);  Surgeon: Edgar Branch MD;  Location: [facility] ([facility]TH [place]);  Service: Endoscopy;  Laterality: N/A;  referral Dr Peraza-cirrhosis/labs done on 4/24/23-instr portal-GT       Time Tracking:     OT Date of Treatment: 06/05/25  OT Start Time: 1048  OT Stop Time: 1110  OT Total Time (min): 22 min    Billable Minutes:Evaluation 8  Self Care/Home  Management 14    6/5/2025

## 2025-06-05 NOTE — ASSESSMENT & PLAN NOTE
Patient found to have large pleural effusion on imaging. I have personally reviewed and interpreted the following imaging: CT. A thoracentesis was deferred. Most likely etiology includes Congestive Heart Failure. Management to include Diuresis and Pulm consult    - Seen by pulm - no plan for bronch or thoracentesis at this time - risk of re accumulation  - Diuresis as above

## 2025-06-05 NOTE — PLAN OF CARE
Problem: Physical Therapy  Goal: Physical Therapy Goal  Description: Goals to be met by: 2025     Patient will increase functional independence with mobility by performin. Supine to sit with Stand-by Assistance  2. Sit to supine with Stand-by Assistance  3. Sit to stand transfer with Stand-by Assistance with RW  4. Bed to chair transfer with Stand-by Assistance using Rolling Walker  5. Gait  x 50 feet with Stand-by Assistance using Rolling Walker.   6. Ascend/descend 6 stair with left or right Handrails Stand-by Assistance using Single-point Cane or no AD.   7. Lower extremity exercise program x10 reps per handout, with supervision    Outcome: Progressing

## 2025-06-05 NOTE — PLAN OF CARE
Pt engaged well in therapy this date.     Problem: Occupational Therapy  Goal: Occupational Therapy Goal  Description: Goals to be met by: 6/26/25     Patient will increase functional independence with ADLs by performing:    UE Dressing with Stand-by Assistance.  LE Dressing with Stand-by Assistance.  Grooming while bedside chair with Stand-by Assistance.  Toileting from bedside commode with Stand-by Assistance for hygiene and clothing management.   Supine to sit with Contact Guard Assistance.  Step transfer with Contact Guard Assistance  Toilet transfer to bedside commode with Contact Guard Assistance.    Outcome: Progressing

## 2025-06-05 NOTE — ASSESSMENT & PLAN NOTE
Improved  Recent Cipro exposure as well as IV abx in hospital. COVID can also cause diarrhea.   Cdiff negative  GI panel negative  Stool culture pending  COVID management as above  PRN meds not needed so far

## 2025-06-06 LAB
ABSOLUTE EOSINOPHIL (OHS): 0.02 K/UL
ABSOLUTE MONOCYTE (OHS): 1.48 K/UL (ref 0.3–1)
ABSOLUTE NEUTROPHIL COUNT (OHS): 11.96 K/UL (ref 1.8–7.7)
ALBUMIN SERPL BCP-MCNC: 1.8 G/DL (ref 3.5–5.2)
ALP SERPL-CCNC: 89 UNIT/L (ref 40–150)
ALT SERPL W/O P-5'-P-CCNC: 14 UNIT/L (ref 10–44)
ANION GAP (OHS): 11 MMOL/L (ref 8–16)
AST SERPL-CCNC: 35 UNIT/L (ref 11–45)
BASOPHILS # BLD AUTO: 0.03 K/UL
BASOPHILS NFR BLD AUTO: 0.2 %
BILIRUB SERPL-MCNC: 0.4 MG/DL (ref 0.1–1)
BUN SERPL-MCNC: 21 MG/DL (ref 8–23)
CALCIUM SERPL-MCNC: 8.9 MG/DL (ref 8.7–10.5)
CHLORIDE SERPL-SCNC: 104 MMOL/L (ref 95–110)
CO2 SERPL-SCNC: 26 MMOL/L (ref 23–29)
CREAT SERPL-MCNC: 0.6 MG/DL (ref 0.5–1.4)
ERYTHROCYTE [DISTWIDTH] IN BLOOD BY AUTOMATED COUNT: 21.4 % (ref 11.5–14.5)
GFR SERPLBLD CREATININE-BSD FMLA CKD-EPI: >60 ML/MIN/1.73/M2
GLUCOSE SERPL-MCNC: 100 MG/DL (ref 70–110)
HCT VFR BLD AUTO: 27.1 % (ref 37–48.5)
HGB BLD-MCNC: 8.2 GM/DL (ref 12–16)
IMM GRANULOCYTES # BLD AUTO: 0.12 K/UL (ref 0–0.04)
IMM GRANULOCYTES NFR BLD AUTO: 0.8 % (ref 0–0.5)
LYMPHOCYTES # BLD AUTO: 0.79 K/UL (ref 1–4.8)
MAGNESIUM SERPL-MCNC: 1.6 MG/DL (ref 1.6–2.6)
MCH RBC QN AUTO: 27.2 PG (ref 27–31)
MCHC RBC AUTO-ENTMCNC: 30.3 G/DL (ref 32–36)
MCV RBC AUTO: 90 FL (ref 82–98)
NUCLEATED RBC (/100WBC) (OHS): 0 /100 WBC
PHOSPHATE SERPL-MCNC: 3 MG/DL (ref 2.7–4.5)
PLATELET # BLD AUTO: 180 K/UL (ref 150–450)
PMV BLD AUTO: 11.9 FL (ref 9.2–12.9)
POTASSIUM SERPL-SCNC: 4.2 MMOL/L (ref 3.5–5.1)
PROT SERPL-MCNC: 6.6 GM/DL (ref 6–8.4)
RBC # BLD AUTO: 3.01 M/UL (ref 4–5.4)
RELATIVE EOSINOPHIL (OHS): 0.1 %
RELATIVE LYMPHOCYTE (OHS): 5.5 % (ref 18–48)
RELATIVE MONOCYTE (OHS): 10.3 % (ref 4–15)
RELATIVE NEUTROPHIL (OHS): 83.1 % (ref 38–73)
SODIUM SERPL-SCNC: 141 MMOL/L (ref 136–145)
WBC # BLD AUTO: 14.4 K/UL (ref 3.9–12.7)

## 2025-06-06 PROCEDURE — 27000207 HC ISOLATION

## 2025-06-06 PROCEDURE — 63600175 PHARM REV CODE 636 W HCPCS: Performed by: STUDENT IN AN ORGANIZED HEALTH CARE EDUCATION/TRAINING PROGRAM

## 2025-06-06 PROCEDURE — 25000242 PHARM REV CODE 250 ALT 637 W/ HCPCS: Performed by: STUDENT IN AN ORGANIZED HEALTH CARE EDUCATION/TRAINING PROGRAM

## 2025-06-06 PROCEDURE — 99232 SBSQ HOSP IP/OBS MODERATE 35: CPT | Mod: ,,, | Performed by: NURSE PRACTITIONER

## 2025-06-06 PROCEDURE — 97110 THERAPEUTIC EXERCISES: CPT | Mod: CO

## 2025-06-06 PROCEDURE — 36415 COLL VENOUS BLD VENIPUNCTURE: CPT | Performed by: STUDENT IN AN ORGANIZED HEALTH CARE EDUCATION/TRAINING PROGRAM

## 2025-06-06 PROCEDURE — 25000003 PHARM REV CODE 250: Performed by: STUDENT IN AN ORGANIZED HEALTH CARE EDUCATION/TRAINING PROGRAM

## 2025-06-06 PROCEDURE — 84100 ASSAY OF PHOSPHORUS: CPT | Performed by: STUDENT IN AN ORGANIZED HEALTH CARE EDUCATION/TRAINING PROGRAM

## 2025-06-06 PROCEDURE — 25000003 PHARM REV CODE 250: Performed by: EMERGENCY MEDICINE

## 2025-06-06 PROCEDURE — 63600175 PHARM REV CODE 636 W HCPCS

## 2025-06-06 PROCEDURE — 97116 GAIT TRAINING THERAPY: CPT | Mod: CQ

## 2025-06-06 PROCEDURE — 80053 COMPREHEN METABOLIC PANEL: CPT | Performed by: STUDENT IN AN ORGANIZED HEALTH CARE EDUCATION/TRAINING PROGRAM

## 2025-06-06 PROCEDURE — 99233 SBSQ HOSP IP/OBS HIGH 50: CPT | Mod: 95,,, | Performed by: STUDENT IN AN ORGANIZED HEALTH CARE EDUCATION/TRAINING PROGRAM

## 2025-06-06 PROCEDURE — 21400001 HC TELEMETRY ROOM

## 2025-06-06 PROCEDURE — 83735 ASSAY OF MAGNESIUM: CPT | Performed by: STUDENT IN AN ORGANIZED HEALTH CARE EDUCATION/TRAINING PROGRAM

## 2025-06-06 PROCEDURE — 97110 THERAPEUTIC EXERCISES: CPT | Mod: CQ

## 2025-06-06 PROCEDURE — 85025 COMPLETE CBC W/AUTO DIFF WBC: CPT | Performed by: STUDENT IN AN ORGANIZED HEALTH CARE EDUCATION/TRAINING PROGRAM

## 2025-06-06 RX ORDER — MAGNESIUM SULFATE HEPTAHYDRATE 40 MG/ML
2 INJECTION, SOLUTION INTRAVENOUS ONCE
Status: COMPLETED | OUTPATIENT
Start: 2025-06-06 | End: 2025-06-06

## 2025-06-06 RX ORDER — FUROSEMIDE 40 MG/1
40 TABLET ORAL DAILY
Status: DISCONTINUED | OUTPATIENT
Start: 2025-06-07 | End: 2025-06-07 | Stop reason: HOSPADM

## 2025-06-06 RX ADMIN — REMDESIVIR 100 MG: 100 INJECTION, POWDER, LYOPHILIZED, FOR SOLUTION INTRAVENOUS at 10:06

## 2025-06-06 RX ADMIN — ACETAMINOPHEN 1000 MG: 500 TABLET ORAL at 06:06

## 2025-06-06 RX ADMIN — LATANOPROST 1 DROP: 50 SOLUTION OPHTHALMIC at 08:06

## 2025-06-06 RX ADMIN — DICLOFENAC SODIUM 2 G: 10 GEL TOPICAL at 08:06

## 2025-06-06 RX ADMIN — FAMOTIDINE 20 MG: 20 TABLET, FILM COATED ORAL at 08:06

## 2025-06-06 RX ADMIN — MAGNESIUM SULFATE HEPTAHYDRATE 2 G: 40 INJECTION, SOLUTION INTRAVENOUS at 08:06

## 2025-06-06 RX ADMIN — MUPIROCIN: 20 OINTMENT TOPICAL at 08:06

## 2025-06-06 RX ADMIN — BRIMONIDINE TARTRATE 1 DROP: 2 SOLUTION OPHTHALMIC at 09:06

## 2025-06-06 RX ADMIN — ACETAMINOPHEN 1000 MG: 500 TABLET ORAL at 09:06

## 2025-06-06 RX ADMIN — BRIMONIDINE TARTRATE 1 DROP: 2 SOLUTION OPHTHALMIC at 02:06

## 2025-06-06 RX ADMIN — Medication 6 MG: at 09:06

## 2025-06-06 RX ADMIN — DORZOLAMIDE HYDROCHLORIDE AND TIMOLOL MALEATE 1 DROP: 20; 5 SOLUTION/ DROPS OPHTHALMIC at 08:06

## 2025-06-06 RX ADMIN — MUPIROCIN: 20 OINTMENT TOPICAL at 09:06

## 2025-06-06 RX ADMIN — DORZOLAMIDE HYDROCHLORIDE AND TIMOLOL MALEATE 1 DROP: 20; 5 SOLUTION/ DROPS OPHTHALMIC at 09:06

## 2025-06-06 RX ADMIN — FUROSEMIDE 60 MG: 10 INJECTION, SOLUTION INTRAMUSCULAR; INTRAVENOUS at 08:06

## 2025-06-06 RX ADMIN — BRIMONIDINE TARTRATE 1 DROP: 2 SOLUTION OPHTHALMIC at 08:06

## 2025-06-06 RX ADMIN — LIDOCAINE 1 PATCH: 50 PATCH CUTANEOUS at 09:06

## 2025-06-06 RX ADMIN — OXYCODONE HYDROCHLORIDE 2.5 MG: 5 SOLUTION ORAL at 07:06

## 2025-06-06 RX ADMIN — DICLOFENAC SODIUM 2 G: 10 GEL TOPICAL at 09:06

## 2025-06-06 RX ADMIN — Medication 2000 UNITS: at 08:06

## 2025-06-06 RX ADMIN — PREDNISONE 2.5 MG: 2.5 TABLET ORAL at 08:06

## 2025-06-06 NOTE — ASSESSMENT & PLAN NOTE
RESOLVED  Patient's most recent potassium results are listed below.   Recent Labs     06/04/25  1951 06/05/25  0459 06/06/25  0357   K 4.3 3.3* 4.2     Plan  - Replete potassium per protocol  - Monitor potassium daily  - Patient's hypokalemia is improving  - Monitor with diuresis

## 2025-06-06 NOTE — ASSESSMENT & PLAN NOTE
BG goal: 140-180  T2DM on Omnipod 5. COVID (+). Received Dex 6 mg in ED. Plan for 2.5 mg Prednisone QD. Will monitor on insulin pump for now.      - Continue Home insulin pump   - POCT Glucose before meals, at bedtime and at 2 am  - Hypoglycemia protocol in place      ** Please notify Endocrine for any change and/or advance in diet**  ** Please call Endocrine for any BG related issues **     Discharge Planning:   TBD. Please notify endocrinology prior to discharge.

## 2025-06-06 NOTE — PROGRESS NOTES
[facility] - [facility]  Palliative Medicine  Progress Note    Patient Name: Radha Feng  MRN: [id number]  Admission Date: 6/3/2025  Hospital Length of Stay: 3 days  Code Status: DNR   Attending Provider: Eric Boggs MD  Consulting Provider: Divina Choi MD  Primary Care Physician: Leticia Lim MD  Principal Problem:Acute hypoxemic respiratory failure    Patient information was obtained from patient and primary team.      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 Palliative and Supportive Care was consulted to explore goals of care and malignant symptom management.    6/6/25: Supportive conversation with Ms. Feng alone in her room. She is very funny and ready to return home after she finishes treatment for her COVID pneumonia. She expresses deep gratefulness for my visits and the conversations that we've had. She is agreeable with follow up in the palliative care clinic and expresses hope that she can return home soon. Will not place hospice referral at this time as daughter desires for her mother to continue full supportive treatments and mother wants to also support daughter's coping with her serious illness.    Advance Care Planning  Goals of Care  - Code status: DNAR/DNI  - Next of kin: adult children  - ACP documents: none  - Patient has decision making capacity  - Prognosis: poor  - Goals: improvement in condition, symptom control, quality of life  - Plans: will establish care with outpatient palliative care in [facility]  "than a week    Goals of Care Conversation:  - 6/4/25: Supportive conversation with Ms. Feng and her daughter Della at bedside. They are very welcoming of my visit. 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 Della explains to her mother my field (palliative), that it is a focus on alleviating symptom burdens to improve quality of life. I asked about their last clinic visit with her oncologist Dr. Kohler and Della explains to me that Dr. Peraza was worried that immunotherapy would worsen her mother's autoimmune hepatitis. They agreed that in order to protect Ms. Feng that they would avoid further immunotherapy. I asked about their conversation regarding hospice. Della explains that they hope to be able to extend her life and "focus on living." She feels that because her mother is not actively dying, hospice is not what would be best for her care and goals. Her mother tells me that she is not afraid of dying, [place] understands that death comes for all of us. She embraces that she will one day pass and has peace with it. Her daughter reminds her mother many times throughout our conversation that Ms. Feng is not dying and encourages her mother to "focus on living." Validated both that we can accept that we will all die one day, and until that day comes, we can focus on ways to improve quality of life to our best ability in order to protect her ortega and priorities during whatever days God has planned for her. Both nodded in response to this. Della admits that she feels that since her mother was diagnosed formally with lung cancer, her mother suddenly stopped eating due to appetite loss and was more depressed. Della alludes that in being given bad news, this affected her mother's mentality severely and now her mother keeps talking about " negative things, like her death. Della admits that they are very different - that Della is an optimist and her mother is a pessimist. Ms. Feng shakes her head to this and reminds me that she is just accepting that she will one day pass, just as many family members before her have. 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  - I took time to explain really what our department is - stressing that unlike hospice, we do not make house visits, are not available 24/7 or at a whim's notice. Della is very understanding of this, and appreciative that they can have palliative support for symptom management while they continue to explore disease-modifying treatments to buy her as much time as possible. Ms. Feng says that she just wants to focus on being happy. She admits that over the last couple of years, losing her independence has been very difficult to accept. She loves cooking and now her daughter Della cooks for her. Even though Della is an excellent cook, Ms. Feng will always modify her food to claim some control and sense of cooking again. However her appetite is poor and she has lost a lot of weight. In addition to this, she has poor sleep, wakes up due to excruciating RLE pain. Della wonders how a component of depression or anxiety may be influencing her mother's symptom burdens and feels that if these three things can be addressed, her mother might feel better and do better as well.        Neoplasm Related Pain  RLE Pain  - LA  reviewed. Prescribed short course of oxycodone 5 mg which daughter was breaking into 2.5 mg doses for severe RLE pain at nighttime only  - Has lidocaine patch applied to right shoulder, where site of metastasis is located  - Likely has arthritis of RLE. Was evaluated extensively in the past and counseled by outpatient Orthopedic surgery that she has mild arthritis, and tried steroid  shot that daughter believes triggered autoimmune hepatitis. High dose steroid treatment for autoimmune hepatitis took away the RLE pain, and pain returned during downtitration of steroids  - Of note, was referred to acupuncture and they attended this appointment, only to be told by the acupuncturist that he doesn't treat bone pain, only muscle pain and wouldn't treat any of her site of malignant pain. It was a very disappointing visit and wanted to let us know that for others being referred to acupuncture therapy for holistic approach to care, that this acupuncturist made it very clear that he will not treat malignant pain.  - Continue APAP with modified limits in setting of known autoimmune hepatitis  - Continue diclofenac gel to joints  - Resume home oxycodone - because tablets cannot be broken in half, will order liquid oxycodone 2.5 mg PO q6h PRN severe pain    Poor Appetite  Mood Disturbance  Sleep Disturbance  - Trialed mirtazapine 7.5 mg at nighttime during this hospitalization, but was very restless until 3 am and then thereafter very groggy. Discontinued as to minimize polypharmacy and medicines that are not benefiting  - Due to autoimmune hepatitis, will not start SNRI or TCA  - Continue melatonin 6 mg qHS PRN insomnia        I will sign off. Please contact us if you have any additional questions.    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 Palliative and Supportive Care was consulted to explore goals of care and malignant symptom management.    Hospital Course:  No notes on file    Interval History: Supportive conversation at bedside.    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    Diabetes mellitus     Glaucoma     Hypertension     Lung cancer     Other ascites 11/29/2022       Past Surgical History:   Procedure Laterality Date    CATARACT EXTRACTION W/  INTRAOCULAR LENS IMPLANT Left 12/21/2021        ENDOBRONCHIAL ULTRASOUND N/A 07/05/2024    Procedure: ENDOBRONCHIAL ULTRASOUND (EBUS);  Surgeon: Rolo Wilkinson MD;  [facility];  Service: Pulmonary;  Laterality: N/A;    ESOPHAGOGASTRODUODENOSCOPY N/A 06/26/2023    Procedure: ESOPHAGOGASTRODUODENOSCOPY (EGD);  Surgeon: Edgar Branch MD;  Location: [facility] (4TH FLR);  Service: Endoscopy;  Laterality: N/A;  referral Dr Peraza-cirrhosis/labs done on 4/24/23-[facility]-       Review of patient's allergies indicates:  No Known Allergies    Medications:  Continuous Infusions:   insulin aspart U-100  0-1 Units/hr Subcutaneous Continuous         Scheduled Meds:   brimonidine 0.2%  1 drop Both Eyes TID    diclofenac sodium  2 g Topical (Top) BID    dorzolamide-timolol 2-0.5%  1 drop Both Eyes BID    famotidine  20 mg Oral Daily    [START ON 6/7/2025] furosemide  40 mg Oral Daily    latanoprost  1 drop Both Eyes Daily    LIDOcaine  1 patch Transdermal QHS    mupirocin   Nasal BID    predniSONE  2.5 mg Oral Daily    remdesivir infusion  100 mg Intravenous Daily    vitamin D  2,000 Units Oral Daily     PRN Meds:  Current Facility-Administered Medications:     acetaminophen, 1,000 mg, Oral, Q8H PRN    albuterol-ipratropium, 3 mL, Nebulization, Q6H PRN    dextrose 50%, 12.5 g, Intravenous, PRN    dextrose 50%, 25 g, Intravenous, PRN    glucagon (human recombinant), 1 mg, Intramuscular, PRN    glucose,  16 g, Oral, PRN    glucose, 24 g, Oral, PRN    insulin aspart U-100, 0-10 Units, Subcutaneous, PRN    melatonin, 6 mg, Oral, Nightly PRN    naloxone, 0.02 mg, Intravenous, PRN    oxyCODONE, 2.5 mg, Oral, Q6H PRN    sodium chloride 0.9%, 10 mL, Intravenous, PRN    sodium chloride 0.9%, 10 mL, Intravenous, Q12H PRN    Family History       Problem Relation (Age of Onset)    Blindness Cousin    Cataracts Mother    Colon cancer Sister    Diabetes Mother    Glaucoma Mother    Heart attack Father    Hypertension Mother          Tobacco Use    Smoking status: Former    Smokeless tobacco: Never   Substance and Sexual Activity    Alcohol use: Not Currently    Drug use: Never    Sexual activity: Not on file       Review of Systems   Constitutional:  Positive for activity change, appetite change and fatigue.   Musculoskeletal:  Positive for arthralgias.   Psychiatric/Behavioral:  Positive for sleep disturbance.      Objective:     Vital Signs (Most Recent):  Temp: 98.1 °F (36.7 °C) (06/06/25 1534)  Pulse: 69 (06/06/25 1534)  Resp: 18 (06/06/25 1534)  BP: 117/78 (06/06/25 1534)  SpO2: 97 % (06/06/25 1534) Vital Signs (24h Range):  Temp:  [96.9 °F (36.1 °C)-98.3 °F (36.8 °C)] 98.1 °F (36.7 °C)  Pulse:  [66-81] 69  Resp:  [18-20] 18  SpO2:  [93 %-100 %] 97 %  BP: ()/(62-81) 117/78     Weight: 49 kg (108 lb 0.4 oz)  Body mass index is 17.98 kg/m².       Physical Exam  Constitutional:       General: She is not in acute distress.  HENT:      Head: Normocephalic and atraumatic.      Right Ear: External ear normal.      Left Ear: External ear normal.      Nose: Nose normal.      Mouth/Throat:      Mouth: Mucous membranes are moist.   Eyes:      Extraocular Movements: Extraocular movements intact.      Conjunctiva/sclera: Conjunctivae normal.   Cardiovascular:      Rate and Rhythm: Normal rate.   Pulmonary:      Effort: Pulmonary effort is normal.      Breath sounds: Normal breath sounds.   Abdominal:      General: Bowel sounds  are normal.      Palpations: Abdomen is soft.   Skin:     General: Skin is warm.   Neurological:      Mental Status: She is alert and oriented to person, place, and time. Mental status is at baseline.   Psychiatric:         Mood and Affect: Mood normal.         Behavior: Behavior normal.            Review of Symptoms      Symptom Assessment (ESAS 0-10 Scale)  Pain:  0  Dyspnea:  0  Anxiety:  0  Nausea:  0  Depression:  0  Anorexia:  0  Fatigue:  0  Insomnia:  0  Restlessness:  0  Agitation:  0         Living Arrangements:  Lives with family    Psychosocial/Cultural:   See Palliative Psychosocial Note: No  Has daughter Della and son who are very supportive. She is a twin, but her twin sister passed away a few years ago. She is one of ten children, six are . 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 Loves going to [employer] and [employer]  **Primary  to Follow**  Palliative Care  Consult: No    Spiritual:  F - Tamar and Belief:  Taoism  I - Importance:  Important  C - Community:  Involved  A - Address in Care:  Engage  support        Advance Care Planning  Advance Directives:   Living Will: No    LaPOST: No    Do Not Resuscitate Status: Yes    Medical Power of : No      Decision Making:  Patient answered questions  Goals of Care: 25: Supportive conversation with Ms. Feng and her daughter Della at bedside. They are very welcoming of my visit. 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 Della explains to her mother my field (palliative), that it is a focus on alleviating symptom burdens to improve quality of life. I asked about their last clinic visit with her oncologist Dr. Kohler and Della explains to me that Dr. Peraza was worried that immunotherapy would worsen her mother's autoimmune  "hepatitis. They agreed that in order to protect Ms. Feng that they would avoid further immunotherapy. I asked about their conversation regarding hospice. Della explains that they hope to be able to extend her life and "focus on living." She feels that because her mother is not actively dying, hospice is not what would be best for her care and goals. Her mother tells me that she is not afraid of dying, [place] and understands that death comes for all of us. She embraces that she will one day pass and has peace with it. Her daughter reminds her mother many times throughout our conversation that Ms. Feng is not dying and encourages her mother to "focus on living." Validated both that we can accept that we will all die one day, and until that day comes, we can focus on ways to improve quality of life to our best ability in order to protect her ortega and priorities during whatever days God has planned for her. Both nodded in response to this. Della admits that she feels that since her mother was diagnosed formally with lung cancer, her mother suddenly stopped eating due to appetite loss and was more depressed. Della alludes that in being given bad news, this affected her mother's mentality severely and now her mother keeps talking about negative things, like her death. Della admits that they are very different - that Della is an optimist and her mother is a pessimist. Ms. Feng shakes her head to this and reminds me that she is just accepting that she will one day pass, just as many family members before her have. 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  - I took time to explain really what our department is - stressing that unlike hospice, we do not make house visits, are not available 24/7 or at a whim's notice. Della is very understanding of this, and appreciative that they can have " palliative support for symptom management while they continue to explore disease-modifying treatments to buy her as much time as possible. Ms. Feng says that she just wants to focus on being happy. She admits that over the last couple of years, losing her independence has been very difficult to accept. She loves cooking and now her daughter Della cooks for her. Even though Della is an excellent cook, Ms. Feng will always modify her food to claim some control and sense of cooking again. However her appetite is poor and she has lost a lot of weight. In addition to this, she has poor sleep, wakes up due to excruciating RLE pain. Della wonders how a component of depression or anxiety may be influencing her mother's symptom burdens and feels that if these three things can be addressed, her mother might feel better and do better as well.         Significant Labs: CBC:   Recent Labs   Lab 06/05/25 0459 06/06/25 0357   WBC 16.82* 14.40*   HGB 8.5* 8.2*   HCT 27.3* 27.1*    180     CMP:   Recent Labs   Lab 06/04/25 1951 06/05/25 0459 06/06/25 0357    140 141   K 4.3 3.3* 4.2    105 104   CO2 24 26 26   * 116* 100   BUN 18 20 21   CREATININE 0.6 0.6 0.6   CALCIUM 8.5* 8.8 8.9   PROT  --  6.4 6.6   ALBUMIN  --  1.7* 1.8*   BILITOT  --  0.4 0.4   ALKPHOS  --  88 89   AST  --  34 35   ALT  --  16 14   ANIONGAP 10 9 11     CBC:   Recent Labs   Lab 06/06/25 0357   WBC 14.40*   HGB 8.2*   HCT 27.1*   MCV 90        BMP:  Recent Labs   Lab 06/06/25 0357         K 4.2      CO2 26   BUN 21   CREATININE 0.6   CALCIUM 8.9   MG 1.6     LFT:  Lab Results   Component Value Date    AST 35 06/06/2025    ALKPHOS 89 06/06/2025    BILITOT 0.4 06/06/2025     Albumin:   Albumin   Date Value Ref Range Status   06/06/2025 1.8 (L) 3.5 - 5.2 g/dL Final   03/11/2025 2.1 (L) 3.5 - 5.2 g/dL Final     Protein:   Protein Total   Date Value Ref Range Status   06/06/2025 6.6 6.0 - 8.4  gm/dL Final     Total Protein   Date Value Ref Range Status   03/11/2025 7.5 6.0 - 8.4 g/dL Final     Lactic acid:   Lab Results   Component Value Date    LACTATE 6.1 (HH) 10/23/2022       Significant Imaging: I have reviewed all pertinent imaging results/findings within the past 24 hours.    I spent a total of 50 minutes on the day of the visit. This includes face to face time in discussion of goals of care, symptom assessment, coordination of care and emotional support. This also includes non-face to face time preparing to see the patient (eg, review of tests/imaging), obtaining and/or reviewing separately obtained history, documenting clinical information in the electronic or other health record, independently interpreting results and communicating results to the patient/family/caregiver, or care coordinator.     Divina Choi MD  Palliative Medicine  [facility]

## 2025-06-06 NOTE — ASSESSMENT & PLAN NOTE
Patient with Hypoxic Respiratory failure which is Acute.  she is not on home oxygen. Supplemental oxygen was provided and noted-      .   Signs/symptoms of respiratory failure include- tachypnea and respiratory distress. Contributing diagnoses includes - ARDS, CHF, COPD, Pneumonia, and Lung CA Labs and images were reviewed. Patient Has not had a recent ABG. Will treat underlying causes and adjust management of respiratory failure as follows-     - BNP elevated 2717 on admission  - COVID positive on admission  - CTA chest and abdomen 6/3 : New RUL atelectasis, presumably related to occlusion of RUL bronchus and progression of malignancy. New large R and moderate L pleural effusions, partially loculated.  Malignant pleural fluid is favored.  B/l Interstitial and airspace opacities.    Large mass in the right posterior shoulder presumably metastatic with questionable subtle osseous metastatic disease. Enlarged mediastinal and right axillary lymph nodes suggestive of metastatic disease. Indeterminate liver lesions.   No evidence of acute PE. Asymmetric decreased opacification of the left common iliac artery- limited by suboptimal bolus timing.  Chronic or recent occlusion     - For COVID - started remdesivir after talking to Dr. Peraza - no limitation to steroids use. Got Dexamethasone 6mg IV  x1 dose in ED - will start if no improvement with steroids and diuresis  - For loculated pleural effusions and HF exacerbation - lasix 40 in the ED , start lasix 60 IV BID for now  -pulmonary consulted for need drainage as potential of malignancy effusion  - known lung CA - now seems metastatic  - possible obstruction of RUL bronchus by mass extension   - will benefit from palliative care consult - to be discussed with pt and daughter  - Has COPD but does not seem to be in exacerbation - steroids if resp status not improving    - Echo 6/4; 45 - 50%. Grade II diastolic dysfunction. Normal RV size and systolic function. Moderate MR,   PASP 39 mmHg. IVC 3 mmHg. small  pericardial effusion adjacent to the left atrium. Left pleural effusion.  - Pulmonary consulted - recommend against bronch and thoracentesis at this time based on discussion with pt and family  - s/p IV lasix --> PO lasix 40mg starting 6/7  - CXR ordered for 6/6  - Remdesivir x 5 days starting 6/3  - Dexamethasone on admission x1  - Duonebs PRN  - Encourage IS

## 2025-06-06 NOTE — ASSESSMENT & PLAN NOTE
Improved  Recent Cipro exposure as well as IV abx in hospital. COVID can also cause diarrhea.   Cdiff negative  GI panel negative  Stool culture NGTD  COVID management as above  PRN meds not needed so far

## 2025-06-06 NOTE — PT/OT/SLP PROGRESS
Physical Therapy Treatment    Patient Name:  Radha Feng   MRN:  [id number]    Recommendations:     Discharge Recommendations: Moderate Intensity Therapy  Discharge Equipment Recommendations:    Barriers to discharge: Inaccessible home and increased level of assistance     Assessment:     Radha Feng is a 84 y.o. female admitted with a medical diagnosis of Acute hypoxemic respiratory failure.  She presents with the following impairments/functional limitations: weakness, impaired endurance, impaired self care skills, impaired functional mobility, gait instability, decreased lower extremity function, decreased safety awareness, orthopedic precautions. Continued emphasis on bed mobility, transfers and gait training. Patient insisted on on her daughter helping her up from supine to sitting on edge of bed to get to commode. Patient will continue to benefit from skilled PT to address deficits and improve function mobility.       Rehab Prognosis: Good; patient would benefit from acute skilled PT services to address these deficits and reach maximum level of function.    Recent Surgery: * No surgery found *      Plan:     During this hospitalization, patient to be seen 4 x/week to address the identified rehab impairments via gait training, therapeutic activities, therapeutic exercises, neuromuscular re-education and progress toward the following goals:    Plan of Care Expires:  07/05/25    Subjective     Chief Complaint: Patient reports that she desires to build her strength back up.   Patient/Family Comments/goals: Patient agreeable to PT.  Pain/Comfort:  Pain Rating 1: 0/10      Objective:     Communicated with nurser and patient's daughter prior to session.  Patient found supine with telemetry, peripheral IV, PureWick, oxygen upon PT entry to room.     General Precautions: Standard, fall  Orthopedic Precautions: spinal precautions  Braces: TLSO  Respiratory Status: Nasal cannula, flow 1 L/min     Functional  Mobility:  Bed Mobility:     Rolling Right: minimum assistance of 1 person  Scooting: minimum assistance of 1 person  Supine to Sit: minimum assistance of 1 person   Transfers:     Sit to Stand:  stand by assistance with rolling walker  Gait: Patient ambulated 12ft with RW/CGA with chair follow  Balance: Sitting of EOB: Good       AM-PAC 6 CLICK MOBILITY  Turning over in bed (including adjusting bedclothes, sheets and blankets)?: 3  Sitting down on and standing up from a chair with arms (e.g., wheelchair, bedside commode, etc.): 3  Moving from lying on back to sitting on the side of the bed?: 3  Moving to and from a bed to a chair (including a wheelchair)?: 3  Need to walk in hospital room?: 3  Climbing 3-5 steps with a railing?: 2  Basic Mobility Total Score: 17       Treatment & Education:  Patient educated on HEP and importance of consistency to improve strength.  -LAQ x 10 diana  -Seated marches x 10 diana       Patient left up in chair with all lines intact and call button in reach..    GOALS:   Multidisciplinary Problems       Physical Therapy Goals          Problem: Physical Therapy    Goal Priority Disciplines Outcome Interventions   Physical Therapy Goal     PT, PT/OT Progressing    Description: Goals to be met by: 2025     Patient will increase functional independence with mobility by performin. Supine to sit with Stand-by Assistance  2. Sit to supine with Stand-by Assistance  3. Sit to stand transfer with Stand-by Assistance with RW  4. Bed to chair transfer with Stand-by Assistance using Rolling Walker  5. Gait  x 50 feet with Stand-by Assistance using Rolling Walker.   6. Ascend/descend 6 stair with left or right Handrails Stand-by Assistance using Single-point Cane or no AD.   7. Lower extremity exercise program x10 reps per handout, with supervision                         DME Justifications:   Radha's mobility limitation cannot be sufficiently resolved by the use of a cane. Her functional  mobility deficit can be sufficiently resolved with the use of a Rolling Walker. Patient's mobility limitation significantly impairs their ability to participate in one of more activities of daily living.  The use of a RW will significantly improve the patient's ability to participate in MRADLS and the patient will use it on regular basis in the home.    Time Tracking:     PT Received On: 06/06/25  PT Start Time: 1146     PT Stop Time: 1219  PT Total Time (min): 33 min     Billable Minutes: Gait Training 18 and Therapeutic Exercise 15    Treatment Type: Treatment  PT/PTA: PTA     Number of PTA visits since last PT visit: 1 06/06/2025

## 2025-06-06 NOTE — PROGRESS NOTES
[facility]  Progress Note    Patient Name: Radha Feng  MRN: [id number]  Patient Class: IP- Inpatient   Admission Date: 6/3/2025  Length of Stay: 3 days  Attending Physician: Eric Boggs MD  Primary Care Provider: Leticia Lim MD        Subjective     Principal Problem:Acute hypoxemic respiratory failure        HPI:  84 y.o. 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     Daughter reports that patient has been having loose stools for almost a week - they were somwhat formed but loose initially but for the last 2-3 days they have been watery - 4 times a day. This morning associated with some abdominal discomfort as well. She was discharged on ciprofloxacin for UTI after admission for L1 fracture post fall and being treated with IV abx in house. She has been taking it. This am daughter who is an RN and has a private job noticed stool to be melanotic.  She did get Pepto-Bismol yesterday.  Daughter also noticed that her stool were foul-smelling.  She denies any urinary complaints.  She had a few episodes of vomiting about 1 week ago in the setting of stopping the antibiotic but none since, nausea this morning with no vomiting.     Secondarily daughter says that she has been more short of breath with sats going from 88-94% on room air where she is normally 95-97% on room air.  She does have a new cough that is occasionally productive of sputum.  No chest pain. Daughter has been  holding home PO lasix as she was worried about dehydration related to diarrhea.   She has some mild right ankle swelling but no calf swelling or redness.  Patient was also complaining of pain in her right 4th toe which is new and is not related to redness or any trauma. She takes tylenol 1g TID at home. She uses dexcom and insulin pump at home. Daughter wanted to make sure I talk to Dr. Orr before ordering steroids or redesivir for COVID - I talked to Dr. HO and was told that there are no limitations to use either or both medications.     In the ED she was found to have COVID 19 infection and CT showed bilateral loculated pleural effusions with possible RUL bronchus obstruction. Being admitted for workup and management. She was initially given 500 ml VF for diarrhea but then iso pleural effusions was given lasix 40 IV. She was given dexamethasone IV 6 mg x1 . Placed on O2 for saturation 90-94%.     Overview/Hospital Course:  PT admitted to  for workup and management of AHRF 2/2 COVID infection superimposed on R lung mass. Pt placed on Remdesivir for COVID, EDT 6/7. Started on IV diuresis given evidence of volume overload on admission. Seen by palliative care and pulm. No plan for bronch or thoracentesis per pulm and recommend to continue diuresis, also recommend to hold off on steroids for COVID as improving oxygen requirement and has DM with hyperglycemia. Also dicussed with vascular surgery regarding about CT mention of L common iliac incomplete assessment about chronic or recent occlusion. Pulses palpable bilaterally, no discoloration - per vascular no surveillance or further imaging needed. For management of atherosclerotic risk factors can consider recommend ASA 81, statin if no GI bleed and no concern for autoimmune hepatitis perspective. Will defer for now as hb starting to come up with dark stools on admission and reluctance on meds iso AI hepatitis.    Interval History: NAEON. Patient seen and examined  at bedside. Satting well on 1LNC.     Review of Systems   Constitutional:  Positive for fatigue. Negative for chills and fever.   HENT:  Negative for congestion and sore throat.    Respiratory:  Positive for cough. Negative for shortness of breath.    Cardiovascular:  Negative for chest pain and palpitations.   Gastrointestinal:  Negative for abdominal pain, nausea and vomiting.   Genitourinary:  Negative for dysuria and frequency.   Musculoskeletal:  Negative for arthralgias and back pain.   Skin:  Negative for rash and wound.   Neurological:  Negative for dizziness and headaches.   Psychiatric/Behavioral:  Negative for agitation and confusion.      Objective:     Vital Signs (Most Recent):  Temp: 96.9 °F (36.1 °C) (06/06/25 1136)  Pulse: 67 (06/06/25 1136)  Resp: 18 (06/06/25 1136)  BP: 99/62 (06/06/25 1136)  SpO2: 98 % (06/06/25 1136) Vital Signs (24h Range):  Temp:  [96.7 °F (35.9 °C)-98.3 °F (36.8 °C)] 96.9 °F (36.1 °C)  Pulse:  [66-81] 67  Resp:  [18-20] 18  SpO2:  [93 %-100 %] 98 %  BP: ()/(62-81) 99/62     Weight: 49 kg (108 lb 0.4 oz)  Body mass index is 17.98 kg/m².    Intake/Output Summary (Last 24 hours) at 6/6/2025 1447  Last data filed at 6/6/2025 1153  Gross per 24 hour   Intake --   Output 600 ml   Net -600 ml         Physical Exam  Constitutional:       Appearance: She is ill-appearing.   HENT:      Head: Normocephalic and atraumatic.      Nose: Nose normal.      Mouth/Throat:      Mouth: Mucous membranes are moist.      Pharynx: Oropharynx is clear.   Eyes:      Extraocular Movements: Extraocular movements intact.      Pupils: Pupils are equal, round, and reactive to light.   Cardiovascular:      Rate and Rhythm: Normal rate.   Pulmonary:      Effort: Pulmonary effort is normal. No respiratory distress.      Breath sounds: No rales.      Comments: Diminished sounds in lower lung fields.   Abdominal:      General: Abdomen is flat. There is no distension.      Palpations: Abdomen is soft.       Tenderness: There is no abdominal tenderness.   Musculoskeletal:         General: Normal range of motion.      Cervical back: Normal range of motion. No rigidity.      Right lower leg: No edema.      Left lower leg: No edema.      Comments: R 4th toe no erythema or swelling   Skin:     General: Skin is warm and dry.   Neurological:      General: No focal deficit present.      Mental Status: She is alert and oriented to person, place, and time.   Psychiatric:         Mood and Affect: Mood normal.         Behavior: Behavior normal.               Significant Labs: All pertinent labs within the past 24 hours have been reviewed.  CBC:   Recent Labs   Lab 06/05/25 0459 06/06/25 0357   WBC 16.82* 14.40*   HGB 8.5* 8.2*   HCT 27.3* 27.1*    180     CMP:   Recent Labs   Lab 06/04/25 1951 06/05/25 0459 06/06/25 0357    140 141   K 4.3 3.3* 4.2    105 104   CO2 24 26 26   * 116* 100   BUN 18 20 21   CREATININE 0.6 0.6 0.6   CALCIUM 8.5* 8.8 8.9   PROT  --  6.4 6.6   ALBUMIN  --  1.7* 1.8*   BILITOT  --  0.4 0.4   ALKPHOS  --  88 89   AST  --  34 35   ALT  --  16 14   ANIONGAP 10 9 11       Significant Imaging: I have reviewed all pertinent imaging results/findings within the past 24 hours.      Assessment & Plan  Acute hypoxemic respiratory failure  Patient with Hypoxic Respiratory failure which is Acute.  she is not on home oxygen. Supplemental oxygen was provided and noted-      .   Signs/symptoms of respiratory failure include- tachypnea and respiratory distress. Contributing diagnoses includes - ARDS, CHF, COPD, Pneumonia, and Lung CA Labs and images were reviewed. Patient Has not had a recent ABG. Will treat underlying causes and adjust management of respiratory failure as follows-     - BNP elevated 2717 on admission  - COVID positive on admission  - CTA chest and abdomen 6/3 : New RUL atelectasis, presumably related to occlusion of RUL bronchus and progression of malignancy. New large R  and moderate L pleural effusions, partially loculated.  Malignant pleural fluid is favored.  B/l Interstitial and airspace opacities.    Large mass in the right posterior shoulder presumably metastatic with questionable subtle osseous metastatic disease. Enlarged mediastinal and right axillary lymph nodes suggestive of metastatic disease. Indeterminate liver lesions.   No evidence of acute PE. Asymmetric decreased opacification of the left common iliac artery- limited by suboptimal bolus timing.  Chronic or recent occlusion     - For COVID - started remdesivir after talking to Dr. Peraza - no limitation to steroids use. Got Dexamethasone 6mg IV  x1 dose in ED - will start if no improvement with steroids and diuresis  - For loculated pleural effusions and HF exacerbation - lasix 40 in the ED , start lasix 60 IV BID for now  -pulmonary consulted for need drainage as potential of malignancy effusion  - known lung CA - now seems metastatic  - possible obstruction of RUL bronchus by mass extension   - will benefit from palliative care consult - to be discussed with pt and daughter  - Has COPD but does not seem to be in exacerbation - steroids if resp status not improving    - Echo 6/4; 45 - 50%. Grade II diastolic dysfunction. Normal RV size and systolic function. Moderate MR,  PASP 39 mmHg. IVC 3 mmHg. small  pericardial effusion adjacent to the left atrium. Left pleural effusion.  - Pulmonary consulted - recommend against bronch and thoracentesis at this time based on discussion with pt and family  - s/p IV lasix --> PO lasix 40mg starting 6/7  - CXR ordered for 6/6  - Remdesivir x 5 days starting 6/3  - Dexamethasone on admission x1  - Duonebs PRN  - Encourage IS      Type 2 diabetes mellitus with circulatory disorder, with long-term current use of insulin  Patient's FSGs are controlled on current medication regimen.  Last A1c reviewed-   Lab Results   Component Value Date    HGBA1C 6.0 (H) 06/21/2024     Most recent  "fingerstick glucose reviewed- No results for input(s): "POCTGLUCOSE" in the last 24 hours.    Antihyperglycemics (From admission, onward)      Start     Stop Route Frequency Ordered    06/03/25 1745  insulin aspart U-100 insulin pump from home        Question Answer Comment   Target number 120    Basal Rate #1 0.45    Basal rate #1 time 5258-0365        -- SubQ Continuous 06/03/25 1637    06/03/25 1736  insulin aspart U-100 insulin pump from home 0-10 Units        Question Answer Comment   Target number 120    Carbohydrate coverage #1 1:12    Carbohydrate coverage #1 time 6112-5704    Sensitivity #1 [id number]    Sensitivity #1 time 1:50        -- SubQ As needed (PRN) 06/03/25 1637          Hold Oral hypoglycemics while patient is in the hospital.  Has insulin pump and dexcom  Endocrinology consulted  - appreciate       Hypokalemia  RESOLVED  Patient's most recent potassium results are listed below.   Recent Labs     06/04/25  1951 06/05/25  0459 06/06/25  0357   K 4.3 3.3* 4.2     Plan  - Replete potassium per protocol  - Monitor potassium daily  - Patient's hypokalemia is improving  - Monitor with diuresis  Adrenal cortical steroids causing adverse effect in therapeutic use      Insulin pump in place  Endo consulted    COVID-19  Patient is identified as Severe COVID-19 based on hypoxemia with O2 saturations <94% on room air or on ambulation   Initiate standard COVID protocols; COVID-19 testing ,Infection Control notification  and isolation- respiratory, contact and droplet per protocol    Diagnostics: CBC, CMP, Ferritin, CRP, and Portable CXR    Management: Initiate targeted therapy with Remdesivir, 200mg IV x1, followed by 100mg IV daily x5 days total, Maintain oxygen saturations 92-96% via Nasal Cannula 1 LPM and monitor with continuous/intermittent pulse oximetry. , and Inhaled bronchodilators as needed for shortness of breath. Got one dose of dexamethaosne on admission - will start if not improving or worsening " as sats were 90-94% before O2 was placed.     Advance Care Planning  Current advance care plan has been discussed with patient/family/POA and patient currently wishes DNR (Do Not Resuscitate).   BMI < 18.5  Nutrition consult    Atelectasis  I have personally reviewed CT Scan. Patient with atelectasis on interpretation. Likely Obstructive atelectasis secondary to malignancy. Signs and symptoms include Shortness of breath Will begin treatment with incentive spirometry.  Pleural effusion  Patient found to have large pleural effusion on imaging. I have personally reviewed and interpreted the following imaging: CT. A thoracentesis was deferred. Most likely etiology includes Congestive Heart Failure. Management to include Diuresis and Pulm consult    - Seen by pulm - no plan for bronch or thoracentesis at this time - risk of re accumulation  - Diuresis as above  Diarrhea  Improved  Recent Cipro exposure as well as IV abx in hospital. COVID can also cause diarrhea.   Cdiff negative  GI panel negative  Stool culture NGTD  COVID management as above  PRN meds not needed so far            Elevated troponin  No chest pain , breathing improved with diuresis and remdesivir    Trop 753 - 614  Likley demand ischemia iso COVID, HF and pleural effusions  Daughter does not even want lovenox or heparin dvt ppx so was discontinued - SCDs for dvt ppx.   ACP (advance care planning)  Advance Care Planning   Discussed with patient and daughter at bedside on admission. DNR/DNI. Status updated.     Further discussed palliative care involvement given metastatic disease - daughter and patient agreed to see palliative care as was supposed to see on 6/19 as outpatient but was pre-poned to 6/9 or 6/10 after hospitalization. Palliative team following        Total of 15 minutes spent in GOC and ACP conversations on 6/4.   Toe pain  Likely iso arthritis  R 4th toe pain   Xray unremarkable -negative for fracture  No cellulitis or swelling concerning  for gout   Per daughter was using tobramycin drops for toe at home as recommended    Diclofenac gel  -with improvement in pain    Also dicussed with vascular surgery on 6/4/25 regarding about CT mention of L common iliac incomplete assessment about chronic or recent occlusion. Pulses palpable bilaterally, no discoloration - per vascular no surveillance or further imaging needed. For management of atherosclerotic risk factors can consider recommend ASA 81, statin if no GI bleed and no concern for autoimmune hepatitis perspective. Will defer for now as hb starting to come up with dark stools on admission and reluctance on meds iso AI hepatitis - will discuss as stabilizes more or as outpatient as its for primary ppx.     Autoimmune hepatitis  -- C/w prednisone 2.5 daily    Adenocarcinoma of right lung  - CTA chest and abdomen 6/3 : New RUL atelectasis, presumably related to occlusion of RUL bronchus and progression of malignancy. New large R and moderate L pleural effusions, partially loculated.  Malignant pleural fluid is favored.  B/l Interstitial and airspace opacities.     Large mass in the right posterior shoulder presumably metastatic with questionable subtle osseous metastatic disease. Enlarged mediastinal and right axillary lymph nodes suggestive of metastatic disease. Indeterminate liver lesions.   No evidence of acute PE. Asymmetric decreased opacification of the left common iliac artery- limited by suboptimal bolus timing.  Chronic or recent occlusion can not be excluded    - holding of on AC as not confirmed and family does not want AC at this time - also given possible GI bleed  - Will discuss with vascular to review  - Palliative consulted  - Rad onc and heme/onc as outpatient    Per Rad onc 5/30 -   -With AI hepatitis on steroids, IO is not recommended by med onc and with age and co morbidities, she does not wish to proceed with cytotoxic chemotherapy. She already received a high dose to the mediastinum  and hilum and given asymptomatic, I do not favor re-irradiation.  -Discussed will treat R posterior shoulder but will also evaluate if the R SCV and Axillary adenopathy can safely be treated as well.   -Paliative care meet up   Anemia  Anemia is likely due to ACD iso cancer with ? Blood loss. Most recent hemoglobin and hematocrit are listed below.  Recent Labs     06/04/25  0453 06/05/25  0459 06/06/25  0357   HGB 7.4* 8.5* 8.2*   HCT 24.4* 27.3* 27.1*     Plan  - Monitor serial CBC: Daily  - Transfuse PRBC if patient becomes hemodynamically unstable, symptomatic or H/H drops below 7/21.  - Patient has not received any PRBC transfusions to date  - Patient's anemia is currently worsening. Will Monitor  - Dark stools have resolved and were possibly related to Pepto bismol intake at home per daughter  - Will involve GI if hb drops further or if dark stools re start.    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 The cancer has metastasized to shoulder. The patient is under the care of an outpatient oncologist. The patient is not undergoing active chemotherapy. .Their staging information is listed below.    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        VTE Risk Mitigation (From admission, onward)           Ordered     IP VTE HIGH RISK PATIENT  Once         06/03/25 1527     Place sequential compression device  Until discontinued         06/03/25 1527     IP VTE HIGH RISK PATIENT  Once         06/03/25 1527     Place sequential compression device  Until discontinued         06/03/25 1527                    Discharge Planning   MELVI: 6/7/2025     Code Status: DNR   Medical Readiness for Discharge  Date:   Discharge Plan A: Home, Home with family, Other (Referral for outpatient case management.)                Please place Justification for DME        Eric Boggs MD  Department of Hospital Medicine   [facility]

## 2025-06-06 NOTE — PROGRESS NOTES
"[facility]  Endocrinology  Progress Note    Admit Date: 6/3/2025     Reason for Consult: Management of T2DM, Hyperglycemia      Diabetes diagnosis year: > 5 years ago      Home Diabetes Medications:    Omnipod 5  Basal Rate  12A:  0.45 units/hr      Carb Ratio  12A:12  6A: 10  6P: 12     ISF  12A: 50      Target: 120  Correct above: 140     IAT: 4 hours        How often checking glucose at home? >4 x day Dexcom   BG readings on regimen: 100s-200s    Hypoglycemia on the regimen? No   Missed doses on regimen?  No     Diabetes Complications include:     Hyperglycemia     Complicating diabetes co morbidities:   Glucocorticoid use         HPI: Radha Feng is a 84 y.o. 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 Endocrine consulted for BG management.       Interval HPI:   Overnight events: No acute events overnight. Patient in room 606/606 A. Blood glucose stable. BG at goal on current insulin regimen (Home Insulin Pump). Steroid use- Prednisone 2.5 mg QD.    Renal function- Normal   Vasopressors-  None       Endocrine will continue to follow and manage insulin orders inpatient.         Diet Adult Regular     Eatin%  Nausea: No  Hypoglycemia and intervention: No  Fever: No  TPN and/or TF: No  If yes, type of TF/TPN and rate: n/a    /72   Pulse 66   Temp 98.2 °F (36.8 °C) (Axillary)   Resp 18   Ht 5' 5" (1.651 m)   Wt 49 kg (108 lb 0.4 oz)   SpO2 95%   Breastfeeding No   BMI 17.98 kg/m²     Labs Reviewed and Include    Recent Labs   Lab 25  0357      CALCIUM 8.9   ALBUMIN 1.8*   PROT 6.6 " "     K 4.2   CO2 26      BUN 21   CREATININE 0.6   ALKPHOS 89   ALT 14   AST 35   BILITOT 0.4     Lab Results   Component Value Date    WBC 14.40 (H) 06/06/2025    HGB 8.2 (L) 06/06/2025    HCT 27.1 (L) 06/06/2025    MCV 90 06/06/2025     06/06/2025     No results for input(s): "TSH", "FREET4" in the last 168 hours.  Lab Results   Component Value Date    HGBA1C 6.0 (H) 06/21/2024       Nutritional status:   Body mass index is 17.98 kg/m².  Lab Results   Component Value Date    ALBUMIN 1.8 (L) 06/06/2025    ALBUMIN 1.7 (L) 06/05/2025    ALBUMIN 1.6 (L) 06/04/2025     No results found for: "PREALBUMIN"    Estimated Creatinine Clearance: 55 mL/min (based on SCr of 0.6 mg/dL).    Accu-Checks  No results for input(s): "POCTGLUCOSE" in the last 72 hours.    Current Medications and/or Treatments Impacting Glycemic Control  Immunotherapy:    Immunosuppressants       None          Steroids:   Hormones (From admission, onward)      Start     Stop Route Frequency Ordered    06/04/25 0900  predniSONE tablet 2.5 mg         -- Oral Daily 06/03/25 1527    06/03/25 1611  melatonin tablet 6 mg         -- Oral Nightly PRN 06/03/25 1527          Pressors:    Autonomic Drugs (From admission, onward)      None          Hyperglycemia/Diabetes Medications:   Antihyperglycemics (From admission, onward)      Start     Stop Route Frequency Ordered    06/03/25 1745  insulin aspart U-100 insulin pump from home        Question Answer Comment   Target number 120    Basal Rate #1 0.45    Basal rate #1 time 3380-5345        -- SubQ Continuous 06/03/25 1637    06/03/25 1736  insulin aspart U-100 insulin pump from home 0-10 Units        Question Answer Comment   Target number 120    Carbohydrate coverage #1 1:12    Carbohydrate coverage #1 time 7896-5762    Sensitivity #1 0904-0532    Sensitivity #1 time 1:50        -- SubQ As needed (PRN) 06/03/25 1637            ASSESSMENT and PLAN    Pulmonary  * Acute hypoxemic respiratory " failure  Managed per primary team      Endocrine  Insulin pump in place  At time of evaluation, pt meets criteria to continue home insulin pump usage.  - Has all adequate supplies   - Bolus settings reviewed    - No changes to home regimen.   - Nurse to check BG qac/hs/0200 & record in epic   - Patient to input glucose into pump and use bolus wizard for prandial needs   - Will continue to monitor accuchecks and titrate insulin as clinically indicated .     - Discussed above plan with patient, patient verbalized understanding.   - Understands in case of pump malfunction or cognitive decline in which pt can no longer safely use insulin pump, will transition to SC MDI       If pump malfunctions or is disconnected, please call endocrine.         Type 2 diabetes mellitus with circulatory disorder, with long-term current use of insulin  BG goal: 140-180  T2DM on Omnipod 5. COVID (+). Received Dex 6 mg in ED. Plan for 2.5 mg Prednisone QD. Will monitor on insulin pump for now.      - Continue Home insulin pump   - POCT Glucose before meals, at bedtime and at 2 am  - Hypoglycemia protocol in place      ** Please notify Endocrine for any change and/or advance in diet**  ** Please call Endocrine for any BG related issues **     Discharge Planning:   TBD. Please notify endocrinology prior to discharge.        Other  Adrenal cortical steroids causing adverse effect in therapeutic use  Glucocorticoids markedly increase glucose levels. Expect the steroid taper will help glucose control.             Michelle Garcia NP  Endocrinology  [facility] - Med Surg

## 2025-06-06 NOTE — HOSPITAL COURSE
PT admitted to  for workup and management of AHRF 2/2 COVID infection superimposed on R lung mass. Pt placed on Remdesivir for COVID, EDT 6/7. Started on IV diuresis given evidence of volume overload on admission. Seen by palliative care and pulm. No plan for bronch or thoracentesis per pulm and recommend to continue diuresis, also recommend to hold off on steroids for COVID as improving oxygen requirement and has DM with hyperglycemia. Also dicussed with vascular surgery regarding about CT mention of L common iliac incomplete assessment about chronic or recent occlusion. Pulses palpable bilaterally, no discoloration - per vascular no surveillance or further imaging needed. For management of atherosclerotic risk factors can consider recommend ASA 81, statin if no GI bleed and no concern for autoimmune hepatitis perspective. Will defer for now as hb starting to come up with dark stools on admission and reluctance on meds iso AI hepatitis. On 6/7 patient completed course of remdesivir, symptomatically improved. S/p 6MWT qualifying for home O2 s/o known COPD and lung cancer. PT/OT recommending moderate intensity therapy, however patient/family declining therapy at this time. Pt stable for DC to home with close multidisciplinary follow-up on 6/7. All questions answered and patient/family agreeable to plan.

## 2025-06-06 NOTE — PT/OT/SLP PROGRESS
"Occupational Therapy   Treatment    Name: Radha Feng  MRN: [id number]  Admitting Diagnosis:  Acute hypoxemic respiratory failure       Recommendations:     Discharge Recommendations: Moderate Intensity Therapy  Discharge Equipment Recommendations:  bath bench  Barriers to discharge:  Other (Comment) (increased skilled (A) needed)    Assessment:     Radha Feng is a 84 y.o. female with a medical diagnosis of Acute hypoxemic respiratory failure.  She presents with the following performance deficits affecting function: weakness, impaired functional mobility, impaired endurance, gait instability, impaired self care skills, decreased lower extremity function, pain, decreased safety awareness, orthopedic precautions.     Rehab Prognosis:  Good; patient would benefit from acute skilled OT services to address these deficits and reach maximum level of function.       Plan:     Patient to be seen 4 x/week to address the above listed problems via self-care/home management, therapeutic activities, therapeutic exercises  Plan of Care Expires: 06/26/25  Plan of Care Reviewed with: patient    Subjective     Patient/Family Comments/goals: "I'm not getting up, I just got back in bed"  Pain/Comfort:  Pain Rating 1:  (unrated)  Location - Side 1: Bilateral  Location - Orientation 1: generalized  Location 1: arm  Pain Addressed 1: Reposition, Distraction, Cessation of Activity  Pain Rating Post-Intervention 1:  (unrated)    Objective:     Communicated with: RN prior to session.  Patient found HOB elevated with telemetry, peripheral IV, PureWick, oxygen upon OT entry to room.    General Precautions: Standard, fall, contact, airborne, droplet    Orthopedic Precautions:spinal precautions  Braces: TLSO  Respiratory Status: Room air     Occupational Performance:     Bed Mobility:    Not performed     [facility] 6 Click ADL: 19    Treatment & Education:  Pt educated on OT POC and frequency during hospital stay.   Pt educated on " importance of OOB activity to improve function and activity tolerance.  Pt performing UE exercises through all planes for 10 reps.   Addressed all patient questions/concerns within [facility]LV[facility] scope of practice.     Patient left HOB elevated with all lines intact, call button in reach, and RN notified    GOALS:   Multidisciplinary Problems       Occupational Therapy Goals          Problem: Occupational Therapy    Goal Priority Disciplines Outcome Interventions   Occupational Therapy Goal     OT, PT/OT Progressing    Description: Goals to be met by: 6/26/25     Patient will increase functional independence with ADLs by performing:    UE Dressing with Stand-by Assistance.  LE Dressing with Stand-by Assistance.  Grooming while bedside chair with Stand-by Assistance.  Toileting from bedside commode with Stand-by Assistance for hygiene and clothing management.   Supine to sit with Contact Guard Assistance.  Step transfer with Contact Guard Assistance  Toilet transfer to bedside commode with Contact Guard Assistance.                       Time Tracking:     OT Date of Treatment: 06/06/25  OT Start Time: 1428  OT Stop Time: 1436  OT Total Time (min): 8 min    Billable Minutes:Therapeutic Exercise 8    OT/MANFRED: MANFRED     Number of MANFRED visits since last OT visit: 1    6/6/2025

## 2025-06-06 NOTE — CONSULTS
[facility] VASCULAR ACCESS NOTE       Bed:606/606 A    NIAS consulted for PIV insertion in real time using ultrasound guidance.    Patient's nurse obtained PIV access after consult placed.    Re consult NIAS if needed.    Susanna Sinha RN

## 2025-06-06 NOTE — ASSESSMENT & PLAN NOTE
"Patient's FSGs are controlled on current medication regimen.  Last A1c reviewed-   Lab Results   Component Value Date    HGBA1C 6.0 (H) 06/21/2024     Most recent fingerstick glucose reviewed- No results for input(s): "POCTGLUCOSE" in the last 24 hours.    Antihyperglycemics (From admission, onward)      Start     Stop Route Frequency Ordered    06/03/25 1745  insulin aspart U-100 insulin pump from home        Question Answer Comment   Target number 120    Basal Rate #1 0.45    Basal rate #1 time 2905-8233        -- SubQ Continuous 06/03/25 1637    06/03/25 1736  insulin aspart U-100 insulin pump from home 0-10 Units        Question Answer Comment   Target number 120    Carbohydrate coverage #1 1:12    Carbohydrate coverage #1 time 5525-5687    Sensitivity #1 2453-7188    Sensitivity #1 time 1:50        -- SubQ As needed (PRN) 06/03/25 1637          Hold Oral hypoglycemics while patient is in the hospital.  Has insulin pump and dexcom  Endocrinology consulted  - appreciate       "

## 2025-06-06 NOTE — ASSESSMENT & PLAN NOTE
At time of evaluation, pt meets criteria to continue home insulin pump usage.  - Has all adequate supplies   - Bolus settings reviewed    - No changes to home regimen.   - Nurse to check BG qac/hs/0200 & record in epic   - Patient to input glucose into pump and use bolus wizard for prandial needs   - Will continue to monitor accuchecks and titrate insulin as clinically indicated .     - Discussed above plan with patient, patient verbalized understanding.   - Understands in case of pump malfunction or cognitive decline in which pt can no longer safely use insulin pump, will transition to SC MDI       If pump malfunctions or is disconnected, please call endocrine.

## 2025-06-06 NOTE — SUBJECTIVE & OBJECTIVE
Interval History: NAEON. Patient seen and examined at bedside. Satting well on 1LNC.     Review of Systems   Constitutional:  Positive for fatigue. Negative for chills and fever.   HENT:  Negative for congestion and sore throat.    Respiratory:  Positive for cough. Negative for shortness of breath.    Cardiovascular:  Negative for chest pain and palpitations.   Gastrointestinal:  Negative for abdominal pain, nausea and vomiting.   Genitourinary:  Negative for dysuria and frequency.   Musculoskeletal:  Negative for arthralgias and back pain.   Skin:  Negative for rash and wound.   Neurological:  Negative for dizziness and headaches.   Psychiatric/Behavioral:  Negative for agitation and confusion.      Objective:     Vital Signs (Most Recent):  Temp: 96.9 °F (36.1 °C) (06/06/25 1136)  Pulse: 67 (06/06/25 1136)  Resp: 18 (06/06/25 1136)  BP: 99/62 (06/06/25 1136)  SpO2: 98 % (06/06/25 1136) Vital Signs (24h Range):  Temp:  [96.7 °F (35.9 °C)-98.3 °F (36.8 °C)] 96.9 °F (36.1 °C)  Pulse:  [66-81] 67  Resp:  [18-20] 18  SpO2:  [93 %-100 %] 98 %  BP: ()/(62-81) 99/62     Weight: 49 kg (108 lb 0.4 oz)  Body mass index is 17.98 kg/m².    Intake/Output Summary (Last 24 hours) at 6/6/2025 1447  Last data filed at 6/6/2025 1153  Gross per 24 hour   Intake --   Output 600 ml   Net -600 ml         Physical Exam  Constitutional:       Appearance: She is ill-appearing.   HENT:      Head: Normocephalic and atraumatic.      Nose: Nose normal.      Mouth/Throat:      Mouth: Mucous membranes are moist.      Pharynx: Oropharynx is clear.   Eyes:      Extraocular Movements: Extraocular movements intact.      Pupils: Pupils are equal, round, and reactive to light.   Cardiovascular:      Rate and Rhythm: Normal rate.   Pulmonary:      Effort: Pulmonary effort is normal. No respiratory distress.      Breath sounds: No rales.      Comments: Diminished sounds in lower lung fields.   Abdominal:      General: Abdomen is flat. There is no  distension.      Palpations: Abdomen is soft.      Tenderness: There is no abdominal tenderness.   Musculoskeletal:         General: Normal range of motion.      Cervical back: Normal range of motion. No rigidity.      Right lower leg: No edema.      Left lower leg: No edema.      Comments: R 4th toe no erythema or swelling   Skin:     General: Skin is warm and dry.   Neurological:      General: No focal deficit present.      Mental Status: She is alert and oriented to person, place, and time.   Psychiatric:         Mood and Affect: Mood normal.         Behavior: Behavior normal.               Significant Labs: All pertinent labs within the past 24 hours have been reviewed.  CBC:   Recent Labs   Lab 06/05/25 0459 06/06/25 0357   WBC 16.82* 14.40*   HGB 8.5* 8.2*   HCT 27.3* 27.1*    180     CMP:   Recent Labs   Lab 06/04/25 1951 06/05/25 0459 06/06/25 0357    140 141   K 4.3 3.3* 4.2    105 104   CO2 24 26 26   * 116* 100   BUN 18 20 21   CREATININE 0.6 0.6 0.6   CALCIUM 8.5* 8.8 8.9   PROT  --  6.4 6.6   ALBUMIN  --  1.7* 1.8*   BILITOT  --  0.4 0.4   ALKPHOS  --  88 89   AST  --  34 35   ALT  --  16 14   ANIONGAP 10 9 11       Significant Imaging: I have reviewed all pertinent imaging results/findings within the past 24 hours.

## 2025-06-06 NOTE — ASSESSMENT & PLAN NOTE
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 Blood loss. Most recent hemoglobin and hematocrit are listed below.  Recent Labs     06/04/25  0453 06/05/25  0459 06/06/25  0357   HGB 7.4* 8.5* 8.2*   HCT 24.4* 27.3* 27.1*     Plan  - Monitor serial CBC: Daily  - Transfuse PRBC if patient becomes hemodynamically unstable, symptomatic or H/H drops below 7/21.  - Patient has not received any PRBC transfusions to date  - Patient's anemia is currently worsening. Will Monitor  - Dark stools have resolved and were possibly related to Pepto bismol intake at home per daughter  - Will involve GI if hb drops further or if dark stools re start.    - Encourage pepcid BID use

## 2025-06-06 NOTE — ASSESSMENT & PLAN NOTE
Patient is identified as Severe COVID-19 based on hypoxemia with O2 saturations <94% on room air or on ambulation   Initiate standard COVID protocols; COVID-19 testing ,Infection Control notification  and isolation- respiratory, contact and droplet per protocol    Diagnostics: CBC, CMP, Ferritin, CRP, and Portable CXR    Management: Initiate targeted therapy with Remdesivir, 200mg IV x1, followed by 100mg IV daily x5 days total, Maintain oxygen saturations 92-96% via Nasal Cannula 1 LPM and monitor with continuous/intermittent pulse oximetry. , and Inhaled bronchodilators as needed for shortness of breath. Got one dose of dexamethaosne on admission - will start if not improving or worsening as sats were 90-94% before O2 was placed.     Advance Care Planning  Current advance care plan has been discussed with patient/family/POA and patient currently wishes DNR (Do Not Resuscitate).

## 2025-06-06 NOTE — SUBJECTIVE & OBJECTIVE
Interval History: Supportive conversation at bedside.    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    Diabetes mellitus     Glaucoma     Hypertension     Lung cancer     Other ascites 11/29/2022       Past Surgical History:   Procedure Laterality Date    CATARACT EXTRACTION W/  INTRAOCULAR LENS IMPLANT Left 12/21/2021        ENDOBRONCHIAL ULTRASOUND N/A 07/05/2024    Procedure: ENDOBRONCHIAL ULTRASOUND (EBUS);  Surgeon: Rolo Wilkinson MD;  Location: [facility] OR [address] [place] [address];  Service: Pulmonary;  Laterality: N/A;    ESOPHAGOGASTRODUODENOSCOPY N/A 06/26/2023    Procedure: ESOPHAGOGASTRODUODENOSCOPY (EGD);  Surgeon: Edgar Branch MD;  Location: [facility] (4TH FLR);  Service: Endoscopy;  Laterality: N/A;  referral Dr Peraza-cirrhosis/labs done on 4/24/23-[facility] portal-       Review of patient's allergies indicates:  No Known Allergies    Medications:  Continuous Infusions:   insulin aspart U-100  0-1 Units/hr Subcutaneous Continuous         Scheduled Meds:   brimonidine 0.2%  1 drop Both Eyes TID    diclofenac sodium  2 g Topical (Top) BID    dorzolamide-timolol 2-0.5%  1 drop Both Eyes BID    famotidine  20 mg Oral Daily    [START ON 6/7/2025] furosemide  40 mg Oral Daily    latanoprost  1 drop Both Eyes Daily    LIDOcaine  1 patch Transdermal QHS    mupirocin   Nasal BID    predniSONE  2.5 mg Oral Daily    remdesivir infusion  100 mg Intravenous Daily    vitamin D  2,000 Units Oral Daily     PRN Meds:  Current Facility-Administered Medications:     acetaminophen, 1,000 mg, Oral, Q8H PRN    albuterol-ipratropium, 3 mL, Nebulization, Q6H PRN    dextrose 50%, 12.5 g, Intravenous, PRN    dextrose 50%, 25 g, Intravenous, PRN    glucagon (human recombinant), 1 mg, Intramuscular, PRN    glucose, 16 g, Oral, PRN    glucose, 24 g, Oral, PRN    insulin aspart U-100, 0-10 Units, Subcutaneous, PRN    melatonin, 6 mg, Oral, Nightly PRN    naloxone, 0.02 mg,  Intravenous, PRN    oxyCODONE, 2.5 mg, Oral, Q6H PRN    sodium chloride 0.9%, 10 mL, Intravenous, PRN    sodium chloride 0.9%, 10 mL, Intravenous, Q12H PRN    Family History       Problem Relation (Age of Onset)    Blindness Cousin    Cataracts Mother    Colon cancer Sister    Diabetes Mother    Glaucoma Mother    Heart attack Father    Hypertension Mother          Tobacco Use    Smoking status: Former    Smokeless tobacco: Never   Substance and Sexual Activity    Alcohol use: Not Currently    Drug use: Never    Sexual activity: Not on file       Review of Systems   Constitutional:  Positive for activity change, appetite change and fatigue.   Musculoskeletal:  Positive for arthralgias.   Psychiatric/Behavioral:  Positive for sleep disturbance.      Objective:     Vital Signs (Most Recent):  Temp: 98.1 °F (36.7 °C) (06/06/25 1534)  Pulse: 69 (06/06/25 1534)  Resp: 18 (06/06/25 1534)  BP: 117/78 (06/06/25 1534)  SpO2: 97 % (06/06/25 1534) Vital Signs (24h Range):  Temp:  [96.9 °F (36.1 °C)-98.3 °F (36.8 °C)] 98.1 °F (36.7 °C)  Pulse:  [66-81] 69  Resp:  [18-20] 18  SpO2:  [93 %-100 %] 97 %  BP: ()/(62-81) 117/78     Weight: 49 kg (108 lb 0.4 oz)  Body mass index is 17.98 kg/m².       Physical Exam  Constitutional:       General: She is not in acute distress.  HENT:      Head: Normocephalic and atraumatic.      Right Ear: External ear normal.      Left Ear: External ear normal.      Nose: Nose normal.      Mouth/Throat:      Mouth: Mucous membranes are moist.   Eyes:      Extraocular Movements: Extraocular movements intact.      Conjunctiva/sclera: Conjunctivae normal.   Cardiovascular:      Rate and Rhythm: Normal rate.   Pulmonary:      Effort: Pulmonary effort is normal.      Breath sounds: Normal breath sounds.   Abdominal:      General: Bowel sounds are normal.      Palpations: Abdomen is soft.   Skin:     General: Skin is warm.   Neurological:      Mental Status: She is alert and oriented to person, place,  and time. Mental status is at baseline.   Psychiatric:         Mood and Affect: Mood normal.         Behavior: Behavior normal.            Review of Symptoms      Symptom Assessment (ESAS 0-10 Scale)  Pain:  0  Dyspnea:  0  Anxiety:  0  Nausea:  0  Depression:  0  Anorexia:  0  Fatigue:  0  Insomnia:  0  Restlessness:  0  Agitation:  0         Living Arrangements:  Lives with family    Psychosocial/Cultural:   See Palliative Psychosocial Note: No  Has daughter Della and son who are very supportive. She is a twin, but her twin sister passed away a few years ago. She is one of ten children, six are . 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 Loves going to [employer] and [employer]  **Primary  to Follow**  Palliative Care  Consult: No    Spiritual:  F - Tamar and Belief:  Latter-day  I - Importance:  Important  C - Community:  Involved  A - Address in Care:  Engage  support        Advance Care Planning   Advance Directives:   Living Will: No    LaPOST: No    Do Not Resuscitate Status: Yes    Medical Power of : No      Decision Making:  Patient answered questions  Goals of Care: 25: Supportive conversation with Ms. Feng and her daughter Della at bedside. They are very welcoming of my visit. 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 Della explains to her mother my field (palliative), that it is a focus on alleviating symptom burdens to improve quality of life. I asked about their last clinic visit with her oncologist Dr. Kohler and Della explains to me that Dr. Peraza was worried that immunotherapy would worsen her mother's autoimmune hepatitis. They agreed that in order to protect Ms. Feng that they would avoid further immunotherapy. I asked about their conversation regarding hospice.  "Della explains that they hope to be able to extend her life and "focus on living." She feels that because her mother is not actively dying, hospice is not what would be best for her care and goals. Her mother tells me that she is not afraid of dying, [place] that death comes for all of us. She embraces that she will one day pass and has peace with it. Her daughter reminds her mother many times throughout our conversation that Ms. Feng is not dying and encourages her mother to "focus on living." Validated both that we can accept that we will all die one day, and until that day comes, we can focus on ways to improve quality of life to our best ability in order to protect her ortega and priorities during whatever days God has planned for her. Both nodded in response to this. Della admits that she feels that since her mother was diagnosed formally with lung cancer, her mother suddenly stopped eating due to appetite loss and was more depressed. Della alludes that in being given bad news, this affected her mother's mentality severely and now her mother keeps talking about negative things, like her death. Della admits that they are very different - that Della is an optimist and her mother is a pessimist. Ms. Feng shakes her head to this and reminds me that she is just accepting that she will one day pass, just as many family members before her have. 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  - I took time to explain really what our department is - stressing that unlike hospice, we do not make house visits, are not available 24/7 or at a whim's notice. Della is very understanding of this, and appreciative that they can have palliative support for symptom management while they continue to explore disease-modifying treatments to buy her as much time as possible. Ms. Feng says that she " just wants to focus on being happy. She admits that over the last couple of years, losing her independence has been very difficult to accept. She loves cooking and now her daughter Della cooks for her. Even though Della is an excellent cook, Ms. Feng will always modify her food to claim some control and sense of cooking again. However her appetite is poor and she has lost a lot of weight. In addition to this, she has poor sleep, wakes up due to excruciating RLE pain. Della wonders how a component of depression or anxiety may be influencing her mother's symptom burdens and feels that if these three things can be addressed, her mother might feel better and do better as well.         Significant Labs: CBC:   Recent Labs   Lab 06/05/25 0459 06/06/25 0357   WBC 16.82* 14.40*   HGB 8.5* 8.2*   HCT 27.3* 27.1*    180     CMP:   Recent Labs   Lab 06/04/25 1951 06/05/25 0459 06/06/25 0357    140 141   K 4.3 3.3* 4.2    105 104   CO2 24 26 26   * 116* 100   BUN 18 20 21   CREATININE 0.6 0.6 0.6   CALCIUM 8.5* 8.8 8.9   PROT  --  6.4 6.6   ALBUMIN  --  1.7* 1.8*   BILITOT  --  0.4 0.4   ALKPHOS  --  88 89   AST  --  34 35   ALT  --  16 14   ANIONGAP 10 9 11     CBC:   Recent Labs   Lab 06/06/25 0357   WBC 14.40*   HGB 8.2*   HCT 27.1*   MCV 90        BMP:  Recent Labs   Lab 06/06/25 0357         K 4.2      CO2 26   BUN 21   CREATININE 0.6   CALCIUM 8.9   MG 1.6     LFT:  Lab Results   Component Value Date    AST 35 06/06/2025    ALKPHOS 89 06/06/2025    BILITOT 0.4 06/06/2025     Albumin:   Albumin   Date Value Ref Range Status   06/06/2025 1.8 (L) 3.5 - 5.2 g/dL Final   03/11/2025 2.1 (L) 3.5 - 5.2 g/dL Final     Protein:   Protein Total   Date Value Ref Range Status   06/06/2025 6.6 6.0 - 8.4 gm/dL Final     Total Protein   Date Value Ref Range Status   03/11/2025 7.5 6.0 - 8.4 g/dL Final     Lactic acid:   Lab Results   Component Value Date    LACTATE 6.1  () 10/23/2022       Significant Imaging: I have reviewed all pertinent imaging results/findings within the past 24 hours.

## 2025-06-06 NOTE — SUBJECTIVE & OBJECTIVE
"Interval HPI:   Overnight events: No acute events overnight. Patient in room 606/606 A. Blood glucose stable. BG at goal on current insulin regimen (Home Insulin Pump). Steroid use- Prednisone 2.5 mg QD.    Renal function- Normal   Vasopressors-  None       Endocrine will continue to follow and manage insulin orders inpatient.         Diet Adult Regular     Eatin%  Nausea: No  Hypoglycemia and intervention: No  Fever: No  TPN and/or TF: No  If yes, type of TF/TPN and rate: n/a    /72   Pulse 66   Temp 98.2 °F (36.8 °C) (Axillary)   Resp 18   Ht 5' 5" (1.651 m)   Wt 49 kg (108 lb 0.4 oz)   SpO2 95%   Breastfeeding No   BMI 17.98 kg/m²     Labs Reviewed and Include    Recent Labs   Lab 25  0357      CALCIUM 8.9   ALBUMIN 1.8*   PROT 6.6      K 4.2   CO2 26      BUN 21   CREATININE 0.6   ALKPHOS 89   ALT 14   AST 35   BILITOT 0.4     Lab Results   Component Value Date    WBC 14.40 (H) 2025    HGB 8.2 (L) 2025    HCT 27.1 (L) 2025    MCV 90 2025     2025     No results for input(s): "TSH", "FREET4" in the last 168 hours.  Lab Results   Component Value Date    HGBA1C 6.0 (H) 2024       Nutritional status:   Body mass index is 17.98 kg/m².  Lab Results   Component Value Date    ALBUMIN 1.8 (L) 2025    ALBUMIN 1.7 (L) 2025    ALBUMIN 1.6 (L) 2025     No results found for: "PREALBUMIN"    Estimated Creatinine Clearance: 55 mL/min (based on SCr of 0.6 mg/dL).    Accu-Checks  No results for input(s): "POCTGLUCOSE" in the last 72 hours.    Current Medications and/or Treatments Impacting Glycemic Control  Immunotherapy:    Immunosuppressants       None          Steroids:   Hormones (From admission, onward)      Start     Stop Route Frequency Ordered    25 0900  predniSONE tablet 2.5 mg         -- Oral Daily 25 1527    25 1611  melatonin tablet 6 mg         -- Oral Nightly PRN 25 1527      "     Pressors:    Autonomic Drugs (From admission, onward)      None          Hyperglycemia/Diabetes Medications:   Antihyperglycemics (From admission, onward)      Start     Stop Route Frequency Ordered    06/03/25 1745  insulin aspart U-100 insulin pump from home        Question Answer Comment   Target number 120    Basal Rate #1 0.45    Basal rate #1 time 5091-6084        -- SubQ Continuous 06/03/25 1637    06/03/25 1736  insulin aspart U-100 insulin pump from home 0-10 Units        Question Answer Comment   Target number 120    Carbohydrate coverage #1 1:12    Carbohydrate coverage #1 time 4590-6829    Sensitivity #1 4933-9601    Sensitivity #1 time 1:50        -- SubQ As needed (PRN) 06/03/25 1637

## 2025-06-07 VITALS
HEIGHT: 65 IN | RESPIRATION RATE: 17 BRPM | BODY MASS INDEX: 17.99 KG/M2 | DIASTOLIC BLOOD PRESSURE: 57 MMHG | SYSTOLIC BLOOD PRESSURE: 112 MMHG | TEMPERATURE: 98 F | HEART RATE: 67 BPM | WEIGHT: 108 LBS | OXYGEN SATURATION: 98 %

## 2025-06-07 DIAGNOSIS — U07.1 COVID-19 VIRUS DETECTED: ICD-10-CM

## 2025-06-07 LAB
ABSOLUTE EOSINOPHIL (OHS): 0.03 K/UL
ABSOLUTE MONOCYTE (OHS): 1.16 K/UL (ref 0.3–1)
ABSOLUTE NEUTROPHIL COUNT (OHS): 12.99 K/UL (ref 1.8–7.7)
ALBUMIN SERPL BCP-MCNC: 1.4 G/DL (ref 3.5–5.2)
ALP SERPL-CCNC: 80 UNIT/L (ref 40–150)
ALT SERPL W/O P-5'-P-CCNC: 8 UNIT/L (ref 10–44)
ANION GAP (OHS): 12 MMOL/L (ref 8–16)
AST SERPL-CCNC: 17 UNIT/L (ref 11–45)
BACTERIA STL CULT: NORMAL
BASOPHILS # BLD AUTO: 0.02 K/UL
BASOPHILS NFR BLD AUTO: 0.1 %
BILIRUB SERPL-MCNC: 0.5 MG/DL (ref 0.1–1)
BUN SERPL-MCNC: 20 MG/DL (ref 8–23)
CALCIUM SERPL-MCNC: 7 MG/DL (ref 8.7–10.5)
CHLORIDE SERPL-SCNC: 110 MMOL/L (ref 95–110)
CO2 SERPL-SCNC: 24 MMOL/L (ref 23–29)
CREAT SERPL-MCNC: 0.5 MG/DL (ref 0.5–1.4)
ERYTHROCYTE [DISTWIDTH] IN BLOOD BY AUTOMATED COUNT: 21.2 % (ref 11.5–14.5)
GFR SERPLBLD CREATININE-BSD FMLA CKD-EPI: >60 ML/MIN/1.73/M2
GLUCOSE SERPL-MCNC: 80 MG/DL (ref 70–110)
HCT VFR BLD AUTO: 28.5 % (ref 37–48.5)
HGB BLD-MCNC: 8.5 GM/DL (ref 12–16)
IMM GRANULOCYTES # BLD AUTO: 0.13 K/UL (ref 0–0.04)
IMM GRANULOCYTES NFR BLD AUTO: 0.9 % (ref 0–0.5)
INDIRECT COOMBS: NORMAL
LYMPHOCYTES # BLD AUTO: 0.78 K/UL (ref 1–4.8)
MAGNESIUM SERPL-MCNC: 1.6 MG/DL (ref 1.6–2.6)
MCH RBC QN AUTO: 27.3 PG (ref 27–31)
MCHC RBC AUTO-ENTMCNC: 29.8 G/DL (ref 32–36)
MCV RBC AUTO: 92 FL (ref 82–98)
NUCLEATED RBC (/100WBC) (OHS): 0 /100 WBC
PHOSPHATE SERPL-MCNC: 2.4 MG/DL (ref 2.7–4.5)
PLATELET # BLD AUTO: 176 K/UL (ref 150–450)
PMV BLD AUTO: 11.5 FL (ref 9.2–12.9)
POTASSIUM SERPL-SCNC: 2.9 MMOL/L (ref 3.5–5.1)
PROT SERPL-MCNC: 4.8 GM/DL (ref 6–8.4)
RBC # BLD AUTO: 3.11 M/UL (ref 4–5.4)
RELATIVE EOSINOPHIL (OHS): 0.2 %
RELATIVE LYMPHOCYTE (OHS): 5.2 % (ref 18–48)
RELATIVE MONOCYTE (OHS): 7.7 % (ref 4–15)
RELATIVE NEUTROPHIL (OHS): 85.9 % (ref 38–73)
RH BLD: NORMAL
SODIUM SERPL-SCNC: 146 MMOL/L (ref 136–145)
SPECIMEN OUTDATE: NORMAL
WBC # BLD AUTO: 15.11 K/UL (ref 3.9–12.7)

## 2025-06-07 PROCEDURE — 25000003 PHARM REV CODE 250: Performed by: STUDENT IN AN ORGANIZED HEALTH CARE EDUCATION/TRAINING PROGRAM

## 2025-06-07 PROCEDURE — 86900 BLOOD TYPING SEROLOGIC ABO: CPT | Performed by: OBSTETRICS & GYNECOLOGY

## 2025-06-07 PROCEDURE — 83735 ASSAY OF MAGNESIUM: CPT | Performed by: STUDENT IN AN ORGANIZED HEALTH CARE EDUCATION/TRAINING PROGRAM

## 2025-06-07 PROCEDURE — 85025 COMPLETE CBC W/AUTO DIFF WBC: CPT | Performed by: STUDENT IN AN ORGANIZED HEALTH CARE EDUCATION/TRAINING PROGRAM

## 2025-06-07 PROCEDURE — 80053 COMPREHEN METABOLIC PANEL: CPT | Performed by: STUDENT IN AN ORGANIZED HEALTH CARE EDUCATION/TRAINING PROGRAM

## 2025-06-07 PROCEDURE — 99232 SBSQ HOSP IP/OBS MODERATE 35: CPT | Mod: ,,,

## 2025-06-07 PROCEDURE — 36415 COLL VENOUS BLD VENIPUNCTURE: CPT | Performed by: STUDENT IN AN ORGANIZED HEALTH CARE EDUCATION/TRAINING PROGRAM

## 2025-06-07 PROCEDURE — 36415 COLL VENOUS BLD VENIPUNCTURE: CPT

## 2025-06-07 PROCEDURE — 63600175 PHARM REV CODE 636 W HCPCS

## 2025-06-07 PROCEDURE — 63600175 PHARM REV CODE 636 W HCPCS: Performed by: STUDENT IN AN ORGANIZED HEALTH CARE EDUCATION/TRAINING PROGRAM

## 2025-06-07 PROCEDURE — 25000003 PHARM REV CODE 250

## 2025-06-07 PROCEDURE — 84100 ASSAY OF PHOSPHORUS: CPT | Performed by: STUDENT IN AN ORGANIZED HEALTH CARE EDUCATION/TRAINING PROGRAM

## 2025-06-07 RX ORDER — MAGNESIUM SULFATE HEPTAHYDRATE 40 MG/ML
2 INJECTION, SOLUTION INTRAVENOUS ONCE
Status: COMPLETED | OUTPATIENT
Start: 2025-06-07 | End: 2025-06-07

## 2025-06-07 RX ORDER — POTASSIUM CHLORIDE 20 MEQ/1
40 TABLET, EXTENDED RELEASE ORAL ONCE
Status: COMPLETED | OUTPATIENT
Start: 2025-06-07 | End: 2025-06-07

## 2025-06-07 RX ORDER — TALC
6 POWDER (GRAM) TOPICAL NIGHTLY PRN
Qty: 30 TABLET | Refills: 3 | Status: SHIPPED | OUTPATIENT
Start: 2025-06-07

## 2025-06-07 RX ORDER — SODIUM,POTASSIUM PHOSPHATES 280-250MG
1 POWDER IN PACKET (EA) ORAL ONCE
Status: COMPLETED | OUTPATIENT
Start: 2025-06-07 | End: 2025-06-07

## 2025-06-07 RX ADMIN — POTASSIUM & SODIUM PHOSPHATES POWDER PACK 280-160-250 MG 1 PACKET: 280-160-250 PACK at 02:06

## 2025-06-07 RX ADMIN — POTASSIUM CHLORIDE 40 MEQ: 1500 TABLET, EXTENDED RELEASE ORAL at 02:06

## 2025-06-07 RX ADMIN — BRIMONIDINE TARTRATE 1 DROP: 2 SOLUTION OPHTHALMIC at 02:06

## 2025-06-07 RX ADMIN — MAGNESIUM SULFATE HEPTAHYDRATE 2 G: 40 INJECTION, SOLUTION INTRAVENOUS at 02:06

## 2025-06-07 RX ADMIN — ACETAMINOPHEN 1000 MG: 500 TABLET ORAL at 07:06

## 2025-06-07 RX ADMIN — FAMOTIDINE 20 MG: 20 TABLET, FILM COATED ORAL at 08:06

## 2025-06-07 RX ADMIN — BRIMONIDINE TARTRATE 1 DROP: 2 SOLUTION OPHTHALMIC at 08:06

## 2025-06-07 RX ADMIN — FUROSEMIDE 40 MG: 40 TABLET ORAL at 08:06

## 2025-06-07 RX ADMIN — Medication 2000 UNITS: at 08:06

## 2025-06-07 RX ADMIN — DORZOLAMIDE HYDROCHLORIDE AND TIMOLOL MALEATE 1 DROP: 20; 5 SOLUTION/ DROPS OPHTHALMIC at 09:06

## 2025-06-07 RX ADMIN — LATANOPROST 1 DROP: 50 SOLUTION OPHTHALMIC at 08:06

## 2025-06-07 RX ADMIN — DICLOFENAC SODIUM 2 G: 10 GEL TOPICAL at 08:06

## 2025-06-07 RX ADMIN — MUPIROCIN: 20 OINTMENT TOPICAL at 08:06

## 2025-06-07 RX ADMIN — PREDNISONE 2.5 MG: 2.5 TABLET ORAL at 08:06

## 2025-06-07 RX ADMIN — REMDESIVIR 100 MG: 100 INJECTION, POWDER, LYOPHILIZED, FOR SOLUTION INTRAVENOUS at 08:06

## 2025-06-07 NOTE — DISCHARGE SUMMARY
[facility]  Discharge Summary      Patient Name: Radha Feng  MRN: [id number]  BISMARK: [id number]  Patient Class: IP- Inpatient  Admission Date: 6/3/2025  Hospital Length of Stay: 4 days  Discharge Date and Time: 06/07/2025 3:02 PM  Attending Physician: Eric Boggs MD   Discharging Provider: Eric Boggs MD  Primary Care Provider: Leticia Lim MD  [facility] Medicine Team: [facility] Eric Boggs MD  Primary Care Team: [facility]    HPI:   84 y.o. 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     Daughter reports that patient has been having loose stools for almost a week - they were somwhat formed but loose initially but for the last 2-3 days they have been watery - 4 times a day. This morning associated with some abdominal discomfort as well. She was discharged on ciprofloxacin for UTI after admission for L1 fracture post fall and being treated with IV abx in house. She has been taking it. This am daughter who is an RN and has a private job noticed stool to be melanotic.  She did get Pepto-Bismol yesterday.  Daughter also noticed that her stool were foul-smelling.  She denies any urinary complaints.  She had a few episodes of vomiting about 1 week ago in the setting of stopping the antibiotic but none since, nausea this morning with no vomiting.     Secondarily daughter says that she has been more short of breath with sats going from 88-94% on room air where she is  normally 95-97% on room air.  She does have a new cough that is occasionally productive of sputum.  No chest pain. Daughter has been holding home PO lasix as she was worried about dehydration related to diarrhea.   She has some mild right ankle swelling but no calf swelling or redness.  Patient was also complaining of pain in her right 4th toe which is new and is not related to redness or any trauma. She takes tylenol 1g TID at home. She uses dexcom and insulin pump at home. Daughter wanted to make sure I talk to Dr. Orr before ordering steroids or redesivir for COVID - I talked to Dr. HO and was told that there are no limitations to use either or both medications.     In the ED she was found to have COVID 19 infection and CT showed bilateral loculated pleural effusions with possible RUL bronchus obstruction. Being admitted for workup and management. She was initially given 500 ml VF for diarrhea but then iso pleural effusions was given lasix 40 IV. She was given dexamethasone IV 6 mg x1 . Placed on O2 for saturation 90-94%.     * No surgery found *      Hospital Course:   PT admitted to  for workup and management of AHRF 2/2 COVID infection superimposed on R lung mass. Pt placed on Remdesivir for COVID, EDT 6/7. Started on IV diuresis given evidence of volume overload on admission. Seen by palliative care and pulm. No plan for bronch or thoracentesis per pulm and recommend to continue diuresis, also recommend to hold off on steroids for COVID as improving oxygen requirement and has DM with hyperglycemia. Also dicussed with vascular surgery regarding about CT mention of L common iliac incomplete assessment about chronic or recent occlusion. Pulses palpable bilaterally, no discoloration - per vascular no surveillance or further imaging needed. For management of atherosclerotic risk factors can consider recommend ASA 81, statin if no GI bleed and no concern for autoimmune hepatitis perspective. Will defer for now  as hb starting to come up with dark stools on admission and reluctance on meds iso AI hepatitis. On 6/7 patient completed course of remdesivir, symptomatically improved. S/p 6MWT qualifying for home O2 s/o known COPD and lung cancer. PT/OT recommending moderate intensity therapy, however patient/family declining therapy at this time. Pt stable for DC to home with close multidisciplinary follow-up on 6/7. All questions answered and patient/family agreeable to plan.      Goals of Care Treatment Preferences:  Code Status: DNR      SDOH Screening:  The patient was screened for utility difficulties, food insecurity, transport difficulties, housing insecurity, and interpersonal safety and there were no concerns identified this admission.    Vitals:    06/07/25 1542   BP: (!) 112/57   Pulse: 67   Resp: 17   Temp: 97.7 °F (36.5 °C)     Physical Exam  Constitutional:       Appearance: She is ill-appearing.   HENT:      Head: Normocephalic and atraumatic.      Nose: Nose normal.      Mouth/Throat:      Mouth: Mucous membranes are moist.      Pharynx: Oropharynx is clear.   Eyes:      Extraocular Movements: Extraocular movements intact.      Pupils: Pupils are equal, round, and reactive to light.   Cardiovascular:      Rate and Rhythm: Normal rate.   Pulmonary:      Effort: Pulmonary effort is normal. No respiratory distress.      Breath sounds: No rales.      Comments: Diminished sounds in lower lung fields.   Abdominal:      General: Abdomen is flat. There is no distension.      Palpations: Abdomen is soft.      Tenderness: There is no abdominal tenderness.   Musculoskeletal:         General: Normal range of motion.      Cervical back: Normal range of motion. No rigidity.      Right lower leg: No edema.      Left lower leg: No edema.      Comments: R 4th toe no erythema or swelling   Skin:     General: Skin is warm and dry.   Neurological:      General: No focal deficit present.      Mental Status: She is alert and oriented  to person, place, and time.   Psychiatric:         Mood and Affect: Mood normal.         Behavior: Behavior normal.      Consults:   Consults (From admission, onward)          Status Ordering Provider     Inpatient consult to Midline team  Once        Provider:  (Not yet assigned)    Completed SORAIDA, SHASHA     Inpatient consult to Palliative Care  Once        Provider:  (Not yet assigned)    Completed SORAIDA, SHASHA     Inpatient consult to Pulmonology  Once        Provider:  (Not yet assigned)    Completed SORAIDA, SHASHA     Inpatient consult to Endocrinology  Once        Provider:  (Not yet assigned)    Completed SORAIDA, SHASHA     Inpatient consult to Registered Dietitian/Nutritionist  Once        Provider:  (Not yet assigned)    Completed SORAIDA, SHASHA            Assessment & Plan  Acute hypoxemic respiratory failure  Patient with Hypoxic Respiratory failure which is Acute.  she is not on home oxygen. Supplemental oxygen was provided and noted-      .   Signs/symptoms of respiratory failure include- tachypnea and respiratory distress. Contributing diagnoses includes - ARDS, CHF, COPD, Pneumonia, and Lung CA Labs and images were reviewed. Patient Has not had a recent ABG. Will treat underlying causes and adjust management of respiratory failure as follows-     - BNP elevated 2717 on admission  - COVID positive on admission  - CTA chest and abdomen 6/3 : New RUL atelectasis, presumably related to occlusion of RUL bronchus and progression of malignancy. New large R and moderate L pleural effusions, partially loculated.  Malignant pleural fluid is favored.  B/l Interstitial and airspace opacities.    Large mass in the right posterior shoulder presumably metastatic with questionable subtle osseous metastatic disease. Enlarged mediastinal and right axillary lymph nodes suggestive of metastatic disease. Indeterminate liver lesions.   No evidence of acute PE. Asymmetric decreased opacification of the left common iliac artery-  "limited by suboptimal bolus timing.  Chronic or recent occlusion     - For COVID - started remdesivir after talking to Dr. Peraza - no limitation to steroids use. Got Dexamethasone 6mg IV  x1 dose in ED - will start if no improvement with steroids and diuresis  - For loculated pleural effusions and HF exacerbation - lasix 40 in the ED , start lasix 60 IV BID for now  -pulmonary consulted for need drainage as potential of malignancy effusion  - known lung CA - now seems metastatic  - possible obstruction of RUL bronchus by mass extension   - will benefit from palliative care consult - to be discussed with pt and daughter  - Has COPD but does not seem to be in exacerbation - steroids if resp status not improving    - Echo 6/4; 45 - 50%. Grade II diastolic dysfunction. Normal RV size and systolic function. Moderate MR,  PASP 39 mmHg. IVC 3 mmHg. small  pericardial effusion adjacent to the left atrium. Left pleural effusion.  - Pulmonary consulted - recommend against bronch and thoracentesis at this time based on discussion with pt and family  - s/p IV lasix --> PO lasix 40mg starting 6/7  - CXR ordered for 6/6  - Remdesivir x 5 days starting 6/3  - Dexamethasone on admission x1  - Duonebs PRN  - Encourage IS      Type 2 diabetes mellitus with circulatory disorder, with long-term current use of insulin  Patient's FSGs are controlled on current medication regimen.  Last A1c reviewed-   Lab Results   Component Value Date    HGBA1C 6.0 (H) 06/21/2024     Most recent fingerstick glucose reviewed- No results for input(s): "POCTGLUCOSE" in the last 24 hours.    Antihyperglycemics (From admission, onward)      Start     Stop Route Frequency Ordered    06/03/25 1745  insulin aspart U-100 insulin pump from home        Question Answer Comment   Target number 120    Basal Rate #1 0.45    Basal rate #1 time 2378-7046        -- SubQ Continuous 06/03/25 1637    06/03/25 1736  insulin aspart U-100 insulin pump from home 0-10 Units     "    Question Answer Comment   Target number 120    Carbohydrate coverage #1 1:12    Carbohydrate coverage #1 time 0826-9822    Sensitivity #1 [id number]    Sensitivity #1 time 1:50        -- SubQ As needed (PRN) 06/03/25 1637          Hold Oral hypoglycemics while patient is in the hospital.  Has insulin pump and dexcom  Endocrinology consulted  - appreciate       Hypokalemia  RESOLVED  Patient's most recent potassium results are listed below.   Recent Labs     06/05/25  0459 06/06/25  0357 06/07/25  0849   K 3.3* 4.2 2.9*     Plan  - Replete potassium per protocol  - Monitor potassium daily  - Patient's hypokalemia is improving  - Monitor with diuresis  Adrenal cortical steroids causing adverse effect in therapeutic use      Insulin pump in place  Endo consulted    COVID-19  Patient is identified as Severe COVID-19 based on hypoxemia with O2 saturations <94% on room air or on ambulation   Initiate standard COVID protocols; COVID-19 testing ,Infection Control notification  and isolation- respiratory, contact and droplet per protocol    Diagnostics: CBC, CMP, Ferritin, CRP, and Portable CXR    Management: Initiate targeted therapy with Remdesivir, 200mg IV x1, followed by 100mg IV daily x5 days total, Maintain oxygen saturations 92-96% via Nasal Cannula 0.5 LPM and monitor with continuous/intermittent pulse oximetry. , and Inhaled bronchodilators as needed for shortness of breath. Got one dose of dexamethaosne on admission - will start if not improving or worsening as sats were 90-94% before O2 was placed.     Advance Care Planning  Current advance care plan has been discussed with patient/family/POA and patient currently wishes DNR (Do Not Resuscitate).   BMI < 18.5  Nutrition consult    Atelectasis  I have personally reviewed CT Scan. Patient with atelectasis on interpretation. Likely Obstructive atelectasis secondary to malignancy. Signs and symptoms include Shortness of breath Will begin treatment with incentive  spirometry.  Pleural effusion  Patient found to have large pleural effusion on imaging. I have personally reviewed and interpreted the following imaging: CT. A thoracentesis was deferred. Most likely etiology includes Congestive Heart Failure. Management to include Diuresis and Pulm consult    - Seen by pulm - no plan for bronch or thoracentesis at this time - risk of re accumulation  - Diuresis as above  Diarrhea  Improved  Recent Cipro exposure as well as IV abx in hospital. COVID can also cause diarrhea.   Cdiff negative  GI panel negative  Stool culture NGTD  COVID management as above  PRN meds not needed so far            Elevated troponin  No chest pain , breathing improved with diuresis and remdesivir    Trop 753 - 614  Likley demand ischemia iso COVID, HF and pleural effusions  Daughter does not even want lovenox or heparin dvt ppx so was discontinued - SCDs for dvt ppx.   ACP (advance care planning)  Advance Care Planning   Discussed with patient and daughter at bedside on admission. DNR/DNI. Status updated.     Further discussed palliative care involvement given metastatic disease - daughter and patient agreed to see palliative care as was supposed to see on 6/19 as outpatient but was pre-poned to 6/9 or 6/10 after hospitalization. Palliative team following        Total of 15 minutes spent in GOC and ACP conversations on 6/4.   Toe pain  Likely iso arthritis  R 4th toe pain   Xray unremarkable -negative for fracture  No cellulitis or swelling concerning for gout   Per daughter was using tobramycin drops for toe at home as recommended    Diclofenac gel  -with improvement in pain    Also dicussed with vascular surgery on 6/4/25 regarding about CT mention of L common iliac incomplete assessment about chronic or recent occlusion. Pulses palpable bilaterally, no discoloration - per vascular no surveillance or further imaging needed. For management of atherosclerotic risk factors can consider recommend ASA 81,  statin if no GI bleed and no concern for autoimmune hepatitis perspective. Will defer for now as hb starting to come up with dark stools on admission and reluctance on meds iso AI hepatitis - will discuss as stabilizes more or as outpatient as its for primary ppx.     Autoimmune hepatitis  -- C/w prednisone 2.5 daily    Adenocarcinoma of right lung  - CTA chest and abdomen 6/3 : New RUL atelectasis, presumably related to occlusion of RUL bronchus and progression of malignancy. New large R and moderate L pleural effusions, partially loculated.  Malignant pleural fluid is favored.  B/l Interstitial and airspace opacities.     Large mass in the right posterior shoulder presumably metastatic with questionable subtle osseous metastatic disease. Enlarged mediastinal and right axillary lymph nodes suggestive of metastatic disease. Indeterminate liver lesions.   No evidence of acute PE. Asymmetric decreased opacification of the left common iliac artery- limited by suboptimal bolus timing.  Chronic or recent occlusion can not be excluded    - holding of on AC as not confirmed and family does not want AC at this time - also given possible GI bleed  - Will discuss with vascular to review  - Palliative consulted  - Rad onc and heme/onc as outpatient    Per Rad onc 5/30 -   -With AI hepatitis on steroids, IO is not recommended by med onc and with age and co morbidities, she does not wish to proceed with cytotoxic chemotherapy. She already received a high dose to the mediastinum and hilum and given asymptomatic, I do not favor re-irradiation.  -Discussed will treat R posterior shoulder but will also evaluate if the R SCV and Axillary adenopathy can safely be treated as well.   -Paliative care meet up   Anemia  Anemia is likely due to ACD iso cancer with ? Blood loss. Most recent hemoglobin and hematocrit are listed below.  Recent Labs     06/05/25  0459 06/06/25  0357 06/07/25  0849   HGB 8.5* 8.2* 8.5*   HCT 27.3* 27.1* 28.5*      Plan  - Monitor serial CBC: Daily  - Transfuse PRBC if patient becomes hemodynamically unstable, symptomatic or H/H drops below 7/21.  - Patient has not received any PRBC transfusions to date  - Patient's anemia is currently worsening. Will Monitor  - Dark stools have resolved and were possibly related to Pepto bismol intake at home per daughter  - Will involve GI if hb drops further or if dark stools re start.    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 The cancer has metastasized to shoulder. The patient is under the care of an outpatient oncologist. The patient is not undergoing active chemotherapy. .Their staging information is listed below.    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 [C78.2] 06/05/2025 Yes    Encounter for palliative care [Z51.5] 06/04/2025 Not Applicable    COVID-19 [U07.1] 06/03/2025 Yes    BMI < 18.5 [Z68.1] 06/03/2025 Not Applicable    Atelectasis [J98.11] 06/03/2025 Yes    Pleural effusion [J90] 06/03/2025 Yes    Diarrhea [R19.7] 06/03/2025 Yes    Elevated troponin [R79.89] 06/03/2025 Yes    ACP (advance care planning) [Z71.89] 06/03/2025 Not Applicable    Toe pain [M79.676] 06/03/2025 Yes    Anemia [D64.9] 05/18/2025 Yes    Adenocarcinoma of right lung [C34.91] 07/25/2024 Yes    Insulin pump in place [Z96.41] 01/29/2024 Not Applicable    Adrenal cortical  "steroids causing adverse effect in therapeutic use [T38.0X5A] 07/24/2023 Yes    Hypokalemia [E87.6] 11/07/2022 Yes    Autoimmune hepatitis [K75.4] 09/28/2022 Yes     Chronic    Type 2 diabetes mellitus with circulatory disorder, with long-term current use of insulin [E11.59, Z79.4] 09/21/2022 Not Applicable      Problems Resolved During this Admission:       Discharged Condition: stable    Disposition: Home or Self Care    Follow Up:    Patient Instructions:      OXYGEN FOR HOME USE     Order Specific Question Answer Comments   Liter Flow 2    Duration Continuous    Qualifying Test Performed at: Rest    Oxygen saturation: 88    Portable mode: continuous    Route nasal cannula    Device: home concentrator with portable tanks    Length of need (in months): 99 mos    Patient condition with qualifying saturation COPD    Height: 5' 5" (1.651 m)    Weight: 49 kg (108 lb 0.4 oz)    Alternative treatment measures have been tried or considered and deemed clinically ineffective. Yes        Significant Diagnostic Studies:     X-Ray Chest AP Portable   Final Result      See above         Electronically signed by: Moe Molina MD   Date:    06/06/2025   Time:    10:18      CTA Chest Non-Coronary (PE Studies)   Final Result   Abnormal      New right upper lobe atelectasis, presumably related to occlusion of the right upper lobe bronchus and progression of malignancy.  Consider bronchoscopy follow-up if it would alter management.      New large right and moderate left pleural effusions, partially loculated.  Malignant pleural fluid is favored.  Interstitial and airspace opacities in both lungs, potentially exaggerated by passive atelectasis.      Large mass in the right posterior shoulder presumably metastatic disease with questionable subtle osseous metastatic disease.      Enlarged mediastinal and right axillary lymph nodes suggestive of metastatic disease.      Indeterminate liver lesions.      No evidence of acute pulmonary " embolus.      Asymmetric decreased opacification of the left common iliac artery, with reconstitution at the level of the left external iliac artery.  Findings are limited by suboptimal bolus timing.  Chronic or recent occlusion would be included in the differential without prior contrast enhanced imaging available for comparison.      Multiple additional findings discussed in the body of the report.      This report was flagged in Epic as abnormal.         Electronically signed by: Deandre Becerra MD   Date:    06/03/2025   Time:    14:33      CT Abdomen Pelvis With IV Contrast NO Oral Contrast   Final Result   Abnormal      New right upper lobe atelectasis, presumably related to occlusion of the right upper lobe bronchus and progression of malignancy.  Consider bronchoscopy follow-up if it would alter management.      New large right and moderate left pleural effusions, partially loculated.  Malignant pleural fluid is favored.  Interstitial and airspace opacities in both lungs, potentially exaggerated by passive atelectasis.      Large mass in the right posterior shoulder presumably metastatic disease with questionable subtle osseous metastatic disease.      Enlarged mediastinal and right axillary lymph nodes suggestive of metastatic disease.      Indeterminate liver lesions.      No evidence of acute pulmonary embolus.      Asymmetric decreased opacification of the left common iliac artery, with reconstitution at the level of the left external iliac artery.  Findings are limited by suboptimal bolus timing.  Chronic or recent occlusion would be included in the differential without prior contrast enhanced imaging available for comparison.      Multiple additional findings discussed in the body of the report.      This report was flagged in Epic as abnormal.         Electronically signed by: Deandre Becerra MD   Date:    06/03/2025   Time:    14:33      X-Ray Toe 2 or More Views Right   Final Result      X-Ray Chest  AP Portable   Final Result      Perihilar and lower lobe edema with pleural fluid.  Pneumonia cannot be excluded.         Electronically signed by: Matt Curran MD   Date:    2025   Time:    12:31            Pending Diagnostic Studies:       None           Medications:  Reconciled Home Medications:      Medication List        START taking these medications      DEXCOM G6 TRANSMITTER Amanda  Generic drug: blood-glucose transmitter  1 each by Misc.(Non-Drug; Combo Route) route every 3 (three) months.     melatonin 3 mg tablet  Commonly known as: MELATIN  Take 2 tablets (6 mg total) by mouth nightly as needed for Insomnia.     OMNIPOD 5 G6-G7 PODS (GEN 5) Crtg  Generic drug: insulin pump cart,auto,BT,G6/7  SMARTSI Each SUB-Q Once a Month            CONTINUE taking these medications      acetaminophen 500 MG tablet  Commonly known as: TYLENOL  Take 2 tablets (1,000 mg total) by mouth every 8 (eight) hours as needed for Pain.     BD INSULIN SYRINGE ULTRA-FINE 1 mL 31 gauge x 5/16 Syrg  Generic drug: insulin syringe-needle U-100     blood-glucose,,cont Misc  Dispsense 1 Dexcom G7      brimonidine 0.2% 0.2 % Drop  Commonly known as: ALPHAGAN  Place 1 drop into both eyes 3 (three) times daily.     cholecalciferol (vitamin D3) 25 mcg (1,000 unit) capsule  Commonly known as: VITAMIN D3  Take 2 capsules (2,000 Units total) by mouth once daily.     DEXCOM G7 SENSOR Amanda  Generic drug: blood-glucose sensor  1 Device by Misc.(Non-Drug; Combo Route) route every 10 days.     dorzolamide-timolol 2-0.5% 22.3-6.8 mg/mL ophthalmic solution  Commonly known as: COSOPT  Place 1 drop into both eyes 2 (two) times daily.     furosemide 40 MG tablet  Commonly known as: LASIX  TAKE 1 TABLET BY MOUTH EVERY DAY     insulin aspart U-100 100 unit/mL injection  Commonly known as: NovoLOG U-100 Insulin aspart  TO USE WITH OMNIPOD 5. TOTAL DAILY DOSE UP TO 40 UNITS     latanoprost 0.005 % ophthalmic solution  PLACE 1 DROP  "INTO BOTH EYES ONCE DAILY     LIDOcaine 5 %  Commonly known as: LIDODERM  Place 1 patch onto the skin once daily. Remove & Discard patch within 12 hours or as directed by MD     nystatin 100,000 unit/mL suspension  Commonly known as: MYCOSTATIN  SWISH WITH 6MLS BY MOUTH THEN SWALLOW 4 TIMES A DAY FOR 14 DAYS     OMNIPOD 5 G6 PODS (GEN 5) Crtg  Generic drug: insulin pump cart,automated,BT  Inject 1 each into the skin Every 3 (three) days.     oxyCODONE 5 MG immediate release tablet  Commonly known as: ROXICODONE  Take 0.5 tablets (2.5 mg total) by mouth every 6 (six) hours as needed for Pain.     pen needle, diabetic 31 gauge x 5/16" Ndle  Use to inject insulin into the skin up to 4 times daily     predniSONE 5 MG tablet  Commonly known as: DELTASONE  Take 0.5 tablets (2.5 mg total) by mouth once daily.     tobramycin sulfate 0.3% 0.3 % ophthalmic solution  Commonly known as: TOBREX  1-2 drops topically twice daily to affected toe(s).            STOP taking these medications      ciprofloxacin HCl 500 MG tablet  Commonly known as: CIPRO     estradioL 0.01 % (0.1 mg/gram) vaginal cream  Commonly known as: ESTRACE     lactulose 10 gram packet  Commonly known as: CEPHULAC     lisinopriL-hydrochlorothiazide 20-12.5 mg per tablet  Commonly known as: PRINZIDE,ZESTORETIC              Indwelling Lines/Drains at time of discharge:   Lines/Drains/Airways       Line  Duration                  Subcutaneous Infusion Pump 05/16/25 Abdomen (Comment) 22 days                    Time spent on the discharge of patient: 60 minutes         Eric Boggs MD  Department of Hospital Medicine  [facility] - Med Surg  "

## 2025-06-07 NOTE — PROGRESS NOTES
"[facility] Surg  Endocrinology  Progress Note    Admit Date: 6/3/2025     Reason for Consult: Management of T2DM, Hyperglycemia      Diabetes diagnosis year: > 5 years ago      Home Diabetes Medications:    Omnipod 5  Basal Rate  12A:  0.45 units/hr      Carb Ratio  12A:12  6A: 10  6P: 12     ISF  12A: 50      Target: 120  Correct above: 140     IAT: 4 hours        How often checking glucose at home? >4 x day Dexcom   BG readings on regimen: 100s-200s    Hypoglycemia on the regimen? No   Missed doses on regimen?  No     Diabetes Complications include:     Hyperglycemia     Complicating diabetes co morbidities:   Glucocorticoid use         HPI: Radha Feng is a 84 y.o. 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 Endocrine consulted for BG management.       Interval HPI:    No acute events overnight. Patient in room 606/606 A. Blood glucose stable. BG at goal on current insulin regimen (Home Insulin Pump). Steroid use- Prednisone 2.5 mg QD.    Renal function- Normal   Vasopressors-  None          Diet Adult Regular      Eatin%  Nausea: No  Hypoglycemia and intervention: No  Fever: No  TPN and/or TF: No  If yes, type of TF/TPN and rate: n/a    /63 (BP Location: Right arm, Patient Position: Lying)   Pulse 81   Temp 97.7 °F (36.5 °C) (Oral)   Resp 17   Ht 5' 5" (1.651 m)   Wt 49 kg (108 lb 0.4 oz)   SpO2 (!) 94%   Breastfeeding No   BMI 17.98 kg/m²     Labs Reviewed and Include    No results for input(s): "GLU", "CALCIUM", "ALBUMIN", "PROT", "NA", "K", "CO2", "CL", "BUN", "CREATININE", "ALKPHOS", "ALT", " ""AST", "BILITOT" in the last 24 hours.  Lab Results   Component Value Date    WBC 14.40 (H) 06/06/2025    HGB 8.2 (L) 06/06/2025    HCT 27.1 (L) 06/06/2025    MCV 90 06/06/2025     06/06/2025     No results for input(s): "TSH", "FREET4" in the last 168 hours.  Lab Results   Component Value Date    HGBA1C 6.0 (H) 06/21/2024       Nutritional status:   Body mass index is 17.98 kg/m².  Lab Results   Component Value Date    ALBUMIN 1.8 (L) 06/06/2025    ALBUMIN 1.7 (L) 06/05/2025    ALBUMIN 1.6 (L) 06/04/2025     No results found for: "PREALBUMIN"    Estimated Creatinine Clearance: 55 mL/min (based on SCr of 0.6 mg/dL).    Accu-Checks  No results for input(s): "POCTGLUCOSE" in the last 72 hours.    Current Medications and/or Treatments Impacting Glycemic Control  Immunotherapy:    Immunosuppressants       None          Steroids:   Hormones (From admission, onward)      Start     Stop Route Frequency Ordered    06/04/25 0900  predniSONE tablet 2.5 mg         -- Oral Daily 06/03/25 1527    06/03/25 1611  melatonin tablet 6 mg         -- Oral Nightly PRN 06/03/25 1527          Pressors:    Autonomic Drugs (From admission, onward)      None          Hyperglycemia/Diabetes Medications:   Antihyperglycemics (From admission, onward)      Start     Stop Route Frequency Ordered    06/03/25 1745  insulin aspart U-100 insulin pump from home        Question Answer Comment   Target number 120    Basal Rate #1 0.45    Basal rate #1 time 0030-3184        -- SubQ Continuous 06/03/25 1637    06/03/25 1736  insulin aspart U-100 insulin pump from home 0-10 Units        Question Answer Comment   Target number 120    Carbohydrate coverage #1 1:12    Carbohydrate coverage #1 time 8803-8268    Sensitivity #1 [phone]    Sensitivity #1 time 1:50        -- SubQ As needed (PRN) 06/03/25 1637            ASSESSMENT and PLAN    Pulmonary  * Acute hypoxemic respiratory failure  Managed per primary team      Endocrine  Insulin pump in " place  At time of evaluation, pt meets criteria to continue home insulin pump usage.  - Has all adequate supplies   - Bolus settings reviewed    - No changes to home regimen.   - Nurse to check BG qac/hs/0200 & record in epic   - Patient to input glucose into pump and use bolus wizard for prandial needs   - Will continue to monitor accuchecks and titrate insulin as clinically indicated .     - Discussed above plan with patient, patient verbalized understanding.   - Understands in case of pump malfunction or cognitive decline in which pt can no longer safely use insulin pump, will transition to SC MDI       If pump malfunctions or is disconnected, please call endocrine.         Type 2 diabetes mellitus with circulatory disorder, with long-term current use of insulin  BG goal: 140-180     - Continue Home insulin pump   - POCT Glucose before meals, at bedtime and at 2 am  - Hypoglycemia protocol in place      ** Please notify Endocrine for any change and/or advance in diet**  ** Please call Endocrine for any BG related issues **     Discharge Planning:   TBD. Please notify endocrinology prior to discharge.        Other  Adrenal cortical steroids causing adverse effect in therapeutic use  Glucocorticoids markedly increase glucose levels. Expect the steroid taper will help glucose control.             Candace Ambriz PA-C  Endocrinology  [facility] - Med Surg

## 2025-06-07 NOTE — ASSESSMENT & PLAN NOTE
Patient is identified as Severe COVID-19 based on hypoxemia with O2 saturations <94% on room air or on ambulation   Initiate standard COVID protocols; COVID-19 testing ,Infection Control notification  and isolation- respiratory, contact and droplet per protocol    Diagnostics: CBC, CMP, Ferritin, CRP, and Portable CXR    Management: Initiate targeted therapy with Remdesivir, 200mg IV x1, followed by 100mg IV daily x5 days total, Maintain oxygen saturations 92-96% via Nasal Cannula 0.5 LPM and monitor with continuous/intermittent pulse oximetry. , and Inhaled bronchodilators as needed for shortness of breath. Got one dose of dexamethaosne on admission - will start if not improving or worsening as sats were 90-94% before O2 was placed.     Advance Care Planning  Current advance care plan has been discussed with patient/family/POA and patient currently wishes DNR (Do Not Resuscitate).

## 2025-06-07 NOTE — NURSING
O2 brought to room. Went over dc orders - all questions answered. Copy of dc orders given to daughter. Waiting on IV meds to finish up and then can dc pt/

## 2025-06-07 NOTE — SUBJECTIVE & OBJECTIVE
"Interval HPI:    No acute events overnight. Patient in room 606/606 A. Blood glucose stable. BG at goal on current insulin regimen (Home Insulin Pump). Steroid use- Prednisone 2.5 mg QD.    Renal function- Normal   Vasopressors-  None          Diet Adult Regular      Eatin%  Nausea: No  Hypoglycemia and intervention: No  Fever: No  TPN and/or TF: No  If yes, type of TF/TPN and rate: n/a    /63 (BP Location: Right arm, Patient Position: Lying)   Pulse 81   Temp 97.7 °F (36.5 °C) (Oral)   Resp 17   Ht 5' 5" (1.651 m)   Wt 49 kg (108 lb 0.4 oz)   SpO2 (!) 94%   Breastfeeding No   BMI 17.98 kg/m²     Labs Reviewed and Include    No results for input(s): "GLU", "CALCIUM", "ALBUMIN", "PROT", "NA", "K", "CO2", "CL", "BUN", "CREATININE", "ALKPHOS", "ALT", "AST", "BILITOT" in the last 24 hours.  Lab Results   Component Value Date    WBC 14.40 (H) 2025    HGB 8.2 (L) 2025    HCT 27.1 (L) 2025    MCV 90 2025     2025     No results for input(s): "TSH", "FREET4" in the last 168 hours.  Lab Results   Component Value Date    HGBA1C 6.0 (H) 2024       Nutritional status:   Body mass index is 17.98 kg/m².  Lab Results   Component Value Date    ALBUMIN 1.8 (L) 2025    ALBUMIN 1.7 (L) 2025    ALBUMIN 1.6 (L) 2025     No results found for: "PREALBUMIN"    Estimated Creatinine Clearance: 55 mL/min (based on SCr of 0.6 mg/dL).    Accu-Checks  No results for input(s): "POCTGLUCOSE" in the last 72 hours.    Current Medications and/or Treatments Impacting Glycemic Control  Immunotherapy:    Immunosuppressants       None          Steroids:   Hormones (From admission, onward)      Start     Stop Route Frequency Ordered    25 0900  predniSONE tablet 2.5 mg         -- Oral Daily 25 1527    25 1611  melatonin tablet 6 mg         -- Oral Nightly PRN 25 1527          Pressors:    Autonomic Drugs (From admission, onward)      None      "     Hyperglycemia/Diabetes Medications:   Antihyperglycemics (From admission, onward)      Start     Stop Route Frequency Ordered    06/03/25 1745  insulin aspart U-100 insulin pump from home        Question Answer Comment   Target number 120    Basal Rate #1 0.45    Basal rate #1 time 5391-7886        -- SubQ Continuous 06/03/25 1637    06/03/25 1736  insulin aspart U-100 insulin pump from home 0-10 Units        Question Answer Comment   Target number 120    Carbohydrate coverage #1 1:12    Carbohydrate coverage #1 time 5036-7098    Sensitivity #1 [phone]    Sensitivity #1 time 1:50        -- SubQ As needed (PRN) 06/03/25 1637

## 2025-06-07 NOTE — ASSESSMENT & PLAN NOTE
"Patient's FSGs are controlled on current medication regimen.  Last A1c reviewed-   Lab Results   Component Value Date    HGBA1C 6.0 (H) 06/21/2024     Most recent fingerstick glucose reviewed- No results for input(s): "POCTGLUCOSE" in the last 24 hours.    Antihyperglycemics (From admission, onward)      Start     Stop Route Frequency Ordered    06/03/25 1745  insulin aspart U-100 insulin pump from home        Question Answer Comment   Target number 120    Basal Rate #1 0.45    Basal rate #1 time 3622-8038        -- SubQ Continuous 06/03/25 1637    06/03/25 1736  insulin aspart U-100 insulin pump from home 0-10 Units        Question Answer Comment   Target number 120    Carbohydrate coverage #1 1:12    Carbohydrate coverage #1 time 8700-2558    Sensitivity #1 [phone]    Sensitivity #1 time 1:50        -- SubQ As needed (PRN) 06/03/25 1637          Hold Oral hypoglycemics while patient is in the hospital.  Has insulin pump and dexcom  Endocrinology consulted  - appreciate       "

## 2025-06-07 NOTE — PLAN OF CARE
[facility] Surg  Discharge Final Note    Primary Care Provider: Leticia Lim MD    Expected Discharge Date: 6/7/2025    Final Discharge Note (most recent)       Final Note - 06/07/25 1630          Final Note    Assessment Type Final Discharge Note     Anticipated Discharge Disposition Home or Self Care     What phone number can be called within the next 1-3 days to see how you are doing after discharge? [phone]     Hospital Resources/Appts/Education Provided Appointments scheduled and added to AVS   per CHW       Post-Acute Status    Post-Acute Authorization Other;HME     HME Status Set-up Complete/Auth obtained     Other Status No Post-Acute Service Needs     Discharge Delays None known at this time                     Important Message from Medicare             After-discharge care                Durable Medical Equipment       [facility]   Service: Durable Medical Equipment    [address] [place] [id number]   Phone: [phone]                   Discharge Plan A and Plan B have been determined by review of patient's clinical status, future medical and therapeutic needs, and coverage/benefits for post-acute care in coordination with multidisciplinary team members.     Cm spoke with daughter reviewed POC is agreeable with discharge, oxygen delivered to bedside. Bedside/virtual nurse to review discharge paperwork and instructions. NO additional CM needs at this time.     Tori Willoughby RN  Case Management  [phone]

## 2025-06-07 NOTE — ASSESSMENT & PLAN NOTE
RESOLVED  Patient's most recent potassium results are listed below.   Recent Labs     06/05/25  0459 06/06/25  0357 06/07/25  0849   K 3.3* 4.2 2.9*     Plan  - Replete potassium per protocol  - Monitor potassium daily  - Patient's hypokalemia is improving  - Monitor with diuresis

## 2025-06-07 NOTE — ASSESSMENT & PLAN NOTE
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 Blood loss. Most recent hemoglobin and hematocrit are listed below.  Recent Labs     06/05/25  0459 06/06/25  0357 06/07/25  0849   HGB 8.5* 8.2* 8.5*   HCT 27.3* 27.1* 28.5*     Plan  - Monitor serial CBC: Daily  - Transfuse PRBC if patient becomes hemodynamically unstable, symptomatic or H/H drops below 7/21.  - Patient has not received any PRBC transfusions to date  - Patient's anemia is currently worsening. Will Monitor  - Dark stools have resolved and were possibly related to Pepto bismol intake at home per daughter  - Will involve GI if hb drops further or if dark stools re start.    - Encourage pepcid BID use

## 2025-06-07 NOTE — PLAN OF CARE
06/07/25 1302   Post-Acute Status   Post-Acute Authorization [facility] Status Referrals Sent   Discharge Delays None known at this time   Discharge Plan   Discharge Plan A Home with family   Discharge Plan B Home with family       Discharge Plan A and Plan B have been determined by review of patient's clinical status, future medical and therapeutic needs, and coverage/benefits for post-acute care in coordination with multidisciplinary team members.     CM notified provider that patient would patti walk test if still requiring oxygen, walk test completed patient failed, referral to [facility] sent.     Tori Willoughby RN  Case Management  [phone]

## 2025-06-07 NOTE — NURSING
Home Oxygen Evaluation    Date Performed: 6/7/2025    1) Patient's Home O2 Sat on room air, while at rest: 88        If O2 sats on room air at rest are 88% or below, patient qualifies. No additional testing needed. Document N/A in steps 2 and 3. If 89% or above, complete steps 2.      2) Patient's O2 Sat on room air while exercising: na        If O2 sats on room air while exercising remain 89% or above patient does not qualify, no further testing needed Document N/A in step 3. If O2 sats on room air while exercising are 88% or below, continue to step 3.      3) Patient's O2 Sat while exercising on O2: na at na LPM         (Must show improvement from #2 for patients to qualify)    If O2 sats improve on oxygen, patient qualifies for portable oxygen. If not, the patient does not qualify.

## 2025-06-07 NOTE — ASSESSMENT & PLAN NOTE
BG goal: 140-180     - Continue Home insulin pump   - POCT Glucose before meals, at bedtime and at 2 am  - Hypoglycemia protocol in place      ** Please notify Endocrine for any change and/or advance in diet**  ** Please call Endocrine for any BG related issues **     Discharge Planning:   TBD. Please notify endocrinology prior to discharge.

## 2025-06-07 NOTE — DISCHARGE INSTRUCTIONS
Our goal at [facility] is to always give you outstanding care and exceptional service. You may receive a survey from [employer] by mail, text or e-mail in the next 24-48 hours asking about the care you received with us. The survey should only take 5-10 minutes to complete and is very important to us.     Your feedback provides us with a way to recognize our staff who work tirelessly to provide the best care! Also, your responses help us learn how to improve when your experience was below our aspiration of excellence. We are always looking for ways to improve your stay. We WILL use your feedback to continue making improvements to help us provide the highest quality care. We keep your personal information and feedback confidential. We appreciate your time completing this survey and can't wait to hear from you!!!    We look forward to your continued care with us! Thanks so much for choosing [facility] for your healthcare needs!

## 2025-06-09 ENCOUNTER — PATIENT MESSAGE (OUTPATIENT)
Dept: ENDOCRINOLOGY | Facility: CLINIC | Age: 85
End: 2025-06-09
Payer: MEDICARE

## 2025-06-13 ENCOUNTER — TELEPHONE (OUTPATIENT)
Dept: HEMATOLOGY/ONCOLOGY | Facility: CLINIC | Age: 85
End: 2025-06-13
Payer: MEDICARE

## 2025-06-13 ENCOUNTER — OUTPATIENT CASE MANAGEMENT (OUTPATIENT)
Dept: ADMINISTRATIVE | Facility: OTHER | Age: 85
End: 2025-06-13
Payer: MEDICARE

## 2025-06-13 NOTE — LETTER
Radha Feng  [address] [address]      Dear Radha,    Welcome to [facility]s Complex Care Management Program.  It was a pleasure talking with you today.  My name is Catina Dalal, and I look forward to being your Care Manager.  My goal is to help you function at the healthiest and highest level possible.  You can contact me directly at [phone] .    As an [facility] patient, some of the services we may be able to provide include:     Development of an individualized care plan with a Registered Nurse   Connection with a   Connection with available resources and services    Coordinate communication among your care team members   Provide coaching and education   Help you understand your doctors treatment plan  Help you obtain information about your insurance coverage.     All services provided by [facility] Complex Care Managers and other care team members are coordinated with and communicated to your primary care team.      As part of your enrollment, you will be receiving education materials and more information about these services in your [facility] account, by phone or through the mail.  If you do not wish to participate or receive information, please contact our office at [phone].      [facility] Patient Rights and Responsibilities available upon request.    Sincerely,        Catina Dalal, RN  [facility]   Outpatient RN Complex Care Manager

## 2025-06-13 NOTE — NURSING
FARZAD Wild spoke to patient's daughter about virtual cancellations for dietitian and Integrative Oncology.     Patient continues to experience appetite loss. Has tried Remeron which caused irritability/agitation/confusion. Her daughter currently prepares Ensure milkshakes for additional calories. Daughter is a nurse. Desires to see dietitian in radiation when she is on-treatment for recipes/ideas that may assist in weight gain. FARZAD Wild will confer with Kemar Perez RD.     Patient was initially scheduled with Dr. Chavez for Palliative consult and desires to remain with this physician. Appt was changed to a different provider post hospital discharge. FARZAD Wild will message team for support w/this request.     Virtual scheduled and confirmed Thursday 6/26/25 at 1pm with oJsefa Adams NP. Patient and daughter would like to discuss any vitamins/supplements that may improve her health and well-being following palliative radiation therapy. FARZAD Mittal.

## 2025-06-13 NOTE — PROGRESS NOTES
Diabetes Care Specialist Virtual Visit Note   The patient location is: Home in [place]  The chief complaint leading to consultation is: Diabetes  Visit type: audiovisual  Total time spent with patient: 15 min   Each patient to whom he or she provides medical services by telemedicine is:  (1) informed of the relationship between the physician and patient and the respective role of any other health care provider with respect to management of the patient; and (2) notified that he or she may decline to receive medical services by telemedicine and may withdraw from such care at any time.    Diabetes Care Specialist Follow-up Note  Author: Michelle Feng RN, [facility]  Date: 6/13/2025    Intake    Program Intake  Reason for Diabetes Program Visit:: Intervention  Type of Intervention:: Individual  Individual: Education  Current diabetes risk level:: moderate  In the last month, have you used the ER or been admitted to the hospital: No  Permission to speak with others about care:: yes    Current Diabetes Treatment: Insulin  Method of insulin delivery?: Insulin Pump  Type of Pump: Omnipod 5  Does patient have back-up plan?: Yes  Any problems obtaining supplies?: No    Continuous Glucose Monitoring  Patient has CGM: Yes  Personal CGM type:: Dexcom G6    Lab Results   Component Value Date    HGBA1C 6.0 (H) 06/21/2024       Lifestyle Coping Support & Clinical    Lifestyle/Coping/Support  Compared to other people your age, how would you rate your health?: Fair    Problem Review  Active Comorbidities:  (Liver disease)    Diabetes Self-Management Skills Assessment    Medication Skills Assessment  Patient is able to identify current diabetes medications, dosages, and appropriate timing of medications.: yes  Patient reports problems or concerns with current medication regimen.: no      Home Blood Glucose Monitoring  Personal CGM type:: Dexcom G6    During today's follow-up visit,  the following areas required further assessment  and content was provided/reviewed.    Based on today's diabetes care assessment, the following areas of need were identified:      Identified Areas of Need      Medication/Current Diabetes Treatment:  Omnipod 5 review       Today's interventions were provided through individual discussion, instruction, and written materials were provided.    Patient verbalized understanding of instruction and written materials.  Pt was able to return back demonstration of instructions today. Patient understood key points, needs reinforcement and further instruction.     Diabetes Self-Management Care Plan Review and Evaluation of Progress:    During today's follow-up Radha's Diabetes Self-Management Care Plan progress was reviewed and progress was evaluated including his/her input. Radha has agreed to continue his/her journey to improve/maintain overall diabetes control by continuing to set health goals. See care plan progress below.      Care Plan: Diabetes Management   Updates made since 6/13/2024 12:00 AM        Problem: Medications         Goal: Patient Agrees to take Diabetes Medication(s) using the Omnipod 5 system    Start Date: 3/27/2023   Expected End Date: 5/13/2025   This Visit's Progress: On track   Recent Progress: On track   Priority: High   Barriers: Knowledge deficit   Note:    OP5    Update to care planning 06/21/2023:  Reviewed Omnipod 5 download with Dexcom integration with patient and her daughter Vinod.  Overall doing well.  Bgs stable during the night and when she is not eating.  Elevations noted at all post meal glucose.  Carbs are controlled and measured by family members.  Will increase her I:C from 16 to 14 as she requires more insulin with meals.  Walked daughter thru changes on her PDM. No other concerns voiced.  We reviewed troubleshooting, what to look for including leaking at the site an unexplained rise in glucose readings.  Advised to monitor for these and to change the pod if this occurs.   Advised that she no longer has long acting insulin and changes must be address immediately to avoid potential issues.  Has back up insulin.  Demonstrated how to use the inhaled and glucagon emergency pens.  Will reach out to HCP to prescribed. Patient states she likes using system as opposed to injections. Advised to call Omnipod for replacement pods as she had to change 2 prematurely.  No other questions voiced.     Update to care planning 10/10/23:  Patient and her daughter seen for pump f/u.  Doing well on Omnipod 5.  We reviewed when changing the pod to make sure she is in [employer].  Had changed pod last and forgot to switch.  Discussed troubleshooting with pump and how to identify issues.  Advised to always change pod if numbers do not respond to correction bolus.  Instructed Too how to do a correction bolus with pump.     Update to care planning 4/17/2024:  Patient seen virtually for her 6 mth f/u visit.  Tish (daughter) in attendance. Download was reviewed for Omnipod 5.  Continues to do very well on pump, reports no issues.  Assisted with changing IC ratio and basal rates per Dr. Agarwal.  No other issues, continues to enter her meals appropriately.     Update to care planning 10/1/2024: Patient seen virtually for f/u visit. Regino daughter in attendance. Overall doing well with the pump. Daughter stated that sometimes the patient's glucose will drop overnight.  Download reviewed.  Adjusting her targets as follows: 12 am target 140/correct over 150; 6 am target 120 correct over 140.  Advised will look at download next week to see if further changes are needed.     Update to care planing 1/24/2024:  Assisted daughter with programming Dexcom G7 in pump. Dexcom G7 training completed. Daughter stating Mother experienced a couple of concerning lows.  Adjusted ISF from 50 to 60 and adjusted carb ratios as follows: 12 am 12; 6 am 10; 4 pm 12    Update to care planning 04/29/2025:  Omnipod 5 download  reviewed with patient and her daughter.  Daughter states that a few times her mother's sugar went low after bolusing for a meal.  She lately has not been giving insulin for some of her meals.  There are some missed meal boluses noted especially in the evening.    Will make the following adjustments  I:C ratios  12 am change from 12 to 14; 6 am same at 10; 4 pm change from 12 to 14  ISF change from 60 to 70    Explained to daughter that these changes in her carb ratio will result in less insulin being taken and the increase in ISF will lessen the amount of insulin given for correction.  Understanding was verbalized.  Changes made by daughter.    Will f/u in 2 week to see if her numbers level out.    Update to care planning 05/27/2025:  Omnipod download reviewed.  Numbers much better less lows noted.          Follow Up Plan     No follow-ups on file.    Today's care plan and follow up schedule was discussed with patient.  Radha verbalized understanding of the care plan, goals, and agrees to follow up plan.        The patient was encouraged to communicate with his/her health care provider/physician and care team regarding his/her condition(s) and treatment.  I provided the patient with my contact information today and encouraged to contact me via phone or [facility] Portal as needed.     Length of Visit   Total Time: 15 Minutes

## 2025-06-13 NOTE — PROGRESS NOTES
Outpatient Care Management  Initial Patient Assessment    Patient: Radha Feng  MRN: [id number]  Date of Service: 06/13/2025  Completed by: Catina Dalal RN  Referral Date: 05/19/2025  Date of Eligibility: 5/20/2025  Program:   High Risk  Status: Ongoing  Effective Dates: 6/13/2025 - present  Responsible Staff: Catina Dalal RN        Reason for Visit   Patient presents with    OPCM Enrollment Call       Brief Summary:  Radha Feng was referred by Dr. Trujillo at [facility] for compression fracture of L1 vertebra. Daughter denies pt having any issues other than fatigue from fracture. Patient qualifies for program based on high risk score of 64.5%.   Active problem list, medical, surgical and social history reviewed. Active comorbidities include compression fracture of L1 lumbar vertebra due to fall in May 2025, bilat glaucoma, SOB, home continuous oxygen at 2 liters, anemia, COVID-19, urge incontinence, adenocarcinoma of lung, type 2 DM, peripheral vascular disease, and bilateral leg weakness. Areas of need identified by patient include managing new oxygen requirements.   Recent dx of COVID.     Did get home portable oxygen. But was very hard to push the chair and tank.   Maybe something more portable. Has a total of 3 green tanks and concentrator   Has a home concentrator  Came from [facility]    Pt still very tired fatigued. Had to reschedule appts.    Can see about care at home NP for visits later on per daughter.     No HH. Pt going to be going to outpatient PT/OT next week.     Daughter stays with the patient. Drives pt everywhere    Getting in fluids and calories- but low appetite has been a bit of a challenge    Shakes with sugar free ice cream with ensure.     2 liters of oxygen continuous.     89-88% without oxygen    Still has intermittent coughing but better than before hospitalization.     Michelle Wallace, who is listed as being a rep for [facility] oxygen. Teams status  is out of office.   Collaborated with inpatient CM via secure chat.     Disability Status  Is the patient alert and oriented (person, place, time, and situation)?: Alert and oriented x 4  Hearing Difficulty or Deaf: no  Visual Difficulty or Blind: yes  Visual and Hearing Conclusion Statement: -- (glaucoma in both eyes limits vision at times and daughter denies pt having any heaing issues)  Difficulty Concentrating, Remembering or Making Decisions: yes  Concentration Management: dont forget much  Communication Difficulty: no  Eating/Swallowing Difficulty: no  Walking or Climbing Stairs Difficulty: yes  Walking or Climbing Stairs: stair climbing difficulty, requires equipment; ambulation difficulty, requires equipment  Mobility Management: -- (rollator, cane)  Dressing/Bathing Difficulty: no  Grooming: independent  Transferring (e.g., getting in and out of chairs): independent  Toileting : Independent  Continence : Incontience - Bladder  Difficulty Managing Errands Independently: yes  Errands Management: -- (daughter)  Equipment Currently Used at Home: rollator; glucometer; oxygen  ADL Conclusion Statement: -- (the patient does need some assistance with ADL's at this time.)  Change in Functional Status Since Onset of Current Illness/Injury: yes        Spiritual Beliefs  Spiritual, Cultural Beliefs, Orthodox Practices, Values that Affect Care: no      Social History     Socioeconomic History    Marital status: Single   Tobacco Use    Smoking status: Former    Smokeless tobacco: Never   Substance and Sexual Activity    Alcohol use: Not Currently    Drug use: Never   Social History Narrative    , one daughter local, four sons out of state. Retired . Lives with daughter Joss.     Social Drivers of Health     Financial Resource Strain: Low Risk  (6/5/2025)    Overall Financial Resource Strain (CARDIA)     Difficulty of Paying Living Expenses: Not hard at all   Food Insecurity: No Food Insecurity  (6/5/2025)    Hunger Vital Sign     Worried About Running Out of Food in the Last Year: Never true     Ran Out of Food in the Last Year: Never true   Transportation Needs: No Transportation Needs (6/5/2025)    PRAPARE - Transportation     Lack of Transportation (Medical): No     Lack of Transportation (Non-Medical): No   Physical Activity: Inactive (6/5/2025)    Exercise Vital Sign     Days of Exercise per Week: 0 days     Minutes of Exercise per Session: 0 min   Stress: Stress Concern Present (6/3/2025)    [employer] [place] [employer] - Occupational Stress Questionnaire     Feeling of Stress : To some extent   Housing Stability: Low Risk  (6/5/2025)    Housing Stability Vital Sign     Unable to Pay for Housing in the Last Year: No     Homeless in the Last Year: No       Roles and Relationships  Primary Source of Support/Comfort: child(keith)  Name of Support/Comfort Primary Source: -- (Saulo)      Advance Directives (For Healthcare)  Advance Directive  (If Adv Dir status is received, view document under Adv Dir in header or Chart Review Media tab): Patient does not have Advance Directive, requests information.  Patient Requests Assistance: Patient will do independently        Patient Reported Insurance  Verified current insurance plan:: Medicare            6/13/2025    11:23 AM 5/22/2024     2:44 PM 4/22/2024     3:43 PM 1/22/2024    10:48 AM 10/20/2023    10:38 AM 4/17/2023     3:35 PM 1/9/2023    11:30 AM   Depression Patient Health Questionnaire   Over the last two weeks how often have you been bothered by little interest or pleasure in doing things Not at all Not at all Not at all Not at all Not at all Not at all  Not at all    Over the last two weeks how often have you been bothered by feeling down, depressed or hopeless Not at all Not at all Not at all Not at all Not at all Not at all Not at all   PHQ-2 Total Score 0 0 0 0 0 0 0       Data saved with a previous flowsheet row definition        Learning Assessment       06/13/2025 1124 [facility] (6/13/2025 - Present)   Created by Catina Dalal RN -  (Nurse) Status: Complete                 PRIMARY LEARNER     Primary Learner Name:  Della RS - 06/13/2025 1124    Relationship:  Family RS - 06/13/2025 1124    Does the primary learner have any barriers to learning?:  No Barriers RS - 06/13/2025 1124    What is the preferred language of the primary learner?:  English RS - 06/13/2025 1124    Is an  required?:  No RS - 06/13/2025 1124    How does the primary learner prefer to learn new concepts?:  Listening, Reading, Demonstration RS - 06/13/2025 1124    How often do you need to have someone help you read instructions, pamphlets, or written material from your doctor or pharmacy?:  Never RS - 06/13/2025 1124        CO-LEARNER #1     No question answered        CO-LEARNER #2     No question answered        SPECIAL TOPICS     No question answered        ANSWERED BY:     No question answered        Comments         Edit History       Catina Dalal, RN -  (Nurse)   06/13/2025 1124

## 2025-06-16 ENCOUNTER — HOSPITAL ENCOUNTER (INPATIENT)
Facility: HOSPITAL | Age: 85
LOS: 4 days | Discharge: HOME | DRG: 300 | End: 2025-06-20
Attending: EMERGENCY MEDICINE | Admitting: STUDENT IN AN ORGANIZED HEALTH CARE EDUCATION/TRAINING PROGRAM
Payer: MEDICARE

## 2025-06-16 DIAGNOSIS — Z71.89 ADVANCE CARE PLANNING: ICD-10-CM

## 2025-06-16 DIAGNOSIS — C34.91 ADENOCARCINOMA OF RIGHT LUNG: ICD-10-CM

## 2025-06-16 DIAGNOSIS — M79.676 TOE PAIN: Primary | ICD-10-CM

## 2025-06-16 DIAGNOSIS — S32.010S COMPRESSION FRACTURE OF L1 VERTEBRA, SEQUELA: ICD-10-CM

## 2025-06-16 DIAGNOSIS — M79.89 SWELLING OF RIGHT FOOT: ICD-10-CM

## 2025-06-16 DIAGNOSIS — M79.675 PAIN OF TOE OF LEFT FOOT: ICD-10-CM

## 2025-06-16 DIAGNOSIS — J96.11 CHRONIC RESPIRATORY FAILURE WITH HYPOXIA: Chronic | ICD-10-CM

## 2025-06-16 DIAGNOSIS — Z51.5 PALLIATIVE CARE ENCOUNTER: Chronic | ICD-10-CM

## 2025-06-16 DIAGNOSIS — R07.9 CHEST PAIN: ICD-10-CM

## 2025-06-16 DIAGNOSIS — Z13.6 SCREENING FOR CARDIOVASCULAR CONDITION: ICD-10-CM

## 2025-06-16 DIAGNOSIS — M79.674 PAIN OF TOE OF RIGHT FOOT: ICD-10-CM

## 2025-06-16 PROBLEM — J96.10 CHRONIC RESPIRATORY FAILURE: Chronic | Status: ACTIVE | Noted: 2025-06-16

## 2025-06-16 LAB
ABSOLUTE EOSINOPHIL (OHS): 0.06 K/UL
ABSOLUTE MONOCYTE (OHS): 1.4 K/UL (ref 0.3–1)
ABSOLUTE NEUTROPHIL COUNT (OHS): 17.98 K/UL (ref 1.8–7.7)
ALBUMIN SERPL BCP-MCNC: 1.8 G/DL (ref 3.5–5.2)
ALP SERPL-CCNC: 112 UNIT/L (ref 40–150)
ALT SERPL W/O P-5'-P-CCNC: 15 UNIT/L (ref 10–44)
ANION GAP (OHS): 9 MMOL/L (ref 8–16)
APTT PPP: 31.5 SECONDS (ref 21–32)
AST SERPL-CCNC: 26 UNIT/L (ref 11–45)
BACTERIA #/AREA URNS AUTO: ABNORMAL /HPF
BASOPHILS # BLD AUTO: 0.03 K/UL
BASOPHILS NFR BLD AUTO: 0.1 %
BILIRUB SERPL-MCNC: 0.5 MG/DL (ref 0.1–1)
BILIRUB UR QL STRIP.AUTO: NEGATIVE
BIPAP: 0
BUN SERPL-MCNC: 14 MG/DL (ref 6–30)
BUN SERPL-MCNC: 14 MG/DL (ref 8–23)
CALCIUM SERPL-MCNC: 9 MG/DL (ref 8.7–10.5)
CHLORIDE SERPL-SCNC: 104 MMOL/L (ref 95–110)
CHLORIDE SERPL-SCNC: 105 MMOL/L (ref 95–110)
CLARITY UR: ABNORMAL
CO2 SERPL-SCNC: 25 MMOL/L (ref 23–29)
COLOR UR AUTO: YELLOW
CREAT SERPL-MCNC: 0.6 MG/DL (ref 0.5–1.4)
CREAT SERPL-MCNC: 0.7 MG/DL (ref 0.5–1.4)
CRP SERPL-MCNC: 334.8 MG/L
ERYTHROCYTE [DISTWIDTH] IN BLOOD BY AUTOMATED COUNT: 19.9 % (ref 11.5–14.5)
FIO2: 21 %
GFR SERPLBLD CREATININE-BSD FMLA CKD-EPI: >60 ML/MIN/1.73/M2
GLUCOSE SERPL-MCNC: 178 MG/DL (ref 70–110)
GLUCOSE SERPL-MCNC: 181 MG/DL (ref 70–110)
GLUCOSE UR QL STRIP: NEGATIVE
HCT VFR BLD AUTO: 26.5 % (ref 37–48.5)
HCT VFR BLD CALC: 31 %PCV (ref 36–54)
HGB BLD-MCNC: 7.9 GM/DL (ref 12–16)
HGB UR QL STRIP: NEGATIVE
HYALINE CASTS UR QL AUTO: 9 /LPF (ref 0–1)
IMM GRANULOCYTES # BLD AUTO: 0.18 K/UL (ref 0–0.04)
IMM GRANULOCYTES NFR BLD AUTO: 0.9 % (ref 0–0.5)
INR PPP: 1.2 (ref 0.8–1.2)
KETONES UR QL STRIP: NEGATIVE
LDH SERPL L TO P-CCNC: 2.7 MMOL/L (ref 0.5–2.2)
LEUKOCYTE ESTERASE UR QL STRIP: ABNORMAL
LYMPHOCYTES # BLD AUTO: 0.91 K/UL (ref 1–4.8)
MCH RBC QN AUTO: 27.8 PG (ref 27–31)
MCHC RBC AUTO-ENTMCNC: 29.8 G/DL (ref 32–36)
MCV RBC AUTO: 93 FL (ref 82–98)
MICROSCOPIC COMMENT: ABNORMAL
NITRITE UR QL STRIP: POSITIVE
NUCLEATED RBC (/100WBC) (OHS): 0 /100 WBC
OHS QRS DURATION: 84 MS
OHS QTC CALCULATION: 464 MS
PH UR STRIP: 6 [PH]
PLATELET # BLD AUTO: 137 K/UL (ref 150–450)
PMV BLD AUTO: 12.2 FL (ref 9.2–12.9)
POC IONIZED CALCIUM: 1.12 MMOL/L (ref 1.06–1.42)
POC PERFORMED BY: ABNORMAL
POC TCO2 (MEASURED): 22 MMOL/L (ref 23–29)
POC TEMPERATURE: 37 C
POTASSIUM BLD-SCNC: 4.4 MMOL/L (ref 3.5–5.1)
POTASSIUM SERPL-SCNC: 4.2 MMOL/L (ref 3.5–5.1)
PROT SERPL-MCNC: 6.9 GM/DL (ref 6–8.4)
PROT UR QL STRIP: ABNORMAL
PROTHROMBIN TIME: 12.6 SECONDS (ref 9–12.5)
RBC # BLD AUTO: 2.84 M/UL (ref 4–5.4)
RBC #/AREA URNS AUTO: 3 /HPF (ref 0–4)
RELATIVE EOSINOPHIL (OHS): 0.3 %
RELATIVE LYMPHOCYTE (OHS): 4.4 % (ref 18–48)
RELATIVE MONOCYTE (OHS): 6.8 % (ref 4–15)
RELATIVE NEUTROPHIL (OHS): 87.5 % (ref 38–73)
SAMPLE: ABNORMAL
SODIUM BLD-SCNC: 139 MMOL/L (ref 136–145)
SODIUM SERPL-SCNC: 139 MMOL/L (ref 136–145)
SP GR UR STRIP: >=1.03
SPECIMEN SOURCE: ABNORMAL
SQUAMOUS #/AREA URNS AUTO: 20 /HPF
UROBILINOGEN UR STRIP-ACNC: NEGATIVE EU/DL
WBC # BLD AUTO: 20.56 K/UL (ref 3.9–12.7)
WBC #/AREA URNS AUTO: 19 /HPF (ref 0–5)

## 2025-06-16 PROCEDURE — 85730 THROMBOPLASTIN TIME PARTIAL: CPT | Performed by: STUDENT IN AN ORGANIZED HEALTH CARE EDUCATION/TRAINING PROGRAM

## 2025-06-16 PROCEDURE — 25500020 PHARM REV CODE 255: Performed by: HOSPITALIST

## 2025-06-16 PROCEDURE — 87040 BLOOD CULTURE FOR BACTERIA: CPT | Performed by: STUDENT IN AN ORGANIZED HEALTH CARE EDUCATION/TRAINING PROGRAM

## 2025-06-16 PROCEDURE — 99285 EMERGENCY DEPT VISIT HI MDM: CPT | Mod: 25

## 2025-06-16 PROCEDURE — 93005 ELECTROCARDIOGRAM TRACING: CPT

## 2025-06-16 PROCEDURE — 96365 THER/PROPH/DIAG IV INF INIT: CPT

## 2025-06-16 PROCEDURE — 21400001 HC TELEMETRY ROOM

## 2025-06-16 PROCEDURE — 80047 BASIC METABLC PNL IONIZED CA: CPT

## 2025-06-16 PROCEDURE — 25000003 PHARM REV CODE 250: Performed by: STUDENT IN AN ORGANIZED HEALTH CARE EDUCATION/TRAINING PROGRAM

## 2025-06-16 PROCEDURE — 81003 URINALYSIS AUTO W/O SCOPE: CPT | Performed by: STUDENT IN AN ORGANIZED HEALTH CARE EDUCATION/TRAINING PROGRAM

## 2025-06-16 PROCEDURE — 93010 ELECTROCARDIOGRAM REPORT: CPT | Mod: ,,, | Performed by: INTERNAL MEDICINE

## 2025-06-16 PROCEDURE — 87086 URINE CULTURE/COLONY COUNT: CPT | Performed by: STUDENT IN AN ORGANIZED HEALTH CARE EDUCATION/TRAINING PROGRAM

## 2025-06-16 PROCEDURE — 63600175 PHARM REV CODE 636 W HCPCS

## 2025-06-16 PROCEDURE — 86140 C-REACTIVE PROTEIN: CPT | Performed by: EMERGENCY MEDICINE

## 2025-06-16 PROCEDURE — 63600175 PHARM REV CODE 636 W HCPCS: Performed by: STUDENT IN AN ORGANIZED HEALTH CARE EDUCATION/TRAINING PROGRAM

## 2025-06-16 PROCEDURE — 85610 PROTHROMBIN TIME: CPT | Performed by: STUDENT IN AN ORGANIZED HEALTH CARE EDUCATION/TRAINING PROGRAM

## 2025-06-16 PROCEDURE — 99900035 HC TECH TIME PER 15 MIN (STAT)

## 2025-06-16 PROCEDURE — 12000002 HC ACUTE/MED SURGE SEMI-PRIVATE ROOM

## 2025-06-16 PROCEDURE — 83605 ASSAY OF LACTIC ACID: CPT

## 2025-06-16 PROCEDURE — 85025 COMPLETE CBC W/AUTO DIFF WBC: CPT | Performed by: EMERGENCY MEDICINE

## 2025-06-16 PROCEDURE — 82040 ASSAY OF SERUM ALBUMIN: CPT | Performed by: EMERGENCY MEDICINE

## 2025-06-16 PROCEDURE — 99223 1ST HOSP IP/OBS HIGH 75: CPT | Mod: GC,,, | Performed by: SURGERY

## 2025-06-16 RX ORDER — OXYCODONE HYDROCHLORIDE 5 MG/1
5 TABLET ORAL EVERY 6 HOURS PRN
Status: DISCONTINUED | OUTPATIENT
Start: 2025-06-16 | End: 2025-06-16

## 2025-06-16 RX ORDER — HEPARIN SODIUM,PORCINE/D5W 25000/250
0-40 INTRAVENOUS SOLUTION INTRAVENOUS CONTINUOUS
Status: DISCONTINUED | OUTPATIENT
Start: 2025-06-16 | End: 2025-06-17

## 2025-06-16 RX ORDER — NALOXONE HCL 0.4 MG/ML
0.02 VIAL (ML) INJECTION
Status: DISCONTINUED | OUTPATIENT
Start: 2025-06-16 | End: 2025-06-20 | Stop reason: HOSPADM

## 2025-06-16 RX ORDER — PREDNISONE 2.5 MG/1
2.5 TABLET ORAL DAILY
Status: DISCONTINUED | OUTPATIENT
Start: 2025-06-17 | End: 2025-06-20 | Stop reason: HOSPADM

## 2025-06-16 RX ORDER — LATANOPROST 50 UG/ML
1 SOLUTION/ DROPS OPHTHALMIC DAILY
Status: DISCONTINUED | OUTPATIENT
Start: 2025-06-17 | End: 2025-06-20 | Stop reason: HOSPADM

## 2025-06-16 RX ORDER — SODIUM CHLORIDE 0.9 % (FLUSH) 0.9 %
10 SYRINGE (ML) INJECTION EVERY 12 HOURS PRN
Status: DISCONTINUED | OUTPATIENT
Start: 2025-06-16 | End: 2025-06-20 | Stop reason: HOSPADM

## 2025-06-16 RX ORDER — GLUCAGON 1 MG
1 KIT INJECTION
Status: DISCONTINUED | OUTPATIENT
Start: 2025-06-16 | End: 2025-06-20 | Stop reason: HOSPADM

## 2025-06-16 RX ORDER — IBUPROFEN 200 MG
16 TABLET ORAL
Status: DISCONTINUED | OUTPATIENT
Start: 2025-06-16 | End: 2025-06-20 | Stop reason: HOSPADM

## 2025-06-16 RX ORDER — IBUPROFEN 200 MG
24 TABLET ORAL
Status: DISCONTINUED | OUTPATIENT
Start: 2025-06-16 | End: 2025-06-20 | Stop reason: HOSPADM

## 2025-06-16 RX ORDER — ACETAMINOPHEN 500 MG
1000 TABLET ORAL EVERY 8 HOURS PRN
Status: DISCONTINUED | OUTPATIENT
Start: 2025-06-16 | End: 2025-06-20 | Stop reason: HOSPADM

## 2025-06-16 RX ORDER — INSULIN ASPART 100 [IU]/ML
0-5 INJECTION, SOLUTION INTRAVENOUS; SUBCUTANEOUS
Status: DISCONTINUED | OUTPATIENT
Start: 2025-06-16 | End: 2025-06-16

## 2025-06-16 RX ORDER — OXYCODONE HCL 5 MG/5 ML
1.25 SOLUTION, ORAL ORAL EVERY 6 HOURS PRN
Refills: 0 | Status: DISCONTINUED | OUTPATIENT
Start: 2025-06-16 | End: 2025-06-19

## 2025-06-16 RX ORDER — TALC
6 POWDER (GRAM) TOPICAL NIGHTLY PRN
Status: DISCONTINUED | OUTPATIENT
Start: 2025-06-16 | End: 2025-06-20 | Stop reason: HOSPADM

## 2025-06-16 RX ADMIN — IOHEXOL 100 ML: 350 INJECTION, SOLUTION INTRAVENOUS at 11:06

## 2025-06-16 RX ADMIN — VANCOMYCIN HYDROCHLORIDE 1250 MG: 1.25 INJECTION, POWDER, LYOPHILIZED, FOR SOLUTION INTRAVENOUS at 04:06

## 2025-06-16 RX ADMIN — HEPARIN SODIUM AND DEXTROSE 12 UNITS/KG/HR: 10000; 5 INJECTION INTRAVENOUS at 08:06

## 2025-06-16 RX ADMIN — PIPERACILLIN SODIUM AND TAZOBACTAM SODIUM 4.5 G: 4; .5 INJECTION, POWDER, FOR SOLUTION INTRAVENOUS at 04:06

## 2025-06-16 NOTE — HPI
84 y.o. 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     Daughter at bedside with collateral: she states that baseline patient is ambulatory with a cane.  Has had no symptoms of leg pain in the past until recently.  During her past admission, she had brief episode of toe pain, which resolved.  However symptoms have continued, and she brought her in due to the fact that her right 2/3 toes appear discolored, with blistering present.    Prior smoker, quit 30 years ago.  Was previously on aspirin, statin, however both of the was discontinued when she was diagnosed with her autoimmune hepatitis.

## 2025-06-16 NOTE — ASSESSMENT & PLAN NOTE
"Patient is identified as having Diastolic (HFpEF) heart failure that is Chronic. CHF is currently controlled. Latest ECHO performed and demonstrates- Results for orders placed during the hospital encounter of 09/20/22    Echo    Interpretation Summary  · The estimated ejection fraction is 65%.  · The left ventricle is normal in size with normal systolic function.  · Grade II left ventricular diastolic dysfunction.  · Normal right ventricular size with normal right ventricular systolic function.  · Moderate left atrial enlargement.  · There is bileaflet mitral prolapse. Eccentric anteriorly directed MR.  · Moderate mitral regurgitation. ERO=0.2 cm2,  · Mild tricuspid regurgitation. No prior studies available for comparison.  · The estimated PA systolic pressure is 36 mmHg.  · Normal central venous pressure (3 mmHg).  . Continue ACE/ARB and Furosemide and monitor clinical status closely. Monitor on telemetry. Patient is on CHF pathway.  Monitor strict Is&Os and daily weights.  Place on fluid restriction of 1.5 L. Cardiology has not been consulted. Continue to stress to patient importance of self efficacy and  on diet for CHF. Last BNP reviewed- and noted below No results for input(s): "BNP", "BNPTRIAGEBLO" in the last 168 hours.  "
- CTA with R hilar mass and LAP  - pulm consulted  - NPO after midnight  - H/H , Will monitor for hemoptysis  - consider Onc consult am     
- ISS  - ADA     
83-year-old female medical history of diabetes mellitus, hypertension, and autoimmune hepatitis (on prednisone, previously imuran discontinued due to thrombocytopenia) who presented to the emergency department  for hemoptysis. CTA from 6/20 reveals a right hilar/pulmonary mass, encasing several venous structures and airways. Scattered pulmonary nodules are noted elsewhere, malignancy remains a concern given hilar mass. Pulmonology consulted for lung mass. No findings suggestive of pulmonary embolism. No blood thinners, NSAIDs, including aspirin.     - No need for urgent EBUS inpatient  - can allow her to have diet  - ok to discharge for pulmonology standpoint  - Will message pulm coordinator to get patient in to do EBUS and biopsy with Dr. Wilkinson - aiming for July 5 to allow for patient to go to her trip to [facility].  - Discussed with patient and she is willing to undergo biopsy and consider potential chemo/radiation if pathology is concerning for malignancy.         
At time of evaluation, pt meets criteria to continue home insulin pump usage.  - Has all adequate supplies   - Insulin pump site change on   - Bolus settings reviewed    - No changes to home regimen.   - Nurse to check BG qac/hs/0200 & record in epic   - Patient to input glucose into pump and use bolus wizard for prandial needs   - Will continue to monitor accuchecks and titrate insulin as clinically indicated .     - Discussed above plan with patient, patient verbalized understanding.   - Understands in case of pump malfunction or cognitive decline in which pt can no longer safely use insulin pump, will transition to SC MDI     If pump malfunctions or is disconnected, please contact endocrine ASAP.       
BG goal: 140-180    - Continue home insulin pump  - POCT Glucose before meals, at bedtime and at 2 am  - Hypoglycemia protocol in place      ** Please notify Endocrine for any change and/or advance in diet**  ** Please call Endocrine for any BG related issues **     Discharge Planning:   TBD. Please notify endocrinology prior to discharge.      
Managed per primary team      
On prednisone resumed     
N/A

## 2025-06-16 NOTE — FIRST PROVIDER EVALUATION
"Medical screening examination initiated.  I have conducted a focused provider triage encounter, findings are as follows:    Brief history of present illness:  84-year-old female with a history of stage IIIB adenocarcinoma of the right upper lung with intralobar Mets, COPD, PVD, heart failure, aortic and coronary atherosclerosis, urinary incontinence, type 2 diabetes presenting with concern for foot ulcers and foot pain.Skin changes have been worsening over the last 2-3 days. Blisters on toes recently. Pain increased. See pics.     Vitals:    06/16/25 1400   BP: (!) 108/56   Pulse: 84   Resp: 20   Temp: 97.6 °F (36.4 °C)   TempSrc: Oral   SpO2: 100%   Weight: 49 kg (108 lb)   Height: 5' 5" (1.651 m)       Pertinent physical exam:  Discoloration and ulceration to toes 123 and 4 of the right foot.            Brief workup plan:  labs, crp, xray, vbg lactate      Preliminary workup initiated; this workup will be continued and followed by the physician or advanced practice provider that is assigned to the patient when roomed.    Hilario Glasgow DO, RICKI, FACEP  Senior Emergency Staff Physician   Dept of Emergency Medicine   [facility]          Disclaimer: This note has been generated using voice-recognition software. There may be typographical errors that have been missed during proof-reading.    "

## 2025-06-16 NOTE — SUBJECTIVE & OBJECTIVE
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    Diabetes mellitus     Glaucoma     Hypertension     Lung cancer     Other ascites 11/29/2022     Past Surgical History:   Procedure Laterality Date    CATARACT EXTRACTION W/  INTRAOCULAR LENS IMPLANT Left 12/21/2021        ENDOBRONCHIAL ULTRASOUND N/A 07/05/2024    Procedure: ENDOBRONCHIAL ULTRASOUND (EBUS);  Surgeon: Rolo Wilkinson MD;  Location: [facility] OR [facility] FLR;  Service: Pulmonary;  Laterality: N/A;    ESOPHAGOGASTRODUODENOSCOPY N/A 06/26/2023    Procedure: ESOPHAGOGASTRODUODENOSCOPY (EGD);  Surgeon: Edgar Branch MD;  Location: [facility] ENDO (4TH FLR);  Service: Endoscopy;  Laterality: N/A;  referral Dr Peraza-cirrhosis/labs done on 4/24/23-instr portal-GT     Family History       Problem Relation (Age of Onset)    Blindness Cousin    Cataracts Mother    Colon cancer Sister    Diabetes Mother    Glaucoma Mother    Heart attack Father    Hypertension Mother          Tobacco Use    Smoking status: Former    Smokeless tobacco: Never   Substance and Sexual Activity    Alcohol use: Not Currently    Drug use: Never    Sexual activity: Not on file     Review of Systems   Constitutional:  Negative for activity change.   Gastrointestinal:  Positive for abdominal pain.   Skin:  Positive for color change.   Neurological:  Positive for numbness.   All other systems reviewed and are negative.    Objective:     Vital Signs (Most Recent):  Temp: 97.6 °F (36.4 °C) (06/16/25 1400)  Pulse: 66 (06/16/25 1800)  Resp: 20 (06/16/25 1400)  BP: (!) 108/56 (06/16/25 1400)  SpO2: 100 % (06/16/25 1800) Vital Signs (24h Range):  Temp:  [97.6 °F (36.4 °C)] 97.6 °F (36.4 °C)  Pulse:  [65-84] 66  Resp:  [20] 20  SpO2:  [100 %] 100 %  BP: (108)/(56) 108/56     Weight: 49 kg (108 lb)  Body mass index is 17.97 kg/m².      Physical  Exam  Vitals reviewed.   HENT:      Head: Normocephalic.   Cardiovascular:      Comments: 1+ R fem pulse above femoral head   Nonpalpable L fem pulse     L monophasic PT/DP signals     R multiphasic PT, monophasic DP   Feet:      Comments: R 1st, 2nd, 3rd toe discoloration present.   Neurological:      Mental Status: She is alert.          Significant Labs:  All pertinent labs from the last 24 hours have been reviewed.    Significant Diagnostics:  I have reviewed and interpreted all pertinent imaging results/findings within the past 24 hours.       [1] (Not in a hospital admission)

## 2025-06-16 NOTE — ED NOTES
I-STAT Chem-8+ Results:   Value Reference Range   Sodium 139 136-145 mmol/L   Potassium  4.4 3.5-5.1 mmol/L   Chloride 104  mmol/L   Ionized Calcium 1.12 1.06-1.42 mmol/L   CO2 (measured) 22 23-29 mmol/L   Glucose 178  mg/dL   BUN 14 6-30 mg/dL   Creatinine 0.7 0.5-1.4 mg/dL   Hematocrit 31 36-54%

## 2025-06-16 NOTE — ED PROVIDER NOTES
Encounter Date: 6/16/2025       History     Chief Complaint   Patient presents with    Foot Pain     Bilateral foot pain, discoloration noted to toes, pt unable to bare weight. On 2L nasal cannula home oxygen, hx of adenocarcinoma of upper lobe of right lung.      HPI  Radha Feng is an 84 YOF with PMH adenocarcinoma, DM, HTN is presenting with bilateral foot pain.    Patient has had bilateral foot pain for about a week. 4 days ago pain acutely worsened. Skin changes 1d. Caregiver also reports decreased sensation on exam. Tenderness to the soles of feet.  Denies any trauma or other injury.  No chest pain or difficulty breathing or any other acute concerns at this time.  Mentions that patient was discharged from recent hospital admission on 2 L.    On chart review patient was admitted 06/03-6/07/2025 for management of acute hypoxic respiratory failure secondary to COVID.  Patient was treated with remdesivir and started on IV diuresis.  Seen by palliative.  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  Pulses were palpable bilaterally per vascular surgery no surveillance for further surveillance  needed.  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    Diabetes mellitus     Glaucoma     Hypertension     Lung cancer     Other ascites 11/29/2022     Past Surgical History:   Procedure Laterality Date    CATARACT EXTRACTION W/  INTRAOCULAR LENS IMPLANT Left 12/21/2021        ENDOBRONCHIAL ULTRASOUND N/A 07/05/2024    Procedure: ENDOBRONCHIAL ULTRASOUND (EBUS);  Surgeon: Rolo Wilkinson MD;  Location: [facility] OR [address] [place] [address];  Service: Pulmonary;  Laterality: N/A;    ESOPHAGOGASTRODUODENOSCOPY N/A 06/26/2023    Procedure: ESOPHAGOGASTRODUODENOSCOPY (EGD);  Surgeon: Edgar Branch MD;  Location: [facility]  (4TH FLR);  Service: Endoscopy;  Laterality: N/A;  referral Dr Peraza-cirrhosis/labs done on 4/24/23-instr portal-GT     Family History   Problem Relation Name Age of Onset    Cataracts Mother      Diabetes Mother      Glaucoma Mother      Hypertension Mother      Heart attack Father      Colon cancer Sister      Blindness Cousin      Amblyopia Neg Hx      Macular degeneration Neg Hx      Retinal detachment Neg Hx      Strabismus Neg Hx       Social History[1]  Review of Systems    Physical Exam     Initial Vitals [06/16/25 1400]   BP Pulse Resp Temp SpO2   (!) 108/56 84 20 97.6 °F (36.4 °C) 100 %      MAP       --         Physical Exam    Nursing note and vitals reviewed.  Constitutional: She appears well-developed and well-nourished.   HENT:   Head: Normocephalic and atraumatic.   Eyes: Conjunctivae and EOM are normal. Pupils are equal, round, and reactive to light.   Cardiovascular:  Normal rate and regular rhythm.           Murmur heard.  Weak pedal pulses   Pulmonary/Chest: Breath sounds normal. No respiratory distress. She has no wheezes. She has no rhonchi. She has no rales.   Abdominal: Abdomen is soft. Bowel sounds are normal. She exhibits no distension. There is no abdominal tenderness.   Musculoskeletal:      Comments: Bilateral soles tender to touch. 2nd toe is dusky. Ecchymosis on distal dorsum of R foot      Neurological: She is alert.   Skin: Skin is warm. Capillary refill takes 2 to 3 seconds.         ED Course   Procedures  Labs Reviewed   COMPREHENSIVE METABOLIC PANEL - Abnormal       Result Value    Sodium 139      Potassium 4.2      Chloride 105      CO2 25      Glucose 181 (*)     BUN 14      Creatinine 0.6      Calcium 9.0      Protein Total 6.9      Albumin 1.8 (*)     Bilirubin Total 0.5            AST 26      ALT 15      Anion Gap 9      eGFR >60     C-REACTIVE PROTEIN - Abnormal    .8 (*)    CBC WITH DIFFERENTIAL - Abnormal    WBC 20.56 (*)     RBC 2.84 (*)     HGB 7.9 (*)      HCT 26.5 (*)     MCV 93      MCH 27.8      MCHC 29.8 (*)     RDW 19.9 (*)     Platelet Count 137 (*)     MPV 12.2      Nucleated RBC 0      Neut % 87.5 (*)     Lymph % 4.4 (*)     Mono % 6.8      Eos % 0.3      Basophil % 0.1      Imm Grans % 0.9 (*)     Neut # 17.98 (*)     Lymph # 0.91 (*)     Mono # 1.40 (*)     Eos # 0.06      Baso # 0.03      Imm Grans # 0.18 (*)    PROTIME-INR - Abnormal    PT 12.6 (*)     INR 1.2     ISTAT PROCEDURE - Abnormal    POC Glucose 178 (*)     POC BUN 14      POC Creatinine 0.7      POC Sodium 139      POC Potassium 4.4      POC Chloride 104      POC TCO2 (MEASURED) 22 (*)     POC Ionized Calcium 1.12      POC Hematocrit 31 (*)     Sample ALOK     APTT - Normal    PTT 31.5     CULTURE, BLOOD   CULTURE, BLOOD   CBC W/ AUTO DIFFERENTIAL    Narrative:     The following orders were created for panel order CBC auto differential.  Procedure                               Abnormality         Status                     ---------                               -----------         ------                     CBC with Differential[[id number]]       Abnormal            Final result                 Please view results for these tests on the individual orders.   URINALYSIS, REFLEX TO URINE CULTURE   ISTAT CHEM8        ECG Results              EKG 12-lead (Final result)        Collection Time Result Time QRS Duration OHS QTC Calculation    06/16/25 15:21:52 06/16/25 15:50:21 84 464                     Final result by Interface, Lab In [facility] (06/16/25 15:50:26)                   Narrative:    Test Reason : Z13.6,    Vent. Rate :  72 BPM     Atrial Rate :  72 BPM     P-R Int : 144 ms          QRS Dur :  84 ms      QT Int : 424 ms       P-R-T Axes :  33  -4 -80 degrees    QTcB Int : 464 ms    Normal sinus rhythm  Possible  Anteroseptal infarct (cited on or before 03-Jun-2025)  T wave abnormality, consider inferolateral ischemia  Abnormal ECG  When compared with ECG of 03-Jun-2025 11:34,  No significant  change was found    Confirmed by Mohit Cardoza ([id number]) on 6/16/2025 3:50:17 PM    Referred By: AAAREFERRAL SELF           Confirmed By: Mohit Cardoza                                  Imaging Results              X-Ray Foot Complete Right (Final result)  Result time 06/16/25 16:11:37      Final result by Vinod Bell MD (06/16/25 16:11:37)                   Impression:      No acute displaced fracture-dislocation identified.      Electronically signed by: Vinod Bell MD  Date:    06/16/2025  Time:    16:11               Narrative:    EXAMINATION:  XR FOOT COMPLETE 3 VIEW RIGHT    CLINICAL HISTORY:  . Other specified soft tissue disorders    TECHNIQUE:  AP, lateral, and oblique views of the right foot were performed.    COMPARISON:  Right toe series 06/03/2025    FINDINGS:  Generalized osteopenia.  Overall alignment is within normal limits.  Lisfranc articulation is congruent.  No displaced fracture, dislocation or destructive osseous process.  Baseline minimal to mild DJD.  No subcutaneous emphysema or radiopaque foreign body.                                       X-Ray Chest AP Portable (Final result)  Result time 06/16/25 16:04:49      Final result by Vinod Bell MD (06/16/25 16:04:49)                   Impression:      As above.      Electronically signed by: Vinod Bell MD  Date:    06/16/2025  Time:    16:04               Narrative:    EXAMINATION:  XR CHEST AP PORTABLE    CLINICAL HISTORY:  eval for pneumonia;    TECHNIQUE:  Single frontal view of the chest was performed.    COMPARISON:  Chest radiograph 06/06/2025, CT abdomen and pelvis 06/03/2025, PET CT 04/29/2025    FINDINGS:  Tubing and clothing artifacts overlie the chest.  Patient is rotated.    Cardiomediastinal silhouette is relatively midline with similar calcification and tortuosity of the aorta and upper limits of normal sized heart.  Pulmonary vasculature and hilar contours are within normal limits.    There is suspected small right pleural effusion  with right basilar atelectasis/infiltrate, which appears increased from prior.  Previous small left pleural effusion has resolved versus now trace.  No consolidation seen on the left.  Bibasilar mild platelike scarring versus atelectasis.  Mild biapical pleuroparenchymal scarring more so on the right and similar to prior.  No pneumothorax.    No acute osseous process seen.  PA and lateral views can be obtained.                                       Medications   piperacillin-tazobactam (ZOSYN) 4.5 g in D5W 100 mL IVPB (MB+) (0 g Intravenous Stopped 6/16/25 1647)   vancomycin 1,250 mg in 0.9% NaCl 250 mL IVPB (admixture device) (0 mg Intravenous Stopped 6/16/25 1825)     Medical Decision Making  Radha Feng is an 84 YOF with PMH adenocarcinoma, DM, HTN is presenting with bilateral foot pain. On arrival patient is hemodynamically stable. Exam notable for weak pedal pulses. Ecchymosis on dorsum of R foot near toes. 2nd toe on R appears dusky. Tenderness to soles bilaterally.  Differentials at this time include PID, emboli, septic emboli, fracture.  Plan to obtain CBC, CMP, CRP, lactate, and x-ray of feet.    White count elevated 20.56.  Hemoglobin 7.9.  Lactate elevated at 2.7.  CRP elevated at 334.8.  Increasing concern for septic emboli.  Patient has started that on broad-spectrum antibiotics and IV fluids.  Discussed case with vascular surgery who does not believe that this is ischemic limb.  Discussed case with hospital medicine who admitted the patient.    Amount and/or Complexity of Data Reviewed  Labs: ordered. Decision-making details documented in ED Course.  Radiology: ordered.    Risk  Decision regarding hospitalization.               ED Course as of 06/16/25 1850   Mon Jun 16, 2025   1514 WBC(!): 20.56 [AC]   1514 Hemoglobin(!): 7.9 [AC]   1514 POC Lactate(!): 2.7  Increased concern for septic emboli in setting of malignancy. Will initiate sepsis orderset [AC]   1643 CRP(!): 334.8 [AC]      ED Course User  Index  [AC] Radha Sanchez MD                           Clinical Impression:  Final diagnoses:  [M79.89] Swelling of right foot  [Z13.6] Screening for cardiovascular condition          ED Disposition Condition    Admit                       [1]   Social History  Tobacco Use    Smoking status: Former    Smokeless tobacco: Never   Substance Use Topics    Alcohol use: Not Currently    Drug use: Never        Radha Sanchez MD  Resident  06/16/25 7213

## 2025-06-16 NOTE — ASSESSMENT & PLAN NOTE
84 y.o. 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     - Please obtain CTA runoff   - Will order noninvasive studies.   - ASA, start heparin gtt

## 2025-06-16 NOTE — CONSULTS
[facility] - Emergency Dept  Vascular Surgery  Consult Note    Inpatient consult to Vascular Surgery  Consult performed by: Eliecer Navarro MD  Consult ordered by: Radha Sanchez MD        Subjective:     Chief Complaint/Reason for Admission: Toe discoloration     History of Present Illness: 84 y.o. 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     Daughter at bedside with collateral: she states that baseline patient is ambulatory with a cane.  Has had no symptoms of leg pain in the past until recently.  During her past admission, she had brief episode of toe pain, which resolved.  However symptoms have continued, and she brought her in due to the fact that her right 2/3 toes appear discolored, with blistering present.    Prior smoker, quit 30 years ago.  Was previously on aspirin, statin, however both of the was discontinued when she was diagnosed with her autoimmune hepatitis.    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    Diabetes mellitus     Glaucoma     Hypertension     Lung cancer     Other ascites 11/29/2022     Past Surgical History:   Procedure Laterality Date    CATARACT EXTRACTION W/  INTRAOCULAR LENS IMPLANT Left 12/21/2021        ENDOBRONCHIAL ULTRASOUND N/A 07/05/2024    Procedure: ENDOBRONCHIAL ULTRASOUND (EBUS);  Surgeon: Rolo Wilkinson MD;  Location: [facility] OR [address] [place] [address];  Service: Pulmonary;  Laterality: N/A;    ESOPHAGOGASTRODUODENOSCOPY N/A 06/26/2023    Procedure: ESOPHAGOGASTRODUODENOSCOPY (EGD);  Surgeon: Edgar KENDRICK  MD Jose Carlos;  Location: [facility] ([address] [place] [address]);  Service: Endoscopy;  Laterality: N/A;  referral Dr Peraza-cirrhosis/labs done on 4/24/23-instr portal-GT     Family History       Problem Relation (Age of Onset)    Blindness Cousin    Cataracts Mother    Colon cancer Sister    Diabetes Mother    Glaucoma Mother    Heart attack Father    Hypertension Mother          Tobacco Use    Smoking status: Former    Smokeless tobacco: Never   Substance and Sexual Activity    Alcohol use: Not Currently    Drug use: Never    Sexual activity: Not on file     Review of Systems   Constitutional:  Negative for activity change.   Gastrointestinal:  Positive for abdominal pain.   Skin:  Positive for color change.   Neurological:  Positive for numbness.   All other systems reviewed and are negative.    Objective:     Vital Signs (Most Recent):  Temp: 97.6 °F (36.4 °C) (06/16/25 1400)  Pulse: 66 (06/16/25 1800)  Resp: 20 (06/16/25 1400)  BP: (!) 108/56 (06/16/25 1400)  SpO2: 100 % (06/16/25 1800) Vital Signs (24h Range):  Temp:  [97.6 °F (36.4 °C)] 97.6 °F (36.4 °C)  Pulse:  [65-84] 66  Resp:  [20] 20  SpO2:  [100 %] 100 %  BP: (108)/(56) 108/56     Weight: 49 kg (108 lb)  Body mass index is 17.97 kg/m².      Physical Exam  Vitals reviewed.   HENT:      Head: Normocephalic.   Cardiovascular:      Comments: 1+ R fem pulse above femoral head   Nonpalpable L fem pulse     L monophasic PT/DP signals     R multiphasic PT, monophasic DP   Feet:      Comments: R 1st, 2nd, 3rd toe discoloration present.   Neurological:      Mental Status: She is alert.          Significant Labs:  All pertinent labs from the last 24 hours have been reviewed.    Significant Diagnostics:  I have reviewed and interpreted all pertinent imaging results/findings within the past 24 hours.    Assessment/Plan:     Toe pain  84 y.o. 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     - Please obtain CTA runoff   - Will order noninvasive studies.   - ASA, start heparin gtt        Thank you for your consult.     Eliecer Navaror MD  Vascular Surgery  [facility] - Emergency Dept       [1] (Not in a hospital admission)

## 2025-06-17 LAB
ABSOLUTE EOSINOPHIL (OHS): 0.16 K/UL
ABSOLUTE EOSINOPHIL (OHS): 0.23 K/UL
ABSOLUTE MONOCYTE (OHS): 0.99 K/UL (ref 0.3–1)
ABSOLUTE MONOCYTE (OHS): 1.4 K/UL (ref 0.3–1)
ABSOLUTE NEUTROPHIL COUNT (OHS): 14.55 K/UL (ref 1.8–7.7)
ABSOLUTE NEUTROPHIL COUNT (OHS): 16.23 K/UL (ref 1.8–7.7)
ALBUMIN SERPL BCP-MCNC: 1.5 G/DL (ref 3.5–5.2)
ALP SERPL-CCNC: 98 UNIT/L (ref 40–150)
ALT SERPL W/O P-5'-P-CCNC: 12 UNIT/L (ref 10–44)
ANION GAP (OHS): 11 MMOL/L (ref 8–16)
APTT PPP: 44.5 SECONDS (ref 21–32)
APTT PPP: 46.6 SECONDS (ref 21–32)
AST SERPL-CCNC: 21 UNIT/L (ref 11–45)
BASOPHILS # BLD AUTO: 0.04 K/UL
BASOPHILS # BLD AUTO: 0.05 K/UL
BASOPHILS NFR BLD AUTO: 0.2 %
BASOPHILS NFR BLD AUTO: 0.3 %
BILIRUB SERPL-MCNC: 0.4 MG/DL (ref 0.1–1)
BUN SERPL-MCNC: 11 MG/DL (ref 8–23)
CALCIUM SERPL-MCNC: 8.3 MG/DL (ref 8.7–10.5)
CHLORIDE SERPL-SCNC: 105 MMOL/L (ref 95–110)
CHOLEST SERPL-MCNC: 61 MG/DL (ref 120–199)
CHOLEST/HDLC SERPL: 3.6 {RATIO} (ref 2–5)
CO2 SERPL-SCNC: 21 MMOL/L (ref 23–29)
CREAT SERPL-MCNC: 0.6 MG/DL (ref 0.5–1.4)
EAG (OHS): 108 MG/DL (ref 68–131)
ERYTHROCYTE [DISTWIDTH] IN BLOOD BY AUTOMATED COUNT: 19.9 % (ref 11.5–14.5)
ERYTHROCYTE [DISTWIDTH] IN BLOOD BY AUTOMATED COUNT: 19.9 % (ref 11.5–14.5)
GFR SERPLBLD CREATININE-BSD FMLA CKD-EPI: >60 ML/MIN/1.73/M2
GLUCOSE SERPL-MCNC: 153 MG/DL (ref 70–110)
HBA1C MFR BLD: 5.4 % (ref 4–5.6)
HCT VFR BLD AUTO: 24.1 % (ref 37–48.5)
HCT VFR BLD AUTO: 26.9 % (ref 37–48.5)
HDLC SERPL-MCNC: 17 MG/DL (ref 40–75)
HDLC SERPL: 27.9 % (ref 20–50)
HGB BLD-MCNC: 7.2 GM/DL (ref 12–16)
HGB BLD-MCNC: 8 GM/DL (ref 12–16)
HOLD SPECIMEN: NORMAL
IMM GRANULOCYTES # BLD AUTO: 0.12 K/UL (ref 0–0.04)
IMM GRANULOCYTES # BLD AUTO: 0.14 K/UL (ref 0–0.04)
IMM GRANULOCYTES NFR BLD AUTO: 0.7 % (ref 0–0.5)
IMM GRANULOCYTES NFR BLD AUTO: 0.8 % (ref 0–0.5)
INDIRECT COOMBS: NORMAL
LDLC SERPL CALC-MCNC: 30 MG/DL (ref 63–159)
LYMPHOCYTES # BLD AUTO: 0.71 K/UL (ref 1–4.8)
LYMPHOCYTES # BLD AUTO: 0.89 K/UL (ref 1–4.8)
MAGNESIUM SERPL-MCNC: 1.9 MG/DL (ref 1.6–2.6)
MCH RBC QN AUTO: 27.5 PG (ref 27–31)
MCH RBC QN AUTO: 27.6 PG (ref 27–31)
MCHC RBC AUTO-ENTMCNC: 29.7 G/DL (ref 32–36)
MCHC RBC AUTO-ENTMCNC: 29.9 G/DL (ref 32–36)
MCV RBC AUTO: 92 FL (ref 82–98)
MCV RBC AUTO: 93 FL (ref 82–98)
NONHDLC SERPL-MCNC: 44 MG/DL
NUCLEATED RBC (/100WBC) (OHS): 0 /100 WBC
NUCLEATED RBC (/100WBC) (OHS): 0 /100 WBC
PLATELET # BLD AUTO: 120 K/UL (ref 150–450)
PLATELET # BLD AUTO: 148 K/UL (ref 150–450)
PMV BLD AUTO: 11.3 FL (ref 9.2–12.9)
PMV BLD AUTO: 11.5 FL (ref 9.2–12.9)
POCT GLUCOSE: 116 MG/DL (ref 70–110)
POCT GLUCOSE: 155 MG/DL (ref 70–110)
POTASSIUM SERPL-SCNC: 3.7 MMOL/L (ref 3.5–5.1)
PROT SERPL-MCNC: 5.6 GM/DL (ref 6–8.4)
RBC # BLD AUTO: 2.62 M/UL (ref 4–5.4)
RBC # BLD AUTO: 2.9 M/UL (ref 4–5.4)
RELATIVE EOSINOPHIL (OHS): 0.9 %
RELATIVE EOSINOPHIL (OHS): 1.3 %
RELATIVE LYMPHOCYTE (OHS): 3.9 % (ref 18–48)
RELATIVE LYMPHOCYTE (OHS): 5.2 % (ref 18–48)
RELATIVE MONOCYTE (OHS): 5.4 % (ref 4–15)
RELATIVE MONOCYTE (OHS): 8.1 % (ref 4–15)
RELATIVE NEUTROPHIL (OHS): 84.7 % (ref 38–73)
RELATIVE NEUTROPHIL (OHS): 88.5 % (ref 38–73)
RH BLD: NORMAL
SODIUM SERPL-SCNC: 137 MMOL/L (ref 136–145)
SPECIMEN OUTDATE: NORMAL
TRIGL SERPL-MCNC: 70 MG/DL (ref 30–150)
TROPONIN I SERPL HS-MCNC: 138 NG/L
WBC # BLD AUTO: 17.19 K/UL (ref 3.9–12.7)
WBC # BLD AUTO: 18.32 K/UL (ref 3.9–12.7)

## 2025-06-17 PROCEDURE — 83036 HEMOGLOBIN GLYCOSYLATED A1C: CPT

## 2025-06-17 PROCEDURE — C1751 CATH, INF, PER/CENT/MIDLINE: HCPCS

## 2025-06-17 PROCEDURE — 36415 COLL VENOUS BLD VENIPUNCTURE: CPT | Performed by: HOSPITALIST

## 2025-06-17 PROCEDURE — 93005 ELECTROCARDIOGRAM TRACING: CPT

## 2025-06-17 PROCEDURE — 93010 ELECTROCARDIOGRAM REPORT: CPT | Mod: ,,, | Performed by: INTERNAL MEDICINE

## 2025-06-17 PROCEDURE — 86901 BLOOD TYPING SEROLOGIC RH(D): CPT | Performed by: HOSPITALIST

## 2025-06-17 PROCEDURE — 83735 ASSAY OF MAGNESIUM: CPT

## 2025-06-17 PROCEDURE — 80061 LIPID PANEL: CPT

## 2025-06-17 PROCEDURE — 85730 THROMBOPLASTIN TIME PARTIAL: CPT | Performed by: STUDENT IN AN ORGANIZED HEALTH CARE EDUCATION/TRAINING PROGRAM

## 2025-06-17 PROCEDURE — 63600175 PHARM REV CODE 636 W HCPCS: Performed by: STUDENT IN AN ORGANIZED HEALTH CARE EDUCATION/TRAINING PROGRAM

## 2025-06-17 PROCEDURE — 85025 COMPLETE CBC W/AUTO DIFF WBC: CPT

## 2025-06-17 PROCEDURE — 25000242 PHARM REV CODE 250 ALT 637 W/ HCPCS

## 2025-06-17 PROCEDURE — 85025 COMPLETE CBC W/AUTO DIFF WBC: CPT | Performed by: HOSPITALIST

## 2025-06-17 PROCEDURE — 99232 SBSQ HOSP IP/OBS MODERATE 35: CPT | Mod: GC,,, | Performed by: SURGERY

## 2025-06-17 PROCEDURE — 80053 COMPREHEN METABOLIC PANEL: CPT

## 2025-06-17 PROCEDURE — 25000003 PHARM REV CODE 250: Performed by: STUDENT IN AN ORGANIZED HEALTH CARE EDUCATION/TRAINING PROGRAM

## 2025-06-17 PROCEDURE — 84484 ASSAY OF TROPONIN QUANT: CPT | Performed by: HOSPITALIST

## 2025-06-17 PROCEDURE — 36410 VNPNXR 3YR/> PHY/QHP DX/THER: CPT

## 2025-06-17 PROCEDURE — 21400001 HC TELEMETRY ROOM

## 2025-06-17 PROCEDURE — 99222 1ST HOSP IP/OBS MODERATE 55: CPT | Mod: ,,,

## 2025-06-17 PROCEDURE — 63600175 PHARM REV CODE 636 W HCPCS

## 2025-06-17 PROCEDURE — 36415 COLL VENOUS BLD VENIPUNCTURE: CPT | Performed by: STUDENT IN AN ORGANIZED HEALTH CARE EDUCATION/TRAINING PROGRAM

## 2025-06-17 PROCEDURE — 36415 COLL VENOUS BLD VENIPUNCTURE: CPT

## 2025-06-17 PROCEDURE — 25000003 PHARM REV CODE 250: Performed by: HOSPITALIST

## 2025-06-17 PROCEDURE — 94761 N-INVAS EAR/PLS OXIMETRY MLT: CPT

## 2025-06-17 PROCEDURE — 25000003 PHARM REV CODE 250

## 2025-06-17 RX ORDER — DORZOLAMIDE HYDROCHLORIDE AND TIMOLOL MALEATE 20; 5 MG/ML; MG/ML
1 SOLUTION/ DROPS OPHTHALMIC 2 TIMES DAILY
Status: DISCONTINUED | OUTPATIENT
Start: 2025-06-17 | End: 2025-06-20 | Stop reason: HOSPADM

## 2025-06-17 RX ORDER — NAPROXEN SODIUM 220 MG/1
81 TABLET, FILM COATED ORAL DAILY
Status: DISCONTINUED | OUTPATIENT
Start: 2025-06-17 | End: 2025-06-20 | Stop reason: HOSPADM

## 2025-06-17 RX ORDER — INSULIN ASPART 100 [IU]/ML
0-10 INJECTION, SOLUTION INTRAVENOUS; SUBCUTANEOUS
Status: DISCONTINUED | OUTPATIENT
Start: 2025-06-17 | End: 2025-06-20 | Stop reason: HOSPADM

## 2025-06-17 RX ORDER — BRIMONIDINE TARTRATE 2 MG/ML
1 SOLUTION/ DROPS OPHTHALMIC 3 TIMES DAILY
Status: DISCONTINUED | OUTPATIENT
Start: 2025-06-17 | End: 2025-06-20 | Stop reason: HOSPADM

## 2025-06-17 RX ORDER — GLUCAGON 1 MG
1 KIT INJECTION
Status: DISCONTINUED | OUTPATIENT
Start: 2025-06-17 | End: 2025-06-20 | Stop reason: HOSPADM

## 2025-06-17 RX ORDER — INSULIN ASPART 100 [IU]/ML
1 INJECTION, SOLUTION INTRAVENOUS; SUBCUTANEOUS CONTINUOUS
Status: DISCONTINUED | OUTPATIENT
Start: 2025-06-17 | End: 2025-06-20 | Stop reason: HOSPADM

## 2025-06-17 RX ORDER — IBUPROFEN 200 MG
16 TABLET ORAL
Status: DISCONTINUED | OUTPATIENT
Start: 2025-06-17 | End: 2025-06-20 | Stop reason: HOSPADM

## 2025-06-17 RX ORDER — ALUMINUM HYDROXIDE, MAGNESIUM HYDROXIDE, AND SIMETHICONE 2400; 240; 2400 MG/30ML; MG/30ML; MG/30ML
30 SUSPENSION ORAL EVERY 6 HOURS PRN
Status: DISCONTINUED | OUTPATIENT
Start: 2025-06-17 | End: 2025-06-20 | Stop reason: HOSPADM

## 2025-06-17 RX ORDER — MUPIROCIN 20 MG/G
OINTMENT TOPICAL 2 TIMES DAILY
Status: CANCELLED | OUTPATIENT
Start: 2025-06-17 | End: 2025-06-22

## 2025-06-17 RX ORDER — ATORVASTATIN CALCIUM 10 MG/1
10 TABLET, FILM COATED ORAL DAILY
Status: DISCONTINUED | OUTPATIENT
Start: 2025-06-18 | End: 2025-06-18

## 2025-06-17 RX ORDER — IBUPROFEN 200 MG
24 TABLET ORAL
Status: DISCONTINUED | OUTPATIENT
Start: 2025-06-17 | End: 2025-06-20 | Stop reason: HOSPADM

## 2025-06-17 RX ORDER — CLOPIDOGREL BISULFATE 75 MG/1
75 TABLET ORAL DAILY
Status: DISCONTINUED | OUTPATIENT
Start: 2025-06-17 | End: 2025-06-20 | Stop reason: HOSPADM

## 2025-06-17 RX ORDER — NITROGLYCERIN 0.4 MG/1
0.4 TABLET SUBLINGUAL EVERY 5 MIN PRN
Status: DISCONTINUED | OUTPATIENT
Start: 2025-06-17 | End: 2025-06-20 | Stop reason: HOSPADM

## 2025-06-17 RX ORDER — ACETAMINOPHEN 500 MG
1000 TABLET ORAL EVERY 8 HOURS
Status: CANCELLED | OUTPATIENT
Start: 2025-06-17

## 2025-06-17 RX ORDER — SODIUM CHLORIDE 0.9 % (FLUSH) 0.9 %
10 SYRINGE (ML) INJECTION EVERY 12 HOURS PRN
Status: DISCONTINUED | OUTPATIENT
Start: 2025-06-17 | End: 2025-06-20 | Stop reason: HOSPADM

## 2025-06-17 RX ADMIN — BRIMONIDINE TARTRATE 1 DROP: 2 SOLUTION OPHTHALMIC at 02:06

## 2025-06-17 RX ADMIN — ASPIRIN 81 MG CHEWABLE TABLET 81 MG: 81 TABLET CHEWABLE at 09:06

## 2025-06-17 RX ADMIN — PIPERACILLIN SODIUM AND TAZOBACTAM SODIUM 4.5 G: 4; .5 INJECTION, POWDER, FOR SOLUTION INTRAVENOUS at 02:06

## 2025-06-17 RX ADMIN — PIPERACILLIN SODIUM AND TAZOBACTAM SODIUM 4.5 G: 4; .5 INJECTION, POWDER, FOR SOLUTION INTRAVENOUS at 09:06

## 2025-06-17 RX ADMIN — DORZOLAMIDE HYDROCHLORIDE AND TIMOLOL MALEATE 1 DROP: 20; 5 SOLUTION OPHTHALMIC at 08:06

## 2025-06-17 RX ADMIN — PREDNISONE 2.5 MG: 2.5 TABLET ORAL at 09:06

## 2025-06-17 RX ADMIN — OXYCODONE HYDROCHLORIDE 1.25 MG: 5 SOLUTION ORAL at 12:06

## 2025-06-17 RX ADMIN — BRIMONIDINE TARTRATE 1 DROP: 2 SOLUTION OPHTHALMIC at 08:06

## 2025-06-17 RX ADMIN — OXYCODONE HYDROCHLORIDE 1.25 MG: 5 SOLUTION ORAL at 08:06

## 2025-06-17 RX ADMIN — CLOPIDOGREL BISULFATE 75 MG: 75 TABLET, FILM COATED ORAL at 03:06

## 2025-06-17 RX ADMIN — INSULIN ASPART 1 UNITS/HR: 100 INJECTION, SOLUTION INTRAVENOUS; SUBCUTANEOUS at 03:06

## 2025-06-17 RX ADMIN — ACETAMINOPHEN 1000 MG: 500 TABLET ORAL at 06:06

## 2025-06-17 RX ADMIN — LATANOPROST 1 DROP: 50 SOLUTION OPHTHALMIC at 09:06

## 2025-06-17 RX ADMIN — ACETAMINOPHEN 1000 MG: 500 TABLET ORAL at 08:06

## 2025-06-17 RX ADMIN — ACETAMINOPHEN 1000 MG: 500 TABLET ORAL at 12:06

## 2025-06-17 NOTE — SUBJECTIVE & OBJECTIVE
Interval HPI:   Overnight events: No acute events overnight. Patient in room 629/629 A. Blood glucose stable. BG at goal on current insulin regimen (Home Insulin Pump). Steroid use- None.      Renal function-   Lab Results   Component Value Date    CREATININE 0.6 2025        Vasopressors-  None     Diet Consistent Carbohydrate 2000 Calories (up to 75 gm per meal)     Eatin%  Nausea: No  Hypoglycemia and intervention: No  Fever: No  TPN and/or TF: No    PMH, PSH, FH, SH updated and reviewed     ROS:  Review of Systems   Constitutional:  Negative for unexpected weight change.   Gastrointestinal:  Negative for constipation, diarrhea, nausea and vomiting.   Endocrine: Negative for polydipsia and polyuria.       Current Medications and/or Treatments Impacting Glycemic Control  Immunotherapy:    Immunosuppressants       None          Steroids:   Hormones (From admission, onward)      Start     Stop Route Frequency Ordered    25 0900  predniSONE tablet 2.5 mg         -- Oral Daily 25 1930    25 2030  melatonin tablet 6 mg         -- Oral Nightly PRN 25          Pressors:    Autonomic Drugs (From admission, onward)      None          Hyperglycemia/Diabetes Medications:   Antihyperglycemics (From admission, onward)      None             PHYSICAL EXAMINATION:  Vitals:    25 0758   BP: 115/67   Pulse: 68   Resp:    Temp: 97.2 °F (36.2 °C)     Body mass index is 15.08 kg/m².     Physical Exam  Constitutional:       General: She is not in acute distress.     Appearance: Normal appearance. She is not ill-appearing.   HENT:      Head: Normocephalic and atraumatic.      Right Ear: External ear normal.      Left Ear: External ear normal.      Nose: Nose normal.   Pulmonary:      Effort: No respiratory distress.   Skin:     Coloration: Skin is not jaundiced.   Neurological:      Mental Status: She is alert. Mental status is at baseline.   Psychiatric:         Mood and Affect: Mood  normal.         Behavior: Behavior normal.         Thought Content: Thought content normal.         Judgment: Judgment normal.

## 2025-06-17 NOTE — ASSESSMENT & PLAN NOTE
Patient with leucocytosis   Recent Labs   Lab 06/16/25  1446 06/17/25  0430 06/17/25  1551   WBC 20.56* 17.19* 18.32*     . Afebrile. BCX 2, urine culture pending . likely secondary to sepsis  6/17  Leucocytosis trended down. Hb 7.2 on heparin drip. monitor. UA +, BC x2 pending. on  Zosyn

## 2025-06-17 NOTE — PROGRESS NOTES
[facility] Medicine  Progress Note    Patient Name: Radha Feng  MRN: [id number]  Patient Class: IP- Inpatient   Admission Date: 6/16/2025  Length of Stay: 1 days  Attending Physician: Dar Hernandez MD  Primary Care Provider: Leticia Lim MD        Subjective     Principal Problem:Toe pain        HPI:  Ms. 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 She reports questionable subjective fevers and chills. No N/V/D or any other complaints. She states the foot pain got increasingly worse today prompting her to present for evaluation.     In the ED, patient afebrile, SBP 100s, HR 70s, satting well on home 2LNC. CBC with WBC 20 (up from prior DC), Hgb around BL at 7.9. CMP showing stable lytes and renal function. R foot XR without acute fx. Vasc surg consulted, low suspicion for acute limb at this time, recommending CTA runoff and AC. Pt subsequently admitted to  for continued workup and management.     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 vascular surgery consulted.  CTA runoff -. Extensive calcified and noncalcified atherosclerotic plaque results in peripheral vascular disease . There is at least  two-vessel runoff to bilateral feet via the anterior and posterior tibial arteries. Attenuated flow within the peroneal artery bilaterally may reflect slow flow or distal occlusion.   started on ASA, start heparin gtt. X ray foot - No acute displaced fracture-dislocation identified. vascular surgery f/u -  possible embolization from Right femoral plaque - Recommend  ASA/plavix/statin.  heparin  discontinued. needs short term vascular surgeyr  f/u, if not improving/persistent symptoms then could consider endarterectomy.Leucocytosis trended down. Hb 7.2 on heparin drip. monitor. UA +, BC x2 pending. on  Zosyn. Endocrine consulted for DM2 on omnipod insulin pump and dexcom. discussed with daughter. denies urinary symptoms. Patient has anterior and posterior vaginal wall prolapse -follows with urogynecology and uses pessary. pessary noted in vagiana per CT scan. Gynecology consulted for UTI? / pessary in place/ Leucocytosis         Review of Systems:   Pain scale:   Constitutional:  fever,  chills, headache, vision loss, hearing loss, weight loss, Generalized weakness, falls, loss of smell, loss of taste, poor appetite,  sore throat  Respiratory: cough, shortness of breath.   Cardiovascular: chest pain, dizziness, palpitations, orthopnea, swelling of feet, syncope  Gastrointestinal: nausea, vomiting, abdominal pain, diarrhea, black stool,  blood in stool, change in bowel habits, constipation  Genitourinary: hematuria, dysuria, urgency, frequency  Integument/Breast: rash,  pruritis. color change - toes   Hematologic/Lymphatic: easy bruising, lymphadenopathy  Musculoskeletal: arthralgias , myalgias, back pain, neck pain, knee pain  Neurological: confusion, seizures, tremors, slurred speech, numbness   Behavioral/Psych:  depression, anxiety, auditory or visual hallucinations     OBJECTIVE:     Physical Exam:  Body mass index is 15.08 kg/m².    Constitutional: Appears thin built    Head: Normocephalic and atraumatic.  "  Neck: Normal range of motion. Neck supple.   Cardiovascular: Normal heart rate.  Regular heart rhythm.  Pulmonary/Chest: Effort normal.   Abdominal: No distension.  No tenderness  Musculoskeletal: Normal range of motion. No edema. right  1st, 2nd, 3rd toe discoloration. Right shoulder dressing  Neurological: Alert and oriented to person, place, and time.   Skin: Skin is warm and dry.   Psychiatric: Normal mood and affect. Behavior is normal.                  Vital Signs  Temp: 97.2 °F (36.2 °C) (06/17/25 0758)  Pulse: 67 (06/17/25 1130)  Resp: 18 (06/17/25 0630)  BP: (!) 101/59 (06/17/25 1130)  SpO2: 100 % (06/17/25 1130)     24 Hour VS Range    Temp:  [97.2 °F (36.2 °C)-98.9 °F (37.2 °C)]   Pulse:  [64-81]   Resp:  [18-22]   BP: (101-158)/(58-70)   SpO2:  [99 %-100 %]     Intake/Output Summary (Last 24 hours) at 6/17/2025 1646  Last data filed at 6/16/2025 1825  Gross per 24 hour   Intake 250.08 ml   Output --   Net 250.08 ml         I/O This Shift:  No intake/output data recorded.    Wt Readings from Last 3 Encounters:   06/17/25 41.1 kg (90 lb 9.7 oz)   06/04/25 49 kg (108 lb 0.4 oz)   05/18/25 49 kg (108 lb)       I have personally reviewed the vitals and recorded Intake/Output     Laboratory/Diagnostic Data:    CBC/Anemia Labs: Coags:    Recent Labs   Lab 06/16/25  1446 06/16/25  1455 06/17/25  0430 06/17/25  1551   WBC 20.56*  --  17.19* 18.32*   HGB 7.9*  --  7.2* 8.0*   HCT 26.5* 31* 24.1* 26.9*   *  --  120* 148*   MCV 93  --  92 93   RDW 19.9*  --  19.9* 19.9*    Recent Labs   Lab 06/16/25  1447 06/17/25  0430 06/17/25  1121   INR 1.2  --   --    APTT 31.5 46.6* 44.5*        Chemistries: ABG:   Recent Labs   Lab 06/16/25  1446 06/17/25  0430    137   K 4.2 3.7    105   CO2 25 21*   BUN 14 11   CREATININE 0.6 0.6   CALCIUM 9.0 8.3*   PROT 6.9 5.6*   BILITOT 0.5 0.4   ALKPHOS 112 98   ALT 15 12   AST 26 21   MG  --  1.9    No results for input(s): "PH", "PCO2", "PO2", "HCO3", " ""POCSATURATED", "BE" in the last 168 hours.     POCT Glucose: HbA1c:    Recent Labs   Lab 06/17/25  1131   POCTGLUCOSE 155*    Hemoglobin A1C   Date Value Ref Range Status   06/21/2024 6.0 (H) 4.0 - 5.6 % Final     Comment:     ADA Screening Guidelines:  5.7-6.4%  Consistent with prediabetes  >or=6.5%  Consistent with diabetes    High levels of fetal hemoglobin interfere with the HbA1C  assay. Heterozygous hemoglobin variants (HbS, HgC, etc)do  not significantly interfere with this assay.   However, presence of multiple variants may affect accuracy.     01/22/2024 6.2 (H) 4.0 - 5.6 % Final     Comment:     ADA Screening Guidelines:  5.7-6.4%  Consistent with prediabetes  >or=6.5%  Consistent with diabetes    High levels of fetal hemoglobin interfere with the HbA1C  assay. Heterozygous hemoglobin variants (HbS, HgC, etc)do  not significantly interfere with this assay.   However, presence of multiple variants may affect accuracy.     07/24/2023 6.2 (H) 4.0 - 5.6 % Final     Comment:     ADA Screening Guidelines:  5.7-6.4%  Consistent with prediabetes  >or=6.5%  Consistent with diabetes    High levels of fetal hemoglobin interfere with the HbA1C  assay. Heterozygous hemoglobin variants (HbS, HgC, etc)do  not significantly interfere with this assay.   However, presence of multiple variants may affect accuracy.       Hemoglobin A1c   Date Value Ref Range Status   06/17/2025 5.4 4.0 - 5.6 % Final     Comment:     ADA Screening Guidelines:  5.7-6.4%  Consistent with prediabetes  >=6.5%  Consistent with diabetes    High levels of fetal hemoglobin interfere with the HbA1C  assay. Heterozygous hemoglobin variants (HbS, HgC, etc)do  not significantly interfere with this assay.   However, presence of multiple variants may affect accuracy.        Cardiac Enzymes: Ejection Fractions:    No results for input(s): "CPK", "CPKMB", "MB", "TROPONINI" in the last 72 hours. EF   Date Value Ref Range Status   09/22/2022 65 % Final    "       Recent Labs   Lab 06/16/25  2255   COLORU Yellow   APPEARANCEUA Hazy*   PHUR 6.0   SPECGRAV >=1.030*   PROTEINUA Trace*   GLUCUA Negative   BILIRUBINUA Negative   OCCULTUA Negative   NITRITE Positive*   UROBILINOGEN Negative   LEUKOCYTESUR 3+*   RBCUA 3   WBCUA 19*   BACTERIA Many*   HYALINECASTS 9*       Procalcitonin (ng/mL)   Date Value   09/24/2022 0.61 (H)     Lactate (Lactic Acid) (mmol/L)   Date Value   10/23/2022 6.1 (HH)     BNP (pg/mL)   Date Value   06/03/2025 2,717 (H)   09/20/2022 66     CRP (mg/L)   Date Value   06/16/2025 334.8 (H)   09/23/2022 41.6 (H)     D-Dimer (mg/L FEU)   Date Value   09/26/2022 12.81 (H)   09/25/2022 13.06 (H)   09/23/2022 12.76 (H)   09/20/2022 11.97 (H)     Ferritin (ng/mL)   Date Value   05/17/2025 741.0 (H)   04/17/2025 387.0 (H)   03/09/2023 193   09/23/2022 1,186 (H)     Lactate Dehydrogenase (U/L)   Date Value   05/17/2025 527 (H)     LD (U/L)   Date Value   03/09/2023 261 (H)   12/27/2022 337 (H)   09/26/2022 543 (H)   09/25/2022 461 (H)   09/23/2022 430 (H)     Troponin I (ng/mL)   Date Value   09/20/2022 <0.006     Results for orders placed or performed in visit on 07/24/23   Vitamin D    Collection Time: 07/24/23 11:20 AM   Result Value Ref Range    Vit D, 25-Hydroxy 29 (L) 30 - 96 ng/mL     POC Rapid COVID (no units)   Date Value   10/23/2022 Negative       Microbiology labs for the last week  Microbiology Results (last 7 days)       Procedure Component Value Units Date/Time    MRSA Screen by PCR [[id number]]     Order Status: Sent Specimen: Nasal Swab     Blood culture x two cultures. Draw prior to antibiotics. [[id number]]  (Normal) Collected: 06/16/25 1616    Order Status: Completed Specimen: Blood from Peripheral, Antecubital, Right Updated: 06/17/25 0014     Blood Culture No Growth After 6 Hours    Blood culture x two cultures. Draw prior to antibiotics. [[id number]]  (Normal) Collected: 06/16/25 1616    Order Status: Completed Specimen: Blood from  Peripheral, Forearm, Right Updated: 06/17/25 0014     Blood Culture No Growth After 6 Hours    Urine culture [[id number]] Collected: 06/16/25 2255    Order Status: Sent Specimen: Urine Updated: 06/16/25 2355            Reviewed and noted in plan where applicable- Please see chart for full lab data.    Lines/Drains:       Peripheral IV - Single Lumen 06/16/25 1450 20 G Anterior;Left Forearm (Active)   Site Assessment Clean;Dry;Intact;No redness;No swelling 06/17/25 0400   Line Securement Device Secured with sutureless device 06/16/25 2359   Extremity Assessment Distal to IV No abnormal discoloration;No redness;No swelling;No warmth 06/16/25 2359   Line Status No blood return;Flushed;Infusing 06/17/25 0400   Dressing Status Clean;Dry;Intact 06/17/25 0400   Dressing Intervention Integrity maintained 06/17/25 0400   Number of days: 0            Midline Catheter - Single Lumen 06/17/25 0150 Left basilic vein (medial side of arm) other (see comments) (Active)   Site Assessment Clean;Dry;Intact 06/17/25 0150   IV Device Securement catheter securement device 06/17/25 0150   Line Status Blood return noted;Flushed;Saline locked 06/17/25 0150   Extremity Circumference (cm) 23 cm 06/17/25 0150   Dressing Type CHG impregnated dressing/sponge;Central line dressing 06/17/25 0150   Dressing Status Clean;Dry;Intact 06/17/25 0150   Dressing Intervention First dressing 06/17/25 0150   Dressing Change Due 06/24/25 06/17/25 0150   Site Change Due 07/15/25 06/17/25 0150   Reason Not Rotated Not due 06/17/25 0150   Number of days: 0            Subcutaneous Infusion Pump 05/16/25 Abdomen (Comment) (Active)   Number of days: 32       Female External Urinary Catheter w/ Suction 06/16/25 1825 (Active)   Skin no redness;no breakdown 06/16/25 2359   Tolerance no signs/symptoms of discomfort 06/16/25 2359   Suction Continuous suction at 40 mmHg 06/16/25 2359   Date of last wick change 06/17/25 06/16/25 2359   Number of days: 0       Imaging  ECG  Results              EKG 12-lead (Final result)        Collection Time Result Time QRS Duration OHS QTC Calculation    06/16/25 15:21:52 06/16/25 15:50:21 84 464                     Final result by Interface, Lab In [facility] (06/16/25 15:50:26)                   Narrative:    Test Reason : Z13.6,    Vent. Rate :  72 BPM     Atrial Rate :  72 BPM     P-R Int : 144 ms          QRS Dur :  84 ms      QT Int : 424 ms       P-R-T Axes :  33  -4 -80 degrees    QTcB Int : 464 ms    Normal sinus rhythm  Possible  Anteroseptal infarct (cited on or before 03-Jun-2025)  T wave abnormality, consider inferolateral ischemia  Abnormal ECG  When compared with ECG of 03-Jun-2025 11:34,  No significant change was found    Confirmed by Mohit Cardoza ([id number]) on 6/16/2025 3:50:17 PM    Referred By: AAAREFERRAL SELF           Confirmed By: Mohit Cardoza                                    Results for orders placed during the hospital encounter of 06/03/25    Echo    Interpretation Summary    Left Ventricle: The left ventricle is normal in size. Normal wall thickness. There is eccentric hypertrophy. Mild global hypokinesis present. There is mildly reduced systolic function with a visually estimated ejection fraction of 45 - 50%. Grade II diastolic dysfunction.    Right Ventricle: The right ventricle is normal in size Wall thickness is normal. Systolic function is normal.    Left Atrium: The left atrium is severely dilated    Mitral Valve: There is moderate regurgitation with an eccentrically anteromedial directed jet.    Tricuspid Valve: There is mild regurgitation.    Pulmonary Artery: The estimated pulmonary artery systolic pressure is 39 mmHg.    IVC/SVC: Normal venous pressure at 3 mmHg.    Pericardium: There is a small effusion adjacent to the left atrium. Left pleural effusion.      CTA Runoff ABD PEL Bilat Lower Ext  Narrative: EXAMINATION:  CTA RUNOFF ABD PEL BILAT LOWER EXT    CLINICAL HISTORY:  Claudication or leg  ischemia    TECHNIQUE:  CTA of abdomen, pelvis, and bilateral lower extremities performed with 100 mL Omnipaque 350 intravenous contrast.    COMPARISON:  CT 06/03/2025    FINDINGS:  SOFT TISSUES: Fat-containing umbilical hernia.  Diffuse muscle atrophy.    LUNG BASES/VISUALIZED MEDIASTINUM: Bilateral pleural effusions, right greater than left, with adjacent compressive atelectasis.  These are improved in size compared to CT 06/03/2025.  Scattered subsegmental atelectasis versus scarring.  Multi-vessel coronary calcific atherosclerosis.    HEPATOBILIARY: Liver is normal size. Several subcentimeter hypodensities, too small to characterize but unchanged.  Similar geographic hypoattenuation about the falciform ligament, likely focal fat.  No biliary ductal dilatation.  Normal gallbladder.    PANCREAS: Similar mild prominence of the pancreatic duct measuring 0.4 cm in the pancreatic head.  No obstructing stones or masses.  This likely reflects senescent change.    SPLEEN: Normal size.    ADRENALS: No adrenal nodules.    KIDNEYS/URETERS: Kidneys enhance symmetrically.  Multifocal bilateral renal cortical scarring, similar to prior.  Right lower pole hypodensity, favored to represent a simple cyst.  Bilateral renovascular calcifications.  No stones or hydronephrosis.  Note is made of early excretion of contrast in several calices in bilateral kidneys.  No discrete solid renal masses.  Ureters are unremarkable.    BLADDER/PELVIC ORGANS: No bladder wall thickening.  Incidental note of the right ureteral jet, a normal finding.  Pessary device in the vagina.  Calcification in the left aspect of the uterine fundus may reflect a calcified fibroid or adjacent phleboliths.  No adnexal masses.    PERITONEUM / RETROPERITONEUM: No free air or fluid.    LYMPH NODES: No lymphadenopathy in the abdomen or pelvis, by size criteria.    GI TRACT: No evidence of bowel obstruction or inflammation.  Suspected prior appendectomy.  Moderate  stool burden throughout the colon.    BONES: Degenerative changes of the spine.  Unchanged compression deformity of L1.  Similar advanced degenerative change in the right hip with deformity of the right femoral head and acetabular protrusion.  Additional degenerative of the knees, right greater than left, and ankles.  No acute fractures or aggressive osseous lesions.    VESSELS: Extensive calcified and noncalcified atherosclerosis.  Tortuosity of the abdominal aorta.  No abdominal aortic aneurysm.  Celiac trunk, SMA, bilateral renal arteries, and RADHA are patent without high-grade stenosis at their origins.  Accessory left hepatic artery off the left gastric artery.  Portal vein is patent.    CTA runoff:    Right:    Common iliac artery: Calcified plaque along its course without high-grade stenosis.    External iliac artery: Patent.    Common femoral artery: Bulky calcified and noncalcified plaque with severe stenosis distally.    Superficial femoral artery: Multifocal calcified plaque resulting and intermittent mild-moderate stenosis.    Deep femoral artery: Scattered plaque resulting in mild stenosis.  Overall patent.    Popliteal artery: Patent.    Anterior tibial artery: Multifocal plaque without high-grade stenosis.  Patent through the ankle.    Tibial-peroneal trunk: Patent.    Posterior tibial artery: Multifocal plaque without high-grade stenosis.  Patent through the ankle.    Peroneal artery: Attenuated flow distally may reflect occlusion or slow flow.    Left:    Common iliac artery: Predominantly noncalcified plaque along its course results in severe stenosis with questionable intermittent occlusion.    External iliac artery: Multifocal plaque with scattered areas of mild stenosis.    Common femoral artery: Multifocal plaque with areas of scattered mild-moderate stenosis.    Superficial femoral artery: Multifocal plaque results in scattered areas of moderate to severe stenosis.    Deep femoral artery:  Multifocal plaque results in scattered areas of moderate to severe stenosis.    Popliteal artery: Multifocal plaque with areas of mild-moderate stenosis.    Anterior tibial artery: Slow flow with multifocal stenoses.  Patent through the ankle on delayed phase.    Tibial-peroneal trunk: Multifocal plaque with mild stenoses.    Posterior tibial artery: Multifocal plaque without high-grade stenosis.  Patent through the ankle.    Peroneal artery: Attenuated flow distally may reflect occlusion or slow flow.    Poor runoff into the left foot, even on delayed images.  Impression: 1. Extensive calcified and noncalcified atherosclerotic plaque results in peripheral vascular disease as detailed above.  There is at least two-vessel runoff to bilateral feet via the anterior and posterior tibial arteries.  Attenuated flow within the peroneal artery bilaterally may reflect slow flow or distal occlusion.  2. Additional findings as above.    Electronically signed by resident: Lex Earl  Date:    06/17/2025  Time:    00:59    Electronically signed by: Jamie Myles  Date:    06/17/2025  Time:    06:41      Labs, Imaging, EKG and Diagnostic results from 6/17/2025 were reviewed.    Medications:  Medication list was reviewed and changes noted under Assessment/Plan.  Medications Ordered Prior to Encounter[1]  Scheduled Medications:  Current Facility-Administered Medications   Medication Dose Route Frequency    aspirin  81 mg Oral Daily    [START ON 6/18/2025] atorvastatin  10 mg Oral Daily    brimonidine 0.2%  1 drop Both Eyes TID    clopidogreL  75 mg Oral Daily    dorzolamide-timolol 2-0.5%  1 drop Both Eyes BID    latanoprost  1 drop Both Eyes Daily    predniSONE  2.5 mg Oral Daily     PRN:   Current Facility-Administered Medications:     acetaminophen, 1,000 mg, Oral, Q8H PRN    aluminum & magnesium hydroxide-simethicone, 30 mL, Oral, Q6H PRN    dextrose 50%, 12.5 g, Intravenous, PRN    dextrose 50%, 12.5 g, Intravenous, PRN     dextrose 50%, 25 g, Intravenous, PRN    dextrose 50%, 25 g, Intravenous, PRN    glucagon (human recombinant), 1 mg, Intramuscular, PRN    glucagon (human recombinant), 1 mg, Intramuscular, PRN    glucose, 16 g, Oral, PRN    glucose, 16 g, Oral, PRN    glucose, 24 g, Oral, PRN    glucose, 24 g, Oral, PRN    insulin aspart U-100, 0-10 Units, Subcutaneous, PRN    melatonin, 6 mg, Oral, Nightly PRN    naloxone, 0.02 mg, Intravenous, PRN    nitroGLYCERIN, 0.4 mg, Sublingual, Q5 Min PRN    oxyCODONE, 1.25 mg, Oral, Q6H PRN    sodium chloride 0.9%, 10 mL, Intravenous, Q12H PRN    Flushing PICC/Midline Protocol, , , Until Discontinued **AND** sodium chloride 0.9%, 10 mL, Intravenous, Q12H PRN  Infusions:    insulin aspart U-100  1 Units/hr Subcutaneous Continuous 0.01 mL/hr at 06/17/25 1515 1 Units/hr at 06/17/25 1515       Estimated Creatinine Clearance: 46.1 mL/min (based on SCr of 0.6 mg/dL).             Assessment & Plan  Toe pain  Aortic atherosclerosis  PVD (peripheral vascular disease)  85 y/o F presenting for evaluation of several days b/l toe pain R>L as well as R toe discoloration. C/f claudication/ischemia given known PVD, though unknown etiology of acute worsening of her sxs. Vascular surgery consulted in the ED, no acute intervention at this time, though will f/u noninvasive studies.     -- CTA runoff ordered  -- ASA 81mg qd  -- heparin drip ordered  -- vascular surgery following, appreciate recs  -- multimodal pain regimen    6/17  PVD, HFpEF, T2DM on insulin managed with CGM and insulin pump, and autoimmune hepatitis on chronic prednisone, presenting after recent admission for R toe pain, now with discoloration over the last 2-3 days. vascular surgery consulted.  CTA runoff . started on ASA and heparin gtt.  85 y/o F presenting for evaluation of several days b/l toe pain R>L as well as R toe discoloration. C/f claudication/ischemia given known PVD, though unknown etiology of acute worsening of her sxs.  Vascular surgery consulted in the ED, no acute intervention at this time, though will f/u noninvasive studies.   -- CTA runoff ordered  -- ASA 81mg qd  -- heparin drip ordered  -- vascular surgery following, appreciate recs  -- multimodal pain regimen    85 y/o F presenting for evaluation of several days b/l toe pain R>L as well as R toe discoloration. C/f claudication/ischemia given known PVD, though unknown etiology of acute worsening of her sxs. Vascular surgery consulted in the ED, no acute intervention at this time, though will f/u noninvasive studies.   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 vascular surgery consulted.  CTA runoff -. Extensive calcified and noncalcified atherosclerotic plaque results in peripheral vascular disease . There is at least two-vessel runoff to bilateral feet via the anterior and posterior tibial arteries. Attenuated flow within the peroneal artery bilaterally may reflect slow flow or distal occlusion.   started on ASA, start heparin gtt. X ray foot - No acute displaced fracture-dislocation identified. vascular surgery f/u -  possible embolization from Right femoral plaque - Recommend  ASA/plavix/statin.  heparin  discontinued. needs short term vascular surgeyr  f/u, if not improving/persistent symptoms then could consider endarterectomy.Leucocytosis trended down. Hb 7.2 on heparin drip. monitor. UA +, BC x2 pending. on  Zosyn. Endocrine consulted for DM2 on omnipod insulin pump and dexcom. discussed with daughter. denies urinary  symptoms. Patient has anterior and posterior vaginal wall prolapse -follows with urogynecology and uses pessary. pessary noted in vagiana per CT scan    Hypertensive heart disease with diastolic heart failure  Patients blood pressure range in the last 24 hours was: BP  Min: 101/59  Max: 158/70.The patient's inpatient anti-hypertensive regimen is listed below:  Current Antihypertensives  dorzolamide-timolol 2-0.5% ophthalmic solution 1 drop, 2 times daily, Both Eyes  nitroGLYCERIN SL tablet 0.4 mg, Every 5 min PRN, Sublingual    Plan  - BP is controlled, no changes needed to their regimen  - holding home lasix on admission given normal BP and pt appearing overall volume down. Can resume as clinically indicated       Type 2 diabetes mellitus with circulatory disorder, with long-term current use of insulin  Insulin pump in place  Patient's FSGs are controlled on current medication regimen.  Last A1c reviewed-   Lab Results   Component Value Date    HGBA1C 5.4 06/17/2025     Most recent fingerstick glucose reviewed-   Recent Labs   Lab 06/17/25  1131   POCTGLUCOSE 155*     Current correctional scale N/A  Maintain anti-hyperglycemic dose as follows-   Antihyperglycemics (From admission, onward)      Start     Stop Route Frequency Ordered    06/17/25 1515  insulin aspart U-100 insulin pump from home        Question Answer Comment   Target number 120    Basal Rate #1 1    Basal rate #1 time 0000        -- SubQ Continuous 06/17/25 1408    06/17/25 1507  insulin aspart U-100 insulin pump from home 0-10 Units        Question Answer Comment   Target number 120    Carbohydrate coverage #1 10    Carbohydrate coverage #1 time 0000    Sensitivity #1 70    Sensitivity #1 time 0000        -- SubQ As needed (PRN) 06/17/25 1408          -- Hold Oral hypoglycemics while patient is in the hospital.  -- patient with home insulin pump and sensor in place and functional.  -- Endocrinology consulted on admit for assistance with pump      Patient's FSGs are controlled on current medication regimen.  Last A1c reviewed-   Lab Results   Component Value Date    HGBA1C 5.4 06/17/2025     Most recent fingerstick glucose reviewed-   Recent Labs   Lab 06/17/25  1131   POCTGLUCOSE 155*     Current correctional scale N/A  Maintain anti-hyperglycemic dose as follows-   Antihyperglycemics (From admission, onward)      Start     Stop Route Frequency Ordered    06/17/25 1515  insulin aspart U-100 insulin pump from home        Question Answer Comment   Target number 120    Basal Rate #1 1    Basal rate #1 time 0000        -- SubQ Continuous 06/17/25 1408    06/17/25 1507  insulin aspart U-100 insulin pump from home 0-10 Units        Question Answer Comment   Target number 120    Carbohydrate coverage #1 10    Carbohydrate coverage #1 time 0000    Sensitivity #1 70    Sensitivity #1 time 0000        -- SubQ As needed (PRN) 06/17/25 1408          -- Hold Oral hypoglycemics while patient is in the hospital.  -- patient with home insulin pump and sensor in place and functional.  -- Endocrinology consulted on admit for assistance with pump     Autoimmune hepatitis  -- continue home prednisone 2.5mg qd on admission  6/18 Lipitor discontinued as daughter concerned about  h/o autoimmune hepatitis on prednisone until hepatology eval     Chronic respiratory failure  Centrilobular emphysema  Adenocarcinoma of right lung  Patient with Hypoxic Respiratory failure which is Chronic.  she is on home oxygen at 2 LPM. Supplemental oxygen was provided and noted-      Signs/symptoms of respiratory failure include- increased work of breathing. Contributing diagnoses includes - malignancy, heart failure Labs and images were reviewed. Patient Has not had a recent ABG. Will treat underlying causes and adjust management of respiratory failure as follows-    -- continue home O2 on admission  -- no e/o respiratory distress at this time       Patient with Hypoxic Respiratory failure which  is Chronic.  she is on home oxygen at 2 LPM. Supplemental oxygen was provided and noted-      Signs/symptoms of respiratory failure include- increased work of breathing. Contributing diagnoses includes - malignancy, heart failure Labs and images were reviewed. Patient Has not had a recent ABG. Will treat underlying causes and adjust management of respiratory failure as follows-    -- continue home O2 on admission  -- no e/o respiratory distress at this time       Patient with Hypoxic Respiratory failure which is Chronic.  she is on home oxygen at 2 LPM. Supplemental oxygen was provided and noted-      Signs/symptoms of respiratory failure include- increased work of breathing. Contributing diagnoses includes - malignancy, heart failure Labs and images were reviewed. Patient Has not had a recent ABG. Will treat underlying causes and adjust management of respiratory failure as follows-    -- continue home O2 on admission  -- no e/o respiratory distress at this time       Thrombocytopenia  monitor  Recent Labs     06/16/25  1446 06/17/25  0430 06/17/25  1551   * 120* 148*     Leukocytosis    Patient with leucocytosis   Recent Labs   Lab 06/16/25  1446 06/17/25  0430 06/17/25  1551   WBC 20.56* 17.19* 18.32*     . Afebrile. BCX 2, urine culture pending . likely secondary to sepsis  6/17  Leucocytosis trended down. Hb 7.2 on heparin drip. monitor. UA +, BC x2 pending. on  Zosyn      Anemia    Patient's with Normocytic anemia.. Hemoglobin stable. Etiology likely due to chronic disease .  Current CBC reviewed-    Recent Labs   Lab 06/16/25  1446 06/17/25  0430 06/17/25  1551   HGB 7.9* 7.2* 8.0*         Component Value Date/Time    MCV 93 06/17/2025 1551    MCV 96 03/11/2025 1358    RDW 19.9 (H) 06/17/2025 1551    RDW 15.9 (H) 03/11/2025 1358    IRON 15 (L) 05/17/2025 1348    IRON 121 03/09/2023 1330    FERRITIN 741.0 (H) 05/17/2025 1348    RETIC 4.0 (H) 05/17/2025 1348    RETIC 2.7 (H) 03/09/2023 1330    FOLATE 11.6  03/09/2023 1330    MMOVVSCN23 794 03/09/2023 1330    OCCULTBLOOD Positive (A) 10/26/2022 0935     Monitor CBC and transfuse if H/H drops below 7/21.    Stage 4 malignant neoplasm of lung   metastatic cancer of lung primary. The cancer has metastasized to shoulder. The patient is under the care of an outpatient oncologist. The patient is not undergoing active chemotherapy.     UTI (urinary tract infection)  ? UA +, UC pending. 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 Patient has anterior and posterior vaginal wall prolapse -follows with urogynecology and uses pessary. pessary noted in vagiana per CT scan    Shoulder lesion, right  noted metastatic cancer of lung primary. The cancer has metastasized to shoulder.         VTE Risk Mitigation (From admission, onward)           Ordered     Reason for No Pharmacological VTE Prophylaxis  Once        Comments: Heparin gtt   Question:  Reasons:  Answer:  Physician Provided (leave comment)    06/16/25 1926     IP VTE HIGH RISK PATIENT  Once         06/16/25 1926     Place sequential compression device  Until discontinued         06/16/25 1926                    Discharge Planning   MELVI: 6/20/2025     Code Status: Full Code   Medical Readiness for Discharge Date:   Discharge Plan A: Home with family                        Dar Hernandez MD  Department of Hospital Medicine   [facility]         [1]   No current facility-administered medications on file prior to encounter.     Current Outpatient Medications on File Prior to Encounter   Medication Sig Dispense Refill    acetaminophen (TYLENOL) 500 MG tablet Take 2 tablets (1,000 mg total) by mouth every 8 (eight) hours as needed for Pain.  0    BD INSULIN SYRINGE ULTRA-FINE 1 mL 31 gauge x 5/16 Syrg       blood-glucose meter,continuous Misc Dispsense 1 Dexcom G7  1 each 0    blood-glucose transmitter (DEXCOM G6 TRANSMITTER) Amanda 1 each by Misc.(Non-Drug; Combo Route) route every 3 (three) months.  "(Patient not taking: Reported on 2025) 1 each 3    brimonidine 0.2% (ALPHAGAN) 0.2 % Drop Place 1 drop into both eyes 3 (three) times daily. 10 mL 11    cholecalciferol, vitamin D3, (VITAMIN D3) 25 mcg (1,000 unit) capsule Take 2 capsules (2,000 Units total) by mouth once daily.  0    DEXCOM G7 SENSOR Amanda 1 Device by Misc.(Non-Drug; Combo Route) route every 10 days. 9 each 3    dorzolamide-timolol 2-0.5% (COSOPT) 22.3-6.8 mg/mL ophthalmic solution Place 1 drop into both eyes 2 (two) times daily. 20 mL 3    furosemide (LASIX) 40 MG tablet TAKE 1 TABLET BY MOUTH EVERY DAY 90 tablet 0    insulin aspart U-100 (NOVOLOG U-100 INSULIN ASPART) 100 unit/mL injection TO USE WITH OMNIPOD 5. TOTAL DAILY DOSE UP TO 40 UNITS 40 mL 4    insulin pump cart,automated,BT (OMNIPOD 5 G6 PODS, GEN 5,) Crtg Inject 1 each into the skin Every 3 (three) days. 10 each 11    latanoprost 0.005 % ophthalmic solution PLACE 1 DROP INTO BOTH EYES ONCE DAILY 7.5 mL 3    LIDOcaine (LIDODERM) 5 % Place 1 patch onto the skin once daily. Remove & Discard patch within 12 hours or as directed by MD 30 patch 0    melatonin (MELATIN) 3 mg tablet Take 2 tablets (6 mg total) by mouth nightly as needed for Insomnia. 30 tablet 3    nystatin (MYCOSTATIN) 100,000 unit/mL suspension SWISH WITH 6MLS BY MOUTH THEN SWALLOW 4 TIMES A DAY FOR 14 DAYS      OMNIPOD 5 G6-G7 PODS, GEN 5, Crtg SMARTSI Each SUB-Q Once a Month (Patient not taking: Reported on 2025)      oxyCODONE (ROXICODONE) 5 MG immediate release tablet Take 0.5 tablets (2.5 mg total) by mouth every 6 (six) hours as needed for Pain. 10 tablet 0    pen needle, diabetic 31 gauge x 5/16" Ndle Use to inject insulin into the skin up to 4 times daily 400 each 3    predniSONE (DELTASONE) 5 MG tablet Take 0.5 tablets (2.5 mg total) by mouth once daily. 45 tablet 3    tobramycin sulfate 0.3% (TOBREX) 0.3 % ophthalmic solution 1-2 drops topically twice daily to affected toe(s). 5 mL 9     "

## 2025-06-17 NOTE — ASSESSMENT & PLAN NOTE
At time of evaluation, pt meets criteria to continue home insulin pump usage.  - Has all adequate supplies   - Bolus settings reviewed    - No changes to home regimen.   - Nurse to check BG qac/hs/0200 & record in epic   - Patient to input glucose into pump and use bolus wizard for prandial needs   - Will continue to monitor accuchecks and titrate insulin as clinically indicated .     - Discussed above plan with patient, patient verbalized understanding.   - Understands in case of pump malfunction or cognitive decline in which pt can no longer safely use insulin pump, will transition to SC MDI        If pump malfunctions or is disconnected, please notify the on-call provider for endocrinology.

## 2025-06-17 NOTE — ASSESSMENT & PLAN NOTE
Patient with Hypoxic Respiratory failure which is Chronic.  she is on home oxygen at 2 LPM. Supplemental oxygen was provided and noted-      Signs/symptoms of respiratory failure include- increased work of breathing. Contributing diagnoses includes - malignancy, heart failure Labs and images were reviewed. Patient Has not had a recent ABG. Will treat underlying causes and adjust management of respiratory failure as follows-    -- continue home O2 on admission  -- no e/o respiratory distress at this time

## 2025-06-17 NOTE — ASSESSMENT & PLAN NOTE
. Patient has anterior and posterior vaginal wall prolapse -follows with urogynecology and uses pessary. pessary noted in vagiana per CT scan

## 2025-06-17 NOTE — HPI
Ms. 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 She reports questionable subjective fevers and chills. No N/V/D or any other complaints. She states the foot pain got increasingly worse today prompting her to present for evaluation.     In the ED, patient afebrile, SBP 100s, HR 70s, satting well on home 2LNC. CBC with WBC 20 (up from prior DC), Hgb around BL at 7.9. CMP showing stable lytes and renal function. R foot XR without acute fx. Vasc surg consulted, low suspicion for acute limb at this time, recommending CTA runoff and AC. Pt subsequently admitted to  for continued workup and management.

## 2025-06-17 NOTE — HPI
Reason for Consult: Management of T2DM, Hyperglycemia      Diabetes diagnosis year: > 5 years ago      Home Diabetes Medications:    Omnipod 5  Basal Rate  12A:  0.45 units/hr      Carb Ratio  12A:12  6A: 10  6P: 12     ISF  12A: 50      Target: 120  Correct above: 140     IAT: 4 hours        How often checking glucose at home? >4 x day Dexcom   BG readings on regimen: 100s-200s     Hypoglycemia on the regimen? No   Missed doses on regimen?  No     Diabetes Complications include:     Hyperglycemia     Complicating diabetes co morbidities:   Glucocorticoid use         HPI: Radha Feng is a 84 y.o. 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 She reports questionable subjective fevers and chills. No N/V/D or any other complaints. She states the foot pain got increasingly worse today prompting her to present for evaluation. Endocrine consulted for BG management.

## 2025-06-17 NOTE — ASSESSMENT & PLAN NOTE
Patient with Hypoxic Respiratory failure which is Chronic.  she is on home oxygen at 2 LPM. Supplemental oxygen was provided and noted-      Signs/symptoms of respiratory failure include- increased work of breathing. Contributing diagnoses includes - malignancy, heart failure Labs and images were reviewed. Patient Has not had a recent ABG. Will treat underlying causes and adjust management of respiratory failure as follows-    -- continue home O2 on admission  -- no e/o respiratory distress at this time       Patient with Hypoxic Respiratory failure which is Chronic.  she is on home oxygen at 2 LPM. Supplemental oxygen was provided and noted-      Signs/symptoms of respiratory failure include- increased work of breathing. Contributing diagnoses includes - malignancy, heart failure Labs and images were reviewed. Patient Has not had a recent ABG. Will treat underlying causes and adjust management of respiratory failure as follows-    -- continue home O2 on admission  -- no e/o respiratory distress at this time       Patient with Hypoxic Respiratory failure which is Chronic.  she is on home oxygen at 2 LPM. Supplemental oxygen was provided and noted-      Signs/symptoms of respiratory failure include- increased work of breathing. Contributing diagnoses includes - malignancy, heart failure Labs and images were reviewed. Patient Has not had a recent ABG. Will treat underlying causes and adjust management of respiratory failure as follows-    -- continue home O2 on admission  -- no e/o respiratory distress at this time

## 2025-06-17 NOTE — ASSESSMENT & PLAN NOTE
"Patient's FSGs are controlled on current medication regimen.  Last A1c reviewed-   Lab Results   Component Value Date    HGBA1C 6.0 (H) 06/21/2024     Most recent fingerstick glucose reviewed- No results for input(s): "POCTGLUCOSE" in the last 24 hours.  Current correctional scale N/A  Maintain anti-hyperglycemic dose as follows-   Antihyperglycemics (From admission, onward)      None          -- Hold Oral hypoglycemics while patient is in the hospital.  -- patient with home insulin pump and sensor in place and functional.  -- Endocrinology consulted on admit for assistance with pump     "

## 2025-06-17 NOTE — ASSESSMENT & PLAN NOTE
-- continue home prednisone 2.5mg qd on admission  6/18 Lipitor discontinued as daughter concerned about  h/o autoimmune hepatitis on prednisone until hepatology eval

## 2025-06-17 NOTE — CONSULTS
[facility]  Endocrinology  Diabetes Consult Note    Consult Requested by: Dar Hernandez MD   Reason for admit: Toe pain    HISTORY OF PRESENT ILLNESS:  Reason for Consult: Management of T2DM, Hyperglycemia      Diabetes diagnosis year: > 5 years ago      Home Diabetes Medications:    Omnipod 5  Basal Rate  12A:  0.45 units/hr      Carb Ratio  12A:12  6A: 10  6P: 12     ISF  12A: 50      Target: 120  Correct above: 140     IAT: 4 hours        How often checking glucose at home? >4 x day Dexcom   BG readings on regimen: 100s-200s     Hypoglycemia on the regimen? No   Missed doses on regimen?  No     Diabetes Complications include:     Hyperglycemia     Complicating diabetes co morbidities:   Glucocorticoid use         HPI: Radha Feng is a 84 y.o. 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 She reports questionable subjective fevers and chills. No N/V/D or any other complaints. She states the foot pain got increasingly worse today prompting her to present for evaluation. Endocrine consulted for BG management.        Interval HPI:   Overnight events: No acute events overnight. Patient in room 629/629 A. Blood glucose stable. BG at goal on current insulin regimen (Home Insulin Pump). Steroid use- None.      Renal function-   Lab Results   Component Value Date    CREATININE 0.6 2025        Vasopressors-  None     Diet Consistent Carbohydrate 2000 Calories (up to 75 gm per meal)     Eatin%  Nausea: No  Hypoglycemia and intervention: No  Fever: No  TPN and/or TF:  No    PMH, PSH, FH, SH updated and reviewed     ROS:  Review of Systems   Constitutional:  Negative for unexpected weight change.   Gastrointestinal:  Negative for constipation, diarrhea, nausea and vomiting.   Endocrine: Negative for polydipsia and polyuria.       Current Medications and/or Treatments Impacting Glycemic Control  Immunotherapy:    Immunosuppressants       None          Steroids:   Hormones (From admission, onward)      Start     Stop Route Frequency Ordered    06/17/25 0900  predniSONE tablet 2.5 mg         -- Oral Daily 06/16/25 1930    06/16/25 2030  melatonin tablet 6 mg         -- Oral Nightly PRN 06/16/25 1930          Pressors:    Autonomic Drugs (From admission, onward)      None          Hyperglycemia/Diabetes Medications:   Antihyperglycemics (From admission, onward)      None             PHYSICAL EXAMINATION:  Vitals:    06/17/25 0758   BP: 115/67   Pulse: 68   Resp:    Temp: 97.2 °F (36.2 °C)     Body mass index is 15.08 kg/m².     Physical Exam  Constitutional:       General: She is not in acute distress.     Appearance: Normal appearance. She is not ill-appearing.   HENT:      Head: Normocephalic and atraumatic.      Right Ear: External ear normal.      Left Ear: External ear normal.      Nose: Nose normal.   Pulmonary:      Effort: No respiratory distress.   Skin:     Coloration: Skin is not jaundiced.   Neurological:      Mental Status: She is alert. Mental status is at baseline.   Psychiatric:         Mood and Affect: Mood normal.         Behavior: Behavior normal.         Thought Content: Thought content normal.         Judgment: Judgment normal.            Labs Reviewed and Include   Recent Labs   Lab 06/17/25  0430   *   CALCIUM 8.3*   ALBUMIN 1.5*   PROT 5.6*      K 3.7   CO2 21*      BUN 11   CREATININE 0.6   ALKPHOS 98   ALT 12   AST 21   BILITOT 0.4     Lab Results   Component Value Date    WBC 17.19 (H) 06/17/2025    HGB 7.2 (L) 06/17/2025    HCT 24.1 (L)  "06/17/2025    MCV 92 06/17/2025     (L) 06/17/2025     No results for input(s): "TSH", "FREET4" in the last 168 hours.  Lab Results   Component Value Date    HGBA1C 5.4 06/17/2025       Nutritional status:   Body mass index is 15.08 kg/m².  Lab Results   Component Value Date    ALBUMIN 1.5 (L) 06/17/2025    ALBUMIN 1.8 (L) 06/16/2025    ALBUMIN 1.4 (L) 06/07/2025     No results found for: "PREALBUMIN"    Estimated Creatinine Clearance: 46.1 mL/min (based on SCr of 0.6 mg/dL).    Accu-Checks  No results for input(s): "POCTGLUCOSE" in the last 72 hours.     ASSESSMENT and PLAN    Endocrine  Insulin pump in place  At time of evaluation, pt meets criteria to continue home insulin pump usage.  - Has all adequate supplies   - Bolus settings reviewed    - No changes to home regimen.   - Nurse to check BG qac/hs/0200 & record in epic   - Patient to input glucose into pump and use bolus wizard for prandial needs   - Will continue to monitor accuchecks and titrate insulin as clinically indicated .     - Discussed above plan with patient, patient verbalized understanding.   - Understands in case of pump malfunction or cognitive decline in which pt can no longer safely use insulin pump, will transition to SC MDI        If pump malfunctions or is disconnected, please notify the on-call provider for endocrinology.         Type 2 diabetes mellitus with circulatory disorder, with long-term current use of insulin    BG goal: 140-180    - Continue home insulin pump   - POCT Glucose before meals, at bedtime and at 2 am  - Hypoglycemia protocol in place      ** Please notify Endocrine for any change and/or advance in diet**  ** Please call Endocrine for any BG related issues **     Discharge Planning:   TBD. Please notify endocrinology prior to discharge.      Orthopedic  * Toe pain  Managed per primary team            Plan discussed with patient, family, and RN at bedside.     Candace Ambriz PA-C  Endocrinology  [facility] " Surg

## 2025-06-17 NOTE — PLAN OF CARE
[facility] - Med Surg  Initial Discharge Assessment       Primary Care Provider: Leticia Lim MD    Admission Diagnosis: Screening for cardiovascular condition [Z13.6]  Chest pain [R07.9]  Swelling of right foot [M79.89]    Admission Date: 6/16/2025  Expected Discharge Date: 6/20/2025    Transition of Care Barriers: (P) None    Payor: MEDICARE / Plan: MEDICARE PART A & B / Product Type: Government /     Extended Emergency Contact Information  Primary Emergency Contact: Della Feng  Mobile Phone: [phone]  Relation: Daughter  Preferred language: English   needed? No  Secondary Emergency Contact: Raymon Morin  Mobile Phone: [phone]  Relation: Grandchild    Discharge Plan A: (P) Home with family  Discharge Plan B: (P) Home      [id number] - [place], [place] - [address]  [address]  [facility] [address]  Phone: [phone] Fax: [phone]    [facility]  [address] [facility] [address]  Phone: [phone] Fax: [phone]    [facility]  [address] [place] [address]  Phone: [phone] Fax: [phone]         met with pt and daughter Della Feng [phone] at bedside. Pt's daughter reports that she lives in the home with her mom in a single story home with 6 stairs before the entrance. Pt has a rollator used to walk through her home, a straight cane used to walk inside her bedroom, wheelchair for long distances, shower chair and BSC. Pt has oxygen at home but would like a portable one as the larger one is too heavy to hold and push pt in a wheelchair. Pt's daughter would like all services outpatient.     Discharge Plan A and Plan B have been determined by review of patient's clinical status, future medical and therapeutic needs, and coverage/benefits for post-acute care in coordination with multidisciplinary team members.    Initial Assessment (most recent)       Adult  Discharge Assessment - 06/17/25 1645          Discharge Assessment    Assessment Type Discharge Planning Assessment (P)      Confirmed/corrected address, phone number and insurance Yes (P)      Confirmed Demographics Correct on Facesheet (P)      Source of Information patient (P)      Communicated MELVI with patient/caregiver Date not available/Unable to determine (P)      People in Home child(keith), adult (P)      Name(s) of People in Home Della Feng (Daughter)  [phone] (P)      Facility Arrived From: Home (P)      Do you expect to return to your current living situation? Yes (P)      Do you have help at home or someone to help you manage your care at home? Yes (P)      Who are your caregiver(s) and their phone number(s)? Della Feng (Daughter)  [phone] (P)      Prior to hospitilization cognitive status: Alert/Oriented (P)      Current cognitive status: Alert/Oriented (P)      Walking or Climbing Stairs Difficulty yes (P)      Walking or Climbing Stairs ambulation difficulty, requires equipment (P)      Mobility Management Straight cane used in bedroom, rollator used throughout home, wheelchair for long distances. (P)      Dressing/Bathing Difficulty yes (P)      Dressing/Bathing bathing difficulty, requires equipment (P)      Dressing/Bathing Management BSC, Shower chair (P)      Home Accessibility wheelchair accessible (P)      Home Layout Able to live on 1st floor (P)      Equipment Currently Used at Home bedside commode;cane, straight;oxygen;shower chair;walker, rolling;wheelchair (P)      Readmission within 30 days? Yes (P)      Patient currently being followed by outpatient case management? No (P)      Do you currently have service(s) that help you manage your care at home? No (P)      Do you take prescription medications? Yes (P)      Do you have prescription coverage? Yes (P)      Do you have any problems affording any of your prescribed medications? No (P)      Is the patient taking  medications as prescribed? yes (P)      Who is going to help you get home at discharge? Della Feng (Daughter)  [phone] (P)      How do you get to doctors appointments? family or friend will provide (P)      Are you on dialysis? No (P)      Do you take coumadin? No (P)      Discharge Plan A Home with family (P)      Discharge Plan B Home (P)      DME Needed Upon Discharge  oxygen (P)      Discharge Plan discussed with: Patient;Adult children (P)      Transition of Care Barriers None (P)         Physical Activity    On average, how many days per week do you engage in moderate to strenuous exercise (like a brisk walk)? 0 days (P)      On average, how many minutes do you engage in exercise at this level? 0 min (P)         Financial Resource Strain    How hard is it for you to pay for the very basics like food, housing, medical care, and heating? Not hard at all (P)         Housing Stability    In the last 12 months, was there a time when you were not able to pay the mortgage or rent on time? No (P)      At any time in the past 12 months, were you homeless or living in a shelter (including now)? No (P)         Transportation Needs    In the past 12 months, has lack of transportation kept you from medical appointments or from getting medications? No (P)      In the past 12 months, has lack of transportation kept you from meetings, work, or from getting things needed for daily living? No (P)         Food Insecurity    Within the past 12 months, you worried that your food would run out before you got the money to buy more. Never true (P)      Within the past 12 months, the food you bought just didn't last and you didn't have money to get more. Never true (P)         Alcohol Use    Q2: How many drinks containing alcohol do you have on a typical day when you are drinking? Patient does not drink (P)      Q3: How often do you have six or more drinks on one occasion? Never (P)         Health Literacy    How often do  you need to have someone help you when you read instructions, pamphlets, or other written material from your doctor or pharmacy? Never (P)                      Readmission Assessment (most recent)       Readmission Assessment - 06/17/25 1635          Readmission    Was this a planned readmission? No     Why were you hospitalized in the last 30 days? Covid and stomach pains     Why were you readmitted? New medical problem     When you left the hospital where did you go? Home with Family     Did patient/caregiver refused recommended DC plan? No     Tell me about what happened between when you left the hospital and the day you returned. Pt became Covid positive     When did you start not feeling well? Couple days ago developed pain in toe     Did you try to manage your symptoms your self? No     Did you call anyone? Yes     Who did you call? Other (comments)   Had a vascular appointment.    Did you try to see or did see a doctor or nurse before you came? Yes     Were you seen? Yes     Did you have  a follow-up appointment on discharge? Yes     Did you go? Yes                    AMISH Segovia  Case Management  [phone]

## 2025-06-17 NOTE — SUBJECTIVE & OBJECTIVE
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    Diabetes mellitus     Glaucoma     Hypertension     Lung cancer     Other ascites 11/29/2022       Past Surgical History:   Procedure Laterality Date    CATARACT EXTRACTION W/  INTRAOCULAR LENS IMPLANT Left 12/21/2021        ENDOBRONCHIAL ULTRASOUND N/A 07/05/2024    Procedure: ENDOBRONCHIAL ULTRASOUND (EBUS);  Surgeon: Rolo Wilkinson MD;  [facility];  Service: Pulmonary;  Laterality: N/A;    ESOPHAGOGASTRODUODENOSCOPY N/A 06/26/2023    Procedure: ESOPHAGOGASTRODUODENOSCOPY (EGD);  Surgeon: Edgar Branch MD;  Location: [facility] ENDO (4TH FLR);  Service: Endoscopy;  Laterality: N/A;  referral Dr Peraza-cirrhosis/labs done on 4/24/23-[facility] portal-GT       Review of patient's allergies indicates:  No Known Allergies    No current facility-administered medications on file prior to encounter.     Current Outpatient Medications on File Prior to Encounter   Medication Sig    acetaminophen (TYLENOL) 500 MG tablet Take 2 tablets (1,000 mg total) by mouth every 8 (eight) hours as needed for Pain.    BD INSULIN SYRINGE ULTRA-FINE 1 mL 31 gauge x 5/16 Syrg     blood-glucose meter,continuous Misc Dispsense 1 Dexcom G7     blood-glucose transmitter (DEXCOM G6 TRANSMITTER) Amanda 1 each by Misc.(Non-Drug; Combo Route) route every 3 (three) months. (Patient not taking: Reported on 6/13/2025)    brimonidine 0.2% (ALPHAGAN) 0.2 % Drop Place 1 drop into both eyes 3 (three) times daily.    cholecalciferol, vitamin D3, (VITAMIN D3) 25 mcg (1,000 unit) capsule Take 2 capsules (2,000 Units total) by mouth once daily.    DEXCOM G7 SENSOR Amanda 1 Device by Misc.(Non-Drug; Combo Route) route every 10 days.    dorzolamide-timolol 2-0.5% (COSOPT) 22.3-6.8 mg/mL ophthalmic solution Place 1 drop into both eyes 2 (two) times daily.    furosemide (LASIX) 40 MG tablet TAKE 1 TABLET BY MOUTH EVERY DAY    insulin aspart  "U-100 (NOVOLOG U-100 INSULIN ASPART) 100 unit/mL injection TO USE WITH OMNIPOD 5. TOTAL DAILY DOSE UP TO 40 UNITS    insulin pump cart,automated,BT (OMNIPOD 5 G6 PODS, GEN 5,) Crtg Inject 1 each into the skin Every 3 (three) days.    latanoprost 0.005 % ophthalmic solution PLACE 1 DROP INTO BOTH EYES ONCE DAILY    LIDOcaine (LIDODERM) 5 % Place 1 patch onto the skin once daily. Remove & Discard patch within 12 hours or as directed by MD    melatonin (MELATIN) 3 mg tablet Take 2 tablets (6 mg total) by mouth nightly as needed for Insomnia.    nystatin (MYCOSTATIN) 100,000 unit/mL suspension SWISH WITH 6MLS BY MOUTH THEN SWALLOW 4 TIMES A DAY FOR 14 DAYS    OMNIPOD 5 G6-G7 PODS, GEN 5, Crtg SMARTSI Each SUB-Q Once a Month (Patient not taking: Reported on 2025)    oxyCODONE (ROXICODONE) 5 MG immediate release tablet Take 0.5 tablets (2.5 mg total) by mouth every 6 (six) hours as needed for Pain.    pen needle, diabetic 31 gauge x 5/16" Ndle Use to inject insulin into the skin up to 4 times daily    predniSONE (DELTASONE) 5 MG tablet Take 0.5 tablets (2.5 mg total) by mouth once daily.    tobramycin sulfate 0.3% (TOBREX) 0.3 % ophthalmic solution 1-2 drops topically twice daily to affected toe(s).     Family History       Problem Relation (Age of Onset)    Blindness Cousin    Cataracts Mother    Colon cancer Sister    Diabetes Mother    Glaucoma Mother    Heart attack Father    Hypertension Mother          Tobacco Use    Smoking status: Former    Smokeless tobacco: Never   Substance and Sexual Activity    Alcohol use: Not Currently    Drug use: Never    Sexual activity: Not on file     Review of Systems   Constitutional:  Positive for chills and fatigue.   Respiratory:  Negative for cough and shortness of breath.    Gastrointestinal:  Negative for abdominal pain, diarrhea, nausea and vomiting.   Musculoskeletal:  Positive for myalgias.     Objective:     Vital Signs (Most Recent):  Temp: 97.6 °F (36.4 °C) " (06/16/25 1400)  Pulse: 68 (06/16/25 1900)  Resp: 20 (06/16/25 1400)  BP: (!) 108/56 (06/16/25 1400)  SpO2: 100 % (06/16/25 1900) Vital Signs (24h Range):  Temp:  [97.6 °F (36.4 °C)] 97.6 °F (36.4 °C)  Pulse:  [65-84] 68  Resp:  [20] 20  SpO2:  [100 %] 100 %  BP: (108)/(56) 108/56     Weight: 49 kg (108 lb)  Body mass index is 17.97 kg/m².     Physical Exam  Vitals and nursing note reviewed.   Constitutional:       General: She is in acute distress (mild distress d/t pain).      Appearance: She is not ill-appearing, toxic-appearing or diaphoretic.   HENT:      Head: Normocephalic and atraumatic.      Mouth/Throat:      Mouth: Mucous membranes are dry.      Pharynx: Oropharynx is clear.   Eyes:      Extraocular Movements: Extraocular movements intact.      Conjunctiva/sclera: Conjunctivae normal.      Pupils: Pupils are equal, round, and reactive to light.   Cardiovascular:      Rate and Rhythm: Normal rate and regular rhythm.      Pulses: Normal pulses.      Heart sounds: Normal heart sounds.   Pulmonary:      Effort: Pulmonary effort is normal.      Breath sounds: Normal breath sounds.   Abdominal:      General: Abdomen is flat. Bowel sounds are normal.      Palpations: Abdomen is soft.   Musculoskeletal:         General: Normal range of motion.      Cervical back: Normal range of motion and neck supple.      Right lower leg: No edema.      Left lower leg: No edema.   Skin:     General: Skin is warm and dry.      Comments: Discoloration of R 1-3rd toes noted (see image)   Neurological:      Mental Status: She is alert.              CRANIAL NERVES     CN III, IV, VI   Pupils are equal, round, and reactive to light.       Significant Labs: All pertinent labs within the past 24 hours have been reviewed.  CBC:   Recent Labs   Lab 06/16/25  1446 06/16/25  1455   WBC 20.56*  --    HGB 7.9*  --    HCT 26.5* 31*   *  --      CMP:   Recent Labs   Lab 06/16/25  1446      K 4.2      CO2 25   *   BUN  14   CREATININE 0.6   CALCIUM 9.0   PROT 6.9   ALBUMIN 1.8*   BILITOT 0.5   ALKPHOS 112   AST 26   ALT 15   ANIONGAP 9       Significant Imaging: I have reviewed all pertinent imaging results/findings within the past 24 hours.    X-Ray Foot Complete Right   Final Result      No acute displaced fracture-dislocation identified.         Electronically signed by: Vinod Bell MD   Date:    06/16/2025   Time:    16:11      X-Ray Chest AP Portable   Final Result      As above.         Electronically signed by: Vinod Bell MD   Date:    06/16/2025   Time:    16:04      CTA Runoff ABD PEL Bilat Lower Ext    (Results Pending)

## 2025-06-17 NOTE — HOSPITAL COURSE
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 vascular surgery consulted.  CTA runoff -. Extensive calcified and noncalcified atherosclerotic plaque results in peripheral vascular disease . There is at least two-vessel runoff to bilateral feet via the anterior and posterior tibial arteries. Attenuated flow within the peroneal artery bilaterally may reflect slow flow or distal occlusion.   started on ASA, start heparin gtt. X ray foot - No acute displaced fracture-dislocation identified. vascular surgery f/u -  possible embolization from Right femoral plaque - Recommend  ASA/plavix/statin.  heparin  discontinued. needs short term vascular surgeyr  f/u, if not improving/persistent symptoms then could consider endarterectomy.Leucocytosis trended down. Hb 7.2 on heparin drip. monitor. UA +, BC x2 pending. on  Zosyn. Endocrine consulted for DM2 on omnipod insulin pump and dexcom. discussed with daughter. denies urinary symptoms. Patient has anterior and posterior vaginal wall prolapse -follows with urogynecology and uses pessary. pessary noted in vagiana per CT scan. Gynecology consulted for UTI? / pessary in place/ Leucocytosis   6/18 BC x 2 NGTD. UC NGTD. ID consulted for right shoulder nonhealing ulcer. wound care evaluation . Lipitor discontinued as daughter concerned about  h/o autoimmune hepatitis on prednisone. discussed with hepatology - OK to restart lipitor  need to monitor liver enzymes every 4-8 weeks  6/19 s/p ID eval - Differential for her ongoing leukocytosis includes tissue ischemia due to peripheral artery disease vs post-obstructive pneumonia vs ongoing use of prednisone. Okay to hold off on further  antibiotics for now. CT chest WO contrast  - Worsening of the known right perihilar mass with tiny calcifications now measuring 3.8 x 2.7 cm, prior 2.8 x 2.2 cm.  Complete atelectasis of the right upper lobe in the present study. Lymphadenopathy in the right or paraclavicular region and in the right axillary region again noted. Well-known soft tissue mass in the right shoulder is partially included.New/more conspicuous 3 mm solid noncalcified nodule in the left upper lobe. Moderate bilateral pleural effusions, right greater than left. Pulmonology eval -- No indication for thoracentesis as patient is not symptomatic, If she develops shortness of breath, would consider diuresis. Gyn eval - Do not recommend removal of pessary at this time. UC NGTD. Hb trended down to 7.1 monitor. Palliative care consulted for right LE pain     Patient continues to leukocytosis due to malignancy  Will continue with dapt and statin on discharge    Patient stable for discharge. Needs close follow up with PCP for ongoing management of complex care and malignancy.     Follow up with palliative care, vascular surgery, oncology, hepatology    Discharged with home oxygen therapy to continue. Was started at prior hospitalization      Plan of care reviewed with patient and daughter, questions answered. In agreement with discharge plans at this time    PE  No acute distress  Heart rrr  Lungs cta, on NC at 2LPM  Right middle toe nontender, discolored. Improved from prior per ericka

## 2025-06-17 NOTE — ASSESSMENT & PLAN NOTE
Patients blood pressure range in the last 24 hours was: BP  Min: 108/56  Max: 158/70.The patient's inpatient anti-hypertensive regimen is listed below:  Current Antihypertensives       Plan  - BP is controlled, no changes needed to their regimen  - holding home lasix on admission given normal BP and pt appearing overall volume down. Can resume as clinically indicated

## 2025-06-17 NOTE — ASSESSMENT & PLAN NOTE
Patient's with Normocytic anemia.. Hemoglobin stable. Etiology likely due to chronic disease .  Current CBC reviewed-    Recent Labs   Lab 06/16/25  1446 06/17/25  0430 06/17/25  1551   HGB 7.9* 7.2* 8.0*         Component Value Date/Time    MCV 93 06/17/2025 1551    MCV 96 03/11/2025 1358    RDW 19.9 (H) 06/17/2025 1551    RDW 15.9 (H) 03/11/2025 1358    IRON 15 (L) 05/17/2025 1348    IRON 121 03/09/2023 1330    FERRITIN 741.0 (H) 05/17/2025 1348    RETIC 4.0 (H) 05/17/2025 1348    RETIC 2.7 (H) 03/09/2023 1330    FOLATE 11.6 03/09/2023 1330    [id number] 03/09/2023 1330    OCCULTBLOOD Positive (A) 10/26/2022 0935     Monitor CBC and transfuse if H/H drops below 7/21.

## 2025-06-17 NOTE — ASSESSMENT & PLAN NOTE
Patient's with Normocytic anemia.. Hemoglobin stable. Etiology likely due to chronic disease .  Current CBC reviewed-    Recent Labs   Lab 06/17/25  0430 06/17/25  1551 06/18/25  0808   HGB 7.2* 8.0* 7.5*         Component Value Date/Time    MCV 94 06/18/2025 0808    MCV 96 03/11/2025 1358    RDW 19.9 (H) 06/18/2025 0808    RDW 15.9 (H) 03/11/2025 1358    IRON 15 (L) 05/17/2025 1348    IRON 121 03/09/2023 1330    FERRITIN 741.0 (H) 05/17/2025 1348    RETIC 4.0 (H) 05/17/2025 1348    RETIC 2.7 (H) 03/09/2023 1330    FOLATE 11.6 03/09/2023 1330    [id number] 03/09/2023 1330    OCCULTBLOOD Positive (A) 10/26/2022 0935     Monitor CBC and transfuse if H/H drops below 7/21.

## 2025-06-17 NOTE — ED NOTES
Per hospital med pt ok to go upstairs with 1 line. Midline consult placed. Ed attempted 4 us lines with no success.

## 2025-06-17 NOTE — ASSESSMENT & PLAN NOTE
Patient's FSGs are controlled on current medication regimen.  Last A1c reviewed-   Lab Results   Component Value Date    HGBA1C 5.4 06/17/2025     Most recent fingerstick glucose reviewed-   Recent Labs   Lab 06/17/25  1131   POCTGLUCOSE 155*     Current correctional scale N/A  Maintain anti-hyperglycemic dose as follows-   Antihyperglycemics (From admission, onward)      Start     Stop Route Frequency Ordered    06/17/25 1515  insulin aspart U-100 insulin pump from home        Question Answer Comment   Target number 120    Basal Rate #1 1    Basal rate #1 time 0000        -- SubQ Continuous 06/17/25 1408    06/17/25 1507  insulin aspart U-100 insulin pump from home 0-10 Units        Question Answer Comment   Target number 120    Carbohydrate coverage #1 10    Carbohydrate coverage #1 time 0000    Sensitivity #1 70    Sensitivity #1 time 0000        -- SubQ As needed (PRN) 06/17/25 1408          -- Hold Oral hypoglycemics while patient is in the hospital.  -- patient with home insulin pump and sensor in place and functional.  -- Endocrinology consulted on admit for assistance with pump     Patient's FSGs are controlled on current medication regimen.  Last A1c reviewed-   Lab Results   Component Value Date    HGBA1C 5.4 06/17/2025     Most recent fingerstick glucose reviewed-   Recent Labs   Lab 06/17/25  1131   POCTGLUCOSE 155*     Current correctional scale N/A  Maintain anti-hyperglycemic dose as follows-   Antihyperglycemics (From admission, onward)      Start     Stop Route Frequency Ordered    06/17/25 1515  insulin aspart U-100 insulin pump from home        Question Answer Comment   Target number 120    Basal Rate #1 1    Basal rate #1 time 0000        -- SubQ Continuous 06/17/25 1408    06/17/25 1507  insulin aspart U-100 insulin pump from home 0-10 Units        Question Answer Comment   Target number 120    Carbohydrate coverage #1 10    Carbohydrate coverage #1 time 0000    Sensitivity #1 70    Sensitivity  #1 time 0000        -- SubQ As needed (PRN) 06/17/25 1408          -- Hold Oral hypoglycemics while patient is in the hospital.  -- patient with home insulin pump and sensor in place and functional.  -- Endocrinology consulted on admit for assistance with pump

## 2025-06-17 NOTE — ASSESSMENT & PLAN NOTE
84 y.o. 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     - CTA reviewed, possible embolization from R femoral plaque   - Recommend d/c on ASA/plavix/statin if stabilizes, given she has not trialed medical therapy, can d/c heparin    - Will schedule short term f/u, if not improving/persistent symptoms then could consider endarterectomy, although higher than average medical risk given COPD on O2 and other significant medical co-morbidities

## 2025-06-17 NOTE — ASSESSMENT & PLAN NOTE
83 y/o F presenting for evaluation of several days b/l toe pain R>L as well as R toe discoloration. C/f claudication/ischemia given known PVD, though unknown etiology of acute worsening of her sxs. Vascular surgery consulted in the ED, no acute intervention at this time, though will f/u noninvasive studies.     -- CTA runoff ordered  -- ASA 81mg qd  -- heparin drip ordered  -- vascular surgery following, appreciate recs  -- multimodal pain regimen    6/17  PVD, HFpEF, T2DM on insulin managed with CGM and insulin pump, and autoimmune hepatitis on chronic prednisone, presenting after recent admission for R toe pain, now with discoloration over the last 2-3 days. vascular surgery consulted.  CTA runoff . started on ASA and heparin gtt.  83 y/o F presenting for evaluation of several days b/l toe pain R>L as well as R toe discoloration. C/f claudication/ischemia given known PVD, though unknown etiology of acute worsening of her sxs. Vascular surgery consulted in the ED, no acute intervention at this time, though will f/u noninvasive studies.   -- CTA runoff ordered  -- ASA 81mg qd  -- heparin drip ordered  -- vascular surgery following, appreciate recs  -- multimodal pain regimen    83 y/o F presenting for evaluation of several days b/l toe pain R>L as well as R toe discoloration. C/f claudication/ischemia given known PVD, though unknown etiology of acute worsening of her sxs. Vascular surgery consulted in the ED, no acute intervention at this time, though will f/u noninvasive studies.   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 vascular surgery consulted.  CTA runoff -. Extensive calcified and noncalcified atherosclerotic plaque results in peripheral vascular disease . There is at least two-vessel runoff to bilateral feet via the anterior and posterior tibial arteries. Attenuated flow within the peroneal artery bilaterally may reflect slow flow or distal occlusion.   started on ASA, start heparin gtt. X ray foot - No acute displaced fracture-dislocation identified. vascular surgery f/u -  possible embolization from Right femoral plaque - Recommend  ASA/plavix/statin.  heparin  discontinued. needs short term vascular surgeyr  f/u, if not improving/persistent symptoms then could consider endarterectomy.Leucocytosis trended down. Hb 7.2 on heparin drip. monitor. UA +, BC x2 pending. on  Zosyn. Endocrine consulted for DM2 on omnipod insulin pump and dexcom. discussed with daughter. denies urinary symptoms. Patient has anterior and posterior vaginal wall prolapse -follows with urogynecology and uses pessary. pessary noted in vagiana per CT scan

## 2025-06-17 NOTE — H&P
[facility]  History & Physical    Patient Name: Radha Feng  MRN: [id number]  Patient Class: IP- Inpatient  Admission Date: 6/16/2025  Attending Physician: Nestor Victoria,*   Primary Care Provider: Leticia Lim MD         Patient information was obtained from patient, relative(s), past medical records, and ER records.     Subjective:     Principal Problem:Toe pain    Chief Complaint:   Chief Complaint   Patient presents with    Foot Pain     Bilateral foot pain, discoloration noted to toes, pt unable to bare weight. On 2L nasal cannula home oxygen, hx of adenocarcinoma of upper lobe of right lung.         HPI: Ms. 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 She reports questionable subjective fevers and chills. No N/V/D or any other complaints. She states the foot pain got increasingly worse today prompting her to present for evaluation.     In the ED, patient afebrile, SBP 100s, HR 70s, satting well on home 2LNC. CBC with WBC 20 (up from prior DC), Hgb around BL at 7.9. CMP showing stable lytes and renal function. R foot XR without acute fx. Vasc surg consulted, low suspicion for acute limb at this time, recommending CTA runoff and AC. Pt subsequently admitted to  for continued workup and management.     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    Diabetes mellitus     Glaucoma     Hypertension     Lung cancer     Other ascites 11/29/2022       Past Surgical History:    Procedure Laterality Date    CATARACT EXTRACTION W/  INTRAOCULAR LENS IMPLANT Left 12/21/2021        ENDOBRONCHIAL ULTRASOUND N/A 07/05/2024    Procedure: ENDOBRONCHIAL ULTRASOUND (EBUS);  Surgeon: Rolo Wilkinson MD;  Location: [facility] OR 2ND FLR;  Service: Pulmonary;  Laterality: N/A;    ESOPHAGOGASTRODUODENOSCOPY N/A 06/26/2023    Procedure: ESOPHAGOGASTRODUODENOSCOPY (EGD);  Surgeon: Edgar Branch MD;  Location: [facility] (4TH FLR);  Service: Endoscopy;  Laterality: N/A;  referral Dr Peraza-cirrhosis/labs done on 4/24/23-instr portal-GT       Review of patient's allergies indicates:  No Known Allergies    No current facility-administered medications on file prior to encounter.     Current Outpatient Medications on File Prior to Encounter   Medication Sig    acetaminophen (TYLENOL) 500 MG tablet Take 2 tablets (1,000 mg total) by mouth every 8 (eight) hours as needed for Pain.    BD INSULIN SYRINGE ULTRA-FINE 1 mL 31 gauge x 5/16 Syrg     blood-glucose meter,continuous Misc Dispsense 1 Dexcom G7     blood-glucose transmitter (DEXCOM G6 TRANSMITTER) Amanda 1 each by Misc.(Non-Drug; Combo Route) route every 3 (three) months. (Patient not taking: Reported on 6/13/2025)    brimonidine 0.2% (ALPHAGAN) 0.2 % Drop Place 1 drop into both eyes 3 (three) times daily.    cholecalciferol, vitamin D3, (VITAMIN D3) 25 mcg (1,000 unit) capsule Take 2 capsules (2,000 Units total) by mouth once daily.    DEXCOM G7 SENSOR Amanda 1 Device by Misc.(Non-Drug; Combo Route) route every 10 days.    dorzolamide-timolol 2-0.5% (COSOPT) 22.3-6.8 mg/mL ophthalmic solution Place 1 drop into both eyes 2 (two) times daily.    furosemide (LASIX) 40 MG tablet TAKE 1 TABLET BY MOUTH EVERY DAY    insulin aspart U-100 (NOVOLOG U-100 INSULIN ASPART) 100 unit/mL injection TO USE WITH OMNIPOD 5. TOTAL DAILY DOSE UP TO 40 UNITS    insulin pump cart,automated,BT (OMNIPOD 5 G6 PODS, GEN 5,) Crtg Inject 1 each into the skin Every 3  "(three) days.    latanoprost 0.005 % ophthalmic solution PLACE 1 DROP INTO BOTH EYES ONCE DAILY    LIDOcaine (LIDODERM) 5 % Place 1 patch onto the skin once daily. Remove & Discard patch within 12 hours or as directed by MD    melatonin (MELATIN) 3 mg tablet Take 2 tablets (6 mg total) by mouth nightly as needed for Insomnia.    nystatin (MYCOSTATIN) 100,000 unit/mL suspension SWISH WITH 6MLS BY MOUTH THEN SWALLOW 4 TIMES A DAY FOR 14 DAYS    OMNIPOD 5 G6-G7 PODS, GEN 5, Crtg SMARTSI Each SUB-Q Once a Month (Patient not taking: Reported on 2025)    oxyCODONE (ROXICODONE) 5 MG immediate release tablet Take 0.5 tablets (2.5 mg total) by mouth every 6 (six) hours as needed for Pain.    pen needle, diabetic 31 gauge x 5/16" Ndle Use to inject insulin into the skin up to 4 times daily    predniSONE (DELTASONE) 5 MG tablet Take 0.5 tablets (2.5 mg total) by mouth once daily.    tobramycin sulfate 0.3% (TOBREX) 0.3 % ophthalmic solution 1-2 drops topically twice daily to affected toe(s).     Family History       Problem Relation (Age of Onset)    Blindness Cousin    Cataracts Mother    Colon cancer Sister    Diabetes Mother    Glaucoma Mother    Heart attack Father    Hypertension Mother          Tobacco Use    Smoking status: Former    Smokeless tobacco: Never   Substance and Sexual Activity    Alcohol use: Not Currently    Drug use: Never    Sexual activity: Not on file     Review of Systems   Constitutional:  Positive for chills and fatigue.   Respiratory:  Negative for cough and shortness of breath.    Gastrointestinal:  Negative for abdominal pain, diarrhea, nausea and vomiting.   Musculoskeletal:  Positive for myalgias.     Objective:     Vital Signs (Most Recent):  Temp: 97.6 °F (36.4 °C) (25 1400)  Pulse: 68 (25 1900)  Resp: 20 (25 1400)  BP: (!) 108/56 (25 1400)  SpO2: 100 % (25) Vital Signs (24h Range):  Temp:  [97.6 °F (36.4 °C)] 97.6 °F (36.4 °C)  Pulse:  [65-84] " 68  Resp:  [20] 20  SpO2:  [100 %] 100 %  BP: (108)/(56) 108/56     Weight: 49 kg (108 lb)  Body mass index is 17.97 kg/m².     Physical Exam  Vitals and nursing note reviewed.   Constitutional:       General: She is in acute distress (mild distress d/t pain).      Appearance: She is not ill-appearing, toxic-appearing or diaphoretic.   HENT:      Head: Normocephalic and atraumatic.      Mouth/Throat:      Mouth: Mucous membranes are dry.      Pharynx: Oropharynx is clear.   Eyes:      Extraocular Movements: Extraocular movements intact.      Conjunctiva/sclera: Conjunctivae normal.      Pupils: Pupils are equal, round, and reactive to light.   Cardiovascular:      Rate and Rhythm: Normal rate and regular rhythm.      Pulses: Normal pulses.      Heart sounds: Normal heart sounds.   Pulmonary:      Effort: Pulmonary effort is normal.      Breath sounds: Normal breath sounds.   Abdominal:      General: Abdomen is flat. Bowel sounds are normal.      Palpations: Abdomen is soft.   Musculoskeletal:         General: Normal range of motion.      Cervical back: Normal range of motion and neck supple.      Right lower leg: No edema.      Left lower leg: No edema.   Skin:     General: Skin is warm and dry.      Comments: Discoloration of R 1-3rd toes noted (see image)   Neurological:      Mental Status: She is alert.              CRANIAL NERVES     CN III, IV, VI   Pupils are equal, round, and reactive to light.       Significant Labs: All pertinent labs within the past 24 hours have been reviewed.  CBC:   Recent Labs   Lab 06/16/25  1446 06/16/25  1455   WBC 20.56*  --    HGB 7.9*  --    HCT 26.5* 31*   *  --      CMP:   Recent Labs   Lab 06/16/25  1446      K 4.2      CO2 25   *   BUN 14   CREATININE 0.6   CALCIUM 9.0   PROT 6.9   ALBUMIN 1.8*   BILITOT 0.5   ALKPHOS 112   AST 26   ALT 15   ANIONGAP 9       Significant Imaging: I have reviewed all pertinent imaging results/findings within the past  "24 hours.    X-Ray Foot Complete Right   Final Result      No acute displaced fracture-dislocation identified.         Electronically signed by: Vinod Bell MD   Date:    06/16/2025   Time:    16:11      X-Ray Chest AP Portable   Final Result      As above.         Electronically signed by: Vinod Bell MD   Date:    06/16/2025   Time:    16:04      CTA Runoff ABD PEL Bilat Lower Ext    (Results Pending)       Assessment/Plan:     Assessment & Plan  Toe pain  Aortic atherosclerosis  PVD (peripheral vascular disease)  83 y/o F presenting for evaluation of several days b/l toe pain R>L as well as R toe discoloration. C/f claudication/ischemia given known PVD, though unknown etiology of acute worsening of her sxs. Vascular surgery consulted in the ED, no acute intervention at this time, though will f/u noninvasive studies.     Plan:  -- CTA runoff ordered  -- ASA 81mg qd  -- heparin drip ordered  -- vascular surgery following, appreciate recs  -- multimodal pain regimen    Hypertensive heart disease with diastolic heart failure  Patients blood pressure range in the last 24 hours was: BP  Min: 108/56  Max: 158/70.The patient's inpatient anti-hypertensive regimen is listed below:  Current Antihypertensives       Plan  - BP is controlled, no changes needed to their regimen  - holding home lasix on admission given normal BP and pt appearing overall volume down. Can resume as clinically indicated       Type 2 diabetes mellitus with circulatory disorder, with long-term current use of insulin  Insulin pump in place  Patient's FSGs are controlled on current medication regimen.  Last A1c reviewed-   Lab Results   Component Value Date    HGBA1C 6.0 (H) 06/21/2024     Most recent fingerstick glucose reviewed- No results for input(s): "POCTGLUCOSE" in the last 24 hours.  Current correctional scale N/A  Maintain anti-hyperglycemic dose as follows-   Antihyperglycemics (From admission, onward)      None          -- Hold Oral " hypoglycemics while patient is in the hospital.  -- patient with home insulin pump and sensor in place and functional.  -- Endocrinology consulted on admit for assistance with pump     Autoimmune hepatitis  -- continue home prednisone 2.5mg qd on admission    Chronic respiratory failure  Centrilobular emphysema  Adenocarcinoma of right lung  Patient with Hypoxic Respiratory failure which is Chronic.  she is on home oxygen at 2 LPM. Supplemental oxygen was provided and noted-      Signs/symptoms of respiratory failure include- increased work of breathing. Contributing diagnoses includes - malignancy, heart failure Labs and images were reviewed. Patient Has not had a recent ABG. Will treat underlying causes and adjust management of respiratory failure as follows-    -- continue home O2 on admission  -- no e/o respiratory distress at this time       VTE Risk Mitigation (From admission, onward)           Ordered     heparin 25,000 units in dextrose 5% (100 units/ml) IV bolus from bag LOW INTENSITY nomogram - [facility]  As needed (PRN)        Question:  Heparin Infusion Adjustment (DO NOT MODIFY ANSWER)  Answer:  \\[email or web address]\epic\Images\Pharmacy\HeparinInfusions\heparin LOW INTENSITY nomogram for [facility] [id number]    06/16/25 1928     heparin 25,000 units in dextrose 5% (100 units/ml) IV bolus from bag LOW INTENSITY nomogram - [facility]  As needed (PRN)        Question:  Heparin Infusion Adjustment (DO NOT MODIFY ANSWER)  Answer:  \\[email or web address]\epic\Images\Pharmacy\HeparinInfusions\heparin LOW INTENSITY nomogram for [facility] [id number]    06/16/25 1928     heparin 25,000 units in dextrose 5% 250 mL (100 units/mL) infusion LOW INTENSITY nomogram - [facility]  Continuous        Question:  Begin at (units/kg/hr)  Answer:  12    06/16/25 1928     Reason for No Pharmacological VTE Prophylaxis  Once        Comments: Heparin gtt   Question:  Reasons:  Answer:  Physician Provided (leave comment)    06/16/25 1926     IP VTE HIGH RISK PATIENT  Once          06/16/25 1926     Place sequential compression device  Until discontinued         06/16/25 1926                                    Eric Boggs MD  Department of Hospital Medicine  [facility] Surg

## 2025-06-17 NOTE — PROGRESS NOTES
[facility]  Vascular Surgery  Progress Note    Patient Name: Radha Feng  MRN: [id number]  Admission Date: 6/16/2025  Primary Care Provider: Leticia Lim MD    Subjective:     Interval History: No acute events overnight. Foot pain remains stable.     Post-Op Info:  * No surgery found *         Medications:  Continuous Infusions:   heparin (porcine) in D5W  0-40 Units/kg/hr Intravenous Continuous 5.9 mL/hr at 06/16/25 2059 12 Units/kg/hr at 06/16/25 2059     Scheduled Meds:   aspirin  81 mg Oral Daily    brimonidine 0.2%  1 drop Both Eyes TID    dorzolamide-timolol 2-0.5%  1 drop Both Eyes BID    latanoprost  1 drop Both Eyes Daily    predniSONE  2.5 mg Oral Daily     PRN Meds:  Current Facility-Administered Medications:     acetaminophen, 1,000 mg, Oral, Q8H PRN    dextrose 50%, 12.5 g, Intravenous, PRN    dextrose 50%, 25 g, Intravenous, PRN    glucagon (human recombinant), 1 mg, Intramuscular, PRN    glucose, 16 g, Oral, PRN    glucose, 24 g, Oral, PRN    heparin (PORCINE), 60 Units/kg, Intravenous, PRN    heparin (PORCINE), 30 Units/kg, Intravenous, PRN    melatonin, 6 mg, Oral, Nightly PRN    naloxone, 0.02 mg, Intravenous, PRN    oxyCODONE, 1.25 mg, Oral, Q6H PRN    sodium chloride 0.9%, 10 mL, Intravenous, Q12H PRN    Flushing PICC/Midline Protocol, , , Until Discontinued **AND** sodium chloride 0.9%, 10 mL, Intravenous, Q12H PRN     Objective:     Vital Signs (Most Recent):  Temp: 97.2 °F (36.2 °C) (06/17/25 0758)  Pulse: 67 (06/17/25 1130)  Resp: 18 (06/17/25 0630)  BP: (!) 101/59 (06/17/25 1130)  SpO2: 100 % (06/17/25 1130) Vital Signs (24h Range):  Temp:  [97.2 °F (36.2 °C)-98.9 °F (37.2 °C)] 97.2 °F (36.2 °C)  Pulse:  [64-84] 67  Resp:  [18-22] 18  SpO2:  [99 %-100 %] 100 %  BP: (101-158)/(56-70) 101/59          Physical Exam  Vitals reviewed.   HENT:      Head: Normocephalic.   Cardiovascular:      Comments: 1+ R fem pulse above femoral head   Nonpalpable L fem pulse     L  monophasic PT/DP signals     R multiphasic PT, monophasic DP   Feet:      Comments: R 1st, 2nd, 3rd toe discoloration present, stable from prior   Neurological:      Mental Status: She is alert.          Significant Labs:  All pertinent labs from the last 24 hours have been reviewed.    Significant Diagnostics:  I have reviewed and interpreted all pertinent imaging results/findings within the past 24 hours.  Assessment/Plan:     * Toe pain  84 y.o. 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     - CTA reviewed, possible embolization from R femoral plaque   - Recommend d/c on ASA/plavix/statin if stabilizes, given she has not trialed medical therapy, can d/c heparin    - Will schedule short term f/u, if not improving/persistent symptoms then could consider endarterectomy, although higher than average medical risk given COPD on O2 and other significant medical co-morbidities         Eliecer Navarro MD  Vascular Surgery  [facility]

## 2025-06-17 NOTE — ASSESSMENT & PLAN NOTE
Patients blood pressure range in the last 24 hours was: BP  Min: 101/59  Max: 158/70.The patient's inpatient anti-hypertensive regimen is listed below:  Current Antihypertensives  dorzolamide-timolol 2-0.5% ophthalmic solution 1 drop, 2 times daily, Both Eyes  nitroGLYCERIN SL tablet 0.4 mg, Every 5 min PRN, Sublingual    Plan  - BP is controlled, no changes needed to their regimen  - holding home lasix on admission given normal BP and pt appearing overall volume down. Can resume as clinically indicated

## 2025-06-17 NOTE — ASSESSMENT & PLAN NOTE
Patient with leucocytosis   Recent Labs   Lab 06/17/25  0430 06/17/25  1551 06/18/25  0808   WBC 17.19* 18.32* 19.55*     . Afebrile. BCX 2, urine culture pending . likely secondary to sepsis  6/17  Leucocytosis trended down. Hb 7.2 on heparin drip. monitor. UA +, BC x2 pending. on  Zosyn    Patient has anterior and posterior vaginal wall prolapse -follows with urogynecology and uses pessary. pessary noted in vagiana per CT scan. Gynecology consulted for UTI? / pessary in place/ Leucocytosis

## 2025-06-17 NOTE — SUBJECTIVE & OBJECTIVE
Medications:  Continuous Infusions:   heparin (porcine) in D5W  0-40 Units/kg/hr Intravenous Continuous 5.9 mL/hr at 06/16/25 2059 12 Units/kg/hr at 06/16/25 2059     Scheduled Meds:   aspirin  81 mg Oral Daily    brimonidine 0.2%  1 drop Both Eyes TID    dorzolamide-timolol 2-0.5%  1 drop Both Eyes BID    latanoprost  1 drop Both Eyes Daily    predniSONE  2.5 mg Oral Daily     PRN Meds:  Current Facility-Administered Medications:     acetaminophen, 1,000 mg, Oral, Q8H PRN    dextrose 50%, 12.5 g, Intravenous, PRN    dextrose 50%, 25 g, Intravenous, PRN    glucagon (human recombinant), 1 mg, Intramuscular, PRN    glucose, 16 g, Oral, PRN    glucose, 24 g, Oral, PRN    heparin (PORCINE), 60 Units/kg, Intravenous, PRN    heparin (PORCINE), 30 Units/kg, Intravenous, PRN    melatonin, 6 mg, Oral, Nightly PRN    naloxone, 0.02 mg, Intravenous, PRN    oxyCODONE, 1.25 mg, Oral, Q6H PRN    sodium chloride 0.9%, 10 mL, Intravenous, Q12H PRN    Flushing PICC/Midline Protocol, , , Until Discontinued **AND** sodium chloride 0.9%, 10 mL, Intravenous, Q12H PRN     Objective:     Vital Signs (Most Recent):  Temp: 97.2 °F (36.2 °C) (06/17/25 0758)  Pulse: 67 (06/17/25 1130)  Resp: 18 (06/17/25 0630)  BP: (!) 101/59 (06/17/25 1130)  SpO2: 100 % (06/17/25 1130) Vital Signs (24h Range):  Temp:  [97.2 °F (36.2 °C)-98.9 °F (37.2 °C)] 97.2 °F (36.2 °C)  Pulse:  [64-84] 67  Resp:  [18-22] 18  SpO2:  [99 %-100 %] 100 %  BP: (101-158)/(56-70) 101/59          Physical Exam  Vitals reviewed.   HENT:      Head: Normocephalic.   Cardiovascular:      Comments: 1+ R fem pulse above femoral head   Nonpalpable L fem pulse     L monophasic PT/DP signals     R multiphasic PT, monophasic DP   Feet:      Comments: R 1st, 2nd, 3rd toe discoloration present, stable from prior   Neurological:      Mental Status: She is alert.          Significant Labs:  All pertinent labs from the last 24 hours have been reviewed.    Significant Diagnostics:  I have  reviewed and interpreted all pertinent imaging results/findings within the past 24 hours.

## 2025-06-17 NOTE — ASSESSMENT & PLAN NOTE
metastatic cancer of lung primary. The cancer has metastasized to shoulder. The patient is under the care of an outpatient oncologist. The patient is not undergoing active chemotherapy.

## 2025-06-17 NOTE — ASSESSMENT & PLAN NOTE
85 y/o F presenting for evaluation of several days b/l toe pain R>L as well as R toe discoloration. C/f claudication/ischemia given known PVD, though unknown etiology of acute worsening of her sxs. Vascular surgery consulted in the ED, no acute intervention at this time, though will f/u noninvasive studies.     Plan:  -- CTA runoff ordered  -- ASA 81mg qd  -- heparin drip ordered  -- vascular surgery following, appreciate recs  -- multimodal pain regimen

## 2025-06-18 ENCOUNTER — PATIENT MESSAGE (OUTPATIENT)
Dept: TRANSPLANT | Facility: CLINIC | Age: 85
End: 2025-06-18
Payer: MEDICARE

## 2025-06-18 ENCOUNTER — PATIENT MESSAGE (OUTPATIENT)
Dept: RADIATION ONCOLOGY | Facility: CLINIC | Age: 85
End: 2025-06-18
Payer: MEDICARE

## 2025-06-18 PROBLEM — R65.10 SIRS (SYSTEMIC INFLAMMATORY RESPONSE SYNDROME): Status: ACTIVE | Noted: 2025-06-18

## 2025-06-18 LAB
ABSOLUTE EOSINOPHIL (OHS): 0.42 K/UL
ABSOLUTE MONOCYTE (OHS): 1.11 K/UL (ref 0.3–1)
ABSOLUTE NEUTROPHIL COUNT (OHS): 16.96 K/UL (ref 1.8–7.7)
ALBUMIN SERPL BCP-MCNC: 1.7 G/DL (ref 3.5–5.2)
ALP SERPL-CCNC: 101 UNIT/L (ref 40–150)
ALT SERPL W/O P-5'-P-CCNC: 11 UNIT/L (ref 10–44)
ANION GAP (OHS): 9 MMOL/L (ref 8–16)
AST SERPL-CCNC: 19 UNIT/L (ref 11–45)
BACTERIA UR CULT: NORMAL
BASOPHILS # BLD AUTO: 0.05 K/UL
BASOPHILS NFR BLD AUTO: 0.3 %
BILIRUB SERPL-MCNC: 0.3 MG/DL (ref 0.1–1)
BUN SERPL-MCNC: 12 MG/DL (ref 8–23)
CALCIUM SERPL-MCNC: 8.7 MG/DL (ref 8.7–10.5)
CHLORIDE SERPL-SCNC: 108 MMOL/L (ref 95–110)
CO2 SERPL-SCNC: 21 MMOL/L (ref 23–29)
CREAT SERPL-MCNC: 0.5 MG/DL (ref 0.5–1.4)
ERYTHROCYTE [DISTWIDTH] IN BLOOD BY AUTOMATED COUNT: 19.9 % (ref 11.5–14.5)
GFR SERPLBLD CREATININE-BSD FMLA CKD-EPI: >60 ML/MIN/1.73/M2
GLUCOSE SERPL-MCNC: 75 MG/DL (ref 70–110)
HCT VFR BLD AUTO: 25.8 % (ref 37–48.5)
HGB BLD-MCNC: 7.5 GM/DL (ref 12–16)
IMM GRANULOCYTES # BLD AUTO: 0.12 K/UL (ref 0–0.04)
IMM GRANULOCYTES NFR BLD AUTO: 0.6 % (ref 0–0.5)
LYMPHOCYTES # BLD AUTO: 0.89 K/UL (ref 1–4.8)
MAGNESIUM SERPL-MCNC: 2 MG/DL (ref 1.6–2.6)
MCH RBC QN AUTO: 27.2 PG (ref 27–31)
MCHC RBC AUTO-ENTMCNC: 29.1 G/DL (ref 32–36)
MCV RBC AUTO: 94 FL (ref 82–98)
NUCLEATED RBC (/100WBC) (OHS): 0 /100 WBC
OHS QRS DURATION: 88 MS
OHS QTC CALCULATION: 480 MS
PLATELET # BLD AUTO: 149 K/UL (ref 150–450)
PMV BLD AUTO: 11.3 FL (ref 9.2–12.9)
POTASSIUM SERPL-SCNC: 3.8 MMOL/L (ref 3.5–5.1)
PROT SERPL-MCNC: 6.2 GM/DL (ref 6–8.4)
RBC # BLD AUTO: 2.76 M/UL (ref 4–5.4)
RELATIVE EOSINOPHIL (OHS): 2.1 %
RELATIVE LYMPHOCYTE (OHS): 4.6 % (ref 18–48)
RELATIVE MONOCYTE (OHS): 5.7 % (ref 4–15)
RELATIVE NEUTROPHIL (OHS): 86.7 % (ref 38–73)
SODIUM SERPL-SCNC: 138 MMOL/L (ref 136–145)
TROPONIN I SERPL HS-MCNC: 142 NG/L
WBC # BLD AUTO: 19.55 K/UL (ref 3.9–12.7)

## 2025-06-18 PROCEDURE — 25000003 PHARM REV CODE 250

## 2025-06-18 PROCEDURE — 63600175 PHARM REV CODE 636 W HCPCS

## 2025-06-18 PROCEDURE — 83735 ASSAY OF MAGNESIUM: CPT

## 2025-06-18 PROCEDURE — 21400001 HC TELEMETRY ROOM

## 2025-06-18 PROCEDURE — 27000221 HC OXYGEN, UP TO 24 HOURS

## 2025-06-18 PROCEDURE — 82947 ASSAY GLUCOSE BLOOD QUANT: CPT

## 2025-06-18 PROCEDURE — 25000003 PHARM REV CODE 250: Performed by: HOSPITALIST

## 2025-06-18 PROCEDURE — 25000242 PHARM REV CODE 250 ALT 637 W/ HCPCS

## 2025-06-18 PROCEDURE — 99232 SBSQ HOSP IP/OBS MODERATE 35: CPT | Mod: GC,,, | Performed by: STUDENT IN AN ORGANIZED HEALTH CARE EDUCATION/TRAINING PROGRAM

## 2025-06-18 PROCEDURE — 27000207 HC ISOLATION

## 2025-06-18 PROCEDURE — 36415 COLL VENOUS BLD VENIPUNCTURE: CPT | Performed by: HOSPITALIST

## 2025-06-18 PROCEDURE — 99232 SBSQ HOSP IP/OBS MODERATE 35: CPT | Mod: ,,,

## 2025-06-18 PROCEDURE — 84484 ASSAY OF TROPONIN QUANT: CPT | Performed by: HOSPITALIST

## 2025-06-18 PROCEDURE — 99223 1ST HOSP IP/OBS HIGH 75: CPT | Mod: ,,, | Performed by: INTERNAL MEDICINE

## 2025-06-18 PROCEDURE — 94761 N-INVAS EAR/PLS OXIMETRY MLT: CPT

## 2025-06-18 PROCEDURE — 99900035 HC TECH TIME PER 15 MIN (STAT)

## 2025-06-18 PROCEDURE — 85025 COMPLETE CBC W/AUTO DIFF WBC: CPT | Performed by: HOSPITALIST

## 2025-06-18 RX ORDER — ATORVASTATIN CALCIUM 10 MG/1
10 TABLET, FILM COATED ORAL DAILY
Status: DISCONTINUED | OUTPATIENT
Start: 2025-06-18 | End: 2025-06-20 | Stop reason: HOSPADM

## 2025-06-18 RX ADMIN — BRIMONIDINE TARTRATE 1 DROP: 2 SOLUTION OPHTHALMIC at 09:06

## 2025-06-18 RX ADMIN — LATANOPROST 1 DROP: 50 SOLUTION OPHTHALMIC at 09:06

## 2025-06-18 RX ADMIN — ACETAMINOPHEN 1000 MG: 500 TABLET ORAL at 08:06

## 2025-06-18 RX ADMIN — DORZOLAMIDE HYDROCHLORIDE AND TIMOLOL MALEATE 1 DROP: 20; 5 SOLUTION OPHTHALMIC at 09:06

## 2025-06-18 RX ADMIN — OXYCODONE HYDROCHLORIDE 1.25 MG: 5 SOLUTION ORAL at 09:06

## 2025-06-18 RX ADMIN — PREDNISONE 2.5 MG: 2.5 TABLET ORAL at 09:06

## 2025-06-18 RX ADMIN — ASPIRIN 81 MG CHEWABLE TABLET 81 MG: 81 TABLET CHEWABLE at 09:06

## 2025-06-18 RX ADMIN — ACETAMINOPHEN 1000 MG: 500 TABLET ORAL at 04:06

## 2025-06-18 RX ADMIN — CLOPIDOGREL BISULFATE 75 MG: 75 TABLET, FILM COATED ORAL at 09:06

## 2025-06-18 RX ADMIN — DORZOLAMIDE HYDROCHLORIDE AND TIMOLOL MALEATE 1 DROP: 20; 5 SOLUTION OPHTHALMIC at 08:06

## 2025-06-18 RX ADMIN — BRIMONIDINE TARTRATE 1 DROP: 2 SOLUTION OPHTHALMIC at 08:06

## 2025-06-18 NOTE — CONSULTS
"[facility] Surg  Obstetrics & Gynecology  Consult Note    Patient Name: Radha Feng  MRN: [id number]  Admission Date: 6/16/2025  Hospital Length of Stay: 2 days  Code Status: Full Code  Primary Care Provider: Leticia Lim MD  Principal Problem: Toe pain    Inpatient consult to Gynecology  Consult performed by: Adilene Carlin MD  Consult ordered by: Dar Hernandez MD        Subjective:     Chief Complaint: UTI w/ pessary in place    History of Present Illness:    84 y.o.  female has a past medical history of Stage IIIB lung cancer, Cataract, Chondromalacia, knee, right (10/28/2022), Diabetes mellitus, Glaucoma, Hypertension, and Other ascites (11/29/2022) who is admitted to hospital medicine for toe pain and leukocytosis of unknown origin. GYN was consulted as patient has stage 2 apical prolapse, Stage 2 anterior and posterior vaginal wall prolapse with a #2 1/2 " LS gH pessary in place in setting of UTI.     Patient denies any issue with pessary. Denies any vaginal bleeding or abnormal discharge. She reports she feels as though she can empty completely and denies any symptoms of dysuria. Daughter reports she is unsure if mom can empty completely and reports this was one of the reasons she had the pessary placed but is nervous it moved or is causing leukocytosis. Patient gets the pessary taken out and cleaned every three months. Her last cleaning was April and there were not issues at this time.    No current facility-administered medications on file prior to encounter.     Current Outpatient Medications on File Prior to Encounter   Medication Sig    acetaminophen (TYLENOL) 500 MG tablet Take 2 tablets (1,000 mg total) by mouth every 8 (eight) hours as needed for Pain.    BD INSULIN SYRINGE ULTRA-FINE 1 mL 31 gauge x 5/16 Syrg     blood-glucose meter,continuous Misc Dispsense 1 Dexcom G7     blood-glucose transmitter (DEXCOM G6 TRANSMITTER) Amanda 1 each by Misc.(Non-Drug; Combo " "Route) route every 3 (three) months. (Patient not taking: Reported on 2025)    brimonidine 0.2% (ALPHAGAN) 0.2 % Drop Place 1 drop into both eyes 3 (three) times daily.    cholecalciferol, vitamin D3, (VITAMIN D3) 25 mcg (1,000 unit) capsule Take 2 capsules (2,000 Units total) by mouth once daily.    DEXCOM G7 SENSOR Amanda 1 Device by Misc.(Non-Drug; Combo Route) route every 10 days.    dorzolamide-timolol 2-0.5% (COSOPT) 22.3-6.8 mg/mL ophthalmic solution Place 1 drop into both eyes 2 (two) times daily.    furosemide (LASIX) 40 MG tablet TAKE 1 TABLET BY MOUTH EVERY DAY    insulin aspart U-100 (NOVOLOG U-100 INSULIN ASPART) 100 unit/mL injection TO USE WITH OMNIPOD 5. TOTAL DAILY DOSE UP TO 40 UNITS    insulin pump cart,automated,BT (OMNIPOD 5 G6 PODS, GEN 5,) Crtg Inject 1 each into the skin Every 3 (three) days.    latanoprost 0.005 % ophthalmic solution PLACE 1 DROP INTO BOTH EYES ONCE DAILY    LIDOcaine (LIDODERM) 5 % Place 1 patch onto the skin once daily. Remove & Discard patch within 12 hours or as directed by MD    melatonin (MELATIN) 3 mg tablet Take 2 tablets (6 mg total) by mouth nightly as needed for Insomnia.    nystatin (MYCOSTATIN) 100,000 unit/mL suspension SWISH WITH 6MLS BY MOUTH THEN SWALLOW 4 TIMES A DAY FOR 14 DAYS    OMNIPOD 5 G6-G7 PODS, GEN 5, Crtg SMARTSI Each SUB-Q Once a Month (Patient not taking: Reported on 2025)    oxyCODONE (ROXICODONE) 5 MG immediate release tablet Take 0.5 tablets (2.5 mg total) by mouth every 6 (six) hours as needed for Pain.    pen needle, diabetic 31 gauge x 5/16" Ndle Use to inject insulin into the skin up to 4 times daily    predniSONE (DELTASONE) 5 MG tablet Take 0.5 tablets (2.5 mg total) by mouth once daily.    tobramycin sulfate 0.3% (TOBREX) 0.3 % ophthalmic solution 1-2 drops topically twice daily to affected toe(s).       Review of patient's allergies indicates:  No Known Allergies    Past Medical History:   Diagnosis Date    Cataract     " Chondromalacia, knee, 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    Diabetes mellitus     Glaucoma     Hypertension     Lung cancer     Other ascites 11/29/2022     OB History   No obstetric history on file.     Past Surgical History:   Procedure Laterality Date    CATARACT EXTRACTION W/  INTRAOCULAR LENS IMPLANT Left 12/21/2021        ENDOBRONCHIAL ULTRASOUND N/A 07/05/2024    Procedure: ENDOBRONCHIAL ULTRASOUND (EBUS);  Surgeon: Rolo Wilkinson MD;  Location: [facility] OR 2ND FLR;  Service: Pulmonary;  Laterality: N/A;    ESOPHAGOGASTRODUODENOSCOPY N/A 06/26/2023    Procedure: ESOPHAGOGASTRODUODENOSCOPY (EGD);  Surgeon: Edgar Branch MD;  Location: [facility] (4TH FLR);  Service: Endoscopy;  Laterality: N/A;  referral Dr Peraza-cirrhosis/labs done on 4/24/23-[facility]-     Family History       Problem Relation (Age of Onset)    Blindness Cousin    Cataracts Mother    Colon cancer Sister    Diabetes Mother    Glaucoma Mother    Heart attack Father    Hypertension Mother          Tobacco Use    Smoking status: Former    Smokeless tobacco: Never   Substance and Sexual Activity    Alcohol use: Not Currently    Drug use: Never    Sexual activity: Not on file     Review of Systems   Constitutional:  Negative for fever.   Respiratory:  Negative for shortness of breath.    Cardiovascular:  Negative for chest pain.   Gastrointestinal:  Negative for abdominal pain, nausea and vomiting.   Endocrine: Negative for hot flashes.   Genitourinary:  Negative for dysuria, vaginal bleeding, vaginal discharge, vaginal pain and vaginal odor.   Neurological:  Negative for headaches.   Hematological:  Does not bruise/bleed easily.   Psychiatric/Behavioral:  Negative for depression.      Objective:     Vital Signs (Most Recent):  Temp: 97.7 °F (36.5 °C) (06/18/25 1200)  Pulse: 69 (06/18/25 1200)  Resp: 18 (06/18/25 1200)  BP: (!) 113/52 (06/18/25 1200)  SpO2: 100 % (06/18/25 1200) Vital Signs (24h Range):  Temp:  [97.4 °F (36.3 °C)-98.4 °F (36.9  °C)] 97.7 °F (36.5 °C)  Pulse:  [59-76] 69  Resp:  [16-20] 18  SpO2:  [98 %-100 %] 100 %  BP: (113-144)/(52-70) 113/52     Weight: 41.1 kg (90 lb 9.7 oz)  Body mass index is 15.08 kg/m².  No LMP recorded. Patient has had a hysterectomy.    Physical Exam:   Constitutional: She is oriented to person, place, and time. She appears well-developed. No distress.        Pulmonary/Chest: Effort normal.        Abdominal: Soft. There is no abdominal tenderness. There is no rebound and no guarding.     Genitourinary:    Genitourinary Comments: Normal appearing external genitalia. On digital exam pessary feels to be in the correct place however exam was limited due to patient positioning. There was not blood on glove after exam.              Musculoskeletal: Normal range of motion.       Neurological: She is alert and oriented to person, place, and time.    Skin: Skin is warm and dry. She is not diaphoretic.    Psychiatric: She has a normal mood and affect.       Laboratory:  Recent Labs   Lab 06/17/25  0430 06/17/25  1551 06/18/25  0808   WBC 17.19* 18.32* 19.55*   HGB 7.2* 8.0* 7.5*   HCT 24.1* 26.9* 25.8*   MCV 92 93 94   * 148* 149*          Assessment/Plan:     Active Diagnoses:    Diagnosis Date Noted POA    PRINCIPAL PROBLEM:  Toe pain [M79.676] 06/03/2025 Yes    Chronic respiratory failure [J96.10] 06/16/2025 Yes     Chronic    Stage 4 malignant neoplasm of lung [C34.90] 06/05/2025 Yes    Anemia [D64.9] 05/18/2025 Yes    Leukocytosis [D72.829] 05/17/2025 Yes    UTI (urinary tract infection) [N39.0] 05/17/2025 Yes    Shoulder lesion, right [M75.91] 05/17/2025 Yes    Adenocarcinoma of right lung [C34.91] 07/25/2024 Yes    Insulin pump in place [Z96.41] 01/29/2024 Not Applicable    Pelvic floor weakness in female [N81.89] 06/16/2023 Yes    Aortic atherosclerosis [I70.0] 11/14/2022 Yes    Centrilobular emphysema [J43.2] 11/14/2022 Yes    Thrombocytopenia [D69.6] 10/26/2022 Yes    Autoimmune hepatitis [K75.4]  09/28/2022 Yes     Chronic    Hypertensive heart disease with diastolic heart failure [I11.0, I50.30] 09/21/2022 Yes    Type 2 diabetes mellitus with circulatory disorder, with long-term current use of insulin [E11.59, Z79.4] 09/21/2022 Not Applicable    PVD (peripheral vascular disease) [I73.9]  Yes      Problems Resolved During this Admission:       1) UTI w/ pessary in place  - Patient w/o vaginal spotting/abnormal discharge on exam and per patient report  - Pessary overall feels to be in proper place  - W/o vaginal bleeding/ spotting or abnormal discharge low suspicion that an ulcer or abscess exists large enough to cause leukocytosis.  - Do not recommend removal as patient denies any symptoms of retention but does report retention before pessary placement.    - If concern for incomplete emptying while inpatient would bladder scan or in/out cath   - Do not recommend removal of pessary at this time  - Defer treatment of UTI to primary team. Can treat like normal with pessary in place.  - Will ensure patient has follow up for pessary maintenance next month.    Thank you for your consult. I will sign off. Please contact us if you have any additional questions. [phone]    Adilene Carlin MD  Obstetrics & Gynecology  [facility] Surg

## 2025-06-18 NOTE — SUBJECTIVE & OBJECTIVE
"Interval HPI:   Overnight events: No acute events overnight. Patient in room 629/629 A. Blood glucose stable. BG at goal on current insulin regimen (Home Insulin Pump). Steroid use- None.   Renal function-         Lab Results   Component Value Date     CREATININE 0.6 2025         Vasopressors-  None      Diet Consistent Carbohydrate 2000 Calories (up to 75 gm per meal)      Eatin%  Nausea: No  Hypoglycemia and intervention: No  Fever: No  TPN and/or TF: No    /63 (Patient Position: Lying)   Pulse 76   Temp 98.4 °F (36.9 °C) (Oral)   Resp 16   Ht 5' 5" (1.651 m)   Wt 41.1 kg (90 lb 9.7 oz)   SpO2 100%   BMI 15.08 kg/m²     Labs Reviewed and Include    Recent Labs   Lab 25  0808   GLU 75   CALCIUM 8.7   ALBUMIN 1.7*   PROT 6.2      K 3.8   CO2 21*      BUN 12   CREATININE 0.5   ALKPHOS 101   ALT 11   AST 19   BILITOT 0.3     Lab Results   Component Value Date    WBC 19.55 (H) 2025    HGB 7.5 (L) 2025    HCT 25.8 (L) 2025    MCV 94 2025     (L) 2025     No results for input(s): "TSH", "FREET4" in the last 168 hours.  Lab Results   Component Value Date    HGBA1C 5.4 2025       Nutritional status:   Body mass index is 15.08 kg/m².  Lab Results   Component Value Date    ALBUMIN 1.7 (L) 2025    ALBUMIN 1.5 (L) 2025    ALBUMIN 1.8 (L) 2025     No results found for: "PREALBUMIN"    Estimated Creatinine Clearance: 55.3 mL/min (based on SCr of 0.5 mg/dL).    Accu-Checks  Recent Labs     25  1131 25  1701   POCTGLUCOSE 155* 116*       Current Medications and/or Treatments Impacting Glycemic Control  Immunotherapy:    Immunosuppressants       None          Steroids:   Hormones (From admission, onward)      Start     Stop Route Frequency Ordered    25 0900  predniSONE tablet 2.5 mg         -- Oral Daily 06/16/25  melatonin tablet 6 mg         -- Oral Nightly PRN 25      "     Pressors:    Autonomic Drugs (From admission, onward)      None          Hyperglycemia/Diabetes Medications:   Antihyperglycemics (From admission, onward)      Start     Stop Route Frequency Ordered    06/17/25 1515  insulin aspart U-100 insulin pump from home        Question Answer Comment   Target number 120    Basal Rate #1 1    Basal rate #1 time 0000        -- SubQ Continuous 06/17/25 1408    06/17/25 1507  insulin aspart U-100 insulin pump from home 0-10 Units        Question Answer Comment   Target number 120    Carbohydrate coverage #1 10    Carbohydrate coverage #1 time 0000    Sensitivity #1 70    Sensitivity #1 time 0000        -- SubQ As needed (PRN) 06/17/25 1408

## 2025-06-18 NOTE — PLAN OF CARE
06/18/25 1512   Post-Acute Status   Post-Acute Authorization HME   HME Status Pending post-acute provider review/more information requested   Discharge Delays None known at this time   Discharge Plan   Discharge Plan A Home with family   Discharge Plan B Home     Van met with family at bedside to provide an update regarding portable oxygen. Van informed family that [facility] is reviewing and will contact family to discuss plan. Family agreeable; Van to follow up with [facility] regarding portable oxygen.     Discharge Plan A and Plan B have been determined by review of patient's clinical status, future medical and therapeutic needs, and coverage/benefits for post-acute care in coordination with multidisciplinary team members.    VAN Segovia  Case Management  [phone]

## 2025-06-18 NOTE — CONSULTS
[facility] Surg    Wound Care     Patient Name:  Radha Feng  MRN:  [id number]  Date: 6/18/2025  Diagnosis: Toe pain     History:  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    Diabetes mellitus     Glaucoma     Hypertension     Lung cancer     Other ascites 11/29/2022     Social History[1]  Precautions:  Allergies as of 06/16/2025    (No Known Allergies)       [facility] Assessment Details / Treatment:    Patient seen for wound care: New Consult   Chart reviewed for this encounter.   Labs:   WBC (K/uL)   Date Value   06/18/2025 19.55 (H)   06/17/2025 18.32 (H)     Glucose (mg/dL)   Date Value   06/18/2025 75   06/17/2025 153 (H)   03/11/2025 175 (H)   02/11/2025 148 (H)     Albumin (g/dL)   Date Value   06/18/2025 1.7 (L)   06/17/2025 1.5 (L)   03/11/2025 2.1 (L)   02/11/2025 2.4 (L)     Cheng Score: 16  Nutrition sub-score: 2  Wounds are POA  PureWick and adult pull up in use (at daughter request from their home) in use, daughter reports urinary stress incontinence.     Narrative:  Pt seen for WC consultation and agreed to assessment  Chart reviewed for this encounter.   See Flow Sheet for additional documentation and media.    Pt laying on Loyal bed on left lateral side, daughter at bedside is retired RN, bedside nurse present for assessment.   Pull-up and foam borders removed revealing white cream cleansed off with purple bath wipes revealing partial thickness skin loss on bilateral buttock areas, likely MASD, periwound is dark and discolored unclear if this area is pt normal skin coloration, daughter could not verify. Daughter reports pt did not like waffle overlay, Immerse ordered.   Educated daughter that the use of adult briefs are not recommended at [facility] and foam and Triad is not generally recommended, either one or the other, she prefers Triad, application completed.   Right posterior shoulder has edematous, induration with open areas of  hypergranulated pink tissue, periwound is dark and discolored, daughter explained this ulceration is metastasized from pt lung and pt is followed by oncology.  Wound cleansed w/Vashe and 4x4 gauze, applied Mepilex AG foam to open wound, covered with foam border.   Bilateral heels assessed revealing clean, intact, blanchable erythema, no induration, no bogginess, no open wounds noted.   WC dressing change supplies at bedside.     RECOMMENDATIONS:  Bedside nurse assess for acute changes (purulence, increased redness/swelling, increased drainage, malodor, increased pain, pallor, necrosis) please contact physician on any acute changes.    Nutrition consult  Immerse bed   Triad BID/PRN soilage  Right shoulder dressing change q3d  Joseluis BID  Follow up with outpatient WC / PCP / Oncologist    Discussed POC with patient and primary nurse.   See EMR for orders & patient education.     Discussed nutrition and the role of protein in wound healing with the patient. Instructed patient to optimize protein for wound healing.     Bedside nursing to continue care, dressing changes, & continue monitoring.  Bedside nursing to maintain pressure injury prevention interventions, (PIP).     Recommendations made to primary team for above plan.    Thank you for the consult. Wound Care will sign off.  Please place a new consult if needed.      06/18/25 1130   WOCN Assessment   WOCN Total Time (mins) 45   Visit Date 06/18/25   Visit Time 1130   Consult Type New   WOCN Speciality Wound   Wound moisture;At risk for pressure Injury  (metastasized ulceration)   Intervention chart review;assessed;changed;applied;consult other service;orders;coordination of care   Teaching on-going        Wound 06/18/25 Non pressure chronic ulcer Right Shoulder   Date First Assessed: 06/18/25   Present on Original Admission: Yes  Primary Wound Type: Non pressure chronic ulcer  Side: Right  Location: Shoulder   Wound Image    Dressing Appearance Clean;Intact;Dry    Drainage Amount Small   Drainage Characteristics/Odor Serosanguineous;No odor   Appearance Pink;Moist;Hypergranulation   Red (%), Wound Tissue Color 100 %   Periwound Area Intact;Dry;Edematous;Indurated   Wound Edges Irregular   Care Cleansed with:;Antimicrobial agent   Dressing Removed;Non-adherent;Applied;Silver;Foam   Periwound Care Dry periwound area maintained   Dressing Change Due 06/21/25        Wound 06/03/25 2000 Moisture associated dermatitis Buttocks   Date First Assessed/Time First Assessed: 06/03/25 2000   Present on Original Admission: Yes  Primary Wound Type: Moisture associated dermatitis  Location: Buttocks  Is this injury device related?: No   Wound Image    Dressing Appearance Intact   Drainage Amount Scant   Drainage Characteristics/Odor Serosanguineous;No odor   Appearance Pink;Moist   Tissue loss description Partial thickness   Red (%), Wound Tissue Color 100 %   Periwound Area Intact;Other (see comments)  (dark discolored)   Wound Edges Irregular   Care Cleansed with:;Other (see comments)  (purple bath wipes)   Dressing Removed;Foam;Applied;Other (comment)  (Triad)   Periwound Care Topical treatment applied   Off Loading Other (see comments)  (immerse ordered)   Dressing Change Due 06/18/25       Right heel    Left heel                    [1]   Social History  Socioeconomic History    Marital status: Single   Tobacco Use    Smoking status: Former    Smokeless tobacco: Never   Substance and Sexual Activity    Alcohol use: Not Currently    Drug use: Never   Social History Narrative    , one daughter local, four sons out of state. Retired . Lives with daughter Joss.     Social Drivers of Health     Financial Resource Strain: Low Risk  (6/17/2025)    Overall Financial Resource Strain (CARDIA)     Difficulty of Paying Living Expenses: Not hard at all   Food Insecurity: No Food Insecurity (6/17/2025)    Hunger Vital Sign     Worried About Running Out of Food in the Last Year:  Never true     Ran Out of Food in the Last Year: Never true   Transportation Needs: No Transportation Needs (6/17/2025)    PRAPARE - Transportation     Lack of Transportation (Medical): No     Lack of Transportation (Non-Medical): No   Physical Activity: Inactive (6/17/2025)    Exercise Vital Sign     Days of Exercise per Week: 0 days     Minutes of Exercise per Session: 0 min   Stress: Patient Declined (6/17/2025)    [employer] [place] [employer] - Occupational Stress Questionnaire     Feeling of Stress : Patient declined   Recent Concern: Stress - Stress Concern Present (6/3/2025)    [employer] [place] [employer] - Occupational Stress Questionnaire     Feeling of Stress : To some extent   Housing Stability: Low Risk  (6/17/2025)    Housing Stability Vital Sign     Unable to Pay for Housing in the Last Year: No     Homeless in the Last Year: No

## 2025-06-18 NOTE — ASSESSMENT & PLAN NOTE
-- continue home prednisone 2.5mg qd on admission  6/18 Lipitor discontinued as daughter concerned about  h/o autoimmune hepatitis on prednisone. discussed with hepatology - OK to restart lipitor  need to monitor liver enzymes every 4-8 weeks

## 2025-06-18 NOTE — ASSESSMENT & PLAN NOTE
85 y/o F presenting for evaluation of several days b/l toe pain R>L as well as R toe discoloration. C/f claudication/ischemia given known PVD, though unknown etiology of acute worsening of her sxs. Vascular surgery consulted in the ED, no acute intervention at this time, though will f/u noninvasive studies.     -- CTA runoff ordered  -- ASA 81mg qd  -- heparin drip ordered  -- vascular surgery following, appreciate recs  -- multimodal pain regimen    6/17  PVD, HFpEF, T2DM on insulin managed with CGM and insulin pump, and autoimmune hepatitis on chronic prednisone, presenting after recent admission for R toe pain, now with discoloration over the last 2-3 days. vascular surgery consulted.  CTA runoff . started on ASA and heparin gtt.  85 y/o F presenting for evaluation of several days b/l toe pain R>L as well as R toe discoloration. C/f claudication/ischemia given known PVD, though unknown etiology of acute worsening of her sxs. Vascular surgery consulted in the ED, no acute intervention at this time, though will f/u noninvasive studies.   -- CTA runoff ordered  -- ASA 81mg qd  -- heparin drip ordered  -- vascular surgery following, appreciate recs  -- multimodal pain regimen    85 y/o F presenting for evaluation of several days b/l toe pain R>L as well as R toe discoloration. C/f claudication/ischemia given known PVD, though unknown etiology of acute worsening of her sxs. Vascular surgery consulted in the ED, no acute intervention at this time, though will f/u noninvasive studies.   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 vascular surgery consulted.  CTA runoff -. Extensive calcified and noncalcified atherosclerotic plaque results in peripheral vascular disease . There is at least two-vessel runoff to bilateral feet via the anterior and posterior tibial arteries. Attenuated flow within the peroneal artery bilaterally may reflect slow flow or distal occlusion.   started on ASA, start heparin gtt. X ray foot - No acute displaced fracture-dislocation identified. vascular surgery f/u -  possible embolization from Right femoral plaque - Recommend  ASA/plavix/statin.  heparin  discontinued. needs short term vascular surgeyr  f/u, if not improving/persistent symptoms then could consider endarterectomy.Leucocytosis trended down. Hb 7.2 on heparin drip. monitor. UA +, BC x2 pending. on  Zosyn. Endocrine consulted for DM2 on omnipod insulin pump and dexcom. discussed with daughter. denies urinary symptoms. Patient has anterior and posterior vaginal wall prolapse -follows with urogynecology and uses pessary. pessary noted in vagiana per CT scan

## 2025-06-18 NOTE — PROGRESS NOTES
[facility]  Progress Note    Patient Name: Radha Feng  MRN: [id number]  Patient Class: IP- Inpatient   Admission Date: 6/16/2025  Length of Stay: 2 days  Attending Physician: Dar Hernandez MD  Primary Care Provider: Leticia Lim MD        Subjective     Principal Problem:Toe pain        HPI:  Ms. 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 She reports questionable subjective fevers and chills. No N/V/D or any other complaints. She states the foot pain got increasingly worse today prompting her to present for evaluation.     In the ED, patient afebrile, SBP 100s, HR 70s, satting well on home 2LNC. CBC with WBC 20 (up from prior DC), Hgb around BL at 7.9. CMP showing stable lytes and renal function. R foot XR without acute fx. Vasc surg consulted, low suspicion for acute limb at this time, recommending CTA runoff and AC. Pt subsequently admitted to  for continued workup and management.     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 vascular surgery consulted.  CTA runoff -. Extensive calcified and noncalcified atherosclerotic plaque results in peripheral vascular disease . There is at least  two-vessel runoff to bilateral feet via the anterior and posterior tibial arteries. Attenuated flow within the peroneal artery bilaterally may reflect slow flow or distal occlusion.   started on ASA, start heparin gtt. X ray foot - No acute displaced fracture-dislocation identified. vascular surgery f/u -  possible embolization from Right femoral plaque - Recommend  ASA/plavix/statin.  heparin  discontinued. needs short term vascular surgeyr  f/u, if not improving/persistent symptoms then could consider endarterectomy.Leucocytosis trended down. Hb 7.2 on heparin drip. monitor. UA +, BC x2 pending. on  Zosyn. Endocrine consulted for DM2 on omnipod insulin pump and dexcom. discussed with daughter. denies urinary symptoms. Patient has anterior and posterior vaginal wall prolapse -follows with urogynecology and uses pessary. pessary noted in vagiana per CT scan. Gynecology consulted for UTI? / pessary in place/ Leucocytosis   6/18 BC x 2 NGTD. UC NGTD. ID consulted for right shoulder nonhealing ulcer. wound care evaluation . Lipitor discontinued as daughter concerned about  h/o autoimmune hepatitis on prednisone. discussed with hepatology - OK to restart lipitor  need to monitor liver enzymes every 4-8 weeks        Review of Systems:   Pain scale:   Constitutional:  fever,  chills, headache, vision loss, hearing loss, weight loss, Generalized weakness, falls, loss of smell, loss of taste, poor appetite,  sore throat  Respiratory: cough, shortness of breath.   Cardiovascular: chest pain, dizziness, palpitations, orthopnea, swelling of feet, syncope  Gastrointestinal: nausea, vomiting, abdominal pain, diarrhea, black stool,  blood in stool, change in bowel habits, constipation  Genitourinary: hematuria, dysuria, urgency, frequency  Integument/Breast: rash,  pruritis. color change - toes   Hematologic/Lymphatic: easy bruising, lymphadenopathy  Musculoskeletal: arthralgias , myalgias, back pain, neck pain, knee  pain  Neurological: confusion, seizures, tremors, slurred speech, numbness   Behavioral/Psych:  depression, anxiety, auditory or visual hallucinations     OBJECTIVE:     Physical Exam:  Body mass index is 15.08 kg/m².    Constitutional: Appears thin built    Head: Normocephalic and atraumatic.   Neck: Normal range of motion. Neck supple.   Cardiovascular: Normal heart rate.  Regular heart rhythm.  Pulmonary/Chest: Effort normal.   Abdominal: No distension.  No tenderness  Musculoskeletal: Normal range of motion. No edema. right  1st, 2nd, 3rd toe discoloration. right shoulder dressing  Neurological: Alert and oriented to person, place, and time.   Skin: Skin is warm and dry.   Psychiatric: Normal mood and affect. Behavior is normal.                  Vital Signs  Temp: 97.7 °F (36.5 °C) (06/18/25 1200)  Pulse: 69 (06/18/25 1200)  Resp: 18 (06/18/25 1200)  BP: (!) 113/52 (06/18/25 1200)  SpO2: 100 % (06/18/25 1200)     24 Hour VS Range    Temp:  [97.4 °F (36.3 °C)-98.4 °F (36.9 °C)]   Pulse:  [59-76]   Resp:  [16-20]   BP: (113-144)/(52-70)   SpO2:  [98 %-100 %]     Intake/Output Summary (Last 24 hours) at 6/18/2025 1621  Last data filed at 6/18/2025 0530  Gross per 24 hour   Intake 240 ml   Output 350 ml   Net -110 ml         I/O This Shift:  No intake/output data recorded.    Wt Readings from Last 3 Encounters:   06/17/25 41.1 kg (90 lb 9.7 oz)   06/04/25 49 kg (108 lb 0.4 oz)   05/18/25 49 kg (108 lb)       I have personally reviewed the vitals and recorded Intake/Output     Laboratory/Diagnostic Data:    CBC/Anemia Labs: Coags:    Recent Labs   Lab 06/17/25  0430 06/17/25  1551 06/18/25  0808   WBC 17.19* 18.32* 19.55*   HGB 7.2* 8.0* 7.5*   HCT 24.1* 26.9* 25.8*   * 148* 149*   MCV 92 93 94   RDW 19.9* 19.9* 19.9*    Recent Labs   Lab 06/16/25  1447 06/17/25  0430 06/17/25  1121   INR 1.2  --   --    APTT 31.5 46.6* 44.5*        Chemistries: ABG:   Recent Labs   Lab 06/16/25  1446 06/17/25  0430  "06/18/25  0808    137 138   K 4.2 3.7 3.8    105 108   CO2 25 21* 21*   BUN 14 11 12   CREATININE 0.6 0.6 0.5   CALCIUM 9.0 8.3* 8.7   PROT 6.9 5.6* 6.2   BILITOT 0.5 0.4 0.3   ALKPHOS 112 98 101   ALT 15 12 11   AST 26 21 19   MG  --  1.9 2.0    No results for input(s): "PH", "PCO2", "PO2", "HCO3", "POCSATURATED", "BE" in the last 168 hours.     POCT Glucose: HbA1c:    Recent Labs   Lab 06/17/25  1131 06/17/25  1701   POCTGLUCOSE 155* 116*    Hemoglobin A1C   Date Value Ref Range Status   06/21/2024 6.0 (H) 4.0 - 5.6 % Final     Comment:     ADA Screening Guidelines:  5.7-6.4%  Consistent with prediabetes  >or=6.5%  Consistent with diabetes    High levels of fetal hemoglobin interfere with the HbA1C  assay. Heterozygous hemoglobin variants (HbS, HgC, etc)do  not significantly interfere with this assay.   However, presence of multiple variants may affect accuracy.     01/22/2024 6.2 (H) 4.0 - 5.6 % Final     Comment:     ADA Screening Guidelines:  5.7-6.4%  Consistent with prediabetes  >or=6.5%  Consistent with diabetes    High levels of fetal hemoglobin interfere with the HbA1C  assay. Heterozygous hemoglobin variants (HbS, HgC, etc)do  not significantly interfere with this assay.   However, presence of multiple variants may affect accuracy.     07/24/2023 6.2 (H) 4.0 - 5.6 % Final     Comment:     ADA Screening Guidelines:  5.7-6.4%  Consistent with prediabetes  >or=6.5%  Consistent with diabetes    High levels of fetal hemoglobin interfere with the HbA1C  assay. Heterozygous hemoglobin variants (HbS, HgC, etc)do  not significantly interfere with this assay.   However, presence of multiple variants may affect accuracy.       Hemoglobin A1c   Date Value Ref Range Status   06/17/2025 5.4 4.0 - 5.6 % Final     Comment:     ADA Screening Guidelines:  5.7-6.4%  Consistent with prediabetes  >=6.5%  Consistent with diabetes    High levels of fetal hemoglobin interfere with the HbA1C  assay. Heterozygous " "hemoglobin variants (HbS, HgC, etc)do  not significantly interfere with this assay.   However, presence of multiple variants may affect accuracy.        Cardiac Enzymes: Ejection Fractions:    No results for input(s): "CPK", "CPKMB", "MB", "TROPONINI" in the last 72 hours. EF   Date Value Ref Range Status   09/22/2022 65 % Final          Recent Labs   Lab 06/16/25  2255   COLORU Yellow   APPEARANCEUA Hazy*   PHUR 6.0   SPECGRAV >=1.030*   PROTEINUA Trace*   GLUCUA Negative   BILIRUBINUA Negative   OCCULTUA Negative   NITRITE Positive*   UROBILINOGEN Negative   LEUKOCYTESUR 3+*   RBCUA 3   WBCUA 19*   BACTERIA Many*   HYALINECASTS 9*       Procalcitonin (ng/mL)   Date Value   09/24/2022 0.61 (H)     Lactate (Lactic Acid) (mmol/L)   Date Value   10/23/2022 6.1 (HH)     BNP (pg/mL)   Date Value   06/03/2025 2,717 (H)   09/20/2022 66     CRP (mg/L)   Date Value   06/16/2025 334.8 (H)   09/23/2022 41.6 (H)     D-Dimer (mg/L FEU)   Date Value   09/26/2022 12.81 (H)   09/25/2022 13.06 (H)   09/23/2022 12.76 (H)   09/20/2022 11.97 (H)     Ferritin (ng/mL)   Date Value   05/17/2025 741.0 (H)   04/17/2025 387.0 (H)   03/09/2023 193   09/23/2022 1,186 (H)     Lactate Dehydrogenase (U/L)   Date Value   05/17/2025 527 (H)     LD (U/L)   Date Value   03/09/2023 261 (H)   12/27/2022 337 (H)   09/26/2022 543 (H)   09/25/2022 461 (H)   09/23/2022 430 (H)     Troponin I (ng/mL)   Date Value   09/20/2022 <0.006     Results for orders placed or performed in visit on 07/24/23   Vitamin D    Collection Time: 07/24/23 11:20 AM   Result Value Ref Range    Vit D, 25-Hydroxy 29 (L) 30 - 96 ng/mL     POC Rapid COVID (no units)   Date Value   10/23/2022 Negative       Microbiology labs for the last week  Microbiology Results (last 7 days)       Procedure Component Value Units Date/Time    Urine culture [[id number]] Collected: 06/16/25 1616    Order Status: Completed Specimen: Urine Updated: 06/18/25 1437     Urine Culture No Significant Growth "    Blood culture x two cultures. Draw prior to antibiotics. [[id number]]  (Normal) Collected: 06/16/25 1616    Order Status: Completed Specimen: Blood from Peripheral, Antecubital, Right Updated: 06/18/25 0701     Blood Culture No Growth After 36 Hours    Blood culture x two cultures. Draw prior to antibiotics. [[id number]]  (Normal) Collected: 06/16/25 1616    Order Status: Completed Specimen: Blood from Peripheral, Forearm, Right Updated: 06/18/25 0701     Blood Culture No Growth After 36 Hours    MRSA Screen by PCR [[id number]]     Order Status: Sent Specimen: Nasal Swab             Reviewed and noted in plan where applicable- Please see chart for full lab data.    Lines/Drains:       Peripheral IV - Single Lumen 06/16/25 1450 20 G Anterior;Left Forearm (Active)   Site Assessment Clean;Dry;Intact;No redness;No swelling 06/17/25 0400   Line Securement Device Secured with sutureless device 06/16/25 2359   Extremity Assessment Distal to IV No abnormal discoloration;No redness;No swelling;No warmth 06/16/25 2359   Line Status No blood return;Flushed;Infusing 06/17/25 0400   Dressing Status Clean;Dry;Intact 06/17/25 0400   Dressing Intervention Integrity maintained 06/17/25 0400   Number of days: 0            Midline Catheter - Single Lumen 06/17/25 0150 Left basilic vein (medial side of arm) other (see comments) (Active)   Site Assessment Clean;Dry;Intact 06/17/25 0150   IV Device Securement catheter securement device 06/17/25 0150   Line Status Blood return noted;Flushed;Saline locked 06/17/25 0150   Extremity Circumference (cm) 23 cm 06/17/25 0150   Dressing Type CHG impregnated dressing/sponge;Central line dressing 06/17/25 0150   Dressing Status Clean;Dry;Intact 06/17/25 0150   Dressing Intervention First dressing 06/17/25 0150   Dressing Change Due 06/24/25 06/17/25 0150   Site Change Due 07/15/25 06/17/25 0150   Reason Not Rotated Not due 06/17/25 0150   Number of days: 0            Subcutaneous Infusion Pump  05/16/25 Abdomen (Comment) (Active)   Number of days: 32       Female External Urinary Catheter w/ Suction 06/16/25 1825 (Active)   Skin no redness;no breakdown 06/16/25 2359   Tolerance no signs/symptoms of discomfort 06/16/25 2359   Suction Continuous suction at 40 mmHg 06/16/25 2359   Date of last wick change 06/17/25 06/16/25 2359   Number of days: 0       Imaging  ECG Results              EKG 12-lead (Final result)        Collection Time Result Time QRS Duration OHS QTC Calculation    06/16/25 15:21:52 06/16/25 15:50:21 84 464                     Final result by Interface, Lab In [facility] (06/16/25 15:50:26)                   Narrative:    Test Reason : Z13.6,    Vent. Rate :  72 BPM     Atrial Rate :  72 BPM     P-R Int : 144 ms          QRS Dur :  84 ms      QT Int : 424 ms       P-R-T Axes :  33  -4 -80 degrees    QTcB Int : 464 ms    Normal sinus rhythm  Possible  Anteroseptal infarct (cited on or before 03-Jun-2025)  T wave abnormality, consider inferolateral ischemia  Abnormal ECG  When compared with ECG of 03-Jun-2025 11:34,  No significant change was found    Confirmed by Mohit Cardoza ([id number]) on 6/16/2025 3:50:17 PM    Referred By: AAAREFERRAL SELF           Confirmed By: Mohit Cardoza                                    Results for orders placed during the hospital encounter of 06/03/25    Echo    Interpretation Summary    Left Ventricle: The left ventricle is normal in size. Normal wall thickness. There is eccentric hypertrophy. Mild global hypokinesis present. There is mildly reduced systolic function with a visually estimated ejection fraction of 45 - 50%. Grade II diastolic dysfunction.    Right Ventricle: The right ventricle is normal in size Wall thickness is normal. Systolic function is normal.    Left Atrium: The left atrium is severely dilated    Mitral Valve: There is moderate regurgitation with an eccentrically anteromedial directed jet.    Tricuspid Valve: There is mild regurgitation.    Pulmonary  Artery: The estimated pulmonary artery systolic pressure is 39 mmHg.    IVC/SVC: Normal venous pressure at 3 mmHg.    Pericardium: There is a small effusion adjacent to the left atrium. Left pleural effusion.      X-Ray Chest AP Portable  Narrative: EXAMINATION:  XR CHEST AP PORTABLE    CLINICAL HISTORY:  chest pain;    TECHNIQUE:  Single frontal view of the chest was performed.    COMPARISON:  06/16/2025    FINDINGS:  There is no pneumothorax or significant interval detrimental change in the cardiopulmonary status since the previous exam.  Impression: As above    Electronically signed by: Juventino Rosas MD  Date:    06/17/2025  Time:    17:38  CTA Runoff ABD PEL Bilat Lower Ext  Narrative: EXAMINATION:  CTA RUNOFF ABD PEL BILAT LOWER EXT    CLINICAL HISTORY:  Claudication or leg ischemia    TECHNIQUE:  CTA of abdomen, pelvis, and bilateral lower extremities performed with 100 mL Omnipaque 350 intravenous contrast.    COMPARISON:  CT 06/03/2025    FINDINGS:  SOFT TISSUES: Fat-containing umbilical hernia.  Diffuse muscle atrophy.    LUNG BASES/VISUALIZED MEDIASTINUM: Bilateral pleural effusions, right greater than left, with adjacent compressive atelectasis.  These are improved in size compared to CT 06/03/2025.  Scattered subsegmental atelectasis versus scarring.  Multi-vessel coronary calcific atherosclerosis.    HEPATOBILIARY: Liver is normal size. Several subcentimeter hypodensities, too small to characterize but unchanged.  Similar geographic hypoattenuation about the falciform ligament, likely focal fat.  No biliary ductal dilatation.  Normal gallbladder.    PANCREAS: Similar mild prominence of the pancreatic duct measuring 0.4 cm in the pancreatic head.  No obstructing stones or masses.  This likely reflects senescent change.    SPLEEN: Normal size.    ADRENALS: No adrenal nodules.    KIDNEYS/URETERS: Kidneys enhance symmetrically.  Multifocal bilateral renal cortical scarring, similar to prior.  Right lower  pole hypodensity, favored to represent a simple cyst.  Bilateral renovascular calcifications.  No stones or hydronephrosis.  Note is made of early excretion of contrast in several calices in bilateral kidneys.  No discrete solid renal masses.  Ureters are unremarkable.    BLADDER/PELVIC ORGANS: No bladder wall thickening.  Incidental note of the right ureteral jet, a normal finding.  Pessary device in the vagina.  Calcification in the left aspect of the uterine fundus may reflect a calcified fibroid or adjacent phleboliths.  No adnexal masses.    PERITONEUM / RETROPERITONEUM: No free air or fluid.    LYMPH NODES: No lymphadenopathy in the abdomen or pelvis, by size criteria.    GI TRACT: No evidence of bowel obstruction or inflammation.  Suspected prior appendectomy.  Moderate stool burden throughout the colon.    BONES: Degenerative changes of the spine.  Unchanged compression deformity of L1.  Similar advanced degenerative change in the right hip with deformity of the right femoral head and acetabular protrusion.  Additional degenerative of the knees, right greater than left, and ankles.  No acute fractures or aggressive osseous lesions.    VESSELS: Extensive calcified and noncalcified atherosclerosis.  Tortuosity of the abdominal aorta.  No abdominal aortic aneurysm.  Celiac trunk, SMA, bilateral renal arteries, and RADHA are patent without high-grade stenosis at their origins.  Accessory left hepatic artery off the left gastric artery.  Portal vein is patent.    CTA runoff:    Right:    Common iliac artery: Calcified plaque along its course without high-grade stenosis.    External iliac artery: Patent.    Common femoral artery: Bulky calcified and noncalcified plaque with severe stenosis distally.    Superficial femoral artery: Multifocal calcified plaque resulting and intermittent mild-moderate stenosis.    Deep femoral artery: Scattered plaque resulting in mild stenosis.  Overall patent.    Popliteal artery:  Patent.    Anterior tibial artery: Multifocal plaque without high-grade stenosis.  Patent through the ankle.    Tibial-peroneal trunk: Patent.    Posterior tibial artery: Multifocal plaque without high-grade stenosis.  Patent through the ankle.    Peroneal artery: Attenuated flow distally may reflect occlusion or slow flow.    Left:    Common iliac artery: Predominantly noncalcified plaque along its course results in severe stenosis with questionable intermittent occlusion.    External iliac artery: Multifocal plaque with scattered areas of mild stenosis.    Common femoral artery: Multifocal plaque with areas of scattered mild-moderate stenosis.    Superficial femoral artery: Multifocal plaque results in scattered areas of moderate to severe stenosis.    Deep femoral artery: Multifocal plaque results in scattered areas of moderate to severe stenosis.    Popliteal artery: Multifocal plaque with areas of mild-moderate stenosis.    Anterior tibial artery: Slow flow with multifocal stenoses.  Patent through the ankle on delayed phase.    Tibial-peroneal trunk: Multifocal plaque with mild stenoses.    Posterior tibial artery: Multifocal plaque without high-grade stenosis.  Patent through the ankle.    Peroneal artery: Attenuated flow distally may reflect occlusion or slow flow.    Poor runoff into the left foot, even on delayed images.  Impression: 1. Extensive calcified and noncalcified atherosclerotic plaque results in peripheral vascular disease as detailed above.  There is at least two-vessel runoff to bilateral feet via the anterior and posterior tibial arteries.  Attenuated flow within the peroneal artery bilaterally may reflect slow flow or distal occlusion.  2. Additional findings as above.    Electronically signed by resident: Lex Earl  Date:    06/17/2025  Time:    00:59    Electronically signed by: Jamie Myles  Date:    06/17/2025  Time:    06:41      Labs, Imaging, EKG and Diagnostic results from  6/18/2025 were reviewed.    Medications:  Medication list was reviewed and changes noted under Assessment/Plan.  Medications Ordered Prior to Encounter[1]  Scheduled Medications:  Current Facility-Administered Medications   Medication Dose Route Frequency    aspirin  81 mg Oral Daily    atorvastatin  10 mg Oral Daily    brimonidine 0.2%  1 drop Both Eyes TID    clopidogreL  75 mg Oral Daily    dorzolamide-timolol 2-0.5%  1 drop Both Eyes BID    latanoprost  1 drop Both Eyes Daily    predniSONE  2.5 mg Oral Daily     PRN:   Current Facility-Administered Medications:     acetaminophen, 1,000 mg, Oral, Q8H PRN    aluminum & magnesium hydroxide-simethicone, 30 mL, Oral, Q6H PRN    dextrose 50%, 12.5 g, Intravenous, PRN    dextrose 50%, 12.5 g, Intravenous, PRN    dextrose 50%, 25 g, Intravenous, PRN    dextrose 50%, 25 g, Intravenous, PRN    glucagon (human recombinant), 1 mg, Intramuscular, PRN    glucagon (human recombinant), 1 mg, Intramuscular, PRN    glucose, 16 g, Oral, PRN    glucose, 16 g, Oral, PRN    glucose, 24 g, Oral, PRN    glucose, 24 g, Oral, PRN    insulin aspart U-100, 0-10 Units, Subcutaneous, PRN    melatonin, 6 mg, Oral, Nightly PRN    naloxone, 0.02 mg, Intravenous, PRN    nitroGLYCERIN, 0.4 mg, Sublingual, Q5 Min PRN    oxyCODONE, 1.25 mg, Oral, Q6H PRN    sodium chloride 0.9%, 10 mL, Intravenous, Q12H PRN    Flushing PICC/Midline Protocol, , , Until Discontinued **AND** sodium chloride 0.9%, 10 mL, Intravenous, Q12H PRN  Infusions:    insulin aspart U-100  1 Units/hr Subcutaneous Continuous 0.01 mL/hr at 06/17/25 1515 1 Units/hr at 06/17/25 1515       Estimated Creatinine Clearance: 55.3 mL/min (based on SCr of 0.5 mg/dL).             Assessment & Plan  Toe pain  Aortic atherosclerosis  PVD (peripheral vascular disease)  83 y/o F presenting for evaluation of several days b/l toe pain R>L as well as R toe discoloration. C/f claudication/ischemia given known PVD, though unknown etiology of acute  worsening of her sxs. Vascular surgery consulted in the ED, no acute intervention at this time, though will f/u noninvasive studies.     -- CTA runoff ordered  -- ASA 81mg qd  -- heparin drip ordered  -- vascular surgery following, appreciate recs  -- multimodal pain regimen    6/17  PVD, HFpEF, T2DM on insulin managed with CGM and insulin pump, and autoimmune hepatitis on chronic prednisone, presenting after recent admission for R toe pain, now with discoloration over the last 2-3 days. vascular surgery consulted.  CTA runoff . started on ASA and heparin gtt.  83 y/o F presenting for evaluation of several days b/l toe pain R>L as well as R toe discoloration. C/f claudication/ischemia given known PVD, though unknown etiology of acute worsening of her sxs. Vascular surgery consulted in the ED, no acute intervention at this time, though will f/u noninvasive studies.   -- CTA runoff ordered  -- ASA 81mg qd  -- heparin drip ordered  -- vascular surgery following, appreciate recs  -- multimodal pain regimen    83 y/o F presenting for evaluation of several days b/l toe pain R>L as well as R toe discoloration. C/f claudication/ischemia given known PVD, though unknown etiology of acute worsening of her sxs. Vascular surgery consulted in the ED, no acute intervention at this time, though will f/u noninvasive studies.   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 vascular surgery consulted.  CTA runoff -. Extensive calcified and noncalcified atherosclerotic plaque results in  peripheral vascular disease . There is at least two-vessel runoff to bilateral feet via the anterior and posterior tibial arteries. Attenuated flow within the peroneal artery bilaterally may reflect slow flow or distal occlusion.   started on ASA, start heparin gtt. X ray foot - No acute displaced fracture-dislocation identified. vascular surgery f/u -  possible embolization from Right femoral plaque - Recommend  ASA/plavix/statin.  heparin  discontinued. needs short term vascular surgeyr  f/u, if not improving/persistent symptoms then could consider endarterectomy.Leucocytosis trended down. Hb 7.2 on heparin drip. monitor. UA +, BC x2 pending. on  Zosyn. Endocrine consulted for DM2 on omnipod insulin pump and dexcom. discussed with daughter. denies urinary symptoms. Patient has anterior and posterior vaginal wall prolapse -follows with urogynecology and uses pessary. pessary noted in vagiana per CT scan    Hypertensive heart disease with diastolic heart failure  Patients blood pressure range in the last 24 hours was: BP  Min: 101/59  Max: 158/70.The patient's inpatient anti-hypertensive regimen is listed below:  Current Antihypertensives  dorzolamide-timolol 2-0.5% ophthalmic solution 1 drop, 2 times daily, Both Eyes  nitroGLYCERIN SL tablet 0.4 mg, Every 5 min PRN, Sublingual    Plan  - BP is controlled, no changes needed to their regimen  - holding home lasix on admission given normal BP and pt appearing overall volume down. Can resume as clinically indicated       Type 2 diabetes mellitus with circulatory disorder, with long-term current use of insulin  Insulin pump in place  Patient's FSGs are controlled on current medication regimen.  Last A1c reviewed-   Lab Results   Component Value Date    HGBA1C 5.4 06/17/2025     Most recent fingerstick glucose reviewed-   Recent Labs   Lab 06/17/25  1701   POCTGLUCOSE 116*     Current correctional scale N/A  Maintain anti-hyperglycemic dose as follows-    Antihyperglycemics (From admission, onward)      Start     Stop Route Frequency Ordered    06/17/25 1515  insulin aspart U-100 insulin pump from home        Question Answer Comment   Target number 120    Basal Rate #1 1    Basal rate #1 time 0000        -- SubQ Continuous 06/17/25 1408    06/17/25 1507  insulin aspart U-100 insulin pump from home 0-10 Units        Question Answer Comment   Target number 120    Carbohydrate coverage #1 10    Carbohydrate coverage #1 time 0000    Sensitivity #1 70    Sensitivity #1 time 0000        -- SubQ As needed (PRN) 06/17/25 1408          -- Hold Oral hypoglycemics while patient is in the hospital.  -- patient with home insulin pump and sensor in place and functional.  -- Endocrinology consulted on admit for assistance with pump     Patient's FSGs are controlled on current medication regimen.  Last A1c reviewed-   Lab Results   Component Value Date    HGBA1C 5.4 06/17/2025     Most recent fingerstick glucose reviewed-   Recent Labs   Lab 06/17/25  1701   POCTGLUCOSE 116*     Current correctional scale N/A  Maintain anti-hyperglycemic dose as follows-   Antihyperglycemics (From admission, onward)      Start     Stop Route Frequency Ordered    06/17/25 1515  insulin aspart U-100 insulin pump from home        Question Answer Comment   Target number 120    Basal Rate #1 1    Basal rate #1 time 0000        -- SubQ Continuous 06/17/25 1408    06/17/25 1507  insulin aspart U-100 insulin pump from home 0-10 Units        Question Answer Comment   Target number 120    Carbohydrate coverage #1 10    Carbohydrate coverage #1 time 0000    Sensitivity #1 70    Sensitivity #1 time 0000        -- SubQ As needed (PRN) 06/17/25 1408          -- Hold Oral hypoglycemics while patient is in the hospital.  -- patient with home insulin pump and sensor in place and functional.  -- Endocrinology consulted on admit for assistance with pump     Autoimmune hepatitis  -- continue home prednisone 2.5mg qd  on admission  6/18 Lipitor discontinued as daughter concerned about  h/o autoimmune hepatitis on prednisone. discussed with hepatology - OK to restart lipitor  need to monitor liver enzymes every 4-8 weeks    Chronic respiratory failure  Centrilobular emphysema  Adenocarcinoma of right lung  Patient with Hypoxic Respiratory failure which is Chronic.  she is on home oxygen at 2 LPM. Supplemental oxygen was provided and noted-      Signs/symptoms of respiratory failure include- increased work of breathing. Contributing diagnoses includes - malignancy, heart failure Labs and images were reviewed. Patient Has not had a recent ABG. Will treat underlying causes and adjust management of respiratory failure as follows-    -- continue home O2 on admission  -- no e/o respiratory distress at this time       Patient with Hypoxic Respiratory failure which is Chronic.  she is on home oxygen at 2 LPM. Supplemental oxygen was provided and noted-      Signs/symptoms of respiratory failure include- increased work of breathing. Contributing diagnoses includes - malignancy, heart failure Labs and images were reviewed. Patient Has not had a recent ABG. Will treat underlying causes and adjust management of respiratory failure as follows-    -- continue home O2 on admission  -- no e/o respiratory distress at this time       Patient with Hypoxic Respiratory failure which is Chronic.  she is on home oxygen at 2 LPM. Supplemental oxygen was provided and noted-      Signs/symptoms of respiratory failure include- increased work of breathing. Contributing diagnoses includes - malignancy, heart failure Labs and images were reviewed. Patient Has not had a recent ABG. Will treat underlying causes and adjust management of respiratory failure as follows-    -- continue home O2 on admission  -- no e/o respiratory distress at this time       Thrombocytopenia  monitor  Recent Labs     06/17/25  0430 06/17/25  1551 06/18/25  0808   * 148* 149*      Leukocytosis    Patient with leucocytosis   Recent Labs   Lab 06/17/25  0430 06/17/25  1551 06/18/25  0808   WBC 17.19* 18.32* 19.55*     . Afebrile. BCX 2, urine culture pending . likely secondary to sepsis  6/17  Leucocytosis trended down. Hb 7.2 on heparin drip. monitor. UA +, BC x2 pending. on  Zosyn    Patient has anterior and posterior vaginal wall prolapse -follows with urogynecology and uses pessary. pessary noted in vagiana per CT scan. Gynecology consulted for UTI? / pessary in place/ Leucocytosis     Anemia    Patient's with Normocytic anemia.. Hemoglobin stable. Etiology likely due to chronic disease .  Current CBC reviewed-    Recent Labs   Lab 06/17/25  0430 06/17/25  1551 06/18/25  0808   HGB 7.2* 8.0* 7.5*         Component Value Date/Time    MCV 94 06/18/2025 0808    MCV 96 03/11/2025 1358    RDW 19.9 (H) 06/18/2025 0808    RDW 15.9 (H) 03/11/2025 1358    IRON 15 (L) 05/17/2025 1348    IRON 121 03/09/2023 1330    FERRITIN 741.0 (H) 05/17/2025 1348    RETIC 4.0 (H) 05/17/2025 1348    RETIC 2.7 (H) 03/09/2023 1330    FOLATE 11.6 03/09/2023 1330    ONJPENGV60 794 03/09/2023 1330    OCCULTBLOOD Positive (A) 10/26/2022 0935     Monitor CBC and transfuse if H/H drops below 7/21.    Stage 4 malignant neoplasm of lung   metastatic cancer of lung primary. The cancer has metastasized to shoulder. The patient is under the care of an outpatient oncologist. The patient is not undergoing active chemotherapy.     UTI (urinary tract infection)  ? UA +, UC pending. 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 Patient has anterior and posterior vaginal wall prolapse -follows with urogynecology and uses pessary. pessary noted in vagiana per CT scan    Shoulder lesion, right  noted metastatic cancer of lung primary. The cancer has metastasized to shoulder.   6/18 ID consulted for right shoulder nonhealing ulcer. wound care evaluation         VTE Risk Mitigation (From admission, onward)            Ordered     Reason for No Pharmacological VTE Prophylaxis  Once        Comments: Heparin gtt   Question:  Reasons:  Answer:  Physician Provided (leave comment)    06/16/25 1926     IP VTE HIGH RISK PATIENT  Once         06/16/25 1926     Place sequential compression device  Until discontinued         06/16/25 1926                    Discharge Planning   MELVI: 6/20/2025     Code Status: Full Code   Medical Readiness for Discharge Date:   Discharge Plan A: Home with family   Discharge Delays: None known at this time                    Dar Hernandez MD  Department of Hospital Medicine   [facility]         [1]   No current facility-administered medications on file prior to encounter.     Current Outpatient Medications on File Prior to Encounter   Medication Sig Dispense Refill    acetaminophen (TYLENOL) 500 MG tablet Take 2 tablets (1,000 mg total) by mouth every 8 (eight) hours as needed for Pain.  0    BD INSULIN SYRINGE ULTRA-FINE 1 mL 31 gauge x 5/16 Syrg       blood-glucose meter,continuous Misc Dispsense 1 Dexcom G7  1 each 0    blood-glucose transmitter (DEXCOM G6 TRANSMITTER) Amanda 1 each by Misc.(Non-Drug; Combo Route) route every 3 (three) months. (Patient not taking: Reported on 6/13/2025) 1 each 3    brimonidine 0.2% (ALPHAGAN) 0.2 % Drop Place 1 drop into both eyes 3 (three) times daily. 10 mL 11    cholecalciferol, vitamin D3, (VITAMIN D3) 25 mcg (1,000 unit) capsule Take 2 capsules (2,000 Units total) by mouth once daily.  0    DEXCOM G7 SENSOR Amanda 1 Device by Misc.(Non-Drug; Combo Route) route every 10 days. 9 each 3    dorzolamide-timolol 2-0.5% (COSOPT) 22.3-6.8 mg/mL ophthalmic solution Place 1 drop into both eyes 2 (two) times daily. 20 mL 3    furosemide (LASIX) 40 MG tablet TAKE 1 TABLET BY MOUTH EVERY DAY 90 tablet 0    insulin aspart U-100 (NOVOLOG U-100 INSULIN ASPART) 100 unit/mL injection TO USE WITH OMNIPOD 5. TOTAL DAILY DOSE UP TO 40 UNITS 40 mL 4    insulin pump  "cart,automated,BT (OMNIPOD 5 G6 PODS, GEN 5,) Crtg Inject 1 each into the skin Every 3 (three) days. 10 each 11    latanoprost 0.005 % ophthalmic solution PLACE 1 DROP INTO BOTH EYES ONCE DAILY 7.5 mL 3    LIDOcaine (LIDODERM) 5 % Place 1 patch onto the skin once daily. Remove & Discard patch within 12 hours or as directed by MD 30 patch 0    melatonin (MELATIN) 3 mg tablet Take 2 tablets (6 mg total) by mouth nightly as needed for Insomnia. 30 tablet 3    nystatin (MYCOSTATIN) 100,000 unit/mL suspension SWISH WITH 6MLS BY MOUTH THEN SWALLOW 4 TIMES A DAY FOR 14 DAYS      OMNIPOD 5 G6-G7 PODS, GEN 5, Crtg SMARTSI Each SUB-Q Once a Month (Patient not taking: Reported on 2025)      oxyCODONE (ROXICODONE) 5 MG immediate release tablet Take 0.5 tablets (2.5 mg total) by mouth every 6 (six) hours as needed for Pain. 10 tablet 0    pen needle, diabetic 31 gauge x 5/16" Ndle Use to inject insulin into the skin up to 4 times daily 400 each 3    predniSONE (DELTASONE) 5 MG tablet Take 0.5 tablets (2.5 mg total) by mouth once daily. 45 tablet 3    tobramycin sulfate 0.3% (TOBREX) 0.3 % ophthalmic solution 1-2 drops topically twice daily to affected toe(s). 5 mL 9     "

## 2025-06-18 NOTE — ASSESSMENT & PLAN NOTE
Patient's FSGs are controlled on current medication regimen.  Last A1c reviewed-   Lab Results   Component Value Date    HGBA1C 5.4 06/17/2025     Most recent fingerstick glucose reviewed-   Recent Labs   Lab 06/17/25  1701   POCTGLUCOSE 116*     Current correctional scale N/A  Maintain anti-hyperglycemic dose as follows-   Antihyperglycemics (From admission, onward)      Start     Stop Route Frequency Ordered    06/17/25 1515  insulin aspart U-100 insulin pump from home        Question Answer Comment   Target number 120    Basal Rate #1 1    Basal rate #1 time 0000        -- SubQ Continuous 06/17/25 1408    06/17/25 1507  insulin aspart U-100 insulin pump from home 0-10 Units        Question Answer Comment   Target number 120    Carbohydrate coverage #1 10    Carbohydrate coverage #1 time 0000    Sensitivity #1 70    Sensitivity #1 time 0000        -- SubQ As needed (PRN) 06/17/25 1408          -- Hold Oral hypoglycemics while patient is in the hospital.  -- patient with home insulin pump and sensor in place and functional.  -- Endocrinology consulted on admit for assistance with pump     Patient's FSGs are controlled on current medication regimen.  Last A1c reviewed-   Lab Results   Component Value Date    HGBA1C 5.4 06/17/2025     Most recent fingerstick glucose reviewed-   Recent Labs   Lab 06/17/25  1701   POCTGLUCOSE 116*     Current correctional scale N/A  Maintain anti-hyperglycemic dose as follows-   Antihyperglycemics (From admission, onward)      Start     Stop Route Frequency Ordered    06/17/25 1515  insulin aspart U-100 insulin pump from home        Question Answer Comment   Target number 120    Basal Rate #1 1    Basal rate #1 time 0000        -- SubQ Continuous 06/17/25 1408    06/17/25 1507  insulin aspart U-100 insulin pump from home 0-10 Units        Question Answer Comment   Target number 120    Carbohydrate coverage #1 10    Carbohydrate coverage #1 time 0000    Sensitivity #1 70    Sensitivity  #1 time 0000        -- SubQ As needed (PRN) 06/17/25 1408          -- Hold Oral hypoglycemics while patient is in the hospital.  -- patient with home insulin pump and sensor in place and functional.  -- Endocrinology consulted on admit for assistance with pump

## 2025-06-18 NOTE — ASSESSMENT & PLAN NOTE
noted metastatic cancer of lung primary. The cancer has metastasized to shoulder.   6/18 ID consulted for right shoulder nonhealing ulcer. wound care evaluation

## 2025-06-18 NOTE — PLAN OF CARE
Recommendations     1.) Recommend continuing with Diabetic Diet as tolerated.                  - RN staff: please continue to document PO intake in the flowsheets      2.) Recommend Boost Glucose Control BID to help meet needs.      3.) Recommend daily wt wts x 7 days to determine wt trend/accuracy.      4.) RD to monitor wt, PO intake, skin, labs.       Goals: To meet % of EEN/EPN by next RD f/u   Nutrition Goal Status: new  Communication of RD Recs:  (POC)     Nutrition Discharge Planning      Nutrition Discharge Planning: Therapeutic diet (comments), Oral supplement regimen (comments)  Therapeutic diet (comments): Diabetic Diet  Oral supplement regimen (comments): Boost Glucose Control BID

## 2025-06-18 NOTE — CONSULTS
"  [facility]  Adult Nutrition  Consult Note    SUMMARY     Recommendations    1.) Recommend continuing with Diabetic Diet as tolerated.      - RN staff: please continue to document PO intake in the flowsheets     2.) Recommend Boost Glucose Control BID to help meet needs.     3.) Recommend daily wt wts x 7 days to determine wt trend/accuracy.     4.) RD to monitor wt, PO intake, skin, labs.      Goals: To meet % of EEN/EPN by next RD f/u   Nutrition Goal Status: new  Communication of RD Recs:  (POC)    Nutrition Discharge Planning     Nutrition Discharge Planning: Therapeutic diet (comments), Oral supplement regimen (comments)  Therapeutic diet (comments): Diabetic Diet  Oral supplement regimen (comments): Boost Glucose Control BID    Reason for Assessment    Reason For Assessment: consult, low BMI  Diagnosis: other (see comments) (Toe pain)    General Information Comments: RD consulted for low BMI. 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 Pt was not seen d/t time constraint- RD to f/u tomorrow. No noted n/v/d/c. Pt with low to fair PO intake, 25-50%, per RN flowsheets. Per chart review, significant wt loss at the 1 month marker: -17% noted, if new wt is accurate. NFPE to be performed tomorrow. RD team to monitor and f/u.      Nutrition/Diet History    Spiritual, Cultural Beliefs, Druze Practices, Values that Affect Care: no  Food Allergies: NKFA  Factors Affecting Nutritional Intake: None identified at this time  Nutrition-related SDOH: Unable to assess at this time    Anthropometrics    Height: 5' 5" (165.1 cm)  Height (inches): 65 in  Height Method: Stated  Weight: 41.1 kg (90 lb 9.7 oz)  Weight (lb): 90.61 lb  Weight Method: Bed Scale  Ideal Body Weight (IBW), Female: 125 " lb  % Ideal Body Weight, Female (lb): 72.49 %  BMI (Calculated): 15.1  BMI Grade: less than 18.5 - underweight    Lab/Procedures/Meds    Pertinent Labs Reviewed: reviewed  Pertinent Labs Comments: 6/16: CRP: 334.8  Pertinent Medications Reviewed: reviewed  Pertinent Medications Comments: Statin, prednisone, insulin    Estimated/Assessed Needs    Weight Used For Calorie Calculations: 41.1 kg (90 lb 9.7 oz)  Energy Calorie Requirements (kcal): 1439 kcal  Energy Need Method: Kcal/kg (35 kcal/kg)    Protein Requirements: 62g (1.5g/kg)  Weight Used For Protein Calculations: 41.1 kg (90 lb 9.7 oz)    Fluid Requirements (mL): as per MD or RDA  Estimated Fluid Requirement Method: RDA Method  RDA Method (mL): 1439    CHO Requirement: 180g    Nutrition Prescription Ordered    Current Diet Order: Diabetic Diet    Evaluation of Received Nutrient/Fluid Intake    I/O: +140ml since admit  Energy Calories Required: not meeting needs  Protein Required: not meeting needs  Fluid Required:  (as per MD)  Comments: LBM 6/17  Tolerance: tolerating  % Intake of Estimated Energy Needs: 25 - 50 %  % Meal Intake: 25 - 50 %    PES Statement  Inadequate energy intake related to Other (comment) (decreased appetite) as evidenced by Intake <75% estimated needs  Status: New    Nutrition Risk    Level of Risk/Frequency of Follow-up: high     Monitor and Evaluation    Monitor and Evaluation: Food and beverage intake, Diet order, Weight, Electrolyte and renal panel, Gastrointestinal profile, Glucose/endocrine profile, Inflammatory profile, Lipid profile, Skin, Nutrition focused physical findings     Nutrition Follow-Up    RD Follow-up?: Yes

## 2025-06-18 NOTE — PROGRESS NOTES
[facility]  Endocrinology  Progress Note    Admit Date: 2025     Reason for Consult: Management of T2DM, Hyperglycemia      Diabetes diagnosis year: > 5 years ago      Home Diabetes Medications:    Omnipod 5  Basal Rate  12A:  0.45 units/hr      Carb Ratio  12A:12  6A: 10  6P: 12     ISF  12A: 50      Target: 120  Correct above: 140     IAT: 4 hours        How often checking glucose at home? >4 x day Dexcom   BG readings on regimen: 100s-200s     Hypoglycemia on the regimen? No   Missed doses on regimen?  No     Diabetes Complications include:     Hyperglycemia     Complicating diabetes co morbidities:   Glucocorticoid use         HPI: Radha Feng is a 84 y.o. 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 She reports questionable subjective fevers and chills. No N/V/D or any other complaints. She states the foot pain got increasingly worse today prompting her to present for evaluation. Endocrine consulted for BG management.        Interval HPI:   Overnight events: No acute events overnight. Patient in room 629/629 A. Blood glucose stable. BG at goal on current insulin regimen (Home Insulin Pump). Steroid use- None.   Renal function-         Lab Results   Component Value Date     CREATININE 0.6 2025         Vasopressors-  None      Diet Consistent Carbohydrate 2000 Calories (up to 75 gm per meal)      Eatin%  Nausea: No  Hypoglycemia and intervention: No  Fever: No  TPN and/or TF: No    /63 (Patient Position: Lying)   Pulse 76   Temp 98.4 °F (36.9 °C)  "(Oral)   Resp 16   Ht 5' 5" (1.651 m)   Wt 41.1 kg (90 lb 9.7 oz)   SpO2 100%   BMI 15.08 kg/m²     Labs Reviewed and Include    Recent Labs   Lab 06/18/25  0808   GLU 75   CALCIUM 8.7   ALBUMIN 1.7*   PROT 6.2      K 3.8   CO2 21*      BUN 12   CREATININE 0.5   ALKPHOS 101   ALT 11   AST 19   BILITOT 0.3     Lab Results   Component Value Date    WBC 19.55 (H) 06/18/2025    HGB 7.5 (L) 06/18/2025    HCT 25.8 (L) 06/18/2025    MCV 94 06/18/2025     (L) 06/18/2025     No results for input(s): "TSH", "FREET4" in the last 168 hours.  Lab Results   Component Value Date    HGBA1C 5.4 06/17/2025       Nutritional status:   Body mass index is 15.08 kg/m².  Lab Results   Component Value Date    ALBUMIN 1.7 (L) 06/18/2025    ALBUMIN 1.5 (L) 06/17/2025    ALBUMIN 1.8 (L) 06/16/2025     No results found for: "PREALBUMIN"    Estimated Creatinine Clearance: 55.3 mL/min (based on SCr of 0.5 mg/dL).    Accu-Checks  Recent Labs     06/17/25  1131 06/17/25  1701   POCTGLUCOSE 155* 116*       Current Medications and/or Treatments Impacting Glycemic Control  Immunotherapy:    Immunosuppressants       None          Steroids:   Hormones (From admission, onward)      Start     Stop Route Frequency Ordered    06/17/25 0900  predniSONE tablet 2.5 mg         -- Oral Daily 06/16/25 1930    06/16/25 2030  melatonin tablet 6 mg         -- Oral Nightly PRN 06/16/25 1930          Pressors:    Autonomic Drugs (From admission, onward)      None          Hyperglycemia/Diabetes Medications:   Antihyperglycemics (From admission, onward)      Start     Stop Route Frequency Ordered    06/17/25 1515  insulin aspart U-100 insulin pump from home        Question Answer Comment   Target number 120    Basal Rate #1 1    Basal rate #1 time 0000        -- SubQ Continuous 06/17/25 1408    06/17/25 1507  insulin aspart U-100 insulin pump from home 0-10 Units        Question Answer Comment   Target number 120    Carbohydrate coverage #1 10  "   Carbohydrate coverage #1 time 0000    Sensitivity #1 70    Sensitivity #1 time 0000        -- SubQ As needed (PRN) 06/17/25 1408            ASSESSMENT and PLAN    Endocrine  Insulin pump in place  At time of evaluation, pt meets criteria to continue home insulin pump usage.  - Has all adequate supplies   - Bolus settings reviewed    - No changes to home regimen.   - Nurse to check BG qac/hs/0200 & record in epic   - Patient to input glucose into pump and use bolus wizard for prandial needs   - Will continue to monitor accuchecks and titrate insulin as clinically indicated .     - Discussed above plan with patient, patient verbalized understanding.   - Understands in case of pump malfunction or cognitive decline in which pt can no longer safely use insulin pump, will transition to SC MDI        If pump malfunctions or is disconnected, please notify the on-call provider for endocrinology.         Type 2 diabetes mellitus with circulatory disorder, with long-term current use of insulin    BG goal: 140-180    - Continue home insulin pump   - POCT Glucose before meals, at bedtime and at 2 am  - Hypoglycemia protocol in place      ** Please notify Endocrine for any change and/or advance in diet**  ** Please call Endocrine for any BG related issues **     Discharge Planning:   TBD. Please notify endocrinology prior to discharge.      Orthopedic  * Toe pain  Managed per primary team            Candace Ambriz PA-C  Endocrinology  [facility]

## 2025-06-19 ENCOUNTER — HOSPITAL ENCOUNTER (OUTPATIENT)
Dept: RADIATION THERAPY | Facility: HOSPITAL | Age: 85
Discharge: HOME | End: 2025-06-19
Payer: MEDICARE

## 2025-06-19 ENCOUNTER — PATIENT MESSAGE (OUTPATIENT)
Dept: UROGYNECOLOGY | Facility: CLINIC | Age: 85
End: 2025-06-19
Payer: MEDICARE

## 2025-06-19 DIAGNOSIS — I73.9 PVD (PERIPHERAL VASCULAR DISEASE): Primary | ICD-10-CM

## 2025-06-19 PROBLEM — Z71.89 ADVANCE CARE PLANNING: Status: ACTIVE | Noted: 2025-06-19

## 2025-06-19 PROBLEM — Z51.5 PALLIATIVE CARE ENCOUNTER: Chronic | Status: ACTIVE | Noted: 2025-06-04

## 2025-06-19 PROBLEM — E44.0 MODERATE MALNUTRITION: Status: ACTIVE | Noted: 2025-06-19

## 2025-06-19 LAB
ABSOLUTE EOSINOPHIL (OHS): 0.13 K/UL
ABSOLUTE EOSINOPHIL (OHS): 0.48 K/UL
ABSOLUTE MONOCYTE (OHS): 1.03 K/UL (ref 0.3–1)
ABSOLUTE MONOCYTE (OHS): 1.22 K/UL (ref 0.3–1)
ABSOLUTE NEUTROPHIL COUNT (OHS): 14.13 K/UL (ref 1.8–7.7)
ABSOLUTE NEUTROPHIL COUNT (OHS): 16.5 K/UL (ref 1.8–7.7)
ALBUMIN SERPL BCP-MCNC: 1.5 G/DL (ref 3.5–5.2)
ALP SERPL-CCNC: 92 UNIT/L (ref 40–150)
ALT SERPL W/O P-5'-P-CCNC: 10 UNIT/L (ref 10–44)
ANION GAP (OHS): 8 MMOL/L (ref 8–16)
AST SERPL-CCNC: 17 UNIT/L (ref 11–45)
BASOPHILS # BLD AUTO: 0.03 K/UL
BASOPHILS # BLD AUTO: 0.06 K/UL
BASOPHILS NFR BLD AUTO: 0.2 %
BASOPHILS NFR BLD AUTO: 0.3 %
BILIRUB SERPL-MCNC: 0.4 MG/DL (ref 0.1–1)
BUN SERPL-MCNC: 11 MG/DL (ref 8–23)
CALCIUM SERPL-MCNC: 8.4 MG/DL (ref 8.7–10.5)
CHLORIDE SERPL-SCNC: 109 MMOL/L (ref 95–110)
CO2 SERPL-SCNC: 22 MMOL/L (ref 23–29)
CREAT SERPL-MCNC: 0.5 MG/DL (ref 0.5–1.4)
ERYTHROCYTE [DISTWIDTH] IN BLOOD BY AUTOMATED COUNT: 19.6 % (ref 11.5–14.5)
ERYTHROCYTE [DISTWIDTH] IN BLOOD BY AUTOMATED COUNT: 19.9 % (ref 11.5–14.5)
GFR SERPLBLD CREATININE-BSD FMLA CKD-EPI: >60 ML/MIN/1.73/M2
GLUCOSE SERPL-MCNC: 118 MG/DL (ref 70–110)
HCT VFR BLD AUTO: 23.5 % (ref 37–48.5)
HCT VFR BLD AUTO: 28.9 % (ref 37–48.5)
HGB BLD-MCNC: 7.1 GM/DL (ref 12–16)
HGB BLD-MCNC: 8.2 GM/DL (ref 12–16)
IMM GRANULOCYTES # BLD AUTO: 0.08 K/UL (ref 0–0.04)
IMM GRANULOCYTES # BLD AUTO: 0.1 K/UL (ref 0–0.04)
IMM GRANULOCYTES NFR BLD AUTO: 0.4 % (ref 0–0.5)
IMM GRANULOCYTES NFR BLD AUTO: 0.6 % (ref 0–0.5)
LYMPHOCYTES # BLD AUTO: 0.82 K/UL (ref 1–4.8)
LYMPHOCYTES # BLD AUTO: 0.82 K/UL (ref 1–4.8)
MAGNESIUM SERPL-MCNC: 1.9 MG/DL (ref 1.6–2.6)
MCH RBC QN AUTO: 27.4 PG (ref 27–31)
MCH RBC QN AUTO: 27.6 PG (ref 27–31)
MCHC RBC AUTO-ENTMCNC: 28.4 G/DL (ref 32–36)
MCHC RBC AUTO-ENTMCNC: 30.2 G/DL (ref 32–36)
MCV RBC AUTO: 91 FL (ref 82–98)
MCV RBC AUTO: 97 FL (ref 82–98)
NUCLEATED RBC (/100WBC) (OHS): 0 /100 WBC
NUCLEATED RBC (/100WBC) (OHS): 0 /100 WBC
PLATELET # BLD AUTO: 149 K/UL (ref 150–450)
PLATELET # BLD AUTO: 164 K/UL (ref 150–450)
PMV BLD AUTO: 11.1 FL (ref 9.2–12.9)
PMV BLD AUTO: 11.7 FL (ref 9.2–12.9)
POTASSIUM SERPL-SCNC: 3.6 MMOL/L (ref 3.5–5.1)
PROT SERPL-MCNC: 5.7 GM/DL (ref 6–8.4)
RBC # BLD AUTO: 2.59 M/UL (ref 4–5.4)
RBC # BLD AUTO: 2.97 M/UL (ref 4–5.4)
RELATIVE EOSINOPHIL (OHS): 0.7 %
RELATIVE EOSINOPHIL (OHS): 2.9 %
RELATIVE LYMPHOCYTE (OHS): 4.4 % (ref 18–48)
RELATIVE LYMPHOCYTE (OHS): 4.9 % (ref 18–48)
RELATIVE MONOCYTE (OHS): 5.5 % (ref 4–15)
RELATIVE MONOCYTE (OHS): 7.3 % (ref 4–15)
RELATIVE NEUTROPHIL (OHS): 84.1 % (ref 38–73)
RELATIVE NEUTROPHIL (OHS): 88.7 % (ref 38–73)
SODIUM SERPL-SCNC: 139 MMOL/L (ref 136–145)
WBC # BLD AUTO: 16.78 K/UL (ref 3.9–12.7)
WBC # BLD AUTO: 18.62 K/UL (ref 3.9–12.7)

## 2025-06-19 PROCEDURE — 99232 SBSQ HOSP IP/OBS MODERATE 35: CPT | Mod: ,,,

## 2025-06-19 PROCEDURE — 82374 ASSAY BLOOD CARBON DIOXIDE: CPT

## 2025-06-19 PROCEDURE — 25000003 PHARM REV CODE 250: Performed by: HOSPITALIST

## 2025-06-19 PROCEDURE — 36415 COLL VENOUS BLD VENIPUNCTURE: CPT | Performed by: HOSPITALIST

## 2025-06-19 PROCEDURE — 85025 COMPLETE CBC W/AUTO DIFF WBC: CPT | Performed by: HOSPITALIST

## 2025-06-19 PROCEDURE — 27000207 HC ISOLATION

## 2025-06-19 PROCEDURE — 99223 1ST HOSP IP/OBS HIGH 75: CPT | Mod: 25,,, | Performed by: CLINICAL NURSE SPECIALIST

## 2025-06-19 PROCEDURE — 99497 ADVNCD CARE PLAN 30 MIN: CPT | Mod: 25,,, | Performed by: CLINICAL NURSE SPECIALIST

## 2025-06-19 PROCEDURE — 77290 THER RAD SIMULAJ FIELD CPLX: CPT | Mod: 26,,, | Performed by: STUDENT IN AN ORGANIZED HEALTH CARE EDUCATION/TRAINING PROGRAM

## 2025-06-19 PROCEDURE — 25000003 PHARM REV CODE 250

## 2025-06-19 PROCEDURE — 77263 THER RADIOLOGY TX PLNG CPLX: CPT | Mod: ,,, | Performed by: STUDENT IN AN ORGANIZED HEALTH CARE EDUCATION/TRAINING PROGRAM

## 2025-06-19 PROCEDURE — 21400001 HC TELEMETRY ROOM

## 2025-06-19 PROCEDURE — 25000242 PHARM REV CODE 250 ALT 637 W/ HCPCS: Performed by: HOSPITALIST

## 2025-06-19 PROCEDURE — 63600175 PHARM REV CODE 636 W HCPCS

## 2025-06-19 PROCEDURE — 36415 COLL VENOUS BLD VENIPUNCTURE: CPT

## 2025-06-19 PROCEDURE — 99233 SBSQ HOSP IP/OBS HIGH 50: CPT | Mod: ,,, | Performed by: INTERNAL MEDICINE

## 2025-06-19 PROCEDURE — 99223 1ST HOSP IP/OBS HIGH 75: CPT | Mod: GC,,, | Performed by: STUDENT IN AN ORGANIZED HEALTH CARE EDUCATION/TRAINING PROGRAM

## 2025-06-19 PROCEDURE — 77334 RADIATION TREATMENT AID(S): CPT | Mod: 26,,, | Performed by: STUDENT IN AN ORGANIZED HEALTH CARE EDUCATION/TRAINING PROGRAM

## 2025-06-19 PROCEDURE — 85025 COMPLETE CBC W/AUTO DIFF WBC: CPT

## 2025-06-19 PROCEDURE — 83735 ASSAY OF MAGNESIUM: CPT

## 2025-06-19 RX ORDER — TALC
6 POWDER (GRAM) TOPICAL NIGHTLY
Status: DISCONTINUED | OUTPATIENT
Start: 2025-06-19 | End: 2025-06-20 | Stop reason: HOSPADM

## 2025-06-19 RX ORDER — OXYCODONE HCL 5 MG/5 ML
1.25 SOLUTION, ORAL ORAL EVERY 6 HOURS PRN
Refills: 0 | Status: DISCONTINUED | OUTPATIENT
Start: 2025-06-19 | End: 2025-06-20 | Stop reason: HOSPADM

## 2025-06-19 RX ORDER — OXYCODONE HCL 5 MG/5 ML
1.25 SOLUTION, ORAL ORAL EVERY 4 HOURS PRN
Refills: 0 | Status: DISCONTINUED | OUTPATIENT
Start: 2025-06-19 | End: 2025-06-19

## 2025-06-19 RX ADMIN — BRIMONIDINE TARTRATE 1 DROP: 2 SOLUTION OPHTHALMIC at 09:06

## 2025-06-19 RX ADMIN — LATANOPROST 1 DROP: 50 SOLUTION OPHTHALMIC at 08:06

## 2025-06-19 RX ADMIN — OXYCODONE HYDROCHLORIDE 1.25 MG: 5 SOLUTION ORAL at 09:06

## 2025-06-19 RX ADMIN — DORZOLAMIDE HYDROCHLORIDE AND TIMOLOL MALEATE 1 DROP: 20; 5 SOLUTION OPHTHALMIC at 09:06

## 2025-06-19 RX ADMIN — DORZOLAMIDE HYDROCHLORIDE AND TIMOLOL MALEATE 1 DROP: 20; 5 SOLUTION OPHTHALMIC at 08:06

## 2025-06-19 RX ADMIN — CLOPIDOGREL BISULFATE 75 MG: 75 TABLET, FILM COATED ORAL at 08:06

## 2025-06-19 RX ADMIN — Medication 6 MG: at 09:06

## 2025-06-19 RX ADMIN — PREDNISONE 2.5 MG: 2.5 TABLET ORAL at 08:06

## 2025-06-19 RX ADMIN — BRIMONIDINE TARTRATE 1 DROP: 2 SOLUTION OPHTHALMIC at 02:06

## 2025-06-19 RX ADMIN — ACETAMINOPHEN 1000 MG: 500 TABLET ORAL at 11:06

## 2025-06-19 RX ADMIN — BRIMONIDINE TARTRATE 1 DROP: 2 SOLUTION OPHTHALMIC at 08:06

## 2025-06-19 RX ADMIN — ASPIRIN 81 MG CHEWABLE TABLET 81 MG: 81 TABLET CHEWABLE at 08:06

## 2025-06-19 RX ADMIN — ACETAMINOPHEN 1000 MG: 500 TABLET ORAL at 02:06

## 2025-06-19 NOTE — PROGRESS NOTES
[facility]  Adult Nutrition  Progress Note    SUMMARY     Recommendations    Recommendation/Intervention:   1. Continue consistent carbohydrate diet as tolerated     - please continue to document PO % intake via flowsheets    2. Recommend boost breeze orange 1 x daily and Joseluis BID    3. Encourage good intake      4. RD to monitor weight, labs, intake, tolerance    Goals:   1. % nutritional needs met with diet during admission     2. Maintain weight during admission    3. Display s/s of wound healing during admission    Nutrition Goal Status: new  Communication of RD Recs:  (POC)    Nutrition Discharge Planning    Nutrition Discharge Planning: Therapeutic diet (comments), Oral supplement regimen (comments)  Therapeutic diet (comments): Diabetic Diet  Oral supplement regimen (comments): Boost Glucose Control BID    Assessment and Plan    Endocrine  Moderate malnutrition  Malnutrition Type:  Context: chronic illness  Level: moderate    Related to (etiology):   Physiological causes resulting in anorexia or diminished intake    Signs and Symptoms (as evidenced by):   Wt loss 17% x 1 mo  <50% of estimated energy requirements> 1 mo    Malnutrition Characteristic Summary:  Weight Loss (Malnutrition): greater than 5% in 1 month (17% x 1 mo)  Energy Intake (Malnutrition): less than or equal to 50% for greater than or equal to 1 month  Subcutaneous Fat (Malnutrition): moderate depletion  Muscle Mass (Malnutrition): moderate depletion      Interventions/Recommendations (treatment strategy):  Collaboration with other providers  [facility]    Nutrition Diagnosis Status:   New         Malnutrition Assessment  Malnutrition Context: chronic illness  Malnutrition Level: moderate          Weight Loss (Malnutrition): greater than 5% in 1 month (17% x 1 mo)  Energy Intake (Malnutrition): less than or equal to 50% for greater than or equal to 1 month  Subcutaneous Fat (Malnutrition): moderate depletion  Muscle Mass  (Malnutrition): moderate depletion   Orbital Region (Subcutaneous Fat Loss): mild depletion  Upper Arm Region (Subcutaneous Fat Loss): moderate depletion  Thoracic and Lumbar Region: moderate depletion   [place] Region (Muscle Loss): severe depletion  Clavicle Bone Region (Muscle Loss): moderate depletion  Clavicle and Acromion Bone Region (Muscle Loss): moderate depletion  Scapular Bone Region (Muscle Loss): moderate depletion  Dorsal Hand (Muscle Loss): severe depletion  Patellar Region (Muscle Loss): moderate depletion  Anterior Thigh Region (Muscle Loss): moderate depletion  Posterior Calf Region (Muscle Loss): moderate depletion                 Reason for Assessment    Reason For Assessment: RD follow-up  Diagnosis: other (see comments) (Toe pain)  General Information Comments: PMHx: 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 Non pressure chronic ulcer right shoulder noted 6/18. No edema noted. Pt not in room at time of visit, gathered information through daughter. Pt appetite is not good, has been eating about 50% of meals her daughter brings. Daughter says she's been eating like that for more than a month now. Wants to d/c boost glucose and wants to try boost breeze as well as tyson. Agreed to wt loss of 20lbs x 1 mo. Per chart review, significant wt loss at the 1 month marker: -17% notedNFPE pending, pt not in the room at time of visit.    Nutrition/Diet History    Nutrition Intake History: 2-3 meals and snacks  Food Preferences: cheerios, bananas. milk  and water  Spiritual, Cultural Beliefs, Sikhism Practices, Values that Affect Care: no  Food Allergies: NKFA  Factors Affecting Nutritional Intake: altered taste, decreased appetite, dry mouth  Nutrition-related SDOH: None  "Identified    Anthropometrics    Height: 5' 5" (165.1 cm)  Height (inches): 65 in  Height Method: Stated  Weight: 41.1 kg (90 lb 9.7 oz)  Weight (lb): 90.61 lb  Weight Method: Bed Scale  Ideal Body Weight (IBW), Female: 125 lb  % Ideal Body Weight, Female (lb): 72.49 %  BMI (Calculated): 15.1  BMI Grade: less than 18.5 - underweight  Usual Body Weight (UBW), k kg  % Usual Body Weight: 69.81       Lab/Procedures/Meds    Pertinent Labs Reviewed: reviewed  Pertinent Labs Comments: H/H 7.1/23.5, Glu 118, Ca 8.4, Protein total 5.7, albumin 1.5, cholesterol total 61, HDL 17, LDL 30, CRP: 334.8  Pertinent Medications Reviewed: reviewed  Pertinent Medications Comments: Statin, prednisone, insulin    Estimated/Assessed Needs    Weight Used For Calorie Calculations: 41.1 kg (90 lb 9.7 oz)  Energy Calorie Requirements (kcal): 0063-5463 kcal/kg (35-40 kcal/kg)  Energy Need Method: Kcal/kg  Protein Requirements: 62-82 k/kg (1.5-2.0 g/pro\)  Weight Used For Protein Calculations: 41.1 kg (90 lb 9.7 oz)  Fluid Requirements (mL): as per MD or RDA  Estimated Fluid Requirement Method: RDA Method  RDA Method (mL): 1439  CHO Requirement: 179-206      Nutrition Prescription Ordered    Current Diet Order: Diabetic Diet  Oral Nutrition Supplement: boost glucose, tyson    Evaluation of Received Nutrient/Fluid Intake    I/O: 240/450 OB   Energy Calories Required: not meeting needs  Protein Required: not meeting needs  Fluid Required:  (as per MD)  Comments: LBM   Tolerance: tolerating  % Intake of Estimated Energy Needs: 25 - 50 %  % Meal Intake: 25 - 50 %    PES Statement  Inadequate energy intake related to Other (comment) (decreased appetite) as evidenced by Intake <75% estimated needs  Status: New    Nutrition Risk    Level of Risk/Frequency of Follow-up:  (2/wk)     Monitor and Evaluation    Monitor and Evaluation: Food and beverage intake, Diet order, Weight, Electrolyte and renal panel, Gastrointestinal profile, " Glucose/endocrine profile, Inflammatory profile, Lipid profile, Skin, Nutrition focused physical findings     Nutrition Follow-Up    RD Follow-up?: Yes

## 2025-06-19 NOTE — ASSESSMENT & PLAN NOTE
Patient's with Normocytic anemia.. Hemoglobin stable. Etiology likely due to chronic disease .  Current CBC reviewed-    Recent Labs   Lab 06/17/25  1551 06/18/25  0808 06/19/25  0559   HGB 8.0* 7.5* 7.1*         Component Value Date/Time    MCV 91 06/19/2025 0559    MCV 96 03/11/2025 1358    RDW 19.6 (H) 06/19/2025 0559    RDW 15.9 (H) 03/11/2025 1358    IRON 15 (L) 05/17/2025 1348    IRON 121 03/09/2023 1330    FERRITIN 741.0 (H) 05/17/2025 1348    RETIC 4.0 (H) 05/17/2025 1348    RETIC 2.7 (H) 03/09/2023 1330    FOLATE 11.6 03/09/2023 1330    [id number] 794 03/09/2023 1330    OCCULTBLOOD Positive (A) 10/26/2022 0935     Monitor CBC and transfuse if H/H drops below 7/21.

## 2025-06-19 NOTE — ASSESSMENT & PLAN NOTE
84 year old female with a history of COPD, PVD, HFpEF, aortic and coronary atherosclerosis, urinary incontinence, T2DM, osteroporosis, and autoimmune hepatitis on chronic prednisone and metastatic lung cancer s/p radiation therapy.  She had a recent hospital admission secondary to COVID infection and has been on supplemental oxygen (2L) at home ever since.  She is admitted to the hospital now with ischemic changes to her toes.  ID is consulted to assist with her ongoing leukocytosis.      Differential for her ongoing leukocytosis includes tissue ischemia due to peripheral artery disease vs post-obstructive pneumonia vs ongoing use of prednisone (less likely).      Plan  Check non contrast CT scan of chest to assess for infection.  Okay to hold off on further antibiotics for now.

## 2025-06-19 NOTE — SUBJECTIVE & OBJECTIVE
"Interval HPI:   Overnight events: No acute events overnight. Patient in room 629/629 A. Blood glucose stable. BG at goal on current insulin regimen (Home Insulin Pump). Steroid use- None.   Renal function-   Lab Results   Component Value Date    CREATININE 0.5 2025             Vasopressors-  None      Diet Consistent Carbohydrate 2000 Calories (up to 75 gm per meal)      Eatin%  Nausea: No  Hypoglycemia and intervention: No  Fever: No  TPN and/or TF: No    /62 (Patient Position: Lying)   Pulse 77   Temp 97.7 °F (36.5 °C) (Oral)   Resp 18   Ht 5' 5" (1.651 m)   Wt 41.1 kg (90 lb 9.7 oz)   SpO2 100%   BMI 15.08 kg/m²     Labs Reviewed and Include    Recent Labs   Lab 25  0559   *   CALCIUM 8.4*   ALBUMIN 1.5*   PROT 5.7*      K 3.6   CO2 22*      BUN 11   CREATININE 0.5   ALKPHOS 92   ALT 10   AST 17   BILITOT 0.4     Lab Results   Component Value Date    WBC 16.78 (H) 2025    HGB 7.1 (L) 2025    HCT 23.5 (L) 2025    MCV 91 2025     (L) 2025     No results for input(s): "TSH", "FREET4" in the last 168 hours.  Lab Results   Component Value Date    HGBA1C 5.4 2025       Nutritional status:   Body mass index is 15.08 kg/m².  Lab Results   Component Value Date    ALBUMIN 1.5 (L) 2025    ALBUMIN 1.7 (L) 2025    ALBUMIN 1.5 (L) 2025     No results found for: "PREALBUMIN"    Estimated Creatinine Clearance: 55.3 mL/min (based on SCr of 0.5 mg/dL).    Accu-Checks  Recent Labs     25  1131 25  1701   POCTGLUCOSE 155* 116*       Current Medications and/or Treatments Impacting Glycemic Control  Immunotherapy:    Immunosuppressants       None          Steroids:   Hormones (From admission, onward)      Start     Stop Route Frequency Ordered    25 0900  predniSONE tablet 2.5 mg         -- Oral Daily 0625  melatonin tablet 6 mg         -- Oral Nightly PRN 25      "     Pressors:    Autonomic Drugs (From admission, onward)      None          Hyperglycemia/Diabetes Medications:   Antihyperglycemics (From admission, onward)      Start     Stop Route Frequency Ordered    06/17/25 1515  insulin aspart U-100 insulin pump from home        Question Answer Comment   Target number 120    Basal Rate #1 1    Basal rate #1 time 0000        -- SubQ Continuous 06/17/25 1408    06/17/25 1507  insulin aspart U-100 insulin pump from home 0-10 Units        Question Answer Comment   Target number 120    Carbohydrate coverage #1 10    Carbohydrate coverage #1 time 0000    Sensitivity #1 70    Sensitivity #1 time 0000        -- SubQ As needed (PRN) 06/17/25 1408

## 2025-06-19 NOTE — CONSULTS
Pulmonary & Critical Care Medicine Consult Note    Consultant Attending: Mina Brand MD  Consultant Fellow: Taylor Garcia DO    Reason for Consult:     Worsening of the known right perihilar mass with tiny calcifications now measuring 3.8 x 2.7 cm, prior 2.8 x 2.2 cm. Complete atelectasis of the right upper lobe/ pleural effusions/ thoracentesis?     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     Pulmonary was consulted for known right perihilar mass and pleural effusion. Patient currently says she is not experiencing shortness of breath and the cough she had had previously has improved significantly. Overall her respiratory symptoms are improved from a few weeks ago.     She was just admitted for shortness of breath (discharged 6/7/25) and was seen by pulmonary at that time. BNP was elevated to 2700 and it was deemed that the bilateral effusions were likely secondary to heart failure although it was discussed with family and patient that it could also be malignant. However, since the patient did not want definitive treatment, invasive thoracentesis was not in line with patient's goals of care. Instead, she was diuresed and based on CT chest from 6/18/25 compared to CTA chest from 6/3/25, the pleural effusions have significantly decreased in size, but the perihilar mass has increased in size.       Past Medical History:  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    Diabetes mellitus     Glaucoma     Hypertension     Lung cancer     Other ascites 11/29/2022       Past Surgical History:  Past Surgical History:   Procedure Laterality Date    CATARACT EXTRACTION W/  INTRAOCULAR LENS IMPLANT  Left 12/21/2021        ENDOBRONCHIAL ULTRASOUND N/A 07/05/2024    Procedure: ENDOBRONCHIAL ULTRASOUND (EBUS);  Surgeon: Rolo Wilkinson MD;  Location: [facility] OR 2ND FLR;  Service: Pulmonary;  Laterality: N/A;    ESOPHAGOGASTRODUODENOSCOPY N/A 06/26/2023    Procedure: ESOPHAGOGASTRODUODENOSCOPY (EGD);  Surgeon: Edgar Branch MD;  Location: [facility] (4TH FLR);  Service: Endoscopy;  Laterality: N/A;  referral Dr Peraza-cirrhosis/labs done on 4/24/23-instr portal-GT       Allergies:  Review of patient's allergies indicates:  No Known Allergies    Medications:   In-Hospital Scheduled Medications:   aspirin  81 mg Oral Daily    atorvastatin  10 mg Oral Daily    brimonidine 0.2%  1 drop Both Eyes TID    clopidogreL  75 mg Oral Daily    dorzolamide-timolol 2-0.5%  1 drop Both Eyes BID    latanoprost  1 drop Both Eyes Daily    predniSONE  2.5 mg Oral Daily      In-Hospital PRN Medications:    Current Facility-Administered Medications:     acetaminophen, 1,000 mg, Oral, Q8H PRN    aluminum & magnesium hydroxide-simethicone, 30 mL, Oral, Q6H PRN    dextrose 50%, 12.5 g, Intravenous, PRN    dextrose 50%, 12.5 g, Intravenous, PRN    dextrose 50%, 25 g, Intravenous, PRN    dextrose 50%, 25 g, Intravenous, PRN    glucagon (human recombinant), 1 mg, Intramuscular, PRN    glucagon (human recombinant), 1 mg, Intramuscular, PRN    glucose, 16 g, Oral, PRN    glucose, 16 g, Oral, PRN    glucose, 24 g, Oral, PRN    glucose, 24 g, Oral, PRN    insulin aspart U-100, 0-10 Units, Subcutaneous, PRN    melatonin, 6 mg, Oral, Nightly PRN    naloxone, 0.02 mg, Intravenous, PRN    nitroGLYCERIN, 0.4 mg, Sublingual, Q5 Min PRN    oxyCODONE, 1.25 mg, Oral, Q6H PRN    sodium chloride 0.9%, 10 mL, Intravenous, Q12H PRN    Flushing PICC/Midline Protocol, , , Until Discontinued **AND** sodium chloride 0.9%, 10 mL, Intravenous, Q12H PRN   In-Hospital IV Infusion Medications:   insulin aspart U-100  1 Units/hr Subcutaneous Continuous  0.01 mL/hr at 06/17/25 1515 1 Units/hr at 06/17/25 1515      Home Medications:  Prior to Admission medications    Medication Sig Start Date End Date Taking? Authorizing Provider   acetaminophen (TYLENOL) 500 MG tablet Take 2 tablets (1,000 mg total) by mouth every 8 (eight) hours as needed for Pain. 10/14/22   Malaika Madrigal MD   BD INSULIN SYRINGE ULTRA-FINE 1 mL 31 gauge x 5/16 Syrg  12/28/22   Provider, Historical   blood-glucose meter,continuous Misc Dispsense 1 Dexcom G7  1/23/25   Nestor Agarwal MD   blood-glucose transmitter (DEXCOM G6 TRANSMITTER) Amanda 1 each by Misc.(Non-Drug; Combo Route) route every 3 (three) months.  Patient not taking: Reported on 6/13/2025 11/8/23   Nestor Agarwal MD   brimonidine 0.2% (ALPHAGAN) 0.2 % Drop Place 1 drop into both eyes 3 (three) times daily. 7/25/24   Nj Elliott OD   cholecalciferol, vitamin D3, (VITAMIN D3) 25 mcg (1,000 unit) capsule Take 2 capsules (2,000 Units total) by mouth once daily. 7/24/23   Nestor Agarwal MD   DEXCOM G7 SENSOR Amanda 1 Device by Misc.(Non-Drug; Combo Route) route every 10 days. 1/22/25 1/22/26  Nestor Agarwal MD   dorzolamide-timolol 2-0.5% (COSOPT) 22.3-6.8 mg/mL ophthalmic solution Place 1 drop into both eyes 2 (two) times daily. 3/18/25   Nicole Garber MD   furosemide (LASIX) 40 MG tablet TAKE 1 TABLET BY MOUTH EVERY DAY 4/22/25   Leticia Lim MD   insulin aspart U-100 (NOVOLOG U-100 INSULIN ASPART) 100 unit/mL injection TO USE WITH OMNIPOD 5. TOTAL DAILY DOSE UP TO 40 UNITS 5/2/24   Nestor Agarwal MD   insulin pump cart,automated,BT (OMNIPOD 5 G6 PODS, GEN 5,) Crtg Inject 1 each into the skin Every 3 (three) days. 7/26/24   Nestor Agarwal MD   latanoprost 0.005 % ophthalmic solution PLACE 1 DROP INTO BOTH EYES ONCE DAILY 12/8/23 4/25/25  Nj Elliott, LUANA   LIDOcaine (LIDODERM) 5 % Place 1 patch onto the skin once daily. Remove & Discard patch within 12 hours or as  "directed by MD 25   Catherine Trujillo MD   melatonin (MELATIN) 3 mg tablet Take 2 tablets (6 mg total) by mouth nightly as needed for Insomnia. 25   Eric Boggs MD   nystatin (MYCOSTATIN) 100,000 unit/mL suspension SWISH WITH 6MLS BY MOUTH THEN SWALLOW 4 TIMES A DAY FOR 14 DAYS 25   Provider, Historical   OMNIPOD 5 G6-G7 PODS, GEN 5, Crtg SMARTSI Each SUB-Q Once a Month  Patient not taking: Reported on 24   Provider, Historical   oxyCODONE (ROXICODONE) 5 MG immediate release tablet Take 0.5 tablets (2.5 mg total) by mouth every 6 (six) hours as needed for Pain. 25   Catherine Trujillo MD   pen needle, diabetic 31 gauge x 5/16" Ndle Use to inject insulin into the skin up to 4 times daily 22   Leticia Lim MD   predniSONE (DELTASONE) 5 MG tablet Take 0.5 tablets (2.5 mg total) by mouth once daily. 3/24/25   Yvonne Peraza MD   tobramycin sulfate 0.3% (TOBREX) 0.3 % ophthalmic solution 1-2 drops topically twice daily to affected toe(s). 25   Je Tejada DPM       Family History:  Family History   Problem Relation Name Age of Onset    Cataracts Mother      Diabetes Mother      Glaucoma Mother      Hypertension Mother      Heart attack Father      Colon cancer Sister      Blindness Cousin      Amblyopia Neg Hx      Macular degeneration Neg Hx      Retinal detachment Neg Hx      Strabismus Neg Hx         Social History:  Social History[1]    Review of Systems:  Pertinent items are noted in HPI. All other systems are reviewed and are negative.     Objective:   Last 24 Hour Vital Signs:  BP  Min: 113/52  Max: 138/68  Temp  Av.8 °F (36.6 °C)  Min: 97.5 °F (36.4 °C)  Max: 98.6 °F (37 °C)  Pulse  Av.4  Min: 69  Max: 82  Resp  Av.7  Min: 16  Max: 19  SpO2  Av.9 %  Min: 99 %  Max: 100 %  I/O last 3 completed shifts:  In: 480 [P.O.:480]  Out: 800 [Urine:800]    Physical Examination:  Physical Exam  Constitutional:       General: She " "is not in acute distress.  HENT:      Head: Normocephalic and atraumatic.   Eyes:      Extraocular Movements: Extraocular movements intact.   Cardiovascular:      Rate and Rhythm: Normal rate.   Pulmonary:      Breath sounds: No wheezing or rhonchi.      Comments: Decreased breath sounds at bilateral bases   Skin:     General: Skin is warm and dry.      Comments: Right big toe, second toe, and middle toe with blue discoloration and tenderness to touch    Neurological:      Mental Status: She is alert.           Laboratory:  Trended Lab Data:  Recent Labs     06/16/25  1447 06/16/25  1455 06/17/25  0430 06/17/25  1551 06/18/25  0808 06/19/25  0559   WBC  --   --  17.19* 18.32* 19.55* 16.78*   HGB  --   --  7.2* 8.0* 7.5* 7.1*   HCT  --    < > 24.1* 26.9* 25.8* 23.5*   PLT  --   --  120* 148* 149* 149*   NA  --   --  137  --  138 139   K  --   --  3.7  --  3.8 3.6   CL  --   --  105  --  108 109   CO2  --   --  21*  --  21* 22*   BUN  --   --  11  --  12 11   CREATININE  --   --  0.6  --  0.5 0.5   GLU  --   --  153*  --  75 118*   BILITOT  --   --  0.4  --  0.3 0.4   AST  --   --  21  --  19 17   ALT  --   --  12  --  11 10   ALKPHOS  --   --  98  --  101 92   CALCIUM  --   --  8.3*  --  8.7 8.4*   ALBUMIN  --   --  1.5*  --  1.7* 1.5*   PROT  --   --  5.6*  --  6.2 5.7*   MG  --   --  1.9  --  2.0 1.9   INR 1.2  --   --   --   --   --     < > = values in this interval not displayed.       Cardiac: No results for input(s): "TROPONINI", "CKTOTAL", "CKMB", "BNP" in the last 168 hours.    Urinalysis:   Lab Results   Component Value Date    LABURIN No Significant Growth 06/16/2025    COLORU Yellow 06/16/2025    SPECGRAV >=1.030 (A) 06/16/2025    NITRITE Positive (A) 06/16/2025    KETONESU Negative 11/03/2022    UROBILINOGEN Negative 06/16/2025       Microbiology:  Microbiology Results (last 7 days)       Procedure Component Value Units Date/Time    Blood culture x two cultures. Draw prior to antibiotics. [[id number]]  " (Normal) Collected: 06/16/25 1616    Order Status: Completed Specimen: Blood from Peripheral, Antecubital, Right Updated: 06/18/25 1902     Blood Culture No Growth After 48 Hours    Blood culture x two cultures. Draw prior to antibiotics. [[id number]]  (Normal) Collected: 06/16/25 1616    Order Status: Completed Specimen: Blood from Peripheral, Forearm, Right Updated: 06/18/25 1902     Blood Culture No Growth After 48 Hours    Urine culture [[id number]] Collected: 06/16/25 2255    Order Status: Completed Specimen: Urine Updated: 06/18/25 1437     Urine Culture No Significant Growth    MRSA Screen by PCR [[id number]]     Order Status: Sent Specimen: Nasal Swab             Radiology:  Results for orders placed or performed during the hospital encounter of 06/16/25 (from the past 2160 hours)   CTA Runoff ABD PEL Bilat Lower Ext    Narrative    EXAMINATION:  CTA RUNOFF ABD PEL BILAT LOWER EXT    CLINICAL HISTORY:  Claudication or leg ischemia    TECHNIQUE:  CTA of abdomen, pelvis, and bilateral lower extremities performed with 100 mL Omnipaque 350 intravenous contrast.    COMPARISON:  CT 06/03/2025    FINDINGS:  SOFT TISSUES: Fat-containing umbilical hernia.  Diffuse muscle atrophy.    LUNG BASES/VISUALIZED MEDIASTINUM: Bilateral pleural effusions, right greater than left, with adjacent compressive atelectasis.  These are improved in size compared to CT 06/03/2025.  Scattered subsegmental atelectasis versus scarring.  Multi-vessel coronary calcific atherosclerosis.    HEPATOBILIARY: Liver is normal size. Several subcentimeter hypodensities, too small to characterize but unchanged.  Similar geographic hypoattenuation about the falciform ligament, likely focal fat.  No biliary ductal dilatation.  Normal gallbladder.    PANCREAS: Similar mild prominence of the pancreatic duct measuring 0.4 cm in the pancreatic head.  No obstructing stones or masses.  This likely reflects senescent change.    SPLEEN: Normal  size.    ADRENALS: No adrenal nodules.    KIDNEYS/URETERS: Kidneys enhance symmetrically.  Multifocal bilateral renal cortical scarring, similar to prior.  Right lower pole hypodensity, favored to represent a simple cyst.  Bilateral renovascular calcifications.  No stones or hydronephrosis.  Note is made of early excretion of contrast in several calices in bilateral kidneys.  No discrete solid renal masses.  Ureters are unremarkable.    BLADDER/PELVIC ORGANS: No bladder wall thickening.  Incidental note of the right ureteral jet, a normal finding.  Pessary device in the vagina.  Calcification in the left aspect of the uterine fundus may reflect a calcified fibroid or adjacent phleboliths.  No adnexal masses.    PERITONEUM / RETROPERITONEUM: No free air or fluid.    LYMPH NODES: No lymphadenopathy in the abdomen or pelvis, by size criteria.    GI TRACT: No evidence of bowel obstruction or inflammation.  Suspected prior appendectomy.  Moderate stool burden throughout the colon.    BONES: Degenerative changes of the spine.  Unchanged compression deformity of L1.  Similar advanced degenerative change in the right hip with deformity of the right femoral head and acetabular protrusion.  Additional degenerative of the knees, right greater than left, and ankles.  No acute fractures or aggressive osseous lesions.    VESSELS: Extensive calcified and noncalcified atherosclerosis.  Tortuosity of the abdominal aorta.  No abdominal aortic aneurysm.  Celiac trunk, SMA, bilateral renal arteries, and RADHA are patent without high-grade stenosis at their origins.  Accessory left hepatic artery off the left gastric artery.  Portal vein is patent.    CTA runoff:    Right:    Common iliac artery: Calcified plaque along its course without high-grade stenosis.    External iliac artery: Patent.    Common femoral artery: Bulky calcified and noncalcified plaque with severe stenosis distally.    Superficial femoral artery: Multifocal calcified  plaque resulting and intermittent mild-moderate stenosis.    Deep femoral artery: Scattered plaque resulting in mild stenosis.  Overall patent.    Popliteal artery: Patent.    Anterior tibial artery: Multifocal plaque without high-grade stenosis.  Patent through the ankle.    Tibial-peroneal trunk: Patent.    Posterior tibial artery: Multifocal plaque without high-grade stenosis.  Patent through the ankle.    Peroneal artery: Attenuated flow distally may reflect occlusion or slow flow.    Left:    Common iliac artery: Predominantly noncalcified plaque along its course results in severe stenosis with questionable intermittent occlusion.    External iliac artery: Multifocal plaque with scattered areas of mild stenosis.    Common femoral artery: Multifocal plaque with areas of scattered mild-moderate stenosis.    Superficial femoral artery: Multifocal plaque results in scattered areas of moderate to severe stenosis.    Deep femoral artery: Multifocal plaque results in scattered areas of moderate to severe stenosis.    Popliteal artery: Multifocal plaque with areas of mild-moderate stenosis.    Anterior tibial artery: Slow flow with multifocal stenoses.  Patent through the ankle on delayed phase.    Tibial-peroneal trunk: Multifocal plaque with mild stenoses.    Posterior tibial artery: Multifocal plaque without high-grade stenosis.  Patent through the ankle.    Peroneal artery: Attenuated flow distally may reflect occlusion or slow flow.    Poor runoff into the left foot, even on delayed images.      Impression    1. Extensive calcified and noncalcified atherosclerotic plaque results in peripheral vascular disease as detailed above.  There is at least two-vessel runoff to bilateral feet via the anterior and posterior tibial arteries.  Attenuated flow within the peroneal artery bilaterally may reflect slow flow or distal occlusion.  2. Additional findings as above.    Electronically signed by resident: Lex  "Rajat  Date:    06/17/2025  Time:    00:59    Electronically signed by: Jamie Myles  Date:    06/17/2025  Time:    06:41   CT Chest Without Contrast    Narrative    EXAMINATION:  CT CHEST WITHOUT CONTRAST    CLINICAL HISTORY:  "Pneumonia, unresolved;"    COMPARISON:  Frontal view of the chest dated 06/17/2025.    PET-CT dated 04/29/2025.    TECHNIQUE:  Volumetric data acquisition of the chest from the lung apices to the adrenals was obtained without intravenous contrast. Sagittal and coronal multiplanar reconstructions were performed. Lack of IV contrast material limits the assessment of mediastinal and abdominal structures.    FINDINGS:  Lungs and large airways: Known right perihilar mass with tiny calcifications is larger than in the previous exam measuring approximately 3.8 x 2.7 cm, prior 2.8 x 2.2 cm (series 2, image 53) with complete atelectasis of the right upper lobe in this study. New/more conspicuous solid noncalcified 3 mm in the left upper lobe (series 302, image 180).  Tiny completely calcified nodules in the left lower lobe.Minimal bandlike opacities in the right lower lobe may represent atelectasis.    Pleura: Moderate bilateral pleural effusions, right greater than left.  No evidence of pneumothorax.  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  Accessory left minor fissure.    Heart and pericardium: Cardiac silhouette is within normal limits with no evidence of pericardial effusion    Mediastinum and celena: Lymphadenopathy in the station 4R is mildly enlarged respect to the prior study measuring 25 mm, prior 23 mm (series 2, image 54).    Chest wall and lower neck: Well-known soft tissue mass in the right shoulder is partially included.  The lymphadenopathy in the right supraclavicular region measuring 14 x 15 mm, prior 14 x 14 mm, is again noted.  Lymphadenopathy measuring 33 x 21 mm, prior 34 x 18 mm in the right axillary region is " again noted (series 2 image 39.  The thyroid gland is grossly unchanged.    Vessels: Left-sided aortic arch.  Aorta is normal in caliber.  Moderate to severe aortic, supraaortic and coronary calcifications.  Main pulmonary artery is normal in size.    Bones: Degenerative changes in the spine partial collapse of L1 vertebral body.    Upper abdomen: Visualized liver, spleen and pancreas are normal in size with no evidence of focal lesion.  Adrenal glands are not enlarged.  The small hyperdensities versus minimal calcification in the right upper renal pole.  Tiny nonobstructing right renal stone.  Cyst measuring 12 x 12 mm right lower renal pole.      Impression    1. Worsening of the known right perihilar mass with tiny calcifications now measuring 3.8 x 2.7 cm, prior 2.8 x 2.2 cm.  Complete atelectasis of the right upper lobe in the present study.  2. Worsening of the lymphadenopathy in station 4R measuring 25 mm, prior 23 mm.  3. Lymphadenopathy in the right or paraclavicular region and in the right axillary region again noted.  4. Well-known soft tissue mass in the right shoulder is partially included.  5. New/more conspicuous 3 mm solid noncalcified nodule in the left upper lobe.  6. Moderate bilateral pleural effusions, right greater than left.  7. Additional findings.  Please see the above discussion.      Electronically signed by: Edwardo Cox  Date:    06/19/2025  Time:    09:48   Results for orders placed or performed during the hospital encounter of 06/03/25 (from the past 2160 hours)   CT Abdomen Pelvis With IV Contrast NO Oral Contrast    Narrative    EXAMINATION:  CT ABDOMEN PELVIS WITH IV CONTRAST; CTA CHEST NON CORONARY (PE STUDIES)    CLINICAL HISTORY:  Diarrhea;Abdominal pain, acute, nonlocalized;Abdo pain, diarrhea, recently on cipro.;; Pulmonary embolism (PE) suspected, high prob;SOB, intermittent hypoxia, hx of lung cancer with mets and mostly staying in bed;    TECHNIQUE:  Low dose axial  images, sagittal and coronal reformations were obtained from the thoracic inlet to the pubic symphysis following the IV administration of 75 mL of Omnipaque 350 .  Oral contrast was not given. CTA chest obtained utilizing PE protocol with MIP reformats.  CT abdomen pelvis obtained in the portal venous phase as a separate acquisition.    COMPARISON:  CTA, 06/20/2024.  PET-CT, 04/29/2025.    FINDINGS:  Evaluation is limited by extensive streak artifact due to the patient's arms overlying the field of view.  Exam quality also limited by motion.    CTA CHEST:    Examination of the soft tissue and vascular structures at the base of the neck is negative for acute finding.  There is a partially imaged suspected soft tissue mass in the right upper shoulder region measuring roughly 10 cm in size (axial image 1).  Enlarged right axillary lymph nodes measuring up to 1.8 cm in short axis (axial image 166).    Thoracic aorta is negative for dissection and demonstrates atherosclerosis.  No significant aneurysm.    No large or central pulmonary embolus.  No obvious acute pulmonary embolus to the proximal segmental level, allowing for motion and artifact limitations related to the patient's arms overlying the field of view.    Trachea is patent.  There is occlusion of the right upper lobe bronchus.  Retained secretions in the lower lobe airways.    Complete atelectasis of the right upper lobe, new from prior PET-CT.  Interstitial and airspace opacities in both lungs.  Moderate to large right and small left pleural effusions.  Left pleural effusion is partially loculated, likely malignant effusion.    Heart is at the upper limit of normal in size.  Coronary artery calcifications.  Minimal pericardial fluid.  No abnormal bowing of the interventricular septum.    Mildly enlarged mediastinal and hilar lymph nodes as seen on prior PET-CT.    CT ABDOMEN PELVIS:    Abdomen:    Liver is similar and negative for acute finding.  Few  hypodensities in the liver which are too small to definitively characterize.  Suggest attention on follow-up.  Focal fat infiltration or liver lesion in the anterior aspect of the liver (series 3, image 49).  Gallbladder is unremarkable.  No intrahepatic biliary ductal dilatation.    Spleen, adrenals, and pancreas are unremarkable.    The kidneys are symmetric.  No hydronephrosis. No asymmetric perinephric fat stranding.  Right renal stone versus vascular calcification.    No bowel obstruction.  Colonic diverticulosis.    No pneumoperitoneum or organized fluid collection.  Mild mesenteric edema.    No bulky lymphadenopathy.    Abdominal aorta is normal in caliber with advanced calcific atherosclerosis.  There is relatively decreased opacification of the left common iliac artery for which thrombus or chronic occlusion is difficult to exclude without prior contrast enhanced imaging for comparison.  Significant iliofemoral atherosclerosis.  External iliac and common femoral arteries are grossly patent.    Portal, splenic, and superior mesenteric veins are patent.  No portal venous gas.  There is non opacification of the iliofemoral veins for which thrombosis is difficult to exclude, though this could be related to phase of contrast.    Pelvis:    Urinary bladder is decompressed and not well evaluated.  Liquid stool in the rectum.  Presumed pessary device in the pelvis.  No significant pelvic free fluid.    Bones and soft tissues:    Few scattered questionable aggressive osseous lesions involving multiple ribs with questionable small lucent lesions in the spine.  Unclear if findings are related to metastatic disease or bony demineralization.  There is height loss of the L1 vertebral body as seen on prior CT dated 05/17/2025. Consider attention on follow-up PET-CT and correlation with symptoms.  There are advanced degenerative changes in the right hip with deformity of the right femoral head as seen previously.   Right-sided acetabular protrusio.      Impression    New right upper lobe atelectasis, presumably related to occlusion of the right upper lobe bronchus and progression of malignancy.  Consider bronchoscopy follow-up if it would alter management.    New large right and moderate left pleural effusions, partially loculated.  Malignant pleural fluid is favored.  Interstitial and airspace opacities in both lungs, potentially exaggerated by passive atelectasis.    Large mass in the right posterior shoulder presumably metastatic disease with questionable subtle osseous metastatic disease.    Enlarged mediastinal and right axillary lymph nodes suggestive of metastatic disease.    Indeterminate liver lesions.    No evidence of acute pulmonary embolus.    Asymmetric decreased opacification of the left common iliac artery, with reconstitution at the level of the left external iliac artery.  Findings are limited by suboptimal bolus timing.  Chronic or recent occlusion would be included in the differential without prior contrast enhanced imaging available for comparison.    Multiple additional findings discussed in the body of the report.    This report was flagged in Epic as abnormal.      Electronically signed by: Deandre Becerra MD  Date:    06/03/2025  Time:    14:33   CTA Chest Non-Coronary (PE Studies)    Narrative    EXAMINATION:  CT ABDOMEN PELVIS WITH IV CONTRAST; CTA CHEST NON CORONARY (PE STUDIES)    CLINICAL HISTORY:  Diarrhea;Abdominal pain, acute, nonlocalized;Abdo pain, diarrhea, recently on cipro.;; Pulmonary embolism (PE) suspected, high prob;SOB, intermittent hypoxia, hx of lung cancer with mets and mostly staying in bed;    TECHNIQUE:  Low dose axial images, sagittal and coronal reformations were obtained from the thoracic inlet to the pubic symphysis following the IV administration of 75 mL of Omnipaque 350 .  Oral contrast was not given. CTA chest obtained utilizing PE protocol with MIP reformats.  CT  abdomen pelvis obtained in the portal venous phase as a separate acquisition.    COMPARISON:  CTA, 06/20/2024.  PET-CT, 04/29/2025.    FINDINGS:  Evaluation is limited by extensive streak artifact due to the patient's arms overlying the field of view.  Exam quality also limited by motion.    CTA CHEST:    Examination of the soft tissue and vascular structures at the base of the neck is negative for acute finding.  There is a partially imaged suspected soft tissue mass in the right upper shoulder region measuring roughly 10 cm in size (axial image 1).  Enlarged right axillary lymph nodes measuring up to 1.8 cm in short axis (axial image 166).    Thoracic aorta is negative for dissection and demonstrates atherosclerosis.  No significant aneurysm.    No large or central pulmonary embolus.  No obvious acute pulmonary embolus to the proximal segmental level, allowing for motion and artifact limitations related to the patient's arms overlying the field of view.    Trachea is patent.  There is occlusion of the right upper lobe bronchus.  Retained secretions in the lower lobe airways.    Complete atelectasis of the right upper lobe, new from prior PET-CT.  Interstitial and airspace opacities in both lungs.  Moderate to large right and small left pleural effusions.  Left pleural effusion is partially loculated, likely malignant effusion.    Heart is at the upper limit of normal in size.  Coronary artery calcifications.  Minimal pericardial fluid.  No abnormal bowing of the interventricular septum.    Mildly enlarged mediastinal and hilar lymph nodes as seen on prior PET-CT.    CT ABDOMEN PELVIS:    Abdomen:    Liver is similar and negative for acute finding.  Few hypodensities in the liver which are too small to definitively characterize.  Suggest attention on follow-up.  Focal fat infiltration or liver lesion in the anterior aspect of the liver (series 3, image 49).  Gallbladder is unremarkable.  No intrahepatic biliary  ductal dilatation.    Spleen, adrenals, and pancreas are unremarkable.    The kidneys are symmetric.  No hydronephrosis. No asymmetric perinephric fat stranding.  Right renal stone versus vascular calcification.    No bowel obstruction.  Colonic diverticulosis.    No pneumoperitoneum or organized fluid collection.  Mild mesenteric edema.    No bulky lymphadenopathy.    Abdominal aorta is normal in caliber with advanced calcific atherosclerosis.  There is relatively decreased opacification of the left common iliac artery for which thrombus or chronic occlusion is difficult to exclude without prior contrast enhanced imaging for comparison.  Significant iliofemoral atherosclerosis.  External iliac and common femoral arteries are grossly patent.    Portal, splenic, and superior mesenteric veins are patent.  No portal venous gas.  There is non opacification of the iliofemoral veins for which thrombosis is difficult to exclude, though this could be related to phase of contrast.    Pelvis:    Urinary bladder is decompressed and not well evaluated.  Liquid stool in the rectum.  Presumed pessary device in the pelvis.  No significant pelvic free fluid.    Bones and soft tissues:    Few scattered questionable aggressive osseous lesions involving multiple ribs with questionable small lucent lesions in the spine.  Unclear if findings are related to metastatic disease or bony demineralization.  There is height loss of the L1 vertebral body as seen on prior CT dated 05/17/2025. Consider attention on follow-up PET-CT and correlation with symptoms.  There are advanced degenerative changes in the right hip with deformity of the right femoral head as seen previously.  Right-sided acetabular protrusio.      Impression    New right upper lobe atelectasis, presumably related to occlusion of the right upper lobe bronchus and progression of malignancy.  Consider bronchoscopy follow-up if it would alter management.    New large right and  moderate left pleural effusions, partially loculated.  Malignant pleural fluid is favored.  Interstitial and airspace opacities in both lungs, potentially exaggerated by passive atelectasis.    Large mass in the right posterior shoulder presumably metastatic disease with questionable subtle osseous metastatic disease.    Enlarged mediastinal and right axillary lymph nodes suggestive of metastatic disease.    Indeterminate liver lesions.    No evidence of acute pulmonary embolus.    Asymmetric decreased opacification of the left common iliac artery, with reconstitution at the level of the left external iliac artery.  Findings are limited by suboptimal bolus timing.  Chronic or recent occlusion would be included in the differential without prior contrast enhanced imaging available for comparison.    Multiple additional findings discussed in the body of the report.    This report was flagged in Epic as abnormal.      Electronically signed by: Deandre Becerra MD  Date:    06/03/2025  Time:    14:33   Results for orders placed or performed during the hospital encounter of 05/17/25 (from the past 2160 hours)   CT Lumbar Spine Without Contrast    Narrative    EXAMINATION:  CT LUMBAR SPINE WITHOUT CONTRAST    CLINICAL HISTORY:  Low back pain, trauma;    TECHNIQUE:  Low-dose axial, sagittal and coronal reformations are obtained through the lumbar spine.  Contrast was not administered.    COMPARISON:  Prior PET-CT dated 04/29/2025.    FINDINGS:  There is diffuse osteopenia.  There is an acute compression fracture involving the inferior endplate of L1 with approximately 30% height loss and no retropulsion of fracture fragments.    No additional fractures are identified.  AP alignment is satisfactory.  Degenerative changes are detailed by level as follows:    T12-L1: There is no neural foraminal or central canal stenosis    L1-2: There is facet arthropathy without canal or foraminal narrowing    L2-3: There is a mild  broad-based disc bulge facet arthropathy and probable mild central canal stenosis without definite neural foraminal narrowing    L3-4: There is a posterior broad-based disc bulge facet arthropathy and ligamentum flavum thickening resulting in mild central canal stenosis and mild bilateral neural foraminal narrowing    L4-5: There is a posterior broad-based disc bulge facet arthropathy and ligamentum flavum thickening resulting in moderate central canal stenosis and mild bilateral neural foraminal narrowing    L5-S1: There is a posterior broad-based disc bulge facet arthropathy and ligamentum flavum thickening resulting in mild central canal stenosis and bilateral neural foraminal narrowing.    There is aortoiliac atherosclerotic calcification.  Vascular calcifications are noted at the right renal hilum.      Impression    Acute L1 compression fracture with approximately 30% height loss.  No retropulsion of fracture fragments.    Osteopenia and degenerative change of the lumbar spine detailed above.      Electronically signed by: Katarina Grace MD  Date:    05/17/2025  Time:    15:43         I have personally reviewed the above labs and imaging.    Current Medications:     Infusions:   insulin aspart U-100  1 Units/hr Subcutaneous Continuous 0.01 mL/hr at 06/17/25 1515 1 Units/hr at 06/17/25 1515        Scheduled:   aspirin  81 mg Oral Daily    atorvastatin  10 mg Oral Daily    brimonidine 0.2%  1 drop Both Eyes TID    clopidogreL  75 mg Oral Daily    dorzolamide-timolol 2-0.5%  1 drop Both Eyes BID    latanoprost  1 drop Both Eyes Daily    predniSONE  2.5 mg Oral Daily        PRN:    Current Facility-Administered Medications:     acetaminophen, 1,000 mg, Oral, Q8H PRN    aluminum & magnesium hydroxide-simethicone, 30 mL, Oral, Q6H PRN    dextrose 50%, 12.5 g, Intravenous, PRN    dextrose 50%, 12.5 g, Intravenous, PRN    dextrose 50%, 25 g, Intravenous, PRN    dextrose 50%, 25 g, Intravenous, PRN    glucagon  (human recombinant), 1 mg, Intramuscular, PRN    glucagon (human recombinant), 1 mg, Intramuscular, PRN    glucose, 16 g, Oral, PRN    glucose, 16 g, Oral, PRN    glucose, 24 g, Oral, PRN    glucose, 24 g, Oral, PRN    insulin aspart U-100, 0-10 Units, Subcutaneous, PRN    melatonin, 6 mg, Oral, Nightly PRN    naloxone, 0.02 mg, Intravenous, PRN    nitroGLYCERIN, 0.4 mg, Sublingual, Q5 Min PRN    oxyCODONE, 1.25 mg, Oral, Q6H PRN    sodium chloride 0.9%, 10 mL, Intravenous, Q12H PRN    Flushing PICC/Midline Protocol, , , Until Discontinued **AND** sodium chloride 0.9%, 10 mL, Intravenous, Q12H PRN     Assessment:     Radha Feng is a 84 y.o. 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 06/05/2025    Encounter for palliative care 06/04/2025    COVID-19 06/03/2025    Acute hypoxemic respiratory failure 06/03/2025    BMI < 18.5 06/03/2025    Atelectasis 06/03/2025    Pleural effusion 06/03/2025    Diarrhea 06/03/2025    Elevated troponin 06/03/2025    ACP (advance care planning) 06/03/2025    Toe pain 06/03/2025    Anemia 05/18/2025    UTI (urinary tract infection) 05/17/2025    Leukocytosis 05/17/2025    Compression fracture of L1 lumbar vertebra 05/17/2025    Primary hypertension 05/17/2025    Abdominal pain 05/17/2025    Primary open angle glaucoma (POAG) of both eyes 05/17/2025    Shoulder lesion, right 05/17/2025    Adenocarcinoma of right lung 07/25/2024    Lung mass 06/20/2024    Insulin pump in place 01/29/2024    Adrenal cortical steroids causing adverse effect in therapeutic use 07/24/2023    Pelvic floor weakness in female 06/16/2023    Uterovaginal prolapse, complete 04/12/2023    Urinary urgency 04/12/2023    Age-related osteoporosis without  current pathological fracture 03/07/2023    Vitamin D deficiency 03/07/2023    Mixed urge and stress incontinence 01/09/2023    Leg weakness, bilateral 11/30/2022    Decreased mobility and endurance 11/30/2022    Aortic atherosclerosis 11/14/2022    Centrilobular emphysema 11/14/2022    Coronary atherosclerosis due to calcified coronary lesion 11/14/2022    Cirrhosis of liver with ascites 11/14/2022    Hypokalemia 11/07/2022    Chondromalacia, knee, 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    Thrombocytopenia 10/26/2022    Autoimmune hepatitis 09/28/2022    Oral phase dysphagia 09/24/2022    Acute liver failure without hepatic coma 09/21/2022    Hypertensive heart disease with diastolic heart failure 09/21/2022    Type 2 diabetes mellitus with circulatory disorder, with long-term current use of insulin 09/21/2022    Idiopathic hypotension 09/20/2022    Lethargy 09/20/2022    SOB (shortness of breath) 09/20/2022    Leg swelling 09/20/2022    PVD (peripheral vascular disease)     Nuclear sclerotic cataract of left eye 12/21/2021        Plan:     Stage IIIB adenocarcinoma of right lung  Bilateral pleural effusions  CT chest with improvement in bilateral pleural effusions after she was diuresed during last hospital stay. Perihilar mass increased in size. Pulmonary and palliative saw patient at last admission. Thoracentesis was discussed and patient and daughter did not wish to pursue. With the improvement in her pleural effusions after diuresis, would not offer thoracentesis. Additionally, based on discussion with patient and daughter they would not want to pursue this.       Recommendations:  - No indication for thoracentesis as patient is not symptomatic, it would not , and patient and family are not interested in procedure  - If she develops shortness of breath, would consider diuresis as she had good response to this at last visit as evidenced by the improvement in the size of her pleural effusions   - She should continue  to follow up with palliative care and oncology once outpatient     Thank you for allowing us to participate in the care of this patient. Pulmonary will sign off. Please contact me if you have any questions regarding this consult.    Discussed with Dr. Brand.     Taylor Garcia MD  [facility] Pul[facility] Medicine Fellow         [1]   Social History  Tobacco Use    Smoking status: Former    Smokeless tobacco: Never   Substance Use Topics    Alcohol use: Not Currently    Drug use: Never

## 2025-06-19 NOTE — PLAN OF CARE
Problem: Adult Inpatient Plan of Care  Goal: Plan of Care Review  Outcome: Progressing  Goal: Patient-Specific Goal (Individualized)  Outcome: Progressing  Goal: Absence of Hospital-Acquired Illness or Injury  Outcome: Progressing  Goal: Optimal Comfort and Wellbeing  Outcome: Progressing  Goal: Readiness for Transition of Care  Outcome: Progressing     Problem: Diabetes Comorbidity  Goal: Blood Glucose Level Within Targeted Range  Outcome: Progressing     Problem: Wound  Goal: Optimal Coping  Outcome: Progressing  Goal: Optimal Functional Ability  Outcome: Progressing  Goal: Absence of Infection Signs and Symptoms  Outcome: Progressing  Goal: Improved Oral Intake  Outcome: Progressing  Goal: Optimal Pain Control and Function  Outcome: Progressing  Goal: Skin Health and Integrity  Outcome: Progressing  Goal: Optimal Wound Healing  Outcome: Progressing     Problem: Infection  Goal: Absence of Infection Signs and Symptoms  Outcome: Progressing     Problem: Skin Injury Risk Increased  Goal: Skin Health and Integrity  Outcome: Progressing     Problem: Coping Ineffective  Goal: Effective Coping  Outcome: Progressing

## 2025-06-19 NOTE — PROGRESS NOTES
[facility] Medicine  Progress Note    Patient Name: Radha Feng  MRN: [id number]  Patient Class: IP- Inpatient   Admission Date: 6/16/2025  Length of Stay: 3 days  Attending Physician: Dar Hernandez MD  Primary Care Provider: Leticia Lim MD        Subjective     Principal Problem:Toe pain        HPI:  Ms. 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 She reports questionable subjective fevers and chills. No N/V/D or any other complaints. She states the foot pain got increasingly worse today prompting her to present for evaluation.     In the ED, patient afebrile, SBP 100s, HR 70s, satting well on home 2LNC. CBC with WBC 20 (up from prior DC), Hgb around BL at 7.9. CMP showing stable lytes and renal function. R foot XR without acute fx. Vasc surg consulted, low suspicion for acute limb at this time, recommending CTA runoff and AC. Pt subsequently admitted to  for continued workup and management.     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 vascular surgery consulted.  CTA runoff -. Extensive calcified and noncalcified atherosclerotic plaque results in peripheral vascular disease . There is at least  two-vessel runoff to bilateral feet via the anterior and posterior tibial arteries. Attenuated flow within the peroneal artery bilaterally may reflect slow flow or distal occlusion.   started on ASA, start heparin gtt. X ray foot - No acute displaced fracture-dislocation identified. vascular surgery f/u -  possible embolization from Right femoral plaque - Recommend  ASA/plavix/statin.  heparin  discontinued. needs short term vascular surgeyr  f/u, if not improving/persistent symptoms then could consider endarterectomy.Leucocytosis trended down. Hb 7.2 on heparin drip. monitor. UA +, BC x2 pending. on  Zosyn. Endocrine consulted for DM2 on omnipod insulin pump and dexcom. discussed with daughter. denies urinary symptoms. Patient has anterior and posterior vaginal wall prolapse -follows with urogynecology and uses pessary. pessary noted in vagiana per CT scan. Gynecology consulted for UTI? / pessary in place/ Leucocytosis   6/18 BC x 2 NGTD. UC NGTD. ID consulted for right shoulder nonhealing ulcer. wound care evaluation . Lipitor discontinued as daughter concerned about  h/o autoimmune hepatitis on prednisone. discussed with hepatology - OK to restart lipitor  need to monitor liver enzymes every 4-8 weeks  6/19 s/p ID eval - Differential for her ongoing leukocytosis includes tissue ischemia due to peripheral artery disease vs post-obstructive pneumonia vs ongoing use of prednisone. Okay to hold off on further antibiotics for now. CT chest WO contrast  - Worsening of the known right perihilar mass with tiny calcifications now measuring 3.8 x 2.7 cm, prior 2.8 x 2.2 cm.  Complete atelectasis of the right upper lobe in the present study. Lymphadenopathy in the right or paraclavicular region and in the right axillary region again noted. Well-known soft tissue mass in the right shoulder is partially included.New/more conspicuous 3 mm solid noncalcified nodule in the left upper lobe. Moderate bilateral pleural effusions,  right greater than left. Pulmonology eval -- No indication for thoracentesis as patient is not symptomatic, If she develops shortness of breath, would consider diuresis. Gyn eval - Do not recommend removal of pessary at this time. UC NGTD. Hb trended down to 7.1 monitor. Palliative care consulted for right LE pain         Review of Systems:   Pain scale:   Constitutional:  fever,  chills, headache, vision loss, hearing loss, weight loss, Generalized weakness, falls, loss of smell, loss of taste, poor appetite,  sore throat  Respiratory: cough, shortness of breath.   Cardiovascular: chest pain, dizziness, palpitations, orthopnea, swelling of feet, syncope  Gastrointestinal: nausea, vomiting, abdominal pain, diarrhea, black stool,  blood in stool, change in bowel habits, constipation  Genitourinary: hematuria, dysuria, urgency, frequency  Integument/Breast: rash,  pruritis. color change - toes   Hematologic/Lymphatic: easy bruising, lymphadenopathy  Musculoskeletal: arthralgias , myalgias, back pain, neck pain, knee pain  Neurological: confusion, seizures, tremors, slurred speech, numbness   Behavioral/Psych:  depression, anxiety, auditory or visual hallucinations     OBJECTIVE:     Physical Exam:  Body mass index is 15.08 kg/m².    Constitutional: Appears thin built    Head: Normocephalic and atraumatic.   Neck: Normal range of motion. Neck supple.   Cardiovascular: Normal heart rate.  Regular heart rhythm.  Pulmonary/Chest: Effort normal.   Abdominal: No distension.  No tenderness  Musculoskeletal: Normal range of motion. No edema. right  1st, 2nd, 3rd toe discoloration. right shoulder dressing  Neurological: Alert and oriented to person, place, and time.   Skin: Skin is warm and dry.   Psychiatric: Normal mood and affect. Behavior is normal.                  Vital Signs  Temp: 97.5 °F (36.4 °C) (06/19/25 1158)  Pulse: 80 (06/19/25 1158)  Resp: 18 (06/19/25 1158)  BP: 138/68 (06/19/25 1158)  SpO2: 100 % (06/19/25  "1158)     24 Hour VS Range    Temp:  [97.5 °F (36.4 °C)-98.6 °F (37 °C)]   Pulse:  [70-82]   Resp:  [16-19]   BP: (118-138)/(59-80)   SpO2:  [99 %-100 %]     Intake/Output Summary (Last 24 hours) at 6/19/2025 1315  Last data filed at 6/19/2025 0513  Gross per 24 hour   Intake 240 ml   Output 450 ml   Net -210 ml         I/O This Shift:  No intake/output data recorded.    Wt Readings from Last 3 Encounters:   06/17/25 41.1 kg (90 lb 9.7 oz)   06/04/25 49 kg (108 lb 0.4 oz)   05/18/25 49 kg (108 lb)       I have personally reviewed the vitals and recorded Intake/Output     Laboratory/Diagnostic Data:    CBC/Anemia Labs: Coags:    Recent Labs   Lab 06/17/25  1551 06/18/25  0808 06/19/25  0559   WBC 18.32* 19.55* 16.78*   HGB 8.0* 7.5* 7.1*   HCT 26.9* 25.8* 23.5*   * 149* 149*   MCV 93 94 91   RDW 19.9* 19.9* 19.6*    Recent Labs   Lab 06/16/25  1447 06/17/25  0430 06/17/25  1121   INR 1.2  --   --    APTT 31.5 46.6* 44.5*        Chemistries: ABG:   Recent Labs   Lab 06/17/25  0430 06/18/25  0808 06/19/25  0559    138 139   K 3.7 3.8 3.6    108 109   CO2 21* 21* 22*   BUN 11 12 11   CREATININE 0.6 0.5 0.5   CALCIUM 8.3* 8.7 8.4*   PROT 5.6* 6.2 5.7*   BILITOT 0.4 0.3 0.4   ALKPHOS 98 101 92   ALT 12 11 10   AST 21 19 17   MG 1.9 2.0 1.9    No results for input(s): "PH", "PCO2", "PO2", "HCO3", "POCSATURATED", "BE" in the last 168 hours.     POCT Glucose: HbA1c:    Recent Labs   Lab 06/17/25  1131 06/17/25  1701   POCTGLUCOSE 155* 116*    Hemoglobin A1C   Date Value Ref Range Status   06/21/2024 6.0 (H) 4.0 - 5.6 % Final     Comment:     ADA Screening Guidelines:  5.7-6.4%  Consistent with prediabetes  >or=6.5%  Consistent with diabetes    High levels of fetal hemoglobin interfere with the HbA1C  assay. Heterozygous hemoglobin variants (HbS, HgC, etc)do  not significantly interfere with this assay.   However, presence of multiple variants may affect accuracy.     01/22/2024 6.2 (H) 4.0 - 5.6 % Final    " " Comment:     ADA Screening Guidelines:  5.7-6.4%  Consistent with prediabetes  >or=6.5%  Consistent with diabetes    High levels of fetal hemoglobin interfere with the HbA1C  assay. Heterozygous hemoglobin variants (HbS, HgC, etc)do  not significantly interfere with this assay.   However, presence of multiple variants may affect accuracy.     07/24/2023 6.2 (H) 4.0 - 5.6 % Final     Comment:     ADA Screening Guidelines:  5.7-6.4%  Consistent with prediabetes  >or=6.5%  Consistent with diabetes    High levels of fetal hemoglobin interfere with the HbA1C  assay. Heterozygous hemoglobin variants (HbS, HgC, etc)do  not significantly interfere with this assay.   However, presence of multiple variants may affect accuracy.       Hemoglobin A1c   Date Value Ref Range Status   06/17/2025 5.4 4.0 - 5.6 % Final     Comment:     ADA Screening Guidelines:  5.7-6.4%  Consistent with prediabetes  >=6.5%  Consistent with diabetes    High levels of fetal hemoglobin interfere with the HbA1C  assay. Heterozygous hemoglobin variants (HbS, HgC, etc)do  not significantly interfere with this assay.   However, presence of multiple variants may affect accuracy.        Cardiac Enzymes: Ejection Fractions:    No results for input(s): "CPK", "CPKMB", "MB", "TROPONINI" in the last 72 hours. EF   Date Value Ref Range Status   09/22/2022 65 % Final          No results for input(s): "COLORU", "APPEARANCEUA", "PHUR", "SPECGRAV", "PROTEINUA", "GLUCUA", "KETONESU", "BILIRUBINUA", "OCCULTUA", "NITRITE", "UROBILINOGEN", "LEUKOCYTESUR", "RBCUA", "WBCUA", "BACTERIA", "SQUAMEPITHEL", "HYALINECASTS" in the last 48 hours.    Invalid input(s): "WRIGHTSUR"      Procalcitonin (ng/mL)   Date Value   09/24/2022 0.61 (H)     Lactate (Lactic Acid) (mmol/L)   Date Value   10/23/2022 6.1 (HH)     BNP (pg/mL)   Date Value   06/03/2025 2,717 (H)   09/20/2022 66     CRP (mg/L)   Date Value   06/16/2025 334.8 (H)   09/23/2022 41.6 (H)     D-Dimer (mg/L FEU)   Date " Value   09/26/2022 12.81 (H)   09/25/2022 13.06 (H)   09/23/2022 12.76 (H)   09/20/2022 11.97 (H)     Ferritin (ng/mL)   Date Value   05/17/2025 741.0 (H)   04/17/2025 387.0 (H)   03/09/2023 193   09/23/2022 1,186 (H)     Lactate Dehydrogenase (U/L)   Date Value   05/17/2025 527 (H)     LD (U/L)   Date Value   03/09/2023 261 (H)   12/27/2022 337 (H)   09/26/2022 543 (H)   09/25/2022 461 (H)   09/23/2022 430 (H)     Troponin I (ng/mL)   Date Value   09/20/2022 <0.006     Results for orders placed or performed in visit on 07/24/23   Vitamin D    Collection Time: 07/24/23 11:20 AM   Result Value Ref Range    Vit D, 25-Hydroxy 29 (L) 30 - 96 ng/mL     POC Rapid COVID (no units)   Date Value   10/23/2022 Negative       Microbiology labs for the last week  Microbiology Results (last 7 days)       Procedure Component Value Units Date/Time    Blood culture x two cultures. Draw prior to antibiotics. [[id number]]  (Normal) Collected: 06/16/25 1616    Order Status: Completed Specimen: Blood from Peripheral, Antecubital, Right Updated: 06/18/25 1902     Blood Culture No Growth After 48 Hours    Blood culture x two cultures. Draw prior to antibiotics. [[id number]]  (Normal) Collected: 06/16/25 1616    Order Status: Completed Specimen: Blood from Peripheral, Forearm, Right Updated: 06/18/25 1902     Blood Culture No Growth After 48 Hours    Urine culture [[id number]] Collected: 06/16/25 2255    Order Status: Completed Specimen: Urine Updated: 06/18/25 1437     Urine Culture No Significant Growth    MRSA Screen by PCR [[id number]]     Order Status: Sent Specimen: Nasal Swab             Reviewed and noted in plan where applicable- Please see chart for full lab data.    Lines/Drains:       Peripheral IV - Single Lumen 06/16/25 1450 20 G Anterior;Left Forearm (Active)   Site Assessment Clean;Dry;Intact;No redness;No swelling 06/17/25 0400   Line Securement Device Secured with sutureless device 06/16/25 3109   Extremity Assessment  Distal to IV No abnormal discoloration;No redness;No swelling;No warmth 06/16/25 2359   Line Status No blood return;Flushed;Infusing 06/17/25 0400   Dressing Status Clean;Dry;Intact 06/17/25 0400   Dressing Intervention Integrity maintained 06/17/25 0400   Number of days: 0            Midline Catheter - Single Lumen 06/17/25 0150 Left basilic vein (medial side of arm) other (see comments) (Active)   Site Assessment Clean;Dry;Intact 06/17/25 0150   IV Device Securement catheter securement device 06/17/25 0150   Line Status Blood return noted;Flushed;Saline locked 06/17/25 0150   Extremity Circumference (cm) 23 cm 06/17/25 0150   Dressing Type CHG impregnated dressing/sponge;Central line dressing 06/17/25 0150   Dressing Status Clean;Dry;Intact 06/17/25 0150   Dressing Intervention First dressing 06/17/25 0150   Dressing Change Due 06/24/25 06/17/25 0150   Site Change Due 07/15/25 06/17/25 0150   Reason Not Rotated Not due 06/17/25 0150   Number of days: 0            Subcutaneous Infusion Pump 05/16/25 Abdomen (Comment) (Active)   Number of days: 32       Female External Urinary Catheter w/ Suction 06/16/25 1825 (Active)   Skin no redness;no breakdown 06/16/25 2359   Tolerance no signs/symptoms of discomfort 06/16/25 2359   Suction Continuous suction at 40 mmHg 06/16/25 2359   Date of last wick change 06/17/25 06/16/25 2359   Number of days: 0       Imaging  ECG Results              EKG 12-lead (Final result)        Collection Time Result Time QRS Duration OHS QTC Calculation    06/16/25 15:21:52 06/16/25 15:50:21 84 464                     Final result by Interface, Lab In [facility] (06/16/25 15:50:26)                   Narrative:    Test Reason : Z13.6,    Vent. Rate :  72 BPM     Atrial Rate :  72 BPM     P-R Int : 144 ms          QRS Dur :  84 ms      QT Int : 424 ms       P-R-T Axes :  33  -4 -80 degrees    QTcB Int : 464 ms    Normal sinus rhythm  Possible  Anteroseptal infarct (cited on or before 03-Jun-2025)  T  "wave abnormality, consider inferolateral ischemia  Abnormal ECG  When compared with ECG of 03-Jun-2025 11:34,  No significant change was found    Confirmed by Mohit Cardoza ([id number]) on 6/16/2025 3:50:17 PM    Referred By: AAAREFERRAL SELF           Confirmed By: Mohit Cardoza                                    Results for orders placed during the hospital encounter of 06/03/25    Echo    Interpretation Summary    Left Ventricle: The left ventricle is normal in size. Normal wall thickness. There is eccentric hypertrophy. Mild global hypokinesis present. There is mildly reduced systolic function with a visually estimated ejection fraction of 45 - 50%. Grade II diastolic dysfunction.    Right Ventricle: The right ventricle is normal in size Wall thickness is normal. Systolic function is normal.    Left Atrium: The left atrium is severely dilated    Mitral Valve: There is moderate regurgitation with an eccentrically anteromedial directed jet.    Tricuspid Valve: There is mild regurgitation.    Pulmonary Artery: The estimated pulmonary artery systolic pressure is 39 mmHg.    IVC/SVC: Normal venous pressure at 3 mmHg.    Pericardium: There is a small effusion adjacent to the left atrium. Left pleural effusion.      CT Chest Without Contrast  Narrative: EXAMINATION:  CT CHEST WITHOUT CONTRAST    CLINICAL HISTORY:  "Pneumonia, unresolved;"    COMPARISON:  Frontal view of the chest dated 06/17/2025.    PET-CT dated 04/29/2025.    TECHNIQUE:  Volumetric data acquisition of the chest from the lung apices to the adrenals was obtained without intravenous contrast. Sagittal and coronal multiplanar reconstructions were performed. Lack of IV contrast material limits the assessment of mediastinal and abdominal structures.    FINDINGS:  Lungs and large airways: Known right perihilar mass with tiny calcifications is larger than in the previous exam measuring approximately 3.8 x 2.7 cm, prior 2.8 x 2.2 cm (series 2, image 53) with complete " atelectasis of the right upper lobe in this study. New/more conspicuous solid noncalcified 3 mm in the left upper lobe (series 302, image 180).  Tiny completely calcified nodules in the left lower lobe.Minimal bandlike opacities in the right lower lobe may represent atelectasis.    Pleura: Moderate bilateral pleural effusions, right greater than left.  No evidence of pneumothorax.  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  Accessory left minor fissure.    Heart and pericardium: Cardiac silhouette is within normal limits with no evidence of pericardial effusion    Mediastinum and celena: Lymphadenopathy in the station 4R is mildly enlarged respect to the prior study measuring 25 mm, prior 23 mm (series 2, image 54).    Chest wall and lower neck: Well-known soft tissue mass in the right shoulder is partially included.  The lymphadenopathy in the right supraclavicular region measuring 14 x 15 mm, prior 14 x 14 mm, is again noted.  Lymphadenopathy measuring 33 x 21 mm, prior 34 x 18 mm in the right axillary region is again noted (series 2 image 39.  The thyroid gland is grossly unchanged.    Vessels: Left-sided aortic arch.  Aorta is normal in caliber.  Moderate to severe aortic, supraaortic and coronary calcifications.  Main pulmonary artery is normal in size.    Bones: Degenerative changes in the spine partial collapse of L1 vertebral body.    Upper abdomen: Visualized liver, spleen and pancreas are normal in size with no evidence of focal lesion.  Adrenal glands are not enlarged.  The small hyperdensities versus minimal calcification in the right upper renal pole.  Tiny nonobstructing right renal stone.  Cyst measuring 12 x 12 mm right lower renal pole.  Impression: 1. Worsening of the known right perihilar mass with tiny calcifications now measuring 3.8 x 2.7 cm, prior 2.8 x 2.2 cm.  Complete atelectasis of the right upper lobe in the present study.  2.  Worsening of the lymphadenopathy in station 4R measuring 25 mm, prior 23 mm.  3. Lymphadenopathy in the right or paraclavicular region and in the right axillary region again noted.  4. Well-known soft tissue mass in the right shoulder is partially included.  5. New/more conspicuous 3 mm solid noncalcified nodule in the left upper lobe.  6. Moderate bilateral pleural effusions, right greater than left.  7. Additional findings.  Please see the above discussion.    Electronically signed by: Edwardo Cox  Date:    06/19/2025  Time:    09:48      Labs, Imaging, EKG and Diagnostic results from 6/19/2025 were reviewed.    Medications:  Medication list was reviewed and changes noted under Assessment/Plan.  Medications Ordered Prior to Encounter[1]  Scheduled Medications:  Current Facility-Administered Medications   Medication Dose Route Frequency    aspirin  81 mg Oral Daily    atorvastatin  10 mg Oral Daily    brimonidine 0.2%  1 drop Both Eyes TID    clopidogreL  75 mg Oral Daily    dorzolamide-timolol 2-0.5%  1 drop Both Eyes BID    latanoprost  1 drop Both Eyes Daily    predniSONE  2.5 mg Oral Daily     PRN:   Current Facility-Administered Medications:     acetaminophen, 1,000 mg, Oral, Q8H PRN    aluminum & magnesium hydroxide-simethicone, 30 mL, Oral, Q6H PRN    dextrose 50%, 12.5 g, Intravenous, PRN    dextrose 50%, 12.5 g, Intravenous, PRN    dextrose 50%, 25 g, Intravenous, PRN    dextrose 50%, 25 g, Intravenous, PRN    glucagon (human recombinant), 1 mg, Intramuscular, PRN    glucagon (human recombinant), 1 mg, Intramuscular, PRN    glucose, 16 g, Oral, PRN    glucose, 16 g, Oral, PRN    glucose, 24 g, Oral, PRN    glucose, 24 g, Oral, PRN    insulin aspart U-100, 0-10 Units, Subcutaneous, PRN    melatonin, 6 mg, Oral, Nightly PRN    naloxone, 0.02 mg, Intravenous, PRN    nitroGLYCERIN, 0.4 mg, Sublingual, Q5 Min PRN    oxyCODONE, 1.25 mg, Oral, Q6H PRN    sodium chloride 0.9%, 10 mL, Intravenous, Q12H PRN     Flushing PICC/Midline Protocol, , , Until Discontinued **AND** sodium chloride 0.9%, 10 mL, Intravenous, Q12H PRN  Infusions:    insulin aspart U-100  1 Units/hr Subcutaneous Continuous 0.01 mL/hr at 06/17/25 1515 1 Units/hr at 06/17/25 1515       Estimated Creatinine Clearance: 55.3 mL/min (based on SCr of 0.5 mg/dL).             Assessment & Plan  Toe pain  Aortic atherosclerosis  PVD (peripheral vascular disease)  85 y/o F presenting for evaluation of several days b/l toe pain R>L as well as R toe discoloration. C/f claudication/ischemia given known PVD, though unknown etiology of acute worsening of her sxs. Vascular surgery consulted in the ED, no acute intervention at this time, though will f/u noninvasive studies.     -- CTA runoff ordered  -- ASA 81mg qd  -- heparin drip ordered  -- vascular surgery following, appreciate recs  -- multimodal pain regimen    6/17  PVD, HFpEF, T2DM on insulin managed with CGM and insulin pump, and autoimmune hepatitis on chronic prednisone, presenting after recent admission for R toe pain, now with discoloration over the last 2-3 days. vascular surgery consulted.  CTA runoff . started on ASA and heparin gtt.  85 y/o F presenting for evaluation of several days b/l toe pain R>L as well as R toe discoloration. C/f claudication/ischemia given known PVD, though unknown etiology of acute worsening of her sxs. Vascular surgery consulted in the ED, no acute intervention at this time, though will f/u noninvasive studies.   -- CTA runoff ordered  -- ASA 81mg qd  -- heparin drip ordered  -- vascular surgery following, appreciate recs  -- multimodal pain regimen    85 y/o F presenting for evaluation of several days b/l toe pain R>L as well as R toe discoloration. C/f claudication/ischemia given known PVD, though unknown etiology of acute worsening of her sxs. Vascular surgery consulted in the ED, no acute intervention at this time, though will f/u noninvasive studies.   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 vascular surgery consulted.  CTA runoff -. Extensive calcified and noncalcified atherosclerotic plaque results in peripheral vascular disease . There is at least two-vessel runoff to bilateral feet via the anterior and posterior tibial arteries. Attenuated flow within the peroneal artery bilaterally may reflect slow flow or distal occlusion.   started on ASA, start heparin gtt. X ray foot - No acute displaced fracture-dislocation identified. vascular surgery f/u -  possible embolization from Right femoral plaque - Recommend  ASA/plavix/statin.  heparin  discontinued. needs short term vascular surgeyr  f/u, if not improving/persistent symptoms then could consider endarterectomy.Leucocytosis trended down. Hb 7.2 on heparin drip. monitor. UA +, BC x2 pending. on  Zosyn. Endocrine consulted for DM2 on omnipod insulin pump and dexcom. discussed with daughter. denies urinary symptoms. Patient has anterior and posterior vaginal wall prolapse -follows with urogynecology and uses pessary. pessary noted in vagiana per CT scan    83 y/o F presenting for evaluation of several days b/l toe pain R>L as well as R toe discoloration. C/f claudication/ischemia given known PVD, though unknown etiology of acute worsening of her sxs. Vascular surgery consulted in the ED, no acute intervention at this time, though will f/u noninvasive studies.     -- CTA runoff ordered  -- ASA 81mg qd  -- heparin drip ordered  -- vascular surgery following, appreciate recs  --  multimodal pain regimen    6/17  PVD, HFpEF, T2DM on insulin managed with CGM and insulin pump, and autoimmune hepatitis on chronic prednisone, presenting after recent admission for R toe pain, now with discoloration over the last 2-3 days. vascular surgery consulted.  CTA runoff . started on ASA and heparin gtt.  83 y/o F presenting for evaluation of several days b/l toe pain R>L as well as R toe discoloration. C/f claudication/ischemia given known PVD, though unknown etiology of acute worsening of her sxs. Vascular surgery consulted in the ED, no acute intervention at this time, though will f/u noninvasive studies.   -- CTA runoff ordered  -- ASA 81mg qd  -- heparin drip ordered  -- vascular surgery following, appreciate recs  -- multimodal pain regimen    83 y/o F presenting for evaluation of several days b/l toe pain R>L as well as R toe discoloration. C/f claudication/ischemia given known PVD, though unknown etiology of acute worsening of her sxs. Vascular surgery consulted in the ED, no acute intervention at this time, though will f/u noninvasive studies.   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 vascular surgery consulted.  CTA runoff -. Extensive calcified and noncalcified atherosclerotic plaque results in peripheral vascular disease . There is at least two-vessel runoff to bilateral feet via the anterior and posterior tibial arteries. Attenuated flow within the peroneal artery bilaterally may reflect slow flow or distal occlusion.   started on ASA, start  heparin gtt. X ray foot - No acute displaced fracture-dislocation identified. vascular surgery f/u -  possible embolization from Right femoral plaque - Recommend  ASA/plavix/statin.  heparin  discontinued. needs short term vascular surgeyr  f/u, if not improving/persistent symptoms then could consider endarterectomy.Leucocytosis trended down. Hb 7.2 on heparin drip. monitor. UA +, BC x2 pending. on  Zosyn. Endocrine consulted for DM2 on omnipod insulin pump and dexcom. discussed with daughter. denies urinary symptoms. Patient has anterior and posterior vaginal wall prolapse -follows with urogynecology and uses pessary. pessary noted in vagiana per CT scan    83 y/o F presenting for evaluation of several days b/l toe pain R>L as well as R toe discoloration. C/f claudication/ischemia given known PVD, though unknown etiology of acute worsening of her sxs. Vascular surgery consulted in the ED, no acute intervention at this time, though will f/u noninvasive studies.     -- CTA runoff ordered  -- ASA 81mg qd  -- heparin drip ordered  -- vascular surgery following, appreciate recs  -- multimodal pain regimen    6/17  PVD, HFpEF, T2DM on insulin managed with CGM and insulin pump, and autoimmune hepatitis on chronic prednisone, presenting after recent admission for R toe pain, now with discoloration over the last 2-3 days. vascular surgery consulted.  CTA runoff . started on ASA and heparin gtt.  83 y/o F presenting for evaluation of several days b/l toe pain R>L as well as R toe discoloration. C/f claudication/ischemia given known PVD, though unknown etiology of acute worsening of her sxs. Vascular surgery consulted in the ED, no acute intervention at this time, though will f/u noninvasive studies.   -- CTA runoff ordered  -- ASA 81mg qd  -- heparin drip ordered  -- vascular surgery following, appreciate recs  -- multimodal pain regimen    83 y/o F presenting for evaluation of several days b/l toe pain R>L as well as R toe  discoloration. C/f claudication/ischemia given known PVD, though unknown etiology of acute worsening of her sxs. Vascular surgery consulted in the ED, no acute intervention at this time, though will f/u noninvasive studies.   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 vascular surgery consulted.  CTA runoff -. Extensive calcified and noncalcified atherosclerotic plaque results in peripheral vascular disease . There is at least two-vessel runoff to bilateral feet via the anterior and posterior tibial arteries. Attenuated flow within the peroneal artery bilaterally may reflect slow flow or distal occlusion.   started on ASA, start heparin gtt. X ray foot - No acute displaced fracture-dislocation identified. vascular surgery f/u -  possible embolization from Right femoral plaque - Recommend  ASA/plavix/statin.  heparin  discontinued. needs short term vascular surgeyr  f/u, if not improving/persistent symptoms then could consider endarterectomy.Leucocytosis trended down. Hb 7.2 on heparin drip. monitor. UA +, BC x2 pending. on  Zosyn. Endocrine consulted for DM2 on omnipod insulin pump and dexcom. discussed with daughter. denies urinary symptoms. Patient has anterior and posterior vaginal wall prolapse -follows with urogynecology and uses pessary. pessary noted in vagiana per CT scan    Hypertensive heart disease with diastolic heart failure  Patients blood pressure range in the last 24 hours was: BP  Min: 101/59  Max: 158/70.The patient's inpatient anti-hypertensive  "regimen is listed below:  Current Antihypertensives  dorzolamide-timolol 2-0.5% ophthalmic solution 1 drop, 2 times daily, Both Eyes  nitroGLYCERIN SL tablet 0.4 mg, Every 5 min PRN, Sublingual    Plan  - BP is controlled, no changes needed to their regimen  - holding home lasix on admission given normal BP and pt appearing overall volume down. Can resume as clinically indicated       Type 2 diabetes mellitus with circulatory disorder, with long-term current use of insulin  Insulin pump in place  Patient's FSGs are controlled on current medication regimen.  Last A1c reviewed-   Lab Results   Component Value Date    HGBA1C 5.4 06/17/2025     Most recent fingerstick glucose reviewed-   No results for input(s): "POCTGLUCOSE" in the last 24 hours.    Current correctional scale N/A  Maintain anti-hyperglycemic dose as follows-   Antihyperglycemics (From admission, onward)      Start     Stop Route Frequency Ordered    06/17/25 1515  insulin aspart U-100 insulin pump from home        Question Answer Comment   Target number 120    Basal Rate #1 1    Basal rate #1 time 0000        -- SubQ Continuous 06/17/25 1408    06/17/25 1507  insulin aspart U-100 insulin pump from home 0-10 Units        Question Answer Comment   Target number 120    Carbohydrate coverage #1 10    Carbohydrate coverage #1 time 0000    Sensitivity #1 70    Sensitivity #1 time 0000        -- SubQ As needed (PRN) 06/17/25 1408          -- Hold Oral hypoglycemics while patient is in the hospital.  -- patient with home insulin pump and sensor in place and functional.  -- Endocrinology consulted on admit for assistance with pump     Patient's FSGs are controlled on current medication regimen.  Last A1c reviewed-   Lab Results   Component Value Date    HGBA1C 5.4 06/17/2025     Most recent fingerstick glucose reviewed-   No results for input(s): "POCTGLUCOSE" in the last 24 hours.    Current correctional scale N/A  Maintain anti-hyperglycemic dose as follows- "   Antihyperglycemics (From admission, onward)      Start     Stop Route Frequency Ordered    06/17/25 1515  insulin aspart U-100 insulin pump from home        Question Answer Comment   Target number 120    Basal Rate #1 1    Basal rate #1 time 0000        -- SubQ Continuous 06/17/25 1408    06/17/25 1507  insulin aspart U-100 insulin pump from home 0-10 Units        Question Answer Comment   Target number 120    Carbohydrate coverage #1 10    Carbohydrate coverage #1 time 0000    Sensitivity #1 70    Sensitivity #1 time 0000        -- SubQ As needed (PRN) 06/17/25 1408          -- Hold Oral hypoglycemics while patient is in the hospital.  -- patient with home insulin pump and sensor in place and functional.  -- Endocrinology consulted on admit for assistance with pump     Autoimmune hepatitis  -- continue home prednisone 2.5mg qd on admission  6/18 Lipitor discontinued as daughter concerned about  h/o autoimmune hepatitis on prednisone. discussed with hepatology - OK to restart lipitor  need to monitor liver enzymes every 4-8 weeks    Chronic respiratory failure  Centrilobular emphysema  Adenocarcinoma of right lung  Patient with Hypoxic Respiratory failure which is Chronic.  she is on home oxygen at 2 LPM. Supplemental oxygen was provided and noted-      Signs/symptoms of respiratory failure include- increased work of breathing. Contributing diagnoses includes - malignancy, heart failure Labs and images were reviewed. Patient Has not had a recent ABG. Will treat underlying causes and adjust management of respiratory failure as follows-    -- continue home O2 on admission  -- no e/o respiratory distress at this time       Patient with Hypoxic Respiratory failure which is Chronic.  she is on home oxygen at 2 LPM. Supplemental oxygen was provided and noted-      Signs/symptoms of respiratory failure include- increased work of breathing. Contributing diagnoses includes - malignancy, heart failure Labs and images were  reviewed. Patient Has not had a recent ABG. Will treat underlying causes and adjust management of respiratory failure as follows-    -- continue home O2 on admission  -- no e/o respiratory distress at this time       Patient with Hypoxic Respiratory failure which is Chronic.  she is on home oxygen at 2 LPM. Supplemental oxygen was provided and noted-      Signs/symptoms of respiratory failure include- increased work of breathing. Contributing diagnoses includes - malignancy, heart failure Labs and images were reviewed. Patient Has not had a recent ABG. Will treat underlying causes and adjust management of respiratory failure as follows-    -- continue home O2 on admission  -- no e/o respiratory distress at this time       Thrombocytopenia  monitor  Recent Labs     06/17/25  1551 06/18/25  0808 06/19/25  0559   * 149* 149*     Leukocytosis    Patient with leucocytosis   Recent Labs   Lab 06/17/25  1551 06/18/25  0808 06/19/25  0559   WBC 18.32* 19.55* 16.78*     . Afebrile. BCX 2, urine culture pending . likely secondary to sepsis  6/17  Leucocytosis trended down. Hb 7.2 on heparin drip. monitor. UA +, BC x2 pending. on  Zosyn    Patient has anterior and posterior vaginal wall prolapse -follows with urogynecology and uses pessary. pessary noted in vagiana per CT scan. Gynecology consulted for UTI? / pessary in place/ Leucocytosis   6/19 s/p ID eval - Differential for her ongoing leukocytosis includes tissue ischemia due to peripheral artery disease vs post-obstructive pneumonia vs ongoing use of prednisone. Okay to hold off on further antibiotics for now. CT chest WO contrast.  Gyn eval - Do not recommend removal of pessary at this time. UC NGTD. CT chest WO contrast  - Worsening of the known right perihilar mass with tiny calcifications now measuring 3.8 x 2.7 cm, prior 2.8 x 2.2 cm.  Complete atelectasis of the right upper lobe in the present study. Lymphadenopathy in the right or paraclavicular region and  in the right axillary region again noted. Well-known soft tissue mass in the right shoulder is partially included.New/more conspicuous 3 mm solid noncalcified nodule in the left upper lobe. Moderate bilateral pleural effusions, right greater than left.    Anemia    Patient's with Normocytic anemia.. Hemoglobin stable. Etiology likely due to chronic disease .  Current CBC reviewed-    Recent Labs   Lab 06/17/25  1551 06/18/25  0808 06/19/25  0559   HGB 8.0* 7.5* 7.1*         Component Value Date/Time    MCV 91 06/19/2025 0559    MCV 96 03/11/2025 1358    RDW 19.6 (H) 06/19/2025 0559    RDW 15.9 (H) 03/11/2025 1358    IRON 15 (L) 05/17/2025 1348    IRON 121 03/09/2023 1330    FERRITIN 741.0 (H) 05/17/2025 1348    RETIC 4.0 (H) 05/17/2025 1348    RETIC 2.7 (H) 03/09/2023 1330    FOLATE 11.6 03/09/2023 1330    [id number] 03/09/2023 1330    OCCULTBLOOD Positive (A) 10/26/2022 0935     Monitor CBC and transfuse if H/H drops below 7/21.    Stage 4 malignant neoplasm of lung   metastatic cancer of lung primary. The cancer has metastasized to shoulder. The patient is under the care of an outpatient oncologist. The patient is not undergoing active chemotherapy.     UTI (urinary tract infection)  ? UA +, UC pending. 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 Patient has anterior and posterior vaginal wall prolapse -follows with urogynecology and uses pessary. pessary noted in vagiana per CT scan  6/19  Gyn eval - Do not recommend removal of pessary at this time. UC NGTD.     Shoulder lesion, right  noted metastatic cancer of lung primary. The cancer has metastasized to shoulder.   6/18 ID consulted for right shoulder nonhealing ulcer. wound care evaluation       SIRS (systemic inflammatory response syndrome)      Moderate malnutrition  Nutrition consulted. Most recent weight and BMI monitored-     Measurements:  Wt Readings from Last 1 Encounters:   06/17/25 41.1 kg (90 lb 9.7 oz)   Body mass  index is 15.08 kg/m².    Patient has been screened and assessed by RD.    Malnutrition Type:  Context: chronic illness  Level: moderate    Malnutrition Characteristic Summary:  Weight Loss (Malnutrition): greater than 5% in 1 month (17% x 1 mo)  Energy Intake (Malnutrition): less than or equal to 50% for greater than or equal to 1 month  Subcutaneous Fat (Malnutrition): moderate depletion  Muscle Mass (Malnutrition): moderate depletion    Interventions/Recommendations (treatment strategy):  1. Continue consistent carbohydrate diet as tolerated   - please continue to document PO % intake via flowsheets   2. Recommend boost breeze orange TID and Joseluis TID  3. Encourage good intake    4. RD to monitor weight, labs, intake, tolerance      VTE Risk Mitigation (From admission, onward)           Ordered     Reason for No Pharmacological VTE Prophylaxis  Once        Comments: Heparin gtt   Question:  Reasons:  Answer:  Physician Provided (leave comment)    06/16/25 1926     IP VTE HIGH RISK PATIENT  Once         06/16/25 1926     Place sequential compression device  Until discontinued         06/16/25 1926                    Discharge Planning   MELVI: 6/20/2025     Code Status: Full Code   Medical Readiness for Discharge Date:   Discharge Plan A: Home with family   Discharge Delays: None known at this time                    Dar Hernandez MD  Department of Hospital Medicine   [facility]         [1]   No current facility-administered medications on file prior to encounter.     Current Outpatient Medications on File Prior to Encounter   Medication Sig Dispense Refill    acetaminophen (TYLENOL) 500 MG tablet Take 2 tablets (1,000 mg total) by mouth every 8 (eight) hours as needed for Pain.  0    BD INSULIN SYRINGE ULTRA-FINE 1 mL 31 gauge x 5/16 Syrg       blood-glucose meter,continuous Misc Dispsense 1 Dexcom G7  1 each 0    blood-glucose transmitter (DEXCOM G6 TRANSMITTER) Amanda 1 each by Misc.(Non-Drug;  "Combo Route) route every 3 (three) months. (Patient not taking: Reported on 2025) 1 each 3    brimonidine 0.2% (ALPHAGAN) 0.2 % Drop Place 1 drop into both eyes 3 (three) times daily. 10 mL 11    cholecalciferol, vitamin D3, (VITAMIN D3) 25 mcg (1,000 unit) capsule Take 2 capsules (2,000 Units total) by mouth once daily.  0    DEXCOM G7 SENSOR Amanda 1 Device by Misc.(Non-Drug; Combo Route) route every 10 days. 9 each 3    dorzolamide-timolol 2-0.5% (COSOPT) 22.3-6.8 mg/mL ophthalmic solution Place 1 drop into both eyes 2 (two) times daily. 20 mL 3    furosemide (LASIX) 40 MG tablet TAKE 1 TABLET BY MOUTH EVERY DAY 90 tablet 0    insulin aspart U-100 (NOVOLOG U-100 INSULIN ASPART) 100 unit/mL injection TO USE WITH OMNIPOD 5. TOTAL DAILY DOSE UP TO 40 UNITS 40 mL 4    insulin pump cart,automated,BT (OMNIPOD 5 G6 PODS, GEN 5,) Crtg Inject 1 each into the skin Every 3 (three) days. 10 each 11    latanoprost 0.005 % ophthalmic solution PLACE 1 DROP INTO BOTH EYES ONCE DAILY 7.5 mL 3    LIDOcaine (LIDODERM) 5 % Place 1 patch onto the skin once daily. Remove & Discard patch within 12 hours or as directed by MD 30 patch 0    melatonin (MELATIN) 3 mg tablet Take 2 tablets (6 mg total) by mouth nightly as needed for Insomnia. 30 tablet 3    nystatin (MYCOSTATIN) 100,000 unit/mL suspension SWISH WITH 6MLS BY MOUTH THEN SWALLOW 4 TIMES A DAY FOR 14 DAYS      OMNIPOD 5 G6-G7 PODS, GEN 5, Crtg SMARTSI Each SUB-Q Once a Month (Patient not taking: Reported on 2025)      oxyCODONE (ROXICODONE) 5 MG immediate release tablet Take 0.5 tablets (2.5 mg total) by mouth every 6 (six) hours as needed for Pain. 10 tablet 0    pen needle, diabetic 31 gauge x 5/16" Ndle Use to inject insulin into the skin up to 4 times daily 400 each 3    predniSONE (DELTASONE) 5 MG tablet Take 0.5 tablets (2.5 mg total) by mouth once daily. 45 tablet 3    tobramycin sulfate 0.3% (TOBREX) 0.3 % ophthalmic solution 1-2 drops topically twice daily to " affected toe(s). 5 mL 9

## 2025-06-19 NOTE — ASSESSMENT & PLAN NOTE
"Impression: Pt is an 83 y/o female with metastatic adenocarcinoma of the RUL with intralobar mets dx'd 7/5/24, s/p radiation therapy to the R lung/mediastinum (45 Gy/15 Fx, 8/14/24-9/4/24), COPD, RF on home 2LNC, PVD, HFpEF, aortic and coronary atherosclerosis, urinary incontinence, T2DM on insulin managed with CGM and insulin pump, osteroporosis, and AI hepatitis on prednisone who presented to the ED for evaluation of several days bilateral foot pain and discoloration. She reports questionable subjective fevers and chills. No N/V/D or any other complaints. Pt states the foot pain got increasingly worse prompting her to present for evaluation. Pt is AAOx3.  Pt reports comfortable at this time. Pt appears thin.     Reason for consult: Communicated with Dr. Hernandez. Pt was supposed to see pal out today but admitted.     GOC/ACP:     Goal is to continue medical management to increase QOL/improve symptoms. Pt to have XRT to shoulder mass. Pt got simulated CT today. Per daughter/pt, they are okay with XRT but do not want pursue anymore chemo/immunotherapy. Della explains that they hope to be able to extend her life and "focus on living." Hospice has been discussed as an option. Daughter/pt aware of hospice but goal at this time is medical management. Pt to f/u in Palliative care outpt clinic. Pt and daughter in agreement. Farrukh Hull is care-giver. Pt lives with daughter.     Next of kin: adult children  - ACP documents: none  - Patient has decision making capacity  - Prognosis: poor       Neoplasm Related Pain  RLE Pain to right 1-3 toes and foot/shoulder pain.   Current meds: oxycodone 1.25 mg oral liquid q 6 hrs prn.     Recs:  - Consider lidocaine patch to right shoulder, where site of metastasis is located  -Consider increase in frequency of oxycodone 1.25 mg oral liquid to q 4 hrs prn.   Pt and daughter do not want dose increased due to pt sleeping all day with increase in dose.      Insomnia:   - ordered " melatonin 6 mg qHS. Pt on this med nightly at home. Communicated with Dr. Hernandez.     Plan:  Will follow-up in clinic.

## 2025-06-19 NOTE — SUBJECTIVE & OBJECTIVE
Interval History: Pt to f/u in [facility] clinic    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    Diabetes mellitus     Glaucoma     Hypertension     Lung cancer     Other ascites 11/29/2022       Past Surgical History:   Procedure Laterality Date    CATARACT EXTRACTION W/  INTRAOCULAR LENS IMPLANT Left 12/21/2021        ENDOBRONCHIAL ULTRASOUND N/A 07/05/2024    Procedure: ENDOBRONCHIAL ULTRASOUND (EBUS);  Surgeon: Rolo Wilkinson MD;  [facility];  Service: Pulmonary;  Laterality: N/A;    ESOPHAGOGASTRODUODENOSCOPY N/A 06/26/2023    Procedure: ESOPHAGOGASTRODUODENOSCOPY (EGD);  Surgeon: Edgar Branch MD;  Location: [facility] (4TH FLR);  Service: Endoscopy;  Laterality: N/A;  referral Dr Peraza-cirrhosis/labs done on 4/24/23-[facility] portal-GT       Review of patient's allergies indicates:  No Known Allergies    Medications:  Continuous Infusions:   insulin aspart U-100  1 Units/hr Subcutaneous Continuous 0.01 mL/hr at 06/17/25 1515 1 Units/hr at 06/17/25 1515     Scheduled Meds:   aspirin  81 mg Oral Daily    atorvastatin  10 mg Oral Daily    brimonidine 0.2%  1 drop Both Eyes TID    clopidogreL  75 mg Oral Daily    dorzolamide-timolol 2-0.5%  1 drop Both Eyes BID    latanoprost  1 drop Both Eyes Daily    melatonin  6 mg Oral Nightly    predniSONE  2.5 mg Oral Daily     PRN Meds:  Current Facility-Administered Medications:     acetaminophen, 1,000 mg, Oral, Q8H PRN    aluminum & magnesium hydroxide-simethicone, 30 mL, Oral, Q6H PRN    dextrose 50%, 12.5 g, Intravenous, PRN    dextrose 50%, 12.5 g, Intravenous, PRN    dextrose 50%, 25 g, Intravenous, PRN    dextrose 50%, 25 g, Intravenous, PRN    glucagon (human recombinant), 1 mg, Intramuscular, PRN    glucagon (human recombinant), 1 mg, Intramuscular, PRN    glucose, 16 g, Oral, PRN    glucose, 16 g, Oral, PRN    glucose, 24 g, Oral, PRN    glucose, 24 g, Oral, PRN    insulin aspart  U-100, 0-10 Units, Subcutaneous, PRN    melatonin, 6 mg, Oral, Nightly PRN    naloxone, 0.02 mg, Intravenous, PRN    nitroGLYCERIN, 0.4 mg, Sublingual, Q5 Min PRN    oxyCODONE, 1.25 mg, Oral, Q6H PRN    sodium chloride 0.9%, 10 mL, Intravenous, Q12H PRN    Flushing PICC/Midline Protocol, , , Until Discontinued **AND** sodium chloride 0.9%, 10 mL, Intravenous, Q12H PRN    Family History       Problem Relation (Age of Onset)    Blindness Cousin    Cataracts Mother    Colon cancer Sister    Diabetes Mother    Glaucoma Mother    Heart attack Father    Hypertension Mother          Tobacco Use    Smoking status: Former    Smokeless tobacco: Never   Substance and Sexual Activity    Alcohol use: Not Currently    Drug use: Never    Sexual activity: Not on file       Review of Systems   Constitutional:  Positive for appetite change.   Gastrointestinal:  Negative for constipation, diarrhea, nausea and vomiting.   Skin:  Positive for wound.   Neurological:  Positive for weakness.     Objective:     Vital Signs (Most Recent):  Temp: 97.5 °F (36.4 °C) (06/19/25 1158)  Pulse: 80 (06/19/25 1158)  Resp: 18 (06/19/25 1158)  BP: 138/68 (06/19/25 1158)  SpO2: 100 % (06/19/25 1158) Vital Signs (24h Range):  Temp:  [97.5 °F (36.4 °C)-98.6 °F (37 °C)] 97.5 °F (36.4 °C)  Pulse:  [70-82] 80  Resp:  [16-19] 18  SpO2:  [99 %-100 %] 100 %  BP: (118-138)/(59-80) 138/68     Weight: 41.1 kg (90 lb 9.7 oz)  Body mass index is 15.08 kg/m².       Physical Exam  Constitutional:       General: She is not in acute distress.     Interventions: Nasal cannula in place.   HENT:      Head: Normocephalic and atraumatic.   Pulmonary:      Effort: Pulmonary effort is normal.      Comments: O2 per NC  Musculoskeletal:      Comments: Right shoulder mass   Feet:      Comments: R 1st, 2nd, 3rd toe discoloration present-purplish in color  Neurological:      Mental Status: She is alert.   Psychiatric:         Behavior: Behavior is cooperative.            Review of  Symptoms      Symptom Assessment (ESAS 0-10 Scale)  Pain:  0  Dyspnea:  0  Anxiety:  0  Nausea:  0  Depression:  0  Anorexia:  5  Fatigue:  0  Insomnia:  0  Restlessness:  0  Agitation:  0       Constipation:  No constipation    Performance Status:  60    Living Arrangements:  Lives with family    Psychosocial/Cultural:   See Palliative Psychosocial Note: Jenny  Has daughter Della and son who are very supportive. She is a twin, but her twin sister passed away a few years ago. She is one of ten children, six are . 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 Loves going to [employer] and [employer].  **Primary  to Follow**  Palliative Care  Consult: Yes    Spiritual:  A - Address in Care:  Yes, Will ask ariel Merritt to see.         Advance Care Planning   Advance Directives:   Living Will: No    LaPOST: No    Do Not Resuscitate Status: No    Medical Power of : No    Agent's Name:  Della    Decision Making:  Patient answered questions  Goals of Care: The patient endorses that what is most important right now is to focus on quality of life.    Accordingly, we have decided that the best plan to meet the patient's goals includes continuing with treatment         Significant Labs: All pertinent labs within the past 24 hours have been reviewed.  CBC:   Recent Labs   Lab 25  0559   WBC 16.78*   HGB 7.1*   HCT 23.5*   MCV 91   *     BMP:  Recent Labs   Lab 25  0559   *      K 3.6      CO2 22*   BUN 11   CREATININE 0.5   CALCIUM 8.4*   MG 1.9     LFT:  Lab Results   Component Value Date    AST 17 2025    ALKPHOS 92 2025    BILITOT 0.4 2025     Albumin:   Albumin   Date Value Ref Range Status   2025 1.5 (L) 3.5 - 5.2 g/dL Final   2025 2.1 (L) 3.5 - 5.2 g/dL Final     Protein:   Protein Total   Date Value Ref Range Status   2025 5.7 (L) 6.0 - 8.4 gm/dL Final     Total Protein    Date Value Ref Range Status   03/11/2025 7.5 6.0 - 8.4 g/dL Final     Lactic acid:   Lab Results   Component Value Date    LACTATE 6.1 (HH) 10/23/2022       Significant Imaging: I have reviewed all pertinent imaging results/findings within the past 24 hours.

## 2025-06-19 NOTE — CONSULTS
"[facility] Surg  Palliative Medicine  Consult Note    Patient Name: Radha Feng  MRN: [id number]  Admission Date: 6/16/2025  Hospital Length of Stay: 3 days  Code Status: Full Code   Attending Provider: Dar Hernandez MD  Consulting Provider: ESTHER Beard  Primary Care Physician: Leticia Lim MD  Principal Problem:Toe pain    Patient information was obtained from patient, relative(s), and primary team.      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 She reports questionable subjective fevers and chills. No N/V/D or any other complaints. Pt states the foot pain got increasingly worse prompting her to present for evaluation. Pt is AAOx3.  Pt reports comfortable at this time. Pt appears thin.     Reason for consult: Communicated with Dr. Hernandez. Pt was supposed to see pal out today but admitted.     GOC/ACP:     Goal is to continue medical management to increase QOL/improve symptoms. Pt to have XRT to shoulder mass. Pt got simulated CT today. Per daughter/pt, they are okay with XRT but do not want pursue anymore chemo/immunotherapy. Della explains that they hope to be able to extend her life and "focus on living." Hospice has been discussed as an option. Daughter/pt aware of hospice but goal at this time is medical management. Pt to f/u in Palliative care " outpt clinic. Pt and daughter in agreement. Daughter Della is care-giver. Pt lives with daughter.     Next of kin: adult children  - ACP documents: none  - Patient has decision making capacity  - Prognosis: poor       Neoplasm Related Pain  RLE Pain to right 1-3 toes and foot/shoulder pain.   Current meds: oxycodone 1.25 mg oral liquid q 6 hrs prn.     Recs:  - Consider lidocaine patch to right shoulder, where site of metastasis is located  -Consider increase in frequency of oxycodone 1.25 mg oral liquid to q 4 hrs prn.   Pt and daughter do not want dose increased due to pt sleeping all day with increase in dose.      Insomnia:   - ordered melatonin 6 mg qHS. Pt on this med nightly at home. Communicated with Dr. Hernandez.     Plan:  Will follow-up in clinic.                   Thank you for your consult. I will follow-up with patient. Please contact us if you have any additional questions.    Subjective:     HPI:   Pt is an 85 y/o F with hx of Stage IIIB (cT3, pN2, cMx) adenocarcinoma of the RUL with intralobar mets dx'd 7/5/24, s/p radiation therapy to the R lung/mediastinum (45 Gy/15 Fx, 8/14/24-9/4/24), COPD, RF on home 2LNC, PVD, HFpEF, aortic and coronary atherosclerosis, urinary incontinence, T2DM on insulin managed with CGM and insulin pump, osteroporosis, and AI hepatitis on prednisone who presented to the ED for evaluation of several days bilateral foot pain and discoloration. She reports questionable subjective fevers and chills. No N/V/D or any other complaints. She states the foot pain got increasingly worse today prompting her to present for evaluation.      In the ED, patient afebrile, SBP 100s, HR 70s, satting well on home 2LNC. CBC with WBC 20 (up from prior DC), Hgb around BL at 7.9. CMP showing stable lytes and renal function. R foot XR without acute fx. Vasc surg consulted, low suspicion for acute limb at this time, recommending CTA runoff and AC. Pt subsequently admitted to  for continued  workup and management.        Hospital Course:  No notes on file    Interval History: Pt to f/u in [facility] clinic    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    Diabetes mellitus     Glaucoma     Hypertension     Lung cancer     Other ascites 11/29/2022       Past Surgical History:   Procedure Laterality Date    CATARACT EXTRACTION W/  INTRAOCULAR LENS IMPLANT Left 12/21/2021        ENDOBRONCHIAL ULTRASOUND N/A 07/05/2024    Procedure: ENDOBRONCHIAL ULTRASOUND (EBUS);  Surgeon: Rolo Wilkinson MD;  Location: [facility] OR [address] [place] [address];  Service: Pulmonary;  Laterality: N/A;    ESOPHAGOGASTRODUODENOSCOPY N/A 06/26/2023    Procedure: ESOPHAGOGASTRODUODENOSCOPY (EGD);  Surgeon: Edgar Branch MD;  Location: [facility] (4TH FLR);  Service: Endoscopy;  Laterality: N/A;  referral Dr Peraza-cirrhosis/labs done on 4/24/23-[facility] portal-GT       Review of patient's allergies indicates:  No Known Allergies    Medications:  Continuous Infusions:   insulin aspart U-100  1 Units/hr Subcutaneous Continuous 0.01 mL/hr at 06/17/25 1515 1 Units/hr at 06/17/25 1515     Scheduled Meds:   aspirin  81 mg Oral Daily    atorvastatin  10 mg Oral Daily    brimonidine 0.2%  1 drop Both Eyes TID    clopidogreL  75 mg Oral Daily    dorzolamide-timolol 2-0.5%  1 drop Both Eyes BID    latanoprost  1 drop Both Eyes Daily    melatonin  6 mg Oral Nightly    predniSONE  2.5 mg Oral Daily     PRN Meds:  Current Facility-Administered Medications:     acetaminophen, 1,000 mg, Oral, Q8H PRN    aluminum & magnesium hydroxide-simethicone, 30 mL, Oral, Q6H PRN    dextrose 50%, 12.5 g, Intravenous, PRN    dextrose 50%, 12.5 g, Intravenous, PRN    dextrose 50%, 25 g, Intravenous, PRN    dextrose 50%, 25 g, Intravenous, PRN    glucagon (human recombinant), 1 mg, Intramuscular, PRN    glucagon (human recombinant), 1 mg, Intramuscular, PRN    glucose, 16 g, Oral, PRN    glucose, 16 g, Oral, PRN     glucose, 24 g, Oral, PRN    glucose, 24 g, Oral, PRN    insulin aspart U-100, 0-10 Units, Subcutaneous, PRN    melatonin, 6 mg, Oral, Nightly PRN    naloxone, 0.02 mg, Intravenous, PRN    nitroGLYCERIN, 0.4 mg, Sublingual, Q5 Min PRN    oxyCODONE, 1.25 mg, Oral, Q6H PRN    sodium chloride 0.9%, 10 mL, Intravenous, Q12H PRN    Flushing PICC/Midline Protocol, , , Until Discontinued **AND** sodium chloride 0.9%, 10 mL, Intravenous, Q12H PRN    Family History       Problem Relation (Age of Onset)    Blindness Cousin    Cataracts Mother    Colon cancer Sister    Diabetes Mother    Glaucoma Mother    Heart attack Father    Hypertension Mother          Tobacco Use    Smoking status: Former    Smokeless tobacco: Never   Substance and Sexual Activity    Alcohol use: Not Currently    Drug use: Never    Sexual activity: Not on file       Review of Systems   Constitutional:  Positive for appetite change.   Gastrointestinal:  Negative for constipation, diarrhea, nausea and vomiting.   Skin:  Positive for wound.   Neurological:  Positive for weakness.     Objective:     Vital Signs (Most Recent):  Temp: 97.5 °F (36.4 °C) (06/19/25 1158)  Pulse: 80 (06/19/25 1158)  Resp: 18 (06/19/25 1158)  BP: 138/68 (06/19/25 1158)  SpO2: 100 % (06/19/25 1158) Vital Signs (24h Range):  Temp:  [97.5 °F (36.4 °C)-98.6 °F (37 °C)] 97.5 °F (36.4 °C)  Pulse:  [70-82] 80  Resp:  [16-19] 18  SpO2:  [99 %-100 %] 100 %  BP: (118-138)/(59-80) 138/68     Weight: 41.1 kg (90 lb 9.7 oz)  Body mass index is 15.08 kg/m².       Physical Exam  Constitutional:       General: She is not in acute distress.     Interventions: Nasal cannula in place.   HENT:      Head: Normocephalic and atraumatic.   Pulmonary:      Effort: Pulmonary effort is normal.      Comments: O2 per NC  Musculoskeletal:      Comments: Right shoulder mass   Feet:      Comments: R 1st, 2nd, 3rd toe discoloration present-purplish in color  Neurological:      Mental Status: She is alert.    Psychiatric:         Behavior: Behavior is cooperative.            Review of Symptoms      Symptom Assessment (ESAS 0-10 Scale)  Pain:  0  Dyspnea:  0  Anxiety:  0  Nausea:  0  Depression:  0  Anorexia:  5  Fatigue:  0  Insomnia:  0  Restlessness:  0  Agitation:  0       Constipation:  No constipation    Performance Status:  60    Living Arrangements:  Lives with family    Psychosocial/Cultural:   See Palliative Psychosocial Note: Jenny  Has daughter Della and son who are very supportive. She is a twin, but her twin sister passed away a few years ago. She is one of ten children, six are . 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 Loves going to [employer] and [employer].  **Primary  to Follow**  Palliative Care  Consult: Yes    Spiritual:  A - Address in Care:  Yes, Will ask ariel Merritt to see.         Advance Care Planning  Advance Directives:   Living Will: No    LaPOST: No    Do Not Resuscitate Status: No    Medical Power of : No    Agent's Name:  Della    Decision Making:  Patient answered questions  Goals of Care: The patient endorses that what is most important right now is to focus on quality of life.    Accordingly, we have decided that the best plan to meet the patient's goals includes continuing with treatment         Significant Labs: All pertinent labs within the past 24 hours have been reviewed.  CBC:   Recent Labs   Lab 25  0559   WBC 16.78*   HGB 7.1*   HCT 23.5*   MCV 91   *     BMP:  Recent Labs   Lab 25  0559   *      K 3.6      CO2 22*   BUN 11   CREATININE 0.5   CALCIUM 8.4*   MG 1.9     LFT:  Lab Results   Component Value Date    AST 17 2025    ALKPHOS 92 2025    BILITOT 0.4 2025     Albumin:   Albumin   Date Value Ref Range Status   2025 1.5 (L) 3.5 - 5.2 g/dL Final   2025 2.1 (L) 3.5 - 5.2 g/dL Final     Protein:   Protein Total   Date Value Ref  Range Status   06/19/2025 5.7 (L) 6.0 - 8.4 gm/dL Final     Total Protein   Date Value Ref Range Status   03/11/2025 7.5 6.0 - 8.4 g/dL Final     Lactic acid:   Lab Results   Component Value Date    LACTATE 6.1 (HH) 10/23/2022       Significant Imaging: I have reviewed all pertinent imaging results/findings within the past 24 hours.      25 minutes of ACP completed.     Keily Khan, CNS  Palliative Medicine  [facility] S[facility]

## 2025-06-19 NOTE — ASSESSMENT & PLAN NOTE
Patient with leucocytosis   Recent Labs   Lab 06/17/25  1551 06/18/25  0808 06/19/25  0559   WBC 18.32* 19.55* 16.78*     . Afebrile. BCX 2, urine culture pending . likely secondary to sepsis  6/17  Leucocytosis trended down. Hb 7.2 on heparin drip. monitor. UA +, BC x2 pending. on  Zosyn    Patient has anterior and posterior vaginal wall prolapse -follows with urogynecology and uses pessary. pessary noted in vagiana per CT scan. Gynecology consulted for UTI? / pessary in place/ Leucocytosis   6/19 s/p ID eval - Differential for her ongoing leukocytosis includes tissue ischemia due to peripheral artery disease vs post-obstructive pneumonia vs ongoing use of prednisone. Okay to hold off on further antibiotics for now. CT chest WO contrast.  Gyn eval - Do not recommend removal of pessary at this time. UC NGTD. CT chest WO contrast  - Worsening of the known right perihilar mass with tiny calcifications now measuring 3.8 x 2.7 cm, prior 2.8 x 2.2 cm.  Complete atelectasis of the right upper lobe in the present study. Lymphadenopathy in the right or paraclavicular region and in the right axillary region again noted. Well-known soft tissue mass in the right shoulder is partially included.New/more conspicuous 3 mm solid noncalcified nodule in the left upper lobe. Moderate bilateral pleural effusions, right greater than left.

## 2025-06-19 NOTE — ASSESSMENT & PLAN NOTE
"Patient's FSGs are controlled on current medication regimen.  Last A1c reviewed-   Lab Results   Component Value Date    HGBA1C 5.4 06/17/2025     Most recent fingerstick glucose reviewed-   No results for input(s): "POCTGLUCOSE" in the last 24 hours.    Current correctional scale N/A  Maintain anti-hyperglycemic dose as follows-   Antihyperglycemics (From admission, onward)      Start     Stop Route Frequency Ordered    06/17/25 1515  insulin aspart U-100 insulin pump from home        Question Answer Comment   Target number 120    Basal Rate #1 1    Basal rate #1 time 0000        -- SubQ Continuous 06/17/25 1408    06/17/25 1507  insulin aspart U-100 insulin pump from home 0-10 Units        Question Answer Comment   Target number 120    Carbohydrate coverage #1 10    Carbohydrate coverage #1 time 0000    Sensitivity #1 70    Sensitivity #1 time 0000        -- SubQ As needed (PRN) 06/17/25 1408          -- Hold Oral hypoglycemics while patient is in the hospital.  -- patient with home insulin pump and sensor in place and functional.  -- Endocrinology consulted on admit for assistance with pump     Patient's FSGs are controlled on current medication regimen.  Last A1c reviewed-   Lab Results   Component Value Date    HGBA1C 5.4 06/17/2025     Most recent fingerstick glucose reviewed-   No results for input(s): "POCTGLUCOSE" in the last 24 hours.    Current correctional scale N/A  Maintain anti-hyperglycemic dose as follows-   Antihyperglycemics (From admission, onward)      Start     Stop Route Frequency Ordered    06/17/25 1515  insulin aspart U-100 insulin pump from home        Question Answer Comment   Target number 120    Basal Rate #1 1    Basal rate #1 time 0000        -- SubQ Continuous 06/17/25 1408    06/17/25 1507  insulin aspart U-100 insulin pump from home 0-10 Units        Question Answer Comment   Target number 120    Carbohydrate coverage #1 10    Carbohydrate coverage #1 time 0000    Sensitivity #1 70 "    Sensitivity #1 time 0000        -- SubQ As needed (PRN) 06/17/25 1408          -- Hold Oral hypoglycemics while patient is in the hospital.  -- patient with home insulin pump and sensor in place and functional.  -- Endocrinology consulted on admit for assistance with pump

## 2025-06-19 NOTE — SUBJECTIVE & OBJECTIVE
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    Diabetes mellitus     Glaucoma     Hypertension     Lung cancer     Other ascites 11/29/2022       Past Surgical History:   Procedure Laterality Date    CATARACT EXTRACTION W/  INTRAOCULAR LENS IMPLANT Left 12/21/2021        ENDOBRONCHIAL ULTRASOUND N/A 07/05/2024    Procedure: ENDOBRONCHIAL ULTRASOUND (EBUS);  Surgeon: Rolo Wilkinson MD;  Location: [facility] OR 2ND FLR;  Service: Pulmonary;  Laterality: N/A;    ESOPHAGOGASTRODUODENOSCOPY N/A 06/26/2023    Procedure: ESOPHAGOGASTRODUODENOSCOPY (EGD);  Surgeon: Edgar Branch MD;  Location: [facility] ENDO (4TH FLR);  Service: Endoscopy;  Laterality: N/A;  referral Dr Peraza-cirrhosis/labs done on 4/24/23-[facility] portal-GT       Review of patient's allergies indicates:  No Known Allergies    Medications:  Medications Prior to Admission   Medication Sig    acetaminophen (TYLENOL) 500 MG tablet Take 2 tablets (1,000 mg total) by mouth every 8 (eight) hours as needed for Pain.    BD INSULIN SYRINGE ULTRA-FINE 1 mL 31 gauge x 5/16 Syrg     blood-glucose meter,continuous Misc Dispsense 1 Dexcom G7     blood-glucose transmitter (DEXCOM G6 TRANSMITTER) Amanda 1 each by Misc.(Non-Drug; Combo Route) route every 3 (three) months. (Patient not taking: Reported on 6/13/2025)    brimonidine 0.2% (ALPHAGAN) 0.2 % Drop Place 1 drop into both eyes 3 (three) times daily.    cholecalciferol, vitamin D3, (VITAMIN D3) 25 mcg (1,000 unit) capsule Take 2 capsules (2,000 Units total) by mouth once daily.    DEXCOM G7 SENSOR Amanda 1 Device by Misc.(Non-Drug; Combo Route) route every 10 days.    dorzolamide-timolol 2-0.5% (COSOPT) 22.3-6.8 mg/mL ophthalmic solution Place 1 drop into both eyes 2 (two) times daily.    furosemide (LASIX) 40 MG tablet TAKE 1 TABLET BY MOUTH EVERY DAY    insulin aspart U-100 (NOVOLOG U-100 INSULIN ASPART) 100 unit/mL injection TO USE WITH OMNIPOD 5. TOTAL  "DAILY DOSE UP TO 40 UNITS    insulin pump cart,automated,BT (OMNIPOD 5 G6 PODS, GEN 5,) Crtg Inject 1 each into the skin Every 3 (three) days.    latanoprost 0.005 % ophthalmic solution PLACE 1 DROP INTO BOTH EYES ONCE DAILY    LIDOcaine (LIDODERM) 5 % Place 1 patch onto the skin once daily. Remove & Discard patch within 12 hours or as directed by MD    melatonin (MELATIN) 3 mg tablet Take 2 tablets (6 mg total) by mouth nightly as needed for Insomnia.    nystatin (MYCOSTATIN) 100,000 unit/mL suspension SWISH WITH 6MLS BY MOUTH THEN SWALLOW 4 TIMES A DAY FOR 14 DAYS    OMNIPOD 5 G6-G7 PODS, GEN 5, Crtg SMARTSI Each SUB-Q Once a Month (Patient not taking: Reported on 2025)    oxyCODONE (ROXICODONE) 5 MG immediate release tablet Take 0.5 tablets (2.5 mg total) by mouth every 6 (six) hours as needed for Pain.    pen needle, diabetic 31 gauge x 5/16" Ndle Use to inject insulin into the skin up to 4 times daily    predniSONE (DELTASONE) 5 MG tablet Take 0.5 tablets (2.5 mg total) by mouth once daily.    tobramycin sulfate 0.3% (TOBREX) 0.3 % ophthalmic solution 1-2 drops topically twice daily to affected toe(s).     Antibiotics (From admission, onward)      None          Antifungals (From admission, onward)      None          Antivirals (From admission, onward)      None             Immunization History   Administered Date(s) Administered    COVID-19, MRNA, LN-S, PF (Pfizer) (Purple Cap) 01/10/2021, 2021, 10/16/2021    Influenza (FLUAD) - Quadrivalent - Adjuvanted - PF *Preferred* (65+) 2022    Influenza - Quadrivalent - High Dose - PF (65 years and older) 2020    Influenza - Trivalent - Fluzone High Dose - PF (65 years and older) 2017, 2019    Pneumococcal Conjugate - 13 Valent 2017    Pneumococcal Polysaccharide - 23 Valent 2019    Tdap 2021       Family History       Problem Relation (Age of Onset)    Blindness Cousin    Cataracts Mother    Colon cancer Sister    " Diabetes Mother    Glaucoma Mother    Heart attack Father    Hypertension Mother          Social History     Socioeconomic History    Marital status: Single   Tobacco Use    Smoking status: Former    Smokeless tobacco: Never   Substance and Sexual Activity    Alcohol use: Not Currently    Drug use: Never   Social History Narrative    , one daughter local, four sons out of state. Retired . Lives with daughter Joss.     Social Drivers of Health     Financial Resource Strain: Low Risk  (6/17/2025)    Overall Financial Resource Strain (CARDIA)     Difficulty of Paying Living Expenses: Not hard at all   Food Insecurity: No Food Insecurity (6/17/2025)    Hunger Vital Sign     Worried About Running Out of Food in the Last Year: Never true     Ran Out of Food in the Last Year: Never true   Transportation Needs: No Transportation Needs (6/17/2025)    PRAPARE - Transportation     Lack of Transportation (Medical): No     Lack of Transportation (Non-Medical): No   Physical Activity: Inactive (6/17/2025)    Exercise Vital Sign     Days of Exercise per Week: 0 days     Minutes of Exercise per Session: 0 min   Stress: Patient Declined (6/17/2025)    Citizen of [place] [employer] - Occupational Stress Questionnaire     Feeling of Stress : Patient declined   Recent Concern: Stress - Stress Concern Present (6/3/2025)    Citizen [place] [employer] - Occupational Stress Questionnaire     Feeling of Stress : To some extent   Housing Stability: Low Risk  (6/17/2025)    Housing Stability Vital Sign     Unable to Pay for Housing in the Last Year: No     Homeless in the Last Year: No     Review of Systems   Respiratory:  Positive for shortness of breath.    Musculoskeletal:  Positive for arthralgias.   Skin:  Positive for color change.   All other systems reviewed and are negative.    Objective:     Vital Signs (Most Recent):  Temp: 98.6 °F (37 °C) (06/18/25 2021)  Pulse: 71 (06/18/25  2021)  Resp: 19 (06/18/25 2106)  BP: (!) 121/59 (06/18/25 2021)  SpO2: 100 % (06/18/25 2021) Vital Signs (24h Range):  Temp:  [97.4 °F (36.3 °C)-98.6 °F (37 °C)] 98.6 °F (37 °C)  Pulse:  [59-78] 71  Resp:  [16-20] 19  SpO2:  [98 %-100 %] 100 %  BP: (113-144)/(52-69) 121/59     Weight: 41.1 kg (90 lb 9.7 oz)  Body mass index is 15.08 kg/m².    Estimated Creatinine Clearance: 55.3 mL/min (based on SCr of 0.5 mg/dL).     Physical Exam  Vitals and nursing note reviewed.   Constitutional:       Appearance: Normal appearance.   HENT:      Head: Normocephalic and atraumatic.   Cardiovascular:      Rate and Rhythm: Normal rate and regular rhythm.   Musculoskeletal:         General: Tenderness (bilateral toes) present.      Comments: Right shoulder hyperpigmented mass.   Skin:     Findings: Erythema (bilateral toes) present.   Neurological:      General: No focal deficit present.      Mental Status: She is alert and oriented to person, place, and time.          Significant Labs:   Microbiology Results (last 7 days)       Procedure Component Value Units Date/Time    Blood culture x two cultures. Draw prior to antibiotics. [[id number]]  (Normal) Collected: 06/16/25 1616    Order Status: Completed Specimen: Blood from Peripheral, Antecubital, Right Updated: 06/18/25 1902     Blood Culture No Growth After 48 Hours    Blood culture x two cultures. Draw prior to antibiotics. [[id number]]  (Normal) Collected: 06/16/25 1616    Order Status: Completed Specimen: Blood from Peripheral, Forearm, Right Updated: 06/18/25 1902     Blood Culture No Growth After 48 Hours    Urine culture [[id number]] Collected: 06/16/25 2255    Order Status: Completed Specimen: Urine Updated: 06/18/25 1437     Urine Culture No Significant Growth    MRSA Screen by PCR [[id number]]     Order Status: Sent Specimen: Nasal Swab             Significant Imaging: I have reviewed all pertinent imaging results/findings within the past 24 hours.

## 2025-06-19 NOTE — HPI
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 She reports questionable subjective fevers and chills. No N/V/D or any other complaints. She states the foot pain got increasingly worse today prompting her to present for evaluation.      In the ED, patient afebrile, SBP 100s, HR 70s, satting well on home 2LNC. CBC with WBC 20 (up from prior DC), Hgb around BL at 7.9. CMP showing stable lytes and renal function. R foot XR without acute fx. Vasc surg consulted, low suspicion for acute limb at this time, recommending CTA runoff and AC. Pt subsequently admitted to  for continued workup and management.

## 2025-06-19 NOTE — PLAN OF CARE
Recommendations    Recommendation/Intervention:   1. Continue consistent carbohydrate diet as tolerated     - please continue to document PO % intake via flowsheets     2. Recommend boost breeze orange 1 x daily and Joseluis BID  3. Encourage good intake      4. RD to monitor weight, labs, intake, tolerance    Goals:   1. % nutritional needs met with diet during admission     2. Maintain weight during admission    3. Display s/s of wound healing during admission    Nutrition Goal Status: new  Communication of RD Recs:  (POC)    Nutrition Discharge Planning    Nutrition Discharge Planning: Therapeutic diet (comments), Oral supplement regimen (comments)  Therapeutic diet (comments): Diabetic Diet  Oral supplement regimen (comments): Boost Glucose Control BID

## 2025-06-19 NOTE — ASSESSMENT & PLAN NOTE
85 y/o F presenting for evaluation of several days b/l toe pain R>L as well as R toe discoloration. C/f claudication/ischemia given known PVD, though unknown etiology of acute worsening of her sxs. Vascular surgery consulted in the ED, no acute intervention at this time, though will f/u noninvasive studies.     -- CTA runoff ordered  -- ASA 81mg qd  -- heparin drip ordered  -- vascular surgery following, appreciate recs  -- multimodal pain regimen    6/17  PVD, HFpEF, T2DM on insulin managed with CGM and insulin pump, and autoimmune hepatitis on chronic prednisone, presenting after recent admission for R toe pain, now with discoloration over the last 2-3 days. vascular surgery consulted.  CTA runoff . started on ASA and heparin gtt.  85 y/o F presenting for evaluation of several days b/l toe pain R>L as well as R toe discoloration. C/f claudication/ischemia given known PVD, though unknown etiology of acute worsening of her sxs. Vascular surgery consulted in the ED, no acute intervention at this time, though will f/u noninvasive studies.   -- CTA runoff ordered  -- ASA 81mg qd  -- heparin drip ordered  -- vascular surgery following, appreciate recs  -- multimodal pain regimen    85 y/o F presenting for evaluation of several days b/l toe pain R>L as well as R toe discoloration. C/f claudication/ischemia given known PVD, though unknown etiology of acute worsening of her sxs. Vascular surgery consulted in the ED, no acute intervention at this time, though will f/u noninvasive studies.   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 vascular surgery consulted.  CTA runoff -. Extensive calcified and noncalcified atherosclerotic plaque results in peripheral vascular disease . There is at least two-vessel runoff to bilateral feet via the anterior and posterior tibial arteries. Attenuated flow within the peroneal artery bilaterally may reflect slow flow or distal occlusion.   started on ASA, start heparin gtt. X ray foot - No acute displaced fracture-dislocation identified. vascular surgery f/u -  possible embolization from Right femoral plaque - Recommend  ASA/plavix/statin.  heparin  discontinued. needs short term vascular surgeyr  f/u, if not improving/persistent symptoms then could consider endarterectomy.Leucocytosis trended down. Hb 7.2 on heparin drip. monitor. UA +, BC x2 pending. on  Zosyn. Endocrine consulted for DM2 on omnipod insulin pump and dexcom. discussed with daughter. denies urinary symptoms. Patient has anterior and posterior vaginal wall prolapse -follows with urogynecology and uses pessary. pessary noted in vagiana per CT scan    85 y/o F presenting for evaluation of several days b/l toe pain R>L as well as R toe discoloration. C/f claudication/ischemia given known PVD, though unknown etiology of acute worsening of her sxs. Vascular surgery consulted in the ED, no acute intervention at this time, though will f/u noninvasive studies.     -- CTA runoff ordered  -- ASA 81mg qd  -- heparin drip ordered  -- vascular surgery following, appreciate recs  -- multimodal pain regimen    6/17  PVD, HFpEF, T2DM on insulin managed with CGM and insulin pump, and autoimmune hepatitis on chronic prednisone, presenting after recent admission for R toe pain, now with discoloration over the last 2-3 days. vascular surgery consulted.  CTA runoff . started on ASA and heparin gtt.  85 y/o F presenting for evaluation of several days b/l toe pain R>L as well as R toe discoloration. C/f  claudication/ischemia given known PVD, though unknown etiology of acute worsening of her sxs. Vascular surgery consulted in the ED, no acute intervention at this time, though will f/u noninvasive studies.   -- CTA runoff ordered  -- ASA 81mg qd  -- heparin drip ordered  -- vascular surgery following, appreciate recs  -- multimodal pain regimen    85 y/o F presenting for evaluation of several days b/l toe pain R>L as well as R toe discoloration. C/f claudication/ischemia given known PVD, though unknown etiology of acute worsening of her sxs. Vascular surgery consulted in the ED, no acute intervention at this time, though will f/u noninvasive studies.   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 vascular surgery consulted.  CTA runoff -. Extensive calcified and noncalcified atherosclerotic plaque results in peripheral vascular disease . There is at least two-vessel runoff to bilateral feet via the anterior and posterior tibial arteries. Attenuated flow within the peroneal artery bilaterally may reflect slow flow or distal occlusion.   started on ASA, start heparin gtt. X ray foot - No acute displaced fracture-dislocation identified. vascular surgery f/u -  possible embolization from Right femoral plaque - Recommend  ASA/plavix/statin.  heparin  discontinued. needs short term vascular surgeyr  f/u, if not improving/persistent symptoms then could consider endarterectomy.Leucocytosis trended down. Hb 7.2 on heparin drip. monitor. UA +, BC x2 pending. on  Zosyn. Endocrine  consulted for DM2 on omnipod insulin pump and dexcom. discussed with daughter. denies urinary symptoms. Patient has anterior and posterior vaginal wall prolapse -follows with urogynecology and uses pessary. pessary noted in vagiana per CT scan    85 y/o F presenting for evaluation of several days b/l toe pain R>L as well as R toe discoloration. C/f claudication/ischemia given known PVD, though unknown etiology of acute worsening of her sxs. Vascular surgery consulted in the ED, no acute intervention at this time, though will f/u noninvasive studies.     -- CTA runoff ordered  -- ASA 81mg qd  -- heparin drip ordered  -- vascular surgery following, appreciate recs  -- multimodal pain regimen    6/17  PVD, HFpEF, T2DM on insulin managed with CGM and insulin pump, and autoimmune hepatitis on chronic prednisone, presenting after recent admission for R toe pain, now with discoloration over the last 2-3 days. vascular surgery consulted.  CTA runoff . started on ASA and heparin gtt.  85 y/o F presenting for evaluation of several days b/l toe pain R>L as well as R toe discoloration. C/f claudication/ischemia given known PVD, though unknown etiology of acute worsening of her sxs. Vascular surgery consulted in the ED, no acute intervention at this time, though will f/u noninvasive studies.   -- CTA runoff ordered  -- ASA 81mg qd  -- heparin drip ordered  -- vascular surgery following, appreciate recs  -- multimodal pain regimen    85 y/o F presenting for evaluation of several days b/l toe pain R>L as well as R toe discoloration. C/f claudication/ischemia given known PVD, though unknown etiology of acute worsening of her sxs. Vascular surgery consulted in the ED, no acute intervention at this time, though will f/u noninvasive studies.   -- CTA runoff ordered  -- ASA 81mg qd  -- heparin drip ordered  -- vascular surgery following, appreciate recs  -- multimodal pain regimen    6/17 MHx of Stage IIIB adenocarcinoma of the RUL  with intralobar mets dx'd 7/5/24, s/p radiation therapy to the R lung/mediastinum, COPD not on O2, PVD, HFpEF, T2DM on insulin managed with CGM and insulin pump, and autoimmune hepatitis on chronic prednisone, presenting after recent admission for R toe pain, now with discoloration over the last 2-3 days. vascular surgery consulted.  CTA runoff -. Extensive calcified and noncalcified atherosclerotic plaque results in peripheral vascular disease . There is at least two-vessel runoff to bilateral feet via the anterior and posterior tibial arteries. Attenuated flow within the peroneal artery bilaterally may reflect slow flow or distal occlusion.   started on ASA, start heparin gtt. X ray foot - No acute displaced fracture-dislocation identified. vascular surgery f/u -  possible embolization from Right femoral plaque - Recommend  ASA/plavix/statin.  heparin  discontinued. needs short term vascular surgeyr  f/u, if not improving/persistent symptoms then could consider endarterectomy.Leucocytosis trended down. Hb 7.2 on heparin drip. monitor. UA +, BC x2 pending. on  Zosyn. Endocrine consulted for DM2 on omnipod insulin pump and dexcom. discussed with daughter. denies urinary symptoms. Patient has anterior and posterior vaginal wall prolapse -follows with urogynecology and uses pessary. pessary noted in vagiana per CT scan

## 2025-06-19 NOTE — HPI
84 year old female with a history of COPD, PVD, HFpEF, aortic and coronary atherosclerosis, urinary incontinence, T2DM, osteroporosis, and autoimmune hepatitis on chronic prednisone and metastatic lung cancer s/p radiation therapy.  She had a recent hospital admission secondary to COVID infection and has been on supplemental oxygen (2L) at home ever since.  She is admitted to the hospital now with significant pain to her bilateral toes.  Her toes were erythematous and cold to touch.  CTA abdomen with runoff showed extensive calcified and non-calcified plaques and some peripheral artery disease.  Patient has been started on plavix.  ID is consulted because the patient has had a leukocytosis since early may.  On admission, her WBC count was slightly higher.  ID is consulted for evaluation.

## 2025-06-19 NOTE — ASSESSMENT & PLAN NOTE
85 y/o F presenting for evaluation of several days b/l toe pain R>L as well as R toe discoloration. C/f claudication/ischemia given known PVD, though unknown etiology of acute worsening of her sxs. Vascular surgery consulted in the ED, no acute intervention at this time, though will f/u noninvasive studies.     -- CTA runoff ordered  -- ASA 81mg qd  -- heparin drip ordered  -- vascular surgery following, appreciate recs  -- multimodal pain regimen    6/17  PVD, HFpEF, T2DM on insulin managed with CGM and insulin pump, and autoimmune hepatitis on chronic prednisone, presenting after recent admission for R toe pain, now with discoloration over the last 2-3 days. vascular surgery consulted.  CTA runoff . started on ASA and heparin gtt.  85 y/o F presenting for evaluation of several days b/l toe pain R>L as well as R toe discoloration. C/f claudication/ischemia given known PVD, though unknown etiology of acute worsening of her sxs. Vascular surgery consulted in the ED, no acute intervention at this time, though will f/u noninvasive studies.   -- CTA runoff ordered  -- ASA 81mg qd  -- heparin drip ordered  -- vascular surgery following, appreciate recs  -- multimodal pain regimen    85 y/o F presenting for evaluation of several days b/l toe pain R>L as well as R toe discoloration. C/f claudication/ischemia given known PVD, though unknown etiology of acute worsening of her sxs. Vascular surgery consulted in the ED, no acute intervention at this time, though will f/u noninvasive studies.   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 vascular surgery consulted.  CTA runoff -. Extensive calcified and noncalcified atherosclerotic plaque results in peripheral vascular disease . There is at least two-vessel runoff to bilateral feet via the anterior and posterior tibial arteries. Attenuated flow within the peroneal artery bilaterally may reflect slow flow or distal occlusion.   started on ASA, start heparin gtt. X ray foot - No acute displaced fracture-dislocation identified. vascular surgery f/u -  possible embolization from Right femoral plaque - Recommend  ASA/plavix/statin.  heparin  discontinued. needs short term vascular surgeyr  f/u, if not improving/persistent symptoms then could consider endarterectomy.Leucocytosis trended down. Hb 7.2 on heparin drip. monitor. UA +, BC x2 pending. on  Zosyn. Endocrine consulted for DM2 on omnipod insulin pump and dexcom. discussed with daughter. denies urinary symptoms. Patient has anterior and posterior vaginal wall prolapse -follows with urogynecology and uses pessary. pessary noted in vagiana per CT scan    83 y/o F presenting for evaluation of several days b/l toe pain R>L as well as R toe discoloration. C/f claudication/ischemia given known PVD, though unknown etiology of acute worsening of her sxs. Vascular surgery consulted in the ED, no acute intervention at this time, though will f/u noninvasive studies.     -- CTA runoff ordered  -- ASA 81mg qd  -- heparin drip ordered  -- vascular surgery following, appreciate recs  -- multimodal pain regimen    6/17  PVD, HFpEF, T2DM on insulin managed with CGM and insulin pump, and autoimmune hepatitis on chronic prednisone, presenting after recent admission for R toe pain, now with discoloration over the last 2-3 days. vascular surgery consulted.  CTA runoff . started on ASA and heparin gtt.  83 y/o F presenting for evaluation of several days b/l toe pain R>L as well as R toe discoloration. C/f  claudication/ischemia given known PVD, though unknown etiology of acute worsening of her sxs. Vascular surgery consulted in the ED, no acute intervention at this time, though will f/u noninvasive studies.   -- CTA runoff ordered  -- ASA 81mg qd  -- heparin drip ordered  -- vascular surgery following, appreciate recs  -- multimodal pain regimen    83 y/o F presenting for evaluation of several days b/l toe pain R>L as well as R toe discoloration. C/f claudication/ischemia given known PVD, though unknown etiology of acute worsening of her sxs. Vascular surgery consulted in the ED, no acute intervention at this time, though will f/u noninvasive studies.   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 vascular surgery consulted.  CTA runoff -. Extensive calcified and noncalcified atherosclerotic plaque results in peripheral vascular disease . There is at least two-vessel runoff to bilateral feet via the anterior and posterior tibial arteries. Attenuated flow within the peroneal artery bilaterally may reflect slow flow or distal occlusion.   started on ASA, start heparin gtt. X ray foot - No acute displaced fracture-dislocation identified. vascular surgery f/u -  possible embolization from Right femoral plaque - Recommend  ASA/plavix/statin.  heparin  discontinued. needs short term vascular surgeyr  f/u, if not improving/persistent symptoms then could consider endarterectomy.Leucocytosis trended down. Hb 7.2 on heparin drip. monitor. UA +, BC x2 pending. on  Zosyn. Endocrine  consulted for DM2 on omnipod insulin pump and dexcom. discussed with daughter. denies urinary symptoms. Patient has anterior and posterior vaginal wall prolapse -follows with urogynecology and uses pessary. pessary noted in vagiana per CT scan    83 y/o F presenting for evaluation of several days b/l toe pain R>L as well as R toe discoloration. C/f claudication/ischemia given known PVD, though unknown etiology of acute worsening of her sxs. Vascular surgery consulted in the ED, no acute intervention at this time, though will f/u noninvasive studies.     -- CTA runoff ordered  -- ASA 81mg qd  -- heparin drip ordered  -- vascular surgery following, appreciate recs  -- multimodal pain regimen    6/17  PVD, HFpEF, T2DM on insulin managed with CGM and insulin pump, and autoimmune hepatitis on chronic prednisone, presenting after recent admission for R toe pain, now with discoloration over the last 2-3 days. vascular surgery consulted.  CTA runoff . started on ASA and heparin gtt.  83 y/o F presenting for evaluation of several days b/l toe pain R>L as well as R toe discoloration. C/f claudication/ischemia given known PVD, though unknown etiology of acute worsening of her sxs. Vascular surgery consulted in the ED, no acute intervention at this time, though will f/u noninvasive studies.   -- CTA runoff ordered  -- ASA 81mg qd  -- heparin drip ordered  -- vascular surgery following, appreciate recs  -- multimodal pain regimen    83 y/o F presenting for evaluation of several days b/l toe pain R>L as well as R toe discoloration. C/f claudication/ischemia given known PVD, though unknown etiology of acute worsening of her sxs. Vascular surgery consulted in the ED, no acute intervention at this time, though will f/u noninvasive studies.   -- CTA runoff ordered  -- ASA 81mg qd  -- heparin drip ordered  -- vascular surgery following, appreciate recs  -- multimodal pain regimen    6/17 MHx of Stage IIIB adenocarcinoma of the RUL  with intralobar mets dx'd 7/5/24, s/p radiation therapy to the R lung/mediastinum, COPD not on O2, PVD, HFpEF, T2DM on insulin managed with CGM and insulin pump, and autoimmune hepatitis on chronic prednisone, presenting after recent admission for R toe pain, now with discoloration over the last 2-3 days. vascular surgery consulted.  CTA runoff -. Extensive calcified and noncalcified atherosclerotic plaque results in peripheral vascular disease . There is at least two-vessel runoff to bilateral feet via the anterior and posterior tibial arteries. Attenuated flow within the peroneal artery bilaterally may reflect slow flow or distal occlusion.   started on ASA, start heparin gtt. X ray foot - No acute displaced fracture-dislocation identified. vascular surgery f/u -  possible embolization from Right femoral plaque - Recommend  ASA/plavix/statin.  heparin  discontinued. needs short term vascular surgeyr  f/u, if not improving/persistent symptoms then could consider endarterectomy.Leucocytosis trended down. Hb 7.2 on heparin drip. monitor. UA +, BC x2 pending. on  Zosyn. Endocrine consulted for DM2 on omnipod insulin pump and dexcom. discussed with daughter. denies urinary symptoms. Patient has anterior and posterior vaginal wall prolapse -follows with urogynecology and uses pessary. pessary noted in vagiana per CT scan

## 2025-06-19 NOTE — PROGRESS NOTES
[facility]  Endocrinology  Progress Note    Admit Date: 2025     Reason for Consult: Management of T2DM, Hyperglycemia      Diabetes diagnosis year: > 5 years ago      Home Diabetes Medications:    Omnipod 5  Basal Rate  12A:  0.45 units/hr      Carb Ratio  12A:12  6A: 10  6P: 12     ISF  12A: 50      Target: 120  Correct above: 140     IAT: 4 hours        How often checking glucose at home? >4 x day Dexcom   BG readings on regimen: 100s-200s     Hypoglycemia on the regimen? No   Missed doses on regimen?  No     Diabetes Complications include:     Hyperglycemia     Complicating diabetes co morbidities:   Glucocorticoid use         HPI: Radha Feng is a 84 y.o. 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 She reports questionable subjective fevers and chills. No N/V/D or any other complaints. She states the foot pain got increasingly worse today prompting her to present for evaluation. Endocrine consulted for BG management.        Interval HPI:   Overnight events: No acute events overnight. Patient in room 629/629 A. Blood glucose stable. BG at goal on current insulin regimen (Home Insulin Pump). Steroid use- None.   Renal function-   Lab Results   Component Value Date    CREATININE 0.5 2025             Vasopressors-  None      Diet Consistent Carbohydrate 2000 Calories (up to 75 gm per meal)      Eatin%  Nausea: No  Hypoglycemia and intervention: No  Fever: No  TPN and/or TF: No    /62 (Patient Position: Lying)   Pulse 77   Temp 97.7 °F (36.5 °C) (Oral)   " Resp 18   Ht 5' 5" (1.651 m)   Wt 41.1 kg (90 lb 9.7 oz)   SpO2 100%   BMI 15.08 kg/m²     Labs Reviewed and Include    Recent Labs   Lab 06/19/25  0559   *   CALCIUM 8.4*   ALBUMIN 1.5*   PROT 5.7*      K 3.6   CO2 22*      BUN 11   CREATININE 0.5   ALKPHOS 92   ALT 10   AST 17   BILITOT 0.4     Lab Results   Component Value Date    WBC 16.78 (H) 06/19/2025    HGB 7.1 (L) 06/19/2025    HCT 23.5 (L) 06/19/2025    MCV 91 06/19/2025     (L) 06/19/2025     No results for input(s): "TSH", "FREET4" in the last 168 hours.  Lab Results   Component Value Date    HGBA1C 5.4 06/17/2025       Nutritional status:   Body mass index is 15.08 kg/m².  Lab Results   Component Value Date    ALBUMIN 1.5 (L) 06/19/2025    ALBUMIN 1.7 (L) 06/18/2025    ALBUMIN 1.5 (L) 06/17/2025     No results found for: "PREALBUMIN"    Estimated Creatinine Clearance: 55.3 mL/min (based on SCr of 0.5 mg/dL).    Accu-Checks  Recent Labs     06/17/25  1131 06/17/25  1701   POCTGLUCOSE 155* 116*       Current Medications and/or Treatments Impacting Glycemic Control  Immunotherapy:    Immunosuppressants       None          Steroids:   Hormones (From admission, onward)      Start     Stop Route Frequency Ordered    06/17/25 0900  predniSONE tablet 2.5 mg         -- Oral Daily 06/16/25 1930    06/16/25 2030  melatonin tablet 6 mg         -- Oral Nightly PRN 06/16/25 1930          Pressors:    Autonomic Drugs (From admission, onward)      None          Hyperglycemia/Diabetes Medications:   Antihyperglycemics (From admission, onward)      Start     Stop Route Frequency Ordered    06/17/25 1515  insulin aspart U-100 insulin pump from home        Question Answer Comment   Target number 120    Basal Rate #1 1    Basal rate #1 time 0000        -- SubQ Continuous 06/17/25 1408    06/17/25 1507  insulin aspart U-100 insulin pump from home 0-10 Units        Question Answer Comment   Target number 120    Carbohydrate coverage #1 10  "   Carbohydrate coverage #1 time 0000    Sensitivity #1 70    Sensitivity #1 time 0000        -- SubQ As needed (PRN) 06/17/25 1408            ASSESSMENT and PLAN    Endocrine  Insulin pump in place  At time of evaluation, pt meets criteria to continue home insulin pump usage.  - Has all adequate supplies   - Bolus settings reviewed    - No changes to home regimen.   - Nurse to check BG qac/hs/0200 & record in epic   - Patient to input glucose into pump and use bolus wizard for prandial needs   - Will continue to monitor accuchecks and titrate insulin as clinically indicated .     - Discussed above plan with patient, patient verbalized understanding.   - Understands in case of pump malfunction or cognitive decline in which pt can no longer safely use insulin pump, will transition to [facility] MDI        If pump malfunctions or is disconnected, please notify the on-call provider for endocrinology.         Type 2 diabetes mellitus with circulatory disorder, with long-term current use of insulin    BG goal: 140-180    - Continue home insulin pump   - POCT Glucose before meals, at bedtime and at 2 am  - Hypoglycemia protocol in place      ** Please notify Endocrine for any change and/or advance in diet**  ** Please call Endocrine for any BG related issues **     Discharge Planning:   TBD. Please notify endocrinology prior to discharge.      Orthopedic  * Toe pain  Managed per primary team            Candace Ambriz PA-C  Endocrinology  [facility]

## 2025-06-19 NOTE — ASSESSMENT & PLAN NOTE
Nutrition consulted. Most recent weight and BMI monitored-     Measurements:  Wt Readings from Last 1 Encounters:   06/17/25 41.1 kg (90 lb 9.7 oz)   Body mass index is 15.08 kg/m².    Patient has been screened and assessed by RD.    Malnutrition Type:  Context: chronic illness  Level: moderate    Malnutrition Characteristic Summary:  Weight Loss (Malnutrition): greater than 5% in 1 month (17% x 1 mo)  Energy Intake (Malnutrition): less than or equal to 50% for greater than or equal to 1 month  Subcutaneous Fat (Malnutrition): moderate depletion  Muscle Mass (Malnutrition): moderate depletion    Interventions/Recommendations (treatment strategy):  1. Continue consistent carbohydrate diet as tolerated   - please continue to document PO % intake via flowsheets   2. Recommend boost breeze orange TID and Joseluis TID  3. Encourage good intake    4. RD to monitor weight, labs, intake, tolerance

## 2025-06-19 NOTE — ASSESSMENT & PLAN NOTE
"Patient's FSGs are controlled on current medication regimen.  Last A1c reviewed-   Lab Results   Component Value Date    HGBA1C 5.4 06/17/2025     Most recent fingerstick glucose reviewed-   No results for input(s): "POCTGLUCOSE" in the last 24 hours.    Current correctional scale N/A  Maintain anti-hyperglycemic dose as follows-   Antihyperglycemics (From admission, onward)      Start     Stop Route Frequency Ordered    06/17/25 1515  insulin aspart U-100 insulin pump from home        Question Answer Comment   Target number 120    Basal Rate #1 1    Basal rate #1 time 0000        -- SubQ Continuous 06/17/25 1408    06/17/25 1507  insulin aspart U-100 insulin pump from home 0-10 Units        Question Answer Comment   Target number 120    Carbohydrate coverage #1 10    Carbohydrate coverage #1 time 0000    Sensitivity #1 70    Sensitivity #1 time 0000        -- SubQ As needed (PRN) 06/17/25 1408          -- Hold Oral hypoglycemics while patient is in the hospital.  -- patient with home insulin pump and sensor in place and functional.  -- Endocrinology consulted on admit for assistance with pump     Patient's FSGs are controlled on current medication regimen.  Last A1c reviewed-   Lab Results   Component Value Date    HGBA1C 5.4 06/17/2025     Most recent fingerstick glucose reviewed-   No results for input(s): "POCTGLUCOSE" in the last 24 hours.    Current correctional scale N/A  Maintain anti-hyperglycemic dose as follows-   Antihyperglycemics (From admission, onward)      Start     Stop Route Frequency Ordered    06/17/25 1515  insulin aspart U-100 insulin pump from home        Question Answer Comment   Target number 120    Basal Rate #1 1    Basal rate #1 time 0000        -- SubQ Continuous 06/17/25 1408    06/17/25 1507  insulin aspart U-100 insulin pump from home 0-10 Units        Question Answer Comment   Target number 120    Carbohydrate coverage #1 10    Carbohydrate coverage #1 time 0000    Sensitivity #1 70 " "   Sensitivity #1 time 0000        -- SubQ As needed (PRN) 06/17/25 1408          -- Hold Oral hypoglycemics while patient is in the hospital.  -- patient with home insulin pump and sensor in place and functional.  -- Endocrinology consulted on admit for assistance with pump     Patient's FSGs are controlled on current medication regimen.  Last A1c reviewed-   Lab Results   Component Value Date    HGBA1C 5.4 06/17/2025     Most recent fingerstick glucose reviewed-   No results for input(s): "POCTGLUCOSE" in the last 24 hours.    Current correctional scale N/A  Maintain anti-hyperglycemic dose as follows-   Antihyperglycemics (From admission, onward)      Start     Stop Route Frequency Ordered    06/17/25 1515  insulin aspart U-100 insulin pump from home        Question Answer Comment   Target number 120    Basal Rate #1 1    Basal rate #1 time 0000        -- SubQ Continuous 06/17/25 1408    06/17/25 1507  insulin aspart U-100 insulin pump from home 0-10 Units        Question Answer Comment   Target number 120    Carbohydrate coverage #1 10    Carbohydrate coverage #1 time 0000    Sensitivity #1 70    Sensitivity #1 time 0000        -- SubQ As needed (PRN) 06/17/25 1408          -- Hold Oral hypoglycemics while patient is in the hospital.  -- patient with home insulin pump and sensor in place and functional.  -- Endocrinology consulted on admit for assistance with pump     Patient's FSGs are controlled on current medication regimen.  Last A1c reviewed-   Lab Results   Component Value Date    HGBA1C 5.4 06/17/2025     Most recent fingerstick glucose reviewed-   No results for input(s): "POCTGLUCOSE" in the last 24 hours.    Current correctional scale N/A  Maintain anti-hyperglycemic dose as follows-   Antihyperglycemics (From admission, onward)      Start     Stop Route Frequency Ordered    06/17/25 1515  insulin aspart U-100 insulin pump from home        Question Answer Comment   Target number 120    Basal Rate #1 1  "   Basal rate #1 time 0000        -- SubQ Continuous 06/17/25 1408    06/17/25 1507  insulin aspart U-100 insulin pump from home 0-10 Units        Question Answer Comment   Target number 120    Carbohydrate coverage #1 10    Carbohydrate coverage #1 time 0000    Sensitivity #1 70    Sensitivity #1 time 0000        -- SubQ As needed (PRN) 06/17/25 1408          -- Hold Oral hypoglycemics while patient is in the hospital.  -- patient with home insulin pump and sensor in place and functional.  -- Endocrinology consulted on admit for assistance with pump

## 2025-06-19 NOTE — ASSESSMENT & PLAN NOTE
Malnutrition Type:  Context: chronic illness  Level: moderate    Related to (etiology):   Physiological causes resulting in anorexia or diminished intake    Signs and Symptoms (as evidenced by):   Wt loss 17% x 1 mo  <50% of estimated energy requirements> 1 mo    Malnutrition Characteristic Summary:  Weight Loss (Malnutrition): greater than 5% in 1 month (17% x 1 mo)  Energy Intake (Malnutrition): less than or equal to 50% for greater than or equal to 1 month  Subcutaneous Fat (Malnutrition): moderate depletion  Muscle Mass (Malnutrition): moderate depletion      Interventions/Recommendations (treatment strategy):  Collaboration with other providers  [facility]    Nutrition Diagnosis Status:   New

## 2025-06-19 NOTE — ASSESSMENT & PLAN NOTE
. Patient has anterior and posterior vaginal wall prolapse -follows with urogynecology and uses pessary. pessary noted in vagiana per CT scan  6/19  Gyn eval - Do not recommend removal of pessary at this time. UC NGTD.

## 2025-06-19 NOTE — ASSESSMENT & PLAN NOTE
83 y/o F presenting for evaluation of several days b/l toe pain R>L as well as R toe discoloration. C/f claudication/ischemia given known PVD, though unknown etiology of acute worsening of her sxs. Vascular surgery consulted in the ED, no acute intervention at this time, though will f/u noninvasive studies.     -- CTA runoff ordered  -- ASA 81mg qd  -- heparin drip ordered  -- vascular surgery following, appreciate recs  -- multimodal pain regimen    6/17  PVD, HFpEF, T2DM on insulin managed with CGM and insulin pump, and autoimmune hepatitis on chronic prednisone, presenting after recent admission for R toe pain, now with discoloration over the last 2-3 days. vascular surgery consulted.  CTA runoff . started on ASA and heparin gtt.  83 y/o F presenting for evaluation of several days b/l toe pain R>L as well as R toe discoloration. C/f claudication/ischemia given known PVD, though unknown etiology of acute worsening of her sxs. Vascular surgery consulted in the ED, no acute intervention at this time, though will f/u noninvasive studies.   -- CTA runoff ordered  -- ASA 81mg qd  -- heparin drip ordered  -- vascular surgery following, appreciate recs  -- multimodal pain regimen    83 y/o F presenting for evaluation of several days b/l toe pain R>L as well as R toe discoloration. C/f claudication/ischemia given known PVD, though unknown etiology of acute worsening of her sxs. Vascular surgery consulted in the ED, no acute intervention at this time, though will f/u noninvasive studies.   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 vascular surgery consulted.  CTA runoff -. Extensive calcified and noncalcified atherosclerotic plaque results in peripheral vascular disease . There is at least two-vessel runoff to bilateral feet via the anterior and posterior tibial arteries. Attenuated flow within the peroneal artery bilaterally may reflect slow flow or distal occlusion.   started on ASA, start heparin gtt. X ray foot - No acute displaced fracture-dislocation identified. vascular surgery f/u -  possible embolization from Right femoral plaque - Recommend  ASA/plavix/statin.  heparin  discontinued. needs short term vascular surgeyr  f/u, if not improving/persistent symptoms then could consider endarterectomy.Leucocytosis trended down. Hb 7.2 on heparin drip. monitor. UA +, BC x2 pending. on  Zosyn. Endocrine consulted for DM2 on omnipod insulin pump and dexcom. discussed with daughter. denies urinary symptoms. Patient has anterior and posterior vaginal wall prolapse -follows with urogynecology and uses pessary. pessary noted in vagiana per CT scan    83 y/o F presenting for evaluation of several days b/l toe pain R>L as well as R toe discoloration. C/f claudication/ischemia given known PVD, though unknown etiology of acute worsening of her sxs. Vascular surgery consulted in the ED, no acute intervention at this time, though will f/u noninvasive studies.     -- CTA runoff ordered  -- ASA 81mg qd  -- heparin drip ordered  -- vascular surgery following, appreciate recs  -- multimodal pain regimen    6/17  PVD, HFpEF, T2DM on insulin managed with CGM and insulin pump, and autoimmune hepatitis on chronic prednisone, presenting after recent admission for R toe pain, now with discoloration over the last 2-3 days. vascular surgery consulted.  CTA runoff . started on ASA and heparin gtt.  83 y/o F presenting for evaluation of several days b/l toe pain R>L as well as R toe discoloration. C/f  claudication/ischemia given known PVD, though unknown etiology of acute worsening of her sxs. Vascular surgery consulted in the ED, no acute intervention at this time, though will f/u noninvasive studies.   -- CTA runoff ordered  -- ASA 81mg qd  -- heparin drip ordered  -- vascular surgery following, appreciate recs  -- multimodal pain regimen    83 y/o F presenting for evaluation of several days b/l toe pain R>L as well as R toe discoloration. C/f claudication/ischemia given known PVD, though unknown etiology of acute worsening of her sxs. Vascular surgery consulted in the ED, no acute intervention at this time, though will f/u noninvasive studies.   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 vascular surgery consulted.  CTA runoff -. Extensive calcified and noncalcified atherosclerotic plaque results in peripheral vascular disease . There is at least two-vessel runoff to bilateral feet via the anterior and posterior tibial arteries. Attenuated flow within the peroneal artery bilaterally may reflect slow flow or distal occlusion.   started on ASA, start heparin gtt. X ray foot - No acute displaced fracture-dislocation identified. vascular surgery f/u -  possible embolization from Right femoral plaque - Recommend  ASA/plavix/statin.  heparin  discontinued. needs short term vascular surgeyr  f/u, if not improving/persistent symptoms then could consider endarterectomy.Leucocytosis trended down. Hb 7.2 on heparin drip. monitor. UA +, BC x2 pending. on  Zosyn. Endocrine  consulted for DM2 on omnipod insulin pump and dexcom. discussed with daughter. denies urinary symptoms. Patient has anterior and posterior vaginal wall prolapse -follows with urogynecology and uses pessary. pessary noted in vagiana per CT scan    83 y/o F presenting for evaluation of several days b/l toe pain R>L as well as R toe discoloration. C/f claudication/ischemia given known PVD, though unknown etiology of acute worsening of her sxs. Vascular surgery consulted in the ED, no acute intervention at this time, though will f/u noninvasive studies.     -- CTA runoff ordered  -- ASA 81mg qd  -- heparin drip ordered  -- vascular surgery following, appreciate recs  -- multimodal pain regimen    6/17  PVD, HFpEF, T2DM on insulin managed with CGM and insulin pump, and autoimmune hepatitis on chronic prednisone, presenting after recent admission for R toe pain, now with discoloration over the last 2-3 days. vascular surgery consulted.  CTA runoff . started on ASA and heparin gtt.  83 y/o F presenting for evaluation of several days b/l toe pain R>L as well as R toe discoloration. C/f claudication/ischemia given known PVD, though unknown etiology of acute worsening of her sxs. Vascular surgery consulted in the ED, no acute intervention at this time, though will f/u noninvasive studies.   -- CTA runoff ordered  -- ASA 81mg qd  -- heparin drip ordered  -- vascular surgery following, appreciate recs  -- multimodal pain regimen    83 y/o F presenting for evaluation of several days b/l toe pain R>L as well as R toe discoloration. C/f claudication/ischemia given known PVD, though unknown etiology of acute worsening of her sxs. Vascular surgery consulted in the ED, no acute intervention at this time, though will f/u noninvasive studies.   -- CTA runoff ordered  -- ASA 81mg qd  -- heparin drip ordered  -- vascular surgery following, appreciate recs  -- multimodal pain regimen    6/17 MHx of Stage IIIB adenocarcinoma of the RUL  with intralobar mets dx'd 7/5/24, s/p radiation therapy to the R lung/mediastinum, COPD not on O2, PVD, HFpEF, T2DM on insulin managed with CGM and insulin pump, and autoimmune hepatitis on chronic prednisone, presenting after recent admission for R toe pain, now with discoloration over the last 2-3 days. vascular surgery consulted.  CTA runoff -. Extensive calcified and noncalcified atherosclerotic plaque results in peripheral vascular disease . There is at least two-vessel runoff to bilateral feet via the anterior and posterior tibial arteries. Attenuated flow within the peroneal artery bilaterally may reflect slow flow or distal occlusion.   started on ASA, start heparin gtt. X ray foot - No acute displaced fracture-dislocation identified. vascular surgery f/u -  possible embolization from Right femoral plaque - Recommend  ASA/plavix/statin.  heparin  discontinued. needs short term vascular surgeyr  f/u, if not improving/persistent symptoms then could consider endarterectomy.Leucocytosis trended down. Hb 7.2 on heparin drip. monitor. UA +, BC x2 pending. on  Zosyn. Endocrine consulted for DM2 on omnipod insulin pump and dexcom. discussed with daughter. denies urinary symptoms. Patient has anterior and posterior vaginal wall prolapse -follows with urogynecology and uses pessary. pessary noted in vagiana per CT scan

## 2025-06-19 NOTE — CONSULTS
[facility]  Infectious Disease  Consult Note    Patient Name: Radha Feng  MRN: [id number]  Admission Date: 6/16/2025  Hospital Length of Stay: 2 days  Attending Physician: Dar Hernandez MD  Primary Care Provider: Leticia Lim MD     Isolation Status: Airborne and Contact and Droplet    Patient information was obtained from patient, past medical records, and ER records.      Inpatient consult to Infectious Diseases  Consult performed by: Campos Ro MD  Consult ordered by: Dar Hernandez MD        Assessment/Plan:     ID  SIRS (systemic inflammatory response syndrome)  84 year old female with a history of COPD, PVD, HFpEF, aortic and coronary atherosclerosis, urinary incontinence, T2DM, osteroporosis, and autoimmune hepatitis on chronic prednisone and metastatic lung cancer s/p radiation therapy.  She had a recent hospital admission secondary to COVID infection and has been on supplemental oxygen (2L) at home ever since.  She is admitted to the hospital now with ischemic changes to her toes.  ID is consulted to assist with her ongoing leukocytosis.      Differential for her ongoing leukocytosis includes tissue ischemia due to peripheral artery disease vs post-obstructive pneumonia vs ongoing use of prednisone (less likely).      Plan  Check non contrast CT scan of chest to assess for infection.  Okay to hold off on further antibiotics for now.    Oncology  Leukocytosis  See plan for SIRS.    Adenocarcinoma of right lung  Management per heme/onc        Thank you for your consult. I will follow-up with patient. Please contact us if you have any additional questions.    Campos Ro MD  Infectious Disease  [facility]    Time: 75 minutes   50% of time spent on face-to-face counseling and coordination of care. Counseling included review of test results, diagnosis, and treatment plan with patient and/or family.  I have reviewed hospital notes from  service and other  specialty providers as well as outside medical records. I have also reviewed CBC, CMP/BMP,  cultures and imaging with my interpretation as documented. Patient is high risk of morbidity, on antibiotics requiring intensive monitoring for toxicity.       Subjective:     Principal Problem: Toe pain    HPI: 84 year old female with a history of COPD, PVD, HFpEF, aortic and coronary atherosclerosis, urinary incontinence, T2DM, osteroporosis, and autoimmune hepatitis on chronic prednisone and metastatic lung cancer s/p radiation therapy.  She had a recent hospital admission secondary to COVID infection and has been on supplemental oxygen (2L) at home ever since.  She is admitted to the hospital now with significant pain to her bilateral toes.  Her toes were erythematous and cold to touch.  CTA abdomen with runoff showed extensive calcified and non-calcified plaques and some peripheral artery disease.  Patient has been started on plavix.  ID is consulted because the patient has had a leukocytosis since early may.  On admission, her WBC count was slightly higher.  ID is consulted for evaluation.    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    Diabetes mellitus     Glaucoma     Hypertension     Lung cancer     Other ascites 11/29/2022       Past Surgical History:   Procedure Laterality Date    CATARACT EXTRACTION W/  INTRAOCULAR LENS IMPLANT Left 12/21/2021        ENDOBRONCHIAL ULTRASOUND N/A 07/05/2024    Procedure: ENDOBRONCHIAL ULTRASOUND (EBUS);  Surgeon: Rolo Wilkinson MD;  Location: [facility] OR [address] [place] [address];  Service: Pulmonary;  Laterality: N/A;    ESOPHAGOGASTRODUODENOSCOPY N/A 06/26/2023    Procedure: ESOPHAGOGASTRODUODENOSCOPY (EGD);  Surgeon: Edgar Branch MD;  Location: [facility] (4TH FLR);  Service: Endoscopy;  Laterality: N/A;  referral Dr Peraza-cirrhosis/labs done on 4/24/23-instr portal-GT       Review of patient's allergies indicates:  No Known  Allergies    Medications:  Medications Prior to Admission   Medication Sig    acetaminophen (TYLENOL) 500 MG tablet Take 2 tablets (1,000 mg total) by mouth every 8 (eight) hours as needed for Pain.    BD INSULIN SYRINGE ULTRA-FINE 1 mL 31 gauge x 5/16 Syrg     blood-glucose meter,continuous Misc Dispsense 1 Dexcom G7     blood-glucose transmitter (DEXCOM G6 TRANSMITTER) Amanda 1 each by Misc.(Non-Drug; Combo Route) route every 3 (three) months. (Patient not taking: Reported on 2025)    brimonidine 0.2% (ALPHAGAN) 0.2 % Drop Place 1 drop into both eyes 3 (three) times daily.    cholecalciferol, vitamin D3, (VITAMIN D3) 25 mcg (1,000 unit) capsule Take 2 capsules (2,000 Units total) by mouth once daily.    DEXCOM G7 SENSOR Amanda 1 Device by Misc.(Non-Drug; Combo Route) route every 10 days.    dorzolamide-timolol 2-0.5% (COSOPT) 22.3-6.8 mg/mL ophthalmic solution Place 1 drop into both eyes 2 (two) times daily.    furosemide (LASIX) 40 MG tablet TAKE 1 TABLET BY MOUTH EVERY DAY    insulin aspart U-100 (NOVOLOG U-100 INSULIN ASPART) 100 unit/mL injection TO USE WITH OMNIPOD 5. TOTAL DAILY DOSE UP TO 40 UNITS    insulin pump cart,automated,BT (OMNIPOD 5 G6 PODS, GEN 5,) Crtg Inject 1 each into the skin Every 3 (three) days.    latanoprost 0.005 % ophthalmic solution PLACE 1 DROP INTO BOTH EYES ONCE DAILY    LIDOcaine (LIDODERM) 5 % Place 1 patch onto the skin once daily. Remove & Discard patch within 12 hours or as directed by MD    melatonin (MELATIN) 3 mg tablet Take 2 tablets (6 mg total) by mouth nightly as needed for Insomnia.    nystatin (MYCOSTATIN) 100,000 unit/mL suspension SWISH WITH 6MLS BY MOUTH THEN SWALLOW 4 TIMES A DAY FOR 14 DAYS    OMNIPOD 5 G6-G7 PODS, GEN 5, Crtg SMARTSI Each SUB-Q Once a Month (Patient not taking: Reported on 2025)    oxyCODONE (ROXICODONE) 5 MG immediate release tablet Take 0.5 tablets (2.5 mg total) by mouth every 6 (six) hours as needed for Pain.    pen needle,  "diabetic 31 gauge x 5/16" Ndle Use to inject insulin into the skin up to 4 times daily    predniSONE (DELTASONE) 5 MG tablet Take 0.5 tablets (2.5 mg total) by mouth once daily.    tobramycin sulfate 0.3% (TOBREX) 0.3 % ophthalmic solution 1-2 drops topically twice daily to affected toe(s).     Antibiotics (From admission, onward)      None          Antifungals (From admission, onward)      None          Antivirals (From admission, onward)      None             Immunization History   Administered Date(s) Administered    COVID-19, MRNA, LN-S, PF (Pfizer) (Purple Cap) 01/10/2021, 01/31/2021, 10/16/2021    Influenza (FLUAD) - Quadrivalent - Adjuvanted - PF *Preferred* (65+) 01/26/2022    Influenza - Quadrivalent - High Dose - PF (65 years and older) 11/30/2020    Influenza - Trivalent - Fluzone High Dose - PF (65 years and older) 11/02/2017, 11/14/2019    Pneumococcal Conjugate - 13 Valent 11/02/2017    Pneumococcal Polysaccharide - 23 Valent 11/14/2019    Tdap 06/28/2021       Family History       Problem Relation (Age of Onset)    Blindness Cousin    Cataracts Mother    Colon cancer Sister    Diabetes Mother    Glaucoma Mother    Heart attack Father    Hypertension Mother          Social History     Socioeconomic History    Marital status: Single   Tobacco Use    Smoking status: Former    Smokeless tobacco: Never   Substance and Sexual Activity    Alcohol use: Not Currently    Drug use: Never   Social History Narrative    , one daughter local, four sons out of state. Retired . Lives with daughter Joss.     Social Drivers of Health     Financial Resource Strain: Low Risk  (6/17/2025)    Overall Financial Resource Strain (CARDIA)     Difficulty of Paying Living Expenses: Not hard at all   Food Insecurity: No Food Insecurity (6/17/2025)    Hunger Vital Sign     Worried About Running Out of Food in the Last Year: Never true     Ran Out of Food in the Last Year: Never true   Transportation Needs: No " Transportation Needs (6/17/2025)    PRAPARE - Transportation     Lack of Transportation (Medical): No     Lack of Transportation (Non-Medical): No   Physical Activity: Inactive (6/17/2025)    Exercise Vital Sign     Days of Exercise per Week: 0 days     Minutes of Exercise per Session: 0 min   Stress: Patient Declined (6/17/2025)    [facility] of Occupational Health - Occupational Stress Questionnaire     Feeling of Stress : Patient declined   Recent Concern: Stress - Stress Concern Present (6/3/2025)    [facility] of Occupational Health - Occupational Stress Questionnaire     Feeling of Stress : To some extent   Housing Stability: Low Risk  (6/17/2025)    Housing Stability Vital Sign     Unable to Pay for Housing in the Last Year: No     Homeless in the Last Year: No     Review of Systems   Respiratory:  Positive for shortness of breath.    Musculoskeletal:  Positive for arthralgias.   Skin:  Positive for color change.   All other systems reviewed and are negative.    Objective:     Vital Signs (Most Recent):  Temp: 98.6 °F (37 °C) (06/18/25 2021)  Pulse: 71 (06/18/25 2021)  Resp: 19 (06/18/25 2106)  BP: (!) 121/59 (06/18/25 2021)  SpO2: 100 % (06/18/25 2021) Vital Signs (24h Range):  Temp:  [97.4 °F (36.3 °C)-98.6 °F (37 °C)] 98.6 °F (37 °C)  Pulse:  [59-78] 71  Resp:  [16-20] 19  SpO2:  [98 %-100 %] 100 %  BP: (113-144)/(52-69) 121/59     Weight: 41.1 kg (90 lb 9.7 oz)  Body mass index is 15.08 kg/m².    Estimated Creatinine Clearance: 55.3 mL/min (based on SCr of 0.5 mg/dL).     Physical Exam  Vitals and nursing note reviewed.   Constitutional:       Appearance: Normal appearance.   HENT:      Head: Normocephalic and atraumatic.   Cardiovascular:      Rate and Rhythm: Normal rate and regular rhythm.   Musculoskeletal:         General: Tenderness (bilateral toes) present.      Comments: Right shoulder hyperpigmented mass.   Skin:     Findings: Erythema (bilateral toes) present.   Neurological:       General: No focal deficit present.      Mental Status: She is alert and oriented to person, place, and time.          Significant Labs:   Microbiology Results (last 7 days)       Procedure Component Value Units Date/Time    Blood culture x two cultures. Draw prior to antibiotics. [[id number]]  (Normal) Collected: 06/16/25 1616    Order Status: Completed Specimen: Blood from Peripheral, Antecubital, Right Updated: 06/18/25 1902     Blood Culture No Growth After 48 Hours    Blood culture x two cultures. Draw prior to antibiotics. [[id number]]  (Normal) Collected: 06/16/25 1616    Order Status: Completed Specimen: Blood from Peripheral, Forearm, Right Updated: 06/18/25 1902     Blood Culture No Growth After 48 Hours    Urine culture [[id number]] Collected: 06/16/25 2255    Order Status: Completed Specimen: Urine Updated: 06/18/25 1437     Urine Culture No Significant Growth    MRSA Screen by PCR [[id number]]     Order Status: Sent Specimen: Nasal Swab             Significant Imaging: I have reviewed all pertinent imaging results/findings within the past 24 hours.

## 2025-06-20 ENCOUNTER — PATIENT MESSAGE (OUTPATIENT)
Dept: TRANSPLANT | Facility: CLINIC | Age: 85
End: 2025-06-20
Payer: MEDICARE

## 2025-06-20 ENCOUNTER — PATIENT MESSAGE (OUTPATIENT)
Dept: HEPATOLOGY | Facility: CLINIC | Age: 85
End: 2025-06-20
Payer: MEDICARE

## 2025-06-20 ENCOUNTER — TELEPHONE (OUTPATIENT)
Dept: HEPATOLOGY | Facility: CLINIC | Age: 85
End: 2025-06-20
Payer: MEDICARE

## 2025-06-20 VITALS
RESPIRATION RATE: 18 BRPM | DIASTOLIC BLOOD PRESSURE: 56 MMHG | OXYGEN SATURATION: 100 % | TEMPERATURE: 98 F | SYSTOLIC BLOOD PRESSURE: 112 MMHG | WEIGHT: 90.63 LBS | HEART RATE: 79 BPM | BODY MASS INDEX: 15.1 KG/M2 | HEIGHT: 65 IN

## 2025-06-20 DIAGNOSIS — K75.4 AUTOIMMUNE HEPATITIS: Primary | ICD-10-CM

## 2025-06-20 LAB
ABSOLUTE EOSINOPHIL (OHS): 0.45 K/UL
ABSOLUTE MONOCYTE (OHS): 1.32 K/UL (ref 0.3–1)
ABSOLUTE NEUTROPHIL COUNT (OHS): 15 K/UL (ref 1.8–7.7)
ALBUMIN SERPL BCP-MCNC: 1.5 G/DL (ref 3.5–5.2)
ALP SERPL-CCNC: 96 UNIT/L (ref 40–150)
ALT SERPL W/O P-5'-P-CCNC: 10 UNIT/L (ref 10–44)
ANION GAP (OHS): 8 MMOL/L (ref 8–16)
AST SERPL-CCNC: 19 UNIT/L (ref 11–45)
BASOPHILS # BLD AUTO: 0.03 K/UL
BASOPHILS NFR BLD AUTO: 0.2 %
BILIRUB SERPL-MCNC: 0.4 MG/DL (ref 0.1–1)
BUN SERPL-MCNC: 11 MG/DL (ref 8–23)
CALCIUM SERPL-MCNC: 8.7 MG/DL (ref 8.7–10.5)
CHLORIDE SERPL-SCNC: 107 MMOL/L (ref 95–110)
CO2 SERPL-SCNC: 23 MMOL/L (ref 23–29)
CREAT SERPL-MCNC: 0.5 MG/DL (ref 0.5–1.4)
ERYTHROCYTE [DISTWIDTH] IN BLOOD BY AUTOMATED COUNT: 19.9 % (ref 11.5–14.5)
GFR SERPLBLD CREATININE-BSD FMLA CKD-EPI: >60 ML/MIN/1.73/M2
GLUCOSE SERPL-MCNC: 195 MG/DL (ref 70–110)
HCT VFR BLD AUTO: 23.4 % (ref 37–48.5)
HGB BLD-MCNC: 7.1 GM/DL (ref 12–16)
IMM GRANULOCYTES # BLD AUTO: 0.11 K/UL (ref 0–0.04)
IMM GRANULOCYTES NFR BLD AUTO: 0.6 % (ref 0–0.5)
LYMPHOCYTES # BLD AUTO: 0.97 K/UL (ref 1–4.8)
MAGNESIUM SERPL-MCNC: 1.9 MG/DL (ref 1.6–2.6)
MCH RBC QN AUTO: 28 PG (ref 27–31)
MCHC RBC AUTO-ENTMCNC: 30.3 G/DL (ref 32–36)
MCV RBC AUTO: 92 FL (ref 82–98)
NUCLEATED RBC (/100WBC) (OHS): 0 /100 WBC
PLATELET # BLD AUTO: 178 K/UL (ref 150–450)
PMV BLD AUTO: 11.1 FL (ref 9.2–12.9)
POTASSIUM SERPL-SCNC: 4.2 MMOL/L (ref 3.5–5.1)
PROT SERPL-MCNC: 6.1 GM/DL (ref 6–8.4)
RBC # BLD AUTO: 2.54 M/UL (ref 4–5.4)
RELATIVE EOSINOPHIL (OHS): 2.5 %
RELATIVE LYMPHOCYTE (OHS): 5.4 % (ref 18–48)
RELATIVE MONOCYTE (OHS): 7.4 % (ref 4–15)
RELATIVE NEUTROPHIL (OHS): 83.9 % (ref 38–73)
SODIUM SERPL-SCNC: 138 MMOL/L (ref 136–145)
WBC # BLD AUTO: 17.88 K/UL (ref 3.9–12.7)

## 2025-06-20 PROCEDURE — 84132 ASSAY OF SERUM POTASSIUM: CPT

## 2025-06-20 PROCEDURE — 25000003 PHARM REV CODE 250

## 2025-06-20 PROCEDURE — 85025 COMPLETE CBC W/AUTO DIFF WBC: CPT

## 2025-06-20 PROCEDURE — 27000221 HC OXYGEN, UP TO 24 HOURS

## 2025-06-20 PROCEDURE — 36415 COLL VENOUS BLD VENIPUNCTURE: CPT

## 2025-06-20 PROCEDURE — 25000003 PHARM REV CODE 250: Performed by: HOSPITALIST

## 2025-06-20 PROCEDURE — 94761 N-INVAS EAR/PLS OXIMETRY MLT: CPT

## 2025-06-20 PROCEDURE — 83735 ASSAY OF MAGNESIUM: CPT

## 2025-06-20 PROCEDURE — 63600175 PHARM REV CODE 636 W HCPCS

## 2025-06-20 PROCEDURE — 99900035 HC TECH TIME PER 15 MIN (STAT)

## 2025-06-20 RX ORDER — NAPROXEN SODIUM 220 MG/1
81 TABLET, FILM COATED ORAL DAILY
Qty: 30 TABLET | Refills: 0 | Status: SHIPPED | OUTPATIENT
Start: 2025-06-21 | End: 2026-06-21

## 2025-06-20 RX ORDER — OXYCODONE HYDROCHLORIDE 5 MG/1
2.5 TABLET ORAL EVERY 6 HOURS PRN
Qty: 10 TABLET | Refills: 0 | Status: SHIPPED | OUTPATIENT
Start: 2025-06-20

## 2025-06-20 RX ORDER — ATORVASTATIN CALCIUM 10 MG/1
10 TABLET, FILM COATED ORAL DAILY
Qty: 90 TABLET | Refills: 3 | Status: SHIPPED | OUTPATIENT
Start: 2025-06-21 | End: 2026-06-21

## 2025-06-20 RX ORDER — LIDOCAINE 50 MG/G
1 PATCH TOPICAL DAILY
Qty: 30 PATCH | Refills: 0 | Status: SHIPPED | OUTPATIENT
Start: 2025-06-20

## 2025-06-20 RX ORDER — CLOPIDOGREL BISULFATE 75 MG/1
75 TABLET ORAL DAILY
Qty: 30 TABLET | Refills: 11 | Status: SHIPPED | OUTPATIENT
Start: 2025-06-21 | End: 2026-06-21

## 2025-06-20 RX ADMIN — PREDNISONE 2.5 MG: 2.5 TABLET ORAL at 09:06

## 2025-06-20 RX ADMIN — ACETAMINOPHEN 1000 MG: 500 TABLET ORAL at 09:06

## 2025-06-20 RX ADMIN — DORZOLAMIDE HYDROCHLORIDE AND TIMOLOL MALEATE 1 DROP: 20; 5 SOLUTION OPHTHALMIC at 09:06

## 2025-06-20 RX ADMIN — ASPIRIN 81 MG CHEWABLE TABLET 81 MG: 81 TABLET CHEWABLE at 09:06

## 2025-06-20 RX ADMIN — CLOPIDOGREL BISULFATE 75 MG: 75 TABLET, FILM COATED ORAL at 09:06

## 2025-06-20 RX ADMIN — BRIMONIDINE TARTRATE 1 DROP: 2 SOLUTION OPHTHALMIC at 02:06

## 2025-06-20 RX ADMIN — ACETAMINOPHEN 1000 MG: 500 TABLET ORAL at 12:06

## 2025-06-20 RX ADMIN — ATORVASTATIN CALCIUM 10 MG: 10 TABLET, FILM COATED ORAL at 09:06

## 2025-06-20 RX ADMIN — LATANOPROST 1 DROP: 50 SOLUTION OPHTHALMIC at 09:06

## 2025-06-20 RX ADMIN — BRIMONIDINE TARTRATE 1 DROP: 2 SOLUTION OPHTHALMIC at 09:06

## 2025-06-20 NOTE — PROGRESS NOTES
[facility]  Infectious Disease  Progress Note    Patient Name: Radha Feng  MRN: [id number]  Admission Date: 6/16/2025  Length of Stay: 3 days  Attending Physician: Dar Hernandez MD  Primary Care Provider: Leticia Lim MD    Isolation Status: Airborne and Contact and Droplet  Assessment/Plan:      ID  SIRS (systemic inflammatory response syndrome)  84 year old female with a history of COPD, PVD, HFpEF, aortic and coronary atherosclerosis, urinary incontinence, T2DM, osteroporosis, and autoimmune hepatitis on chronic prednisone and metastatic lung cancer s/p radiation therapy.  She had a recent hospital admission secondary to COVID infection and has been on supplemental oxygen (2L) at home ever since.  She is admitted to the hospital now with ischemic changes to her toes.  ID is consulted to assist with her ongoing leukocytosis.      CT scan is negative for infection but shows progression of her cancer. WBC count is generally bouncing around but has been elevated for several weeks.    Plan  No additional testing.  No antibiotics planned.    ID will sign off.        Anticipated Disposition: TBD    Thank you for your consult. I will sign off. Please contact us if you have any additional questions.    Campos Ro MD  Infectious Disease  [facility] Surg    Time: 50 minutes   50% of time spent on face-to-face counseling and coordination of care. Counseling included review of test results, diagnosis, and treatment plan with patient and/or family.  I have reviewed hospital notes from hospital medicine and other specialty providers as well as outside medical records. I have also reviewed CBC, CMP/BMP,  cultures and imaging with my interpretation as documented. Patient is high risk of morbidity, on antibiotics requiring intensive monitoring for toxicity.       Subjective:     Principal Problem:Toe pain    HPI: 84 year old female with a history of COPD, PVD, HFpEF, aortic and coronary  atherosclerosis, urinary incontinence, T2DM, osteroporosis, and autoimmune hepatitis on chronic prednisone and metastatic lung cancer s/p radiation therapy.  She had a recent hospital admission secondary to COVID infection and has been on supplemental oxygen (2L) at home ever since.  She is admitted to the hospital now with significant pain to her bilateral toes.  Her toes were erythematous and cold to touch.  CTA abdomen with runoff showed extensive calcified and non-calcified plaques and some peripheral artery disease.  Patient has been started on plavix.  ID is consulted because the patient has had a leukocytosis since early may.  On admission, her WBC count was slightly higher.  ID is consulted for evaluation.  Interval History: No adverse events.    Review of Systems   Respiratory:  Positive for shortness of breath.    Musculoskeletal:  Positive for arthralgias.   Skin:  Positive for color change.   All other systems reviewed and are negative.    Objective:     Vital Signs (Most Recent):  Temp: 98.8 °F (37.1 °C) (06/19/25 2022)  Pulse: 76 (06/19/25 2022)  Resp: 18 (06/19/25 1608)  BP: 129/65 (06/19/25 2022)  SpO2: 99 % (06/19/25 2022) Vital Signs (24h Range):  Temp:  [97.3 °F (36.3 °C)-98.8 °F (37.1 °C)] 98.8 °F (37.1 °C)  Pulse:  [70-82] 76  Resp:  [18] 18  SpO2:  [99 %-100 %] 99 %  BP: (121-138)/(60-80) 129/65     Weight: 41.1 kg (90 lb 9.7 oz)  Body mass index is 15.08 kg/m².    Estimated Creatinine Clearance: 55.3 mL/min (based on SCr of 0.5 mg/dL).     Physical Exam  Vitals and nursing note reviewed.   Constitutional:       Appearance: Normal appearance.   HENT:      Head: Normocephalic and atraumatic.   Cardiovascular:      Rate and Rhythm: Normal rate and regular rhythm.   Musculoskeletal:         General: Tenderness (bilateral toes) present.      Comments: Right shoulder hyperpigmented mass.   Skin:     Findings: Erythema (bilateral toes) present.   Neurological:      General: No focal deficit present.       Mental Status: She is alert and oriented to person, place, and time.          Significant Labs:   Microbiology Results (last 7 days)       Procedure Component Value Units Date/Time    Blood culture x two cultures. Draw prior to antibiotics. [[id number]]  (Normal) Collected: 06/16/25 1616    Order Status: Completed Specimen: Blood from Peripheral, Antecubital, Right Updated: 06/19/25 1901     Blood Culture No Growth After 72 Hours    Blood culture x two cultures. Draw prior to antibiotics. [[id number]]  (Normal) Collected: 06/16/25 1616    Order Status: Completed Specimen: Blood from Peripheral, Forearm, Right Updated: 06/19/25 1901     Blood Culture No Growth After 72 Hours    Urine culture [[id number]] Collected: 06/16/25 2255    Order Status: Completed Specimen: Urine Updated: 06/18/25 1437     Urine Culture No Significant Growth    MRSA Screen by PCR [[id number]]     Order Status: Sent Specimen: Nasal Swab             Significant Imaging: I have reviewed all pertinent imaging results/findings within the past 24 hours.

## 2025-06-20 NOTE — ASSESSMENT & PLAN NOTE
85 y/o F presenting for evaluation of several days b/l toe pain R>L as well as R toe discoloration. C/f claudication/ischemia given known PVD, though unknown etiology of acute worsening of her sxs. Vascular surgery consulted in the ED, no acute intervention at this time, though will f/u noninvasive studies.     -- CTA runoff ordered  -- ASA 81mg qd  -- heparin drip ordered  -- vascular surgery following, appreciate recs  -- multimodal pain regimen    6/17  PVD, HFpEF, T2DM on insulin managed with CGM and insulin pump, and autoimmune hepatitis on chronic prednisone, presenting after recent admission for R toe pain, now with discoloration over the last 2-3 days. vascular surgery consulted.  CTA runoff . started on ASA and heparin gtt.  85 y/o F presenting for evaluation of several days b/l toe pain R>L as well as R toe discoloration. C/f claudication/ischemia given known PVD, though unknown etiology of acute worsening of her sxs. Vascular surgery consulted in the ED, no acute intervention at this time, though will f/u noninvasive studies.   -- CTA runoff ordered  -- ASA 81mg qd  -- heparin drip ordered  -- vascular surgery following, appreciate recs  -- multimodal pain regimen    85 y/o F presenting for evaluation of several days b/l toe pain R>L as well as R toe discoloration. C/f claudication/ischemia given known PVD, though unknown etiology of acute worsening of her sxs. Vascular surgery consulted in the ED, no acute intervention at this time, though will f/u noninvasive studies.   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 vascular surgery consulted.  CTA runoff -. Extensive calcified and noncalcified atherosclerotic plaque results in peripheral vascular disease . There is at least two-vessel runoff to bilateral feet via the anterior and posterior tibial arteries. Attenuated flow within the peroneal artery bilaterally may reflect slow flow or distal occlusion.   started on ASA, start heparin gtt. X ray foot - No acute displaced fracture-dislocation identified. vascular surgery f/u -  possible embolization from Right femoral plaque - Recommend  ASA/plavix/statin.  heparin  discontinued. needs short term vascular surgeyr  f/u, if not improving/persistent symptoms then could consider endarterectomy.Leucocytosis trended down. Hb 7.2 on heparin drip. monitor. UA +, BC x2 pending. on  Zosyn. Endocrine consulted for DM2 on omnipod insulin pump and dexcom. discussed with daughter. denies urinary symptoms. Patient has anterior and posterior vaginal wall prolapse -follows with urogynecology and uses pessary. pessary noted in vagiana per CT scan    83 y/o F presenting for evaluation of several days b/l toe pain R>L as well as R toe discoloration. C/f claudication/ischemia given known PVD, though unknown etiology of acute worsening of her sxs. Vascular surgery consulted in the ED, no acute intervention at this time, though will f/u noninvasive studies.     -- CTA runoff ordered  -- ASA 81mg qd  -- heparin drip ordered  -- vascular surgery following, appreciate recs  -- multimodal pain regimen    6/17  PVD, HFpEF, T2DM on insulin managed with CGM and insulin pump, and autoimmune hepatitis on chronic prednisone, presenting after recent admission for R toe pain, now with discoloration over the last 2-3 days. vascular surgery consulted.  CTA runoff . started on ASA and heparin gtt.  83 y/o F presenting for evaluation of several days b/l toe pain R>L as well as R toe discoloration. C/f  claudication/ischemia given known PVD, though unknown etiology of acute worsening of her sxs. Vascular surgery consulted in the ED, no acute intervention at this time, though will f/u noninvasive studies.   -- CTA runoff ordered  -- ASA 81mg qd  -- heparin drip ordered  -- vascular surgery following, appreciate recs  -- multimodal pain regimen    85 y/o F presenting for evaluation of several days b/l toe pain R>L as well as R toe discoloration. C/f claudication/ischemia given known PVD, though unknown etiology of acute worsening of her sxs. Vascular surgery consulted in the ED, no acute intervention at this time, though will f/u noninvasive studies.   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 vascular surgery consulted.  CTA runoff -. Extensive calcified and noncalcified atherosclerotic plaque results in peripheral vascular disease . There is at least two-vessel runoff to bilateral feet via the anterior and posterior tibial arteries. Attenuated flow within the peroneal artery bilaterally may reflect slow flow or distal occlusion.   started on ASA, start heparin gtt. X ray foot - No acute displaced fracture-dislocation identified. vascular surgery f/u -  possible embolization from Right femoral plaque - Recommend  ASA/plavix/statin.  heparin  discontinued. needs short term vascular surgeyr  f/u, if not improving/persistent symptoms then could consider endarterectomy.Leucocytosis trended down. Hb 7.2 on heparin drip. monitor. UA +, BC x2 pending. on  Zosyn. Endocrine  consulted for DM2 on omnipod insulin pump and dexcom. discussed with daughter. denies urinary symptoms. Patient has anterior and posterior vaginal wall prolapse -follows with urogynecology and uses pessary. pessary noted in vagiana per CT scan    85 y/o F presenting for evaluation of several days b/l toe pain R>L as well as R toe discoloration. C/f claudication/ischemia given known PVD, though unknown etiology of acute worsening of her sxs. Vascular surgery consulted in the ED, no acute intervention at this time, though will f/u noninvasive studies.     -- CTA runoff ordered  -- ASA 81mg qd  -- heparin drip ordered  -- vascular surgery following, appreciate recs  -- multimodal pain regimen    6/17  PVD, HFpEF, T2DM on insulin managed with CGM and insulin pump, and autoimmune hepatitis on chronic prednisone, presenting after recent admission for R toe pain, now with discoloration over the last 2-3 days. vascular surgery consulted.  CTA runoff . started on ASA and heparin gtt.  85 y/o F presenting for evaluation of several days b/l toe pain R>L as well as R toe discoloration. C/f claudication/ischemia given known PVD, though unknown etiology of acute worsening of her sxs. Vascular surgery consulted in the ED, no acute intervention at this time, though will f/u noninvasive studies.   -- CTA runoff ordered  -- ASA 81mg qd  -- heparin drip ordered  -- vascular surgery following, appreciate recs  -- multimodal pain regimen    85 y/o F presenting for evaluation of several days b/l toe pain R>L as well as R toe discoloration. C/f claudication/ischemia given known PVD, though unknown etiology of acute worsening of her sxs. Vascular surgery consulted in the ED, no acute intervention at this time, though will f/u noninvasive studies.   -- CTA runoff ordered  -- ASA 81mg qd  -- heparin drip ordered  -- vascular surgery following, appreciate recs  -- multimodal pain regimen    6/17 MHx of Stage IIIB adenocarcinoma of the RUL  with intralobar mets dx'd 7/5/24, s/p radiation therapy to the R lung/mediastinum, COPD not on O2, PVD, HFpEF, T2DM on insulin managed with CGM and insulin pump, and autoimmune hepatitis on chronic prednisone, presenting after recent admission for R toe pain, now with discoloration over the last 2-3 days. vascular surgery consulted.  CTA runoff -. Extensive calcified and noncalcified atherosclerotic plaque results in peripheral vascular disease . There is at least two-vessel runoff to bilateral feet via the anterior and posterior tibial arteries. Attenuated flow within the peroneal artery bilaterally may reflect slow flow or distal occlusion.   started on ASA, start heparin gtt. X ray foot - No acute displaced fracture-dislocation identified. vascular surgery f/u -  possible embolization from Right femoral plaque - Recommend  ASA/plavix/statin.  heparin  discontinued. needs short term vascular surgeyr  f/u, if not improving/persistent symptoms then could consider endarterectomy.Leucocytosis trended down. Hb 7.2 on heparin drip. monitor. UA +, BC x2 pending. on  Zosyn. Endocrine consulted for DM2 on omnipod insulin pump and dexcom. discussed with daughter. denies urinary symptoms. Patient has anterior and posterior vaginal wall prolapse -follows with urogynecology and uses pessary. pessary noted in vagiana per CT scan

## 2025-06-20 NOTE — ASSESSMENT & PLAN NOTE
83 y/o F presenting for evaluation of several days b/l toe pain R>L as well as R toe discoloration. C/f claudication/ischemia given known PVD, though unknown etiology of acute worsening of her sxs. Vascular surgery consulted in the ED, no acute intervention at this time, though will f/u noninvasive studies.     -- CTA runoff ordered  -- ASA 81mg qd  -- heparin drip ordered  -- vascular surgery following, appreciate recs  -- multimodal pain regimen    6/17  PVD, HFpEF, T2DM on insulin managed with CGM and insulin pump, and autoimmune hepatitis on chronic prednisone, presenting after recent admission for R toe pain, now with discoloration over the last 2-3 days. vascular surgery consulted.  CTA runoff . started on ASA and heparin gtt.  83 y/o F presenting for evaluation of several days b/l toe pain R>L as well as R toe discoloration. C/f claudication/ischemia given known PVD, though unknown etiology of acute worsening of her sxs. Vascular surgery consulted in the ED, no acute intervention at this time, though will f/u noninvasive studies.   -- CTA runoff ordered  -- ASA 81mg qd  -- heparin drip ordered  -- vascular surgery following, appreciate recs  -- multimodal pain regimen    83 y/o F presenting for evaluation of several days b/l toe pain R>L as well as R toe discoloration. C/f claudication/ischemia given known PVD, though unknown etiology of acute worsening of her sxs. Vascular surgery consulted in the ED, no acute intervention at this time, though will f/u noninvasive studies.   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 vascular surgery consulted.  CTA runoff -. Extensive calcified and noncalcified atherosclerotic plaque results in peripheral vascular disease . There is at least two-vessel runoff to bilateral feet via the anterior and posterior tibial arteries. Attenuated flow within the peroneal artery bilaterally may reflect slow flow or distal occlusion.   started on ASA, start heparin gtt. X ray foot - No acute displaced fracture-dislocation identified. vascular surgery f/u -  possible embolization from Right femoral plaque - Recommend  ASA/plavix/statin.  heparin  discontinued. needs short term vascular surgeyr  f/u, if not improving/persistent symptoms then could consider endarterectomy.Leucocytosis trended down. Hb 7.2 on heparin drip. monitor. UA +, BC x2 pending. on  Zosyn. Endocrine consulted for DM2 on omnipod insulin pump and dexcom. discussed with daughter. denies urinary symptoms. Patient has anterior and posterior vaginal wall prolapse -follows with urogynecology and uses pessary. pessary noted in vagiana per CT scan    85 y/o F presenting for evaluation of several days b/l toe pain R>L as well as R toe discoloration. C/f claudication/ischemia given known PVD, though unknown etiology of acute worsening of her sxs. Vascular surgery consulted in the ED, no acute intervention at this time, though will f/u noninvasive studies.     -- CTA runoff ordered  -- ASA 81mg qd  -- heparin drip ordered  -- vascular surgery following, appreciate recs  -- multimodal pain regimen    6/17  PVD, HFpEF, T2DM on insulin managed with CGM and insulin pump, and autoimmune hepatitis on chronic prednisone, presenting after recent admission for R toe pain, now with discoloration over the last 2-3 days. vascular surgery consulted.  CTA runoff . started on ASA and heparin gtt.  85 y/o F presenting for evaluation of several days b/l toe pain R>L as well as R toe discoloration. C/f  claudication/ischemia given known PVD, though unknown etiology of acute worsening of her sxs. Vascular surgery consulted in the ED, no acute intervention at this time, though will f/u noninvasive studies.   -- CTA runoff ordered  -- ASA 81mg qd  -- heparin drip ordered  -- vascular surgery following, appreciate recs  -- multimodal pain regimen    85 y/o F presenting for evaluation of several days b/l toe pain R>L as well as R toe discoloration. C/f claudication/ischemia given known PVD, though unknown etiology of acute worsening of her sxs. Vascular surgery consulted in the ED, no acute intervention at this time, though will f/u noninvasive studies.   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 vascular surgery consulted.  CTA runoff -. Extensive calcified and noncalcified atherosclerotic plaque results in peripheral vascular disease . There is at least two-vessel runoff to bilateral feet via the anterior and posterior tibial arteries. Attenuated flow within the peroneal artery bilaterally may reflect slow flow or distal occlusion.   started on ASA, start heparin gtt. X ray foot - No acute displaced fracture-dislocation identified. vascular surgery f/u -  possible embolization from Right femoral plaque - Recommend  ASA/plavix/statin.  heparin  discontinued. needs short term vascular surgeyr  f/u, if not improving/persistent symptoms then could consider endarterectomy.Leucocytosis trended down. Hb 7.2 on heparin drip. monitor. UA +, BC x2 pending. on  Zosyn. Endocrine  consulted for DM2 on omnipod insulin pump and dexcom. discussed with daughter. denies urinary symptoms. Patient has anterior and posterior vaginal wall prolapse -follows with urogynecology and uses pessary. pessary noted in vagiana per CT scan    85 y/o F presenting for evaluation of several days b/l toe pain R>L as well as R toe discoloration. C/f claudication/ischemia given known PVD, though unknown etiology of acute worsening of her sxs. Vascular surgery consulted in the ED, no acute intervention at this time, though will f/u noninvasive studies.     -- CTA runoff ordered  -- ASA 81mg qd  -- heparin drip ordered  -- vascular surgery following, appreciate recs  -- multimodal pain regimen    6/17  PVD, HFpEF, T2DM on insulin managed with CGM and insulin pump, and autoimmune hepatitis on chronic prednisone, presenting after recent admission for R toe pain, now with discoloration over the last 2-3 days. vascular surgery consulted.  CTA runoff . started on ASA and heparin gtt.  85 y/o F presenting for evaluation of several days b/l toe pain R>L as well as R toe discoloration. C/f claudication/ischemia given known PVD, though unknown etiology of acute worsening of her sxs. Vascular surgery consulted in the ED, no acute intervention at this time, though will f/u noninvasive studies.   -- CTA runoff ordered  -- ASA 81mg qd  -- heparin drip ordered  -- vascular surgery following, appreciate recs  -- multimodal pain regimen    85 y/o F presenting for evaluation of several days b/l toe pain R>L as well as R toe discoloration. C/f claudication/ischemia given known PVD, though unknown etiology of acute worsening of her sxs. Vascular surgery consulted in the ED, no acute intervention at this time, though will f/u noninvasive studies.   -- CTA runoff ordered  -- ASA 81mg qd  -- heparin drip ordered  -- vascular surgery following, appreciate recs  -- multimodal pain regimen    6/17 MHx of Stage IIIB adenocarcinoma of the RUL  with intralobar mets dx'd 7/5/24, s/p radiation therapy to the R lung/mediastinum, COPD not on O2, PVD, HFpEF, T2DM on insulin managed with CGM and insulin pump, and autoimmune hepatitis on chronic prednisone, presenting after recent admission for R toe pain, now with discoloration over the last 2-3 days. vascular surgery consulted.  CTA runoff -. Extensive calcified and noncalcified atherosclerotic plaque results in peripheral vascular disease . There is at least two-vessel runoff to bilateral feet via the anterior and posterior tibial arteries. Attenuated flow within the peroneal artery bilaterally may reflect slow flow or distal occlusion.   started on ASA, start heparin gtt. X ray foot - No acute displaced fracture-dislocation identified. vascular surgery f/u -  possible embolization from Right femoral plaque - Recommend  ASA/plavix/statin.  heparin  discontinued. needs short term vascular surgeyr  f/u, if not improving/persistent symptoms then could consider endarterectomy.Leucocytosis trended down. Hb 7.2 on heparin drip. monitor. UA +, BC x2 pending. on  Zosyn. Endocrine consulted for DM2 on omnipod insulin pump and dexcom. discussed with daughter. denies urinary symptoms. Patient has anterior and posterior vaginal wall prolapse -follows with urogynecology and uses pessary. pessary noted in vagiana per CT scan

## 2025-06-20 NOTE — PLAN OF CARE
Problem: Adult Inpatient Plan of Care  Goal: Plan of Care Review  Outcome: Adequate for Care Transition  Goal: Patient-Specific Goal (Individualized)  Outcome: Adequate for Care Transition  Goal: Absence of Hospital-Acquired Illness or Injury  Outcome: Adequate for Care Transition  Goal: Optimal Comfort and Wellbeing  Outcome: Adequate for Care Transition  Goal: Readiness for Transition of Care  Outcome: Adequate for Care Transition     Problem: Diabetes Comorbidity  Goal: Blood Glucose Level Within Targeted Range  Outcome: Adequate for Care Transition     Problem: Wound  Goal: Optimal Coping  Outcome: Adequate for Care Transition  Goal: Optimal Functional Ability  Outcome: Adequate for Care Transition  Goal: Absence of Infection Signs and Symptoms  Outcome: Adequate for Care Transition  Goal: Improved Oral Intake  Outcome: Adequate for Care Transition  Goal: Optimal Pain Control and Function  Outcome: Adequate for Care Transition  Goal: Skin Health and Integrity  Outcome: Adequate for Care Transition  Goal: Optimal Wound Healing  Outcome: Adequate for Care Transition

## 2025-06-20 NOTE — ASSESSMENT & PLAN NOTE
84 year old female with a history of COPD, PVD, HFpEF, aortic and coronary atherosclerosis, urinary incontinence, T2DM, osteroporosis, and autoimmune hepatitis on chronic prednisone and metastatic lung cancer s/p radiation therapy.  She had a recent hospital admission secondary to COVID infection and has been on supplemental oxygen (2L) at home ever since.  She is admitted to the hospital now with ischemic changes to her toes.  ID is consulted to assist with her ongoing leukocytosis.      CT scan is negative for infection but shows progression of her cancer. WBC count is generally bouncing around but has been elevated for several weeks.    Plan  No additional testing.  No antibiotics planned.    ID will sign off.

## 2025-06-20 NOTE — NURSING
Portable O2 brought to room. Pts daughter will push pt down in her own wheelchair w belongings already brought down.

## 2025-06-20 NOTE — PLAN OF CARE
[facility]  Discharge Reassessment    Primary Care Provider: Leticia Lim MD    Expected Discharge Date: 6/20/2025    Reassessment (most recent)       Discharge Reassessment - 06/20/25 1438          Discharge Reassessment    Assessment Type Discharge Planning Reassessment (P)      Did the patient's condition or plan change since previous assessment? No (P)      Communicated MELVI with patient/caregiver Yes (P)      Discharge Plan A Home with family (P)      Discharge Plan B Home (P)      DME Needed Upon Discharge  none (P)      Transition of Care Barriers None (P)         Post-Acute Status    Post-Acute Authorization Other (P)      Other Status No Post-Acute Service Needs (P)      Discharge Delays None known at this time (P)                      AMISH Segovia  Case Management  [phone]

## 2025-06-20 NOTE — DISCHARGE INSTRUCTIONS
Our goal at [facility] is to always give you outstanding care and exceptional service. You may receive a survey from [employer] by mail, text or e-mail in the next 24-48 hours asking about the care you received with us. The survey should only take 5-10 minutes to complete and is very important to us.     Your feedback provides us with a way to recognize our staff who work tirelessly to provide the best care! Also, your responses help us learn how to improve when your experience was below our aspiration of excellence. We are always looking for ways to improve your stay. We WILL use your feedback to continue making improvements to help us provide the highest quality care. We keep your personal information and feedback confidential. We appreciate your time completing this survey and can't wait to hear from you!!!    We look forward to your continued care with us! Thanks so much for choosing [facility] for your healthcare needs!

## 2025-06-20 NOTE — DISCHARGE SUMMARY
[facility]  Discharge Summary      Patient Name: Radha Feng  MRN: [id number]  BISMARK: [id number]  Patient Class: IP- Inpatient  Admission Date: 6/16/2025  Hospital Length of Stay: 4 days  Discharge Date and Time: 06/20/2025 4:37 PM  Attending Physician: Wisam Villalba DO   Discharging Provider: Wisam Villalba DO  Primary Care Provider: Leticia Lim MD  [facility] Medicine Team: [facility] Wisam Villalba DO  Primary Care Team: [facility]    HPI:   Ms. 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 She reports questionable subjective fevers and chills. No N/V/D or any other complaints. She states the foot pain got increasingly worse today prompting her to present for evaluation.     In the ED, patient afebrile, SBP 100s, HR 70s, satting well on home 2LNC. CBC with WBC 20 (up from prior DC), Hgb around BL at 7.9. CMP showing stable lytes and renal function. R foot XR without acute fx. Vasc surg consulted, low suspicion for acute limb at this time, recommending CTA runoff and AC. Pt subsequently admitted to  for continued workup and management.     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 vascular surgery consulted.  CTA runoff -. Extensive calcified and noncalcified atherosclerotic plaque results in peripheral vascular disease . There is at least two-vessel runoff to bilateral feet via the anterior and posterior tibial arteries. Attenuated flow within the peroneal artery bilaterally may reflect slow flow or distal occlusion.   started on ASA, start heparin gtt. X ray foot - No acute displaced fracture-dislocation identified. vascular surgery f/u -  possible embolization from Right femoral plaque - Recommend  ASA/plavix/statin.  heparin  discontinued. needs short term vascular surgeyr  f/u, if not improving/persistent symptoms then could consider endarterectomy.Leucocytosis trended down. Hb 7.2 on heparin drip. monitor. UA +, BC x2 pending. on  Zosyn. Endocrine consulted for DM2 on omnipod insulin pump and dexcom. discussed with daughter. denies urinary symptoms. Patient has anterior and posterior vaginal wall prolapse -follows with urogynecology and uses pessary. pessary noted in vagiana per CT scan. Gynecology consulted for UTI? / pessary in place/ Leucocytosis   6/18 BC x 2 NGTD. UC NGTD. ID consulted for right shoulder nonhealing ulcer. wound care evaluation . Lipitor discontinued as daughter concerned about  h/o autoimmune hepatitis on prednisone. discussed with hepatology - OK to restart lipitor  need to monitor liver enzymes every 4-8 weeks  6/19 s/p ID eval - Differential for her ongoing leukocytosis includes tissue ischemia due to peripheral artery disease vs post-obstructive pneumonia vs ongoing use of prednisone. Okay to hold off on further antibiotics for now. CT chest WO contrast  - Worsening of the known right perihilar mass with tiny calcifications now measuring 3.8 x 2.7 cm, prior 2.8 x 2.2 cm.  Complete atelectasis of the right upper lobe in the present study. Lymphadenopathy in the right or paraclavicular region and in the right axillary region again noted.  Well-known soft tissue mass in the right shoulder is partially included.New/more conspicuous 3 mm solid noncalcified nodule in the left upper lobe. Moderate bilateral pleural effusions, right greater than left. Pulmonology eval -- No indication for thoracentesis as patient is not symptomatic, If she develops shortness of breath, would consider diuresis. Gyn eval - Do not recommend removal of pessary at this time. UC NGTD. Hb trended down to 7.1 monitor. Palliative care consulted for right LE pain     Patient continues to leukocytosis due to malignancy  Will continue with dapt and statin on discharge    Patient stable for discharge. Needs close follow up with PCP for ongoing management of complex care and malignancy.     Follow up with palliative care, vascular surgery, oncology, hepatology    Discharged with home oxygen therapy to continue. Was started at prior hospitalization      Plan of care reviewed with patient and daughter, questions answered. In agreement with discharge plans at this time    PE  No acute distress  Heart rrr  Lungs cta, on NC at 2LPM  Right middle toe nontender, discolored. Improved from prior per dajose cer     Goals of Care Treatment Preferences:  Code Status: Full Code    Health care agent: [employer] care agent number: No value filed.          What is most important right now is to focus on quality of life, even if it means sacrificing a little time.  Accordingly, we have decided that the best plan to meet the patient's goals includes continuing with treatment.         Consults:   Consults (From admission, onward)          Status Ordering Provider     Inpatient consult to Palliative Care  Once        Provider:  (Not yet assigned)    Completed LISSETH DIAZ     Inpatient consult to Pulmonology  Once        Provider:  (Not yet assigned)    Completed LISSETH DIAZ     Inpatient consult to Infectious Diseases  Once        Provider:  (Not yet assigned)    LISSETH Albrecht  RUBINA     Inpatient consult to Gynecology  Once        Provider:  (Not yet assigned)    Completed LISSETH DIAZ     Inpatient consult to Registered Dietitian/Nutritionist  Once        Provider:  (Not yet assigned)    Completed LEVI HOLMAN     Inpatient consult to Midline team  Once        Provider:  (Not yet assigned)    Completed BERTHA ERWIN     Inpatient consult to Endocrinology  Once        Provider:  (Not yet assigned)    Completed BERTHA ERWIN     Inpatient consult to Vascular Surgery  Once        Provider:  (Not yet assigned)    Completed SUSAN SANTOS            Assessment & Plan  Toe pain  Aortic atherosclerosis  PVD (peripheral vascular disease)  83 y/o F presenting for evaluation of several days b/l toe pain R>L as well as R toe discoloration. C/f claudication/ischemia given known PVD, though unknown etiology of acute worsening of her sxs. Vascular surgery consulted in the ED, no acute intervention at this time, though will f/u noninvasive studies.     -- CTA runoff ordered  -- ASA 81mg qd  -- heparin drip ordered  -- vascular surgery following, appreciate recs  -- multimodal pain regimen    6/17  PVD, HFpEF, T2DM on insulin managed with CGM and insulin pump, and autoimmune hepatitis on chronic prednisone, presenting after recent admission for R toe pain, now with discoloration over the last 2-3 days. vascular surgery consulted.  CTA runoff . started on ASA and heparin gtt.  83 y/o F presenting for evaluation of several days b/l toe pain R>L as well as R toe discoloration. C/f claudication/ischemia given known PVD, though unknown etiology of acute worsening of her sxs. Vascular surgery consulted in the ED, no acute intervention at this time, though will f/u noninvasive studies.   -- CTA runoff ordered  -- ASA 81mg qd  -- heparin drip ordered  -- vascular surgery following, appreciate recs  -- multimodal pain regimen    83 y/o F presenting for evaluation of several days b/l toe pain R>L as  well as R toe discoloration. C/f claudication/ischemia given known PVD, though unknown etiology of acute worsening of her sxs. Vascular surgery consulted in the ED, no acute intervention at this time, though will f/u noninvasive studies.   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 vascular surgery consulted.  CTA runoff -. Extensive calcified and noncalcified atherosclerotic plaque results in peripheral vascular disease . There is at least two-vessel runoff to bilateral feet via the anterior and posterior tibial arteries. Attenuated flow within the peroneal artery bilaterally may reflect slow flow or distal occlusion.   started on ASA, start heparin gtt. X ray foot - No acute displaced fracture-dislocation identified. vascular surgery f/u -  possible embolization from Right femoral plaque - Recommend  ASA/plavix/statin.  heparin  discontinued. needs short term vascular surgeyr  f/u, if not improving/persistent symptoms then could consider endarterectomy.Leucocytosis trended down. Hb 7.2 on heparin drip. monitor. UA +, BC x2 pending. on  Zosyn. Endocrine consulted for DM2 on omnipod insulin pump and dexcom. discussed with daughter. denies urinary symptoms. Patient has anterior and posterior vaginal wall prolapse -follows with urogynecology and uses pessary. pessary noted in vagiana per CT scan    85 y/o F presenting for evaluation of several days b/l toe pain R>L as well as R toe discoloration. C/f claudication/ischemia given known PVD, though unknown etiology of acute  worsening of her sxs. Vascular surgery consulted in the ED, no acute intervention at this time, though will f/u noninvasive studies.     -- CTA runoff ordered  -- ASA 81mg qd  -- heparin drip ordered  -- vascular surgery following, appreciate recs  -- multimodal pain regimen    6/17  PVD, HFpEF, T2DM on insulin managed with CGM and insulin pump, and autoimmune hepatitis on chronic prednisone, presenting after recent admission for R toe pain, now with discoloration over the last 2-3 days. vascular surgery consulted.  CTA runoff . started on ASA and heparin gtt.  85 y/o F presenting for evaluation of several days b/l toe pain R>L as well as R toe discoloration. C/f claudication/ischemia given known PVD, though unknown etiology of acute worsening of her sxs. Vascular surgery consulted in the ED, no acute intervention at this time, though will f/u noninvasive studies.   -- CTA runoff ordered  -- ASA 81mg qd  -- heparin drip ordered  -- vascular surgery following, appreciate recs  -- multimodal pain regimen    85 y/o F presenting for evaluation of several days b/l toe pain R>L as well as R toe discoloration. C/f claudication/ischemia given known PVD, though unknown etiology of acute worsening of her sxs. Vascular surgery consulted in the ED, no acute intervention at this time, though will f/u noninvasive studies.   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 vascular surgery consulted.  CTA runoff -. Extensive calcified and noncalcified atherosclerotic plaque results in  peripheral vascular disease . There is at least two-vessel runoff to bilateral feet via the anterior and posterior tibial arteries. Attenuated flow within the peroneal artery bilaterally may reflect slow flow or distal occlusion.   started on ASA, start heparin gtt. X ray foot - No acute displaced fracture-dislocation identified. vascular surgery f/u -  possible embolization from Right femoral plaque - Recommend  ASA/plavix/statin.  heparin  discontinued. needs short term vascular surgeyr  f/u, if not improving/persistent symptoms then could consider endarterectomy.Leucocytosis trended down. Hb 7.2 on heparin drip. monitor. UA +, BC x2 pending. on  Zosyn. Endocrine consulted for DM2 on omnipod insulin pump and dexcom. discussed with daughter. denies urinary symptoms. Patient has anterior and posterior vaginal wall prolapse -follows with urogynecology and uses pessary. pessary noted in vagiana per CT scan    83 y/o F presenting for evaluation of several days b/l toe pain R>L as well as R toe discoloration. C/f claudication/ischemia given known PVD, though unknown etiology of acute worsening of her sxs. Vascular surgery consulted in the ED, no acute intervention at this time, though will f/u noninvasive studies.     -- CTA runoff ordered  -- ASA 81mg qd  -- heparin drip ordered  -- vascular surgery following, appreciate recs  -- multimodal pain regimen    6/17  PVD, HFpEF, T2DM on insulin managed with CGM and insulin pump, and autoimmune hepatitis on chronic prednisone, presenting after recent admission for R toe pain, now with discoloration over the last 2-3 days. vascular surgery consulted.  CTA runoff . started on ASA and heparin gtt.  83 y/o F presenting for evaluation of several days b/l toe pain R>L as well as R toe discoloration. C/f claudication/ischemia given known PVD, though unknown etiology of acute worsening of her sxs. Vascular surgery consulted in the ED, no acute intervention at this time, though will  f/u noninvasive studies.   -- CTA runoff ordered  -- ASA 81mg qd  -- heparin drip ordered  -- vascular surgery following, appreciate recs  -- multimodal pain regimen    85 y/o F presenting for evaluation of several days b/l toe pain R>L as well as R toe discoloration. C/f claudication/ischemia given known PVD, though unknown etiology of acute worsening of her sxs. Vascular surgery consulted in the ED, no acute intervention at this time, though will f/u noninvasive studies.   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 vascular surgery consulted.  CTA runoff -. Extensive calcified and noncalcified atherosclerotic plaque results in peripheral vascular disease . There is at least two-vessel runoff to bilateral feet via the anterior and posterior tibial arteries. Attenuated flow within the peroneal artery bilaterally may reflect slow flow or distal occlusion.   started on ASA, start heparin gtt. X ray foot - No acute displaced fracture-dislocation identified. vascular surgery f/u -  possible embolization from Right femoral plaque - Recommend  ASA/plavix/statin.  heparin  discontinued. needs short term vascular surgeyr  f/u, if not improving/persistent symptoms then could consider endarterectomy.Leucocytosis trended down. Hb 7.2 on heparin drip. monitor. UA +, BC x2 pending. on  Zosyn. Endocrine consulted for DM2 on omnipod insulin pump and dexcom. discussed with daughter. denies urinary symptoms. Patient has anterior and posterior vaginal wall prolapse -follows with  "urogynecology and uses pessary. pessary noted in vagiana per CT scan    Hypertensive heart disease with diastolic heart failure  Patients blood pressure range in the last 24 hours was: BP  Min: 101/59  Max: 158/70.The patient's inpatient anti-hypertensive regimen is listed below:  Current Antihypertensives  dorzolamide-timolol 2-0.5% ophthalmic solution 1 drop, 2 times daily, Both Eyes  nitroGLYCERIN SL tablet 0.4 mg, Every 5 min PRN, Sublingual    Plan  - BP is controlled, no changes needed to their regimen  - holding home lasix on admission given normal BP and pt appearing overall volume down. Can resume as clinically indicated       Type 2 diabetes mellitus with circulatory disorder, with long-term current use of insulin  Insulin pump in place  Patient's FSGs are controlled on current medication regimen.  Last A1c reviewed-   Lab Results   Component Value Date    HGBA1C 5.4 06/17/2025     Most recent fingerstick glucose reviewed-   No results for input(s): "POCTGLUCOSE" in the last 24 hours.    Current correctional scale N/A  Maintain anti-hyperglycemic dose as follows-   Antihyperglycemics (From admission, onward)      Start     Stop Route Frequency Ordered    06/17/25 1515  insulin aspart U-100 insulin pump from home        Question Answer Comment   Target number 120    Basal Rate #1 1    Basal rate #1 time 0000        -- SubQ Continuous 06/17/25 1408    06/17/25 1507  insulin aspart U-100 insulin pump from home 0-10 Units        Question Answer Comment   Target number 120    Carbohydrate coverage #1 10    Carbohydrate coverage #1 time 0000    Sensitivity #1 70    Sensitivity #1 time 0000        -- SubQ As needed (PRN) 06/17/25 1408          -- Hold Oral hypoglycemics while patient is in the hospital.  -- patient with home insulin pump and sensor in place and functional.  -- Endocrinology consulted on admit for assistance with pump     Patient's FSGs are controlled on current medication regimen.  Last A1c " "reviewed-   Lab Results   Component Value Date    HGBA1C 5.4 06/17/2025     Most recent fingerstick glucose reviewed-   No results for input(s): "POCTGLUCOSE" in the last 24 hours.    Current correctional scale N/A  Maintain anti-hyperglycemic dose as follows-   Antihyperglycemics (From admission, onward)      Start     Stop Route Frequency Ordered    06/17/25 1515  insulin aspart U-100 insulin pump from home        Question Answer Comment   Target number 120    Basal Rate #1 1    Basal rate #1 time 0000        -- SubQ Continuous 06/17/25 1408    06/17/25 1507  insulin aspart U-100 insulin pump from home 0-10 Units        Question Answer Comment   Target number 120    Carbohydrate coverage #1 10    Carbohydrate coverage #1 time 0000    Sensitivity #1 70    Sensitivity #1 time 0000        -- SubQ As needed (PRN) 06/17/25 1408          -- Hold Oral hypoglycemics while patient is in the hospital.  -- patient with home insulin pump and sensor in place and functional.  -- Endocrinology consulted on admit for assistance with pump     Patient's FSGs are controlled on current medication regimen.  Last A1c reviewed-   Lab Results   Component Value Date    HGBA1C 5.4 06/17/2025     Most recent fingerstick glucose reviewed-   No results for input(s): "POCTGLUCOSE" in the last 24 hours.    Current correctional scale N/A  Maintain anti-hyperglycemic dose as follows-   Antihyperglycemics (From admission, onward)      Start     Stop Route Frequency Ordered    06/17/25 1515  insulin aspart U-100 insulin pump from home        Question Answer Comment   Target number 120    Basal Rate #1 1    Basal rate #1 time 0000        -- SubQ Continuous 06/17/25 1408    06/17/25 1507  insulin aspart U-100 insulin pump from home 0-10 Units        Question Answer Comment   Target number 120    Carbohydrate coverage #1 10    Carbohydrate coverage #1 time 0000    Sensitivity #1 70    Sensitivity #1 time 0000        -- SubQ As needed (PRN) 06/17/25 " "1408          -- Hold Oral hypoglycemics while patient is in the hospital.  -- patient with home insulin pump and sensor in place and functional.  -- Endocrinology consulted on admit for assistance with pump     Patient's FSGs are controlled on current medication regimen.  Last A1c reviewed-   Lab Results   Component Value Date    HGBA1C 5.4 06/17/2025     Most recent fingerstick glucose reviewed-   No results for input(s): "POCTGLUCOSE" in the last 24 hours.    Current correctional scale N/A  Maintain anti-hyperglycemic dose as follows-   Antihyperglycemics (From admission, onward)      Start     Stop Route Frequency Ordered    06/17/25 1515  insulin aspart U-100 insulin pump from home        Question Answer Comment   Target number 120    Basal Rate #1 1    Basal rate #1 time 0000        -- SubQ Continuous 06/17/25 1408    06/17/25 1507  insulin aspart U-100 insulin pump from home 0-10 Units        Question Answer Comment   Target number 120    Carbohydrate coverage #1 10    Carbohydrate coverage #1 time 0000    Sensitivity #1 70    Sensitivity #1 time 0000        -- SubQ As needed (PRN) 06/17/25 1408          -- Hold Oral hypoglycemics while patient is in the hospital.  -- patient with home insulin pump and sensor in place and functional.  -- Endocrinology consulted on admit for assistance with pump     Autoimmune hepatitis  -- continue home prednisone 2.5mg qd on admission  6/18 Lipitor discontinued as daughter concerned about  h/o autoimmune hepatitis on prednisone. discussed with hepatology - OK to restart lipitor  need to monitor liver enzymes every 4-8 weeks    Chronic respiratory failure  Centrilobular emphysema  Adenocarcinoma of right lung  Patient with Hypoxic Respiratory failure which is Chronic.  she is on home oxygen at 2 LPM. Supplemental oxygen was provided and noted-      Signs/symptoms of respiratory failure include- increased work of breathing. Contributing diagnoses includes - malignancy, heart " failure Labs and images were reviewed. Patient Has not had a recent ABG. Will treat underlying causes and adjust management of respiratory failure as follows-    -- continue home O2 on admission  -- no e/o respiratory distress at this time       Patient with Hypoxic Respiratory failure which is Chronic.  she is on home oxygen at 2 LPM. Supplemental oxygen was provided and noted-      Signs/symptoms of respiratory failure include- increased work of breathing. Contributing diagnoses includes - malignancy, heart failure Labs and images were reviewed. Patient Has not had a recent ABG. Will treat underlying causes and adjust management of respiratory failure as follows-    -- continue home O2 on admission  -- no e/o respiratory distress at this time       Patient with Hypoxic Respiratory failure which is Chronic.  she is on home oxygen at 2 LPM. Supplemental oxygen was provided and noted-      Signs/symptoms of respiratory failure include- increased work of breathing. Contributing diagnoses includes - malignancy, heart failure Labs and images were reviewed. Patient Has not had a recent ABG. Will treat underlying causes and adjust management of respiratory failure as follows-    -- continue home O2 on admission  -- no e/o respiratory distress at this time       Thrombocytopenia  monitor  Recent Labs     06/19/25  0559 06/19/25  1220 06/20/25  0439   * 164 178     Leukocytosis    Patient with leucocytosis   Recent Labs   Lab 06/19/25  0559 06/19/25  1220 06/20/25  0439   WBC 16.78* 18.62* 17.88*     . Afebrile. BCX 2, urine culture pending . likely secondary to sepsis  6/17  Leucocytosis trended down. Hb 7.2 on heparin drip. monitor. UA +, BC x2 pending. on  Zosyn    Patient has anterior and posterior vaginal wall prolapse -follows with urogynecology and uses pessary. pessary noted in vagiana per CT scan. Gynecology consulted for UTI? / pessary in place/ Leucocytosis   6/19 s/p ID eval - Differential for her ongoing  leukocytosis includes tissue ischemia due to peripheral artery disease vs post-obstructive pneumonia vs ongoing use of prednisone. Okay to hold off on further antibiotics for now. CT chest WO contrast.  Gyn eval - Do not recommend removal of pessary at this time. UC NGTD. CT chest WO contrast  - Worsening of the known right perihilar mass with tiny calcifications now measuring 3.8 x 2.7 cm, prior 2.8 x 2.2 cm.  Complete atelectasis of the right upper lobe in the present study. Lymphadenopathy in the right or paraclavicular region and in the right axillary region again noted. Well-known soft tissue mass in the right shoulder is partially included.New/more conspicuous 3 mm solid noncalcified nodule in the left upper lobe. Moderate bilateral pleural effusions, right greater than left.    Anemia    Patient's with Normocytic anemia.. Hemoglobin stable. Etiology likely due to chronic disease .  Current CBC reviewed-    Recent Labs   Lab 06/19/25  0559 06/19/25  1220 06/20/25  0439   HGB 7.1* 8.2* 7.1*         Component Value Date/Time    MCV 92 06/20/2025 0439    MCV 96 03/11/2025 1358    RDW 19.9 (H) 06/20/2025 0439    RDW 15.9 (H) 03/11/2025 1358    IRON 15 (L) 05/17/2025 1348    IRON 121 03/09/2023 1330    FERRITIN 741.0 (H) 05/17/2025 1348    RETIC 4.0 (H) 05/17/2025 1348    RETIC 2.7 (H) 03/09/2023 1330    FOLATE 11.6 03/09/2023 1330    [id number] 794 03/09/2023 1330    OCCULTBLOOD Positive (A) 10/26/2022 0935     Monitor CBC and transfuse if H/H drops below 7/21.    Stage 4 malignant neoplasm of lung   metastatic cancer of lung primary. The cancer has metastasized to shoulder. The patient is under the care of an outpatient oncologist. The patient is not undergoing active chemotherapy.     UTI (urinary tract infection)  ? UA +, UC pending. 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 Patient has anterior and posterior vaginal wall prolapse -follows with urogynecology and uses pessary. pessary  noted in vagiana per CT scan  6/19  Gyn eval - Do not recommend removal of pessary at this time. UC NGTD.     Shoulder lesion, right  noted metastatic cancer of lung primary. The cancer has metastasized to shoulder.   6/18 ID consulted for right shoulder nonhealing ulcer. wound care evaluation       SIRS (systemic inflammatory response syndrome)      Moderate malnutrition  Nutrition consulted. Most recent weight and BMI monitored-     Measurements:  Wt Readings from Last 1 Encounters:   06/17/25 41.1 kg (90 lb 9.7 oz)   Body mass index is 15.08 kg/m².    Patient has been screened and assessed by RD.    Malnutrition Type:  Context: chronic illness  Level: moderate    Malnutrition Characteristic Summary:  Weight Loss (Malnutrition): greater than 5% in 1 month (17% x 1 mo)  Energy Intake (Malnutrition): less than or equal to 50% for greater than or equal to 1 month  Subcutaneous Fat (Malnutrition): moderate depletion  Muscle Mass (Malnutrition): moderate depletion    Interventions/Recommendations (treatment strategy):  1. Continue consistent carbohydrate diet as tolerated   - please continue to document PO % intake via flowsheets   2. Recommend boost breeze orange TID and Joseluis TID  3. Encourage good intake    4. RD to monitor weight, labs, intake, tolerance    Palliative care encounter      Advance care planning      Final Active Diagnoses:    Diagnosis Date Noted POA    PRINCIPAL PROBLEM:  Toe pain [M79.676] 06/03/2025 Yes    Moderate malnutrition [E44.0] 06/19/2025 Yes    Advance care planning [Z71.89] 06/19/2025 Not Applicable    SIRS (systemic inflammatory response syndrome) [R65.10] 06/18/2025 Yes    Chronic respiratory failure [J96.10] 06/16/2025 Yes     Chronic    Stage 4 malignant neoplasm of lung [C34.90] 06/05/2025 Yes    Palliative care encounter [Z51.5] 06/04/2025 Not Applicable     Chronic    Anemia [D64.9] 05/18/2025 Yes    Leukocytosis [D72.829] 05/17/2025 Yes    UTI (urinary tract infection) [N39.0]  "05/17/2025 Yes    Shoulder lesion, right [M75.91] 05/17/2025 Yes    Adenocarcinoma of right lung [C34.91] 07/25/2024 Yes    Insulin pump in place [Z96.41] 01/29/2024 Not Applicable    Pelvic floor weakness in female [N81.89] 06/16/2023 Yes    Aortic atherosclerosis [I70.0] 11/14/2022 Yes    Centrilobular emphysema [J43.2] 11/14/2022 Yes    Thrombocytopenia [D69.6] 10/26/2022 Yes    Autoimmune hepatitis [K75.4] 09/28/2022 Yes     Chronic    Hypertensive heart disease with diastolic heart failure [I11.0, I50.30] 09/21/2022 Yes    Type 2 diabetes mellitus with circulatory disorder, with long-term current use of insulin [E11.59, Z79.4] 09/21/2022 Not Applicable    PVD (peripheral vascular disease) [I73.9]  Yes      Problems Resolved During this Admission:       Discharged Condition: good    Disposition: Home or Self Care    Follow Up:   Follow-up Information       ANN Montoya II, MD Follow up in 2 week(s).    Specialty: Vascular Surgery  Contact information:  [address] [facility] [id number] [phone]               Leticia Lim MD Follow up in 1 week(s).    Specialty: Internal Medicine  Contact information:  [address]  [place] [id number] [phone]                           Patient Instructions:      OXYGEN FOR HOME USE     Order Specific Question Answer Comments   Liter Flow 2    Duration Continuous    Qualifying Test Performed at: Rest    Oxygen saturation: 88    Portable mode: pulse dose acceptable    Mode: Conserving device M6 tank with conserving device   Route nasal cannula    Device: home concentrator with portable concentrator    Length of need (in months): 3 mos    Patient condition with qualifying saturation COPD    Height: 5' 5" (1.651 m)    Weight: 41.1 kg (90 lb 9.7 oz)    Alternative treatment measures have been tried or considered and deemed clinically ineffective. Yes        Significant Diagnostic Studies: N/A    Pending Diagnostic Studies:       Procedure " Component Value Units Date/Time    Lactic acid, plasma [[id number]]     Order Status: Sent Lab Status: No result     Specimen: Blood     Occult blood x 1, stool [[id number]]     Order Status: Sent Lab Status: No result     Specimen: Stool     Protime-INR [[id number]]     Order Status: Sent Lab Status: No result     Specimen: Blood     Urinalysis, Reflex to Urine Culture Urine, Clean Catch [[id number]]     Order Status: Sent Lab Status: No result     Specimen: Urine            Medications:  Reconciled Home Medications:      Medication List        START taking these medications      aspirin 81 MG Chew  Take 1 tablet (81 mg total) by mouth once daily.  Start taking on: June 21, 2025     atorvastatin 10 MG tablet  Commonly known as: LIPITOR  Take 1 tablet (10 mg total) by mouth once daily.  Start taking on: June 21, 2025     clopidogreL 75 mg tablet  Commonly known as: PLAVIX  Take 1 tablet (75 mg total) by mouth once daily.  Start taking on: June 21, 2025            CONTINUE taking these medications      acetaminophen 500 MG tablet  Commonly known as: TYLENOL  Take 2 tablets (1,000 mg total) by mouth every 8 (eight) hours as needed for Pain.     BD INSULIN SYRINGE ULTRA-FINE 1 mL 31 gauge x 5/16 Syrg  Generic drug: insulin syringe-needle U-100     blood-glucose,,cont Misc  Dispsense 1 Dexcom G7      brimonidine 0.2% 0.2 % Drop  Commonly known as: ALPHAGAN  Place 1 drop into both eyes 3 (three) times daily.     cholecalciferol (vitamin D3) 25 mcg (1,000 unit) capsule  Commonly known as: VITAMIN D3  Take 2 capsules (2,000 Units total) by mouth once daily.     DEXCOM G7 SENSOR Amanda  Generic drug: blood-glucose sensor  1 Device by Misc.(Non-Drug; Combo Route) route every 10 days.     dorzolamide-timolol 2-0.5% 22.3-6.8 mg/mL ophthalmic solution  Commonly known as: COSOPT  Place 1 drop into both eyes 2 (two) times daily.     furosemide 40 MG tablet  Commonly known as: LASIX  TAKE 1 TABLET BY MOUTH EVERY  "DAY     insulin aspart U-100 100 unit/mL injection  Commonly known as: NovoLOG U-100 Insulin aspart  TO USE WITH OMNIPOD 5. TOTAL DAILY DOSE UP TO 40 UNITS     latanoprost 0.005 % ophthalmic solution  PLACE 1 DROP INTO BOTH EYES ONCE DAILY     LIDOcaine 5 %  Commonly known as: LIDODERM  Place 1 patch onto the skin once daily. Remove & Discard patch within 12 hours or as directed by MD     melatonin 3 mg tablet  Commonly known as: MELATIN  Take 2 tablets (6 mg total) by mouth nightly as needed for Insomnia.     nystatin 100,000 unit/mL suspension  Commonly known as: MYCOSTATIN  SWISH WITH 6MLS BY MOUTH THEN SWALLOW 4 TIMES A DAY FOR 14 DAYS     OMNIPOD 5 G6 PODS (GEN 5) Crtg  Generic drug: insulin pump cart,automated,BT  Inject 1 each into the skin Every 3 (three) days.     oxyCODONE 5 MG immediate release tablet  Commonly known as: ROXICODONE  Take 0.5 tablets (2.5 mg total) by mouth every 6 (six) hours as needed for Pain.     pen needle, diabetic 31 gauge x 5/16" Ndle  Use to inject insulin into the skin up to 4 times daily     predniSONE 5 MG tablet  Commonly known as: DELTASONE  Take 0.5 tablets (2.5 mg total) by mouth once daily.     tobramycin sulfate 0.3% 0.3 % ophthalmic solution  Commonly known as: TOBREX  1-2 drops topically twice daily to affected toe(s).            ASK your doctor about these medications      DEXCOM G6 TRANSMITTER Amanda  Generic drug: blood-glucose transmitter  1 each by Misc.(Non-Drug; Combo Route) route every 3 (three) months.     OMNIPOD 5 G6-G7 PODS (GEN 5) Crtg  Generic drug: insulin pump cart,auto,BT,G6/7  SMARTSI Each SUB-Q Once a Month              Indwelling Lines/Drains at time of discharge:   Lines/Drains/Airways       Line  Duration                  Subcutaneous Infusion Pump 25 Abdomen (Comment) 35 days                              Time spent on the discharge of patient: 49 minutes         Wisam Villalba DO  Department of Hospital Medicine  [facility]  "

## 2025-06-20 NOTE — SUBJECTIVE & OBJECTIVE
Interval History: No adverse events.    Review of Systems   Respiratory:  Positive for shortness of breath.    Musculoskeletal:  Positive for arthralgias.   Skin:  Positive for color change.   All other systems reviewed and are negative.    Objective:     Vital Signs (Most Recent):  Temp: 98.8 °F (37.1 °C) (06/19/25 2022)  Pulse: 76 (06/19/25 2022)  Resp: 18 (06/19/25 1608)  BP: 129/65 (06/19/25 2022)  SpO2: 99 % (06/19/25 2022) Vital Signs (24h Range):  Temp:  [97.3 °F (36.3 °C)-98.8 °F (37.1 °C)] 98.8 °F (37.1 °C)  Pulse:  [70-82] 76  Resp:  [18] 18  SpO2:  [99 %-100 %] 99 %  BP: (121-138)/(60-80) 129/65     Weight: 41.1 kg (90 lb 9.7 oz)  Body mass index is 15.08 kg/m².    Estimated Creatinine Clearance: 55.3 mL/min (based on SCr of 0.5 mg/dL).     Physical Exam  Vitals and nursing note reviewed.   Constitutional:       Appearance: Normal appearance.   HENT:      Head: Normocephalic and atraumatic.   Cardiovascular:      Rate and Rhythm: Normal rate and regular rhythm.   Musculoskeletal:         General: Tenderness (bilateral toes) present.      Comments: Right shoulder hyperpigmented mass.   Skin:     Findings: Erythema (bilateral toes) present.   Neurological:      General: No focal deficit present.      Mental Status: She is alert and oriented to person, place, and time.          Significant Labs:   Microbiology Results (last 7 days)       Procedure Component Value Units Date/Time    Blood culture x two cultures. Draw prior to antibiotics. [[id number]]  (Normal) Collected: 06/16/25 1616    Order Status: Completed Specimen: Blood from Peripheral, Antecubital, Right Updated: 06/19/25 1901     Blood Culture No Growth After 72 Hours    Blood culture x two cultures. Draw prior to antibiotics. [[id number]]  (Normal) Collected: 06/16/25 1616    Order Status: Completed Specimen: Blood from Peripheral, Forearm, Right Updated: 06/19/25 1901     Blood Culture No Growth After 72 Hours    Urine culture [[id number]]  Collected: 06/16/25 5377    Order Status: Completed Specimen: Urine Updated: 06/18/25 1437     Urine Culture No Significant Growth    MRSA Screen by PCR [[id number]]     Order Status: Sent Specimen: Nasal Swab             Significant Imaging: I have reviewed all pertinent imaging results/findings within the past 24 hours.

## 2025-06-20 NOTE — ASSESSMENT & PLAN NOTE
Patient with leucocytosis   Recent Labs   Lab 06/19/25  0559 06/19/25  1220 06/20/25  0439   WBC 16.78* 18.62* 17.88*     . Afebrile. BCX 2, urine culture pending . likely secondary to sepsis  6/17  Leucocytosis trended down. Hb 7.2 on heparin drip. monitor. UA +, BC x2 pending. on  Zosyn    Patient has anterior and posterior vaginal wall prolapse -follows with urogynecology and uses pessary. pessary noted in vagiana per CT scan. Gynecology consulted for UTI? / pessary in place/ Leucocytosis   6/19 s/p ID eval - Differential for her ongoing leukocytosis includes tissue ischemia due to peripheral artery disease vs post-obstructive pneumonia vs ongoing use of prednisone. Okay to hold off on further antibiotics for now. CT chest WO contrast.  Gyn eval - Do not recommend removal of pessary at this time. UC NGTD. CT chest WO contrast  - Worsening of the known right perihilar mass with tiny calcifications now measuring 3.8 x 2.7 cm, prior 2.8 x 2.2 cm.  Complete atelectasis of the right upper lobe in the present study. Lymphadenopathy in the right or paraclavicular region and in the right axillary region again noted. Well-known soft tissue mass in the right shoulder is partially included.New/more conspicuous 3 mm solid noncalcified nodule in the left upper lobe. Moderate bilateral pleural effusions, right greater than left.

## 2025-06-20 NOTE — ASSESSMENT & PLAN NOTE
Patient's with Normocytic anemia.. Hemoglobin stable. Etiology likely due to chronic disease .  Current CBC reviewed-    Recent Labs   Lab 06/19/25  0559 06/19/25  1220 06/20/25  0439   HGB 7.1* 8.2* 7.1*         Component Value Date/Time    MCV 92 06/20/2025 0439    MCV 96 03/11/2025 1358    RDW 19.9 (H) 06/20/2025 0439    RDW 15.9 (H) 03/11/2025 1358    IRON 15 (L) 05/17/2025 1348    IRON 121 03/09/2023 1330    FERRITIN 741.0 (H) 05/17/2025 1348    RETIC 4.0 (H) 05/17/2025 1348    RETIC 2.7 (H) 03/09/2023 1330    FOLATE 11.6 03/09/2023 1330    [id number] 794 03/09/2023 1330    OCCULTBLOOD Positive (A) 10/26/2022 0935     Monitor CBC and transfuse if H/H drops below 7/21.

## 2025-06-21 LAB
BACTERIA BLD CULT: NORMAL
BACTERIA BLD CULT: NORMAL

## 2025-06-21 NOTE — PROGRESS NOTES
I met with Ms. Feng and her daughter during admission. She underwent CT sim for a R shoulder metastasis. She also has axillary adenopathy. Upon eval, treatment for the R axilla (compared to shoulder alone) will increase dose to the lung and plexus. The R axilla is not bothering her and she elected to defer RT to the axilla at this time. I will plan to treat the R shoulder to 25/5.     I also offered 8-10 Gy in 1 given multiple admissions but she prefers to proceed with the previously discussed 5 fx course.     Amish Cunningham MD  Radiation Oncology

## 2025-06-23 NOTE — PLAN OF CARE
[facility] - Med S[facility]  Discharge Final Note    Primary Care Provider: Leticia Lim MD    Expected Discharge Date: 6/20/2025        Patient discharged home with portable oxygen and no other needs from case management.     Discharge Plan A and Plan B have been determined by review of patient's clinical status, future medical and therapeutic needs, and coverage/benefits for post-acute care in coordination with multidisciplinary team members.    Future Appointments   Date Time Provider Department Center   6/24/2025 10:30 AM [facility] LAB [facility]   6/26/2025  1:00 PM Josefa Adams FNP-C [facility]   7/1/2025 10:30 AM Leticia Lmi MD [facility] PRICARE [facility]   7/2/2025  9:00 AM NOM OIC EOS [facility] EOS [facility]   7/2/2025 10:00 AM Leeanne Solorio PA-C [facility] NEUROS8 [facility]   7/8/2025 10:30 AM LAB, [facility] LAB [facility]   7/8/2025  2:00 PM Naomi Mendez PA-C [facility] UROGYN [facility]   7/10/2025  3:00 PM Yvonne Peraza MD [facility] HEPAT [facility]   7/15/2025 11:30 AM Nestor Agarwal MD [facility] ENDODIA [facility]   7/15/2025  2:15 PM Nicole Garber MD [facility] OPHTHAL [facility]   7/17/2025  1:00 PM Xu Cannon DO [facility] PLMDBEPROSPER [place]   7/22/2025 10:30 AM [facility] LAB [facility]   7/22/2025  3:45 PM [facility] USOP3 [facility] USOUNDO [facility]   9/18/2025  3:00 PM Yvonne Peraza MD [facility] HEPAT [facility]       Final Discharge Note (most recent)       Final Note - 06/23/25 0839          Final Note    Assessment Type Final Discharge Note (P)      Anticipated Discharge Disposition Home or Self Care (P)      What phone number can be called within the next 1-3 days to see how you are doing after discharge? [phone] (P)      Hospital Resources/Appts/Education Provided Provided patient/caregiver with written discharge plan information (P)         Post-Acute Status    Post-Acute Authorization HME (P)      HME Status Set-up  Complete/Auth obtained (P)      Discharge Delays None known at this time (P)                      Important Message from Medicare             Contact Info       ANN Montoya II, MD   Specialty: Vascular Surgery    [address] [id number]   Phone: [phone]       Next Steps: Follow up in 2 week(s)    Leticia Lim MD   Specialty: Internal Medicine   Relationship: PCP - General    [address] [address]   Phone: [phone]       Next Steps: Follow up in 1 week(s)          AMISH Segovia  Case Management  [phone]     Pinch Graft Text: The defect edges were debeveled with a #15 scalpel blade. Given the location of the defect, shape of the defect and the proximity to free margins a pinch graft was deemed most appropriate. Using a sterile surgical marker, the primary defect shape was transferred to the donor site. The area thus outlined was incised deep to adipose tissue with a #15 scalpel blade.  The harvested graft was then trimmed of adipose tissue until only dermis and epidermis was left. The skin margins of the secondary defect were undermined to an appropriate distance in all directions utilizing iris scissors.  The secondary defect was closed with interrupted buried subcutaneous sutures.  The skin edges were then re-apposed with running  sutures.  The skin graft was then placed in the primary defect and oriented appropriately.

## 2025-06-24 ENCOUNTER — LAB VISIT (OUTPATIENT)
Dept: LAB | Facility: HOSPITAL | Age: 85
End: 2025-06-24
Attending: INTERNAL MEDICINE
Payer: MEDICARE

## 2025-06-24 DIAGNOSIS — K74.60 CIRRHOSIS OF LIVER WITH ASCITES, UNSPECIFIED HEPATIC CIRRHOSIS TYPE: ICD-10-CM

## 2025-06-24 DIAGNOSIS — R18.8 CIRRHOSIS OF LIVER WITH ASCITES, UNSPECIFIED HEPATIC CIRRHOSIS TYPE: ICD-10-CM

## 2025-06-24 LAB
ABSOLUTE EOSINOPHIL (OHS): 0.42 K/UL
ABSOLUTE MONOCYTE (OHS): 1.83 K/UL (ref 0.3–1)
ABSOLUTE NEUTROPHIL COUNT (OHS): 14.93 K/UL (ref 1.8–7.7)
ALBUMIN SERPL BCP-MCNC: 1.7 G/DL (ref 3.5–5.2)
ALP SERPL-CCNC: 103 UNIT/L (ref 40–150)
ALT SERPL W/O P-5'-P-CCNC: 11 UNIT/L (ref 10–44)
ANION GAP (OHS): 13 MMOL/L (ref 8–16)
AST SERPL-CCNC: 38 UNIT/L (ref 11–45)
BASOPHILS # BLD AUTO: 0.04 K/UL
BASOPHILS NFR BLD AUTO: 0.2 %
BILIRUB DIRECT SERPL-MCNC: 0.3 MG/DL (ref 0.1–0.3)
BILIRUB SERPL-MCNC: 0.5 MG/DL (ref 0.1–1)
BUN SERPL-MCNC: 13 MG/DL (ref 8–23)
CALCIUM SERPL-MCNC: 8.8 MG/DL (ref 8.7–10.5)
CHLORIDE SERPL-SCNC: 105 MMOL/L (ref 95–110)
CO2 SERPL-SCNC: 23 MMOL/L (ref 23–29)
CREAT SERPL-MCNC: 0.6 MG/DL (ref 0.5–1.4)
ERYTHROCYTE [DISTWIDTH] IN BLOOD BY AUTOMATED COUNT: 19.7 % (ref 11.5–14.5)
GFR SERPLBLD CREATININE-BSD FMLA CKD-EPI: >60 ML/MIN/1.73/M2
GLUCOSE SERPL-MCNC: 98 MG/DL (ref 70–110)
HCT VFR BLD AUTO: 24.8 % (ref 37–48.5)
HGB BLD-MCNC: 7.4 GM/DL (ref 12–16)
IMM GRANULOCYTES # BLD AUTO: 0.17 K/UL (ref 0–0.04)
IMM GRANULOCYTES NFR BLD AUTO: 0.9 % (ref 0–0.5)
INR PPP: 1.1 (ref 0.8–1.2)
LYMPHOCYTES # BLD AUTO: 1.32 K/UL (ref 1–4.8)
MCH RBC QN AUTO: 27.5 PG (ref 27–31)
MCHC RBC AUTO-ENTMCNC: 29.8 G/DL (ref 32–36)
MCV RBC AUTO: 92 FL (ref 82–98)
NUCLEATED RBC (/100WBC) (OHS): 0 /100 WBC
PLATELET # BLD AUTO: 269 K/UL (ref 150–450)
PMV BLD AUTO: 11.6 FL (ref 9.2–12.9)
POTASSIUM SERPL-SCNC: 4.5 MMOL/L (ref 3.5–5.1)
PROT SERPL-MCNC: 6.9 GM/DL (ref 6–8.4)
PROTHROMBIN TIME: 12.4 SECONDS (ref 9–12.5)
RBC # BLD AUTO: 2.69 M/UL (ref 4–5.4)
RELATIVE EOSINOPHIL (OHS): 2.2 %
RELATIVE LYMPHOCYTE (OHS): 7.1 % (ref 18–48)
RELATIVE MONOCYTE (OHS): 9.8 % (ref 4–15)
RELATIVE NEUTROPHIL (OHS): 79.8 % (ref 38–73)
SODIUM SERPL-SCNC: 141 MMOL/L (ref 136–145)
WBC # BLD AUTO: 18.71 K/UL (ref 3.9–12.7)

## 2025-06-24 PROCEDURE — 77334 RADIATION TREATMENT AID(S): CPT | Mod: 26,,, | Performed by: STUDENT IN AN ORGANIZED HEALTH CARE EDUCATION/TRAINING PROGRAM

## 2025-06-24 PROCEDURE — 82105 ALPHA-FETOPROTEIN SERUM: CPT

## 2025-06-24 PROCEDURE — 80053 COMPREHEN METABOLIC PANEL: CPT

## 2025-06-24 PROCEDURE — 36415 COLL VENOUS BLD VENIPUNCTURE: CPT | Mod: PN

## 2025-06-24 PROCEDURE — 82248 BILIRUBIN DIRECT: CPT

## 2025-06-24 PROCEDURE — 77300 RADIATION THERAPY DOSE PLAN: CPT | Mod: 26,,, | Performed by: STUDENT IN AN ORGANIZED HEALTH CARE EDUCATION/TRAINING PROGRAM

## 2025-06-24 PROCEDURE — 85025 COMPLETE CBC W/AUTO DIFF WBC: CPT

## 2025-06-24 PROCEDURE — 77295 3-D RADIOTHERAPY PLAN: CPT | Mod: 26,,, | Performed by: STUDENT IN AN ORGANIZED HEALTH CARE EDUCATION/TRAINING PROGRAM

## 2025-06-24 PROCEDURE — 85610 PROTHROMBIN TIME: CPT

## 2025-06-25 ENCOUNTER — RESULTS FOLLOW-UP (OUTPATIENT)
Dept: HEPATOLOGY | Facility: CLINIC | Age: 85
End: 2025-06-25

## 2025-06-25 ENCOUNTER — TELEPHONE (OUTPATIENT)
Dept: HEMATOLOGY/ONCOLOGY | Facility: CLINIC | Age: 85
End: 2025-06-25
Payer: MEDICARE

## 2025-06-25 LAB
AFP SERPL-MCNC: <2 NG/ML
PLATELET BLD QL SMEAR: NORMAL

## 2025-06-25 NOTE — TELEPHONE ENCOUNTER
LMOVM pt to remind of virtual appt tomorrow with Josefa Adams NP. Contact information was given should pt need to contact us back.

## 2025-06-26 ENCOUNTER — OUTPATIENT CASE MANAGEMENT (OUTPATIENT)
Dept: ADMINISTRATIVE | Facility: OTHER | Age: 85
End: 2025-06-26
Payer: MEDICARE

## 2025-06-26 ENCOUNTER — OFFICE VISIT (OUTPATIENT)
Dept: HEMATOLOGY/ONCOLOGY | Facility: CLINIC | Age: 85
End: 2025-06-26
Payer: MEDICARE

## 2025-06-26 DIAGNOSIS — M54.59 OTHER LOW BACK PAIN: ICD-10-CM

## 2025-06-26 DIAGNOSIS — R53.1 GENERALIZED WEAKNESS: ICD-10-CM

## 2025-06-26 DIAGNOSIS — Z74.09 DECREASED MOBILITY AND ENDURANCE: Primary | ICD-10-CM

## 2025-06-26 DIAGNOSIS — R53.83 FATIGUE, UNSPECIFIED TYPE: ICD-10-CM

## 2025-06-26 DIAGNOSIS — C34.11 ADENOCARCINOMA OF UPPER LOBE OF RIGHT LUNG: ICD-10-CM

## 2025-06-26 DIAGNOSIS — M79.675 PAIN OF TOE OF LEFT FOOT: ICD-10-CM

## 2025-06-26 DIAGNOSIS — G47.00 INSOMNIA, UNSPECIFIED TYPE: ICD-10-CM

## 2025-06-26 NOTE — PROGRESS NOTES
Outpatient Care Management  Plan of Care Follow Up Visit    Patient: Radha Feng  MRN: [id number]  Date of Service: 06/26/2025  Completed by: Catina Dalal RN  Referral Date: 05/19/2025    Reason for Visit   Patient presents with    OPCM RN Follow Up Call       Brief Summary: Phone contact made with pt's daughter today. We discussed her care plan and did post discharge assessment. Collaborated with inpatient CM and DME for needed piece of equipment left at the hospital. Will continue to follow for education and management.   Follow up if wound care appt made. See if pt can connect with [facility].     Palliative care -sooner than before July 17. Maybe Dr. Chavez- collaborated with Keily SANTANA with palliative med that was consulted in the hospital. She agreed to see I clinic can see the patient sooner.

## 2025-06-26 NOTE — PROGRESS NOTES
The patient location is: [facility]  The chief complaint leading to consultation is: pain, insomnia    Visit type: audiovisual    Each patient to whom he or she provides medical services by telemedicine is:  (1) informed of the relationship between the physician and patient and the respective role of any other health care provider with respect to management of the patient; and (2) notified that he or she may decline to receive medical services by telemedicine and may withdraw from such care at any time.    Notes:Radha Feng  84 y.o. is here to seek an integrative approach to discuss side effects related to lung cancer treatment. Radha Feng  was referred by Dr. Kohler     HPI  Mrs. Feng' daughter Della reports she went to the ED early June 2024 after she bumped her head on the dresser.  She had a scan at the ED that showed a mass in the lungs and was discharged and they planned to follow up. She then started coughing up blood the end of June before the follow up appointment so she went back to the hospital and was diagnosed with lung cancer July 2024. She had radiation, no other treatment. She then noted an area to the right shoulder around November which looked like a rash but began to grow into a mass. She is going to get radiation to the shoulder mass, no scheduled yet. She reports the mass is painful at times. She is not sleeping well due to pain to her feet. She describes the pain as burning. Her daughter states her Mom does not feel like she is tired and seems to fight her sleep. She falls asleep she wakes up agitated and moves around a lot in bed. She has not had a good appetite. Her daughter tries to get her the food she enjoys but she does not always eat.   She is a  and has 4 children.     Pillars Assessment    Sleep  How many hours of sleep per night? 3-5 hours  Do you have trouble falling asleep, staying asleep or waking up earlier than you need to? yes  Do you have  daytime fatigue? yes  Do you need medication for sleep? no  Do you use any supplements or other interventions for sleep? melatonin    Resilience  Rate your current level of stress- high    Nutrition   Food allergies or sensitivities: no  Do you adhere to a particular type of diet? no  Do you have any concerns with your eating habits? yes    Exercise  How would you describe your physical activity level? low    Past Medical History  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    Diabetes mellitus     Glaucoma     Hypertension     Lung cancer     Other ascites 11/29/2022      Past Surgical History   Past Surgical History:   Procedure Laterality Date    CATARACT EXTRACTION W/  INTRAOCULAR LENS IMPLANT Left 12/21/2021        ENDOBRONCHIAL ULTRASOUND N/A 07/05/2024    Procedure: ENDOBRONCHIAL ULTRASOUND (EBUS);  Surgeon: Rolo Wilkinson MD;  Location: [facility] OR [address] [place] [address];  Service: Pulmonary;  Laterality: N/A;    ESOPHAGOGASTRODUODENOSCOPY N/A 06/26/2023    Procedure: ESOPHAGOGASTRODUODENOSCOPY (EGD);  Surgeon: Edgar Branch MD;  Location: [facility] (4TH FLR);  Service: Endoscopy;  Laterality: N/A;  referral Dr Peraza-cirrhosis/labs done on 4/24/23-[facility] portal-GT      Family History   Family History   Problem Relation Name Age of Onset    Cataracts Mother      Diabetes Mother      Glaucoma Mother      Hypertension Mother      Heart attack Father      Colon cancer Sister      Blindness Cousin      Amblyopia Neg Hx      Macular degeneration Neg Hx      Retinal detachment Neg Hx      Strabismus Neg Hx        Allergies  Review of patient's allergies indicates:  No Known Allergies   Current Medications:  Current Medications[1]     Review of Systems  Review of Systems   Constitutional:  Positive for malaise/fatigue.   HENT: Negative.     Eyes: Negative.    Respiratory: Negative.     Cardiovascular: Negative.    Gastrointestinal: Negative.    Genitourinary:  Negative.    Musculoskeletal: Negative.    Skin: Negative.    Neurological: Negative.    Endo/Heme/Allergies: Negative.    Psychiatric/Behavioral:  Positive for depression. The patient is nervous/anxious and has insomnia.       Physical Exam      There were no vitals filed for this visit.  There is no height or weight on file to calculate BMI.  Physical Exam  Neurological:      Mental Status: She is alert.   Psychiatric:         Mood and Affect: Mood normal.         Behavior: Behavior normal.        ASSESSMENT :  1. Decreased mobility and endurance    2. Insomnia, unspecified type    3. Pain of toe of left foot    4. Fatigue, unspecified type    5. Other low back pain    6. Generalized weakness    7. Adenocarcinoma of upper lobe of right lung      PLAN:  Reviewed all information discussed at today's visit and all questions were answered.    Counseled on healthy lifestyle and behavior modifications   Referral to PT I discussed the importance of therapy and explained the therapists will work with you to develop a specialized treatment program to help reduce and improve cancer-related problems and improve your strength and function.   Continue Acupuncture, referral previously placed  I explained acupuncture can reduce fatigue,  pain, and neuropathy. It can also assist with behavioral health such as depression and anxiety and improve overall sleep quality. Acupuncture helps improve overall symptoms from treatment.   Message sent to reschedule Nutrition appointment.   Recommended Dermavitality Neuropathy Support Cream, samples given  We discussed the importance of a good sleep routine and sleep hygiene: Recommended insight timer-breathing into sleep, progressive muscle relaxation, and guided meditations.     Follow up with Integrative Services as needed    I spent a total of 43 minutes on the day of the visit.This includes face to face time and non-face to face time preparing to see the patient (eg, review of tests),  obtaining and/or reviewing separately obtained history, documenting clinical information in the electronic or other health record, independently interpreting results and communicating results to the patient/family/caregiver, or care coordinator.        [1]   Current Outpatient Medications:     acetaminophen (TYLENOL) 500 MG tablet, Take 2 tablets (1,000 mg total) by mouth every 8 (eight) hours as needed for Pain., Disp: , Rfl: 0    aspirin 81 MG Chew, Take 1 tablet (81 mg total) by mouth once daily., Disp: 30 tablet, Rfl: 0    atorvastatin (LIPITOR) 10 MG tablet, Take 1 tablet (10 mg total) by mouth once daily., Disp: 90 tablet, Rfl: 3    BD INSULIN SYRINGE ULTRA-FINE 1 mL 31 gauge x 5/16 Syrg, , Disp: , Rfl:     blood-glucose meter,continuous Misc, Dispsense 1 Dexcom G7 , Disp: 1 each, Rfl: 0    blood-glucose transmitter (DEXCOM G6 TRANSMITTER) Amanda, 1 each by Misc.(Non-Drug; Combo Route) route every 3 (three) months. (Patient not taking: Reported on 6/13/2025), Disp: 1 each, Rfl: 3    brimonidine 0.2% (ALPHAGAN) 0.2 % Drop, Place 1 drop into both eyes 3 (three) times daily., Disp: 10 mL, Rfl: 11    cholecalciferol, vitamin D3, (VITAMIN D3) 25 mcg (1,000 unit) capsule, Take 2 capsules (2,000 Units total) by mouth once daily., Disp: , Rfl: 0    clopidogreL (PLAVIX) 75 mg tablet, Take 1 tablet (75 mg total) by mouth once daily., Disp: 30 tablet, Rfl: 11    DEXCOM G7 SENSOR Amanda, 1 Device by Misc.(Non-Drug; Combo Route) route every 10 days., Disp: 9 each, Rfl: 3    dorzolamide-timolol 2-0.5% (COSOPT) 22.3-6.8 mg/mL ophthalmic solution, Place 1 drop into both eyes 2 (two) times daily., Disp: 20 mL, Rfl: 3    furosemide (LASIX) 40 MG tablet, TAKE 1 TABLET BY MOUTH EVERY DAY, Disp: 90 tablet, Rfl: 0    insulin aspart U-100 (NOVOLOG U-100 INSULIN ASPART) 100 unit/mL injection, TO USE WITH OMNIPOD 5. TOTAL DAILY DOSE UP TO 40 UNITS, Disp: 40 mL, Rfl: 4    insulin pump cart,automated,BT (OMNIPOD 5 G6 PODS, GEN 5,)  "Crtg, Inject 1 each into the skin Every 3 (three) days., Disp: 10 each, Rfl: 11    latanoprost 0.005 % ophthalmic solution, PLACE 1 DROP INTO BOTH EYES ONCE DAILY, Disp: 7.5 mL, Rfl: 3    LIDOcaine (LIDODERM) 5 %, Place 1 patch onto the skin once daily. Remove & Discard patch within 12 hours or as directed by MD, Disp: 30 patch, Rfl: 0    melatonin (MELATIN) 3 mg tablet, Take 2 tablets (6 mg total) by mouth nightly as needed for Insomnia., Disp: 30 tablet, Rfl: 3    nystatin (MYCOSTATIN) 100,000 unit/mL suspension, SWISH WITH 6MLS BY MOUTH THEN SWALLOW 4 TIMES A DAY FOR 14 DAYS, Disp: , Rfl:     OMNIPOD 5 G6-G7 PODS, GEN 5, Crtg, SMARTSI Each SUB-Q Once a Month (Patient not taking: Reported on 2025), Disp: , Rfl:     oxyCODONE (ROXICODONE) 5 MG immediate release tablet, Take 0.5 tablets (2.5 mg total) by mouth every 6 (six) hours as needed for Pain., Disp: 10 tablet, Rfl: 0    pen needle, diabetic 31 gauge x 5/16" Ndle, Use to inject insulin into the skin up to 4 times daily, Disp: 400 each, Rfl: 3    predniSONE (DELTASONE) 5 MG tablet, Take 0.5 tablets (2.5 mg total) by mouth once daily., Disp: 45 tablet, Rfl: 3    tobramycin sulfate 0.3% (TOBREX) 0.3 % ophthalmic solution, 1-2 drops topically twice daily to affected toe(s)., Disp: 5 mL, Rfl: 9    "

## 2025-06-26 NOTE — ADDENDUM NOTE
Addended by: CARMEL PLUMMER on: 6/26/2025 03:26 PM     Modules accepted: Orders     HPI:    Overall stable. No change in family history. He states he may use 2 five mg Percocet a day for pain and would like a Rx. For #45 a month which is fine. He has some ear ringing for more than one year after COVID. He is unsure regarding his hearing changes. No other HEENT, cardiopulmonary, abdominal, , neurological, systemic, psychiatric, lymphatic, endocrine, vascular complaints.     PE:    Vitals noted, gen, nad, cooperative, alert, neck supple nl rom, lungs with good air movement, RRR, S1, S2, no MRG, abdomen, no acute findings. He uses arm B canes to walk.          Results for orders placed or performed in visit on 08/26/24   CBC with platelets and differential     Status: Abnormal   Result Value Ref Range    WBC Count 6.6 4.0 - 11.0 10e3/uL    RBC Count 4.30 (L) 4.40 - 5.90 10e6/uL    Hemoglobin 14.1 13.3 - 17.7 g/dL    Hematocrit 39.1 (L) 40.0 - 53.0 %    MCV 91 78 - 100 fL    MCH 32.8 26.5 - 33.0 pg    MCHC 36.1 31.5 - 36.5 g/dL    RDW 12.2 10.0 - 15.0 %    Platelet Count 380 150 - 450 10e3/uL    % Neutrophils 61 %    % Lymphocytes 30 %    % Monocytes 7 %    % Eosinophils 1 %    % Basophils 1 %    % Immature Granulocytes 0 %    NRBCs per 100 WBC 0 <1 /100    Absolute Neutrophils 4.1 1.6 - 8.3 10e3/uL    Absolute Lymphocytes 2.0 0.8 - 5.3 10e3/uL    Absolute Monocytes 0.5 0.0 - 1.3 10e3/uL    Absolute Eosinophils 0.1 0.0 - 0.7 10e3/uL    Absolute Basophils 0.0 0.0 - 0.2 10e3/uL    Absolute Immature Granulocytes 0.0 <=0.4 10e3/uL    Absolute NRBCs 0.0 10e3/uL   CBC with platelets and differential     Status: Abnormal    Narrative    The following orders were created for panel order CBC with platelets and differential.  Procedure                               Abnormality         Status                     ---------                               -----------         ------                     CBC with platelets and d...[[id number]]  Abnormal            Final result                 Please view results for  these tests on the individual orders.         A/P:    1. Immunizations; Tdap 11/4/2016. Moderna COVID vaccination x 4.    2. Seen 9/16/2021, Urology by Dr. Alvarez for erectile dysfunction   3. Dermatology; he does note feel he has any worrisome skin concerns that he needs to see dermatology.   4. Lipids ordered 8/26/2024   5. Spinal cord injury and he uses 1-2 5 mg Percocet a day and this is stable Seen Dr. Sena, PMR 9/10/2020 and detailed note in the chart. Will increase from #30 a  month to #45 a month   6. ENT referral for ear ringing.     30 minutes spent on the date of the encounter doing chart review, history and exam, documentation and further activities as noted above exclusive of procedures and other billable procedures

## 2025-06-27 ENCOUNTER — TELEPHONE (OUTPATIENT)
Dept: PRIMARY CARE CLINIC | Facility: CLINIC | Age: 85
End: 2025-06-27
Payer: MEDICARE

## 2025-06-27 ENCOUNTER — PATIENT MESSAGE (OUTPATIENT)
Dept: ENDOCRINOLOGY | Facility: CLINIC | Age: 85
End: 2025-06-27
Payer: MEDICARE

## 2025-06-27 ENCOUNTER — HOSPITAL ENCOUNTER (OUTPATIENT)
Dept: VASCULAR SURGERY | Facility: CLINIC | Age: 85
Discharge: HOME | End: 2025-06-27
Attending: SURGERY
Payer: MEDICARE

## 2025-06-27 ENCOUNTER — OUTPATIENT CASE MANAGEMENT (OUTPATIENT)
Dept: ADMINISTRATIVE | Facility: OTHER | Age: 85
End: 2025-06-27
Payer: MEDICARE

## 2025-06-27 ENCOUNTER — TELEPHONE (OUTPATIENT)
Dept: WOUND CARE | Facility: CLINIC | Age: 85
End: 2025-06-27
Payer: MEDICARE

## 2025-06-27 ENCOUNTER — INITIAL CONSULT (OUTPATIENT)
Dept: VASCULAR SURGERY | Facility: CLINIC | Age: 85
End: 2025-06-27
Attending: SURGERY
Payer: MEDICARE

## 2025-06-27 ENCOUNTER — PATIENT MESSAGE (OUTPATIENT)
Dept: ADMINISTRATIVE | Facility: OTHER | Age: 85
End: 2025-06-27
Payer: MEDICARE

## 2025-06-27 VITALS — DIASTOLIC BLOOD PRESSURE: 60 MMHG | TEMPERATURE: 98 F | SYSTOLIC BLOOD PRESSURE: 102 MMHG | HEART RATE: 86 BPM

## 2025-06-27 DIAGNOSIS — Z79.4 TYPE 2 DIABETES MELLITUS WITH OTHER CIRCULATORY COMPLICATION, WITH LONG-TERM CURRENT USE OF INSULIN: Primary | ICD-10-CM

## 2025-06-27 DIAGNOSIS — I73.9 PVD (PERIPHERAL VASCULAR DISEASE): Primary | ICD-10-CM

## 2025-06-27 DIAGNOSIS — E11.59 TYPE 2 DIABETES MELLITUS WITH OTHER CIRCULATORY COMPLICATION, WITH LONG-TERM CURRENT USE OF INSULIN: ICD-10-CM

## 2025-06-27 DIAGNOSIS — I73.9 PVD (PERIPHERAL VASCULAR DISEASE): ICD-10-CM

## 2025-06-27 DIAGNOSIS — E11.59 TYPE 2 DIABETES MELLITUS WITH OTHER CIRCULATORY COMPLICATION, WITH LONG-TERM CURRENT USE OF INSULIN: Primary | ICD-10-CM

## 2025-06-27 DIAGNOSIS — Z79.4 TYPE 2 DIABETES MELLITUS WITH OTHER CIRCULATORY COMPLICATION, WITH LONG-TERM CURRENT USE OF INSULIN: ICD-10-CM

## 2025-06-27 PROCEDURE — 99214 OFFICE O/P EST MOD 30 MIN: CPT | Mod: PBBFAC | Performed by: STUDENT IN AN ORGANIZED HEALTH CARE EDUCATION/TRAINING PROGRAM

## 2025-06-27 PROCEDURE — 93923 UPR/LXTR ART STDY 3+ LVLS: CPT | Mod: PBBFAC | Performed by: STUDENT IN AN ORGANIZED HEALTH CARE EDUCATION/TRAINING PROGRAM

## 2025-06-27 PROCEDURE — 93923 UPR/LXTR ART STDY 3+ LVLS: CPT | Mod: 26,S$PBB,, | Performed by: STUDENT IN AN ORGANIZED HEALTH CARE EDUCATION/TRAINING PROGRAM

## 2025-06-27 PROCEDURE — 99999 PR PBB SHADOW E&M-EST. PATIENT-LVL IV: CPT | Mod: PBBFAC,,, | Performed by: STUDENT IN AN ORGANIZED HEALTH CARE EDUCATION/TRAINING PROGRAM

## 2025-06-27 RX ORDER — INSULIN ASPART 100 [IU]/ML
INJECTION, SOLUTION INTRAVENOUS; SUBCUTANEOUS
Qty: 40 ML | Refills: 4 | Status: SHIPPED | OUTPATIENT
Start: 2025-06-27

## 2025-06-27 RX ORDER — INSULIN PMP CART,AUT,G6/7,CNTR
1 EACH SUBCUTANEOUS
Qty: 10 EACH | Refills: 11 | Status: SHIPPED | OUTPATIENT
Start: 2025-06-27

## 2025-06-27 NOTE — PROGRESS NOTES
VASCULAR SURGERY SERVICE    REFERRING DOCTOR: Leticia Lim MD    CHIEF COMPLAINT: Right toe necrosis    HISTORY OF PRESENT ILLNESS: Radha Feng is a 84 y.o. 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 She was discharged home on maximal medical therapy.  She returns to clinic today for follow up.  Her right 2nd 3rd and 4th toes have all become discolored.  She reports pain, specifically at night.  She is in clinic today with her daughter who is her advocate.  Overall she is doing okay.  She denies any fevers or chills.  She denies any drainage from her feet.  She is able to keep her wounds very clean.    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    Diabetes mellitus     Glaucoma     Hypertension     Lung cancer     Other ascites 11/29/2022       Past Surgical History:   Procedure Laterality Date    CATARACT EXTRACTION W/  INTRAOCULAR LENS IMPLANT Left 12/21/2021        ENDOBRONCHIAL ULTRASOUND N/A 07/05/2024    Procedure: ENDOBRONCHIAL ULTRASOUND (EBUS);  Surgeon: Rolo Wilkinson MD;  Location: [facility] OR [address] [place] [address];  Service: Pulmonary;  Laterality: N/A;    ESOPHAGOGASTRODUODENOSCOPY N/A 06/26/2023    Procedure: ESOPHAGOGASTRODUODENOSCOPY (EGD);  Surgeon: Edgar Branch MD;  Location: [facility] (4TH FLR);  Service: Endoscopy;  Laterality: N/A;  referral Dr Peraza-cirrhosis/labs done on 4/24/23-[facility] portal-GT       Current Medications[1]    Review of patient's allergies indicates:  No Known Allergies    Family History   Problem Relation Name Age of Onset    Cataracts Mother      Diabetes Mother      Glaucoma Mother      Hypertension Mother      Heart attack Father      Colon cancer Sister       Blindness Cousin      Amblyopia Neg Hx      Macular degeneration Neg Hx      Retinal detachment Neg Hx      Strabismus Neg Hx         Social History[2]    REVIEW OF SYSTEMS:  General: No chills, fever, malaise, changes in weight, reports low appetite  HEENT: No visual changes, difficulty hearing  Cardiovascular: No chest pain, palpitations  Pulmonary: No dyspnea, cough, wheezing  Gastrointestinal: No nausea, vomiting, diarrhea, constipation  Genitourinary: No dysuria, low urine output, hematuria  Endocrine: No polydipsia, polyphagia  Hematologic: No pallor  Musculoskeletal:  Chronic right hip joint pain, no back pain,  Neurologic: no seizures, no headaches, no weakness  Psychiatric: no mood disturbance      PHYSICAL EXAM:   There were no vitals taken for this visit.  Constitutional:  Alert,   Well-appearing  In no distress.   Neurological: Normal speech  no focal findings  CN II - XII grossly intact.    Psychiatric: Mood and affect appropriate and symmetric.   HEENT: Normocephalic / atraumatic  PERRLA  Midline trachea  No scars across the neck   Cardiac: Regular rate and rhythm.   Pulmonary: Normal pulmonary effort.   Abdomen: Soft, not distended.     Skin: Warm and well perfused.    Vascular:  Dopplerable right DP and PT all the way into her plantar arch.  Dopplerable left DP and PT.   Extremities/  Musculoskeletal: No edema.   Her right 2nd through 4th toes are showing signs of necrosis.  There was no odor, there was no drainage.  There is no erythema to her foot.     IMAGING:  I have independently reviewed her ABIs which were performed today.  On her right, her ABIs 1.27 with waveforms that suggest at least multilevel, moderate POD with digital pressures of 0.  On her left, her waveforms suggest multilevel severe PID with digital pressures of 0.    IMPRESSION:  84-year-old female with symptomatic right leg severe PAD and necrotic 2nd through 4th toes    PLAN:   I had a alexsandra discussion with Ms. Feng and her  daughter in clinic today.  They are very aware that any sort of surgical intervention would likely be disastrous in her given her medical complexity.  Their biggest concern today is on a good quality of life and controlling her symptoms, which I think is completely reasonable.  We discussed keeping her toes clean and painting them with Betadine twice daily.  We discussed that if there are any signs of infection that they should call me sooner.  The goal is to try to get her toes to form a dry gangrene which will then potentially auto amputate.  If possible, they would wish to avoid any surgery on her.  I do think she is experiencing rest pain at night when she puts her feet up, however, the patient and her daughter okay with managing this with the pain medication.  She is also seeing the palliative care team, which is providing great care for her symptom management.  I will see her back in 3 weeks' time just for a checkup to see how she is doing.  They know to call me sooner should any issues arise.               [1]   Current Outpatient Medications:     acetaminophen (TYLENOL) 500 MG tablet, Take 2 tablets (1,000 mg total) by mouth every 8 (eight) hours as needed for Pain., Disp: , Rfl: 0    aspirin 81 MG Chew, Take 1 tablet (81 mg total) by mouth once daily., Disp: 30 tablet, Rfl: 0    atorvastatin (LIPITOR) 10 MG tablet, Take 1 tablet (10 mg total) by mouth once daily., Disp: 90 tablet, Rfl: 3    BD INSULIN SYRINGE ULTRA-FINE 1 mL 31 gauge x 5/16 Syrg, , Disp: , Rfl:     blood-glucose meter,continuous Misc, Dispsense 1 Dexcom G7 , Disp: 1 each, Rfl: 0    blood-glucose transmitter (DEXCOM G6 TRANSMITTER) Amanda, 1 each by Misc.(Non-Drug; Combo Route) route every 3 (three) months. (Patient not taking: Reported on 6/13/2025), Disp: 1 each, Rfl: 3    brimonidine 0.2% (ALPHAGAN) 0.2 % Drop, Place 1 drop into both eyes 3 (three) times daily., Disp: 10 mL, Rfl: 11    cholecalciferol, vitamin D3, (VITAMIN D3) 25  "mcg (1,000 unit) capsule, Take 2 capsules (2,000 Units total) by mouth once daily., Disp: , Rfl: 0    clopidogreL (PLAVIX) 75 mg tablet, Take 1 tablet (75 mg total) by mouth once daily., Disp: 30 tablet, Rfl: 11    DEXCOM G7 SENSOR Amanda, 1 Device by Misc.(Non-Drug; Combo Route) route every 10 days., Disp: 9 each, Rfl: 3    dorzolamide-timolol 2-0.5% (COSOPT) 22.3-6.8 mg/mL ophthalmic solution, Place 1 drop into both eyes 2 (two) times daily., Disp: 20 mL, Rfl: 3    furosemide (LASIX) 40 MG tablet, TAKE 1 TABLET BY MOUTH EVERY DAY, Disp: 90 tablet, Rfl: 0    insulin aspart U-100 (NOVOLOG U-100 INSULIN ASPART) 100 unit/mL injection, TO USE WITH OMNIPOD 5. TOTAL DAILY DOSE UP TO 40 UNITS, Disp: 40 mL, Rfl: 4    latanoprost 0.005 % ophthalmic solution, PLACE 1 DROP INTO BOTH EYES ONCE DAILY, Disp: 7.5 mL, Rfl: 3    LIDOcaine (LIDODERM) 5 %, Place 1 patch onto the skin once daily. Remove & Discard patch within 12 hours or as directed by MD, Disp: 30 patch, Rfl: 0    melatonin (MELATIN) 3 mg tablet, Take 2 tablets (6 mg total) by mouth nightly as needed for Insomnia., Disp: 30 tablet, Rfl: 3    nystatin (MYCOSTATIN) 100,000 unit/mL suspension, SWISH WITH 6MLS BY MOUTH THEN SWALLOW 4 TIMES A DAY FOR 14 DAYS, Disp: , Rfl:     OMNIPOD 5 G6-G7 PODS, GEN 5, Crtg, Inject 1 each into the skin every 72 hours., Disp: 10 each, Rfl: 11    oxyCODONE (ROXICODONE) 5 MG immediate release tablet, Take 0.5 tablets (2.5 mg total) by mouth every 6 (six) hours as needed for Pain., Disp: 10 tablet, Rfl: 0    pen needle, diabetic 31 gauge x 5/16" Ndle, Use to inject insulin into the skin up to 4 times daily, Disp: 400 each, Rfl: 3    predniSONE (DELTASONE) 5 MG tablet, Take 0.5 tablets (2.5 mg total) by mouth once daily., Disp: 45 tablet, Rfl: 3    tobramycin sulfate 0.3% (TOBREX) 0.3 % ophthalmic solution, 1-2 drops topically twice daily to affected toe(s)., Disp: 5 mL, Rfl: 9  [2]   Social History  Tobacco Use    Smoking status: Former    " Smokeless tobacco: Never   Substance Use Topics    Alcohol use: Not Currently    Drug use: Never

## 2025-06-27 NOTE — TELEPHONE ENCOUNTER
Dillonm notifying pt during her upcoming annual visit on July 1st she can discuss a Wound care referral with .

## 2025-06-27 NOTE — TELEPHONE ENCOUNTER
----- Message from  Catina sent at 6/27/2025 10:31 AM CDT -----  Regarding: Wound care  Good morning,     I spoke with this patient's daughter yesterday. The patient was recently discharged from the hospital and is needing a wound care follow up. Daughter is monitoring a wound to the patient's right shoulder and to sacral area. They are running low on wound care supplies also. She did have a consult from wound care on 6/18/25 and was also followed by infectious disease.   I do not see a referral on her chart though. Not sure if this would be needed or if patient can schedule hospital follow up with wound care    Please contact daughter to advise.     Thank you for your assistance,   Catina Dalal RN  Outpatient Complex Care Management  [phone]

## 2025-06-27 NOTE — TELEPHONE ENCOUNTER
Spoke with the daughter (Della) and informed her to reach out to her mother PCP and she may give her a referral for wound care. I also stated to the daughter that the provider at [facility] only does hip to ankle. She stated that before she left the hospital she told the  that she'll take her mother to [facility] for her wound care.

## 2025-06-29 ENCOUNTER — PATIENT MESSAGE (OUTPATIENT)
Dept: HEMATOLOGY/ONCOLOGY | Facility: CLINIC | Age: 85
End: 2025-06-29
Payer: MEDICARE

## 2025-06-30 ENCOUNTER — DOCUMENTATION ONLY (OUTPATIENT)
Dept: RADIATION ONCOLOGY | Facility: CLINIC | Age: 85
End: 2025-06-30
Payer: MEDICARE

## 2025-06-30 ENCOUNTER — TELEPHONE (OUTPATIENT)
Dept: PALLIATIVE MEDICINE | Facility: CLINIC | Age: 85
End: 2025-06-30
Payer: MEDICARE

## 2025-06-30 NOTE — PLAN OF CARE
DAY 1 of outpatient radiation to axilla. Daughter present. Vitals 105/63, 87 HR ,99% on 2L. Tolerating treatment.

## 2025-07-01 ENCOUNTER — PATIENT MESSAGE (OUTPATIENT)
Dept: PRIMARY CARE CLINIC | Facility: CLINIC | Age: 85
End: 2025-07-01
Payer: MEDICARE

## 2025-07-01 ENCOUNTER — HOSPITAL ENCOUNTER (OUTPATIENT)
Dept: RADIATION THERAPY | Facility: HOSPITAL | Age: 85
Discharge: HOME | End: 2025-07-01
Attending: STUDENT IN AN ORGANIZED HEALTH CARE EDUCATION/TRAINING PROGRAM
Payer: MEDICARE

## 2025-07-02 ENCOUNTER — TELEPHONE (OUTPATIENT)
Dept: NEUROSURGERY | Facility: CLINIC | Age: 85
End: 2025-07-02
Payer: MEDICARE

## 2025-07-02 NOTE — TELEPHONE ENCOUNTER
Called to confirm pt was still able to make XR and appt w/ Leeanne Solorio PA-C today. Pt's daughter states that appts will need to be r/s as pt is undergoing chemo and has been experiencing fatigue. She confirmed r/s pt's appts to:    Future Appointments   Date Time Provider Department Center   7/7/2025  9:00 AM Jung Judge MD [facility]   7/8/2025 10:30 AM [facility] LAB [facility]   7/8/2025  2:00 PM Naomi Mendez PA-C [facility] UROGYN [facility]   7/10/2025  3:00 PM Yvonne Peraza MD [facility] HEPAT [facility]   7/15/2025 11:30 AM Nestor Agarwal MD [facility] ENDODIA [facility]   7/15/2025  2:15 PM Nicole Garber MD [facility] OPHTHAL [facility]   7/18/2025  1:30 PM Susannah Schafer MD [facility] VASCSUR [facility]   7/22/2025 10:30 AM [facility] LAB [facility]   7/22/2025  2:15 PM [facility] OIC-XRAY [facility] XRAY [facility]   7/22/2025  3:30 PM Danielle Miller, NP [facility] NEUROS8 [facility]   7/22/2025  3:45 PM [facility] USOP3 [facility] USOUNDO [facility]   7/24/2025  8:00 AM Janice Chavez MD [facility]       Then attempted to call back and inform that appts may need to be pushed back again as pt is scheduled for an  same day. No answer. Lvm advising call back to r/s if needed.

## 2025-07-08 ENCOUNTER — OFFICE VISIT (OUTPATIENT)
Dept: UROGYNECOLOGY | Facility: CLINIC | Age: 85
End: 2025-07-08
Payer: MEDICARE

## 2025-07-08 ENCOUNTER — LAB VISIT (OUTPATIENT)
Dept: LAB | Facility: HOSPITAL | Age: 85
End: 2025-07-08
Attending: INTERNAL MEDICINE
Payer: MEDICARE

## 2025-07-08 VITALS
HEIGHT: 65 IN | WEIGHT: 90.63 LBS | HEART RATE: 80 BPM | BODY MASS INDEX: 15.1 KG/M2 | SYSTOLIC BLOOD PRESSURE: 118 MMHG | DIASTOLIC BLOOD PRESSURE: 57 MMHG

## 2025-07-08 DIAGNOSIS — K75.4 AUTOIMMUNE HEPATITIS: ICD-10-CM

## 2025-07-08 DIAGNOSIS — N95.2 VAGINAL ATROPHY: ICD-10-CM

## 2025-07-08 DIAGNOSIS — Z46.89 PESSARY MAINTENANCE: Primary | ICD-10-CM

## 2025-07-08 LAB
ABSOLUTE EOSINOPHIL (OHS): 0.05 K/UL
ABSOLUTE MONOCYTE (OHS): 1.71 K/UL (ref 0.3–1)
ABSOLUTE NEUTROPHIL COUNT (OHS): 15.12 K/UL (ref 1.8–7.7)
ALBUMIN SERPL BCP-MCNC: 1.6 G/DL (ref 3.5–5.2)
ALP SERPL-CCNC: 89 UNIT/L (ref 40–150)
ALT SERPL W/O P-5'-P-CCNC: 12 UNIT/L (ref 10–44)
ANION GAP (OHS): 8 MMOL/L (ref 8–16)
AST SERPL-CCNC: 33 UNIT/L (ref 11–45)
BASOPHILS # BLD AUTO: 0.02 K/UL
BASOPHILS NFR BLD AUTO: 0.1 %
BILIRUB DIRECT SERPL-MCNC: 0.2 MG/DL (ref 0.1–0.3)
BILIRUB SERPL-MCNC: 0.3 MG/DL (ref 0.1–1)
BUN SERPL-MCNC: 15 MG/DL (ref 8–23)
CALCIUM SERPL-MCNC: 8.5 MG/DL (ref 8.7–10.5)
CHLORIDE SERPL-SCNC: 108 MMOL/L (ref 95–110)
CO2 SERPL-SCNC: 25 MMOL/L (ref 23–29)
CREAT SERPL-MCNC: 0.5 MG/DL (ref 0.5–1.4)
ERYTHROCYTE [DISTWIDTH] IN BLOOD BY AUTOMATED COUNT: 19.7 % (ref 11.5–14.5)
GFR SERPLBLD CREATININE-BSD FMLA CKD-EPI: >60 ML/MIN/1.73/M2
GLUCOSE SERPL-MCNC: 181 MG/DL (ref 70–110)
HCT VFR BLD AUTO: 22.5 % (ref 37–48.5)
HGB BLD-MCNC: 6.5 GM/DL (ref 12–16)
IMM GRANULOCYTES # BLD AUTO: 0.09 K/UL (ref 0–0.04)
IMM GRANULOCYTES NFR BLD AUTO: 0.5 % (ref 0–0.5)
INR PPP: 1.2 (ref 0.8–1.2)
LYMPHOCYTES # BLD AUTO: 0.77 K/UL (ref 1–4.8)
MCH RBC QN AUTO: 26.6 PG (ref 27–31)
MCHC RBC AUTO-ENTMCNC: 28.9 G/DL (ref 32–36)
MCV RBC AUTO: 92 FL (ref 82–98)
NUCLEATED RBC (/100WBC) (OHS): 0 /100 WBC
PLATELET # BLD AUTO: 272 K/UL (ref 150–450)
PMV BLD AUTO: 11.6 FL (ref 9.2–12.9)
POTASSIUM SERPL-SCNC: 4.6 MMOL/L (ref 3.5–5.1)
PROT SERPL-MCNC: 6.4 GM/DL (ref 6–8.4)
PROTHROMBIN TIME: 12.9 SECONDS (ref 9–12.5)
RBC # BLD AUTO: 2.44 M/UL (ref 4–5.4)
RELATIVE EOSINOPHIL (OHS): 0.3 %
RELATIVE LYMPHOCYTE (OHS): 4.3 % (ref 18–48)
RELATIVE MONOCYTE (OHS): 9.6 % (ref 4–15)
RELATIVE NEUTROPHIL (OHS): 85.2 % (ref 38–73)
SODIUM SERPL-SCNC: 141 MMOL/L (ref 136–145)
WBC # BLD AUTO: 17.76 K/UL (ref 3.9–12.7)

## 2025-07-08 PROCEDURE — 82248 BILIRUBIN DIRECT: CPT

## 2025-07-08 PROCEDURE — 82040 ASSAY OF SERUM ALBUMIN: CPT

## 2025-07-08 PROCEDURE — 99999 PR PBB SHADOW E&M-EST. PATIENT-LVL IV: CPT | Mod: PBBFAC,,,

## 2025-07-08 PROCEDURE — 85610 PROTHROMBIN TIME: CPT

## 2025-07-08 PROCEDURE — 99214 OFFICE O/P EST MOD 30 MIN: CPT | Mod: PBBFAC

## 2025-07-08 PROCEDURE — 99213 OFFICE O/P EST LOW 20 MIN: CPT | Mod: S$PBB,,,

## 2025-07-08 PROCEDURE — 85025 COMPLETE CBC W/AUTO DIFF WBC: CPT

## 2025-07-08 PROCEDURE — 36415 COLL VENOUS BLD VENIPUNCTURE: CPT | Mod: PN

## 2025-07-08 NOTE — PROGRESS NOTES
[facility]  [address] [address]    Radha Feng  [id number]  1940      Radha Feng is a 84 y.o. here for a urogyn follow up for pelvic organ prolapse.     History of Present Illness   84 y.o.  female has a past medical history of Cataract, Chondromalacia, knee, right (10/28/2022), Diabetes mellitus, Glaucoma, Hypertension, Lung cancer, and Other ascites (11/29/2022).       06/12/2023  Here for pessary placement. Has had pessary holiday since last visit.    Changes since last visit:  Rare UUI when pulling pants down  Denies pain, bleeding, or discharge  Doing well with pessary--using rephresh weekly  Denies constipation or straining    10/12/2023:  Patient presents for follow up   She is currently using the pessary has had issues with abrasions in the past  No vaginal discharge or bleeding   +JUAN MIGUEL and +UU stable with the pessary     POP + doing well with the pessary     1/18/202:  Patient here for follow up doing well no issues  No discharge, no bleeding   Voiding well no constipation     5/22/2024:  Patient doing well no issues  Pessary in place     8/202/2024:  Newly diagnosed with lung cancer- getting radiation tolerating things well   Patient doing well no issues  Pessary in place     12/19/2024  Patient states that she hates these pessary cleaning and is interested in discussing surgery     07/08/2025// Changes since last visit   Patient here for follow up pessary fitting   Patient is now on Supplemental O2 after herlinda URI  Not currently interested in surgical intervention due to health   No vaginal bleeding or discharge     [facility]     Question 12/20/2022  1:44 PM CST - Filed by Patient   General Urogynecology: Are you experiencing the following?     Dysuria (painful urination) No   Nocturia:  waking up at night to empty your bladder  Yes   If you answered yes to the previous question, how many times does this happen per night?  "1-2   Enuresis (urine loss during sleep) No   Dribbling urine after you urinate No   Hematuria (urine appears red) No   Type of stream Weak   Urinary frequency: How often a day are you going to the bathroom per day?  Less than 10   Urinary Tract Infections: How many Urinary Tract Infections have you had in the past year? One (1) in the past year   If you have had a UTI in the past year, what treatments have you had so far?  Prophylactic antibiotics   Urinary Incontinence (General): Are you experiencing the following?     Past consultation for incontinence: Have you ever seen someone for the evaluation of incontinence? No   If you answered yes to the previous question, please select all the therapies you have tried.  N/a- I answered no to the previous question   Please note the effectiveness of the therapies.     Need to wear protection to keep clothes dry  Yes   If you answered yes to the previous question, please scar the protection you use.  Poise   If you wear protection, how much wetness is typically on each pad? Slight   If you wear protection, how often do you have to change per day, if applicable?  3-4   Stress Symptoms: Are you experiencing the following?     Leakage of urine with cough, laugh and/or sneeze Yes   If you answered yes to the previous question, what is the frequency in days, weeks and/or months? Daily   Leakage of urine with sex No   Leakage of urine with bending/ lifting No   Leakage of urine with briskly walking or jogging No   If you lose urine for any other reason not previously mentioned, please note it below, if applicable.      Urge Symptoms: Are you experiencing the following?     Urgency ("got to go" feeling) No   Urge: How frequently do you feel an urge to urinate (feeling like you "gotta go" to the bathroom and can't wait) Never   Do you experience a leakage of urine when you have a feeling of urgency?  No   Leakage of urine when unaware No   Past use of anticholinergics (medications " used to treat overactive bladder) No   If you answered yes to the previous question, please scar the anticholinergics you have used:      Have you ever used Mirbetriq (aka Mirabegron)?  No   Prolapse Symptoms: Are you experiencing any of the following?      Falling out/ Bulging/ Heaviness in the vagina Yes   Vaginal/ Abdominal Pain/ Pressure No   Need to strain/ Push to void No   Need to wait on the toilet before you void No   Unusual position to urinate (using your hands to push back the vaginal bulge) No   Sensation of incomplete emptying No   Past use of pessary device No   If you answered yes to the previous question, please list the devices you have used below.      Bowel Symptoms: Are you experiencing any of the following?     Constipation No   Diarrhea  No   Hematochezia (bloody stool) No   Incomplete evacuation of stool No   Involuntary loss of formed stool No   Fecal smearing/urgency No   Involuntary loss of gas Yes   Vaginal Symptoms: Are you experiencing any of the following?      Abnormal vaginal bleeding  No   Vaginal dryness No   Sexually active  No   Dyspareunia (painful intercourse) No   Estrogen use  No        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    Diabetes mellitus     Glaucoma     Hypertension     Lung cancer     Other ascites 11/29/2022       Past Surgical History:   Procedure Laterality Date    CATARACT EXTRACTION W/  INTRAOCULAR LENS IMPLANT Left 12/21/2021        ENDOBRONCHIAL ULTRASOUND N/A 07/05/2024    Procedure: ENDOBRONCHIAL ULTRASOUND (EBUS);  Surgeon: Rolo Wilkinson MD;  Location: [facility] OR [address] [place] [address];  Service: Pulmonary;  Laterality: N/A;    ESOPHAGOGASTRODUODENOSCOPY N/A 06/26/2023    Procedure: ESOPHAGOGASTRODUODENOSCOPY (EGD);  Surgeon: Edgar Branch MD;  Location: [facility] (4TH FLR);  Service: Endoscopy;  Laterality: N/A;  referral Dr Peraza-cirrhosis/labs done on 4/24/23-[facility]-       Family History    Problem Relation Name Age of Onset    Cataracts Mother      Diabetes Mother      Glaucoma Mother      Hypertension Mother      Heart attack Father      Colon cancer Sister      Blindness Cousin      Amblyopia Neg Hx      Macular degeneration Neg Hx      Retinal detachment Neg Hx      Strabismus Neg Hx         Social History     Socioeconomic History    Marital status: Single   Tobacco Use    Smoking status: Former    Smokeless tobacco: Never   Substance and Sexual Activity    Alcohol use: Not Currently    Drug use: Never   Social History Narrative    , one daughter local, four sons out of state. Retired . Lives with daughter Joss.     Social Drivers of Health     Financial Resource Strain: Low Risk  (6/17/2025)    Overall Financial Resource Strain (CARDIA)     Difficulty of Paying Living Expenses: Not hard at all   Food Insecurity: No Food Insecurity (6/17/2025)    Hunger Vital Sign     Worried About Running Out of Food in the Last Year: Never true     Ran Out of Food in the Last Year: Never true   Transportation Needs: No Transportation Needs (6/17/2025)    PRAPARE - Transportation     Lack of Transportation (Medical): No     Lack of Transportation (Non-Medical): No   Physical Activity: Inactive (6/17/2025)    Exercise Vital Sign     Days of Exercise per Week: 0 days     Minutes of Exercise per Session: 0 min   Stress: Patient Declined (6/17/2025)    [place] [place] [employer] - Occupational Stress Questionnaire     Feeling of Stress : Patient declined   Recent Concern: Stress - Stress Concern Present (6/3/2025)    [employer] [place] [employer] - Occupational Stress Questionnaire     Feeling of Stress : To some extent   Housing Stability: Low Risk  (6/17/2025)    Housing Stability Vital Sign     Unable to Pay for Housing in the Last Year: No     Homeless in the Last Year: No       Current Outpatient Medications   Medication Sig Dispense Refill    acetaminophen  "(TYLENOL) 500 MG tablet Take 2 tablets (1,000 mg total) by mouth every 8 (eight) hours as needed for Pain.  0    aspirin 81 MG Chew Take 1 tablet (81 mg total) by mouth once daily. 30 tablet 0    atorvastatin (LIPITOR) 10 MG tablet Take 1 tablet (10 mg total) by mouth once daily. 90 tablet 3    BD INSULIN SYRINGE ULTRA-FINE 1 mL 31 gauge x 5/16 Syrg       blood-glucose meter,continuous Misc Dispsense 1 Dexcom G7  1 each 0    blood-glucose transmitter (DEXCOM G6 TRANSMITTER) Amanda 1 each by Misc.(Non-Drug; Combo Route) route every 3 (three) months. 1 each 3    brimonidine 0.2% (ALPHAGAN) 0.2 % Drop Place 1 drop into both eyes 3 (three) times daily. 10 mL 11    cholecalciferol, vitamin D3, (VITAMIN D3) 25 mcg (1,000 unit) capsule Take 2 capsules (2,000 Units total) by mouth once daily.  0    clopidogreL (PLAVIX) 75 mg tablet Take 1 tablet (75 mg total) by mouth once daily. 30 tablet 11    DEXCOM G7 SENSOR Amanda 1 Device by Misc.(Non-Drug; Combo Route) route every 10 days. 9 each 3    dorzolamide-timolol 2-0.5% (COSOPT) 22.3-6.8 mg/mL ophthalmic solution Place 1 drop into both eyes 2 (two) times daily. 20 mL 3    furosemide (LASIX) 40 MG tablet TAKE 1 TABLET BY MOUTH EVERY DAY 90 tablet 0    insulin aspart U-100 (NOVOLOG U-100 INSULIN ASPART) 100 unit/mL injection TO USE WITH OMNIPOD 5. TOTAL DAILY DOSE UP TO 40 UNITS 40 mL 4    LIDOcaine (LIDODERM) 5 % Place 1 patch onto the skin once daily. Remove & Discard patch within 12 hours or as directed by MD 30 patch 0    melatonin (MELATIN) 3 mg tablet Take 2 tablets (6 mg total) by mouth nightly as needed for Insomnia. 30 tablet 3    nystatin (MYCOSTATIN) 100,000 unit/mL suspension SWISH WITH 6MLS BY MOUTH THEN SWALLOW 4 TIMES A DAY FOR 14 DAYS      oxyCODONE (ROXICODONE) 5 MG immediate release tablet Take 0.5 tablets (2.5 mg total) by mouth every 6 (six) hours as needed for Pain. 10 tablet 0    pen needle, diabetic 31 gauge x 5/16" Ndle Use to inject insulin into the " "skin up to 4 times daily 400 each 3    predniSONE (DELTASONE) 5 MG tablet Take 0.5 tablets (2.5 mg total) by mouth once daily. 45 tablet 3    tobramycin sulfate 0.3% (TOBREX) 0.3 % ophthalmic solution 1-2 drops topically twice daily to affected toe(s). 5 mL 9    latanoprost 0.005 % ophthalmic solution PLACE 1 DROP INTO BOTH EYES ONCE DAILY 7.5 mL 3    OMNIPOD 5 G6-G7 PODS, GEN 5, Crtg Inject 1 each into the skin every 72 hours. 10 each 11     No current facility-administered medications for this visit.       Review of patient's allergies indicates:  No Known Allergies    Well Woman:  Pap:denies history of abnormal pap  Mammo:no longer screening  Colonoscopy:refused  Dexa:03/2023    Hip Fracture 6%.   Impression:   Osteopenia lumbar spine.  Osteoporosis both hips.      ROS:  As per HPI.      Exam  BP (!) 118/57   Pulse 80   Ht 5' 5" (1.651 m)   Wt 41.1 kg (90 lb 9.7 oz)   BMI 15.08 kg/m²   General: alert and oriented, no acute distress  Respiratory: normal respiratory effort  Abd: soft, non-tender, non-distended    Pelvic  Ext. Genitalia: normal external genitalia. Normal bartholin's and skeens glands  Vagina: + atrophy. Normal vaginal mucosa without lesions. No abrasion noted  Non-tender bladder base without palpable mass.  #2 1/2 LS GH pessary in place- removed and cleaned   Cervix no lesions  Uterus:  nontender, normal size, shape, position, mobile  Urethra: no masses or tenderness  Urethral meatus: no lesions, caruncle or prolapse.    Pessary removed without difficulty. Washed with soap and water. Reinserted without difficulty    Impression  1. Pessary maintenance        2. Vaginal atrophy                    Plan:   1. Prolapse: Stage 2 apical prolapse, Stage 2 anterior and posterior vaginal wall prolapse -  --continue  #2 1/2 " LS gH pessary-  --Non surgical options discussed with patient    --no discharge continue vaginal estrogen and add replens  on opposite days.   --Pessary in place, present with " insertion and removal in clinic  --Surgical options discussed such as : Colpocleisis recommend office CM[facility]. Tentatively scheduled for 3/14/2024. Transvaginal sonogram prior to surgery.      2.  Mixed urinary incontinence, urge > stress:   --Empty bladder every 3 hours.  Empty well: wait a minute, lean forward on toilet.    --Avoid dietary irritants (see sheet).  Keep diary x 3-5 days to determine your irritants.  --KEGELS: do 10 in AM and 10 in PM, holding each x 10 seconds.  When you feel urge to go, STOP, KEGEL, and when urge has passed, then go to bathroom.  --URGE: .  SE profile reviewed.    Takes 2-4 weeks to see if will have effect.  For dry mouth: get sour, sugar free lozenge or gum.    --STRESS:  Non surgical options: Pessary vs. Impressa vs Rivive - which represent urethral and bladder support devices; Surgical options including 1.  mid urethral sling; retropubic, transobturator vs single incision 2. Fascial slings 3. Hutchison procedure 4. Periurethral bulking     3. Vaginal atrophy (dryness):   Please start Use REPLENS or REFRESH OTC: 1/2 applicator full in vagina twice a week.      4. RTC 3 months for for pc for removal and leave the pessary out.     Naomi Mendez  Female Pelvic Medicine and Reconstructive Surgery  [facility] [place], [place]

## 2025-07-09 ENCOUNTER — INFUSION (OUTPATIENT)
Dept: INFUSION THERAPY | Facility: HOSPITAL | Age: 85
End: 2025-07-09
Payer: MEDICARE

## 2025-07-09 ENCOUNTER — PATIENT MESSAGE (OUTPATIENT)
Dept: HEMATOLOGY/ONCOLOGY | Facility: CLINIC | Age: 85
End: 2025-07-09
Payer: MEDICARE

## 2025-07-09 ENCOUNTER — PATIENT MESSAGE (OUTPATIENT)
Dept: RADIATION ONCOLOGY | Facility: CLINIC | Age: 85
End: 2025-07-09
Payer: MEDICARE

## 2025-07-09 ENCOUNTER — LAB VISIT (OUTPATIENT)
Dept: LAB | Facility: HOSPITAL | Age: 85
End: 2025-07-09
Attending: HOSPITALIST
Payer: MEDICARE

## 2025-07-09 VITALS
HEIGHT: 65 IN | BODY MASS INDEX: 15.1 KG/M2 | RESPIRATION RATE: 18 BRPM | TEMPERATURE: 98 F | HEART RATE: 79 BPM | DIASTOLIC BLOOD PRESSURE: 60 MMHG | OXYGEN SATURATION: 100 % | SYSTOLIC BLOOD PRESSURE: 137 MMHG | WEIGHT: 90.63 LBS

## 2025-07-09 DIAGNOSIS — D64.9 ANEMIA, UNSPECIFIED TYPE: Primary | ICD-10-CM

## 2025-07-09 DIAGNOSIS — D64.9 ANEMIA, UNSPECIFIED TYPE: ICD-10-CM

## 2025-07-09 DIAGNOSIS — C34.11 ADENOCARCINOMA OF UPPER LOBE OF RIGHT LUNG: ICD-10-CM

## 2025-07-09 LAB
ABO + RH BLD: NORMAL
BLD PROD TYP BPU: NORMAL
BLOOD UNIT EXPIRATION DATE: NORMAL
BLOOD UNIT TYPE CODE: 5100
CROSSMATCH INTERPRETATION: NORMAL
DISPENSE STATUS: NORMAL
INDIRECT COOMBS: NORMAL
RH BLD: NORMAL
SPECIMEN OUTDATE: NORMAL
UNIT NUMBER: NORMAL

## 2025-07-09 PROCEDURE — P9016 RBC LEUKOCYTES REDUCED: HCPCS | Performed by: HOSPITALIST

## 2025-07-09 PROCEDURE — 36430 TRANSFUSION BLD/BLD COMPNT: CPT

## 2025-07-09 PROCEDURE — 86920 COMPATIBILITY TEST SPIN: CPT | Performed by: HOSPITALIST

## 2025-07-09 PROCEDURE — 86901 BLOOD TYPING SEROLOGIC RH(D): CPT | Performed by: HOSPITALIST

## 2025-07-09 PROCEDURE — 36415 COLL VENOUS BLD VENIPUNCTURE: CPT

## 2025-07-09 PROCEDURE — 25000003 PHARM REV CODE 250: Performed by: HOSPITALIST

## 2025-07-09 RX ORDER — DIPHENHYDRAMINE HYDROCHLORIDE 50 MG/ML
25 INJECTION, SOLUTION INTRAMUSCULAR; INTRAVENOUS
Status: CANCELLED | OUTPATIENT
Start: 2025-07-09

## 2025-07-09 RX ORDER — ACETAMINOPHEN 325 MG/1
650 TABLET ORAL
Status: CANCELLED | OUTPATIENT
Start: 2025-07-09

## 2025-07-09 RX ORDER — DIPHENHYDRAMINE HYDROCHLORIDE 50 MG/ML
25 INJECTION, SOLUTION INTRAMUSCULAR; INTRAVENOUS
Status: DISCONTINUED | OUTPATIENT
Start: 2025-07-09 | End: 2025-07-09 | Stop reason: HOSPADM

## 2025-07-09 RX ORDER — HYDROCODONE BITARTRATE AND ACETAMINOPHEN 500; 5 MG/1; MG/1
TABLET ORAL ONCE
Status: COMPLETED | OUTPATIENT
Start: 2025-07-09 | End: 2025-07-09

## 2025-07-09 RX ORDER — ACETAMINOPHEN 325 MG/1
650 TABLET ORAL
Status: COMPLETED | OUTPATIENT
Start: 2025-07-09 | End: 2025-07-09

## 2025-07-09 RX ORDER — HYDROCODONE BITARTRATE AND ACETAMINOPHEN 500; 5 MG/1; MG/1
TABLET ORAL ONCE
Status: CANCELLED | OUTPATIENT
Start: 2025-07-09 | End: 2025-07-09

## 2025-07-09 RX ADMIN — ACETAMINOPHEN 650 MG: 325 TABLET ORAL at 03:07

## 2025-07-09 RX ADMIN — SODIUM CHLORIDE: 9 INJECTION, SOLUTION INTRAVENOUS at 01:07

## 2025-07-09 NOTE — PROGRESS NOTES
Hgb 6.5. Labs may represent mixed KIA and anemia of chronic inflammation, noting only a mildly elevated ferritin (387) with a significantly decreased iron saturation (15) and iron (26). STFR elevation may indicate some KIA. Hgb newly 6.5. 1 u PRBC ordered.

## 2025-07-09 NOTE — PLAN OF CARE
5944 Patient is here for 1U PRBC. Labs, meds and hx reviewed. HGL 6.5 on yesterday's labs. Orders obtained for 1U PRBC. PIV inserted and NS started. Mounds and snack provided.

## 2025-07-09 NOTE — PLAN OF CARE
3745 Patient tolerated 1U PRBC with no s/s of reaction and no complaints. PIV removed and catheter tip intact. AVS was declined and patient assisted to her wheelchair. Discharged with her daughter.   Complex Repair And Double Advancement Flap Text: The defect edges were debeveled with a #15 scalpel blade.  The primary defect was closed partially with a complex linear closure.  Given the location of the remaining defect, shape of the defect and the proximity to free margins a double advancement flap was deemed most appropriate for complete closure of the defect.  Using a sterile surgical marker, an appropriate advancement flap was drawn incorporating the defect and placing the expected incisions within the relaxed skin tension lines where possible.    The area thus outlined was incised deep to adipose tissue with a #15 scalpel blade.  The skin margins were undermined to an appropriate distance in all directions utilizing iris scissors.

## 2025-07-10 ENCOUNTER — TELEPHONE (OUTPATIENT)
Dept: PALLIATIVE MEDICINE | Facility: CLINIC | Age: 85
End: 2025-07-10
Payer: MEDICARE

## 2025-07-10 NOTE — TELEPHONE ENCOUNTER
Pt's daughter called asking for  an afternoon appt I explained that the next afternoon appt was 8/4 she asked for virtual appt but stated pt needed opioids I explained that the first visit would need to be in person for an opoid prescription then we can go to virtual. Pt's daughter agreed to keep 8am in person and added to to a wait list. She then called back and spoke to another team member regarding changing 8am to virtual and that opioids will  be requested through oncology despite oncology reaching out to us to manage opioids

## 2025-07-11 ENCOUNTER — PATIENT MESSAGE (OUTPATIENT)
Dept: VASCULAR SURGERY | Facility: CLINIC | Age: 85
End: 2025-07-11
Payer: MEDICARE

## 2025-07-11 ENCOUNTER — PATIENT MESSAGE (OUTPATIENT)
Dept: HEMATOLOGY/ONCOLOGY | Facility: CLINIC | Age: 85
End: 2025-07-11
Payer: MEDICARE

## 2025-07-14 ENCOUNTER — LAB VISIT (OUTPATIENT)
Dept: LAB | Facility: HOSPITAL | Age: 85
End: 2025-07-14
Attending: INTERNAL MEDICINE
Payer: MEDICARE

## 2025-07-14 DIAGNOSIS — K75.4 AUTOIMMUNE HEPATITIS: ICD-10-CM

## 2025-07-14 LAB
ABSOLUTE EOSINOPHIL (OHS): 0.06 K/UL
ABSOLUTE MONOCYTE (OHS): 1.64 K/UL (ref 0.3–1)
ABSOLUTE NEUTROPHIL COUNT (OHS): 15.44 K/UL (ref 1.8–7.7)
BASOPHILS # BLD AUTO: 0.03 K/UL
BASOPHILS NFR BLD AUTO: 0.2 %
ERYTHROCYTE [DISTWIDTH] IN BLOOD BY AUTOMATED COUNT: 19.3 % (ref 11.5–14.5)
HCT VFR BLD AUTO: 28.5 % (ref 37–48.5)
HGB BLD-MCNC: 8.5 GM/DL (ref 12–16)
IMM GRANULOCYTES # BLD AUTO: 0.1 K/UL (ref 0–0.04)
IMM GRANULOCYTES NFR BLD AUTO: 0.5 % (ref 0–0.5)
INR PPP: 1.2 (ref 0.8–1.2)
LYMPHOCYTES # BLD AUTO: 0.93 K/UL (ref 1–4.8)
MCH RBC QN AUTO: 28.5 PG (ref 27–31)
MCHC RBC AUTO-ENTMCNC: 29.8 G/DL (ref 32–36)
MCV RBC AUTO: 96 FL (ref 82–98)
NUCLEATED RBC (/100WBC) (OHS): 0 /100 WBC
PLATELET # BLD AUTO: 223 K/UL (ref 150–450)
PMV BLD AUTO: 11.5 FL (ref 9.2–12.9)
PROTHROMBIN TIME: 12.5 SECONDS (ref 9–12.5)
RBC # BLD AUTO: 2.98 M/UL (ref 4–5.4)
RELATIVE EOSINOPHIL (OHS): 0.3 %
RELATIVE LYMPHOCYTE (OHS): 5.1 % (ref 18–48)
RELATIVE MONOCYTE (OHS): 9 % (ref 4–15)
RELATIVE NEUTROPHIL (OHS): 84.9 % (ref 38–73)
WBC # BLD AUTO: 18.2 K/UL (ref 3.9–12.7)

## 2025-07-14 PROCEDURE — 82040 ASSAY OF SERUM ALBUMIN: CPT

## 2025-07-14 PROCEDURE — 36415 COLL VENOUS BLD VENIPUNCTURE: CPT | Mod: PN

## 2025-07-14 PROCEDURE — 85025 COMPLETE CBC W/AUTO DIFF WBC: CPT

## 2025-07-14 PROCEDURE — 82248 BILIRUBIN DIRECT: CPT

## 2025-07-14 PROCEDURE — 85610 PROTHROMBIN TIME: CPT

## 2025-07-15 ENCOUNTER — OFFICE VISIT (OUTPATIENT)
Dept: ENDOCRINOLOGY | Facility: CLINIC | Age: 85
End: 2025-07-15
Payer: MEDICARE

## 2025-07-15 ENCOUNTER — TELEPHONE (OUTPATIENT)
Dept: HEMATOLOGY/ONCOLOGY | Facility: CLINIC | Age: 85
End: 2025-07-15
Payer: MEDICARE

## 2025-07-15 ENCOUNTER — PATIENT MESSAGE (OUTPATIENT)
Dept: ENDOCRINOLOGY | Facility: CLINIC | Age: 85
End: 2025-07-15

## 2025-07-15 DIAGNOSIS — E55.9 VITAMIN D DEFICIENCY: ICD-10-CM

## 2025-07-15 DIAGNOSIS — Z96.41 INSULIN PUMP IN PLACE: ICD-10-CM

## 2025-07-15 DIAGNOSIS — C34.90 STAGE 4 MALIGNANT NEOPLASM OF LUNG, UNSPECIFIED LATERALITY: ICD-10-CM

## 2025-07-15 DIAGNOSIS — Z79.4 TYPE 2 DIABETES MELLITUS WITH OTHER CIRCULATORY COMPLICATION, WITH LONG-TERM CURRENT USE OF INSULIN: ICD-10-CM

## 2025-07-15 DIAGNOSIS — M81.0 AGE-RELATED OSTEOPOROSIS WITHOUT CURRENT PATHOLOGICAL FRACTURE: Primary | ICD-10-CM

## 2025-07-15 DIAGNOSIS — D64.9 ANEMIA, UNSPECIFIED TYPE: ICD-10-CM

## 2025-07-15 DIAGNOSIS — S32.010S COMPRESSION FRACTURE OF L1 VERTEBRA, SEQUELA: ICD-10-CM

## 2025-07-15 DIAGNOSIS — T38.0X5A ADRENAL CORTICAL STEROIDS CAUSING ADVERSE EFFECT IN THERAPEUTIC USE: ICD-10-CM

## 2025-07-15 DIAGNOSIS — K75.4 AUTOIMMUNE HEPATITIS: Chronic | ICD-10-CM

## 2025-07-15 DIAGNOSIS — I70.229 CRITICAL LOWER LIMB ISCHEMIA: ICD-10-CM

## 2025-07-15 DIAGNOSIS — E11.59 TYPE 2 DIABETES MELLITUS WITH OTHER CIRCULATORY COMPLICATION, WITH LONG-TERM CURRENT USE OF INSULIN: ICD-10-CM

## 2025-07-15 LAB
ALBUMIN SERPL BCP-MCNC: 1.5 G/DL (ref 3.5–5.2)
ALP SERPL-CCNC: 82 UNIT/L (ref 40–150)
ALT SERPL W/O P-5'-P-CCNC: 12 UNIT/L (ref 10–44)
ANION GAP (OHS): 7 MMOL/L (ref 8–16)
AST SERPL-CCNC: 50 UNIT/L (ref 11–45)
BILIRUB DIRECT SERPL-MCNC: 0.3 MG/DL (ref 0.1–0.3)
BILIRUB SERPL-MCNC: 0.4 MG/DL (ref 0.1–1)
BUN SERPL-MCNC: 12 MG/DL (ref 8–23)
CALCIUM SERPL-MCNC: 8.5 MG/DL (ref 8.7–10.5)
CHLORIDE SERPL-SCNC: 107 MMOL/L (ref 95–110)
CO2 SERPL-SCNC: 27 MMOL/L (ref 23–29)
CREAT SERPL-MCNC: 0.5 MG/DL (ref 0.5–1.4)
GFR SERPLBLD CREATININE-BSD FMLA CKD-EPI: >60 ML/MIN/1.73/M2
GLUCOSE SERPL-MCNC: 105 MG/DL (ref 70–110)
POTASSIUM SERPL-SCNC: 4.4 MMOL/L (ref 3.5–5.1)
PROT SERPL-MCNC: 6.2 GM/DL (ref 6–8.4)
SODIUM SERPL-SCNC: 141 MMOL/L (ref 136–145)

## 2025-07-15 PROCEDURE — 95251 CONT GLUC MNTR ANALYSIS I&R: CPT | Mod: NDTC,,, | Performed by: INTERNAL MEDICINE

## 2025-07-15 PROCEDURE — 98007 SYNCH AUDIO-VIDEO EST HI 40: CPT | Mod: 95,,, | Performed by: INTERNAL MEDICINE

## 2025-07-15 PROCEDURE — G2211 COMPLEX E/M VISIT ADD ON: HCPCS | Mod: 95,,, | Performed by: INTERNAL MEDICINE

## 2025-07-15 RX ORDER — BLOOD-GLUCOSE SENSOR
1 EACH MISCELLANEOUS
Qty: 9 EACH | Refills: 3 | Status: SHIPPED | OUTPATIENT
Start: 2025-07-15 | End: 2026-07-15

## 2025-07-15 RX ORDER — INSULIN PMP CART,AUT,G6/7,CNTR
1 EACH SUBCUTANEOUS
Qty: 10 EACH | Refills: 11 | Status: SHIPPED | OUTPATIENT
Start: 2025-07-15

## 2025-07-15 RX ORDER — INSULIN ASPART 100 [IU]/ML
INJECTION, SOLUTION INTRAVENOUS; SUBCUTANEOUS
Qty: 40 ML | Refills: 4 | Status: SHIPPED | OUTPATIENT
Start: 2025-07-15

## 2025-07-15 NOTE — ASSESSMENT & PLAN NOTE
Will resume Prolia given recent fracture. She is not a good candidate for anabolic therapy due to recent radiation therapy (PTH analogs) and peripheral vascular disease (Evenity).    Risk factors include low BMI, menopause, recent fractures, falls    Continue Vitamin D3 - 2000 IU daily.    Fall precautions emphasized     Resume Prolia q 6 months  Need to continue Prolia q6 months, and avoid delaying the dose due to rapid bone loss and increased fracture risk associated with Prolia withdrawal.  If she decides to stop Prolia at any point, counseled that she needs to start alternative anti resorptive therapy.      Discussed the risk of osteonecrosis of the jaw with anti resorptive therapy, with incidence estimated from 1-10/979856 treated patients. Discussed that the incidence of ONJ is higher in patients undergoing invasive dental surgery (excludes routine cleaning, fillings or root canals). Dental work should ideally be completed prior to initiation of treatment; and to alert your dentist/oral surgeon if you are planning any invasive dental work while on one of these agents. Preventative measures such as good oral hygiene are encouraged.    Discussed the risk of atypical femur fractures with anti resorptive therapy. The exact incidence is unknown, but has been estimated at approximately 1 in 63890 patients treated with oral bisphosphonates and increasing incidence was correlated with increased duration of bisphosphonate use. Despite this risk, the significant benefit on fracture reduction far outweighs the potential for this rare event.    Schedule DXA at [facility].

## 2025-07-15 NOTE — TELEPHONE ENCOUNTER
Spoke w/ pt daughter Della Feng to schedule nutrition referral placed by Josefa GODDARD. Pt approved to schedule virtual appt with Keamr Perez RD for Tuesday 07/29/2025 @8am. MA informed pt to log into [employer] 15 minutes before virtual start. Pt acknowledged.

## 2025-07-15 NOTE — ASSESSMENT & PLAN NOTE
Reviewed goals of therapy are to get the best control we can without hypoglycemia.    Currently meeting glycemic target:  Yes    Doing well with Dexcom G7 plus OP5.  She is eating very little, so BG control has been okay lately. Advised she can try giving a small bolus pre-meal then bolus for the remainder of carb intake at the end of the meal since her PO intake is inconsistent. This should help to prevent post-prandial spikes.    I did not make changes to her pump settings since she is doing relatively well from a diabetes standpoint.    Pump Settings  Manual Basal Rate  12A:0.45     Carb Ratio  12A:14  6A: 10  4P: 14     ISF  12A: 70     Target:   12A: 140  6A:   120    Correct above:   12A: 150  6A:   140     IAT: 4 hours     Reviewed patient's current insulin regimen. Clarified proper insulin dose and timing in relation to meals, etc. Insulin injection sites and proper rotation instructed.         Hypoglycemia precautions discussed.     Discussed diet and exercise.    Diabetes health maintenance topics are addressed in the HPI    Lab Results   Component Value Date    HGBA1C 5.4 06/17/2025    HGBA1C 6.0 (H) 06/21/2024    HGBA1C 6.2 (H) 01/22/2024

## 2025-07-15 NOTE — PROGRESS NOTES
Follow-up visit      Subjective:      Chief Complaint:  Diabetes and osteoporosis    History of Present Illness  Radha Feng is a 84 y.o. female with autoimmune hepatitis, cirrhosis and hx HE, HTN, HLD, HFpEF, anemia, thrombocytopenia, Vit D def, CAD, and T2DM referred for evaluation of T2DM and osteoporosis.    The patient's last visit with me was on 1/29/2024.      With regards to diabetes:    Since our last visit she was diagnosed with lung cancer with metastasis to right shoulder and underwent radiation treatment to the lung and shoulder area. She also has severe PAD with 4 toes that are currently in the process of auto amputating. She is seeing vascular surgery for this. This is causing a lot of pain, which is being managed by palliative care.    Daughter states she has not been eating much lately, so she's been bolusing post-meal to prevent overdosing insulin. She also had a sensor issue recently, which put her into manual mode temporarily until the sensor connection could be re-established.       Diagnosed approx 25yrs ago - in 60s  Known complications: neuropathy    PDN for autoimmune hepatitis reduced to 2.5 mg daily.       Pump data were downloaded and reviewed. Overall control is adequate with minimal hypoglycemia. Staying in automated mode 92% of the time. Sometimes skips carb boluses or bolusing late (see above).        Current Diabetes Regimen:  Omnipod 5 + Dexcom G6 with Novolog      Pump Settings  Manual Basal Rate  12A:0.45     Carb Ratio  12A:14  6A: 10  4P: 14     ISF  12A: 70     Target:   12A: 140  6A:   120    Correct above:   12A: 150  6A:   140     IAT: 4 hours     Timing of prandial insulin: Gives at time of meal or 30 mins before (daughter has been experimenting with timing). In either case she's noted post-prandial spikes.     Omitted doses: none    Prior mediations tried:  Glyburide - switched to insulin in 10/2022  Metformin - stopped in 10/2022 due to autoimmune hepatitis and  "lactic acidosis       Diet/Exercise:  Eats 3 meals per day, overall healthy diet  Walks with cane inside, uses wheelchair for long distances. Signficant improvement in mobility since hospitalization     Recent Hgb A1C:  Lab Results   Component Value Date    HGBA1C 5.4 06/17/2025       Screening / DM Complications:    Nephropathy:  ACEi/ARB: Not taking  Lab Results   Component Value Date    MICALBCREAT 20.0 01/22/2024       Last Lipid Panel:  Statin: Not taking statin 2/2 autoimmune hepatitis   Lab Results   Component Value Date    LDLCALC 30.0 (L) 06/17/2025       Last foot exam : 07/15/2025 - has some neuropathy, cataracts and glaucoma, some nerve damage in R eye not from DM but from glaucoma  Last eye exam : 03/18/2025;  no laser surgery or DR    B12:  Lab Results   Component Value Date    [id number] 03/09/2023     Diabetes Management Status    Statin: Not taking  ACE/ARB: Taking    Screening or Prevention Patient's value Goal Complete/Controlled?   HgA1C Testing and Control   Lab Results   Component Value Date    HGBA1C 5.4 06/17/2025      Annually/Less than 8% Yes   Lipid profile : 06/17/2025 Annually Yes   LDL control Lab Results   Component Value Date    LDLCALC 30.0 (L) 06/17/2025    Annually/Less than 100 mg/dl  No   Nephropathy screening Lab Results   Component Value Date    LABMICR 5.0 01/22/2024     Lab Results   Component Value Date    PROTEINUA Trace (A) 06/16/2025     No results found for: "UTPCR"   Annually Yes   Blood pressure BP Readings from Last 1 Encounters:   07/09/25 137/60    Less than 140/90 No   Dilated retinal exam : 03/18/2025 Annually Yes   Foot exam   : 07/15/2025 Annually No         With regards to osteoporosis and vitamin D deficiency:    Diagnosed: 3/2023    Started on Prolia in 4/2023 - Received doses in 10/2023 and 4/2024 but has not been on it since due to other health issues. She had no problems with it and is willing to go back to it.    Due for repeat DXA.    Suffered L1 " fracture in 5/2025 after fall backwards onto her back while pulling on a dresser drawer.             DXA (Year)  Location  (T-scores) 3/6/2023  ([facility])   L-spine -1.9   TBS     L Total Hip -2.8   L Femoral Neck -2.8   Distal 1/3R    FRAX (MOF  /  Hip) 12.8%  /  6.0%          Taking Vitamin D3 - 2000 IU daily     Lab Results   Component Value Date    [id number] 29 (L) 07/24/2023          Wears full dentures bottom and top - does not have any teeth     On Prednisone 2.5 mg qd for autoimmune hepatitis     ROS:   As above    Objective:     There were no vitals taken for this visit.  BP Readings from Last 3 Encounters:   07/09/25 137/60   07/08/25 (!) 118/57   06/27/25 102/60     Wt Readings from Last 1 Encounters:   07/09/25 1317 41.1 kg (90 lb 9.7 oz)     There is no height or weight on file to calculate BMI.      Physical Exam  Lab Review:   Lab Results   Component Value Date    HGBA1C 5.4 06/17/2025     Lab Results   Component Value Date    CHOL 61 (L) 06/17/2025    HDL 17 (L) 06/17/2025    LDLCALC 30.0 (L) 06/17/2025    TRIG 70 06/17/2025    CHOLHDL 27.9 06/17/2025     Lab Results   Component Value Date     07/14/2025    K 4.4 07/14/2025     07/14/2025    CO2 27 07/14/2025     07/14/2025    BUN 12 07/14/2025    CREATININE 0.5 07/14/2025    CALCIUM 8.5 (L) 07/14/2025    PROT 6.2 07/14/2025    ALBUMIN 1.5 (L) 07/14/2025    BILITOT 0.4 07/14/2025    ALKPHOS 82 07/14/2025    AST 50 (H) 07/14/2025    ALT 12 07/14/2025    ANIONGAP 7 (L) 07/14/2025    TSH 0.740 09/21/2022     Vit D, 25-Hydroxy   Date Value Ref Range Status   07/24/2023 29 (L) 30 - 96 ng/mL Final     Comment:     Vitamin D deficiency.........<10 ng/mL                              Vitamin D insufficiency......10-29 ng/mL       Vitamin D sufficiency........> or equal to 30 ng/mL  Vitamin D toxicity............>100 ng/mL       Vit D 18 in 1/2022 (Care Everywhere)    Assessment and Plan     Type 2 diabetes mellitus with circulatory  disorder, with long-term current use of insulin  Reviewed goals of therapy are to get the best control we can without hypoglycemia.    Currently meeting glycemic target:  Yes    Doing well with Dexcom G7 plus OP5.  She is eating very little, so BG control has been okay lately. Advised she can try giving a small bolus pre-meal then bolus for the remainder of carb intake at the end of the meal since her PO intake is inconsistent. This should help to prevent post-prandial spikes.    I did not make changes to her pump settings since she is doing relatively well from a diabetes standpoint.    Pump Settings  Manual Basal Rate  12A:0.45     Carb Ratio  12A:14  6A: 10  4P: 14     ISF  12A: 70     Target:   12A: 140  6A:   120    Correct above:   12A: 150  6A:   140     IAT: 4 hours     Reviewed patient's current insulin regimen. Clarified proper insulin dose and timing in relation to meals, etc. Insulin injection sites and proper rotation instructed.         Hypoglycemia precautions discussed.     Discussed diet and exercise.    Diabetes health maintenance topics are addressed in the HPI    Lab Results   Component Value Date    HGBA1C 5.4 06/17/2025    HGBA1C 6.0 (H) 06/21/2024    HGBA1C 6.2 (H) 01/22/2024         Insulin pump in place  See above    Age-related osteoporosis without current pathological fracture  Will resume Prolia given recent fracture. She is not a good candidate for anabolic therapy due to recent radiation therapy (PTH analogs) and peripheral vascular disease ([place]).    Risk factors include low BMI, menopause, recent fractures, falls    Continue Vitamin D3 - 2000 IU daily.    Fall precautions emphasized     Resume Prolia q 6 months  Need to continue Prolia q6 months, and avoid delaying the dose due to rapid bone loss and increased fracture risk associated with Prolia withdrawal.  If she decides to stop Prolia at any point, counseled that she needs to start alternative anti resorptive  therapy.      Discussed the risk of osteonecrosis of the jaw with anti resorptive therapy, with incidence estimated from 1-10/[id number] treated patients. Discussed that the incidence of ONJ is higher in patients undergoing invasive dental surgery (excludes routine cleaning, fillings or root canals). Dental work should ideally be completed prior to initiation of treatment; and to alert your dentist/oral surgeon if you are planning any invasive dental work while on one of these agents. Preventative measures such as good oral hygiene are encouraged.    Discussed the risk of atypical femur fractures with anti resorptive therapy. The exact incidence is unknown, but has been estimated at approximately 1 in 08860 patients treated with oral bisphosphonates and increasing incidence was correlated with increased duration of bisphosphonate use. Despite this risk, the significant benefit on fracture reduction far outweighs the potential for this rare event.    Schedule [facility] at [facility].    Vitamin D deficiency  On vitamin-D replacement - cholecalciferol 2000 IU daily    Autoimmune hepatitis  Currently on prednisone 2.5 mg daily.    Adrenal cortical steroids causing adverse effect in therapeutic use  Steroids will predominantly effect prandial requirements of insulin.     Stage 4 malignant neoplasm of lung  Now s/p radiation therapy. Not currently on chemotherapy.    Anemia  Will falsely lower A1c.    Critical lower limb ischemia  Currently with some toes that are auto amputating. Following with vascular surgery.     Compression fracture of L1 lumbar vertebra  She is at very high risk for future fractures. Will start back on Prolia.       The patient location is: home  The chief complaint leading to consultation is: diabetes    Visit type: audiovisual    Face to Face time with patient: 24 minutes of total time spent on the encounter, which includes face to face time and non-face to face time preparing to see the patient (eg, review  of tests), Obtaining and/or reviewing separately obtained history, Documenting clinical information in the electronic or other health record, Independently interpreting results (not separately reported) and communicating results to the patient/family/caregiver, or Care coordination (not separately reported).     Each patient to whom he or she provides medical services by telemedicine is:  (1) informed of the relationship between the physician and patient and the respective role of any other health care provider with respect to management of the patient; and (2) notified that he or she may decline to receive medical services by telemedicine and may withdraw from such care at any time.    Notes:     Follow up in about 3 months (around 10/15/2025).

## 2025-07-18 ENCOUNTER — OFFICE VISIT (OUTPATIENT)
Dept: VASCULAR SURGERY | Facility: CLINIC | Age: 85
End: 2025-07-18
Payer: MEDICARE

## 2025-07-18 ENCOUNTER — PATIENT MESSAGE (OUTPATIENT)
Dept: VASCULAR SURGERY | Facility: CLINIC | Age: 85
End: 2025-07-18

## 2025-07-18 VITALS
HEART RATE: 87 BPM | DIASTOLIC BLOOD PRESSURE: 72 MMHG | TEMPERATURE: 98 F | HEIGHT: 66 IN | SYSTOLIC BLOOD PRESSURE: 120 MMHG | WEIGHT: 99.19 LBS | BODY MASS INDEX: 15.94 KG/M2

## 2025-07-18 DIAGNOSIS — I73.9 PAD (PERIPHERAL ARTERY DISEASE): ICD-10-CM

## 2025-07-18 DIAGNOSIS — S32.010S COMPRESSION FRACTURE OF L1 VERTEBRA, SEQUELA: ICD-10-CM

## 2025-07-18 DIAGNOSIS — C34.90 STAGE 4 MALIGNANT NEOPLASM OF LUNG, UNSPECIFIED LATERALITY: Primary | ICD-10-CM

## 2025-07-18 PROCEDURE — 99214 OFFICE O/P EST MOD 30 MIN: CPT | Mod: PBBFAC | Performed by: STUDENT IN AN ORGANIZED HEALTH CARE EDUCATION/TRAINING PROGRAM

## 2025-07-18 PROCEDURE — 99999 PR PBB SHADOW E&M-EST. PATIENT-LVL IV: CPT | Mod: PBBFAC,,, | Performed by: STUDENT IN AN ORGANIZED HEALTH CARE EDUCATION/TRAINING PROGRAM

## 2025-07-18 RX ORDER — OXYCODONE HYDROCHLORIDE 5 MG/1
5 TABLET ORAL EVERY 6 HOURS PRN
Qty: 20 TABLET | Refills: 0 | Status: SHIPPED | OUTPATIENT
Start: 2025-07-18 | End: 2025-07-21

## 2025-07-18 NOTE — PROGRESS NOTES
VASCULAR SURGERY SERVICE    REFERRING DOCTOR: Leticia Lim MD    CHIEF COMPLAINT: Right toe necrosis    HISTORY OF PRESENT ILLNESS: Radha Feng is a 84 y.o. 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 She was discharged home on maximal medical therapy.  She returns to clinic today for follow up.  Her right 2nd 3rd and 4th toes have all become discolored.  She reports pain, specifically at night.  She is in clinic today with her daughter who is her advocate.  Overall she is doing okay.  She denies any fevers or chills.  She denies any drainage from her feet.  She is able to keep her wounds very clean.    Interval History:  She returns to clinic today for follow up.  She has been doing a very good job of keeping her toes clean and dry, but does have constant pain.  This is being managed with 1/4 of a pain pill during the day and 1/2 of a pain pill at night.  She has not been able to see palliative care yet, but has a visit coming up shortly.    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    Diabetes mellitus     Glaucoma     Hypertension     Lung cancer     Other ascites 11/29/2022       Past Surgical History:   Procedure Laterality Date    CATARACT EXTRACTION W/  INTRAOCULAR LENS IMPLANT Left 12/21/2021        ENDOBRONCHIAL ULTRASOUND N/A 07/05/2024    Procedure: ENDOBRONCHIAL ULTRASOUND (EBUS);  Surgeon: Rolo Wilkinson MD;  Location: [facility] OR [address] [place] [address];  Service: Pulmonary;  Laterality: N/A;    ESOPHAGOGASTRODUODENOSCOPY N/A 06/26/2023    Procedure: ESOPHAGOGASTRODUODENOSCOPY (EGD);  Surgeon: Edgar Branch MD;  Location: [facility] (4TH FLR);  Service: Endoscopy;  Laterality: N/A;  referral   Bzowej-cirrhosis/labs done on 4/24/23-instr portal-GT       Current Medications[1]    Review of patient's allergies indicates:  No Known Allergies    Family History   Problem Relation Name Age of Onset    Cataracts Mother      Diabetes Mother      Glaucoma Mother      Hypertension Mother      Heart attack Father      Colon cancer Sister      Blindness Cousin      Amblyopia Neg Hx      Macular degeneration Neg Hx      Retinal detachment Neg Hx      Strabismus Neg Hx         Social History[2]    REVIEW OF SYSTEMS:  General: No chills, fever, malaise, changes in weight, reports low appetite  HEENT: No visual changes, difficulty hearing  Cardiovascular: No chest pain, palpitations  Pulmonary: No dyspnea, cough, wheezing  Gastrointestinal: No nausea, vomiting, diarrhea, constipation  Genitourinary: No dysuria, low urine output, hematuria  Endocrine: No polydipsia, polyphagia  Hematologic: No pallor  Musculoskeletal:  Chronic right hip joint pain, no back pain,  Neurologic: no seizures, no headaches, no weakness  Psychiatric: no mood disturbance      PHYSICAL EXAM:   There were no vitals taken for this visit.  Constitutional:  Alert,   Well-appearing  In no distress.   Neurological: Normal speech  no focal findings  CN II - XII grossly intact.    Psychiatric: Mood and affect appropriate and symmetric.   HEENT: Normocephalic / atraumatic  PERRLA  Midline trachea  No scars across the neck   Cardiac: Regular rate and rhythm.   Pulmonary: Normal pulmonary effort.   Abdomen: Soft, not distended.     Skin: Warm and well perfused.    Vascular:  Dopplerable right DP and PT all the way into her plantar arch.  Dopplerable left DP and PT.   Extremities/  Musculoskeletal: No edema.   Her right 2nd through 4th toes have dry gangrene, there is no drainage.  There is no erythema.       IMAGING:  No imaging was performed today    IMPRESSION:  84-year-old female with symptomatic right leg severe PAD and dry gangrene of her 2nd through 4th  toes, with partial dry gangrene of her medial right 1st toe    PLAN:   I provided a script for pain medicine in clinic today.  We discussed the possibility of a higher palliative amputation.  I explained to her that given her peripheral arterial disease, I would be hesitant to offer anything but an above-knee amputation.  Although it is important to note that with this, her mobility would be significantly limited.  I presented all options to the patient and her daughter.  They will think about it.  I will also reach out to our colleagues in [facility] to see if I can get a peer support person to come out and speak with her about this.  I will plan on seeing her back in clinic after she is able to meet with palliative Care, as they may have better methods of controlling her pain.                 [1]   Current Outpatient Medications:     acetaminophen (TYLENOL) 500 MG tablet, Take 2 tablets (1,000 mg total) by mouth every 8 (eight) hours as needed for Pain., Disp: , Rfl: 0    aspirin 81 MG Chew, Take 1 tablet (81 mg total) by mouth once daily., Disp: 30 tablet, Rfl: 0    atorvastatin (LIPITOR) 10 MG tablet, Take 1 tablet (10 mg total) by mouth once daily., Disp: 90 tablet, Rfl: 3    BD INSULIN SYRINGE ULTRA-FINE 1 mL 31 gauge x 5/16 Syrg, , Disp: , Rfl:     blood-glucose meter,continuous Misc, Dispsense 1 Dexcom G7 , Disp: 1 each, Rfl: 0    blood-glucose transmitter (DEXCOM G6 TRANSMITTER) Amanda, 1 each by Misc.(Non-Drug; Combo Route) route every 3 (three) months., Disp: 1 each, Rfl: 3    brimonidine 0.2% (ALPHAGAN) 0.2 % Drop, Place 1 drop into both eyes 3 (three) times daily., Disp: 10 mL, Rfl: 11    cholecalciferol, vitamin D3, (VITAMIN D3) 25 mcg (1,000 unit) capsule, Take 2 capsules (2,000 Units total) by mouth once daily., Disp: , Rfl: 0    clopidogreL (PLAVIX) 75 mg tablet, Take 1 tablet (75 mg total) by mouth once daily., Disp: 30 tablet, Rfl: 11    DEXCOM G7 SENSOR Amanda, 1 Device by Misc.(Non-Drug; Combo  "Route) route every 10 days., Disp: 9 each, Rfl: 3    dorzolamide-timolol 2-0.5% (COSOPT) 22.3-6.8 mg/mL ophthalmic solution, Place 1 drop into both eyes 2 (two) times daily., Disp: 20 mL, Rfl: 3    furosemide (LASIX) 40 MG tablet, TAKE 1 TABLET BY MOUTH EVERY DAY, Disp: 90 tablet, Rfl: 0    insulin aspart U-100 (NOVOLOG U-100 INSULIN ASPART) 100 unit/mL injection, TO USE WITH OMNIPOD 5. TOTAL DAILY DOSE UP TO 40 UNITS, Disp: 40 mL, Rfl: 4    latanoprost 0.005 % ophthalmic solution, PLACE 1 DROP INTO BOTH EYES ONCE DAILY, Disp: 7.5 mL, Rfl: 3    LIDOcaine (LIDODERM) 5 %, Place 1 patch onto the skin once daily. Remove & Discard patch within 12 hours or as directed by MD, Disp: 30 patch, Rfl: 0    melatonin (MELATIN) 3 mg tablet, Take 2 tablets (6 mg total) by mouth nightly as needed for Insomnia., Disp: 30 tablet, Rfl: 3    nystatin (MYCOSTATIN) 100,000 unit/mL suspension, SWISH WITH 6MLS BY MOUTH THEN SWALLOW 4 TIMES A DAY FOR 14 DAYS, Disp: , Rfl:     OMNIPOD 5 G6-G7 PODS, GEN 5, Crtg, Inject 1 each into the skin every 72 hours., Disp: 10 each, Rfl: 11    oxyCODONE (ROXICODONE) 5 MG immediate release tablet, Take 0.5 tablets (2.5 mg total) by mouth every 6 (six) hours as needed for Pain., Disp: 10 tablet, Rfl: 0    pen needle, diabetic 31 gauge x 5/16" Ndle, Use to inject insulin into the skin up to 4 times daily, Disp: 400 each, Rfl: 3    predniSONE (DELTASONE) 5 MG tablet, Take 0.5 tablets (2.5 mg total) by mouth once daily., Disp: 45 tablet, Rfl: 3    tobramycin sulfate 0.3% (TOBREX) 0.3 % ophthalmic solution, 1-2 drops topically twice daily to affected toe(s)., Disp: 5 mL, Rfl: 9  [2]   Social History  Tobacco Use    Smoking status: Former    Smokeless tobacco: Never   Substance Use Topics    Alcohol use: Not Currently    Drug use: Never     "

## 2025-07-20 DIAGNOSIS — I50.30 HYPERTENSIVE HEART DISEASE WITH DIASTOLIC HEART FAILURE: ICD-10-CM

## 2025-07-20 DIAGNOSIS — I11.0 HYPERTENSIVE HEART DISEASE WITH DIASTOLIC HEART FAILURE: ICD-10-CM

## 2025-07-20 NOTE — TELEPHONE ENCOUNTER
Care Due:                  Date            Visit Type   Department     Provider  --------------------------------------------------------------------------------                                MYCHART                              FOLLOWUP/OF  [facility]   Leticia Zia  Last Visit: 01-      [facility] VISIT   JOSE MIGUEL Lim  Next Visit: None Scheduled  None         None Found                                                            Last  Test          Frequency    Reason                     Performed    Due Date  --------------------------------------------------------------------------------    Office Visit  15 months..  furosemide...............  01- 04-    [facility] Embedded Care Due Messages. Reference number: [id number].   7/20/2025 7:09:24 AM CDT

## 2025-07-21 ENCOUNTER — PATIENT MESSAGE (OUTPATIENT)
Dept: HEPATOLOGY | Facility: CLINIC | Age: 85
End: 2025-07-21

## 2025-07-21 ENCOUNTER — OFFICE VISIT (OUTPATIENT)
Dept: HEPATOLOGY | Facility: CLINIC | Age: 85
End: 2025-07-21
Payer: MEDICARE

## 2025-07-21 DIAGNOSIS — K75.4 AUTOIMMUNE HEPATITIS: Primary | Chronic | ICD-10-CM

## 2025-07-21 DIAGNOSIS — R18.8 CIRRHOSIS OF LIVER WITH ASCITES, UNSPECIFIED HEPATIC CIRRHOSIS TYPE: ICD-10-CM

## 2025-07-21 DIAGNOSIS — S32.010S COMPRESSION FRACTURE OF L1 VERTEBRA, SEQUELA: ICD-10-CM

## 2025-07-21 DIAGNOSIS — K76.9 LIVER LESION: ICD-10-CM

## 2025-07-21 DIAGNOSIS — K74.60 CIRRHOSIS OF LIVER WITH ASCITES, UNSPECIFIED HEPATIC CIRRHOSIS TYPE: ICD-10-CM

## 2025-07-21 RX ORDER — FUROSEMIDE 40 MG/1
40 TABLET ORAL
Qty: 90 TABLET | Refills: 0 | OUTPATIENT
Start: 2025-07-21

## 2025-07-21 NOTE — PROGRESS NOTES
The patient location is: home  The chief complaint leading to consultation is: f/u AIH and cirrhosis    Visit type: audiovisual    Face to Face time with patient: 15 minutes  30 minutes of total time spent on the encounter, which includes face to face time and non-face to face time preparing to see the patient (eg, review of tests), Obtaining and/or reviewing separately obtained history, Documenting clinical information in the electronic or other health record, Independently interpreting results (not separately reported) and communicating results to the patient/family/caregiver, or Care coordination (not separately reported).         Each patient to whom he or she provides medical services by telemedicine is:  (1) informed of the relationship between the physician and patient and the respective role of any other health care provider with respect to management of the patient; and (2) notified that he or she may decline to receive medical services by telemedicine and may withdraw from such care at any time.    Notes: HEPATOLOGY FOLLOW UP    Referring Physician: Leticia Lim MD   Current Corresponding Physician: Leticia Lim MD, Nestor Agarwal MD, Divina Stock MD    Radha Feng is here for follow up of autoimmune hepatitis-induced cirrhosis    HPI  Ms Feng is an 83 yo PMHx HTN, ID-T2DM, decompensated AIH cirrhosis (biopsy proven) presented and was admitted 11/3/22-11/11/22 with abdo distention from ascites.     Patient hospitalized 09/20/22-10/14/22 for SOB found to have elevated liver enzymes and diagnosed with AIH vs RUBIO cirrhosis on biopsy decompensated by ascites. Started on steroids, imuran w/ improvement in enzymes. However due to malaise an leukopenia, imuran was held. She was diuresed, underwent LVP and discharged on 10 mg prednisone daily with goal to continue but minimize prednisone as outpt.    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 She continues on diuretics; has been able to stop lactulose and continues on prednisone now down to 2.5 mg daily. Still taking 40 mg daily of lasix. No recent paracentesis.. She has decreased blood flow to her toes. This is very painful. The plan is to allow for autoamputation. Placed on atorvaastatin.    Paracentesis 11/7/22: 2200 ml fluid; cell count 50   EGD 6/26/23: no varices    Labs 7/14/25: Tbil 0.7, ALT 12, AST 50, ALKP 82, creat 0.5  CT chest abdo pelvis w IV contrast 6/3/25: Liver is similar and negative for acute finding. Few hypodensities in the liver which are too small to definitively characterize. Suggest attention on follow-up. Focal fat infiltration or liver lesion in the anterior aspect of the liver (series 3, image 49).   Bone density 3/6/23: osteoporosis in hips; osteopenia lumbar spine- on tx per endocrinology    Ascites, ongoing: lasix 40 mg daily but no aldactone (urinates more with monotherapy by report)  HE: off lactulose    MELD 3.0: 13 at 7/14/2025  1:12 PM  MELD-Na: 8 at 7/14/2025  1:12 PM  Calculated from:  Serum Creatinine: 0.5 mg/dL (Using min of 1 mg/dL) at 7/14/2025  1:12 PM  Serum Sodium: 141 mmol/L (Using max of 137 mmol/L) at 7/14/2025  1:12 PM  Total Bilirubin: 0.4 mg/dL (Using min of 1 mg/dL) at 7/14/2025  1:12 PM  Serum Albumin: 1.5 g/dL at 7/14/2025  1:12 PM  INR(ratio): 1.2 at 7/14/2025  1:12 PM  Age at listing (hypothetical): 84 years  Sex: Female at 7/14/2025  1:12 PM        Outpatient Encounter Medications as of 7/21/2025   Medication Sig Dispense Refill    acetaminophen (TYLENOL) 500 MG tablet Take 2 tablets (1,000 mg total) by mouth every 8 (eight) hours as needed for Pain.  0    aspirin 81 MG Chew Take 1 tablet (81 mg total) by mouth once daily. 30 tablet 0    atorvastatin (LIPITOR) 10 MG tablet Take 1 tablet (10 mg total) by mouth once daily. 90 tablet 3    BD INSULIN  "SYRINGE ULTRA-FINE 1 mL 31 gauge x 5/16 Syrg       blood-glucose meter,continuous Misc Dispsense 1 Dexcom G7  1 each 0    blood-glucose transmitter (DEXCOM G6 TRANSMITTER) Amanda 1 each by Misc.(Non-Drug; Combo Route) route every 3 (three) months. 1 each 3    brimonidine 0.2% (ALPHAGAN) 0.2 % Drop Place 1 drop into both eyes 3 (three) times daily. 10 mL 11    cholecalciferol, vitamin D3, (VITAMIN D3) 25 mcg (1,000 unit) capsule Take 2 capsules (2,000 Units total) by mouth once daily.  0    clopidogreL (PLAVIX) 75 mg tablet Take 1 tablet (75 mg total) by mouth once daily. 30 tablet 11    DEXCOM G7 SENSOR Amanda 1 Device by Misc.(Non-Drug; Combo Route) route every 10 days. 9 each 3    dorzolamide-timolol 2-0.5% (COSOPT) 22.3-6.8 mg/mL ophthalmic solution Place 1 drop into both eyes 2 (two) times daily. 20 mL 3    furosemide (LASIX) 40 MG tablet TAKE 1 TABLET BY MOUTH EVERY DAY 90 tablet 0    insulin aspart U-100 (NOVOLOG U-100 INSULIN ASPART) 100 unit/mL injection TO USE WITH OMNIPOD 5. TOTAL DAILY DOSE UP TO 40 UNITS 40 mL 4    latanoprost 0.005 % ophthalmic solution PLACE 1 DROP INTO BOTH EYES ONCE DAILY 7.5 mL 3    LIDOcaine (LIDODERM) 5 % Place 1 patch onto the skin once daily. Remove & Discard patch within 12 hours or as directed by MD 30 patch 0    melatonin (MELATIN) 3 mg tablet Take 2 tablets (6 mg total) by mouth nightly as needed for Insomnia. 30 tablet 3    nystatin (MYCOSTATIN) 100,000 unit/mL suspension SWISH WITH 6MLS BY MOUTH THEN SWALLOW 4 TIMES A DAY FOR 14 DAYS      OMNIPOD 5 G6-G7 PODS, GEN 5, Crtg Inject 1 each into the skin every 72 hours. 10 each 11    oxyCODONE (ROXICODONE) 5 MG immediate release tablet Take 0.5 tablets (2.5 mg total) by mouth every 6 (six) hours as needed for Pain. 10 tablet 0    oxyCODONE (ROXICODONE) 5 MG immediate release tablet Take 1 tablet (5 mg total) by mouth every 6 (six) hours as needed for Pain. 20 tablet 0    pen needle, diabetic 31 gauge x 5/16" Ndle Use to " inject insulin into the skin up to 4 times daily 400 each 3    predniSONE (DELTASONE) 5 MG tablet Take 0.5 tablets (2.5 mg total) by mouth once daily. 45 tablet 3    tobramycin sulfate 0.3% (TOBREX) 0.3 % ophthalmic solution 1-2 drops topically twice daily to affected toe(s). 5 mL 9    [DISCONTINUED] DEXCOM G7 SENSOR Amanda 1 Device by Misc.(Non-Drug; Combo Route) route every 10 days. 9 each 3    [DISCONTINUED] insulin aspart U-100 (NOVOLOG U-100 INSULIN ASPART) 100 unit/mL injection TO USE WITH OMNIPOD 5. TOTAL DAILY DOSE UP TO 40 UNITS 40 mL 4    [DISCONTINUED] OMNIPOD 5 G6-G7 PODS, GEN 5, Crtg Inject 1 each into the skin every 72 hours. 10 each 11    [DISCONTINUED] oxyCODONE (ROXICODONE) 5 MG immediate release tablet Take 0.5 tablets (2.5 mg total) by mouth every 6 (six) hours as needed for Pain. 10 tablet 0     No facility-administered encounter medications on file as of 7/21/2025.     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    Diabetes mellitus     Glaucoma     Hypertension     Lung cancer     Other ascites 11/29/2022       Review of Systems   Constitutional: Negative.    HENT: Negative.     Eyes: Negative.    Respiratory: Negative.     Cardiovascular: Negative.    Gastrointestinal: Negative.    Genitourinary: Negative.    Musculoskeletal: Negative.    Skin: Negative.    Neurological: Negative.    Psychiatric/Behavioral: Negative.       There were no vitals filed for this visit.         MELD 3.0: 13 at 7/14/2025  1:12 PM  MELD-Na: 8 at 7/14/2025  1:12 PM  Calculated from:  Serum Creatinine: 0.5 mg/dL (Using min of 1 mg/dL) at 7/14/2025  1:12 PM  Serum Sodium: 141 mmol/L (Using max of 137 mmol/L) at 7/14/2025  1:12 PM  Total Bilirubin: 0.4 mg/dL (Using min of 1 mg/dL) at 7/14/2025  1:12 PM  Serum Albumin: 1.5 g/dL at 7/14/2025  1:12 PM  INR(ratio): 1.2 at 7/14/2025  1:12 PM  Age at listing  (hypothetical): 84 years  Sex: Female at 7/14/2025  1:12 PM      Lab Results   Component Value Date     07/14/2025     (H) 03/11/2025    BUN 12 07/14/2025    CREATININE 0.5 07/14/2025    CALCIUM 8.5 (L) 07/14/2025    CALCIUM 9.6 03/11/2025     07/14/2025     03/11/2025    K 4.4 07/14/2025    K 4.3 03/11/2025     07/14/2025     03/11/2025    PROT 6.2 07/14/2025    PROT 7.5 03/11/2025    CO2 27 07/14/2025    CO2 25 03/11/2025    ANIONGAP 7 (L) 07/14/2025    WBC 18.20 (H) 07/14/2025    RBC 2.98 (L) 07/14/2025    RBC 3.09 (L) 03/11/2025    HGB 8.5 (L) 07/14/2025    HGB 9.4 (L) 03/11/2025    HCT 28.5 (L) 07/14/2025    HCT 31 (L) 06/16/2025    MCV 96 07/14/2025    MCV 96 03/11/2025    MCH 28.5 07/14/2025    MCHC 29.8 (L) 07/14/2025    MCHC 31.8 (L) 03/11/2025     Lab Results   Component Value Date    RDW 19.3 (H) 07/14/2025    RDW 15.9 (H) 03/11/2025     07/14/2025     03/11/2025    MPV 11.5 07/14/2025    GRAN 9.6 (H) 03/11/2025    GRAN 77.7 (H) 03/11/2025    LYMPH 5.1 (L) 07/14/2025    LYMPH 0.93 (L) 07/14/2025    LYMPH 1.1 03/11/2025    LYMPH 8.9 (L) 03/11/2025    MONO 9.0 07/14/2025    MONO 1.64 (H) 07/14/2025    MONO 1.4 (H) 03/11/2025    MONO 11.1 03/11/2025    EOSINOPHIL 1.1 03/11/2025    BASOPHIL 0.2 07/14/2025    BASOPHIL 0.03 07/14/2025    BASOPHIL 0.2 03/11/2025    EOS 0.3 07/14/2025    EOS 0.06 07/14/2025    EOS 0.1 03/11/2025    BASO 0.03 03/11/2025    APTT 44.5 (H) 06/17/2025    APTT 28.6 06/20/2024    GROUPTRH O POS 07/09/2025    GROUPTRH O POS 06/20/2024    BNP 2,717 (H) 06/03/2025    CHOL 61 (L) 06/17/2025    CHOL 195 01/22/2024    TRIG 70 06/17/2025    TRIG 89 01/22/2024    HDL 17 (L) 06/17/2025    CHOLHDL 27.9 06/17/2025    TOTALCHOLEST 3.6 06/17/2025    TOTALCHOLEST 3.6 01/22/2024    ALBUMIN 1.5 (L) 07/14/2025    ALBUMIN 2.1 (L) 03/11/2025    BILIDIR 0.3 07/14/2025    BILIDIR 0.2 03/11/2025    AST 50 (H) 07/14/2025    AST 39 03/11/2025    ALT 12  07/14/2025    ALT 18 03/11/2025    ALKPHOS 82 07/14/2025    ALKPHOS 71 03/11/2025    MG 1.9 06/20/2025    LABPROT 12.9 (H) 03/11/2025    INR 1.2 07/14/2025    INR 1.2 03/11/2025       Assessment and Plan:  Radha Feng is a 84 y.o. female with AIH-induced cirrhosis. She has lung cancer, avascular necrosis of the hip, poor perfusion of the toes w paln to allow for autoampuation.. Current recommendations:  Cirrhosis: check labs every 2 weeks on atorvastatin; HCC screening -recommend ct in 3 months given finding of hypodense lesion on ct from 6/25.   EGD -was to be repeated 6/2025 to screen for varices-defer for now  Autoimmune hepatitis: continue prednisone 2.5 mg daily;   Ascites/edema, ongoing: continue current diuretics   HE, off lactulose; monitor  Osteoporosis: tx per endocriniology; bone density as per endocrinology  Possible avascular necrosis of the hip: f/u ortho  Ischemic toes: pain meds per palliative care    Return 3 months  A total of 35 minutes was spent reviewing the chart, imaging, labs, examining the patient and counseling the patient about their liver disease.

## 2025-07-22 ENCOUNTER — PATIENT MESSAGE (OUTPATIENT)
Dept: HEPATOLOGY | Facility: CLINIC | Age: 85
End: 2025-07-22
Payer: MEDICARE

## 2025-07-22 ENCOUNTER — TELEPHONE (OUTPATIENT)
Dept: HEPATOLOGY | Facility: CLINIC | Age: 85
End: 2025-07-22
Payer: MEDICARE

## 2025-07-22 ENCOUNTER — LAB VISIT (OUTPATIENT)
Dept: LAB | Facility: HOSPITAL | Age: 85
End: 2025-07-22
Attending: INTERNAL MEDICINE
Payer: MEDICARE

## 2025-07-22 DIAGNOSIS — K75.4 AUTOIMMUNE HEPATITIS: ICD-10-CM

## 2025-07-22 LAB
ABSOLUTE EOSINOPHIL (OHS): 0.11 K/UL
ABSOLUTE MONOCYTE (OHS): 1.14 K/UL (ref 0.3–1)
ABSOLUTE NEUTROPHIL COUNT (OHS): 11.7 K/UL (ref 1.8–7.7)
ALBUMIN SERPL BCP-MCNC: 1.6 G/DL (ref 3.5–5.2)
ALP SERPL-CCNC: 87 UNIT/L (ref 40–150)
ALT SERPL W/O P-5'-P-CCNC: 12 UNIT/L (ref 10–44)
ANION GAP (OHS): 8 MMOL/L (ref 8–16)
AST SERPL-CCNC: 37 UNIT/L (ref 11–45)
BASOPHILS # BLD AUTO: 0.02 K/UL
BASOPHILS NFR BLD AUTO: 0.1 %
BILIRUB DIRECT SERPL-MCNC: 0.3 MG/DL (ref 0.1–0.3)
BILIRUB SERPL-MCNC: 0.4 MG/DL (ref 0.1–1)
BUN SERPL-MCNC: 13 MG/DL (ref 8–23)
CALCIUM SERPL-MCNC: 8.1 MG/DL (ref 8.7–10.5)
CHLORIDE SERPL-SCNC: 107 MMOL/L (ref 95–110)
CO2 SERPL-SCNC: 24 MMOL/L (ref 23–29)
CREAT SERPL-MCNC: 0.6 MG/DL (ref 0.5–1.4)
ERYTHROCYTE [DISTWIDTH] IN BLOOD BY AUTOMATED COUNT: 18.8 % (ref 11.5–14.5)
GFR SERPLBLD CREATININE-BSD FMLA CKD-EPI: >60 ML/MIN/1.73/M2
GLUCOSE SERPL-MCNC: 154 MG/DL (ref 70–110)
HCT VFR BLD AUTO: 26.9 % (ref 37–48.5)
HGB BLD-MCNC: 8.1 GM/DL (ref 12–16)
IMM GRANULOCYTES # BLD AUTO: 0.06 K/UL (ref 0–0.04)
IMM GRANULOCYTES NFR BLD AUTO: 0.4 % (ref 0–0.5)
INR PPP: 1.1 (ref 0.8–1.2)
LYMPHOCYTES # BLD AUTO: 0.82 K/UL (ref 1–4.8)
MCH RBC QN AUTO: 28 PG (ref 27–31)
MCHC RBC AUTO-ENTMCNC: 30.1 G/DL (ref 32–36)
MCV RBC AUTO: 93 FL (ref 82–98)
NUCLEATED RBC (/100WBC) (OHS): 0 /100 WBC
PLATELET # BLD AUTO: 295 K/UL (ref 150–450)
PMV BLD AUTO: 10.9 FL (ref 9.2–12.9)
POTASSIUM SERPL-SCNC: 4.2 MMOL/L (ref 3.5–5.1)
PROT SERPL-MCNC: 6.3 GM/DL (ref 6–8.4)
PROTHROMBIN TIME: 11.8 SECONDS (ref 9–12.5)
RBC # BLD AUTO: 2.89 M/UL (ref 4–5.4)
RELATIVE EOSINOPHIL (OHS): 0.8 %
RELATIVE LYMPHOCYTE (OHS): 5.9 % (ref 18–48)
RELATIVE MONOCYTE (OHS): 8.2 % (ref 4–15)
RELATIVE NEUTROPHIL (OHS): 84.6 % (ref 38–73)
SODIUM SERPL-SCNC: 139 MMOL/L (ref 136–145)
WBC # BLD AUTO: 13.85 K/UL (ref 3.9–12.7)

## 2025-07-22 PROCEDURE — 85025 COMPLETE CBC W/AUTO DIFF WBC: CPT

## 2025-07-22 PROCEDURE — 85610 PROTHROMBIN TIME: CPT

## 2025-07-22 PROCEDURE — 82248 BILIRUBIN DIRECT: CPT

## 2025-07-22 PROCEDURE — 36415 COLL VENOUS BLD VENIPUNCTURE: CPT | Mod: PN

## 2025-07-22 PROCEDURE — 80053 COMPREHEN METABOLIC PANEL: CPT

## 2025-07-22 RX ORDER — OXYCODONE HYDROCHLORIDE 5 MG/1
2.5 TABLET ORAL EVERY 6 HOURS PRN
Qty: 10 TABLET | Refills: 0 | Status: SHIPPED | OUTPATIENT
Start: 2025-07-22

## 2025-07-22 NOTE — TELEPHONE ENCOUNTER
----- Message from Yvonne Peraza MD sent at 7/21/2025 12:37 PM CDT -----  Ct 09/25. Return after ct scan  Labs every 2 weeks (may already be scheduled)

## 2025-07-22 NOTE — TELEPHONE ENCOUNTER
Contacted  pt via My Chart messages and scheduled her labs every 2 weeks at [facility] until October. Pt was scheduled fro Ct scan in September and her f/u visit was scheduled in October next available as a video visit.  A message via My [facility] was sent informing pt about this upcoming appointments.

## 2025-07-23 ENCOUNTER — PATIENT MESSAGE (OUTPATIENT)
Dept: VASCULAR SURGERY | Facility: CLINIC | Age: 85
End: 2025-07-23
Payer: MEDICARE

## 2025-07-23 NOTE — PROGRESS NOTES
The patient location is: home  The chief complaint leading to consultation is: symptom management and GOC    Visit type: audiovisual    Face to Face time with patient: 52 minutes  88 minutes of total time spent on the encounter, which includes face to face time and non-face to face time preparing to see the patient (eg, review of tests), Obtaining and/or reviewing separately obtained history, Documenting clinical information in the electronic or other health record, Independently interpreting results (not separately reported) and communicating results to the patient/family/caregiver, or Care coordination (not separately reported).     Each patient to whom he or she provides medical services by telemedicine is:  (1) informed of the relationship between the physician and patient and the respective role of any other health care provider with respect to management of the patient; and (2) notified that he or she may decline to receive medical services by telemedicine and may withdraw from such care at any time.  Palliative Medicine Clinic Note        Consult Requested By: Dr. Amandeep Kohler IV      Reason for Consult: Symptom management and ACP in the setting of metastatic adenocarcinoma of RUL    Chief Complaint:   Chief Complaint   Patient presents with    Pain    Shortness of Breath    Anxiety    Depression    Anorexia    Fatigue    Insomnia       Care Team: Patient Care Team:  Leticia Lim MD as PCP - General (Internal Medicine)  Noemi Sinha RN as Oncology Navigator  Catina Dalal, RN as Outpatient       ASSESSMENT/PLAN:      Plan/Recommendations:    Radha was seen today for pain, shortness of breath, anxiety, depression, anorexia, fatigue and insomnia.    Diagnoses and all orders for this visit:    Adenocarcinoma of upper lobe of right lung  - was seen by Dr. Kohler  - not on any disease directed therapy  - see oncology notes on 05/02/2025 as well as 05/15/2025 for full details of  those discussions  - patient not a candidate for immunotherapy due to autoimmune hepatitis    Encounter for palliative care/Advance Care Planning   Date: 07/24/2025  - patient decisional and accompanied by her daughter, Della, today on virtual visit  - previously seen by inpatient palliative medicine team but new to this clinic and this provider  - philosophy of palliative medicine reviewed with patient/family today  - will request that [facility] med staff send new patient folder in the mail after visit today  - no ACP documents uploaded into EMR  - goals: improvement in physical symptoms so she is able to enjoy her life and be more active and live her life  - code status not discussed        Pain of toe of left foot/PVD (peripheral vascular disease)  - patient with severe pain of three toes; which she and daughter note are currently auto-amputating  - patient has been seen by vascular team  - daughter expresses some concerns about need for wound care of toes/foot; will refer to wound care today  - current pain regime: APAP 1000 mg TID along with a fourth to a half tab of oxycodone 5 mg. Patient unable to tolerate more than half tab at a time due to sleepiness  - patient recently received cream from integrative oncology to help, which does provide some relief  - start lidocaine cream as well today  - hold on further changes at this time    Adjustment disorder with mixed anxiety and depressed mood  - patient denies any depression but does rate some anxiety  - daughter notes major changes in patient over the past few months. Patient also has had significant loss of family members within last 2 years as well  - patient does agree that she is not as active as before though she can still find happiness in her days, she does find herself more irritable at things that used to bring her happiness  - trial of mirtazapine during hospitalization, but patient developed agitation with use  - given autoimmune hepatitis, will also  be careful regarding medication selection  - start celexa 10 mg daily today  - emotional support provided along with Pall med team; please see SW note for full details    Anorexia  - patient with some changes in appetite  - will work on improved symptom control to help with appetite at this time  - will hold on any appetite stimulant    Neoplastic (malignant) related fatigue/Insomnia  - ongoing fatigue  - patient also not sleeping due to perseveration of her foot as well as foot pain  - will work on improved pain control as noted above  - will also work on mental health to help patient become more active during the day, which will help with fatigue and insomnia as well  - patient already on prednisone 2.5 mg daily  - discussed also giving additional half tab of oxycodone at night if needed      Advance Care Planning   Advance Directives:   Living Will: No    [place]: No    Do Not Resuscitate Status: No    Medical Power of : No      Decision Making:  Patient answered questions and Family answered questions  Goals of Care: Advance Care Planning    Date: 07/24/2025    [facility]  I engaged the patient and family in a voluntary conversation about advance care planning and we specifically addressed what the goals of care would be moving forward, in light of the patient's change in clinical status, specifically lung cancer.  We did specifically address the patient's likely prognosis, which is poor.  We explored the patient's values and preferences for future care.  The patient and family endorses that what is most important right now is to focus on remaining as independent as possible, symptom/pain control, and quality of life, even if it means sacrificing a little time    Accordingly, we have decided that the best plan to meet the patient's goals includes no further escalation in treatment    A total of 16 min was spent on advance care planning, goals of care discussion, emotional support, formulating and communicating  prognosis and exploring burden/benefit of various approaches of treatment. This discussion occurred on a fully voluntary basis with the verbal consent of the patient and/or family.            Follow up: 4 weeks     Plan discussed with: oncology team       SUBJECTIVE:      History of Present Illness / Interval History:  Radha Feng is 84 y.o. female with PVD, HTN, CAD, aortic atherosclerosis, type II DM, autoimmune hepatitis and metastatic adenocarcinoma of right lung presents to Palliative Care Clinic for physical symptoms, advance care planning,, clarification of goals of care, and additional support.. Please see oncology notes for more details on oncologic history and treatment course      7/24/25  History of Present Illness    CHIEF COMPLAINT:  - Patient presents virtually today for palliative care follow-up, primarily to address pain management and depression concerns.    HISTORY OBTAINED FROM:  - Patient  - Daughter    HPI:  Patient is an 84-year-old woman with a complex medical history including autoimmune hepatitis diagnosed three years ago and lung cancer diagnosed in July of last year. She is experiencing severe ischemic pain (10/10) in her foot due to necrotic toes, which began about a month ago. The first, second, and third toes are completely necrotic, with the great toe starting to become necrotic as well. A recent CT revealed decreased blood flow due to plaque lodging in the capillaries.    Patient's mobility has significantly decreased over the past six months. She previously used a stick to walk around the house but now relies on a rollator or wheelchair for longer distances. She experiences shortness of breath when walking and severe pain when climbing stairs, particularly when lifting her foot.    Patient takes oxycodone and acetaminophen for pain management, with her daughter administering half or quarter pills depending on the pain intensity. Despite medication, the pain significantly  worsens in the evenings, leading to disturbed sleep patterns. She often sits up at night, nodding off but refusing to lie down, and wakes up after about an hour to rub her feet and cry.    Patient has become emotionally invested in her foot condition, leading to sleep issues for about a month. A previous attempt to use Mirtazapine for depression and appetite improvement resulted in negative side effects.    Patient has experienced a significant decline in her daily activities. Prior to her illnesses, she was independent and active, but has become increasingly withdrawn, especially after her lung cancer diagnosis. She no longer leaves her room as frequently or engages in activities she previously enjoyed.    Patient recently developed shortness of breath following COVID-19 during a hospital stay. She also suffered a fall in April or May, resulting in an L1 fracture. Physical therapy was recommended but has been delayed due to appointment cancellations and the patient not feeling well.    Emotionally, the patient has experienced significant losses, including the death of her twin sister two years ago and her brother almost a year ago. These losses have impacted her mental state, though she expresses an acceptance of mortality. Her social interactions have decreased, becoming less responsive to phone calls from family members.    Patient denies experiencing much shoulder pain where she recently had radiation treatment for cancer.     GOALS OF CARE/ADVANCED DIRECTIVES:  - Lives at home with her daughter, who is her primary caregiver  - Daughter manages medications, assists with daily activities, and coordinates medical appointments  - Main goal is to maintain quality of life and manage pain effectively  - Has experienced significant losses, including the death of her twin sister and brother  - Expresses understanding of her mortality and maintains a strong spiritual connection  - Has a [employer] community in [place]  where she was raised    ACTIVITIES OF DAILY LIVING:  - Decreased mobility: uses walker for short distances and wheelchair for longer distances  - Difficulty climbing stairs due to significant pain  - Experiences shortness of breath when walking  - Difficulty sleeping: often sits up and nods off instead of lying down  - Decreased appetite  - Requires assistance with bathing and personal hygiene  - Able to sort clothes independently but has not been doing this recently  - Watches TV during the day but has difficulty focusing on it  - Withdrawn socially: no longer leaves her room or walks around the house  - Has bedsores on her bottom, indicating decreased mobility    SOCIAL HISTORY:  - Patient was raised in a [employer] household  - Attends [employer] when visiting [place]  - Entire family is buried at the [employer] in [place]           ROS:  Review of Systems   Constitutional:  Positive for activity change, appetite change and fatigue.   HENT: Negative.     Eyes: Negative.    Respiratory:  Positive for shortness of breath.    Cardiovascular: Negative.    Gastrointestinal: Negative.    Genitourinary: Negative.    Musculoskeletal:  Positive for leg pain.   Integumentary:  Positive for wound.   Neurological:  Positive for weakness.   Psychiatric/Behavioral:  Positive for decreased concentration, dysphoric mood and sleep disturbance. The patient is nervous/anxious.    All other systems reviewed and are negative.      Review of Symptoms      Symptom Assessment (ESAS 0-10 Scale)  Pain:  6  Dyspnea:  2  Anxiety:  7  Nausea:  0  Depression:  8  Anorexia:  7  Fatigue:  4  Insomnia:  7  Restlessness:  0  Agitation:  0     CAM / Delirium:  Negative  Constipation:  Negative  Diarrhea:  Negative    Anxiety:  Is nervous/anxious    Bowel Management Plan (BMP):  No      Pain Assessment:  OME in 24 hours:  5  Location(s): foot    Foot       Location: generalized        Quality: Sharp and aching         Quantity: 6/10 in intensity        Chronicity: Onset 1 month(s) ago, gradually worsening        Aggravating Factors: Pressure        Alleviating Factors: Acetaminophen and opiates        Associated Symptoms: None    Modified Maya Scale:  0.5    ECOG Performance Status ndGndrndanddndend:nd nd2nd Living Arrangements:  Lives with family    Psychosocial/Cultural:   See Palliative Psychosocial Note: Yes  Please see SW note from 07/24/2025 for full details  **Primary  to Follow**  Palliative Care  Consult: No    Spiritual:  F - Tamar and Belief:  Yes  I - Importance:  Yes  C - Community:  Yes  A - Address in Care:  Needs met            Medications:  Current Medications[1]      External  database queried on 07/24/2025  by Janice CHAMPAGNE :  07/11/2025 oxycodone 5 mg Disp: 10 for 5 days      Review of patient's allergies indicates:  No Known Allergies        OBJECTIVE:         Physical Exam: limited due to telemedicine visit  Vitals:      Physical Exam  Constitutional:       Appearance: She is not toxic-appearing or diaphoretic.      Comments: Looks uncomfortable during first part of visit; patient became more settled and not grimacing for second half of visit as pain medication started to work   HENT:      Head: Normocephalic and atraumatic.      Right Ear: External ear normal.      Left Ear: External ear normal.      Nose: Nose normal.      Mouth/Throat:      Mouth: Mucous membranes are moist.   Eyes:      General: No scleral icterus.        Right eye: No discharge.         Left eye: No discharge.      Extraocular Movements: Extraocular movements intact.   Neck:      Comments: Trachea midline  Pulmonary:      Effort: Pulmonary effort is normal. No respiratory distress.      Comments: On NC during visit  Abdominal:      General: Abdomen is flat. There is no distension.   Musculoskeletal:      Cervical back: Normal range of motion.      Comments: Lying in bed on side; able to adjust her position on  "her own   Neurological:      General: No focal deficit present.      Cranial Nerves: No cranial nerve deficit.   Psychiatric:         Behavior: Behavior normal.         Thought Content: Thought content normal.         Judgment: Judgment normal.           Labs: I have reviewed the latest pertinent labs including 2025 H.1, Cr: 0.6, Alb: 1.6      Imaging:  I have reviewed the latest pertinent imaging including 2025 CT chest: "1. Worsening of the known right perihilar mass with tiny calcifications now measuring 3.8 x 2.7 cm, prior 2.8 x 2.2 cm.  Complete atelectasis of the right upper lobe in the present study.  2. Worsening of the lymphadenopathy in station 4R measuring 25 mm, prior 23 mm.  3. Lymphadenopathy in the right or paraclavicular region and in the right axillary region again noted.  4. Well-known soft tissue mass in the right shoulder is partially included.  5. New/more conspicuous 3 mm solid noncalcified nodule in the left upper lobe.  6. Moderate bilateral pleural effusions, right greater than left.  7. Additional findings.  Please see the above discussion."       I spent a total of 72 of 88 minutes on the day of the visit. This includes face to face time in discussion of goals of care, symptom assessment, coordination of care and emotional support.  This also includes non-face to face time preparing to see the patient (eg, review of tests/imaging), obtaining and/or reviewing separately obtained history, documenting clinical information in the electronic or other health record, independently interpreting results and communicating results to the patient/family/caregiver, or care coordinator.     Additional 16 of 88 min time spent on a voluntary advance care planning and /or goals of care discussion, providing emotional support, formulating and communicating prognosis and exploring burden/benefit of various approaches of treatment.       Janice Chavez MD         [1]   Current Outpatient " Medications:     acetaminophen (TYLENOL) 500 MG tablet, Take 2 tablets (1,000 mg total) by mouth every 8 (eight) hours as needed for Pain., Disp: , Rfl: 0    aspirin 81 MG Chew, Take 1 tablet (81 mg total) by mouth once daily., Disp: 30 tablet, Rfl: 0    atorvastatin (LIPITOR) 10 MG tablet, Take 1 tablet (10 mg total) by mouth once daily., Disp: 90 tablet, Rfl: 3    BD INSULIN SYRINGE ULTRA-FINE 1 mL 31 gauge x 5/16 Syrg, , Disp: , Rfl:     blood-glucose meter,continuous Misc, Dispsense 1 Dexcom G7 , Disp: 1 each, Rfl: 0    blood-glucose transmitter (DEXCOM G6 TRANSMITTER) Amanda, 1 each by Misc.(Non-Drug; Combo Route) route every 3 (three) months., Disp: 1 each, Rfl: 3    brimonidine 0.2% (ALPHAGAN) 0.2 % Drop, Place 1 drop into both eyes 3 (three) times daily., Disp: 10 mL, Rfl: 11    cholecalciferol, vitamin D3, (VITAMIN D3) 25 mcg (1,000 unit) capsule, Take 2 capsules (2,000 Units total) by mouth once daily., Disp: , Rfl: 0    clopidogreL (PLAVIX) 75 mg tablet, Take 1 tablet (75 mg total) by mouth once daily., Disp: 30 tablet, Rfl: 11    DEXCOM G7 SENSOR Amanda, 1 Device by Misc.(Non-Drug; Combo Route) route every 10 days., Disp: 9 each, Rfl: 3    dorzolamide-timolol 2-0.5% (COSOPT) 22.3-6.8 mg/mL ophthalmic solution, Place 1 drop into both eyes 2 (two) times daily., Disp: 20 mL, Rfl: 3    furosemide (LASIX) 40 MG tablet, TAKE 1 TABLET BY MOUTH EVERY DAY, Disp: 90 tablet, Rfl: 0    insulin aspart U-100 (NOVOLOG U-100 INSULIN ASPART) 100 unit/mL injection, TO USE WITH OMNIPOD 5. TOTAL DAILY DOSE UP TO 40 UNITS, Disp: 40 mL, Rfl: 4    LIDOcaine (LIDODERM) 5 %, Place 1 patch onto the skin once daily. Remove & Discard patch within 12 hours or as directed by MD, Disp: 30 patch, Rfl: 0    melatonin (MELATIN) 3 mg tablet, Take 2 tablets (6 mg total) by mouth nightly as needed for Insomnia., Disp: 30 tablet, Rfl: 3    OMNIPOD 5 G6-G7 PODS, GEN 5, Crtg, Inject 1 each into the skin every 72 hours., Disp: 10 each, Rfl:  "11    oxyCODONE (ROXICODONE) 5 MG immediate release tablet, Take 0.5 tablets (2.5 mg total) by mouth every 6 (six) hours as needed for Pain., Disp: 10 tablet, Rfl: 0    pen needle, diabetic 31 gauge x 5/16" Ndle, Use to inject insulin into the skin up to 4 times daily, Disp: 400 each, Rfl: 3    predniSONE (DELTASONE) 5 MG tablet, Take 0.5 tablets (2.5 mg total) by mouth once daily., Disp: 45 tablet, Rfl: 3    citalopram (CELEXA) 10 MG tablet, Take 1 tablet (10 mg total) by mouth once daily., Disp: 30 tablet, Rfl: 2    latanoprost 0.005 % ophthalmic solution, PLACE 1 DROP INTO BOTH EYES ONCE DAILY, Disp: 7.5 mL, Rfl: 3    LIDOcaine (XYLOCAINE) 5 % Oint ointment, Apply topically as needed (ischemic foot pain)., Disp: 120 g, Rfl: 2    nystatin (MYCOSTATIN) 100,000 unit/mL suspension, Take 6 mLs (600,000 Units total) by mouth 4 (four) times daily., Disp: 473 mL, Rfl: 2    tobramycin sulfate 0.3% (TOBREX) 0.3 % ophthalmic solution, 1-2 drops topically twice daily to affected toe(s). (Patient not taking: Reported on 7/24/2025), Disp: 5 mL, Rfl: 9    "

## 2025-07-24 ENCOUNTER — PATIENT MESSAGE (OUTPATIENT)
Dept: PALLIATIVE MEDICINE | Facility: CLINIC | Age: 85
End: 2025-07-24

## 2025-07-24 ENCOUNTER — OFFICE VISIT (OUTPATIENT)
Dept: PALLIATIVE MEDICINE | Facility: CLINIC | Age: 85
End: 2025-07-24
Payer: MEDICARE

## 2025-07-24 DIAGNOSIS — F43.23 ADJUSTMENT DISORDER WITH MIXED ANXIETY AND DEPRESSED MOOD: ICD-10-CM

## 2025-07-24 DIAGNOSIS — R63.0 ANOREXIA: ICD-10-CM

## 2025-07-24 DIAGNOSIS — R53.0 NEOPLASTIC (MALIGNANT) RELATED FATIGUE: ICD-10-CM

## 2025-07-24 DIAGNOSIS — C34.11 ADENOCARCINOMA OF UPPER LOBE OF RIGHT LUNG: Primary | ICD-10-CM

## 2025-07-24 DIAGNOSIS — I73.9 PVD (PERIPHERAL VASCULAR DISEASE): ICD-10-CM

## 2025-07-24 DIAGNOSIS — Z51.5 ENCOUNTER FOR PALLIATIVE CARE: ICD-10-CM

## 2025-07-24 DIAGNOSIS — M79.675 PAIN OF TOE OF LEFT FOOT: ICD-10-CM

## 2025-07-24 DIAGNOSIS — G47.00 INSOMNIA, UNSPECIFIED TYPE: ICD-10-CM

## 2025-07-24 DIAGNOSIS — Z71.89 ADVANCED CARE PLANNING/COUNSELING DISCUSSION: ICD-10-CM

## 2025-07-24 RX ORDER — NYSTATIN 100000 [USP'U]/ML
6 SUSPENSION ORAL 4 TIMES DAILY
Qty: 473 ML | Refills: 2 | Status: SHIPPED | OUTPATIENT
Start: 2025-07-24

## 2025-07-24 RX ORDER — LIDOCAINE 50 MG/G
OINTMENT TOPICAL
Qty: 120 G | Refills: 2 | Status: SHIPPED | OUTPATIENT
Start: 2025-07-24

## 2025-07-24 RX ORDER — CITALOPRAM 10 MG/1
10 TABLET ORAL DAILY
Qty: 30 TABLET | Refills: 2 | Status: SHIPPED | OUTPATIENT
Start: 2025-07-24

## 2025-07-24 NOTE — PROGRESS NOTES
Advance Care Planning   [facility] Palliative Medicine [facility]  Palliative Care   Psychosocial Assessment    Patient Name: Radha Feng  MRN: [id number]  Attending Provider: No att. providers found  Palliative Care Provider: Radha Marquez DNP   Primary Care Physician: Leticia Lim MD  Principal Problem: Adenocarcinoma of right lung     Reason for Referral: Advance care planning and psychosocial support       Present during Interview: patient and daughter/Della .      Primary Language:English   Needed: no      Past Medical Situation:   PMH:   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    Diabetes mellitus     Glaucoma     Hypertension     Lung cancer     Other ascites 11/29/2022     Mental Health/Substance Use History: Patient exhibits signs of depression   Risk of Abuse, neglect or exploitation:  None identified   Current or Previous Trauma and/or evidence of PTSD: None identified   Non-traditional Health practices: None identified     Understanding of diagnosis and prognosis: fair   Experience/Comfort level with health care system: fair     Patients Mental Status: Alert and oriented to person, place, time and situation with depressed affect.     Socio-Economic Factors/Resources:  Address: [address] [place] [id number]  Phone Number: [phone] (home)     Marital Status: Single  Household composition: Patient and daughter   Children: Four children     Patient/Family perceptions about Caregiving Needs; availability and capacity: Patient's care needs are well met by family     Family Dynamics/Relationships: Healthy     Patient/Family Strengths/Resilience: Patient is open with team.  Daughter is a strong advocate for patient's needs.   Patient/Family Coping: Patient exhibits signs of depression.  Patient not motivated at this time to engage in therapy. Team will continue to assess and recommend interventions as  appropriate.     Activities of Daily Living:Assistance as needed provided by family  Support Systems-Family & Community (Home Health, HME etc):     Transportation:  yes    Work/Education History: retired   Self-Care Activities/Hobbies: Watching TV, talking on the phone     History: no    Financial Resources:Medicare      Advanced Care Planning & Legal Concerns:   Advanced Directives/Living Will: no  LaPOST/POLST: no   Planning:  no    Power of : no    Emergency Contacts: Daughter/Della Feng     Spirituality, Culture & Coping Mechanisms:  F- Tamar and Belief: Tenriism     I - Importance: Moderate     C - Community/Culture Values:  Patient considers the Taoism near her birth place (in [place]) as her Taoism home     A - Address in Care: Needs met at this time       Goals/Hopes/Expectations: manage pain and enjoy life   Fears/Anxiety/Concerns: Patient exhibits signs of depression        Complicated Bereavement Risk Assessment Tool (CBRAT)  Reference:  [facility] Clinical Practice Group (May 2016). Bereavement Risk Screening and Management Guidelines.  Retrieved from: http://www.grpcc.com.au/wp-content/uploads//XPONG-Aeinsibzuxg-Zaitixzcj-and-Management-Guideline-2016.pdf      Bereaved Client Characteristics   Under 18      no  Was a Twin   no  Young Spouse   no  Elderly Spouse    no  Isolated    no  Lacks Meaningful Social Support   no  Dissatisfied with help available during illness   no  New to [employer] [place] no  New to Decision-Making   no    Illness  Inherited Disorder   no  Stigmatized Disease in the family/community   no  Lengthy/Burdensome   no     Bereaved Client's History of Loss   Cumulative Multiple Losses   yes  Previous Mental Health Illnesses   no  Current Mental Health Illness   no  Other Significant Health Issues   no   Migrant/Refugee   no Death  Sudden or Unexpected   no  Traumatic Circumstances Associated with Death    no  Significant Cultural/Social Burdens as a result of Death   yes   Relationship with   Profound Lifelong Partner   no  Highly Dependent    no  Antagonistic   no  Ambivalent   no  Deeply Connected   yes  Culturally Defined   yes   Risk Factors Scores  0-2  Low  3-5  Moderate  5+  High  All persons scoring moderate to high presume to be at risk**    (** It is acknowledged that protective factors and resilience may outweigh apparent risk factors.      Total Risk Factors Score:   Low to moderate    Advance Care Planning     Date: 2025    [facility]  Team engaged the patient and daughter in a voluntary conversation about advance care planning and we specifically addressed what the goals of care would be moving forward, in light of the patient's change in clinical status, specifically adenocarcinoma of lung.  We did not specifically address the patient's likely prognosis, which is guarded.  We explored the patient's values and preferences for future care.  The patient and daughter endorse that what is most important right now is to focus on pain relief and finding ortega out of life.     Accordingly, we have decided that the best plan to meet the patient's goals includes continuing with treatment    A total of 16 min was spent on advance care planning, goals of care discussion, emotional support, formulating and communicating prognosis and exploring burden/benefit of various approaches of treatment. This discussion occurred on a fully voluntary basis with the verbal consent of the patient and/or family.        Plan of Care:   SW accompanied MD during initial visit with patient and daughter/Della. Patient presents Oriented x4 with depressed affect. Patient appears to have a fair understanding of their illness. Patient is not seeking treatment of her cancer and wishes to continue medical management of her comorbidities.  Patient's hope is for optimal pain relief that would allow increased functionality. Daughter  "reports she feels patient is depressed and "emotionally invested in her (necrotic)  foot". SW facilitated exploration of this with patient.  Patient notes she is "not depressed". Patient admits she has suffered many losses recently and acknowledges her own mortality ("I'm not going to be here forever) Patient reports her granddaughters call her "four to five times a day" which she considers to be overwhelming.  Patient notes she does find enjoyment from frequent phone calls with a long distance childhood friend. SW collaborated with patient and daughter regarding behavior modifications and highlighting family patterns that may stifle patient's sense of control and independence.SW provided education regarding signs of depression, noting patient's isolation and loss of motivation substantiate daughter's claim of patient's depression. Patient amenable to MDs recommendation to begin medication for mood.      Patient and daughter deny further psychosocial concerns. SW remains available to provide support. SW will continue to follow.    Laura Grewal, AMILCARW-BACS    Palliative Medicine                  "

## 2025-07-25 ENCOUNTER — TELEPHONE (OUTPATIENT)
Dept: WOUND CARE | Facility: HOSPITAL | Age: 85
End: 2025-07-25
Payer: MEDICARE

## 2025-07-25 NOTE — TELEPHONE ENCOUNTER
Left voice message requesting return call to #[phone] option 1 to  assist with scheduling a wound care appointment

## 2025-07-28 ENCOUNTER — CLINICAL SUPPORT (OUTPATIENT)
Dept: REHABILITATION | Facility: HOSPITAL | Age: 85
End: 2025-07-28
Payer: MEDICARE

## 2025-07-28 DIAGNOSIS — R29.898 WEAKNESS OF BOTH LOWER EXTREMITIES: ICD-10-CM

## 2025-07-28 DIAGNOSIS — R29.898 WEAKNESS OF BOTH UPPER EXTREMITIES: Primary | ICD-10-CM

## 2025-07-28 DIAGNOSIS — R68.89 WORSENING FUNCTIONAL ENDURANCE: ICD-10-CM

## 2025-07-28 PROCEDURE — 97162 PT EVAL MOD COMPLEX 30 MIN: CPT

## 2025-07-28 NOTE — PROGRESS NOTES
Outpatient Rehab    Physical Therapy Evaluation (only)    Patient Name: Radha Feng  MRN: [id number]  YOB: 1940  Encounter Date: 7/28/2025    Therapy Diagnosis:   Encounter Diagnoses   Name Primary?    Weakness of both upper extremities Yes    Weakness of both lower extremities     Worsening functional endurance      Physician: Josefa Adams FNP-C    Physician Orders: Eval and Treat  Medical Diagnosis: Adenocarcinoma of upper lobe of right lung  Decreased mobility and endurance  Generalized weakness  Surgical Diagnosis: Not applicable for this Episode  Surgical Date: Not applicable for this Episode  Days Since Last Surgery: Not applicable for this Episode    Visit # / Visits Authorized:  1 / 1  Insurance Authorization Period: 6/26/2025 to 6/26/2026  Date of Evaluation: 7/28/2025  Plan of Care Certification: 7/28/2025 to 9/22/2025     Time In: 1515   Time Out: 1600  Total Time (in minutes): 45   Total Billable Time (in minutes):      Intake Outcome Measure for FOTO Survey    Therapist reviewed FOTO scores for Radha Feng on 7/28/2025.   FOTO report - see Media section or FOTO account episode details.     Intake Score (%): Not applicable for this Episode    Precautions:  Additional Precautions and Protocol Details: Standard, fall, DM, osteoporosis, bony mets    Subjective   History of Present Illness  Radha is a 84 y.o. female who reports to physical therapy with a chief concern of increased foot pain and weakness.     The patient reports a medical diagnosis of C34.11 (ICD-10-CM) - Adenocarcinoma of upper lobe of right lung  Z74.09 (ICD-10-CM) - Decreased mobility and endurance  R53.1 (ICD-10-CM) - Generalized weakness.                      Dominant Hand: Left  History of Present Condition/Illness: Patient and her daughter report that for the past 6 weeks she has had trouble with her toes. She had a fall recently when she was trying to get something out of a dresser drawer and she  lost her balance. She went backwards and ended up fracturing L1. The pain in her foot limits her more than pain in her back. She walks around her room with a cane but uses her rollator when walking to the living room. When going long distances to her medical appointments she uses a wheelchair. She does not like to get up and move around due to her foot pain. She gets help with lower body due to fatigue and pain. She can stand for about 30 seconds and has to hold on to something, which doesn't allow her to cook. She prefers a tub bath, but is currently using a shower chair to bathe. She also gets help for balance when stepping over the side of the tub. She likes to travel but is unable to do so at this time. Sometimes she has to pull up on her daughter's arm to get out of bed. She is using a BSC due to limited ability to walk. She also gets help getting up the steps and lifting her leg into the car.         Activities of Daily Living  Social history was obtained from Patient and Adult child.    General Prior Level of Function Comments: Independent  General Current Level of Function Comments: Mod A  Patient Responsibilities: Personal ADL, Meal prep, Home management, Community mobility    Previously independent with activities of daily living? Yes     Currently independent with activities of daily living? No  Activities currently needing assistance include Bathing, Dressing - lower body, Functional mobility, and Transfers.        Previously independent with instrumental activities of daily living? Yes     Currently independent with instrumental activities of daily living? No  Activities currently needing assistance include: Health management, Home establishment and management, Meal prep, Community mobility, Driving, and Grocery/shopping.            Pain     Patient reports a current pain level of 9/10. Pain at best is reported as 8/10. Pain at worst is reported as 9/10.   Location: R foot  Clinical Progression (since  "onset): Worsening  Pain Qualities: Aching, Other (Comment)  Other Pain Qualities: "like something catches it and hurts it"  Pain-Relieving Factors: Massage, Medications - prescription  Pain-Aggravating Factors: Standing, Other (Comment), Walking  Other Pain-Aggravating Factors: touching, worse at night         Review of Systems  Patient reports: Cancer History, Cardiac History, and Diabetes        Treatment History  Treatments  Previously Received Treatments: Yes  Previous Treatments: Physical therapy  Currently Receiving Treatments: No    Living Arrangements  Living Situation  Housing: Home independently  Living Arrangements: Family members    Equipment/Treatments  Mobility Equipment: Rollator, Straight cane  Other Adaptive Equipment Comment: BSC, shower chair    Single story home, 6 steps to enter      Employment     Employment Status: Retired         Past Medical History/Physical Systems Review:   Radha Feng  has a past medical history of Cataract, Chondromalacia, knee, right, Diabetes mellitus, Glaucoma, Hypertension, Lung cancer, and Other ascites.    Radha Feng  has a past surgical history that includes Cataract extraction w/  intraocular lens implant (Left, 12/21/2021); Esophagogastroduodenoscopy (N/A, 06/26/2023); and Endobronchial ultrasound (N/A, 07/05/2024).    Radha has a current medication list which includes the following prescription(s): acetaminophen, aspirin, atorvastatin, bd insulin syringe ultra-fine, blood-glucose,,cont, dexcom g6 transmitter, brimonidine 0.2%, cholecalciferol (vitamin d3), citalopram, clopidogrel, cyproheptadine, dexcom g7 sensor, dorzolamide-timolol 2-0.5%, furosemide, insulin aspart u-100, latanoprost, lidocaine, lidocaine, melatonin, nystatin, omnipod 5 g6-g7 pods (gen 5), ondansetron, oxycodone, pen needle, diabetic, prednisone, and tobramycin sulfate 0.3%.    Review of patient's allergies indicates:  No Known Allergies     Objective   Vital " Signs  /67   Pulse 77   SpO2 96%   BP Location: Left arm  BP Position: Sitting  BP Cuff Size: Adult         Shoulder Strength - Planes of Motion   Right Strength Right Pain Left Strength Left  Pain   Flexion 3+   3+     Extension 4   4     ABduction 3+   3+     Internal Rotation 0° 3+   3+     External Rotation 0° 3+   3+         Elbow Strength   Right Strength Right Pain Left Strength Left  Pain   Flexion (C6) 4   4     Extension (C7) 4-   4-            Wrist Strength - Planes of Motion   Right Strength Right Pain Left Strength Left  Pain   Flexion 4-   4-     Extension 4-   4-       Right  Strength  Right Hand Dynamometer Position: 2  Elbow Position Forearm Position Trial 1 (lbs) Pain   Flexed Neutral 12         Left  Strength  Left Hand Dynamometer Position: 2  Elbow Position Forearm Position Trial 1 (lbs) Pain   Flexed Neutral 22           Hip Strength - Planes of Motion   Right Strength Right Pain Left Strength Left  Pain   Flexion (L2) 3+   4-     Extension 4-   4-     ABduction 4-   4-     ADduction 4   4         Knee Strength   Right Strength Right Pain Left Strength Left  Pain   Flexion (S2) 4-   4     Extension (L3) 4   4+            Ankle/Foot Strength - Planes of Motion   Right Strength Right Pain Left Strength Left  Pain   Dorsiflexion (L4) 3   4     Plantar Flexion (S1) 3   4+          Fall Risk  Functional mobility test results suggest the patient is: At Risk for Falls  Sit to Stand Testing      The patient completed 0 repetitions of a sit to stand transfer in 30 seconds. Unable to perform sit to stand from wheelchair without Min A              Time Entry(in minutes):  PT Evaluation (Moderate) Time Entry: 45    Assessment & Plan   Assessment  Radha presents with a condition of Moderate complexity.   Presentation of Symptoms: Evolving       Functional Limitations: Activity tolerance, Ambulating on uneven surfaces, Standing tolerance, Transfers, Participating in leisure activities,  Painful locomotion/ambulation, Pain with ADLs/IADLs, Gait limitations, Increased risk of fall, Decreased ambulation distance/endurance, Completing self-care activities, Bed mobility  Impairments: Impaired balance, Activity intolerance, Impaired physical strength, Lack of appropriate home exercise program, Pain with functional activity, Weight-bearing intolerance    Patient Goal for Therapy (PT): I want to learn how to walk it so my foot won't hurt, decrease my fear to stand up  Prognosis: Fair  Assessment Details: pt is a 84 y.o. female referred to outpatient Physical Therapy with a medical diagnosis of Adenocarcinoma of upper lobe of right lung; Decreased mobility and endurance; Generalized weakness. Pt presents with functional deficits/limitations listed above. Pt tolerated evaluation and home exercise program exercises without issue in clinic. Pt will benefit from skilled outpatient Physical Therapy to address the deficits stated above, work towards the goals below, provide pt/family education, and to maximize pt's level of independence.    Plan  From a physical therapy perspective, the patient would benefit from: Skilled Rehab Services    Planned therapy interventions include: Therapeutic exercise, Therapeutic activities, Neuromuscular re-education, Manual therapy, and ADLs/IADLs.            Visit Frequency: 2 times Per Week for 8 Weeks.       This plan was discussed with Patient and Family.   Discussion participants: Agreed Upon Plan of Care             The patient's spiritual, cultural, and educational needs were considered, and the patient is agreeable to the plan of care and goals.           Goals:   Active       Long term goals       Pt will increase strength in bilateral lower extremities to 4+/5 for improved functional mobility and balance. (Progressing)       Start:  07/28/25    Expected End:  09/22/25            Pt will increase strength in bilateral upper extremities to 4+/5 in order to perform  functional activities independently (Progressing)       Start:  07/28/25    Expected End:  09/22/25            Pt will be independent with HEP to improve ROM, strength, balance, and independence with ADL's  (Progressing)       Start:  07/28/25    Expected End:  09/22/25            Pt. will improve 30 Second Chair Rise test score to 10 completed with arms in order to transfer independently and for patient to be in low risk for falls category (Progressing)       Start:  07/28/25    Expected End:  09/22/25            Patient will report compliance with walking program 5x week for 10 -20min each day to improve overall cardiovascular function and decrease cancer related fatigue at discharge. (Progressing)       Start:  07/28/25    Expected End:  09/22/25               Short term goals       Pt. to demonstrate increased strength in bilateral lower extremities to 4-/5 to improve functional mobility.  (Progressing)       Start:  07/28/25    Expected End:  08/25/25            Pt to demonstrate increased strength in bilateral upper extremities to 4/5 in order to perform functional activities. (Progressing)       Start:  07/28/25    Expected End:  08/25/25            Pt will improve 30 Second Chair Rise test score to 5 completed with arms in order to ambulate safely in community (Progressing)       Start:  07/28/25    Expected End:  08/25/25            Pt will initiate home exercise program to improve strength, flexibility, endurance, mobility & balance to return pt to PLOF (Progressing)       Start:  07/28/25    Expected End:  08/25/25            Pt will tolerate 10 min or greater of time in light-->moderate intensity cardio (I.e. Bike, NuStep) to improve endurance. (Progressing)       Start:  07/28/25    Expected End:  08/25/25            Pt to demonstrate tandem stance 30 sec or greater w/ UE support in order to improve balance to progress to singe leg stance to decrease fall risk in performance of ADL's (Progressing)        Start:  07/28/25    Expected End:  08/25/25                Sunita Moore, PT

## 2025-07-28 NOTE — PROGRESS NOTES
"The patient location is: [place]  The chief complaint leading to consultation is: Unintentional weight loss      Visit type: audiovisual    Face to Face time with patient: 40 minutes  60 minutes of total time spent on the encounter, which includes face to face time and non-face to face time preparing to see the patient (eg, review of tests), Obtaining and/or reviewing separately obtained history, Documenting clinical information in the electronic or other health record, Independently interpreting results (not separately reported) and communicating results to the patient/family/caregiver, or Care coordination (not separately reported).     Each patient to whom he or she provides medical services by telemedicine is:  (1) informed of the relationship between the physician and patient and the respective role of any other health care provider with respect to management of the patient; and (2) notified that he or she may decline to receive medical services by telemedicine and may withdraw from such care at any time.    Notes:    Oncology Nutrition Assessment Medical Nutrition Therapy Initial Visit    Radhanilson Granger Jung  1940    Referring Provider: Josefa Adams FNP-C     PMHx: Past Medical History[1]  Allergies: Patient has no known allergies.    Nutrition Assessment    Anthropometrics:   Weight:   Wt Readings from Last 10 Encounters:   07/18/25 45 kg (99 lb 3.3 oz)   07/09/25 41.1 kg (90 lb 9.7 oz)   07/08/25 41.1 kg (90 lb 9.7 oz)   06/17/25 41.1 kg (90 lb 9.7 oz)   06/04/25 49 kg (108 lb 0.4 oz)   05/18/25 49 kg (108 lb)   04/25/25 49.6 kg (109 lb 5.6 oz)   04/23/25 50.2 kg (110 lb 10.7 oz)   01/28/25 54.3 kg (119 lb 11.4 oz)   01/15/25 54.3 kg (119 lb 11.4 oz)                              Usual BW: 120-130 lb. Timeframe: 2821-2254 Ht Readings from Last 1 Encounters:   07/18/25 5' 6" (1.676 m)      BMI Readings from Last 1 Encounters:   07/18/25 16.01 kg/m²     Estimated body surface area is 1.45 meters " "squared as calculated from the following:    Height as of 7/18/25: 5' 6" (1.676 m).    Weight as of 7/18/25: 45 kg (99 lb 3.3 oz).        Appetite: decreased for the past 3-4 months.  Intake: Eating 20-30% of 2 smaller meals daily. Snacking occasionally. Breakfast: 2-3 spoons of cheerios, half milk. Skips lunch but opts for smoothie/shakes (smoothie wolfgang - hulk/milkshake with SF ice cream with 2% milk). Same for dinner.   Previously eating 3 full meals daily (100% of these meals) with desserts after each meal however this stopped after falling.     Encounter Notes:  Radha Feng is a 84 y.o. female with Adenocarcinoma of Right Lung referred by Josefa Adams FNP-C for nutrition assessment and counseling. Patient presents to virtual visit accompanied by her daughter. Weight loss of 20 lbs in 7 months noted.  Likely related to very poor appetite. BG typically 160-180 but she has been having BG lows. Current nutrition impact symptoms listed below.     Nutrition Impact Symptoms    [] No nutritional concerns at current  [] Poor Appetite   [] Weight loss   [x] Nausea - sour stomach; Zofran   [] Vomiting   [] Diarrhea   [] Constipation   [x] Early Satiety [] Change in smell   [x] Change in taste - bland/chemical.   [x] Dry Mouth - salt, water, lemon and nystatin  [] Thick saliva   [x] Dysphagia - with medication/meats  [] Difficulty chewing  [x] Mucositis - nystatin for thrush management PRN/occasionally [] Indigestion  [] Gas/Bloating  [] Reflux      [] Fatigue     [x] other, please specify- Lasix helping to manage ELO. Buttock sores/Pressure Injury. Toes dying.        Current Medications:  Current Outpatient Medications   Medication Instructions    acetaminophen (TYLENOL) 1,000 mg, Oral, Every 8 hours PRN    aspirin 81 mg, Oral, Daily    atorvastatin (LIPITOR) 10 mg, Oral, Daily    BD INSULIN SYRINGE ULTRA-FINE 1 mL 31 gauge x 5/16 Syrg No dose, route, or frequency recorded.    blood-glucose meter,continuous " "Misc Dispsense 1 Dexcom G7     blood-glucose transmitter (DEXCOM G6 TRANSMITTER) Amanda 1 each, Misc.(Non-Drug; Combo Route), Every 3 months    brimonidine 0.2% (ALPHAGAN) 0.2 % Drop 1 drop, Both Eyes, 3 times daily    cholecalciferol (vitamin D3) (VITAMIN D3) 2,000 Units, Oral, Daily    citalopram (CELEXA) 10 mg, Oral, Daily    clopidogreL (PLAVIX) 75 mg, Oral, Daily    DEXCOM G7 SENSOR Amanda 1 Device, Misc.(Non-Drug; Combo Route), Every 10 days    dorzolamide-timolol 2-0.5% (COSOPT) 22.3-6.8 mg/mL ophthalmic solution 1 drop, Both Eyes, 2 times daily    furosemide (LASIX) 40 mg, Oral    insulin aspart U-100 (NOVOLOG U-100 INSULIN ASPART) 100 unit/mL injection TO USE WITH OMNIPOD 5. TOTAL DAILY DOSE UP TO 40 UNITS    latanoprost 0.005 % ophthalmic solution 1 drop, Both Eyes, Daily    LIDOcaine (LIDODERM) 5 % 1 patch, Transdermal, Daily, Remove & Discard patch within 12 hours or as directed by MD    LIDOcaine (XYLOCAINE) 5 % Oint ointment Topical (Top), As needed (PRN)    melatonin (MELATIN) 6 mg, Oral, Nightly PRN    nystatin (MYCOSTATIN) 600,000 Units, Oral, 4 times daily    OMNIPOD 5 G6-G7 PODS, GEN 5, Crtg 1 each, Subcutaneous, Every 72 hours    oxyCODONE (ROXICODONE) 2.5 mg, Oral, Every 6 hours PRN    pen needle, diabetic 31 gauge x 5/16" Ndle Use to inject insulin into the skin up to 4 times daily    predniSONE (DELTASONE) 2.5 mg, Oral, Daily    tobramycin sulfate 0.3% (TOBREX) 0.3 % ophthalmic solution 1-2 drops topically twice daily to affected toe(s).     Labs: Reviewed 07/22/25: gluc 154, ca 8.1, alb 1.6     Nutrition Diagnosis    Nutrition Problem: Malnutrition  Etiology (related to): inadequate energy intake, increased energy needs, and cancer and associated treatment  Signs/Symptoms (as evidenced by): weight loss of 10 lbs in 3 months, both fat & muscle wasting present, and BMI of 16    Nutrition Intervention    Nutrition Prescription  4872-9640 kcals/day 30-35 kcal/kg   54-63 g protein/day 1.2-1.4 " gm/kg  5113-5141 mL fluid/day 1 ml/kcal    Recommendations:   Encouraged small frequent meals (minimum of 6 bites per meal). Aim for 5-6 mini meals daily or eating every 2 hours while wake.   Discussed dry mouth management (biotene/xylimelts/salt/baking soda rinses)  Encouraged use of Joseluis BID orally for wound healing  Encouraged increased protein. Sources of protein discussed  Recommend appetite stimulant such as Periactin 4 mg QID. Will send message to Dr. Chavez.     Materials Provided/Reviewed: none    Nutrition Monitoring and Evaluation    Monitor: energy intake, weight, diet education needs , and symptom management     Follow up: In 3 weeks     Communication to referring provider/care team: Note available in chart.     Consultation Time: 40 minutes Minutes    Kemar OREILLYAP, , LDN  [facility] in Clinical Nutrition  Board Certified Specialist in Oncology Nutrition   [facility]  [phone]                                                               [1]   Past Medical History:  Diagnosis Date    Cataract     Chondromalacia, knee, 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    Diabetes mellitus     Glaucoma     Hypertension     Lung cancer     Other ascites 11/29/2022

## 2025-07-29 ENCOUNTER — TELEPHONE (OUTPATIENT)
Dept: HEMATOLOGY/ONCOLOGY | Facility: CLINIC | Age: 85
End: 2025-07-29

## 2025-07-29 ENCOUNTER — CLINICAL SUPPORT (OUTPATIENT)
Dept: HEMATOLOGY/ONCOLOGY | Facility: CLINIC | Age: 85
End: 2025-07-29
Payer: MEDICARE

## 2025-07-29 ENCOUNTER — PATIENT MESSAGE (OUTPATIENT)
Dept: PALLIATIVE MEDICINE | Facility: CLINIC | Age: 85
End: 2025-07-29
Payer: MEDICARE

## 2025-07-29 DIAGNOSIS — E11.51 TYPE 2 DIABETES MELLITUS WITH DIABETIC PERIPHERAL ANGIOPATHY WITHOUT GANGRENE, WITH LONG-TERM CURRENT USE OF INSULIN: ICD-10-CM

## 2025-07-29 DIAGNOSIS — Z71.3 NUTRITIONAL COUNSELING: Primary | ICD-10-CM

## 2025-07-29 DIAGNOSIS — Z79.4 TYPE 2 DIABETES MELLITUS WITH DIABETIC PERIPHERAL ANGIOPATHY WITHOUT GANGRENE, WITH LONG-TERM CURRENT USE OF INSULIN: ICD-10-CM

## 2025-07-29 DIAGNOSIS — C34.11 ADENOCARCINOMA OF UPPER LOBE OF RIGHT LUNG: ICD-10-CM

## 2025-07-29 DIAGNOSIS — C79.89 SECONDARY MALIGNANT NEOPLASM OF SOFT TISSUES OF SHOULDER: ICD-10-CM

## 2025-07-29 PROCEDURE — 97802 MEDICAL NUTRITION INDIV IN: CPT | Mod: 95,,,

## 2025-07-29 RX ORDER — CYPROHEPTADINE HYDROCHLORIDE 4 MG/1
4 TABLET ORAL 3 TIMES DAILY PRN
Qty: 90 TABLET | Refills: 2 | Status: SHIPPED | OUTPATIENT
Start: 2025-07-29 | End: 2025-07-30 | Stop reason: SDUPTHER

## 2025-07-29 NOTE — TELEPHONE ENCOUNTER
Left message to  on 07/29/2025 @4:25pm to give our office a call to schedule her mother nutrition appt to meet with WALESKA Finney MA ext.[phone]

## 2025-07-29 NOTE — Clinical Note
Jack Chavez - Spoke with patient and daughter this morning for nutrition assessment. Lots of concern about wound healing. Unfortunately patients intake is very poor. I would like to try a trial of appetite stimulant like Periactin 4 mg TID/QID?  Thanks, Kemar

## 2025-07-30 DIAGNOSIS — C34.90 STAGE 4 MALIGNANT NEOPLASM OF LUNG, UNSPECIFIED LATERALITY: ICD-10-CM

## 2025-07-30 RX ORDER — ONDANSETRON 4 MG/1
4 TABLET, ORALLY DISINTEGRATING ORAL EVERY 6 HOURS PRN
Qty: 30 TABLET | Refills: 2 | Status: SHIPPED | OUTPATIENT
Start: 2025-07-30

## 2025-07-30 RX ORDER — CYPROHEPTADINE HYDROCHLORIDE 4 MG/1
4 TABLET ORAL 3 TIMES DAILY PRN
Qty: 90 TABLET | Refills: 2 | Status: SHIPPED | OUTPATIENT
Start: 2025-07-30

## 2025-07-31 ENCOUNTER — PATIENT MESSAGE (OUTPATIENT)
Dept: PALLIATIVE MEDICINE | Facility: CLINIC | Age: 85
End: 2025-07-31
Payer: MEDICARE

## 2025-07-31 ENCOUNTER — OFFICE VISIT (OUTPATIENT)
Dept: VASCULAR SURGERY | Facility: CLINIC | Age: 85
End: 2025-07-31
Payer: MEDICARE

## 2025-07-31 VITALS
HEIGHT: 66 IN | BODY MASS INDEX: 15.94 KG/M2 | SYSTOLIC BLOOD PRESSURE: 128 MMHG | WEIGHT: 99.19 LBS | TEMPERATURE: 99 F | DIASTOLIC BLOOD PRESSURE: 75 MMHG | HEART RATE: 84 BPM

## 2025-07-31 DIAGNOSIS — S32.010S COMPRESSION FRACTURE OF L1 VERTEBRA, SEQUELA: ICD-10-CM

## 2025-07-31 DIAGNOSIS — I70.229 CRITICAL LOWER LIMB ISCHEMIA: Primary | ICD-10-CM

## 2025-07-31 DIAGNOSIS — I73.9 PVD (PERIPHERAL VASCULAR DISEASE): Primary | ICD-10-CM

## 2025-07-31 PROCEDURE — 99999 PR PBB SHADOW E&M-EST. PATIENT-LVL IV: CPT | Mod: PBBFAC,,, | Performed by: STUDENT IN AN ORGANIZED HEALTH CARE EDUCATION/TRAINING PROGRAM

## 2025-07-31 PROCEDURE — 99214 OFFICE O/P EST MOD 30 MIN: CPT | Mod: PBBFAC | Performed by: STUDENT IN AN ORGANIZED HEALTH CARE EDUCATION/TRAINING PROGRAM

## 2025-07-31 RX ORDER — OXYCODONE HYDROCHLORIDE 5 MG/1
2.5 TABLET ORAL EVERY 6 HOURS PRN
Qty: 15 TABLET | Refills: 0 | Status: SHIPPED | OUTPATIENT
Start: 2025-07-31

## 2025-07-31 NOTE — PROGRESS NOTES
VASCULAR SURGERY SERVICE    REFERRING DOCTOR: Leticia Lim MD    CHIEF COMPLAINT: Right toe necrosis    HISTORY OF PRESENT ILLNESS: Radha Feng is a 84 y.o. 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 She was discharged home on maximal medical therapy.  She returns to clinic today for follow up.  Her right 2nd 3rd and 4th toes have all become discolored.  She reports pain, specifically at night.  She is in clinic today with her daughter who is her advocate.  Overall she is doing okay.  She denies any fevers or chills.  She denies any drainage from her feet.  She is able to keep her wounds very clean.    Interval History:  She returns to clinic today for follow up.  She is still experiencing constant pain, which is not unexpected.  Her toes are clean and dry.  She has mummified changes to her 2nd through 4th toes.  There is minimal odor.  There was no drainage.  She is not interested in a palliative above-knee amputation at this time, but would like to discuss different options for better pain control.  She did have a virtual visit with palliative Care, and the lidocaine cream that was prescribed is not helping her.    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    Diabetes mellitus     Glaucoma     Hypertension     Lung cancer     Other ascites 11/29/2022       Past Surgical History:   Procedure Laterality Date    CATARACT EXTRACTION W/  INTRAOCULAR LENS IMPLANT Left 12/21/2021        ENDOBRONCHIAL ULTRASOUND N/A 07/05/2024    Procedure: ENDOBRONCHIAL ULTRASOUND (EBUS);  Surgeon: Rolo Wilkinson MD;  Location: [facility] OR [address] [place] [address];  Service: Pulmonary;  Laterality: N/A;     "ESOPHAGOGASTRODUODENOSCOPY N/A 06/26/2023    Procedure: ESOPHAGOGASTRODUODENOSCOPY (EGD);  Surgeon: Edgar Branch MD;  Location: [facility] ([address] [place] [address]);  Service: Endoscopy;  Laterality: N/A;  referral Dr Peraza-cirrhosis/labs done on 4/24/23-instr portal-GT       Current Medications[1]    Review of patient's allergies indicates:  No Known Allergies    Family History   Problem Relation Name Age of Onset    Cataracts Mother      Diabetes Mother      Glaucoma Mother      Hypertension Mother      Heart attack Father      Colon cancer Sister      Blindness Cousin      Amblyopia Neg Hx      Macular degeneration Neg Hx      Retinal detachment Neg Hx      Strabismus Neg Hx         Social History[2]    REVIEW OF SYSTEMS:  General: No chills, fever, malaise, changes in weight, reports low appetite  HEENT: No visual changes, difficulty hearing  Cardiovascular: No chest pain, palpitations  Pulmonary: No dyspnea, cough, wheezing  Gastrointestinal: No nausea, vomiting, diarrhea, constipation  Genitourinary: No dysuria, low urine output, hematuria  Endocrine: No polydipsia, polyphagia  Hematologic: No pallor  Musculoskeletal:  Chronic right hip joint pain, no back pain,  Neurologic: no seizures, no headaches, no weakness  Psychiatric: no mood disturbance      PHYSICAL EXAM:   /75 (BP Location: Left arm, Patient Position: Sitting)   Pulse 84   Temp 98.6 °F (37 °C) (Oral)   Ht 5' 6" (1.676 m)   Wt 45 kg (99 lb 3.3 oz)   BMI 16.01 kg/m²   Constitutional:  Alert,   Well-appearing  In no distress.   Neurological: Normal speech  no focal findings  CN II - XII grossly intact.    Psychiatric: Mood and affect appropriate and symmetric.   HEENT: Normocephalic / atraumatic  PERRLA  Midline trachea  No scars across the neck   Cardiac: Regular rate and rhythm.   Pulmonary: Normal pulmonary effort.   Abdomen: Soft, not distended.     Skin: Warm and well perfused.    Vascular:  Dopplerable right DP and PT all the way into her plantar " arch.  Dopplerable left DP and PT.   Extremities/  Musculoskeletal: No edema.   Her right 2nd through 4th toes have dry gangrene, there is no drainage.  There is no erythema.       IMAGING:  No imaging was performed today    IMPRESSION:  84-year-old female with symptomatic right leg severe PAD and dry gangrene of her 2nd through 4th toes, with partial dry gangrene of her medial right 1st toe    PLAN:   We had a long talk again in clinic today.  She is not yet ready for a palliative above-knee amputation.  I did explain to her that maybe she could go to the [facility] pain Clinic to see if there were any block options for her constant pain in her foot.  Unfortunately, this is ischemic pain and not unexpected.  Overall though, she still has a good quality of life and is still relatively mobile with her leg.  We discussed all of this in clinic today.  I will refill her pain meds as she does not have an in-person visit with palliative Medicine for another few weeks.  I will also put in a referral to [facility] pain Clinic to see if there are any other local block options that could help her with better pain control.  I will plan on seeing her back in 6 weeks' time.                 [1]   Current Outpatient Medications:     acetaminophen (TYLENOL) 500 MG tablet, Take 2 tablets (1,000 mg total) by mouth every 8 (eight) hours as needed for Pain., Disp: , Rfl: 0    aspirin 81 MG Chew, Take 1 tablet (81 mg total) by mouth once daily., Disp: 30 tablet, Rfl: 0    atorvastatin (LIPITOR) 10 MG tablet, Take 1 tablet (10 mg total) by mouth once daily., Disp: 90 tablet, Rfl: 3    BD INSULIN SYRINGE ULTRA-FINE 1 mL 31 gauge x 5/16 Syrg, , Disp: , Rfl:     blood-glucose meter,continuous Misc, Dispsense 1 Dexcom G7 , Disp: 1 each, Rfl: 0    blood-glucose transmitter (DEXCOM G6 TRANSMITTER) Amanda, 1 each by Misc.(Non-Drug; Combo Route) route every 3 (three) months., Disp: 1 each, Rfl: 3    brimonidine 0.2% (ALPHAGAN) 0.2 % Drop, Place  1 drop into both eyes 3 (three) times daily., Disp: 10 mL, Rfl: 11    cholecalciferol, vitamin D3, (VITAMIN D3) 25 mcg (1,000 unit) capsule, Take 2 capsules (2,000 Units total) by mouth once daily., Disp: , Rfl: 0    citalopram (CELEXA) 10 MG tablet, Take 1 tablet (10 mg total) by mouth once daily., Disp: 30 tablet, Rfl: 2    clopidogreL (PLAVIX) 75 mg tablet, Take 1 tablet (75 mg total) by mouth once daily., Disp: 30 tablet, Rfl: 11    cyproheptadine (PERIACTIN) 4 mg tablet, Take 1 tablet (4 mg total) by mouth 3 (three) times daily as needed (appetite stimulant)., Disp: 90 tablet, Rfl: 2    DEXCOM G7 SENSOR Amanda, 1 Device by Misc.(Non-Drug; Combo Route) route every 10 days., Disp: 9 each, Rfl: 3    dorzolamide-timolol 2-0.5% (COSOPT) 22.3-6.8 mg/mL ophthalmic solution, Place 1 drop into both eyes 2 (two) times daily., Disp: 20 mL, Rfl: 3    furosemide (LASIX) 40 MG tablet, TAKE 1 TABLET BY MOUTH EVERY DAY, Disp: 90 tablet, Rfl: 0    insulin aspart U-100 (NOVOLOG U-100 INSULIN ASPART) 100 unit/mL injection, TO USE WITH OMNIPOD 5. TOTAL DAILY DOSE UP TO 40 UNITS, Disp: 40 mL, Rfl: 4    LIDOcaine (LIDODERM) 5 %, Place 1 patch onto the skin once daily. Remove & Discard patch within 12 hours or as directed by MD, Disp: 30 patch, Rfl: 0    LIDOcaine (XYLOCAINE) 5 % Oint ointment, Apply topically as needed (ischemic foot pain)., Disp: 120 g, Rfl: 2    melatonin (MELATIN) 3 mg tablet, Take 2 tablets (6 mg total) by mouth nightly as needed for Insomnia., Disp: 30 tablet, Rfl: 3    nystatin (MYCOSTATIN) 100,000 unit/mL suspension, Take 6 mLs (600,000 Units total) by mouth 4 (four) times daily., Disp: 473 mL, Rfl: 2    OMNIPOD 5 G6-G7 PODS, GEN 5, Crtg, Inject 1 each into the skin every 72 hours., Disp: 10 each, Rfl: 11    ondansetron (ZOFRAN-ODT) 4 MG TbDL, Dissolve one tablet (4 mg total) by mouth every 6 (six) hours as needed (nausea)., Disp: 30 tablet, Rfl: 2    oxyCODONE (ROXICODONE) 5 MG immediate release tablet, Take  "0.5 tablets (2.5 mg total) by mouth every 6 (six) hours as needed for Pain., Disp: 10 tablet, Rfl: 0    pen needle, diabetic 31 gauge x 5/16" Ndle, Use to inject insulin into the skin up to 4 times daily, Disp: 400 each, Rfl: 3    predniSONE (DELTASONE) 5 MG tablet, Take 0.5 tablets (2.5 mg total) by mouth once daily., Disp: 45 tablet, Rfl: 3    tobramycin sulfate 0.3% (TOBREX) 0.3 % ophthalmic solution, 1-2 drops topically twice daily to affected toe(s)., Disp: 5 mL, Rfl: 9    latanoprost 0.005 % ophthalmic solution, PLACE 1 DROP INTO BOTH EYES ONCE DAILY, Disp: 7.5 mL, Rfl: 3  [2]   Social History  Tobacco Use    Smoking status: Former    Smokeless tobacco: Never   Substance Use Topics    Alcohol use: Not Currently    Drug use: Never     "

## 2025-08-01 ENCOUNTER — TELEPHONE (OUTPATIENT)
Dept: WOUND CARE | Facility: HOSPITAL | Age: 85
End: 2025-08-01
Payer: MEDICARE

## 2025-08-01 NOTE — TELEPHONE ENCOUNTER
Called and spoke to daughter regarding appointment. Offered daughter 8/8/25 soonest appointment with  daughter declined, offered next available 8/11/25 at 10 am declined again. Daughter states she will call clinic back to schedule around her work schedule and she has to make arrangements to come.

## 2025-08-02 VITALS — HEART RATE: 77 BPM | DIASTOLIC BLOOD PRESSURE: 67 MMHG | SYSTOLIC BLOOD PRESSURE: 109 MMHG | OXYGEN SATURATION: 96 %

## 2025-08-02 PROBLEM — R29.898 WEAKNESS OF BOTH LOWER EXTREMITIES: Status: ACTIVE | Noted: 2025-08-02

## 2025-08-02 PROBLEM — R68.89 WORSENING FUNCTIONAL ENDURANCE: Status: ACTIVE | Noted: 2025-08-02

## 2025-08-02 PROBLEM — R29.898 WEAKNESS OF BOTH UPPER EXTREMITIES: Status: ACTIVE | Noted: 2025-08-02

## 2025-08-04 PROBLEM — R68.89 WORSENING FUNCTIONAL ENDURANCE: Status: RESOLVED | Noted: 2025-08-02 | Resolved: 2025-08-04

## 2025-08-05 ENCOUNTER — HOSPITAL ENCOUNTER (OUTPATIENT)
Dept: RADIOLOGY | Facility: OTHER | Age: 85
Discharge: HOME | End: 2025-08-05
Attending: INTERNAL MEDICINE
Payer: MEDICARE

## 2025-08-05 ENCOUNTER — OUTPATIENT CASE MANAGEMENT (OUTPATIENT)
Dept: ADMINISTRATIVE | Facility: OTHER | Age: 85
End: 2025-08-05
Payer: MEDICARE

## 2025-08-05 DIAGNOSIS — M81.0 AGE-RELATED OSTEOPOROSIS WITHOUT CURRENT PATHOLOGICAL FRACTURE: ICD-10-CM

## 2025-08-05 PROCEDURE — 77080 DXA BONE DENSITY AXIAL: CPT | Mod: TC

## 2025-08-05 PROCEDURE — 77080 DXA BONE DENSITY AXIAL: CPT | Mod: 26,,, | Performed by: RADIOLOGY

## 2025-08-05 NOTE — PROGRESS NOTES
08/05/25 Attempt f/u with patient/caregiver. No answer. Left message requesting call back. RN OPCM third f/u attempt. RN OPCM.

## 2025-08-11 PROBLEM — R53.83 FATIGUE: Status: RESOLVED | Noted: 2025-06-26 | Resolved: 2025-08-11

## 2025-08-11 PROBLEM — R53.1 GENERALIZED WEAKNESS: Status: RESOLVED | Noted: 2025-06-26 | Resolved: 2025-08-11

## 2025-08-19 ENCOUNTER — LAB VISIT (OUTPATIENT)
Dept: LAB | Facility: HOSPITAL | Age: 85
End: 2025-08-19
Attending: INTERNAL MEDICINE
Payer: MEDICARE

## 2025-08-19 ENCOUNTER — TELEPHONE (OUTPATIENT)
Dept: PAIN MEDICINE | Facility: CLINIC | Age: 85
End: 2025-08-19
Payer: MEDICARE

## 2025-08-19 DIAGNOSIS — K75.4 AUTOIMMUNE HEPATITIS: ICD-10-CM

## 2025-08-19 LAB
ABSOLUTE EOSINOPHIL (OHS): 0.05 K/UL
ABSOLUTE MONOCYTE (OHS): 1.73 K/UL (ref 0.3–1)
ABSOLUTE NEUTROPHIL COUNT (OHS): 13.96 K/UL (ref 1.8–7.7)
ALBUMIN SERPL BCP-MCNC: 1.6 G/DL (ref 3.5–5.2)
ALP SERPL-CCNC: 89 UNIT/L (ref 40–150)
ALT SERPL W/O P-5'-P-CCNC: <8 UNIT/L (ref 0–55)
ANION GAP (OHS): 9 MMOL/L (ref 8–16)
AST SERPL-CCNC: 33 UNIT/L (ref 0–50)
BASOPHILS # BLD AUTO: 0.02 K/UL
BASOPHILS NFR BLD AUTO: 0.1 %
BILIRUB DIRECT SERPL-MCNC: 0.2 MG/DL (ref 0.1–0.3)
BILIRUB SERPL-MCNC: 0.4 MG/DL (ref 0.1–1)
BUN SERPL-MCNC: 33 MG/DL (ref 8–23)
CALCIUM SERPL-MCNC: 9.2 MG/DL (ref 8.7–10.5)
CHLORIDE SERPL-SCNC: 107 MMOL/L (ref 95–110)
CO2 SERPL-SCNC: 24 MMOL/L (ref 23–29)
CREAT SERPL-MCNC: 0.6 MG/DL (ref 0.5–1.4)
ERYTHROCYTE [DISTWIDTH] IN BLOOD BY AUTOMATED COUNT: 19.1 % (ref 11.5–14.5)
GFR SERPLBLD CREATININE-BSD FMLA CKD-EPI: >60 ML/MIN/1.73/M2
GLUCOSE SERPL-MCNC: 186 MG/DL (ref 70–110)
HCT VFR BLD AUTO: 27.8 % (ref 37–48.5)
HGB BLD-MCNC: 8.1 GM/DL (ref 12–16)
IMM GRANULOCYTES # BLD AUTO: 0.11 K/UL (ref 0–0.04)
IMM GRANULOCYTES NFR BLD AUTO: 0.6 % (ref 0–0.5)
INR PPP: 1.1 (ref 0.8–1.2)
LYMPHOCYTES # BLD AUTO: 1.49 K/UL (ref 1–4.8)
MCH RBC QN AUTO: 28.3 PG (ref 27–31)
MCHC RBC AUTO-ENTMCNC: 29.1 G/DL (ref 32–36)
MCV RBC AUTO: 97 FL (ref 82–98)
NUCLEATED RBC (/100WBC) (OHS): 0 /100 WBC
PLATELET # BLD AUTO: 333 K/UL (ref 150–450)
PMV BLD AUTO: 11.3 FL (ref 9.2–12.9)
POTASSIUM SERPL-SCNC: 4.4 MMOL/L (ref 3.5–5.1)
PROT SERPL-MCNC: 6.6 GM/DL (ref 6–8.4)
PROTHROMBIN TIME: 12 SECONDS (ref 9–12.5)
RBC # BLD AUTO: 2.86 M/UL (ref 4–5.4)
RELATIVE EOSINOPHIL (OHS): 0.3 %
RELATIVE LYMPHOCYTE (OHS): 8.6 % (ref 18–48)
RELATIVE MONOCYTE (OHS): 10 % (ref 4–15)
RELATIVE NEUTROPHIL (OHS): 80.4 % (ref 38–73)
SODIUM SERPL-SCNC: 140 MMOL/L (ref 136–145)
WBC # BLD AUTO: 17.36 K/UL (ref 3.9–12.7)

## 2025-08-19 PROCEDURE — 85610 PROTHROMBIN TIME: CPT

## 2025-08-19 PROCEDURE — 36415 COLL VENOUS BLD VENIPUNCTURE: CPT | Mod: PN

## 2025-08-19 PROCEDURE — 82248 BILIRUBIN DIRECT: CPT

## 2025-08-19 PROCEDURE — 85025 COMPLETE CBC W/AUTO DIFF WBC: CPT

## 2025-08-19 PROCEDURE — 82040 ASSAY OF SERUM ALBUMIN: CPT

## 2025-08-21 ENCOUNTER — PATIENT MESSAGE (OUTPATIENT)
Dept: PALLIATIVE MEDICINE | Facility: CLINIC | Age: 85
End: 2025-08-21
Payer: MEDICARE

## 2025-08-22 DIAGNOSIS — R82.90 MALODOROUS URINE: Primary | ICD-10-CM

## 2025-08-25 ENCOUNTER — LAB VISIT (OUTPATIENT)
Dept: LAB | Facility: HOSPITAL | Age: 85
End: 2025-08-25
Attending: HOSPITALIST
Payer: MEDICARE

## 2025-08-25 DIAGNOSIS — R82.90 MALODOROUS URINE: ICD-10-CM

## 2025-08-25 LAB
BILIRUB UR QL STRIP.AUTO: NEGATIVE
CLARITY UR: ABNORMAL
COLOR UR AUTO: YELLOW
GLUCOSE UR QL STRIP: NEGATIVE
HGB UR QL STRIP: NEGATIVE
KETONES UR QL STRIP: NEGATIVE
LEUKOCYTE ESTERASE UR QL STRIP: ABNORMAL
NITRITE UR QL STRIP: POSITIVE
PH UR STRIP: 6 [PH]
PROT UR QL STRIP: ABNORMAL
SP GR UR STRIP: 1.03
UROBILINOGEN UR STRIP-ACNC: NEGATIVE EU/DL

## 2025-08-25 PROCEDURE — 81003 URINALYSIS AUTO W/O SCOPE: CPT

## 2025-08-25 PROCEDURE — 87077 CULTURE AEROBIC IDENTIFY: CPT

## 2025-08-26 ENCOUNTER — PATIENT MESSAGE (OUTPATIENT)
Dept: HEPATOLOGY | Facility: CLINIC | Age: 85
End: 2025-08-26
Payer: MEDICARE

## 2025-08-26 ENCOUNTER — PATIENT MESSAGE (OUTPATIENT)
Dept: ENDOCRINOLOGY | Facility: CLINIC | Age: 85
End: 2025-08-26
Payer: MEDICARE

## 2025-08-26 ENCOUNTER — PATIENT MESSAGE (OUTPATIENT)
Dept: INFECTIOUS DISEASES | Facility: HOSPITAL | Age: 85
End: 2025-08-26
Payer: MEDICARE

## 2025-08-26 DIAGNOSIS — N39.0 URINARY TRACT INFECTION WITHOUT HEMATURIA, SITE UNSPECIFIED: Primary | ICD-10-CM

## 2025-08-26 LAB
BACTERIA #/AREA URNS AUTO: ABNORMAL /HPF
HOLD SPECIMEN: NORMAL
HYALINE CASTS UR QL AUTO: 24 /LPF (ref 0–1)
MICROSCOPIC COMMENT: ABNORMAL
RBC #/AREA URNS AUTO: 3 /HPF (ref 0–4)
SQUAMOUS #/AREA URNS AUTO: 6 /HPF
WBC #/AREA URNS AUTO: 13 /HPF (ref 0–5)

## 2025-08-26 RX ORDER — NITROFURANTOIN 25; 75 MG/1; MG/1
100 CAPSULE ORAL 2 TIMES DAILY
Qty: 10 CAPSULE | Refills: 0 | Status: SHIPPED | OUTPATIENT
Start: 2025-08-26 | End: 2025-08-31

## 2025-08-27 ENCOUNTER — TELEPHONE (OUTPATIENT)
Dept: PAIN MEDICINE | Facility: CLINIC | Age: 85
End: 2025-08-27
Payer: MEDICARE

## 2025-08-28 ENCOUNTER — PATIENT MESSAGE (OUTPATIENT)
Dept: PAIN MEDICINE | Facility: CLINIC | Age: 85
End: 2025-08-28
Payer: MEDICARE

## 2025-08-28 ENCOUNTER — OFFICE VISIT (OUTPATIENT)
Dept: PAIN MEDICINE | Facility: CLINIC | Age: 85
End: 2025-08-28
Payer: MEDICARE

## 2025-08-28 ENCOUNTER — LAB VISIT (OUTPATIENT)
Dept: LAB | Facility: OTHER | Age: 85
End: 2025-08-28
Attending: ANESTHESIOLOGY
Payer: MEDICARE

## 2025-08-28 VITALS
SYSTOLIC BLOOD PRESSURE: 112 MMHG | HEIGHT: 66 IN | DIASTOLIC BLOOD PRESSURE: 62 MMHG | OXYGEN SATURATION: 98 % | BODY MASS INDEX: 15.94 KG/M2 | TEMPERATURE: 98 F | WEIGHT: 99.19 LBS | RESPIRATION RATE: 18 BRPM | HEART RATE: 99 BPM

## 2025-08-28 DIAGNOSIS — F11.20 OPIOID USE DISORDER, MODERATE, DEPENDENCE: ICD-10-CM

## 2025-08-28 DIAGNOSIS — M79.671 FOOT PAIN, RIGHT: Primary | ICD-10-CM

## 2025-08-28 DIAGNOSIS — F11.20 OPIOID USE DISORDER, MODERATE, DEPENDENCE: Primary | ICD-10-CM

## 2025-08-28 DIAGNOSIS — I73.9 SEVERE PERIPHERAL ARTERIAL DISEASE: ICD-10-CM

## 2025-08-28 DIAGNOSIS — K75.4 AUTOIMMUNE HEPATITIS: ICD-10-CM

## 2025-08-28 LAB — BACTERIA UR CULT: ABNORMAL

## 2025-08-28 PROCEDURE — 99999 PR PBB SHADOW E&M-EST. PATIENT-LVL V: CPT | Mod: PBBFAC,,, | Performed by: ANESTHESIOLOGY

## 2025-08-28 PROCEDURE — 80347 BENZODIAZEPINES 13 OR MORE: CPT

## 2025-08-28 PROCEDURE — 99215 OFFICE O/P EST HI 40 MIN: CPT | Mod: PBBFAC | Performed by: ANESTHESIOLOGY

## 2025-08-28 PROCEDURE — 99204 OFFICE O/P NEW MOD 45 MIN: CPT | Mod: S$PBB,,, | Performed by: ANESTHESIOLOGY

## 2025-08-28 RX ORDER — PREGABALIN 50 MG/1
50 CAPSULE ORAL 2 TIMES DAILY
Qty: 60 CAPSULE | Refills: 2 | Status: SHIPPED | OUTPATIENT
Start: 2025-08-28 | End: 2025-08-28

## 2025-08-28 RX ORDER — OXYCODONE HYDROCHLORIDE 5 MG/1
5 TABLET ORAL EVERY 4 HOURS PRN
Qty: 30 TABLET | Refills: 0 | Status: SHIPPED | OUTPATIENT
Start: 2025-08-28

## 2025-08-28 RX ORDER — PREGABALIN 25 MG/1
25 CAPSULE ORAL 2 TIMES DAILY
Qty: 60 CAPSULE | Refills: 2 | Status: SHIPPED | OUTPATIENT
Start: 2025-08-28 | End: 2026-02-26

## 2025-08-29 ENCOUNTER — PATIENT MESSAGE (OUTPATIENT)
Dept: PAIN MEDICINE | Facility: CLINIC | Age: 85
End: 2025-08-29
Payer: MEDICARE

## 2025-09-01 ENCOUNTER — PATIENT MESSAGE (OUTPATIENT)
Dept: HEMATOLOGY/ONCOLOGY | Facility: CLINIC | Age: 85
End: 2025-09-01
Payer: MEDICARE

## 2025-09-01 PROBLEM — R62.7 FAILURE TO THRIVE IN ADULT: Status: ACTIVE | Noted: 2025-09-01

## 2025-09-01 PROBLEM — C79.31 METASTATIC CANCER TO BRAIN: Status: ACTIVE | Noted: 2025-09-01

## 2025-09-01 PROBLEM — G93.6 VASOGENIC CEREBRAL EDEMA: Status: ACTIVE | Noted: 2025-09-01

## 2025-09-01 PROBLEM — R41.82 ALTERED MENTAL STATUS: Status: ACTIVE | Noted: 2025-09-01

## 2025-09-01 PROBLEM — G93.40 ENCEPHALOPATHY: Status: ACTIVE | Noted: 2025-09-01

## 2025-09-02 PROBLEM — L89.309 PRESSURE INJURY OF SKIN OF BUTTOCK: Status: ACTIVE | Noted: 2025-09-02

## 2025-09-02 PROBLEM — I96 DRY GANGRENE: Status: ACTIVE | Noted: 2025-09-02

## 2025-09-02 PROBLEM — R52 PAIN: Status: ACTIVE | Noted: 2025-09-02

## 2025-09-02 LAB
1OH-MIDAZOLAM UR QL SCN: NOT DETECTED
6MAM UR QL: NOT DETECTED
7AMINOCLONAZEPAM UR QL: NOT DETECTED
A-OH ALPRAZ UR QL: NOT DETECTED
ALPRAZ UR QL: NOT DETECTED
AMPHET UR QL SCN: NOT DETECTED
ANNOTATION COMMENT IMP: ABNORMAL
AR NOROXYMORPHONE (CUTOFF 100 NG/ML): NOT DETECTED
AR OXYMORPHONE (CUTOFF 40 NG/ML): PRESENT
BARBITURATES UR QL: NEGATIVE
BUPRENORPHINE UR QL: NOT DETECTED
BZE UR QL: NEGATIVE
CARBOXYTHC UR QL: NEGATIVE
CARISOPRODOL UR QL: NEGATIVE
CLONAZEPAM UR QL: NOT DETECTED
CODEINE UR QL: NOT DETECTED
CREAT UR-MCNC: 100.2 MG/DL
DIAZEPAM UR QL: NOT DETECTED
ETHYL GLUCURONIDE UR QL: NEGATIVE
FENTANYL UR QL: NOT DETECTED
GABAPENTIN UR QL CFM: NOT DETECTED
HYDROCODONE UR QL: NOT DETECTED
HYDROMORPHONE UR QL: NOT DETECTED
LORAZEPAM UR QL: NOT DETECTED
MDA UR QL: NOT DETECTED
MDEA UR QL: NOT DETECTED
MDMA UR QL: NOT DETECTED
ME-PHENIDATE UR QL: NOT DETECTED
METHADONE UR QL: NEGATIVE
METHAMPHET UR QL: NOT DETECTED
MIDAZOLAM UR QL SCN: NOT DETECTED
MORPHINE UR QL: NOT DETECTED
NALOXONE UR QL CFM: NOT DETECTED
NORBUPRENORPHINE UR QL CFM: NOT DETECTED
NORDIAZEPAM UR QL: NOT DETECTED
NORFENTANYL UR QL: NOT DETECTED
NORMEPERIDINE UR QL CFM: NOT DETECTED
NOROXYCODONE UR QL CFM: NOT DETECTED
NOROXYMORPHONE UR QL SCN: PRESENT
OXAZEPAM UR QL: NOT DETECTED
OXYCODONE UR QL: PRESENT
PATHOLOGY STUDY: ABNORMAL
PCP UR QL: NEGATIVE
PHENTERMINE UR QL: NOT DETECTED
PREGABALIN UR QL CFM: NOT DETECTED
SERVICE CMNT-IMP: ABNORMAL
TAPENTADOL UR QL SCN: NOT DETECTED
TAPENTADOL UR QL SCN: NOT DETECTED
TEMAZEPAM UR QL: NOT DETECTED
TRAMADOL UR QL: NEGATIVE
ZOLPIDEM PHENYL-4-CARB UR QL SCN: NOT DETECTED
ZOLPIDEM UR QL: NOT DETECTED

## 2025-09-03 PROBLEM — A49.8 CLOSTRIDIUM DIFFICILE INFECTION: Status: ACTIVE | Noted: 2025-09-03

## 2025-09-04 DIAGNOSIS — R91.8 LUNG MASS: Primary | ICD-10-CM

## (undated) DEVICE — BLADE SURG BVL ANG COAX 2.4MM

## (undated) DEVICE — Device

## (undated) DEVICE — BOWL STERILE LARGE 32OZ

## (undated) DEVICE — SYR SLIP TIP 1CC

## (undated) DEVICE — CASSETTE INFINITI

## (undated) DEVICE — SYS LABEL CORRECT MED

## (undated) DEVICE — SPONGE COTTON TRAY 4X4IN

## (undated) DEVICE — SOL BETADINE 5%

## (undated) DEVICE — CONTAINER SPECIMEN OR STER 4OZ

## (undated) DEVICE — CONNECTOR SWIVEL

## (undated) DEVICE — CATH BRONCHOSCOPE F/BF

## (undated) DEVICE — GOWN POLY REINF BRTH SLV XL

## (undated) DEVICE — NDL ASPIRATING VIZISHOT 20-40M

## (undated) DEVICE — LUBRICANT SURGILUBE 2 OZ

## (undated) DEVICE — DRESSING TRANS 6X8 TEGADERM

## (undated) DEVICE — KIT ANTIFOG W/SPONG & FLUID

## (undated) DEVICE — GLOVE BIOGEL SKINSENSE PI 7.5

## (undated) DEVICE — BAG ION VISION PROBE

## (undated) DEVICE — CYTOSPIN COLLECTION FLUID BLT

## (undated) DEVICE — GLASSES EYE PROTECTIVE

## (undated) DEVICE — NDL VIZISHOT 2 FLEX 22G

## (undated) DEVICE — SOL IRR STRL WATER 500ML

## (undated) DEVICE — ADAPTER SWIVEL

## (undated) DEVICE — ADAPTER VISION PROBE & SUCTION

## (undated) DEVICE — SHIELD EYE METAL FOX 50/BX

## (undated) DEVICE — SYR 10CC LUER LOCK

## (undated) DEVICE — SYR SLIP TIP 20CC

## (undated) DEVICE — PENCIL GOLF STERILE

## (undated) DEVICE — SHEILD & GARTERS FOX METAL EYE

## (undated) DEVICE — DRAPE THREE-QTR REINF 53X77IN

## (undated) DEVICE — SOL PVP-I SCRUB 7.5% 4OZ

## (undated) DEVICE — PACK ECLIPSE SET-UP W/O DRAPE

## (undated) DEVICE — ALCOHOL BLEND 95%